# Patient Record
Sex: MALE | NOT HISPANIC OR LATINO | Employment: UNEMPLOYED | URBAN - METROPOLITAN AREA
[De-identification: names, ages, dates, MRNs, and addresses within clinical notes are randomized per-mention and may not be internally consistent; named-entity substitution may affect disease eponyms.]

---

## 2021-01-01 ENCOUNTER — VIRTUAL VISIT (OUTPATIENT)
Dept: ONCOLOGY | Facility: CLINIC | Age: 46
End: 2021-01-01
Attending: REGISTERED NURSE
Payer: MEDICAID

## 2021-01-01 ENCOUNTER — VIRTUAL VISIT (OUTPATIENT)
Dept: ONCOLOGY | Facility: CLINIC | Age: 46
End: 2021-01-01
Attending: PHYSICIAN ASSISTANT
Payer: MEDICAID

## 2021-01-01 ENCOUNTER — TELEPHONE (OUTPATIENT)
Facility: CLINIC | Age: 46
End: 2021-01-01

## 2021-01-01 ENCOUNTER — APPOINTMENT (OUTPATIENT)
Dept: GENERAL RADIOLOGY | Facility: CLINIC | Age: 46
End: 2021-01-01
Attending: INTERNAL MEDICINE
Payer: MEDICAID

## 2021-01-01 ENCOUNTER — HOSPITAL ENCOUNTER (OUTPATIENT)
Facility: CLINIC | Age: 46
Discharge: HOME | End: 2021-12-10
Admitting: PHYSICIAN ASSISTANT
Payer: MEDICAID

## 2021-01-01 ENCOUNTER — APPOINTMENT (OUTPATIENT)
Dept: PHYSICAL THERAPY | Facility: CLINIC | Age: 46
End: 2021-01-01
Attending: STUDENT IN AN ORGANIZED HEALTH CARE EDUCATION/TRAINING PROGRAM
Payer: MEDICAID

## 2021-01-01 ENCOUNTER — APPOINTMENT (OUTPATIENT)
Dept: OCCUPATIONAL THERAPY | Facility: CLINIC | Age: 46
End: 2021-01-01
Attending: PHYSICIAN ASSISTANT
Payer: MEDICAID

## 2021-01-01 ENCOUNTER — APPOINTMENT (OUTPATIENT)
Dept: OCCUPATIONAL THERAPY | Facility: CLINIC | Age: 46
End: 2021-01-01
Attending: STUDENT IN AN ORGANIZED HEALTH CARE EDUCATION/TRAINING PROGRAM
Payer: MEDICAID

## 2021-01-01 ENCOUNTER — PATIENT OUTREACH (OUTPATIENT)
Dept: ONCOLOGY | Facility: CLINIC | Age: 46
End: 2021-01-01
Payer: MEDICAID

## 2021-01-01 ENCOUNTER — VIRTUAL VISIT (OUTPATIENT)
Dept: PALLIATIVE CARE | Facility: CLINIC | Age: 46
End: 2021-01-01
Attending: INTERNAL MEDICINE
Payer: MEDICAID

## 2021-01-01 ENCOUNTER — VIRTUAL VISIT (OUTPATIENT)
Dept: RADIATION ONCOLOGY | Facility: HOSPITAL | Age: 46
End: 2021-01-01
Attending: CLINICAL NURSE SPECIALIST

## 2021-01-01 ENCOUNTER — LAB (OUTPATIENT)
Dept: INFUSION THERAPY | Facility: HOSPITAL | Age: 46
End: 2021-01-01
Attending: PHYSICIAN ASSISTANT
Payer: MEDICAID

## 2021-01-01 ENCOUNTER — DOCUMENTATION ONLY (OUTPATIENT)
Facility: CLINIC | Age: 46
End: 2021-01-01

## 2021-01-01 ENCOUNTER — APPOINTMENT (OUTPATIENT)
Dept: OCCUPATIONAL THERAPY | Facility: CLINIC | Age: 46
End: 2021-01-01
Attending: INTERNAL MEDICINE
Payer: MEDICAID

## 2021-01-01 ENCOUNTER — HOSPITAL ENCOUNTER (INPATIENT)
Facility: CLINIC | Age: 46
LOS: 4 days | Discharge: HOME | End: 2021-12-04
Attending: INTERNAL MEDICINE | Admitting: INTERNAL MEDICINE
Payer: MEDICAID

## 2021-01-01 ENCOUNTER — NURSE TRIAGE (OUTPATIENT)
Dept: ONCOLOGY | Facility: CLINIC | Age: 46
End: 2021-01-01
Payer: MEDICAID

## 2021-01-01 ENCOUNTER — INFUSION THERAPY VISIT (OUTPATIENT)
Dept: INFUSION THERAPY | Facility: CLINIC | Age: 46
End: 2021-01-01
Attending: REGISTERED NURSE
Payer: MEDICAID

## 2021-01-01 ENCOUNTER — PATIENT OUTREACH (OUTPATIENT)
Dept: PALLIATIVE CARE | Facility: CLINIC | Age: 46
End: 2021-01-01
Payer: MEDICAID

## 2021-01-01 ENCOUNTER — HOSPITAL ENCOUNTER (OUTPATIENT)
Age: 46
End: 2021-01-01
Attending: INTERNAL MEDICINE | Admitting: INTERNAL MEDICINE
Payer: COMMERCIAL

## 2021-01-01 VITALS
HEART RATE: 130 BPM | OXYGEN SATURATION: 100 % | SYSTOLIC BLOOD PRESSURE: 123 MMHG | RESPIRATION RATE: 18 BRPM | DIASTOLIC BLOOD PRESSURE: 84 MMHG | TEMPERATURE: 99 F

## 2021-01-01 VITALS
RESPIRATION RATE: 20 BRPM | SYSTOLIC BLOOD PRESSURE: 141 MMHG | OXYGEN SATURATION: 100 % | HEIGHT: 74 IN | TEMPERATURE: 97.2 F | DIASTOLIC BLOOD PRESSURE: 78 MMHG | HEART RATE: 135 BPM | BODY MASS INDEX: 32.39 KG/M2 | WEIGHT: 252.4 LBS

## 2021-01-01 VITALS
HEART RATE: 116 BPM | WEIGHT: 257.4 LBS | BODY MASS INDEX: 33.03 KG/M2 | TEMPERATURE: 98.4 F | HEIGHT: 74 IN | OXYGEN SATURATION: 100 % | RESPIRATION RATE: 18 BRPM | DIASTOLIC BLOOD PRESSURE: 70 MMHG | SYSTOLIC BLOOD PRESSURE: 118 MMHG

## 2021-01-01 VITALS
TEMPERATURE: 98.8 F | RESPIRATION RATE: 18 BRPM | SYSTOLIC BLOOD PRESSURE: 121 MMHG | OXYGEN SATURATION: 100 % | HEART RATE: 120 BPM | DIASTOLIC BLOOD PRESSURE: 77 MMHG

## 2021-01-01 VITALS
OXYGEN SATURATION: 100 % | TEMPERATURE: 98.1 F | HEART RATE: 117 BPM | DIASTOLIC BLOOD PRESSURE: 79 MMHG | SYSTOLIC BLOOD PRESSURE: 123 MMHG | RESPIRATION RATE: 20 BRPM

## 2021-01-01 VITALS
HEART RATE: 119 BPM | SYSTOLIC BLOOD PRESSURE: 128 MMHG | RESPIRATION RATE: 20 BRPM | OXYGEN SATURATION: 100 % | TEMPERATURE: 98.3 F | DIASTOLIC BLOOD PRESSURE: 75 MMHG

## 2021-01-01 VITALS
OXYGEN SATURATION: 100 % | SYSTOLIC BLOOD PRESSURE: 122 MMHG | TEMPERATURE: 98.3 F | HEART RATE: 109 BPM | RESPIRATION RATE: 15 BRPM | DIASTOLIC BLOOD PRESSURE: 77 MMHG

## 2021-01-01 DIAGNOSIS — R19.7 DIARRHEA, UNSPECIFIED TYPE: ICD-10-CM

## 2021-01-01 DIAGNOSIS — R63.4 WEIGHT LOSS: ICD-10-CM

## 2021-01-01 DIAGNOSIS — C49.9 SARCOMA (H): Primary | ICD-10-CM

## 2021-01-01 DIAGNOSIS — C49.9 SARCOMA (H): ICD-10-CM

## 2021-01-01 DIAGNOSIS — D50.9 MICROCYTIC ANEMIA: ICD-10-CM

## 2021-01-01 DIAGNOSIS — G89.3 CANCER ASSOCIATED PAIN: Primary | ICD-10-CM

## 2021-01-01 DIAGNOSIS — G89.3 CANCER ASSOCIATED PAIN: ICD-10-CM

## 2021-01-01 DIAGNOSIS — D70.9 NEUTROPENIA, UNSPECIFIED TYPE (H): ICD-10-CM

## 2021-01-01 DIAGNOSIS — R11.0 NAUSEA: ICD-10-CM

## 2021-01-01 DIAGNOSIS — K59.00 CONSTIPATION, UNSPECIFIED CONSTIPATION TYPE: ICD-10-CM

## 2021-01-01 DIAGNOSIS — Z51.5 ENCOUNTER FOR PALLIATIVE CARE: ICD-10-CM

## 2021-01-01 DIAGNOSIS — E87.6 HYPOKALEMIA: ICD-10-CM

## 2021-01-01 DIAGNOSIS — M79.89 SOFT TISSUE MASS: ICD-10-CM

## 2021-01-01 LAB
ABO/RH(D): NORMAL
ALBUMIN SERPL-MCNC: 2 G/DL (ref 3.4–5)
ALBUMIN SERPL-MCNC: 2.2 G/DL (ref 3.4–5)
ALBUMIN SERPL-MCNC: 2.3 G/DL (ref 3.4–5)
ALBUMIN SERPL-MCNC: 2.4 G/DL (ref 3.4–5)
ALBUMIN SERPL-MCNC: 2.5 G/DL (ref 3.4–5)
ALBUMIN SERPL-MCNC: 2.6 G/DL (ref 3.5–5)
ALBUMIN SERPL-MCNC: 2.7 G/DL (ref 3.5–5)
ALBUMIN SERPL-MCNC: 3 G/DL (ref 3.5–5)
ALBUMIN UR-MCNC: 30 MG/DL
ALBUMIN UR-MCNC: 70 MG/DL
ALP SERPL-CCNC: 142 U/L (ref 40–150)
ALP SERPL-CCNC: 143 U/L (ref 40–150)
ALP SERPL-CCNC: 208 U/L (ref 45–120)
ALP SERPL-CCNC: 218 U/L (ref 40–150)
ALP SERPL-CCNC: 237 U/L (ref 45–120)
ALP SERPL-CCNC: 239 U/L (ref 40–150)
ALP SERPL-CCNC: 247 U/L (ref 45–120)
ALP SERPL-CCNC: 275 U/L (ref 40–150)
ALP SERPL-CCNC: 315 U/L (ref 40–150)
ALP SERPL-CCNC: 320 U/L (ref 40–150)
ALP SERPL-CCNC: 397 U/L (ref 40–150)
ALT SERPL W P-5'-P-CCNC: 16 U/L (ref 0–70)
ALT SERPL W P-5'-P-CCNC: 17 U/L (ref 0–70)
ALT SERPL W P-5'-P-CCNC: 17 U/L (ref 0–70)
ALT SERPL W P-5'-P-CCNC: 18 U/L (ref 0–45)
ALT SERPL W P-5'-P-CCNC: 18 U/L (ref 0–45)
ALT SERPL W P-5'-P-CCNC: 18 U/L (ref 0–70)
ALT SERPL W P-5'-P-CCNC: 19 U/L (ref 0–70)
ALT SERPL W P-5'-P-CCNC: 21 U/L (ref 0–70)
ALT SERPL W P-5'-P-CCNC: 24 U/L (ref 0–45)
ALT SERPL W P-5'-P-CCNC: 24 U/L (ref 0–70)
ALT SERPL W P-5'-P-CCNC: 26 U/L (ref 0–70)
ANION GAP SERPL CALCULATED.3IONS-SCNC: 6 MMOL/L (ref 3–14)
ANION GAP SERPL CALCULATED.3IONS-SCNC: 7 MMOL/L (ref 3–14)
ANION GAP SERPL CALCULATED.3IONS-SCNC: 8 MMOL/L (ref 3–14)
ANION GAP SERPL CALCULATED.3IONS-SCNC: 8 MMOL/L (ref 5–18)
ANION GAP SERPL CALCULATED.3IONS-SCNC: 9 MMOL/L (ref 3–14)
ANION GAP SERPL CALCULATED.3IONS-SCNC: 9 MMOL/L (ref 5–18)
ANION GAP SERPL CALCULATED.3IONS-SCNC: 9 MMOL/L (ref 5–18)
ANTIBODY SCREEN: NEGATIVE
APPEARANCE UR: CLEAR
APPEARANCE UR: CLEAR
AST SERPL W P-5'-P-CCNC: 13 U/L (ref 0–45)
AST SERPL W P-5'-P-CCNC: 14 U/L (ref 0–40)
AST SERPL W P-5'-P-CCNC: 15 U/L (ref 0–45)
AST SERPL W P-5'-P-CCNC: 18 U/L (ref 0–45)
AST SERPL W P-5'-P-CCNC: 21 U/L (ref 0–45)
AST SERPL W P-5'-P-CCNC: 22 U/L (ref 0–40)
AST SERPL W P-5'-P-CCNC: 22 U/L (ref 0–40)
AST SERPL W P-5'-P-CCNC: 22 U/L (ref 0–45)
AST SERPL W P-5'-P-CCNC: 22 U/L (ref 0–45)
AST SERPL W P-5'-P-CCNC: 25 U/L (ref 0–45)
AST SERPL W P-5'-P-CCNC: 35 U/L (ref 0–45)
ATRIAL RATE - MUSE: 111 BPM
ATRIAL RATE - MUSE: 115 BPM
ATRIAL RATE - MUSE: 126 BPM
BACTERIA #/AREA URNS HPF: ABNORMAL /HPF
BACTERIA BLD CULT: NO GROWTH
BASOPHILS # BLD AUTO: 0 10E3/UL (ref 0–0.2)
BASOPHILS # BLD AUTO: 0.1 10E3/UL (ref 0–0.2)
BASOPHILS # BLD MANUAL: 0 10E3/UL (ref 0–0.2)
BASOPHILS # BLD MANUAL: 0.1 10E3/UL (ref 0–0.2)
BASOPHILS # BLD MANUAL: 0.2 10E3/UL (ref 0–0.2)
BASOPHILS NFR BLD AUTO: 0 %
BASOPHILS NFR BLD AUTO: 1 %
BASOPHILS NFR BLD MANUAL: 0 %
BASOPHILS NFR BLD MANUAL: 1 %
BASOPHILS NFR BLD MANUAL: 4 %
BILIRUB SERPL-MCNC: 0.4 MG/DL (ref 0.2–1.3)
BILIRUB SERPL-MCNC: 0.6 MG/DL (ref 0.2–1.3)
BILIRUB SERPL-MCNC: 0.7 MG/DL (ref 0.2–1.3)
BILIRUB SERPL-MCNC: 0.7 MG/DL (ref 0.2–1.3)
BILIRUB SERPL-MCNC: 0.8 MG/DL (ref 0.2–1.3)
BILIRUB SERPL-MCNC: 0.8 MG/DL (ref 0.2–1.3)
BILIRUB SERPL-MCNC: 0.8 MG/DL (ref 0–1)
BILIRUB UR QL STRIP: NEGATIVE
BILIRUB UR QL STRIP: NEGATIVE
BLD PROD TYP BPU: NORMAL
BLD PROD TYP BPU: NORMAL
BLOOD COMPONENT TYPE: NORMAL
BLOOD COMPONENT TYPE: NORMAL
BUN SERPL-MCNC: 12 MG/DL (ref 7–30)
BUN SERPL-MCNC: 14 MG/DL (ref 7–30)
BUN SERPL-MCNC: 5 MG/DL (ref 7–30)
BUN SERPL-MCNC: 5 MG/DL (ref 8–22)
BUN SERPL-MCNC: 7 MG/DL (ref 7–30)
BUN SERPL-MCNC: 8 MG/DL (ref 7–30)
BUN SERPL-MCNC: 8 MG/DL (ref 7–30)
BUN SERPL-MCNC: 8 MG/DL (ref 8–22)
BUN SERPL-MCNC: 8 MG/DL (ref 8–22)
BUN SERPL-MCNC: 9 MG/DL (ref 7–30)
BUN SERPL-MCNC: 9 MG/DL (ref 7–30)
CALCIUM SERPL-MCNC: 8.7 MG/DL (ref 8.5–10.1)
CALCIUM SERPL-MCNC: 8.8 MG/DL (ref 8.5–10.1)
CALCIUM SERPL-MCNC: 8.9 MG/DL (ref 8.5–10.1)
CALCIUM SERPL-MCNC: 9 MG/DL (ref 8.5–10.5)
CALCIUM SERPL-MCNC: 9 MG/DL (ref 8.5–10.5)
CALCIUM SERPL-MCNC: 9.1 MG/DL (ref 8.5–10.1)
CALCIUM SERPL-MCNC: 9.1 MG/DL (ref 8.5–10.5)
CALCIUM SERPL-MCNC: 9.3 MG/DL (ref 8.5–10.1)
CALCIUM SERPL-MCNC: 9.3 MG/DL (ref 8.5–10.1)
CHLORIDE BLD-SCNC: 100 MMOL/L (ref 94–109)
CHLORIDE BLD-SCNC: 101 MMOL/L (ref 98–107)
CHLORIDE BLD-SCNC: 102 MMOL/L (ref 94–109)
CHLORIDE BLD-SCNC: 103 MMOL/L (ref 94–109)
CHLORIDE BLD-SCNC: 103 MMOL/L (ref 98–107)
CHLORIDE BLD-SCNC: 104 MMOL/L (ref 94–109)
CHLORIDE BLD-SCNC: 104 MMOL/L (ref 94–109)
CHLORIDE BLD-SCNC: 105 MMOL/L (ref 94–109)
CHLORIDE BLD-SCNC: 106 MMOL/L (ref 94–109)
CHLORIDE BLD-SCNC: 99 MMOL/L (ref 94–109)
CHLORIDE BLD-SCNC: 99 MMOL/L (ref 98–107)
CO2 SERPL-SCNC: 23 MMOL/L (ref 20–32)
CO2 SERPL-SCNC: 24 MMOL/L (ref 20–32)
CO2 SERPL-SCNC: 25 MMOL/L (ref 20–32)
CO2 SERPL-SCNC: 25 MMOL/L (ref 20–32)
CO2 SERPL-SCNC: 25 MMOL/L (ref 22–31)
CO2 SERPL-SCNC: 26 MMOL/L (ref 20–32)
CO2 SERPL-SCNC: 26 MMOL/L (ref 22–31)
CO2 SERPL-SCNC: 27 MMOL/L (ref 20–32)
CO2 SERPL-SCNC: 28 MMOL/L (ref 22–31)
CODING SYSTEM: NORMAL
CODING SYSTEM: NORMAL
COLOR UR AUTO: ABNORMAL
COLOR UR AUTO: ABNORMAL
CREAT SERPL-MCNC: 0.53 MG/DL (ref 0.66–1.25)
CREAT SERPL-MCNC: 0.55 MG/DL (ref 0.66–1.25)
CREAT SERPL-MCNC: 0.58 MG/DL (ref 0.66–1.25)
CREAT SERPL-MCNC: 0.6 MG/DL (ref 0.66–1.25)
CREAT SERPL-MCNC: 0.6 MG/DL (ref 0.66–1.25)
CREAT SERPL-MCNC: 0.62 MG/DL (ref 0.66–1.25)
CREAT SERPL-MCNC: 0.64 MG/DL (ref 0.7–1.3)
CREAT SERPL-MCNC: 0.69 MG/DL (ref 0.7–1.3)
CREAT SERPL-MCNC: 0.75 MG/DL (ref 0.66–1.25)
CREAT SERPL-MCNC: 0.77 MG/DL (ref 0.7–1.3)
CREAT SERPL-MCNC: 0.81 MG/DL (ref 0.66–1.25)
CROSSMATCH: NORMAL
CROSSMATCH: NORMAL
CRP SERPL-MCNC: 120 MG/L (ref 0–8)
DACRYOCYTES BLD QL SMEAR: SLIGHT
DACRYOCYTES BLD QL SMEAR: SLIGHT
DIASTOLIC BLOOD PRESSURE - MUSE: NORMAL MMHG
ELLIPTOCYTES BLD QL SMEAR: SLIGHT
ELLIPTOCYTES BLD QL SMEAR: SLIGHT
EOSINOPHIL # BLD AUTO: 0 10E3/UL (ref 0–0.7)
EOSINOPHIL # BLD AUTO: 0 10E3/UL (ref 0–0.7)
EOSINOPHIL # BLD AUTO: 0.1 10E3/UL (ref 0–0.7)
EOSINOPHIL # BLD AUTO: 0.2 10E3/UL (ref 0–0.7)
EOSINOPHIL # BLD MANUAL: 0 10E3/UL (ref 0–0.7)
EOSINOPHIL NFR BLD AUTO: 0 %
EOSINOPHIL NFR BLD AUTO: 1 %
EOSINOPHIL NFR BLD MANUAL: 0 %
EOSINOPHIL NFR BLD MANUAL: 1 %
ERYTHROCYTE [DISTWIDTH] IN BLOOD BY AUTOMATED COUNT: 14.8 % (ref 10–15)
ERYTHROCYTE [DISTWIDTH] IN BLOOD BY AUTOMATED COUNT: 14.9 % (ref 10–15)
ERYTHROCYTE [DISTWIDTH] IN BLOOD BY AUTOMATED COUNT: 15.1 % (ref 10–15)
ERYTHROCYTE [DISTWIDTH] IN BLOOD BY AUTOMATED COUNT: 17.2 % (ref 10–15)
ERYTHROCYTE [DISTWIDTH] IN BLOOD BY AUTOMATED COUNT: 17.2 % (ref 10–15)
ERYTHROCYTE [DISTWIDTH] IN BLOOD BY AUTOMATED COUNT: 17.8 % (ref 10–15)
ERYTHROCYTE [DISTWIDTH] IN BLOOD BY AUTOMATED COUNT: 18 % (ref 10–15)
ERYTHROCYTE [DISTWIDTH] IN BLOOD BY AUTOMATED COUNT: 18.1 % (ref 10–15)
ERYTHROCYTE [DISTWIDTH] IN BLOOD BY AUTOMATED COUNT: 18.1 % (ref 10–15)
ERYTHROCYTE [DISTWIDTH] IN BLOOD BY AUTOMATED COUNT: 18.3 % (ref 10–15)
ERYTHROCYTE [DISTWIDTH] IN BLOOD BY AUTOMATED COUNT: 18.4 % (ref 10–15)
ERYTHROCYTE [DISTWIDTH] IN BLOOD BY AUTOMATED COUNT: 18.6 % (ref 10–15)
ERYTHROCYTE [DISTWIDTH] IN BLOOD BY AUTOMATED COUNT: 19.8 % (ref 10–15)
FERRITIN SERPL-MCNC: 1559 NG/ML (ref 26–388)
FIBRINOGEN PPP-MCNC: 455 MG/DL (ref 170–490)
FRAGMENTS BLD QL SMEAR: SLIGHT
GFR SERPL CREATININE-BSD FRML MDRD: >90 ML/MIN/1.73M2
GLUCOSE BLD-MCNC: 105 MG/DL (ref 70–125)
GLUCOSE BLD-MCNC: 106 MG/DL (ref 70–99)
GLUCOSE BLD-MCNC: 109 MG/DL (ref 70–125)
GLUCOSE BLD-MCNC: 110 MG/DL (ref 70–125)
GLUCOSE BLD-MCNC: 112 MG/DL (ref 70–99)
GLUCOSE BLD-MCNC: 112 MG/DL (ref 70–99)
GLUCOSE BLD-MCNC: 116 MG/DL (ref 70–99)
GLUCOSE BLD-MCNC: 119 MG/DL (ref 70–99)
GLUCOSE BLD-MCNC: 121 MG/DL (ref 70–99)
GLUCOSE BLD-MCNC: 124 MG/DL (ref 70–99)
GLUCOSE BLD-MCNC: 125 MG/DL (ref 70–99)
GLUCOSE UR STRIP-MCNC: NEGATIVE MG/DL
GLUCOSE UR STRIP-MCNC: NEGATIVE MG/DL
GRANULAR CAST: 1 /LPF
HCT VFR BLD AUTO: 17.7 % (ref 40–53)
HCT VFR BLD AUTO: 20.7 % (ref 40–53)
HCT VFR BLD AUTO: 20.8 % (ref 40–53)
HCT VFR BLD AUTO: 21.9 % (ref 40–53)
HCT VFR BLD AUTO: 22.3 % (ref 40–53)
HCT VFR BLD AUTO: 22.3 % (ref 40–53)
HCT VFR BLD AUTO: 22.8 % (ref 40–53)
HCT VFR BLD AUTO: 22.9 % (ref 40–53)
HCT VFR BLD AUTO: 23.1 % (ref 40–53)
HCT VFR BLD AUTO: 23.2 % (ref 40–53)
HCT VFR BLD AUTO: 23.9 % (ref 40–53)
HCT VFR BLD AUTO: 24.3 % (ref 40–53)
HCT VFR BLD AUTO: 24.8 % (ref 40–53)
HGB BLD-MCNC: 5.8 G/DL (ref 13.3–17.7)
HGB BLD-MCNC: 6.5 G/DL (ref 13.3–17.7)
HGB BLD-MCNC: 6.8 G/DL (ref 13.3–17.7)
HGB BLD-MCNC: 7.1 G/DL (ref 13.3–17.7)
HGB BLD-MCNC: 7.1 G/DL (ref 13.3–17.7)
HGB BLD-MCNC: 7.2 G/DL (ref 13.3–17.7)
HGB BLD-MCNC: 7.4 G/DL (ref 13.3–17.7)
HGB BLD-MCNC: 7.4 G/DL (ref 13.3–17.7)
HGB BLD-MCNC: 7.5 G/DL (ref 13.3–17.7)
HGB BLD-MCNC: 7.6 G/DL (ref 13.3–17.7)
HGB BLD-MCNC: 7.6 G/DL (ref 13.3–17.7)
HGB BLD-MCNC: 7.7 G/DL (ref 13.3–17.7)
HGB BLD-MCNC: 8.1 G/DL (ref 13.3–17.7)
HGB UR QL STRIP: ABNORMAL
HGB UR QL STRIP: ABNORMAL
IMM GRANULOCYTES # BLD: 0.1 10E3/UL
IMM GRANULOCYTES # BLD: 0.2 10E3/UL
IMM GRANULOCYTES # BLD: 0.3 10E3/UL
IMM GRANULOCYTES NFR BLD: 1 %
IMM GRANULOCYTES NFR BLD: 2 %
IMM GRANULOCYTES NFR BLD: 3 %
IMM GRANULOCYTES NFR BLD: 3 %
INR PPP: 1.2 (ref 0.85–1.15)
INTERPRETATION ECG - MUSE: NORMAL
IRON SATN MFR SERPL: 19 % (ref 15–46)
IRON SERPL-MCNC: 29 UG/DL (ref 35–180)
ISSUE DATE AND TIME: NORMAL
ISSUE DATE AND TIME: NORMAL
KETONES UR STRIP-MCNC: 60 MG/DL
KETONES UR STRIP-MCNC: 80 MG/DL
LACTATE SERPL-SCNC: 1 MMOL/L (ref 0.7–2)
LACTATE SERPL-SCNC: 1.1 MMOL/L (ref 0.7–2)
LDH SERPL L TO P-CCNC: 283 U/L (ref 85–227)
LDH SERPL L TO P-CCNC: 287 U/L (ref 85–227)
LEUKOCYTE ESTERASE UR QL STRIP: NEGATIVE
LEUKOCYTE ESTERASE UR QL STRIP: NEGATIVE
LYMPHOCYTES # BLD AUTO: 0.4 10E3/UL (ref 0.8–5.3)
LYMPHOCYTES # BLD AUTO: 0.5 10E3/UL (ref 0.8–5.3)
LYMPHOCYTES # BLD AUTO: 0.5 10E3/UL (ref 0.8–5.3)
LYMPHOCYTES # BLD AUTO: 0.7 10E3/UL (ref 0.8–5.3)
LYMPHOCYTES # BLD AUTO: 0.7 10E3/UL (ref 0.8–5.3)
LYMPHOCYTES # BLD AUTO: 1 10E3/UL (ref 0.8–5.3)
LYMPHOCYTES # BLD MANUAL: 0 10E3/UL (ref 0.8–5.3)
LYMPHOCYTES # BLD MANUAL: 0.2 10E3/UL (ref 0.8–5.3)
LYMPHOCYTES # BLD MANUAL: 0.4 10E3/UL (ref 0.8–5.3)
LYMPHOCYTES # BLD MANUAL: 0.4 10E3/UL (ref 0.8–5.3)
LYMPHOCYTES # BLD MANUAL: 0.7 10E3/UL (ref 0.8–5.3)
LYMPHOCYTES NFR BLD AUTO: 3 %
LYMPHOCYTES NFR BLD AUTO: 4 %
LYMPHOCYTES NFR BLD AUTO: 4 %
LYMPHOCYTES NFR BLD AUTO: 5 %
LYMPHOCYTES NFR BLD AUTO: 6 %
LYMPHOCYTES NFR BLD AUTO: 6 %
LYMPHOCYTES NFR BLD AUTO: 9 %
LYMPHOCYTES NFR BLD AUTO: 9 %
LYMPHOCYTES NFR BLD MANUAL: 0 %
LYMPHOCYTES NFR BLD MANUAL: 10 %
LYMPHOCYTES NFR BLD MANUAL: 10 %
LYMPHOCYTES NFR BLD MANUAL: 3 %
LYMPHOCYTES NFR BLD MANUAL: 52 %
MAGNESIUM SERPL-MCNC: 2.2 MG/DL (ref 1.6–2.3)
MAGNESIUM SERPL-MCNC: 2.3 MG/DL (ref 1.6–2.3)
MAGNESIUM SERPL-MCNC: 2.3 MG/DL (ref 1.8–2.6)
MCH RBC QN AUTO: 24.3 PG (ref 26.5–33)
MCH RBC QN AUTO: 24.6 PG (ref 26.5–33)
MCH RBC QN AUTO: 24.7 PG (ref 26.5–33)
MCH RBC QN AUTO: 24.8 PG (ref 26.5–33)
MCH RBC QN AUTO: 24.9 PG (ref 26.5–33)
MCH RBC QN AUTO: 25 PG (ref 26.5–33)
MCH RBC QN AUTO: 25.1 PG (ref 26.5–33)
MCH RBC QN AUTO: 25.1 PG (ref 26.5–33)
MCH RBC QN AUTO: 25.2 PG (ref 26.5–33)
MCH RBC QN AUTO: 25.5 PG (ref 26.5–33)
MCH RBC QN AUTO: 25.7 PG (ref 26.5–33)
MCH RBC QN AUTO: 25.7 PG (ref 26.5–33)
MCH RBC QN AUTO: 25.9 PG (ref 26.5–33)
MCHC RBC AUTO-ENTMCNC: 31.4 G/DL (ref 31.5–36.5)
MCHC RBC AUTO-ENTMCNC: 31.4 G/DL (ref 31.5–36.5)
MCHC RBC AUTO-ENTMCNC: 31.7 G/DL (ref 31.5–36.5)
MCHC RBC AUTO-ENTMCNC: 31.8 G/DL (ref 31.5–36.5)
MCHC RBC AUTO-ENTMCNC: 31.8 G/DL (ref 31.5–36.5)
MCHC RBC AUTO-ENTMCNC: 32.3 G/DL (ref 31.5–36.5)
MCHC RBC AUTO-ENTMCNC: 32.4 G/DL (ref 31.5–36.5)
MCHC RBC AUTO-ENTMCNC: 32.5 G/DL (ref 31.5–36.5)
MCHC RBC AUTO-ENTMCNC: 32.7 G/DL (ref 31.5–36.5)
MCHC RBC AUTO-ENTMCNC: 32.7 G/DL (ref 31.5–36.5)
MCHC RBC AUTO-ENTMCNC: 32.8 G/DL (ref 31.5–36.5)
MCHC RBC AUTO-ENTMCNC: 32.9 G/DL (ref 31.5–36.5)
MCHC RBC AUTO-ENTMCNC: 33.2 G/DL (ref 31.5–36.5)
MCV RBC AUTO: 75 FL (ref 78–100)
MCV RBC AUTO: 77 FL (ref 78–100)
MCV RBC AUTO: 78 FL (ref 78–100)
MCV RBC AUTO: 79 FL (ref 78–100)
MCV RBC AUTO: 80 FL (ref 78–100)
METAMYELOCYTES # BLD MANUAL: 0 10E3/UL
METAMYELOCYTES NFR BLD MANUAL: 4 %
MONOCYTES # BLD AUTO: 0.3 10E3/UL (ref 0–1.3)
MONOCYTES # BLD AUTO: 0.4 10E3/UL (ref 0–1.3)
MONOCYTES # BLD AUTO: 0.8 10E3/UL (ref 0–1.3)
MONOCYTES # BLD AUTO: 0.8 10E3/UL (ref 0–1.3)
MONOCYTES # BLD AUTO: 0.9 10E3/UL (ref 0–1.3)
MONOCYTES # BLD AUTO: 1 10E3/UL (ref 0–1.3)
MONOCYTES # BLD AUTO: 1.1 10E3/UL (ref 0–1.3)
MONOCYTES # BLD AUTO: 1.1 10E3/UL (ref 0–1.3)
MONOCYTES # BLD MANUAL: 0 10E3/UL (ref 0–1.3)
MONOCYTES # BLD MANUAL: 0.1 10E3/UL (ref 0–1.3)
MONOCYTES NFR BLD AUTO: 10 %
MONOCYTES NFR BLD AUTO: 11 %
MONOCYTES NFR BLD AUTO: 4 %
MONOCYTES NFR BLD AUTO: 7 %
MONOCYTES NFR BLD AUTO: 8 %
MONOCYTES NFR BLD AUTO: 9 %
MONOCYTES NFR BLD MANUAL: 0 %
MONOCYTES NFR BLD MANUAL: 12 %
NEUTROPHILS # BLD AUTO: 10.1 10E3/UL (ref 1.6–8.3)
NEUTROPHILS # BLD AUTO: 11.8 10E3/UL (ref 1.6–8.3)
NEUTROPHILS # BLD AUTO: 12.9 10E3/UL (ref 1.6–8.3)
NEUTROPHILS # BLD AUTO: 3.4 10E3/UL (ref 1.6–8.3)
NEUTROPHILS # BLD AUTO: 5.9 10E3/UL (ref 1.6–8.3)
NEUTROPHILS # BLD AUTO: 8.4 10E3/UL (ref 1.6–8.3)
NEUTROPHILS # BLD AUTO: 8.9 10E3/UL (ref 1.6–8.3)
NEUTROPHILS # BLD AUTO: 9.7 10E3/UL (ref 1.6–8.3)
NEUTROPHILS # BLD MANUAL: 0 10E3/UL (ref 1.6–8.3)
NEUTROPHILS # BLD MANUAL: 0.3 10E3/UL (ref 1.6–8.3)
NEUTROPHILS # BLD MANUAL: 3.2 10E3/UL (ref 1.6–8.3)
NEUTROPHILS # BLD MANUAL: 5.9 10E3/UL (ref 1.6–8.3)
NEUTROPHILS # BLD MANUAL: 7.7 10E3/UL (ref 1.6–8.3)
NEUTROPHILS NFR BLD AUTO: 77 %
NEUTROPHILS NFR BLD AUTO: 80 %
NEUTROPHILS NFR BLD AUTO: 81 %
NEUTROPHILS NFR BLD AUTO: 81 %
NEUTROPHILS NFR BLD AUTO: 86 %
NEUTROPHILS NFR BLD AUTO: 87 %
NEUTROPHILS NFR BLD AUTO: 88 %
NEUTROPHILS NFR BLD AUTO: 90 %
NEUTROPHILS NFR BLD MANUAL: 0 %
NEUTROPHILS NFR BLD MANUAL: 32 %
NEUTROPHILS NFR BLD MANUAL: 85 %
NEUTROPHILS NFR BLD MANUAL: 90 %
NEUTROPHILS NFR BLD MANUAL: 96 %
NITRATE UR QL: NEGATIVE
NITRATE UR QL: NEGATIVE
NRBC # BLD AUTO: 0 10E3/UL
NRBC BLD AUTO-RTO: 0 /100
NT-PROBNP SERPL-MCNC: 150 PG/ML (ref 0–450)
P AXIS - MUSE: 26 DEGREES
P AXIS - MUSE: 73 DEGREES
P AXIS - MUSE: 79 DEGREES
PH UR STRIP: 6 [PH] (ref 5–7)
PH UR STRIP: 7 [PH] (ref 5–7)
PHOSPHATE SERPL-MCNC: 2.1 MG/DL (ref 2.5–4.5)
PHOSPHATE SERPL-MCNC: 2.7 MG/DL (ref 2.5–4.5)
PHOSPHATE SERPL-MCNC: 2.8 MG/DL (ref 2.5–4.5)
PHOSPHATE SERPL-MCNC: 2.8 MG/DL (ref 2.5–4.5)
PHOSPHATE SERPL-MCNC: 3 MG/DL (ref 2.5–4.5)
PHOSPHATE SERPL-MCNC: 3.1 MG/DL (ref 2.5–4.5)
PHOSPHATE SERPL-MCNC: 3.3 MG/DL (ref 2.5–4.5)
PHOSPHATE SERPL-MCNC: 3.5 MG/DL (ref 2.5–4.5)
PLAT MORPH BLD: ABNORMAL
PLATELET # BLD AUTO: 128 10E3/UL (ref 150–450)
PLATELET # BLD AUTO: 161 10E3/UL (ref 150–450)
PLATELET # BLD AUTO: 220 10E3/UL (ref 150–450)
PLATELET # BLD AUTO: 224 10E3/UL (ref 150–450)
PLATELET # BLD AUTO: 224 10E3/UL (ref 150–450)
PLATELET # BLD AUTO: 255 10E3/UL (ref 150–450)
PLATELET # BLD AUTO: 521 10E3/UL (ref 150–450)
PLATELET # BLD AUTO: 657 10E3/UL (ref 150–450)
PLATELET # BLD AUTO: 664 10E3/UL (ref 150–450)
PLATELET # BLD AUTO: 705 10E3/UL (ref 150–450)
PLATELET # BLD AUTO: 706 10E3/UL (ref 150–450)
PLATELET # BLD AUTO: 708 10E3/UL (ref 150–450)
PLATELET # BLD AUTO: 772 10E3/UL (ref 150–450)
POTASSIUM BLD-SCNC: 3.2 MMOL/L (ref 3.4–5.3)
POTASSIUM BLD-SCNC: 3.3 MMOL/L (ref 3.5–5)
POTASSIUM BLD-SCNC: 3.5 MMOL/L (ref 3.5–5)
POTASSIUM BLD-SCNC: 3.5 MMOL/L (ref 3.5–5)
POTASSIUM BLD-SCNC: 3.6 MMOL/L (ref 3.4–5.3)
POTASSIUM BLD-SCNC: 3.7 MMOL/L (ref 3.4–5.3)
POTASSIUM BLD-SCNC: 3.9 MMOL/L (ref 3.4–5.3)
POTASSIUM BLD-SCNC: 3.9 MMOL/L (ref 3.4–5.3)
PR INTERVAL - MUSE: 122 MS
PR INTERVAL - MUSE: 122 MS
PR INTERVAL - MUSE: 126 MS
PROCALCITONIN SERPL-MCNC: 0.28 NG/ML
PROT SERPL-MCNC: 7.1 G/DL (ref 6.8–8.8)
PROT SERPL-MCNC: 7.2 G/DL (ref 6.8–8.8)
PROT SERPL-MCNC: 7.2 G/DL (ref 6.8–8.8)
PROT SERPL-MCNC: 7.3 G/DL (ref 6.8–8.8)
PROT SERPL-MCNC: 7.4 G/DL (ref 6.8–8.8)
PROT SERPL-MCNC: 7.4 G/DL (ref 6–8)
PROT SERPL-MCNC: 7.5 G/DL (ref 6.8–8.8)
PROT SERPL-MCNC: 7.5 G/DL (ref 6–8)
PROT SERPL-MCNC: 7.6 G/DL (ref 6.8–8.8)
PROT SERPL-MCNC: 7.7 G/DL (ref 6–8)
PROT SERPL-MCNC: 7.8 G/DL (ref 6.8–8.8)
QRS DURATION - MUSE: 68 MS
QRS DURATION - MUSE: 72 MS
QRS DURATION - MUSE: 74 MS
QT - MUSE: 314 MS
QT - MUSE: 336 MS
QT - MUSE: 344 MS
QTC - MUSE: 454 MS
QTC - MUSE: 456 MS
QTC - MUSE: 475 MS
R AXIS - MUSE: 16 DEGREES
R AXIS - MUSE: 2 DEGREES
R AXIS - MUSE: 2 DEGREES
RBC # BLD AUTO: 2.35 10E6/UL (ref 4.4–5.9)
RBC # BLD AUTO: 2.67 10E6/UL (ref 4.4–5.9)
RBC # BLD AUTO: 2.71 10E6/UL (ref 4.4–5.9)
RBC # BLD AUTO: 2.78 10E6/UL (ref 4.4–5.9)
RBC # BLD AUTO: 2.86 10E6/UL (ref 4.4–5.9)
RBC # BLD AUTO: 2.88 10E6/UL (ref 4.4–5.9)
RBC # BLD AUTO: 2.89 10E6/UL (ref 4.4–5.9)
RBC # BLD AUTO: 2.96 10E6/UL (ref 4.4–5.9)
RBC # BLD AUTO: 2.96 10E6/UL (ref 4.4–5.9)
RBC # BLD AUTO: 2.99 10E6/UL (ref 4.4–5.9)
RBC # BLD AUTO: 3.07 10E6/UL (ref 4.4–5.9)
RBC # BLD AUTO: 3.09 10E6/UL (ref 4.4–5.9)
RBC # BLD AUTO: 3.13 10E6/UL (ref 4.4–5.9)
RBC MORPH BLD: ABNORMAL
RBC URINE: 0 /HPF
RBC URINE: 6 /HPF
SARS-COV-2 RNA RESP QL NAA+PROBE: NEGATIVE
SARS-COV-2 RNA RESP QL NAA+PROBE: NEGATIVE
SCANNED LAB RESULT: NORMAL
SODIUM SERPL-SCNC: 133 MMOL/L (ref 133–144)
SODIUM SERPL-SCNC: 135 MMOL/L (ref 133–144)
SODIUM SERPL-SCNC: 135 MMOL/L (ref 136–145)
SODIUM SERPL-SCNC: 136 MMOL/L (ref 133–144)
SODIUM SERPL-SCNC: 136 MMOL/L (ref 136–145)
SODIUM SERPL-SCNC: 137 MMOL/L (ref 133–144)
SODIUM SERPL-SCNC: 137 MMOL/L (ref 136–145)
SP GR UR STRIP: 1.01 (ref 1–1.03)
SP GR UR STRIP: 1.01 (ref 1–1.03)
SPECIMEN EXPIRATION DATE: NORMAL
SQUAMOUS EPITHELIAL: <1 /HPF
SQUAMOUS EPITHELIAL: <1 /HPF
SYSTOLIC BLOOD PRESSURE - MUSE: NORMAL MMHG
T AXIS - MUSE: -11 DEGREES
T AXIS - MUSE: 1 DEGREES
T AXIS - MUSE: 14 DEGREES
TARGETS BLD QL SMEAR: ABNORMAL
TARGETS BLD QL SMEAR: SLIGHT
TIBC SERPL-MCNC: 155 UG/DL (ref 240–430)
TRANSFERRIN SERPL-MCNC: 124 MG/DL (ref 210–360)
TRANSITIONAL EPI: <1 /HPF
UNIT ABO/RH: NORMAL
UNIT ABO/RH: NORMAL
UNIT NUMBER: NORMAL
UNIT NUMBER: NORMAL
UNIT STATUS: NORMAL
UNIT STATUS: NORMAL
UNIT TYPE ISBT: 5100
UNIT TYPE ISBT: 6200
URATE SERPL-MCNC: 3.4 MG/DL (ref 3–8)
URATE SERPL-MCNC: 4.5 MG/DL (ref 3.5–7.2)
URATE SERPL-MCNC: 4.8 MG/DL (ref 3.5–7.2)
UROBILINOGEN UR STRIP-MCNC: 2 MG/DL
UROBILINOGEN UR STRIP-MCNC: 3 MG/DL
VENTRICULAR RATE- MUSE: 111 BPM
VENTRICULAR RATE- MUSE: 115 BPM
VENTRICULAR RATE- MUSE: 126 BPM
WBC # BLD AUTO: 0.3 10E3/UL (ref 4–11)
WBC # BLD AUTO: 0.8 10E3/UL (ref 4–11)
WBC # BLD AUTO: 10.8 10E3/UL (ref 4–11)
WBC # BLD AUTO: 11.6 10E3/UL (ref 4–11)
WBC # BLD AUTO: 12 10E3/UL (ref 4–11)
WBC # BLD AUTO: 13.5 10E3/UL (ref 4–11)
WBC # BLD AUTO: 15.1 10E3/UL (ref 4–11)
WBC # BLD AUTO: 3.8 10E3/UL (ref 4–11)
WBC # BLD AUTO: 4.1 10E3/UL (ref 4–11)
WBC # BLD AUTO: 6.6 10E3/UL (ref 4–11)
WBC # BLD AUTO: 7.2 10E3/UL (ref 4–11)
WBC # BLD AUTO: 8 10E3/UL (ref 4–11)
WBC # BLD AUTO: 9.8 10E3/UL (ref 4–11)
WBC URINE: 1 /HPF
WBC URINE: 2 /HPF

## 2021-01-01 PROCEDURE — 97165 OT EVAL LOW COMPLEX 30 MIN: CPT | Mod: GO | Performed by: OCCUPATIONAL THERAPIST

## 2021-01-01 PROCEDURE — 36591 DRAW BLOOD OFF VENOUS DEVICE: CPT | Performed by: PHYSICIAN ASSISTANT

## 2021-01-01 PROCEDURE — 83880 ASSAY OF NATRIURETIC PEPTIDE: CPT | Performed by: INTERNAL MEDICINE

## 2021-01-01 PROCEDURE — 258N000003 HC RX IP 258 OP 636: Performed by: PHYSICIAN ASSISTANT

## 2021-01-01 PROCEDURE — 250N000013 HC RX MED GY IP 250 OP 250 PS 637: Performed by: PHYSICIAN ASSISTANT

## 2021-01-01 PROCEDURE — 250N000013 HC RX MED GY IP 250 OP 250 PS 637: Performed by: INTERNAL MEDICINE

## 2021-01-01 PROCEDURE — 82247 BILIRUBIN TOTAL: CPT

## 2021-01-01 PROCEDURE — 83735 ASSAY OF MAGNESIUM: CPT | Performed by: PHYSICIAN ASSISTANT

## 2021-01-01 PROCEDURE — 99222 1ST HOSP IP/OBS MODERATE 55: CPT | Mod: GC | Performed by: INTERNAL MEDICINE

## 2021-01-01 PROCEDURE — 82247 BILIRUBIN TOTAL: CPT | Performed by: PHYSICIAN ASSISTANT

## 2021-01-01 PROCEDURE — 93010 ELECTROCARDIOGRAM REPORT: CPT | Performed by: INTERNAL MEDICINE

## 2021-01-01 PROCEDURE — 85610 PROTHROMBIN TIME: CPT | Performed by: PHYSICIAN ASSISTANT

## 2021-01-01 PROCEDURE — 97140 MANUAL THERAPY 1/> REGIONS: CPT | Mod: GO | Performed by: OCCUPATIONAL THERAPIST

## 2021-01-01 PROCEDURE — 97535 SELF CARE MNGMENT TRAINING: CPT | Mod: GO | Performed by: OCCUPATIONAL THERAPIST

## 2021-01-01 PROCEDURE — 84550 ASSAY OF BLOOD/URIC ACID: CPT | Performed by: PHYSICIAN ASSISTANT

## 2021-01-01 PROCEDURE — 99233 SBSQ HOSP IP/OBS HIGH 50: CPT | Performed by: INTERNAL MEDICINE

## 2021-01-01 PROCEDURE — 250N000011 HC RX IP 250 OP 636: Performed by: PHYSICIAN ASSISTANT

## 2021-01-01 PROCEDURE — 120N000002 HC R&B MED SURG/OB UMMC

## 2021-01-01 PROCEDURE — P9016 RBC LEUKOCYTES REDUCED: HCPCS | Performed by: PHYSICIAN ASSISTANT

## 2021-01-01 PROCEDURE — 84132 ASSAY OF SERUM POTASSIUM: CPT | Performed by: PHYSICIAN ASSISTANT

## 2021-01-01 PROCEDURE — 36430 TRANSFUSION BLD/BLD COMPNT: CPT

## 2021-01-01 PROCEDURE — 82040 ASSAY OF SERUM ALBUMIN: CPT | Performed by: PHYSICIAN ASSISTANT

## 2021-01-01 PROCEDURE — 85027 COMPLETE CBC AUTOMATED: CPT

## 2021-01-01 PROCEDURE — 85027 COMPLETE CBC AUTOMATED: CPT | Performed by: PHYSICIAN ASSISTANT

## 2021-01-01 PROCEDURE — 999N000128 HC STATISTIC PERIPHERAL IV START W/O US GUIDANCE

## 2021-01-01 PROCEDURE — 84155 ASSAY OF PROTEIN SERUM: CPT | Performed by: INTERNAL MEDICINE

## 2021-01-01 PROCEDURE — 99223 1ST HOSP IP/OBS HIGH 75: CPT | Mod: AI | Performed by: INTERNAL MEDICINE

## 2021-01-01 PROCEDURE — U0005 INFEC AGEN DETEC AMPLI PROBE: HCPCS | Performed by: INTERNAL MEDICINE

## 2021-01-01 PROCEDURE — 999N001193 HC VIDEO/TELEPHONE VISIT; NO CHARGE

## 2021-01-01 PROCEDURE — 80053 COMPREHEN METABOLIC PANEL: CPT | Performed by: PHYSICIAN ASSISTANT

## 2021-01-01 PROCEDURE — 99443 PR PHYSICIAN TELEPHONE EVALUATION 21-30 MIN: CPT | Performed by: REGISTERED NURSE

## 2021-01-01 PROCEDURE — 85004 AUTOMATED DIFF WBC COUNT: CPT | Performed by: PHYSICIAN ASSISTANT

## 2021-01-01 PROCEDURE — 85025 COMPLETE CBC W/AUTO DIFF WBC: CPT | Performed by: PHYSICIAN ASSISTANT

## 2021-01-01 PROCEDURE — 93005 ELECTROCARDIOGRAM TRACING: CPT

## 2021-01-01 PROCEDURE — 36415 COLL VENOUS BLD VENIPUNCTURE: CPT | Performed by: PHYSICIAN ASSISTANT

## 2021-01-01 PROCEDURE — 99232 SBSQ HOSP IP/OBS MODERATE 35: CPT | Performed by: INTERNAL MEDICINE

## 2021-01-01 PROCEDURE — 99204 OFFICE O/P NEW MOD 45 MIN: CPT | Mod: 95 | Performed by: CLINICAL NURSE SPECIALIST

## 2021-01-01 PROCEDURE — 85014 HEMATOCRIT: CPT

## 2021-01-01 PROCEDURE — 99232 SBSQ HOSP IP/OBS MODERATE 35: CPT | Performed by: PHYSICIAN ASSISTANT

## 2021-01-01 PROCEDURE — 99238 HOSP IP/OBS DSCHRG MGMT 30/<: CPT | Performed by: INTERNAL MEDICINE

## 2021-01-01 PROCEDURE — 36415 COLL VENOUS BLD VENIPUNCTURE: CPT

## 2021-01-01 PROCEDURE — 84145 PROCALCITONIN (PCT): CPT | Performed by: INTERNAL MEDICINE

## 2021-01-01 PROCEDURE — 84100 ASSAY OF PHOSPHORUS: CPT | Performed by: PHYSICIAN ASSISTANT

## 2021-01-01 PROCEDURE — 87040 BLOOD CULTURE FOR BACTERIA: CPT | Performed by: INTERNAL MEDICINE

## 2021-01-01 PROCEDURE — 86901 BLOOD TYPING SEROLOGIC RH(D): CPT | Performed by: PHYSICIAN ASSISTANT

## 2021-01-01 PROCEDURE — 250N000013 HC RX MED GY IP 250 OP 250 PS 637: Performed by: STUDENT IN AN ORGANIZED HEALTH CARE EDUCATION/TRAINING PROGRAM

## 2021-01-01 PROCEDURE — 83615 LACTATE (LD) (LDH) ENZYME: CPT | Performed by: PHYSICIAN ASSISTANT

## 2021-01-01 PROCEDURE — G0463 HOSPITAL OUTPT CLINIC VISIT: HCPCS | Mod: PN,RTG | Performed by: REGISTERED NURSE

## 2021-01-01 PROCEDURE — 82040 ASSAY OF SERUM ALBUMIN: CPT

## 2021-01-01 PROCEDURE — 86140 C-REACTIVE PROTEIN: CPT | Performed by: INTERNAL MEDICINE

## 2021-01-01 PROCEDURE — 86923 COMPATIBILITY TEST ELECTRIC: CPT

## 2021-01-01 PROCEDURE — 258N000003 HC RX IP 258 OP 636: Performed by: INTERNAL MEDICINE

## 2021-01-01 PROCEDURE — 250N000011 HC RX IP 250 OP 636: Performed by: STUDENT IN AN ORGANIZED HEALTH CARE EDUCATION/TRAINING PROGRAM

## 2021-01-01 PROCEDURE — 250N000011 HC RX IP 250 OP 636: Performed by: INTERNAL MEDICINE

## 2021-01-01 PROCEDURE — 71045 X-RAY EXAM CHEST 1 VIEW: CPT

## 2021-01-01 PROCEDURE — 99207 PR CONSULT E&M CHANGED TO INITIAL LEVEL: CPT | Performed by: PHYSICIAN ASSISTANT

## 2021-01-01 PROCEDURE — 86900 BLOOD TYPING SEROLOGIC ABO: CPT | Performed by: PHYSICIAN ASSISTANT

## 2021-01-01 PROCEDURE — 99215 OFFICE O/P EST HI 40 MIN: CPT | Mod: 95 | Performed by: REGISTERED NURSE

## 2021-01-01 PROCEDURE — 36591 DRAW BLOOD OFF VENOUS DEVICE: CPT

## 2021-01-01 PROCEDURE — 80053 COMPREHEN METABOLIC PANEL: CPT

## 2021-01-01 PROCEDURE — 85025 COMPLETE CBC W/AUTO DIFF WBC: CPT | Performed by: INTERNAL MEDICINE

## 2021-01-01 PROCEDURE — P9016 RBC LEUKOCYTES REDUCED: HCPCS

## 2021-01-01 PROCEDURE — 97116 GAIT TRAINING THERAPY: CPT | Mod: GP | Performed by: REHABILITATION PRACTITIONER

## 2021-01-01 PROCEDURE — 96372 THER/PROPH/DIAG INJ SC/IM: CPT | Performed by: INTERNAL MEDICINE

## 2021-01-01 PROCEDURE — G0463 HOSPITAL OUTPT CLINIC VISIT: HCPCS | Mod: PN,RTG | Performed by: EMERGENCY MEDICINE

## 2021-01-01 PROCEDURE — 84550 ASSAY OF BLOOD/URIC ACID: CPT

## 2021-01-01 PROCEDURE — 36591 DRAW BLOOD OFF VENOUS DEVICE: CPT | Performed by: INTERNAL MEDICINE

## 2021-01-01 PROCEDURE — 99213 OFFICE O/P EST LOW 20 MIN: CPT | Mod: 95 | Performed by: PHYSICIAN ASSISTANT

## 2021-01-01 PROCEDURE — 85384 FIBRINOGEN ACTIVITY: CPT | Performed by: PHYSICIAN ASSISTANT

## 2021-01-01 PROCEDURE — 36415 COLL VENOUS BLD VENIPUNCTURE: CPT | Performed by: INTERNAL MEDICINE

## 2021-01-01 PROCEDURE — 99215 OFFICE O/P EST HI 40 MIN: CPT | Mod: GC | Performed by: EMERGENCY MEDICINE

## 2021-01-01 PROCEDURE — 71045 X-RAY EXAM CHEST 1 VIEW: CPT | Mod: 26 | Performed by: RADIOLOGY

## 2021-01-01 PROCEDURE — 83735 ASSAY OF MAGNESIUM: CPT

## 2021-01-01 PROCEDURE — 84466 ASSAY OF TRANSFERRIN: CPT | Performed by: PHYSICIAN ASSISTANT

## 2021-01-01 PROCEDURE — 99238 HOSP IP/OBS DSCHRG MGMT 30/<: CPT | Performed by: STUDENT IN AN ORGANIZED HEALTH CARE EDUCATION/TRAINING PROGRAM

## 2021-01-01 PROCEDURE — G0463 HOSPITAL OUTPT CLINIC VISIT: HCPCS | Mod: PN,RTG | Performed by: PHYSICIAN ASSISTANT

## 2021-01-01 PROCEDURE — 99215 OFFICE O/P EST HI 40 MIN: CPT | Mod: 95 | Performed by: PHYSICIAN ASSISTANT

## 2021-01-01 PROCEDURE — 81001 URINALYSIS AUTO W/SCOPE: CPT | Performed by: INTERNAL MEDICINE

## 2021-01-01 PROCEDURE — 81001 URINALYSIS AUTO W/SCOPE: CPT | Performed by: STUDENT IN AN ORGANIZED HEALTH CARE EDUCATION/TRAINING PROGRAM

## 2021-01-01 PROCEDURE — U0005 INFEC AGEN DETEC AMPLI PROBE: HCPCS | Performed by: PHYSICIAN ASSISTANT

## 2021-01-01 PROCEDURE — 99231 SBSQ HOSP IP/OBS SF/LOW 25: CPT | Performed by: PHYSICIAN ASSISTANT

## 2021-01-01 PROCEDURE — 83605 ASSAY OF LACTIC ACID: CPT | Performed by: INTERNAL MEDICINE

## 2021-01-01 PROCEDURE — 3E04305 INTRODUCTION OF OTHER ANTINEOPLASTIC INTO CENTRAL VEIN, PERCUTANEOUS APPROACH: ICD-10-PCS | Performed by: INTERNAL MEDICINE

## 2021-01-01 PROCEDURE — 84450 TRANSFERASE (AST) (SGOT): CPT | Performed by: INTERNAL MEDICINE

## 2021-01-01 PROCEDURE — 99222 1ST HOSP IP/OBS MODERATE 55: CPT | Performed by: PHYSICIAN ASSISTANT

## 2021-01-01 PROCEDURE — 999N000147 HC STATISTIC PT IP EVAL DEFER: Performed by: PHYSICAL THERAPIST

## 2021-01-01 PROCEDURE — 84100 ASSAY OF PHOSPHORUS: CPT

## 2021-01-01 PROCEDURE — 97530 THERAPEUTIC ACTIVITIES: CPT | Mod: GP | Performed by: REHABILITATION PRACTITIONER

## 2021-01-01 PROCEDURE — 96372 THER/PROPH/DIAG INJ SC/IM: CPT | Performed by: PHYSICIAN ASSISTANT

## 2021-01-01 PROCEDURE — 82728 ASSAY OF FERRITIN: CPT | Performed by: PHYSICIAN ASSISTANT

## 2021-01-01 PROCEDURE — 86923 COMPATIBILITY TEST ELECTRIC: CPT | Performed by: PHYSICIAN ASSISTANT

## 2021-01-01 PROCEDURE — 83550 IRON BINDING TEST: CPT | Performed by: PHYSICIAN ASSISTANT

## 2021-01-01 RX ORDER — HEPARIN SODIUM (PORCINE) LOCK FLUSH IV SOLN 100 UNIT/ML 100 UNIT/ML
5 SOLUTION INTRAVENOUS
Status: DISCONTINUED | OUTPATIENT
Start: 2021-01-01 | End: 2021-01-01 | Stop reason: HOSPADM

## 2021-01-01 RX ORDER — ALBUTEROL SULFATE 90 UG/1
1-2 AEROSOL, METERED RESPIRATORY (INHALATION)
Status: CANCELLED
Start: 2021-01-01

## 2021-01-01 RX ORDER — HEPARIN SODIUM,PORCINE 10 UNIT/ML
5 VIAL (ML) INTRAVENOUS
Status: CANCELLED | OUTPATIENT
Start: 2021-01-01

## 2021-01-01 RX ORDER — ACETAMINOPHEN 500 MG
500-1000 TABLET ORAL EVERY 6 HOURS PRN
Qty: 50 TABLET | Refills: 1 | Status: SHIPPED | OUTPATIENT
Start: 2021-01-01 | End: 2022-01-01

## 2021-01-01 RX ORDER — ONDANSETRON 8 MG/1
8 TABLET, FILM COATED ORAL EVERY 8 HOURS
Status: DISCONTINUED | OUTPATIENT
Start: 2021-01-01 | End: 2021-01-01

## 2021-01-01 RX ORDER — MORPHINE SULFATE 30 MG/1
30 TABLET, FILM COATED, EXTENDED RELEASE ORAL EVERY 12 HOURS
Qty: 14 TABLET | Refills: 0 | Status: SHIPPED | OUTPATIENT
Start: 2021-01-01 | End: 2021-01-01

## 2021-01-01 RX ORDER — DEXTROSE MONOHYDRATE 50 MG/ML
10-20 INJECTION, SOLUTION INTRAVENOUS
Status: CANCELLED | OUTPATIENT
Start: 2021-01-01

## 2021-01-01 RX ORDER — DIPHENHYDRAMINE HYDROCHLORIDE 50 MG/ML
50 INJECTION INTRAMUSCULAR; INTRAVENOUS
Status: CANCELLED
Start: 2021-01-01

## 2021-01-01 RX ORDER — CEFTRIAXONE 1 G/1
1 INJECTION, POWDER, FOR SOLUTION INTRAMUSCULAR; INTRAVENOUS EVERY 24 HOURS
Status: DISCONTINUED | OUTPATIENT
Start: 2021-01-01 | End: 2021-01-01

## 2021-01-01 RX ORDER — PROCHLORPERAZINE MALEATE 10 MG
10 TABLET ORAL EVERY 6 HOURS PRN
Status: CANCELLED | OUTPATIENT
Start: 2021-01-01

## 2021-01-01 RX ORDER — HEPARIN SODIUM,PORCINE 10 UNIT/ML
5 VIAL (ML) INTRAVENOUS
Status: DISCONTINUED | OUTPATIENT
Start: 2021-01-01 | End: 2021-01-01 | Stop reason: HOSPADM

## 2021-01-01 RX ORDER — DIPHENHYDRAMINE HYDROCHLORIDE 50 MG/ML
50 INJECTION INTRAMUSCULAR; INTRAVENOUS
Status: DISCONTINUED | OUTPATIENT
Start: 2021-01-01 | End: 2021-01-01 | Stop reason: HOSPADM

## 2021-01-01 RX ORDER — METHYLPREDNISOLONE SODIUM SUCCINATE 125 MG/2ML
125 INJECTION, POWDER, LYOPHILIZED, FOR SOLUTION INTRAMUSCULAR; INTRAVENOUS
Status: CANCELLED
Start: 2021-01-01

## 2021-01-01 RX ORDER — EPINEPHRINE 1 MG/ML
0.3 INJECTION, SOLUTION INTRAMUSCULAR; SUBCUTANEOUS EVERY 5 MIN PRN
Status: CANCELLED | OUTPATIENT
Start: 2021-01-01

## 2021-01-01 RX ORDER — PROCHLORPERAZINE MALEATE 10 MG
10 TABLET ORAL EVERY 6 HOURS PRN
Status: DISCONTINUED | OUTPATIENT
Start: 2021-01-01 | End: 2021-01-01 | Stop reason: HOSPADM

## 2021-01-01 RX ORDER — ALBUTEROL SULFATE 0.83 MG/ML
2.5 SOLUTION RESPIRATORY (INHALATION)
Status: CANCELLED | OUTPATIENT
Start: 2021-01-01

## 2021-01-01 RX ORDER — EPINEPHRINE 1 MG/ML
0.3 INJECTION, SOLUTION, CONCENTRATE INTRAVENOUS EVERY 5 MIN PRN
Status: CANCELLED | OUTPATIENT
Start: 2021-01-01

## 2021-01-01 RX ORDER — EPINEPHRINE 1 MG/ML
0.3 INJECTION, SOLUTION, CONCENTRATE INTRAVENOUS EVERY 5 MIN PRN
Status: DISCONTINUED | OUTPATIENT
Start: 2021-01-01 | End: 2021-01-01 | Stop reason: HOSPADM

## 2021-01-01 RX ORDER — HEPARIN SODIUM (PORCINE) LOCK FLUSH IV SOLN 100 UNIT/ML 100 UNIT/ML
5 SOLUTION INTRAVENOUS
Status: CANCELLED | OUTPATIENT
Start: 2021-01-01

## 2021-01-01 RX ORDER — PROCHLORPERAZINE MALEATE 5 MG
5 TABLET ORAL EVERY 6 HOURS PRN
Qty: 30 TABLET | Refills: 0 | Status: ON HOLD | OUTPATIENT
Start: 2021-01-01 | End: 2022-01-01

## 2021-01-01 RX ORDER — NALOXONE HYDROCHLORIDE 0.4 MG/ML
0.2 INJECTION, SOLUTION INTRAMUSCULAR; INTRAVENOUS; SUBCUTANEOUS
Status: CANCELLED | OUTPATIENT
Start: 2021-01-01

## 2021-01-01 RX ORDER — DEXTROSE MONOHYDRATE 50 MG/ML
10-20 INJECTION, SOLUTION INTRAVENOUS
Status: DISCONTINUED | OUTPATIENT
Start: 2021-01-01 | End: 2021-01-01

## 2021-01-01 RX ORDER — POTASSIUM CHLORIDE 750 MG/1
40 TABLET, EXTENDED RELEASE ORAL ONCE
Status: COMPLETED | OUTPATIENT
Start: 2021-01-01 | End: 2021-01-01

## 2021-01-01 RX ORDER — MORPHINE SULFATE 30 MG/1
30 TABLET, FILM COATED, EXTENDED RELEASE ORAL EVERY 12 HOURS
Qty: 60 TABLET | Refills: 0 | Status: SHIPPED | OUTPATIENT
Start: 2021-01-01 | End: 2021-01-01

## 2021-01-01 RX ORDER — SENNOSIDES 8.6 MG
1-2 TABLET ORAL 2 TIMES DAILY PRN
Status: DISCONTINUED | OUTPATIENT
Start: 2021-01-01 | End: 2021-01-01

## 2021-01-01 RX ORDER — SALIVA STIMULANT COMB. NO.3
1-2 SPRAY, NON-AEROSOL (ML) MUCOUS MEMBRANE 4 TIMES DAILY PRN
Status: DISCONTINUED | OUTPATIENT
Start: 2021-01-01 | End: 2021-01-01 | Stop reason: HOSPADM

## 2021-01-01 RX ORDER — SENNOSIDES 8.6 MG
8.6 TABLET ORAL 2 TIMES DAILY
Status: DISCONTINUED | OUTPATIENT
Start: 2021-01-01 | End: 2021-01-01 | Stop reason: HOSPADM

## 2021-01-01 RX ORDER — SENNOSIDES 8.6 MG
1-2 TABLET ORAL 2 TIMES DAILY PRN
Qty: 30 TABLET | Refills: 1 | Status: ON HOLD | OUTPATIENT
Start: 2021-01-01 | End: 2022-01-01

## 2021-01-01 RX ORDER — POLYETHYLENE GLYCOL 3350 17 G/17G
17 POWDER, FOR SOLUTION ORAL DAILY PRN
Qty: 510 G | Status: ON HOLD | COMMUNITY
Start: 2021-01-01 | End: 2022-01-01

## 2021-01-01 RX ORDER — MEPERIDINE HYDROCHLORIDE 25 MG/ML
25 INJECTION INTRAMUSCULAR; INTRAVENOUS; SUBCUTANEOUS EVERY 30 MIN PRN
Status: CANCELLED | OUTPATIENT
Start: 2021-01-01

## 2021-01-01 RX ORDER — HEPARIN SODIUM,PORCINE 10 UNIT/ML
5-10 VIAL (ML) INTRAVENOUS EVERY 24 HOURS
Status: DISCONTINUED | OUTPATIENT
Start: 2021-01-01 | End: 2021-01-01 | Stop reason: HOSPADM

## 2021-01-01 RX ORDER — METHYLPREDNISOLONE SODIUM SUCCINATE 125 MG/2ML
125 INJECTION, POWDER, LYOPHILIZED, FOR SOLUTION INTRAMUSCULAR; INTRAVENOUS
Status: DISCONTINUED | OUTPATIENT
Start: 2021-01-01 | End: 2021-01-01 | Stop reason: HOSPADM

## 2021-01-01 RX ORDER — SENNOSIDES 8.6 MG
1-2 TABLET ORAL 2 TIMES DAILY
Status: DISCONTINUED | OUTPATIENT
Start: 2021-01-01 | End: 2021-01-01 | Stop reason: HOSPADM

## 2021-01-01 RX ORDER — ONDANSETRON 8 MG/1
8 TABLET, FILM COATED ORAL EVERY 8 HOURS
Status: CANCELLED | OUTPATIENT
Start: 2021-01-01

## 2021-01-01 RX ORDER — DOCUSATE SODIUM 100 MG/1
100 CAPSULE, LIQUID FILLED ORAL 2 TIMES DAILY PRN
Status: DISCONTINUED | OUTPATIENT
Start: 2021-01-01 | End: 2021-01-01 | Stop reason: HOSPADM

## 2021-01-01 RX ORDER — ONDANSETRON 8 MG/1
8 TABLET, FILM COATED ORAL EVERY 8 HOURS PRN
Status: DISCONTINUED | OUTPATIENT
Start: 2021-01-01 | End: 2021-01-01 | Stop reason: HOSPADM

## 2021-01-01 RX ORDER — OXYCODONE HYDROCHLORIDE 5 MG/1
5-15 TABLET ORAL EVERY 4 HOURS PRN
Status: DISCONTINUED | OUTPATIENT
Start: 2021-01-01 | End: 2021-01-01 | Stop reason: HOSPADM

## 2021-01-01 RX ORDER — OXYCODONE HYDROCHLORIDE 5 MG/1
5-15 TABLET ORAL EVERY 4 HOURS PRN
Status: DISCONTINUED | OUTPATIENT
Start: 2021-01-01 | End: 2021-01-01

## 2021-01-01 RX ORDER — NALOXONE HYDROCHLORIDE 0.4 MG/ML
0.2 INJECTION, SOLUTION INTRAMUSCULAR; INTRAVENOUS; SUBCUTANEOUS
Status: DISCONTINUED | OUTPATIENT
Start: 2021-01-01 | End: 2021-01-01 | Stop reason: HOSPADM

## 2021-01-01 RX ORDER — PROCHLORPERAZINE MALEATE 5 MG
5 TABLET ORAL EVERY 6 HOURS PRN
Qty: 30 TABLET | Refills: 0 | Status: SHIPPED | OUTPATIENT
Start: 2021-01-01 | End: 2021-01-01

## 2021-01-01 RX ORDER — OXYCODONE HYDROCHLORIDE 5 MG/1
5-15 TABLET ORAL EVERY 4 HOURS PRN
Qty: 45 TABLET | Refills: 0 | Status: SHIPPED | OUTPATIENT
Start: 2021-01-01 | End: 2021-01-01

## 2021-01-01 RX ORDER — ONDANSETRON 4 MG/1
8 TABLET, ORALLY DISINTEGRATING ORAL EVERY 8 HOURS PRN
Status: DISCONTINUED | OUTPATIENT
Start: 2021-01-01 | End: 2021-01-01 | Stop reason: HOSPADM

## 2021-01-01 RX ORDER — OXYCODONE HYDROCHLORIDE 5 MG/1
10-15 TABLET ORAL EVERY 4 HOURS PRN
Status: DISCONTINUED | OUTPATIENT
Start: 2021-01-01 | End: 2021-01-01 | Stop reason: HOSPADM

## 2021-01-01 RX ORDER — LEVOFLOXACIN 750 MG/1
750 TABLET, FILM COATED ORAL DAILY
Qty: 5 TABLET | Refills: 0 | Status: SHIPPED | OUTPATIENT
Start: 2021-01-01 | End: 2021-01-01

## 2021-01-01 RX ORDER — ONDANSETRON 8 MG/1
8 TABLET, FILM COATED ORAL EVERY 8 HOURS PRN
Qty: 30 TABLET | Refills: 1 | Status: ON HOLD | OUTPATIENT
Start: 2021-01-01 | End: 2022-01-01

## 2021-01-01 RX ORDER — METHYLPREDNISOLONE SODIUM SUCCINATE 125 MG/2ML
125 INJECTION, POWDER, LYOPHILIZED, FOR SOLUTION INTRAMUSCULAR; INTRAVENOUS
Status: CANCELLED | OUTPATIENT
Start: 2021-01-01

## 2021-01-01 RX ORDER — ALBUTEROL SULFATE 90 UG/1
1-2 AEROSOL, METERED RESPIRATORY (INHALATION)
Status: CANCELLED | OUTPATIENT
Start: 2021-01-01

## 2021-01-01 RX ORDER — MORPHINE SULFATE 15 MG/1
30 TABLET, FILM COATED, EXTENDED RELEASE ORAL EVERY 12 HOURS
Status: DISCONTINUED | OUTPATIENT
Start: 2021-01-01 | End: 2021-01-01 | Stop reason: HOSPADM

## 2021-01-01 RX ORDER — ONDANSETRON 2 MG/ML
8 INJECTION INTRAMUSCULAR; INTRAVENOUS EVERY 8 HOURS PRN
Status: DISCONTINUED | OUTPATIENT
Start: 2021-01-01 | End: 2021-01-01 | Stop reason: HOSPADM

## 2021-01-01 RX ORDER — OXYCODONE HYDROCHLORIDE 5 MG/1
5-20 TABLET ORAL EVERY 4 HOURS PRN
Status: DISCONTINUED | OUTPATIENT
Start: 2021-01-01 | End: 2021-01-01

## 2021-01-01 RX ORDER — ACETAMINOPHEN 500 MG
500-1000 TABLET ORAL EVERY 6 HOURS PRN
COMMUNITY
Start: 2021-01-01 | End: 2021-01-01

## 2021-01-01 RX ORDER — OXYCODONE HYDROCHLORIDE 5 MG/1
10-15 TABLET ORAL EVERY 4 HOURS PRN
Qty: 100 TABLET | Refills: 0 | Status: SHIPPED | OUTPATIENT
Start: 2021-01-01 | End: 2021-01-01

## 2021-01-01 RX ORDER — CALCIUM CARBONATE 500 MG/1
500 TABLET, CHEWABLE ORAL 3 TIMES DAILY PRN
Status: DISCONTINUED | OUTPATIENT
Start: 2021-01-01 | End: 2021-01-01 | Stop reason: HOSPADM

## 2021-01-01 RX ORDER — PROCHLORPERAZINE MALEATE 5 MG
5-10 TABLET ORAL EVERY 6 HOURS PRN
Status: DISCONTINUED | OUTPATIENT
Start: 2021-01-01 | End: 2021-01-01

## 2021-01-01 RX ORDER — ALBUTEROL SULFATE 90 UG/1
1-2 AEROSOL, METERED RESPIRATORY (INHALATION)
Status: DISCONTINUED | OUTPATIENT
Start: 2021-01-01 | End: 2021-01-01 | Stop reason: HOSPADM

## 2021-01-01 RX ORDER — ACETAMINOPHEN 500 MG
500-1000 TABLET ORAL EVERY 6 HOURS PRN
Status: DISCONTINUED | OUTPATIENT
Start: 2021-01-01 | End: 2021-01-01 | Stop reason: HOSPADM

## 2021-01-01 RX ORDER — OXYCODONE HYDROCHLORIDE 5 MG/1
5-15 TABLET ORAL EVERY 4 HOURS PRN
Qty: 60 TABLET | Refills: 0 | Status: ON HOLD | OUTPATIENT
Start: 2021-01-01 | End: 2021-01-01

## 2021-01-01 RX ORDER — OXYCODONE HYDROCHLORIDE 5 MG/1
10-15 TABLET ORAL EVERY 4 HOURS PRN
Qty: 100 TABLET | Refills: 0 | Status: SHIPPED | OUTPATIENT
Start: 2021-01-01 | End: 2022-01-01

## 2021-01-01 RX ORDER — MORPHINE SULFATE 30 MG/1
30 TABLET, FILM COATED, EXTENDED RELEASE ORAL EVERY 12 HOURS
Qty: 60 TABLET | Refills: 0 | Status: SHIPPED | OUTPATIENT
Start: 2021-01-01 | End: 2022-01-01

## 2021-01-01 RX ORDER — HEPARIN SODIUM,PORCINE 10 UNIT/ML
5-10 VIAL (ML) INTRAVENOUS
Status: DISCONTINUED | OUTPATIENT
Start: 2021-01-01 | End: 2021-01-01 | Stop reason: HOSPADM

## 2021-01-01 RX ORDER — ACETAMINOPHEN 325 MG/1
975 TABLET ORAL EVERY 8 HOURS
Status: DISCONTINUED | OUTPATIENT
Start: 2021-01-01 | End: 2021-01-01 | Stop reason: HOSPADM

## 2021-01-01 RX ORDER — ALBUTEROL SULFATE 0.83 MG/ML
2.5 SOLUTION RESPIRATORY (INHALATION)
Status: DISCONTINUED | OUTPATIENT
Start: 2021-01-01 | End: 2021-01-01 | Stop reason: HOSPADM

## 2021-01-01 RX ORDER — HEPARIN SODIUM (PORCINE) LOCK FLUSH IV SOLN 100 UNIT/ML 100 UNIT/ML
5-10 SOLUTION INTRAVENOUS
Status: DISCONTINUED | OUTPATIENT
Start: 2021-01-01 | End: 2021-01-01 | Stop reason: HOSPADM

## 2021-01-01 RX ORDER — ONDANSETRON 8 MG/1
8 TABLET, FILM COATED ORAL EVERY 8 HOURS PRN
Qty: 30 TABLET | Refills: 1 | Status: SHIPPED | OUTPATIENT
Start: 2021-01-01 | End: 2021-01-01

## 2021-01-01 RX ORDER — POLYETHYLENE GLYCOL 3350 17 G/17G
17 POWDER, FOR SOLUTION ORAL DAILY
Status: DISCONTINUED | OUTPATIENT
Start: 2021-01-01 | End: 2021-01-01 | Stop reason: HOSPADM

## 2021-01-01 RX ORDER — OXYCODONE HYDROCHLORIDE 5 MG/1
5-15 TABLET ORAL EVERY 4 HOURS PRN
Qty: 60 TABLET | Refills: 0 | Status: SHIPPED | OUTPATIENT
Start: 2021-01-01 | End: 2021-01-01

## 2021-01-01 RX ORDER — OXYCODONE HYDROCHLORIDE 10 MG/1
10-20 TABLET ORAL EVERY 4 HOURS PRN
Status: DISCONTINUED | OUTPATIENT
Start: 2021-01-01 | End: 2021-01-01

## 2021-01-01 RX ORDER — MEPERIDINE HYDROCHLORIDE 25 MG/ML
25 INJECTION INTRAMUSCULAR; INTRAVENOUS; SUBCUTANEOUS EVERY 30 MIN PRN
Status: DISCONTINUED | OUTPATIENT
Start: 2021-01-01 | End: 2021-01-01 | Stop reason: HOSPADM

## 2021-01-01 RX ORDER — DIPHENHYDRAMINE HYDROCHLORIDE 50 MG/ML
50 INJECTION INTRAMUSCULAR; INTRAVENOUS
Status: CANCELLED | OUTPATIENT
Start: 2021-01-01

## 2021-01-01 RX ORDER — OXYCODONE HYDROCHLORIDE 5 MG/1
5-15 TABLET ORAL EVERY 4 HOURS PRN
Qty: 60 TABLET | Refills: 0 | Status: CANCELLED | OUTPATIENT
Start: 2021-01-01

## 2021-01-01 RX ORDER — BISACODYL 10 MG
10 SUPPOSITORY, RECTAL RECTAL DAILY PRN
Status: DISCONTINUED | OUTPATIENT
Start: 2021-01-01 | End: 2021-01-01 | Stop reason: HOSPADM

## 2021-01-01 RX ADMIN — OXYCODONE HYDROCHLORIDE 15 MG: 5 TABLET ORAL at 00:24

## 2021-01-01 RX ADMIN — ONDANSETRON HYDROCHLORIDE 8 MG: 8 TABLET, FILM COATED ORAL at 10:21

## 2021-01-01 RX ADMIN — MORPHINE SULFATE 30 MG: 15 TABLET, EXTENDED RELEASE ORAL at 21:34

## 2021-01-01 RX ADMIN — ACETAMINOPHEN 975 MG: 325 TABLET, FILM COATED ORAL at 12:30

## 2021-01-01 RX ADMIN — ENOXAPARIN SODIUM 40 MG: 40 INJECTION SUBCUTANEOUS at 12:47

## 2021-01-01 RX ADMIN — ENOXAPARIN SODIUM 40 MG: 40 INJECTION SUBCUTANEOUS at 11:06

## 2021-01-01 RX ADMIN — POTASSIUM CHLORIDE 40 MEQ: 750 TABLET, EXTENDED RELEASE ORAL at 09:05

## 2021-01-01 RX ADMIN — OXYCODONE HYDROCHLORIDE 15 MG: 5 TABLET ORAL at 13:09

## 2021-01-01 RX ADMIN — HEPARIN SODIUM (PORCINE) LOCK FLUSH IV SOLN 100 UNIT/ML 5 ML: 100 SOLUTION at 13:50

## 2021-01-01 RX ADMIN — OXYCODONE HYDROCHLORIDE 10 MG: 5 TABLET ORAL at 18:10

## 2021-01-01 RX ADMIN — HEPARIN 5 ML: 100 SYRINGE at 09:41

## 2021-01-01 RX ADMIN — ACETAMINOPHEN 975 MG: 325 TABLET, FILM COATED ORAL at 20:03

## 2021-01-01 RX ADMIN — ONDANSETRON HYDROCHLORIDE 8 MG: 8 TABLET, FILM COATED ORAL at 21:30

## 2021-01-01 RX ADMIN — HEPARIN SODIUM (PORCINE) LOCK FLUSH IV SOLN 100 UNIT/ML 5 ML: 100 SOLUTION at 14:43

## 2021-01-01 RX ADMIN — MESNA 3280 MG: 100 INJECTION, SOLUTION INTRAVENOUS at 16:48

## 2021-01-01 RX ADMIN — MESNA 3280 MG: 100 INJECTION, SOLUTION INTRAVENOUS at 17:00

## 2021-01-01 RX ADMIN — ONDANSETRON HYDROCHLORIDE 8 MG: 8 TABLET, FILM COATED ORAL at 22:40

## 2021-01-01 RX ADMIN — MORPHINE SULFATE 30 MG: 15 TABLET, EXTENDED RELEASE ORAL at 08:37

## 2021-01-01 RX ADMIN — OXYCODONE HYDROCHLORIDE 10 MG: 5 TABLET ORAL at 22:33

## 2021-01-01 RX ADMIN — ACETAMINOPHEN 975 MG: 325 TABLET, FILM COATED ORAL at 04:12

## 2021-01-01 RX ADMIN — PEGFILGRASTIM-CBQV 6 MG: 6 INJECTION, SOLUTION SUBCUTANEOUS at 14:57

## 2021-01-01 RX ADMIN — OXYCODONE HYDROCHLORIDE 5 MG: 5 TABLET ORAL at 03:24

## 2021-01-01 RX ADMIN — SODIUM CHLORIDE 1000 ML: 9 INJECTION, SOLUTION INTRAVENOUS at 13:01

## 2021-01-01 RX ADMIN — MORPHINE SULFATE 30 MG: 15 TABLET, EXTENDED RELEASE ORAL at 07:39

## 2021-01-01 RX ADMIN — ACETAMINOPHEN 500 MG: 500 TABLET, FILM COATED ORAL at 21:46

## 2021-01-01 RX ADMIN — HEPARIN SODIUM (PORCINE) LOCK FLUSH IV SOLN 100 UNIT/ML 5 ML: 100 SOLUTION at 11:04

## 2021-01-01 RX ADMIN — CEFTRIAXONE SODIUM 1 G: 1 INJECTION, POWDER, FOR SOLUTION INTRAMUSCULAR; INTRAVENOUS at 08:58

## 2021-01-01 RX ADMIN — OXYCODONE HYDROCHLORIDE 15 MG: 5 TABLET ORAL at 20:03

## 2021-01-01 RX ADMIN — OXYCODONE HYDROCHLORIDE 10 MG: 5 TABLET ORAL at 00:18

## 2021-01-01 RX ADMIN — OXYCODONE HYDROCHLORIDE 15 MG: 5 TABLET ORAL at 05:30

## 2021-01-01 RX ADMIN — ENOXAPARIN SODIUM 40 MG: 40 INJECTION SUBCUTANEOUS at 13:11

## 2021-01-01 RX ADMIN — MESNA 3280 MG: 100 INJECTION, SOLUTION INTRAVENOUS at 17:20

## 2021-01-01 RX ADMIN — ONDANSETRON HYDROCHLORIDE 8 MG: 8 TABLET, FILM COATED ORAL at 13:42

## 2021-01-01 RX ADMIN — ONDANSETRON HYDROCHLORIDE 8 MG: 8 TABLET, FILM COATED ORAL at 18:34

## 2021-01-01 RX ADMIN — ENOXAPARIN SODIUM 40 MG: 40 INJECTION SUBCUTANEOUS at 20:03

## 2021-01-01 RX ADMIN — PROCHLORPERAZINE MALEATE 10 MG: 10 TABLET ORAL at 20:03

## 2021-01-01 RX ADMIN — ONDANSETRON HYDROCHLORIDE 8 MG: 8 TABLET, FILM COATED ORAL at 13:11

## 2021-01-01 RX ADMIN — MORPHINE SULFATE 30 MG: 15 TABLET, EXTENDED RELEASE ORAL at 20:03

## 2021-01-01 RX ADMIN — MORPHINE SULFATE 30 MG: 15 TABLET, EXTENDED RELEASE ORAL at 08:33

## 2021-01-01 RX ADMIN — OXYCODONE HYDROCHLORIDE 10 MG: 5 TABLET ORAL at 02:39

## 2021-01-01 RX ADMIN — ACETAMINOPHEN 975 MG: 325 TABLET, FILM COATED ORAL at 13:09

## 2021-01-01 RX ADMIN — HEPARIN SODIUM (PORCINE) LOCK FLUSH IV SOLN 100 UNIT/ML 5 ML: 100 SOLUTION at 10:55

## 2021-01-01 RX ADMIN — MORPHINE SULFATE 30 MG: 15 TABLET, EXTENDED RELEASE ORAL at 08:10

## 2021-01-01 RX ADMIN — OXYCODONE HYDROCHLORIDE 15 MG: 5 TABLET ORAL at 18:34

## 2021-01-01 RX ADMIN — ENOXAPARIN SODIUM 40 MG: 40 INJECTION SUBCUTANEOUS at 11:26

## 2021-01-01 RX ADMIN — ONDANSETRON HYDROCHLORIDE 8 MG: 8 TABLET, FILM COATED ORAL at 14:56

## 2021-01-01 RX ADMIN — DOXORUBICIN HYDROCHLORIDE 109 MG: 2 INJECTABLE, LIPOSOMAL INTRAVENOUS at 15:38

## 2021-01-01 RX ADMIN — ONDANSETRON 8 MG: 2 INJECTION INTRAMUSCULAR; INTRAVENOUS at 07:34

## 2021-01-01 RX ADMIN — Medication 5 ML: at 10:21

## 2021-01-01 RX ADMIN — Medication 5 ML: at 07:57

## 2021-01-01 RX ADMIN — POTASSIUM & SODIUM PHOSPHATES POWDER PACK 280-160-250 MG 1 PACKET: 280-160-250 PACK at 09:05

## 2021-01-01 RX ADMIN — OXYCODONE HYDROCHLORIDE 15 MG: 5 TABLET ORAL at 09:29

## 2021-01-01 RX ADMIN — ONDANSETRON HYDROCHLORIDE 8 MG: 8 TABLET, FILM COATED ORAL at 02:36

## 2021-01-01 RX ADMIN — ONDANSETRON HYDROCHLORIDE 8 MG: 8 TABLET, FILM COATED ORAL at 09:05

## 2021-01-01 RX ADMIN — MORPHINE SULFATE 30 MG: 15 TABLET, EXTENDED RELEASE ORAL at 20:37

## 2021-01-01 RX ADMIN — PEGFILGRASTIM 6 MG: 6 INJECTION SUBCUTANEOUS at 10:00

## 2021-01-01 RX ADMIN — ONDANSETRON HYDROCHLORIDE 8 MG: 8 TABLET, FILM COATED ORAL at 06:00

## 2021-01-01 RX ADMIN — Medication 5 ML: at 07:39

## 2021-01-01 RX ADMIN — PROCHLORPERAZINE MALEATE 10 MG: 10 TABLET ORAL at 12:18

## 2021-01-01 RX ADMIN — ONDANSETRON HYDROCHLORIDE 8 MG: 8 TABLET, FILM COATED ORAL at 05:30

## 2021-01-01 RX ADMIN — STANDARDIZED SENNA CONCENTRATE 2 TABLET: 8.6 TABLET ORAL at 20:37

## 2021-01-01 RX ADMIN — OXYCODONE HYDROCHLORIDE 15 MG: 5 TABLET ORAL at 13:12

## 2021-01-01 RX ADMIN — SODIUM CHLORIDE 1000 ML: 9 INJECTION, SOLUTION INTRAVENOUS at 10:21

## 2021-01-01 RX ADMIN — MORPHINE SULFATE 30 MG: 15 TABLET, EXTENDED RELEASE ORAL at 08:45

## 2021-01-01 RX ADMIN — MESNA 3660 MG: 100 INJECTION, SOLUTION INTRAVENOUS at 10:40

## 2021-01-01 RX ADMIN — MORPHINE SULFATE 30 MG: 15 TABLET, EXTENDED RELEASE ORAL at 19:57

## 2021-01-01 RX ADMIN — MORPHINE SULFATE 30 MG: 15 TABLET, EXTENDED RELEASE ORAL at 08:58

## 2021-01-01 RX ADMIN — ONDANSETRON HYDROCHLORIDE 8 MG: 8 TABLET, FILM COATED ORAL at 02:38

## 2021-01-01 RX ADMIN — OXYCODONE HYDROCHLORIDE 15 MG: 5 TABLET ORAL at 06:57

## 2021-01-01 RX ADMIN — OXYCODONE HYDROCHLORIDE 15 MG: 5 TABLET ORAL at 14:37

## 2021-01-01 RX ADMIN — HEPARIN SODIUM (PORCINE) LOCK FLUSH IV SOLN 100 UNIT/ML 5 ML: 100 SOLUTION at 10:00

## 2021-01-01 RX ADMIN — STANDARDIZED SENNA CONCENTRATE 2 TABLET: 8.6 TABLET ORAL at 08:46

## 2021-01-01 ASSESSMENT — ACTIVITIES OF DAILY LIVING (ADL)
ADLS_ACUITY_SCORE: 20
ADLS_ACUITY_SCORE: 8
ADLS_ACUITY_SCORE: 12
ADLS_ACUITY_SCORE: 16
ADLS_ACUITY_SCORE: 16
ADLS_ACUITY_SCORE: 12
ADLS_ACUITY_SCORE: 8
ADLS_ACUITY_SCORE: 8
ADLS_ACUITY_SCORE: 12
ADLS_ACUITY_SCORE: 18
ADLS_ACUITY_SCORE: 8
ADLS_ACUITY_SCORE: 20
ADLS_ACUITY_SCORE: 18
ADLS_ACUITY_SCORE: 12
ADLS_ACUITY_SCORE: 12
ADLS_ACUITY_SCORE: 16
ADLS_ACUITY_SCORE: 8
ADLS_ACUITY_SCORE: 12
ADLS_ACUITY_SCORE: 8
ADLS_ACUITY_SCORE: 8
ADLS_ACUITY_SCORE: 12
ADLS_ACUITY_SCORE: 20
ADLS_ACUITY_SCORE: 8
ADLS_ACUITY_SCORE: 12
ADLS_ACUITY_SCORE: 8
ADLS_ACUITY_SCORE: 8
ADLS_ACUITY_SCORE: 20
ADLS_ACUITY_SCORE: 12
ADLS_ACUITY_SCORE: 20
ADLS_ACUITY_SCORE: 12
ADLS_ACUITY_SCORE: 12
ADLS_ACUITY_SCORE: 8
ADLS_ACUITY_SCORE: 20
ADLS_ACUITY_SCORE: 8
ADLS_ACUITY_SCORE: 12
ADLS_ACUITY_SCORE: 16
ADLS_ACUITY_SCORE: 16
ADLS_ACUITY_SCORE: 20
ADLS_ACUITY_SCORE: 16
ADLS_ACUITY_SCORE: 8
ADLS_ACUITY_SCORE: 8
ADLS_ACUITY_SCORE: 12
ADLS_ACUITY_SCORE: 20
ADLS_ACUITY_SCORE: 12
ADLS_ACUITY_SCORE: 14
ADLS_ACUITY_SCORE: 12
ADLS_ACUITY_SCORE: 8
ADLS_ACUITY_SCORE: 8
ADLS_ACUITY_SCORE: 12
ADLS_ACUITY_SCORE: 16
ADLS_ACUITY_SCORE: 16
ADLS_ACUITY_SCORE: 20
ADLS_ACUITY_SCORE: 20
ADLS_ACUITY_SCORE: 12
ADLS_ACUITY_SCORE: 20
ADLS_ACUITY_SCORE: 12
ADLS_ACUITY_SCORE: 20
ADLS_ACUITY_SCORE: 12
ADLS_ACUITY_SCORE: 12
ADLS_ACUITY_SCORE: 18
ADLS_ACUITY_SCORE: 20
DEPENDENT_IADLS:: TRANSPORTATION;SHOPPING
ADLS_ACUITY_SCORE: 20
ADLS_ACUITY_SCORE: 12
ADLS_ACUITY_SCORE: 8
ADLS_ACUITY_SCORE: 12
ADLS_ACUITY_SCORE: 20
ADLS_ACUITY_SCORE: 8
ADLS_ACUITY_SCORE: 12
ADLS_ACUITY_SCORE: 18
ADLS_ACUITY_SCORE: 12
ADLS_ACUITY_SCORE: 20
ADLS_ACUITY_SCORE: 16
ADLS_ACUITY_SCORE: 16
ADLS_ACUITY_SCORE: 12
ADLS_ACUITY_SCORE: 8
ADLS_ACUITY_SCORE: 8
ADLS_ACUITY_SCORE: 16
ADLS_ACUITY_SCORE: 12
ADLS_ACUITY_SCORE: 16
ADLS_ACUITY_SCORE: 16
ADLS_ACUITY_SCORE: 12
ADLS_ACUITY_SCORE: 16
ADLS_ACUITY_SCORE: 12
ADLS_ACUITY_SCORE: 8
ADLS_ACUITY_SCORE: 14
ADLS_ACUITY_SCORE: 8
ADLS_ACUITY_SCORE: 16
ADLS_ACUITY_SCORE: 12
ADLS_ACUITY_SCORE: 20
ADLS_ACUITY_SCORE: 16
ADLS_ACUITY_SCORE: 16
ADLS_ACUITY_SCORE: 8
ADLS_ACUITY_SCORE: 12
ADLS_ACUITY_SCORE: 16
PREVIOUS_RESPONSIBILITIES: MEAL PREP;HOUSEKEEPING;LAUNDRY;SHOPPING;MEDICATION MANAGEMENT;FINANCES;DRIVING;WORK
ADLS_ACUITY_SCORE: 12
ADLS_ACUITY_SCORE: 16
ADLS_ACUITY_SCORE: 8
ADLS_ACUITY_SCORE: 16
ADLS_ACUITY_SCORE: 12
ADLS_ACUITY_SCORE: 16
ADLS_ACUITY_SCORE: 20
ADLS_ACUITY_SCORE: 12
ADLS_ACUITY_SCORE: 18
ADLS_ACUITY_SCORE: 12
ADLS_ACUITY_SCORE: 20
ADLS_ACUITY_SCORE: 8
ADLS_ACUITY_SCORE: 16
ADLS_ACUITY_SCORE: 12
ADLS_ACUITY_SCORE: 8
ADLS_ACUITY_SCORE: 16

## 2021-01-01 ASSESSMENT — PAIN SCALES - GENERAL
PAINLEVEL: SEVERE PAIN (6)
PAINLEVEL: NO PAIN (0)
PAINLEVEL: EXTREME PAIN (8)

## 2021-01-01 ASSESSMENT — MIFFLIN-ST. JEOR
SCORE: 2099.63
SCORE: 2122.31
SCORE: 2087.38
SCORE: 2103.71

## 2021-09-21 ENCOUNTER — OFFICE VISIT (OUTPATIENT)
Dept: ORTHOPEDICS | Facility: CLINIC | Age: 46
End: 2021-09-21

## 2021-09-21 DIAGNOSIS — Z53.9 ERRONEOUS ENCOUNTER--DISREGARD: Primary | ICD-10-CM

## 2021-09-21 NOTE — LETTER
9/21/2021      RE: Vic Scott III  [address] [facility] [address]       ERRONEOUS ENCOUNTER :    Patient was inappropriately scheduled in my clinic and we have facilitated appointment with Dr. Matty Rodríguez.    Aleksey Longoria DO [facility]  Primary Care Sports Medicine  [facility]          Aleksey Longoria DO

## 2021-09-21 NOTE — PROGRESS NOTES
ERRONEOUS ENCOUNTER :    Patient was inappropriately scheduled in my clinic and we have facilitated appointment with Dr. Matty Rodríguez.    Aleksey Longoria DO [facility]  Primary Care Sports Medicine  [facility]

## 2021-09-22 NOTE — TELEPHONE ENCOUNTER
RECORDS RECEIVED FROM: right leg mass referred by Dr. Barrientos at [facility]    DATE RECEIVED: Sep 24, 2021     NOTES STATUS DETAILS   OFFICE NOTE from referring provider [facility] Akshat Barrientos,      DISCHARGE REPORT from the ER [facility] Akshat Barrientos, DO     MEDICATION LIST Internal    MRI In process-R 9/17/21   CT SCAN In process-R 9/13/21 09/22/21   12:13 PM   CALLED  FOR IMAGES  Sariah Steiner CMA    09/23/21   9:04 AM   IMAGES RESOLVED IN PACS  COMPLETE  Sariah Steiner CMA

## 2021-09-24 ENCOUNTER — PRE VISIT (OUTPATIENT)
Dept: ORTHOPEDICS | Facility: CLINIC | Age: 46
End: 2021-09-24

## 2021-09-24 ENCOUNTER — OFFICE VISIT (OUTPATIENT)
Dept: ORTHOPEDICS | Facility: CLINIC | Age: 46
End: 2021-09-24

## 2021-09-24 VITALS — WEIGHT: 260 LBS | HEIGHT: 72 IN | BODY MASS INDEX: 35.21 KG/M2

## 2021-09-24 DIAGNOSIS — M79.89 SOFT TISSUE MASS: Primary | ICD-10-CM

## 2021-09-24 PROCEDURE — 88341 IMHCHEM/IMCYTCHM EA ADD ANTB: CPT | Mod: 26 | Performed by: PATHOLOGY

## 2021-09-24 PROCEDURE — 20206 BIOPSY MUSCLE PERQ NEEDLE: CPT | Mod: RT | Performed by: ORTHOPAEDIC SURGERY

## 2021-09-24 PROCEDURE — 88342 IMHCHEM/IMCYTCHM 1ST ANTB: CPT | Mod: TC | Performed by: ORTHOPAEDIC SURGERY

## 2021-09-24 PROCEDURE — 88307 TISSUE EXAM BY PATHOLOGIST: CPT | Mod: TC | Performed by: ORTHOPAEDIC SURGERY

## 2021-09-24 PROCEDURE — 99204 OFFICE O/P NEW MOD 45 MIN: CPT | Mod: 25 | Performed by: ORTHOPAEDIC SURGERY

## 2021-09-24 PROCEDURE — 88307 TISSUE EXAM BY PATHOLOGIST: CPT | Mod: 26 | Performed by: PATHOLOGY

## 2021-09-24 PROCEDURE — 88342 IMHCHEM/IMCYTCHM 1ST ANTB: CPT | Mod: 26 | Performed by: PATHOLOGY

## 2021-09-24 RX ORDER — OXYCODONE HYDROCHLORIDE 5 MG/1
5 TABLET ORAL EVERY 6 HOURS PRN
Qty: 30 TABLET | Refills: 0 | Status: SHIPPED | OUTPATIENT
Start: 2021-09-24 | End: 2021-10-13

## 2021-09-24 RX ORDER — OXYCODONE HYDROCHLORIDE 5 MG/1
5 TABLET ORAL
COMMUNITY
Start: 2021-09-13 | End: 2021-09-24

## 2021-09-24 ASSESSMENT — MIFFLIN-ST. JEOR: SCORE: 2109.35

## 2021-09-24 NOTE — LETTER
9/24/2021         RE: Vic Scott III  [address] [facility] [address]        Dear Colleague,    Thank you for referring your patient, Vic Scott III, to the [facility] [place]. Please see a copy of my visit note below.    This is a 46-year-old is a history swollen right leg over the past couple months.  Its been increasing rapidly in size.  Is difficult for him to ambulate because of this.  He presented to an outside institution and had no insurance.  He is worked to rectify this and now presents for diagnosis and treatment.    On examination he is alert oriented has normal mood and affect and is in no acute distress.  Respirations are regular and unlabored eyes are nonicteric.  In his right lower extremity he is extremely swollen in the right thigh.  He has difficulty flexing the hip at all.  He is able to bear weight but has an extreme limp favoring that right leg.  The foot is sensate.  There is no erythema.    I reviewed MRI and CT scan.  The MRI is incomplete as he did not tolerate it.  He has a large mass originating in the area of the gluteus medius and quadricep.  It surrounds the entire femur just below the hip and extends up on the lateral aspect of the pelvis.  There seems to be some involvement of bone at the superior edge of the acetabulum and the anterior cortex of the femur just below the hip.  The MRI shows some increased signal in the acetabulum.  Is difficult to assess the femur because of the lack of an appropriate sequence.  On the T1 coronal sequence is difficult to appreciate true marrow placement in the pelvis or the femur.  These changes may be inflammatory or secondary.  There is definitely some periosteal bone formation on the anterior femur on the CT scan.    I proposed a needle biopsy today and the patient agreed.  He was able to get himself positioned supine on exam table.      Description of procedure:    The superior end of this large  right hip and thigh mass was marked and the skin sterilely prepped.  I injected lidocaine in the skin subcutaneous tissue and muscle capsule and tumor.  I made a small incision with 11 blade and passed a Arnoldo-Cut needle 3 times.  Good tissue was obtained.  The patient had bloody serous drainage for quite a while after that.  After holding pressure and applying Steri-Strips we were able to control this drainage.  The patient was able to get back in his wheelchair and was comfortable.  The specimen was placed in formalin and sent to pathology.    We discussed that this was likely a malignancy.  Is not clear if we can do a good limb salvage but his femoral vessels and sciatic nerve did not seem to be involved.  He may benefit from neoadjuvant treatment if it is indeed a malignancy.  We will reach out to him next week with the diagnosis and treatment proposal.      Again, thank you for allowing me to participate in the care of your patient.        Sincerely,        Matty Whitaker MD

## 2021-09-24 NOTE — PROGRESS NOTES
This is a 46-year-old is a history swollen right leg over the past couple months.  Its been increasing rapidly in size.  Is difficult for him to ambulate because of this.  He presented to an outside institution and had no insurance.  He is worked to rectify this and now presents for diagnosis and treatment.    On examination he is alert oriented has normal mood and affect and is in no acute distress.  Respirations are regular and unlabored eyes are nonicteric.  In his right lower extremity he is extremely swollen in the right thigh.  He has difficulty flexing the hip at all.  He is able to bear weight but has an extreme limp favoring that right leg.  The foot is sensate.  There is no erythema.    I reviewed MRI and CT scan.  The MRI is incomplete as he did not tolerate it.  He has a large mass originating in the area of the gluteus medius and quadricep.  It surrounds the entire femur just below the hip and extends up on the lateral aspect of the pelvis.  There seems to be some involvement of bone at the superior edge of the acetabulum and the anterior cortex of the femur just below the hip.  The MRI shows some increased signal in the acetabulum.  Is difficult to assess the femur because of the lack of an appropriate sequence.  On the T1 coronal sequence is difficult to appreciate true marrow placement in the pelvis or the femur.  These changes may be inflammatory or secondary.  There is definitely some periosteal bone formation on the anterior femur on the CT scan.    I proposed a needle biopsy today and the patient agreed.  He was able to get himself positioned supine on exam table.      Description of procedure:    The superior end of this large right hip and thigh mass was marked and the skin sterilely prepped.  I injected lidocaine in the skin subcutaneous tissue and muscle capsule and tumor.  I made a small incision with 11 blade and passed a Arnoldo-Cut needle 3 times.  Good tissue was obtained.  The patient had  bloody serous drainage for quite a while after that.  After holding pressure and applying Steri-Strips we were able to control this drainage.  The patient was able to get back in his wheelchair and was comfortable.  The specimen was placed in formalin and sent to pathology.    We discussed that this was likely a malignancy.  Is not clear if we can do a good limb salvage but his femoral vessels and sciatic nerve did not seem to be involved.  He may benefit from neoadjuvant treatment if it is indeed a malignancy.  We will reach out to him next week with the diagnosis and treatment proposal.

## 2021-09-24 NOTE — LETTER
Date:October 9, 2021      Patient was self referred, no letter generated. Do not send.        [facility] Health Information

## 2021-09-24 NOTE — NURSING NOTE
"Reason For Visit:   Chief Complaint   Patient presents with     Consult     consulting right hip mass referred by Dr. Barrientos at [facility] // first noticed about a month ago        Ht 1.84 m (6' 0.44\")   Wt 117.9 kg (260 lb)   BMI 34.83 kg/m      Pain Assessment  Patient Currently in Pain: Yes  0-10 Pain Scale: 3 (can get up to 10)  Primary Pain Location: Hip (right hip)      Joycelyn Dorantes ATC    "

## 2021-09-24 NOTE — NURSING NOTE
[facility] [address] [address]  [phone]  Dept: [phone]  ______________________________________________________________________________    Patient: Vic Scott III   : 1975   MRN: [id number]   2021    INVASIVE PROCEDURE SAFETY CHECKLIST    Date: 2021   Procedure:Right Hip Biopsy   Patient Name: Vic Scott III  MRN: [id number]  YOB: 1975    Action: Complete sections as appropriate. Any discrepancy results in a HARD COPY until resolved.     PRE PROCEDURE:  Patient ID verified with 2 identifiers (name and  or MRN): Yes  Procedure and site verified with patient/designee (when able): Yes  Accurate consent documentation in medical record: Yes  H&P (or appropriate assessment) documented in medical record: NA  H&P must be up to 20 days prior to procedure and updates within 24 hours of procedure as applicable: NA  Relevant diagnostic and radiology test results appropriately labeled and displayed as applicable: Yes  Procedure site(s) marked with provider initials: NA    TIMEOUT:  Time-Out performed immediately prior to starting procedure, including verbal and active participation of all team members addressing the following:Yes  * Correct patient identify  * Confirmed that the correct side and site are marked  * An accurate procedure consent form  * Agreement on the procedure to be done  * Correct patient position  * Relevant images and results are properly labeled and appropriately displayed  * The need to administer antibiotics or fluids for irrigation purposes during the procedure as applicable   * Safety precautions based on patient history or medication use    DURING PROCEDURE: Verification of correct person, site, and procedures any time the responsibility for care of the patient is transferred to another member of the care team.       The following medications were given:          Prior to injection, verified patient identity using patient's name and date of birth.  Due to injection administration, patient instructed to remain in clinic for 15 minutes  afterwards, and to report any adverse reaction to me immediately.    Biopsy Injection  Medication Name: Lidocaine NDC 96876-606-45  Drug Amount Wasted:  Yes: 0 mg/ml   Vial/Syringe: Single dose vial  Expiration Date:  12/1/24  Lot: [id number]  -Two vials used       Scribed by Joycelyn Dorantes ATC for Dr. Whitaker on September 24, 2021 at 11:00 am based on the provider's statements to me.     Joycelyn Dorantes ATC

## 2021-09-29 LAB
PATH REPORT.COMMENTS IMP SPEC: ABNORMAL
PATH REPORT.COMMENTS IMP SPEC: YES
PATH REPORT.FINAL DX SPEC: ABNORMAL
PATH REPORT.GROSS SPEC: ABNORMAL
PATH REPORT.MICROSCOPIC SPEC OTHER STN: ABNORMAL
PHOTO IMAGE: ABNORMAL

## 2021-09-30 DIAGNOSIS — C49.9 UNDIFFERENTIATED PLEOMORPHIC SARCOMA (H): Primary | ICD-10-CM

## 2021-10-01 ENCOUNTER — VIRTUAL VISIT (OUTPATIENT)
Dept: ORTHOPEDICS | Facility: CLINIC | Age: 46
End: 2021-10-01
Payer: MEDICAID

## 2021-10-01 ENCOUNTER — TELEPHONE (OUTPATIENT)
Dept: ORTHOPEDICS | Facility: CLINIC | Age: 46
End: 2021-10-01

## 2021-10-01 DIAGNOSIS — C49.9 UNDIFFERENTIATED PLEOMORPHIC SARCOMA (H): Primary | ICD-10-CM

## 2021-10-01 PROCEDURE — 99212 OFFICE O/P EST SF 10 MIN: CPT | Mod: 95 | Performed by: ORTHOPAEDIC SURGERY

## 2021-10-01 NOTE — LETTER
10/1/2021         RE: Vic Scott III  [address] [facility] [address]        Dear Colleague,    Thank you for referring your patient, Vic Scott III, to the [facility] [place]. Please see a copy of my visit note below.    Vic is a 46 year old who is being evaluated via a billable telephone visit.      What phone number would you like to be contacted at? [phone]  How would you like to obtain your AVS? 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  The lymph nodes are less likely but the lung involvement is common.  He has a PET scan ordered but has not scheduled that yet.  I would like him to do this as soon as possible.  I did also like him to visit with her medical oncologist to see if he is a candidate for chemotherapy.  I think you benefit but having this treated prior to attempting any surgery.  Is not clear if we can do limb salvage with the state of things as they currently are given the apparent involvement of the femur and the proximity to the acetabulum.  From the MRI is difficult to assess bone involvement in the pelvis but it is right up against the outer table of the ilium.  Is possible the patient could have a high cord amputation or massive bone resection with the pelvis and proximal femur reconstruction.  Any type of reconstruction would be at very high risk for infection and subsequent loss of the limb.  Patient is anxious to get started with initial treatment.  We will present his case at our conference for review and discussion of will be reasonable after neoadjuvant treatment.      Sincerely,        Matty Whitaker MD

## 2021-10-01 NOTE — NURSING NOTE
Reason For Visit:   Chief Complaint   Patient presents with     RECHECK     review biopsy results // discuss what's next        There were no vitals taken for this visit.    Pain Assessment  Patient Currently in Pain: Unable to assess    Joycelyn Dorantes ATC

## 2021-10-01 NOTE — TELEPHONE ENCOUNTER
ATC called [facility] to schedule pt's PET. They informed that they need to verify pt's insurance first before scheduling the PET. They will call pt to schedule the PET ASAP after hearing from .           -GEMA Almanzar- Orthopedics

## 2021-10-01 NOTE — PROGRESS NOTES
Vic is a 46 year old who is being evaluated via a billable telephone visit.      What phone number would you like to be contacted at? [phone]  How would you like to obtain your AVS? 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  The lymph nodes are less likely but the lung involvement is common.  He has a PET scan ordered but has not scheduled that yet.  I would like him to do this as soon as possible.  I did also like him to visit with her medical oncologist to see if he is a candidate for chemotherapy.  I think you benefit but having this treated prior to attempting any surgery.  Is not clear if we can do limb salvage with the state of things as they currently are given the apparent involvement of the femur and the proximity to the acetabulum.  From the MRI is difficult to assess bone involvement in the pelvis but it is right up against the outer table of the ilium.  Is possible the patient could have a high cord amputation or massive bone resection with the pelvis and proximal femur reconstruction.  Any type of reconstruction would be at very high risk for infection and subsequent loss of the limb.  Patient is anxious to get started with initial treatment.  We will present his case at our conference for review and discussion of will be reasonable after neoadjuvant treatment.

## 2021-10-05 ENCOUNTER — TELEPHONE (OUTPATIENT)
Dept: ORTHOPEDICS | Facility: CLINIC | Age: 46
End: 2021-10-05

## 2021-10-05 NOTE — TELEPHONE ENCOUNTER
RN called over to the [facility] to schedule a PET scan and left a voice message with them to call patient directly and schedule soonest available.

## 2021-10-06 ENCOUNTER — TELEPHONE (OUTPATIENT)
Dept: ORTHOPEDICS | Facility: CLINIC | Age: 46
End: 2021-10-06

## 2021-10-06 NOTE — TELEPHONE ENCOUNTER
[facility] Call Center    Phone Message    May a detailed message be left on voicemail: yes     Reason for Call: Other: Patient's sister, Lauryn, is calling to discuss the possibility of trying different medications.      Patient was on Motrin but it didn't do anything, wants to try something else.    Patient said the oxy worked but he doesn't have any left.    Please call Lauryn to discuss options.    Action Taken: Message routed to:  [facility] ortho    Travel Screening: Not Applicable

## 2021-10-08 NOTE — TELEPHONE ENCOUNTER
RN called and the patients phone answered, he spoke and then was disconnected.  RN then called back again and it went to voice mail.  RN left a message that I was calling back to speak with him regarding his pain medication and that I would follow up on Monday with him.  He is also directed to call back to PET scan to get that scheduled as I know that they have called and left messages.

## 2021-10-12 ENCOUNTER — TELEPHONE (OUTPATIENT)
Dept: ORTHOPEDICS | Facility: CLINIC | Age: 46
End: 2021-10-12

## 2021-10-12 DIAGNOSIS — M79.89 SOFT TISSUE MASS: ICD-10-CM

## 2021-10-12 NOTE — TELEPHONE ENCOUNTER
RN called and left a voice message for Vic.  I am calling to speak with your regarding your pain medication request.  Please try and answer a call from this number tomorrow.

## 2021-10-13 RX ORDER — OXYCODONE HYDROCHLORIDE 5 MG/1
5 TABLET ORAL EVERY 6 HOURS PRN
Qty: 30 TABLET | Refills: 0 | Status: SHIPPED | OUTPATIENT
Start: 2021-10-13 | End: 2021-10-26

## 2021-10-14 ENCOUNTER — DOCUMENTATION ONLY (OUTPATIENT)
Dept: ONCOLOGY | Facility: CLINIC | Age: 46
End: 2021-10-14

## 2021-10-14 ENCOUNTER — TELEPHONE (OUTPATIENT)
Dept: ORTHOPEDICS | Facility: CLINIC | Age: 46
End: 2021-10-14

## 2021-10-14 DIAGNOSIS — C49.9 SARCOMA (H): Primary | ICD-10-CM

## 2021-10-14 NOTE — NURSING NOTE
Review of Oncology referral for new dx sarcoma.    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 Ortho has msg'ed Financial Counselors for assistance. Needs to establish with Dr Ambrocio or Christiano/ Med Onc. Awaiting reply from  & Financial team about next steps.     Addendum 10/15: per Jony Perez, he is working with pt now to complete MA application. Updates to come next week.

## 2021-10-14 NOTE — TELEPHONE ENCOUNTER
RN sent an email to Armando Singh and Violeta Conklin.  RN informed them of the need for Oncology consult and PET scan for new diagnosis of Sarcoma.  Please reach out to assist this patient .

## 2021-10-14 NOTE — TELEPHONE ENCOUNTER
Staff at the [facility] called me back and states that the patient has no insurance.  Violeta ANNE (Timmylandbharathi) states that he needs to have a EMA and Care plan before they are able to schedule the PET.  RN is not able to see notes from Violeta ANNE.  Staff provided her number for me to call .

## 2021-10-14 NOTE — TELEPHONE ENCOUNTER
RN called over to the [facility] and left a voice message to call me back to schedule a PET or call patient directly.

## 2021-10-20 ENCOUNTER — ANCILLARY PROCEDURE (OUTPATIENT)
Dept: PET IMAGING | Facility: CLINIC | Age: 46
End: 2021-10-20
Attending: PHYSICIAN ASSISTANT
Payer: MEDICAID

## 2021-10-20 DIAGNOSIS — C49.9 UNDIFFERENTIATED PLEOMORPHIC SARCOMA (H): ICD-10-CM

## 2021-10-20 LAB
CREAT BLD-MCNC: 0.8 MG/DL (ref 0.5–1.2)
GFR SERPL CREATININE-BSD FRML MDRD: >90 ML/MIN/{1.73_M2}
GLUCOSE SERPL-MCNC: 92 MG/DL (ref 70–99)

## 2021-10-20 RX ORDER — IOPAMIDOL 755 MG/ML
50-125 INJECTION, SOLUTION INTRAVASCULAR ONCE
Status: COMPLETED | OUTPATIENT
Start: 2021-10-20 | End: 2021-10-20

## 2021-10-20 RX ADMIN — IOPAMIDOL 125 ML: 755 INJECTION, SOLUTION INTRAVASCULAR at 14:57

## 2021-10-26 ENCOUNTER — VIRTUAL VISIT (OUTPATIENT)
Dept: ONCOLOGY | Facility: CLINIC | Age: 46
End: 2021-10-26
Attending: ORTHOPAEDIC SURGERY
Payer: MEDICAID

## 2021-10-26 DIAGNOSIS — R63.4 WEIGHT LOSS: ICD-10-CM

## 2021-10-26 DIAGNOSIS — M79.89 SOFT TISSUE MASS: ICD-10-CM

## 2021-10-26 DIAGNOSIS — G89.3 CANCER ASSOCIATED PAIN: Primary | ICD-10-CM

## 2021-10-26 DIAGNOSIS — C49.9 SARCOMA (H): ICD-10-CM

## 2021-10-26 PROCEDURE — G0463 HOSPITAL OUTPT CLINIC VISIT: HCPCS | Mod: PN,RTG | Performed by: INTERNAL MEDICINE

## 2021-10-26 PROCEDURE — 99205 OFFICE O/P NEW HI 60 MIN: CPT | Mod: 95 | Performed by: INTERNAL MEDICINE

## 2021-10-26 PROCEDURE — 999N001193 HC VIDEO/TELEPHONE VISIT; NO CHARGE

## 2021-10-26 RX ORDER — OXYCODONE HYDROCHLORIDE 5 MG/1
TABLET ORAL
Qty: 60 TABLET | Refills: 0 | Status: ON HOLD | OUTPATIENT
Start: 2021-10-26 | End: 2021-01-01

## 2021-10-26 NOTE — LETTER
10/26/2021         RE: Vic Scott III  [address] [facility] [address]        Dear Colleague,    Thank you for referring your patient, Vic Scott III, to the [facility]. Please see a copy of my visit note below.    Vic is a 46 year old who is being evaluated via a billable video visit.      How would you like to obtain your AVS? MyChart  If the video visit is dropped, the invitation should be resent by: Text to cell phone: [phone]  Will anyone else be joining your video visit? No      Video Start [id number]  Video-Visit Details    Type of service:  Video Visit    Video End Timeabout 348    Originating Location (pt. Location)home  Distant Location (provider location):  [facility]     [facility]        10-26-21     I saw Vic Scott, being referred for a sarcoma of the right leg.  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  He then noticed increasing swelling leading to imaging and a biopsy showed a sarcoma.  He is now taking oxycodone 5 mg every 4-6 hours and sometimes has to wake up with that.  He also takes Motrin 2 of them every 4-6 hours and the pain is sometimes controlled sometimes not.  He is eating okay and gets out of the house daily but is not that active.  He has an occasional burning sensation in the area of the tumor.  He thinks he gets hot more easily than he used to.  He has lost 15 to 20 pounds since September but still weighs 225.  He thinks his mood is okay with all this.    Past history is really not remarkable.  The family history is not perfectly clear but 1 brother had a lymphoma and his father may have had a sarcoma.  All of his siblings are half siblings.  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  No known drug allergies    On exam he appeared comfortable normal affect.  HEENT full EOMs  CHEST no respiratory distress  NEURO normal mentation and speech  PSYCH appropriate mood but somewhat upset and teary when discussing the situation.  MSK normal head and arm movement      I reviewed the pathology showing:    [id number]  Soft tissue, right thigh mass, needle biopsy:  - High grade pleomorphic sarcoma  - See comment  Immunohistochemical stains show the tumor is positive for SATB2 while S100, CK AE1/AE3, desmin, myogenin and ERG are negative. The positivity for SATB2, in addition to the bone involvement identified on imaging studies suggest an high grade osteosarcoma.    -  I reviewed his images of 10-20-21 showing the larre primary mass along with some concerning but non-specific inguinal LNs    Formal read:  IMPRESSION: This patient with pleomorphic sarcoma of the right le. 30 cm hypermetabolic mass centered in the anterior compartment of  the right thigh involving the right groin and buttock consistent with  biopsy-proven pleomorphic sarcoma. Mass partially erodes the outer  cortex of the proximal right femur which increases risk for pathologic  fracture.  2. Several prominent mildly hypermetabolic inguinal lymph nodes,  concerning for metastasis.  3. Moderate right leg subcutaneous edema. Compression of the femoral  vein as it passes by the mass, without evidence of deep venous  Thrombosis.    -  We discussed the nature of cancer in general and his sarcoma in particular.  I think this is most likely a STS and we will approach treatment that way pending Foudation one results.  We discussed we will be getting NGS to better characterize the tumor but we will start treatment with Doxil ifosfamide and reviewed how that works.  All things considered I think it would be best to do the first treatment in the hospital and then if insurance allows and he tolerates it okay to think about  doing outpatient treatment after that.  He would like to start Monday and we will try to get a port in before that.  We could use a PICC if necessary.  We will check an echo to be complete.  I reviewed the typical toxicities of this and will need to figure out how to do the neuro checks if he tries at home.  We discussed this is a serious situation but we do not have definite metastatic disease demonstrated right now.  We will also refer to palliative for better pain control and general support.    All of his questions were addressed to call if others arise.  t>60 min including chart/image review/orders    Jonn Ambrocio M.D.  Professor  Hematology, Oncology and Transplantation        Again, thank you for allowing me to participate in the care of your patient.        Sincerely,        Jonn Ambrocio MD

## 2021-10-26 NOTE — PROGRESS NOTES
Vic is a 46 year old who is being evaluated via a billable video visit.      How would you like to obtain your AVS? MyChart  If the video visit is dropped, the invitation should be resent by: Text to cell phone: [phone]  Will anyone else be joining your video visit? No      Video Start [id number]  Video-Visit Details    Type of service:  Video Visit    Video End Timeabout 348    Originating Location (pt. Location)home  Distant Location (provider location):  [facility]     [facility]        10-26-21     I saw Vic Scott, being referred for a sarcoma of the right leg.  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  He then noticed increasing swelling leading to imaging and a biopsy showed a sarcoma.  He is now taking oxycodone 5 mg every 4-6 hours and sometimes has to wake up with that.  He also takes Motrin 2 of them every 4-6 hours and the pain is sometimes controlled sometimes not.  He is eating okay and gets out of the house daily but is not that active.  He has an occasional burning sensation in the area of the tumor.  He thinks he gets hot more easily than he used to.  He has lost 15 to 20 pounds since September but still weighs 225.  He thinks his mood is okay with all this.    Past history is really not remarkable.  The family history is not perfectly clear but 1 brother had a lymphoma and his father may have had a sarcoma.  All of his siblings are half siblings.  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  No known drug allergies    On exam he appeared comfortable normal affect.  HEENT full EOMs  CHEST no respiratory distress  NEURO normal mentation and speech  PSYCH appropriate mood but somewhat upset and teary when discussing the situation.  TOAN  normal head and arm movement      I reviewed the pathology showing:    [id number]  Soft tissue, right thigh mass, needle biopsy:  - High grade pleomorphic sarcoma  - See comment  Immunohistochemical stains show the tumor is positive for SATB2 while S100, CK AE1/AE3, desmin, myogenin and ERG are negative. The positivity for SATB2, in addition to the bone involvement identified on imaging studies suggest an high grade osteosarcoma.    -  I reviewed his images of 10-20-21 showing the larre primary mass along with some concerning but non-specific inguinal LNs    Formal read:  IMPRESSION: This patient with pleomorphic sarcoma of the right le. 30 cm hypermetabolic mass centered in the anterior compartment of  the right thigh involving the right groin and buttock consistent with  biopsy-proven pleomorphic sarcoma. Mass partially erodes the outer  cortex of the proximal right femur which increases risk for pathologic  fracture.  2. Several prominent mildly hypermetabolic inguinal lymph nodes,  concerning for metastasis.  3. Moderate right leg subcutaneous edema. Compression of the femoral  vein as it passes by the mass, without evidence of deep venous  Thrombosis.    -  We discussed the nature of cancer in general and his sarcoma in particular.  I think this is most likely a STS and we will approach treatment that way pending Foudation one results.  We discussed we will be getting NGS to better characterize the tumor but we will start treatment with Doxil ifosfamide and reviewed how that works.  All things considered I think it would be best to do the first treatment in the hospital and then if insurance allows and he tolerates it okay to think about doing outpatient treatment after that.  He would like to start Monday and we will try to get a port in before that.  We could use a PICC if necessary.  We will check an echo to be complete.  I reviewed the typical toxicities of this and will need to figure out how  to do the neuro checks if he tries at home.  We discussed this is a serious situation but we do not have definite metastatic disease demonstrated right now.  We will also refer to palliative for better pain control and general support.    All of his questions were addressed to call if others arise.  t>60 min including chart/image review/orders    Jonn Ambrocio M.D.  Professor  Hematology, Oncology and Transplantation

## 2021-10-29 ENCOUNTER — ANESTHESIA EVENT (OUTPATIENT)
Dept: SURGERY | Facility: AMBULATORY SURGERY CENTER | Age: 46
End: 2021-10-29
Payer: MEDICAID

## 2021-10-29 DIAGNOSIS — C49.9 SARCOMA (H): Primary | ICD-10-CM

## 2021-10-29 RX ORDER — HYDROMORPHONE HYDROCHLORIDE 1 MG/ML
0.4 INJECTION, SOLUTION INTRAMUSCULAR; INTRAVENOUS; SUBCUTANEOUS EVERY 5 MIN PRN
Status: CANCELLED | OUTPATIENT
Start: 2021-10-29

## 2021-10-29 RX ORDER — ONDANSETRON 2 MG/ML
4 INJECTION INTRAMUSCULAR; INTRAVENOUS EVERY 30 MIN PRN
Status: CANCELLED | OUTPATIENT
Start: 2021-10-29

## 2021-10-29 RX ORDER — OXYCODONE HYDROCHLORIDE 5 MG/1
5 TABLET ORAL EVERY 4 HOURS PRN
Status: CANCELLED | OUTPATIENT
Start: 2021-10-29

## 2021-10-29 RX ORDER — LABETALOL HYDROCHLORIDE 5 MG/ML
10 INJECTION, SOLUTION INTRAVENOUS
Status: CANCELLED | OUTPATIENT
Start: 2021-10-29

## 2021-10-29 RX ORDER — ONDANSETRON 4 MG/1
4 TABLET, ORALLY DISINTEGRATING ORAL EVERY 30 MIN PRN
Status: CANCELLED | OUTPATIENT
Start: 2021-10-29

## 2021-10-29 RX ORDER — FENTANYL CITRATE 50 UG/ML
50 INJECTION, SOLUTION INTRAMUSCULAR; INTRAVENOUS EVERY 5 MIN PRN
Status: CANCELLED | OUTPATIENT
Start: 2021-10-29

## 2021-10-29 RX ORDER — SODIUM CHLORIDE, SODIUM LACTATE, POTASSIUM CHLORIDE, CALCIUM CHLORIDE 600; 310; 30; 20 MG/100ML; MG/100ML; MG/100ML; MG/100ML
INJECTION, SOLUTION INTRAVENOUS CONTINUOUS
Status: CANCELLED | OUTPATIENT
Start: 2021-10-29

## 2021-10-29 NOTE — ANESTHESIA PREPROCEDURE EVALUATION
Anesthesia Pre-Procedure Evaluation    Patient: Vic Scott III   MRN: [id number] : 1975        Preoperative Diagnosis: Sarcoma (H) [C49.9]    Procedure : Procedure(s):  INSERTION, VASCULAR ACCESS PORT          No past medical history on file.   No past surgical history on file.   No Known Allergies   Social History     Tobacco Use     Smoking status: Never Smoker     Smokeless tobacco: Never Used   Substance Use Topics     Alcohol use: Not on file      Wt Readings from Last 1 Encounters:   21 117.9 kg (260 lb)           Physical Exam    Airway        Mallampati: II   TM distance: > 3 FB   Neck ROM: full   Mouth opening: > 3 cm    Respiratory Devices and Support         Dental  no notable dental history         Cardiovascular          Rhythm and rate: tachycardia     Pulmonary   pulmonary exam normal                OUTSIDE LABS:  CBC: No results found for: WBC, HGB, HCT, PLT  BMP:   Lab Results   Component Value Date    GLC 92 10/20/2021     COAGS: No results found for: PTT, INR, FIBR  POC: No results found for: BGM, HCG, HCGS  HEPATIC: No results found for: ALBUMIN, PROTTOTAL, ALT, AST, GGT, ALKPHOS, BILITOTAL, BILIDIRECT, STACIA  OTHER: No results found for: PH, LACT, A1C, VENTURA, PHOS, MAG, LIPASE, AMYLASE, TSH, T4, T3, CRP, SED    Anesthesia Plan    ASA Status:  2   NPO Status:  NPO Appropriate    Anesthesia Type: MAC.     - Reason for MAC: straight local not clinically adequate   Induction: Intravenous, Propofol.   Maintenance: TIVA.        Consents    Anesthesia Plan(s) and associated risks, benefits, and realistic alternatives discussed. Questions answered and patient/representative(s) expressed understanding.     - Discussed with:  Patient      - Extended Intubation/Ventilatory Support Discussed: No.      - Patient is DNR/DNI Status: No    Use of blood products discussed: No .     Postoperative Care    Pain management: Multi-modal analgesia, Oral pain medications, IV analgesics.   PONV  prophylaxis: Dexamethasone or Solumedrol, Ondansetron (or other 5HT-3)     Comments:                Gustavo Mead MD

## 2021-11-01 ENCOUNTER — HOSPITAL ENCOUNTER (OUTPATIENT)
Facility: AMBULATORY SURGERY CENTER | Age: 46
End: 2021-11-01
Attending: RADIOLOGY
Payer: MEDICAID

## 2021-11-01 ENCOUNTER — ANCILLARY PROCEDURE (OUTPATIENT)
Dept: RADIOLOGY | Facility: AMBULATORY SURGERY CENTER | Age: 46
End: 2021-11-01
Attending: INTERNAL MEDICINE
Payer: MEDICAID

## 2021-11-01 ENCOUNTER — ANESTHESIA (OUTPATIENT)
Dept: SURGERY | Facility: AMBULATORY SURGERY CENTER | Age: 46
End: 2021-11-01
Payer: MEDICAID

## 2021-11-01 VITALS
OXYGEN SATURATION: 100 % | HEIGHT: 74 IN | DIASTOLIC BLOOD PRESSURE: 95 MMHG | HEART RATE: 115 BPM | WEIGHT: 230 LBS | TEMPERATURE: 98.8 F | SYSTOLIC BLOOD PRESSURE: 152 MMHG | BODY MASS INDEX: 29.52 KG/M2

## 2021-11-01 DIAGNOSIS — C49.9 SARCOMA (H): ICD-10-CM

## 2021-11-01 LAB
INR PPP: 1.11 (ref 0.85–1.15)
PLATELET # BLD AUTO: 318 10E3/UL (ref 150–450)

## 2021-11-01 PROCEDURE — 77001 FLUOROGUIDE FOR VEIN DEVICE: CPT | Mod: 26 | Performed by: RADIOLOGY

## 2021-11-01 PROCEDURE — 85049 AUTOMATED PLATELET COUNT: CPT | Performed by: PATHOLOGY

## 2021-11-01 PROCEDURE — 76937 US GUIDE VASCULAR ACCESS: CPT | Mod: 26 | Performed by: RADIOLOGY

## 2021-11-01 PROCEDURE — 36561 INSERT TUNNELED CV CATH: CPT | Mod: RT | Performed by: RADIOLOGY

## 2021-11-01 PROCEDURE — 85610 PROTHROMBIN TIME: CPT | Performed by: PATHOLOGY

## 2021-11-01 DEVICE — CATH PORT POWERPORT CLEARVUE SLIM 8FR 5618000: Type: IMPLANTABLE DEVICE | Site: CHEST | Status: FUNCTIONAL

## 2021-11-01 RX ORDER — SODIUM CHLORIDE, SODIUM LACTATE, POTASSIUM CHLORIDE, CALCIUM CHLORIDE 600; 310; 30; 20 MG/100ML; MG/100ML; MG/100ML; MG/100ML
INJECTION, SOLUTION INTRAVENOUS CONTINUOUS
Status: DISCONTINUED | OUTPATIENT
Start: 2021-11-01 | End: 2021-11-02 | Stop reason: HOSPADM

## 2021-11-01 RX ORDER — CEFAZOLIN SODIUM 2 G/50ML
2 SOLUTION INTRAVENOUS
Status: COMPLETED | OUTPATIENT
Start: 2021-11-01 | End: 2021-11-01

## 2021-11-01 RX ORDER — HEPARIN SODIUM,PORCINE 10 UNIT/ML
5-10 VIAL (ML) INTRAVENOUS
Status: DISCONTINUED | OUTPATIENT
Start: 2021-11-01 | End: 2021-11-02 | Stop reason: HOSPADM

## 2021-11-01 RX ORDER — LIDOCAINE 40 MG/G
CREAM TOPICAL
Status: DISCONTINUED | OUTPATIENT
Start: 2021-11-01 | End: 2021-11-02 | Stop reason: HOSPADM

## 2021-11-01 RX ORDER — PROPOFOL 10 MG/ML
INJECTION, EMULSION INTRAVENOUS PRN
Status: DISCONTINUED | OUTPATIENT
Start: 2021-11-01 | End: 2021-11-01

## 2021-11-01 RX ORDER — HEPARIN SODIUM (PORCINE) LOCK FLUSH IV SOLN 100 UNIT/ML 100 UNIT/ML
5-10 SOLUTION INTRAVENOUS
Status: DISCONTINUED | OUTPATIENT
Start: 2021-11-01 | End: 2021-11-02 | Stop reason: HOSPADM

## 2021-11-01 RX ORDER — SODIUM CHLORIDE 9 MG/ML
INJECTION, SOLUTION INTRAVENOUS CONTINUOUS
Status: DISCONTINUED | OUTPATIENT
Start: 2021-11-01 | End: 2021-11-02 | Stop reason: HOSPADM

## 2021-11-01 RX ORDER — PROPOFOL 10 MG/ML
INJECTION, EMULSION INTRAVENOUS CONTINUOUS PRN
Status: DISCONTINUED | OUTPATIENT
Start: 2021-11-01 | End: 2021-11-01

## 2021-11-01 RX ORDER — NICOTINE POLACRILEX 4 MG
15-30 LOZENGE BUCCAL
Status: DISCONTINUED | OUTPATIENT
Start: 2021-11-01 | End: 2021-11-02 | Stop reason: HOSPADM

## 2021-11-01 RX ORDER — HEPARIN SODIUM,PORCINE 10 UNIT/ML
5-10 VIAL (ML) INTRAVENOUS EVERY 24 HOURS
Status: DISCONTINUED | OUTPATIENT
Start: 2021-11-01 | End: 2021-11-02 | Stop reason: HOSPADM

## 2021-11-01 RX ORDER — DEXTROSE MONOHYDRATE 25 G/50ML
25-50 INJECTION, SOLUTION INTRAVENOUS
Status: DISCONTINUED | OUTPATIENT
Start: 2021-11-01 | End: 2021-11-02 | Stop reason: HOSPADM

## 2021-11-01 RX ADMIN — CEFAZOLIN SODIUM 2 G: 2 SOLUTION INTRAVENOUS at 08:15

## 2021-11-01 RX ADMIN — SODIUM CHLORIDE, SODIUM LACTATE, POTASSIUM CHLORIDE, CALCIUM CHLORIDE: 600; 310; 30; 20 INJECTION, SOLUTION INTRAVENOUS at 08:27

## 2021-11-01 RX ADMIN — PROPOFOL 50 MG: 10 INJECTION, EMULSION INTRAVENOUS at 08:52

## 2021-11-01 RX ADMIN — PROPOFOL 300 MCG/KG/MIN: 10 INJECTION, EMULSION INTRAVENOUS at 08:52

## 2021-11-01 ASSESSMENT — MIFFLIN-ST. JEOR: SCORE: 1998.02

## 2021-11-01 NOTE — ANESTHESIA POSTPROCEDURE EVALUATION
Patient: Vic Scott III    Procedure: Procedure(s):  INSERTION, VASCULAR ACCESS PORT       Diagnosis:Sarcoma (H) [C49.9]  Diagnosis Additional Information: No value filed.    Anesthesia Type:  MAC    Note:  Disposition: Outpatient   Postop Pain Control: Uneventful            Sign Out: Well controlled pain   PONV:    Neuro/Psych: Uneventful            Sign Out: Acceptable/Baseline neuro status   Airway/Respiratory: Uneventful            Sign Out: Acceptable/Baseline resp. status   CV/Hemodynamics: Uneventful            Sign Out: Acceptable CV status; No obvious hypovolemia; No obvious fluid overload   Other NRE:    DID A NON-ROUTINE EVENT OCCUR?            Last vitals:  Vitals Value Taken Time   /109 11/01/21 0947   Temp 37.1  C (98.8  F) 11/01/21 0947   Pulse 115 11/01/21 0947   Resp     SpO2 100 % 11/01/21 0947       Electronically Signed By: Gustavo Mead MD  November 1, 2021  10:08 AM

## 2021-11-01 NOTE — H&P
A collaboration between [facility] and [facility]  Experts in minimally invasive, targeted treatments performed using imaging guidance    History & Physical    Patient Name:  Vic Scott III   YOB: 1975  Medical Record Number (MRN):  [id number]  Age:  46 year old male      Requested IR procedure:  Chest port    Indication / Associated Diagnosis:  sarcoma    Sedation planned:  Per anesthesia services, Monitored Anesthesia Care (MAC)    Expected Level: Moderate to Deep Sedation    Indication: Sedation is required for the following type of Procedure: Venous Access      Focused history and physical completed prior to procedure. I have reviewed the lab findings, diagnostic data, and medications.     I have determined this patient to be an appropriate candidate for the planned procedure and have reassessed the patient IMMEDIATELY PRIOR to procedure.    -----    Patient Data:    Physical Exam:  Heart:  RRR w/o mm  Pulm: Lungs CTA bilaterally  Airway: Mouth open fully, > 3 fingers wide    Review of Systems: (negative unless bolded and red font)  Heart: palpatations, h/o MI, HTN, chest pain, cardiac surgery  Pulm: sleep apnea, COPD, chronic cough, asthma  GI: nausea, vomiting, ulcers, reflux  MSK: significant arthralgias, myalgias, physical limitations, pain  Endo: diabetes  Heme: bleeding disorder, anticoagulation    -----    History reviewed. No pertinent past medical history.      Patient Active Problem List    Diagnosis Date Noted     Sarcoma (H) 10/26/2021     Priority: Medium         Prescription Medications as of 11/1/2021       Rx Number Disp Refills Start End Last Dispensed Date Next Fill Date Owning Pharmacy    oxyCODONE (ROXICODONE) 5 MG tablet  60 tablet 0 10/26/2021    [id number] [address] AT [facility] [address]    Sig: Take 1-2 pills by mouth every 4-6 hours as needed for pain    Class:  "E-Prescribe    Earliest Fill Date: 10/26/2021      Hospital Medications as of 11/1/2021       Dose Frequency Start End    ceFAZolin (ANCEF) intermittent infusion 2 g in 50 mL dextrose PREMIX 2 g PRE-OP/PRE-PROCEDURE 11/1/2021     Admin Instructions: Give dose within 1 hour PRIOR to procedure.  If patient weight is greater than or equal to 120 kg change dose to 3 g.    Class: E-Prescribe    Route: Intravenous    lactated ringers infusion  CONTINUOUS 11/1/2021     Class: E-Prescribe    Route: Intravenous    lidocaine (LMX4) kit  EVERY 1 HOUR PRN 11/1/2021     Admin Instructions: Apply at least 30 minutes prior to VAD insertion in divided doses as needed for size of site for insertion.<BR>MAX Dose: 2.5 g (  of 5 g tube)<BR>Do NOT give if patient has a history of allergy to any local anesthetic or any \"eva\" product.<BR>Do NOT use both lidocaine intradermal/subcutaneous injection and the lidocaine cream on the same site.    Class: E-Prescribe    Route: Topical    lidocaine (LMX4) kit  EVERY 1 HOUR PRN 11/1/2021     Admin Instructions: Apply at least 30 minutes prior to VAD insertion in divided doses as needed for size of site for insertion.<BR>MAX Dose: 2.5 g (  of 5 g tube)<BR>Do NOT give if patient has a history of allergy to any local anesthetic or any \"eva\" product.<BR>Do NOT use both lidocaine intradermal/subcutaneous injection and the lidocaine cream on the same site.    Class: E-Prescribe    Route: Topical    lidocaine 1 % 0.1-1 mL 0.1-1 mL EVERY 1 HOUR PRN 11/1/2021     Admin Instructions: MAX dose 1 mL subcutaneous OR intradermal along the side of the vein in divided doses as needed for VAD insertion.<BR>Do NOT give if patient has a history of allergy to any local anesthetic or any \"eva\" product.<BR>Do NOT use both lidocaine intradermal/subcutaneous injection and the lidocaine cream on the same site.    Class: E-Prescribe    Route: Other    lidocaine 1 % 0.1-1 mL 0.1-1 mL EVERY 1 HOUR PRN 11/1/2021     " "Admin Instructions: MAX dose 1 mL subcutaneous OR intradermal along the side of the vein in divided doses as needed for VAD insertion.<BR>Do NOT give if patient has a history of allergy to any local anesthetic or any \"eva\" product.<BR>Do NOT use both lidocaine intradermal/subcutaneous injection and the lidocaine cream on the same site.    Class: E-Prescribe    Route: Other    medication instruction  CONTINUOUS PRN 11/1/2021     Admin Instructions: May take regular AM medications except those listed    Class: E-Prescribe    Route: Does not apply    sodium chloride (PF) 0.9% PF flush 3 mL 3 mL EVERY 8 HOURS 11/1/2021     Admin Instructions: to lock peripheral IV dormant line    Class: E-Prescribe    Route: Intracatheter    sodium chloride (PF) 0.9% PF flush 3 mL 3 mL EVERY 1 MIN PRN 11/1/2021     Class: E-Prescribe    Route: Intracatheter    sodium chloride (PF) 0.9% PF flush 3 mL 3 mL EVERY 8 HOURS 11/1/2021     Admin Instructions: to lock peripheral IV dormant line    Class: E-Prescribe    Route: Intracatheter    sodium chloride (PF) 0.9% PF flush 3 mL 3 mL EVERY 1 MIN PRN 11/1/2021     Class: E-Prescribe    Route: Intracatheter    sodium chloride 0.9 % bag TABLE SOLN 1 Bag CONTINUOUS PRN 10/29/2021     Admin Instructions: Maximum total dose 5000 mL<BR>Nurse will document number of bags used at the end of the procedure.<BR><BR>NOT A PRESSURE BAG    Class: E-Prescribe    Route: TABLE SOLN    sodium chloride 0.9% infusion  CONTINUOUS 11/1/2021     Admin Instructions: IF PATIENT IS RECEIVING RENAL DIALYSIS, RN to reduce rate of 0.9 % sodium chloride IV solution to 10 mL /hour (TKO) IV infusion to prevent fluid overload.    Class: E-Prescribe    Route: Intravenous            No Known Allergies      Complete Blood Count:  No results found for: PLT    Coagulation:  No results found for: INR    Vital Signs:  BP (!) 166/105   Pulse (!) 126   Temp 97.1  F (36.2  C) (Temporal)   Ht 1.88 m (6' 2\")   Wt 104.3 kg (230 lb)  "  SpO2 98%   BMI 29.53 kg/m      -----    Rory Jovel PA-C

## 2021-11-01 NOTE — ANESTHESIA CARE TRANSFER NOTE
Patient: Vic Scott III    Procedure: Procedure(s):  INSERTION, VASCULAR ACCESS PORT       Diagnosis: Sarcoma (H) [C49.9]  Diagnosis Additional Information: No value filed.    Anesthesia Type:   MAC     Note:    Oropharynx: oropharynx clear of all foreign objects and spontaneously breathing  Level of Consciousness: awake  Oxygen Supplementation: room air    Independent Airway: airway patency satisfactory and stable  Dentition: dentition unchanged  Vital Signs Stable: post-procedure vital signs reviewed and stable  Report to RN Given: handoff report given  Patient transferred to:  Recovery  Comments: Uneventful transport; IV patent; Pt responds appropriately to command; Pt comfortable; VSS: Report to RN  Handoff Report: Identifed the Patient, Identified the Reponsible Provider, Reviewed the pertinent medical history, Discussed the surgical course, Reviewed Intra-OP anesthesia mangement and issues during anesthesia, Set expectations for post-procedure period and Allowed opportunity for questions and acknowledgement of understanding      Vitals:  Vitals Value Taken Time   /109 11/01/21 0947   Temp 37.1  C (98.8  F) 11/01/21 0947   Pulse 115 11/01/21 0947   Resp 22    SpO2 100 % 11/01/21 0947       Electronically Signed By: CHET HOUSTON CRNA  November 1, 2021  9:48 AM

## 2021-11-01 NOTE — BRIEF OP NOTE
[facility]    Brief Operative Note    Pre-operative diagnosis: Sarcoma (H) [C49.9]  Post-operative diagnosis Same as pre-operative diagnosis    Procedure: Procedure(s):  INSERTION, VASCULAR ACCESS PORT  Surgeon: Surgeon(s) and Role:     * Sushil Gonzales MD - Primary  Anesthesia: Monitor Anesthesia Care   Estimated Blood Loss: Minimal    Drains: None  Specimens: * No specimens in log *  Findings: 8F x 22.5 cm RIJV single lumen port placed with tip in SVC. Hep locked with 1:100 heparin. Port ready for immediate use.  Complications: None.  Implants:   Implant Name Type Inv. Item Serial No.  Lot No. LRB No. Used Action   CATH PORT POWERPORT CLEARVUE SLIM 8FR [id number] - [id number] Port CATH PORT POWERPORT CLEARVUE SLIM 8FR [id number]   [employer] [id number] Right 1 Implanted

## 2021-11-01 NOTE — DISCHARGE INSTRUCTIONS
A collaboration between [facility] and [facility]  Experts in minimally invasive, targeted treatments performed using imaging guidance    Venous Access Device,  Port Catheter or Tunneled or Non-Tunneled Central Line Placement    Today you had a procedure today to install a venous access device; either a tunneled central vein catheter or a subcutaneous port catheter.    After you go home:  - Drink plenty of fluids.  Generally 6-8 (8 ounce) glasses a day is recommended.  - Resume your regular diet unless otherwise ordered by a medical provider.  - Keep any applied tape/gauze dressings clean and dry.  Change tape/gauze dressings if they get wet or soiled.  - You may shower the following day after procedure, however cover and protect from moisture any tape/gauze dressings.  You may let water hit and run over dried skin glue, but do not scrub.  Pat the area dry after showering.  - Port placement incisions are closed with absorbable suture, meaning they do not need to be removed at a later date, and a topical skin adhesive (skin glue).  This glue will wear off in 7-14 days.  Do not remove before this time.  If 14 days have passed and residual glue is present, you may gently remove it.  - Do not apply gels, lotions, or ointments to the glue site for the first 10 days as this may cause the glue to prematurely soften and fail.  - Do not perform strenuous activities or lift greater than 10 pounds for the next three days.  - If there is bleeding or oozing from the procedure site, apply firm pressure to the area for 5-10 minutes.  If the bleeding continues seek medical advice at the numbers below.  - Mild procedure site discomfort can be treated with an ice pack and over-the-counter pain relievers.        For 24 hours after any sedation used:  - Relax and take it easy.  No strenuous activities.  - Do not drive or operate machines at home or at work.  - No alcohol  consumption.  - Do not make any important or legal decisions.    Call our Interventional Radiology (IR) service if:  - If you start bleeding from the procedure site.  If you do start to bleed from the site, lie down and hold some pressure on the site.  Our radiology provider can help you decide if you need to return to the hospital.  - If you have new or worsening pain related to the procedure.  - If you have concerning swelling at the procedure site.  - If you develop persistent nausea or vomiting.  - If you develop hives or a rash or any unexplained itching.  - If you have a fever of greater than 100.5  F and chills in the first 5 days after procedure.  - Any other concerns related to your procedure.      [facility]  Interventional Radiology (IR)  [address]  [address] [address]    Contact Number:  [phone]  (IR control desk)  - Monday - Friday 8:00 am - 4:30 pm    After hours for urgent concerns:  [phone]  - After 4:30 pm Monday - Friday, Weekends and Holidays.   - Ask for Interventional Radiology on-call.  Someone is available 24 hours a day.  - [facility] toll free number:  [phone]

## 2021-11-02 ENCOUNTER — APPOINTMENT (OUTPATIENT)
Dept: LAB | Facility: CLINIC | Age: 46
End: 2021-11-02
Attending: INTERNAL MEDICINE
Payer: MEDICAID

## 2021-11-02 ENCOUNTER — HOSPITAL ENCOUNTER (INPATIENT)
Facility: CLINIC | Age: 46
LOS: 7 days | Discharge: HOME | End: 2021-11-09
Attending: STUDENT IN AN ORGANIZED HEALTH CARE EDUCATION/TRAINING PROGRAM | Admitting: INTERNAL MEDICINE
Payer: MEDICAID

## 2021-11-02 ENCOUNTER — APPOINTMENT (OUTPATIENT)
Dept: CARDIOLOGY | Facility: CLINIC | Age: 46
End: 2021-11-02
Attending: PHYSICIAN ASSISTANT
Payer: MEDICAID

## 2021-11-02 DIAGNOSIS — C49.9 SARCOMA (H): Primary | ICD-10-CM

## 2021-11-02 DIAGNOSIS — K59.00 CONSTIPATION, UNSPECIFIED CONSTIPATION TYPE: ICD-10-CM

## 2021-11-02 DIAGNOSIS — R60.0 EDEMA OF RIGHT LOWER EXTREMITY: ICD-10-CM

## 2021-11-02 LAB
ALBUMIN SERPL-MCNC: 3.1 G/DL (ref 3.4–5)
ALP SERPL-CCNC: 263 U/L (ref 40–150)
ALT SERPL W P-5'-P-CCNC: 26 U/L (ref 0–70)
ANION GAP SERPL CALCULATED.3IONS-SCNC: 6 MMOL/L (ref 3–14)
AST SERPL W P-5'-P-CCNC: 25 U/L (ref 0–45)
BASOPHILS # BLD AUTO: 0 10E3/UL (ref 0–0.2)
BASOPHILS NFR BLD AUTO: 0 %
BILIRUB SERPL-MCNC: 1.1 MG/DL (ref 0.2–1.3)
BUN SERPL-MCNC: 16 MG/DL (ref 7–30)
CALCIUM SERPL-MCNC: 8.8 MG/DL (ref 8.5–10.1)
CHLORIDE BLD-SCNC: 106 MMOL/L (ref 94–109)
CO2 SERPL-SCNC: 26 MMOL/L (ref 20–32)
CREAT SERPL-MCNC: 0.73 MG/DL (ref 0.66–1.25)
EOSINOPHIL # BLD AUTO: 0.1 10E3/UL (ref 0–0.7)
EOSINOPHIL NFR BLD AUTO: 1 %
ERYTHROCYTE [DISTWIDTH] IN BLOOD BY AUTOMATED COUNT: 14.8 % (ref 10–15)
GFR SERPL CREATININE-BSD FRML MDRD: >90 ML/MIN/1.73M2
GLUCOSE BLD-MCNC: 109 MG/DL (ref 70–99)
HCT VFR BLD AUTO: 32.1 % (ref 40–53)
HGB BLD-MCNC: 10.5 G/DL (ref 13.3–17.7)
IMM GRANULOCYTES # BLD: 0.1 10E3/UL
IMM GRANULOCYTES NFR BLD: 1 %
LVEF ECHO: NORMAL
LYMPHOCYTES # BLD AUTO: 1.5 10E3/UL (ref 0.8–5.3)
LYMPHOCYTES NFR BLD AUTO: 15 %
MCH RBC QN AUTO: 25.7 PG (ref 26.5–33)
MCHC RBC AUTO-ENTMCNC: 32.7 G/DL (ref 31.5–36.5)
MCV RBC AUTO: 79 FL (ref 78–100)
MONOCYTES # BLD AUTO: 1 10E3/UL (ref 0–1.3)
MONOCYTES NFR BLD AUTO: 10 %
NEUTROPHILS # BLD AUTO: 7.4 10E3/UL (ref 1.6–8.3)
NEUTROPHILS NFR BLD AUTO: 73 %
NRBC # BLD AUTO: 0 10E3/UL
NRBC BLD AUTO-RTO: 0 /100
PHOSPHATE SERPL-MCNC: 3.5 MG/DL (ref 2.5–4.5)
PLATELET # BLD AUTO: 322 10E3/UL (ref 150–450)
POTASSIUM BLD-SCNC: 3.9 MMOL/L (ref 3.4–5.3)
PROT SERPL-MCNC: 8 G/DL (ref 6.8–8.8)
RBC # BLD AUTO: 4.08 10E6/UL (ref 4.4–5.9)
SARS-COV-2 RNA RESP QL NAA+PROBE: NEGATIVE
SODIUM SERPL-SCNC: 138 MMOL/L (ref 133–144)
WBC # BLD AUTO: 10 10E3/UL (ref 4–11)

## 2021-11-02 PROCEDURE — 80053 COMPREHEN METABOLIC PANEL: CPT | Performed by: PHYSICIAN ASSISTANT

## 2021-11-02 PROCEDURE — U0003 INFECTIOUS AGENT DETECTION BY NUCLEIC ACID (DNA OR RNA); SEVERE ACUTE RESPIRATORY SYNDROME CORONAVIRUS 2 (SARS-COV-2) (CORONAVIRUS DISEASE [COVID-19]), AMPLIFIED PROBE TECHNIQUE, MAKING USE OF HIGH THROUGHPUT TECHNOLOGIES AS DESCRIBED BY CMS-2020-01-R: HCPCS | Performed by: PHYSICIAN ASSISTANT

## 2021-11-02 PROCEDURE — 85025 COMPLETE CBC W/AUTO DIFF WBC: CPT | Performed by: PHYSICIAN ASSISTANT

## 2021-11-02 PROCEDURE — 93010 ELECTROCARDIOGRAM REPORT: CPT | Performed by: INTERNAL MEDICINE

## 2021-11-02 PROCEDURE — 93306 TTE W/DOPPLER COMPLETE: CPT | Mod: 26 | Performed by: STUDENT IN AN ORGANIZED HEALTH CARE EDUCATION/TRAINING PROGRAM

## 2021-11-02 PROCEDURE — 99223 1ST HOSP IP/OBS HIGH 75: CPT | Mod: AI | Performed by: INTERNAL MEDICINE

## 2021-11-02 PROCEDURE — 255N000002 HC RX 255 OP 636: Performed by: STUDENT IN AN ORGANIZED HEALTH CARE EDUCATION/TRAINING PROGRAM

## 2021-11-02 PROCEDURE — 3E04305 INTRODUCTION OF OTHER ANTINEOPLASTIC INTO CENTRAL VEIN, PERCUTANEOUS APPROACH: ICD-10-PCS | Performed by: INTERNAL MEDICINE

## 2021-11-02 PROCEDURE — 258N000003 HC RX IP 258 OP 636: Performed by: INTERNAL MEDICINE

## 2021-11-02 PROCEDURE — 250N000011 HC RX IP 250 OP 636: Performed by: INTERNAL MEDICINE

## 2021-11-02 PROCEDURE — 36415 COLL VENOUS BLD VENIPUNCTURE: CPT | Performed by: PHYSICIAN ASSISTANT

## 2021-11-02 PROCEDURE — 250N000013 HC RX MED GY IP 250 OP 250 PS 637: Performed by: PHYSICIAN ASSISTANT

## 2021-11-02 PROCEDURE — 93005 ELECTROCARDIOGRAM TRACING: CPT

## 2021-11-02 PROCEDURE — 999N000208 ECHOCARDIOGRAM COMPLETE

## 2021-11-02 PROCEDURE — 84100 ASSAY OF PHOSPHORUS: CPT | Performed by: PHYSICIAN ASSISTANT

## 2021-11-02 PROCEDURE — 120N000002 HC R&B MED SURG/OB UMMC

## 2021-11-02 RX ORDER — ONDANSETRON 4 MG/1
8 TABLET, ORALLY DISINTEGRATING ORAL EVERY 8 HOURS PRN
Status: DISCONTINUED | OUTPATIENT
Start: 2021-11-02 | End: 2021-11-02

## 2021-11-02 RX ORDER — NALOXONE HYDROCHLORIDE 0.4 MG/ML
0.4 INJECTION, SOLUTION INTRAMUSCULAR; INTRAVENOUS; SUBCUTANEOUS
Status: DISCONTINUED | OUTPATIENT
Start: 2021-11-02 | End: 2021-01-01 | Stop reason: HOSPADM

## 2021-11-02 RX ORDER — NALOXONE HYDROCHLORIDE 0.4 MG/ML
0.2 INJECTION, SOLUTION INTRAMUSCULAR; INTRAVENOUS; SUBCUTANEOUS
Status: DISCONTINUED | OUTPATIENT
Start: 2021-11-02 | End: 2021-11-02

## 2021-11-02 RX ORDER — MEPERIDINE HYDROCHLORIDE 25 MG/ML
25 INJECTION INTRAMUSCULAR; INTRAVENOUS; SUBCUTANEOUS EVERY 30 MIN PRN
Status: DISCONTINUED | OUTPATIENT
Start: 2021-11-02 | End: 2021-01-01 | Stop reason: HOSPADM

## 2021-11-02 RX ORDER — PROCHLORPERAZINE MALEATE 5 MG
5 TABLET ORAL EVERY 6 HOURS PRN
Status: DISCONTINUED | OUTPATIENT
Start: 2021-11-02 | End: 2021-01-01 | Stop reason: HOSPADM

## 2021-11-02 RX ORDER — LORAZEPAM 2 MG/ML
.5-1 INJECTION INTRAMUSCULAR EVERY 6 HOURS PRN
Status: DISCONTINUED | OUTPATIENT
Start: 2021-11-02 | End: 2021-01-01 | Stop reason: HOSPADM

## 2021-11-02 RX ORDER — ALBUTEROL SULFATE 90 UG/1
1-2 AEROSOL, METERED RESPIRATORY (INHALATION)
Status: CANCELLED
Start: 2021-11-02

## 2021-11-02 RX ORDER — HEPARIN SODIUM (PORCINE) LOCK FLUSH IV SOLN 100 UNIT/ML 100 UNIT/ML
5 SOLUTION INTRAVENOUS
Status: CANCELLED | OUTPATIENT
Start: 2021-11-02

## 2021-11-02 RX ORDER — NALOXONE HYDROCHLORIDE 0.4 MG/ML
0.2 INJECTION, SOLUTION INTRAMUSCULAR; INTRAVENOUS; SUBCUTANEOUS
Status: DISCONTINUED | OUTPATIENT
Start: 2021-11-02 | End: 2021-01-01 | Stop reason: HOSPADM

## 2021-11-02 RX ORDER — ONDANSETRON 8 MG/1
8 TABLET, FILM COATED ORAL EVERY 8 HOURS PRN
Status: DISCONTINUED | OUTPATIENT
Start: 2021-11-02 | End: 2021-11-02

## 2021-11-02 RX ORDER — PROCHLORPERAZINE MALEATE 10 MG
10 TABLET ORAL EVERY 6 HOURS PRN
Status: DISCONTINUED | OUTPATIENT
Start: 2021-11-02 | End: 2021-11-02

## 2021-11-02 RX ORDER — ACETAMINOPHEN 325 MG/1
650 TABLET ORAL EVERY 4 HOURS PRN
Status: DISCONTINUED | OUTPATIENT
Start: 2021-11-02 | End: 2021-01-01 | Stop reason: HOSPADM

## 2021-11-02 RX ORDER — ALBUTEROL SULFATE 0.83 MG/ML
2.5 SOLUTION RESPIRATORY (INHALATION)
Status: CANCELLED | OUTPATIENT
Start: 2021-11-02

## 2021-11-02 RX ORDER — DEXTROSE MONOHYDRATE 50 MG/ML
10-20 INJECTION, SOLUTION INTRAVENOUS
Status: DISCONTINUED | OUTPATIENT
Start: 2021-01-01 | End: 2021-11-02

## 2021-11-02 RX ORDER — DIPHENHYDRAMINE HYDROCHLORIDE 50 MG/ML
50 INJECTION INTRAMUSCULAR; INTRAVENOUS
Status: DISCONTINUED | OUTPATIENT
Start: 2021-11-02 | End: 2021-01-01 | Stop reason: HOSPADM

## 2021-11-02 RX ORDER — EPINEPHRINE 1 MG/ML
0.3 INJECTION, SOLUTION, CONCENTRATE INTRAVENOUS EVERY 5 MIN PRN
Status: CANCELLED | OUTPATIENT
Start: 2021-11-02

## 2021-11-02 RX ORDER — METHYLPREDNISOLONE SODIUM SUCCINATE 125 MG/2ML
125 INJECTION, POWDER, LYOPHILIZED, FOR SOLUTION INTRAMUSCULAR; INTRAVENOUS
Status: CANCELLED
Start: 2021-11-02

## 2021-11-02 RX ORDER — POLYETHYLENE GLYCOL 3350 17 G/17G
17 POWDER, FOR SOLUTION ORAL DAILY PRN
Status: DISCONTINUED | OUTPATIENT
Start: 2021-11-02 | End: 2021-01-01 | Stop reason: HOSPADM

## 2021-11-02 RX ORDER — ALBUTEROL SULFATE 0.83 MG/ML
2.5 SOLUTION RESPIRATORY (INHALATION)
Status: DISCONTINUED | OUTPATIENT
Start: 2021-11-02 | End: 2021-01-01 | Stop reason: HOSPADM

## 2021-11-02 RX ORDER — DIPHENHYDRAMINE HYDROCHLORIDE 50 MG/ML
50 INJECTION INTRAMUSCULAR; INTRAVENOUS
Status: CANCELLED
Start: 2021-11-02

## 2021-11-02 RX ORDER — AMOXICILLIN 250 MG
1 CAPSULE ORAL
Status: DISCONTINUED | OUTPATIENT
Start: 2021-11-02 | End: 2021-01-01 | Stop reason: HOSPADM

## 2021-11-02 RX ORDER — NALOXONE HYDROCHLORIDE 0.4 MG/ML
0.2 INJECTION, SOLUTION INTRAMUSCULAR; INTRAVENOUS; SUBCUTANEOUS
Status: CANCELLED | OUTPATIENT
Start: 2021-11-02

## 2021-11-02 RX ORDER — METHYLPREDNISOLONE SODIUM SUCCINATE 125 MG/2ML
125 INJECTION, POWDER, LYOPHILIZED, FOR SOLUTION INTRAMUSCULAR; INTRAVENOUS
Status: DISCONTINUED | OUTPATIENT
Start: 2021-11-02 | End: 2021-01-01 | Stop reason: HOSPADM

## 2021-11-02 RX ORDER — AMOXICILLIN 250 MG
2 CAPSULE ORAL
Status: DISCONTINUED | OUTPATIENT
Start: 2021-11-02 | End: 2021-01-01 | Stop reason: HOSPADM

## 2021-11-02 RX ORDER — OXYCODONE HYDROCHLORIDE 5 MG/1
5-10 TABLET ORAL EVERY 4 HOURS PRN
Status: DISCONTINUED | OUTPATIENT
Start: 2021-11-02 | End: 2021-11-04

## 2021-11-02 RX ORDER — ONDANSETRON 2 MG/ML
8 INJECTION INTRAMUSCULAR; INTRAVENOUS EVERY 8 HOURS PRN
Status: DISCONTINUED | OUTPATIENT
Start: 2021-11-02 | End: 2021-11-02

## 2021-11-02 RX ORDER — HEPARIN SODIUM,PORCINE 10 UNIT/ML
5 VIAL (ML) INTRAVENOUS
Status: CANCELLED | OUTPATIENT
Start: 2021-11-02

## 2021-11-02 RX ORDER — MEPERIDINE HYDROCHLORIDE 25 MG/ML
25 INJECTION INTRAMUSCULAR; INTRAVENOUS; SUBCUTANEOUS EVERY 30 MIN PRN
Status: CANCELLED | OUTPATIENT
Start: 2021-11-02

## 2021-11-02 RX ORDER — EPINEPHRINE 1 MG/ML
0.3 INJECTION, SOLUTION, CONCENTRATE INTRAVENOUS EVERY 5 MIN PRN
Status: DISCONTINUED | OUTPATIENT
Start: 2021-11-02 | End: 2021-01-01 | Stop reason: HOSPADM

## 2021-11-02 RX ORDER — ONDANSETRON 8 MG/1
8 TABLET, FILM COATED ORAL EVERY 8 HOURS
Status: COMPLETED | OUTPATIENT
Start: 2021-11-02 | End: 2021-01-01

## 2021-11-02 RX ORDER — LIDOCAINE 40 MG/G
CREAM TOPICAL
Status: DISCONTINUED | OUTPATIENT
Start: 2021-11-02 | End: 2021-01-01 | Stop reason: HOSPADM

## 2021-11-02 RX ORDER — ALBUTEROL SULFATE 90 UG/1
1-2 AEROSOL, METERED RESPIRATORY (INHALATION)
Status: DISCONTINUED | OUTPATIENT
Start: 2021-11-02 | End: 2021-01-01 | Stop reason: HOSPADM

## 2021-11-02 RX ORDER — LORAZEPAM 0.5 MG/1
.5-1 TABLET ORAL EVERY 6 HOURS PRN
Status: DISCONTINUED | OUTPATIENT
Start: 2021-11-02 | End: 2021-01-01 | Stop reason: HOSPADM

## 2021-11-02 RX ADMIN — ACETAMINOPHEN 650 MG: 325 TABLET, FILM COATED ORAL at 16:52

## 2021-11-02 RX ADMIN — OXYCODONE HYDROCHLORIDE 5 MG: 5 TABLET ORAL at 21:17

## 2021-11-02 RX ADMIN — HUMAN ALBUMIN MICROSPHERES AND PERFLUTREN 5 ML: 10; .22 INJECTION, SOLUTION INTRAVENOUS at 15:14

## 2021-11-02 RX ADMIN — ONDANSETRON HYDROCHLORIDE 8 MG: 8 TABLET, FILM COATED ORAL at 20:09

## 2021-11-02 RX ADMIN — DOXORUBICIN HYDROCHLORIDE 110 MG: 2 INJECTABLE, LIPOSOMAL INTRAVENOUS at 20:49

## 2021-11-02 RX ADMIN — OXYCODONE HYDROCHLORIDE 5 MG: 5 TABLET ORAL at 16:50

## 2021-11-02 ASSESSMENT — ACTIVITIES OF DAILY LIVING (ADL)
ADLS_ACUITY_SCORE: 7
ADLS_ACUITY_SCORE: 12
ADLS_ACUITY_SCORE: 7
ADLS_ACUITY_SCORE: 12
ADLS_ACUITY_SCORE: 7
ADLS_ACUITY_SCORE: 12

## 2021-11-02 ASSESSMENT — MIFFLIN-ST. JEOR: SCORE: 2096.91

## 2021-11-02 NOTE — PLAN OF CARE
[id number]: Pt HR tachy 110s-120s. EKG completed showing sinus tach. OVSS, pt afebrile on RA. Pt anxious with admission; therapeutic communication and emotional support provided. Pt denies nausea or dyspnea. Pt reporting pain in right hip that radiates down leg; experiences some burning sensation in that leg. 5 mg Oxy given X1 and Tylenol X1. Pt stating pain improved after pain medications. Pt with diffuse edema in RLE; encouraging pt to elevate extremity. Pt up with SBA with Walker or cane. Pt to start Doxil & Ifos/Mesna this evening. Education about chemotherapy given, questions answered. Covid test sent and negative. Echo completed. Port patent; good blood return noted. IR accessed port at bedside.

## 2021-11-02 NOTE — PROVIDER NOTIFICATION
DATE/TIME  (DOT-TD, DOT-NOW) CHEMO CHECK ACTIVITY (REGIMEN & DOSE CHECK, DAY, DOSE #, NAME OF CHEMO #1)  CHEMO DRUG #2  CHEMO DRUG #3 NAME OF RN #1 (USE DOT-ME HERE) NAME OF RN#2 (2ND RN TO LOG IN SEPARATELY)   11/2/2021  3:42 PM   Doxil/Ifos Mesna Protocol Double Check   Melanie Amor RN   (Alondra)    11/02/21  7:56 PM   Dose #1 Doxil double check   LUIS Xiao RN     11/03/21  12:31 AM   Dose #1 Ifos/Mesna double check   LUIS Xiao RN     11/04/21  12:31 AM   Dose #2 Ifos/Mesna double check   LUIS Xiao RN     11/05/21  12:49 AM   Dose #3 ifos/mesna double check   LUIS Xiao RN     11/06/21  12:30 AM Dose #4 ifos/mesna  Double check   Lucia Iqbal RN ([facility])   11/06/21  23:27 Dose #5 ifos/mesna  Double check   LUIS Zarate RN     11/07/2021 Dose #6 Ifos/mesna   Double check   Lucia Iqbal RN ([facility])

## 2021-11-02 NOTE — PROGRESS NOTES
RIGHT chest port accessed bedside, positive blood return, locked with 5 ml 10 U/mL heparin. Sterilely dressed. Okay to use.

## 2021-11-02 NOTE — PLAN OF CARE
Nursing Focus: Admission  D: Arrived at 1300 from home. Patient accompanied by sister. Admitted for Cycle 1 of Ifosfamide/Mesna and Doxil. Complains of pain in right leg.      I: Admission process began.  Patient oriented to room, enviroment, call light.  MD notified of patient's arrival on unit.     A: HR tachy 110-120s. Vital signs stable, afebrile.  Patient stable at this time.  Complaining of pain in right leg.     P: Implement plan of care when available. Continue to monitor patient. Nursing interventions as appropriate. Notify MD with changes in pt status.

## 2021-11-02 NOTE — H&P
[facility]    Hematology / Oncology  Admission History & Physical     Date of Admission:  11/02/21  Date of Service: 11/02/21  Primary Hematologist/Oncologist: Dr. 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 Whitaker, Orthopedics with rapidly progressing right leg swelling since ~ July 2021. MRI was incomplete due to pain, but did show ~[id number] right thigh soft tissue mass with probable bony involvement of the proximal right femur. 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 Was seen by Dr. Whitaker to review the results on 10/1/21 - discussed consideration of neoadjuvant chemotherapy vs resection and reconstruction. Given high risk for amputation with massive reconstructive surgery, he was referred to Dr. Ambrocio and a PET was ordered which identifed concerning inguinal lymphadenopathy. He was seen by Dr. Ambrocio 10/26/21, ordered NGS (in process) and recommended starting Doxil/Ifos initially as an inpatient. Admitted for Cycle 1 Doxil/Ifos (W7D7=69/2/21)   - Port placed 11/1/21 - nursing unable to access port on admission. IR consulted, able to access port and OK to use.   - Baseline Echo (11/2) - EF 60-65%, normal  - Pt has moderate right leg subcutaneous edema with compression of the femoral vein. Lymphedema consulted     Treatment Plan: Doxil/Ifos/Mesna Cycle 1, Day 1 = 11/2/21   Doxorubicin Liposomal 45 mg/mg2 once Day 1   Ifosfamide 1500 mg/m2, Mesna 1500 mg/m2 once daily Days 1-6   Mesna 1500 mg/m2 once Day 7   Neulasta Day 8 - request sent to be scheduled at [facility]  [facility] ~11/9   Supportive Care - Zofran 8mg q8h Days 1-7    # Cancer-related pain  Referral placed to palliative care for pain control and support PTA. He has not yet established care with palliative. Currently taking oxycodone 5mg and Motrin ~every 4-6 hours.   - Mass partially erodes the outer cortex of the proximal right femur which increases risk for pathologic fracture. Per Dr. Whitaker, OK to bear weight on his right leg  - Consider Palliative care consult for pain mgmt and general support during admission  - Oxycodone 5-10mg q4h PRN    # Sinus tachycardia  Pt tachycardic on admission, HR 115bpm but regular. No murmurs. Pt denies chest pain. He does not have significant cardiac history. He does admit to feeling a little anxious about his new cancer diagnosis.   - EKG in process  - Baseline Echo as above per chemo workup is normal with EF 60-65%    FEN:  - IVF per chemotherapy protocol   - PRN lyte replacement  - RDAT     Prophy/Misc:  - VTE: Lovenox ppx while inpatient   - GI/PUD: None indicated  - Bowels: Senna and Miralax PRN  - Activity: OK to bear weight per Dr. Whitaker, consulted PT. Use cane with walking.   - Asymptomatic COVID screening: negative on admission    Code: Full Code, confirmed on admission  Disposition: Admitted for scheduled chemotherapy with Doxil/Ifos, anticipate discharge on Day 7 after completion of Mesna   Follow up:   - Requested labs, neulasta  - Will need MEHRAN visit within 1 week of discharge, request not yet sent    Patient was seen and plan of care was discussed with attending physician Dr. Ellis.    Sofi Waller PA-C  Hematology/Oncology  Pager # [phone]    Chief Complaint   Sarcoma  - History obtained from the patient and through chart review.    History of Present Illness   Vic Scott III is a 46 year old male with history of newly diagnosed right thigh high grade pleomorphic sarcoma with metastases to the inguinal lymph nodes who presents for Cycle 1 Doxil/Ifos (Day  1=11/2/21).     He notes that he started having swelling of his right hip some time in July 2021. He did have tenderness near the area but was able to walk okay and overall it didn't bother him much. It then started to get larger and more painful. He saw a provider who diagnosed him with bursitis and recommended tylenol. Then in September his family noticed that he was limping and the right thigh mass was larger, and more painful. When told he has cancer he was very surprised and initially in denial because he has always been so healthy. Nondrinker, nonsmoker, no other significant past medical history. He doesn't take any medications PTA besides pain meds. No weight loss, night sweats, N/V. Denied dyspnea, chest pain, abdominal pain. He uses a cane to mobilize but remains otherwise independent. Currently living with his sister in [place] and plans to stay there through treatment. A comprehensive review of systems was reviewed with the patient and the pertinent positives are listed in the HPI above.      We discussed the potential side effects of doxil/ifos including N/V and neurotoxicity. Pt voices understanding and his questions were addressed. His sister was at bedside. He admits to having some anxiety about his new cancer diagnosis.      Past Medical History    I have reviewed this patient's medical history and updated it with pertinent information if needed.   No past medical history on file.    Past Surgical History   I have reviewed this patient's surgical history and updated it with pertinent information if needed.  Past Surgical History:   Procedure Laterality Date     INSERT PORT VASCULAR ACCESS Right 11/1/2021    Procedure: INSERTION, VASCULAR ACCESS PORT;  Surgeon: Sushil Gonzales MD;  Location: [facility] OR     IR CHEST PORT PLACEMENT > 5 YRS OF AGE  11/1/2021       Prior to Admission Medications   Cannot display prior to admission medications because the patient has not been admitted in this contact.      Allergies   No Known Allergies    Social History   Social History     Socioeconomic History     Marital status: Single     Spouse name: Not on file     Number of children: Not on file     Years of education: Not on file     Highest education level: Not on file   Occupational History     Not on file   Tobacco Use     Smoking status: Never Smoker     Smokeless tobacco: Never Used   Substance and Sexual Activity     Alcohol use: Not on file     Drug use: Not on file     Sexual activity: Not on file   Other Topics Concern     Not on file   Social History Narrative     Not on file     Social Determinants of Health     Financial Resource Strain:      Difficulty of Paying Living Expenses:    Food Insecurity:      Worried About Running Out of Food in the Last Year:      Ran Out of Food in the Last Year:    Transportation Needs:      Lack of Transportation (Medical):      Lack of Transportation (Non-Medical):    Physical Activity:      Days of Exercise per Week:      Minutes of Exercise per Session:    Stress:      Feeling of Stress :    Social Connections:      Frequency of Communication with Friends and Family:      Frequency of Social Gatherings with Friends and Family:      Attends Anabaptism Services:      Active Member of Clubs or Organizations:      Attends Club or Organization Meetings:      Marital Status:    Intimate Partner Violence:      Fear of Current or Ex-Partner:      Emotionally Abused:      Physically Abused:      Sexually Abused:        Family History   I have reviewed this patient's family history and updated it with pertinent information if needed.   No family history on file.    Review of Systems   A comprehensive ROS was performed with the patient and was found to be negative or non-contributory with the exception of that noted in the HPI above.    Physical Exam   Temp:  [98.8  F (37.1  C)] 98.8  F (37.1  C)  Pulse:  [115] 115  BP: (152-154)/() 152/95  SpO2:  [100 %] 100 %  CONSTITUTIONAL:  Alert and interactive. Sitting up in bed, in no acute distress.  HEENT: NC/AT, PERRLA, EOMI, anicteric sclera, oropharynx is pink and moist without erythema/exudates/lesions/thrush.  NECK: Supple, full ROM.  LYMPHATIC: No palpable mandibular/cervical/supra/infraclavicular lymph nodes.  CARDIOVASCULAR: Tachycardic, regular rhythm. Normal S1/S2. No murmurs. 2+ equal and bilateral radial and pedal pulses.   RESPIRATORY: CTAB. No wheezes appreciated. Normal respiratory effort on ambient air.  GASTROINTESTINAL: Soft, non-tender, non-distended, normoactive bowel sounds.  MUSCULOSKELETAL: Diffuse firm swelling of the right hip and upper right thigh, mild tenderness to palpation  EXTREMITIES: 2+ RLE edema  SKIN: Warm and intact. No concerning lesions or rashes on exposed skin surfaces. No jaundice.  NEUROLOGIC: Alert and fully oriented, appropriately responsive during interview.   VASCULAR ACCESS: port C/D/I, non-tender.    Data   I have personally reviewed the following labs/imaging:  Results for orders placed or performed in visit on 11/01/21 (from the past 24 hour(s))   IR Chest Port Placement > 5 Yrs of Age    Narrative    Procedures 11/1/2021:  1. Ultrasound guidance for venous access  2. Tunneled port (age > 5 yrs.) placement  3. Fluoroscopic guidance placement    History: Sarcoma of the right thigh.     Comparisons: PET/CT 10/20/2021    Operators: Christian Gonzales M.D. I, Dr. Sushil Gonzales performed the  entirety of this procedure without assistance.    Medications:   Monitored anesthesia care. Vital signs and oxygenation continuously  monitored. The patient remained stable throughout the procedure.    Fluoroscopy time: 11 seconds    Findings/procedure:     Prior to the procedure, both verbal and written informed consent  obtained from the patient.     Limited jugular ultrasound documented jugular vein patency. The right  neck and upper chest prepped and draped in the usual sterile  fashion.  Buffered 1% Lidocaine used for local analgesia.    Under ultrasound guidance, right internal jugular venotomy made with a  micropuncture needle. Image documenting the vein within the vein  saved.    Micropuncture needle exchanged over guidewire for the micropuncture  sheath. Catheter length measured with the 0.018 guidewire.  Micropuncture sheath saline locked. Subcutaneous pocket created for  the port reservoir over the right anterior chest using a combination  of sharp and blunt dissection. Pocket liberally irrigated with  saline..     8 Spanish by 22.5 cm subcutaneously tunneled from the right anterior  chest pocket to the right internal jugular venotomy site. Catheter cut  to length. Micropuncture sheath exchanged over guidewire for the  peel-away sheath. Catheter placed through the peel-away sheath and  advanced under fluoroscopic guidance to the atriocaval junction.  Peel-away sheath removed. Port aspirated and flushed adequately.     Port heparin locked with 100:1 heparin solution. The right anterior  chest incision closed with three 3-0 Vicryl interrupted sutures, a  running 4-0 Monocryl subcuticular suture, and Dermabond. Right  internal jugular venotomy site closed with Dermabond. No immediate  complication.       Impression    Impression:  Uncomplicated image guided placement of right internal jugular venous  central venous catheter with port. Catheter tip overlies the atrial  caval junction. Port flushed, heparin locked, ready for immediate use.      AMY SUERO         SYSTEM ID:  [id number]

## 2021-11-02 NOTE — PHARMACY-ADMISSION MEDICATION HISTORY
Admission Medication History Completed by Pharmacy    See [facility] Admission Navigator for allergy information, preferred outpatient pharmacy, prior to admission medications and immunization status.     Medication History Sources:     Patient    EPIC    Changes made to PTA medication list (reason):    Added: None    Deleted: None    Changed: None    Additional Information:    None    Prior to Admission medications    Medication Sig Last Dose Taking? Auth Provider   oxyCODONE (ROXICODONE) 5 MG tablet Take 1-2 pills by mouth every 4-6 hours as needed for pain  Yes Jonn Ambrocio MD       Date completed: 11/02/21    Medication history completed by: Rolo Merida [facility]

## 2021-11-02 NOTE — PROGRESS NOTES
Labs and Transfusion orders:  Date: 2021    Patient: Vic Scott III  : 1975  Diagnosis: Sarcoma (C49.9)    Medication administration:  [ x ] Neulasta (pegfilgastim) injection 6 mg x1 subcutaneous on  or 11/10.    LABS:  [ x ] Check CBC with differential and CMP with phos twice a week  through  (fax labs to [facility] at [phone])      Please call the [facility] at [phone] for any questions, and ask to speak with the care coordinator for Dr. Ambrocio (patient's primary oncologist).    Thank you,         Sofi Waller PA-C  Hematology/Oncology  Pager #[phone]

## 2021-11-03 ENCOUNTER — APPOINTMENT (OUTPATIENT)
Dept: PHYSICAL THERAPY | Facility: CLINIC | Age: 46
End: 2021-11-03
Attending: PHYSICIAN ASSISTANT
Payer: MEDICAID

## 2021-11-03 ENCOUNTER — APPOINTMENT (OUTPATIENT)
Dept: OCCUPATIONAL THERAPY | Facility: CLINIC | Age: 46
End: 2021-11-03
Attending: PHYSICIAN ASSISTANT
Payer: MEDICAID

## 2021-11-03 LAB
ALBUMIN SERPL-MCNC: 2.4 G/DL (ref 3.4–5)
ALP SERPL-CCNC: 218 U/L (ref 40–150)
ALT SERPL W P-5'-P-CCNC: 26 U/L (ref 0–70)
ANION GAP SERPL CALCULATED.3IONS-SCNC: 4 MMOL/L (ref 3–14)
AST SERPL W P-5'-P-CCNC: 34 U/L (ref 0–45)
ATRIAL RATE - MUSE: 117 BPM
BASOPHILS # BLD AUTO: 0 10E3/UL (ref 0–0.2)
BASOPHILS NFR BLD AUTO: 0 %
BILIRUB SERPL-MCNC: 0.9 MG/DL (ref 0.2–1.3)
BUN SERPL-MCNC: 11 MG/DL (ref 7–30)
CALCIUM SERPL-MCNC: 8.5 MG/DL (ref 8.5–10.1)
CHLORIDE BLD-SCNC: 104 MMOL/L (ref 94–109)
CO2 SERPL-SCNC: 27 MMOL/L (ref 20–32)
CREAT SERPL-MCNC: 0.69 MG/DL (ref 0.66–1.25)
DIASTOLIC BLOOD PRESSURE - MUSE: NORMAL MMHG
EOSINOPHIL # BLD AUTO: 0.1 10E3/UL (ref 0–0.7)
EOSINOPHIL NFR BLD AUTO: 1 %
ERYTHROCYTE [DISTWIDTH] IN BLOOD BY AUTOMATED COUNT: 14.6 % (ref 10–15)
FERRITIN SERPL-MCNC: 1314 NG/ML (ref 26–388)
GFR SERPL CREATININE-BSD FRML MDRD: >90 ML/MIN/1.73M2
GLUCOSE BLD-MCNC: 114 MG/DL (ref 70–99)
HCT VFR BLD AUTO: 27.7 % (ref 40–53)
HGB BLD-MCNC: 9.1 G/DL (ref 13.3–17.7)
IMM GRANULOCYTES # BLD: 0.1 10E3/UL
IMM GRANULOCYTES NFR BLD: 1 %
INTERPRETATION ECG - MUSE: NORMAL
LDH SERPL L TO P-CCNC: 409 U/L (ref 85–227)
LYMPHOCYTES # BLD AUTO: 1.5 10E3/UL (ref 0.8–5.3)
LYMPHOCYTES NFR BLD AUTO: 15 %
MCH RBC QN AUTO: 25.8 PG (ref 26.5–33)
MCHC RBC AUTO-ENTMCNC: 32.9 G/DL (ref 31.5–36.5)
MCV RBC AUTO: 79 FL (ref 78–100)
MONOCYTES # BLD AUTO: 1 10E3/UL (ref 0–1.3)
MONOCYTES NFR BLD AUTO: 10 %
NEUTROPHILS # BLD AUTO: 7 10E3/UL (ref 1.6–8.3)
NEUTROPHILS NFR BLD AUTO: 73 %
NRBC # BLD AUTO: 0 10E3/UL
NRBC BLD AUTO-RTO: 0 /100
P AXIS - MUSE: 51 DEGREES
PHOSPHATE SERPL-MCNC: 3.4 MG/DL (ref 2.5–4.5)
PLATELET # BLD AUTO: 330 10E3/UL (ref 150–450)
POTASSIUM BLD-SCNC: 3.8 MMOL/L (ref 3.4–5.3)
PR INTERVAL - MUSE: 122 MS
PROT SERPL-MCNC: 7.1 G/DL (ref 6.8–8.8)
QRS DURATION - MUSE: 70 MS
QT - MUSE: 318 MS
QTC - MUSE: 443 MS
R AXIS - MUSE: -12 DEGREES
RBC # BLD AUTO: 3.53 10E6/UL (ref 4.4–5.9)
SODIUM SERPL-SCNC: 135 MMOL/L (ref 133–144)
SYSTOLIC BLOOD PRESSURE - MUSE: NORMAL MMHG
T AXIS - MUSE: 7 DEGREES
URATE SERPL-MCNC: 4 MG/DL (ref 3.5–7.2)
VENTRICULAR RATE- MUSE: 117 BPM
WBC # BLD AUTO: 9.6 10E3/UL (ref 4–11)

## 2021-11-03 PROCEDURE — 250N000011 HC RX IP 250 OP 636: Performed by: PHYSICIAN ASSISTANT

## 2021-11-03 PROCEDURE — 84550 ASSAY OF BLOOD/URIC ACID: CPT | Performed by: PHYSICIAN ASSISTANT

## 2021-11-03 PROCEDURE — 36591 DRAW BLOOD OFF VENOUS DEVICE: CPT | Performed by: PHYSICIAN ASSISTANT

## 2021-11-03 PROCEDURE — 80053 COMPREHEN METABOLIC PANEL: CPT | Performed by: PHYSICIAN ASSISTANT

## 2021-11-03 PROCEDURE — 83615 LACTATE (LD) (LDH) ENZYME: CPT | Performed by: PHYSICIAN ASSISTANT

## 2021-11-03 PROCEDURE — 97116 GAIT TRAINING THERAPY: CPT | Mod: GP | Performed by: REHABILITATION PRACTITIONER

## 2021-11-03 PROCEDURE — 85025 COMPLETE CBC W/AUTO DIFF WBC: CPT | Performed by: PHYSICIAN ASSISTANT

## 2021-11-03 PROCEDURE — 97535 SELF CARE MNGMENT TRAINING: CPT | Mod: GO | Performed by: OCCUPATIONAL THERAPIST

## 2021-11-03 PROCEDURE — 97161 PT EVAL LOW COMPLEX 20 MIN: CPT | Mod: GP | Performed by: REHABILITATION PRACTITIONER

## 2021-11-03 PROCEDURE — 97530 THERAPEUTIC ACTIVITIES: CPT | Mod: GP | Performed by: REHABILITATION PRACTITIONER

## 2021-11-03 PROCEDURE — 84100 ASSAY OF PHOSPHORUS: CPT | Performed by: PHYSICIAN ASSISTANT

## 2021-11-03 PROCEDURE — 250N000013 HC RX MED GY IP 250 OP 250 PS 637: Performed by: PHYSICIAN ASSISTANT

## 2021-11-03 PROCEDURE — 97165 OT EVAL LOW COMPLEX 30 MIN: CPT | Mod: GO | Performed by: OCCUPATIONAL THERAPIST

## 2021-11-03 PROCEDURE — 82040 ASSAY OF SERUM ALBUMIN: CPT | Performed by: PHYSICIAN ASSISTANT

## 2021-11-03 PROCEDURE — 258N000003 HC RX IP 258 OP 636: Performed by: INTERNAL MEDICINE

## 2021-11-03 PROCEDURE — 97140 MANUAL THERAPY 1/> REGIONS: CPT | Mod: GO | Performed by: OCCUPATIONAL THERAPIST

## 2021-11-03 PROCEDURE — 250N000011 HC RX IP 250 OP 636: Performed by: INTERNAL MEDICINE

## 2021-11-03 PROCEDURE — 82728 ASSAY OF FERRITIN: CPT | Performed by: INTERNAL MEDICINE

## 2021-11-03 PROCEDURE — 99232 SBSQ HOSP IP/OBS MODERATE 35: CPT | Performed by: INTERNAL MEDICINE

## 2021-11-03 PROCEDURE — 120N000002 HC R&B MED SURG/OB UMMC

## 2021-11-03 RX ADMIN — MESNA 3660 MG: 100 INJECTION, SOLUTION INTRAVENOUS at 00:52

## 2021-11-03 RX ADMIN — OXYCODONE HYDROCHLORIDE 10 MG: 5 TABLET ORAL at 22:00

## 2021-11-03 RX ADMIN — ACETAMINOPHEN 650 MG: 325 TABLET, FILM COATED ORAL at 02:49

## 2021-11-03 RX ADMIN — OXYCODONE HYDROCHLORIDE 10 MG: 5 TABLET ORAL at 01:21

## 2021-11-03 RX ADMIN — OXYCODONE HYDROCHLORIDE 10 MG: 5 TABLET ORAL at 14:12

## 2021-11-03 RX ADMIN — ONDANSETRON HYDROCHLORIDE 8 MG: 8 TABLET, FILM COATED ORAL at 02:39

## 2021-11-03 RX ADMIN — ONDANSETRON HYDROCHLORIDE 8 MG: 8 TABLET, FILM COATED ORAL at 09:33

## 2021-11-03 RX ADMIN — ONDANSETRON HYDROCHLORIDE 8 MG: 8 TABLET, FILM COATED ORAL at 18:14

## 2021-11-03 RX ADMIN — ENOXAPARIN SODIUM 40 MG: 40 INJECTION SUBCUTANEOUS at 22:00

## 2021-11-03 RX ADMIN — OXYCODONE HYDROCHLORIDE 10 MG: 5 TABLET ORAL at 09:35

## 2021-11-03 RX ADMIN — ACETAMINOPHEN 650 MG: 325 TABLET, FILM COATED ORAL at 09:35

## 2021-11-03 ASSESSMENT — ACTIVITIES OF DAILY LIVING (ADL)
ADLS_ACUITY_SCORE: 10
PREVIOUS_RESPONSIBILITIES: MEAL PREP;HOUSEKEEPING;LAUNDRY;SHOPPING;MEDICATION MANAGEMENT;FINANCES;DRIVING;WORK
DEPENDENT_IADLS:: INDEPENDENT
ADLS_ACUITY_SCORE: 7
ADLS_ACUITY_SCORE: 7
ADLS_ACUITY_SCORE: 10
ADLS_ACUITY_SCORE: 10
ADLS_ACUITY_SCORE: 7
ADLS_ACUITY_SCORE: 8
ADLS_ACUITY_SCORE: 10
ADLS_ACUITY_SCORE: 7
ADLS_ACUITY_SCORE: 10
ADLS_ACUITY_SCORE: 10
ADLS_ACUITY_SCORE: 7
ADLS_ACUITY_SCORE: 10
ADLS_ACUITY_SCORE: 10
ADLS_ACUITY_SCORE: 7
ADLS_ACUITY_SCORE: 10
ADLS_ACUITY_SCORE: 7
ADLS_ACUITY_SCORE: 7
ADLS_ACUITY_SCORE: 10
ADLS_ACUITY_SCORE: 7
ADLS_ACUITY_SCORE: 7
ADLS_ACUITY_SCORE: 10

## 2021-11-03 ASSESSMENT — MIFFLIN-ST. JEOR: SCORE: 2117.32

## 2021-11-03 NOTE — PROGRESS NOTES
11/03/21 1552   Quick Adds   Type of Visit Initial PT Evaluation   Living Environment   People in home sibling(s)  (niece)   Current Living Arrangements house   Home Accessibility stairs to enter home;stairs within home   Number of Stairs, Main Entrance 4   Stair Railings, Main Entrance railings safe and in good condition   Number of Stairs, Within Home, Primary other (see comments)  (1 flight)   Stair Railings, Within Home, Primary railings safe and in good condition   Transportation Anticipated family or friend will provide   Living Environment Comments For the last month, pt has been living at his sister's home with his niece, above information reflects sister's home set up. Pt plans to return to his sister's home at discharge.    Self-Care   Usual Activity Tolerance good   Current Activity Tolerance fair   Regular Exercise No   Equipment Currently Used at Home shower chair;cane, straight   Activity/Exercise/Self-Care Comment Pt reports he is IND at baseline, has been using SPC recently.   Disability/Function   Hearing Difficulty or Deaf no   Wear Glasses or Blind yes   Vision Management glasses   Concentrating, Remembering or Making Decisions Difficulty no   Difficulty Communicating no   Difficulty Eating/Swallowing no   Walking or Climbing Stairs Difficulty yes   Walking or Climbing Stairs ambulation difficulty, requires equipment;stair climbing difficulty, requires equipment   Mobility Management cane   Dressing/Bathing Difficulty no   Toileting issues no   Doing Errands Independently Difficulty (such as shopping) yes   Errands Management Pts family has been assisting   Fall history within last six months yes   Number of times patient has fallen within last six months 1  (fell this AM trying to get up IND)   Change in Functional Status Since Onset of Current Illness/Injury yes   General Information   Onset of Illness/Injury or Date of Surgery 11/02/21   Referring Physician Sofi Waller PA-C    Pertinent History of Current Problem (include personal factors and/or comorbidities that impact the POC) Pt is a 46 year old male with history of 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   Existing Precautions/Restrictions fall   Weight-Bearing Status - RLE weight-bearing as tolerated   Cognition   Orientation Status (Cognition) oriented x 4   Affect/Mental Status (Cognition) WNL   Follows Commands (Cognition) WNL   Safety Deficit (Cognition) insight into deficits/self-awareness;safety precautions awareness;safety precautions follow-through/compliance   Pain Assessment   Patient Currently in Pain Yes, see Vital Sign flowsheet  (Pt reports minimal pain in RLE)   Integumentary/Edema   Integumentary/Edema other (describe)   Integumentary/Edema Comments RLE edematous, RLE wrapped with GCB, OT Edema following   Posture    Posture Forward head position;Protracted shoulders   Range of Motion (ROM)   ROM Comment Decreased ROM RLE, GCB bandages present limiting some ROM, edema in RLE also limiting   Strength   Strength Comments BLE 3/5 throughout   Transfers   Transfer Safety Comments Pt tx sit->stand with CGA. Pt with flexed posture.    Gait/Stairs (Locomotion)   Comment (Gait/Stairs) Pt amb ~ 50 feet with WW and CGA, cues to keep WW close to self, pt with toe out gait pattern with RLE, WBOS.    Balance   Balance other (describe)   Balance Comments Minimal sway in static standing, needs UE support on WW.   Clinical Impression   Criteria for Skilled Therapeutic Intervention yes, treatment indicated   PT Diagnosis (PT) Impaired Functional Mobility   Influenced by the following impairments edema, decreased strength, activity intolerance, impaired balance   Functional limitations due to impairments bed mob, transfers, gait   Clinical Presentation Evolving/Changing   Clinical Presentation Rationale PMHx, clinical  judgement, current presentation   Clinical Decision Making (Complexity) low complexity   Therapy Frequency (PT) Daily   Predicted Duration of Therapy Intervention (days/wks) 11/10/21   Planned Therapy Interventions (PT) bed mobility training;gait training;neuromuscular re-education;stair training;transfer training;strengthening   Anticipated Equipment Needs at Discharge (PT) walker, rolling   Risk & Benefits of therapy have been explained care plan/treatment goals reviewed   PT Discharge Planning    PT Discharge Recommendation (DC Rec) home with home care physical therapy;home with outpatient physical therapy   PT Rationale for DC Rec Pt is mobilizing well, anticipate pt will be safe to discharge home when medically ready for discharge, pt may need WW for discharge. Pt would benefit from further skilled PT services to address ROM, strength and mobility, pt would benefit from additional skilled edema services to achieve reduction of RLE and have management strategies in place for discharge.   PT Brief overview of current status  Nursing Staff: up with A x 1 + WW + gait belt   Total Evaluation Time   Total Evaluation Time (Minutes) 8

## 2021-11-03 NOTE — PLAN OF CARE
[id number]: Tachycardic 100-110s, OVSS pt afebrile on RA. Pt denies nausea or dyspnea. CIVI Dose #1 Ifos Mesna infusing 48.3 mL/hr. Q4H brisk blood return noted. Pt tolerating chemo well thus far. See prior note regarding fall. Pt educated to now ambulate with assistance. Pt calling appropriately before ambulating. PT worked with pt this afternoon. Pt up with AstX1 GB/RW. Pt having fair appetite, with good PO intake. Continue POC.

## 2021-11-03 NOTE — PROGRESS NOTES
11/03/21 1207   Quick Adds   Type of Visit Initial Occupational Therapy Evaluation  (Lymphedema evaluation)   Living Environment   People in home sibling(s)  (niece)   Current Living Arrangements house   Home Accessibility stairs to enter home;stairs within home   Number of Stairs, Main Entrance 4   Stair Railings, Main Entrance railings safe and in good condition   Number of Stairs, Within Home, Primary   (one flight)   Stair Railings, Within Home, Primary railings safe and in good condition   Transportation Anticipated car, drives self;family or friend will provide   Living Environment Comments At baseline, pt lives alone in a house, but for ~1 month, patient has been living with his sister and her daughter in a house w/ 4 steps to enter from the garage and no stairs on main entry. There is one flight of stairs in the house, but pt does not need to use these. House has a walk-in shower w/ a shower chair. Pt will discharge back to his sister's house.   Self-Care   Usual Activity Tolerance good   Current Activity Tolerance fair   Regular Exercise No   Equipment Currently Used at Home cane, straight;shower chair   Activity/Exercise/Self-Care Comment Pt was IND w/ ADLs at baseline. Most recently pt has been using SEC. Pt's sister purchased a shower chair for pt to use. Pt reports his sister is available to provide some assist w/ ADLs. Pt needing increased assist in prior week due to increase volume of RLE.    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 Pt was IND with all IADLs at baseline, now his sister provides assist w/ most IADLs. Pt reports he will provide some assist w/ care of his niece, who has CP.    Disability/Function   Hearing Difficulty or Deaf no   Wear Glasses or Blind yes   Vision Management glasses  "  Concentrating, Remembering or Making Decisions Difficulty no   Difficulty Communicating no   Difficulty Eating/Swallowing no   Walking or Climbing Stairs Difficulty yes   Walking or Climbing Stairs ambulation difficulty, requires equipment;stair climbing difficulty, requires equipment   Mobility Management SEC   Dressing/Bathing Difficulty no   Toileting issues no   Doing Errands Independently Difficulty (such as shopping) yes   Errands Management   (Family providing assist)   Fall history within last six months yes  (Fell this AM in hospital room)   Number of times patient has fallen within last six months 1   Change in Functional Status Since Onset of Current Illness/Injury yes  (Impaired ADL performance)   General Information   Onset of Illness/Injury or Date of Surgery 11/02/21   Referring Physician Sofi Waller PA-C   Patient/Family Therapy Goal Statement (OT) To improve swelling/pain in RLE, to return to sister's house.    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 who presents for Cycle 1 Doxil/Ifos (Day 1=11/2/21).\"   Performance Patterns (Routines, Roles, Habits) Pt values his role as father (has 4 children who do not live with him) and uncle   Existing Precautions/Restrictions fall   Left Upper Extremity (Weight-bearing Status) full weight-bearing (FWB)   Right Upper Extremity (Weight-bearing Status) full weight-bearing (FWB)   Left Lower Extremity (Weight-bearing Status) full weight-bearing (FWB)   Right Lower Extremity (Weight-bearing Status) full weight-bearing (FWB)   General Observations and Info Activity order: Up ad lucretia  (Pt fell this AM, for now should have A when up)   Cognitive Status Examination   Orientation Status orientation to person, place and time   Cognitive Status " "Comments No acute concerns noted   Visual Perception   Visual Impairment/Limitations WFL   Impact of Vision Impairment on Function (Vision) Pt wears glasses, reports no concerns   Sensory   Sensory Comments Light touch intact in BLE. Pt reporting \"burning skin\" sensation in RLE.   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 Edema now much worse than it was a few weeks ago.   Edema Examination/Assessment   Skin Condition Pitting;Intact;Dryness   Skin Condition Comments Skin is intact, mildly dry w/ flaking on sole of foot.    Scar No   Ulcerations No   Pitting Assessment Skin taut w/ firm 2-3+ pitting edema. Volume of RLE, especially thigh, notably larger than LLE.   Edema Assessment Comments Applied GCB from MTPs to mid-thigh to promote fluid mobility.   Range of Motion Comprehensive   Comment, General Range of Motion BUE ROM WFL. RLE ROM limited due to large edema.   Strength Comprehensive (MMT)   Comment, General Manual Muscle Testing (MMT) Assessment BUE strength WFL for ADLs and transfers.    Coordination   Upper Extremity Coordination No deficits were identified   Bed Mobility   Scooting/Bridging Summers (Bed Mobility) supervision   Supine-Sit Summers (Bed Mobility) minimum assist (75% patient effort)   Sit-Supine Summers (Bed Mobility) minimum assist (75% patient effort)   Assistive Device (Bed Mobility) leg    Comment (Bed Mobility) Min A to move/lift RLE during supine < > sit transfer. Pt educated in use of " SEC as improvised leg .   Transfers   Transfers sit-stand transfer   Sit-Stand Transfer   Sit-Stand Trinity (Transfers) supervision   Sit/Stand Transfer Comments x3 STS transfers from EOB w/ close SBA   Lower Body Dressing Assessment   Trinity Level (Lower Body Dressing) minimum assist (75% patient effort)   Assistive Devices (Lower Body Dressing)   (SEC)   Position (Lower Body Dressing) unsupported sitting   Comment (Lower Body Dressing) Min A to doff/don pants while seated EOB. Pt using SEC as improvised dressing stick.   Clinical Impression   Criteria for Skilled Therapeutic Interventions Met (OT) yes;meets criteria;skilled treatment is necessary   OT Diagnosis Impaired ADL performance   Edema: Patient Presentation Edema   OT Problem List-Impairments impacting ADL problems related to;activity tolerance impaired;balance;range of motion (ROM);strength;pain  (significant RLE edema)   Assessment of Occupational Performance 5 or more Performance Deficits   Identified Performance Deficits dressing, bathing, functional mobility/transfers, meal prep, household management, work, leisure   Planned Therapy Interventions (OT) ADL retraining;IADL retraining;strengthening;home program guidelines;progressive activity/exercise;risk factor education   Edema: Planned Interventions Gradient compression bandaging;Fit for compression garment;Edema exercises;Precautions to prevent infection/exacerbation;Education;Manual therapy;ADL training   Clinical Decision Making Complexity (OT) low complexity   Therapy Frequency (OT) Daily   Predicted Duration of Therapy x1 week   Anticipated Equipment Needs Upon Discharge (OT)   (TBD)   Risk & Benefits of therapy have been explained evaluation/treatment results reviewed;care plan/treatment goals reviewed;risks/benefits reviewed;current/potential barriers reviewed;participants voiced agreement with care plan;participants included;patient   Comment-Clinical Impression Pt presents  today w/ significant edema in RLE, R hip pain which impair his functional mobility and ADL performance. Pt will benefit from IP OT to address above deficits in the context of ADL performance and to manage RLE edema for improved ADL independence and safety.   OT Discharge Planning    OT Discharge Recommendation (DC Rec) Home with assist  (OP PT, OP lymphedema therapy)   OT Rationale for DC Rec Anticipate pt will be safe to discharge to his sister's house from ADL standpoint. Pt has good support for IADLs. Pt would benefit from OP PT or OP cancer rehab to promote strength and endurance for ADLs and functional mobility. Pt should seek OP lymphedema for ongoing management of full-leg RLE edema.   OT Brief overview of current status  SBA for transfer and dressing. Ax1 w/ FWW for mobility   Total Evaluation Time (Minutes)   Total Evaluation Time (Minutes) 5

## 2021-11-03 NOTE — PLAN OF CARE
Nursing Focus: Chemotherapy  D: Positive blood return via PORT. Insertion site is clean/dry/intact, dressing intact with no complaints of pain.  Urine output is recorded in intake in Doc Flowsheet.    I: Premedications given per order (see electronic medical administration record). Dose #1 Ifos/[place]es[place] started to infuse over 24 hours. Reviewed pt teaching on chemotherapy side effects.  Pt denies need for further teaching. Chemotherapy double checked per protocol by two chemotherapy competent RN's.   A: Tolerating procedure well. Denies nausea and or pain.   P: Continue to monitor urine output and symptoms of nausea. Screen for symptoms of toxicity.

## 2021-11-03 NOTE — PROGRESS NOTES
Fall  D: Prior to fall patient was ambulating from bed to bathroom. Pt did not have hospital socks on; had own socks on. Patient fell trying stand from bed and was found by NST Angelia. Fall involved no harm to patient.  I: Physician notified. PA coming to assess pt at bedside. Evaluation for injury; no harm noted from fall.   A: Post-fall assessment revealed no harm.  P: Falls precautions to be observed. Additional measures to prevent falls include making sure staff are aware he needs ast-1 at all times, use of  socks, and bed alarm if not calling for help.

## 2021-11-03 NOTE — PLAN OF CARE
"2300 - 0700  Tachycardic - not within parameters to notify, OVS stable, afebrile. Pt c/o R leg pain, restless and unable to become comfortable - 5mg oxy and tylenol given x1 and 10mg oxy given x1. Pt found better pain relief with 10mg, stated he was \"able to get some rest\". Ice packs given for R leg per pt request. Pt denied SOB/n/v. Doxil infused without concern - tolerated up to 300ml/hr. Ifos/Mesna began around 0050 - good blood return noted, tolerating well. Pt up with SBA, able to pivot from bed to chair. No significant events, continue POC.  "

## 2021-11-03 NOTE — PROGRESS NOTES
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     Today:  - Today is Day 1 of Doxil/Ifos   - Will likely complete his chemotherapy on 11/9  - Had a fall this morning when he stood up. Landed on his bottom, did not hit his head   - Pain not increased from baseline. 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 Whitaker, Orthopedics with rapidly progressing right leg swelling since ~ July 2021. MRI was incomplete due to pain, but did show ~[id number] right thigh soft tissue mass with probable bony involvement of the proximal right femur. 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 Was seen by Dr. Whitaker to review the results on 10/1/21 - discussed consideration of neoadjuvant chemotherapy vs resection and reconstruction. Given high risk for amputation with massive reconstructive surgery, he was referred to Dr. Ambrocio and a PET was ordered which identifed concerning inguinal lymphadenopathy. He was seen by Dr. Ambrocio 10/26/21, ordered NGS (in process) and recommended starting Doxil/Ifos initially as an inpatient. Admitted for Cycle 1 Doxil/Ifos (T8Y3=28/2/21)   - Port placed 11/1/21 - nursing unable to access port on admission. IR consulted, able to access port and OK to use.   - Baseline Echo (11/2) - EF 60-65%, normal                  Treatment Plan: Doxil/Ifos/Mesna Cycle 1, Day 1 = 11/3/21               Doxorubicin Liposomal 45 mg/mg2 once Day  1               Ifosfamide 1500 mg/m2, Mesna 1500 mg/m2 once daily Days 1-6               Mesna 1500 mg/m2 once Day 7               Neulasta Day 8 - request sent to be scheduled at [facility] ~11/9               Supportive Care - Zofran 8mg q8h Days 1-7     # Cancer-related pain  Referral placed to palliative care for pain control and support PTA. He has not yet established care with palliative. Currently taking oxycodone 5mg and Motrin ~every 4-6 hours.   - Mass partially erodes the outer cortex of the proximal right femur which increases risk for pathologic fracture. Per Dr. Whitaker, OK to bear weight on his right leg  - Consider Palliative care consult for pain mgmt and general support during admission  - Oxycodone 5-10mg q4h PRN     # Sinus tachycardia  Pt tachycardic on admission, HR 115bpm but regular. No murmurs. Pt denies chest pain. He does not have significant cardiac history. He does admit to feeling a little anxious about his new cancer diagnosis.   - EKG in process  - Baseline Echo as above per chemo workup is normal with EF 60-65%     MSK  # Right Leg Edema  - Pt has moderate right leg subcutaneous edema with compression of the femoral vein.   - Lymphedema consulted    # Fall  - Had a fall this morning when he stood up. Landed on his bottom, did not hit his head  - Pain not increased from baseline. Monitor closely  - There is always concern for fracture after a fall but patient is not having increased pain. Additionally, on review of his imaging, there does not appear to be bony involvement of his sarcoma mass.       Fluids/Electrolytes/Nutrition  - IVF per chemotherapy regimen  - PRN lyte replacement per standing protocol  - Regular diet as tolerated     Lines: Port (11/1/21)    PPX  - VTE: Lovenox ppx while inpatient   - GI/PUD: None indicated  - Bowels: Senna and Miralax PRN  - Activity: OK to bear weight per Dr. Whitaker, consulted PT. Use cane with walking.   - Asymptomatic COVID screening:  negative on admission     Code: Full Code    Disposition: Admitted for scheduled chemotherapy with Doxil/Ifos, anticipate discharge on Day 7 after completion of Mesna   Follow up:   - Requested labs, scheduled at [facility], 2x weekly  - Will request neulasta for 11/11  - Will need MEHRAN visit within 1 week of discharge      Patient is seen and examined by Dr. Martinez and EVER.  Assessment and plan are discussed and delivered to the patient.    Bell Chicas (Benson) PA   Hematology/Oncology  Pager: [phone]    Interval History   Had a fall this morning after getting up. Did not have associated dizziness or lightheadedness. Did not lose consciousness. Did not hit head. States his socks/feet just slipped and he went down and landed on his bottom. No increased pain in his leg or hip.  Has not noticed any other new symptoms from starting chemotherapy.  Appetite ok. Energy ok.  Denies fever, chills, mouth sores, SOB, cough, abdominal pain, diarrhea, constipation, nausea, vomiting, dysuria, hematuria, numbness, tingling, swelling      Complete and Comprehensive review of systems review and negative other than noted here or in the HPI.     Physical Exam   Temp: 98.1  F (36.7  C) Temp src: Oral BP: (!) 143/71 Pulse: 115   Resp: 20 SpO2: 99 % O2 Device: None (Room air)    Vitals:    11/02/21 1248 11/03/21 0700   Weight: 114.2 kg (251 lb 12.8 oz) 116.3 kg (256 lb 4.8 oz)     Vital Signs with Ranges  Temp:  [96.4  F (35.8  C)-99  F (37.2  C)] 98.1  F (36.7  C)  Pulse:  [110-127] 115  Resp:  [16-20] 20  BP: (132-171)/(71-95) 143/71  SpO2:  [98 %-100 %] 99 %  I/O last 3 completed shifts:  In: 20 [I.V.:20]  Out: 900 [Urine:900]    Constitutional: Pleasant male sitting in bed. Awake and conversational. Non- toxic appearing. No acute distress.   HEENT: Normocephalic, atraumatic. Sclerae anicteric. EOM intact. Moist mucus membranes   Respiratory: Breathing comfortable with no increased work on room air. Good air exchange. No signs of  respiratory distress or accessory muscle use. Lungs clear to auscultation bilaterally, no crackles or wheezing noted.  Cardiovascular: Regular rate and rhythm. Normal S1 and S2. No murmurs, rubs, or gallops. a.    GI: Abdomen is soft, non-distended, non-tender and without distension. Bowel sounds present and normoactive.   Skin: Skin is clean, dry, intact. No jaundice appreciated.   Musculoskeletal/ Extremities: No redness, warmth, or swelling of the joints appreciated. Distal pulses palpable. Nailbeds pink and without cyanosis or clubbing. Significantly swollen right leg with large mass on right thigh  Neurologic: Alert and oriented. Speech normal. Grossly nonfocal. Memory and thought process preserved. Motor function grossly normal, limited movement on right leg. Sensation intact bilaterally. CN II-XII intact.   Neuropsychiatric: Calm, affect congruent to situation. Appropriate mood and affect. Good judgment and insight. No visual/auditory hallucinations.         Medications       Chemotherapy Infusing-Continuous Infusion   Does not apply Q8H     enoxaparin ANTICOAGULANT  40 mg Subcutaneous Q24H     ifosfamide (IFEX) infusion  1,500 mg/m2 Intravenous Q24H     [START ON 11/8/2021] mesna (MESNEX) infusion  1,500 mg/m2 Intravenous Once     ondansetron  8 mg Oral Q8H     sodium chloride (PF)  3 mL Intracatheter Q8H       Data   Results for orders placed or performed during the hospital encounter of 11/02/21 (from the past 24 hour(s))   CBC with platelets differential    Narrative    The following orders were created for panel order CBC with platelets differential.  Procedure                               Abnormality         Status                     ---------                               -----------         ------                     CBC with platelets and d...[[id number]]  Abnormal            Final result                 Please view results for these tests on the individual orders.   Comprehensive metabolic panel    Result Value Ref Range    Sodium 138 133 - 144 mmol/L    Potassium 3.9 3.4 - 5.3 mmol/L    Chloride 106 94 - 109 mmol/L    Carbon Dioxide (CO2) 26 20 - 32 mmol/L    Anion Gap 6 3 - 14 mmol/L    Urea Nitrogen 16 7 - 30 mg/dL    Creatinine 0.73 0.66 - 1.25 mg/dL    Calcium 8.8 8.5 - 10.1 mg/dL    Glucose 109 (H) 70 - 99 mg/dL    Alkaline Phosphatase 263 (H) 40 - 150 U/L    AST 25 0 - 45 U/L    ALT 26 0 - 70 U/L    Protein Total 8.0 6.8 - 8.8 g/dL    Albumin 3.1 (L) 3.4 - 5.0 g/dL    Bilirubin Total 1.1 0.2 - 1.3 mg/dL    GFR Estimate >90 >60 mL/min/1.73m2   Phosphorus   Result Value Ref Range    Phosphorus 3.5 2.5 - 4.5 mg/dL   CBC with platelets and differential   Result Value Ref Range    WBC Count 10.0 4.0 - 11.0 10e3/uL    RBC Count 4.08 (L) 4.40 - 5.90 10e6/uL    Hemoglobin 10.5 (L) 13.3 - 17.7 g/dL    Hematocrit 32.1 (L) 40.0 - 53.0 %    MCV 79 78 - 100 fL    MCH 25.7 (L) 26.5 - 33.0 pg    MCHC 32.7 31.5 - 36.5 g/dL    RDW 14.8 10.0 - 15.0 %    Platelet Count 322 150 - 450 10e3/uL    % Neutrophils 73 %    % Lymphocytes 15 %    % Monocytes 10 %    % Eosinophils 1 %    % Basophils 0 %    % Immature Granulocytes 1 %    NRBCs per 100 WBC 0 <1 /100    Absolute Neutrophils 7.4 1.6 - 8.3 10e3/uL    Absolute Lymphocytes 1.5 0.8 - 5.3 10e3/uL    Absolute Monocytes 1.0 0.0 - 1.3 10e3/uL    Absolute Eosinophils 0.1 0.0 - 0.7 10e3/uL    Absolute Basophils 0.0 0.0 - 0.2 10e3/uL    Absolute Immature Granulocytes 0.1 (H) <=0.0 10e3/uL    Absolute NRBCs 0.0 10e3/uL   Echo Complete   Result Value Ref Range    LVEF  60-65%     Narrative    [id number]  [id number]  [id number]  [id number]^MULU^VICENTE^CARMELITA     [facility]  Echocardiography Laboratory  [address] [place] [id number]     Name: MARIELA MICHAEL  MRN: [id number]  : 1975  Study Date: 2021 02:27 PM  Age: 46 yrs  Gender: Male  Patient Location: [facility], [place]  Reason For Study: Chemotherapy  Ordering Physician: VICENTE HARDWICK  CARMELITA  Referring Physician: SELF, REFERRED  Performed By: Debora Ramirez RDCS     BSA: 2.4 m2  Height: 74 in  Weight: 251 lb  HR: 122  BP: 132/90 mmHg  ______________________________________________________________________________  Procedure  Complete Portable Echo Adult. Contrast Optison. Technically difficult  study.Extremely difficult acoustic windows despite the use of contrast for  endcardial border definition. Optison (NDC #2527-4368-89) given intravenously.  Patient was given 5 ml mixture of 3 ml Optison and 6 ml saline. 4 ml wasted.  ______________________________________________________________________________  Interpretation Summary  Global and regional left ventricular function is normal with an EF of 60-65%.  Left ventricular size is normal.  The right ventricle is normal size.  Global right ventricular function is normal.  No significant valvular abnormalities were noted.  No pericardial effusion is present.  ______________________________________________________________________________  Left Ventricle  Global and regional left ventricular function is normal with an EF of 60-65%.  Left ventricular size is normal. Left ventricular diastolic function is not  assessable.     Right Ventricle  The right ventricle is normal size. Global right ventricular function is  normal.     Atria  Both atria appear normal.     Mitral Valve  The mitral valve is normal. Trace mitral insufficiency is present.     Aortic Valve  The valve leaflets are not well visualized. On Doppler interrogation, there is  no significant stenosis or regurgitation.     Tricuspid Valve  The tricuspid valve is normal. Trace tricuspid insufficiency is present.  Pulmonary artery systolic pressure cannot be assessed.     Pulmonic Valve  The valve leaflets are not well visualized. On Doppler interrogation, there is  no significant stenosis or regurgitation.     Vessels  The aorta root is normal. The thoracic aorta cannot be assessed. The  inferior  vena cava was normal in size with preserved respiratory variability.     Pericardium  No pericardial effusion is present.     ______________________________________________________________________________  MMode/2D Measurements & Calculations  LVOT diam: 2.3 cm  LVOT area: 4.2 cm2     EF(MOD-bp): 63.8 %     ______________________________________________________________________________  Report approved by: MD Jacoby Mclaughlin 11/02/2021 03:25 PM         EKG 12-lead, tracing only   Result Value Ref Range    Systolic Blood Pressure  mmHg    Diastolic Blood Pressure  mmHg    Ventricular Rate 117 BPM    Atrial Rate 117 BPM    IN Interval 122 ms    QRS Duration 70 ms     ms    QTc 443 ms    P Axis 51 degrees    R AXIS -12 degrees    T Axis 7 degrees    Interpretation ECG       Sinus tachycardia  Otherwise normal ECG  No previous ECGs available     CBC with platelets differential    Narrative    The following orders were created for panel order CBC with platelets differential.  Procedure                               Abnormality         Status                     ---------                               -----------         ------                     CBC with platelets and d...[[id number]]  Abnormal            Final result                 Please view results for these tests on the individual orders.   Comprehensive metabolic panel   Result Value Ref Range    Sodium 135 133 - 144 mmol/L    Potassium 3.8 3.4 - 5.3 mmol/L    Chloride 104 94 - 109 mmol/L    Carbon Dioxide (CO2) 27 20 - 32 mmol/L    Anion Gap 4 3 - 14 mmol/L    Urea Nitrogen 11 7 - 30 mg/dL    Creatinine 0.69 0.66 - 1.25 mg/dL    Calcium 8.5 8.5 - 10.1 mg/dL    Glucose 114 (H) 70 - 99 mg/dL    Alkaline Phosphatase 218 (H) 40 - 150 U/L    AST 34 0 - 45 U/L    ALT 26 0 - 70 U/L    Protein Total 7.1 6.8 - 8.8 g/dL    Albumin 2.4 (L) 3.4 - 5.0 g/dL    Bilirubin Total 0.9 0.2 - 1.3 mg/dL    GFR Estimate >90 >60 mL/min/1.73m2    Phosphorus   Result Value Ref Range    Phosphorus 3.4 2.5 - 4.5 mg/dL   Uric acid   Result Value Ref Range    Uric Acid 4.0 3.5 - 7.2 mg/dL   Lactate Dehydrogenase   Result Value Ref Range    Lactate Dehydrogenase 409 (H) 85 - 227 U/L   CBC with platelets and differential   Result Value Ref Range    WBC Count 9.6 4.0 - 11.0 10e3/uL    RBC Count 3.53 (L) 4.40 - 5.90 10e6/uL    Hemoglobin 9.1 (L) 13.3 - 17.7 g/dL    Hematocrit 27.7 (L) 40.0 - 53.0 %    MCV 79 78 - 100 fL    MCH 25.8 (L) 26.5 - 33.0 pg    MCHC 32.9 31.5 - 36.5 g/dL    RDW 14.6 10.0 - 15.0 %    Platelet Count 330 150 - 450 10e3/uL    % Neutrophils 73 %    % Lymphocytes 15 %    % Monocytes 10 %    % Eosinophils 1 %    % Basophils 0 %    % Immature Granulocytes 1 %    NRBCs per 100 WBC 0 <1 /100    Absolute Neutrophils 7.0 1.6 - 8.3 10e3/uL    Absolute Lymphocytes 1.5 0.8 - 5.3 10e3/uL    Absolute Monocytes 1.0 0.0 - 1.3 10e3/uL    Absolute Eosinophils 0.1 0.0 - 0.7 10e3/uL    Absolute Basophils 0.0 0.0 - 0.2 10e3/uL    Absolute Immature Granulocytes 0.1 (H) <=0.0 10e3/uL    Absolute NRBCs 0.0 10e3/uL   Ferritin   Result Value Ref Range    Ferritin 1,314 (H) 26 - 388 ng/mL   Care Management / Social Work IP Consult    Narrative    Roselia Jones RN     11/3/2021 10:44 AM  Care Management Initial Consult    General Information  Assessment completed with: Patient, Care Team Member   Type of CM/SW Visit: Initial Assessment    Primary Care Provider verified and updated as needed: Does not   have a PCP  Readmission within the last 30 days: No previous admission in   last 30 days      Reason for Consult: Discharge Planning  Advance Care Planning: Reviewed: No concerns identified       Communication Assessment  Patient's communication style: Spoken language (English or   Bilingual)    Hearing Difficulty or Deaf: no   Wear Glasses or Blind: yes    Cognitive  Cognitive/Neuro/Behavioral: WDL                      Living Environment:   People in home:  Sibling(s), niece    Current living Arrangements: House      Able to return to prior arrangements: Yes     Family/Social Support:  Care provided by: Self  Provides care for: No one             Description of Support System: Supportive, Involved       Current Resources:   Patient receiving home care services: No  Community Resources: None  Equipment currently used at home: Cane, straight  Supplies currently used at home: None    Employment/Financial:  Employment Status: Unemployed        Financial Concerns: No concerns identified     Lifestyle & Psychosocial Needs:  Social Determinants of Health     Tobacco Use: Low Risk      Smoking Tobacco Use: Never Smoker     Smokeless Tobacco Use: Never Used   Alcohol Use:      Frequency of Alcohol Consumption:      Average Number of Drinks:      Frequency of Binge Drinking:    Financial Resource Strain:      Difficulty of Paying Living Expenses:    Food Insecurity:      Worried About Running Out of Food in the Last Year:      Ran Out of Food in the Last Year:    Transportation Needs:      Lack of Transportation (Medical):      Lack of Transportation (Non-Medical):    Physical Activity:      Days of Exercise per Week:      Minutes of Exercise per Session:    Stress:      Feeling of Stress :    Social Connections:      Frequency of Communication with Friends and Family:      Frequency of Social Gatherings with Friends and Family:      Attends Synagogue Services:      Active Member of Clubs or Organizations:      Attends Club or Organization Meetings:      Marital Status:    Intimate Partner Violence:      Fear of Current or Ex-Partner:      Emotionally Abused:      Physically Abused:      Sexually Abused:    Depression: Not at risk     PHQ-2 Score: 2   Housing Stability:      Unable to Pay for Housing in the Last Year:      Number of Places Lived in the Last Year:      Unstable Housing in the Last Year:        Functional Status:  Prior to admission patient needed assistance:    Dependent ADLs: Ambulation-cane  Dependent IADLs: Independent  Assesssment of Functional Status: At functional baseline    Mental Health Status:  Mental Health Status: No Current Concerns       Chemical Dependency Status:  Chemical Dependency Status: No Current Concerns           Values/Beliefs:  Spiritual, Cultural Beliefs, Faith Practices, Values that   affect care: No               Additional Information:    Patient is a 46 year old male with history of newly diagnosed   right thigh high grade pleomorphic sarcoma with concerns for   metastases to the inguinal lymph nodes who was admitted for Cycle   1 Doxil/Ifos.    Writer asked by MEHRAN to schedule a Neulasta injection on 11/9 or   11/10 and twice weekly labs (CBC w/diff and CMP) through 11/26 at   [facility].     Writer called (Phn: [phone]) and spoke with Lance at [facility]. They do not have any availability next week for a   Neulasta injection, but would be able to schedule labs with   possible transfusions.     The following was scheduled:  11/9: 1030am  11/12: 0830am  11/16: 1030am  11/19: 11am  11/23: 1030am  11/26: 11am    AVS updated. MEHRAN updated. MEHRAN will request a Neulasta injection   at the [facility].     Patient is independent at baseline and does not receive any   in-home supports or services. Patient has recently bee residing   at his sister's home and she is able to assist him with care   needs, as needed. Patient's sister will likely transport him upon   discharge.     RNCC will follow.     Roselia Jones RN, BSN, PHN  Care Coordinator   P: [phone], [facility]

## 2021-11-03 NOTE — PLAN OF CARE
Nursing Focus: Chemotherapy  D: Positive blood return via PORT. Insertion site is clean/dry/intact, dressing intact with no complaints of pain.  Urine output is recorded in intake in Doc Flowsheet.    I: Premedications given per order (see electronic medical administration record). Dose #1 Doxil started to infuse slowly with ramp up. Reviewed pt teaching on chemotherapy side effects.  Pt denies need for further teaching. Chemotherapy double checked per protocol by two chemotherapy competent RN's.   A: Tolerating procedure well. Denies nausea and or pain.   P: Continue to monitor urine output and symptoms of nausea. Screen for symptoms of toxicity.

## 2021-11-04 ENCOUNTER — APPOINTMENT (OUTPATIENT)
Dept: OCCUPATIONAL THERAPY | Facility: CLINIC | Age: 46
End: 2021-11-04
Attending: STUDENT IN AN ORGANIZED HEALTH CARE EDUCATION/TRAINING PROGRAM
Payer: MEDICAID

## 2021-11-04 ENCOUNTER — APPOINTMENT (OUTPATIENT)
Dept: PHYSICAL THERAPY | Facility: CLINIC | Age: 46
End: 2021-11-04
Attending: STUDENT IN AN ORGANIZED HEALTH CARE EDUCATION/TRAINING PROGRAM
Payer: MEDICAID

## 2021-11-04 LAB
ALBUMIN SERPL-MCNC: 2.8 G/DL (ref 3.4–5)
ALP SERPL-CCNC: 221 U/L (ref 40–150)
ALT SERPL W P-5'-P-CCNC: 26 U/L (ref 0–70)
ANION GAP SERPL CALCULATED.3IONS-SCNC: 4 MMOL/L (ref 3–14)
AST SERPL W P-5'-P-CCNC: 30 U/L (ref 0–45)
BASOPHILS # BLD AUTO: 0 10E3/UL (ref 0–0.2)
BASOPHILS NFR BLD AUTO: 0 %
BILIRUB SERPL-MCNC: 0.7 MG/DL (ref 0.2–1.3)
BUN SERPL-MCNC: 11 MG/DL (ref 7–30)
CALCIUM SERPL-MCNC: 8.6 MG/DL (ref 8.5–10.1)
CHLORIDE BLD-SCNC: 104 MMOL/L (ref 94–109)
CO2 SERPL-SCNC: 26 MMOL/L (ref 20–32)
CREAT SERPL-MCNC: 0.71 MG/DL (ref 0.66–1.25)
EOSINOPHIL # BLD AUTO: 0.1 10E3/UL (ref 0–0.7)
EOSINOPHIL NFR BLD AUTO: 1 %
ERYTHROCYTE [DISTWIDTH] IN BLOOD BY AUTOMATED COUNT: 14.8 % (ref 10–15)
FIBRINOGEN PPP-MCNC: 471 MG/DL (ref 170–490)
GFR SERPL CREATININE-BSD FRML MDRD: >90 ML/MIN/1.73M2
GLUCOSE BLD-MCNC: 106 MG/DL (ref 70–99)
HCT VFR BLD AUTO: 27.4 % (ref 40–53)
HGB BLD-MCNC: 9 G/DL (ref 13.3–17.7)
IMM GRANULOCYTES # BLD: 0.1 10E3/UL
IMM GRANULOCYTES NFR BLD: 1 %
INR PPP: 1.26 (ref 0.85–1.15)
LDH SERPL L TO P-CCNC: 403 U/L (ref 85–227)
LYMPHOCYTES # BLD AUTO: 1.4 10E3/UL (ref 0.8–5.3)
LYMPHOCYTES NFR BLD AUTO: 15 %
MAGNESIUM SERPL-MCNC: 2 MG/DL (ref 1.6–2.3)
MCH RBC QN AUTO: 25.8 PG (ref 26.5–33)
MCHC RBC AUTO-ENTMCNC: 32.8 G/DL (ref 31.5–36.5)
MCV RBC AUTO: 79 FL (ref 78–100)
MONOCYTES # BLD AUTO: 1 10E3/UL (ref 0–1.3)
MONOCYTES NFR BLD AUTO: 11 %
NEUTROPHILS # BLD AUTO: 6.7 10E3/UL (ref 1.6–8.3)
NEUTROPHILS NFR BLD AUTO: 72 %
NRBC # BLD AUTO: 0 10E3/UL
NRBC BLD AUTO-RTO: 0 /100
PHOSPHATE SERPL-MCNC: 3.5 MG/DL (ref 2.5–4.5)
PLATELET # BLD AUTO: 344 10E3/UL (ref 150–450)
POTASSIUM BLD-SCNC: 4 MMOL/L (ref 3.4–5.3)
PROT SERPL-MCNC: 7.3 G/DL (ref 6.8–8.8)
RBC # BLD AUTO: 3.49 10E6/UL (ref 4.4–5.9)
SODIUM SERPL-SCNC: 134 MMOL/L (ref 133–144)
URATE SERPL-MCNC: 3.9 MG/DL (ref 3.5–7.2)
WBC # BLD AUTO: 9.3 10E3/UL (ref 4–11)

## 2021-11-04 PROCEDURE — 85384 FIBRINOGEN ACTIVITY: CPT | Performed by: PHYSICIAN ASSISTANT

## 2021-11-04 PROCEDURE — 250N000013 HC RX MED GY IP 250 OP 250 PS 637: Performed by: PHYSICIAN ASSISTANT

## 2021-11-04 PROCEDURE — 97116 GAIT TRAINING THERAPY: CPT | Mod: GP | Performed by: REHABILITATION PRACTITIONER

## 2021-11-04 PROCEDURE — 80053 COMPREHEN METABOLIC PANEL: CPT | Performed by: PHYSICIAN ASSISTANT

## 2021-11-04 PROCEDURE — 120N000002 HC R&B MED SURG/OB UMMC

## 2021-11-04 PROCEDURE — 83735 ASSAY OF MAGNESIUM: CPT | Performed by: PHYSICIAN ASSISTANT

## 2021-11-04 PROCEDURE — 250N000011 HC RX IP 250 OP 636: Performed by: INTERNAL MEDICINE

## 2021-11-04 PROCEDURE — 250N000011 HC RX IP 250 OP 636: Performed by: PHYSICIAN ASSISTANT

## 2021-11-04 PROCEDURE — 84100 ASSAY OF PHOSPHORUS: CPT | Performed by: PHYSICIAN ASSISTANT

## 2021-11-04 PROCEDURE — 84550 ASSAY OF BLOOD/URIC ACID: CPT | Performed by: PHYSICIAN ASSISTANT

## 2021-11-04 PROCEDURE — 83615 LACTATE (LD) (LDH) ENZYME: CPT | Performed by: PHYSICIAN ASSISTANT

## 2021-11-04 PROCEDURE — 97530 THERAPEUTIC ACTIVITIES: CPT | Mod: GP | Performed by: REHABILITATION PRACTITIONER

## 2021-11-04 PROCEDURE — 85610 PROTHROMBIN TIME: CPT | Performed by: PHYSICIAN ASSISTANT

## 2021-11-04 PROCEDURE — 258N000003 HC RX IP 258 OP 636: Performed by: INTERNAL MEDICINE

## 2021-11-04 PROCEDURE — 36591 DRAW BLOOD OFF VENOUS DEVICE: CPT | Performed by: PHYSICIAN ASSISTANT

## 2021-11-04 PROCEDURE — 97140 MANUAL THERAPY 1/> REGIONS: CPT | Mod: GO | Performed by: OCCUPATIONAL THERAPIST

## 2021-11-04 PROCEDURE — 85025 COMPLETE CBC W/AUTO DIFF WBC: CPT | Performed by: PHYSICIAN ASSISTANT

## 2021-11-04 PROCEDURE — 99232 SBSQ HOSP IP/OBS MODERATE 35: CPT | Performed by: INTERNAL MEDICINE

## 2021-11-04 RX ORDER — HYDROMORPHONE HYDROCHLORIDE 1 MG/ML
0.3 INJECTION, SOLUTION INTRAMUSCULAR; INTRAVENOUS; SUBCUTANEOUS
Status: DISCONTINUED | OUTPATIENT
Start: 2021-11-04 | End: 2021-01-01 | Stop reason: HOSPADM

## 2021-11-04 RX ORDER — OXYCODONE HYDROCHLORIDE 10 MG/1
10-20 TABLET ORAL EVERY 4 HOURS PRN
Status: DISCONTINUED | OUTPATIENT
Start: 2021-11-04 | End: 2021-11-07

## 2021-11-04 RX ADMIN — OXYCODONE HYDROCHLORIDE 10 MG: 10 TABLET ORAL at 16:19

## 2021-11-04 RX ADMIN — OXYCODONE HYDROCHLORIDE 10 MG: 5 TABLET ORAL at 02:15

## 2021-11-04 RX ADMIN — ENOXAPARIN SODIUM 40 MG: 40 INJECTION SUBCUTANEOUS at 19:59

## 2021-11-04 RX ADMIN — ACETAMINOPHEN 650 MG: 325 TABLET, FILM COATED ORAL at 07:26

## 2021-11-04 RX ADMIN — OXYCODONE HYDROCHLORIDE 10 MG: 5 TABLET ORAL at 13:10

## 2021-11-04 RX ADMIN — ACETAMINOPHEN 650 MG: 325 TABLET, FILM COATED ORAL at 13:10

## 2021-11-04 RX ADMIN — ONDANSETRON HYDROCHLORIDE 8 MG: 8 TABLET, FILM COATED ORAL at 09:27

## 2021-11-04 RX ADMIN — ONDANSETRON HYDROCHLORIDE 8 MG: 8 TABLET, FILM COATED ORAL at 17:51

## 2021-11-04 RX ADMIN — ONDANSETRON HYDROCHLORIDE 8 MG: 8 TABLET, FILM COATED ORAL at 02:15

## 2021-11-04 RX ADMIN — OXYCODONE HYDROCHLORIDE 10 MG: 5 TABLET ORAL at 07:26

## 2021-11-04 RX ADMIN — MESNA 3660 MG: 100 INJECTION, SOLUTION INTRAVENOUS at 00:52

## 2021-11-04 RX ADMIN — OXYCODONE HYDROCHLORIDE 20 MG: 10 TABLET ORAL at 19:59

## 2021-11-04 ASSESSMENT — ACTIVITIES OF DAILY LIVING (ADL)
ADLS_ACUITY_SCORE: 10

## 2021-11-04 NOTE — PROGRESS NOTES
"CLINICAL NUTRITION SERVICES - ASSESSMENT NOTE     Nutrition Prescription    RECOMMENDATIONS FOR MDs/PROVIDERS TO ORDER:  Continue to monitor fluid status    Malnutrition Status:    Patient does not meet two of the established criteria necessary for diagnosing malnutriton    Recommendations already ordered by Registered Dietitian (RD):  None at this time    Future/Additional Recommendations:  Consider calorie counts pending flowsheet documentation  Monitor weight for fluctuations d/t fluid status     REASON FOR ASSESSMENT  Vic Scott III is a/an 46 year old male assessed by the dietitian for Admission Nutrition Risk Screen:   Have you recently lost weight without trying? Yes. Unsure how much  Have you been eating poorly because of a decreased appetite? yes    NUTRITION HISTORY  At visit, pt states he usually eats about 1-2 meals per day. He says he sometimes snacks, though it is not frequent and depends on the day.     CURRENT NUTRITION ORDERS  Diet: Regular  Intake/Tolerance: per provider note, no nausea or vomiting yet from treatment. Pt having fair appetite, with good PO intake from RN note. 100% of 1 meal documented in flowsheets.   Pt tells me at visit today that his appetite and intake are \"the same\" and \"not really reduced\" from his baseline.     LABS  Labs reviewed    MEDICATIONS  Medications reviewed  Senna and Miralax PRN  Oxycodone 5-10mg q4h PRN  PRN lyte replacement  Continuous Chemo- Ifosfamide (Ifex) and Mesna (Mesnex)    ANTHROPOMETRICS  Height: 188 cm (6' 2\")  Most Recent Weight: 116.3 kg (256 lb 4.8 oz) 11/3  IBW: 86.4 kg  BMI: Obesity Grade I BMI 30-34.9  Weight History: Do not have accurate wt for patient. Suspect wts from 11/1 and 9/24 were reported, and that pt is currently fluid up. He tells me his UBW is ~230lbs, but that he is sure his wt is up due to the fluid in his legs and suspects he may have lost weight that is masked by the fluid.  Wt Readings from Last 10 Encounters:   11/03/21 " 116.3 kg (256 lb 4.8 oz)   11/01/21 104.3 kg (230 lb)   09/24/21 117.9 kg (260 lb)     Dosing Weight: 94 kg (adjusted) - based on lowest doc wt his adm (116.3 kg on 11/3) and IBW of 86.4kg    ASSESSED NUTRITION NEEDS  Estimated Energy Needs: 1969-0056 kcals/day (25 - 30 kcals/kg)  Justification: Maintenance  Estimated Protein Needs: 113-141 grams protein/day (1.2 - 1.5 grams of pro/kg)  Justification: Increased needs  Estimated Fluid Needs: 1mL/kcal or per MD  Justification: Per provider pending fluid status    PHYSICAL FINDINGS  See malnutrition section below.  2-3+ pitting edema, notably worse in RLE than LLE  No BM documented since admit    MALNUTRITION  % Intake: No decreased intake noted  % Weight Loss: None noted, wts difficult to assess  Subcutaneous Fat Loss: None observed  Muscle Loss: None observed  Fluid Accumulation/Edema: Moderate  Malnutrition Diagnosis: Patient does not meet two of the established criteria necessary for diagnosing malnutrition    NUTRITION DIAGNOSIS  No nutrition diagnosis at this time.    INTERVENTIONS  Implementation  Nutrition Education: Per provider order if indicated   Discussed importance of adequate nutrition with patient and to consider oral supplements if he notices any decrease in appetite.    Goals  Patient to consume % of nutritionally adequate meal trays TID, or the equivalent with supplements/snacks.     Monitoring/Evaluation  Progress toward goals will be monitored and evaluated per protocol.    Cari Krueger  Dietetic Intern

## 2021-11-04 NOTE — PLAN OF CARE
1900 - 0700  Tmax 100.0, tachy to 126, OVS stable. Pt c/o right leg pain - 10mg oxy x2 with benefit. Pt denied SOB/n/v. Ifos/mesna infusing, good blood return noted, tolerating well. Pt up with walker and assist. No significant events, continue POC.

## 2021-11-04 NOTE — PLAN OF CARE
[id number]: Pt tachycardic with -120s; OVSS pt afebrile on RA. D2 CIVI Ifos/mesna infusing 48.3 mL/hr. Q4H brisk blood return noted. Pt denies nausea or dyspnea. Pt experiencing constant pain in RLE, pain being managed with 10 mg Oxy X2 and prn Tylenol. Pain not really changing with current pain medications; spoke with PATRICIA Luu and she is planning to increase oxy and add PRN IV dilaudid. Pt using Ice on R hip. CMS intact on right leg. Lymphedema wraps in place on RLE. RLE edematous 3+. Pt up with AstX1 RW.Pt to work with PT today. Pt gave self bed bath. Continue POC.

## 2021-11-04 NOTE — PROGRESS NOTES
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     Today:  - Today is Day 2 of Doxil/Ifos; Will likely complete his chemotherapy on 11/9  - Tolerating treatment well so far   - No nausea or vomiting   - Pain not increased from baseline. 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 Whitaker, Orthopedics with rapidly progressing right leg swelling since ~ July 2021. MRI was incomplete due to pain, but did show ~[id number] right thigh soft tissue mass with probable bony involvement of the proximal right femur. 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 Was seen by Dr. Whitaker to review the results on 10/1/21 - discussed consideration of neoadjuvant chemotherapy vs resection and reconstruction. Given high risk for amputation with massive reconstructive surgery, he was referred to Dr. Ambrocio and a PET was ordered which identifed concerning inguinal lymphadenopathy. He was seen by Dr. Ambrocio 10/26/21, ordered NGS (in process) and recommended starting Doxil/Ifos initially as an inpatient. Admitted for Cycle 1 Doxil/Ifos (M2Y5=98/2/21)   - Port placed 11/1/21 - nursing unable to access port on admission. IR consulted, able to access port and OK to use.   - Baseline Echo (11/2) - EF 60-65%, normal                  Treatment Plan: Doxil/Ifos/Mesna Cycle 1, Day 1 = 11/3/21                Doxorubicin Liposomal 45 mg/mg2 once Day 1               Ifosfamide 1500 mg/m2, Mesna 1500 mg/m2 once daily Days 1-6               Mesna 1500 mg/m2 once Day 7               Neulasta Day 8 - Request sent to be scheduled at [facility] for 11/11               Supportive Care - Zofran 8mg q8h Days 1-7     # Cancer-related pain  Referral placed to palliative care for pain control and support PTA. He has not yet established care with palliative. Currently taking oxycodone 5mg and Motrin ~every 4-6 hours.   - Mass partially erodes the outer cortex of the proximal right femur which increases risk for pathologic fracture. Per Dr. Whitaker, OK to bear weight on his right leg  - Consider Palliative care consult for pain mgmt and general support during admission  - Oxycodone 5-10mg q4h PRN     # Sinus tachycardia  Pt tachycardic on admission, HR 115bpm but regular. No murmurs. Pt denies chest pain. He does not have significant cardiac history. He does admit to feeling a little anxious about his new cancer diagnosis.   - EKG with sinus tachy   - Baseline Echo as above per chemo workup is normal with EF 60-65%     MSK  # Right Leg Edema  - Pt has moderate right leg subcutaneous edema with compression of the femoral vein.   - Lymphedema team on board    # Fall  - Had a fall the am of 11/3 when he stood up. Slipped on his socks. Was not wearing the provided  socks. Landed on his bottom, did not hit his head  - There is always concern for fracture after a fall but patient is not having increased pain. Additionally, on review of his imaging, there does not appear to be bony involvement of his sarcoma mass.   - Pain not increased from baseline. Monitor closely    Fluids/Electrolytes/Nutrition  - IVF per chemotherapy regimen  - PRN lyte replacement per standing protocol  - Regular diet as tolerated     Lines: Port (11/1/21)    PPX  - VTE: Lovenox ppx while inpatient   - GI/PUD: None indicated  - Bowels: Senna and Miralax PRN  -  Activity: OK to bear weight per Dr. Whitaker, consulted PT. Use cane with walking.   - Asymptomatic COVID screening: negative on admission     Code: Full Code    Disposition: Admitted for scheduled chemotherapy with Doxil/Ifos, anticipate discharge on Day 7 after completion of Mesna   Follow up:   - Scheduled labs at [facility], 2x weekly  - Requested neulasta for 11/11  - Requested MEHRAN visit within 1 week of discharge      Patient is seen and examined by Dr. Martinez and EVER.  Assessment and plan are discussed and delivered to the patient.    Bell Gonzalez) PA   Hematology/Oncology  Pager: [phone]    Interval History   No acute events overnight.  Vic continues to feel well. Has not noted any new symptoms or side effects from this cycle of chemo yet. Discussed that he will likely have fatigue/nausea/vomiting in the coming days.   No increased pain in his leg or hip.  Appetite ok. Energy ok.  Denies fever, chills, mouth sores, SOB, cough, abdominal pain, diarrhea, constipation, nausea, vomiting, dysuria, hematuria, numbness, tingling, swelling      Complete and Comprehensive review of systems review and negative other than noted here or in the HPI.     Physical Exam   Temp: 98.5  F (36.9  C) Temp src: Oral BP: (!) 144/70 Pulse: (!) 121   Resp: 18 SpO2: 99 % O2 Device: None (Room air)    Vitals:    11/02/21 1248 11/03/21 0700   Weight: 114.2 kg (251 lb 12.8 oz) 116.3 kg (256 lb 4.8 oz)     Vital Signs with Ranges  Temp:  [97.8  F (36.6  C)-100  F (37.8  C)] 98.5  F (36.9  C)  Pulse:  [109-126] 121  Resp:  [18-20] 18  BP: (128-146)/(70-84) 144/70  SpO2:  [96 %-100 %] 99 %  I/O last 3 completed shifts:  In: 240 [P.O.:240]  Out: 750 [Urine:750]    Constitutional: Pleasant male sitting in bed. Awake and conversational. Non- toxic appearing. No acute distress.   HEENT: Normocephalic, atraumatic. Sclerae anicteric. EOM intact. Moist mucus membranes   Respiratory: Breathing comfortable with no increased work on room air.  Good air exchange. No signs of respiratory distress or accessory muscle use. Lungs clear to auscultation bilaterally, no crackles or wheezing noted.  Cardiovascular: Regular rate and rhythm. Normal S1 and S2. No murmurs, rubs, or gallops. a.    GI: Abdomen is soft, non-distended, non-tender and without distension. Bowel sounds present and normoactive.   Skin: Skin is clean, dry, intact. No jaundice appreciated.   Musculoskeletal/ Extremities: No redness, warmth, or swelling of the joints appreciated. Distal pulses palpable. Nailbeds pink and without cyanosis or clubbing. Significantly swollen right leg with large mass on right thigh  Neurologic: Alert and oriented. Speech normal. Grossly nonfocal. Memory and thought process preserved. Motor function grossly normal, limited movement on right leg. Sensation intact bilaterally. CN II-XII intact.   Neuropsychiatric: Calm, affect congruent to situation. Appropriate mood and affect. Good judgment and insight. No visual/auditory hallucinations.         Medications       Chemotherapy Infusing-Continuous Infusion   Does not apply Q8H     enoxaparin ANTICOAGULANT  40 mg Subcutaneous Q24H     ifosfamide (IFEX) infusion  1,500 mg/m2 Intravenous Q24H     [START ON 11/8/2021] mesna (MESNEX) infusion  1,500 mg/m2 Intravenous Once     ondansetron  8 mg Oral Q8H     sodium chloride (PF)  3 mL Intracatheter Q8H       Data   Results for orders placed or performed during the hospital encounter of 11/02/21 (from the past 24 hour(s))   CBC with platelets differential    Narrative    The following orders were created for panel order CBC with platelets differential.  Procedure                               Abnormality         Status                     ---------                               -----------         ------                     CBC with platelets and d...[[id number]]  Abnormal            Final result                 Please view results for these tests on the individual orders.    Comprehensive metabolic panel   Result Value Ref Range    Sodium 134 133 - 144 mmol/L    Potassium 4.0 3.4 - 5.3 mmol/L    Chloride 104 94 - 109 mmol/L    Carbon Dioxide (CO2) 26 20 - 32 mmol/L    Anion Gap 4 3 - 14 mmol/L    Urea Nitrogen 11 7 - 30 mg/dL    Creatinine 0.71 0.66 - 1.25 mg/dL    Calcium 8.6 8.5 - 10.1 mg/dL    Glucose 106 (H) 70 - 99 mg/dL    Alkaline Phosphatase 221 (H) 40 - 150 U/L    AST 30 0 - 45 U/L    ALT 26 0 - 70 U/L    Protein Total 7.3 6.8 - 8.8 g/dL    Albumin 2.8 (L) 3.4 - 5.0 g/dL    Bilirubin Total 0.7 0.2 - 1.3 mg/dL    GFR Estimate >90 >60 mL/min/1.73m2   Magnesium   Result Value Ref Range    Magnesium 2.0 1.6 - 2.3 mg/dL   Phosphorus   Result Value Ref Range    Phosphorus 3.5 2.5 - 4.5 mg/dL   INR   Result Value Ref Range    INR 1.26 (H) 0.85 - 1.15   Fibrinogen activity   Result Value Ref Range    Fibrinogen Activity 471 170 - 490 mg/dL   Uric acid   Result Value Ref Range    Uric Acid 3.9 3.5 - 7.2 mg/dL   Lactate Dehydrogenase   Result Value Ref Range    Lactate Dehydrogenase 403 (H) 85 - 227 U/L   CBC with platelets and differential   Result Value Ref Range    WBC Count 9.3 4.0 - 11.0 10e3/uL    RBC Count 3.49 (L) 4.40 - 5.90 10e6/uL    Hemoglobin 9.0 (L) 13.3 - 17.7 g/dL    Hematocrit 27.4 (L) 40.0 - 53.0 %    MCV 79 78 - 100 fL    MCH 25.8 (L) 26.5 - 33.0 pg    MCHC 32.8 31.5 - 36.5 g/dL    RDW 14.8 10.0 - 15.0 %    Platelet Count 344 150 - 450 10e3/uL    % Neutrophils 72 %    % Lymphocytes 15 %    % Monocytes 11 %    % Eosinophils 1 %    % Basophils 0 %    % Immature Granulocytes 1 %    NRBCs per 100 WBC 0 <1 /100    Absolute Neutrophils 6.7 1.6 - 8.3 10e3/uL    Absolute Lymphocytes 1.4 0.8 - 5.3 10e3/uL    Absolute Monocytes 1.0 0.0 - 1.3 10e3/uL    Absolute Eosinophils 0.1 0.0 - 0.7 10e3/uL    Absolute Basophils 0.0 0.0 - 0.2 10e3/uL    Absolute Immature Granulocytes 0.1 (H) <=0.0 10e3/uL    Absolute NRBCs 0.0 10e3/uL

## 2021-11-04 NOTE — PLAN OF CARE
Nursing Focus: Chemotherapy  D: Positive blood return via PORT. Insertion site is clean/dry/intact, dressing intact with no complaints of pain.  Urine output is recorded in intake in Doc Flowsheet.    I: Premedications given per order (see electronic medical administration record). Dose #2 Ifos/Mesna started to infuse over 24 hours. Reviewed pt teaching on chemotherapy side effects.  Pt denies need for further teaching. Chemotherapy double checked per protocol by two chemotherapy competent RN's.   A: Tolerating procedure well. Denies nausea and or pain.   P: Continue to monitor urine output and symptoms of nausea. Screen for symptoms of toxicity.

## 2021-11-05 ENCOUNTER — APPOINTMENT (OUTPATIENT)
Dept: OCCUPATIONAL THERAPY | Facility: CLINIC | Age: 46
End: 2021-11-05
Attending: STUDENT IN AN ORGANIZED HEALTH CARE EDUCATION/TRAINING PROGRAM
Payer: MEDICAID

## 2021-11-05 ENCOUNTER — APPOINTMENT (OUTPATIENT)
Dept: GENERAL RADIOLOGY | Facility: CLINIC | Age: 46
End: 2021-11-05
Attending: PHYSICIAN ASSISTANT
Payer: MEDICAID

## 2021-11-05 LAB
ALBUMIN SERPL-MCNC: 2.5 G/DL (ref 3.4–5)
ALBUMIN UR-MCNC: 30 MG/DL
ALP SERPL-CCNC: 201 U/L (ref 40–150)
ALT SERPL W P-5'-P-CCNC: 23 U/L (ref 0–70)
AMORPH CRY #/AREA URNS HPF: ABNORMAL /HPF
ANION GAP SERPL CALCULATED.3IONS-SCNC: 7 MMOL/L (ref 3–14)
APPEARANCE UR: CLEAR
AST SERPL W P-5'-P-CCNC: 21 U/L (ref 0–45)
BASOPHILS # BLD AUTO: 0 10E3/UL (ref 0–0.2)
BASOPHILS NFR BLD AUTO: 0 %
BILIRUB SERPL-MCNC: 0.8 MG/DL (ref 0.2–1.3)
BILIRUB UR QL STRIP: NEGATIVE
BUN SERPL-MCNC: 10 MG/DL (ref 7–30)
CALCIUM SERPL-MCNC: 8.6 MG/DL (ref 8.5–10.1)
CHLORIDE BLD-SCNC: 103 MMOL/L (ref 94–109)
CO2 SERPL-SCNC: 24 MMOL/L (ref 20–32)
COLOR UR AUTO: YELLOW
CREAT SERPL-MCNC: 0.65 MG/DL (ref 0.66–1.25)
EOSINOPHIL # BLD AUTO: 0 10E3/UL (ref 0–0.7)
EOSINOPHIL NFR BLD AUTO: 0 %
ERYTHROCYTE [DISTWIDTH] IN BLOOD BY AUTOMATED COUNT: 14.9 % (ref 10–15)
GFR SERPL CREATININE-BSD FRML MDRD: >90 ML/MIN/1.73M2
GLUCOSE BLD-MCNC: 137 MG/DL (ref 70–99)
GLUCOSE UR STRIP-MCNC: NEGATIVE MG/DL
HCT VFR BLD AUTO: 27.5 % (ref 40–53)
HGB BLD-MCNC: 9 G/DL (ref 13.3–17.7)
HGB UR QL STRIP: ABNORMAL
IMM GRANULOCYTES # BLD: 0.1 10E3/UL
IMM GRANULOCYTES NFR BLD: 1 %
KETONES UR STRIP-MCNC: 80 MG/DL
LACTATE SERPL-SCNC: 1.3 MMOL/L (ref 0.7–2)
LDH SERPL L TO P-CCNC: 378 U/L (ref 85–227)
LEUKOCYTE ESTERASE UR QL STRIP: NEGATIVE
LYMPHOCYTES # BLD AUTO: 0.8 10E3/UL (ref 0.8–5.3)
LYMPHOCYTES NFR BLD AUTO: 8 %
MAGNESIUM SERPL-MCNC: 2.2 MG/DL (ref 1.6–2.3)
MCH RBC QN AUTO: 25.7 PG (ref 26.5–33)
MCHC RBC AUTO-ENTMCNC: 32.7 G/DL (ref 31.5–36.5)
MCV RBC AUTO: 79 FL (ref 78–100)
MONOCYTES # BLD AUTO: 0.9 10E3/UL (ref 0–1.3)
MONOCYTES NFR BLD AUTO: 9 %
MUCOUS THREADS #/AREA URNS LPF: PRESENT /LPF
NEUTROPHILS # BLD AUTO: 8.5 10E3/UL (ref 1.6–8.3)
NEUTROPHILS NFR BLD AUTO: 82 %
NITRATE UR QL: NEGATIVE
NRBC # BLD AUTO: 0 10E3/UL
NRBC BLD AUTO-RTO: 0 /100
PH UR STRIP: 5.5 [PH] (ref 5–7)
PHOSPHATE SERPL-MCNC: 3.4 MG/DL (ref 2.5–4.5)
PLATELET # BLD AUTO: 283 10E3/UL (ref 150–450)
POTASSIUM BLD-SCNC: 3.8 MMOL/L (ref 3.4–5.3)
PROT SERPL-MCNC: 7.3 G/DL (ref 6.8–8.8)
RBC # BLD AUTO: 3.5 10E6/UL (ref 4.4–5.9)
RBC URINE: <1 /HPF
SODIUM SERPL-SCNC: 134 MMOL/L (ref 133–144)
SP GR UR STRIP: 1.02 (ref 1–1.03)
SQUAMOUS EPITHELIAL: <1 /HPF
URATE SERPL-MCNC: 4.7 MG/DL (ref 3.5–7.2)
UROBILINOGEN UR STRIP-MCNC: NORMAL MG/DL
WBC # BLD AUTO: 10.3 10E3/UL (ref 4–11)
WBC URINE: 2 /HPF

## 2021-11-05 PROCEDURE — 83615 LACTATE (LD) (LDH) ENZYME: CPT | Performed by: PHYSICIAN ASSISTANT

## 2021-11-05 PROCEDURE — 71045 X-RAY EXAM CHEST 1 VIEW: CPT

## 2021-11-05 PROCEDURE — 81001 URINALYSIS AUTO W/SCOPE: CPT | Performed by: PHYSICIAN ASSISTANT

## 2021-11-05 PROCEDURE — 85025 COMPLETE CBC W/AUTO DIFF WBC: CPT | Performed by: PHYSICIAN ASSISTANT

## 2021-11-05 PROCEDURE — 250N000013 HC RX MED GY IP 250 OP 250 PS 637: Performed by: PHYSICIAN ASSISTANT

## 2021-11-05 PROCEDURE — 87086 URINE CULTURE/COLONY COUNT: CPT | Performed by: PHYSICIAN ASSISTANT

## 2021-11-05 PROCEDURE — 84550 ASSAY OF BLOOD/URIC ACID: CPT | Performed by: PHYSICIAN ASSISTANT

## 2021-11-05 PROCEDURE — 84100 ASSAY OF PHOSPHORUS: CPT | Performed by: PHYSICIAN ASSISTANT

## 2021-11-05 PROCEDURE — 80053 COMPREHEN METABOLIC PANEL: CPT | Performed by: PHYSICIAN ASSISTANT

## 2021-11-05 PROCEDURE — 250N000011 HC RX IP 250 OP 636: Performed by: INTERNAL MEDICINE

## 2021-11-05 PROCEDURE — 258N000003 HC RX IP 258 OP 636: Performed by: PHYSICIAN ASSISTANT

## 2021-11-05 PROCEDURE — 258N000003 HC RX IP 258 OP 636: Performed by: INTERNAL MEDICINE

## 2021-11-05 PROCEDURE — 99232 SBSQ HOSP IP/OBS MODERATE 35: CPT | Performed by: INTERNAL MEDICINE

## 2021-11-05 PROCEDURE — 999N000128 HC STATISTIC PERIPHERAL IV START W/O US GUIDANCE

## 2021-11-05 PROCEDURE — 71045 X-RAY EXAM CHEST 1 VIEW: CPT | Mod: 26 | Performed by: STUDENT IN AN ORGANIZED HEALTH CARE EDUCATION/TRAINING PROGRAM

## 2021-11-05 PROCEDURE — 87040 BLOOD CULTURE FOR BACTERIA: CPT | Performed by: INTERNAL MEDICINE

## 2021-11-05 PROCEDURE — 83605 ASSAY OF LACTIC ACID: CPT | Performed by: INTERNAL MEDICINE

## 2021-11-05 PROCEDURE — 36591 DRAW BLOOD OFF VENOUS DEVICE: CPT | Performed by: INTERNAL MEDICINE

## 2021-11-05 PROCEDURE — 250N000011 HC RX IP 250 OP 636: Performed by: PHYSICIAN ASSISTANT

## 2021-11-05 PROCEDURE — 120N000002 HC R&B MED SURG/OB UMMC

## 2021-11-05 PROCEDURE — 36591 DRAW BLOOD OFF VENOUS DEVICE: CPT | Performed by: PHYSICIAN ASSISTANT

## 2021-11-05 PROCEDURE — 83735 ASSAY OF MAGNESIUM: CPT | Performed by: INTERNAL MEDICINE

## 2021-11-05 PROCEDURE — 97140 MANUAL THERAPY 1/> REGIONS: CPT | Mod: GO | Performed by: OCCUPATIONAL THERAPIST

## 2021-11-05 RX ORDER — ONDANSETRON 4 MG/1
4 TABLET, ORALLY DISINTEGRATING ORAL EVERY 6 HOURS PRN
Status: DISCONTINUED | OUTPATIENT
Start: 2021-11-05 | End: 2021-11-05

## 2021-11-05 RX ORDER — ONDANSETRON 2 MG/ML
4 INJECTION INTRAMUSCULAR; INTRAVENOUS EVERY 6 HOURS PRN
Status: DISCONTINUED | OUTPATIENT
Start: 2021-11-05 | End: 2021-11-05

## 2021-11-05 RX ORDER — ONDANSETRON 4 MG/1
4 TABLET, FILM COATED ORAL EVERY 6 HOURS PRN
Status: DISCONTINUED | OUTPATIENT
Start: 2021-11-05 | End: 2021-11-05

## 2021-11-05 RX ADMIN — ONDANSETRON HYDROCHLORIDE 8 MG: 8 TABLET, FILM COATED ORAL at 09:55

## 2021-11-05 RX ADMIN — MESNA 3660 MG: 100 INJECTION, SOLUTION INTRAVENOUS at 00:52

## 2021-11-05 RX ADMIN — ONDANSETRON HYDROCHLORIDE 8 MG: 8 TABLET, FILM COATED ORAL at 18:17

## 2021-11-05 RX ADMIN — ONDANSETRON HYDROCHLORIDE 8 MG: 8 TABLET, FILM COATED ORAL at 02:49

## 2021-11-05 RX ADMIN — SODIUM CHLORIDE 1000 ML: 9 INJECTION, SOLUTION INTRAVENOUS at 14:00

## 2021-11-05 RX ADMIN — OXYCODONE HYDROCHLORIDE 10 MG: 10 TABLET ORAL at 13:58

## 2021-11-05 RX ADMIN — OXYCODONE HYDROCHLORIDE 20 MG: 10 TABLET ORAL at 02:49

## 2021-11-05 RX ADMIN — OXYCODONE HYDROCHLORIDE 20 MG: 10 TABLET ORAL at 21:48

## 2021-11-05 RX ADMIN — ENOXAPARIN SODIUM 40 MG: 40 INJECTION SUBCUTANEOUS at 20:04

## 2021-11-05 RX ADMIN — OXYCODONE HYDROCHLORIDE 20 MG: 10 TABLET ORAL at 08:02

## 2021-11-05 RX ADMIN — OXYCODONE HYDROCHLORIDE 10 MG: 10 TABLET ORAL at 12:58

## 2021-11-05 ASSESSMENT — ACTIVITIES OF DAILY LIVING (ADL)
ADLS_ACUITY_SCORE: 12
ADLS_ACUITY_SCORE: 10
ADLS_ACUITY_SCORE: 10
ADLS_ACUITY_SCORE: 12
ADLS_ACUITY_SCORE: 10
ADLS_ACUITY_SCORE: 12
ADLS_ACUITY_SCORE: 10
ADLS_ACUITY_SCORE: 12
ADLS_ACUITY_SCORE: 10
ADLS_ACUITY_SCORE: 12
ADLS_ACUITY_SCORE: 10
ADLS_ACUITY_SCORE: 12
ADLS_ACUITY_SCORE: 10
ADLS_ACUITY_SCORE: 10
ADLS_ACUITY_SCORE: 12
ADLS_ACUITY_SCORE: 12
ADLS_ACUITY_SCORE: 10
ADLS_ACUITY_SCORE: 12
ADLS_ACUITY_SCORE: 12
ADLS_ACUITY_SCORE: 10

## 2021-11-05 ASSESSMENT — MIFFLIN-ST. JEOR: SCORE: 2034.76

## 2021-11-05 NOTE — PROGRESS NOTES
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     Today:  - Today is Day 3 of Doxil/Ifos; Will likely complete his chemotherapy on 11/9  - Tolerating treatment well so far   - Some nausea without vomiting  - Pain not well controlled. Increased Oxycodone to 10-20mg q4h PRN   - IV Dilaudid available prn for breakthrough pain  - T max of 100.4F today. Tachycardic to 120s. No associate symptoms. Did not \"feel\" feverish. Lactic acid of 1.3.    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 Whitaker, Orthopedics with rapidly progressing right leg swelling since ~ July 2021. MRI was incomplete due to pain, but did show ~[id number] right thigh soft tissue mass with probable bony involvement of the proximal right femur. 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 Was seen by Dr. Whitaker to review the results on 10/1/21 - discussed consideration of neoadjuvant chemotherapy vs resection and reconstruction. Given high risk for amputation with massive reconstructive surgery, he was referred to Dr. Ambrocio and a PET was ordered which identifed concerning inguinal lymphadenopathy. He was seen by Dr. Ambrocio 10/26/21, ordered NGS " (in process) and recommended starting Doxil/Ifos initially as an inpatient. Admitted for Cycle 1 Doxil/Ifos (F1K2=36/2/21)   - Port placed 11/1/21 - nursing unable to access port on admission. IR consulted, able to access port and OK to use.   - Baseline Echo (11/2) - EF 60-65%, normal                  Treatment Plan: Doxil/Ifos/Mesna Cycle 1, Day 1 = 11/3/21               Doxorubicin Liposomal 45 mg/mg2 once Day 1               Ifosfamide 1500 mg/m2, Mesna 1500 mg/m2 once daily Days 1-6               Mesna 1500 mg/m2 once Day 7               Neulasta Day 8 - Scheduled 11/10               Supportive Care - Zofran 8mg q8h Days 1-7     # Cancer-related pain  Referral placed to palliative care for pain control and support PTA. He has not yet established care with palliative. Currently taking oxycodone 5mg and Motrin ~every 4-6 hours.   - Mass partially erodes the outer cortex of the proximal right femur which increases risk for pathologic fracture. Per Dr. Whitaker, OK to bear weight on his right leg  - Consider Palliative care consult for pain mgmt and general support during admission  - Oxycodone 5-10mg q4h PRN     # Sinus tachycardia  Pt tachycardic on admission, HR 115bpm but regular. No murmurs. Pt denies chest pain. He does not have significant cardiac history. He does admit to feeling a little anxious about his new cancer diagnosis.   - EKG with sinus tachy   - Baseline Echo as above per chemo workup is normal with EF 60-65%     MSK  # Right Leg Edema  - Pt has moderate right leg subcutaneous edema with compression of the femoral vein.   - Lymphedema team on board    # Fall  - Had a fall the am of 11/3 when he stood up. Slipped on his socks. Was not wearing the provided  socks. Landed on his bottom, did not hit his head  - There is always concern for fracture after a fall but patient is not having increased pain. Additionally, on review of his imaging, there does not appear to be bony involvement of his  sarcoma mass.   - Pain not increased from baseline. Monitor closely    Fluids/Electrolytes/Nutrition  - IVF per chemotherapy regimen  - PRN lyte replacement per standing protocol  - Regular diet as tolerated     Lines: Port (11/1/21)    PPX  - VTE: Lovenox ppx while inpatient   - GI/PUD: None indicated  - Bowels: Senna and Miralax PRN  - Activity: OK to bear weight per Dr. Whitakre, consulted PT. Use cane with walking.   - Asymptomatic COVID screening: negative on admission     Code: Full Code    Disposition: Admitted for scheduled chemotherapy with Doxil/Ifos, anticipate discharge on Day 7 after completion of Mesna   Follow up:   - Scheduled labs at [facility], 2x weekly  - Neulasta on 11/10  - Requested MEHRAN visit within 1 week of discharge      Patient is seen and examined by Dr. Schmidt.  Assessment and plan are discussed and delivered to the patient.    Bell Gonzalez) PA   Hematology/Oncology  Pager: [phone]    Interval History   No acute events overnight.  Vic is feeling well this morning.Has not noted any new symptoms or side effects from this cycle of chemo yet. Discussed that he will likely have fatigue/nausea/vomiting in the coming days. Encouraged using anti emetics  Having some increased pain in his leg. Improved with increased Oxycodone.   Appetite ok. Energy ok.  Denies fever, chills, mouth sores, SOB, cough, abdominal pain, diarrhea, constipation, nausea, vomiting, dysuria, hematuria, numbness, tingling, swelling      Complete and Comprehensive review of systems review and negative other than noted here or in the HPI.     Physical Exam   Temp: 100.4  F (38  C) Temp src: Oral BP: (!) 148/73 Pulse: (!) 131   Resp: 19 SpO2: 100 % O2 Device: None (Room air)    Vitals:    11/02/21 1248 11/03/21 0700 11/05/21 0929   Weight: 114.2 kg (251 lb 12.8 oz) 116.3 kg (256 lb 4.8 oz) 108 kg (238 lb 1.6 oz)     Vital Signs with Ranges  Temp:  [97.4  F (36.3  C)-100.4  F (38  C)] 100.4  F (38  C)  Pulse:   [] 131  Resp:  [18-20] 19  BP: (120-153)/(57-93) 148/73  SpO2:  [93 %-100 %] 100 %  I/O last 3 completed shifts:  In: 1672.8 [P.O.:880; I.V.:20; IV Piggyback:772.8]  Out: 1925 [Urine:1925]    Constitutional: Pleasant male sitting in bed. Awake and conversational. Non- toxic appearing. No acute distress.   HEENT: Normocephalic, atraumatic. Sclerae anicteric. EOM intact. Moist mucus membranes   Respiratory: Breathing comfortable with no increased work on room air. Good air exchange. No signs of respiratory distress or accessory muscle use. Lungs clear to auscultation bilaterally, no crackles or wheezing noted.  Cardiovascular: Regular rate and rhythm. Normal S1 and S2. No murmurs, rubs, or gallops. a.    GI: Abdomen is soft, non-distended, non-tender and without distension. Bowel sounds present and normoactive.   Skin: Skin is clean, dry, intact. No jaundice appreciated.   Musculoskeletal/ Extremities: No redness, warmth, or swelling of the joints appreciated. Distal pulses palpable. Nailbeds pink and without cyanosis or clubbing. Significantly swollen right leg with large mass on right thigh  Neurologic: Alert and oriented. Speech normal. Grossly nonfocal. Memory and thought process preserved. Motor function grossly normal, limited movement on right leg. Sensation intact bilaterally. CN II-XII intact.   Neuropsychiatric: Calm, affect congruent to situation. Appropriate mood and affect. Good judgment and insight. No visual/auditory hallucinations.         Medications       sodium chloride 0.9%  1,000 mL Intravenous Once     ceFEPIme (MAXIPIME) IV  2 g Intravenous Q8H     Chemotherapy Infusing-Continuous Infusion   Does not apply Q8H     enoxaparin ANTICOAGULANT  40 mg Subcutaneous Q24H     ifosfamide (IFEX) infusion  1,500 mg/m2 Intravenous Q24H     [START ON 11/8/2021] mesna (MESNEX) infusion  1,500 mg/m2 Intravenous Once     ondansetron  8 mg Oral Q8H     sodium chloride (PF)  3 mL Intracatheter Q8H       Data    Results for orders placed or performed during the hospital encounter of 11/02/21 (from the past 24 hour(s))   CBC with platelets differential    Narrative    The following orders were created for panel order CBC with platelets differential.  Procedure                               Abnormality         Status                     ---------                               -----------         ------                     CBC with platelets and d...[[id number]]  Abnormal            Final result                 Please view results for these tests on the individual orders.   Comprehensive metabolic panel   Result Value Ref Range    Sodium 134 133 - 144 mmol/L    Potassium 3.8 3.4 - 5.3 mmol/L    Chloride 103 94 - 109 mmol/L    Carbon Dioxide (CO2) 24 20 - 32 mmol/L    Anion Gap 7 3 - 14 mmol/L    Urea Nitrogen 10 7 - 30 mg/dL    Creatinine 0.65 (L) 0.66 - 1.25 mg/dL    Calcium 8.6 8.5 - 10.1 mg/dL    Glucose 137 (H) 70 - 99 mg/dL    Alkaline Phosphatase 201 (H) 40 - 150 U/L    AST 21 0 - 45 U/L    ALT 23 0 - 70 U/L    Protein Total 7.3 6.8 - 8.8 g/dL    Albumin 2.5 (L) 3.4 - 5.0 g/dL    Bilirubin Total 0.8 0.2 - 1.3 mg/dL    GFR Estimate >90 >60 mL/min/1.73m2   Phosphorus   Result Value Ref Range    Phosphorus 3.4 2.5 - 4.5 mg/dL   Uric acid   Result Value Ref Range    Uric Acid 4.7 3.5 - 7.2 mg/dL   Lactate Dehydrogenase   Result Value Ref Range    Lactate Dehydrogenase 378 (H) 85 - 227 U/L   Magnesium   Result Value Ref Range    Magnesium 2.2 1.6 - 2.3 mg/dL   CBC with platelets and differential   Result Value Ref Range    WBC Count 10.3 4.0 - 11.0 10e3/uL    RBC Count 3.50 (L) 4.40 - 5.90 10e6/uL    Hemoglobin 9.0 (L) 13.3 - 17.7 g/dL    Hematocrit 27.5 (L) 40.0 - 53.0 %    MCV 79 78 - 100 fL    MCH 25.7 (L) 26.5 - 33.0 pg    MCHC 32.7 31.5 - 36.5 g/dL    RDW 14.9 10.0 - 15.0 %    Platelet Count 283 150 - 450 10e3/uL    % Neutrophils 82 %    % Lymphocytes 8 %    % Monocytes 9 %    % Eosinophils 0 %    % Basophils 0 %    %  Immature Granulocytes 1 %    NRBCs per 100 WBC 0 <1 /100    Absolute Neutrophils 8.5 (H) 1.6 - 8.3 10e3/uL    Absolute Lymphocytes 0.8 0.8 - 5.3 10e3/uL    Absolute Monocytes 0.9 0.0 - 1.3 10e3/uL    Absolute Eosinophils 0.0 0.0 - 0.7 10e3/uL    Absolute Basophils 0.0 0.0 - 0.2 10e3/uL    Absolute Immature Granulocytes 0.1 (H) <=0.0 10e3/uL    Absolute NRBCs 0.0 10e3/uL   Lactic acid whole blood   Result Value Ref Range    Lactic Acid 1.3 0.7 - 2.0 mmol/L

## 2021-11-05 NOTE — PLAN OF CARE
[id number]: Up in the chair for most of the shift, now back in bed. 10 mg of Oxycodone given at the beginning of the shift followed 4 hrs later by 20 mg Oxycodone for pain to RLE. Lymphedema wraps in place to RLE. Declined dinner. Denies nausea. CIVI Day #2 Ifosfamide/mesna with great blood return via port q 4 hours.

## 2021-11-05 NOTE — PLAN OF CARE
2300 - 0700  Tachy to 120's, hypertensive, OVS stable, afebrile. Pt c/o R leg pain - 20mg oxy given x1 with much benefit. Pt denied SOB/n/v. Ifos/Mesna infusing, good blood return noted. Slept well between nursing cares. No significant events, continue POC.

## 2021-11-05 NOTE — PLAN OF CARE
Nursing Focus: Chemotherapy  D: Positive blood return via PORT. Insertion site is clean/dry/intact, dressing intact with no complaints of pain.  Urine output is recorded in intake in Doc Flowsheet.    I: Premedications given per order (see electronic medical administration record). Dose #3 Ifos/Mesna started to infuse over 24 hours. Reviewed pt teaching on chemotherapy side effects.  Pt denies need for further teaching. Chemotherapy double checked per protocol by two chemotherapy competent RN's.   A: Tolerating procedure well. Denies nausea and or pain.   P: Continue to monitor urine output and symptoms of nausea. Screen for symptoms of toxicity.

## 2021-11-05 NOTE — PLAN OF CARE
0700 - 1900: Tmax 100.4, HR in 120's - 130's, pt report not feeling feverish and wants to wait for the iv cefepime (PATRICIA Luu notified) and okay with the plan since recheck temp is 99.7  NS 1L bolus infused over 4 hour d/t tachy and triggered sepsis w/ LA 1.3  On Day#3 CIVI ifos/mesna infusing via port, tolerating well thus far, denies nausea, on scheduled zofran.  OT re-wrapped his R leg and doesn't think there is not much changes to R leg.  R hip/extremity pain  Managed w/ oxycodone 20 mg x 2 w/ some relief from pain.   PIV placed d/t bolus on left hand/patent.  Up with assist x 1/walker/non-slipper socks on, good UOP, last bm 11/03.  Continue with poc...

## 2021-11-05 NOTE — PROVIDER NOTIFICATION
Bell GOMEZ notified for fever 100.4 and 's - 130's. Pt asymptomatic, ex in R hip/extremity pain. Denies chest pain & palpitation, been resting on his chair.   Provider put chest x-ray, NS bolus 1L and starting on iv cefepime for fever, blood culture ordered, UA/UC ordered.  Continue to monitor HR and fever....

## 2021-11-06 ENCOUNTER — APPOINTMENT (OUTPATIENT)
Dept: OCCUPATIONAL THERAPY | Facility: CLINIC | Age: 46
End: 2021-11-06
Attending: STUDENT IN AN ORGANIZED HEALTH CARE EDUCATION/TRAINING PROGRAM
Payer: MEDICAID

## 2021-11-06 LAB
ALBUMIN SERPL-MCNC: 2.3 G/DL (ref 3.4–5)
ALP SERPL-CCNC: 164 U/L (ref 40–150)
ALT SERPL W P-5'-P-CCNC: 32 U/L (ref 0–70)
ANION GAP SERPL CALCULATED.3IONS-SCNC: 6 MMOL/L (ref 3–14)
AST SERPL W P-5'-P-CCNC: 24 U/L (ref 0–45)
BASOPHILS # BLD AUTO: 0 10E3/UL (ref 0–0.2)
BASOPHILS NFR BLD AUTO: 0 %
BILIRUB SERPL-MCNC: 0.8 MG/DL (ref 0.2–1.3)
BUN SERPL-MCNC: 10 MG/DL (ref 7–30)
CALCIUM SERPL-MCNC: 8.8 MG/DL (ref 8.5–10.1)
CHLORIDE BLD-SCNC: 104 MMOL/L (ref 94–109)
CO2 SERPL-SCNC: 25 MMOL/L (ref 20–32)
CREAT SERPL-MCNC: 0.65 MG/DL (ref 0.66–1.25)
EOSINOPHIL # BLD AUTO: 0 10E3/UL (ref 0–0.7)
EOSINOPHIL NFR BLD AUTO: 0 %
ERYTHROCYTE [DISTWIDTH] IN BLOOD BY AUTOMATED COUNT: 14.7 % (ref 10–15)
GFR SERPL CREATININE-BSD FRML MDRD: >90 ML/MIN/1.73M2
GLUCOSE BLD-MCNC: 177 MG/DL (ref 70–99)
HCT VFR BLD AUTO: 26.7 % (ref 40–53)
HGB BLD-MCNC: 8.9 G/DL (ref 13.3–17.7)
IMM GRANULOCYTES # BLD: 0.1 10E3/UL
IMM GRANULOCYTES NFR BLD: 1 %
LDH SERPL L TO P-CCNC: 485 U/L (ref 85–227)
LYMPHOCYTES # BLD AUTO: 0.6 10E3/UL (ref 0.8–5.3)
LYMPHOCYTES NFR BLD AUTO: 6 %
MAGNESIUM SERPL-MCNC: 2.1 MG/DL (ref 1.6–2.3)
MCH RBC QN AUTO: 26.1 PG (ref 26.5–33)
MCHC RBC AUTO-ENTMCNC: 33.3 G/DL (ref 31.5–36.5)
MCV RBC AUTO: 78 FL (ref 78–100)
MONOCYTES # BLD AUTO: 0.5 10E3/UL (ref 0–1.3)
MONOCYTES NFR BLD AUTO: 5 %
NEUTROPHILS # BLD AUTO: 8.4 10E3/UL (ref 1.6–8.3)
NEUTROPHILS NFR BLD AUTO: 88 %
NRBC # BLD AUTO: 0 10E3/UL
NRBC BLD AUTO-RTO: 0 /100
PHOSPHATE SERPL-MCNC: 2.5 MG/DL (ref 2.5–4.5)
PLATELET # BLD AUTO: 228 10E3/UL (ref 150–450)
POTASSIUM BLD-SCNC: 3.7 MMOL/L (ref 3.4–5.3)
PROT SERPL-MCNC: 7.2 G/DL (ref 6.8–8.8)
RBC # BLD AUTO: 3.41 10E6/UL (ref 4.4–5.9)
SODIUM SERPL-SCNC: 135 MMOL/L (ref 133–144)
URATE SERPL-MCNC: 4.2 MG/DL (ref 3.5–7.2)
WBC # BLD AUTO: 9.5 10E3/UL (ref 4–11)

## 2021-11-06 PROCEDURE — 250N000013 HC RX MED GY IP 250 OP 250 PS 637: Performed by: STUDENT IN AN ORGANIZED HEALTH CARE EDUCATION/TRAINING PROGRAM

## 2021-11-06 PROCEDURE — 83615 LACTATE (LD) (LDH) ENZYME: CPT | Performed by: PHYSICIAN ASSISTANT

## 2021-11-06 PROCEDURE — 250N000013 HC RX MED GY IP 250 OP 250 PS 637: Performed by: PHYSICIAN ASSISTANT

## 2021-11-06 PROCEDURE — 80051 ELECTROLYTE PANEL: CPT | Performed by: PHYSICIAN ASSISTANT

## 2021-11-06 PROCEDURE — 99222 1ST HOSP IP/OBS MODERATE 55: CPT | Performed by: STUDENT IN AN ORGANIZED HEALTH CARE EDUCATION/TRAINING PROGRAM

## 2021-11-06 PROCEDURE — 97530 THERAPEUTIC ACTIVITIES: CPT | Mod: GO

## 2021-11-06 PROCEDURE — 84550 ASSAY OF BLOOD/URIC ACID: CPT | Performed by: PHYSICIAN ASSISTANT

## 2021-11-06 PROCEDURE — 250N000011 HC RX IP 250 OP 636: Performed by: INTERNAL MEDICINE

## 2021-11-06 PROCEDURE — 83735 ASSAY OF MAGNESIUM: CPT | Performed by: INTERNAL MEDICINE

## 2021-11-06 PROCEDURE — 85025 COMPLETE CBC W/AUTO DIFF WBC: CPT | Performed by: PHYSICIAN ASSISTANT

## 2021-11-06 PROCEDURE — 36591 DRAW BLOOD OFF VENOUS DEVICE: CPT | Performed by: PHYSICIAN ASSISTANT

## 2021-11-06 PROCEDURE — 120N000002 HC R&B MED SURG/OB UMMC

## 2021-11-06 PROCEDURE — 99233 SBSQ HOSP IP/OBS HIGH 50: CPT | Performed by: INTERNAL MEDICINE

## 2021-11-06 PROCEDURE — 97140 MANUAL THERAPY 1/> REGIONS: CPT | Mod: GO

## 2021-11-06 PROCEDURE — 258N000003 HC RX IP 258 OP 636: Performed by: INTERNAL MEDICINE

## 2021-11-06 PROCEDURE — 84100 ASSAY OF PHOSPHORUS: CPT | Performed by: PHYSICIAN ASSISTANT

## 2021-11-06 PROCEDURE — 250N000011 HC RX IP 250 OP 636: Performed by: PHYSICIAN ASSISTANT

## 2021-11-06 RX ORDER — MORPHINE SULFATE 15 MG/1
45 TABLET, FILM COATED, EXTENDED RELEASE ORAL EVERY 12 HOURS SCHEDULED
Status: DISCONTINUED | OUTPATIENT
Start: 2021-11-06 | End: 2021-11-07

## 2021-11-06 RX ADMIN — ONDANSETRON HYDROCHLORIDE 8 MG: 8 TABLET, FILM COATED ORAL at 17:55

## 2021-11-06 RX ADMIN — OXYCODONE HYDROCHLORIDE 20 MG: 10 TABLET ORAL at 06:23

## 2021-11-06 RX ADMIN — OXYCODONE HYDROCHLORIDE 20 MG: 10 TABLET ORAL at 11:08

## 2021-11-06 RX ADMIN — OXYCODONE HYDROCHLORIDE 20 MG: 10 TABLET ORAL at 16:22

## 2021-11-06 RX ADMIN — MORPHINE SULFATE 45 MG: 15 TABLET, EXTENDED RELEASE ORAL at 19:54

## 2021-11-06 RX ADMIN — MESNA 3660 MG: 100 INJECTION, SOLUTION INTRAVENOUS at 00:38

## 2021-11-06 RX ADMIN — ONDANSETRON HYDROCHLORIDE 8 MG: 8 TABLET, FILM COATED ORAL at 01:41

## 2021-11-06 RX ADMIN — ENOXAPARIN SODIUM 40 MG: 40 INJECTION SUBCUTANEOUS at 19:54

## 2021-11-06 RX ADMIN — ONDANSETRON HYDROCHLORIDE 8 MG: 8 TABLET, FILM COATED ORAL at 10:10

## 2021-11-06 ASSESSMENT — ACTIVITIES OF DAILY LIVING (ADL)
ADLS_ACUITY_SCORE: 12

## 2021-11-06 NOTE — PLAN OF CARE
0700 - 1900: C1 D#4 CIVI ifos/mesna infusing via port, pt tolerating well thus far. A&O, AVSS, R hip/thigh pain managed w/ oxycodone 20 mg q4hrs.  Palliative doctor seen the pt & added Ms-contin q12 hrs to managed his pain better d/t R thigh tumor/cancer.  Pt been up on chair since he feel most comfortable on recliner chair.  Pt worked with lymphedema & re-wrapped his R leg up to his thigh.  Continue with poc...

## 2021-11-06 NOTE — PLAN OF CARE
[id number]    Tachy in 120's. OVSS on RA. Afebrile, tmax 99.7. Day 4 Ifos/Mesna infusing. Brisk blood return noted. Continues on scheduled Zofran, denies nausea. Requested 20mg PRN oxy x2 for R leg pain. Sat up in chair most of the night, as he was uncomfortable in bed. Continue with POC.

## 2021-11-06 NOTE — CONSULTS
[facility]  Palliative Care Consultation Note    Patient: Vic Scott III  Date of Admission:  11/2/2021    Requesting Clinician / Team: Rebecca/Onc  Reason for consult: Pain management, Symptom management    Recommendations:    Having significant pain related to tumor in right thigh. Has taken 80mg oxycodone over last 24 hours (~100-120 OMEs)with incomplete effect on pain control.    Recommend starting MSContin 45mg Q12H starting tonight- ordered for you.     Continue oxycodone 10-20mg Q4H PRN for now.    Recommend consideration for short course of PO steroids if appropriate with his chemo treatments to get on top of pain.    Has PRN bowel meds ordered. Will need to monitor with addition of long-acting pain medication and possibly schedule a bowel regimen if constipation if occurs.     Patient made aware of availability of [employer]  support in hospital.    These recommendations have been discussed with Rebecca Onc.      Thank you for the opportunity to participate in the care of this patient and family. Our team: will continue to follow.     During regular M-F work hours -- if you are not sure who specifically to contact -- please contact us by sending a text page to our team consult pager at [phone].    After regular work hours and on weekends/holidays, you can call our answering service at [phone]. Also, who's on call for us is available in [facility] Smart Web.       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 Palliative care was consulted for symptom management, specifically related to pain control.      Today, the patient was seen for:  Pleomorphic sarcoma  Cancer-related pain    Prognosis, Goals, & Planning:      Functional Status just prior to  "hospitalization: 1 (Restricted in physically strenuous activity but ambulatory and able to carry out work of a light or sedentary nature)      Prognosis, Goals, and/or Advance Care Planning were addressed today: No        Patient's decision making preferences: shared with support from loved ones          Patient has decision-making capacity today for complex decisions: Yes            I have concerns about the patient/family's health literacy today: No           Patient has a completed Health Care Directive: No.       Code status: Full Code    Coping, Meaning, & Spirituality:   Mood, coping, and/or meaning in the context of serious illness were addressed today: Yes  Patient identifies his Pentec[employer]alism danny as an important part of his life.    Social:     Living situation: Lives with his sister    Key family / caregivers: 2 older sisters    History of Present Illness:  History gathered today from: patient, medical chart, medical team members    No prior significant medical history. Presented with R leg swelling July 2021, found to have soft tissue mass. Bx in Sept showed high grade pleomorphic sarcoma. Was seen by Dr. Whitaker to review the results in Oct. and discussed consideration of neoadjuvant chemotherapy vs resection and reconstruction. Given high risk for amputation with massive reconstructive surgery, he was referred to Dr. Ambrocio and a PET was ordered which identifed concerning inguinal lymphadenopathy. He was seen by Dr. Ambrocio 10/26/21 who recommended starting Doxil/Ifos initially as an inpatient.     Primary symptom has been pain. Currently taking oxycodone 10-20mg (mostly 20mg) Q4H PRN and it is \"taking the edge off\". So far oxycodone has been the only thing that helps with his pain. Denies any other symptoms such as SOB, N/V, or bowel concerns.    Key Palliative Symptom Data:  Pain location: Right leg  # Pain severity the last 12 hours: severe  # Dyspnea severity the last 12 hours: none  # Nausea severity " the last 12 hours: none  # Anxiety severity the last 12 hours: none    Patient is on opioids: assessed and I made recommendations about bowel care as above.    ROS:  Besides above, a complete 10+ ROS was reviewed and is unremarkable.     Past Medical History:  No past medical history on file.     Past Surgical History:  Past Surgical History:   Procedure Laterality Date     INSERT PORT VASCULAR ACCESS Right 11/1/2021    Procedure: INSERTION, VASCULAR ACCESS PORT;  Surgeon: Sushil Gonzales MD;  Location: [facility] OR     IR CHEST PORT PLACEMENT > 5 YRS OF AGE  11/1/2021         Family History:  No family history on file.      Allergies:  No Known Allergies     Medications:  I have reviewed this patient's medication profile and medications from this hospitalization.   Noted scheduled meds are:  Voltaren gel  Zofran    Noted PRN meds are:  Tylenol 650mg Q4H PRN- 0 doses in 24 hours  Hydromorphone IV 0.3mg- 0 doses in 24 hours  Lorazepam 0.5-1mg IV/PO- 0 doses in 24 hours  Oxycodone 10-20mg PO- 80mg in 24 hours  Compazine  Miralax    Physical Exam:  Vital Signs: Temp: 99.2  F (37.3  C) Temp src: Oral BP: 134/74 Pulse: (!) 126   Resp: 20 SpO2: 100 % O2 Device: None (Room air)    Weight: 238 lbs 1.6 oz  Gen: Alert, NAD, sitting up in chair  Eyes: Conjunctiva clear. Sclera anicteric.  HENT: No head trauma; Moist mucous membranes.  CV: RRR  Resp: CTAB anteriorly  Abd: soft, nt, nd  Msk: no gross deformity, no sarcopenia  Skin:  no jaundice  Ext: warm, well perfused. Significant edema of right LE to knee with compression wrap in place.  Neuro: A&O x 3; CN II-XII grossly intact.  Mental status/Psych: Not anxious or depressed.    Data reviewed:  Reviewed recent labs and pertinent imaging  11/6- Cr 0.65, GFR >90. LFTs wnl. Hgb 8.9, Plts 228.      Thank you for the opportunity to continue to participate in the care of this patient and family.  Please feel free to contact on-call palliative provider with any emergent needs.  We  can be reached via team pager [phone] (answered 8-4:30 Monday-Friday); after-hours answering service ([phone]);     Juanita Watts DO / Palliative Medicine / Pager [phone] / [employer] Inpatient Team Consult Pager [phone] (answered 8am-430pm M-F) - ok to text page via [employer] / After-Hours Answering Service [phone] / Palliative Clinic in the [facility] - [phone] (scheduling); [phone] (triage).    Total time spent was 60 mins spent in reviewing record, patient examination and counseling, discussion with primary team, and documentation.  >50% spent in counseling re: symptom management, specifically pain management and medication adjustment.

## 2021-11-06 NOTE — PROGRESS NOTES
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     Today:  - Today is Day 4 of Doxil/Ifos; anticipate discharge 11/9 pending no complications   - Continue supportive care for nausea/vomiting   - Consulted palliative care for assistance in pain regimen; pt would benefit from outpatient follow up      HEME  # High grade pleomorphic sarcoma of the right thigh  # Concern for metastases to the inguinal lymph nodes  # Right leg edema  He initially presented to Dr. Whitaker, Orthopedics with rapidly progressing right leg swelling since ~ July 2021. MRI was incomplete due to pain, but did show ~[id number] right thigh soft tissue mass with probable bony involvement of the proximal right femur. 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 Was seen by Dr. Whitaker to review the results on 10/1/21 - discussed consideration of neoadjuvant chemotherapy vs resection and reconstruction. Given high risk for amputation with massive reconstructive surgery, he was referred to Dr. Ambrocio and a PET was ordered which identifed concerning inguinal lymphadenopathy. He was seen by Dr. Ambrocio 10/26/21, ordered NGS (in process) and recommended starting Doxil/Ifos initially as an inpatient. Admitted for Cycle 1 Doxil/Ifos (W8D7=31/2/21)   - Port placed 11/1/21 - nursing unable to access port on admission. IR consulted, able to access port and OK to use.   - Baseline Echo (11/2) - EF 60-65%, normal                  Treatment Plan: Doxil/Ifos/Mesna Cycle 1, Day 1 = 11/3/21               Doxorubicin Liposomal 45 mg/mg2 once Day 1               Ifosfamide 1500 mg/m2, Mesna  1500 mg/m2 once daily Days 1-6               Mesna 1500 mg/m2 once Day 7               Neulasta Day 8 - Scheduled 11/10               Supportive Care - Zofran 8mg q8h Days 1-7     # Cancer-related pain  Referral placed to palliative care for pain control and support PTA. He has not yet established care with palliative. Currently taking oxycodone 5mg and Motrin ~every 4-6 hours.   - Mass partially erodes the outer cortex of the proximal right femur which increases risk for pathologic fracture. Per Dr. Whitaker, OK to bear weight on his right leg  - Consider Palliative care consult for pain mgmt and general support during admission  - Oxycodone 5-10mg q4h PRN     # Sinus tachycardia  Pt tachycardic on admission, HR 115bpm but regular. No murmurs. Pt denies chest pain. He does not have significant cardiac history. He does admit to feeling a little anxious about his new cancer diagnosis.   - EKG with sinus tachy   - Baseline Echo as above per chemo workup is normal with EF 60-65%     MSK  # Right Leg Edema  - Pt has moderate right leg subcutaneous edema with compression of the femoral vein.   - Lymphedema team on board    # Fall  - Had a fall the am of 11/3 when he stood up. Slipped on his socks. Was not wearing the provided  socks. Landed on his bottom, did not hit his head  - There is always concern for fracture after a fall but patient is not having increased pain. Additionally, on review of his imaging, there does not appear to be bony involvement of his sarcoma mass.   - Pain not increased from baseline. Monitor closely    Fluids/Electrolytes/Nutrition  - IVF per chemotherapy regimen  - PRN lyte replacement per standing protocol  - Regular diet as tolerated     Lines: Port (11/1/21)    PPX  - VTE: Lovenox ppx while inpatient   - GI/PUD: None indicated  - Bowels: Senna and Miralax PRN  - Activity: OK to bear weight per Dr. Whitaker, consulted PT. Use cane with walking.   - Asymptomatic COVID screening: negative on  admission     Code: Full Code    Disposition: Admitted for scheduled chemotherapy with Doxil/Ifos, anticipate discharge on Day 7 after completion of Mesna   Follow up:   - Scheduled labs at [facility], 2x weekly  - Neulasta on 11/10  - Requested MEHRAN visit within 1 week of discharge    Patient is seen and examined by Dr. Martinez and I.  Assessment and plan are discussed and delivered to the patient.    Dione Jurado PA-C  Hematology/Oncology  Pager #[phone]    Interval History   No acute events overnight. Vic is doing ok he continues to have right hip pain. He reports having pain consistently throughout the day. His goal is to have 1-2 hours of no pain. He reports that sometimes the pain is keeping him awake at night. Discussed getting the palliative care team involved while he is admitted as I suspect he would benefit from ongoing pain management outpatient as well. Patient was agreeable to do anything to help the pain. He has not had a BM for 2 days which is typical for him. He will try today, but I provided education that pain medications can cause the gut to slow down. Patient voiced understanding.   Denies fever, chills, mouth sores, SOB, cough, abdominal pain, diarrhea, constipation, nausea, vomiting, dysuria, hematuria, numbness, tingling.      Complete and Comprehensive review of systems review and negative other than noted here or in the HPI.     Physical Exam   Temp: 98.9  F (37.2  C) Temp src: Oral BP: 131/87 Pulse: (!) 127   Resp: 16 SpO2: 100 % O2 Device: None (Room air)    Vitals:    11/02/21 1248 11/03/21 0700 11/05/21 0929   Weight: 114.2 kg (251 lb 12.8 oz) 116.3 kg (256 lb 4.8 oz) 108 kg (238 lb 1.6 oz)     Vital Signs with Ranges  Temp:  [98.9  F (37.2  C)-100.4  F (38  C)] 98.9  F (37.2  C)  Pulse:  [125-131] 127  Resp:  [16-20] 16  BP: (131-158)/(73-87) 131/87  SpO2:  [98 %-100 %] 100 %  I/O last 3 completed shifts:  In: 500 [P.O.:480; I.V.:20]  Out: 2775 [Urine:2775]    Constitutional: Pleasant  male sitting in chair. Awake and conversational. Non- toxic appearing. No acute distress.   HEENT: Normocephalic, atraumatic. Sclerae anicteric. EOM intact. Moist mucus membranes   Respiratory: Breathing comfortable with no increased work on room air. Good air exchange. No signs of respiratory distress or accessory muscle use. Lungs clear to auscultation bilaterally, no crackles or wheezing noted.  Cardiovascular: Regular rate and rhythm. Normal S1 and S2. No murmurs, rubs, or gallops. a.    GI: Abdomen is soft, non-distended, non-tender and without distension. Bowel sounds present and normoactive.   Skin: Skin is clean, dry, intact. No jaundice appreciated.   Musculoskeletal/ Extremities: No redness, warmth, or swelling of the joints appreciated.Right leg w 2+ edema currently wrapped.   Neurologic: Alert and oriented. Speech normal. Grossly nonfocal. Memory and thought process preserved. Motor function grossly normal, limited movement on right leg.   Neuropsychiatric: Calm, affect congruent to situation. Appropriate mood and affect. Good judgment and insight. No visual/auditory hallucinations.         Medications       Chemotherapy Infusing-Continuous Infusion   Does not apply Q8H     diclofenac  2-4 g Topical 4x Daily     enoxaparin ANTICOAGULANT  40 mg Subcutaneous Q24H     ifosfamide (IFEX) infusion  1,500 mg/m2 Intravenous Q24H     [START ON 11/8/2021] mesna (MESNEX) infusion  1,500 mg/m2 Intravenous Once     ondansetron  8 mg Oral Q8H     sodium chloride (PF)  3 mL Intracatheter Q8H       Data   Results for orders placed or performed during the hospital encounter of 11/02/21 (from the past 24 hour(s))   Lactic acid whole blood   Result Value Ref Range    Lactic Acid 1.3 0.7 - 2.0 mmol/L   Blood Culture Arm, Right    Specimen: Arm, Right; Blood   Result Value Ref Range    Culture No growth after 12 hours    XR Chest Port 1 View    Narrative    EXAMINATION:  XR CHEST PORT 1 VIEW 11/5/2021 2:12 PM.    COMPARISON:  None..    HISTORY:  New fever in patient with newly diagnosed sarcoma    FINDINGS: Port-A-Cath tip projects over the low SVC. Heart is normal  in size. Pulmonary vasculature is distinct. The left lung is clear.  Hazy opacities in the peripheral left lung and subtle retrocardiac  opacities. Small left pleural effusion. No pneumothorax. Upper abdomen  is within normal limits.      Impression    IMPRESSION: Possible small left pleural effusion and hazy left basilar  pulmonary opacities which may represent atelectasis or infection.    I have personally reviewed the examination and initial interpretation  and I agree with the findings.    SHELIA TONG MD         SYSTEM ID:  [id number]   Blood Culture Line, venous    Specimen: Line, venous; Blood   Result Value Ref Range    Culture No growth after 12 hours    UA with Microscopic reflex to Culture    Specimen: Urine, Clean Catch   Result Value Ref Range    Color Urine Yellow Colorless, Straw, Light Yellow, Yellow    Appearance Urine Clear Clear    Glucose Urine Negative Negative mg/dL    Bilirubin Urine Negative Negative    Ketones Urine 80  (A) Negative mg/dL    Specific Gravity Urine 1.018 1.003 - 1.035    Blood Urine Small (A) Negative    pH Urine 5.5 5.0 - 7.0    Protein Albumin Urine 30  (A) Negative mg/dL    Urobilinogen Urine Normal Normal, 2.0 mg/dL    Nitrite Urine Negative Negative    Leukocyte Esterase Urine Negative Negative    Mucus Urine Present (A) None Seen /LPF    Amorphous Crystals Urine Few (A) None Seen /HPF    RBC Urine <1 <=2 /HPF    WBC Urine 2 <=5 /HPF    Squamous Epithelials Urine <1 <=1 /HPF    Narrative    Urine Culture not indicated   CBC with platelets differential    Narrative    The following orders were created for panel order CBC with platelets differential.  Procedure                               Abnormality         Status                     ---------                               -----------         ------                     CBC with  platelets and d...[[id number]]  Abnormal            Final result                 Please view results for these tests on the individual orders.   Comprehensive metabolic panel   Result Value Ref Range    Sodium 135 133 - 144 mmol/L    Potassium 3.7 3.4 - 5.3 mmol/L    Chloride 104 94 - 109 mmol/L    Carbon Dioxide (CO2) 25 20 - 32 mmol/L    Anion Gap 6 3 - 14 mmol/L    Urea Nitrogen 10 7 - 30 mg/dL    Creatinine 0.65 (L) 0.66 - 1.25 mg/dL    Calcium 8.8 8.5 - 10.1 mg/dL    Glucose 177 (H) 70 - 99 mg/dL    Alkaline Phosphatase 164 (H) 40 - 150 U/L    AST 24 0 - 45 U/L    ALT 32 0 - 70 U/L    Protein Total 7.2 6.8 - 8.8 g/dL    Albumin 2.3 (L) 3.4 - 5.0 g/dL    Bilirubin Total 0.8 0.2 - 1.3 mg/dL    GFR Estimate >90 >60 mL/min/1.73m2   Phosphorus   Result Value Ref Range    Phosphorus 2.5 2.5 - 4.5 mg/dL   Uric acid   Result Value Ref Range    Uric Acid 4.2 3.5 - 7.2 mg/dL   Lactate Dehydrogenase   Result Value Ref Range    Lactate Dehydrogenase 485 (H) 85 - 227 U/L   Magnesium   Result Value Ref Range    Magnesium 2.1 1.6 - 2.3 mg/dL   CBC with platelets and differential   Result Value Ref Range    WBC Count 9.5 4.0 - 11.0 10e3/uL    RBC Count 3.41 (L) 4.40 - 5.90 10e6/uL    Hemoglobin 8.9 (L) 13.3 - 17.7 g/dL    Hematocrit 26.7 (L) 40.0 - 53.0 %    MCV 78 78 - 100 fL    MCH 26.1 (L) 26.5 - 33.0 pg    MCHC 33.3 31.5 - 36.5 g/dL    RDW 14.7 10.0 - 15.0 %    Platelet Count 228 150 - 450 10e3/uL    % Neutrophils 88 %    % Lymphocytes 6 %    % Monocytes 5 %    % Eosinophils 0 %    % Basophils 0 %    % Immature Granulocytes 1 %    NRBCs per 100 WBC 0 <1 /100    Absolute Neutrophils 8.4 (H) 1.6 - 8.3 10e3/uL    Absolute Lymphocytes 0.6 (L) 0.8 - 5.3 10e3/uL    Absolute Monocytes 0.5 0.0 - 1.3 10e3/uL    Absolute Eosinophils 0.0 0.0 - 0.7 10e3/uL    Absolute Basophils 0.0 0.0 - 0.2 10e3/uL    Absolute Immature Granulocytes 0.1 (H) <=0.0 10e3/uL    Absolute NRBCs 0.0 10e3/uL

## 2021-11-06 NOTE — PROGRESS NOTES
Nursing Focus: Chemotherapy  D: Positive blood return via port. Insertion site is clean/dry/intact, dressing intact with no complaints of pain.  Urine output is recorded in intake in Doc Flowsheet.    I: Premedications given per order (see electronic medical administration record). Dose #4 of Ifos/Mesna started to infuse over 24 hours. Reviewed pt teaching on chemotherapy side effects.  Pt denies need for further teaching. Chemotherapy double checked per protocol by two chemotherapy competent RN's.   A: Tolerating procedure well. Denies nausea and or pain.   P: Continue to monitor urine output and symptoms of nausea. Screen for symptoms of toxicity.

## 2021-11-07 ENCOUNTER — APPOINTMENT (OUTPATIENT)
Dept: OCCUPATIONAL THERAPY | Facility: CLINIC | Age: 46
End: 2021-11-07
Attending: STUDENT IN AN ORGANIZED HEALTH CARE EDUCATION/TRAINING PROGRAM
Payer: MEDICAID

## 2021-11-07 LAB
ALBUMIN SERPL-MCNC: 2.4 G/DL (ref 3.4–5)
ALP SERPL-CCNC: 158 U/L (ref 40–150)
ALT SERPL W P-5'-P-CCNC: 17 U/L (ref 0–70)
ANION GAP SERPL CALCULATED.3IONS-SCNC: 6 MMOL/L (ref 3–14)
AST SERPL W P-5'-P-CCNC: 32 U/L (ref 0–45)
BACTERIA UR CULT: NO GROWTH
BASOPHILS # BLD MANUAL: 0.2 10E3/UL (ref 0–0.2)
BASOPHILS NFR BLD MANUAL: 2 %
BILIRUB SERPL-MCNC: 0.8 MG/DL (ref 0.2–1.3)
BUN SERPL-MCNC: 13 MG/DL (ref 7–30)
CALCIUM SERPL-MCNC: 8.8 MG/DL (ref 8.5–10.1)
CHLORIDE BLD-SCNC: 105 MMOL/L (ref 94–109)
CO2 SERPL-SCNC: 25 MMOL/L (ref 20–32)
CREAT SERPL-MCNC: 0.72 MG/DL (ref 0.66–1.25)
EOSINOPHIL # BLD MANUAL: 0 10E3/UL (ref 0–0.7)
EOSINOPHIL NFR BLD MANUAL: 0 %
ERYTHROCYTE [DISTWIDTH] IN BLOOD BY AUTOMATED COUNT: 14.9 % (ref 10–15)
GFR SERPL CREATININE-BSD FRML MDRD: >90 ML/MIN/1.73M2
GLUCOSE BLD-MCNC: 119 MG/DL (ref 70–99)
HCT VFR BLD AUTO: 25.5 % (ref 40–53)
HGB BLD-MCNC: 8.5 G/DL (ref 13.3–17.7)
LDH SERPL L TO P-CCNC: 302 U/L (ref 85–227)
LYMPHOCYTES # BLD MANUAL: 0.4 10E3/UL (ref 0.8–5.3)
LYMPHOCYTES NFR BLD MANUAL: 5 %
MCH RBC QN AUTO: 26.3 PG (ref 26.5–33)
MCHC RBC AUTO-ENTMCNC: 33.3 G/DL (ref 31.5–36.5)
MCV RBC AUTO: 79 FL (ref 78–100)
MONOCYTES # BLD MANUAL: 0 10E3/UL (ref 0–1.3)
MONOCYTES NFR BLD MANUAL: 0 %
NEUTROPHILS # BLD MANUAL: 7.8 10E3/UL (ref 1.6–8.3)
NEUTROPHILS NFR BLD MANUAL: 93 %
PHOSPHATE SERPL-MCNC: 2.8 MG/DL (ref 2.5–4.5)
PLAT MORPH BLD: ABNORMAL
PLATELET # BLD AUTO: 270 10E3/UL (ref 150–450)
POTASSIUM BLD-SCNC: 3.8 MMOL/L (ref 3.4–5.3)
PROT SERPL-MCNC: 7.2 G/DL (ref 6.8–8.8)
RBC # BLD AUTO: 3.23 10E6/UL (ref 4.4–5.9)
RBC MORPH BLD: ABNORMAL
SODIUM SERPL-SCNC: 136 MMOL/L (ref 133–144)
TARGETS BLD QL SMEAR: ABNORMAL
URATE SERPL-MCNC: 4.6 MG/DL (ref 3.5–7.2)
WBC # BLD AUTO: 8.4 10E3/UL (ref 4–11)

## 2021-11-07 PROCEDURE — 84550 ASSAY OF BLOOD/URIC ACID: CPT | Performed by: PHYSICIAN ASSISTANT

## 2021-11-07 PROCEDURE — 99231 SBSQ HOSP IP/OBS SF/LOW 25: CPT | Performed by: STUDENT IN AN ORGANIZED HEALTH CARE EDUCATION/TRAINING PROGRAM

## 2021-11-07 PROCEDURE — 250N000011 HC RX IP 250 OP 636: Performed by: INTERNAL MEDICINE

## 2021-11-07 PROCEDURE — 82040 ASSAY OF SERUM ALBUMIN: CPT | Performed by: PHYSICIAN ASSISTANT

## 2021-11-07 PROCEDURE — 250N000011 HC RX IP 250 OP 636: Performed by: PHYSICIAN ASSISTANT

## 2021-11-07 PROCEDURE — 99232 SBSQ HOSP IP/OBS MODERATE 35: CPT | Performed by: INTERNAL MEDICINE

## 2021-11-07 PROCEDURE — 83615 LACTATE (LD) (LDH) ENZYME: CPT | Performed by: PHYSICIAN ASSISTANT

## 2021-11-07 PROCEDURE — 120N000002 HC R&B MED SURG/OB UMMC

## 2021-11-07 PROCEDURE — 258N000003 HC RX IP 258 OP 636: Performed by: INTERNAL MEDICINE

## 2021-11-07 PROCEDURE — 36591 DRAW BLOOD OFF VENOUS DEVICE: CPT | Performed by: PHYSICIAN ASSISTANT

## 2021-11-07 PROCEDURE — 250N000013 HC RX MED GY IP 250 OP 250 PS 637: Performed by: PHYSICIAN ASSISTANT

## 2021-11-07 PROCEDURE — 80053 COMPREHEN METABOLIC PANEL: CPT | Performed by: PHYSICIAN ASSISTANT

## 2021-11-07 PROCEDURE — 97140 MANUAL THERAPY 1/> REGIONS: CPT | Mod: GO

## 2021-11-07 PROCEDURE — 84100 ASSAY OF PHOSPHORUS: CPT | Performed by: PHYSICIAN ASSISTANT

## 2021-11-07 PROCEDURE — 250N000013 HC RX MED GY IP 250 OP 250 PS 637: Performed by: STUDENT IN AN ORGANIZED HEALTH CARE EDUCATION/TRAINING PROGRAM

## 2021-11-07 PROCEDURE — 85027 COMPLETE CBC AUTOMATED: CPT | Performed by: PHYSICIAN ASSISTANT

## 2021-11-07 RX ORDER — MORPHINE SULFATE 15 MG/1
30 TABLET, FILM COATED, EXTENDED RELEASE ORAL EVERY 12 HOURS SCHEDULED
Status: DISCONTINUED | OUTPATIENT
Start: 2021-11-07 | End: 2021-01-01 | Stop reason: HOSPADM

## 2021-11-07 RX ORDER — OXYCODONE HYDROCHLORIDE 5 MG/1
5-20 TABLET ORAL EVERY 4 HOURS PRN
Status: DISCONTINUED | OUTPATIENT
Start: 2021-11-07 | End: 2021-01-01

## 2021-11-07 RX ADMIN — DICLOFENAC SODIUM 2 G: 10 GEL TOPICAL at 08:43

## 2021-11-07 RX ADMIN — OXYCODONE HYDROCHLORIDE 10 MG: 5 TABLET ORAL at 17:19

## 2021-11-07 RX ADMIN — MORPHINE SULFATE 45 MG: 15 TABLET, EXTENDED RELEASE ORAL at 08:41

## 2021-11-07 RX ADMIN — DICLOFENAC SODIUM 2 G: 10 GEL TOPICAL at 14:54

## 2021-11-07 RX ADMIN — MORPHINE SULFATE 15 MG: 15 TABLET, EXTENDED RELEASE ORAL at 20:01

## 2021-11-07 RX ADMIN — MESNA 3660 MG: 100 INJECTION, SOLUTION INTRAVENOUS at 00:04

## 2021-11-07 RX ADMIN — ONDANSETRON HYDROCHLORIDE 8 MG: 8 TABLET, FILM COATED ORAL at 01:41

## 2021-11-07 RX ADMIN — ENOXAPARIN SODIUM 40 MG: 40 INJECTION SUBCUTANEOUS at 20:02

## 2021-11-07 RX ADMIN — MESNA 3660 MG: 100 INJECTION, SOLUTION INTRAVENOUS at 23:09

## 2021-11-07 RX ADMIN — ONDANSETRON HYDROCHLORIDE 8 MG: 8 TABLET, FILM COATED ORAL at 10:33

## 2021-11-07 RX ADMIN — ONDANSETRON HYDROCHLORIDE 8 MG: 8 TABLET, FILM COATED ORAL at 18:48

## 2021-11-07 ASSESSMENT — ACTIVITIES OF DAILY LIVING (ADL)
ADLS_ACUITY_SCORE: 16
ADLS_ACUITY_SCORE: 12
ADLS_ACUITY_SCORE: 16
ADLS_ACUITY_SCORE: 16
ADLS_ACUITY_SCORE: 12
ADLS_ACUITY_SCORE: 16
ADLS_ACUITY_SCORE: 16
ADLS_ACUITY_SCORE: 12
ADLS_ACUITY_SCORE: 16
ADLS_ACUITY_SCORE: 12
ADLS_ACUITY_SCORE: 16
ADLS_ACUITY_SCORE: 16
ADLS_ACUITY_SCORE: 12
ADLS_ACUITY_SCORE: 12
ADLS_ACUITY_SCORE: 16
ADLS_ACUITY_SCORE: 12

## 2021-11-07 ASSESSMENT — MIFFLIN-ST. JEOR: SCORE: 2112.33

## 2021-11-07 NOTE — PLAN OF CARE
[id number]    Tachy in 120's-130's. OVSS on RA. Day 5 Ifos/Mesna infusing. Brisk blood return noted. Continues on scheduled Zofran, denies nausea. Started on MS contin 45 mg, denied pain overnight and did not request Prn ox, pt sleepy this shift.  Up in chair all night for comfort. Continue with POC.     Edit: at bedside report this AM patient was observed without shorts, insisting he was wearing shorts, shorts were on floor, and wet. Upon waking further patient was orientated but unsure why he was without shorts or what the wetness was from. Unsure if incontinent or water spill.

## 2021-11-07 NOTE — PROGRESS NOTES
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     Today:  - Today is Day 5 of Doxil/Ifos; anticipate discharge 11/9 pending no complications   - Continue supportive care for nausea/vomiting   - Appreciate palliative care input regarding pain management.      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 Whitaker, Orthopedics with rapidly progressing right leg swelling since ~ July 2021. MRI was incomplete due to pain, but did show ~[id number] right thigh soft tissue mass with probable bony involvement of the proximal right femur. 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 Was seen by Dr. Whitaker to review the results on 10/1/21 - discussed consideration of neoadjuvant chemotherapy vs resection and reconstruction. Given high risk for amputation with massive reconstructive surgery, he was referred to Dr. Ambrocio and a PET was ordered which identifed concerning inguinal lymphadenopathy. He was seen by Dr. Ambrocio 10/26/21, ordered NGS (in process) and recommended starting Doxil/Ifos initially as an inpatient. Admitted for Cycle 1 Doxil/Ifos (H9J2=58/2/21)   - Port placed 11/1/21 - nursing unable to access port on admission. IR consulted, able to access port and OK to use.   - Baseline Echo (11/2) - EF 60-65%, normal                  Treatment Plan: Doxil/Ifos/Mesna Cycle 1, Day 1 = 11/3/21               Doxorubicin Liposomal 45 mg/mg2 once Day 1               Ifosfamide 1500 mg/m2, Mesna 1500 mg/m2 once daily Days 1-6                Mesna 1500 mg/m2 once Day 7               Neulasta Day 8 - Scheduled 11/10               Supportive Care - Zofran 8mg q8h Days 1-7     # Cancer-related pain  Referral placed to palliative care for pain control and support PTA. He has not yet established care with palliative. Currently taking oxycodone 5mg and Motrin ~every 4-6 hours.   - Mass partially erodes the outer cortex of the proximal right femur which increases risk for pathologic fracture. Per Dr. Whitaker, OK to bear weight on his right leg  - Palliative care consulted    - MS Contin 30 mg BID    - Oxycodone 5-20mg q4h PRN     # Sinus tachycardia  Pt tachycardic on admission, HR 115bpm but regular. No murmurs. Pt denies chest pain. He does not have significant cardiac history. He does admit to feeling a little anxious about his new cancer diagnosis.   - EKG with sinus tachy   - Baseline Echo as above per chemo workup is normal with EF 60-65%  - If persistently tachycardic once pain is under better control may need to consider further evaluation.      MSK  # Right Leg Edema  - Pt has moderate right leg subcutaneous edema with compression of the femoral vein.   - Lymphedema team on board; outpatient referral placed     # Fall  - Had a fall the am of 11/3 when he stood up. Slipped on his socks. Was not wearing the provided  socks. Landed on his bottom, did not hit his head  - There is always concern for fracture after a fall but patient is not having increased pain. Additionally, on review of his imaging, there does not appear to be bony involvement of his sarcoma mass.   - Pain not increased from baseline. Monitor closely    Fluids/Electrolytes/Nutrition  - IVF per chemotherapy regimen  - PRN lyte replacement per standing protocol  - Regular diet as tolerated     Lines: Port (11/1/21)    PPX  - VTE: Lovenox ppx while inpatient   - GI/PUD: None indicated  - Bowels: Senna and Miralax PRN  - Activity: OK to bear weight per Dr. Whitaker, consulted PT. Use  "cane with walking.   - Asymptomatic COVID screening: negative on admission     Code: Full Code    Disposition: Admitted for scheduled chemotherapy with Doxil/Ifos, anticipate discharge on Day 7 after completion of Mesna   Follow up:   - Scheduled labs at [facility], 2x weekly  - Neulasta on 11/10  - Requested MEHRAN visit within 1 week of discharge    Patient is seen and examined by Dr. Pittman and EVER.  Assessment and plan are discussed and delivered to the patient.    Dione Jurado PA-C  Hematology/Oncology  Pager #[phone]    Interval History   No acute events overnight. Vic is feeling very tired today. He was found to be w/o his pants on this morning and his pants were on the floor and wet. Patient was able to awake from sleep and answer orientation questions appropriately. Believe this event is related to change in pain medications rather that neurotoxicity. He appears uncomfortable during our conversation; repositioning and say \"oww\" often. He has no further questions at this time.        Complete and Comprehensive review of systems review and negative other than noted here or in the HPI.     Physical Exam   Temp: 99.2  F (37.3  C) Temp src: Oral BP: 126/70 Pulse: 100   Resp: 18 SpO2: 99 % O2 Device: None (Room air)    Vitals:    11/03/21 0700 11/05/21 0929 11/07/21 0700   Weight: 116.3 kg (256 lb 4.8 oz) 108 kg (238 lb 1.6 oz) 115.8 kg (255 lb 3.2 oz)     Vital Signs with Ranges  Temp:  [98.5  F (36.9  C)-100.3  F (37.9  C)] 99.2  F (37.3  C)  Pulse:  [100-133] 100  Resp:  [16-20] 18  BP: (126-152)/(70-80) 126/70  SpO2:  [99 %-100 %] 99 %  I/O last 3 completed shifts:  In: 290 [P.O.:280; I.V.:10]  Out: 1925 [Urine:1925]    Constitutional: Pleasant male sitting in chair; visually uncomfortable. Awake and conversational. Non- toxic appearing. No acute distress.   HEENT: Normocephalic, atraumatic. Sclerae anicteric. EOM intact. Moist mucus membranes   Respiratory: Breathing comfortable with no increased work on room " air. Good air exchange. No signs of respiratory distress or accessory muscle use. Lungs clear to auscultation bilaterally, no crackles or wheezing noted.  Cardiovascular: Regular rate and rhythm. Normal S1 and S2. No murmurs, rubs, or gallops. a.    GI: Abdomen is soft, non-distended, non-tender and without distension. Bowel sounds present and normoactive.   Skin: Skin is clean, dry, intact. No jaundice appreciated.   Musculoskeletal/ Extremities: No redness, warmth, or swelling of the joints appreciated.Right leg w 2+ edema currently wrapped.   Neurologic: Alert and oriented. Speech normal. Grossly nonfocal. Memory and thought process preserved. Motor function grossly normal, limited movement on right leg.   Neuropsychiatric: Calm, affect congruent to situation. Appropriate mood and affect. Good judgment and insight. No visual/auditory hallucinations.         Medications       Chemotherapy Infusing-Continuous Infusion   Does not apply Q8H     diclofenac  2-4 g Topical 4x Daily     enoxaparin ANTICOAGULANT  40 mg Subcutaneous Q24H     ifosfamide (IFEX) infusion  1,500 mg/m2 Intravenous Q24H     [START ON 11/8/2021] mesna (MESNEX) infusion  1,500 mg/m2 Intravenous Once     morphine  30 mg Oral Q12H SAMANTHA     ondansetron  8 mg Oral Q8H     sodium chloride (PF)  3 mL Intracatheter Q8H       Data   Results for orders placed or performed during the hospital encounter of 11/02/21 (from the past 24 hour(s))   CBC with platelets differential    Narrative    The following orders were created for panel order CBC with platelets differential.  Procedure                               Abnormality         Status                     ---------                               -----------         ------                     CBC with platelets and d...[[id number]]  Abnormal            Final result               Manual Differential[[id number]]          Abnormal            Final result                 Please view results for these tests on the  individual orders.   Comprehensive metabolic panel   Result Value Ref Range    Sodium 136 133 - 144 mmol/L    Potassium 3.8 3.4 - 5.3 mmol/L    Chloride 105 94 - 109 mmol/L    Carbon Dioxide (CO2) 25 20 - 32 mmol/L    Anion Gap 6 3 - 14 mmol/L    Urea Nitrogen 13 7 - 30 mg/dL    Creatinine 0.72 0.66 - 1.25 mg/dL    Calcium 8.8 8.5 - 10.1 mg/dL    Glucose 119 (H) 70 - 99 mg/dL    Alkaline Phosphatase 158 (H) 40 - 150 U/L    AST 32 0 - 45 U/L    ALT 17 0 - 70 U/L    Protein Total 7.2 6.8 - 8.8 g/dL    Albumin 2.4 (L) 3.4 - 5.0 g/dL    Bilirubin Total 0.8 0.2 - 1.3 mg/dL    GFR Estimate >90 >60 mL/min/1.73m2   Phosphorus   Result Value Ref Range    Phosphorus 2.8 2.5 - 4.5 mg/dL   Uric acid   Result Value Ref Range    Uric Acid 4.6 3.5 - 7.2 mg/dL   Lactate Dehydrogenase   Result Value Ref Range    Lactate Dehydrogenase 302 (H) 85 - 227 U/L   CBC with platelets and differential   Result Value Ref Range    WBC Count 8.4 4.0 - 11.0 10e3/uL    RBC Count 3.23 (L) 4.40 - 5.90 10e6/uL    Hemoglobin 8.5 (L) 13.3 - 17.7 g/dL    Hematocrit 25.5 (L) 40.0 - 53.0 %    MCV 79 78 - 100 fL    MCH 26.3 (L) 26.5 - 33.0 pg    MCHC 33.3 31.5 - 36.5 g/dL    RDW 14.9 10.0 - 15.0 %    Platelet Count 270 150 - 450 10e3/uL   Manual Differential   Result Value Ref Range    % Neutrophils 93 %    % Lymphocytes 5 %    % Monocytes 0 %    % Eosinophils 0 %    % Basophils 2 %    Absolute Neutrophils 7.8 1.6 - 8.3 10e3/uL    Absolute Lymphocytes 0.4 (L) 0.8 - 5.3 10e3/uL    Absolute Monocytes 0.0 0.0 - 1.3 10e3/uL    Absolute Eosinophils 0.0 0.0 - 0.7 10e3/uL    Absolute Basophils 0.2 0.0 - 0.2 10e3/uL    RBC Morphology Confirmed RBC Indices     Platelet Assessment  Automated Count Confirmed. Platelet morphology is normal.     Automated Count Confirmed. Platelet morphology is normal.    Target Cells Marked (A) None Seen       I have seen, interviewed, and examined the patient independently.  I have reviewed the vital signs and labs.  This note  reflects my assessment and plan.    Vic is quite sleepy this morning, but arouses easily. He is able to tell me the details of his oncological care, his oncologist, surgeon, and the plan. He has no complaints. We will back off on sedating narcotics as he appears quite sleepy, but very much able to awaken and not somnolent (I.e. I do not think this is due to ifos, he is able to be alert and conversant and mentally sharp). Will proceed with protocol and monitor mental status carefully. He is eating ok, getting OOB to use the bathroom.    Ananya Pittman MD/PhD

## 2021-11-07 NOTE — PROGRESS NOTES
[facility]  Palliative Care Daily Progress Note       Recommendations & Counseling       Patient having some increased sedation today. Will decrease MSContin to 30mg Q12H- ordered for you. 1st dose will be at 8PM tonight.    Continue oxycodone 5-20mg Q4H PRN.    In addition to following in hospital, will recommend outpatient follow up with PC.        Assessments          Vic Scott III is a 46 year old male with a past medical history significant for right thigh high grade pleomorphic sarcoma (dx Sept 2021) with concern for metastases to the inguinal lymph nodes who was admitted for Cycle 1 of Doxil/Ifos. Palliative care was consulted for symptom management, specifically related to pain control.       Today, the patient was seen for:  Pleomorphic sarcoma  Cancer-related pain    Prognosis, Goals, or Advance Care Planning was addressed today with: No.     Mood, coping, and/or meaning in the context of serious illness were addressed today: No.            Interval History:     Chart review/discussion with unit or clinical team members:   Per Bedside RN, patient has been more somnolent. PRN range was decreased. Did not get any oxycodone overnight.    Per patient or family/caregivers today:  He is awake but sleepy sitting up in his chair. He does report pain is improved.    Key Palliative Symptoms:   # Pain severity the last 12 hours: low    Patient is on opioids: bowels not assessed today.           Review of Systems:     Besides above, a complete 10+ ROS was reviewed and is unremarkable.          Medications:     I have reviewed this patient's medication profile and medications during this hospitalization.    Noted meds:    MSContin 45mg Q12H  Oxycodone- no doses in 12 hours.           Physical Exam:   Temp: 98.8  F (37.1  C) Temp src: Oral BP: (!) 152/74 Pulse: (!) 127   Resp: 18 SpO2: 99 % O2 Device: None (Room air)    Gen: Alert, NAD, sitting up in chair  Eyes:  Conjunctiva clear. Sclera anicteric.  HENT: No head trauma; Moist mucous membranes.  Resp: Unlabored respirations  Msk: no gross deformity, no sarcopenia  Skin:  no jaundice  Ext: warm, well perfused. Significant edema of right LE to knee with compression wrap in place.  Neuro: A&O x 3 but is sleepy.  Mental status/Psych: Not anxious or depressed.           Data Reviewed:     Reviewed recent labs and pertinent imaging  11/7- Cr 0.72, GFR >90. LFTs wnl. Hgb 8.5. Plts 270.      Thank you for the opportunity to continue to participate in the care of this patient and family.  Please feel free to contact on-call palliative provider with any emergent needs.  We can be reached via team pager [phone] (answered 8-4:30 Monday-Friday); after-hours answering service ([phone]);     Juanita Watts DO / Palliative Medicine / Pager [phone] / [facility] Inpatient Team Consult Pager [phone] (answered 8am-430pm M-F) - ok to text page via [employer] / After-Hours Answering Service [phone] / Palliative Clinic in the [facility] at the [facility] - [phone] (scheduling); [phone] (triage).  Total time spent was 20 minutes,  >50% of time was spent counseling and/or coordination of care regarding pain management and medication adjustments.

## 2021-11-07 NOTE — PLAN OF CARE
The EMR was down for 3 hours on 11/7/2021.    Lucia Iqbal RN was responsible for completing the paper charting during this time period.     The following information was re-entered into the system by Lucia Iqbal RN: Flowsheet data    The following information will remain in the paper chart: N/A    Lucia Iqbal RN  11/7/2021

## 2021-11-07 NOTE — PROGRESS NOTES
Nursing Focus: Chemotherapy  D: Positive blood return via port. Insertion site is clean/dry/intact, dressing intact with no complaints of pain.  Urine output is recorded in intake in Doc Flowsheet.    I: Premedications given per order (see electronic medical administration record). Dose #5 of Ifos/Mesnastarted to infuse over 24hours/minutes. Reviewed pt teaching on chemotherapy side effects.  Pt denies need for further teaching. Chemotherapy double checked per protocol by two chemotherapy competent RN's.   A: Tolerating procedure well. Denies nausea and or pain.   P: Continue to monitor urine output and symptoms of nausea. Screen for symptoms of toxicity.

## 2021-11-07 NOTE — PLAN OF CARE
"0700 - 1900:   BP (!) 140/70 (BP Location: Right arm)   Pulse 117   Temp 99  F (37.2  C) (Oral)   Resp 18   Ht 1.88 m (6' 2\")   Wt 115.8 kg (255 lb 3.2 oz)   SpO2 100%   BMI 32.77 kg/m    Pt been little drowsy from morning d/t oxycodone and morphine or mildly having ifosfamide toxicity, had incontinent urine x 1 this morning.  Pt took a little while to recollect himself this morning when he think he is wearing short while he was not wearing it & took his wet shorts off (provider are aware of the incident).  Palliative doctor seen pt and adjusted morphine to 30 mg from 45 mg & primary team adjusted oxycodone dose 5 - 20 mg.  Pt been declining for oxycodone but continues to be restless & un rested on chair.  C1D#4 CIVI ifos/mesna infusing via port w/ positive blood return.   A&O, arouses to voice, pain managed with scheduled morphine and prn oxycodone 10 mg x 1.  Up with assist x 1, MOSES, last bm 11/3, decline for bowel med's, educated on bowel med's d/t uses of oxycodone.   Continue with poc...    "

## 2021-11-08 NOTE — PLAN OF CARE
"[id number]    AVSS on RA. Day 6 CIVI Ifos/mesna infusing. Pt voided in urinal and this RN noted possible blood tinged urine, provider paged and UA ordered and resulted with moderate amount of blood in urine and few bacteria, MD deferring to day team for any intervention.    Patient endorsing pain, not agreeable to giving a number rating for pain, stating \"I don't know\" or shrugging in response. MS contin dose was switched from 45 to 30mg due to lethargy overnight yesterday, however, pt was initially declining any at all, worried it would be too much. Pt accepted 15mg MS contin. Needed encouragement to take any additional pain medicine, eventually agreed to take 5mg oxy this AM after patient was frequently observed displaying nonverbal indicators of pain.     Pt restless and up in chair all night. Refusing repositioning, pillows, or getting in to bed but shifts weight in chair frequently. Encouraged patient to walk in the evening to the bathroom, and pt declined.     Continue with POC.   "

## 2021-11-08 NOTE — PROGRESS NOTES
[facility]  Palliative Care Daily Progress Note       Recommendations & Counseling       Continue MS Contin 30 mg po q12h and oxycodone 5-15 mg po q4h prn    Recommend scheduling tylenol 975 mg q8h (ordered for you)    Recommend starting bowel regimen with Senna-S 1 tab BID    Thank you for the opportunity to participate in the care of this patient and family. Our team: will continue to follow.     During regular M-F work hours ([id number]) -- if you are not sure who specifically to contact -- please contact us in [facility] Smart Web.     After regular work hours and on weekends/holidays, you can call our answering service at [phone].       Case was reviewed with Bell Chicas PA-C.    Deborah Vang PA-C  [facility]  Contact information available via [facility] Paging/Directory      Attestation:  Total time on the floor involved in the patient's care: 35 minutes  Total time spent in counseling/care coordination: >50%      Assessments          Vic Scott III is a 46 year old male with a past medical history significant for right thigh high grade pleomorphic sarcoma (dx Sept 2021) with concern for metastases to the inguinal lymph nodes who was admitted for Cycle 1 of Doxil/Ifos. Palliative care was consulted for symptom management, specifically related to pain control.       Today, the patient was seen for:  Pleomorphic sarcoma  Cancer-related pain  Constipation    Prognosis, Goals, or Advance Care Planning was addressed today with: No.  Mood, coping, and/or meaning in the context of serious illness were addressed today: No.            Interval History:     Chart review/discussion with unit or clinical team members:   Given MS Contin 15 mg last night, but 30 mg this AM. Ifos stopped due to concern for neurotoxicity    Per patient or family/caregivers today:  Vic is seated in chair. His sister Lauryn at bedside. He  states he slept ok, but does have severe pain in RLE this morning. He repositions himself frequently due to pain. He does not think the 5 mg of oxycodone touches the pain. His goal is to have a few hours of pain relief, but not be sedated to the point he is sleeping all day. His sister related how she noticed he wasn't texting her at all yesterday, and he does not recall much. He is a little sleepy this morning, and seems to be slow in some of his responses and struggles at time to maintain attention, but responds appropriately to questions. He does not have much appetite, but then he and his sister state he never had much appetite, even prior to his cancer diagnosis and treatment. He has not had a BM since 11/3 per nursing, but he thinks he had one on Saturday. He normally has a BM every 3-4 days. Denies nausea, abdominal pain.    Key Palliative Symptoms:  # Pain severity the last 12 hours: severe  # Dyspnea severity the last 12 hours: none  # Nausea severity the last 12 hours: none  # Anxiety severity the last 12 hours: none    Patient is on opioids: assessed and I made recommendations about bowel care as above.           Review of Systems:     A comprehensive ROS has been negative other than stated in the HPI and below:   Palliative Symptom Review (0=no symptom/no concern, 1=mild, 2=moderate, 3=severe):      Fatigue: 1      Depressive Symptoms: 0      Anxiety: 0      Drowsiness: 1      Poor Appetite: 2      Shortness of Breath:0      Insomnia: 0          Medications:     I have reviewed this patient's medication profile and medications during this hospitalization.    Noted meds:    Ceftriaxone 1 g iv q24h  Voltaren gel qid--only used twice yest  MS Contin 30 mg BID  Zofran 8 mg po q8h  Oxy 5-20 mg po q4h prn--10 mg x 1 @ 17:19 yest, 5 mg @ 3:24 AM today--total 15 mg in past 24h  No other prn use             Physical Exam:   Temp: 98.8  F (37.1  C) Temp src: Oral BP: 137/77 Pulse: (!) 127   Resp: 18 SpO2: 99 % O2  Device: None (Room air)    Gen: Sitting in chair. Appears uncomfortable, but in NAD.  HEENT: NCAT. Conjunctiva clear. Sclera anicteric.  CV: Tachycardic, regular rhythm, Peripheral perfusion intact.   Resp: No increased work of breathing, CTAB  Abd: soft, nt, nd  Skin: warm, dry, no jaundice  Ext: warm, well perfused. RLE swelling, wrapped up to proximal thigh.  Neuro: face symmetric. EOM, vision, hearing grossly intact. Speech slow, but clear. Answering questions appropriately, but struggles to maintain attention at times.   Mental status/Psych: alert. Oriented. Affect is appropriate.               Data Reviewed:     Reviewed recent pertinent imaging, comments:   Results for orders placed or performed during the hospital encounter of 11/02/21   XR Chest Port 1 View    Impression    IMPRESSION: Possible small left pleural effusion and hazy left basilar  pulmonary opacities which may represent atelectasis or infection.    I have personally reviewed the examination and initial interpretation  and I agree with the findings.    SHELIA TONG MD         SYSTEM ID:  [id number]   Echo Complete   Result Value Ref Range    LVEF  60-65%        Reviewed recent labs, comments:   Na 135  K 3.6  Creat 0.75  WBC 6.6  Hgb 8.1  Plt 224k  Albumin 2.5

## 2021-11-08 NOTE — PROVIDER NOTIFICATION
Bed: 16  Expected date: 1/17/21  Expected time: 7:55 PM  Means of arrival: Amb-[place]  Comments:  Jose 0627   PATRICIA Luu was notified d/t another episode of incontinence urine x 1 this morning, writer also found a whole pitcher filled with urine at the table w/ straw in it. Pt couldn't remember if pt voided in the pitcher. Pt took awhile to recollect himself and said he did voided in the pitcher since he cannot find the urinals and denied drinking from the pitcher since he knows that its urine.  Writer saw two urinals sitting on the table.  PATRICIA Luu paged/notified Dr. Martinez (attending) and stopped the D#6 chemo at 8:30AM and neuro check q2 hrs, neuro check intact and pt more awake once the chemo stopped, pt was frustrated over stopping his chemo which is delaying his discharge.   Mesna stat ordered and hung at 1045 AM over 18 hours.

## 2021-11-08 NOTE — INTERIM SUMMARY
Cross cover    Received page regarding patient: Blood noted in urine and on urinal. Per nurse, no recent straight cath or [place]ork. On Ifos.     UA showed:       Component Ref Range & Units  4:39 AM 3 d ago    Color Urine Colorless, Straw, Light Yellow, Yellow Light Yellow  Yellow     Appearance Urine Clear Clear  Clear     Glucose Urine Negative mg/dL Negative  Negative     Bilirubin Urine Negative Negative  Negative     Ketones Urine Negative mg/dL 60  Abnormal   80  Abnormal      Specific Gravity Urine 1.003 - 1.035 1.015  1.018     Blood Urine Negative Moderate Abnormal   Small Abnormal      pH Urine 5.0 - 7.0 6.0  5.5     Protein Albumin Urine Negative mg/dL 70  Abnormal   30  Abnormal      Urobilinogen Urine Normal, 2.0 mg/dL 2.0  Normal     Nitrite Urine Negative Negative  Negative     Leukocyte Esterase Urine Negative Negative  Negative     Bacteria Urine None Seen /HPF Few Abnormal       RBC Urine <=2 /HPF 6 High   <1     WBC Urine <=5 /HPF 2  2     Squamous Epithelials Urine <=1 /HPF <1  <1     Transitional Epithelials Urine <=1 /HPF <1      Granular Casts Urine None Seen /LPF 1 High               Resulted close to sign out time. Not due for next chemo or Lovenox until the evening, so there is time to adjust this as needed per day team preference. Per sign out, given fever yesterday, start antibiotics if any focal symptoms. Blood in urine can be symptom of UTI, and patient continues to be quite tachycardic and mildly hypothermic. No previous cultures per review of micro. Not neutropenic. Ordered CTX for  coverage.     Dr. Meenu Ruvalcaba  Internal Medicine/Pediatrics      This note was created using voice recognition software and may contain minor errors.

## 2021-11-08 NOTE — PROVIDER NOTIFICATION
"Cross cover paged at *[phone]    \"Pt stating blood in urine is from \"squeezing too hard\". Educated on other symptoms of UTI (tachy, fever..). Wanting to wait to start antibiotic until after team talks to him this AM. OK to wait?\"   "

## 2021-11-08 NOTE — PROGRESS NOTES
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     Today:  - Today is Day 6 of Doxil/Ifos   - Patient started to develop some concerning signs of ifos toxicity. Stopped Ifos this morning, started Mesna.    - Q2h neuro checks. If patient worsens will consider adding methylene blue  - Continue supportive care for nausea/vomiting   - Appreciate palliative care input regarding pain management.   - having urinary incontinence, hematuria. Bacteria noted on UA. Treated with CTX 2g IV once. Felt that this was not a UTI in absence of fever and unconcerning UA.   - Plan for discharge 11/9 pending no complications    - Discussed discharge schedule with patient and sister. 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 Whitaker, Orthopedics with rapidly progressing right leg swelling since ~ July 2021. MRI was incomplete due to pain, but did show ~[id number] right thigh soft tissue mass with probable bony involvement of the proximal right femur. 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 Was seen by Dr. Whitaker to review the results on 10/1/21 - discussed consideration of neoadjuvant chemotherapy vs resection and reconstruction. Given high risk for amputation with massive reconstructive surgery, he was referred to Dr. Ambrocio and a PET was ordered which identifed concerning inguinal lymphadenopathy. He was seen by Dr. Ambrocio 10/26/21, ordered NGS (in process) and recommended starting Doxil/Ifos  "initially as an inpatient. Admitted for Cycle 1 Doxil/Ifos (W5L7=98/2/21)   - Port placed 11/1/21 - nursing unable to access port on admission. IR consulted, able to access port and OK to use.    - Baseline Echo (11/2) - EF 60-65%, normal                  Treatment Plan: Doxil/Ifos/Mesna Cycle 1, Day 1 = 11/3/21               Doxorubicin Liposomal 45 mg/mg2 once Day 1               Ifosfamide 1500 mg/m2, Mesna 1500 mg/m2 once daily Days 1-6               Mesna 1500 mg/m2 once Day 7               Neulasta Day 8 - Scheduled 11/10               Supportive Care - Zofran 8mg q8h Days 1-7     # Concern for Ifosfamide neurotoxicity  Patient started having intermittent urinary incontinence. Found without his shorts on and some confusion. Noted that his shorts were wet when found. This AM, urine was found in one of his drinking pitchers. At first was notably confused per nursing. Though patient then states that he knows he urinated into the drinking pitcher because he could not find a urinal. Nursing states that there was a urinal sitting on the patient's table. Patient is adamant that he was not \"loopy\" or confused. Mood labile. Visibly upset. Patient is agreeable to stopping the Ifos early. Started Mesna at 1040.  - Q2h neuro checks. If patient worsens will consider adding methylene blue    # Cancer-related pain  Referral placed to palliative care for pain control and support PTA. He has not yet established care with palliative. Currently taking oxycodone 5mg and Motrin ~every 4-6 hours.   - Mass partially erodes the outer cortex of the proximal right femur which increases risk for pathologic fracture. Per Dr. Whitaker, OK to bear weight on his right leg  - Palliative care consulted    - MS Contin 30 mg BID    - Oxycodone 5-20mg q4h PRN   - Tylenol 975mg TID     # Sinus tachycardia  Pt tachycardic on admission, HR 115bpm but regular. No murmurs. Pt denies chest pain. He does not have significant cardiac history. He does " "admit to feeling a little anxious about his new cancer diagnosis.   - EKG with sinus tachy   - Baseline Echo as above per chemo workup is normal with EF 60-65%  - If persistently tachycardic once pain is under better control may need to consider further evaluation.      Renal  # Hematuria  # Urinary Incontinence  Having urinary incontinence, hematuria.  - Questionable Ifos toxicity for incontinence.   - Patient states that he \"squeezed hard\" and that may have been the cause of this hematuria.   - Bacteria noted on UA. Treated with CTX 2g IV once overnight. Felt that this was not a UTI in absence of fever and unconcerning UA.     MSK  # Right Leg Edema  - Pt has moderate right leg subcutaneous edema with compression of the femoral vein.   - Lymphedema team on board; outpatient referral placed     # Fall  - Had a fall the am of 11/3 when he stood up. Slipped on his socks. Was not wearing the provided  socks. Landed on his bottom, did not hit his head  - There is always concern for fracture after a fall but patient is not having increased pain. Additionally, on review of his imaging, there does not appear to be bony involvement of his sarcoma mass.   - Pain not increased from baseline. Monitor closely    Fluids/Electrolytes/Nutrition  - IVF per chemotherapy regimen  - PRN lyte replacement per standing protocol  - Regular diet as tolerated     Lines: Port (11/1/21)    PPX  - VTE: Lovenox ppx while inpatient   - GI/PUD: None indicated  - Bowels: Senna and Miralax PRN  - Activity: OK to bear weight per Dr. Whitaker, consulted PT. Use cane with walking.   - Asymptomatic COVID screening: negative on admission     Code: Full Code    Disposition: Admitted for scheduled chemotherapy with Doxil/Ifos, anticipate discharge on Day 7 after completion of Mesna   Follow up:   - Scheduled labs at [facility], 2x weekly  - Neulasta on 11/10  - MEHRAN visit scheduled 11/17  - Palliative follow up on 11/17    Patient is seen and examined by " "Dr. Martinez and I.  Assessment and plan are discussed and delivered to the patient.    Bell Chicas (Benson) PA-C  Hematology/Oncology  Pager #[phone]    Interval History   No acute events overnight.   Vic is very frustrated today. Patient started having intermittent urinary incontinence. Found without his shorts on and some confusion. Noted that his shorts were wet when found. This AM, urine was found in one of his drinking pitchers. At first was notably confused per nursing. Though patient then states that he knows he urinated into the drinking pitcher because he could not find a urinal. Nursing states that there was a urinal sitting on the patient's table. Patient is adamant that he was not \"loopy\" or confused. Mood labile. Visibly upset.   Vic states he is upset because he feels he is not being listened to. Empathetic listening regrading patients frustration. Voiced our concerns over Ifos toxicity. Patient is agreeable to stopping the Ifos early.  Patient is able to answer orientation questions appropriately.   Appetite ok. Energy low.   States he just wants to be able to go home  Denies fever, chills, mouth sores, SOB, cough, abdominal pain, diarrhea, constipation, nausea, vomiting, dysuria, hematuria, numbness, tingling, swelling      Complete and Comprehensive review of systems review and negative other than noted here or in the HPI.     Physical Exam   Temp: 98.8  F (37.1  C) Temp src: Oral BP: 137/77 Pulse: (!) 127   Resp: 18 SpO2: 99 % O2 Device: None (Room air)    Vitals:    11/05/21 0929 11/07/21 0700 11/08/21 0809   Weight: 108 kg (238 lb 1.6 oz) 115.8 kg (255 lb 3.2 oz) 114.9 kg (253 lb 4.8 oz)     Vital Signs with Ranges  Temp:  [98.3  F (36.8  C)-99.3  F (37.4  C)] 98.8  F (37.1  C)  Pulse:  [117-127] 127  Resp:  [17-20] 18  BP: (121-140)/(62-77) 137/77  SpO2:  [99 %-100 %] 99 %  I/O last 3 completed shifts:  In: -   Out: 3100 [Urine:3100]    Constitutional: Pleasant male sitting in chair; " visually uncomfortable. Awake and conversational. Non- toxic appearing. No acute distress.   HEENT: Normocephalic, atraumatic. Sclerae anicteric. EOM intact. Moist mucus membranes   Respiratory: Breathing comfortable with no increased work on room air. Good air exchange. No signs of respiratory distress or accessory muscle use. Lungs clear to auscultation bilaterally, no crackles or wheezing noted.  Cardiovascular: Regular rate and rhythm. Normal S1 and S2. No murmurs, rubs, or gallops. a.    GI: Abdomen is soft, non-distended, non-tender and without distension. Bowel sounds present and normoactive.   Skin: Skin is clean, dry, intact. No jaundice appreciated.   Musculoskeletal/ Extremities: No redness, warmth, or swelling of the joints appreciated.Right leg w 2+ edema currently wrapped.   Neurologic: Alert and oriented. Speech normal. Grossly nonfocal. Memory and thought process preserved. Motor function grossly normal, limited movement on right leg.   Neuropsychiatric: Calm, affect congruent to situation. Appropriate mood and affect. Good judgment and insight. No visual/auditory hallucinations.         Medications       sodium chloride 0.9%  1,000 mL Intravenous Once     acetaminophen  975 mg Oral Q8H     cefTRIAXone  1 g Intravenous Q24H     Chemotherapy Infusing-Continuous Infusion   Does not apply Q8H     diclofenac  2-4 g Topical 4x Daily     enoxaparin ANTICOAGULANT  40 mg Subcutaneous Q24H     ifosfamide (IFEX) infusion  1,500 mg/m2 Intravenous Q24H     mesna (MESNEX) infusion  1,500 mg/m2 Intravenous Once     morphine  30 mg Oral Q12H SAMANTHA     ondansetron  8 mg Oral Q8H     sennosides  8.6 mg Oral BID     sodium chloride (PF)  3 mL Intracatheter Q8H       Data   Results for orders placed or performed during the hospital encounter of 11/02/21 (from the past 24 hour(s))   UA with Microscopic   Result Value Ref Range    Color Urine Light Yellow Colorless, Straw, Light Yellow, Yellow    Appearance Urine Clear  Clear    Glucose Urine Negative Negative mg/dL    Bilirubin Urine Negative Negative    Ketones Urine 60  (A) Negative mg/dL    Specific Gravity Urine 1.015 1.003 - 1.035    Blood Urine Moderate (A) Negative    pH Urine 6.0 5.0 - 7.0    Protein Albumin Urine 70  (A) Negative mg/dL    Urobilinogen Urine 2.0 Normal, 2.0 mg/dL    Nitrite Urine Negative Negative    Leukocyte Esterase Urine Negative Negative    Bacteria Urine Few (A) None Seen /HPF    RBC Urine 6 (H) <=2 /HPF    WBC Urine 2 <=5 /HPF    Squamous Epithelials Urine <1 <=1 /HPF    Transitional Epithelials Urine <1 <=1 /HPF    Granular Casts Urine 1 (H) None Seen /LPF   CBC with platelets differential    Narrative    The following orders were created for panel order CBC with platelets differential.  Procedure                               Abnormality         Status                     ---------                               -----------         ------                     CBC with platelets and d...[[id number]]  Abnormal            Final result               Manual Differential[[id number]]          Abnormal            Final result                 Please view results for these tests on the individual orders.   Comprehensive metabolic panel   Result Value Ref Range    Sodium 135 133 - 144 mmol/L    Potassium 3.6 3.4 - 5.3 mmol/L    Chloride 104 94 - 109 mmol/L    Carbon Dioxide (CO2) 23 20 - 32 mmol/L    Anion Gap 8 3 - 14 mmol/L    Urea Nitrogen 14 7 - 30 mg/dL    Creatinine 0.75 0.66 - 1.25 mg/dL    Calcium 9.1 8.5 - 10.1 mg/dL    Glucose 112 (H) 70 - 99 mg/dL    Alkaline Phosphatase 143 40 - 150 U/L    AST 22 0 - 45 U/L    ALT 17 0 - 70 U/L    Protein Total 7.2 6.8 - 8.8 g/dL    Albumin 2.5 (L) 3.4 - 5.0 g/dL    Bilirubin Total 0.8 0.2 - 1.3 mg/dL    GFR Estimate >90 >60 mL/min/1.73m2   Magnesium   Result Value Ref Range    Magnesium 2.3 1.6 - 2.3 mg/dL   Phosphorus   Result Value Ref Range    Phosphorus 2.8 2.5 - 4.5 mg/dL   INR   Result Value Ref Range    INR  1.20 (H) 0.85 - 1.15   Fibrinogen activity   Result Value Ref Range    Fibrinogen Activity 455 170 - 490 mg/dL   Uric acid   Result Value Ref Range    Uric Acid 4.8 3.5 - 7.2 mg/dL   Lactate Dehydrogenase   Result Value Ref Range    Lactate Dehydrogenase 283 (H) 85 - 227 U/L   CBC with platelets and differential   Result Value Ref Range    WBC Count 6.6 4.0 - 11.0 10e3/uL    RBC Count 3.13 (L) 4.40 - 5.90 10e6/uL    Hemoglobin 8.1 (L) 13.3 - 17.7 g/dL    Hematocrit 24.8 (L) 40.0 - 53.0 %    MCV 79 78 - 100 fL    MCH 25.9 (L) 26.5 - 33.0 pg    MCHC 32.7 31.5 - 36.5 g/dL    RDW 15.1 (H) 10.0 - 15.0 %    Platelet Count 224 150 - 450 10e3/uL   Manual Differential   Result Value Ref Range    % Neutrophils 90 %    % Lymphocytes 10 %    % Monocytes 0 %    % Eosinophils 0 %    % Basophils 0 %    Absolute Neutrophils 5.9 1.6 - 8.3 10e3/uL    Absolute Lymphocytes 0.7 (L) 0.8 - 5.3 10e3/uL    Absolute Monocytes 0.0 0.0 - 1.3 10e3/uL    Absolute Eosinophils 0.0 0.0 - 0.7 10e3/uL    Absolute Basophils 0.0 0.0 - 0.2 10e3/uL    RBC Morphology Confirmed RBC Indices     Platelet Assessment  Automated Count Confirmed. Platelet morphology is normal.     Automated Count Confirmed. Platelet morphology is normal.

## 2021-11-08 NOTE — PLAN OF CARE
"0700 - 1900:   /81 (BP Location: Right arm)   Pulse 120   Temp 98.5  F (36.9  C) (Oral)   Resp 16   Ht 1.88 m (6' 2\")   Wt 114.9 kg (253 lb 4.8 oz)   SpO2 100%   BMI 32.52 kg/m    A&O, q2hrs neuro check, neuro intact & unchanged.  Pt very restless/rubbing on his legs while sitting up on chair, R hip/thigh pain at 9/10, oxycodone 15 mg x 2 w/ only mild relief.  Pt absolutely refused/decline to take his senokot, pt was educated since pt been taking oxycodone but pt not even ready to listen, very convince that he is not constipated even his last bm was 11/3 and doesn't need to go and its his baseline.  No appetite and not wanting to order any meals, good fluid intake, likes natasha rele.  Pt sister was present the whole time and pt gets upset & frustrated over staff members not listening to him.  Continue with poc..  Pt will be discharging home tomorrow after mesna completion.  "

## 2021-11-08 NOTE — PROVIDER NOTIFICATION
"Cross cover paged at *[phone] at 0200    \"Pt voided in urinal and urine appears aguilar/ pink, possible blood tinged. Sample has been collected. Would you like a UA?\"    Dr. Ruvalcaba responded with a phone call and ordered a UA, UA sent   "

## 2021-11-08 NOTE — DISCHARGE SUMMARY
Attestation signed by Griffin Martinez MD at 11/9/2021 12:03 PM   Physician Attestation   I, Griffin Martinez MD, personally saw and evaluated Vic Scott III as part of a shared visit.  I have reviewed and discussed with the advanced practice provider their discharge plan.     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  # Concern for metastases to the inguinal lymph nodes  # Right leg edema  # Concern for Ifosfamide neurotoxicity  # Cancer-related pain  # Sinus tachycardia  # Hematuria  # Urinary Incontinence    Recommendations for Outpatient:  New/changed medications:   - Voltaren gel PRN   - MS Contin 30mg BID for pain (due to insurance barriers, will send home with 7 day supply and paper script for 30 day supply)   - Oxycodone 5-15mg q4h for pain PRN   - Ondansetron 8mg q8h prn   - Compazine 5mg q6h prn   - Miralax / Senna PRN for constipation    Follow-Up:   - Scheduled labs at [facility], 2x weekly   - Neulasta on 11/10   - MEHRAN visit scheduled 11/17 + 11/29   - Palliative follow up on 11/17     Summary of Hospitalization:   Vic Scott III is a 46 year old male with history of newly diagnosed right thigh high grade pleomorphic sarcoma with concern for metastases to the inguinal lymph nodes who presents for Cycle 1 Doxil/Ifos (Day 1=11/3/21).     Of note, had a fall the am of 11/3 when he stood up. Slipped on his socks. Was not  "wearing the provided  socks. Landed on his bottom, did not hit his head. Through his admission, did not have evidence of pathologic fracture.     Patient struggling with pain control. Palliative care consulted and adjusted pain regimen. Improved prior to discharge. Will follow up with palliative team on 11/17.    On 11/8/21: Patient started having intermittent urinary incontinence. Found without his shorts on and some confusion. Noted that his shorts were wet when found. This AM, urine was found in one of his drinking pitchers. At first was notably confused per nursing. Though patient then states that he knows he urinated into the drinking pitcher because he could not find a urinal. Nursing states that there was a urinal sitting on the patient's table. Patient is adamant that he was not \"loopy\" or confused. Mood labile. Visibly upset. Patient is agreeable to stopping the Ifos early. Started Mesna at 1040.Q2h neuro checks with no worsening of his mentation. Did not require methylene blue.   There is questionable Ifos toxicity involvement. This event should not preclude him from Ifosfamide in the future.     History of Present Illness   Vic is doing well today  Appetite intact. Some nausea present, encourage using anti-emetics. Mood brighter today. Still having pain in the right leg. States it is ache-y but starts to \"burn\" sometimes. Pain medications are overall improving his pain control.   Denies fever, SOB, cough, abdominal pain, diarrhea, constipation, nausea, vomiting, dysuria, hematuria, numbness, tingling, swelling    Thoroughly discussed pain medications and anti nausea medications with patient.  All questions answered at bedside.    Hospital Course   Vic Scott III was admitted on 11/2/2021.  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 Whitaker, Orthopedics with rapidly progressing right leg swelling since ~ July 2021. MRI was incomplete due to pain, but did show ~[id number] right thigh soft tissue mass with probable bony involvement of the proximal right femur. 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 Was seen by Dr. Whitaker to review the results on 10/1/21 - discussed consideration of neoadjuvant chemotherapy vs resection and reconstruction. Given high risk for amputation with massive reconstructive surgery, he was referred to Dr. Ambrocio and a PET was ordered which identifed concerning inguinal lymphadenopathy. He was seen by Dr. Ambrocio 10/26/21, ordered NGS (in process) and recommended starting Doxil/Ifos initially as an inpatient. Admitted for Cycle 1 Doxil/Ifos (B7L8=14/2/21)   - Port placed 11/1/21 - nursing unable to access port on admission. IR consulted, able to access port and OK to use.    - Baseline Echo (11/2) - EF 60-65%, normal                  Treatment Plan: Doxil/Ifos/Mesna Cycle 1, Day 1 = 11/3/21               Doxorubicin Liposomal 45 mg/mg2 once Day 1               Ifosfamide 1500 mg/m2, Mesna 1500 mg/m2 once daily Days 1-6               Mesna 1500 mg/m2 once Day 7               Neulasta Day 8 - Scheduled 11/10               Supportive Care - Zofran 8mg q8h Days 1-7     # Concern for Ifosfamide neurotoxicity  Patient started having intermittent urinary incontinence. Found without his shorts on and some confusion. Noted that his shorts were wet when found. This AM, urine was found in one of his drinking pitchers. At first was notably confused per nursing. Though patient then states that he knows he urinated into the drinking pitcher because he could not find a urinal. Nursing states that there was a urinal sitting on the patient's table. Patient is adamant that he was not \"loopy\" or confused. Mood labile. Visibly upset. Patient is agreeable to stopping the Ifos early. " "Started Mesna at 1040.  - Q2h neuro checks. If patient worsens will consider adding methylene blue     # Cancer-related pain  Referral placed to palliative care for pain control and support PTA. He has not yet established care with palliative. Currently taking oxycodone 5mg and Motrin ~every 4-6 hours.   - Mass partially erodes the outer cortex of the proximal right femur which increases risk for pathologic fracture. Per Dr. Whitaker, OK to bear weight on his right leg  - Palliative care consulted                - MS Contin 30 mg BID                - Oxycodone 5-20mg q4h PRN               - Tylenol 975mg TID     # Sinus tachycardia  Pt tachycardic on admission, HR 115bpm but regular. No murmurs. Pt denies chest pain. He does not have significant cardiac history. He does admit to feeling a little anxious about his new cancer diagnosis.   - EKG with sinus tachy   - Baseline Echo as above per chemo workup is normal with EF 60-65%  - If persistently tachycardic once pain is under better control may need to consider further evaluation.      Renal  # Hematuria  # Urinary Incontinence  Having urinary incontinence, hematuria.  - Questionable Ifos toxicity for incontinence.   - Patient states that he \"squeezed hard\" and that may have been the cause of this hematuria.   - Bacteria noted on UA. Treated with CTX 2g IV once overnight. Felt that this was not a UTI in absence of fever and unconcerning UA.      MSK  # Right Leg Edema  - Pt has moderate right leg subcutaneous edema with compression of the femoral vein.   - Lymphedema team on board; outpatient referral placed      # Fall  - Had a fall the am of 11/3 when he stood up. Slipped on his socks. Was not wearing the provided  socks. Landed on his bottom, did not hit his head  - There is always concern for fracture after a fall but patient is not having increased pain. Additionally, on review of his imaging, there does not appear to be bony involvement of his sarcoma mass. "   - Pain not increased from baseline. Monitor closely     Lytes  # Hypokalemia  Replaced per protocol prior to discharge  - Will follow in outpatient    Patient is seen and examined by Dr. Martinez and EVER.  Assessment and plan are discussed and delivered to the patient.    Bell Chicas (Nelson), RICHARD  Hematology/Oncology    Significant Results and Procedures   n/a    Pending Results   These results will be followed up by outpatient team  Unresulted Labs Ordered in the Past 30 Days of this Admission     Date and Time Order Name Status Description    11/5/2021  1:04 PM Blood Culture Line, venous Preliminary     11/5/2021  1:04 PM Blood Culture Arm, Right Preliminary           Code Status   Full Code    Primary Care Physician   Physician No Ref-Primary    Physical Exam   Temp: 98.4  F (36.9  C) Temp src: Oral BP: 118/70 Pulse: 116   Resp: 18 SpO2: 100 % O2 Device: None (Room air)    Vitals:    11/07/21 0700 11/08/21 0809 11/09/21 0754   Weight: 115.8 kg (255 lb 3.2 oz) 114.9 kg (253 lb 4.8 oz) 116.8 kg (257 lb 6.4 oz)     Vital Signs with Ranges  Temp:  [97.6  F (36.4  C)-99.2  F (37.3  C)] 98.4  F (36.9  C)  Pulse:  [113-121] 116  Resp:  [16-18] 18  BP: (118-143)/(70-81) 118/70  SpO2:  [98 %-100 %] 100 %  I/O last 3 completed shifts:  In: -   Out: 2625 [Urine:2625]      Constitutional: Pleasant male sitting in chair; visually uncomfortable. Awake and conversational. Non- toxic appearing. No acute distress.   HEENT: Normocephalic, atraumatic. Sclerae anicteric. EOM intact. Moist mucus membranes   Respiratory: Breathing comfortable with no increased work on room air. Good air exchange. No signs of respiratory distress or accessory muscle use. Lungs clear to auscultation bilaterally, no crackles or wheezing noted.  Cardiovascular: Regular rate and rhythm. Normal S1 and S2. No murmurs, rubs, or gallops. a.    GI: Abdomen is soft, non-distended, non-tender and without distension. Bowel sounds present and normoactive.   Skin:  Skin is clean, dry, intact. No jaundice appreciated.   Musculoskeletal/ Extremities: No redness, warmth, or swelling of the joints appreciated.Right leg w 2+ edema currently wrapped.   Neurologic: Alert and oriented. Speech normal. Grossly nonfocal. Memory and thought process preserved. Motor function grossly normal, limited movement on right leg.   Neuropsychiatric: Calm, affect congruent to situation. Appropriate mood and affect. Good judgment and insight. No visual/auditory hallucinations.       Time Spent on this Encounter   IBell PA-C, personally saw the patient today and spent greater than 30 minutes discharging this patient.    Discharge Disposition   Discharged to home  Condition at discharge: Stable    Consultations This Hospital Stay   PHYSICAL THERAPY ADULT IP CONSULT  INTERVENTIONAL RADIOLOGY ADULT/PEDS IP CONSULT  VASCULAR ACCESS CARE ADULT IP CONSULT  LYMPHEDEMA THERAPY IP CONSULT  CARE MANAGEMENT / SOCIAL WORK IP CONSULT  VASCULAR ACCESS CARE ADULT IP CONSULT  PALLIATIVE CARE ADULT IP CONSULT    Discharge Orders      Comprehensive metabolic panel    Please arrange twice weekly, 11/9-11/26     Lymphedema Therapy Referral      Infusion Appointment Request     CBC with Platelets & Differential    Please arrange twice weekly 11/9-11/26     Discharge Medications   Current Discharge Medication List      START taking these medications    Details   diclofenac (VOLTAREN) 1 % topical gel Apply 2-4 g topically 4 times daily as needed for moderate pain  Qty: 100 g, Refills: 0    Associated Diagnoses: Sarcoma (H)      levofloxacin (LEVAQUIN) 750 MG tablet Take 1 tablet (750 mg) by mouth daily for 5 days  Qty: 5 tablet, Refills: 0    Associated Diagnoses: Sarcoma (H)      morphine (MS CONTIN) 30 MG CR tablet Take 1 tablet (30 mg) by mouth every 12 hours  Qty: 60 tablet, Refills: 0    Associated Diagnoses: Sarcoma (H)      ondansetron (ZOFRAN) 8 MG tablet Take 1 tablet (8 mg) by mouth every 8 hours as  needed for nausea  Qty: 30 tablet, Refills: 1    Associated Diagnoses: Sarcoma (H)      polyethylene glycol (MIRALAX) 17 GM/Dose powder Take 17 g by mouth daily  Qty: 510 g      prochlorperazine (COMPAZINE) 5 MG tablet Take 1 tablet (5 mg) by mouth every 6 hours as needed for nausea or vomiting  Qty: 30 tablet, Refills: 0    Associated Diagnoses: Sarcoma (H)         CONTINUE these medications which have CHANGED    Details   oxyCODONE (ROXICODONE) 5 MG tablet Take 1-3 tablets (5-15 mg) by mouth every 4 hours as needed for moderate to severe pain  Qty: 45 tablet, Refills: 0    Associated Diagnoses: Sarcoma (H)         CONTINUE these medications which have NOT CHANGED    Details   acetaminophen (TYLENOL) 500 MG tablet Take 1-2 tablets (500-1,000 mg) by mouth every 6 hours as needed for mild pain           Allergies   No Known Allergies  Data   Most Recent 3 CBC's:Recent Labs   Lab Test 11/09/21  0707 11/08/21  0630 11/07/21  0558   WBC 3.8* 6.6 8.4   HGB 7.6* 8.1* 8.5*   MCV 78 79 79    224 270      Most Recent 3 BMP's:  Recent Labs   Lab Test 11/09/21  0707 11/08/21  0630 11/07/21  0558    135 136   POTASSIUM 3.2* 3.6 3.8   CHLORIDE 104 104 105   CO2 25 23 25   BUN 12 14 13   CR 0.81 0.75 0.72   ANIONGAP 7 8 6   VENTURA 8.8 9.1 8.8   * 112* 119*     Most Recent 2 LFT's:  Recent Labs   Lab Test 11/09/21  0707 11/08/21  0630   AST 22 22   ALT 16 17   ALKPHOS 142 143   BILITOTAL 0.6 0.8     Most Recent INR's and Anticoagulation Dosing History:  Anticoagulation Dose History     Recent Dosing and Labs Latest Ref Rng & Units 11/1/2021 11/4/2021 11/8/2021    INR 0.85 - 1.15 1.11 1.26(H) 1.20(H)        Most Recent 3 Troponin's:No lab results found.  Most Recent Cholesterol Panel:No lab results found.  Most Recent 6 Bacteria Isolates From Any Culture (See EPIC Reports for Culture Details):No lab results found.  Most Recent TSH, T4 and A1c Labs:No lab results found.  Results for orders placed or performed during  the hospital encounter of 21   XR Chest Port 1 View    Narrative    EXAMINATION:  XR CHEST PORT 1 VIEW 2021 2:12 PM.    COMPARISON: None..    HISTORY:  New fever in patient with newly diagnosed sarcoma    FINDINGS: Port-A-Cath tip projects over the low SVC. Heart is normal  in size. Pulmonary vasculature is distinct. The left lung is clear.  Hazy opacities in the peripheral left lung and subtle retrocardiac  opacities. Small left pleural effusion. No pneumothorax. Upper abdomen  is within normal limits.      Impression    IMPRESSION: Possible small left pleural effusion and hazy left basilar  pulmonary opacities which may represent atelectasis or infection.    I have personally reviewed the examination and initial interpretation  and I agree with the findings.    SHELIA TONG MD         SYSTEM ID:  [id number]   Echo Complete     Value    LVEF  60-65%    Narrative    [id number]  [id number]  [id number]  [id number]^MULU^VICENTE^CARMELITA     [facility],[place]  Echocardiography Laboratory  [address] [place] [id number]     Name: MARIELA MICHAEL  MRN: [id number]  : 1975  Study Date: 2021 02:27 PM  Age: 46 yrs  Gender: Male  Patient Location: [facility]  Reason For Study: Chemotherapy  Ordering Physician: VICENTE HARDWICK  Referring Physician: SELF, REFERRED  Performed By: Debora Ramirez RDCS     BSA: 2.4 m2  Height: 74 in  Weight: 251 lb  HR: 122  BP: 132/90 mmHg  ______________________________________________________________________________  Procedure  Complete Portable Echo Adult. Contrast Optison. Technically difficult  study.Extremely difficult acoustic windows despite the use of contrast for  endcardial border definition. Optison (NDC #5582-7638-13) given intravenously.  Patient was given 5 ml mixture of 3 ml Optison and 6 ml saline. 4 ml wasted.  ______________________________________________________________________________  Interpretation Summary  Global and  regional left ventricular function is normal with an EF of 60-65%.  Left ventricular size is normal.  The right ventricle is normal size.  Global right ventricular function is normal.  No significant valvular abnormalities were noted.  No pericardial effusion is present.  ______________________________________________________________________________  Left Ventricle  Global and regional left ventricular function is normal with an EF of 60-65%.  Left ventricular size is normal. Left ventricular diastolic function is not  assessable.     Right Ventricle  The right ventricle is normal size. Global right ventricular function is  normal.     Atria  Both atria appear normal.     Mitral Valve  The mitral valve is normal. Trace mitral insufficiency is present.     Aortic Valve  The valve leaflets are not well visualized. On Doppler interrogation, there is  no significant stenosis or regurgitation.     Tricuspid Valve  The tricuspid valve is normal. Trace tricuspid insufficiency is present.  Pulmonary artery systolic pressure cannot be assessed.     Pulmonic Valve  The valve leaflets are not well visualized. On Doppler interrogation, there is  no significant stenosis or regurgitation.     Vessels  The aorta root is normal. The thoracic aorta cannot be assessed. The inferior  vena cava was normal in size with preserved respiratory variability.     Pericardium  No pericardial effusion is present.     ______________________________________________________________________________  MMode/2D Measurements & Calculations  LVOT diam: 2.3 cm  LVOT area: 4.2 cm2     EF(MOD-bp): 63.8 %     ______________________________________________________________________________  Report approved by: MD Jacoby Leslien 11/02/2021 03:25 PM

## 2021-11-08 NOTE — PROGRESS NOTES
Nursing Focus: Chemotherapy  D: Positive blood return via port. Insertion site is clean/dry/intact, dressing intact with no complaints of pain.  Urine output is recorded in intake in Doc Flowsheet.    I: Premedications given per order (see electronic medical administration record). Dose #6 of Ifos/Mesna started to infuse over 24hours/minutes. Reviewed pt teaching on chemotherapy side effects.  Pt denies need for further teaching. Chemotherapy double checked per protocol by two chemotherapy competent RN's.   A: Tolerating procedure well. Denies nausea and or pain.   P: Continue to monitor urine output and symptoms of nausea. Screen for symptoms of toxicity.

## 2021-11-09 NOTE — PLAN OF CARE
Physical Therapy Discharge Summary    Reason for therapy discharge:    Discharged to home with home therapy.    Progress towards therapy goal(s). See goals on Care Plan in [facility] electronic health record for goal details.  Goals partially met.  Barriers to achieving goals:   discharge from facility.    Therapy recommendation(s):    Continued therapy is recommended.  Rationale/Recommendations:  Pt would benefit from additional skilled PT to address functional mobility, transfers, gait, strength and ROM.

## 2021-11-09 NOTE — PLAN OF CARE
Occupational Therapy and Lymphedema Therapy Discharge Summary    Reason for therapy discharge:    Discharged to home with home therapy.    Progress towards therapy goal(s). See goals on Care Plan in [facility] electronic health record for goal details.  Goals partially met.  Barriers to achieving goals:   discharge from facility.    Therapy recommendation(s):    Continued therapy is recommended.  Rationale/Recommendations:  HH OT to maximize pt's ADL independence and safety in the home environment. HH Lymphedema therapy to promote fluid mobility in RLE to protect skin integrity and to improve functional mobility and ADL performance..

## 2021-11-09 NOTE — PROGRESS NOTES
Care Management Discharge Note    Discharge Date: 11/09/2021     Discharge Disposition: Home    Discharge Services: Home Care, Outpatient Infusion Services    Discharge DME: Walker, raised toilet seat    Discharge Transportation: Car, family or friend will provide    Private pay costs discussed: Not applicable    PAS Confirmation Code: N/A  Patient/family educated on Medicare website which has current facility and service quality ratings: Yes    Education Provided on the Discharge Plan: Yes  Persons Notified of Discharge Plans: Patient, bedside RN, DANNY  Patient/Family in Agreement with the Plan: Yes    Additional Information:    Per team, patient will discharge today after completion of chemotherapy.     Writer asked to arranged HH PT/OT/Lymphedema services per PT/OT recs.    Writer spoke with the patient and his sister to discuss above recommendations. Writer described overall home care referral process. Patient agreeable to home care referral. Writer offered choice and both patient and patient's sister did not have a preference.    Patient reported that he does not currently have a PCP. Patient's sister shared that she was going to set patient up with a provider at her clinic, [facility] in [place]. Patient and patient's sister declined writer assisting with scheduling a PCP appointment at this time and patient's sister said she would do this once they get home.     [facility]- Denied. At capacity with APRIL farrar.  [facility]- Denied. At [facility] [place].  [facility]- Denied. Not currently taking new patients.  [facility]- Denied. No availability in area.  [facility]- Denied. Not contracted with [place].  [facility]- Denied. At [facility] in [facility].  [facility]- Denied. Not currently accepting new patients.  [facility]- Are able to accept. Start of care week of 11/22.  [facility]-  Denied. Do not accept MA payers.  [facility]- Denied. Do not accept MA payers.    MEHRAN updated on above. Agreeable to delayed start of care.     Writer spoke with patient. Patient agreeable to delayed start of care.    Writer made referral to [facility] for  PT/OT/Lymphedema services. Discharge orders faxed ([phone]).    AVS updated.     Roselia Jones RN, BSN, PHN  Care Coordinator   P: [phone], [facility]

## 2021-11-09 NOTE — PROGRESS NOTES
[facility]  Palliative Care Daily Progress Note       Recommendations & Counseling       Continue MS Contin 30 mg po q12h and oxycodone 5-15 mg po q4h prn    Encourage him to take bowel regimen    Follow up with PC clinic scheduled for 11/17/21    Thank you for the opportunity to participate in the care of this patient and family. Our team: will continue to follow.     During regular M-F work hours ([phone]) -- if you are not sure who specifically to contact -- please contact us in [facility] Smart Web.     After regular work hours and on weekends/holidays, you can call our answering service at [phone].     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 Palliative care was consulted for symptom management, specifically related to pain control.       Today, the patient was seen for:  Pleomorphic sarcoma  Cancer-related pain    Prognosis, Goals, or Advance Care Planning was addressed today with: No.  Mood, coping, and/or meaning in the context of serious illness were addressed today: No.            Interval History:     Chart review/discussion with unit or clinical team members:   Discharging today. Not taking Senna per chart.    Per patient or family/caregivers today:  Vic is seated in chair. He is more awake, alert today. He feels his pain is reasonably controlled. He states the pain can be unpredictable in intensity--sometimes 5 mg of oxycodone is enough, but  sometimes needing more. He feels the current range of 5-15 mg is working well.     Key Palliative Symptoms:  # Pain severity the last 12 hours: moderate    Patient is on opioids: bowels not assessed today.           Review of Systems:     A comprehensive ROS has been negative other than stated in the HPI.          Medications:     I have reviewed this patient's medication profile and medications during this hospitalization.    Noted meds:    Ceftriaxone 1 g iv q24h  Voltaren gel qid--not using  MS Contin 30 mg BID  Zofran 8 mg po q8h  Senna 1 tab BID-not taking  Oxy 5-15 mg po q4h prn--65 mg total in past 24h               Physical Exam:   Temp: 98.4  F (36.9  C) Temp src: Oral BP: 118/70 Pulse: 116   Resp: 18 SpO2: 100 % O2 Device: None (Room air)    Gen: Sitting in chair. Appears comfortable, in NAD.  HEENT: NCAT. Conjunctiva clear. Sclera anicteric.  CV: Tachycardic, regular rhythm, Peripheral perfusion intact.   Resp: No increased work of breathing, CTAB  Abd: soft, nt, nd  Skin: warm, dry, no jaundice  Ext: warm, well perfused. RLE swelling, wrapped up to proximal thigh.  Neuro: face symmetric. EOM, vision, hearing grossly intact. Speech clear. More alert today.  Mental status/Psych: Affect is appropriate.               Data Reviewed:     Reviewed recent pertinent imaging, comments:   Results for orders placed or performed during the hospital encounter of 11/02/21   XR Chest Port 1 View    Impression    IMPRESSION: Possible small left pleural effusion and hazy left basilar  pulmonary opacities which may represent atelectasis or infection.    I have personally reviewed the examination and initial interpretation  and I agree with the findings.    SHELIA TONG MD         SYSTEM ID:  [id number]   Echo Complete   Result Value Ref Range    LVEF  60-65%        Reviewed recent labs, comments:   Na 135  K 3.6  Creat 0.75  WBC 6.6  Hgb 8.1  Plt 224k  Albumin 2.5

## 2021-11-09 NOTE — PLAN OF CARE
"[id number]    Mesna finished infusing.     Neuros Q2H, slight decrease on right leg d/t sarcoma but otherwise intact. Patient allowed writer to do Q2H over evenings, but declined the frequency of checks overnight, agreeable to once with VS. Mentation seems clear, patient appeared more awake, watching TV and on his computer/tablet.     Pain being managed with MS contin and PRN Oxy 10mg Q4H, given x2. Denies nausea, on scheduled Zofran. No BM since 11/3, not passing flatus, hypoactive BS. Ordered a commode as patient states it is hard to sit down low d/t his height and the toilet being low. He finds it a lot of \"work\" to use the bathroom r/t his leg/pain. States he eats about 1-2 \"light meals\", but that is not new. Educated on the importance of having Bms with chemo and narcs, patient may need more education.     Plan to discharge today. Ho states he is unsure what time d/t his sister picking him up (unsure if she works).    "

## 2021-11-09 NOTE — PLAN OF CARE
VSS pt afebrile on RA. Pt denies nausea or dyspnea. Neuros WDL. Pt experiencing pain in RLE; being managed with PRN oxy and scheduled MS contin. K 3.2, replaced. Phos 2.1 replaced. Lymph wraps in place. Pt to have home PT/OT follow up, referrals sent via [facility]. [facility] will call pt to update him when accepted. Pt adequate for discharge.     Discharge  D: Orders for discharge and outpatient medications written.  I: Home medications and return to clinic schedule reviewed with patient and his sister. Discharge instructions and parameters for calling Health Care Provider reviewed. Patient left at 1354 accompanied by transport and sister.   A: Patient/family verbalized understanding and was ready for discharge. Education provided regarding pain medication management and constipation prevention.   P: Patient instructed to  medications in Pharmacy. Paper script for MS Contin given to patient, can fill at preferred pharmacy 11/13. Follow up as scheduled reviewed with patient and sister.

## 2021-11-11 NOTE — PROGRESS NOTES
Called and spoke with Vic to let him know that he needs to come to get his neulasta appointment today at the [facility] at 2:00 pm.  Let him know that if he doesn't, his counts will really drop and he might end up in the hospital with neutropenia.  He confirmed that he will call for a ride and come.  He verbalized that he knew where the [facility] was located.

## 2021-11-12 NOTE — PROGRESS NOTES
Vic came to clinic this morning for labs.  Dr Ambrocio called as pt had order for Neulasta in his therapy plan.  Per Dr Ambrocio pt should get neulasta today.  Port was accessed with good blood return.  Labs drawn.  Port flushed with ns, heparinized then de-accessed and site covered. Pt was educated on Neulasta use and potential side effects.  Neulasta was given subcutaneous into his abdomen.  He tolerated the injection well and site was covered with a bandaid.  Vic left clinic assisted by this RN via wheelchair to hospital lobby.  He is aware of his future appointments.     After pt left lab called with critical lab of WBC 0.3.  Dr Ambrocio paged with this result.

## 2021-11-16 NOTE — PROGRESS NOTES
Vic is a 46 year old who is being evaluated via a billable video visit.      How would you like to obtain your AVS? MyChart  If the video visit is dropped, the invitation should be resent by: Text to cell phone: [phone]  Will anyone else be joining your video visit? No    Video-Visit Details    Type of service:  Video Visit    Video Start Time: 11:10 AM    Video End Time:11:34 AM    Originating Location (pt. Location): Home    Distant Location (provider location):  [facility]     Platform used for Video Visit: [facility]    Hematology-Oncology Visit  Nov 17, 2021    Reason for Visit: High-grade pleomorphic sarcoma of the right thigh    Oncology History:   He initially presented to Dr. Whitaker, Orthopedics with rapidly progressing right leg swelling since ~ July 2021. MRI was incomplete due to pain, but did show ~[id number] right thigh soft tissue mass with probable bony involvement of the proximal right femur. 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 Was seen by Dr. Whitaker to review the results on 10/1/21 - discussed consideration of neoadjuvant chemotherapy vs resection and reconstruction. Given high risk for amputation with massive reconstructive surgery, he was referred to Dr. Ambrocio and a PET was ordered which identifed concerning inguinal lymphadenopathy. He was seen by Dr. Ambrocio 10/26/21, ordered NGS (in process) and recommended starting Doxil/Ifos initially as an inpatient. Admitted for Cycle 1 Doxil/Ifos (F4K9=65/2/21).  Notably he had an episode of confusion while hospitalized on 11/8/21 concerning for ifosfamide neurotoxicity.  This resolved without administration of methylene blue    Interval History:   Vic is seen today in follow-up of sarcoma.  He feels that he tolerated his first cycle of Doxil/ifosfamide well.  He is having fluctuating fatigue.  Right hip and leg pain is currently controlled with MS Contin, oxycodone, Tylenol and  Voltaren gel.  He is using 10 to 15 mg of oxycodone 2-3 times per day.  Uses walker for ambulation.  Denies mucositis.  No hand-foot syndrome concerns, uses Vaseline on his hands and feet daily.  Has not had nausea since discharge.  Appetite is back to normal.  Using senna as needed for constipation.  Denies shortness of breath or dizziness.  No fever chills.  Has had some intermittent port tenderness when drinking soda.  Lives with his oldest sister who has been a good source of support.    ROS:  10 point ROS neg other than the symptoms noted above in the HPI.    Current Outpatient Medications   Medication     acetaminophen (TYLENOL) 500 MG tablet     diclofenac (VOLTAREN) 1 % topical gel     polyethylene glycol (MIRALAX) 17 GM/Dose powder     prochlorperazine (COMPAZINE) 5 MG tablet     sennosides (SENOKOT) 8.6 MG tablet     morphine (MS CONTIN) 30 MG CR tablet     ondansetron (ZOFRAN) 8 MG tablet     oxyCODONE (ROXICODONE) 5 MG tablet     No current facility-administered medications for this visit.       PHYSICAL EXAM:  General: Patient appears well in no acute distress.   Skin: No visualized rash or lesions on visualized skin  Eyes: EOMI, no erythema, sclera icterus or discharge noted  Resp: Appears to be breathing comfortably without accessory muscle usage, speaking in full sentences, no cough  MSK: Appears to have normal range of motion based on visualized movements  Neurologic: No apparent tremors, facial movements symmetric  Psych: affect pleasant, alert and oriented    The rest of a comprehensive physical examination is deferred due to PHE (public health emergency) video restrictions    ECO    Labs:    2021 09:43   Sodium 137   Potassium 3.5   Chloride 103   Carbon Dioxide 25   Urea Nitrogen 8   Creatinine 0.77   GFR Estimate >90   Calcium 9.0   Anion Gap 9   Magnesium 2.3   Phosphorus 2.8   Albumin 3.0 (L)   Protein Total 7.4   Bilirubin Total 0.8   Alkaline Phosphatase 208 (H)   ALT 24   AST 22    Uric Acid 3.4   Glucose 109   WBC 0.3 (LL)   Hemoglobin 7.4 (L)   Hematocrit 22.3 (L)   Platelet Count 128 (L)   RBC Count 2.88 (L)   MCV 77 (L)   MCH 25.7 (L)   MCHC 33.2   RDW 14.8   % Neutrophils 0   % Lymphocytes 0   % Monocytes 0   % Eosinophils 0   % Basophils 0   Absolute Basophils 0.0   Absolute Neutrophil 0.0 (LL)   Absolute Lymphocytes 0.0 (L)   Absolute Monocytes 0.0   Absolute Eosinophils 0.0   RBC Morphology Confirmed RBC Indices   Platelet Morphology Automated Count Confirmed. Platelet morphology is normal.     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 Biopsy showing high grade pleomorphic sarcoma of large right thigh soft tissue mas. High risk for amputation with massive reconstruction. Began initial treatment with Doxil/Ifos as an inpatient 11/2/21. Baseline ECHO with EF 60-65%. Tolerated first cycle well with concern for ifos neurotoxicity. Cycle 2 scheduled 11/30/21.    Concern for ifosfamide neurotoxicity  Intermittent urinary incontinence and episodes of confusion noted on day 7 of ifosfamide infusion. Did not require methylene blue. Should not preclude future treatment.     Neutropenia  Neutropenia with 11/12 labs, received neulasta support same day. No fever/chills or concerns s/s of infection. Repeat labs 11/19 as scheduled.    Microcytic anemia  Currently asymptomatic. Have not checked iron studies, will add to 11/19 labs. Transfusion support as needed.    Constipation  Continue senna PRN    Cancer-related pain  Fairly well controlled with MS contin, oxycodone, tylenol and Voltaren gel.  Requesting refills. Will defer to palliative care as he is scheduled for a consult this afternoon.     Hypokalemia  Replaced inpatient per protocol. Stable at 3.5. Continue to follow.    Plan:  Palliative consult today  Labs 11/19, 11/23, 11/26   Will see Latosha Steinberg PA-C 11/29  Cycle 2 doxil/ifos inpatient planned  11/30  Would like to get established with PCP. Will request.     55 minutes spent on the date of the encounter doing chart review, review of test results, interpretation of tests, patient visit and documentation     Latosha Taylor CNP  [facility] [address] [id number] [phone]    The patient was seen in conjunction with Latosha Taylor CNP who served as a scribe for today's visit. I have reviewed and edited the above note, and agree with the above findings and plan.  Latosha Steinberg PA-C

## 2021-11-17 NOTE — PROGRESS NOTES
Vic is a 46 year old who is being evaluated via a billable video visit.      How would you like to obtain your AVS? Mail a copy  If the video visit is dropped, the invitation should be resent by: Text to cell phone: [phone]  Will anyone else be joining your video visit?       Video Start Time: 1505  Video-Visit Details    Type of service:  Video Visit    Video End Time:1549    Originating Location (pt. Location): Home    Distant Location (provider location):  [facility]     Platform used for Video Visit: [facility]     Palliative Care Outpatient Clinic Consultation Note    Patient:  Vic Scott III    Chief Complaint:   Vic Scott III 46 year old male who is presenting to the palliative medicine clinic today at the request of Dr. Ambrocio for a palliative care consultation secondary to symptoms management related to cancer.   The patient's primary care provider is:  No Ref-Primary, Physician.     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     This patient's cancer history was reviewed as per the chart.  In brief,     He initially presented to Dr. Whitaker, Orthopedics with rapidly progressing right leg swelling since ~ July 2021. MRI was incomplete due to pain, but did show ~17v35db right thigh soft tissue mass with probable bony involvement of the proximal right femur. 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 Was seen by Dr. Whitaker to review the results on 10/1/21 - discussed consideration of neoadjuvant chemotherapy vs resection and reconstruction. Given high risk for amputation with massive reconstructive surgery, he was referred to Dr. Ambrocio and a PET was ordered which identifed concerning inguinal lymphadenopathy. He was seen by Dr. Ambrocio 10/26/21, ordered NGS (in process) and recommended  starting Doxil/Ifos initially as an inpatient. Admitted for Cycle 1 Doxil/Ifos (O1B1=50/2/21)     He has been doing relatively well since his discharge from the hospital 21. He continues to have right leg pain and has been using MS Contin 30 mg BID as well as oxycodone 10-15 mg 2-3 times daily. He says this moderately controls his pain but when he takes the higher dose it does make him sleepy. While in the hospital he was on a higher dose of MS Contin and he was sedated from it. He is able to ambulate but with a walker to offload some of the weight from his leg.     He says he has not been having significant symptoms otherwise. He is concerned about his diet and wants to keep some kind of log to track what he is eating to make sure he is eating enough.     Patient's Disease Understanding: plan to shrink tumor then surgery, he says timing is up in the air    Coping:  He says he has been coping well. Denies significant depression or anxiety.     Social History  Living Situation: lives with sister, has another sister, nieces  Children: 4 kids 14,16, 6, 4  Actual/Potential Caregiver(s): sisters  Support System: family/friends, college friends electrical engineering  Occupation: mental health counselor, was looking into opening restaurtant  Hobbies: basketball, weightlifting   Substance Use/History of misuse: denies  Spiritual Background: not really (practices Anabaptist)  Spiritual Concerns/Needs: none    Social History     Tobacco Use     Smoking status: Never Smoker     Smokeless tobacco: Never Used   Substance Use Topics     Alcohol use: Not on file     Drug use: Not on file       Family History  No family history on file.  Patient's Involvement with Prior History of Serious Illness in Family: leonardo has cancer (leukemia) currently, grandmother  from cancer    Advance Care Planning:  Advance Directive:  none  Health Care Agent Contact Information: Sister Lauryn BLACK:   no    No Known Allergies  Current  Outpatient Medications   Medication Sig Dispense Refill     acetaminophen (TYLENOL) 500 MG tablet Take 500-1,000 mg by mouth every 6 hours as needed for mild pain Per patient he needs a refill       diclofenac (VOLTAREN) 1 % topical gel Apply 2-4 g topically 4 times daily as needed for moderate pain 100 g 0     morphine (MS CONTIN) 30 MG CR tablet Take 1 tablet (30 mg) by mouth every 12 hours (Patient not taking: Reported on 11/17/2021) 60 tablet 0     ondansetron (ZOFRAN) 8 MG tablet Take 1 tablet (8 mg) by mouth every 8 hours as needed for nausea (Patient not taking: Reported on 11/17/2021) 30 tablet 1     oxyCODONE (ROXICODONE) 5 MG tablet Take 1-3 tablets (5-15 mg) by mouth every 4 hours as needed for moderate to severe pain (Patient not taking: Reported on 11/17/2021) 60 tablet 0     polyethylene glycol (MIRALAX) 17 GM/Dose powder Take 17 g by mouth daily 510 g      prochlorperazine (COMPAZINE) 5 MG tablet Take 1 tablet (5 mg) by mouth every 6 hours as needed for nausea or vomiting 30 tablet 0     sennosides (SENOKOT) 8.6 MG tablet Take 1-2 tablets by mouth 2 times daily as needed for constipation or no stool 30 tablet 1     No past medical history on file.  Past Surgical History:   Procedure Laterality Date     INSERT PORT VASCULAR ACCESS Right 11/1/2021    Procedure: INSERTION, VASCULAR ACCESS PORT;  Surgeon: Sushil Gonzales MD;  Location: [facility] OR     IR CHEST PORT PLACEMENT > 5 YRS OF AGE  11/1/2021       Palliative Symptom Review (0=no symptom/no concern, 1=mild, 2=moderate, 3=severe):      Pain: 3      Fatigue: 1      Nausea: 1      Constipation: 0      Diarrhea: 0      Depressive Symptoms: 0      Anxiety: 0      Drowsiness: 1      Poor Appetite: 1      Shortness of Breath: 0      Insomnia: 0      Other: 0      Overall (0 good/no concerns, 3 very poor):  2    No vitals due to video visit.     GENERAL: Comfortable-appearing, alert and no distress  EYES: Eyes grossly normal to inspection, conjunctivae  and sclerae normal  Hearing intact.   RESP: no audible wheeze, cough, or visible cyanosis.  No increased work of breathing on RA.  Able to speak easily in complete sentences.  SKIN: no suspicious lesions or rashes on exposed skin  NEURO: Cranial nerves grossly intact, mentation intact and speech normal.  No tremor.   PSYCH: mentation appears normal, affect normal/bright and mood-congruent, judgement and insight intact, normal speech and appearance     Wt Readings from Last 10 Encounters:   21 116.8 kg (257 lb 6.4 oz)   21 104.3 kg (230 lb)   21 117.9 kg (260 lb)       Data Reviewed:  LABS:   Recent Labs   Lab Test 21  0943 21  0707    136   POTASSIUM 3.5 3.2*   CHLORIDE 103 104   CO2 25 25   ANIONGAP 9 7    125*   BUN 8 12   CR 0.77 0.81   VENTURA 9.0 8.8       IMAGING:     Images personally reviewed: massive R thigh tumor from buttock to knee    PET 10/20/21  IMPRESSION: This patient with pleomorphic sarcoma of the right le. 30 cm hypermetabolic mass centered in the anterior compartment of  the right thigh involving the right groin and buttock consistent with  biopsy-proven pleomorphic sarcoma. Mass partially erodes the outer  cortex of the proximal right femur which increases risk for pathologic  fracture.  2. Several prominent mildly hypermetabolic inguinal lymph nodes,  concerning for metastasis.  3. Moderate right leg subcutaneous edema. Compression of the femoral  vein as it passes by the mass, without evidence of deep venous  thrombosis.    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 He has been doing well since his discharge but pain has been moderately controlled.    1. Pain- will continue on MS Contin 30 mg BID as well as prn Oxycodone. He became sedated on higher  doses in the hospital and also notices when he uses the higher dose of Oxycodone it causes some mild sedation. May benefit from transition to Methadone. Will defer starting that at this time as he will be admitted for chemo starting 11/30/21 but we could consider changing during that admission as we could monitor how he does inpatient. We have refilled his MS Contin and Oxycodone today.     2. Nausea- improved since discharge, will refer to dietician to discuss his concerns about nutrition    3. ACP- not addressed today as we had some technical issues that shortened our visit. Will reassess on future visits.     Aman Sofia MD  Palliative Care Fellow  Staffed with Dr. Valentino    Attending Note:  Patient seen and evaluated with Dr Sofia and I agree with/confirm their findings/recs in this note. I think he'll likely have better pain relief with methadone but our AV connection with poor today (he couldn't understand us very well, we had to repeat a lot), and he was oversedated recently in the hospital from an opioid transition, so we did not recommend that with him today. I am hopeful my inpatient colleagues can see him in the hospital when he's there next for ifos and can consider it then. Pain may yet improve spontaneously too if his massive tumor shrinks. His mood seems ok, good family support.      Thank you for involving us in the patient's care.   David Valentino MD / Palliative Medicine / Text me via [facility].

## 2021-11-17 NOTE — LETTER
November 17, 2021       TO: Vic Scott III  [address] [facility] [address]       DearMr.Sonia,    We are writing to inform you of your test results.    {p results letter list:[id number]}    No results found from the In Basket message.    ***

## 2021-11-17 NOTE — PATIENT INSTRUCTIONS
Thank you for seeing the Palliative Care Clinic today.      1. We have refilled your pain medication prescriptions. Please call our clinic at the number listed below 5 days before you are going to run out to get a refill.     2. We have sent a referral to our dietician to discuss your diet concerns. They should be contacting you to set up an appointment to talk.     3. We will have our Palliative team in the hospital see you next time to discuss further changes in your pain medications to see if we can help further with your pain.     Return to clinic after your discharge from the hospital for a follow-up.      You can reach the Palliative Care Team during business hours at the following numbers:   -For the [facility], call [phone]  -To reach the palliative RN for questions or refills, call [phone]       To reach the Palliative Care Provider on-call After-hours or on holidays and weekends, call: [phone].  Please note that we are not able to provide pain medication refills on evenings or weekends.

## 2021-11-17 NOTE — LETTER
11/17/2021       RE: Vic Scott III  [address] [facility] [address]     Dear Colleague,    Thank you for referring your patient, Vic Scott III, to the [facility] at [facility]. Please see a copy of my visit note below.    Palliative Care Outpatient Clinic Consultation Note    Patient:  Vic Scott III    Chief Complaint:   Vic Scott III 46 year old male who is presenting to the palliative medicine clinic today at the request of Dr. Ambrocio for a palliative care consultation secondary to symptoms management related to cancer.   The patient's primary care provider is:  No Ref-Primary, Physician.     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     This patient's cancer history was reviewed as per the chart.  In brief,     He initially presented to Dr. Whitaker, Orthopedics with rapidly progressing right leg swelling since ~ July 2021. MRI was incomplete due to pain, but did show ~[id number] right thigh soft tissue mass with probable bony involvement of the proximal right femur. 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 Was seen by Dr. Whitaker to review the results on 10/1/21 - discussed consideration of neoadjuvant chemotherapy vs resection and reconstruction. Given high risk for amputation with massive reconstructive surgery, he was referred to Dr. Ambrocio and a PET was ordered which identifed concerning inguinal lymphadenopathy. He was seen by Dr. Ambrocio 10/26/21, ordered NGS (in process) and recommended starting Doxil/Ifos initially as an inpatient. Admitted for Cycle 1 Doxil/Ifos (N9J4=83/2/21)     He has been doing relatively well since his discharge from the hospital 11/9/21. He continues to have right leg pain and has been using MS Contin 30  mg BID as well as oxycodone 10-15 mg 2-3 times daily. He says this moderately controls his pain but when he takes the higher dose it does make him sleepy. While in the hospital he was on a higher dose of MS Contin and he was sedated from it. He is able to ambulate but with a walker to offload some of the weight from his leg.     He says he has not been having significant symptoms otherwise. He is concerned about his diet and wants to keep some kind of log to track what he is eating to make sure he is eating enough.     Patient's Disease Understanding: plan to shrink tumor then surgery, he says timing is up in the air    Coping:  He says he has been coping well. Denies significant depression or anxiety.     Social History  Living Situation: lives with sister, has another sister, nieces  Children: 4 kids 14,16, 6, 4  Actual/Potential Caregiver(s): sisters  Support System: family/friends, college friends electrical engineering  Occupation: mental health counselor, was looking into opening restaurtant  Hobbies: basketball, weightlifting   Substance Use/History of misuse: denies  Spiritual Background: not really (practices Taoism)  Spiritual Concerns/Needs: none    Social History     Tobacco Use     Smoking status: Never Smoker     Smokeless tobacco: Never Used   Substance Use Topics     Alcohol use: Not on file     Drug use: Not on file       Family History  No family history on file.  Patient's Involvement with Prior History of Serious Illness in Family: buddy has cancer (leukemia) currently, grandmother  from cancer    Advance Care Planning:  Advance Directive:  none  Health Care Agent Contact Information: Sister Lauryn BLACK:   no    No Known Allergies  Current Outpatient Medications   Medication Sig Dispense Refill     acetaminophen (TYLENOL) 500 MG tablet Take 500-1,000 mg by mouth every 6 hours as needed for mild pain Per patient he needs a refill       diclofenac (VOLTAREN) 1 % topical gel Apply 2-4 g  topically 4 times daily as needed for moderate pain 100 g 0     morphine (MS CONTIN) 30 MG CR tablet Take 1 tablet (30 mg) by mouth every 12 hours (Patient not taking: Reported on 11/17/2021) 60 tablet 0     ondansetron (ZOFRAN) 8 MG tablet Take 1 tablet (8 mg) by mouth every 8 hours as needed for nausea (Patient not taking: Reported on 11/17/2021) 30 tablet 1     oxyCODONE (ROXICODONE) 5 MG tablet Take 1-3 tablets (5-15 mg) by mouth every 4 hours as needed for moderate to severe pain (Patient not taking: Reported on 11/17/2021) 60 tablet 0     polyethylene glycol (MIRALAX) 17 GM/Dose powder Take 17 g by mouth daily 510 g      prochlorperazine (COMPAZINE) 5 MG tablet Take 1 tablet (5 mg) by mouth every 6 hours as needed for nausea or vomiting 30 tablet 0     sennosides (SENOKOT) 8.6 MG tablet Take 1-2 tablets by mouth 2 times daily as needed for constipation or no stool 30 tablet 1     No past medical history on file.  Past Surgical History:   Procedure Laterality Date     INSERT PORT VASCULAR ACCESS Right 11/1/2021    Procedure: INSERTION, VASCULAR ACCESS PORT;  Surgeon: Sushil Gonzales MD;  Location: [facility] OR     IR CHEST PORT PLACEMENT > 5 YRS OF AGE  11/1/2021       Palliative Symptom Review (0=no symptom/no concern, 1=mild, 2=moderate, 3=severe):      Pain: 3      Fatigue: 1      Nausea: 1      Constipation: 0      Diarrhea: 0      Depressive Symptoms: 0      Anxiety: 0      Drowsiness: 1      Poor Appetite: 1      Shortness of Breath: 0      Insomnia: 0      Other: 0      Overall (0 good/no concerns, 3 very poor):  2    No vitals due to video visit.     GENERAL: Comfortable-appearing, alert and no distress  EYES: Eyes grossly normal to inspection, conjunctivae and sclerae normal  Hearing intact.   RESP: no audible wheeze, cough, or visible cyanosis.  No increased work of breathing on RA.  Able to speak easily in complete sentences.  SKIN: no suspicious lesions or rashes on exposed skin  NEURO: Cranial  nerves grossly intact, mentation intact and speech normal.  No tremor.   PSYCH: mentation appears normal, affect normal/bright and mood-congruent, judgement and insight intact, normal speech and appearance     Wt Readings from Last 10 Encounters:   21 116.8 kg (257 lb 6.4 oz)   21 104.3 kg (230 lb)   21 117.9 kg (260 lb)       Data Reviewed:  LABS:   Recent Labs   Lab Test 21  0943 21  0707    136   POTASSIUM 3.5 3.2*   CHLORIDE 103 104   CO2 25 25   ANIONGAP 9 7    125*   BUN 8 12   CR 0.77 0.81   VENTURA 9.0 8.8       IMAGING:     Images personally reviewed: massive R thigh tumor from buttock to knee    PET 10/20/21  IMPRESSION: This patient with pleomorphic sarcoma of the right le. 30 cm hypermetabolic mass centered in the anterior compartment of  the right thigh involving the right groin and buttock consistent with  biopsy-proven pleomorphic sarcoma. Mass partially erodes the outer  cortex of the proximal right femur which increases risk for pathologic  fracture.  2. Several prominent mildly hypermetabolic inguinal lymph nodes,  concerning for metastasis.  3. Moderate right leg subcutaneous edema. Compression of the femoral  vein as it passes by the mass, without evidence of deep venous  thrombosis.    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 He has been doing well since his discharge but pain has been moderately controlled.    1. Pain- will continue on MS Contin 30 mg BID as well as prn Oxycodone. He became sedated on higher doses in the hospital and also notices when he uses the higher dose of Oxycodone it causes some mild sedation. May benefit from transition to Methadone. Will defer starting that at this time as he will be admitted for chemo starting 21 but  we could consider changing during that admission as we could monitor how he does inpatient. We have refilled his MS Contin and Oxycodone today.     2. Nausea- improved since discharge, will refer to dietician to discuss his concerns about nutrition    3. ACP- not addressed today as we had some technical issues that shortened our visit. Will reassess on future visits.     Aman Sofia MD  Palliative Care Fellow  Staffed with Dr. Valentino    Attending Note:  Patient seen and evaluated with Dr Sofia and I agree with/confirm their findings/recs in this note. I think he'll likely have better pain relief with methadone but our AV connection with poor today (he couldn't understand us very well, we had to repeat a lot), and he was oversedated recently in the hospital from an opioid transition, so we did not recommend that with him today. I am hopeful my inpatient colleagues can see him in the hospital when he's there next for ifos and can consider it then. Pain may yet improve spontaneously too if his massive tumor shrinks. His mood seems ok, good family support.      Thank you for involving us in the patient's care.   David Valentino MD / Palliative Medicine / Text me via [facility].              Again, thank you for allowing me to participate in the care of your patient.      Sincerely,    Aman Sofia MD

## 2021-11-17 NOTE — LETTER
11/17/2021      RE: Vic Scott III  [address] [facility] [address]       Hematology-Oncology Visit  Nov 17, 2021    Reason for Visit: High-grade pleomorphic sarcoma of the right thigh    Oncology History:   He initially presented to Dr. Whitaker, Orthopedics with rapidly progressing right leg swelling since ~ July 2021. MRI was incomplete due to pain, but did show ~[id number] right thigh soft tissue mass with probable bony involvement of the proximal right femur. 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 Was seen by Dr. Whitaker to review the results on 10/1/21 - discussed consideration of neoadjuvant chemotherapy vs resection and reconstruction. Given high risk for amputation with massive reconstructive surgery, he was referred to Dr. Ambrocio and a PET was ordered which identifed concerning inguinal lymphadenopathy. He was seen by Dr. Ambrocio 10/26/21, ordered NGS (in process) and recommended starting Doxil/Ifos initially as an inpatient. Admitted for Cycle 1 Doxil/Ifos (E3A2=44/2/21).  Notably he had an episode of confusion while hospitalized on 11/8/21 concerning for ifosfamide neurotoxicity.  This resolved without administration of methylene blue    Interval History:   Vic is seen today in follow-up of sarcoma.  He feels that he tolerated his first cycle of Doxil/ifosfamide well.  He is having fluctuating fatigue.  Right hip and leg pain is currently controlled with MS Contin, oxycodone, Tylenol and Voltaren gel.  He is using 10 to 15 mg of oxycodone 2-3 times per day.  Uses walker for ambulation.  Denies mucositis.  No hand-foot syndrome concerns, uses Vaseline on his hands and feet daily.  Has not had nausea since discharge.  Appetite is back to normal.  Using senna as needed for constipation.  Denies shortness of breath or dizziness.  No fever chills.  Has had some intermittent port tenderness when drinking soda.  Lives with his oldest sister who has been a good  source of support.    ROS:  10 point ROS neg other than the symptoms noted above in the HPI.    Current Outpatient Medications   Medication     acetaminophen (TYLENOL) 500 MG tablet     diclofenac (VOLTAREN) 1 % topical gel     polyethylene glycol (MIRALAX) 17 GM/Dose powder     prochlorperazine (COMPAZINE) 5 MG tablet     sennosides (SENOKOT) 8.6 MG tablet     morphine (MS CONTIN) 30 MG CR tablet     ondansetron (ZOFRAN) 8 MG tablet     oxyCODONE (ROXICODONE) 5 MG tablet     No current facility-administered medications for this visit.       PHYSICAL EXAM:  General: Patient appears well in no acute distress.   Skin: No visualized rash or lesions on visualized skin  Eyes: EOMI, no erythema, sclera icterus or discharge noted  Resp: Appears to be breathing comfortably without accessory muscle usage, speaking in full sentences, no cough  MSK: Appears to have normal range of motion based on visualized movements  Neurologic: No apparent tremors, facial movements symmetric  Psych: affect pleasant, alert and oriented    The rest of a comprehensive physical examination is deferred due to PHE (public health emergency) video restrictions    ECO    Labs:    2021 09:43   Sodium 137   Potassium 3.5   Chloride 103   Carbon Dioxide 25   Urea Nitrogen 8   Creatinine 0.77   GFR Estimate >90   Calcium 9.0   Anion Gap 9   Magnesium 2.3   Phosphorus 2.8   Albumin 3.0 (L)   Protein Total 7.4   Bilirubin Total 0.8   Alkaline Phosphatase 208 (H)   ALT 24   AST 22   Uric Acid 3.4   Glucose 109   WBC 0.3 (LL)   Hemoglobin 7.4 (L)   Hematocrit 22.3 (L)   Platelet Count 128 (L)   RBC Count 2.88 (L)   MCV 77 (L)   MCH 25.7 (L)   MCHC 33.2   RDW 14.8   % Neutrophils 0   % Lymphocytes 0   % Monocytes 0   % Eosinophils 0   % Basophils 0   Absolute Basophils 0.0   Absolute Neutrophil 0.0 (LL)   Absolute Lymphocytes 0.0 (L)   Absolute Monocytes 0.0   Absolute Eosinophils 0.0   RBC Morphology Confirmed RBC Indices   Platelet Morphology  Automated Count Confirmed. Platelet morphology is normal.     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 Biopsy showing high grade pleomorphic sarcoma of large right thigh soft tissue mas. High risk for amputation with massive reconstruction. Began initial treatment with Doxil/Ifos as an inpatient 11/2/21. Baseline ECHO with EF 60-65%. Tolerated first cycle well with concern for ifos neurotoxicity. Cycle 2 scheduled 11/30/21.    Concern for ifosfamide neurotoxicity  Intermittent urinary incontinence and episodes of confusion noted on day 7 of ifosfamide infusion. Did not require methylene blue. Should not preclude future treatment.     Neutropenia  Neutropenia with 11/12 labs, received neulasta support same day. No fever/chills or concerns s/s of infection. Repeat labs 11/19 as scheduled.    Microcytic anemia  Currently asymptomatic. Have not checked iron studies, will add to 11/19 labs. Transfusion support as needed.    Constipation  Continue senna PRN    Cancer-related pain  Fairly well controlled with MS contin, oxycodone, tylenol and Voltaren gel.  Requesting refills. Will defer to palliative care as he is scheduled for a consult this afternoon.     Hypokalemia  Replaced inpatient per protocol. Stable at 3.5. Continue to follow.    Plan:  Palliative consult today  Labs 11/19, 11/23, 11/26   Will see Latosha Steinberg PA-C 11/29  Cycle 2 doxil/ifos inpatient planned 11/30  Would like to get established with PCP. Will request.     55 minutes spent on the date of the encounter doing chart review, review of test results, interpretation of tests, patient visit and documentation     Latosha Taylor CNP  [facility]  [address] [place] [id number]  [phone]    The patient was seen in conjunction with Latosha Taylor CNP who served as a scribe for today's visit. I have reviewed and edited the above note, and agree with  the above findings and plan.      Latosha Steinberg PA-C

## 2021-11-26 NOTE — PROGRESS NOTES
Received a call from [place] stating that he needed medications refilled.  Called him back to see which ones he needs and left him a voice mail to call the main number.  Also let him know will call him back on Monday 11/29.

## 2021-11-29 NOTE — Clinical Note
11/29/2021         RE: Vic Scott III  [address] [facility] [address]        Dear Colleague,    Thank you for referring your patient, Vic Scott III, to the [facility]. Please see a copy of my visit note below.    Vic is a 46 year old who is being evaluated via a billable video visit.      How would you like to obtain your AVS? MyChart  If the video visit is dropped, the invitation should be resent by: Text to cell phone: [phone]  Will anyone else be joining your video visit? No      Video-Visit Details    Type of service:  Video Visit    Video Start Time: 1:27 PM    Video End Time:1:41 PM    Originating Location (pt. Location): Home    Distant Location (provider location):  [facility]     Platform used for Video Visit: KXEN     Hematology-Oncology Visit  Nov 29, 2021    Reason for Visit: High-grade pleomorphic sarcoma of the right thigh    Oncology History:   He initially presented to Dr. Whitaker, Orthopedics with rapidly progressing right leg swelling since ~ July 2021. MRI was incomplete due to pain, but did show ~94v75ze right thigh soft tissue mass with probable bony involvement of the proximal right femur. 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 Was seen by Dr. Whitaker to review the results on 10/1/21 - discussed consideration of neoadjuvant chemotherapy vs resection and reconstruction. Given high risk for amputation with massive reconstructive surgery, he was referred to Dr. Ambrocio and a PET was ordered which identifed concerning inguinal lymphadenopathy. He was seen by Dr. Ambrocio 10/26/21, ordered NGS (in process) and recommended starting Doxil/Ifos initially as an inpatient. Admitted for Cycle 1 Doxil/Ifos (M8N4=33/2/21).  Notably he had an episode of confusion while hospitalized on 11/8/21 concerning for ifosfamide neurotoxicity.  This resolved without administration of methylene  blue    Interval History:   Vic is seen today in follow-up of sarcoma.    -Dad passed away last week, collapsed. Plan for a service in December.  -Talking with sisters and feels coping fairly well.   -Pain is not well controlled. Pain is in right hip and leg. Taking MS Contin.   -Ran out of oxycodone last week. Previously, had been taking oxycodone 5 mg 2-3 tablets bid. Also, taking Motrin, started this morning. Also, using Voltaren gel.   -Eating and drinking okay. Appetite remains fair. Feels weight is going down a little.     Current Outpatient Medications   Medication     acetaminophen (TYLENOL) 500 MG tablet     diclofenac (VOLTAREN) 1 % topical gel     morphine (MS CONTIN) 30 MG CR tablet     ondansetron (ZOFRAN) 8 MG tablet     oxyCODONE (ROXICODONE) 5 MG tablet     polyethylene glycol (MIRALAX) 17 GM/Dose powder     prochlorperazine (COMPAZINE) 5 MG tablet     sennosides (SENOKOT) 8.6 MG tablet     No current facility-administered medications for this visit.       PHYSICAL EXAM:  General: Patient appears well in no acute distress.   Skin: No visualized rash or lesions on visualized skin  Eyes: EOMI, no erythema, sclera icterus or discharge noted  Resp: Appears to be breathing comfortably without accessory muscle usage, speaking in full sentences, no cough  MSK: Appears to have normal range of motion based on visualized movements  Neurologic: No apparent tremors, facial movements symmetric  Psych: affect pleasant, alert and oriented    The rest of a comprehensive physical examination is deferred due to PHE (public health emergency) video restrictions    ECO    Labs:   Most Recent 3 CBC's:Recent Labs   Lab Test 21  0943 21  0707 21  0630   WBC 0.3* 3.8* 6.6   HGB 7.4* 7.6* 8.1*   MCV 77* 78 79   * 224 224     Most Recent 3 BMP's:  Recent Labs   Lab Test 21  0943 21  0707 21  0630    136 135   POTASSIUM 3.5 3.2* 3.6   CHLORIDE 103 104 104   CO2 25 25 23    BUN 8 12 14   CR 0.77 0.81 0.75   ANIONGAP 9 7 8   VENTURA 9.0 8.8 9.1    125* 112*    Most Recent 2 LFT's:  Recent Labs   Lab Test 11/12/21  0943 11/09/21  0707   AST 22 22   ALT 24 16   ALKPHOS 208* 142   BILITOTAL 0.8 0.6    Most Recent TSH and T4:No lab results found.  I reviewed the above labs today.    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 Biopsy showing high grade pleomorphic sarcoma of large right thigh soft tissue mas. High risk for amputation with massive reconstruction. Began initial treatment with Doxil/Ifos as an inpatient 11/2/21. Baseline ECHO with EF 60-65%. Tolerated first cycle well with concern for ifos neurotoxicity, but did not require methylene blue. Cycle 2 scheduled 11/30/21.     Concern for ifosfamide neurotoxicity  Intermittent urinary incontinence and episodes of confusion noted on day 7 of ifosfamide infusion. Did not require methylene blue. Should not preclude future treatment.     Constipation  Continue senna PRN    Cancer-related pain  Fairly well controlled with MS Contin, oxycodone, tylenol and Voltaren gel.  Ran out of oxycodone. Will message palliative care about getting a refill.    Dietician inpatient  Labs tomorrow with admission  Iron studies  PCP    Latosha Steinberg PA-C  [facility]  [address] [place] [id number] [phone]          Again, thank you for allowing me to participate in the care of your patient.        Sincerely,        Latosha Steinberg PA-C

## 2021-11-29 NOTE — TELEPHONE ENCOUNTER
[facility] Triage - NO  ACCESS    Incoming Call: Refill on Oxycodone 5 mg    Refill Request    Date of most recent appointment: 21 Maryan  Next upcoming appointment: 21 Maryan    Medication requested: Oxycodone  Quantity:  60  Last fill date:  21  Person requesting refill:  Vic  Notes: Sees Maryan today    Prescribing provider(s):  [facility]    How are you takin-3 every 4-6 hours      Are you having any side effects: No    How many do you have left:Completely out    Is this managing your pain: Yes    What pharmacy: [facility]    Routing to Aman Sofia, Palliative

## 2021-11-29 NOTE — PROGRESS NOTES
Vic called back b/c sister is getting off work and is trying to coordinate  of medications. Wondering about status.     As of 3:18pm oxycodone refill is pending.     Routed to care team

## 2021-11-29 NOTE — H&P
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 Currently admitted for Cycle 2 Doxil/Ifosfamide, Ifosfamide dose-reduced by 10% for history of mild neurotoxicity with cycle 1.     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 Whitaker, Orthopedics with rapidly progressing right leg swelling since ~ July 2021. MRI was incomplete due to pain, but did show ~97j04pm right thigh soft tissue mass with probable bony involvement of the proximal right femur. 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 Was seen by Dr. Whitaker to review the results on 10/1/21 - discussed consideration of neoadjuvant chemotherapy vs resection and reconstruction. Given high risk for amputation with massive reconstructive surgery, he was referred to Dr. Ambrocio and a PET was ordered which identifed concerning inguinal lymphadenopathy. He was seen by Dr. Ambrocio 10/26/21, ordered Foundation one (MSI stable, no targetable mutations) and recommended starting Doxil/Ifos initially as an inpatient. Baseline Echo 11/2/21 technically difficult, EF 60-65%. Admitted for Cycle 1 Doxil/Ifos (E6Z8=36/2/21), on C1D6 he was noted to have some mild confusion (labile mood, urinated himself and into a water pitcher) which warranted stopping Ifos slightly earlier at 1040am on Day 6 (he only had a few hours of ifos infusion left), mesna started. Patient did not require methylene blue. Otherwise tolerated well with mild nausea. He notes subjectively  decreased size of the right thigh mass. Admitted for Cycle 2 Doxil/Ifos, Ifosfamide dose reduced by 10% (1500 mg/m2 ? 1350 mg/m2).   - Port accessed on admission   - Plan for restaging scans prior to Cycle 3 Doxil/Ifos                  Treatment Plan: Doxil/Ifos/Mesna Cycle 2, Day 1 = 11/30/21               Doxorubicin Liposomal 45 mg/mg2 once Day 1               Ifosfamide 1350 mg/m2, Mesna 1500 mg/m2 once daily Days 1-6               Mesna 1500 mg/m2 once Day 7               Pegfilgastrim (UDENYCA per patient's insurance) Day 8 - requested to be scheduled at [facility] - Zofran 8mg q8h Days 1-7    # Cancer-related pain  Follows with palliative care outpatient. Currently taking oxycodone 5mg and Motrin ~every 4-6 hours. Mass partially erodes the outer cortex of the proximal right femur which increases risk for pathologic fracture. Per Dr. Whitaker, OK to bear weight on his right leg. He is following with Palliative Care, Dr. Sofia, as an outpatient. When seen by Dr. Sofia on 11/17/21, they discussed that he tends to become sedated with higher doses of oxycodone and could benefit from Methadone, but deferred starting methadone until next planned admission 11/30/21 so that he could be closely monitored. Pain has been fairly controlled with outpatient regimen, on average he is taking 5mg oxycodone 2-3 tabs BID, and ran out of oxycodone ~ 1 week PTA. After Cycle 1 of chemotherapy the right thigh mass has decreased in size and the pain is slightly improved.   - Continue PTA pain regimen                - MS Contin 30 mg BID                - Oxycodone 10-15mg q4h PRN               - Tylenol 975mg TID    - Voltaren gel PRN  - Consulted Palliative Care on admission to consider transitioning to Methadone per outpatient plan    # Leukocytosis  # Anemia  # Thrombocytosis  On admission, WBC 15.1 (mostly neutrophils), Hgb 7.1, Plt 772k. He received neulasta on 11/12, which would not explain his  leukocytosis presently. His counts appear to be reactive but he has been afebrile and no s/sx of infection. No evidence of bleeding, anemia likely due to chemotherapy. OK to proceed with chemotherapy as planned per Dr. Ambrocio.   - Transfuse for hgb <7 and plt <10k, do not anticipate transfusion needs while inpatient but will sign blood consent if needed    CARDS   # Sinus Tachycardia   On previous admission patient was noted to have consistent -120. EKG with sinus tachycardia, otherwise normal. No chest pain and no significant cardiac history. He has been drinking a lot of gatorade at home, though notes poor food intake lately due to nausea from chemotherapy. Clinically euvolemic on exam.  - EKG ordered on admission  - 1L NS given 11/30  - Consider additional fluid boluses if PO intake is poor  - Will consider Cardiology consultation is tachycardia persists, could discuss starting metoprolol    MSK   # Right LE Edema   Follows with lymphedema outpatient.   - Consulted lymphedema    GI  # Opioid-induced constipation  Per patient, on average he has about 2 bowel movements per week. Is passing flatus, no abdominal pain on admission. Bowel sounds remain active.   - Increased bowel regimen: Miralax daily, Senna 1-2 tab BID    # Chemo-related Nausea  # Decreased appetite  After Cycle 1 Doxil/Ifos he has been intermittently nauseous at home. Zofran PRN usually helps. His appetite is decreased, eating 1-2 meals per day and not eating 100% of his meals. He denies heartburn or s/sx of GERD. He has a bowel movement only about twice per week. Denies abdominal pain, cramping, or bloating.   - Encouraged bowel regimen as above  - Zofran PRN  - Encouraged small frequent meals, Ensure between meals  - If nausea is poorly controlled consider trial of Zyprexa at bedtime     FEN:  -IVF per chemo protocol   -PRN lyte replacement  -RDAT    Prophy/Misc:  -VTE: Lovenox, hold if platelets <50k  -GI/PUD:None indicated, tums  PRN  -Bowels:Senna and Miralax     Dispo: Remain inpatient until completion of chemotherapy, tentatively plan to discharge on 12/7 barring any other clinical complications  Follow up:   - Requested UDENYCA and Labs 12/8 at [facility]  - Requested twice weekly labs at [facility] 12/8-12/24 (CBC w/ diff, CMP)  - Requested MEHRAN visit the week of 12/13  - Referral placed to Primary Care by outpatient MEHRAN, not yet scheduled    This patient was discussed with Dr Cristóbal Waller PA-C  Hematology/Oncology  Pager #[phone]       I independently examined this patient and my assessment is in agreement with the above note.  I reviewed all tests and past medical history and my evaluation agrees with the findings in the note. Given the subjective response we will proceed w cycle 2 w 10% ifos reduction and same doxil dose.  The thrombocytosis is a bit surprising but the subjective response seems convincing.  Neurotoxicity will be monitored.  We will restage before cycle 3.    Jonn Ambrocio M.D.  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 Cycle 1 Doxil/Ifos complicated by mild confusion on Day 6, Ifosfamide discontinued a few hours before completion of regimen. Currently admitted for Cycle 2 Doxil/Ifosfamide, dose-reduced by 10%.     Patient has been feeling fairly well at home besides the death of his father last week. He is trying to stay positive and take things day by day, and overall feels he is coping OK. He feels like his right thigh mass is significantly smaller and softer after cycle 1 of chemotherapy. His pain is constantly present but he  feels like the pain is fairly controlled with his current regimen and might be slightly improved after cycle 1. He was taking oxycodone 10-15mg about twice daily but ran out ~ 1 week ago. He denies fevers, nasal congestion, sinus tenderness, cough, sore throat, chest pain, abdominal pain. He has had some nausea at home, taking zofran helps. He has about 2 bowel movements per week on average. No abdominal pain, cramping, or bloating on admission. Appetite is decreased lately, he is only eating about 1-2 meals per day, and doesn't always eat 100% of his meals. He is interested in trying a protein shake. A comprehensive review of systems was obtained and is negative other than noted here or in the HPI.      Past Medical History    I have reviewed this patient's medical history and updated it with pertinent information if needed.   No past medical history on file.    Past Surgical History   I have reviewed this patient's surgical history and updated it with pertinent information if needed.  Past Surgical History:   Procedure Laterality Date    INSERT PORT VASCULAR ACCESS Right 11/1/2021    Procedure: INSERTION, VASCULAR ACCESS PORT;  Surgeon: Sushil Gonzales MD;  Location: [facility] OR    IR CHEST PORT PLACEMENT > 5 YRS OF AGE  11/1/2021       Prior to Admission Medications   Cannot display prior to admission medications because the patient has not been admitted in this contact.     Allergies   No Known Allergies    Social History   I have reviewed this patient's social history and updated it with pertinent information if needed. Raleigh Sonia III  reports that he has never smoked. He has never used smokeless tobacco.    Family History   I have reviewed this patient's family history and updated it with pertinent information if needed.   No family history on file.    Review of Systems   A comprehensive review of symptoms was obtained and negative unless noted above.      Physical Exam                      Vital Signs  with Ranges     0 lbs 0 oz    Constitutional: Pleasant male sitting in chair in NAD. Alert and interactive.   HEENT: NCAT. PERRL, EOMI, anicteric sclera. Oral mucosa pink and moist with no lesions or thrush.  Hematologic / Lymphatic: No overt bleeding. No cervical or clavicular adenopathy.  Respiratory: Non-labored breathing, good air exchange, lungs clear to auscultation bilaterally. No cough or wheeze noted.   Cardiovascular: Regular rate and rhythm. No murmur or rub.   GI: Normoactive bowel sounds. Abdomen soft, non-distended, and non-tender. No palpable masses or organomegaly.  Skin: Warm and dry. No concerning lesions or rash on exposed surfaces.  Musculoskeletal: Right thigh with firm widespread mass, non-tender. RLE edema with lymphedema wrap in placed. Strong peripheral pulses. Good strength and ROM  Neurologic: A&O x 3, CNs 2-12 grossly intact, speech normal, gait normal, sensation to light touch grossly WNL. Grossly non-focal.  Neuropsychiatric: Mentation and affect appear normal/appropriate.  Vascular Access: Port is CDI without erythema, swelling, or discharge.       Data     Most Recent 3 CBC's:Recent Labs   Lab Test 11/30/21  1049 11/12/21  0943 11/09/21  0707   WBC 15.1* 0.3* 3.8*   HGB 7.1* 7.4* 7.6*   MCV 77* 77* 78   * 128* 224     Most Recent 3 BMP's:Recent Labs   Lab Test 11/30/21  1049 11/12/21  0943 11/09/21  0707    137 136   POTASSIUM 3.7  3.7 3.5 3.2*   CHLORIDE 99 103 104   CO2 26 25 25   BUN 8 8 12   CR 0.62* 0.77 0.81   ANIONGAP 8 9 7   VENTURA 8.7 9.0 8.8   * 109 125*

## 2021-11-29 NOTE — PROGRESS NOTES
Received message from ONC MEHRAN patient is requesting refill of oxycodone. Patient averaging 4-6 tabs per day per ONC MEHRAN.      Last refill: 11/17/2021  Last office visit: 11/17/2021  Needs follow up scheduled, looks like plan is to see patient while admitted and schedule follow up after his discharge?    Will route request to MD for review.     Reviewed [place]  Report.

## 2021-11-29 NOTE — PROGRESS NOTES
Vic is a 46 year old who is being evaluated via a billable video visit.      How would you like to obtain your AVS? MyChart  If the video visit is dropped, the invitation should be resent by: Text to cell phone: [phone]  Will anyone else be joining your video visit? No    Video-Visit Details    Type of service:  Video Visit    Video Start Time: 1:27 PM    Video End Time:1:41 PM    Originating Location (pt. Location): Home    Distant Location (provider location):  [facility]     Platform used for Video Visit: Lemko     Hematology-Oncology Visit  Nov 29, 2021    Reason for Visit: High-grade pleomorphic sarcoma of the right thigh    Oncology History:   He initially presented to Dr. Whitaker, Orthopedics with rapidly progressing right leg swelling since ~ July 2021. MRI was incomplete due to pain, but did show ~32z90ag right thigh soft tissue mass with probable bony involvement of the proximal right femur. 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 Was seen by Dr. Whitaker to review the results on 10/1/21 - discussed consideration of neoadjuvant chemotherapy vs resection and reconstruction. Given high risk for amputation with massive reconstructive surgery, he was referred to Dr. Ambrocio and a PET was ordered which identifed concerning inguinal lymphadenopathy. He was seen by Dr. Ambrocio 10/26/21, ordered NGS (in process) and recommended starting Doxil/Ifos initially as an inpatient. Admitted for Cycle 1 Doxil/Ifos (T0A7=70/2/21).  Notably he had an episode of confusion while hospitalized on 11/8/21 concerning for ifosfamide neurotoxicity.  This resolved without administration of methylene blue    Interval History:   Vic is seen today in follow-up of sarcoma.    -Dad passed away last week, collapsed. Plan for a service in December.  -Talking with sisters and feels coping fairly well.   -Pain is not well controlled. Pain is in right hip and leg.  Taking MS Contin.   -Ran out of oxycodone last week. Previously, had been taking oxycodone 5 mg 2-3 tablets bid. Also, taking Motrin, started this morning. Also, using Voltaren gel.   -Eating and drinking okay. Appetite remains fair. Feels weight is going down a little.     Current Outpatient Medications   Medication     acetaminophen (TYLENOL) 500 MG tablet     diclofenac (VOLTAREN) 1 % topical gel     morphine (MS CONTIN) 30 MG CR tablet     ondansetron (ZOFRAN) 8 MG tablet     oxyCODONE (ROXICODONE) 5 MG tablet     polyethylene glycol (MIRALAX) 17 GM/Dose powder     prochlorperazine (COMPAZINE) 5 MG tablet     sennosides (SENOKOT) 8.6 MG tablet     No current facility-administered medications for this visit.     PHYSICAL EXAM:  General: Patient appears well in no acute distress.   Skin: No visualized rash or lesions on visualized skin  Eyes: EOMI, no erythema, sclera icterus or discharge noted  Resp: Appears to be breathing comfortably without accessory muscle usage, speaking in full sentences, no cough  MSK: Appears to have normal range of motion based on visualized movements  Neurologic: No apparent tremors, facial movements symmetric  Psych: affect pleasant, alert and oriented    The rest of a comprehensive physical examination is deferred due to PHE (public health emergency) video restrictions    ECO    Labs:   Most Recent 3 CBC's:Recent Labs   Lab Test 2143 21  0707 21  0630   WBC 0.3* 3.8* 6.6   HGB 7.4* 7.6* 8.1*   MCV 77* 78 79   * 224 224     Most Recent 3 BMP's:  Recent Labs   Lab Test 21  0943 21  0707 21  0630    136 135   POTASSIUM 3.5 3.2* 3.6   CHLORIDE 103 104 104   CO2 25 25 23   BUN 8 12 14   CR 0.77 0.81 0.75   ANIONGAP 9 7 8   VENTURA 9.0 8.8 9.1    125* 112*    Most Recent 2 LFT's:  Recent Labs   Lab Test 21  0943 21  0707   AST 22 22   ALT 24 16   ALKPHOS 208* 142   BILITOTAL 0.8 0.6   I reviewed the above labs  today.    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 Biopsy showing high grade pleomorphic sarcoma of large right thigh soft tissue mas. High risk for amputation with massive reconstruction. Began initial treatment with Doxil/Ifos as an inpatient 11/2/21. Baseline ECHO with EF 60-65%. Tolerated first cycle well with concern for ifos neurotoxicity, but did not require methylene blue. Cycle 2 scheduled 11/30/21. Will repeat imaging after this cycle.     Concern for ifosfamide neurotoxicity  Intermittent urinary incontinence and episodes of confusion noted on day 7 of ifosfamide infusion. Did not require methylene blue. Should not preclude future treatment. Discussed with Dr. Ambrocio and will dose reduce by 10% for cycle 2.     Constipation  Continue senna PRN    Cancer-related pain  Fairly well controlled with MS Contin, oxycodone, tylenol and Voltaren gel.      Malnutrition  Will ask to have him seen by a dietician inpatient.     Primary care  Will request an appt to get established    Microcytic anemia  Will check iron studies tomorrow.    Latosha Steinberg PA-C  [facility]  [address] [place] [id number]  [phone]    20 minutes spent on the date of the encounter doing chart review, review of test results, interpretation of tests, patient visit and documentation

## 2021-11-30 NOTE — CONSULTS
Care Management Initial Consult    General Information  Assessment completed with: Care Team MemberMOSES-chart review   Type of CM/SW Visit: Initial Assessment    Primary Care Provider verified and updated as needed: Does not have PCP. Follows w/Oncology   Readmission within the last 30 days: Planned readmission      Reason for Consult: Discharge Planning  Advance Care Planning: Reviewed: No concerns identified       Communication Assessment  Patient's communication style: Spoken language (English or Bilingual)    Hearing Difficulty or Deaf: no   Wear Glasses or Blind: yes    Cognitive  Cognitive/Neuro/Behavioral: WDL                      Living Environment:   People in home: Sibling(s), niece  Current living Arrangements: House      Able to return to prior arrangements: Yes     Family/Social Support:  Care provided by: Self  Provides care for: No one        Description of Support System: Supportive, Involved       Current Resources:   Patient receiving home care services: No  Community Resources: OP Infusion, Palliative Care, Transportation Services, OP Lymphedema services  Equipment currently used at home: Cane, straight, shower chair, walker,  standard  Supplies currently used at home: None    Employment/Financial:  Employment Status: Unemployed        Financial Concerns: No concerns identified     Lifestyle & Psychosocial Needs:  Social Determinants of Health     Tobacco Use: Low Risk      Smoking Tobacco Use: Never Smoker     Smokeless Tobacco Use: Never Used   Alcohol Use: Not on file   Financial Resource Strain: Not on file   Food Insecurity: Not on file   Transportation Needs: Not on file   Physical Activity: Not on file   Stress: Not on file   Social Connections: Not on file   Intimate Partner Violence: Not on file   Depression: Not at risk     PHQ-2 Score: 2   Housing Stability: Not on file       Functional Status:  Prior to admission patient needed assistance:   Dependent ADLs: Ambulation-cane,  Ambulation-walker  Dependent IADLs: Transportation, Shopping  Assesssment of Functional Status: At functional baseline    Mental Health Status:  Mental Health Status: No Current Concerns       Chemical Dependency Status:  Chemical Dependency Status: No Current Concerns           Values/Beliefs:  Spiritual, Cultural Beliefs, Congregational Practices, Values that affect care: No               Additional Information:    Patient is a 46 year old male with a past medical history of 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 Currently admitted for Cycle 2 Doxil/Ifosfamide, Ifosfamide dose-reduced by 10% for history of mild neurotoxicity with cycle 1.     CM assessment being completed for discharge planning.    Patient is independent at baseline and does not receive any in-home supports or services. Patient has recently been residing at his sister's home and she is able to assist him with care needs, as needed. Patient does use a walker for ambulation. Patient has been attending OP lymphedema services at [facility]. Patient has transportation benefits ([employer], Phn: [phone]) through his medical insurance, that he can use, if needed upon discharge.      Writer asked by MEHRAN to schedule a Pegfilgastrim (UDENYCA per patient's insurance) injection on 12/8 and twice weekly labs (CBC w/diff and CMP) through 12/24 at [facility].      Writer called (Phn: [phone]) [facility] and was able to schedule patient as follows:     12/8: 230pm- Udenyca injection and labs  12/10: 2pm- Labs  12/13: 10am- Labs  12/16: 2pm- Labs  12/20: 2pm- Labs  12/23: 2pm- Labs    AVS updated. MEHRAN updated. RNCC will follow.      Roselia Jones RN, BSN, PHN  Care Coordinator   P: [phone], [facility]

## 2021-11-30 NOTE — PLAN OF CARE

## 2021-11-30 NOTE — PLAN OF CARE
[phone]    Nursing Focus: Admission  D: Arrived at 1100 from home via personal car. Patient accompanied by family member. Admitted for Cycle 2 Doxil/Ifos. Complains of pain in right leg.      I: Admission process began.  Patient oriented to room, enviroment, call light.  MD notified of patient's arrival on unit.     A: Asymptomatic covid sent, NEG. Tachy in 120s, no concerns. Sepsis triggered, LA OVSS on RA. C/o pain in right leg, rating 6/10, declined PRN oxycodone or other intervention. Nauseous, prn compazine given x1 with some relief. Denies SOB. Port accessed, no issues. 1L NS bolus given over 2 hrs. Up with SBA and cane, calling appropriately d/t recent fall. LBM 11/28, denied constipation. Lymph wrap on right leg, unable to assess skin, pt reported skin break down on RLE from leaving his lymph wrap on too long. Able to make needs known.     P: Implement plan of care when available. Continue to monitor patient. Nursing interventions as appropriate. Notify MD with changes in pt status.

## 2021-11-30 NOTE — PROGRESS NOTES
Nursing Focus: Chemotherapy  D: Positive brisk bright red blood return via Port. Insertion site is clean/dry/intact, dressing intact with no complaints of pain.  Urine output is recorded in intake Doc Flowsheet.    I: Dose #1 of Ifos and mesna started to infuse over 24 hours. Reviewed pt teaching on chemotherapy side effects.  Pt denies need for further teaching. Chemotherapy double checked per protocol by two chemotherapy competent RN's.   A: Tolerating chemo well. Denies nausea.   P: Continue to monitor urine output and symptoms of nausea. Screen for symptoms of toxicity.     normal...

## 2021-11-30 NOTE — LETTER
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 Currently admitted for Cycle 2 Doxil/Ifosfamide, Ifosfamide dose-reduced by 10% for history of mild neurotoxicity with cycle 1.          Reason for Communication Hand-off Referral: Fragility    Discharge Plan: Home with OP lymphedema services with clinic follow-up as recommended.        Concern for non-adherence with plan of care: N  Discharge Needs Assessment:  Needs      Most Recent Value   Equipment Currently Used at Home cane, straight, walker, standard, shower chair, raised toilet seat  [reacher]          Follow-up specialty is recommended: Yes    Follow-up plan:    Future Appointments   Date Time Provider Department Center          2021  2:00 PM [facility]   2021  2:30 PM [facility]   2021  3:30 PM [facility]   2021  8:00 AM [facility] Bolanos[facility]   12/10/2021  2:00 PM [facility] CHEMO LAB DRAW 1 [facility]   2021  8:00 AM Latosha Steinberg PA-C [facility]   2021 10:00 AM [facility]RAW [facility]   2021  1:30 PM Dani Robb, PT [facility]   12/15/2021 11:30 AM [facility]   2021  2:00 PM [facility]   2021  2:00 PM [facility]   2021  2:00 PM [facility] CHEMO LAB DRAW 1  [facility]       Any outstanding tests or procedures:        Radiology & Cardiology Orders     Future Labs/Procedures Complete By Expires    PET Oncology Whole Body  12/29/2021 (Approximate) 2/1/2022    Process Instructions:    If your scan is being scheduled at the hospital, call [phone] for Pre-authorization.    If your scan is being scheduledat the [facility], call [phone] for scheduling and prior authorization.     Patient Preparation  1.  NPO (except water and medications) for 6 hours prior to exam  2.  No physical exercise for 24 hours prior to exam (for example: running)  3.  If possible, please schedule known diabetic patients for early AM time slots.  4.  Patient should arrive 1/2 hours prior to exam scheduled start   5.  Patient should expect the exam to last 2-3 hours  6.  Patient may need to sign ABN or waiver prior to exam.      Technical Questions if scheduled at the [facility]?   [phone]  Technical Questions if scheduled at [facility]?  [phone]     Call to schedule:    Monday through Friday [facility] ([facility]) [phone]  Monday through Saturday [facility] [phone]  Monday [facility] [phone]  Tuesday [facility] [phone]  Wednesday [facility] [phone]  Thursday [facility] [phone]  Friday [facility] (AM) [phone]  Friday [facility] (PM) [phone]*    *Mobile services are also available at [employer] every other Tuesday (AM) [phone] or  *Mobile services are also available at [facility] every Wednesday (PM) [phone]  *Mobile services are also available at [place] Wednesday (AM) [phone]      Comments:    Please arrange on ~12/29              Roselia Jones, RN, BSN, PHN  Care Coordinator   P: [phone], [facility]       AVS/Discharge Summary is the source of truth; this is a helpful guide for  improved communication of patient story

## 2021-11-30 NOTE — PROVIDER NOTIFICATION
DATE/TIME  (DOT-TD, DOT-NOW) CHEMO CHECK ACTIVITY (REGIMEN & DOSE CHECK, DAY, DOSE #, NAME OF CHEMO #1)  CHEMO DRUG #2  CHEMO DRUG #3 NAME OF RN #1 (USE DOT-ME HERE) NAME OF RN#2 (2ND RN TO LOG IN SEPARATELY)   11/30/2021  11:04 AM   Doxil/Ifos/Mesna protocol double check   LUIS Cleaning RN     11/30/21  2:53 PM   Doxil Dose # 1 double check   Staci Brito RN   (campos)   11/30/2021  4:45 PM  Ifos/mesna Dose #1 double check  Jesus Urena RN   (Alondra Vargas)   12/1/2021  15:58 PM Ifos/Mesna #2 Dose #2 Double check    LUIS Zarate RN     12/2/2021 Ifos/Mesna #3   Dose #3 Double check   LUIS Green RN

## 2021-11-30 NOTE — CONSULTS
[facility]  Palliative Care Consultation Note    Patient: Vic Scott III  Date of Admission:  11/30/2021    Requesting Clinician / Team: Sofi Waller PA-C/Hem Onc  Reason for consult: Symptom management    Recommendations:    Vic wants to think about whether he wants to try methadone. Recommend that we start tomorrow morning if he decides to proceed. In the meantime, he is agreeable with obtaining a new baseline EKG in case he does want to change to methadone. For now, his pain appears to be reasonably controlled on current regimen, aside from feeling some mild sedation on higher doses of oxycodone.    Continue MS Contin 30 mg po q12h for now, along with oxycodone 5-15 mg po q4h prn.    If no contraindication, would also recommend having ibuprofen 400 mg po q6h prn also available.    Ordered biotene spray prn dry mouth    These recommendations have been discussed with Sofi Waller PA-C.      Thank you for the opportunity to participate in the care of this patient and family. Our team: will continue to follow.     During regular M-F work hours -- if you are not sure who specifically to contact -- please contact us by sending a text page to our team consult pager at [phone].    After regular work hours and on weekends/holidays, you can call our answering service at [phone]. Also, who's on call for us is available in [facility] Smart Web.     Deborah Vang PA-C  [facility]  Contact information available via [facility] Paging/Directory    Total time spent was 55 minutes,  >50% of time was spent counseling and/or coordination of care.    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 Currently admitted for Cycle 2 Doxil/Ifosfamide.    Today, the patient was seen for:  High  "grade pleomorphic sarcoma of the right thigh  Cancer related pain    Prognosis, Goals, & Planning:      Functional Status just prior to hospitalization: 1 (Restricted in physically strenuous activity but ambulatory and able to carry out work of a light or sedentary nature)       Prognosis, Goals, and/or Advance Care Planning were addressed today: Yes        Summary/Comments: Although no HCD on file, he states his sister Lauryn did fill one out. Asked him if she could bring in a copy.      Patient's decision making preferences: independently          Patient has decision-making capacity today for complex decisions: Yes            I have concerns about the patient/family's health literacy today: No           Patient has a completed Health Care Directive: Reportedly yes, but not available to us currently.      Code status: Full Code        Coping, Meaning, & Spirituality:   Mood, coping, and/or meaning in the context of serious illness were addressed today: Yes  Summary/Comments: Vic shared that his father (over 80 years of age) collapsed and  suddenly last week from a heart attack. He is \"processing\" his grief on his own, and declined support of Imam (he is Religion) or . Denies any anxiety or depression.    Social:     Living situation: lives with his sister Lauryn    [place] family / caregivers: both mother and father are . 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 Whitaker, Orthopedics with rapidly progressing right leg swelling since ~ 2021. MRI was incomplete due to pain, but did show ~47y48rp right thigh soft tissue mass with probable bony involvement of the proximal right femur. Biopsy of the mass 21 " "significant for high grade pleomorphic sarcoma. Was seen by Dr. Whitaker to review the results on 10/1/21 - discussed consideration of neoadjuvant chemotherapy vs resection and reconstruction. Given high risk for amputation with massive reconstructive surgery, he was referred to Dr. Ambrocio and a PET was ordered which identifed concerning inguinal lymphadenopathy. He was seen by Dr. Ambrocio 10/26/21, ordered Foundation one (MSI stable, no targetable mutations) and recommended starting Doxil/Ifos initially as an inpatient. Baseline Echo 11/2/21 technically difficult, EF 60-65%. Admitted for Cycle 1 Doxil/Ifos (C4W2=22/2/21), on C1D6 he was noted to have some mild confusion (labile mood, urinated himself and into a water pitcher) which warranted stopping Ifos slightly earlier at 1040am on Day 6 (he only had a few hours of ifos infusion left), mesna started. Patient did not require methylene blue.\" Now admitted for C2 Doxil/Ifos.    Vic is known to our service and followed in outpatient palliative care clinic. He is here with his brother in [facility] at bedside. Overall, he feels the swelling of his RLE has decreased since starting chemo. He previously had sensation of his skin burning which was even more uncomfortable than the achy, stabbing pain he has from the mass in his left thigh. This burning pain has resolved. He states pain in right thigh varies in intensity at times, and can come on suddenly, even without provocation. He is able to ambulate with a cane and sometimes walker. Pain does not wake him at night. He has been taking MS Contin 30 mg BID. He usually 10 mg of oxycodone in the morning with 1 tab of motrin. He takes oxycodone about 2-3 times per day, either 10 or 15 mg. The 15 mg does cause him to feel sleepy, so sometimes he takes 10 mg instead. Over the Thanksgiving holiday he ran out of oxycodone and was not able to get this refilled until yesterday. Without oxycodone, his pain was up to a 10/10, " with oxycodone, pain decreases to a 3-4/10. He denies any constipation and only takes a stool softener as needed. He has not had problems with nausea until today--he thinks returning to the hospital has triggered this. Nausea is mild. He vomited once after discharging from the hospital, but not since then. Appetite is low and he is losing weight--he will start the protein shakes recommended by dietician. He does have dry mouth at times and has been drinking a lot to offset this. He is sleeping ok.       Key Palliative Symptom Data:  # Pain severity the last 12 hours: moderate  # Nausea severity the last 12 hours: low    Patient is on opioids: assessed and bowels ok/no needed changes to plan of care today.    ROS:  Palliative Symptom Review (0=no symptom/no concern, 1=mild, 2=moderate, 3=severe):      Pain: 2-3      Fatigue: 1      Nausea: 1      Constipation: 0      Diarrhea: 0      Depressive Symptoms: 0, although is coping with grief from sudden death of his father last week      Anxiety: 0      Drowsiness: 1      Poor Appetite: 1      Shortness of Breath: 0      Insomnia: 0     Past Medical History:  No past medical history on file.     Past Surgical History:  Past Surgical History:   Procedure Laterality Date     INSERT PORT VASCULAR ACCESS Right 11/1/2021    Procedure: INSERTION, VASCULAR ACCESS PORT;  Surgeon: Sushil Gonzales MD;  Location: [facility] OR     IR CHEST PORT PLACEMENT > 5 YRS OF AGE  11/1/2021         Family History:  No family history on file.      Allergies:  No Known Allergies     Medications:  I have reviewed this patient's medication profile and medications from this hospitalization.   Noted:  MS Contin 30 mg po q12h  Oxycodone 5-15 mg po q4h prn    Physical Exam:  Vital Signs: Temp: 99.1  F (37.3  C) Temp src: Oral BP: 126/80 Pulse: (!) 129   Resp: 20 SpO2: 98 % O2 Device: None (Room air)    Weight: 249 lbs 11.2 oz  Gen: Sitting in chair. Appears comfortable, in NAD  HEENT: NCAT. Conjunctiva  clear. Sclera anicteric.  CV: RRR, Peripheral perfusion intact.   Resp: No increased work of breathing, CTAB  Abd: soft, nt, nd  Skin:  Warm, dry.  Ext: RLE wrapped, significantly larger in size > LLE.   Neuro: face symmetric. EOM, vision, hearing grossly intact. Speech fluent. Moves all extremities.  Mental status/Psych: alert. Oriented. Asks/answers questions appropriately. Affect is appropriate.      Data reviewed:  Recent imaging reviewed, my comments on pertinents:   EKG 11/2/21--Sinus tachycardia, QTc 443  Echo 11/2/21:   Interpretation Summary  Global and regional left ventricular function is normal with an EF of 60-65%.  Left ventricular size is normal.  The right ventricle is normal size.  Global right ventricular function is normal.  No significant valvular abnormalities were noted.  No pericardial effusion is present.    Recent lab data reviewed, my comments on pertinents:   Na 133  K 3.7  BUN 8  Cr 0.62  Mag 2.3  Phos 3.1  Tbili 0.6  Alk Phos 397  ALT 26  AST 35  WBC 15.1  Hgb 7.1  Plt 772k

## 2021-12-01 NOTE — PROGRESS NOTES
SPIRITUAL HEALTH SERVICES  SPIRITUAL ASSESSMENT Progress Note (Palliative Focus)  [facility] ([place]) 7D    REFERRAL SOURCE: Palliative care initial spiritual assessment.    Per Palliative Care PATRICIA Vang, patient Vic Scott declines spiritual health visits at this time, citing own spiritual supports.     Plan: I am available for spiritual support as needed, with no plans to follow unless requested.    Racheal Bazan  Palliative   Pager [phone]  [facility] Inpatient Team Consult pager [phone] (M-F 8-4:30)  After-hours Answering Service [phone]

## 2021-12-01 NOTE — PROGRESS NOTES
[facility]  Palliative Care Daily Progress Note       Recommendations & Counseling       Vic does not wish to switch to methadone but continue his current pain regimen    We did discuss trial of Marinol for appetite stimulation--he will think about this.    Thank you for the opportunity to participate in the care of this patient and family. Our team: will continue to follow.     During regular M-F work hours ([phone]) -- if you are not sure who specifically to contact -- please contact us in [facility] Smart Web.     After regular work hours and on weekends/holidays, you can call our answering service at [phone].       Case was reviewed with oSfi Waller PA-C/Hem Onc.    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 Currently admitted for Cycle 2 Doxil/Ifosfamide.     Today, the patient was seen for:  High grade pleomorphic sarcoma of the right thigh  Cancer related pain     Prognosis, Goals, or Advance Care Planning was addressed today with: No.  Mood, coping, and/or meaning in the context of serious illness were addressed today: No.              Interval History:     Chart review/discussion with unit or clinical team members:   No acute events overnight.    Per patient or family/caregivers today:  Vic is seated in chair. He has decided he does not want to change to methadone, but to continue his current pain regimen. He denies any nausea today. He will try supplementing diet with Ensure as his appetite continues to be low. No other  concerns today.     Key Palliative Symptoms:  # Pain severity the last 12 hours: moderate  # Nausea severity the last 12 hours: none    Patient is on opioids: bowels not assessed today.           Review of Systems:     A comprehensive ROS has been negative other than stated in the HPI and below:  Palliative Symptom Review (0=no symptom/no concern, 1=mild, 2=moderate, 3=severe):      Pain: 2      Fatigue: 1      Nausea: 0             Medications:     I have reviewed this patient's medication profile and medications during this hospitalization.    Noted meds:    MS Contin 30 mg po q12h  Zofran 8 mg po q8h  Oxycodone 10-15 mg po q4h prn--45 mg in past 24h, last dose 9:30 AM  Compazine 10 mg po q6h prn--x 2 yest             Physical Exam:   Temp: 97.6  F (36.4  C) Temp src: Oral BP: 129/74 Pulse: 106   Resp: 18 SpO2: 100 % O2 Device: None (Room air)    Gen: Sitting in chair. Appears comfortable, in NAD  HEENT: NCAT. Conjunctiva clear. Sclera anicteric.  CV: RRR, Peripheral perfusion intact.   Resp: No increased work of breathing, CTAB  Abd: soft, nt, nd  Skin:  Warm, dry.  Ext: RLE wrapped, significantly larger in size > LLE.   Neuro: face symmetric. EOM, vision, hearing grossly intact. Speech fluent. Moves all extremities.  Mental status/Psych: alert. Oriented. Asks/answers questions appropriately. Affect is appropriate.               Data Reviewed:     Reviewed recent pertinent imaging, comments:   No results found for this visit on 11/30/21.    Reviewed recent labs, comments:   Na 135  K 3.6  Creat 0.60  WBC 10.8  Hgb 6.5  Plt 705k  Albumin 2.2

## 2021-12-01 NOTE — PLAN OF CARE
"1900- 2300    /74 (BP Location: Left arm)   Pulse 111   Temp 99.2  F (37.3  C) (Oral)   Resp 16   Ht 1.88 m (6' 2\")   Wt 113.3 kg (249 lb 11.2 oz)   SpO2 100%   BMI 32.06 kg/m      Pt reporting pain in RLE and Hip. PRN Oxy given x1. Pt reports intermittent nausea. PRN Compazine given x1 and scheduled Zofran. Pt reports poor appetite. BM last on 10/28- normal per pt- denied Senna. Good UOP per pt report. CIVI Ifosfamide running at 47.8 ml/hr. Good blood return. Tolerating infusion well. Pt sleeping in recliner comfortably.   "

## 2021-12-01 NOTE — PROVIDER NOTIFICATION
Paged:     Shira, #[phone]    Critical Lab: pt hgb = 6.5, no conditional orders in place.     Thanks, Cuca [phone]

## 2021-12-01 NOTE — PROGRESS NOTES
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 Currently admitted for Cycle 2 Doxil/Ifosfamide, Ifosfamide dose-reduced by 10% for history of mild neurotoxicity with cycle 1.     Plan for today  - Day 2 Doxil/Ifos. No e/o neurotoxicity  - Continue supportive cares and current pain regimen     ONC  # High grade pleomorphic sarcoma of the right thigh  # Concern for metastases to the inguinal lymph nodes  # Right leg edema  # History of mild Ifos neurotoxicity  He initially presented to Dr. Whitaker, Orthopedics with rapidly progressing right leg swelling since ~ July 2021. MRI was incomplete due to pain, but did show ~17e55nt right thigh soft tissue mass with probable bony involvement of the proximal right femur. 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 Was seen by Dr. Whitaker to review the results on 10/1/21 - discussed consideration of neoadjuvant chemotherapy vs resection and reconstruction. Given high risk for amputation with massive reconstructive surgery, he was referred to Dr. Ambrocio and a PET was ordered which identifed concerning inguinal lymphadenopathy. He was seen by Dr. Ambrocio 10/26/21, ordered Foundation one (MSI stable, no targetable mutations) and recommended starting Doxil/Ifos initially as an inpatient. Baseline Echo 11/2/21 technically difficult, EF 60-65%. Admitted for Cycle 1 Doxil/Ifos (X2M3=68/2/21), on C1D6 he was noted to have some mild confusion (labile mood, urinated himself and into a water pitcher) which warranted stopping Ifos slightly earlier at 1040am on Day 6 (he only had a few hours of ifos infusion left), mesna started. Patient  did not require methylene blue. Otherwise tolerated well with mild nausea. He notes subjectively decreased size of the right thigh mass. Admitted for Cycle 2 Doxil/Ifos, Ifosfamide dose reduced by 10% (1500 mg/m2 ? 1350 mg/m2).   - Port accessed on admission   - Plan for restaging scans prior to Cycle 3 Doxil/Ifos, ordered/requested PET/CT scan for 12/29/21                  Treatment Plan: Doxil/Ifos/Mesna Cycle 2, Day 1 = 11/30/21               Doxorubicin Liposomal 45 mg/mg2 once Day 1               Ifosfamide 1350 mg/m2, Mesna 1500 mg/m2 once daily Days 1-6               Mesna 1500 mg/m2 once Day 7               Pegfilgastrim (UDENYCA per patient's insurance) Day 8 - scheduled on 12/8 at [facility] - Zofran 8mg q8h Days 1-7     # Cancer-related pain  Follows with palliative care outpatient. Currently taking oxycodone 5mg and Motrin ~every 4-6 hours. Mass partially erodes the outer cortex of the proximal right femur which increases risk for pathologic fracture. Per Dr. Whitaker, OK to bear weight on his right leg. He is following with Palliative Care, Dr. Sofia, as an outpatient. When seen by Dr. Sofia on 11/17/21, they discussed that he tends to become sedated with higher doses of oxycodone and could benefit from Methadone, but deferred starting methadone until next planned admission 11/30/21 so that he could be closely monitored. Pain has been fairly controlled with outpatient regimen, on average he is taking 5mg oxycodone 2-3 tabs BID, and ran out of oxycodone ~ 1 week PTA. After Cycle 1 of chemotherapy the right thigh mass has decreased in size and the pain is slightly improved.   - Continue PTA pain regimen                - MS Contin 30 mg BID                - Oxycodone 10-15mg q4h PRN               - Tylenol 975mg TID               - Voltaren gel PRN  - Consulted Palliative Care on admission to consider transitioning to Methadone per outpatient plan. Upon further discussions  with patient, he feels his pain is fairly controlled and is not interested in trying methadone at this time.      # Leukocytosis  # Anemia  # Thrombocytosis  On admission, WBC 15.1 (mostly neutrophils), Hgb 7.1 (MCV 77, borderline microcytic), Plt 772k. He received neulasta on 11/12, which would not explain his leukocytosis presently. His counts appear to be reactive but he has been afebrile and no s/sx of infection. No evidence of bleeding, anemia likely due to chemotherapy and possible iron deficiency. OK to proceed with chemotherapy as planned per Dr. Ambrocio.   - Iron studies: Serum iron 29, , iron saturation index 19%, transferrin 124, ferritin 1559. Indicates mixed iron deficiency and anemia of chronic disease. In the setting of chronic opioid-induced constipation, will try to avoid PO iron supplementation. Pt was encouraged to incorporate more iron into his diet. If anemia persists disproportionate to chemotherapy-effect could consider IV iron outpatient  - Transfuse for hgb <7 and plt <10k, blood consent signed  - Given 1u pRBC on 12/1 for hgb of 6.5    CARDS   # Sinus Tachycardia   On previous admission patient was noted to have consistent -120. EKG with sinus tachycardia, otherwise normal. No chest pain and no significant cardiac history. He has been drinking a lot of gatorade at home, though notes poor food intake lately due to nausea from chemotherapy. Clinically euvolemic on exam.  - EKG ordered on admission  - 1L NS given 11/30, HR improved to ~104 bpm  - Consider additional fluid boluses if PO intake is poor  - Will consider Cardiology consultation is tachycardia persists, could discuss starting metoprolol     MSK   # Right LE Edema   Follows with lymphedema outpatient.   - Consulted lymphedema     GI  # Opioid-induced constipation  Per patient, on average he has about 2 bowel movements per week. Is passing flatus, no abdominal pain on admission. Bowel sounds remain active.   - Increased  bowel regimen: Miralax daily, Senna 1-2 tab BID. Colace 100mg BID PRN, Milk of Mag daily PRN, Dulcolax suppository daily PRN (ANC >1000, OK to give suppository)     # Chemo-related Nausea  # Decreased appetite  After Cycle 1 Doxil/Ifos he has been intermittently nauseous at home. Zofran PRN usually helps. His appetite is decreased, eating 1-2 meals per day and not eating 100% of his meals. He denies heartburn or s/sx of GERD. He has a bowel movement only about twice per week. Denies abdominal pain, cramping, or bloating.   - Encouraged bowel regimen as above  - Zofran PRN  - Encouraged small frequent meals, Ensure or magic cup between meals  - Palliative care discussed starting Marinol with him which he declined  - If nausea is poorly controlled consider trial of Zyprexa at bedtime      FEN:  -IVF per chemo protocol   -PRN lyte replacement  -RDAT     Prophy/Misc:  -VTE: Lovenox, hold if platelets <50k  -GI/PUD:None indicated, tums PRN  -Bowels:Senna and Miralax   - Activity: WBAT per Orthopedics, PT consulted       Consulted: PT, Lymphedema, Palliative Care  Code: Full code, confirmed with patient on admission  Dispo: Remain inpatient until completion of chemotherapy, tentatively plan to discharge on 12/7 barring any other clinical complications  Follow up:   - UDENYCA and Labs 12/8 at [facility]  - Twice weekly labs at [facility] 12/8-12/24 (CBC w/ diff, CMP)  - MEHRAN visit 12/13  - Ordered/requested PET/CT scan for 12/29/21 (prior to cycle 3)  - Will need an MEHRAN appt or follow up with Dr. Ambrocio after his PET scan to determine further treatment plan, outpatient team to request based on timing of PET  - Referral placed to Primary Care by outpatient MEHRAN, not yet scheduled    Patient was seen and plan of care was discussed with attending physician Dr. Limon.    Sofi Waller PA-C  Hematology/Oncology  Pager # [phone]  ___________________________________________________________________    Subjective & Interval  "History:    No acute events noted overnight. No confusion, tremors, skin changes, oral lesions. Denies N/V. He hasn't had a BM in a few days which is typical for him, but his passing a lot of flatus. No abdominal pain. His right thigh pain is fairly controlled with the current regimen, took 45mg oxycodone and 30mg MS contin BID yesterday. A comprehensive review of systems was reviewed with the patient and the pertinent positives are listed in the HPI above. Discussed his low hgb and rationale for giving pRBCs, consent signed. He notes that he rarely eats red meats or spinach lately.     Physical Exam:    Blood pressure 117/73, pulse 104, temperature 97.1  F (36.2  C), temperature source Oral, resp. rate 18, height 1.88 m (6' 2\"), weight 114.5 kg (252 lb 6.4 oz), SpO2 100 %.    General: alert and cooperative, sitting up in chair, no acute distress  HEENT: sclera anicteric, EOMI, MMM. Thin face  Neck: supple, normal ROM  CV: RRR, normal S1/S2, no m/r/g  Resp: CTAB, no wheezing/crackles, normal respiratory effort on ambient air  GI: soft, non-tender, non-distended, bowel sounds present and normoactive  MSK: Right thigh with firm widespread mass, mildly-tender to palpation. RLE edema with lymphedema wrap in placed. Strong peripheral pulses. Good strength and ROM  Skin: no rashes on limited exam, no jaundice  Neuro: AOx3, moves all extremities equally, no focal deficits    Labs & Studies: I personally reviewed the following studies:  ROUTINE LABS (Last four results):  CMP  Recent Labs   Lab 12/01/21 0625 11/30/21  1049    133   POTASSIUM 3.6 3.7  3.7   CHLORIDE 100 99   CO2 26 26   ANIONGAP 9 8   * 124*   BUN 5* 8   CR 0.60* 0.62*   GFRESTIMATED >90 >90   VENTURA 8.8 8.7   MAG 2.2 2.3   PHOS 3.3 3.1   PROTTOTAL 7.3 7.8   ALBUMIN 2.2* 2.4*   BILITOTAL 0.4 0.6   ALKPHOS 320* 397*   AST 25 35   ALT 24 26     CBC  Recent Labs   Lab 12/01/21  0625 11/30/21  1049   WBC 10.8 15.1*   RBC 2.67* 2.89*   HGB 6.5* 7.1* "   HCT 20.7* 22.3*   MCV 78 77*   MCH 24.3* 24.6*   MCHC 31.4* 31.8   RDW 17.2* 17.2*   * 772*     INRNo lab results found in last 7 days.    Microbiology  Recent Labs   Lab 11/30/21  1128   LACT 1.0       Pertinent Imaging    Medications list for reference:  Current Facility-Administered Medications   Medication     0.9% sodium chloride BOLUS     acetaminophen (TYLENOL) tablet 500-1,000 mg     albuterol (PROVENTIL HFA/VENTOLIN HFA) inhaler     albuterol (PROVENTIL) neb solution 2.5 mg     artificial saliva (BIOTENE MT) solution 1-2 spray     calcium carbonate (TUMS) chewable tablet 500 mg     Chemotherapy Infusing-Continuous Infusion     diclofenac (VOLTAREN) 1 % topical gel 2-4 g     diphenhydrAMINE (BENADRYL) injection 50 mg     enoxaparin ANTICOAGULANT (LOVENOX) injection 40 mg     EPINEPHrine PF (ADRENALIN) injection 0.3 mg     famotidine (PEPCID) injection 20 mg     heparin 100 UNIT/ML injection 5-10 mL     heparin lock flush 10 UNIT/ML injection 5-10 mL     heparin lock flush 10 UNIT/ML injection 5-10 mL     Ifosfamide (IFEX) 3,280 mg, mesna (MESNEX) 3,280 mg in sodium chloride 0.9 % 1,148 mL infusion     meperidine (DEMEROL) injection 25 mg     [START ON 12/6/2021] mesna (MESNEX) 3,280 mg in sodium chloride 0.9 % 1,083 mL infusion     methylPREDNISolone sodium succinate (solu-MEDROL) injection 125 mg     morphine (MS CONTIN) 12 hr tablet 30 mg     naloxone (NARCAN) injection 0.2 mg     ondansetron (ZOFRAN) injection 8 mg    Or     ondansetron (ZOFRAN-ODT) ODT tab 8 mg    Or     ondansetron (ZOFRAN) tablet 8 mg     ondansetron (ZOFRAN) tablet 8 mg     oxyCODONE (ROXICODONE) tablet 10-15 mg     polyethylene glycol (MIRALAX) Packet 17 g     prochlorperazine (COMPAZINE) injection 10 mg     prochlorperazine (COMPAZINE) tablet 10 mg     sennosides (SENOKOT) tablet 1-2 tablet     sodium chloride (PF) 0.9% PF flush 10-20 mL     sodium chloride (PF) 0.9% PF flush 10-20 mL     sodium chloride (PF) 0.9% PF flush  10-20 mL     ------------------------------------------------------------------------------------  PHYSICIAN ATTESTATION    I, Manuel Limon, saw and evaluated Vic Scott III as part of a shared visit.  I have reviewed and discussed with the advanced practice provider their history, physical and plan.    I personally reviewed the vital signs, medications, labs and imaging.    My key history or physical exam findings:  Patient tolerating chemotherapy well thus far.  No physical examination abnormalities.  No adverse events thus far.    Key management decisions made by me:  Continue with planned inpatient chemotherapy; doxil + ifosfamide.  No changes to dose or schedule.    Manuel Limon M.D.  Date of Service (when I saw the patient): 12/01/21

## 2021-12-01 NOTE — PROGRESS NOTES
12/01/21 1633   Quick Adds   Type of Visit Initial Occupational Therapy Evaluation  (Lymphedema Evaluation)   Living Environment   People in home sibling(s)   Current Living Arrangements house   Home Accessibility stairs to enter home;stairs within home   Number of Stairs, Main Entrance 4   Stair Railings, Main Entrance railings safe and in good condition   Number of Stairs, Within Home, Primary other (see comments)   Stair Railings, Within Home, Primary railings safe and in good condition   Transportation Anticipated family or friend will provide   Living Environment Comments At baseline, pt lives alone in a house, but recently patient has been living with his sister and her daughter in a house w/ 4 steps to enter from the garage and no stairs on main entry. There is one flight of stairs in the house, but pt does not need to use these. House has a walk-in shower w/ a shower chair and elevated toilet seat. Pt will discharge back to his sister's house.   Self-Care   Usual Activity Tolerance good   Current Activity Tolerance fair   Regular Exercise No   Equipment Currently Used at Home cane, straight;walker, standard;shower chair;raised toilet seat  (reacher)   Activity/Exercise/Self-Care Comment Pt completes most ADLs Mod I. Before prior hospitalization, pt was using SEC for mobility, now using FWW. Pt uses raised toilet seat due to difficulty flexing R hip and knee. Pt uses reacher or end of cane to doff/don clothing, with help from sister at times to manage socks.    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 Pt is not currently working. Pt was IND w/ IADLs at baseline, now has assist from his sister. Pt reports he provides care for his niece who has CP.    Disability/Function   Hearing Difficulty or  "Deaf no   Wear Glasses or Blind yes   Vision Management glassed   Concentrating, Remembering or Making Decisions Difficulty no   Difficulty Communicating no   Difficulty Eating/Swallowing no   Walking or Climbing Stairs Difficulty yes   Walking or Climbing Stairs ambulation difficulty, requires equipment;stair climbing difficulty, requires equipment   Mobility Management SEC, FWW   Dressing/Bathing Difficulty yes   Dressing/Bathing bathing difficulty, requires equipment;dressing difficulty, requires equipment;dressing difficulty, assistance 1 person   Dressing/Bathing Management shower chair for bathing, reacher or Ax1 for dressing   Toileting issues yes   Toileting Management raised toilet seat   Toileting Assistance toileting difficulty, requires equipment   Doing Errands Independently Difficulty (such as shopping) yes   Errands Management family and friends provide assist   Fall history within last six months yes   Number of times patient has fallen within last six months 1   Change in Functional Status Since Onset of Current Illness/Injury   (ADL performance below baseline)   General Information   Onset of Illness/Injury or Date of Surgery 11/30/21   Referring Physician Sofi Waller PA-C   Patient/Family Therapy Goal Statement (OT) To manage swelling/pain in RLE, to maintain/improve independence w/ ADLs   Additional Occupational Profile Info/Pertinent History of Current Problem Per chart: \"Vic Scott III is a 46 year old male who presents with past medical history of 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 Currently admitted for Cycle 2 Doxil/Ifosfamide, Ifosfamide dose-reduced by 10% for history of mild neurotoxicity with cycle 1.\"   Performance Patterns (Routines, Roles, Habits) Pt values his role as father- pt has 4 children who do not live with him   Existing Precautions/Restrictions fall   Left Upper Extremity (Weight-bearing Status) full " weight-bearing (FWB)   Right Upper Extremity (Weight-bearing Status) full weight-bearing (FWB)   Left Lower Extremity (Weight-bearing Status) full weight-bearing (FWB)   Right Lower Extremity (Weight-bearing Status) full weight-bearing (FWB)   General Observations and Info Acitivity order:    Cognitive Status Examination   Orientation Status orientation to person, place and time   Cognitive Status Comments No acute concerns noted   Visual Perception   Visual Impairment/Limitations WFL   Impact of Vision Impairment on Function (Vision) Pt wears glasses, no acute concerns   Sensory   Sensory Comments Light touch intact in all extremities.    Pain Assessment   Patient Currently in Pain No   Edema General Information   Onset of Edema 09/06/21  (approximate)   Affected Body Part(s) Right LE   Etiology Comments right thigh high grade pleomorphic sarcoma with concern for metastases to the inguinal lymph nodes    Edema Precautions Active Cancer   General Comments/Previous Edema Treatment/Edema Equipment Pt has been wearing knee high compression sock and neoprene knee brace to manage RLE edema. During prior hospitalization, thigh-high GCBs used. Pt reporting he has NOT seen OP lymphedema therapy yet due to scheduling constraints. First available appointment is 12/7.    Edema Examination/Assessment   Skin Condition Pitting;Non-pitting;Intact;Dryness   Skin Condition Comments RLE skin is intact overall. Well-moisturized from ankle to knee, mildly dry w/ flaking on sole of foot. Pt has a band of abraded/scabbing mid-calf from pressure/shear injury caused by wearing a compression sock for ~48 hrs. Scabs are healing, pink exposed skin, no redness or warmth.    Scar No   Pitting Assessment Skin is very taut w/ non-pitting to 2+ pitting edema. RLE volume significantly greater than LLE. No circumference measurements taken today, will consider for future session.    Edema Assessment Comments GCB from MTPs to knee crease donned. Plan  for thigh-high GCB, however pt wearing tight sweatpants, refusing to switch to shorts due to being cold.   Range of Motion Comprehensive   Comment, General Range of Motion BUE ROM WFL. RLE hip and knew ROM limited due to swelling.   Strength Comprehensive (MMT)   Comment, General Manual Muscle Testing (MMT) Assessment BUE strength WFL for ADLs and transfers.    Coordination   Upper Extremity Coordination No deficits were identified   Transfers   Transfers sit-stand transfer   Sit-Stand Transfer   Sit-Stand [place] (Transfers) supervision   Sit/Stand Transfer Comments Multiple STS transfers from recliner w/ no AD.   Lower Body Dressing Assessment   [place] Level (Lower Body Dressing) maximum assist (25% patient effort)   Position (Lower Body Dressing) unsupported sitting   Comment (Lower Body Dressing) Min A to adjust leg of sweat pants, Max A to don sock over GCB.    Clinical Impression   Criteria for Skilled Therapeutic Interventions Met (OT) yes;meets criteria;skilled treatment is necessary   OT Diagnosis Impaired ADL/IADL performance   Edema: Patient Presentation Edema   OT Problem List-Impairments impacting ADL problems related to;activity tolerance impaired;balance;range of motion (ROM);strength;pain   Assessment of Occupational Performance 5 or more Performance Deficits   Identified Performance Deficits dressing, bathing, functional mobility/transfers, meal prep, household management, work, leisure   Planned Therapy Interventions (OT) ADL retraining;IADL retraining;strengthening;home program guidelines;progressive activity/exercise;risk factor education   Edema: Planned Interventions Gradient compression bandaging;Fit for compression garment;Edema exercises;Precautions to prevent infection/exacerbation;Education;Manual therapy;ADL training   Clinical Decision Making Complexity (OT) low complexity   Therapy Frequency (OT) Daily   Predicted Duration of Therapy x1 week   Anticipated Equipment Needs Upon  Discharge (OT)   (TBD)   Risk & Benefits of therapy have been explained evaluation/treatment results reviewed;care plan/treatment goals reviewed;risks/benefits reviewed;current/potential barriers reviewed;participants voiced agreement with care plan;participants included;patient   Comment-Clinical Impression Pt presents today w/ significant edema in RLE, R hip pain which impair his functional mobility and ADL performance. Pt will benefit from IP OT to address above deficits in the context of ADL performance and to manage RLE edema for improved ADL independence and safety.   OT Discharge Planning    OT Discharge Recommendation (DC Rec) Home with assist;home with outpatient occupational therapy  (HH or OP lymphedema therapy)   OT Rationale for DC Rec Rec OP lymph tx. Anticipate pt will be safe to discharge to his sister's house from ADL standpoint. Pt has good support for IADLs. Pt would benefit from OP OT to progress ADL independence and safety in the home environment. Pt would benefit from HH or OP lymphedema therapy for ongoing management of significant RLE edema.    OT Brief overview of current status  Ax1 w/ FWW   Total Evaluation Time (Minutes)   Total Evaluation Time (Minutes) 8

## 2021-12-01 NOTE — PLAN OF CARE
"[id number]:     /76 (BP Location: Left arm)   Pulse 104   Temp 98.3  F (36.8  C) (Oral)   Resp 20   Ht 1.88 m (6' 2\")   Wt 113.3 kg (249 lb 11.2 oz)   SpO2 100%   BMI 32.06 kg/m        NEURO: Intact, A&O x4.      RESPIRATORY:  WDL. Pt denies having any SOB and no cough noted.     CARDIO: Pt tachycardic at baseline.      GI/:  WDL. Pt voiding spontaneously with AUOP. Last BM: 11/28/21.      SKIN: RLE swelling, unable to assess RLE.      ACTIVITY: Stand by assist with walker.      PAIN: Pt reports pain in his right thigh and hip, prn oxycodone given x2, medication was somewhat effective per pt report.      LDA: Port - infusing ifosfamide @ 47.8ml/hr         "

## 2021-12-02 NOTE — CONSULTS
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    Cardiology consulted for persistent tachycardia. Tachycardia has been present for many weeks now.    - EKGs reviewed: sinus rhythm, no arrhythmias identified.  - Echo showed normal biventricular function: no underlying cardiac pathology for the patient's tachycardia.  - No further cardiac testing indicated.     Cardiology will sign off. Please page [phone] with questions or concerns.     Patient discussed with staff attending, Dr. Gardiner and the note reflects our joint plan. Thank you for consulting the cardiovascular services at the [facility]. Please do not hesitate to call with questions or concerns.     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  Patient very somnolent, could not provide accurate history, but denies chest pain/pressure, SOB, palpitations or swelling    A 13-point ROS cannot be obtained because patient very lethargic.      Past Medical History:     Patient Active Problem List   Diagnosis     Sarcoma (H)         Past  Surgical History:      Past Surgical History:   Procedure Laterality Date     INSERT PORT VASCULAR ACCESS Right 11/1/2021    Procedure: INSERTION, VASCULAR ACCESS PORT;  Surgeon: Sushil Gonzales MD;  Location: [facility] OR     IR CHEST PORT PLACEMENT > 5 YRS OF AGE  11/1/2021         Social History:     Social History     Socioeconomic History     Marital status: Single     Spouse name: Not on file     Number of children: Not on file     Years of education: Not on file     Highest education level: Not on file   Occupational History     Not on file   Tobacco Use     Smoking status: Never Smoker     Smokeless tobacco: Never Used   Substance and Sexual Activity     Alcohol use: Not on file     Drug use: Not on file     Sexual activity: Not on file   Other Topics Concern     Not on file   Social History Narrative     Not on file     Social Determinants of Health     Financial Resource Strain: Not on file   Food Insecurity: Not on file   Transportation Needs: Not on file   Physical Activity: Not on file   Stress: Not on file   Social Connections: Not on file   Intimate Partner Violence: Not on file   Housing Stability: Not on file         Family History:     No family history on file.      Allergies:     No Known Allergies      Medications:     Current Facility-Administered Medications   Medication     0.9% sodium chloride BOLUS     acetaminophen (TYLENOL) tablet 500-1,000 mg     albuterol (PROVENTIL HFA/VENTOLIN HFA) inhaler     albuterol (PROVENTIL) neb solution 2.5 mg     artificial saliva (BIOTENE MT) solution 1-2 spray     bisacodyl (DULCOLAX) Suppository 10 mg     calcium carbonate (TUMS) chewable tablet 500 mg     Chemotherapy Infusing-Continuous Infusion     diclofenac (VOLTAREN) 1 % topical gel 2-4 g     diphenhydrAMINE (BENADRYL) injection 50 mg     docusate sodium (COLACE) capsule 100 mg     enoxaparin ANTICOAGULANT (LOVENOX) injection 40 mg     EPINEPHrine PF (ADRENALIN) injection 0.3 mg     famotidine  (PEPCID) injection 20 mg     heparin 100 UNIT/ML injection 5-10 mL     heparin lock flush 10 UNIT/ML injection 5-10 mL     heparin lock flush 10 UNIT/ML injection 5-10 mL     Ifosfamide (IFEX) 3,280 mg, mesna (MESNEX) 3,280 mg in sodium chloride 0.9 % 1,148 mL infusion     magnesium hydroxide (MILK OF MAGNESIA) suspension 30 mL     meperidine (DEMEROL) injection 25 mg     [START ON 12/6/2021] mesna (MESNEX) 3,280 mg in sodium chloride 0.9 % 1,083 mL infusion     methylPREDNISolone sodium succinate (solu-MEDROL) injection 125 mg     morphine (MS CONTIN) 12 hr tablet 30 mg     naloxone (NARCAN) injection 0.2 mg     ondansetron (ZOFRAN) injection 8 mg    Or     ondansetron (ZOFRAN-ODT) ODT tab 8 mg    Or     ondansetron (ZOFRAN) tablet 8 mg     ondansetron (ZOFRAN) tablet 8 mg     oxyCODONE (ROXICODONE) tablet 10-15 mg     polyethylene glycol (MIRALAX) Packet 17 g     prochlorperazine (COMPAZINE) injection 10 mg     prochlorperazine (COMPAZINE) tablet 10 mg     sennosides (SENOKOT) tablet 1-2 tablet     sodium chloride (PF) 0.9% PF flush 10-20 mL     sodium chloride (PF) 0.9% PF flush 10-20 mL     sodium chloride (PF) 0.9% PF flush 10-20 mL       No current facility-administered medications on file prior to encounter.  acetaminophen (TYLENOL) 500 MG tablet, Take 500-1,000 mg by mouth every 6 hours as needed for mild pain Per patient he needs a refill  diclofenac (VOLTAREN) 1 % topical gel, Apply 2-4 g topically 4 times daily as needed for moderate pain  ondansetron (ZOFRAN) 8 MG tablet, Take 1 tablet (8 mg) by mouth every 8 hours as needed for nausea  polyethylene glycol (MIRALAX) 17 GM/Dose powder, Take 17 g by mouth daily  prochlorperazine (COMPAZINE) 5 MG tablet, Take 1 tablet (5 mg) by mouth every 6 hours as needed for nausea or vomiting  sennosides (SENOKOT) 8.6 MG tablet, Take 1-2 tablets by mouth 2 times daily as needed for constipation or no stool            Physical Exam:     BP (!) 141/86 (BP Location: Right  "arm)   Pulse 112   Temp 97.8  F (36.6  C) (Oral)   Resp 18   Ht 1.88 m (6' 2\")   Wt 114.5 kg (252 lb 6.4 oz)   SpO2 100%   BMI 32.41 kg/m      Wt Readings from Last 4 Encounters:   12/01/21 114.5 kg (252 lb 6.4 oz)   11/09/21 116.8 kg (257 lb 6.4 oz)   11/01/21 104.3 kg (230 lb)   09/24/21 117.9 kg (260 lb)         Intake/Output Summary (Last 24 hours) at 12/2/2021 0912  Last data filed at 12/2/2021 0645  Gross per 24 hour   Intake 230 ml   Output 250 ml   Net -20 ml       Gen: Very somnolent, NAD  HEENT: EOM grossly intact, mucous membranes pink, moist without plaque or exudate  PULM/THORAX: Clear to auscultation bilaterally, no rales/rhonchi/wheezes  CV:RRR, S1 and S2 appreciated, no extra heart sounds, murmurs or rub auscultated. No JVD  ABD: obese, soft, nontender, nondistended. Normoactive bowel sounds  EXT: No edema, clubbing or cyanosis. No asymmetrical edema or tenderness to palpation in calves bilaterally.  PSYCH: lethargic, following commands  MUSCULOSKELETAL: No joint swelling or tenderness.   SKIN: No jaundice. No rashes or lesions.       Data:     Labs Reviewed on Admission    Troponin No results found for: TROPI, TROPONIN, TROPR, TROPN  BMP  Recent Labs   Lab 12/02/21  0652 12/01/21 0625 11/30/21  1049    135 133   POTASSIUM 3.7 3.6 3.7  3.7   CHLORIDE 102 100 99   VENTURA 8.8 8.8 8.7   CO2 27 26 26   BUN 7 5* 8   CR 0.58* 0.60* 0.62*   * 119* 124*     CBC  Recent Labs   Lab 12/02/21  0652 12/01/21  0625 11/30/21  1049   WBC 12.0* 10.8 15.1*   RBC 2.96* 2.67* 2.89*   HGB 7.4* 6.5* 7.1*   HCT 22.8* 20.7* 22.3*   MCV 77* 78 77*   MCH 25.0* 24.3* 24.6*   MCHC 32.5 31.4* 31.8   RDW 18.1* 17.2* 17.2*   * 705* 772*     INRNo lab results found in last 7 days.   Hepatic Panel   Lab Results   Component Value Date    AST 21 12/02/2021     Lab Results   Component Value Date    ALT 21 12/02/2021     No results found for: BILICONJ   Lab Results   Component Value Date    BILITOTAL 0.8 " 12/02/2021     Lab Results   Component Value Date    ALBUMIN 2.0 12/02/2021     Lab Results   Component Value Date    PROTTOTAL 7.2 12/02/2021      Lab Results   Component Value Date    ALKPHOS 315 12/02/2021           Most Recent Imaging:     EKG 11/30/2021:       Echo 11/2/2021:   Global and regional left ventricular function is normal with an EF of 60-65%.  Left ventricular size is normal.  The right ventricle is normal size.  Global right ventricular function is normal.  No significant valvular abnormalities were noted.  No pericardial effusion is present.    CXR 11/5/2021                                    IMPRESSION: Possible small left pleural effusion and hazy left basilar  pulmonary opacities which may represent atelectasis or infection.

## 2021-12-02 NOTE — PLAN OF CARE
Time: 9471-1551    Tmax 100.4, provider paged. Blood cultures obtained and tylenol given. OVSS on RA. Denies pain/nausea/SOB. On scheduled pain and anti-emetics. CIVI ifos/mesna infusing uneventfully with good blood return from port. SBA with walker. Upon waking, needed multiple prompts to complete an orientation assessment (asked for pt's name and birthday and pt repeatedly only gave his name and insisted he was giving his birthday, when offered his birthday, pt recognized the  and answered subsequent orientation assessment questions appropriately. Later, when asked about swelling of right leg, pt insisted left leg was the swollen extremity). Provider paged for concern for ifos toxicity, but found to be A/Ox4 without deficits. Encourage urinal use in bathroom for measurement. After much education, took 2 scheduled senna. LBM was . Continue to monitor.

## 2021-12-02 NOTE — PROGRESS NOTES
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 Currently admitted for Cycle 2 Doxil/Ifosfamide, Ifosfamide dose-reduced by 10% for history of mild neurotoxicity with cycle 1.     Plan for today  - Day 3 Doxil/Ifos. Labile mood/flat affect but alert and oriented   - Continue chemotherapy as scheduled but will monitor closely for monitor for s/sx of ifosfamide toxicity: hallucinations, AMS, emotional lability, somnolence, myoclonic jerks, tremors, coordination difficulties, etc. If these occur, will consider stopping chemotherapy and giving mesna. If severe symptoms will need to consider Methylene Blue  - Cardiology consulted for persistent tachycardia  - Continue supportive cares and current pain regimen   - Repeat COVID test on 12/7 or sooner if symptomatic    ONC  # High grade pleomorphic sarcoma of the right thigh  # Concern for metastases to the inguinal lymph nodes  # Right leg edema  # History of mild Ifos neurotoxicity  He initially presented to Dr. Whitaker, Orthopedics with rapidly progressing right leg swelling since ~ July 2021. MRI was incomplete due to pain, but did show ~[id number] right thigh soft tissue mass with probable bony involvement of the proximal right femur. 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 Was seen by Dr. Whitaker to review the results on 10/1/21 - discussed consideration of neoadjuvant chemotherapy vs resection and reconstruction. Given high risk for amputation with massive reconstructive surgery, he was referred to Dr. Ambrocio and a PET was ordered which identifed concerning inguinal lymphadenopathy. He was seen by Dr. Ambrocio  10/26/21, ordered Foundation one (MSI stable, no targetable mutations) and recommended starting Doxil/Ifos initially as an inpatient. Baseline Echo 11/2/21 technically difficult, EF 60-65%. Admitted for Cycle 1 Doxil/Ifos (L0R8=28/2/21), on C1D6 he was noted to have some mild confusion (labile mood, urinated himself and into a water pitcher) which warranted stopping Ifos slightly earlier at 1040am on Day 6 (he only had a few hours of ifos infusion left), mesna started. Patient did not require methylene blue. Otherwise tolerated well with mild nausea. He notes subjectively decreased size of the right thigh mass. Admitted for Cycle 2 Doxil/Ifos, Ifosfamide dose reduced by 10% (1500 mg/m2 ? 1350 mg/m2).   - Port accessed on admission   - Plan for restaging scans prior to Cycle 3 Doxil/Ifos, ordered/requested PET/CT scan for 12/29/21                  Treatment Plan: Doxil/Ifos/Mesna Cycle 2, Day 1 = 11/30/21               Doxorubicin Liposomal 45 mg/mg2 once Day 1               Ifosfamide 1350 mg/m2, Mesna 1500 mg/m2 once daily Days 1-6               Mesna 1500 mg/m2 once Day 7               Pegfilgastrim (UDENYCA per patient's insurance) Day 8 - scheduled on 12/8 at [facility] - Zofran 8mg q8h Days 1-7     # Cancer-related pain  Follows with palliative care outpatient. Currently taking oxycodone 5mg and Motrin ~every 4-6 hours. Mass partially erodes the outer cortex of the proximal right femur which increases risk for pathologic fracture. Per Dr. Whitaker, OK to bear weight on his right leg. He is following with Palliative Care, Dr. Sofia, as an outpatient. When seen by Dr. Sofia on 11/17/21, they discussed that he tends to become sedated with higher doses of oxycodone and could benefit from Methadone, but deferred starting methadone until next planned admission 11/30/21 so that he could be closely monitored. Pain has been fairly controlled with outpatient regimen, on average he is taking  5mg oxycodone 2-3 tabs BID, and ran out of oxycodone ~ 1 week PTA. After Cycle 1 of chemotherapy the right thigh mass has decreased in size and the pain is slightly improved.   - Continue PTA pain regimen                - MS Contin 30 mg BID                - Oxycodone 10-15mg q4h PRN               - Tylenol 975mg TID               - Voltaren gel PRN  - Consulted Palliative Care on admission to consider transitioning to Methadone per outpatient plan. Upon further discussions with patient, he feels his pain is fairly controlled and is not interested in trying methadone at this time.      # Leukocytosis  # Anemia  # Thrombocytosis  On admission, WBC 15.1 (mostly neutrophils), Hgb 7.1 (MCV 77, borderline microcytic), Plt 772k. He received neulasta on 11/12, which would not explain his leukocytosis presently. His counts appear to be reactive but he has been afebrile and no s/sx of infection. No evidence of bleeding or dark tarry stools. Anemia likely due to chemotherapy and possible iron deficiency/anemia of chronic disease. OK to proceed with chemotherapy as planned per Dr. Ambrocio.   - Iron studies: Serum iron 29, , iron saturation index 19%, transferrin 124, ferritin 1559. Indicates mixed iron deficiency and anemia of chronic disease. In the setting of chronic opioid-induced constipation, will try to avoid PO iron supplementation. Pt was encouraged to incorporate more iron into his diet. If anemia persists disproportionate to chemotherapy-effect could consider IV iron outpatient  - Transfuse for hgb <7 and plt <10k, blood consent signed  - Given 1u pRBC on 12/1 for hgb of 6.5    CARDS   # Sinus Tachycardia   On previous admission patient was noted to have consistent -120. EKG with sinus tachycardia, otherwise normal. No chest pain and no significant cardiac history. In clinic his heart rate has been persistently ~113-130bpm. He has been drinking a lot of gatorade at home, though notes poor food intake  lately due to nausea from chemotherapy. Clinically euvolemic on exam.  - EKG ordered on admission  - 1L NS given 11/30, HR improved to ~104 bpm briefly but HR since remains persistently 110-120s  - Consider additional fluid boluses if PO intake is poor  - Cardiology consulted per Dr. Ambrocio request for persistent tachycardia and consideration of a beta blocker     ID  # Low-grade non-neutropenic fever  Tm 100.4 at 2030 on 12/1, which was 3 hours after completion pRBC. No s/sx of infection, he has since remained afebrile.   - BCx 12/1 NGTD  - Tylenol PRN  - No indication for further imaging or antibiotics unless re-fevers or develops s/sx of infection    # Possible COVID exposure  On 12/1 patient was roomed with another patient who was exposed to COVID, but the patient has remained asymptomatic.   - No need for special precautions at this time.   - Will order a COVID test on 12/7 (prior to discharge) or sooner if he becomes symptomatic    MSK   # Right LE Edema   Follows with lymphedema outpatient.   - Consulted lymphedema     GI  # Opioid-induced constipation  Per patient, on average he has about 2 bowel movements per week. Is passing flatus, no abdominal pain on admission. Bowel sounds remain active.   - Increased bowel regimen: Miralax daily, Senna 1-2 tab BID. Colace 100mg BID PRN, Milk of Mag daily PRN, Dulcolax suppository daily PRN (ANC >1000, OK to give suppository)     # Chemo-related Nausea  # Decreased appetite  After Cycle 1 Doxil/Ifos he has been intermittently nauseous at home. Zofran PRN usually helps. His appetite is decreased, eating 1-2 meals per day and not eating 100% of his meals. He denies heartburn or s/sx of GERD. He has a bowel movement only about twice per week. Denies abdominal pain, cramping, or bloating.   - Encouraged bowel regimen as above  - Zofran PRN  - Encouraged small frequent meals, Ensure and/or magic cup between meals  - Palliative care discussed starting Marinol with him which  he declined  - If nausea is poorly controlled consider trial of Zyprexa at bedtime     PSYCH  # Labile mood  Patient had labile mood starting Day 6 of cycle 1 Doxil/Ifos. On 12/2 (Cycle 2, day 3) he expressed frustration over the multiple questions being asked to assess for Ifosfamide neurotoxicity. Yet he remains oriented. He notes feeling really tired due to frequent awakenings. He has been using less opioids, and has not had any benzodiazepine use recently.   - Continue to monitor for Ifos neurotoxicity. Nursing instructed to notify provider if he is unable to answer orientation questions.     FEN:  -IVF per chemo protocol   -PRN lyte replacement  -RDAT     Prophy/Misc:  -VTE: Lovenox, hold if platelets <50k  -GI/PUD:None indicated, tums PRN  -Bowels:Senna and Miralax   -Activity: WBAT per Orthopedics, PT consulted       Consulted: PT, Lymphedema, Palliative Care  Code: Full code, confirmed with patient on admission  Dispo: Remain inpatient until completion of chemotherapy, tentatively plan to discharge on 12/7 barring any other clinical complications  Follow up:   - UDENYCA and Labs 12/8 at [facility]  - Twice weekly labs at [facility] 12/8-12/24 (CBC w/ diff, CMP)  - MEHRAN visit 12/13  - Ordered/requested PET/CT scan for 12/29/21 (prior to cycle 3)  - Will need an MEHRAN appt or follow up with Dr. Ambrocio after his PET scan to determine further treatment plan, outpatient team to request based on timing of PET  - Referral placed to Primary Care by outpatient MEHRAN, not yet scheduled    Patient was seen and plan of care was discussed with attending physician Dr. Limon.    Sofi Waller PA-C  Hematology/Oncology  Pager # [phone]  ___________________________________________________________________    Subjective & Interval History:    Tm 100.4 overnight, no symptoms of infection. Overnight per nursing notes there was concern for neurotoxicity because when asked for his birthday he would repeatedly give his name  "instead. When further evaluated by the provider overnight he was alert and oriented x4, no evidence of confusion. Pt notes he was just starting to wake up at the time nursing was asking him questions. This morning he feels really tired and annoyed with all of the \"dumb questions\". Yet he remains alert and oriented, no tremors or poor balance. Denies nausea. He hasn't had a BM in a few days which is typical for him, but his passing a lot of flatus. No abdominal pain. His right thigh pain is fairly controlled with the current regimen, took 15mg oxycodone and 30mg MS contin BID yesterday. A comprehensive review of systems was reviewed with the patient and the pertinent positives are listed in the HPI above.     Physical Exam:    Blood pressure (!) 141/86, pulse 112, temperature 97.8  F (36.6  C), temperature source Oral, resp. rate 18, height 1.88 m (6' 2\"), weight 114.5 kg (252 lb 6.4 oz), SpO2 100 %.    General: tired, reluctantly cooperative, sitting up in chair, no acute distress  HEENT: sclera anicteric, EOMI, MMM. Thin face  Neck: supple, normal ROM  CV: tachycardic, regular rhythm, normal S1/S2, no m/r/g  Resp: CTAB, no wheezing/crackles, normal respiratory effort on ambient air  GI: soft, non-tender, non-distended, bowel sounds present and normoactive  MSK: Right thigh with firm widespread mass, mildly-tender to palpation. RLE edema with lymphedema wrap in placed. Strong peripheral pulses. Good strength and ROM  Skin: no rashes on limited exam, no jaundice  Neuro: AOx3, moves all extremities equally, no focal deficits    Labs & Studies: I personally reviewed the following studies:  ROUTINE LABS (Last four results):  CMP  Recent Labs   Lab 12/02/21  0652 12/01/21  0625 11/30/21  1049    135 133   POTASSIUM 3.7 3.6 3.7  3.7   CHLORIDE 102 100 99   CO2 27 26 26   ANIONGAP 6 9 8   * 119* 124*   BUN 7 5* 8   CR 0.58* 0.60* 0.62*   GFRESTIMATED >90 >90 >90   VENTURA 8.8 8.8 8.7   MAG 2.2 2.2 2.3   PHOS 3.5 " 3.3 3.1   PROTTOTAL 7.2 7.3 7.8   ALBUMIN 2.0* 2.2* 2.4*   BILITOTAL 0.8 0.4 0.6   ALKPHOS 315* 320* 397*   AST 21 25 35   ALT 21 24 26     CBC  Recent Labs   Lab 12/02/21  0652 12/01/21  0625 11/30/21  1049   WBC 12.0* 10.8 15.1*   RBC 2.96* 2.67* 2.89*   HGB 7.4* 6.5* 7.1*   HCT 22.8* 20.7* 22.3*   MCV 77* 78 77*   MCH 25.0* 24.3* 24.6*   MCHC 32.5 31.4* 31.8   RDW 18.1* 17.2* 17.2*   * 705* 772*     INRNo lab results found in last 7 days.    Microbiology  Recent Labs   Lab 11/30/21  1128   LACT 1.0       Pertinent Imaging    Medications list for reference:  Current Facility-Administered Medications   Medication    0.9% sodium chloride BOLUS    acetaminophen (TYLENOL) tablet 500-1,000 mg    albuterol (PROVENTIL HFA/VENTOLIN HFA) inhaler    albuterol (PROVENTIL) neb solution 2.5 mg    artificial saliva (BIOTENE MT) solution 1-2 spray    bisacodyl (DULCOLAX) Suppository 10 mg    calcium carbonate (TUMS) chewable tablet 500 mg    Chemotherapy Infusing-Continuous Infusion    diclofenac (VOLTAREN) 1 % topical gel 2-4 g    diphenhydrAMINE (BENADRYL) injection 50 mg    docusate sodium (COLACE) capsule 100 mg    enoxaparin ANTICOAGULANT (LOVENOX) injection 40 mg    EPINEPHrine PF (ADRENALIN) injection 0.3 mg    famotidine (PEPCID) injection 20 mg    heparin 100 UNIT/ML injection 5-10 mL    heparin lock flush 10 UNIT/ML injection 5-10 mL    heparin lock flush 10 UNIT/ML injection 5-10 mL    Ifosfamide (IFEX) 3,280 mg, mesna (MESNEX) 3,280 mg in sodium chloride 0.9 % 1,148 mL infusion    magnesium hydroxide (MILK OF MAGNESIA) suspension 30 mL    meperidine (DEMEROL) injection 25 mg    [START ON 12/6/2021] mesna (MESNEX) 3,280 mg in sodium chloride 0.9 % 1,083 mL infusion    methylPREDNISolone sodium succinate (solu-MEDROL) injection 125 mg    morphine (MS CONTIN) 12 hr tablet 30 mg    naloxone (NARCAN) injection 0.2 mg    ondansetron (ZOFRAN) injection 8 mg    Or    ondansetron (ZOFRAN-ODT) ODT tab 8 mg    Or     ondansetron (ZOFRAN) tablet 8 mg    ondansetron (ZOFRAN) tablet 8 mg    oxyCODONE (ROXICODONE) tablet 10-15 mg    polyethylene glycol (MIRALAX) Packet 17 g    prochlorperazine (COMPAZINE) injection 10 mg    prochlorperazine (COMPAZINE) tablet 10 mg    sennosides (SENOKOT) tablet 1-2 tablet    sodium chloride (PF) 0.9% PF flush 10-20 mL    sodium chloride (PF) 0.9% PF flush 10-20 mL    sodium chloride (PF) 0.9% PF flush 10-20 mL     Sofi Waller PA-C      ------------------------------------------------------------------------------------  PHYSICIAN ATTESTATION  I, Manuel Limon, saw and evaluated Vic Scott III as part of a shared visit.  I have reviewed and discussed with the advanced practice provider their history, physical and plan.  I personally reviewed the vital signs, medications, labs and imaging.  My key history or physical exam findings:  Patient tolerating chemotherapy well thus far.  No significant physical examination abnormalities, except for a sinus tachycardia.  He had a fever last night to 100.4F; this has not recurred.  He received one unit of PRBCs yesterday, and his hemoglobin today was 7.4 g/dL.  No evidence of ifosfamide-related CNS toxicity.  Key management decisions made by me:  Continue with planned inpatient chemotherapy; doxil/ifosfamide, Day 3 today.  No changes to dose or schedule of chemo.  Consult cardiology for recommendations about his tachycardia.  I spent a total of 40 minutes on this patient on the day of the encounter, of which more than 50% of this time was used for counselling and coordination of care.  Manuel Limon M.D.  Date of Service (when I saw the patient): 12/02/2021

## 2021-12-02 NOTE — PROGRESS NOTES
Paged by RN for concern for neurotoxicity from ifosfamide.     Spent some time interviewing and did a neuro exam with the patient and was reassured that his mentation and neuro exam are fairly unremarkable. Patient said he was a little slower to respond earlier because he woke up right when he was interviewed by the nurse. I don't think he has neurotoxicity.    Zay Turner MD

## 2021-12-02 NOTE — PROGRESS NOTES
[facility] [place]  Palliative Care Daily Progress Note       Recommendations & Counseling       Vic is more somnolent today on exam, but after discussion with oncology, they feel this is more likely due to Ifos neurotoxicity than opioid related. He has not used any prn oxycodone since yesterday morning, and exam without pinpoint pupils. He does wake up enough to answer orientations questions correctly and rates his pain a 7/10 currently. Would continue MS Contin 30 mg BID for now.     Continue to encourage bowel regimen. He states his normal pattern is a BM every 3-4 days.    No complaints of nausea. He declined Marinol for appetite stimulation and I wouldn't recommend this right now anyway given how somnolent he is.    Thank you for the opportunity to participate in the care of this patient and family. As his pain is controlled on current regimen and he does not appear to need further assistance with symptom management, we will sign off. Please re-consult if we can be of further assistance. I will send a message to scheduling about a follow up appointment in OP PC clinic.    During regular M-F work hours ([id number]) -- if you are not sure who specifically to contact -- please contact us in [facility] Smart Web.     After regular work hours and on weekends/holidays, you can call our answering service at [phone].     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 Currently admitted for Cycle 2 Doxil/Ifosfamide.     Today, the patient was seen  for:  High grade pleomorphic sarcoma of the right thigh  Cancer related pain     Prognosis, Goals, or Advance Care Planning was addressed today with: No.  Mood, coping, and/or meaning in the context of serious illness were addressed today: No.              Interval History:     Chart review/discussion with unit or clinical team members:   Overnight hospitalist paged due to concerns that patient was experiencing neurotoxicty, but no signs on that exam. No oxycodone use since yesterday AM. Continues on MS Contin 30 mg BID    Per patient or family/caregivers today:  Vic is seated in chair. He appears more somnolent this morning and not as interactive. He will respond to questions with repeated prompting, but is slow to answer. He is oriented to place and Thursday. He denies feeling any differently today than yesterday, although he repeatedly closes eyes and nods head during interview. He denies nausea. He rates pain in right thigh a 7/10. Last BM on Sunday. He does not feel constipated. When asked, states he normally can go 3-4 days with no BM. He is annoyed, as he feels writer has asked him this already this morning. Discussed it was likely another staff member, as I had not asked him this today already. He shook his head no when asked if his thoughts about trying an appetite stimulant, and would not provide a reason why.     Key Palliative Symptoms:  # Pain severity the last 12 hours: moderate  # Nausea severity the last 12 hours: none    Patient is on opioids: assessed and I made recommendations about bowel care as above.           Review of Systems:     Minimal ROS due to limited participation.             Medications:     I have reviewed this patient's medication profile and medications during this hospitalization.    Noted meds:    MS Contin 30 mg po q12h  Zofran 8 mg po q8h  Oxycodone 10-15 mg po q4h prn--15 mg in past 24h--last dose yest AM  Compazine 10 mg po q6h prn--not using             Physical Exam:    Temp: 97.8  F (36.6  C) Temp src: Oral BP: (!) 141/86 Pulse: 112   Resp: 18 SpO2: 100 % O2 Device: None (Room air)    Gen: Sitting in chair. Appears comfortable, in NAD  HEENT: NCAT. PERRL. Conjunctiva clear. Sclera anicteric.   Resp: No increased work of breathing  Abd: soft, nt, nd  Skin:  Warm, dry.  Ext: RLE wrapped, significantly larger in size > LLE.   Neuro: Somnolent, but does open eyes and respond verbally to questions. Oriented x 3.                Data Reviewed:     Reviewed recent pertinent imaging, comments:   No results found for this visit on 11/30/21.    Reviewed recent labs, comments:   Na 135  K 3.6  Creat 0.60  WBC 10.8  Hgb 6.5  Plt 705k  Albumin 2.2

## 2021-12-02 NOTE — PROVIDER NOTIFICATION
"Web-based messaging to [phone]    \"7D - 7520-1 - C.F.  Tmax 100.4. Due for blood cultures. Okay to release and give tylenol? Also, concern for chemo toxicity. Need to page fellow. Please call RN back.  Sandra, [phone]\"  "

## 2021-12-02 NOTE — PLAN OF CARE
[id number]    Mildly hypertensive, tachycardic. EKG done per cardiology rec. Sinus Tachy. Pt somnolent this shift, sleeping in chair. Not engaging in conversation, utilizing phone, laptop or TV as he was yesterday. Provider is aware of change in behavior. Continue to monitor and notify for increased confusion, hallucinations, AMS, myoclonic jerks or tremors and notify provider.     Day 2 infusing, Day 3 to begin today. Continueing to encourage bowel meds as pt has not had a BM since yesterday. Pt has a visitor who brought food, pt has not yet ate it, and otherwise pt not ordering own food as he has no appetite. UOP this shift 200ml, this writer suspects patient got up to use bathroom with friend present and did not save urine, although patient declined to answer or did not remember.     Continue with POC.

## 2021-12-02 NOTE — PLAN OF CARE
[id number]    AVSS on RA. 1 unit(s) PRBC transfused for a hgb of 6.5 with no adverse reaction. Day 2 CIVI Ifos/mesna infusing. No neuro deficits noted. PTA wound from compression stockings noted on right leg, wound is superficial, covered with gauze and now lymphedema wraps. Oxy given x1.     Pt shared with me his father passed away last week. Emotional support provided, and spiritual services offered, patient declined at this time but will keep it in mind. He also was curious if he should be on oral anti-coagulants while at home, and shared with me that his half brother passed away from a blood clot (PE?) a few years ago.     He has not had a BM since Sunday, and continues to decline all bowel meds. Opioid/constipation education reinforced. Patient said he will let us know if he wants anything.     Continue with POC.

## 2021-12-03 NOTE — PROGRESS NOTES
Care Management Discharge Note    Discharge Date: 12/4/21     Discharge Disposition: Home     Discharge Services: Outpatient Infusion Services, Palliative Care, OP Lymphedema services    Discharge DME: None    Discharge Transportation: Car, family or friend will provide    Private pay costs discussed: Not applicable    PAS Confirmation Code: N/A  Patient/family educated on Medicare website which has current facility and service quality ratings: N/A    Education Provided on the Discharge Plan: Yes  Persons Notified of Discharge Plans: Patient, bedside RN, SW  Patient/Family in Agreement with the Plan: Yes     Handoff Referral Completed: Yes    Additional Information:    Writer received update from [facility] that due to patients Ifosfamide Neurotoxicity, chemotherapy will be stopped. Patient will discharge home tomorrow, 12/4, pending stability.    Writer asked to cancel patients infusion appointment for a Udenyca injection and labs at [facility] on 12/8 and reschedule it on Monday, 12/6 or Tuesday AM, 12/7.    Writer called (Phn: [phone]) and spoke with Shanae at [facility] and was able to re-schedule patient's appointment to Monday, 12/6 at 2pm.    AVS updated.     Roselia Jones, RN, BSN, PHN  Care Coordinator   P: [phone], [employer]

## 2021-12-03 NOTE — PROVIDER NOTIFICATION
7D 7517-2 CF  /83. . Notifying per parameters. No change in clinical status. thanks Pro SMITH [id number]

## 2021-12-03 NOTE — DISCHARGE SUMMARY
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 Currently admitted for Cycle 2 Doxil/Ifosfamide, Ifosfamide dose-reduced by 10% for history of mild neurotoxicity with cycle 1.     On 12/3 at 0530, ie 12 hours into Day 3 Ifos/Mesna pt developed hand tremors, very labile/irritable mood, somnolence, urinary incontinence, was spitting on the floor and spilling drinks, not answering orientation questions. Ifos stopped at around 0530 per on-call Heme/Onc fellow, and started Mesna at ~0800. He gradually improved after stopping Ifos around 1030am, mentation back to baseline and tremors resolved at around 1400 today. Pt expressed that he really wants to go home and doesn't want any more chemo during this admission, but would be amenable to trying either an alternative treatment regimen or significantly lower dose doxil/ifos in the future. Given the severity of the neurotoxicity and per patient's wishes, we elected to discontinue Cycle 2 Ifosfamide and complete 18 hours of Mesna with plan to discharge 12/4 AM.     On 12/2 during admission, patient's roommate was exposed to COVID. Vic remains asymptomatic.     On day of discharge, patient is feeling well. He is hemodynamically stable and mentating appropriately. He can tell me that he became confused with chemotherapy yesterday, however cannot walk through the specifics of the day. States he remembers sitting on the ground and that he was \"out of it\". Feeling much better this " morning and able to think through situations appropriately. Has now completed 18-hour mesna washout. Looking forward to going home. Persistent tachycardia throughout admission without any changes from previous. Pain is overall well-controlled on PTA MS Contin and 10-15 mg oxycodone (which he takes ~2x daily at home). Refills sent of oxycodone and zofran per patient request. Denies need for further refills (does not need MS Contin). Discussed discharge precautions and patient agreeable to plan of care. Will call sister this morning to discuss as well.     To be Addressed at Follow up:  - Dr. Ambrocio and outpatient ME[facility]AN were notified of his neurotoxicity as above, awaiting plan per Dr. Ambrocio  - A PET scan was requested for 12/29, cancel or reschedule as per Dr. Ambrocio if indicated    New Discharge Medications:  - Plan to receive GCSF on 12/6 at [facility], with twice weekly labs and once weekly possible pRBCs.   - Denies need for MS Contin refill.  - Refilled oxycodone 5 mg tablets (#100); using 2-3 (10-15 mg), ~2x daily at home.   - Sent prescription for Zofran ODT for nausea.    Next Follow up Appointments:  - UDENYCA and Labs 12/6 at [facility]  - Twice weekly labs at [facility] 12/8-12/24 (CBC w/ diff, CMP), possible pRBC once weekly   - [facility] video visit 12/8  - ME[facility]AN video visit 12/13  - Ordered/requested PET/CT scan for 12/29/21, cancel/reschedule per outpt team  - Referral placed to Primary Care by outpatient MEHRAN, not yet scheduled  - referral placed by PT for outpatient PT, on waitlist    Hospital Course   Vic Scott III was admitted on 11/30/2021.  The following problems were addressed during his hospitalization:    ONC  # High grade pleomorphic sarcoma of the right thigh  # Concern for metastases to the inguinal lymph nodes  # Right leg edema  # History of mild Ifos neurotoxicity   He initially presented to Dr. Whitaker, Orthopedics with rapidly progressing right leg swelling since ~ July 2021. MRI was  incomplete due to pain, but did show ~51m27gg right thigh soft tissue mass with probable bony involvement of the proximal right femur. 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 Was seen by Dr. Whitaker to review the results on 10/1/21 - discussed consideration of neoadjuvant chemotherapy vs resection and reconstruction. Given high risk for amputation with massive reconstructive surgery, he was referred to Dr. Ambrocio and a PET was ordered which identifed concerning inguinal lymphadenopathy. He was seen by Dr. Ambrocio 10/26/21, ordered Foundation one (MSI stable, no targetable mutations) and recommended starting Doxil/Ifos initially as an inpatient. Baseline Echo 11/2/21 technically difficult, EF 60-65%. Admitted for Cycle 1 Doxil/Ifos (O7B6=99/2/21), on C1D6 he was noted to have some mild confusion (labile mood, urinated himself and into a water pitcher) which warranted stopping Ifos slightly earlier at 1040am on Day 6 (he only had a few hours of ifos infusion left), mesna started. Patient did not require methylene blue. Otherwise tolerated well with mild nausea. He notes subjectively decreased size of the right thigh mass. Admitted for Cycle 2 Doxil/Ifos, Ifosfamide dose reduced by 10% (1500 mg/m2 ? 1350 mg/m2).   - Port accessed on admission   - Plan for restaging scans prior to Cycle 3 Doxil/Ifos, ordered/requested PET/CT scan for 12/29/21                  Treatment Plan: Doxil/Ifos/Mesna Cycle 2, Day 1 = 11/30/21. * Treatment plan discontinued on Day 3, see below               Doxorubicin Liposomal 45 mg/mg2 once Day 1               Ifosfamide 1350 mg/m2, Mesna 1500 mg/m2 once daily Days 1-3*               Mesna 1500 mg/m2 once Day 4*               Pegfilgastrim (UDENYCA per patient's insurance) Day 7* - scheduled on 12/6 at [facility] - Zofran 8mg q8h Days 1-3*                    # Ifosfamide Neurotoxicity  At 12/3 at 0530 AM ie. 12 hours into Day 3  Ifos/Mesna infusion the patient became increasingly irritable (previously pleasant on admission) and lethargic. He was having urinary incontinence, spitting frequently on the floor and spilling drinks, and refusing to answer orientation questions. Also note new bilateral hand tremors. The on-call Heme/Onc fellow was notified and instructed them to stop Ifos and continue mesna. Ifosfamide was discontinued around 0530 on 12/3. Mesna was released but not administered until 0815 on 12/3 due to an error which our Nurse Manager is investigating. He gradually improved after stopping Ifos around 1030am, mentation back to baseline and tremors resolved at around 1400 12/3. Pt expressed that he really wants to go home and doesn't want any more chemo during this admission, but would be amenable to trying either an alternative treatment regimen or significantly lower dose doxil/ifos in the future.   - Given severity of symptoms and per patient's wishes, we discontinued Ifos this cycle. Continue Mesna over 18 hours, will be done around 2am on 12/4. Discharge 12/4 AM  - Dr. Ambrocio and outpatient ME[facility] were notified, awaiting plan per Dr. Ambrocio     # Cancer-related pain  Follows with palliative care outpatient. Currently taking oxycodone 5mg and Motrin ~every 4-6 hours. Mass partially erodes the outer cortex of the proximal right femur which increases risk for pathologic fracture. Per Dr. Whitaker, OK to bear weight on his right leg. He is following with Palliative Care, Dr. Sofia, as an outpatient. When seen by Dr. Sofia on 11/17/21, they discussed that he tends to become sedated with higher doses of oxycodone and could benefit from Methadone, but deferred starting methadone until next planned admission 11/30/21 so that he could be closely monitored. Pain has been fairly controlled with outpatient regimen, on average he is taking 5mg oxycodone 2-3 tabs BID, and ran out of oxycodone ~ 1 week PTA. After Cycle 1 of chemotherapy the  right thigh mass has decreased in size and the pain is slightly improved.   - Continue PTA pain regimen                - MS Contin 30 mg BID                - Oxycodone 10-15mg q4h PRN               - Tylenol 975mg TID               - Voltaren gel PRN  - Consulted Palliative Care on admission to consider transitioning to Methadone per outpatient plan. Upon further discussions with patient, he feels his pain is fairly controlled and is not interested in trying methadone at this time.      # Leukocytosis  # Anemia  # Thrombocytosis  On admission, WBC 15.1 (mostly neutrophils), Hgb 7.1 (MCV 77, borderline microcytic), Plt 772k. He received neulasta on 11/12, which would not explain his leukocytosis presently. His counts appear to be reactive but he has been afebrile and no s/sx of infection. No evidence of bleeding or dark tarry stools. Anemia likely due to chemotherapy and possible iron deficiency/anemia of chronic disease. OK to proceed with chemotherapy as planned per Dr. Ambrocio.   - Iron studies: Serum iron 29, , iron saturation index 19%, transferrin 124, ferritin 1559. Indicates mixed iron deficiency and anemia of chronic disease. In the setting of chronic opioid-induced constipation, will try to avoid PO iron supplementation. Pt was encouraged to incorporate more iron into his diet. If anemia persists disproportionate to chemotherapy-effect could consider IV iron outpatient  - Transfuse for hgb <7 and plt <10k, blood consent signed  - Given 1u pRBC on 12/1 for hgb of 6.5  - Follow labs twice weekly, possible pRBC once weekly      CARDS   # Sinus Tachycardia   On previous admission patient was noted to have consistent -120. EKG with sinus tachycardia, otherwise normal. No chest pain and no significant cardiac history. In clinic his heart rate has been persistently ~113-130bpm. He has been drinking a lot of gatorade at home, though notes poor food intake lately due to nausea from chemotherapy.  Clinically euvolemic on exam.  - EKG ordered on admission, sinus tachy with  bpm  - 1L NS given 11/30, HR improved to ~104 bpm briefly but HR since remains persistently 110-120s  - Consider additional fluid boluses if PO intake is poor  - Cardiology consulted per Dr. Ambrocio request for persistent tachycardia and consideration of a beta blocker - they do not feel like this is cardiac in nature, suspect tachycardia is d/t malignancy. No indication for beta blocker     ID  # Low-grade non-neutropenic fever  Tm 100.4 at 2030 on 12/1, which was 3 hours after completion pRBC. No s/sx of infection, he has since remained afebrile.   - BCx 12/1 NGTD  - Tylenol PRN  - No indication for further imaging or antibiotics unless re-fevers or develops s/sx of infection  - No further fevers noted     # Possible COVID exposure  On 12/1 patient was roomed with another patient who was exposed to COVID, but the patient has remained asymptomatic.   - No need for special precautions at this time.   - Will order a COVID test on 12/4 (prior to discharge)     MSK   # Right LE Edema   Follows with lymphedema outpatient.   - Consulted lymphedema     GI  # Opioid-induced constipation  Per patient, on average he has about 2 bowel movements per week. Is passing flatus, no abdominal pain on admission. Bowel sounds remain active.   - Increased bowel regimen: Miralax daily, Senna 1-2 tab BID. Colace 100mg BID PRN, Milk of Mag daily PRN, Dulcolax suppository daily PRN (ANC >1000, OK to give suppository)     # Chemo-related Nausea  # Decreased appetite  After Cycle 1 Doxil/Ifos he has been intermittently nauseous at home. Zofran PRN usually helps. His appetite is decreased, eating 1-2 meals per day and not eating 100% of his meals. He denies heartburn or s/sx of GERD. He has a bowel movement only about twice per week. Denies abdominal pain, cramping, or bloating.   - Encouraged bowel regimen as above  - Zofran PRN  - Encouraged small frequent  meals, Ensure and/or magic cup between meals  - Palliative care discussed starting Marinol with him which he declined  - Pt denied nausea during this admission. If nausea is poorly controlled in the future consider trial of Zyprexa at bedtime.     FEN:  -IVF per chemo protocol   -PRN lyte replacement  -RDAT     Prophy/Misc:  -VTE: Lovenox inpatient, hold if platelets <50k  -GI/PUD:None indicated, tums PRN  -Bowels:Senna and Miralax   -Activity: WBAT per Orthopedics, PT consulted    Consulted: PT, Lymphedema, Palliative Care  Code: Full code, confirmed with patient on admission  Dispo: Discharge 12/4 AM to patient's sister's home    Deborah Bajwa PA-C  Hematology/Oncology  #[id number]    Significant Results and Procedures   None    Pending Results   These results will be followed up by MEHRAN  Unresulted Labs Ordered in the Past 30 Days of this Admission     Date and Time Order Name Status Description    12/1/2021  9:43 PM Blood Culture Line, venous Preliminary     12/1/2021  9:43 PM Blood Culture Hand, Right Preliminary           Code Status   Full Code    Primary Care Physician   Physician No Ref-Primary  General: alert and oriented, sitting up in chair, no acute distress  HEENT: sclera anicteric, EOMI, MMM. Thin face  Neck: supple, normal ROM  CV: tachycardic, regular rhythm, normal S1/S2, no m/r/g  Resp: CTAB, no wheezing/crackles, normal respiratory effort on ambient air  GI: soft, non-tender, non-distended, bowel sounds present and normoactive  MSK: Right thigh with firm widespread mass, RLE edema with lymphedema wrap in placed. Strong peripheral pulses. Good strength and ROM  Skin: no rashes on limited exam, no jaundice  Neuro: AOx3, moves all extremities equally, no focal deficits    Time Spent on this Encounter   I, Deborah Bajwa PA-C, personally saw the patient today and spent greater than 30 minutes discharging this patient.    Discharge Disposition   Discharged to home  Condition at discharge:  Stable    Consultations This Hospital Stay   PALLIATIVE CARE ADULT IP CONSULT  LYMPHEDEMA THERAPY IP CONSULT  PHYSICAL THERAPY ADULT IP CONSULT  CARE MANAGEMENT / SOCIAL WORK IP CONSULT  VASCULAR ACCESS CARE ADULT IP CONSULT  CARDIOLOGY GENERAL ADULT IP CONSULT    Discharge Orders      PET Oncology Whole Body    Please arrange on ~12/29     Comprehensive metabolic panel    Twice weekly CMP 12/8-12/24     Physical Therapy Referral      Reason for your hospital stay    Chemotherapy (Doxil/Ifos/Mesna) for Sarcoma     Activity    Your activity upon discharge: activity as tolerated     Follow Up and recommended labs and tests    See follow up arranged below     When to contact your care team    [facility] cancer clinic triage line at [phone] for temp > or = 100.4, uncontrolled nausea/vomiting/diarrhea/constipation, unrelieved pain, bleeding not relieved with pressure, dizziness, chest pain, shortness of breath, loss of consciousness, and any new or concerning symptoms.     Discharge Instructions    - Continue current pain regimen  - Take zofran as needed for nausea  - Try to do more activity and eat more small frequent meals     Diet    Follow this diet upon discharge: regular diet, protein shakes between meals (1-2 per day)     CBC with Platelets & Differential    Twice weekly labs 12/8-12/24     Discharge Medications   Current Discharge Medication List      CONTINUE these medications which have CHANGED    Details   oxyCODONE (ROXICODONE) 5 MG tablet Take 2-3 tablets (10-15 mg) by mouth every 4 hours as needed for moderate to severe pain  Qty: 100 tablet, Refills: 0    Associated Diagnoses: Sarcoma (H); Cancer associated pain         CONTINUE these medications which have NOT CHANGED    Details   acetaminophen (TYLENOL) 500 MG tablet Take 500-1,000 mg by mouth every 6 hours as needed for mild pain Per patient he needs a refill      diclofenac (VOLTAREN) 1 % topical gel Apply 2-4 g topically 4 times daily as needed  for moderate pain  Qty: 100 g, Refills: 0    Associated Diagnoses: Sarcoma (H)      morphine (MS CONTIN) 30 MG CR tablet Take 1 tablet (30 mg) by mouth every 12 hours  Qty: 60 tablet, Refills: 0    Associated Diagnoses: Sarcoma (H); Cancer associated pain      ondansetron (ZOFRAN) 8 MG tablet Take 1 tablet (8 mg) by mouth every 8 hours as needed for nausea  Qty: 30 tablet, Refills: 1    Associated Diagnoses: Sarcoma (H)      polyethylene glycol (MIRALAX) 17 GM/Dose powder Take 17 g by mouth daily  Qty: 510 g      prochlorperazine (COMPAZINE) 5 MG tablet Take 1 tablet (5 mg) by mouth every 6 hours as needed for nausea or vomiting  Qty: 30 tablet, Refills: 0    Associated Diagnoses: Sarcoma (H)      sennosides (SENOKOT) 8.6 MG tablet Take 1-2 tablets by mouth 2 times daily as needed for constipation or no stool  Qty: 30 tablet, Refills: 1    Associated Diagnoses: Sarcoma (H); Constipation, unspecified constipation type           Allergies   No Known Allergies  Data   Most Recent 3 CBC's:Recent Labs   Lab Test 12/04/21 0758 12/03/21  1711 12/03/21  0613   WBC 9.8 11.6* 13.5*   HGB 7.5* 7.6* 7.7*   MCV 78 78 79   * 708* 706*      Most Recent 3 BMP's:  Recent Labs   Lab Test 12/04/21 0758 12/03/21  1711 12/03/21  0613    137 137   POTASSIUM 3.6 3.9 3.9   CHLORIDE 106 105 103   CO2 24 25 26   BUN 9 8 9   CR 0.60* 0.55* 0.53*   ANIONGAP 7 7 8   VENTURA 9.3 8.9 9.3   * 112* 121*     Most Recent 2 LFT's:  Recent Labs   Lab Test 12/04/21 0758 12/03/21 1711   AST 13 15   ALT 17 18   ALKPHOS 218* 239*   BILITOTAL 0.7 0.6     Most Recent INR's and Anticoagulation Dosing History:  Anticoagulation Dose History     Recent Dosing and Labs Latest Ref Rng & Units 11/1/2021 11/4/2021 11/8/2021    INR 0.85 - 1.15 1.11 1.26(H) 1.20(H)        Most Recent 3 Troponin's:No lab results found.  Most Recent Cholesterol Panel:No lab results found.  Most Recent 6 Bacteria Isolates From Any Culture (See EPIC Reports for Culture  Details):No lab results found.  Most Recent TSH, T4 and A1c Labs:No lab results found.  Results for orders placed or performed during the hospital encounter of 11/30/21   XR Chest Port 1 View    Narrative    EXAM: XR CHEST PORT 1 VIEW  12/3/2021 7:37 PM     HISTORY:  sustained tacycardia looking at infiltrates       COMPARISON:  Chest x-ray 11/25/2021, PET/CT 10/20/2021    FINDINGS  Technique: Semiupright AP view of the chest.     Devices: Right chest Port-A-Cath terminates projected over the  superior cavoatrial junction.    Lungs: No new focal airspace opacity.  No discernible pneumothorax.   No definite pleural effusion.     Cardiovascular: Cardiomediastinal silhouette is within normal limits.      Impression    IMPRESSION:   No acute air space opacity to suggest focal infection.    I have personally reviewed the examination and initial interpretation  and I agree with the findings.    DEWAYNE KEMP MD         SYSTEM ID:  [id number]

## 2021-12-03 NOTE — PROGRESS NOTES
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 Currently admitted for Cycle 2 Doxil/Ifosfamide, Ifosfamide dose-reduced by 10% for history of mild neurotoxicity with cycle 1.     Plan for today  - at 0530 this morning, ie 12 hours into Day 3 Ifos/Mesna pt developed hand tremors, labile mood, urinary incontinence, was spitting on the floor and spilling drinks, not answering orientation questions. Ifos stopped at 0530 this morning, and started Mesna at ~0800. He gradually improved after stopping Ifos around 1030am, mentation back to baseline and tremors resolved at around 1400 today. Pt expressed that he really wants to go home and doesn't want any more chemo during this admission, but would be amenable to trying either an alternative treatment regimen or significantly lower dose doxil/ifos in the future.   - Will discharge 12/4 AM after 18hours of Mesna (to be completed at ~2:30am tonight)  - Continue current pain regimen     ONC  # High grade pleomorphic sarcoma of the right thigh  # Concern for metastases to the inguinal lymph nodes  # Right leg edema  # History of mild Ifos neurotoxicity   He initially presented to Dr. Whitaker, Orthopedics with rapidly progressing right leg swelling since ~ July 2021. MRI was incomplete due to pain, but did show ~[id number] right thigh soft tissue mass with probable bony involvement of the proximal right femur. 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 Was seen by Dr. Whitaker to review the results on 10/1/21 - discussed consideration of neoadjuvant chemotherapy vs resection and reconstruction. Given high risk for amputation with  massive reconstructive surgery, he was referred to Dr. Ambrocio and a PET was ordered which identifed concerning inguinal lymphadenopathy. He was seen by Dr. Ambrocio 10/26/21, ordered Foundation one (MSI stable, no targetable mutations) and recommended starting Doxil/Ifos initially as an inpatient. Baseline Echo 11/2/21 technically difficult, EF 60-65%. Admitted for Cycle 1 Doxil/Ifos (U8G2=91/2/21), on C1D6 he was noted to have some mild confusion (labile mood, urinated himself and into a water pitcher) which warranted stopping Ifos slightly earlier at 1040am on Day 6 (he only had a few hours of ifos infusion left), mesna started. Patient did not require methylene blue. Otherwise tolerated well with mild nausea. He notes subjectively decreased size of the right thigh mass. Admitted for Cycle 2 Doxil/Ifos, Ifosfamide dose reduced by 10% (1500 mg/m2 ? 1350 mg/m2).   - Port accessed on admission   - Plan for restaging scans prior to Cycle 3 Doxil/Ifos, ordered/requested PET/CT scan for 12/29/21                  Treatment Plan: Doxil/Ifos/Mesna Cycle 2, Day 1 = 11/30/21. * Treatment plan discontinued on Day 3, see below               Doxorubicin Liposomal 45 mg/mg2 once Day 1               Ifosfamide 1350 mg/m2, Mesna 1500 mg/m2 once daily Days 1-3*               Mesna 1500 mg/m2 once Day 4*               Pegfilgastrim (UDENYCA per patient's insurance) Day 7* - scheduled on 12/6 at [facility] - Zofran 8mg q8h Days 1-3*       # Ifosfamide Neurotoxicity  At 12/3 at 0530 AM ie. 12 hours into Day 3 Ifos/Mesna infusion the patient became increasingly irritable (previously pleasant on admission) and lethargic. He was having urinary incontinence, spitting frequently on the floor and spilling drinks, and refusing to answer orientation questions. Also note new bilateral hand tremors. The on-call Heme/Onc fellow was notified and instructed them to stop Ifos and continue mesna. Ifosfamide was  discontinued around 0530 on 12/3. Mesna was released but not administered until 0815 on 12/3 due to an error which our Nurse Manager is investigating. He gradually improved after stopping Ifos around 1030am, mentation back to baseline and tremors resolved at around 1400 12/3. Pt expressed that he really wants to go home and doesn't want any more chemo during this admission, but would be amenable to trying either an alternative treatment regimen or significantly lower dose doxil/ifos in the future.   - Given severity of symptoms and per patient's wishes, we discontinued Ifos this cycle. Continue Mesna over 18 hours, will be done around 2am on 12/4. Discharge 12/4 AM  - Dr. Ambrocio and outpatient MEHRAN were notified, awaiting plan per Dr. Ambrocio       # Cancer-related pain  Follows with palliative care outpatient. Currently taking oxycodone 5mg and Motrin ~every 4-6 hours. Mass partially erodes the outer cortex of the proximal right femur which increases risk for pathologic fracture. Per Dr. Whitaker, OK to bear weight on his right leg. He is following with Palliative Care, Dr. Sofia, as an outpatient. When seen by Dr. Sofia on 11/17/21, they discussed that he tends to become sedated with higher doses of oxycodone and could benefit from Methadone, but deferred starting methadone until next planned admission 11/30/21 so that he could be closely monitored. Pain has been fairly controlled with outpatient regimen, on average he is taking 5mg oxycodone 2-3 tabs BID, and ran out of oxycodone ~ 1 week PTA. After Cycle 1 of chemotherapy the right thigh mass has decreased in size and the pain is slightly improved.   - Continue PTA pain regimen                - MS Contin 30 mg BID                - Oxycodone 10-15mg q4h PRN               - Tylenol 975mg TID               - Voltaren gel PRN  - Consulted Palliative Care on admission to consider transitioning to Methadone per outpatient plan. Upon further discussions with patient,  he feels his pain is fairly controlled and is not interested in trying methadone at this time.      # Leukocytosis  # Anemia  # Thrombocytosis  On admission, WBC 15.1 (mostly neutrophils), Hgb 7.1 (MCV 77, borderline microcytic), Plt 772k. He received neulasta on 11/12, which would not explain his leukocytosis presently. His counts appear to be reactive but he has been afebrile and no s/sx of infection. No evidence of bleeding or dark tarry stools. Anemia likely due to chemotherapy and possible iron deficiency/anemia of chronic disease. OK to proceed with chemotherapy as planned per Dr. Ambrocio.   - Iron studies: Serum iron 29, , iron saturation index 19%, transferrin 124, ferritin 1559. Indicates mixed iron deficiency and anemia of chronic disease. In the setting of chronic opioid-induced constipation, will try to avoid PO iron supplementation. Pt was encouraged to incorporate more iron into his diet. If anemia persists disproportionate to chemotherapy-effect could consider IV iron outpatient  - Transfuse for hgb <7 and plt <10k, blood consent signed  - Given 1u pRBC on 12/1 for hgb of 6.5  - Follow labs twice weekly, possible pRBC once weekly     CARDS   # Sinus Tachycardia   On previous admission patient was noted to have consistent -120. EKG with sinus tachycardia, otherwise normal. No chest pain and no significant cardiac history. In clinic his heart rate has been persistently ~113-130bpm. He has been drinking a lot of gatorade at home, though notes poor food intake lately due to nausea from chemotherapy. Clinically euvolemic on exam.  - EKG ordered on admission  - 1L NS given 11/30, HR improved to ~104 bpm briefly but HR since remains persistently 110-120s  - Consider additional fluid boluses if PO intake is poor  - Cardiology consulted per Dr. Ambrocio request for persistent tachycardia and consideration of a beta blocker - they do not feel like this is cardiac in nature, suspect tachycardia is  d/t malignancy. No indication for beta blocker     ID  # Low-grade non-neutropenic fever  Tm 100.4 at 2030 on 12/1, which was 3 hours after completion pRBC. No s/sx of infection, he has since remained afebrile.   - BCx 12/1 NGTD  - Tylenol PRN  - No indication for further imaging or antibiotics unless re-fevers or develops s/sx of infection    # Possible COVID exposure  On 12/1 patient was roomed with another patient who was exposed to COVID, but the patient has remained asymptomatic.   - No need for special precautions at this time.   - Will order a COVID test on 12/7 (prior to discharge) or sooner if he becomes symptomatic    MSK   # Right LE Edema   Follows with lymphedema outpatient.   - Consulted lymphedema     GI  # Opioid-induced constipation  Per patient, on average he has about 2 bowel movements per week. Is passing flatus, no abdominal pain on admission. Bowel sounds remain active.   - Increased bowel regimen: Miralax daily, Senna 1-2 tab BID. Colace 100mg BID PRN, Milk of Mag daily PRN, Dulcolax suppository daily PRN (ANC >1000, OK to give suppository)     # Chemo-related Nausea  # Decreased appetite  After Cycle 1 Doxil/Ifos he has been intermittently nauseous at home. Zofran PRN usually helps. His appetite is decreased, eating 1-2 meals per day and not eating 100% of his meals. He denies heartburn or s/sx of GERD. He has a bowel movement only about twice per week. Denies abdominal pain, cramping, or bloating.   - Encouraged bowel regimen as above  - Zofran PRN  - Encouraged small frequent meals, Ensure and/or magic cup between meals  - Palliative care discussed starting Marinol with him which he declined  - If nausea is poorly controlled consider trial of Zyprexa at bedtime     PSYCH  # Labile mood  Patient had labile mood starting Day 6 of cycle 1 Doxil/Ifos. On 12/2 (Cycle 2, day 3) he expressed frustration over the multiple questions being asked to assess for Ifosfamide neurotoxicity. Yet he remains  oriented. He notes feeling really tired due to frequent awakenings. He has been using less opioids, and has not had any benzodiazepine use recently.   - Continue to monitor for Ifos neurotoxicity. Nursing instructed to notify provider if he is unable to answer orientation questions.     FEN:  -IVF per chemo protocol   -PRN lyte replacement  -RDAT     Prophy/Misc:  -VTE: Lovenox, hold if platelets <50k  -GI/PUD:None indicated, tums PRN  -Bowels:Senna and Miralax   -Activity: WBAT per Orthopedics, PT consulted       Consulted: PT, Lymphedema, Palliative Care  Code: Full code, confirmed with patient on admission  Dispo: Discharge 12/4 AM to patient's sister's home  Follow up:   - UDENYCA and Labs 12/6 at [facility]  - Twice weekly labs at [place] 12/8-12/24 (CBC w/ diff, CMP), possible pRBC once weekly   - requested MEHRAN video visit the week of 12/6  - MEHRAN visit 12/13  - Ordered/requested PET/CT scan for 12/29/21 (prior to cycle 3)  - Will need an MEHRAN appt or follow up with Dr. Ambrocio after his PET scan to determine further treatment plan, outpatient team to request based on timing of PET  - Referral placed to Primary Care by outpatient MEHRAN, not yet scheduled  - referral placed by PT for outpatient PT, on waitlist    Patient was seen and plan of care was discussed with attending physician Dr. Limon.    Sofi Waller PA-C  Hematology/Oncology  Pager # [phone]  ___________________________________________________________________    Subjective & Interval History:    Afebrile overnight. At 0530 this morning, ie 12 hours into Day 3 Ifos/Mesna pt developed hand tremors, labile mood, urinary incontinence, was spitting on the floor and spilling drinks, not answering orientation questions. Ifos stopped at 0530 this morning, and started Mesna at ~0800. He gradually improved after stopping Ifos around 1030am, mentation back to baseline and tremors resolved at around 1400 today. Pt expressed that he really wants to go  "home and doesn't want any more chemo during this admission, but would be amenable to trying either an alternative treatment regimen or significantly lower dose doxil/ifos in the future. Pain is fairly controlled.  A comprehensive review of systems was reviewed with the patient and the pertinent positives are listed in the HPI above.     Physical Exam:    Blood pressure (!) 164/89, pulse (!) 129, temperature 98.5  F (36.9  C), temperature source Oral, resp. rate 18, height 1.88 m (6' 2\"), weight 114.5 kg (252 lb 6.4 oz), SpO2 100 %.  *Physical exam at 2pm today  General: alert and oriented, sitting up in chair, no acute distress  HEENT: sclera anicteric, EOMI, MMM. Thin face  Neck: supple, normal ROM  CV: tachycardic, regular rhythm, normal S1/S2, no m/r/g  Resp: CTAB, no wheezing/crackles, normal respiratory effort on ambient air  GI: soft, non-tender, non-distended, bowel sounds present and normoactive  MSK: Right thigh with firm widespread mass, mildly-tender to palpation. RLE edema with lymphedema wrap in placed. Strong peripheral pulses. Good strength and ROM  Skin: no rashes on limited exam, no jaundice  Neuro: AOx3, moves all extremities equally, no focal deficits    Labs & Studies: I personally reviewed the following studies:  ROUTINE LABS (Last four results):  CMP  Recent Labs   Lab 12/03/21  0613 12/02/21  0652 12/01/21  0625 11/30/21  1049    135 135 133   POTASSIUM 3.9 3.7 3.6 3.7  3.7   CHLORIDE 103 102 100 99   CO2 26 27 26 26   ANIONGAP 8 6 9 8   * 106* 119* 124*   BUN 9 7 5* 8   CR 0.53* 0.58* 0.60* 0.62*   GFRESTIMATED >90 >90 >90 >90   VENTURA 9.3 8.8 8.8 8.7   MAG 2.3 2.2 2.2 2.3   PHOS 3.0 3.5 3.3 3.1   PROTTOTAL 7.6 7.2 7.3 7.8   ALBUMIN 2.2* 2.0* 2.2* 2.4*   BILITOTAL 0.7 0.8 0.4 0.6   ALKPHOS 275* 315* 320* 397*   AST 18 21 25 35   ALT 19 21 24 26     CBC  Recent Labs   Lab 12/03/21  0613 12/02/21  0652 12/01/21  0625 11/30/21  1049   WBC 13.5* 12.0* 10.8 15.1*   RBC 3.09* 2.96* 2.67* " 2.89*   HGB 7.7* 7.4* 6.5* 7.1*   HCT 24.3* 22.8* 20.7* 22.3*   MCV 79 77* 78 77*   MCH 24.9* 25.0* 24.3* 24.6*   MCHC 31.7 32.5 31.4* 31.8   RDW 18.4* 18.1* 17.2* 17.2*   * 657* 705* 772*     INRNo lab results found in last 7 days.    Microbiology  Recent Labs   Lab 12/03/21  0732 11/30/21  1128   LACT 1.1 1.0       Pertinent Imaging    Medications list for reference:  Current Facility-Administered Medications   Medication    0.9% sodium chloride BOLUS    acetaminophen (TYLENOL) tablet 500-1,000 mg    albuterol (PROVENTIL HFA/VENTOLIN HFA) inhaler    albuterol (PROVENTIL) neb solution 2.5 mg    artificial saliva (BIOTENE MT) solution 1-2 spray    bisacodyl (DULCOLAX) Suppository 10 mg    calcium carbonate (TUMS) chewable tablet 500 mg    Chemotherapy Infusing-Continuous Infusion    diclofenac (VOLTAREN) 1 % topical gel 2-4 g    diphenhydrAMINE (BENADRYL) injection 50 mg    docusate sodium (COLACE) capsule 100 mg    enoxaparin ANTICOAGULANT (LOVENOX) injection 40 mg    EPINEPHrine PF (ADRENALIN) injection 0.3 mg    famotidine (PEPCID) injection 20 mg    heparin 100 UNIT/ML injection 5-10 mL    heparin lock flush 10 UNIT/ML injection 5-10 mL    heparin lock flush 10 UNIT/ML injection 5-10 mL    [Held by provider] Ifosfamide (IFEX) 3,280 mg, mesna (MESNEX) 3,280 mg in sodium chloride 0.9 % 1,148 mL infusion    magnesium hydroxide (MILK OF MAGNESIA) suspension 30 mL    meperidine (DEMEROL) injection 25 mg    mesna (MESNEX) 3,280 mg in sodium chloride 0.9 % 1,083 mL infusion    methylPREDNISolone sodium succinate (solu-MEDROL) injection 125 mg    morphine (MS CONTIN) 12 hr tablet 30 mg    naloxone (NARCAN) injection 0.2 mg    ondansetron (ZOFRAN) injection 8 mg    Or    ondansetron (ZOFRAN-ODT) ODT tab 8 mg    Or    ondansetron (ZOFRAN) tablet 8 mg    ondansetron (ZOFRAN) tablet 8 mg    oxyCODONE (ROXICODONE) tablet 10-15 mg    polyethylene glycol (MIRALAX) Packet 17 g    prochlorperazine (COMPAZINE) injection 10  mg    prochlorperazine (COMPAZINE) tablet 10 mg    sennosides (SENOKOT) tablet 1-2 tablet    sodium chloride (PF) 0.9% PF flush 10-20 mL    sodium chloride (PF) 0.9% PF flush 10-20 mL    sodium chloride (PF) 0.9% PF flush 10-20 mL     Sofi Waller PA-C      ------------------------------------------------------------------------------------  PHYSICIAN ATTESTATION  I, Manuel Limon, saw and evaluated Vic Scott III today as part of a shared visit.  I have reviewed and discussed with the advanced practice provider their history, physical and plan.  I personally reviewed the vital signs, medications, labs and imaging.  My key history or physical exam findings:  He developed significant ifosfamide-related CNS toxicity (disorientation x 3, hand tremors, combative) this morning at about 5:50am.  Ifosfamide infusion was stopped, mesna infusion was started.  By 9:30am, his disorientation had improved partially.  We decided to cancel further ifosfamide doses for this admission.  His Hgb today was steady at 7.7 g/dL.  Blood cultures negative.  The rest of the day was relatively quiet for him.  Key management decisions made by me:  We will stop further ifosfamide infusions for this admission.  Will continue with mesna.  If his CNS function improves to normal by tomorrow, then we will plan for a Saturday discharge.  Growth factor support (Neulasta) will be given on Monday.  Follow up appointments have been made.  I spent a total of 40 minutes on this patient on the day of the encounter, of which more than 50% of this time was used for counselling and coordination of care.  Manuel Limon M.D.  Date of Service (when I saw the patient): 12/03/2021

## 2021-12-03 NOTE — PLAN OF CARE
PT-7D: PT eval & treat orders received; pt seated in chair upon PTs arrival and agreeable to participate. Initiated subjective component of eval, inquiring about home environment & prior level of functional mobility. Pt became increasingly irritated with questioning, did not answer questions & was unable to comprehend the reasons for the questions. Attempted to redirect and ask general questions about prior level of function but was again unsuccessful. Asked pt to take a walk with PT within pt's room; pt declines to ambulate at this time & additional attempts at redirection unsuccessful. Pt continues to become increasingly irritated so PT departed. Pt was still seated in chair with alarm activated; RN informed of PT's interaction with pt. Will reschedule PT eval for 12/6/21 allowing for additional time for toxicity to improve.

## 2021-12-03 NOTE — PROGRESS NOTES
"Overnight Crosscover Note:     Paged by nursing at 5:51am to notify about concern for progression of neurotoxicity with ifosfamide. Nursing notes Pt to have labile mood, urinary incontinence, and tremor in hands. Pt examined bedside--on my exam, Pt has myoclonic movements in both hands, is irritable and lethargic, and is unable to answer orientation questions for me other than to say he is in the hospital, otherwise even with repeating questions he keeps asking \"what?\" and cannot remember the question. He has numerous spilled drinks in his room. Repeated BP in room and 164/89. No hypoxia or dyspnea, no angioedema on exam. Nursing had held ifosfamide with worsening symptoms.    Paged [facility] fellow on call to discuss, appreciate recommendations. Per recs from fellow, will hold ifosfamide (ordered), continue Mesna, monitor for 30 minutes to an hour and decide next steps including possible dose of methylene blue. Will sign this out to primary team. Discussed with bedside nurse as well.    Elizabeth Silva MD  [phone]     "

## 2021-12-03 NOTE — PLAN OF CARE
"Easily arouses to voice. Oriented to self and place. Irritable.Declines to answer further questions. Declines VS or neuro check. Despite explaining to pt rationale for assessment and concern for neurotoxicity he states, \" Why the fuck do you keep bothering me?\" No tremors or other abnormal movements observed. Speech is clear and fluent. Day 3 Ifosfamide/Mesna infusion continues at 47.8 mL/hr. Will continue to monitor for observable s/sx of neurotoxicity.   "

## 2021-12-03 NOTE — PLAN OF CARE
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

## 2021-12-03 NOTE — PLAN OF CARE
Pt lethargic and slow to respond this morning. Arouses to verbal stimuli. Irritable. Oriented to self and states he's in hospital. Unable or unwilling to specify which hospital. Declines to answer further questions. Declines formal neuro assessment, however action tremor noted in LUE while holding a cup of water. Spit out scheduled Zofran tablet. Spitting on floor beside his recliner. Incontinent of urine. Liquid spills noted on his bedside table. Ifosfamide infusion stopped at 0538. Overnight physician notified and came up to see pt. Physician paged Hem/Onc fellow. Plan is to start Mesna infusion shortly and consider methylene blue if no resolution of symptoms. Pharmacy notified and will send Mesna dose when ready.

## 2021-12-04 NOTE — PLAN OF CARE
"0921-1946    BP (!) 141/78 (BP Location: Right arm)   Pulse (!) 135   Temp 97.2  F (36.2  C) (Oral)   Resp 20   Ht 1.88 m (6' 2\")   Wt 114.5 kg (252 lb 6.4 oz)   SpO2 100%   BMI 32.41 kg/m      Reason for admission: Admitted for Cycle 2 Doxil/Ifosfamide, Ifosfamide dose-reduced by 10% for history of mild neurotoxicity with cycle 1.   Activity: SBA with walker. Bed/chair alarmed for safety.   Pain: Pt reporting up 5/10 pain to R hip/thigh. Given scheduled MS contin, pt declined further intervention  Neuro: AxOx4. Neuros intact.   Cardiac: Tachycardia to 130s, workup completed, unremarkable. HTN within parameters. Afebrile.   Respiratory: NLB on RA. O2 sats WDL.   GI/: Voiding spontaneously in bedside urinal, no episodes of incontinence.   Diet: Regular diet. Poor/no appetite.    Lines: R chest port WDL, de-accessed at discharge. PIV WDL, removed at discharge.   Wounds: No new deficits.   Labs/imaging: Reviewed. See chart. COVID swab sent this am, returned negative.       Continue to monitor and follow POC  "

## 2021-12-04 NOTE — PLAN OF CARE
"[id number]    BP (!) 164/83 (BP Location: Right arm)   Pulse (!) 123   Temp 98.1  F (36.7  C) (Oral)   Resp 20   Ht 1.88 m (6' 2\")   Wt 114.5 kg (252 lb 6.4 oz)   SpO2 97%   BMI 32.41 kg/m      Reason for admission: Admitted for Cycle 2 Doxil/Ifosfamide, Ifosfamide dose-reduced by 10% for history of mild neurotoxicity with cycle 1.   Activity: SBA with walker. Bed/chair alarmed for safety.   Pain: Pt reporting up to 10/10 pain to R hip/thigh. Given scheduled MS contin, PRN Oxycodone x2 with moderate effect.   Neuro: At start of shift pt alert to self only, non compliant with assessments. Appeared to be confused and easily loose train of thought. BUE tremors noted, incontinent of urine and stool, spilt drinks all over floor and was spitting on floor constantly despite requests to spit into bag. Pt displaying much agitation using inappropriate language in response to all caregiver inquiries. Ifos stopped approximately 0530 this am, pt mentation improved throughout day returning to baseline in early afternoon.   Cardiac: Tachycardia in 120s throughout day, EKG ordered, CXR, UA/UC, labs. Continued workup in process. HTN in 160s. Afebrile.   Respiratory: NLB on RA. O2 sats WDL.   GI/: Voiding spontaneously, incontinent of urine and stool this am.   Diet: Regular diet. Poor/no appetite.    Lines: R chest port intact infusing Mesna. PIV SL. Sites WDL.   Wounds: No new deficits.   Labs/imaging: Reviewed. See chart.       Continue to monitor and follow POC    "

## 2021-12-04 NOTE — PROGRESS NOTES
Internal medicine cross coverage note  -I was called by the nursing staff for evaluation of persistent tachycardia with the patient heart rate of 127.  -I evaluated the patient at the bedside who declined any cardiac or respiratory symptoms.  There is no remarkable abnormality on cardiac or pulmonary exam.  -I added inflammatory markers which were significantly elevated with CRP of 120 and procalcitonin of 0.28.  -I noted blood cultures dated 12/1/2021 which were negative.  I ordered a chest x-ray which I personally reviewed with no evidence of infiltrates.  I also ordered a urinalysis with reflex to urine culture.  -Twelve-lead EKG showed sinus rhythm with no significant ST/T wave changes.  -No indication for antibiotics given lack of focal symptoms suggestive of infection and unremarkable work-up other than inflammatory markers.  -The patient heart rate normalized to heart rate of 68.

## 2021-12-04 NOTE — PLAN OF CARE
"1900-0730:  Pt refused VS overnight. Asymptomatic. Non-labored breathing. Alert & oriented. C/o 10/10 R hip/leg pain, managed with scheduled MS Contin & PRN oxycodone. Acute episode of wretching/dry heaving after reportedly pills \"went down wrong\". Denied feeling nauseas, no actual emesis occurred. Voiding spontaneously, good UOP. No BM overnight. Mesna infusion completed. Port hep-locked. UA/UC collected. Plan for pt to discharge before 11AM this morning.   "

## 2021-12-04 NOTE — PROGRESS NOTES
Discharge  D: Orders for discharge and outpatient medications written.  I: Home medications and return to clinic schedule reviewed with patient. Discharge instructions and parameters for calling Health Care Provider reviewed. Patient left at 1215 accompanied by brother in law.   A: Patient/family verbalized understanding and was ready for discharge.   P: Patient instructed to  medications in Pharmacy. Follow up as scheduled.

## 2021-12-06 NOTE — TELEPHONE ENCOUNTER
[facility] Prior Authorization Team Request    Medication: oxyCODONE 5mg tabs  Dosing: Take 2-3 tablets (10-15 mg) by mouth every 4 hours as needed for moderate to severe pain  Qty: 100  Day Supply: 6  ICD-10: Sarcoma (H) (C49.9); Cancer associated pain (G89.3)  NDC: [address]-05    Insurance: [place] Medicaid  BIN: [id number]  PCN: n/a  Grp: n/a  ID: [id number]    Additional documentation: Patient requires quantity greater than insurance limits due to cancer-related pain.    Filling Pharmacy: [address] [place] [address] Discharge Pharmacy  Phone Number: [phone]  Fax: [phone] 340b NPI: [id number]    Latosha Bergeron  Pharmacy Liaison  Ph: [phone] Page: [phone]

## 2021-12-06 NOTE — PROGRESS NOTES
PT here in wheelchair for injection and port lab drawn. Port accessed and labs drawn / port flushed and deaccessed with 2x2 to site.  Udencya injection administered in right abdomen and bandaid to site. PT dc'd in wheelchair with family.

## 2021-12-06 NOTE — PROGRESS NOTES
DATE:  12/6/2021   TIME OF RECEIPT FROM LAB:  1530  LAB TEST:  CBC with Diff  LAB VALUE:  HGB 6.8  RESULTS GIVEN WITH READ-BACK TO (PROVIDER):  VICENTE HARDWICK  TIME LAB VALUE REPORTED TO PROVIDER:   1170

## 2021-12-06 NOTE — TELEPHONE ENCOUNTER
Central Prior Authorization Team   Phone: [phone]      PA Initiation    Medication: oxyCODONE 5mg tabs  Insurance Company: [place] Medicaid ([facility]) - Phone [phone] Fax [phone]  Pharmacy Filling the Rx: [facility] - [place], [place] - [address]  Filling Pharmacy Phone: [phone]  Filling Pharmacy Fax:    Start Date: 12/6/2021

## 2021-12-07 NOTE — TELEPHONE ENCOUNTER
"PRIOR AUTHORIZATION DENIED    Medication: oxyCODONE 5mg tabs-DENIED    Denial Date: 12/6/2021    Denial Rational: Clinic Tool for the Assessment and Management of Persistent Pain form is required to be completed and signed by the patient or provider/nurse \"on behalf of the patient\" with someone in the providers office that the patient verbally agree to the pain and plan AND patient unable to come to office to sign OR reason unable to sign.    Form can be found on [facility] website: [email or web address]                  Appeal Information:       "

## 2021-12-07 NOTE — PROGRESS NOTES
Vic is a 46 year old who is being evaluated via a billable video visit.      How would you like to obtain your AVS? MyChart  If the video visit is dropped, the invitation should be resent by: Send to e-mail at: [phone]  Will anyone else be joining your video visit? No      Video Start Time: 12:35  Video-Visit Details    Type of service:  Video Visit    Video End Time:    Originating Location (pt. Location): Home    Distant Location (provider location):  [facility] RADIATION ONCOL[facility] [place]     Platform used for Video Visit: Kendra     Pt called prior to video visit, see assessment. Further recommendations and orders per palliative care provider.

## 2021-12-07 NOTE — PROGRESS NOTES
"[facility]  Palliative Care Consultation Note     \"This video/telephone visit will be conducted via a call between you and yourphysician/provider. We have found that certain health care needs can be provided without the need for an in-person physical exam.  This service lets us provide the care you need with a video/telephone conversation.  If aprescription is necessary we can send it directly to your pharmacy.  If lab work is needed we can place an order for that and you can then stop by our lab to have the test done at a later time.     Video/telephone visits arebilled at different rates depending on your insurance coverage. Please reach out to your insurance provider with any questions.     If during the course of the call the physician/provider feels a video visit is notappropriate, you will not be charged for this service.\"     Patient has given verbal consent to a Video/telephone visit? yes     Video Start - end Time:  1240 - 1309  Multiple attempts on patient in writer's behalf to connect to video visit without success.  Visit was conducted via telephone.    Patient: Vic Scott III    Requesting Clinician / Team: Deborah Vang PA-C  Reason for consult: Ongoing symptom management, goals of care-follow-up visit from inpatient consultation 12/2/2021    Code status: Full code    Impression & Recommendations:  SYMPTOM ASSESSMENT  1.  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  Patient taking a total of 25 mg (one 10 mg and one 15 mg dose) daily  -Voltaren topical cream 2-4 times daily  -Encouraged use of ice and heat as needed for comfort  -Reviewed with patient that prescription refills for opiates cannot be done by writer due to inability and connecting with video visit today.  No changes made at this time.  Would be happy to discuss " "this further with medical oncology until patient can connect with palliative care either face-to-face or in successful video visit.    2.  Nausea secondary to post chemotherapy infusion of Doxil/ifosfamide-mesna (patient unable to complete cycle 2 secondary to neurotoxicity)  -Zofran 8 mg ODT every 8 hours as needed for nausea.  Prescription electronically submitted for this medication today.  -Compazine 5 mg by mouth every 6 hours as needed.  Prescription electronically submitted for this today.    ADVANCED CARE PLANNING/GOALS OF CARE DISCUSSION  -CODE STATUS: Full code.  -Patient's main decision makers/surrogate's: Sisters, Sheryl Vora and Charity Jean Baptiste  - Follow-up with the outpatient palliative care clinic in the next 2 to 3 months for ongoing symptom management and goals of care discussion.  This can take place at any palliative care clinic.    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  Orthopedics, Dr. Whitaker offered options of neoadjuvant chemotherapy vs resection and reconstruction.  Per chart, due to high risk for amputation with massive reconstructive surgery, referral to oncology, Dr. Ambrocio was made .  PET scan identifed inguinal lymphadenopathy.   Dr. Ambrocio recommended/ordered Foundation one (MSI stable, no targetable mutations) and  Doxil/Ifosfamide-mesna as inpatient infusion..  Baseline Echo 11/2/21 technically difficult, EF 60-65%.   Cycle 1 Doxil/Ifos (N2X0=79/2/21), on C1D6 patient \"noted to have some mild confusion (labile mood, urinated himself and into a water pitcher) which warranted stopping Ifos slightly earlier at 1040am on Day 6 (he only had a few hours of ifos infusion left), mesna started. Patient did not require methylene blue.\" (per Juliet " "Taj's consult note -. Cycle 2 Doxil/Ifos was interrupted due to intolerance as well. Patient wishes to discuss alternate treatment options with oncology at next visit.    Prognosis, Goals, & Planning:      Functional Status just prior to hospitalization: 1 (Restricted in physically strenuous activity but ambulatory and able to carry out work of a light or sedentary nature)        Prognosis, Goals, and/or Advance Care Planning were addressed today: Yes        Summary/Comments: reports 2 sisters, Lauryn and Charity are his decision makers.       Patient's decision making preferences: independently          Patient has decision-making capacity today for complex decisions: Yes            I have concerns about the patient/family's health literacy today: No           Patient has a completed Health Care Directive: Reportedly yes, but not available currently.       Code status: Full Code confirmed     Coping, Meaning, & Spirituality:   Mood, coping, and/or meaning in the context of serious illness were addressed today: Yes  Summary/Comments: Vic shared that his father (over 80 years of age) collapsed and  suddenly last week from a heart attack. He is \"processing\" his grief on his own, and declined support of Imam (he is [place]) or SW. Denies any anxiety or depression.     Key Palliative Symptom Data:  # Pain severity the last 12 hours: moderate  # Dyspnea severity the last 12 hours: none  We are not managing dyspnea in this patient  # Nausea severity the last 12 hours: low  # Anxiety severity the last 12 hours: none  We are not managing anxiety in this patient  Patient currently rating right hip pain as 7/10 and described as a constant ache.  Patient recently awoke from sleep and has not taken any morning pain medications including long-acting morphine.  Feels overall that long-acting morphine and oxycodone 10-15 mg for breakthrough pain controlling his current pain.    Patient is on opioids: assessed and " bowels ok/no needed changes to plan of care today.    ROS:  Comprehensive ROS is reviewed and is negative except as here & per HPI: Patient currently denying fever, chills, cough, vomiting, runny nose, body aches, dizziness.    Anorexia: Reports decreased appetite.  Trying to be cognizant of drinking protein shakes as recommended.  Patient reports some fatigue.  Had just woken up prior to phone call for video visit.     Past Medical History:  No past medical history on file.     Past Surgical History:  Past Surgical History:   Procedure Laterality Date     INSERT PORT VASCULAR ACCESS Right 2021    Procedure: INSERTION, VASCULAR ACCESS PORT;  Surgeon: Sushil Gonzales MD;  Location: [facility] OR     IR CHEST PORT PLACEMENT > 5 YRS OF AGE  2021         Family History:  No family history on file.     Social:     Living situation: Current Shailesh staying at his sister's house, Charity Jean Baptiste, so that he is not alone.    Key family / caregivers: Sisters, Charity Jean Baptiste and Lauryn Vora    Current in-home services: None     Both mother and father are .  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   Noted: Medication list reviewed.      Lab Results: personally reviewed.   Lab Results   Component Value Date     2021    CO2 26 2021    BUN 8 2021     Lab Results   Component Value Date    WBC 8.0 2021    HGB 6.8 2021    HCT 20.8 2021    MCV 77 2021     2021     AST   Date Value Ref Range Status   2021 22 0 - 40 U/L Final     ALT   Date Value Ref Range Status   2021 18 0 - 45 U/L Final     Alkaline Phosphatase   Date Value Ref Range Status   2021 247 (H) 45 - 120 U/L Final     Albumin   Date  Value Ref Range Status   12/06/2021 2.7 (L) 3.5 - 5.0 g/dL Final       RADIOLOGY:  XR Chest Port 1 View    Result Date: 12/3/2021  EXAM: XR CHEST PORT 1 VIEW  12/3/2021 7:37 PM HISTORY:  sustained tacycardia looking at infiltrates   COMPARISON:  Chest x-ray 11/25/2021, PET/CT 10/20/2021 FINDINGS Technique: Semiupright AP view of the chest. Devices: Right chest Port-A-Cath terminates projected over the superior cavoatrial junction. Lungs: No new focal airspace opacity.  No discernible pneumothorax. No definite pleural effusion. Cardiovascular: Cardiomediastinal silhouette is within normal limits.     IMPRESSION: No acute air space opacity to suggest focal infection. I have personally reviewed the examination and initial interpretation and I agree with the findings. DEWAYNE KEMP MD   SYSTEM ID:  [id number]      Physical Exam:  Temp:  [98.8  F (37.1  C)] 98.8  F (37.1  C)  Pulse:  [120] 120  Resp:  [18] 18  BP: (121)/(77) 121/77  SpO2:  [100 %] 100 %  Wt Readings from Last 3 Encounters:   12/01/21 114.5 kg (252 lb 6.4 oz)   11/09/21 116.8 kg (257 lb 6.4 oz)   11/01/21 104.3 kg (230 lb)      Unable to connect to video visit due to technical difficulties.    Patient was alert and oriented to time, place, person and situation.  Calm.  Appropriate and conversant.  Nonlabored sounding respirations during conversation.    TTS: I have personally spent a total of 45 minutes today in above encounter reviewing patient's medical record, consultation with the medical providers, and assessing patient and syptoms and providing emotional support with than 50% of this time spent trying to connect with patient in video visit with patient on the phone and conducting symptom review, education on medications for symptoms and review of medication list and providing support.    CHET Vega, CNS  Palliative Care  [phone]

## 2021-12-08 NOTE — LETTER
12/8/2021         RE: Vic Scott III  [address] [address]        Dear Colleague,    Thank you for referring your patient, Vic Scott III, to the [facility]. Please see a copy of my visit note below.    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 Whitaker, Orthopedics with rapidly progressing right leg swelling since ~ July 2021. MRI was incomplete due to pain, but did show ~43u03ry right thigh soft tissue mass with probable bony involvement of the proximal right femur. 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 Was seen by Dr. Whitaker to review the results on 10/1/21 - discussed consideration of neoadjuvant chemotherapy vs resection and reconstruction. Given high risk for amputation with massive reconstructive surgery, he was referred to Dr. Ambrocio and a PET was ordered which identifed concerning inguinal lymphadenopathy. He was seen by Dr. Ambrocio 10/26/21, ordered NGS (in process) and recommended starting Doxil/Ifos initially as an inpatient. Admitted for Cycle 1 Doxil/Ifos (K8J1=54/2/21).  Notably he had an episode of confusion while hospitalized on 11/8/21 concerning for ifosfamide neurotoxicity.  This resolved without administration of methylene blue. With cycle 2 treatment he also had severe neurotoxicity 12 hours into day 3 ifosfamide infusion. He again improved without methylene blue. Remainder of cycle 2 treatment was aborted due reaction/patient wishes.    Interval History:   Vic was called for a video visit which was transferred to telephone visit for evaluation following cycle 2 doxil/ifos. He experienced significant neurotoxicity 12 hours into his day 3 ifos. Symptoms recovered within 5 hours without administration of methylene blue but he did not wish to resume further treatment during the  admission. The remainder of cycle 2 was held and he was discharged home.    Today he is feeling fairly well. Admits to vomiting a few times in the last few days but also did not have antiemetics on hand. Zofran and compazine have since been refilled and are available to him. His pain is stable. Uses MS Contin ever 12 hours as well as oxycodone 10-15 mg twice per day on average. He is out of Tylenol but requests a refill. Pain overall feels stable since diagnosis. Severity is dependent on his activity level and positioning. He sometimes feels as if the size of his tumor has decreased and sometimes feels it is unchanged. Ambulates with a walker or cane. Denies increasing weakness, numbness or tingling to the right leg. Occasional constipation is treated with senna. He received neulasta support  as well as 1 unit PRBCs for a hemglobin of 6.8.    He is concerned about receiving future ifosfamide infusions due to his reaction. He mentally feels well at the moment but states the experience was scary and a large source of stress for both he and his family members.      Current Outpatient Medications   Medication     acetaminophen (TYLENOL) 500 MG tablet     diclofenac (VOLTAREN) 1 % topical gel     morphine (MS CONTIN) 30 MG CR tablet     ondansetron (ZOFRAN) 8 MG tablet     oxyCODONE (ROXICODONE) 5 MG tablet     polyethylene glycol (MIRALAX) 17 GM/Dose powder     prochlorperazine (COMPAZINE) 5 MG tablet     sennosides (SENOKOT) 8.6 MG tablet     No current facility-administered medications for this visit.     PHYSICAL EXAM:  Unable to do physical exam 2/2 telephone visit. Well sounding in no distress. Normal speech and thought process. Good voice quality. No audible wheezing or cough.    ECO    Labs:   Most Recent 3 CBC's:  Recent Labs   Lab Test 21  1426 21  0758 21  1711   WBC 8.0 9.8 11.6*   HGB 6.8* 7.5* 7.6*   MCV 77* 78 78   * 664* 708*     Most Recent 3 BMP's:  Recent Labs   Lab  Test 12/06/21  1426 12/04/21  0758 12/03/21  1711    137 137   POTASSIUM 3.5 3.6 3.9   CHLORIDE 101 106 105   CO2 26 24 25   BUN 8 9 8   CR 0.69* 0.60* 0.55*   ANIONGAP 9 7 7   VENTURA 9.0 9.3 8.9    116* 112*    Most Recent 2 LFT's:  Recent Labs   Lab Test 12/06/21  1426 12/04/21  0758   AST 22 13   ALT 18 17   ALKPHOS 247* 218*   BILITOTAL 0.8 0.7   I reviewed the most recent labs above today. Next lab draw scheduled 12/10/21.    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 Biopsy showing high grade pleomorphic sarcoma of large right thigh soft tissue mas. High risk for amputation with massive reconstruction. Began initial treatment with Doxil/Ifos as an inpatient 11/2/21. Baseline ECHO with EF 60-65%. Tolerated first cycle well with concern for ifos neurotoxicity, but did not require methylene blue. Also had significant ifos neurotoxicity with cycle 2 which limited finishing the cycle.    Discussed the plan for repeat PET imaging to assess response in the next few weeks. Based on results, will determine plan for further treatment with chemotherapy versus surgical plan. If continued chemo with doxil/ifos recommended, ifosfamide dose will be decreased by another 20% to avoid neurotoxicity.     Ifosfamide neurotoxicity  Intermittent urinary incontinence, somnolence, disorientation and episodes of confusion with cycle 1 and 2. Did not require methylene blue. Should not preclude future treatment. Plan for dose reduction if doxil/ifos is continued based on upcoming PET.    Constipation  Continue senna PRN    Cancer-related pain  Fairly well controlled with MS Contin, oxycodone, tylenol and Voltaren gel. Followed by palliative care. Tylenol refilled per patient request.    Anemia  Iron studies indicate mixed iron deficiency and anemia of chronic disease. PO iron avoided d/t constipation. Consider IV iron if anemia disproportionate  to chemotherapy effect. PRBCs PRN.    Plan:  PET scan 2 weeks  MEHRAN visit following PET to review  Twice weekly labs/tentative transfusions as scheduled    30 minutes spent on the date of the encounter doing chart review, review of test results, interpretation of tests, patient visit and documentation, in addition to 21 minutes spent on the phone with the patient.     Latosha Taylor CNP  [facility] [address] [address] [phone]

## 2021-12-08 NOTE — PROGRESS NOTES
Vic is a 46 year old who is being evaluated via a billable video visit.      How would you like to obtain your AVS? Mail a copy  If the video visit is dropped, the invitation should be resent by: Text to cell phone: [phone]  Will anyone else be joining your video visit? No      Unable to connect with patient via [employer] or [employer] for virtual visit. Visit was changed to telephone visit using [employer]. Total phone time: 21 minutes.    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 Whitaker, Orthopedics with rapidly progressing right leg swelling since ~ July 2021. MRI was incomplete due to pain, but did show ~[id number] right thigh soft tissue mass with probable bony involvement of the proximal right femur. 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 Was seen by Dr. Whitaker to review the results on 10/1/21 - discussed consideration of neoadjuvant chemotherapy vs resection and reconstruction. Given high risk for amputation with massive reconstructive surgery, he was referred to Dr. Ambrocio and a PET was ordered which identifed concerning inguinal lymphadenopathy. He was seen by Dr. Ambrocio 10/26/21, ordered NGS (in process) and recommended starting Doxil/Ifos initially as an inpatient. Admitted for Cycle 1 Doxil/Ifos (Y3K1=18/2/21).  Notably he had an episode of confusion while hospitalized on 11/8/21 concerning for ifosfamide neurotoxicity.  This resolved without administration of methylene blue. With cycle 2 treatment he also had severe neurotoxicity 12 hours into day 3 ifosfamide infusion. He again improved without methylene blue. Remainder of cycle 2 treatment was aborted due reaction/patient wishes.    Interval History:   Vic was called for a video visit which was transferred to telephone visit for evaluation following cycle 2 doxil/ifos. He experienced  significant neurotoxicity 12 hours into his day 3 ifos. Symptoms recovered within 5 hours without administration of methylene blue but he did not wish to resume further treatment during the admission. The remainder of cycle 2 was held and he was discharged home.    Today he is feeling fairly well. Admits to vomiting a few times in the last few days but also did not have antiemetics on hand. Zofran and compazine have since been refilled and are available to him. His pain is stable. Uses MS Contin ever 12 hours as well as oxycodone 10-15 mg twice per day on average. He is out of Tylenol but requests a refill. Pain overall feels stable since diagnosis. Severity is dependent on his activity level and positioning. He sometimes feels as if the size of his tumor has decreased and sometimes feels it is unchanged. Ambulates with a walker or cane. Denies increasing weakness, numbness or tingling to the right leg. Occasional constipation is treated with senna. He received neulasta support  as well as 1 unit PRBCs for a hemglobin of 6.8.    He is concerned about receiving future ifosfamide infusions due to his reaction. He mentally feels well at the moment but states the experience was scary and a large source of stress for both he and his family members.      Current Outpatient Medications   Medication     acetaminophen (TYLENOL) 500 MG tablet     diclofenac (VOLTAREN) 1 % topical gel     morphine (MS CONTIN) 30 MG CR tablet     ondansetron (ZOFRAN) 8 MG tablet     oxyCODONE (ROXICODONE) 5 MG tablet     polyethylene glycol (MIRALAX) 17 GM/Dose powder     prochlorperazine (COMPAZINE) 5 MG tablet     sennosides (SENOKOT) 8.6 MG tablet     No current facility-administered medications for this visit.     PHYSICAL EXAM:  Unable to do physical exam 2/2 telephone visit. Well sounding in no distress. Normal speech and thought process. Good voice quality. No audible wheezing or cough.    ECO    Labs:   Most Recent 3  CBC's:  Recent Labs   Lab Test 12/06/21  1426 12/04/21  0758 12/03/21  1711   WBC 8.0 9.8 11.6*   HGB 6.8* 7.5* 7.6*   MCV 77* 78 78   * 664* 708*     Most Recent 3 BMP's:  Recent Labs   Lab Test 12/06/21  1426 12/04/21  0758 12/03/21  1711    137 137   POTASSIUM 3.5 3.6 3.9   CHLORIDE 101 106 105   CO2 26 24 25   BUN 8 9 8   CR 0.69* 0.60* 0.55*   ANIONGAP 9 7 7   VENTURA 9.0 9.3 8.9    116* 112*    Most Recent 2 LFT's:  Recent Labs   Lab Test 12/06/21  1426 12/04/21  0758   AST 22 13   ALT 18 17   ALKPHOS 247* 218*   BILITOTAL 0.8 0.7   I reviewed the most recent labs above today. Next lab draw scheduled 12/10/21.    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 Biopsy showing high grade pleomorphic sarcoma of large right thigh soft tissue mas. High risk for amputation with massive reconstruction. Began initial treatment with Doxil/Ifos as an inpatient 11/2/21. Baseline ECHO with EF 60-65%. Tolerated first cycle well with concern for ifos neurotoxicity, but did not require methylene blue. Also had significant ifos neurotoxicity with cycle 2 which limited finishing the cycle.    Discussed the plan for repeat PET imaging to assess response in the next few weeks. Based on results, will determine plan for further treatment with chemotherapy versus surgical plan. If continued chemo with doxil/ifos recommended, ifosfamide dose will be decreased by another 20% to avoid neurotoxicity.     Ifosfamide neurotoxicity  Intermittent urinary incontinence, somnolence, disorientation and episodes of confusion with cycle 1 and 2. Did not require methylene blue. Should not preclude future treatment. Plan for dose reduction if doxil/ifos is continued based on upcoming PET.    Constipation  Continue senna PRN    Cancer-related pain  Fairly well controlled with MS Contin, oxycodone, tylenol and Voltaren gel. Followed by palliative care.  Tylenol refilled per patient request.    Anemia  Iron studies indicate mixed iron deficiency and anemia of chronic disease. PO iron avoided d/t constipation. Consider IV iron if anemia disproportionate to chemotherapy effect. PRBCs PRN.    Plan:  PET scan 2 weeks  MEHRAN visit following PET to review  Twice weekly labs/tentative transfusions as scheduled    30 minutes spent on the date of the encounter doing chart review, review of test results, interpretation of tests, patient visit and documentation, in addition to 21 minutes spent on the phone with the patient.     Latosha Taylor CNP  [facility] [address] [address]  [phone]

## 2021-12-11 NOTE — LETTER
2021         RE: Vic Scott III  [address] [facility] [address]        Dear Colleague,    Thank you for referring your patient, Vic Scott III, to the [facility]. Please see a copy of my visit note below.    Blood Product Transfusion Nursing Note:    Vic Scott III presents today to [facility] for a one unit PRBcs transfusion.  During today's [facility] appointment orders from Sofi Waller PA-C  were completed.    Progress note:  ID verified by name and .  Assessment completed.  Vitals were stable throughout time in [facility].    printed education given to patient/representative regarding transfusion and possible side effects.  Patient/representative verbalized understanding.     present during visit today: Not Applicable.    All pertinent labs reviewed prior to infusion: YES    Date of consent or authorization: 12/10/21    Patient tolerated the procedure well    Transfusion given over approximately  2 hours    Discharge Plan:    Discharge instructions were reviewed with patient Yes  Patient/representative verbalized understanding of discharge instructions and all questions answered Yes.        Yolis Mcdonald RN    /68   Pulse 117   Temp 98.3  F (36.8  C) (Oral)   Resp 18   SpO2 100%             Again, thank you for allowing me to participate in the care of your patient.        Sincerely,        [facility]

## 2021-12-11 NOTE — PROGRESS NOTES
Blood Product Transfusion Nursing Note:    Vic Scott III presents today to [facility] for a one unit PRBcs transfusion.  During today's [facility] appointment orders from Sofi Waller PA-C  were completed.    Progress note:  ID verified by name and .  Assessment completed.  Vitals were stable throughout time in [facility].    printed education given to patient/representative regarding transfusion and possible side effects.  Patient/representative verbalized understanding.     present during visit today: Not Applicable.    All pertinent labs reviewed prior to infusion: YES    Date of consent or authorization: 12/10/21    Patient tolerated the procedure well    Transfusion given over approximately  2 hours    Discharge Plan:    Discharge instructions were reviewed with patient Yes  Patient/representative verbalized understanding of discharge instructions and all questions answered Yes.        Yolis Mcdonald RN    /68   Pulse 117   Temp 98.3  F (36.8  C) (Oral)   Resp 18   SpO2 100%

## 2021-12-11 NOTE — PATIENT INSTRUCTIONS
Patient Education   After Your Blood Transfusion  Discharge Instructions  After you leave  After a blood transfusion (received red blood cells, platelets, plasma, cryo or granulocytes), you will need to watch for signs of a reaction for the next 48 hours.  Call your clinic or 911 (or go to the Emergency room) if you have any signs of a reaction:    Shaking or chills    Fever (temperature above 100.0 F)    Headache    Nausea    Hives    Itching    Swelling of the face or feeling flushed    Ongoing dry cough (nothing is coughed up)    Trouble breathing or wheezing  Some signs of a reaction won't show up for a few days or up to 4 weeks.   These may include:    Fatigue    Dizziness    Pink or red urine  Your clinic is:   ________________________________________  ________________________________________  For informational purposes only. Not to replace the advice of your health care provider.   Copyright   2015 [employer]. All rights reserved. [employer] 585475 - Rev 07/16.

## 2021-12-13 NOTE — PROGRESS NOTES
Infusion Nursing Note:  Vic Scott III presents today for for Labs and possible blood transfusion.   Patient seen by provider today: No   present during visit today: Not Applicable.    Note: Suzy arrived via wheel chair in company of his care giver. Plan f care explained he understood but verbalized that he went in on Saturday and was transfused with blood because his Hgb came down to 5. 8. So Lab was drawn and he left. His Hgb today is 7.1   Intravenous Access:  Labs drawn without difficulty.  Implanted Port.  Treatment Conditions:  Not Applicable.    Post Infusion Assessment:  Site patent and intact, free from redness, edema or discomfort.  No evidence of extravasations.       Discharge Plan:   Discharge instructions reviewed with: Patient.  Patient and/or family verbalized understanding of discharge instructions and all questions answered.  Patient discharged in stable condition accompanied by: attendant.  Departure Mode: Wheelchair.  Chayo Solis RN

## 2021-12-16 NOTE — PROGRESS NOTES
Infusion Nursing Note:  Vic Scott III presents today for for lab draw and possible transfusion   Patient seen by provider today: No   present during visit today: Not Applicable.    Note: Patient arrived via wheel chair in company of his brother In law, VSS. Said he is here just for lab draw, same was done via his Port. Hgb today is 7.2.    Intravenous Access:  Labs drawn without difficulty.  Treatment Conditions:  Not Applicable.  Post Infusion Assessment:  NA  Discharge Plan:   Discharge instructions reviewed with: Patient and Family.  Patient discharged in stable condition accompanied by: brother.  Departure Mode: Wheelchair.  Chayo Solis RN

## 2021-12-24 NOTE — PROGRESS NOTES
Vic is a 46 year old who is being evaluated via a billable video visit.      How would you like to obtain your AVS? Mail a copy  If the video visit is dropped, the invitation should be resent by: Text to cell phone: [phone]  Will anyone else be joining your video visit? Melanie Rahman VF    Video Start Time: 11:06 AM  Video-Visit Details    Type of service:  Video Visit    Video End Time:11:26 AM    Originating Location (pt. 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 Whitaker, Orthopedics with rapidly progressing right leg swelling since ~ July 2021. MRI was incomplete due to pain, but did show ~[id number] right thigh soft tissue mass with probable bony involvement of the proximal right femur. 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 Was seen by Dr. Whitaker to review the results on 10/1/21 - discussed consideration of neoadjuvant chemotherapy vs resection and reconstruction. Given high risk for amputation with massive reconstructive surgery, he was referred to Dr. Ambrocio and a PET was ordered which identifed concerning inguinal lymphadenopathy. He was seen by Dr. Ambrocio 10/26/21, ordered NGS (in process) and recommended starting Doxil/Ifos initially as an inpatient. Admitted for Cycle 1 Doxil/Ifos (Y3S1=04/2/21).  Notably he had an episode of confusion while hospitalized on 11/8/21 concerning for ifosfamide neurotoxicity.  This resolved without administration of methylene blue. With cycle 2 treatment he also had severe neurotoxicity 12 hours into day 3 ifosfamide infusion. He again improved without methylene blue. Remainder of cycle 2 treatment was aborted due reaction/patient wishes.    Interval History:    Vic was called today for virtual visit for follow-up of high-grade pleomorphic sarcoma of the right thigh.  He missed his restaging  PET scan yesterday, he thought this was scheduled today.    He reports feeling pretty well.  His right leg feels stronger.  Previously was using his walker for ambulation and is now able to ambulate with his cane.  His pain is well controlled with MS Contin every 12 hours and 15 mg of oxycodone twice a day.  He denies constipation, has actually had diarrhea for the last 3 to 4 days.  He is not using MiraLAX but has continued to use senna.    He discussed ongoing concerns with resuming ifosfamide given his neurotoxicity experiences.  He denies any shortness of breath, dizziness or fatigue.  He did have significant anemia with this last cycle, with miscommunication about when he was supposed to be transfused which ultimately delayed the necessary transfusion.      Current Outpatient Medications   Medication     morphine (MS CONTIN) 30 MG CR tablet     oxyCODONE (ROXICODONE) 5 MG tablet     acetaminophen (TYLENOL) 500 MG tablet     diclofenac (VOLTAREN) 1 % topical gel     ondansetron (ZOFRAN) 8 MG tablet     polyethylene glycol (MIRALAX) 17 GM/Dose powder     prochlorperazine (COMPAZINE) 5 MG tablet     sennosides (SENOKOT) 8.6 MG tablet     No current facility-administered medications for this visit.     PHYSICAL EXAM:  There were no vitals taken for this visit.    Video physical exam  General: Patient appears well in no acute distress.   Skin: No visualized rash or lesions on visualized skin  Eyes: EOMI, no erythema, sclera icterus or discharge noted  Resp: Appears to be breathing comfortably without accessory muscle usage, speaking in full sentences, no cough  MSK: Appears to have normal range of motion based on visualized movements  Neurologic: No apparent tremors, facial movements symmetric  Psych: affect appropriate, pleasant, alert and oriented    The rest of a comprehensive  physical examination is deferred due to PHE (public health emergency) video restrictions    ECO    Labs:   Most Recent 3 CBC's:  Recent Labs   Lab Test 21  1022 21  1034 12/10/21  1323   WBC 7.2 4.1 0.8*   HGB 7.2* 7.1* 5.8*   MCV 80 79 75*    161 220     Most Recent 3 BMP's:  Recent Labs   Lab Test 12/10/21  1323 21  1426 21  0758   * 136 137   POTASSIUM 3.3* 3.5 3.6   CHLORIDE 99 101 106   CO2 28 26 24   BUN 5* 8 9   CR 0.64* 0.69* 0.60*   ANIONGAP 8 9 7   VENTURA 9.1 9.0 9.3    110 116*    Most Recent 2 LFT's:  Recent Labs   Lab Test 12/10/21  1323 21  1426   AST 14 22   ALT 18 18   ALKPHOS 237* 247*   BILITOTAL 0.8 0.8   I reviewed the most recent labs above today.     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 Biopsy showing high grade pleomorphic sarcoma of large right thigh soft tissue mas. High risk for amputation with massive reconstruction. Began initial treatment with Doxil/Ifos as an inpatient 21. Baseline ECHO with EF 60-65%. Tolerated first cycle well with concern for ifos neurotoxicity, but did not require methylene blue. Also had significant ifos neurotoxicity with cycle 2 which limited finishing the cycle.    Due for cycle 3 doxil/ifos  but will need to delay due to missed PET. Will plan on PET scan next week, virtual visit with MEHRAN 1/3/22 to review and tentative admission for cycle 3 22. We had a detailed discussion regarding his concern to resume ifosfamide. He also verbalized his fear of the possibility of limb amputation. He has good understanding of the potential risk versus benefit of all scenarios. I assured him that I will review PET scans with Dr. Ambrocio and relay his hesitancy to resume ifosfamide. Ultimately he would like to remain aggressive in his treatment to optimize his chance of cure.    If continued chemo with doxil/ifos recommended,  ifosfamide dose will be decreased by another 20% to avoid neurotoxicity.     Ifosfamide neurotoxicity  Intermittent urinary incontinence, somnolence, disorientation and episodes of confusion with cycle 1 and 2. Did not require methylene blue. Should not preclude future treatment. Plan for dose reduction if doxil/ifos is continued based on upcoming PET.    Constipation  Now with recent diarrhea. Advised to stop senna and continue to hold Miralax. May resume if constipation is na issue.    Cancer-related pain  Well controlled with MS Contin and oxycodone (using 30 mg oxy/day). Not regularly using Tylenol/Voltaren. Followed by palliative care. Morphine and oxycodone refilled today.    Anemia  Iron studies indicate mixed iron deficiency and anemia of chronic disease. PO iron avoided d/t constipation. Consider IV iron if anemia disproportionate to chemotherapy effect. Will recheck labs with PET next week to ensure count recovery.    Plan:  Labs/PET scan next week  MEHRAN visit following PET to review on 1/3/22  Tentative admission for cycle 3 on 1/4/22    40 minutes spent on the date of the encounter doing chart review, review of test results, interpretation of tests, patient visit and documentation     Latosha Taylor CNP  [facility]  [address] [place] [id number] [phone]

## 2022-01-01 ENCOUNTER — APPOINTMENT (OUTPATIENT)
Dept: INTERVENTIONAL RADIOLOGY/VASCULAR | Facility: CLINIC | Age: 47
DRG: 871 | End: 2022-01-01
Attending: PHYSICIAN ASSISTANT
Payer: COMMERCIAL

## 2022-01-01 ENCOUNTER — APPOINTMENT (OUTPATIENT)
Dept: OCCUPATIONAL THERAPY | Facility: CLINIC | Age: 47
End: 2022-01-01
Attending: PHYSICAL MEDICINE & REHABILITATION
Payer: COMMERCIAL

## 2022-01-01 ENCOUNTER — APPOINTMENT (OUTPATIENT)
Dept: GENERAL RADIOLOGY | Facility: CLINIC | Age: 47
DRG: 180 | End: 2022-01-01
Attending: INTERNAL MEDICINE
Payer: COMMERCIAL

## 2022-01-01 ENCOUNTER — TELEPHONE (OUTPATIENT)
Dept: NURSING | Facility: CLINIC | Age: 47
End: 2022-01-01

## 2022-01-01 ENCOUNTER — APPOINTMENT (OUTPATIENT)
Dept: PHYSICAL THERAPY | Facility: CLINIC | Age: 47
DRG: 871 | End: 2022-01-01
Attending: CLINICAL NURSE SPECIALIST
Payer: COMMERCIAL

## 2022-01-01 ENCOUNTER — APPOINTMENT (OUTPATIENT)
Dept: CARDIOLOGY | Facility: CLINIC | Age: 47
DRG: 180 | End: 2022-01-01
Attending: INTERNAL MEDICINE
Payer: COMMERCIAL

## 2022-01-01 ENCOUNTER — HOME INFUSION (PRE-WILLOW HOME INFUSION) (OUTPATIENT)
Dept: PHARMACY | Facility: CLINIC | Age: 47
End: 2022-01-01

## 2022-01-01 ENCOUNTER — APPOINTMENT (OUTPATIENT)
Dept: GENERAL RADIOLOGY | Facility: CLINIC | Age: 47
DRG: 871 | End: 2022-01-01
Attending: EMERGENCY MEDICINE
Payer: COMMERCIAL

## 2022-01-01 ENCOUNTER — MEDICAL CORRESPONDENCE (OUTPATIENT)
Dept: FAMILY MEDICINE | Facility: CLINIC | Age: 47
End: 2022-01-01

## 2022-01-01 ENCOUNTER — INFUSION THERAPY VISIT (OUTPATIENT)
Dept: TRANSPLANT | Facility: CLINIC | Age: 47
End: 2022-01-01
Attending: INTERNAL MEDICINE
Payer: COMMERCIAL

## 2022-01-01 ENCOUNTER — PATIENT OUTREACH (OUTPATIENT)
Dept: ONCOLOGY | Facility: CLINIC | Age: 47
End: 2022-01-01

## 2022-01-01 ENCOUNTER — HOSPITAL ENCOUNTER (INPATIENT)
Facility: CLINIC | Age: 47
LOS: 11 days | Discharge: HOME HEALTH | DRG: 180 | End: 2022-09-11
Attending: EMERGENCY MEDICINE | Admitting: INTERNAL MEDICINE
Payer: COMMERCIAL

## 2022-01-01 ENCOUNTER — VIRTUAL VISIT (OUTPATIENT)
Dept: ONCOLOGY | Facility: CLINIC | Age: 47
End: 2022-01-01
Attending: REGISTERED NURSE
Payer: COMMERCIAL

## 2022-01-01 ENCOUNTER — TELEPHONE (OUTPATIENT)
Dept: ONCOLOGY | Facility: CLINIC | Age: 47
End: 2022-01-01

## 2022-01-01 ENCOUNTER — APPOINTMENT (OUTPATIENT)
Dept: GENERAL RADIOLOGY | Facility: CLINIC | Age: 47
DRG: 871 | End: 2022-01-01
Attending: STUDENT IN AN ORGANIZED HEALTH CARE EDUCATION/TRAINING PROGRAM
Payer: COMMERCIAL

## 2022-01-01 ENCOUNTER — TELEPHONE (OUTPATIENT)
Dept: FAMILY MEDICINE | Facility: CLINIC | Age: 47
End: 2022-01-01

## 2022-01-01 ENCOUNTER — ANESTHESIA EVENT (OUTPATIENT)
Dept: SURGERY | Facility: CLINIC | Age: 47
End: 2022-01-01
Payer: COMMERCIAL

## 2022-01-01 ENCOUNTER — APPOINTMENT (OUTPATIENT)
Dept: PHYSICAL THERAPY | Facility: CLINIC | Age: 47
End: 2022-01-01
Attending: PHYSICAL MEDICINE & REHABILITATION
Payer: COMMERCIAL

## 2022-01-01 ENCOUNTER — MEDICAL CORRESPONDENCE (OUTPATIENT)
Dept: HEALTH INFORMATION MANAGEMENT | Facility: CLINIC | Age: 47
End: 2022-01-01

## 2022-01-01 ENCOUNTER — NURSE TRIAGE (OUTPATIENT)
Dept: NURSING | Facility: CLINIC | Age: 47
End: 2022-01-01

## 2022-01-01 ENCOUNTER — APPOINTMENT (OUTPATIENT)
Dept: CT IMAGING | Facility: CLINIC | Age: 47
DRG: 064 | End: 2022-01-01
Payer: COMMERCIAL

## 2022-01-01 ENCOUNTER — PATIENT OUTREACH (OUTPATIENT)
Dept: CARE COORDINATION | Facility: CLINIC | Age: 47
End: 2022-01-01

## 2022-01-01 ENCOUNTER — APPOINTMENT (OUTPATIENT)
Dept: CT IMAGING | Facility: CLINIC | Age: 47
DRG: 180 | End: 2022-01-01
Attending: PHYSICIAN ASSISTANT
Payer: COMMERCIAL

## 2022-01-01 ENCOUNTER — OFFICE VISIT (OUTPATIENT)
Dept: FAMILY MEDICINE | Facility: CLINIC | Age: 47
End: 2022-01-01
Attending: STUDENT IN AN ORGANIZED HEALTH CARE EDUCATION/TRAINING PROGRAM
Payer: COMMERCIAL

## 2022-01-01 ENCOUNTER — PATIENT OUTREACH (OUTPATIENT)
Dept: PLASTIC SURGERY | Facility: CLINIC | Age: 47
End: 2022-01-01

## 2022-01-01 ENCOUNTER — LAB REQUISITION (OUTPATIENT)
Dept: LAB | Facility: HOSPITAL | Age: 47
End: 2022-01-01
Payer: COMMERCIAL

## 2022-01-01 ENCOUNTER — APPOINTMENT (OUTPATIENT)
Dept: PHYSICAL THERAPY | Facility: CLINIC | Age: 47
End: 2022-01-01
Attending: ORTHOPAEDIC SURGERY
Payer: COMMERCIAL

## 2022-01-01 ENCOUNTER — ANCILLARY PROCEDURE (OUTPATIENT)
Dept: GENERAL RADIOLOGY | Facility: CLINIC | Age: 47
End: 2022-01-01
Attending: ORTHOPAEDIC SURGERY
Payer: COMMERCIAL

## 2022-01-01 ENCOUNTER — APPOINTMENT (OUTPATIENT)
Dept: GENERAL RADIOLOGY | Facility: CLINIC | Age: 47
DRG: 180 | End: 2022-01-01
Attending: STUDENT IN AN ORGANIZED HEALTH CARE EDUCATION/TRAINING PROGRAM
Payer: COMMERCIAL

## 2022-01-01 ENCOUNTER — OFFICE VISIT (OUTPATIENT)
Dept: PLASTIC SURGERY | Facility: CLINIC | Age: 47
End: 2022-01-01
Payer: COMMERCIAL

## 2022-01-01 ENCOUNTER — HOSPITAL ENCOUNTER (INPATIENT)
Facility: CLINIC | Age: 47
LOS: 5 days | Discharge: HOME HEALTH | DRG: 064 | End: 2022-09-22
Attending: EMERGENCY MEDICINE | Admitting: STUDENT IN AN ORGANIZED HEALTH CARE EDUCATION/TRAINING PROGRAM
Payer: COMMERCIAL

## 2022-01-01 ENCOUNTER — TELEPHONE (OUTPATIENT)
Dept: ORTHOPEDICS | Facility: CLINIC | Age: 47
End: 2022-01-01
Payer: COMMERCIAL

## 2022-01-01 ENCOUNTER — APPOINTMENT (OUTPATIENT)
Dept: GENERAL RADIOLOGY | Facility: CLINIC | Age: 47
DRG: 180 | End: 2022-01-01
Attending: PHYSICIAN ASSISTANT
Payer: COMMERCIAL

## 2022-01-01 ENCOUNTER — APPOINTMENT (OUTPATIENT)
Dept: MRI IMAGING | Facility: CLINIC | Age: 47
DRG: 064 | End: 2022-01-01
Attending: EMERGENCY MEDICINE
Payer: COMMERCIAL

## 2022-01-01 ENCOUNTER — APPOINTMENT (OUTPATIENT)
Dept: GENERAL RADIOLOGY | Facility: CLINIC | Age: 47
DRG: 871 | End: 2022-01-01
Attending: FAMILY MEDICINE
Payer: COMMERCIAL

## 2022-01-01 ENCOUNTER — VIRTUAL VISIT (OUTPATIENT)
Dept: ONCOLOGY | Facility: CLINIC | Age: 47
End: 2022-01-01
Attending: STUDENT IN AN ORGANIZED HEALTH CARE EDUCATION/TRAINING PROGRAM
Payer: COMMERCIAL

## 2022-01-01 ENCOUNTER — MEDICAL CORRESPONDENCE (OUTPATIENT)
Dept: REHABILITATION | Facility: CLINIC | Age: 47
End: 2022-01-01

## 2022-01-01 ENCOUNTER — PREP FOR PROCEDURE (OUTPATIENT)
Dept: PLASTIC SURGERY | Facility: CLINIC | Age: 47
End: 2022-01-01
Payer: COMMERCIAL

## 2022-01-01 ENCOUNTER — LAB REQUISITION (OUTPATIENT)
Dept: LAB | Facility: CLINIC | Age: 47
End: 2022-01-01
Payer: COMMERCIAL

## 2022-01-01 ENCOUNTER — PRE VISIT (OUTPATIENT)
Dept: ORTHOPEDICS | Facility: CLINIC | Age: 47
End: 2022-01-01

## 2022-01-01 ENCOUNTER — APPOINTMENT (OUTPATIENT)
Dept: CT IMAGING | Facility: CLINIC | Age: 47
DRG: 180 | End: 2022-01-01
Attending: EMERGENCY MEDICINE
Payer: COMMERCIAL

## 2022-01-01 ENCOUNTER — APPOINTMENT (OUTPATIENT)
Dept: PHYSICAL THERAPY | Facility: CLINIC | Age: 47
DRG: 180 | End: 2022-01-01
Attending: PHYSICIAN ASSISTANT
Payer: COMMERCIAL

## 2022-01-01 ENCOUNTER — TRANSCRIBE ORDERS (OUTPATIENT)
Dept: ONCOLOGY | Facility: CLINIC | Age: 47
End: 2022-01-01

## 2022-01-01 ENCOUNTER — OFFICE VISIT (OUTPATIENT)
Dept: ORTHOPEDICS | Facility: CLINIC | Age: 47
End: 2022-01-01
Payer: COMMERCIAL

## 2022-01-01 ENCOUNTER — APPOINTMENT (OUTPATIENT)
Dept: ULTRASOUND IMAGING | Facility: CLINIC | Age: 47
DRG: 064 | End: 2022-01-01
Attending: EMERGENCY MEDICINE
Payer: COMMERCIAL

## 2022-01-01 ENCOUNTER — LAB (OUTPATIENT)
Dept: LAB | Facility: CLINIC | Age: 47
End: 2022-01-01

## 2022-01-01 ENCOUNTER — HOSPITAL ENCOUNTER (OUTPATIENT)
Age: 47
End: 2022-01-01
Attending: INTERNAL MEDICINE
Payer: COMMERCIAL

## 2022-01-01 ENCOUNTER — APPOINTMENT (OUTPATIENT)
Dept: PHYSICAL THERAPY | Facility: CLINIC | Age: 47
DRG: 871 | End: 2022-01-01
Payer: COMMERCIAL

## 2022-01-01 ENCOUNTER — HOSPITAL ENCOUNTER (INPATIENT)
Facility: CLINIC | Age: 47
LOS: 15 days | DRG: 871 | End: 2022-11-08
Attending: EMERGENCY MEDICINE | Admitting: STUDENT IN AN ORGANIZED HEALTH CARE EDUCATION/TRAINING PROGRAM
Payer: COMMERCIAL

## 2022-01-01 ENCOUNTER — TELEPHONE (OUTPATIENT)
Dept: NEUROLOGY | Facility: CLINIC | Age: 47
End: 2022-01-01

## 2022-01-01 ENCOUNTER — APPOINTMENT (OUTPATIENT)
Dept: MRI IMAGING | Facility: CLINIC | Age: 47
End: 2022-01-01
Attending: ORTHOPAEDIC SURGERY
Payer: COMMERCIAL

## 2022-01-01 ENCOUNTER — APPOINTMENT (OUTPATIENT)
Dept: PHYSICAL THERAPY | Facility: CLINIC | Age: 47
End: 2022-01-01
Attending: PHYSICIAN ASSISTANT
Payer: COMMERCIAL

## 2022-01-01 ENCOUNTER — TRANSFERRED RECORDS (OUTPATIENT)
Dept: FAMILY MEDICINE | Facility: CLINIC | Age: 47
End: 2022-01-01

## 2022-01-01 ENCOUNTER — APPOINTMENT (OUTPATIENT)
Dept: ULTRASOUND IMAGING | Facility: CLINIC | Age: 47
DRG: 871 | End: 2022-01-01
Attending: INTERNAL MEDICINE
Payer: COMMERCIAL

## 2022-01-01 ENCOUNTER — APPOINTMENT (OUTPATIENT)
Dept: CT IMAGING | Facility: CLINIC | Age: 47
DRG: 871 | End: 2022-01-01
Attending: FAMILY MEDICINE
Payer: COMMERCIAL

## 2022-01-01 ENCOUNTER — APPOINTMENT (OUTPATIENT)
Dept: LAB | Facility: CLINIC | Age: 47
End: 2022-01-01
Attending: REGISTERED NURSE
Payer: COMMERCIAL

## 2022-01-01 ENCOUNTER — HOSPITAL ENCOUNTER (INPATIENT)
Facility: CLINIC | Age: 47
LOS: 23 days | Discharge: NURSING, REHAB OR LONG-TERM CARE | End: 2022-06-28
Attending: ORTHOPAEDIC SURGERY | Admitting: ORTHOPAEDIC SURGERY
Payer: COMMERCIAL

## 2022-01-01 ENCOUNTER — HOSPITAL ENCOUNTER (OUTPATIENT)
Dept: PET IMAGING | Facility: HOSPITAL | Age: 47
Discharge: HOME | End: 2022-01-11
Attending: PHYSICIAN ASSISTANT | Admitting: PHYSICIAN ASSISTANT
Payer: COMMERCIAL

## 2022-01-01 ENCOUNTER — APPOINTMENT (OUTPATIENT)
Dept: CT IMAGING | Facility: CLINIC | Age: 47
DRG: 871 | End: 2022-01-01
Attending: INTERNAL MEDICINE
Payer: COMMERCIAL

## 2022-01-01 ENCOUNTER — HOSPITAL ENCOUNTER (OUTPATIENT)
Dept: VASCULAR ULTRASOUND | Facility: CLINIC | Age: 47
Discharge: HOME | End: 2022-08-30
Attending: INTERNAL MEDICINE | Admitting: INTERNAL MEDICINE
Payer: COMMERCIAL

## 2022-01-01 ENCOUNTER — APPOINTMENT (OUTPATIENT)
Dept: OCCUPATIONAL THERAPY | Facility: CLINIC | Age: 47
End: 2022-01-01
Attending: ORTHOPAEDIC SURGERY
Payer: COMMERCIAL

## 2022-01-01 ENCOUNTER — APPOINTMENT (OUTPATIENT)
Dept: CARDIOLOGY | Facility: CLINIC | Age: 47
DRG: 871 | End: 2022-01-01
Attending: STUDENT IN AN ORGANIZED HEALTH CARE EDUCATION/TRAINING PROGRAM
Payer: COMMERCIAL

## 2022-01-01 ENCOUNTER — APPOINTMENT (OUTPATIENT)
Dept: EDUCATION SERVICES | Facility: CLINIC | Age: 47
DRG: 064 | End: 2022-01-01
Payer: COMMERCIAL

## 2022-01-01 ENCOUNTER — TELEPHONE (OUTPATIENT)
Dept: ONCOLOGY | Facility: CLINIC | Age: 47
End: 2022-01-01
Payer: COMMERCIAL

## 2022-01-01 ENCOUNTER — HOSPITAL ENCOUNTER (INPATIENT)
Facility: CLINIC | Age: 47
LOS: 10 days | Discharge: HOME HEALTH | End: 2022-07-08
Attending: PHYSICAL MEDICINE & REHABILITATION | Admitting: PHYSICAL MEDICINE & REHABILITATION
Payer: COMMERCIAL

## 2022-01-01 ENCOUNTER — APPOINTMENT (OUTPATIENT)
Dept: CT IMAGING | Facility: CLINIC | Age: 47
DRG: 871 | End: 2022-01-01
Attending: STUDENT IN AN ORGANIZED HEALTH CARE EDUCATION/TRAINING PROGRAM
Payer: COMMERCIAL

## 2022-01-01 ENCOUNTER — APPOINTMENT (OUTPATIENT)
Dept: RADIOLOGY | Facility: HOSPITAL | Age: 47
End: 2022-01-01
Attending: STUDENT IN AN ORGANIZED HEALTH CARE EDUCATION/TRAINING PROGRAM
Payer: COMMERCIAL

## 2022-01-01 ENCOUNTER — TELEPHONE (OUTPATIENT)
Dept: ORTHOPEDICS | Facility: CLINIC | Age: 47
End: 2022-01-01

## 2022-01-01 ENCOUNTER — OFFICE VISIT (OUTPATIENT)
Dept: FAMILY MEDICINE | Facility: CLINIC | Age: 47
End: 2022-01-01
Payer: COMMERCIAL

## 2022-01-01 ENCOUNTER — TELEPHONE (OUTPATIENT)
Dept: PLASTIC SURGERY | Facility: CLINIC | Age: 47
End: 2022-01-01

## 2022-01-01 ENCOUNTER — ANESTHESIA (OUTPATIENT)
Dept: SURGERY | Facility: CLINIC | Age: 47
End: 2022-01-01
Payer: COMMERCIAL

## 2022-01-01 ENCOUNTER — APPOINTMENT (OUTPATIENT)
Dept: GENERAL RADIOLOGY | Facility: CLINIC | Age: 47
DRG: 871 | End: 2022-01-01
Attending: HOSPITALIST
Payer: COMMERCIAL

## 2022-01-01 ENCOUNTER — MEDICAL CORRESPONDENCE (OUTPATIENT)
Dept: ADMINISTRATIVE | Facility: CLINIC | Age: 47
End: 2022-01-01

## 2022-01-01 ENCOUNTER — APPOINTMENT (OUTPATIENT)
Dept: ULTRASOUND IMAGING | Facility: CLINIC | Age: 47
End: 2022-01-01
Attending: INTERNAL MEDICINE
Payer: COMMERCIAL

## 2022-01-01 ENCOUNTER — NURSE TRIAGE (OUTPATIENT)
Dept: NURSING | Facility: CLINIC | Age: 47
End: 2022-01-01
Payer: COMMERCIAL

## 2022-01-01 ENCOUNTER — DOCUMENTATION ONLY (OUTPATIENT)
Dept: PLASTIC SURGERY | Facility: CLINIC | Age: 47
End: 2022-01-01

## 2022-01-01 ENCOUNTER — HOSPITAL ENCOUNTER (EMERGENCY)
Facility: HOSPITAL | Age: 47
Discharge: ANOTHER HOSPITAL | End: 2022-06-05
Attending: STUDENT IN AN ORGANIZED HEALTH CARE EDUCATION/TRAINING PROGRAM | Admitting: STUDENT IN AN ORGANIZED HEALTH CARE EDUCATION/TRAINING PROGRAM
Payer: COMMERCIAL

## 2022-01-01 ENCOUNTER — APPOINTMENT (OUTPATIENT)
Dept: CARDIOLOGY | Facility: CLINIC | Age: 47
DRG: 064 | End: 2022-01-01
Payer: COMMERCIAL

## 2022-01-01 ENCOUNTER — APPOINTMENT (OUTPATIENT)
Dept: OCCUPATIONAL THERAPY | Facility: CLINIC | Age: 47
DRG: 064 | End: 2022-01-01
Payer: COMMERCIAL

## 2022-01-01 ENCOUNTER — HOSPITAL ENCOUNTER (INPATIENT)
Facility: CLINIC | Age: 47
LOS: 3 days | Discharge: HOME HEALTH | DRG: 871 | End: 2022-10-19
Attending: FAMILY MEDICINE | Admitting: STUDENT IN AN ORGANIZED HEALTH CARE EDUCATION/TRAINING PROGRAM
Payer: COMMERCIAL

## 2022-01-01 ENCOUNTER — HOSPITAL ENCOUNTER (EMERGENCY)
Facility: HOSPITAL | Age: 47
Discharge: HOME | End: 2022-08-13
Attending: EMERGENCY MEDICINE | Admitting: EMERGENCY MEDICINE
Payer: COMMERCIAL

## 2022-01-01 ENCOUNTER — APPOINTMENT (OUTPATIENT)
Dept: INTERVENTIONAL RADIOLOGY/VASCULAR | Facility: CLINIC | Age: 47
DRG: 180 | End: 2022-01-01
Attending: PHYSICIAN ASSISTANT
Payer: COMMERCIAL

## 2022-01-01 ENCOUNTER — VIRTUAL VISIT (OUTPATIENT)
Dept: PALLIATIVE CARE | Facility: CLINIC | Age: 47
End: 2022-01-01
Attending: INTERNAL MEDICINE
Payer: COMMERCIAL

## 2022-01-01 ENCOUNTER — CARE COORDINATION (OUTPATIENT)
Dept: PLASTIC SURGERY | Facility: CLINIC | Age: 47
End: 2022-01-01

## 2022-01-01 ENCOUNTER — HOSPITAL ENCOUNTER (OUTPATIENT)
Facility: CLINIC | Age: 47
End: 2022-01-01
Attending: STUDENT IN AN ORGANIZED HEALTH CARE EDUCATION/TRAINING PROGRAM | Admitting: STUDENT IN AN ORGANIZED HEALTH CARE EDUCATION/TRAINING PROGRAM
Payer: COMMERCIAL

## 2022-01-01 ENCOUNTER — APPOINTMENT (OUTPATIENT)
Dept: CT IMAGING | Facility: HOSPITAL | Age: 47
End: 2022-01-01
Attending: EMERGENCY MEDICINE
Payer: COMMERCIAL

## 2022-01-01 ENCOUNTER — VIRTUAL VISIT (OUTPATIENT)
Dept: ONCOLOGY | Facility: CLINIC | Age: 47
End: 2022-01-01
Attending: INTERNAL MEDICINE
Payer: COMMERCIAL

## 2022-01-01 VITALS
TEMPERATURE: 99 F | BODY MASS INDEX: 23.68 KG/M2 | HEIGHT: 74 IN | SYSTOLIC BLOOD PRESSURE: 124 MMHG | RESPIRATION RATE: 17 BRPM | HEART RATE: 98 BPM | OXYGEN SATURATION: 100 % | DIASTOLIC BLOOD PRESSURE: 89 MMHG | WEIGHT: 184.53 LBS

## 2022-01-01 VITALS
HEART RATE: 106 BPM | WEIGHT: 173.3 LBS | OXYGEN SATURATION: 86 % | TEMPERATURE: 97.4 F | RESPIRATION RATE: 8 BRPM | BODY MASS INDEX: 22.25 KG/M2 | DIASTOLIC BLOOD PRESSURE: 79 MMHG | SYSTOLIC BLOOD PRESSURE: 131 MMHG

## 2022-01-01 VITALS
RESPIRATION RATE: 18 BRPM | OXYGEN SATURATION: 99 % | BODY MASS INDEX: 22.46 KG/M2 | TEMPERATURE: 98.4 F | HEART RATE: 89 BPM | HEIGHT: 74 IN | WEIGHT: 175 LBS | SYSTOLIC BLOOD PRESSURE: 135 MMHG | DIASTOLIC BLOOD PRESSURE: 76 MMHG

## 2022-01-01 VITALS
SYSTOLIC BLOOD PRESSURE: 141 MMHG | HEART RATE: 110 BPM | BODY MASS INDEX: 22.46 KG/M2 | HEIGHT: 74 IN | OXYGEN SATURATION: 100 % | DIASTOLIC BLOOD PRESSURE: 87 MMHG | WEIGHT: 175 LBS

## 2022-01-01 VITALS
HEART RATE: 109 BPM | OXYGEN SATURATION: 98 % | BODY MASS INDEX: 22.33 KG/M2 | WEIGHT: 173.9 LBS | DIASTOLIC BLOOD PRESSURE: 94 MMHG | RESPIRATION RATE: 18 BRPM | SYSTOLIC BLOOD PRESSURE: 150 MMHG | TEMPERATURE: 97.5 F

## 2022-01-01 VITALS
SYSTOLIC BLOOD PRESSURE: 121 MMHG | RESPIRATION RATE: 16 BRPM | BODY MASS INDEX: 23.69 KG/M2 | DIASTOLIC BLOOD PRESSURE: 80 MMHG | HEIGHT: 74 IN | HEART RATE: 115 BPM

## 2022-01-01 VITALS
OXYGEN SATURATION: 98 % | BODY MASS INDEX: 23.02 KG/M2 | SYSTOLIC BLOOD PRESSURE: 120 MMHG | HEART RATE: 100 BPM | DIASTOLIC BLOOD PRESSURE: 69 MMHG | TEMPERATURE: 97.8 F | RESPIRATION RATE: 18 BRPM | WEIGHT: 179.4 LBS | HEIGHT: 74 IN

## 2022-01-01 VITALS — OXYGEN SATURATION: 98 % | RESPIRATION RATE: 18 BRPM | TEMPERATURE: 98.9 F | HEART RATE: 124 BPM

## 2022-01-01 VITALS
WEIGHT: 176.3 LBS | OXYGEN SATURATION: 100 % | DIASTOLIC BLOOD PRESSURE: 68 MMHG | HEART RATE: 100 BPM | TEMPERATURE: 96.7 F | BODY MASS INDEX: 22.63 KG/M2 | SYSTOLIC BLOOD PRESSURE: 115 MMHG | RESPIRATION RATE: 18 BRPM | HEIGHT: 74 IN

## 2022-01-01 VITALS
RESPIRATION RATE: 16 BRPM | OXYGEN SATURATION: 98 % | DIASTOLIC BLOOD PRESSURE: 73 MMHG | SYSTOLIC BLOOD PRESSURE: 124 MMHG | BODY MASS INDEX: 21.46 KG/M2 | WEIGHT: 167.11 LBS | HEART RATE: 95 BPM | TEMPERATURE: 97 F

## 2022-01-01 VITALS
RESPIRATION RATE: 20 BRPM | TEMPERATURE: 99.5 F | OXYGEN SATURATION: 100 % | HEART RATE: 120 BPM | WEIGHT: 235 LBS | HEIGHT: 74 IN | SYSTOLIC BLOOD PRESSURE: 140 MMHG | BODY MASS INDEX: 30.16 KG/M2 | DIASTOLIC BLOOD PRESSURE: 70 MMHG

## 2022-01-01 VITALS
OXYGEN SATURATION: 98 % | RESPIRATION RATE: 18 BRPM | SYSTOLIC BLOOD PRESSURE: 128 MMHG | TEMPERATURE: 98.1 F | DIASTOLIC BLOOD PRESSURE: 82 MMHG | HEART RATE: 118 BPM

## 2022-01-01 VITALS
RESPIRATION RATE: 15 BRPM | HEART RATE: 105 BPM | BODY MASS INDEX: 22.47 KG/M2 | DIASTOLIC BLOOD PRESSURE: 78 MMHG | WEIGHT: 175 LBS | TEMPERATURE: 98.1 F | SYSTOLIC BLOOD PRESSURE: 133 MMHG

## 2022-01-01 VITALS
SYSTOLIC BLOOD PRESSURE: 136 MMHG | TEMPERATURE: 98.4 F | HEART RATE: 123 BPM | RESPIRATION RATE: 18 BRPM | DIASTOLIC BLOOD PRESSURE: 87 MMHG | OXYGEN SATURATION: 97 %

## 2022-01-01 DIAGNOSIS — C49.9 UNDIFFERENTIATED PLEOMORPHIC SARCOMA (H): ICD-10-CM

## 2022-01-01 DIAGNOSIS — K59.00 CONSTIPATION, UNSPECIFIED CONSTIPATION TYPE: ICD-10-CM

## 2022-01-01 DIAGNOSIS — R22.2 ABDOMINAL WALL MASS: ICD-10-CM

## 2022-01-01 DIAGNOSIS — C49.9 SARCOMA (H): Primary | ICD-10-CM

## 2022-01-01 DIAGNOSIS — G89.3 CANCER ASSOCIATED PAIN: ICD-10-CM

## 2022-01-01 DIAGNOSIS — J96.01 ACUTE RESPIRATORY FAILURE WITH HYPOXIA (H): ICD-10-CM

## 2022-01-01 DIAGNOSIS — J90 PLEURAL EFFUSION ON LEFT: ICD-10-CM

## 2022-01-01 DIAGNOSIS — C41.9 OSTEOSARCOMA OF BONE (H): Primary | ICD-10-CM

## 2022-01-01 DIAGNOSIS — I63.419 CEREBROVASCULAR ACCIDENT (CVA) DUE TO EMBOLISM OF MIDDLE CEREBRAL ARTERY, UNSPECIFIED BLOOD VESSEL LATERALITY (H): Primary | ICD-10-CM

## 2022-01-01 DIAGNOSIS — S72.001D CLOSED FRACTURE OF NECK OF RIGHT FEMUR WITH ROUTINE HEALING, SUBSEQUENT ENCOUNTER: ICD-10-CM

## 2022-01-01 DIAGNOSIS — Z89.621 HISTORY OF DISARTICULATION OF RIGHT HIP: Primary | ICD-10-CM

## 2022-01-01 DIAGNOSIS — D64.9 ANEMIA, UNSPECIFIED TYPE: Primary | ICD-10-CM

## 2022-01-01 DIAGNOSIS — Z89.621 AMPUTEE, HIP, RIGHT: ICD-10-CM

## 2022-01-01 DIAGNOSIS — J18.9 COMMUNITY ACQUIRED PNEUMONIA, UNSPECIFIED LATERALITY: ICD-10-CM

## 2022-01-01 DIAGNOSIS — E87.6 HYPOKALEMIA: ICD-10-CM

## 2022-01-01 DIAGNOSIS — C49.9 SARCOMA (H): ICD-10-CM

## 2022-01-01 DIAGNOSIS — S72.001A CLOSED FRACTURE OF RIGHT HIP, INITIAL ENCOUNTER (H): ICD-10-CM

## 2022-01-01 DIAGNOSIS — Z71.89 ADVANCE CARE PLANNING: ICD-10-CM

## 2022-01-01 DIAGNOSIS — I63.9 CEREBROVASCULAR ACCIDENT (CVA), UNSPECIFIED MECHANISM (H): Primary | ICD-10-CM

## 2022-01-01 DIAGNOSIS — C41.9 OSTEOSARCOMA OF BONE (H): ICD-10-CM

## 2022-01-01 DIAGNOSIS — R05.1 ACUTE COUGH: ICD-10-CM

## 2022-01-01 DIAGNOSIS — Z89.621 AMPUTEE, HIP, RIGHT: Primary | ICD-10-CM

## 2022-01-01 DIAGNOSIS — C78.00 SECONDARY SARCOMA OF LUNG, UNSPECIFIED LATERALITY (H): ICD-10-CM

## 2022-01-01 DIAGNOSIS — T45.1X5A ADVERSE EFFECT OF ANTINEOPLASTIC AND IMMUNOSUPPRESSIVE DRUGS, INITIAL ENCOUNTER: Primary | ICD-10-CM

## 2022-01-01 DIAGNOSIS — J90 PLEURAL EFFUSION: ICD-10-CM

## 2022-01-01 DIAGNOSIS — D50.9 MICROCYTIC ANEMIA: ICD-10-CM

## 2022-01-01 DIAGNOSIS — C78.00 SECONDARY SARCOMA OF LUNG, UNSPECIFIED LATERALITY (H): Primary | ICD-10-CM

## 2022-01-01 DIAGNOSIS — R19.7 DIARRHEA, UNSPECIFIED TYPE: ICD-10-CM

## 2022-01-01 DIAGNOSIS — C49.9 UNDIFFERENTIATED PLEOMORPHIC SARCOMA (H): Primary | ICD-10-CM

## 2022-01-01 DIAGNOSIS — C49.9 SARCOMA OF SOFT TISSUE (H): Primary | ICD-10-CM

## 2022-01-01 DIAGNOSIS — S72.91XA CLOSED FRACTURE OF RIGHT FEMUR, UNSPECIFIED FRACTURE MORPHOLOGY, UNSPECIFIED PORTION OF FEMUR, INITIAL ENCOUNTER (H): Primary | ICD-10-CM

## 2022-01-01 DIAGNOSIS — Z89.621 ACQUIRED ABSENCE OF RIGHT HIP: Primary | ICD-10-CM

## 2022-01-01 DIAGNOSIS — T45.1X5A ADVERSE EFFECT OF ANTINEOPLASTIC AND IMMUNOSUPPRESSIVE DRUGS, INITIAL ENCOUNTER: ICD-10-CM

## 2022-01-01 DIAGNOSIS — D64.9 ANEMIA, UNSPECIFIED TYPE: ICD-10-CM

## 2022-01-01 DIAGNOSIS — S72.001A CLOSED FRACTURE OF NECK OF RIGHT FEMUR, INITIAL ENCOUNTER (H): ICD-10-CM

## 2022-01-01 DIAGNOSIS — R45.86 MOOD AND AFFECT DISTURBANCE: ICD-10-CM

## 2022-01-01 DIAGNOSIS — S31.809S WOUND OF BUTTOCK, UNSPECIFIED LATERALITY, SEQUELA: ICD-10-CM

## 2022-01-01 DIAGNOSIS — Z11.52 ENCOUNTER FOR SCREENING LABORATORY TESTING FOR SEVERE ACUTE RESPIRATORY SYNDROME CORONAVIRUS 2 (SARS-COV-2): ICD-10-CM

## 2022-01-01 DIAGNOSIS — R06.02 SOB (SHORTNESS OF BREATH): ICD-10-CM

## 2022-01-01 DIAGNOSIS — J90 PLEURAL EFFUSION ON LEFT: Primary | ICD-10-CM

## 2022-01-01 DIAGNOSIS — D72.829 LEUKOCYTOSIS, UNSPECIFIED TYPE: ICD-10-CM

## 2022-01-01 DIAGNOSIS — J18.9 PNEUMONIA OF BOTH LUNGS DUE TO INFECTIOUS ORGANISM, UNSPECIFIED PART OF LUNG: ICD-10-CM

## 2022-01-01 DIAGNOSIS — E83.42 HYPOMAGNESEMIA: ICD-10-CM

## 2022-01-01 DIAGNOSIS — C77.2 MALIGNANT NEOPLASM METASTATIC TO INTRA-ABDOMINAL LYMPH NODE (H): ICD-10-CM

## 2022-01-01 DIAGNOSIS — C78.00 MALIGNANT NEOPLASM METASTATIC TO LUNG, UNSPECIFIED LATERALITY (H): Primary | ICD-10-CM

## 2022-01-01 DIAGNOSIS — Z09 HOSPITAL DISCHARGE FOLLOW-UP: Primary | ICD-10-CM

## 2022-01-01 DIAGNOSIS — E88.09 HYPOALBUMINEMIA: ICD-10-CM

## 2022-01-01 DIAGNOSIS — I63.419 CEREBROVASCULAR ACCIDENT (CVA) DUE TO EMBOLISM OF MIDDLE CEREBRAL ARTERY, UNSPECIFIED BLOOD VESSEL LATERALITY (H): ICD-10-CM

## 2022-01-01 DIAGNOSIS — C49.9 SOFT TISSUE SARCOMA (H): Primary | ICD-10-CM

## 2022-01-01 DIAGNOSIS — Z71.89 OTHER SPECIFIED COUNSELING: ICD-10-CM

## 2022-01-01 DIAGNOSIS — I63.9 STROKE (H): ICD-10-CM

## 2022-01-01 DIAGNOSIS — C49.9 SECONDARY SARCOMA OF LUNG, UNSPECIFIED LATERALITY (H): ICD-10-CM

## 2022-01-01 DIAGNOSIS — C41.9 MALIGNANT NEOPLASM OF BONE AND ARTICULAR CARTILAGE, UNSPECIFIED (H): ICD-10-CM

## 2022-01-01 DIAGNOSIS — R00.0 SINUS TACHYCARDIA: ICD-10-CM

## 2022-01-01 DIAGNOSIS — C49.9 SECONDARY SARCOMA OF LUNG, UNSPECIFIED LATERALITY (H): Primary | ICD-10-CM

## 2022-01-01 DIAGNOSIS — I63.9 CEREBROVASCULAR ACCIDENT (CVA), UNSPECIFIED MECHANISM (H): ICD-10-CM

## 2022-01-01 DIAGNOSIS — M84.551S PATHOLOGICAL FRACTURE OF RIGHT FEMUR DUE TO NEOPLASTIC DISEASE, SEQUELA: ICD-10-CM

## 2022-01-01 DIAGNOSIS — C78.00 MALIGNANT NEOPLASM METASTATIC TO LUNG, UNSPECIFIED LATERALITY (H): ICD-10-CM

## 2022-01-01 DIAGNOSIS — I26.99 THROMBUS OF PULMONARY VEIN (H): ICD-10-CM

## 2022-01-01 DIAGNOSIS — I63.412 CEREBROVASCULAR ACCIDENT (CVA) DUE TO EMBOLISM OF LEFT MIDDLE CEREBRAL ARTERY (H): Primary | ICD-10-CM

## 2022-01-01 DIAGNOSIS — D61.818 PANCYTOPENIA (H): ICD-10-CM

## 2022-01-01 LAB
1,3 BETA GLUCAN SER-MCNC: <31 PG/ML
ABO/RH(D): ABNORMAL
ALBUMIN SERPL BCG-MCNC: 2.7 G/DL (ref 3.5–5.2)
ALBUMIN SERPL BCG-MCNC: 2.8 G/DL (ref 3.5–5.2)
ALBUMIN SERPL BCG-MCNC: 2.8 G/DL (ref 3.5–5.2)
ALBUMIN SERPL BCG-MCNC: 2.9 G/DL (ref 3.5–5.2)
ALBUMIN SERPL BCG-MCNC: 3 G/DL (ref 3.5–5.2)
ALBUMIN SERPL BCG-MCNC: 3.1 G/DL (ref 3.5–5.2)
ALBUMIN SERPL BCG-MCNC: 3.2 G/DL (ref 3.5–5.2)
ALBUMIN SERPL BCG-MCNC: 3.3 G/DL (ref 3.5–5.2)
ALBUMIN SERPL BCG-MCNC: 3.5 G/DL (ref 3.5–5.2)
ALBUMIN SERPL-MCNC: 2.4 G/DL (ref 3.4–5)
ALBUMIN SERPL-MCNC: 2.5 G/DL (ref 3.4–5)
ALBUMIN SERPL-MCNC: 2.7 G/DL (ref 3.5–5)
ALBUMIN SERPL-MCNC: 3.2 G/DL (ref 3.5–5)
ALBUMIN UR-MCNC: 10 MG/DL
ALBUMIN UR-MCNC: 20 MG/DL
ALBUMIN UR-MCNC: 50 MG/DL
ALBUMIN UR-MCNC: 70 MG/DL
ALP SERPL-CCNC: 103 U/L (ref 40–129)
ALP SERPL-CCNC: 104 U/L (ref 40–129)
ALP SERPL-CCNC: 108 U/L (ref 40–129)
ALP SERPL-CCNC: 110 U/L (ref 40–129)
ALP SERPL-CCNC: 111 U/L (ref 40–129)
ALP SERPL-CCNC: 112 U/L (ref 45–120)
ALP SERPL-CCNC: 113 U/L (ref 40–129)
ALP SERPL-CCNC: 115 U/L (ref 40–129)
ALP SERPL-CCNC: 117 U/L (ref 40–129)
ALP SERPL-CCNC: 118 U/L (ref 40–129)
ALP SERPL-CCNC: 119 U/L (ref 40–129)
ALP SERPL-CCNC: 121 U/L (ref 40–129)
ALP SERPL-CCNC: 122 U/L (ref 40–129)
ALP SERPL-CCNC: 134 U/L (ref 40–129)
ALP SERPL-CCNC: 136 U/L (ref 40–129)
ALP SERPL-CCNC: 137 U/L (ref 40–150)
ALP SERPL-CCNC: 144 U/L (ref 40–129)
ALP SERPL-CCNC: 145 U/L (ref 40–129)
ALP SERPL-CCNC: 147 U/L (ref 40–129)
ALP SERPL-CCNC: 149 U/L (ref 40–129)
ALP SERPL-CCNC: 151 U/L (ref 45–120)
ALP SERPL-CCNC: 214 U/L (ref 40–129)
ALP SERPL-CCNC: 275 U/L (ref 40–129)
ALP SERPL-CCNC: 93 U/L (ref 40–129)
ALP SERPL-CCNC: 96 U/L (ref 40–129)
ALP SERPL-CCNC: 99 U/L (ref 40–129)
ALT SERPL W P-5'-P-CCNC: 10 U/L (ref 10–50)
ALT SERPL W P-5'-P-CCNC: 12 U/L (ref 0–70)
ALT SERPL W P-5'-P-CCNC: 12 U/L (ref 10–50)
ALT SERPL W P-5'-P-CCNC: 12 U/L (ref 10–50)
ALT SERPL W P-5'-P-CCNC: 13 U/L (ref 10–50)
ALT SERPL W P-5'-P-CCNC: 13 U/L (ref 10–50)
ALT SERPL W P-5'-P-CCNC: 5 U/L (ref 10–50)
ALT SERPL W P-5'-P-CCNC: 6 U/L (ref 10–50)
ALT SERPL W P-5'-P-CCNC: 6 U/L (ref 10–50)
ALT SERPL W P-5'-P-CCNC: 7 U/L (ref 10–50)
ALT SERPL W P-5'-P-CCNC: 7 U/L (ref 10–50)
ALT SERPL W P-5'-P-CCNC: 9 U/L (ref 10–50)
ALT SERPL W P-5'-P-CCNC: 9 U/L (ref 10–50)
ALT SERPL W P-5'-P-CCNC: <5 U/L (ref 10–50)
ALT SERPL W P-5'-P-CCNC: <9 U/L (ref 0–45)
ALT SERPL W P-5'-P-CCNC: <9 U/L (ref 0–45)
ANION GAP SERPL CALCULATED.3IONS-SCNC: 10 MMOL/L (ref 3–14)
ANION GAP SERPL CALCULATED.3IONS-SCNC: 10 MMOL/L (ref 7–15)
ANION GAP SERPL CALCULATED.3IONS-SCNC: 11 MMOL/L (ref 7–15)
ANION GAP SERPL CALCULATED.3IONS-SCNC: 12 MMOL/L (ref 5–18)
ANION GAP SERPL CALCULATED.3IONS-SCNC: 12 MMOL/L (ref 7–15)
ANION GAP SERPL CALCULATED.3IONS-SCNC: 13 MMOL/L (ref 7–15)
ANION GAP SERPL CALCULATED.3IONS-SCNC: 14 MMOL/L (ref 7–15)
ANION GAP SERPL CALCULATED.3IONS-SCNC: 14 MMOL/L (ref 7–15)
ANION GAP SERPL CALCULATED.3IONS-SCNC: 16 MMOL/L (ref 7–15)
ANION GAP SERPL CALCULATED.3IONS-SCNC: 18 MMOL/L (ref 7–15)
ANION GAP SERPL CALCULATED.3IONS-SCNC: 19 MMOL/L (ref 7–15)
ANION GAP SERPL CALCULATED.3IONS-SCNC: 22 MMOL/L (ref 7–15)
ANION GAP SERPL CALCULATED.3IONS-SCNC: 28 MMOL/L (ref 7–15)
ANION GAP SERPL CALCULATED.3IONS-SCNC: 5 MMOL/L (ref 3–14)
ANION GAP SERPL CALCULATED.3IONS-SCNC: 5 MMOL/L (ref 3–14)
ANION GAP SERPL CALCULATED.3IONS-SCNC: 6 MMOL/L (ref 3–14)
ANION GAP SERPL CALCULATED.3IONS-SCNC: 7 MMOL/L (ref 3–14)
ANION GAP SERPL CALCULATED.3IONS-SCNC: 7 MMOL/L (ref 3–14)
ANION GAP SERPL CALCULATED.3IONS-SCNC: 8 MMOL/L (ref 3–14)
ANION GAP SERPL CALCULATED.3IONS-SCNC: 8 MMOL/L (ref 3–14)
ANION GAP SERPL CALCULATED.3IONS-SCNC: 8 MMOL/L (ref 7–15)
ANION GAP SERPL CALCULATED.3IONS-SCNC: 9 MMOL/L (ref 5–18)
ANION GAP SERPL CALCULATED.3IONS-SCNC: 9 MMOL/L (ref 7–15)
ANTIBODY ID: NORMAL
ANTIBODY SCREEN, TUBE: ABNORMAL
ANTIBODY SCREEN: POSITIVE
APPEARANCE FLD: ABNORMAL
APPEARANCE UR: ABNORMAL
APPEARANCE UR: CLEAR
APTT PPP: 30 SECONDS (ref 22–38)
APTT PPP: 31 SECONDS (ref 22–38)
APTT PPP: 31 SECONDS (ref 22–38)
APTT PPP: 32 SECONDS (ref 22–38)
APTT PPP: 32 SECONDS (ref 22–38)
APTT PPP: 33 SECONDS (ref 22–38)
APTT PPP: 34 SECONDS (ref 22–38)
APTT PPP: 34 SECONDS (ref 22–38)
APTT PPP: 35 SECONDS (ref 22–38)
APTT PPP: 35 SECONDS (ref 22–38)
APTT PPP: 36 SECONDS (ref 22–38)
APTT PPP: 36 SECONDS (ref 22–38)
APTT PPP: 37 SECONDS (ref 22–38)
APTT PPP: 38 SECONDS (ref 22–38)
APTT PPP: 44 SECONDS (ref 22–38)
APTT PPP: 46 SECONDS (ref 22–38)
AST SERPL W P-5'-P-CCNC: 12 U/L (ref 0–40)
AST SERPL W P-5'-P-CCNC: 16 U/L (ref 10–50)
AST SERPL W P-5'-P-CCNC: 17 U/L (ref 0–40)
AST SERPL W P-5'-P-CCNC: 17 U/L (ref 10–50)
AST SERPL W P-5'-P-CCNC: 18 U/L (ref 10–50)
AST SERPL W P-5'-P-CCNC: 18 U/L (ref 10–50)
AST SERPL W P-5'-P-CCNC: 19 U/L (ref 10–50)
AST SERPL W P-5'-P-CCNC: 20 U/L (ref 10–50)
AST SERPL W P-5'-P-CCNC: 21 U/L (ref 10–50)
AST SERPL W P-5'-P-CCNC: 22 U/L (ref 10–50)
AST SERPL W P-5'-P-CCNC: 23 U/L (ref 10–50)
AST SERPL W P-5'-P-CCNC: 24 U/L (ref 10–50)
AST SERPL W P-5'-P-CCNC: 29 U/L (ref 0–45)
AST SERPL W P-5'-P-CCNC: 29 U/L (ref 10–50)
AST SERPL W P-5'-P-CCNC: 30 U/L (ref 10–50)
ATRIAL RATE - MUSE: 104 BPM
ATRIAL RATE - MUSE: 107 BPM
ATRIAL RATE - MUSE: 111 BPM
ATRIAL RATE - MUSE: 117 BPM
ATRIAL RATE - MUSE: 120 BPM
ATRIAL RATE - MUSE: 125 BPM
ATRIAL RATE - MUSE: 131 BPM
ATRIAL RATE - MUSE: 138 BPM
ATRIAL RATE - MUSE: 139 BPM
BACTERIA BLD CULT: NO GROWTH
BACTERIA PLR CULT: ABNORMAL
BACTERIA PLR CULT: NO GROWTH
BACTERIA UR CULT: NO GROWTH
BASE EXCESS BLDA CALC-SCNC: -0.6 MMOL/L (ref -9.6–2)
BASE EXCESS BLDA CALC-SCNC: 1.2 MMOL/L (ref -9.6–2)
BASE EXCESS BLDA CALC-SCNC: 1.7 MMOL/L (ref -9.6–2)
BASE EXCESS BLDA CALC-SCNC: 2 MMOL/L (ref -9.6–2)
BASE EXCESS BLDA CALC-SCNC: 2.3 MMOL/L (ref -9.6–2)
BASE EXCESS BLDA CALC-SCNC: 2.4 MMOL/L (ref -9.6–2)
BASE EXCESS BLDA CALC-SCNC: 3.1 MMOL/L (ref -9.6–2)
BASE EXCESS BLDV CALC-SCNC: -2.5 MMOL/L (ref -7.7–1.9)
BASOPHILS # BLD AUTO: 0 10E3/UL (ref 0–0.2)
BASOPHILS # BLD AUTO: 0.1 10E3/UL (ref 0–0.2)
BASOPHILS # BLD MANUAL: 0 10E3/UL (ref 0–0.2)
BASOPHILS # BLD MANUAL: 0 10E3/UL (ref 0–0.2)
BASOPHILS NFR BLD AUTO: 0 %
BASOPHILS NFR BLD AUTO: 1 %
BASOPHILS NFR BLD MANUAL: 0 %
BASOPHILS NFR BLD MANUAL: 0 %
BILIRUB SERPL-MCNC: 0.3 MG/DL
BILIRUB SERPL-MCNC: 0.4 MG/DL
BILIRUB SERPL-MCNC: 0.4 MG/DL (ref 0–1)
BILIRUB SERPL-MCNC: 0.5 MG/DL
BILIRUB SERPL-MCNC: 0.6 MG/DL
BILIRUB SERPL-MCNC: 0.7 MG/DL
BILIRUB SERPL-MCNC: 0.8 MG/DL
BILIRUB SERPL-MCNC: 0.8 MG/DL (ref 0.2–1.3)
BILIRUB SERPL-MCNC: 0.8 MG/DL (ref 0–1)
BILIRUB SERPL-MCNC: 0.9 MG/DL
BILIRUB SERPL-MCNC: 1 MG/DL
BILIRUB SERPL-MCNC: 1.2 MG/DL
BILIRUB UR QL STRIP: NEGATIVE
BLD PROD TYP BPU: NORMAL
BLOOD COMPONENT TYPE: NORMAL
BUN SERPL-MCNC: 10 MG/DL (ref 7–30)
BUN SERPL-MCNC: 10.2 MG/DL (ref 6–20)
BUN SERPL-MCNC: 10.4 MG/DL (ref 6–20)
BUN SERPL-MCNC: 10.5 MG/DL (ref 6–20)
BUN SERPL-MCNC: 10.7 MG/DL (ref 6–20)
BUN SERPL-MCNC: 10.8 MG/DL (ref 6–20)
BUN SERPL-MCNC: 11 MG/DL (ref 6–20)
BUN SERPL-MCNC: 11 MG/DL (ref 7–30)
BUN SERPL-MCNC: 11.1 MG/DL (ref 6–20)
BUN SERPL-MCNC: 11.2 MG/DL (ref 6–20)
BUN SERPL-MCNC: 11.7 MG/DL (ref 6–20)
BUN SERPL-MCNC: 12 MG/DL (ref 7–30)
BUN SERPL-MCNC: 12 MG/DL (ref 8–22)
BUN SERPL-MCNC: 12 MG/DL (ref 8–22)
BUN SERPL-MCNC: 12.3 MG/DL (ref 6–20)
BUN SERPL-MCNC: 13 MG/DL (ref 7–30)
BUN SERPL-MCNC: 13 MG/DL (ref 7–30)
BUN SERPL-MCNC: 13.7 MG/DL (ref 6–20)
BUN SERPL-MCNC: 14 MG/DL (ref 7–30)
BUN SERPL-MCNC: 15 MG/DL (ref 7–30)
BUN SERPL-MCNC: 15.4 MG/DL (ref 6–20)
BUN SERPL-MCNC: 15.4 MG/DL (ref 6–20)
BUN SERPL-MCNC: 16 MG/DL (ref 6–20)
BUN SERPL-MCNC: 16 MG/DL (ref 7–30)
BUN SERPL-MCNC: 16.4 MG/DL (ref 6–20)
BUN SERPL-MCNC: 18.2 MG/DL (ref 6–20)
BUN SERPL-MCNC: 19.2 MG/DL (ref 6–20)
BUN SERPL-MCNC: 20.1 MG/DL (ref 6–20)
BUN SERPL-MCNC: 21.2 MG/DL (ref 6–20)
BUN SERPL-MCNC: 21.8 MG/DL (ref 6–20)
BUN SERPL-MCNC: 22 MG/DL (ref 6–20)
BUN SERPL-MCNC: 22.2 MG/DL (ref 6–20)
BUN SERPL-MCNC: 22.5 MG/DL (ref 6–20)
BUN SERPL-MCNC: 23.1 MG/DL (ref 6–20)
BUN SERPL-MCNC: 23.6 MG/DL (ref 6–20)
BUN SERPL-MCNC: 23.7 MG/DL (ref 6–20)
BUN SERPL-MCNC: 26.4 MG/DL (ref 6–20)
BUN SERPL-MCNC: 27.9 MG/DL (ref 6–20)
BUN SERPL-MCNC: 28.4 MG/DL (ref 6–20)
BUN SERPL-MCNC: 29.9 MG/DL (ref 6–20)
BUN SERPL-MCNC: 31.7 MG/DL (ref 6–20)
BUN SERPL-MCNC: 33.5 MG/DL (ref 6–20)
BUN SERPL-MCNC: 34.3 MG/DL (ref 6–20)
BUN SERPL-MCNC: 43 MG/DL (ref 6–20)
BUN SERPL-MCNC: 5.6 MG/DL (ref 6–20)
BUN SERPL-MCNC: 61.2 MG/DL (ref 6–20)
BUN SERPL-MCNC: 7.3 MG/DL (ref 6–20)
BUN SERPL-MCNC: 8.8 MG/DL (ref 6–20)
BUN SERPL-MCNC: 9.2 MG/DL (ref 6–20)
BUN SERPL-MCNC: 9.7 MG/DL (ref 6–20)
BUN SERPL-MCNC: 9.8 MG/DL (ref 6–20)
C COLI+JEJUNI+LARI FUSA STL QL NAA+PROBE: NOT DETECTED
C COLI+JEJUNI+LARI FUSA STL QL NAA+PROBE: NOT DETECTED
C DIFF TOX B STL QL: NEGATIVE
CA-I BLD-MCNC: 4.1 MG/DL (ref 4.4–5.2)
CA-I BLD-MCNC: 4.3 MG/DL (ref 4.4–5.2)
CA-I BLD-MCNC: 4.6 MG/DL (ref 4.4–5.2)
CA-I BLD-MCNC: 4.8 MG/DL (ref 4.4–5.2)
CA-I BLD-MCNC: 4.8 MG/DL (ref 4.4–5.2)
CA-I BLD-MCNC: 4.9 MG/DL (ref 4.4–5.2)
CA-I BLD-MCNC: 5 MG/DL (ref 4.4–5.2)
CA-I BLD-MCNC: 5 MG/DL (ref 4.4–5.2)
CALCIUM SERPL-MCNC: 7.2 MG/DL (ref 8.5–10.1)
CALCIUM SERPL-MCNC: 7.3 MG/DL (ref 8.5–10.1)
CALCIUM SERPL-MCNC: 7.5 MG/DL (ref 8.6–10)
CALCIUM SERPL-MCNC: 7.8 MG/DL (ref 8.5–10.1)
CALCIUM SERPL-MCNC: 7.8 MG/DL (ref 8.5–10.1)
CALCIUM SERPL-MCNC: 8.3 MG/DL (ref 8.6–10)
CALCIUM SERPL-MCNC: 8.4 MG/DL (ref 8.6–10)
CALCIUM SERPL-MCNC: 8.6 MG/DL (ref 8.6–10)
CALCIUM SERPL-MCNC: 8.7 MG/DL (ref 8.6–10)
CALCIUM SERPL-MCNC: 8.8 MG/DL (ref 8.6–10)
CALCIUM SERPL-MCNC: 8.9 MG/DL (ref 8.6–10)
CALCIUM SERPL-MCNC: 9 MG/DL (ref 8.5–10.1)
CALCIUM SERPL-MCNC: 9 MG/DL (ref 8.6–10)
CALCIUM SERPL-MCNC: 9.1 MG/DL (ref 8.5–10.1)
CALCIUM SERPL-MCNC: 9.1 MG/DL (ref 8.6–10)
CALCIUM SERPL-MCNC: 9.2 MG/DL (ref 8.5–10.1)
CALCIUM SERPL-MCNC: 9.2 MG/DL (ref 8.6–10)
CALCIUM SERPL-MCNC: 9.2 MG/DL (ref 8.6–10)
CALCIUM SERPL-MCNC: 9.3 MG/DL (ref 8.5–10.1)
CALCIUM SERPL-MCNC: 9.3 MG/DL (ref 8.5–10.5)
CALCIUM SERPL-MCNC: 9.3 MG/DL (ref 8.6–10)
CALCIUM SERPL-MCNC: 9.4 MG/DL (ref 8.5–10.5)
CALCIUM SERPL-MCNC: 9.4 MG/DL (ref 8.6–10)
CALCIUM SERPL-MCNC: 9.5 MG/DL (ref 8.6–10)
CALCIUM SERPL-MCNC: 9.5 MG/DL (ref 8.6–10)
CELL COUNT BODY FLUID SOURCE: ABNORMAL
CF REDUC 60M P MA LENFR BLD TEG: 0.6 % (ref 0–15)
CF REDUC 60M P MA LENFR BLD TEG: 2 % (ref 0–15)
CFT BLD TEG: 1.2 MINUTE (ref 1–3)
CFT BLD TEG: 2.4 MINUTE (ref 1–3)
CHLORIDE BLD-SCNC: 100 MMOL/L (ref 94–109)
CHLORIDE BLD-SCNC: 102 MMOL/L (ref 94–109)
CHLORIDE BLD-SCNC: 103 MMOL/L (ref 94–109)
CHLORIDE BLD-SCNC: 103 MMOL/L (ref 98–107)
CHLORIDE BLD-SCNC: 104 MMOL/L (ref 98–107)
CHLORIDE BLD-SCNC: 106 MMOL/L (ref 94–109)
CHLORIDE BLD-SCNC: 107 MMOL/L (ref 94–109)
CHLORIDE BLD-SCNC: 108 MMOL/L (ref 94–109)
CHLORIDE BLD-SCNC: 99 MMOL/L (ref 94–109)
CHLORIDE BLD-SCNC: 99 MMOL/L (ref 94–109)
CHLORIDE SERPL-SCNC: 100 MMOL/L (ref 98–107)
CHLORIDE SERPL-SCNC: 101 MMOL/L (ref 98–107)
CHLORIDE SERPL-SCNC: 101 MMOL/L (ref 98–107)
CHLORIDE SERPL-SCNC: 102 MMOL/L (ref 98–107)
CHLORIDE SERPL-SCNC: 103 MMOL/L (ref 98–107)
CHLORIDE SERPL-SCNC: 104 MMOL/L (ref 98–107)
CHLORIDE SERPL-SCNC: 105 MMOL/L (ref 98–107)
CHLORIDE SERPL-SCNC: 106 MMOL/L (ref 98–107)
CHLORIDE SERPL-SCNC: 107 MMOL/L (ref 98–107)
CHLORIDE SERPL-SCNC: 108 MMOL/L (ref 98–107)
CHLORIDE SERPL-SCNC: 108 MMOL/L (ref 98–107)
CHLORIDE SERPL-SCNC: 110 MMOL/L (ref 98–107)
CHLORIDE SERPL-SCNC: 96 MMOL/L (ref 98–107)
CHLORIDE SERPL-SCNC: 96 MMOL/L (ref 98–107)
CHLORIDE SERPL-SCNC: 98 MMOL/L (ref 98–107)
CHOLEST SERPL-MCNC: 183 MG/DL
CI (COAGULATION INDEX)(Z) NON NATIVE: 0.7 (ref -3–3)
CI (COAGULATION INDEX)(Z) NON NATIVE: 2.7 (ref -3–3)
CK SERPL-CCNC: 79 U/L (ref 30–300)
CLOT ANGLE BLD TEG: 63.9 DEGREES (ref 53–72)
CLOT ANGLE BLD TEG: 72.2 DEGREES (ref 53–72)
CLOT INIT BLD TEG: 2.2 MINUTE (ref 5–10)
CLOT INIT BLD TEG: 3 MINUTE (ref 5–10)
CLOT LYSIS 30M P MA LENFR BLD TEG: 0 % (ref 0–8)
CLOT LYSIS 30M P MA LENFR BLD TEG: 0.2 % (ref 0–8)
CLOT STRENGTH BLD TEG: 4.8 KD/SC (ref 4.5–11)
CLOT STRENGTH BLD TEG: 7.8 KD/SC (ref 4.5–11)
CO2 SERPL-SCNC: 22 MMOL/L (ref 20–32)
CO2 SERPL-SCNC: 24 MMOL/L (ref 20–32)
CO2 SERPL-SCNC: 25 MMOL/L (ref 20–32)
CO2 SERPL-SCNC: 26 MMOL/L (ref 20–32)
CO2 SERPL-SCNC: 26 MMOL/L (ref 22–31)
CO2 SERPL-SCNC: 26 MMOL/L (ref 22–31)
CO2 SERPL-SCNC: 27 MMOL/L (ref 20–32)
CO2 SERPL-SCNC: 27 MMOL/L (ref 20–32)
CO2 SERPL-SCNC: 30 MMOL/L (ref 20–32)
CO2 SERPL-SCNC: 31 MMOL/L (ref 20–32)
CODING SYSTEM: NORMAL
COLOR FLD: ABNORMAL
COLOR UR AUTO: ABNORMAL
CPB POCT: NO
CREAT BLD-MCNC: 0.5 MG/DL (ref 0.7–1.3)
CREAT SERPL-MCNC: 0.41 MG/DL (ref 0.67–1.17)
CREAT SERPL-MCNC: 0.49 MG/DL (ref 0.66–1.25)
CREAT SERPL-MCNC: 0.49 MG/DL (ref 0.66–1.25)
CREAT SERPL-MCNC: 0.49 MG/DL (ref 0.67–1.17)
CREAT SERPL-MCNC: 0.5 MG/DL (ref 0.66–1.25)
CREAT SERPL-MCNC: 0.5 MG/DL (ref 0.66–1.25)
CREAT SERPL-MCNC: 0.51 MG/DL (ref 0.66–1.25)
CREAT SERPL-MCNC: 0.51 MG/DL (ref 0.67–1.17)
CREAT SERPL-MCNC: 0.52 MG/DL (ref 0.67–1.17)
CREAT SERPL-MCNC: 0.57 MG/DL (ref 0.66–1.25)
CREAT SERPL-MCNC: 0.61 MG/DL (ref 0.67–1.17)
CREAT SERPL-MCNC: 0.62 MG/DL (ref 0.66–1.25)
CREAT SERPL-MCNC: 0.62 MG/DL (ref 0.66–1.25)
CREAT SERPL-MCNC: 0.63 MG/DL (ref 0.66–1.25)
CREAT SERPL-MCNC: 0.63 MG/DL (ref 0.67–1.17)
CREAT SERPL-MCNC: 0.65 MG/DL (ref 0.66–1.25)
CREAT SERPL-MCNC: 0.66 MG/DL (ref 0.66–1.25)
CREAT SERPL-MCNC: 0.66 MG/DL (ref 0.67–1.17)
CREAT SERPL-MCNC: 0.67 MG/DL (ref 0.66–1.25)
CREAT SERPL-MCNC: 0.67 MG/DL (ref 0.67–1.17)
CREAT SERPL-MCNC: 0.7 MG/DL (ref 0.66–1.25)
CREAT SERPL-MCNC: 0.71 MG/DL (ref 0.7–1.3)
CREAT SERPL-MCNC: 0.73 MG/DL (ref 0.7–1.3)
CREAT SERPL-MCNC: 0.75 MG/DL (ref 0.67–1.17)
CREAT SERPL-MCNC: 0.76 MG/DL (ref 0.67–1.17)
CREAT SERPL-MCNC: 0.76 MG/DL (ref 0.67–1.17)
CREAT SERPL-MCNC: 0.77 MG/DL (ref 0.67–1.17)
CREAT SERPL-MCNC: 0.77 MG/DL (ref 0.67–1.17)
CREAT SERPL-MCNC: 0.81 MG/DL (ref 0.67–1.17)
CREAT SERPL-MCNC: 0.82 MG/DL (ref 0.67–1.17)
CREAT SERPL-MCNC: 0.84 MG/DL (ref 0.67–1.17)
CREAT SERPL-MCNC: 0.9 MG/DL (ref 0.67–1.17)
CREAT SERPL-MCNC: 0.97 MG/DL (ref 0.67–1.17)
CREAT SERPL-MCNC: 0.97 MG/DL (ref 0.67–1.17)
CREAT SERPL-MCNC: 0.98 MG/DL (ref 0.67–1.17)
CREAT SERPL-MCNC: 1.02 MG/DL (ref 0.67–1.17)
CREAT SERPL-MCNC: 1.05 MG/DL (ref 0.67–1.17)
CREAT SERPL-MCNC: 1.06 MG/DL (ref 0.67–1.17)
CREAT SERPL-MCNC: 1.11 MG/DL (ref 0.67–1.17)
CREAT SERPL-MCNC: 1.12 MG/DL (ref 0.67–1.17)
CREAT SERPL-MCNC: 1.12 MG/DL (ref 0.67–1.17)
CREAT SERPL-MCNC: 1.14 MG/DL (ref 0.67–1.17)
CREAT SERPL-MCNC: 1.19 MG/DL (ref 0.67–1.17)
CREAT SERPL-MCNC: 1.2 MG/DL (ref 0.67–1.17)
CREAT SERPL-MCNC: 1.25 MG/DL (ref 0.67–1.17)
CREAT SERPL-MCNC: 1.28 MG/DL (ref 0.67–1.17)
CREAT SERPL-MCNC: 1.33 MG/DL (ref 0.67–1.17)
CREAT SERPL-MCNC: 1.38 MG/DL (ref 0.67–1.17)
CREAT SERPL-MCNC: 1.49 MG/DL (ref 0.67–1.17)
CREAT SERPL-MCNC: 1.53 MG/DL (ref 0.67–1.17)
CREAT SERPL-MCNC: 1.73 MG/DL (ref 0.67–1.17)
CREAT SERPL-MCNC: 1.91 MG/DL (ref 0.67–1.17)
CREAT SERPL-MCNC: 2.02 MG/DL (ref 0.67–1.17)
CREAT SERPL-MCNC: 2.06 MG/DL (ref 0.67–1.17)
CREAT SERPL-MCNC: 2.11 MG/DL (ref 0.67–1.17)
CREAT SERPL-MCNC: 3.04 MG/DL (ref 0.67–1.17)
CROSSMATCH: NORMAL
CRP SERPL-MCNC: 104 MG/L
CRP SERPL-MCNC: 112 MG/L
CRP SERPL-MCNC: 113 MG/L
CRP SERPL-MCNC: 114 MG/L
CRP SERPL-MCNC: 114 MG/L
CRP SERPL-MCNC: 115 MG/L
CRP SERPL-MCNC: 119 MG/L
CRP SERPL-MCNC: 130 MG/L
CRP SERPL-MCNC: 149 MG/L
CRP SERPL-MCNC: 65 MG/L
CRP SERPL-MCNC: 76.4 MG/L
CRP SERPL-MCNC: 91.9 MG/L
CRP SERPL-MCNC: 92.7 MG/L
CRP SERPL-MCNC: 96.8 MG/L
CYSTATIN C (ROCHE): 4.3 MG/L (ref 0.6–1)
D DIMER PPP FEU-MCNC: 9.43 UG/ML FEU (ref 0–0.5)
DACRYOCYTES BLD QL SMEAR: SLIGHT
DAT, ANTI-IGG, C3: ABNORMAL
DAT, ANTI-IGG: ABNORMAL
DEPRECATED HCO3 PLAS-SCNC: 13 MMOL/L (ref 22–29)
DEPRECATED HCO3 PLAS-SCNC: 17 MMOL/L (ref 22–29)
DEPRECATED HCO3 PLAS-SCNC: 19 MMOL/L (ref 22–29)
DEPRECATED HCO3 PLAS-SCNC: 19 MMOL/L (ref 22–29)
DEPRECATED HCO3 PLAS-SCNC: 20 MMOL/L (ref 22–29)
DEPRECATED HCO3 PLAS-SCNC: 21 MMOL/L (ref 22–29)
DEPRECATED HCO3 PLAS-SCNC: 22 MMOL/L (ref 22–29)
DEPRECATED HCO3 PLAS-SCNC: 23 MMOL/L (ref 22–29)
DEPRECATED HCO3 PLAS-SCNC: 24 MMOL/L (ref 22–29)
DEPRECATED HCO3 PLAS-SCNC: 25 MMOL/L (ref 22–29)
DEPRECATED HCO3 PLAS-SCNC: 26 MMOL/L (ref 22–29)
DEPRECATED HCO3 PLAS-SCNC: 27 MMOL/L (ref 22–29)
DEPRECATED HCO3 PLAS-SCNC: 28 MMOL/L (ref 22–29)
DEPRECATED HCO3 PLAS-SCNC: 28 MMOL/L (ref 22–29)
DEPRECATED HCO3 PLAS-SCNC: 29 MMOL/L (ref 22–29)
DEPRECATED HCO3 PLAS-SCNC: 29 MMOL/L (ref 22–29)
DIASTOLIC BLOOD PRESSURE - MUSE: NORMAL MMHG
EC STX1 GENE STL QL NAA+PROBE: NOT DETECTED
EC STX1 GENE STL QL NAA+PROBE: NOT DETECTED
EC STX2 GENE STL QL NAA+PROBE: NOT DETECTED
EC STX2 GENE STL QL NAA+PROBE: NOT DETECTED
ELLIPTOCYTES BLD QL SMEAR: SLIGHT
ELLIPTOCYTES BLD QL SMEAR: SLIGHT
EOSINOPHIL # BLD AUTO: 0 10E3/UL (ref 0–0.7)
EOSINOPHIL # BLD AUTO: 0.1 10E3/UL (ref 0–0.7)
EOSINOPHIL # BLD AUTO: 0.2 10E3/UL (ref 0–0.7)
EOSINOPHIL # BLD AUTO: 0.3 10E3/UL (ref 0–0.7)
EOSINOPHIL # BLD AUTO: 0.7 10E3/UL (ref 0–0.7)
EOSINOPHIL # BLD AUTO: 0.7 10E3/UL (ref 0–0.7)
EOSINOPHIL # BLD MANUAL: 0 10E3/UL (ref 0–0.7)
EOSINOPHIL # BLD MANUAL: 0 10E3/UL (ref 0–0.7)
EOSINOPHIL NFR BLD AUTO: 0 %
EOSINOPHIL NFR BLD AUTO: 1 %
EOSINOPHIL NFR BLD AUTO: 2 %
EOSINOPHIL NFR BLD AUTO: 3 %
EOSINOPHIL NFR BLD AUTO: 5 %
EOSINOPHIL NFR BLD AUTO: 6 %
EOSINOPHIL NFR BLD MANUAL: 0 %
EOSINOPHIL NFR BLD MANUAL: 0 %
EOSINOPHIL NFR FLD MANUAL: 1 %
EOSINOPHIL NFR FLD MANUAL: 2 %
ERYTHROCYTE [DISTWIDTH] IN BLOOD BY AUTOMATED COUNT: 16.1 % (ref 10–15)
ERYTHROCYTE [DISTWIDTH] IN BLOOD BY AUTOMATED COUNT: 16.2 % (ref 10–15)
ERYTHROCYTE [DISTWIDTH] IN BLOOD BY AUTOMATED COUNT: 16.4 % (ref 10–15)
ERYTHROCYTE [DISTWIDTH] IN BLOOD BY AUTOMATED COUNT: 16.5 % (ref 10–15)
ERYTHROCYTE [DISTWIDTH] IN BLOOD BY AUTOMATED COUNT: 16.9 % (ref 10–15)
ERYTHROCYTE [DISTWIDTH] IN BLOOD BY AUTOMATED COUNT: 17 % (ref 10–15)
ERYTHROCYTE [DISTWIDTH] IN BLOOD BY AUTOMATED COUNT: 17 % (ref 10–15)
ERYTHROCYTE [DISTWIDTH] IN BLOOD BY AUTOMATED COUNT: 17.8 % (ref 10–15)
ERYTHROCYTE [DISTWIDTH] IN BLOOD BY AUTOMATED COUNT: 18.1 % (ref 10–15)
ERYTHROCYTE [DISTWIDTH] IN BLOOD BY AUTOMATED COUNT: 18.3 % (ref 10–15)
ERYTHROCYTE [DISTWIDTH] IN BLOOD BY AUTOMATED COUNT: 18.3 % (ref 10–15)
ERYTHROCYTE [DISTWIDTH] IN BLOOD BY AUTOMATED COUNT: 18.4 % (ref 10–15)
ERYTHROCYTE [DISTWIDTH] IN BLOOD BY AUTOMATED COUNT: 18.5 % (ref 10–15)
ERYTHROCYTE [DISTWIDTH] IN BLOOD BY AUTOMATED COUNT: 18.6 % (ref 10–15)
ERYTHROCYTE [DISTWIDTH] IN BLOOD BY AUTOMATED COUNT: 18.7 % (ref 10–15)
ERYTHROCYTE [DISTWIDTH] IN BLOOD BY AUTOMATED COUNT: 18.8 % (ref 10–15)
ERYTHROCYTE [DISTWIDTH] IN BLOOD BY AUTOMATED COUNT: 18.8 % (ref 10–15)
ERYTHROCYTE [DISTWIDTH] IN BLOOD BY AUTOMATED COUNT: 19 % (ref 10–15)
ERYTHROCYTE [DISTWIDTH] IN BLOOD BY AUTOMATED COUNT: 19.1 % (ref 10–15)
ERYTHROCYTE [DISTWIDTH] IN BLOOD BY AUTOMATED COUNT: 19.2 % (ref 10–15)
ERYTHROCYTE [DISTWIDTH] IN BLOOD BY AUTOMATED COUNT: 19.3 % (ref 10–15)
ERYTHROCYTE [DISTWIDTH] IN BLOOD BY AUTOMATED COUNT: 19.3 % (ref 10–15)
ERYTHROCYTE [DISTWIDTH] IN BLOOD BY AUTOMATED COUNT: 19.4 % (ref 10–15)
ERYTHROCYTE [DISTWIDTH] IN BLOOD BY AUTOMATED COUNT: 19.4 % (ref 10–15)
ERYTHROCYTE [DISTWIDTH] IN BLOOD BY AUTOMATED COUNT: 19.6 % (ref 10–15)
ERYTHROCYTE [DISTWIDTH] IN BLOOD BY AUTOMATED COUNT: 19.6 % (ref 10–15)
ERYTHROCYTE [DISTWIDTH] IN BLOOD BY AUTOMATED COUNT: 19.7 % (ref 10–15)
ERYTHROCYTE [DISTWIDTH] IN BLOOD BY AUTOMATED COUNT: 19.9 % (ref 10–15)
ERYTHROCYTE [DISTWIDTH] IN BLOOD BY AUTOMATED COUNT: 20 % (ref 10–15)
ERYTHROCYTE [DISTWIDTH] IN BLOOD BY AUTOMATED COUNT: 20.2 % (ref 10–15)
ERYTHROCYTE [DISTWIDTH] IN BLOOD BY AUTOMATED COUNT: 21.1 % (ref 10–15)
ERYTHROCYTE [DISTWIDTH] IN BLOOD BY AUTOMATED COUNT: 21.2 % (ref 10–15)
ERYTHROCYTE [DISTWIDTH] IN BLOOD BY AUTOMATED COUNT: 21.2 % (ref 10–15)
ERYTHROCYTE [DISTWIDTH] IN BLOOD BY AUTOMATED COUNT: 21.3 % (ref 10–15)
ERYTHROCYTE [DISTWIDTH] IN BLOOD BY AUTOMATED COUNT: 21.3 % (ref 10–15)
ERYTHROCYTE [DISTWIDTH] IN BLOOD BY AUTOMATED COUNT: 21.7 % (ref 10–15)
ERYTHROCYTE [DISTWIDTH] IN BLOOD BY AUTOMATED COUNT: 22 % (ref 10–15)
ERYTHROCYTE [DISTWIDTH] IN BLOOD BY AUTOMATED COUNT: 22.1 % (ref 10–15)
ERYTHROCYTE [DISTWIDTH] IN BLOOD BY AUTOMATED COUNT: 22.2 % (ref 10–15)
ERYTHROCYTE [DISTWIDTH] IN BLOOD BY AUTOMATED COUNT: 22.2 % (ref 10–15)
ERYTHROCYTE [DISTWIDTH] IN BLOOD BY AUTOMATED COUNT: 22.4 % (ref 10–15)
ERYTHROCYTE [DISTWIDTH] IN BLOOD BY AUTOMATED COUNT: 22.4 % (ref 10–15)
ERYTHROCYTE [DISTWIDTH] IN BLOOD BY AUTOMATED COUNT: 22.6 % (ref 10–15)
ERYTHROCYTE [SEDIMENTATION RATE] IN BLOOD BY WESTERGREN METHOD: 88 MM/HR (ref 0–15)
FERRITIN SERPL-MCNC: 1280 NG/ML (ref 26–388)
FERRITIN SERPL-MCNC: 594 NG/ML (ref 26–388)
FIBRINOGEN PPP-MCNC: 117 MG/DL (ref 170–490)
FIBRINOGEN PPP-MCNC: 160 MG/DL (ref 170–490)
FIBRINOGEN PPP-MCNC: 194 MG/DL (ref 170–490)
FIBRINOGEN PPP-MCNC: 338 MG/DL (ref 170–490)
FIBRINOGEN PPP-MCNC: 420 MG/DL (ref 170–490)
FLUAV RNA SPEC QL NAA+PROBE: NEGATIVE
FLUBV RNA RESP QL NAA+PROBE: NEGATIVE
GALACTOMANNAN AG SERPL QL IA: NEGATIVE
GALACTOMANNAN AG SPEC IA-ACNC: 0.29
GFR SERPL CREATININE-BSD FRML MDRD: 12 ML/MIN/1.73M2
GFR SERPL CREATININE-BSD FRML MDRD: 25 ML/MIN/1.73M2
GFR SERPL CREATININE-BSD FRML MDRD: 38 ML/MIN/1.73M2
GFR SERPL CREATININE-BSD FRML MDRD: 39 ML/MIN/1.73M2
GFR SERPL CREATININE-BSD FRML MDRD: 40 ML/MIN/1.73M2
GFR SERPL CREATININE-BSD FRML MDRD: 43 ML/MIN/1.73M2
GFR SERPL CREATININE-BSD FRML MDRD: 49 ML/MIN/1.73M2
GFR SERPL CREATININE-BSD FRML MDRD: 56 ML/MIN/1.73M2
GFR SERPL CREATININE-BSD FRML MDRD: 58 ML/MIN/1.73M2
GFR SERPL CREATININE-BSD FRML MDRD: 64 ML/MIN/1.73M2
GFR SERPL CREATININE-BSD FRML MDRD: 67 ML/MIN/1.73M2
GFR SERPL CREATININE-BSD FRML MDRD: 70 ML/MIN/1.73M2
GFR SERPL CREATININE-BSD FRML MDRD: 72 ML/MIN/1.73M2
GFR SERPL CREATININE-BSD FRML MDRD: 76 ML/MIN/1.73M2
GFR SERPL CREATININE-BSD FRML MDRD: 76 ML/MIN/1.73M2
GFR SERPL CREATININE-BSD FRML MDRD: 80 ML/MIN/1.73M2
GFR SERPL CREATININE-BSD FRML MDRD: 82 ML/MIN/1.73M2
GFR SERPL CREATININE-BSD FRML MDRD: 82 ML/MIN/1.73M2
GFR SERPL CREATININE-BSD FRML MDRD: 83 ML/MIN/1.73M2
GFR SERPL CREATININE-BSD FRML MDRD: 88 ML/MIN/1.73M2
GFR SERPL CREATININE-BSD FRML MDRD: 89 ML/MIN/1.73M2
GFR SERPL CREATININE-BSD FRML MDRD: >60 ML/MIN/1.73M2
GFR SERPL CREATININE-BSD FRML MDRD: >90 ML/MIN/1.73M2
GLUCOSE BLD-MCNC: 101 MG/DL (ref 70–99)
GLUCOSE BLD-MCNC: 107 MG/DL (ref 70–99)
GLUCOSE BLD-MCNC: 110 MG/DL (ref 70–99)
GLUCOSE BLD-MCNC: 111 MG/DL (ref 70–99)
GLUCOSE BLD-MCNC: 113 MG/DL (ref 70–125)
GLUCOSE BLD-MCNC: 115 MG/DL (ref 70–99)
GLUCOSE BLD-MCNC: 116 MG/DL (ref 70–125)
GLUCOSE BLD-MCNC: 116 MG/DL (ref 70–99)
GLUCOSE BLD-MCNC: 119 MG/DL (ref 70–99)
GLUCOSE BLD-MCNC: 120 MG/DL (ref 70–99)
GLUCOSE BLD-MCNC: 124 MG/DL (ref 70–99)
GLUCOSE BLD-MCNC: 126 MG/DL (ref 70–99)
GLUCOSE BLD-MCNC: 131 MG/DL (ref 70–99)
GLUCOSE BLD-MCNC: 138 MG/DL (ref 70–99)
GLUCOSE BLD-MCNC: 147 MG/DL (ref 70–99)
GLUCOSE BLD-MCNC: 154 MG/DL (ref 70–99)
GLUCOSE BLD-MCNC: 155 MG/DL (ref 70–99)
GLUCOSE BLD-MCNC: 162 MG/DL (ref 70–99)
GLUCOSE BLD-MCNC: 163 MG/DL (ref 70–99)
GLUCOSE BLD-MCNC: 182 MG/DL (ref 70–99)
GLUCOSE BLD-MCNC: 193 MG/DL (ref 70–99)
GLUCOSE BLD-MCNC: 201 MG/DL (ref 70–99)
GLUCOSE BLDC GLUCOMTR-MCNC: 106 MG/DL (ref 70–99)
GLUCOSE BLDC GLUCOMTR-MCNC: 111 MG/DL (ref 70–99)
GLUCOSE BLDC GLUCOMTR-MCNC: 113 MG/DL (ref 70–99)
GLUCOSE BLDC GLUCOMTR-MCNC: 125 MG/DL (ref 70–99)
GLUCOSE BLDC GLUCOMTR-MCNC: 129 MG/DL (ref 70–99)
GLUCOSE BLDC GLUCOMTR-MCNC: 131 MG/DL (ref 70–99)
GLUCOSE BLDC GLUCOMTR-MCNC: 143 MG/DL (ref 70–99)
GLUCOSE BLDC GLUCOMTR-MCNC: 146 MG/DL (ref 70–99)
GLUCOSE BLDC GLUCOMTR-MCNC: 151 MG/DL (ref 70–99)
GLUCOSE BLDC GLUCOMTR-MCNC: 160 MG/DL (ref 70–99)
GLUCOSE BLDC GLUCOMTR-MCNC: 172 MG/DL (ref 70–99)
GLUCOSE BLDC GLUCOMTR-MCNC: 172 MG/DL (ref 70–99)
GLUCOSE BLDC GLUCOMTR-MCNC: 96 MG/DL (ref 70–99)
GLUCOSE SERPL-MCNC: 100 MG/DL (ref 70–99)
GLUCOSE SERPL-MCNC: 102 MG/DL (ref 70–99)
GLUCOSE SERPL-MCNC: 103 MG/DL (ref 70–99)
GLUCOSE SERPL-MCNC: 103 MG/DL (ref 70–99)
GLUCOSE SERPL-MCNC: 104 MG/DL (ref 70–99)
GLUCOSE SERPL-MCNC: 104 MG/DL (ref 70–99)
GLUCOSE SERPL-MCNC: 105 MG/DL (ref 70–99)
GLUCOSE SERPL-MCNC: 106 MG/DL (ref 70–99)
GLUCOSE SERPL-MCNC: 107 MG/DL (ref 70–99)
GLUCOSE SERPL-MCNC: 108 MG/DL (ref 70–99)
GLUCOSE SERPL-MCNC: 109 MG/DL (ref 70–99)
GLUCOSE SERPL-MCNC: 111 MG/DL (ref 70–99)
GLUCOSE SERPL-MCNC: 114 MG/DL (ref 70–99)
GLUCOSE SERPL-MCNC: 116 MG/DL (ref 70–99)
GLUCOSE SERPL-MCNC: 116 MG/DL (ref 70–99)
GLUCOSE SERPL-MCNC: 117 MG/DL (ref 70–99)
GLUCOSE SERPL-MCNC: 119 MG/DL (ref 70–99)
GLUCOSE SERPL-MCNC: 122 MG/DL (ref 70–99)
GLUCOSE SERPL-MCNC: 123 MG/DL (ref 70–99)
GLUCOSE SERPL-MCNC: 123 MG/DL (ref 70–99)
GLUCOSE SERPL-MCNC: 125 MG/DL (ref 70–99)
GLUCOSE SERPL-MCNC: 126 MG/DL (ref 70–99)
GLUCOSE SERPL-MCNC: 132 MG/DL (ref 70–99)
GLUCOSE SERPL-MCNC: 135 MG/DL (ref 70–99)
GLUCOSE SERPL-MCNC: 139 MG/DL (ref 70–99)
GLUCOSE SERPL-MCNC: 139 MG/DL (ref 70–99)
GLUCOSE SERPL-MCNC: 141 MG/DL (ref 70–99)
GLUCOSE SERPL-MCNC: 147 MG/DL (ref 70–99)
GLUCOSE SERPL-MCNC: 83 MG/DL (ref 70–99)
GLUCOSE SERPL-MCNC: 84 MG/DL (ref 70–99)
GLUCOSE SERPL-MCNC: 85 MG/DL (ref 70–99)
GLUCOSE SERPL-MCNC: 86 MG/DL (ref 70–99)
GLUCOSE SERPL-MCNC: 91 MG/DL (ref 70–99)
GLUCOSE SERPL-MCNC: 92 MG/DL (ref 70–99)
GLUCOSE SERPL-MCNC: 94 MG/DL (ref 70–99)
GLUCOSE SERPL-MCNC: 95 MG/DL (ref 70–99)
GLUCOSE SERPL-MCNC: 96 MG/DL (ref 70–99)
GLUCOSE UR STRIP-MCNC: 70 MG/DL
GLUCOSE UR STRIP-MCNC: NEGATIVE MG/DL
GRAM STAIN RESULT: NORMAL
HAPTOGLOB SERPL-MCNC: 205 MG/DL (ref 32–197)
HAPTOGLOB SERPL-MCNC: 303 MG/DL (ref 32–197)
HBA1C MFR BLD: 4.7 %
HCO3 BLDA-SCNC: 24 MMOL/L (ref 21–28)
HCO3 BLDA-SCNC: 26 MMOL/L (ref 21–28)
HCO3 BLDA-SCNC: 27 MMOL/L (ref 21–28)
HCO3 BLDA-SCNC: 27 MMOL/L (ref 21–28)
HCO3 BLDV-SCNC: 24 MMOL/L (ref 21–28)
HCO3 BLDV-SCNC: 26 MMOL/L (ref 21–28)
HCT VFR BLD AUTO: 18.9 % (ref 40–53)
HCT VFR BLD AUTO: 19.1 % (ref 40–53)
HCT VFR BLD AUTO: 19.3 % (ref 40–53)
HCT VFR BLD AUTO: 19.4 % (ref 40–53)
HCT VFR BLD AUTO: 19.5 % (ref 40–53)
HCT VFR BLD AUTO: 19.8 % (ref 40–53)
HCT VFR BLD AUTO: 20.3 % (ref 40–53)
HCT VFR BLD AUTO: 20.5 % (ref 40–53)
HCT VFR BLD AUTO: 21.2 % (ref 40–53)
HCT VFR BLD AUTO: 21.2 % (ref 40–53)
HCT VFR BLD AUTO: 21.4 % (ref 40–53)
HCT VFR BLD AUTO: 21.8 % (ref 40–53)
HCT VFR BLD AUTO: 22 % (ref 40–53)
HCT VFR BLD AUTO: 22.2 % (ref 40–53)
HCT VFR BLD AUTO: 22.3 % (ref 40–53)
HCT VFR BLD AUTO: 22.7 % (ref 40–53)
HCT VFR BLD AUTO: 22.9 % (ref 40–53)
HCT VFR BLD AUTO: 23 % (ref 40–53)
HCT VFR BLD AUTO: 23 % (ref 40–53)
HCT VFR BLD AUTO: 23.3 % (ref 40–53)
HCT VFR BLD AUTO: 23.4 % (ref 40–53)
HCT VFR BLD AUTO: 23.7 % (ref 40–53)
HCT VFR BLD AUTO: 23.7 % (ref 40–53)
HCT VFR BLD AUTO: 23.8 % (ref 40–53)
HCT VFR BLD AUTO: 23.9 % (ref 40–53)
HCT VFR BLD AUTO: 24.1 % (ref 40–53)
HCT VFR BLD AUTO: 24.3 % (ref 40–53)
HCT VFR BLD AUTO: 24.4 % (ref 40–53)
HCT VFR BLD AUTO: 24.5 % (ref 40–53)
HCT VFR BLD AUTO: 24.7 % (ref 40–53)
HCT VFR BLD AUTO: 24.7 % (ref 40–53)
HCT VFR BLD AUTO: 24.9 % (ref 40–53)
HCT VFR BLD AUTO: 24.9 % (ref 40–53)
HCT VFR BLD AUTO: 25.3 % (ref 40–53)
HCT VFR BLD AUTO: 25.4 % (ref 40–53)
HCT VFR BLD AUTO: 25.4 % (ref 40–53)
HCT VFR BLD AUTO: 25.5 % (ref 40–53)
HCT VFR BLD AUTO: 25.7 % (ref 40–53)
HCT VFR BLD AUTO: 25.7 % (ref 40–53)
HCT VFR BLD AUTO: 25.8 % (ref 40–53)
HCT VFR BLD AUTO: 26 % (ref 40–53)
HCT VFR BLD AUTO: 26 % (ref 40–53)
HCT VFR BLD AUTO: 26.2 % (ref 40–53)
HCT VFR BLD AUTO: 26.3 % (ref 40–53)
HCT VFR BLD AUTO: 26.3 % (ref 40–53)
HCT VFR BLD AUTO: 26.9 % (ref 40–53)
HCT VFR BLD AUTO: 27 % (ref 40–53)
HCT VFR BLD AUTO: 27.7 % (ref 40–53)
HCT VFR BLD AUTO: 27.8 % (ref 40–53)
HCT VFR BLD AUTO: 28.1 % (ref 40–53)
HCT VFR BLD AUTO: 28.2 % (ref 40–53)
HCT VFR BLD AUTO: 30.4 % (ref 40–53)
HCT VFR BLD AUTO: 34.9 % (ref 40–53)
HCT VFR BLD AUTO: 36.2 % (ref 40–53)
HCT VFR BLD CALC: 40 % (ref 40–53)
HDLC SERPL-MCNC: 48 MG/DL
HGB BLD-MCNC: 11.8 G/DL (ref 13.3–17.7)
HGB BLD-MCNC: 12.1 G/DL (ref 13.3–17.7)
HGB BLD-MCNC: 13.6 G/DL (ref 13.3–17.7)
HGB BLD-MCNC: 5.7 G/DL (ref 13.3–17.7)
HGB BLD-MCNC: 5.9 G/DL (ref 13.3–17.7)
HGB BLD-MCNC: 6.1 G/DL (ref 13.3–17.7)
HGB BLD-MCNC: 6.2 G/DL (ref 13.3–17.7)
HGB BLD-MCNC: 6.3 G/DL (ref 13.3–17.7)
HGB BLD-MCNC: 6.3 G/DL (ref 13.3–17.7)
HGB BLD-MCNC: 6.4 G/DL (ref 13.3–17.7)
HGB BLD-MCNC: 6.6 G/DL (ref 13.3–17.7)
HGB BLD-MCNC: 6.6 G/DL (ref 13.3–17.7)
HGB BLD-MCNC: 6.7 G/DL (ref 13.3–17.7)
HGB BLD-MCNC: 6.8 G/DL (ref 13.3–17.7)
HGB BLD-MCNC: 6.8 G/DL (ref 13.3–17.7)
HGB BLD-MCNC: 6.9 G/DL (ref 13.3–17.7)
HGB BLD-MCNC: 6.9 G/DL (ref 13.3–17.7)
HGB BLD-MCNC: 7 G/DL (ref 13.3–17.7)
HGB BLD-MCNC: 7 G/DL (ref 13.3–17.7)
HGB BLD-MCNC: 7.1 G/DL (ref 13.3–17.7)
HGB BLD-MCNC: 7.1 G/DL (ref 13.3–17.7)
HGB BLD-MCNC: 7.2 G/DL (ref 13.3–17.7)
HGB BLD-MCNC: 7.2 G/DL (ref 13.3–17.7)
HGB BLD-MCNC: 7.3 G/DL (ref 13.3–17.7)
HGB BLD-MCNC: 7.4 G/DL (ref 13.3–17.7)
HGB BLD-MCNC: 7.5 G/DL (ref 13.3–17.7)
HGB BLD-MCNC: 7.5 G/DL (ref 13.3–17.7)
HGB BLD-MCNC: 7.6 G/DL (ref 13.3–17.7)
HGB BLD-MCNC: 7.7 G/DL (ref 13.3–17.7)
HGB BLD-MCNC: 7.8 G/DL (ref 13.3–17.7)
HGB BLD-MCNC: 7.9 G/DL (ref 13.3–17.7)
HGB BLD-MCNC: 8 G/DL (ref 13.3–17.7)
HGB BLD-MCNC: 8.1 G/DL (ref 13.3–17.7)
HGB BLD-MCNC: 8.1 G/DL (ref 13.3–17.7)
HGB BLD-MCNC: 8.2 G/DL (ref 13.3–17.7)
HGB BLD-MCNC: 8.2 G/DL (ref 13.3–17.7)
HGB BLD-MCNC: 8.3 G/DL (ref 13.3–17.7)
HGB BLD-MCNC: 8.3 G/DL (ref 13.3–17.7)
HGB BLD-MCNC: 8.4 G/DL (ref 13.3–17.7)
HGB BLD-MCNC: 8.4 G/DL (ref 13.3–17.7)
HGB BLD-MCNC: 8.5 G/DL (ref 13.3–17.7)
HGB BLD-MCNC: 8.6 G/DL (ref 13.3–17.7)
HGB BLD-MCNC: 8.6 G/DL (ref 13.3–17.7)
HGB BLD-MCNC: 8.9 G/DL (ref 13.3–17.7)
HGB BLD-MCNC: 8.9 G/DL (ref 13.3–17.7)
HGB BLD-MCNC: 9 G/DL (ref 13.3–17.7)
HGB BLD-MCNC: 9.1 G/DL (ref 13.3–17.7)
HGB BLD-MCNC: 9.3 G/DL (ref 13.3–17.7)
HGB BLD-MCNC: 9.5 G/DL (ref 13.3–17.7)
HGB BLD-MCNC: NORMAL G/DL
HGB UR QL STRIP: ABNORMAL
HGB UR QL STRIP: ABNORMAL
HGB UR QL STRIP: NEGATIVE
HGB UR QL STRIP: NEGATIVE
HOLD SPECIMEN: NORMAL
HYALINE CASTS: 3 /LPF
IMM GRANULOCYTES # BLD: 0 10E3/UL
IMM GRANULOCYTES # BLD: 0.1 10E3/UL
IMM GRANULOCYTES # BLD: 0.2 10E3/UL
IMM GRANULOCYTES # BLD: 0.3 10E3/UL
IMM GRANULOCYTES # BLD: 0.3 10E3/UL
IMM GRANULOCYTES # BLD: 0.4 10E3/UL
IMM GRANULOCYTES # BLD: 0.5 10E3/UL
IMM GRANULOCYTES # BLD: 0.6 10E3/UL
IMM GRANULOCYTES NFR BLD: 0 %
IMM GRANULOCYTES NFR BLD: 0 %
IMM GRANULOCYTES NFR BLD: 1 %
IMM GRANULOCYTES NFR BLD: 2 %
IMM GRANULOCYTES NFR BLD: 3 %
IMM GRANULOCYTES NFR BLD: 4 %
INR PPP: 1.04 (ref 0.85–1.15)
INR PPP: 1.05 (ref 0.85–1.15)
INR PPP: 1.07 (ref 0.85–1.15)
INR PPP: 1.13 (ref 0.85–1.15)
INR PPP: 1.2 (ref 0.85–1.15)
INR PPP: 1.23 (ref 0.85–1.15)
INR PPP: 1.34 (ref 0.85–1.15)
INR PPP: 1.36 (ref 0.85–1.15)
INR PPP: 1.42 (ref 0.85–1.15)
INR PPP: 1.43 (ref 0.85–1.15)
INR PPP: 1.46 (ref 0.85–1.15)
INR PPP: 1.46 (ref 0.85–1.15)
INR PPP: 1.48 (ref 0.85–1.15)
INR PPP: 1.53 (ref 0.85–1.15)
INR PPP: 1.56 (ref 0.85–1.15)
INR PPP: 1.62 (ref 0.85–1.15)
INR PPP: 1.64 (ref 0.85–1.15)
INR PPP: 1.64 (ref 0.85–1.15)
INR PPP: 1.78 (ref 0.85–1.15)
INR PPP: 1.91 (ref 0.85–1.15)
INTERPRETATION ECG - MUSE: NORMAL
IRON SATN MFR SERPL: 12 % (ref 15–46)
IRON SATN MFR SERPL: 13 % (ref 15–46)
IRON SERPL-MCNC: 20 UG/DL (ref 35–180)
IRON SERPL-MCNC: 30 UG/DL (ref 35–180)
ISSUE DATE AND TIME: NORMAL
KETONES UR STRIP-MCNC: NEGATIVE MG/DL
LACTATE BLD-SCNC: 0.7 MMOL/L
LACTATE BLD-SCNC: 0.7 MMOL/L
LACTATE BLD-SCNC: 0.8 MMOL/L
LACTATE BLD-SCNC: 0.9 MMOL/L
LACTATE BLD-SCNC: 1 MMOL/L
LACTATE BLD-SCNC: 1.1 MMOL/L
LACTATE BLD-SCNC: 1.3 MMOL/L
LACTATE SERPL-SCNC: 0.5 MMOL/L (ref 0.7–2)
LACTATE SERPL-SCNC: 0.6 MMOL/L (ref 0.7–2)
LACTATE SERPL-SCNC: 0.7 MMOL/L (ref 0.7–2)
LACTATE SERPL-SCNC: 0.8 MMOL/L (ref 0.7–2)
LACTATE SERPL-SCNC: 0.8 MMOL/L (ref 0.7–2)
LACTATE SERPL-SCNC: 1 MMOL/L (ref 0.7–2)
LACTATE SERPL-SCNC: 1 MMOL/L (ref 0.7–2)
LACTATE SERPL-SCNC: 1.2 MMOL/L (ref 0.7–2)
LACTATE SERPL-SCNC: 1.3 MMOL/L (ref 0.7–2)
LACTATE SERPL-SCNC: 1.4 MMOL/L (ref 0.7–2)
LACTATE SERPL-SCNC: 1.4 MMOL/L (ref 0.7–2)
LACTATE SERPL-SCNC: 1.5 MMOL/L (ref 0.7–2)
LACTATE SERPL-SCNC: 1.6 MMOL/L (ref 0.7–2)
LACTATE SERPL-SCNC: 1.7 MMOL/L (ref 0.7–2)
LACTATE SERPL-SCNC: 1.7 MMOL/L (ref 0.7–2)
LACTATE SERPL-SCNC: 1.8 MMOL/L (ref 0.7–2)
LACTATE SERPL-SCNC: 2 MMOL/L (ref 0.7–2)
LACTATE SERPL-SCNC: 2.1 MMOL/L (ref 0.7–2)
LACTATE SERPL-SCNC: 2.2 MMOL/L (ref 0.7–2)
LACTATE SERPL-SCNC: 2.3 MMOL/L (ref 0.7–2)
LACTATE SERPL-SCNC: 2.4 MMOL/L (ref 0.7–2)
LACTATE SERPL-SCNC: 2.4 MMOL/L (ref 0.7–2)
LACTATE SERPL-SCNC: 2.5 MMOL/L (ref 0.7–2)
LACTATE SERPL-SCNC: 3.1 MMOL/L (ref 0.7–2)
LACTATE SERPL-SCNC: 3.1 MMOL/L (ref 0.7–2)
LACTATE SERPL-SCNC: 3.4 MMOL/L (ref 0.7–2)
LACTATE SERPL-SCNC: 4.3 MMOL/L (ref 0.7–2)
LACTATE SERPL-SCNC: 4.5 MMOL/L (ref 0.7–2)
LD BODY BODY FLUID SOURCE: NORMAL
LD BODY BODY FLUID SOURCE: NORMAL
LDH FLD L TO P-CCNC: 1542 U/L
LDH FLD L TO P-CCNC: 651 U/L
LDH SERPL L TO P-CCNC: 420 U/L (ref 0–250)
LDH SERPL L TO P-CCNC: 658 U/L (ref 0–250)
LDLC SERPL CALC-MCNC: 112 MG/DL
LEUKOCYTE ESTERASE UR QL STRIP: NEGATIVE
LVEF ECHO: NORMAL
LYMPHOCYTES # BLD AUTO: 0.5 10E3/UL (ref 0.8–5.3)
LYMPHOCYTES # BLD AUTO: 0.7 10E3/UL (ref 0.8–5.3)
LYMPHOCYTES # BLD AUTO: 0.8 10E3/UL (ref 0.8–5.3)
LYMPHOCYTES # BLD AUTO: 0.9 10E3/UL (ref 0.8–5.3)
LYMPHOCYTES # BLD AUTO: 0.9 10E3/UL (ref 0.8–5.3)
LYMPHOCYTES # BLD AUTO: 1 10E3/UL (ref 0.8–5.3)
LYMPHOCYTES # BLD AUTO: 1.1 10E3/UL (ref 0.8–5.3)
LYMPHOCYTES # BLD AUTO: 1.2 10E3/UL (ref 0.8–5.3)
LYMPHOCYTES # BLD AUTO: 1.3 10E3/UL (ref 0.8–5.3)
LYMPHOCYTES # BLD AUTO: 1.3 10E3/UL (ref 0.8–5.3)
LYMPHOCYTES # BLD AUTO: 1.4 10E3/UL (ref 0.8–5.3)
LYMPHOCYTES # BLD AUTO: 1.5 10E3/UL (ref 0.8–5.3)
LYMPHOCYTES # BLD AUTO: 1.5 10E3/UL (ref 0.8–5.3)
LYMPHOCYTES # BLD AUTO: 1.6 10E3/UL (ref 0.8–5.3)
LYMPHOCYTES # BLD AUTO: 1.6 10E3/UL (ref 0.8–5.3)
LYMPHOCYTES # BLD AUTO: 1.9 10E3/UL (ref 0.8–5.3)
LYMPHOCYTES # BLD MANUAL: 0.6 10E3/UL (ref 0.8–5.3)
LYMPHOCYTES # BLD MANUAL: 0.7 10E3/UL (ref 0.8–5.3)
LYMPHOCYTES NFR BLD AUTO: 10 %
LYMPHOCYTES NFR BLD AUTO: 11 %
LYMPHOCYTES NFR BLD AUTO: 13 %
LYMPHOCYTES NFR BLD AUTO: 14 %
LYMPHOCYTES NFR BLD AUTO: 5 %
LYMPHOCYTES NFR BLD AUTO: 6 %
LYMPHOCYTES NFR BLD AUTO: 7 %
LYMPHOCYTES NFR BLD AUTO: 8 %
LYMPHOCYTES NFR BLD AUTO: 9 %
LYMPHOCYTES NFR BLD MANUAL: 20 %
LYMPHOCYTES NFR BLD MANUAL: 51 %
LYMPHOCYTES NFR FLD MANUAL: 14 %
LYMPHOCYTES NFR FLD MANUAL: 26 %
LYMPHOCYTES NFR FLD MANUAL: 35 %
LYMPHOCYTES NFR FLD MANUAL: 41 %
LYMPHOCYTES NFR FLD MANUAL: 44 %
LYMPHOCYTES NFR FLD MANUAL: 6 %
Lab: NORMAL
MAGNESIUM SERPL-MCNC: 1.1 MG/DL (ref 1.7–2.3)
MAGNESIUM SERPL-MCNC: 1.3 MG/DL (ref 1.7–2.3)
MAGNESIUM SERPL-MCNC: 1.3 MG/DL (ref 1.7–2.3)
MAGNESIUM SERPL-MCNC: 1.4 MG/DL (ref 1.7–2.3)
MAGNESIUM SERPL-MCNC: 1.6 MG/DL (ref 1.7–2.3)
MAGNESIUM SERPL-MCNC: 1.7 MG/DL (ref 1.7–2.3)
MAGNESIUM SERPL-MCNC: 1.8 MG/DL (ref 1.7–2.3)
MAGNESIUM SERPL-MCNC: 1.8 MG/DL (ref 1.7–2.3)
MAGNESIUM SERPL-MCNC: 1.9 MG/DL (ref 1.6–2.3)
MAGNESIUM SERPL-MCNC: 1.9 MG/DL (ref 1.6–2.3)
MAGNESIUM SERPL-MCNC: 2 MG/DL (ref 1.6–2.3)
MAGNESIUM SERPL-MCNC: 2 MG/DL (ref 1.6–2.3)
MAGNESIUM SERPL-MCNC: 2 MG/DL (ref 1.7–2.3)
MAGNESIUM SERPL-MCNC: 2 MG/DL (ref 1.7–2.3)
MAGNESIUM SERPL-MCNC: 2.1 MG/DL (ref 1.6–2.3)
MAGNESIUM SERPL-MCNC: 2.1 MG/DL (ref 1.7–2.3)
MAGNESIUM SERPL-MCNC: 2.2 MG/DL (ref 1.6–2.3)
MAGNESIUM SERPL-MCNC: 2.2 MG/DL (ref 1.6–2.3)
MAGNESIUM SERPL-MCNC: 2.2 MG/DL (ref 1.7–2.3)
MAGNESIUM SERPL-MCNC: 2.3 MG/DL (ref 1.6–2.3)
MAGNESIUM SERPL-MCNC: 2.3 MG/DL (ref 1.7–2.3)
MAGNESIUM SERPL-MCNC: 2.3 MG/DL (ref 1.7–2.3)
MAGNESIUM SERPL-MCNC: 2.4 MG/DL (ref 1.6–2.3)
MAGNESIUM SERPL-MCNC: 2.4 MG/DL (ref 1.6–2.3)
MAGNESIUM SERPL-MCNC: 2.4 MG/DL (ref 1.7–2.3)
MAGNESIUM SERPL-MCNC: 2.5 MG/DL (ref 1.7–2.3)
MAGNESIUM SERPL-MCNC: 2.5 MG/DL (ref 1.7–2.3)
MCF BLD TEG: 49.2 MM (ref 50–70)
MCF BLD TEG: 61 MM (ref 50–70)
MCH RBC QN AUTO: 21.1 PG (ref 26.5–33)
MCH RBC QN AUTO: 21.5 PG (ref 26.5–33)
MCH RBC QN AUTO: 21.7 PG (ref 26.5–33)
MCH RBC QN AUTO: 22.1 PG (ref 26.5–33)
MCH RBC QN AUTO: 22.3 PG (ref 26.5–33)
MCH RBC QN AUTO: 22.4 PG (ref 26.5–33)
MCH RBC QN AUTO: 22.5 PG (ref 26.5–33)
MCH RBC QN AUTO: 22.6 PG (ref 26.5–33)
MCH RBC QN AUTO: 22.7 PG (ref 26.5–33)
MCH RBC QN AUTO: 22.9 PG (ref 26.5–33)
MCH RBC QN AUTO: 23 PG (ref 26.5–33)
MCH RBC QN AUTO: 23 PG (ref 26.5–33)
MCH RBC QN AUTO: 23.1 PG (ref 26.5–33)
MCH RBC QN AUTO: 23.1 PG (ref 26.5–33)
MCH RBC QN AUTO: 23.2 PG (ref 26.5–33)
MCH RBC QN AUTO: 23.4 PG (ref 26.5–33)
MCH RBC QN AUTO: 23.5 PG (ref 26.5–33)
MCH RBC QN AUTO: 23.7 PG (ref 26.5–33)
MCH RBC QN AUTO: 23.7 PG (ref 26.5–33)
MCH RBC QN AUTO: 23.9 PG (ref 26.5–33)
MCH RBC QN AUTO: 24.3 PG (ref 26.5–33)
MCH RBC QN AUTO: 24.3 PG (ref 26.5–33)
MCH RBC QN AUTO: 24.5 PG (ref 26.5–33)
MCH RBC QN AUTO: 24.7 PG (ref 26.5–33)
MCH RBC QN AUTO: 24.8 PG (ref 26.5–33)
MCH RBC QN AUTO: 25 PG (ref 26.5–33)
MCH RBC QN AUTO: 25.1 PG (ref 26.5–33)
MCH RBC QN AUTO: 25.2 PG (ref 26.5–33)
MCH RBC QN AUTO: 25.3 PG (ref 26.5–33)
MCH RBC QN AUTO: 25.9 PG (ref 26.5–33)
MCH RBC QN AUTO: 25.9 PG (ref 26.5–33)
MCH RBC QN AUTO: 26 PG (ref 26.5–33)
MCH RBC QN AUTO: 26.1 PG (ref 26.5–33)
MCH RBC QN AUTO: 26.4 PG (ref 26.5–33)
MCH RBC QN AUTO: 26.5 PG (ref 26.5–33)
MCH RBC QN AUTO: 26.5 PG (ref 26.5–33)
MCH RBC QN AUTO: 26.6 PG (ref 26.5–33)
MCH RBC QN AUTO: 26.7 PG (ref 26.5–33)
MCH RBC QN AUTO: 26.7 PG (ref 26.5–33)
MCH RBC QN AUTO: 26.9 PG (ref 26.5–33)
MCH RBC QN AUTO: 26.9 PG (ref 26.5–33)
MCH RBC QN AUTO: 27.1 PG (ref 26.5–33)
MCH RBC QN AUTO: 27.1 PG (ref 26.5–33)
MCH RBC QN AUTO: 27.3 PG (ref 26.5–33)
MCH RBC QN AUTO: 27.3 PG (ref 26.5–33)
MCH RBC QN AUTO: 27.4 PG (ref 26.5–33)
MCH RBC QN AUTO: 27.5 PG (ref 26.5–33)
MCH RBC QN AUTO: 27.5 PG (ref 26.5–33)
MCH RBC QN AUTO: 27.9 PG (ref 26.5–33)
MCH RBC QN AUTO: 28.1 PG (ref 26.5–33)
MCH RBC QN AUTO: 28.1 PG (ref 26.5–33)
MCH RBC QN AUTO: 28.2 PG (ref 26.5–33)
MCH RBC QN AUTO: 28.2 PG (ref 26.5–33)
MCH RBC QN AUTO: 28.3 PG (ref 26.5–33)
MCHC RBC AUTO-ENTMCNC: 30.2 G/DL (ref 31.5–36.5)
MCHC RBC AUTO-ENTMCNC: 30.2 G/DL (ref 31.5–36.5)
MCHC RBC AUTO-ENTMCNC: 30.4 G/DL (ref 31.5–36.5)
MCHC RBC AUTO-ENTMCNC: 30.4 G/DL (ref 31.5–36.5)
MCHC RBC AUTO-ENTMCNC: 30.7 G/DL (ref 31.5–36.5)
MCHC RBC AUTO-ENTMCNC: 30.7 G/DL (ref 31.5–36.5)
MCHC RBC AUTO-ENTMCNC: 30.8 G/DL (ref 31.5–36.5)
MCHC RBC AUTO-ENTMCNC: 30.9 G/DL (ref 31.5–36.5)
MCHC RBC AUTO-ENTMCNC: 30.9 G/DL (ref 31.5–36.5)
MCHC RBC AUTO-ENTMCNC: 31 G/DL (ref 31.5–36.5)
MCHC RBC AUTO-ENTMCNC: 31.1 G/DL (ref 31.5–36.5)
MCHC RBC AUTO-ENTMCNC: 31.2 G/DL (ref 31.5–36.5)
MCHC RBC AUTO-ENTMCNC: 31.3 G/DL (ref 31.5–36.5)
MCHC RBC AUTO-ENTMCNC: 31.4 G/DL (ref 31.5–36.5)
MCHC RBC AUTO-ENTMCNC: 31.6 G/DL (ref 31.5–36.5)
MCHC RBC AUTO-ENTMCNC: 31.7 G/DL (ref 31.5–36.5)
MCHC RBC AUTO-ENTMCNC: 31.7 G/DL (ref 31.5–36.5)
MCHC RBC AUTO-ENTMCNC: 31.8 G/DL (ref 31.5–36.5)
MCHC RBC AUTO-ENTMCNC: 31.9 G/DL (ref 31.5–36.5)
MCHC RBC AUTO-ENTMCNC: 32 G/DL (ref 31.5–36.5)
MCHC RBC AUTO-ENTMCNC: 32.1 G/DL (ref 31.5–36.5)
MCHC RBC AUTO-ENTMCNC: 32.1 G/DL (ref 31.5–36.5)
MCHC RBC AUTO-ENTMCNC: 32.2 G/DL (ref 31.5–36.5)
MCHC RBC AUTO-ENTMCNC: 32.3 G/DL (ref 31.5–36.5)
MCHC RBC AUTO-ENTMCNC: 32.4 G/DL (ref 31.5–36.5)
MCHC RBC AUTO-ENTMCNC: 32.5 G/DL (ref 31.5–36.5)
MCHC RBC AUTO-ENTMCNC: 32.6 G/DL (ref 31.5–36.5)
MCHC RBC AUTO-ENTMCNC: 32.7 G/DL (ref 31.5–36.5)
MCHC RBC AUTO-ENTMCNC: 32.7 G/DL (ref 31.5–36.5)
MCHC RBC AUTO-ENTMCNC: 33 G/DL (ref 31.5–36.5)
MCHC RBC AUTO-ENTMCNC: 33.1 G/DL (ref 31.5–36.5)
MCHC RBC AUTO-ENTMCNC: 33.4 G/DL (ref 31.5–36.5)
MCHC RBC AUTO-ENTMCNC: 33.8 G/DL (ref 31.5–36.5)
MCV RBC AUTO: 68 FL (ref 78–100)
MCV RBC AUTO: 69 FL (ref 78–100)
MCV RBC AUTO: 69 FL (ref 78–100)
MCV RBC AUTO: 70 FL (ref 78–100)
MCV RBC AUTO: 71 FL (ref 78–100)
MCV RBC AUTO: 71 FL (ref 78–100)
MCV RBC AUTO: 72 FL (ref 78–100)
MCV RBC AUTO: 73 FL (ref 78–100)
MCV RBC AUTO: 74 FL (ref 78–100)
MCV RBC AUTO: 74 FL (ref 78–100)
MCV RBC AUTO: 75 FL (ref 78–100)
MCV RBC AUTO: 76 FL (ref 78–100)
MCV RBC AUTO: 77 FL (ref 78–100)
MCV RBC AUTO: 78 FL (ref 78–100)
MCV RBC AUTO: 78 FL (ref 78–100)
MCV RBC AUTO: 79 FL (ref 78–100)
MCV RBC AUTO: 80 FL (ref 78–100)
MCV RBC AUTO: 80 FL (ref 78–100)
MCV RBC AUTO: 81 FL (ref 78–100)
MCV RBC AUTO: 81 FL (ref 78–100)
MCV RBC AUTO: 82 FL (ref 78–100)
MCV RBC AUTO: 84 FL (ref 78–100)
MCV RBC AUTO: 86 FL (ref 78–100)
MCV RBC AUTO: 87 FL (ref 78–100)
MCV RBC AUTO: 88 FL (ref 78–100)
MCV RBC AUTO: 88 FL (ref 78–100)
MCV RBC AUTO: 89 FL (ref 78–100)
MCV RBC AUTO: 90 FL (ref 78–100)
MCV RBC AUTO: 90 FL (ref 78–100)
METAMYELOCYTES # BLD MANUAL: 0.1 10E3/UL
METAMYELOCYTES NFR BLD MANUAL: 2 %
MONOCYTES # BLD AUTO: 0.4 10E3/UL (ref 0–1.3)
MONOCYTES # BLD AUTO: 0.4 10E3/UL (ref 0–1.3)
MONOCYTES # BLD AUTO: 0.7 10E3/UL (ref 0–1.3)
MONOCYTES # BLD AUTO: 0.7 10E3/UL (ref 0–1.3)
MONOCYTES # BLD AUTO: 0.8 10E3/UL (ref 0–1.3)
MONOCYTES # BLD AUTO: 0.9 10E3/UL (ref 0–1.3)
MONOCYTES # BLD AUTO: 1 10E3/UL (ref 0–1.3)
MONOCYTES # BLD AUTO: 1.1 10E3/UL (ref 0–1.3)
MONOCYTES # BLD AUTO: 1.2 10E3/UL (ref 0–1.3)
MONOCYTES # BLD AUTO: 1.2 10E3/UL (ref 0–1.3)
MONOCYTES # BLD AUTO: 1.3 10E3/UL (ref 0–1.3)
MONOCYTES # BLD AUTO: 1.3 10E3/UL (ref 0–1.3)
MONOCYTES # BLD AUTO: 1.4 10E3/UL (ref 0–1.3)
MONOCYTES # BLD AUTO: 1.5 10E3/UL (ref 0–1.3)
MONOCYTES # BLD AUTO: 1.6 10E3/UL (ref 0–1.3)
MONOCYTES # BLD AUTO: 1.6 10E3/UL (ref 0–1.3)
MONOCYTES # BLD AUTO: 1.7 10E3/UL (ref 0–1.3)
MONOCYTES # BLD AUTO: 1.7 10E3/UL (ref 0–1.3)
MONOCYTES # BLD AUTO: 2.1 10E3/UL (ref 0–1.3)
MONOCYTES # BLD AUTO: 2.2 10E3/UL (ref 0–1.3)
MONOCYTES # BLD MANUAL: 0.3 10E3/UL (ref 0–1.3)
MONOCYTES # BLD MANUAL: 1 10E3/UL (ref 0–1.3)
MONOCYTES NFR BLD AUTO: 11 %
MONOCYTES NFR BLD AUTO: 12 %
MONOCYTES NFR BLD AUTO: 18 %
MONOCYTES NFR BLD AUTO: 5 %
MONOCYTES NFR BLD AUTO: 6 %
MONOCYTES NFR BLD AUTO: 6 %
MONOCYTES NFR BLD AUTO: 7 %
MONOCYTES NFR BLD AUTO: 8 %
MONOCYTES NFR BLD AUTO: 9 %
MONOCYTES NFR BLD MANUAL: 24 %
MONOCYTES NFR BLD MANUAL: 29 %
MONOS+MACROS NFR FLD MANUAL: 14 %
MONOS+MACROS NFR FLD MANUAL: 29 %
MONOS+MACROS NFR FLD MANUAL: 3 %
MONOS+MACROS NFR FLD MANUAL: 7 %
MONOS+MACROS NFR FLD MANUAL: NORMAL %
MONOS+MACROS NFR FLD MANUAL: NORMAL %
MRSA DNA SPEC QL NAA+PROBE: NEGATIVE
MUCOUS THREADS #/AREA URNS LPF: PRESENT /LPF
MUCOUS THREADS #/AREA URNS LPF: PRESENT /LPF
MYELOCYTES # BLD MANUAL: 0 10E3/UL
MYELOCYTES NFR BLD MANUAL: 1 %
NEUTROPHILS # BLD AUTO: 10.2 10E3/UL (ref 1.6–8.3)
NEUTROPHILS # BLD AUTO: 10.3 10E3/UL (ref 1.6–8.3)
NEUTROPHILS # BLD AUTO: 10.9 10E3/UL (ref 1.6–8.3)
NEUTROPHILS # BLD AUTO: 11 10E3/UL (ref 1.6–8.3)
NEUTROPHILS # BLD AUTO: 11.2 10E3/UL (ref 1.6–8.3)
NEUTROPHILS # BLD AUTO: 11.5 10E3/UL (ref 1.6–8.3)
NEUTROPHILS # BLD AUTO: 12.2 10E3/UL (ref 1.6–8.3)
NEUTROPHILS # BLD AUTO: 12.3 10E3/UL (ref 1.6–8.3)
NEUTROPHILS # BLD AUTO: 12.3 10E3/UL (ref 1.6–8.3)
NEUTROPHILS # BLD AUTO: 12.4 10E3/UL (ref 1.6–8.3)
NEUTROPHILS # BLD AUTO: 12.8 10E3/UL (ref 1.6–8.3)
NEUTROPHILS # BLD AUTO: 13.4 10E3/UL (ref 1.6–8.3)
NEUTROPHILS # BLD AUTO: 13.7 10E3/UL (ref 1.6–8.3)
NEUTROPHILS # BLD AUTO: 13.7 10E3/UL (ref 1.6–8.3)
NEUTROPHILS # BLD AUTO: 14 10E3/UL (ref 1.6–8.3)
NEUTROPHILS # BLD AUTO: 14.4 10E3/UL (ref 1.6–8.3)
NEUTROPHILS # BLD AUTO: 14.8 10E3/UL (ref 1.6–8.3)
NEUTROPHILS # BLD AUTO: 15 10E3/UL (ref 1.6–8.3)
NEUTROPHILS # BLD AUTO: 15.7 10E3/UL (ref 1.6–8.3)
NEUTROPHILS # BLD AUTO: 16.2 10E3/UL (ref 1.6–8.3)
NEUTROPHILS # BLD AUTO: 16.5 10E3/UL (ref 1.6–8.3)
NEUTROPHILS # BLD AUTO: 17.5 10E3/UL (ref 1.6–8.3)
NEUTROPHILS # BLD AUTO: 18.3 10E3/UL (ref 1.6–8.3)
NEUTROPHILS # BLD AUTO: 20.9 10E3/UL (ref 1.6–8.3)
NEUTROPHILS # BLD AUTO: 4.5 10E3/UL (ref 1.6–8.3)
NEUTROPHILS # BLD AUTO: 5.3 10E3/UL (ref 1.6–8.3)
NEUTROPHILS # BLD AUTO: 6.1 10E3/UL (ref 1.6–8.3)
NEUTROPHILS # BLD AUTO: 7 10E3/UL (ref 1.6–8.3)
NEUTROPHILS # BLD AUTO: 7.2 10E3/UL (ref 1.6–8.3)
NEUTROPHILS # BLD AUTO: 7.8 10E3/UL (ref 1.6–8.3)
NEUTROPHILS # BLD AUTO: 7.9 10E3/UL (ref 1.6–8.3)
NEUTROPHILS # BLD AUTO: 7.9 10E3/UL (ref 1.6–8.3)
NEUTROPHILS # BLD AUTO: 8.8 10E3/UL (ref 1.6–8.3)
NEUTROPHILS # BLD AUTO: 8.8 10E3/UL (ref 1.6–8.3)
NEUTROPHILS # BLD AUTO: 9.2 10E3/UL (ref 1.6–8.3)
NEUTROPHILS # BLD AUTO: 9.5 10E3/UL (ref 1.6–8.3)
NEUTROPHILS # BLD AUTO: 9.5 10E3/UL (ref 1.6–8.3)
NEUTROPHILS # BLD MANUAL: 0.3 10E3/UL (ref 1.6–8.3)
NEUTROPHILS # BLD MANUAL: 1.8 10E3/UL (ref 1.6–8.3)
NEUTROPHILS NFR BLD AUTO: 68 %
NEUTROPHILS NFR BLD AUTO: 72 %
NEUTROPHILS NFR BLD AUTO: 74 %
NEUTROPHILS NFR BLD AUTO: 75 %
NEUTROPHILS NFR BLD AUTO: 76 %
NEUTROPHILS NFR BLD AUTO: 77 %
NEUTROPHILS NFR BLD AUTO: 78 %
NEUTROPHILS NFR BLD AUTO: 78 %
NEUTROPHILS NFR BLD AUTO: 79 %
NEUTROPHILS NFR BLD AUTO: 80 %
NEUTROPHILS NFR BLD AUTO: 81 %
NEUTROPHILS NFR BLD AUTO: 82 %
NEUTROPHILS NFR BLD AUTO: 83 %
NEUTROPHILS NFR BLD AUTO: 84 %
NEUTROPHILS NFR BLD AUTO: 84 %
NEUTROPHILS NFR BLD AUTO: 85 %
NEUTROPHILS NFR BLD AUTO: 86 %
NEUTROPHILS NFR BLD AUTO: 87 %
NEUTROPHILS NFR BLD AUTO: 87 %
NEUTROPHILS NFR BLD MANUAL: 24 %
NEUTROPHILS NFR BLD MANUAL: 49 %
NEUTS BAND NFR FLD MANUAL: 30 %
NEUTS BAND NFR FLD MANUAL: 51 %
NEUTS BAND NFR FLD MANUAL: 52 %
NEUTS BAND NFR FLD MANUAL: 52 %
NEUTS BAND NFR FLD MANUAL: 53 %
NEUTS BAND NFR FLD MANUAL: 79 %
NITRATE UR QL: NEGATIVE
NONHDLC SERPL-MCNC: 135 MG/DL
NOROV GI+II ORF1-ORF2 JNC STL QL NAA+PR: NOT DETECTED
NOROV GI+II ORF1-ORF2 JNC STL QL NAA+PR: NOT DETECTED
NRBC # BLD AUTO: 0 10E3/UL
NRBC BLD AUTO-RTO: 0 /100
NT-PROBNP SERPL-MCNC: 611 PG/ML (ref 0–450)
NT-PROBNP SERPL-MCNC: 789 PG/ML (ref 0–450)
O2/TOTAL GAS SETTING VFR VENT: 28 %
O2/TOTAL GAS SETTING VFR VENT: 3 %
O2/TOTAL GAS SETTING VFR VENT: 40 %
OBSERVATION IMP: NEGATIVE
OTHER CELLS FLD MANUAL: 20 %
OTHER CELLS FLD MANUAL: 42 %
OXYHGB MFR BLDV: 59 % (ref 70–75)
P AXIS - MUSE: 52 DEGREES
P AXIS - MUSE: 56 DEGREES
P AXIS - MUSE: 58 DEGREES
P AXIS - MUSE: 58 DEGREES
P AXIS - MUSE: 59 DEGREES
P AXIS - MUSE: 60 DEGREES
P AXIS - MUSE: 62 DEGREES
P AXIS - MUSE: 63 DEGREES
P AXIS - MUSE: NORMAL DEGREES
PATH REPORT.COMMENTS IMP SPEC: ABNORMAL
PATH REPORT.COMMENTS IMP SPEC: ABNORMAL
PATH REPORT.COMMENTS IMP SPEC: NORMAL
PATH REPORT.COMMENTS IMP SPEC: YES
PATH REPORT.FINAL DX SPEC: ABNORMAL
PATH REPORT.FINAL DX SPEC: NORMAL
PATH REPORT.GROSS SPEC: ABNORMAL
PATH REPORT.GROSS SPEC: NORMAL
PATH REPORT.MICROSCOPIC SPEC OTHER STN: ABNORMAL
PATH REPORT.MICROSCOPIC SPEC OTHER STN: NORMAL
PATH REPORT.RELEVANT HX SPEC: ABNORMAL
PATH REPORT.RELEVANT HX SPEC: NORMAL
PATHOLOGY SYNOPTIC REPORT: ABNORMAL
PCO2 BLDA: 36 MM HG (ref 35–45)
PCO2 BLDA: 36 MM HG (ref 35–45)
PCO2 BLDA: 37 MM HG (ref 35–45)
PCO2 BLDA: 38 MM HG (ref 35–45)
PCO2 BLDA: 38 MM HG (ref 35–45)
PCO2 BLDV: 43 MM HG (ref 40–50)
PCO2 BLDV: 49 MM HG (ref 40–50)
PERFORMING LABORATORY: NORMAL
PH BLDA: 7.42 [PH] (ref 7.35–7.45)
PH BLDA: 7.44 [PH] (ref 7.35–7.45)
PH BLDA: 7.45 [PH] (ref 7.35–7.45)
PH BLDA: 7.46 [PH] (ref 7.35–7.45)
PH BLDA: 7.46 [PH] (ref 7.35–7.45)
PH BLDA: 7.47 [PH] (ref 7.35–7.45)
PH BLDA: 7.47 [PH] (ref 7.35–7.45)
PH BLDV: 7.31 [PH] (ref 7.32–7.43)
PH BLDV: 7.38 [PH] (ref 7.32–7.43)
PH UR STRIP: 5 [PH] (ref 5–7)
PH UR STRIP: 5.5 [PH] (ref 5–7)
PH UR STRIP: 6 [PH] (ref 5–7)
PH UR STRIP: 6 [PH] (ref 5–7)
PHOSPHATE SERPL-MCNC: 1.5 MG/DL (ref 2.5–4.5)
PHOSPHATE SERPL-MCNC: 1.6 MG/DL (ref 2.5–4.5)
PHOSPHATE SERPL-MCNC: 1.6 MG/DL (ref 2.5–4.5)
PHOSPHATE SERPL-MCNC: 1.9 MG/DL (ref 2.5–4.5)
PHOSPHATE SERPL-MCNC: 2.2 MG/DL (ref 2.5–4.5)
PHOSPHATE SERPL-MCNC: 2.2 MG/DL (ref 2.5–4.5)
PHOSPHATE SERPL-MCNC: 2.3 MG/DL (ref 2.5–4.5)
PHOSPHATE SERPL-MCNC: 2.7 MG/DL (ref 2.5–4.5)
PHOSPHATE SERPL-MCNC: 2.8 MG/DL (ref 2.5–4.5)
PHOSPHATE SERPL-MCNC: 2.8 MG/DL (ref 2.5–4.5)
PHOSPHATE SERPL-MCNC: 3.1 MG/DL (ref 2.5–4.5)
PHOSPHATE SERPL-MCNC: 3.2 MG/DL (ref 2.5–4.5)
PHOSPHATE SERPL-MCNC: 3.2 MG/DL (ref 2.5–4.5)
PHOSPHATE SERPL-MCNC: 3.3 MG/DL (ref 2.5–4.5)
PHOSPHATE SERPL-MCNC: 3.3 MG/DL (ref 2.5–4.5)
PHOSPHATE SERPL-MCNC: 3.5 MG/DL (ref 2.5–4.5)
PHOSPHATE SERPL-MCNC: 3.6 MG/DL (ref 2.5–4.5)
PHOSPHATE SERPL-MCNC: 3.6 MG/DL (ref 2.5–4.5)
PHOSPHATE SERPL-MCNC: 3.7 MG/DL (ref 2.5–4.5)
PHOSPHATE SERPL-MCNC: 4.1 MG/DL (ref 2.5–4.5)
PHOSPHATE SERPL-MCNC: 4.6 MG/DL (ref 2.5–4.5)
PHOSPHATE SERPL-MCNC: 4.6 MG/DL (ref 2.5–4.5)
PHOSPHATE SERPL-MCNC: 5.2 MG/DL (ref 2.5–4.5)
PHOSPHATE SERPL-MCNC: 6 MG/DL (ref 2.5–4.5)
PHOSPHATE SERPL-MCNC: 8.4 MG/DL (ref 2.5–4.5)
PHOTO IMAGE: ABNORMAL
PLAT MORPH BLD: ABNORMAL
PLAT MORPH BLD: NORMAL
PLAT MORPH BLD: NORMAL
PLATELET # BLD AUTO: 106 10E3/UL (ref 150–450)
PLATELET # BLD AUTO: 110 10E3/UL (ref 150–450)
PLATELET # BLD AUTO: 113 10E3/UL (ref 150–450)
PLATELET # BLD AUTO: 138 10E3/UL (ref 150–450)
PLATELET # BLD AUTO: 145 10E3/UL (ref 150–450)
PLATELET # BLD AUTO: 148 10E3/UL (ref 150–450)
PLATELET # BLD AUTO: 188 10E3/UL (ref 150–450)
PLATELET # BLD AUTO: 197 10E3/UL (ref 150–450)
PLATELET # BLD AUTO: 200 10E3/UL (ref 150–450)
PLATELET # BLD AUTO: 213 10E3/UL (ref 150–450)
PLATELET # BLD AUTO: 216 10E3/UL (ref 150–450)
PLATELET # BLD AUTO: 216 10E3/UL (ref 150–450)
PLATELET # BLD AUTO: 238 10E3/UL (ref 150–450)
PLATELET # BLD AUTO: 240 10E3/UL (ref 150–450)
PLATELET # BLD AUTO: 241 10E3/UL (ref 150–450)
PLATELET # BLD AUTO: 251 10E3/UL (ref 150–450)
PLATELET # BLD AUTO: 252 10E3/UL (ref 150–450)
PLATELET # BLD AUTO: 262 10E3/UL (ref 150–450)
PLATELET # BLD AUTO: 265 10E3/UL (ref 150–450)
PLATELET # BLD AUTO: 267 10E3/UL (ref 150–450)
PLATELET # BLD AUTO: 270 10E3/UL (ref 150–450)
PLATELET # BLD AUTO: 271 10E3/UL (ref 150–450)
PLATELET # BLD AUTO: 271 10E3/UL (ref 150–450)
PLATELET # BLD AUTO: 273 10E3/UL (ref 150–450)
PLATELET # BLD AUTO: 282 10E3/UL (ref 150–450)
PLATELET # BLD AUTO: 283 10E3/UL (ref 150–450)
PLATELET # BLD AUTO: 292 10E3/UL (ref 150–450)
PLATELET # BLD AUTO: 295 10E3/UL (ref 150–450)
PLATELET # BLD AUTO: 298 10E3/UL (ref 150–450)
PLATELET # BLD AUTO: 299 10E3/UL (ref 150–450)
PLATELET # BLD AUTO: 300 10E3/UL (ref 150–450)
PLATELET # BLD AUTO: 300 10E3/UL (ref 150–450)
PLATELET # BLD AUTO: 303 10E3/UL (ref 150–450)
PLATELET # BLD AUTO: 303 10E3/UL (ref 150–450)
PLATELET # BLD AUTO: 307 10E3/UL (ref 150–450)
PLATELET # BLD AUTO: 309 10E3/UL (ref 150–450)
PLATELET # BLD AUTO: 310 10E3/UL (ref 150–450)
PLATELET # BLD AUTO: 313 10E3/UL (ref 150–450)
PLATELET # BLD AUTO: 317 10E3/UL (ref 150–450)
PLATELET # BLD AUTO: 319 10E3/UL (ref 150–450)
PLATELET # BLD AUTO: 331 10E3/UL (ref 150–450)
PLATELET # BLD AUTO: 333 10E3/UL (ref 150–450)
PLATELET # BLD AUTO: 334 10E3/UL (ref 150–450)
PLATELET # BLD AUTO: 338 10E3/UL (ref 150–450)
PLATELET # BLD AUTO: 342 10E3/UL (ref 150–450)
PLATELET # BLD AUTO: 351 10E3/UL (ref 150–450)
PLATELET # BLD AUTO: 353 10E3/UL (ref 150–450)
PLATELET # BLD AUTO: 356 10E3/UL (ref 150–450)
PLATELET # BLD AUTO: 357 10E3/UL (ref 150–450)
PLATELET # BLD AUTO: 361 10E3/UL (ref 150–450)
PLATELET # BLD AUTO: 372 10E3/UL (ref 150–450)
PLATELET # BLD AUTO: 421 10E3/UL (ref 150–450)
PLATELET # BLD AUTO: 431 10E3/UL (ref 150–450)
PLATELET # BLD AUTO: 465 10E3/UL (ref 150–450)
PLATELET # BLD AUTO: 465 10E3/UL (ref 150–450)
PLATELET # BLD AUTO: 469 10E3/UL (ref 150–450)
PLATELET # BLD AUTO: 487 10E3/UL (ref 150–450)
PLATELET # BLD AUTO: 526 10E3/UL (ref 150–450)
PLATELET # BLD AUTO: 571 10E3/UL (ref 150–450)
PLATELET # BLD AUTO: 586 10E3/UL (ref 150–450)
PLATELET # BLD AUTO: 589 10E3/UL (ref 150–450)
PLATELET # BLD AUTO: 61 10E3/UL (ref 150–450)
PLATELET # BLD AUTO: 75 10E3/UL (ref 150–450)
PLATELET # BLD AUTO: 85 10E3/UL (ref 150–450)
PO2 BLDA: 117 MM HG (ref 80–105)
PO2 BLDA: 123 MM HG (ref 80–105)
PO2 BLDA: 125 MM HG (ref 80–105)
PO2 BLDA: 138 MM HG (ref 80–105)
PO2 BLDA: 144 MM HG (ref 80–105)
PO2 BLDA: 145 MM HG (ref 80–105)
PO2 BLDA: 211 MM HG (ref 80–105)
PO2 BLDV: 34 MM HG (ref 25–47)
PO2 BLDV: 36 MM HG (ref 25–47)
POLYCHROMASIA BLD QL SMEAR: SLIGHT
POTASSIUM BLD-SCNC: 3.7 MMOL/L (ref 3.4–5.3)
POTASSIUM BLD-SCNC: 3.7 MMOL/L (ref 3.5–5)
POTASSIUM BLD-SCNC: 3.8 MMOL/L (ref 3.4–5.3)
POTASSIUM BLD-SCNC: 3.9 MMOL/L (ref 3.4–5.3)
POTASSIUM BLD-SCNC: 4 MMOL/L (ref 3.4–5.3)
POTASSIUM BLD-SCNC: 4 MMOL/L (ref 3.4–5.3)
POTASSIUM BLD-SCNC: 4.1 MMOL/L (ref 3.4–5.3)
POTASSIUM BLD-SCNC: 4.1 MMOL/L (ref 3.5–5)
POTASSIUM BLD-SCNC: 4.2 MMOL/L (ref 3.4–5.3)
POTASSIUM BLD-SCNC: 4.3 MMOL/L (ref 3.4–5.3)
POTASSIUM BLD-SCNC: 4.3 MMOL/L (ref 3.5–5)
POTASSIUM BLD-SCNC: 4.4 MMOL/L (ref 3.4–5.3)
POTASSIUM BLD-SCNC: 4.4 MMOL/L (ref 3.4–5.3)
POTASSIUM BLD-SCNC: 4.6 MMOL/L (ref 3.5–5)
POTASSIUM BLD-SCNC: 4.6 MMOL/L (ref 3.5–5)
POTASSIUM BLD-SCNC: 4.7 MMOL/L (ref 3.4–5.3)
POTASSIUM BLD-SCNC: 4.7 MMOL/L (ref 3.5–5)
POTASSIUM BLD-SCNC: 4.8 MMOL/L (ref 3.5–5)
POTASSIUM BLD-SCNC: 4.9 MMOL/L (ref 3.4–5.3)
POTASSIUM BLD-SCNC: 4.9 MMOL/L (ref 3.5–5)
POTASSIUM BLD-SCNC: 4.9 MMOL/L (ref 3.5–5)
POTASSIUM SERPL-SCNC: 2.1 MMOL/L (ref 3.4–5.3)
POTASSIUM SERPL-SCNC: 3.1 MMOL/L (ref 3.4–5.3)
POTASSIUM SERPL-SCNC: 3.2 MMOL/L (ref 3.4–5.3)
POTASSIUM SERPL-SCNC: 3.2 MMOL/L (ref 3.4–5.3)
POTASSIUM SERPL-SCNC: 3.3 MMOL/L (ref 3.4–5.3)
POTASSIUM SERPL-SCNC: 3.4 MMOL/L (ref 3.4–5.3)
POTASSIUM SERPL-SCNC: 3.4 MMOL/L (ref 3.4–5.3)
POTASSIUM SERPL-SCNC: 3.5 MMOL/L (ref 3.4–5.3)
POTASSIUM SERPL-SCNC: 3.6 MMOL/L (ref 3.4–5.3)
POTASSIUM SERPL-SCNC: 3.7 MMOL/L (ref 3.4–5.3)
POTASSIUM SERPL-SCNC: 3.7 MMOL/L (ref 3.4–5.3)
POTASSIUM SERPL-SCNC: 3.8 MMOL/L (ref 3.4–5.3)
POTASSIUM SERPL-SCNC: 4 MMOL/L (ref 3.4–5.3)
POTASSIUM SERPL-SCNC: 4 MMOL/L (ref 3.4–5.3)
POTASSIUM SERPL-SCNC: 4.1 MMOL/L (ref 3.4–5.3)
POTASSIUM SERPL-SCNC: 4.2 MMOL/L (ref 3.4–5.3)
POTASSIUM SERPL-SCNC: 4.3 MMOL/L (ref 3.4–5.3)
POTASSIUM SERPL-SCNC: 4.5 MMOL/L (ref 3.4–4.5)
POTASSIUM SERPL-SCNC: 4.6 MMOL/L (ref 3.4–5.3)
PR INTERVAL - MUSE: 114 MS
PR INTERVAL - MUSE: 116 MS
PR INTERVAL - MUSE: 118 MS
PR INTERVAL - MUSE: 126 MS
PR INTERVAL - MUSE: 130 MS
PR INTERVAL - MUSE: 130 MS
PR INTERVAL - MUSE: 134 MS
PR INTERVAL - MUSE: 160 MS
PR INTERVAL - MUSE: NORMAL MS
PREALB SERPL IA-MCNC: 8 MG/DL (ref 15–45)
PROCALCITONIN SERPL IA-MCNC: 0.21 NG/ML
PROCALCITONIN SERPL IA-MCNC: 0.24 NG/ML
PROCALCITONIN SERPL IA-MCNC: 0.26 NG/ML
PROCALCITONIN SERPL IA-MCNC: 0.32 NG/ML
PROCALCITONIN SERPL IA-MCNC: 0.49 NG/ML
PROCALCITONIN SERPL IA-MCNC: 0.78 NG/ML
PROCALCITONIN SERPL IA-MCNC: 0.8 NG/ML
PROCALCITONIN SERPL IA-MCNC: 0.98 NG/ML
PROCALCITONIN SERPL IA-MCNC: 1.96 NG/ML
PROT FLD-MCNC: 3.8 G/DL
PROT FLD-MCNC: 4.8 G/DL
PROT SERPL-MCNC: 5.9 G/DL (ref 6.4–8.3)
PROT SERPL-MCNC: 6 G/DL (ref 6.4–8.3)
PROT SERPL-MCNC: 6.1 G/DL (ref 6.4–8.3)
PROT SERPL-MCNC: 6.2 G/DL (ref 6.4–8.3)
PROT SERPL-MCNC: 6.2 G/DL (ref 6.4–8.3)
PROT SERPL-MCNC: 6.3 G/DL (ref 6.4–8.3)
PROT SERPL-MCNC: 6.4 G/DL (ref 6.4–8.3)
PROT SERPL-MCNC: 6.5 G/DL (ref 6.4–8.3)
PROT SERPL-MCNC: 6.6 G/DL (ref 6.4–8.3)
PROT SERPL-MCNC: 6.6 G/DL (ref 6.4–8.3)
PROT SERPL-MCNC: 6.7 G/DL (ref 6.4–8.3)
PROT SERPL-MCNC: 6.8 G/DL (ref 6.4–8.3)
PROT SERPL-MCNC: 6.8 G/DL (ref 6.4–8.3)
PROT SERPL-MCNC: 7 G/DL (ref 6.4–8.3)
PROT SERPL-MCNC: 7 G/DL (ref 6.4–8.3)
PROT SERPL-MCNC: 7.2 G/DL (ref 6.8–8.8)
PROT SERPL-MCNC: 7.3 G/DL (ref 6–8)
PROT SERPL-MCNC: 7.5 G/DL (ref 6–8)
PROTEIN BODY FLUID SOURCE: NORMAL
PROTEIN BODY FLUID SOURCE: NORMAL
QRS DURATION - MUSE: 66 MS
QRS DURATION - MUSE: 66 MS
QRS DURATION - MUSE: 68 MS
QRS DURATION - MUSE: 70 MS
QRS DURATION - MUSE: 72 MS
QRS DURATION - MUSE: 72 MS
QRS DURATION - MUSE: 74 MS
QRS DURATION - MUSE: 76 MS
QRS DURATION - MUSE: 80 MS
QT - MUSE: 288 MS
QT - MUSE: 302 MS
QT - MUSE: 310 MS
QT - MUSE: 314 MS
QT - MUSE: 316 MS
QT - MUSE: 324 MS
QT - MUSE: 330 MS
QT - MUSE: 348 MS
QT - MUSE: 360 MS
QTC - MUSE: 438 MS
QTC - MUSE: 440 MS
QTC - MUSE: 445 MS
QTC - MUSE: 456 MS
QTC - MUSE: 456 MS
QTC - MUSE: 457 MS
QTC - MUSE: 460 MS
QTC - MUSE: 464 MS
QTC - MUSE: 473 MS
R AXIS - MUSE: -12 DEGREES
R AXIS - MUSE: 0 DEGREES
R AXIS - MUSE: 0 DEGREES
R AXIS - MUSE: 12 DEGREES
R AXIS - MUSE: 20 DEGREES
R AXIS - MUSE: 21 DEGREES
R AXIS - MUSE: 26 DEGREES
R AXIS - MUSE: 5 DEGREES
R AXIS - MUSE: 52 DEGREES
RADIOLOGIST FLAGS: ABNORMAL
RBC # BLD AUTO: 2.38 10E6/UL (ref 4.4–5.9)
RBC # BLD AUTO: 2.49 10E6/UL (ref 4.4–5.9)
RBC # BLD AUTO: 2.53 10E6/UL (ref 4.4–5.9)
RBC # BLD AUTO: 2.57 10E6/UL (ref 4.4–5.9)
RBC # BLD AUTO: 2.66 10E6/UL (ref 4.4–5.9)
RBC # BLD AUTO: 2.67 10E6/UL (ref 4.4–5.9)
RBC # BLD AUTO: 2.69 10E6/UL (ref 4.4–5.9)
RBC # BLD AUTO: 2.69 10E6/UL (ref 4.4–5.9)
RBC # BLD AUTO: 2.7 10E6/UL (ref 4.4–5.9)
RBC # BLD AUTO: 2.7 10E6/UL (ref 4.4–5.9)
RBC # BLD AUTO: 2.73 10E6/UL (ref 4.4–5.9)
RBC # BLD AUTO: 2.75 10E6/UL (ref 4.4–5.9)
RBC # BLD AUTO: 2.78 10E6/UL (ref 4.4–5.9)
RBC # BLD AUTO: 2.79 10E6/UL (ref 4.4–5.9)
RBC # BLD AUTO: 2.84 10E6/UL (ref 4.4–5.9)
RBC # BLD AUTO: 2.85 10E6/UL (ref 4.4–5.9)
RBC # BLD AUTO: 2.86 10E6/UL (ref 4.4–5.9)
RBC # BLD AUTO: 2.88 10E6/UL (ref 4.4–5.9)
RBC # BLD AUTO: 2.9 10E6/UL (ref 4.4–5.9)
RBC # BLD AUTO: 2.91 10E6/UL (ref 4.4–5.9)
RBC # BLD AUTO: 2.91 10E6/UL (ref 4.4–5.9)
RBC # BLD AUTO: 2.95 10E6/UL (ref 4.4–5.9)
RBC # BLD AUTO: 2.96 10E6/UL (ref 4.4–5.9)
RBC # BLD AUTO: 3.01 10E6/UL (ref 4.4–5.9)
RBC # BLD AUTO: 3.04 10E6/UL (ref 4.4–5.9)
RBC # BLD AUTO: 3.04 10E6/UL (ref 4.4–5.9)
RBC # BLD AUTO: 3.05 10E6/UL (ref 4.4–5.9)
RBC # BLD AUTO: 3.06 10E6/UL (ref 4.4–5.9)
RBC # BLD AUTO: 3.11 10E6/UL (ref 4.4–5.9)
RBC # BLD AUTO: 3.13 10E6/UL (ref 4.4–5.9)
RBC # BLD AUTO: 3.14 10E6/UL (ref 4.4–5.9)
RBC # BLD AUTO: 3.14 10E6/UL (ref 4.4–5.9)
RBC # BLD AUTO: 3.15 10E6/UL (ref 4.4–5.9)
RBC # BLD AUTO: 3.17 10E6/UL (ref 4.4–5.9)
RBC # BLD AUTO: 3.19 10E6/UL (ref 4.4–5.9)
RBC # BLD AUTO: 3.2 10E6/UL (ref 4.4–5.9)
RBC # BLD AUTO: 3.21 10E6/UL (ref 4.4–5.9)
RBC # BLD AUTO: 3.21 10E6/UL (ref 4.4–5.9)
RBC # BLD AUTO: 3.24 10E6/UL (ref 4.4–5.9)
RBC # BLD AUTO: 3.32 10E6/UL (ref 4.4–5.9)
RBC # BLD AUTO: 3.32 10E6/UL (ref 4.4–5.9)
RBC # BLD AUTO: 3.35 10E6/UL (ref 4.4–5.9)
RBC # BLD AUTO: 3.36 10E6/UL (ref 4.4–5.9)
RBC # BLD AUTO: 3.38 10E6/UL (ref 4.4–5.9)
RBC # BLD AUTO: 3.4 10E6/UL (ref 4.4–5.9)
RBC # BLD AUTO: 3.45 10E6/UL (ref 4.4–5.9)
RBC # BLD AUTO: 3.46 10E6/UL (ref 4.4–5.9)
RBC # BLD AUTO: 3.48 10E6/UL (ref 4.4–5.9)
RBC # BLD AUTO: 3.49 10E6/UL (ref 4.4–5.9)
RBC # BLD AUTO: 3.54 10E6/UL (ref 4.4–5.9)
RBC # BLD AUTO: 3.59 10E6/UL (ref 4.4–5.9)
RBC # BLD AUTO: 3.66 10E6/UL (ref 4.4–5.9)
RBC # BLD AUTO: 3.71 10E6/UL (ref 4.4–5.9)
RBC # BLD AUTO: 4.01 10E6/UL (ref 4.4–5.9)
RBC # BLD AUTO: 4.18 10E6/UL (ref 4.4–5.9)
RBC # BLD AUTO: 4.3 10E6/UL (ref 4.4–5.9)
RBC MORPH BLD: ABNORMAL
RBC MORPH BLD: NORMAL
RBC MORPH BLD: NORMAL
RBC URINE: 1 /HPF
RBC URINE: 2 /HPF
RBC URINE: 2 /HPF
RETICS # AUTO: 0.09 10E6/UL (ref 0.03–0.1)
RETICS # AUTO: 0.1 10E6/UL (ref 0.03–0.1)
RETICS/RBC NFR AUTO: 2.8 % (ref 0.5–2)
RETICS/RBC NFR AUTO: 3.9 % (ref 0.5–2)
RSV RNA SPEC NAA+PROBE: NEGATIVE
RVA NSP5 STL QL NAA+PROBE: NOT DETECTED
RVA NSP5 STL QL NAA+PROBE: NOT DETECTED
SA TARGET DNA: NEGATIVE
SALMONELLA SP RPOD STL QL NAA+PROBE: NOT DETECTED
SALMONELLA SP RPOD STL QL NAA+PROBE: NOT DETECTED
SAO2 % BLDV: 64 % (ref 94–100)
SARS-COV-2 RNA RESP QL NAA+PROBE: NEGATIVE
SHIGELLA SP+EIEC IPAH STL QL NAA+PROBE: NOT DETECTED
SHIGELLA SP+EIEC IPAH STL QL NAA+PROBE: NOT DETECTED
SODIUM BLD-SCNC: 134 MMOL/L (ref 133–144)
SODIUM BLD-SCNC: 135 MMOL/L (ref 133–144)
SODIUM BLD-SCNC: 136 MMOL/L (ref 133–144)
SODIUM BLD-SCNC: 141 MMOL/L (ref 133–144)
SODIUM SERPL-SCNC: 133 MMOL/L (ref 136–145)
SODIUM SERPL-SCNC: 134 MMOL/L (ref 133–144)
SODIUM SERPL-SCNC: 134 MMOL/L (ref 133–144)
SODIUM SERPL-SCNC: 134 MMOL/L (ref 136–145)
SODIUM SERPL-SCNC: 134 MMOL/L (ref 136–145)
SODIUM SERPL-SCNC: 135 MMOL/L (ref 133–144)
SODIUM SERPL-SCNC: 136 MMOL/L (ref 133–144)
SODIUM SERPL-SCNC: 136 MMOL/L (ref 133–144)
SODIUM SERPL-SCNC: 136 MMOL/L (ref 136–145)
SODIUM SERPL-SCNC: 137 MMOL/L (ref 136–145)
SODIUM SERPL-SCNC: 138 MMOL/L (ref 136–145)
SODIUM SERPL-SCNC: 139 MMOL/L (ref 133–144)
SODIUM SERPL-SCNC: 139 MMOL/L (ref 136–145)
SODIUM SERPL-SCNC: 140 MMOL/L (ref 136–145)
SODIUM SERPL-SCNC: 141 MMOL/L (ref 136–145)
SODIUM SERPL-SCNC: 142 MMOL/L (ref 136–145)
SODIUM SERPL-SCNC: 143 MMOL/L (ref 136–145)
SODIUM SERPL-SCNC: 143 MMOL/L (ref 136–145)
SODIUM SERPL-SCNC: 144 MMOL/L (ref 136–145)
SODIUM SERPL-SCNC: 151 MMOL/L (ref 136–145)
SP GR UR STRIP: 1.01 (ref 1–1.03)
SP GR UR STRIP: 1.01 (ref 1–1.03)
SP GR UR STRIP: 1.02 (ref 1–1.03)
SP GR UR STRIP: 1.02 (ref 1–1.03)
SPECIMEN EXPIRATION DATE: ABNORMAL
SPECIMEN EXPIRATION DATE: NORMAL
SPECIMEN STATUS: NORMAL
SQUAMOUS EPITHELIAL: 1 /HPF
SQUAMOUS EPITHELIAL: <1 /HPF
SYSTOLIC BLOOD PRESSURE - MUSE: NORMAL MMHG
T AXIS - MUSE: -8 DEGREES
T AXIS - MUSE: 0 DEGREES
T AXIS - MUSE: 15 DEGREES
T AXIS - MUSE: 18 DEGREES
T AXIS - MUSE: 19 DEGREES
T AXIS - MUSE: 27 DEGREES
T AXIS - MUSE: 30 DEGREES
T AXIS - MUSE: 35 DEGREES
T AXIS - MUSE: 39 DEGREES
TEST NAME: NORMAL
TIBC SERPL-MCNC: 152 UG/DL (ref 240–430)
TIBC SERPL-MCNC: 243 UG/DL (ref 240–430)
TRIGL SERPL-MCNC: 116 MG/DL
TROPONIN T SERPL HS-MCNC: 23 NG/L
TROPONIN T SERPL HS-MCNC: 23 NG/L
TROPONIN T SERPL HS-MCNC: 32 NG/L
TROPONIN T SERPL HS-MCNC: 39 NG/L
TROPONIN T SERPL HS-MCNC: 39 NG/L
TSH SERPL DL<=0.005 MIU/L-ACNC: 0.51 UIU/ML (ref 0.3–4.2)
UNIT ABO/RH: NORMAL
UNIT NUMBER: NORMAL
UNIT STATUS: NORMAL
UNIT TYPE ISBT: 5100
UNIT TYPE ISBT: 600
UNIT TYPE ISBT: 6200
UNIT TYPE ISBT: 9500
UNIT TYPE ISBT: 9500
URATE SERPL-MCNC: 4.6 MG/DL (ref 3.4–7)
URATE SERPL-MCNC: 5.6 MG/DL (ref 3.4–7)
URATE SERPL-MCNC: 6.3 MG/DL (ref 3.4–7)
UROBILINOGEN UR STRIP-MCNC: NORMAL MG/DL
V CHOL+PARA RFBL+TRKH+TNAA STL QL NAA+PR: NOT DETECTED
V CHOL+PARA RFBL+TRKH+TNAA STL QL NAA+PR: NOT DETECTED
VANCOMYCIN SERPL-MCNC: 25.1 UG/ML
VENTRICULAR RATE- MUSE: 104 BPM
VENTRICULAR RATE- MUSE: 107 BPM
VENTRICULAR RATE- MUSE: 111 BPM
VENTRICULAR RATE- MUSE: 117 BPM
VENTRICULAR RATE- MUSE: 121 BPM
VENTRICULAR RATE- MUSE: 125 BPM
VENTRICULAR RATE- MUSE: 130 BPM
VENTRICULAR RATE- MUSE: 138 BPM
VENTRICULAR RATE- MUSE: 139 BPM
WBC # BLD AUTO: 1.2 10E3/UL (ref 4–11)
WBC # BLD AUTO: 1.2 10E3/UL (ref 4–11)
WBC # BLD AUTO: 1.5 10E3/UL (ref 4–11)
WBC # BLD AUTO: 10.9 10E3/UL (ref 4–11)
WBC # BLD AUTO: 11.4 10E3/UL (ref 4–11)
WBC # BLD AUTO: 11.6 10E3/UL (ref 4–11)
WBC # BLD AUTO: 11.8 10E3/UL (ref 4–11)
WBC # BLD AUTO: 11.8 10E3/UL (ref 4–11)
WBC # BLD AUTO: 12 10E3/UL (ref 4–11)
WBC # BLD AUTO: 12.1 10E3/UL (ref 4–11)
WBC # BLD AUTO: 12.3 10E3/UL (ref 4–11)
WBC # BLD AUTO: 12.5 10E3/UL (ref 4–11)
WBC # BLD AUTO: 12.5 10E3/UL (ref 4–11)
WBC # BLD AUTO: 12.7 10E3/UL (ref 4–11)
WBC # BLD AUTO: 12.8 10E3/UL (ref 4–11)
WBC # BLD AUTO: 12.8 10E3/UL (ref 4–11)
WBC # BLD AUTO: 13.1 10E3/UL (ref 4–11)
WBC # BLD AUTO: 13.4 10E3/UL (ref 4–11)
WBC # BLD AUTO: 13.7 10E3/UL (ref 4–11)
WBC # BLD AUTO: 13.9 10E3/UL (ref 4–11)
WBC # BLD AUTO: 14.2 10E3/UL (ref 4–11)
WBC # BLD AUTO: 14.5 10E3/UL (ref 4–11)
WBC # BLD AUTO: 14.6 10E3/UL (ref 4–11)
WBC # BLD AUTO: 14.7 10E3/UL (ref 4–11)
WBC # BLD AUTO: 15 10E3/UL (ref 4–11)
WBC # BLD AUTO: 15.2 10E3/UL (ref 4–11)
WBC # BLD AUTO: 15.6 10E3/UL (ref 4–11)
WBC # BLD AUTO: 15.7 10E3/UL (ref 4–11)
WBC # BLD AUTO: 16.3 10E3/UL (ref 4–11)
WBC # BLD AUTO: 16.7 10E3/UL (ref 4–11)
WBC # BLD AUTO: 16.7 10E3/UL (ref 4–11)
WBC # BLD AUTO: 17.2 10E3/UL (ref 4–11)
WBC # BLD AUTO: 17.3 10E3/UL (ref 4–11)
WBC # BLD AUTO: 17.4 10E3/UL (ref 4–11)
WBC # BLD AUTO: 17.7 10E3/UL (ref 4–11)
WBC # BLD AUTO: 18.9 10E3/UL (ref 4–11)
WBC # BLD AUTO: 19.2 10E3/UL (ref 4–11)
WBC # BLD AUTO: 19.7 10E3/UL (ref 4–11)
WBC # BLD AUTO: 20.1 10E3/UL (ref 4–11)
WBC # BLD AUTO: 20.3 10E3/UL (ref 4–11)
WBC # BLD AUTO: 21.4 10E3/UL (ref 4–11)
WBC # BLD AUTO: 24.9 10E3/UL (ref 4–11)
WBC # BLD AUTO: 3.6 10E3/UL (ref 4–11)
WBC # BLD AUTO: 3.6 10E3/UL (ref 4–11)
WBC # BLD AUTO: 4.1 10E3/UL (ref 4–11)
WBC # BLD AUTO: 4.3 10E3/UL (ref 4–11)
WBC # BLD AUTO: 5.6 10E3/UL (ref 4–11)
WBC # BLD AUTO: 5.8 10E3/UL (ref 4–11)
WBC # BLD AUTO: 6.9 10E3/UL (ref 4–11)
WBC # BLD AUTO: 7.2 10E3/UL (ref 4–11)
WBC # BLD AUTO: 8 10E3/UL (ref 4–11)
WBC # BLD AUTO: 8 10E3/UL (ref 4–11)
WBC # BLD AUTO: 8.3 10E3/UL (ref 4–11)
WBC # BLD AUTO: 9.2 10E3/UL (ref 4–11)
WBC # BLD AUTO: 9.7 10E3/UL (ref 4–11)
WBC # BLD AUTO: 9.9 10E3/UL (ref 4–11)
WBC # FLD AUTO: 1004 /UL
WBC # FLD AUTO: 1130 /UL
WBC # FLD AUTO: 1167 /UL
WBC # FLD AUTO: 1336 /UL
WBC # FLD AUTO: 1475 /UL
WBC # FLD AUTO: 71 /UL
WBC URINE: 1 /HPF
WBC URINE: 1 /HPF
WBC URINE: 2 /HPF
Y ENTERO RECN STL QL NAA+PROBE: NOT DETECTED
Y ENTERO RECN STL QL NAA+PROBE: NOT DETECTED

## 2022-01-01 PROCEDURE — 250N000011 HC RX IP 250 OP 636: Performed by: REGISTERED NURSE

## 2022-01-01 PROCEDURE — 999N000065 XR CHEST PORT 1 VIEW

## 2022-01-01 PROCEDURE — 99233 SBSQ HOSP IP/OBS HIGH 50: CPT | Performed by: STUDENT IN AN ORGANIZED HEALTH CARE EDUCATION/TRAINING PROGRAM

## 2022-01-01 PROCEDURE — 87506 IADNA-DNA/RNA PROBE TQ 6-11: CPT

## 2022-01-01 PROCEDURE — 258N000003 HC RX IP 258 OP 636: Performed by: INTERNAL MEDICINE

## 2022-01-01 PROCEDURE — 258N000001 HC RX 258: Performed by: STUDENT IN AN ORGANIZED HEALTH CARE EDUCATION/TRAINING PROGRAM

## 2022-01-01 PROCEDURE — 99291 CRITICAL CARE FIRST HOUR: CPT | Mod: 25 | Performed by: EMERGENCY MEDICINE

## 2022-01-01 PROCEDURE — 250N000013 HC RX MED GY IP 250 OP 250 PS 637

## 2022-01-01 PROCEDURE — 99231 SBSQ HOSP IP/OBS SF/LOW 25: CPT | Performed by: INTERNAL MEDICINE

## 2022-01-01 PROCEDURE — 36592 COLLECT BLOOD FROM PICC: CPT | Performed by: STUDENT IN AN ORGANIZED HEALTH CARE EDUCATION/TRAINING PROGRAM

## 2022-01-01 PROCEDURE — 99232 SBSQ HOSP IP/OBS MODERATE 35: CPT | Performed by: PHYSICIAN ASSISTANT

## 2022-01-01 PROCEDURE — 120N000002 HC R&B MED SURG/OB UMMC

## 2022-01-01 PROCEDURE — 80048 BASIC METABOLIC PNL TOTAL CA: CPT | Performed by: INTERNAL MEDICINE

## 2022-01-01 PROCEDURE — 99233 SBSQ HOSP IP/OBS HIGH 50: CPT | Performed by: PHYSICIAN ASSISTANT

## 2022-01-01 PROCEDURE — 83605 ASSAY OF LACTIC ACID: CPT | Performed by: INTERNAL MEDICINE

## 2022-01-01 PROCEDURE — 36415 COLL VENOUS BLD VENIPUNCTURE: CPT | Performed by: ORTHOPAEDIC SURGERY

## 2022-01-01 PROCEDURE — 250N000013 HC RX MED GY IP 250 OP 250 PS 637: Performed by: STUDENT IN AN ORGANIZED HEALTH CARE EDUCATION/TRAINING PROGRAM

## 2022-01-01 PROCEDURE — 85396 CLOTTING ASSAY WHOLE BLOOD: CPT | Performed by: INTERNAL MEDICINE

## 2022-01-01 PROCEDURE — 88305 TISSUE EXAM BY PATHOLOGIST: CPT | Mod: 26 | Performed by: PATHOLOGY

## 2022-01-01 PROCEDURE — P9016 RBC LEUKOCYTES REDUCED: HCPCS | Performed by: EMERGENCY MEDICINE

## 2022-01-01 PROCEDURE — 250N000011 HC RX IP 250 OP 636: Performed by: PHYSICIAN ASSISTANT

## 2022-01-01 PROCEDURE — 84132 ASSAY OF SERUM POTASSIUM: CPT

## 2022-01-01 PROCEDURE — 250N000011 HC RX IP 250 OP 636

## 2022-01-01 PROCEDURE — 82310 ASSAY OF CALCIUM: CPT | Performed by: INTERNAL MEDICINE

## 2022-01-01 PROCEDURE — 86860 RBC ANTIBODY ELUTION: CPT | Performed by: STUDENT IN AN ORGANIZED HEALTH CARE EDUCATION/TRAINING PROGRAM

## 2022-01-01 PROCEDURE — 83735 ASSAY OF MAGNESIUM: CPT | Performed by: INTERNAL MEDICINE

## 2022-01-01 PROCEDURE — 80053 COMPREHEN METABOLIC PANEL: CPT | Performed by: INTERNAL MEDICINE

## 2022-01-01 PROCEDURE — 93005 ELECTROCARDIOGRAM TRACING: CPT

## 2022-01-01 PROCEDURE — 99233 SBSQ HOSP IP/OBS HIGH 50: CPT | Mod: GC | Performed by: INTERNAL MEDICINE

## 2022-01-01 PROCEDURE — 87205 SMEAR GRAM STAIN: CPT | Performed by: INTERNAL MEDICINE

## 2022-01-01 PROCEDURE — 97535 SELF CARE MNGMENT TRAINING: CPT | Mod: GO | Performed by: OCCUPATIONAL THERAPIST

## 2022-01-01 PROCEDURE — 94640 AIRWAY INHALATION TREATMENT: CPT

## 2022-01-01 PROCEDURE — 250N000013 HC RX MED GY IP 250 OP 250 PS 637: Performed by: ORTHOPAEDIC SURGERY

## 2022-01-01 PROCEDURE — 250N000013 HC RX MED GY IP 250 OP 250 PS 637: Performed by: PHYSICIAN ASSISTANT

## 2022-01-01 PROCEDURE — 72170 X-RAY EXAM OF PELVIS: CPT | Mod: GC | Performed by: RADIOLOGY

## 2022-01-01 PROCEDURE — 200N000002 HC R&B ICU UMMC

## 2022-01-01 PROCEDURE — 96374 THER/PROPH/DIAG INJ IV PUSH: CPT | Mod: 59

## 2022-01-01 PROCEDURE — 250N000011 HC RX IP 250 OP 636: Performed by: STUDENT IN AN ORGANIZED HEALTH CARE EDUCATION/TRAINING PROGRAM

## 2022-01-01 PROCEDURE — U0003 INFECTIOUS AGENT DETECTION BY NUCLEIC ACID (DNA OR RNA); SEVERE ACUTE RESPIRATORY SYNDROME CORONAVIRUS 2 (SARS-COV-2) (CORONAVIRUS DISEASE [COVID-19]), AMPLIFIED PROBE TECHNIQUE, MAKING USE OF HIGH THROUGHPUT TECHNOLOGIES AS DESCRIBED BY CMS-2020-01-R: HCPCS | Performed by: PEDIATRICS

## 2022-01-01 PROCEDURE — P9016 RBC LEUKOCYTES REDUCED: HCPCS | Performed by: ORTHOPAEDIC SURGERY

## 2022-01-01 PROCEDURE — 94640 AIRWAY INHALATION TREATMENT: CPT | Mod: 76

## 2022-01-01 PROCEDURE — 250N000013 HC RX MED GY IP 250 OP 250 PS 637: Performed by: CLINICAL NURSE SPECIALIST

## 2022-01-01 PROCEDURE — 250N000009 HC RX 250

## 2022-01-01 PROCEDURE — 84157 ASSAY OF PROTEIN OTHER: CPT | Performed by: STUDENT IN AN ORGANIZED HEALTH CARE EDUCATION/TRAINING PROGRAM

## 2022-01-01 PROCEDURE — 70496 CT ANGIOGRAPHY HEAD: CPT | Mod: 26 | Performed by: RADIOLOGY

## 2022-01-01 PROCEDURE — 85730 THROMBOPLASTIN TIME PARTIAL: CPT

## 2022-01-01 PROCEDURE — 250N000009 HC RX 250: Performed by: ANESTHESIOLOGY

## 2022-01-01 PROCEDURE — 85018 HEMOGLOBIN: CPT | Performed by: PHYSICIAN ASSISTANT

## 2022-01-01 PROCEDURE — 250N000011 HC RX IP 250 OP 636: Performed by: INTERNAL MEDICINE

## 2022-01-01 PROCEDURE — 99233 SBSQ HOSP IP/OBS HIGH 50: CPT | Mod: GC | Performed by: PEDIATRICS

## 2022-01-01 PROCEDURE — 128N000003 HC R&B REHAB

## 2022-01-01 PROCEDURE — 250N000009 HC RX 250: Performed by: STUDENT IN AN ORGANIZED HEALTH CARE EDUCATION/TRAINING PROGRAM

## 2022-01-01 PROCEDURE — 36415 COLL VENOUS BLD VENIPUNCTURE: CPT | Performed by: INTERNAL MEDICINE

## 2022-01-01 PROCEDURE — 80053 COMPREHEN METABOLIC PANEL: CPT

## 2022-01-01 PROCEDURE — 99215 OFFICE O/P EST HI 40 MIN: CPT | Mod: 95 | Performed by: INTERNAL MEDICINE

## 2022-01-01 PROCEDURE — 86901 BLOOD TYPING SEROLOGIC RH(D): CPT | Performed by: STUDENT IN AN ORGANIZED HEALTH CARE EDUCATION/TRAINING PROGRAM

## 2022-01-01 PROCEDURE — 36591 DRAW BLOOD OFF VENOUS DEVICE: CPT | Performed by: INTERNAL MEDICINE

## 2022-01-01 PROCEDURE — 0W9B3ZZ DRAINAGE OF LEFT PLEURAL CAVITY, PERCUTANEOUS APPROACH: ICD-10-PCS | Performed by: STUDENT IN AN ORGANIZED HEALTH CARE EDUCATION/TRAINING PROGRAM

## 2022-01-01 PROCEDURE — 72156 MRI NECK SPINE W/O & W/DYE: CPT | Mod: 26 | Performed by: RADIOLOGY

## 2022-01-01 PROCEDURE — 99291 CRITICAL CARE FIRST HOUR: CPT | Mod: CS | Performed by: EMERGENCY MEDICINE

## 2022-01-01 PROCEDURE — 99223 1ST HOSP IP/OBS HIGH 75: CPT | Mod: AI | Performed by: STUDENT IN AN ORGANIZED HEALTH CARE EDUCATION/TRAINING PROGRAM

## 2022-01-01 PROCEDURE — 36415 COLL VENOUS BLD VENIPUNCTURE: CPT | Performed by: PHYSICIAN ASSISTANT

## 2022-01-01 PROCEDURE — 82040 ASSAY OF SERUM ALBUMIN: CPT | Performed by: INTERNAL MEDICINE

## 2022-01-01 PROCEDURE — 85025 COMPLETE CBC W/AUTO DIFF WBC: CPT | Performed by: EMERGENCY MEDICINE

## 2022-01-01 PROCEDURE — 258N000003 HC RX IP 258 OP 636: Performed by: PHYSICIAN ASSISTANT

## 2022-01-01 PROCEDURE — 84145 PROCALCITONIN (PCT): CPT | Performed by: INTERNAL MEDICINE

## 2022-01-01 PROCEDURE — 71045 X-RAY EXAM CHEST 1 VIEW: CPT | Mod: 26 | Performed by: RADIOLOGY

## 2022-01-01 PROCEDURE — 96366 THER/PROPH/DIAG IV INF ADDON: CPT

## 2022-01-01 PROCEDURE — 999N000147 HC STATISTIC PT IP EVAL DEFER

## 2022-01-01 PROCEDURE — 36592 COLLECT BLOOD FROM PICC: CPT

## 2022-01-01 PROCEDURE — 36430 TRANSFUSION BLD/BLD COMPNT: CPT

## 2022-01-01 PROCEDURE — 99233 SBSQ HOSP IP/OBS HIGH 50: CPT | Mod: 24 | Performed by: SURGERY

## 2022-01-01 PROCEDURE — 80048 BASIC METABOLIC PNL TOTAL CA: CPT

## 2022-01-01 PROCEDURE — 258N000003 HC RX IP 258 OP 636

## 2022-01-01 PROCEDURE — 94799 UNLISTED PULMONARY SVC/PX: CPT

## 2022-01-01 PROCEDURE — C9803 HOPD COVID-19 SPEC COLLECT: HCPCS

## 2022-01-01 PROCEDURE — 80061 LIPID PANEL: CPT

## 2022-01-01 PROCEDURE — 86850 RBC ANTIBODY SCREEN: CPT | Performed by: ORTHOPAEDIC SURGERY

## 2022-01-01 PROCEDURE — 97110 THERAPEUTIC EXERCISES: CPT | Mod: GP | Performed by: PHYSICAL THERAPIST

## 2022-01-01 PROCEDURE — 258N000003 HC RX IP 258 OP 636: Performed by: ANESTHESIOLOGY

## 2022-01-01 PROCEDURE — 85049 AUTOMATED PLATELET COUNT: CPT

## 2022-01-01 PROCEDURE — 87493 C DIFF AMPLIFIED PROBE: CPT | Performed by: STUDENT IN AN ORGANIZED HEALTH CARE EDUCATION/TRAINING PROGRAM

## 2022-01-01 PROCEDURE — 80051 ELECTROLYTE PANEL: CPT | Performed by: EMERGENCY MEDICINE

## 2022-01-01 PROCEDURE — 97110 THERAPEUTIC EXERCISES: CPT | Mod: GP | Performed by: REHABILITATION PRACTITIONER

## 2022-01-01 PROCEDURE — 85014 HEMATOCRIT: CPT | Performed by: INTERNAL MEDICINE

## 2022-01-01 PROCEDURE — 85027 COMPLETE CBC AUTOMATED: CPT | Performed by: INTERNAL MEDICINE

## 2022-01-01 PROCEDURE — 99233 SBSQ HOSP IP/OBS HIGH 50: CPT | Mod: 24 | Performed by: PHYSICAL MEDICINE & REHABILITATION

## 2022-01-01 PROCEDURE — 99367 TEAM CONF W/O PAT BY PHYS: CPT | Mod: 25 | Performed by: INTERNAL MEDICINE

## 2022-01-01 PROCEDURE — 97530 THERAPEUTIC ACTIVITIES: CPT | Mod: GP

## 2022-01-01 PROCEDURE — 93308 TTE F-UP OR LMTD: CPT

## 2022-01-01 PROCEDURE — 86922 COMPATIBILITY TEST ANTIGLOB: CPT

## 2022-01-01 PROCEDURE — 0W9B30Z DRAINAGE OF LEFT PLEURAL CAVITY WITH DRAINAGE DEVICE, PERCUTANEOUS APPROACH: ICD-10-PCS | Performed by: RADIOLOGY

## 2022-01-01 PROCEDURE — 99231 SBSQ HOSP IP/OBS SF/LOW 25: CPT | Performed by: PHYSICIAN ASSISTANT

## 2022-01-01 PROCEDURE — 99207 PR SC NO CHARGE VISIT: CPT | Performed by: INTERNAL MEDICINE

## 2022-01-01 PROCEDURE — 86922 COMPATIBILITY TEST ANTIGLOB: CPT | Performed by: STUDENT IN AN ORGANIZED HEALTH CARE EDUCATION/TRAINING PROGRAM

## 2022-01-01 PROCEDURE — 96361 HYDRATE IV INFUSION ADD-ON: CPT

## 2022-01-01 PROCEDURE — 999N000111 HC STATISTIC OT IP EVAL DEFER

## 2022-01-01 PROCEDURE — 36592 COLLECT BLOOD FROM PICC: CPT | Performed by: REGISTERED NURSE

## 2022-01-01 PROCEDURE — 89051 BODY FLUID CELL COUNT: CPT | Performed by: INTERNAL MEDICINE

## 2022-01-01 PROCEDURE — 99223 1ST HOSP IP/OBS HIGH 75: CPT | Mod: GC | Performed by: STUDENT IN AN ORGANIZED HEALTH CARE EDUCATION/TRAINING PROGRAM

## 2022-01-01 PROCEDURE — 97162 PT EVAL MOD COMPLEX 30 MIN: CPT | Mod: GP | Performed by: PHYSICAL THERAPIST

## 2022-01-01 PROCEDURE — 250N000013 HC RX MED GY IP 250 OP 250 PS 637: Performed by: INTERNAL MEDICINE

## 2022-01-01 PROCEDURE — 80053 COMPREHEN METABOLIC PANEL: CPT | Performed by: FAMILY MEDICINE

## 2022-01-01 PROCEDURE — 85610 PROTHROMBIN TIME: CPT | Performed by: PHYSICIAN ASSISTANT

## 2022-01-01 PROCEDURE — 99207 PR NO BILLABLE SERVICE THIS VISIT: CPT | Performed by: INTERNAL MEDICINE

## 2022-01-01 PROCEDURE — 96365 THER/PROPH/DIAG IV INF INIT: CPT

## 2022-01-01 PROCEDURE — 99238 HOSP IP/OBS DSCHRG MGMT 30/<: CPT | Performed by: PEDIATRICS

## 2022-01-01 PROCEDURE — 93325 DOPPLER ECHO COLOR FLOW MAPG: CPT

## 2022-01-01 PROCEDURE — 84132 ASSAY OF SERUM POTASSIUM: CPT | Performed by: ORTHOPAEDIC SURGERY

## 2022-01-01 PROCEDURE — 84155 ASSAY OF PROTEIN SERUM: CPT | Performed by: REGISTERED NURSE

## 2022-01-01 PROCEDURE — 86901 BLOOD TYPING SEROLOGIC RH(D): CPT

## 2022-01-01 PROCEDURE — 96368 THER/DIAG CONCURRENT INF: CPT

## 2022-01-01 PROCEDURE — 99356 PR PROLONGED SERV,INPATIENT,1ST HR: CPT | Performed by: CLINICAL NURSE SPECIALIST

## 2022-01-01 PROCEDURE — 83615 LACTATE (LD) (LDH) ENZYME: CPT

## 2022-01-01 PROCEDURE — 86901 BLOOD TYPING SEROLOGIC RH(D): CPT | Performed by: FAMILY MEDICINE

## 2022-01-01 PROCEDURE — 87075 CULTR BACTERIA EXCEPT BLOOD: CPT | Performed by: INTERNAL MEDICINE

## 2022-01-01 PROCEDURE — 86900 BLOOD TYPING SEROLOGIC ABO: CPT | Performed by: STUDENT IN AN ORGANIZED HEALTH CARE EDUCATION/TRAINING PROGRAM

## 2022-01-01 PROCEDURE — 250N000009 HC RX 250: Performed by: ORTHOPAEDIC SURGERY

## 2022-01-01 PROCEDURE — 70498 CT ANGIOGRAPHY NECK: CPT

## 2022-01-01 PROCEDURE — 258N000003 HC RX IP 258 OP 636: Performed by: EMERGENCY MEDICINE

## 2022-01-01 PROCEDURE — 99207 PR SC NO CHARGE VISIT: CPT | Performed by: STUDENT IN AN ORGANIZED HEALTH CARE EDUCATION/TRAINING PROGRAM

## 2022-01-01 PROCEDURE — 258N000003 HC RX IP 258 OP 636: Performed by: ORTHOPAEDIC SURGERY

## 2022-01-01 PROCEDURE — 27500 TREATMENT OF THIGH FRACTURE: CPT | Mod: RT

## 2022-01-01 PROCEDURE — 99231 SBSQ HOSP IP/OBS SF/LOW 25: CPT | Mod: 24 | Performed by: PHYSICAL MEDICINE & REHABILITATION

## 2022-01-01 PROCEDURE — 85610 PROTHROMBIN TIME: CPT

## 2022-01-01 PROCEDURE — 83605 ASSAY OF LACTIC ACID: CPT | Performed by: PEDIATRICS

## 2022-01-01 PROCEDURE — 85049 AUTOMATED PLATELET COUNT: CPT | Performed by: STUDENT IN AN ORGANIZED HEALTH CARE EDUCATION/TRAINING PROGRAM

## 2022-01-01 PROCEDURE — 999N000044 HC STATISTIC CVC DRESSING CHANGE

## 2022-01-01 PROCEDURE — 86140 C-REACTIVE PROTEIN: CPT | Performed by: INTERNAL MEDICINE

## 2022-01-01 PROCEDURE — 85027 COMPLETE CBC AUTOMATED: CPT | Performed by: EMERGENCY MEDICINE

## 2022-01-01 PROCEDURE — 72156 MRI NECK SPINE W/O & W/DYE: CPT

## 2022-01-01 PROCEDURE — 97530 THERAPEUTIC ACTIVITIES: CPT | Mod: GP | Performed by: PHYSICAL THERAPIST

## 2022-01-01 PROCEDURE — 36591 DRAW BLOOD OFF VENOUS DEVICE: CPT | Performed by: STUDENT IN AN ORGANIZED HEALTH CARE EDUCATION/TRAINING PROGRAM

## 2022-01-01 PROCEDURE — 97110 THERAPEUTIC EXERCISES: CPT | Mod: GO | Performed by: OCCUPATIONAL THERAPIST

## 2022-01-01 PROCEDURE — 71275 CT ANGIOGRAPHY CHEST: CPT | Mod: 26 | Performed by: RADIOLOGY

## 2022-01-01 PROCEDURE — 86850 RBC ANTIBODY SCREEN: CPT | Performed by: STUDENT IN AN ORGANIZED HEALTH CARE EDUCATION/TRAINING PROGRAM

## 2022-01-01 PROCEDURE — 250N000011 HC RX IP 250 OP 636: Performed by: EMERGENCY MEDICINE

## 2022-01-01 PROCEDURE — 99232 SBSQ HOSP IP/OBS MODERATE 35: CPT | Mod: GC | Performed by: INTERNAL MEDICINE

## 2022-01-01 PROCEDURE — 82330 ASSAY OF CALCIUM: CPT

## 2022-01-01 PROCEDURE — 71045 X-RAY EXAM CHEST 1 VIEW: CPT

## 2022-01-01 PROCEDURE — 87040 BLOOD CULTURE FOR BACTERIA: CPT | Performed by: INTERNAL MEDICINE

## 2022-01-01 PROCEDURE — 93010 ELECTROCARDIOGRAM REPORT: CPT | Performed by: INTERNAL MEDICINE

## 2022-01-01 PROCEDURE — 85384 FIBRINOGEN ACTIVITY: CPT | Performed by: INTERNAL MEDICINE

## 2022-01-01 PROCEDURE — P9016 RBC LEUKOCYTES REDUCED: HCPCS

## 2022-01-01 PROCEDURE — 93321 DOPPLER ECHO F-UP/LMTD STD: CPT | Mod: 26 | Performed by: INTERNAL MEDICINE

## 2022-01-01 PROCEDURE — 255N000002 HC RX 255 OP 636: Performed by: EMERGENCY MEDICINE

## 2022-01-01 PROCEDURE — 83615 LACTATE (LD) (LDH) ENZYME: CPT | Performed by: STUDENT IN AN ORGANIZED HEALTH CARE EDUCATION/TRAINING PROGRAM

## 2022-01-01 PROCEDURE — 81001 URINALYSIS AUTO W/SCOPE: CPT | Performed by: INTERNAL MEDICINE

## 2022-01-01 PROCEDURE — 82565 ASSAY OF CREATININE: CPT | Performed by: PHYSICAL MEDICINE & REHABILITATION

## 2022-01-01 PROCEDURE — 72170 X-RAY EXAM OF PELVIS: CPT

## 2022-01-01 PROCEDURE — 83735 ASSAY OF MAGNESIUM: CPT | Performed by: ORTHOPAEDIC SURGERY

## 2022-01-01 PROCEDURE — 93978 VASCULAR STUDY: CPT | Mod: 26 | Performed by: RADIOLOGY

## 2022-01-01 PROCEDURE — 999N000128 HC STATISTIC PERIPHERAL IV START W/O US GUIDANCE

## 2022-01-01 PROCEDURE — 99417 PROLNG OP E/M EACH 15 MIN: CPT | Performed by: INTERNAL MEDICINE

## 2022-01-01 PROCEDURE — 258N000003 HC RX IP 258 OP 636: Performed by: FAMILY MEDICINE

## 2022-01-01 PROCEDURE — 85007 BL SMEAR W/DIFF WBC COUNT: CPT | Performed by: INTERNAL MEDICINE

## 2022-01-01 PROCEDURE — 97165 OT EVAL LOW COMPLEX 30 MIN: CPT | Mod: GO

## 2022-01-01 PROCEDURE — 87070 CULTURE OTHR SPECIMN AEROBIC: CPT | Performed by: INTERNAL MEDICINE

## 2022-01-01 PROCEDURE — 0W993ZZ DRAINAGE OF RIGHT PLEURAL CAVITY, PERCUTANEOUS APPROACH: ICD-10-PCS | Performed by: STUDENT IN AN ORGANIZED HEALTH CARE EDUCATION/TRAINING PROGRAM

## 2022-01-01 PROCEDURE — 99285 EMERGENCY DEPT VISIT HI MDM: CPT | Mod: CS,25

## 2022-01-01 PROCEDURE — 250N000011 HC RX IP 250 OP 636: Performed by: ORTHOPAEDIC SURGERY

## 2022-01-01 PROCEDURE — 258N000003 HC RX IP 258 OP 636: Performed by: STUDENT IN AN ORGANIZED HEALTH CARE EDUCATION/TRAINING PROGRAM

## 2022-01-01 PROCEDURE — 99222 1ST HOSP IP/OBS MODERATE 55: CPT | Performed by: CLINICAL NURSE SPECIALIST

## 2022-01-01 PROCEDURE — 86901 BLOOD TYPING SEROLOGIC RH(D): CPT | Performed by: ORTHOPAEDIC SURGERY

## 2022-01-01 PROCEDURE — 96367 TX/PROPH/DG ADDL SEQ IV INF: CPT

## 2022-01-01 PROCEDURE — 93325 DOPPLER ECHO COLOR FLOW MAPG: CPT | Mod: 26 | Performed by: INTERNAL MEDICINE

## 2022-01-01 PROCEDURE — 84100 ASSAY OF PHOSPHORUS: CPT | Performed by: ORTHOPAEDIC SURGERY

## 2022-01-01 PROCEDURE — G0463 HOSPITAL OUTPT CLINIC VISIT: HCPCS | Mod: PN,RTG | Performed by: INTERNAL MEDICINE

## 2022-01-01 PROCEDURE — 71260 CT THORAX DX C+: CPT | Mod: 26

## 2022-01-01 PROCEDURE — 85027 COMPLETE CBC AUTOMATED: CPT

## 2022-01-01 PROCEDURE — 84100 ASSAY OF PHOSPHORUS: CPT

## 2022-01-01 PROCEDURE — 85384 FIBRINOGEN ACTIVITY: CPT | Performed by: STUDENT IN AN ORGANIZED HEALTH CARE EDUCATION/TRAINING PROGRAM

## 2022-01-01 PROCEDURE — 85652 RBC SED RATE AUTOMATED: CPT | Performed by: EMERGENCY MEDICINE

## 2022-01-01 PROCEDURE — 86970 RBC PRETX INCUBATJ W/CHEMICL: CPT | Performed by: STUDENT IN AN ORGANIZED HEALTH CARE EDUCATION/TRAINING PROGRAM

## 2022-01-01 PROCEDURE — 999N000007 HC SITE CHECK

## 2022-01-01 PROCEDURE — 83605 ASSAY OF LACTIC ACID: CPT | Performed by: ORTHOPAEDIC SURGERY

## 2022-01-01 PROCEDURE — 999N001193 HC VIDEO/TELEPHONE VISIT; NO CHARGE

## 2022-01-01 PROCEDURE — 85014 HEMATOCRIT: CPT

## 2022-01-01 PROCEDURE — 999N000127 HC STATISTIC PERIPHERAL IV START W US GUIDANCE

## 2022-01-01 PROCEDURE — 96376 TX/PRO/DX INJ SAME DRUG ADON: CPT

## 2022-01-01 PROCEDURE — 99231 SBSQ HOSP IP/OBS SF/LOW 25: CPT | Mod: GC | Performed by: STUDENT IN AN ORGANIZED HEALTH CARE EDUCATION/TRAINING PROGRAM

## 2022-01-01 PROCEDURE — 71275 CT ANGIOGRAPHY CHEST: CPT

## 2022-01-01 PROCEDURE — 85018 HEMOGLOBIN: CPT | Performed by: INTERNAL MEDICINE

## 2022-01-01 PROCEDURE — 255N000002 HC RX 255 OP 636: Performed by: STUDENT IN AN ORGANIZED HEALTH CARE EDUCATION/TRAINING PROGRAM

## 2022-01-01 PROCEDURE — 250N000009 HC RX 250: Performed by: PHYSICIAN ASSISTANT

## 2022-01-01 PROCEDURE — 01996 DLY HOSP MGMT EDRL RX ADMIN: CPT | Performed by: NURSE PRACTITIONER

## 2022-01-01 PROCEDURE — 250N000025 HC SEVOFLURANE, PER MIN: Performed by: ORTHOPAEDIC SURGERY

## 2022-01-01 PROCEDURE — 85025 COMPLETE CBC W/AUTO DIFF WBC: CPT | Performed by: INTERNAL MEDICINE

## 2022-01-01 PROCEDURE — 85730 THROMBOPLASTIN TIME PARTIAL: CPT | Performed by: INTERNAL MEDICINE

## 2022-01-01 PROCEDURE — 89051 BODY FLUID CELL COUNT: CPT

## 2022-01-01 PROCEDURE — 99207 PR NO CHARGE LOS: CPT | Performed by: CLINICAL NURSE SPECIALIST

## 2022-01-01 PROCEDURE — 86140 C-REACTIVE PROTEIN: CPT

## 2022-01-01 PROCEDURE — 99285 EMERGENCY DEPT VISIT HI MDM: CPT | Mod: 25

## 2022-01-01 PROCEDURE — 87086 URINE CULTURE/COLONY COUNT: CPT | Performed by: FAMILY MEDICINE

## 2022-01-01 PROCEDURE — 82728 ASSAY OF FERRITIN: CPT | Performed by: INTERNAL MEDICINE

## 2022-01-01 PROCEDURE — 83550 IRON BINDING TEST: CPT | Performed by: PATHOLOGY

## 2022-01-01 PROCEDURE — 99223 1ST HOSP IP/OBS HIGH 75: CPT | Mod: GC | Performed by: INTERNAL MEDICINE

## 2022-01-01 PROCEDURE — 88112 CYTOPATH CELL ENHANCE TECH: CPT | Mod: 26 | Performed by: PATHOLOGY

## 2022-01-01 PROCEDURE — 82040 ASSAY OF SERUM ALBUMIN: CPT

## 2022-01-01 PROCEDURE — 999N000157 HC STATISTIC RCP TIME EA 10 MIN

## 2022-01-01 PROCEDURE — 36415 COLL VENOUS BLD VENIPUNCTURE: CPT | Performed by: STUDENT IN AN ORGANIZED HEALTH CARE EDUCATION/TRAINING PROGRAM

## 2022-01-01 PROCEDURE — 84100 ASSAY OF PHOSPHORUS: CPT | Performed by: INTERNAL MEDICINE

## 2022-01-01 PROCEDURE — 83735 ASSAY OF MAGNESIUM: CPT | Performed by: STUDENT IN AN ORGANIZED HEALTH CARE EDUCATION/TRAINING PROGRAM

## 2022-01-01 PROCEDURE — 99214 OFFICE O/P EST MOD 30 MIN: CPT | Performed by: REGISTERED NURSE

## 2022-01-01 PROCEDURE — 86850 RBC ANTIBODY SCREEN: CPT | Performed by: CLINICAL NURSE SPECIALIST

## 2022-01-01 PROCEDURE — 84999 UNLISTED CHEMISTRY PROCEDURE: CPT | Performed by: STUDENT IN AN ORGANIZED HEALTH CARE EDUCATION/TRAINING PROGRAM

## 2022-01-01 PROCEDURE — 99233 SBSQ HOSP IP/OBS HIGH 50: CPT | Performed by: INTERNAL MEDICINE

## 2022-01-01 PROCEDURE — 84100 ASSAY OF PHOSPHORUS: CPT | Performed by: STUDENT IN AN ORGANIZED HEALTH CARE EDUCATION/TRAINING PROGRAM

## 2022-01-01 PROCEDURE — 82374 ASSAY BLOOD CARBON DIOXIDE: CPT | Performed by: STUDENT IN AN ORGANIZED HEALTH CARE EDUCATION/TRAINING PROGRAM

## 2022-01-01 PROCEDURE — 80048 BASIC METABOLIC PNL TOTAL CA: CPT | Performed by: PHYSICIAN ASSISTANT

## 2022-01-01 PROCEDURE — 82310 ASSAY OF CALCIUM: CPT | Performed by: STUDENT IN AN ORGANIZED HEALTH CARE EDUCATION/TRAINING PROGRAM

## 2022-01-01 PROCEDURE — 70450 CT HEAD/BRAIN W/O DYE: CPT

## 2022-01-01 PROCEDURE — 85014 HEMATOCRIT: CPT | Performed by: STUDENT IN AN ORGANIZED HEALTH CARE EDUCATION/TRAINING PROGRAM

## 2022-01-01 PROCEDURE — 0KXN0Z2 TRANSFER RIGHT HIP MUSCLE WITH SKIN AND SUBCUTANEOUS TISSUE, OPEN APPROACH: ICD-10-PCS | Performed by: PLASTIC SURGERY

## 2022-01-01 PROCEDURE — P9059 PLASMA, FRZ BETWEEN 8-24HOUR: HCPCS | Performed by: ORTHOPAEDIC SURGERY

## 2022-01-01 PROCEDURE — 85610 PROTHROMBIN TIME: CPT | Performed by: INTERNAL MEDICINE

## 2022-01-01 PROCEDURE — 99233 SBSQ HOSP IP/OBS HIGH 50: CPT | Performed by: CLINICAL NURSE SPECIALIST

## 2022-01-01 PROCEDURE — 85730 THROMBOPLASTIN TIME PARTIAL: CPT | Performed by: FAMILY MEDICINE

## 2022-01-01 PROCEDURE — 83605 ASSAY OF LACTIC ACID: CPT | Performed by: PHYSICIAN ASSISTANT

## 2022-01-01 PROCEDURE — 74220 X-RAY XM ESOPHAGUS 1CNTRST: CPT

## 2022-01-01 PROCEDURE — 36415 COLL VENOUS BLD VENIPUNCTURE: CPT | Performed by: PHYSICAL MEDICINE & REHABILITATION

## 2022-01-01 PROCEDURE — 36591 DRAW BLOOD OFF VENOUS DEVICE: CPT

## 2022-01-01 PROCEDURE — G0463 HOSPITAL OUTPT CLINIC VISIT: HCPCS | Mod: PN,RTG | Performed by: STUDENT IN AN ORGANIZED HEALTH CARE EDUCATION/TRAINING PROGRAM

## 2022-01-01 PROCEDURE — 86880 COOMBS TEST DIRECT: CPT | Performed by: STUDENT IN AN ORGANIZED HEALTH CARE EDUCATION/TRAINING PROGRAM

## 2022-01-01 PROCEDURE — 70498 CT ANGIOGRAPHY NECK: CPT | Mod: 26 | Performed by: RADIOLOGY

## 2022-01-01 PROCEDURE — 97124 MASSAGE THERAPY: CPT | Performed by: PHYSICAL THERAPIST

## 2022-01-01 PROCEDURE — 72195 MRI PELVIS W/O DYE: CPT

## 2022-01-01 PROCEDURE — 83735 ASSAY OF MAGNESIUM: CPT | Performed by: FAMILY MEDICINE

## 2022-01-01 PROCEDURE — 87205 SMEAR GRAM STAIN: CPT

## 2022-01-01 PROCEDURE — 99233 SBSQ HOSP IP/OBS HIGH 50: CPT | Mod: 25 | Performed by: INTERNAL MEDICINE

## 2022-01-01 PROCEDURE — 83010 ASSAY OF HAPTOGLOBIN QUANT: CPT | Performed by: INTERNAL MEDICINE

## 2022-01-01 PROCEDURE — 99253 IP/OBS CNSLTJ NEW/EST LOW 45: CPT | Mod: 24 | Performed by: PHYSICAL MEDICINE & REHABILITATION

## 2022-01-01 PROCEDURE — 36592 COLLECT BLOOD FROM PICC: CPT | Performed by: PEDIATRICS

## 2022-01-01 PROCEDURE — 99222 1ST HOSP IP/OBS MODERATE 55: CPT | Performed by: PSYCHIATRY & NEUROLOGY

## 2022-01-01 PROCEDURE — 71046 X-RAY EXAM CHEST 2 VIEWS: CPT

## 2022-01-01 PROCEDURE — 87102 FUNGUS ISOLATION CULTURE: CPT | Performed by: INTERNAL MEDICINE

## 2022-01-01 PROCEDURE — 250N000011 HC RX IP 250 OP 636: Performed by: FAMILY MEDICINE

## 2022-01-01 PROCEDURE — 83036 HEMOGLOBIN GLYCOSYLATED A1C: CPT

## 2022-01-01 PROCEDURE — 999N000015 HC STATISTIC ARTERIAL MONITORING DAILY

## 2022-01-01 PROCEDURE — 85027 COMPLETE CBC AUTOMATED: CPT | Performed by: STUDENT IN AN ORGANIZED HEALTH CARE EDUCATION/TRAINING PROGRAM

## 2022-01-01 PROCEDURE — 85025 COMPLETE CBC W/AUTO DIFF WBC: CPT | Performed by: REGISTERED NURSE

## 2022-01-01 PROCEDURE — 86922 COMPATIBILITY TEST ANTIGLOB: CPT | Performed by: EMERGENCY MEDICINE

## 2022-01-01 PROCEDURE — 99357 PR PROLONGED SERV,INPATIENT,EA ADDL 30 MIN: CPT | Performed by: CLINICAL NURSE SPECIALIST

## 2022-01-01 PROCEDURE — 99221 1ST HOSP IP/OBS SF/LOW 40: CPT | Performed by: INTERNAL MEDICINE

## 2022-01-01 PROCEDURE — 99232 SBSQ HOSP IP/OBS MODERATE 35: CPT | Performed by: INTERNAL MEDICINE

## 2022-01-01 PROCEDURE — 82374 ASSAY BLOOD CARBON DIOXIDE: CPT | Performed by: INTERNAL MEDICINE

## 2022-01-01 PROCEDURE — 36592 COLLECT BLOOD FROM PICC: CPT | Performed by: INTERNAL MEDICINE

## 2022-01-01 PROCEDURE — 85027 COMPLETE CBC AUTOMATED: CPT | Performed by: PHYSICIAN ASSISTANT

## 2022-01-01 PROCEDURE — 97112 NEUROMUSCULAR REEDUCATION: CPT | Mod: GO

## 2022-01-01 PROCEDURE — 97530 THERAPEUTIC ACTIVITIES: CPT | Mod: GO

## 2022-01-01 PROCEDURE — 258N000003 HC RX IP 258 OP 636: Performed by: PEDIATRICS

## 2022-01-01 PROCEDURE — 84484 ASSAY OF TROPONIN QUANT: CPT | Performed by: INTERNAL MEDICINE

## 2022-01-01 PROCEDURE — 272N000451 HC KIT SHRLOCK 5FR POWER PICC DOUBLE LUMEN

## 2022-01-01 PROCEDURE — 99221 1ST HOSP IP/OBS SF/LOW 40: CPT | Mod: GC | Performed by: PHYSICAL MEDICINE & REHABILITATION

## 2022-01-01 PROCEDURE — 97542 WHEELCHAIR MNGMENT TRAINING: CPT | Mod: GP | Performed by: PHYSICAL THERAPIST

## 2022-01-01 PROCEDURE — 70553 MRI BRAIN STEM W/O & W/DYE: CPT | Mod: 26 | Performed by: RADIOLOGY

## 2022-01-01 PROCEDURE — 86901 BLOOD TYPING SEROLOGIC RH(D): CPT | Performed by: EMERGENCY MEDICINE

## 2022-01-01 PROCEDURE — 87077 CULTURE AEROBIC IDENTIFY: CPT

## 2022-01-01 PROCEDURE — 99215 OFFICE O/P EST HI 40 MIN: CPT | Mod: GT | Performed by: INTERNAL MEDICINE

## 2022-01-01 PROCEDURE — 32555 ASPIRATE PLEURA W/ IMAGING: CPT | Performed by: STUDENT IN AN ORGANIZED HEALTH CARE EDUCATION/TRAINING PROGRAM

## 2022-01-01 PROCEDURE — G0463 HOSPITAL OUTPT CLINIC VISIT: HCPCS

## 2022-01-01 PROCEDURE — 80048 BASIC METABOLIC PNL TOTAL CA: CPT | Performed by: STUDENT IN AN ORGANIZED HEALTH CARE EDUCATION/TRAINING PROGRAM

## 2022-01-01 PROCEDURE — 85004 AUTOMATED DIFF WBC COUNT: CPT | Performed by: INTERNAL MEDICINE

## 2022-01-01 PROCEDURE — 74177 CT ABD & PELVIS W/CONTRAST: CPT

## 2022-01-01 PROCEDURE — G0463 HOSPITAL OUTPT CLINIC VISIT: HCPCS | Mod: PN,RTG | Performed by: REGISTERED NURSE

## 2022-01-01 PROCEDURE — 86920 COMPATIBILITY TEST SPIN: CPT | Performed by: STUDENT IN AN ORGANIZED HEALTH CARE EDUCATION/TRAINING PROGRAM

## 2022-01-01 PROCEDURE — 86922 COMPATIBILITY TEST ANTIGLOB: CPT | Performed by: ORTHOPAEDIC SURGERY

## 2022-01-01 PROCEDURE — 999N000125 HC STATISTIC PATIENT MED CONFERENCE < 30 MIN: Performed by: OCCUPATIONAL THERAPIST

## 2022-01-01 PROCEDURE — 36569 INSJ PICC 5 YR+ W/O IMAGING: CPT

## 2022-01-01 PROCEDURE — P9047 ALBUMIN (HUMAN), 25%, 50ML: HCPCS | Performed by: INTERNAL MEDICINE

## 2022-01-01 PROCEDURE — 99356 PR PROLONGED SERV,INPATIENT,1ST HR: CPT | Performed by: INTERNAL MEDICINE

## 2022-01-01 PROCEDURE — 82947 ASSAY GLUCOSE BLOOD QUANT: CPT | Performed by: EMERGENCY MEDICINE

## 2022-01-01 PROCEDURE — 83735 ASSAY OF MAGNESIUM: CPT | Performed by: EMERGENCY MEDICINE

## 2022-01-01 PROCEDURE — U0005 INFEC AGEN DETEC AMPLI PROBE: HCPCS | Performed by: INTERNAL MEDICINE

## 2022-01-01 PROCEDURE — 86921 COMPATIBILITY TEST INCUBATE: CPT | Performed by: STUDENT IN AN ORGANIZED HEALTH CARE EDUCATION/TRAINING PROGRAM

## 2022-01-01 PROCEDURE — 97140 MANUAL THERAPY 1/> REGIONS: CPT | Mod: GP | Performed by: PHYSICAL THERAPIST

## 2022-01-01 PROCEDURE — 87637 SARSCOV2&INF A&B&RSV AMP PRB: CPT | Performed by: EMERGENCY MEDICINE

## 2022-01-01 PROCEDURE — P9041 ALBUMIN (HUMAN),5%, 50ML: HCPCS | Performed by: ANESTHESIOLOGY

## 2022-01-01 PROCEDURE — 99232 SBSQ HOSP IP/OBS MODERATE 35: CPT | Performed by: STUDENT IN AN ORGANIZED HEALTH CARE EDUCATION/TRAINING PROGRAM

## 2022-01-01 PROCEDURE — 82565 ASSAY OF CREATININE: CPT

## 2022-01-01 PROCEDURE — 71250 CT THORAX DX C-: CPT

## 2022-01-01 PROCEDURE — 36591 DRAW BLOOD OFF VENOUS DEVICE: CPT | Performed by: PHYSICIAN ASSISTANT

## 2022-01-01 PROCEDURE — 87040 BLOOD CULTURE FOR BACTERIA: CPT | Performed by: FAMILY MEDICINE

## 2022-01-01 PROCEDURE — 83605 ASSAY OF LACTIC ACID: CPT | Performed by: FAMILY MEDICINE

## 2022-01-01 PROCEDURE — 87305 ASPERGILLUS AG IA: CPT | Performed by: INTERNAL MEDICINE

## 2022-01-01 PROCEDURE — 0W993ZZ DRAINAGE OF RIGHT PLEURAL CAVITY, PERCUTANEOUS APPROACH: ICD-10-PCS | Performed by: PHYSICIAN ASSISTANT

## 2022-01-01 PROCEDURE — 370N000017 HC ANESTHESIA TECHNICAL FEE, PER MIN: Performed by: ORTHOPAEDIC SURGERY

## 2022-01-01 PROCEDURE — 83550 IRON BINDING TEST: CPT | Performed by: INTERNAL MEDICINE

## 2022-01-01 PROCEDURE — 85018 HEMOGLOBIN: CPT | Performed by: PEDIATRICS

## 2022-01-01 PROCEDURE — 99232 SBSQ HOSP IP/OBS MODERATE 35: CPT | Performed by: CLINICAL NURSE SPECIALIST

## 2022-01-01 PROCEDURE — 84484 ASSAY OF TROPONIN QUANT: CPT | Performed by: FAMILY MEDICINE

## 2022-01-01 PROCEDURE — 84550 ASSAY OF BLOOD/URIC ACID: CPT | Performed by: REGISTERED NURSE

## 2022-01-01 PROCEDURE — 74177 CT ABD & PELVIS W/CONTRAST: CPT | Mod: 59,PS

## 2022-01-01 PROCEDURE — 99214 OFFICE O/P EST MOD 30 MIN: CPT | Performed by: ORTHOPAEDIC SURGERY

## 2022-01-01 PROCEDURE — 250N000011 HC RX IP 250 OP 636: Performed by: CLINICAL NURSE SPECIALIST

## 2022-01-01 PROCEDURE — 85025 COMPLETE CBC W/AUTO DIFF WBC: CPT

## 2022-01-01 PROCEDURE — 99207 PR APP CREDIT; MD BILLING SHARED VISIT: CPT | Performed by: PHYSICIAN ASSISTANT

## 2022-01-01 PROCEDURE — 82805 BLOOD GASES W/O2 SATURATION: CPT | Performed by: INTERNAL MEDICINE

## 2022-01-01 PROCEDURE — 97110 THERAPEUTIC EXERCISES: CPT | Mod: GP

## 2022-01-01 PROCEDURE — 99232 SBSQ HOSP IP/OBS MODERATE 35: CPT | Mod: GC | Performed by: STUDENT IN AN ORGANIZED HEALTH CARE EDUCATION/TRAINING PROGRAM

## 2022-01-01 PROCEDURE — 84145 PROCALCITONIN (PCT): CPT | Performed by: STUDENT IN AN ORGANIZED HEALTH CARE EDUCATION/TRAINING PROGRAM

## 2022-01-01 PROCEDURE — C9803 HOPD COVID-19 SPEC COLLECT: HCPCS | Performed by: EMERGENCY MEDICINE

## 2022-01-01 PROCEDURE — 84132 ASSAY OF SERUM POTASSIUM: CPT | Performed by: STUDENT IN AN ORGANIZED HEALTH CARE EDUCATION/TRAINING PROGRAM

## 2022-01-01 PROCEDURE — 97530 THERAPEUTIC ACTIVITIES: CPT | Mod: GP | Performed by: REHABILITATION PRACTITIONER

## 2022-01-01 PROCEDURE — 250N000012 HC RX MED GY IP 250 OP 636 PS 637: Performed by: STUDENT IN AN ORGANIZED HEALTH CARE EDUCATION/TRAINING PROGRAM

## 2022-01-01 PROCEDURE — 94002 VENT MGMT INPAT INIT DAY: CPT

## 2022-01-01 PROCEDURE — 97161 PT EVAL LOW COMPLEX 20 MIN: CPT | Mod: GP | Performed by: REHABILITATION PRACTITIONER

## 2022-01-01 PROCEDURE — 81003 URINALYSIS AUTO W/O SCOPE: CPT | Performed by: PEDIATRICS

## 2022-01-01 PROCEDURE — 999N000111 HC STATISTIC OT IP EVAL DEFER: Performed by: OCCUPATIONAL THERAPIST

## 2022-01-01 PROCEDURE — 93010 ELECTROCARDIOGRAM REPORT: CPT | Performed by: FAMILY MEDICINE

## 2022-01-01 PROCEDURE — 99215 OFFICE O/P EST HI 40 MIN: CPT | Mod: 95 | Performed by: REGISTERED NURSE

## 2022-01-01 PROCEDURE — 71045 X-RAY EXAM CHEST 1 VIEW: CPT | Mod: 26 | Performed by: STUDENT IN AN ORGANIZED HEALTH CARE EDUCATION/TRAINING PROGRAM

## 2022-01-01 PROCEDURE — 85004 AUTOMATED DIFF WBC COUNT: CPT | Performed by: STUDENT IN AN ORGANIZED HEALTH CARE EDUCATION/TRAINING PROGRAM

## 2022-01-01 PROCEDURE — 86870 RBC ANTIBODY IDENTIFICATION: CPT | Performed by: STUDENT IN AN ORGANIZED HEALTH CARE EDUCATION/TRAINING PROGRAM

## 2022-01-01 PROCEDURE — 85018 HEMOGLOBIN: CPT | Performed by: STUDENT IN AN ORGANIZED HEALTH CARE EDUCATION/TRAINING PROGRAM

## 2022-01-01 PROCEDURE — 85610 PROTHROMBIN TIME: CPT | Performed by: STUDENT IN AN ORGANIZED HEALTH CARE EDUCATION/TRAINING PROGRAM

## 2022-01-01 PROCEDURE — 85049 AUTOMATED PLATELET COUNT: CPT | Performed by: INTERNAL MEDICINE

## 2022-01-01 PROCEDURE — 80053 COMPREHEN METABOLIC PANEL: CPT | Performed by: STUDENT IN AN ORGANIZED HEALTH CARE EDUCATION/TRAINING PROGRAM

## 2022-01-01 PROCEDURE — 74177 CT ABD & PELVIS W/CONTRAST: CPT | Mod: 26

## 2022-01-01 PROCEDURE — 97530 THERAPEUTIC ACTIVITIES: CPT | Mod: GO | Performed by: OCCUPATIONAL THERAPIST

## 2022-01-01 PROCEDURE — 999N000208 ECHOCARDIOGRAM COMPLETE

## 2022-01-01 PROCEDURE — 93308 TTE F-UP OR LMTD: CPT | Mod: 26 | Performed by: INTERNAL MEDICINE

## 2022-01-01 PROCEDURE — 86922 COMPATIBILITY TEST ANTIGLOB: CPT | Performed by: REGISTERED NURSE

## 2022-01-01 PROCEDURE — 82947 ASSAY GLUCOSE BLOOD QUANT: CPT

## 2022-01-01 PROCEDURE — 88305 TISSUE EXAM BY PATHOLOGIST: CPT | Mod: TC

## 2022-01-01 PROCEDURE — 85730 THROMBOPLASTIN TIME PARTIAL: CPT | Performed by: STUDENT IN AN ORGANIZED HEALTH CARE EDUCATION/TRAINING PROGRAM

## 2022-01-01 PROCEDURE — 82803 BLOOD GASES ANY COMBINATION: CPT

## 2022-01-01 PROCEDURE — 99495 TRANSJ CARE MGMT MOD F2F 14D: CPT | Performed by: FAMILY MEDICINE

## 2022-01-01 PROCEDURE — 97542 WHEELCHAIR MNGMENT TRAINING: CPT | Mod: GP

## 2022-01-01 PROCEDURE — 99366 TEAM CONF W/PAT BY HC PROF: CPT | Performed by: INTERNAL MEDICINE

## 2022-01-01 PROCEDURE — 97535 SELF CARE MNGMENT TRAINING: CPT | Mod: GO

## 2022-01-01 PROCEDURE — 85014 HEMATOCRIT: CPT | Performed by: PHYSICIAN ASSISTANT

## 2022-01-01 PROCEDURE — 85049 AUTOMATED PLATELET COUNT: CPT | Performed by: PHYSICAL MEDICINE & REHABILITATION

## 2022-01-01 PROCEDURE — 93010 ELECTROCARDIOGRAM REPORT: CPT | Performed by: EMERGENCY MEDICINE

## 2022-01-01 PROCEDURE — 36415 COLL VENOUS BLD VENIPUNCTURE: CPT

## 2022-01-01 PROCEDURE — 88309 TISSUE EXAM BY PATHOLOGIST: CPT | Mod: 26 | Performed by: PATHOLOGY

## 2022-01-01 PROCEDURE — 99222 1ST HOSP IP/OBS MODERATE 55: CPT | Mod: 24 | Performed by: PHYSICAL MEDICINE & REHABILITATION

## 2022-01-01 PROCEDURE — 370N000017 HC ANESTHESIA TECHNICAL FEE, PER MIN

## 2022-01-01 PROCEDURE — 87637 SARSCOV2&INF A&B&RSV AMP PRB: CPT | Performed by: FAMILY MEDICINE

## 2022-01-01 PROCEDURE — 36415 COLL VENOUS BLD VENIPUNCTURE: CPT | Performed by: EMERGENCY MEDICINE

## 2022-01-01 PROCEDURE — 84134 ASSAY OF PREALBUMIN: CPT | Performed by: INTERNAL MEDICINE

## 2022-01-01 PROCEDURE — 97166 OT EVAL MOD COMPLEX 45 MIN: CPT | Mod: GO | Performed by: OCCUPATIONAL THERAPIST

## 2022-01-01 PROCEDURE — 85018 HEMOGLOBIN: CPT

## 2022-01-01 PROCEDURE — 76705 ECHO EXAM OF ABDOMEN: CPT

## 2022-01-01 PROCEDURE — 999N000141 HC STATISTIC PRE-PROCEDURE NURSING ASSESSMENT: Performed by: ORTHOPAEDIC SURGERY

## 2022-01-01 PROCEDURE — 86140 C-REACTIVE PROTEIN: CPT | Performed by: STUDENT IN AN ORGANIZED HEALTH CARE EDUCATION/TRAINING PROGRAM

## 2022-01-01 PROCEDURE — 80053 COMPREHEN METABOLIC PANEL: CPT | Performed by: EMERGENCY MEDICINE

## 2022-01-01 PROCEDURE — A9585 GADOBUTROL INJECTION: HCPCS | Performed by: RADIOLOGY

## 2022-01-01 PROCEDURE — 81001 URINALYSIS AUTO W/SCOPE: CPT | Performed by: FAMILY MEDICINE

## 2022-01-01 PROCEDURE — 84484 ASSAY OF TROPONIN QUANT: CPT | Performed by: STUDENT IN AN ORGANIZED HEALTH CARE EDUCATION/TRAINING PROGRAM

## 2022-01-01 PROCEDURE — 86901 BLOOD TYPING SEROLOGIC RH(D): CPT | Performed by: CLINICAL NURSE SPECIALIST

## 2022-01-01 PROCEDURE — 27290 AMPUTATION OF LEG AT HIP: CPT | Mod: 22 | Performed by: ORTHOPAEDIC SURGERY

## 2022-01-01 PROCEDURE — 99232 SBSQ HOSP IP/OBS MODERATE 35: CPT | Mod: GC | Performed by: PEDIATRICS

## 2022-01-01 PROCEDURE — 99233 SBSQ HOSP IP/OBS HIGH 50: CPT | Mod: 24 | Performed by: PHYSICIAN ASSISTANT

## 2022-01-01 PROCEDURE — 80202 ASSAY OF VANCOMYCIN: CPT | Performed by: STUDENT IN AN ORGANIZED HEALTH CARE EDUCATION/TRAINING PROGRAM

## 2022-01-01 PROCEDURE — 86850 RBC ANTIBODY SCREEN: CPT | Performed by: EMERGENCY MEDICINE

## 2022-01-01 PROCEDURE — 94003 VENT MGMT INPAT SUBQ DAY: CPT

## 2022-01-01 PROCEDURE — 97116 GAIT TRAINING THERAPY: CPT | Mod: GP

## 2022-01-01 PROCEDURE — 82962 GLUCOSE BLOOD TEST: CPT

## 2022-01-01 PROCEDURE — 99215 OFFICE O/P EST HI 40 MIN: CPT | Performed by: ORTHOPAEDIC SURGERY

## 2022-01-01 PROCEDURE — 93971 EXTREMITY STUDY: CPT | Mod: LT

## 2022-01-01 PROCEDURE — 90832 PSYTX W PT 30 MINUTES: CPT | Performed by: STUDENT IN AN ORGANIZED HEALTH CARE EDUCATION/TRAINING PROGRAM

## 2022-01-01 PROCEDURE — 97161 PT EVAL LOW COMPLEX 20 MIN: CPT | Mod: GP | Performed by: STUDENT IN AN ORGANIZED HEALTH CARE EDUCATION/TRAINING PROGRAM

## 2022-01-01 PROCEDURE — 78816 PET IMAGE W/CT FULL BODY: CPT | Mod: 26

## 2022-01-01 PROCEDURE — 82610 CYSTATIN C: CPT | Performed by: INTERNAL MEDICINE

## 2022-01-01 PROCEDURE — P9035 PLATELET PHERES LEUKOREDUCED: HCPCS | Performed by: ORTHOPAEDIC SURGERY

## 2022-01-01 PROCEDURE — 999N000150 HC STATISTIC PT MED CONFERENCE < 30 MIN: Performed by: PHYSICAL THERAPIST

## 2022-01-01 PROCEDURE — 99215 OFFICE O/P EST HI 40 MIN: CPT | Performed by: REGISTERED NURSE

## 2022-01-01 PROCEDURE — 4A1GXSH MONITORING OF SKIN AND BREAST VASCULAR PERFUSION USING INDOCYANINE GREEN DYE, EXTERNAL APPROACH: ICD-10-PCS | Performed by: ORTHOPAEDIC SURGERY

## 2022-01-01 PROCEDURE — 99231 SBSQ HOSP IP/OBS SF/LOW 25: CPT | Performed by: CLINICAL NURSE SPECIALIST

## 2022-01-01 PROCEDURE — 85025 COMPLETE CBC W/AUTO DIFF WBC: CPT | Mod: ORL | Performed by: INTERNAL MEDICINE

## 2022-01-01 PROCEDURE — 84155 ASSAY OF PROTEIN SERUM: CPT | Performed by: STUDENT IN AN ORGANIZED HEALTH CARE EDUCATION/TRAINING PROGRAM

## 2022-01-01 PROCEDURE — 96360 HYDRATION IV INFUSION INIT: CPT | Mod: 59 | Performed by: EMERGENCY MEDICINE

## 2022-01-01 PROCEDURE — 83605 ASSAY OF LACTIC ACID: CPT

## 2022-01-01 PROCEDURE — 49083 ABD PARACENTESIS W/IMAGING: CPT | Performed by: STUDENT IN AN ORGANIZED HEALTH CARE EDUCATION/TRAINING PROGRAM

## 2022-01-01 PROCEDURE — 88305 TISSUE EXAM BY PATHOLOGIST: CPT | Mod: TC | Performed by: INTERNAL MEDICINE

## 2022-01-01 PROCEDURE — 85610 PROTHROMBIN TIME: CPT | Performed by: EMERGENCY MEDICINE

## 2022-01-01 PROCEDURE — 84484 ASSAY OF TROPONIN QUANT: CPT | Performed by: PHYSICIAN ASSISTANT

## 2022-01-01 PROCEDURE — 3E04305 INTRODUCTION OF OTHER ANTINEOPLASTIC INTO CENTRAL VEIN, PERCUTANEOUS APPROACH: ICD-10-PCS | Performed by: STUDENT IN AN ORGANIZED HEALTH CARE EDUCATION/TRAINING PROGRAM

## 2022-01-01 PROCEDURE — 83615 LACTATE (LD) (LDH) ENZYME: CPT | Performed by: INTERNAL MEDICINE

## 2022-01-01 PROCEDURE — U0003 INFECTIOUS AGENT DETECTION BY NUCLEIC ACID (DNA OR RNA); SEVERE ACUTE RESPIRATORY SYNDROME CORONAVIRUS 2 (SARS-COV-2) (CORONAVIRUS DISEASE [COVID-19]), AMPLIFIED PROBE TECHNIQUE, MAKING USE OF HIGH THROUGHPUT TECHNOLOGIES AS DESCRIBED BY CMS-2020-01-R: HCPCS | Performed by: EMERGENCY MEDICINE

## 2022-01-01 PROCEDURE — 85025 COMPLETE CBC W/AUTO DIFF WBC: CPT | Performed by: STUDENT IN AN ORGANIZED HEALTH CARE EDUCATION/TRAINING PROGRAM

## 2022-01-01 PROCEDURE — 71250 CT THORAX DX C-: CPT | Mod: 26 | Performed by: RADIOLOGY

## 2022-01-01 PROCEDURE — 36591 DRAW BLOOD OFF VENOUS DEVICE: CPT | Performed by: EMERGENCY MEDICINE

## 2022-01-01 PROCEDURE — 93005 ELECTROCARDIOGRAM TRACING: CPT | Performed by: EMERGENCY MEDICINE

## 2022-01-01 PROCEDURE — A9552 F18 FDG: HCPCS | Performed by: PHYSICIAN ASSISTANT

## 2022-01-01 PROCEDURE — 97161 PT EVAL LOW COMPLEX 20 MIN: CPT | Mod: GP

## 2022-01-01 PROCEDURE — 97112 NEUROMUSCULAR REEDUCATION: CPT | Mod: GP

## 2022-01-01 PROCEDURE — 99239 HOSP IP/OBS DSCHRG MGMT >30: CPT | Performed by: STUDENT IN AN ORGANIZED HEALTH CARE EDUCATION/TRAINING PROGRAM

## 2022-01-01 PROCEDURE — 81001 URINALYSIS AUTO W/SCOPE: CPT | Performed by: EMERGENCY MEDICINE

## 2022-01-01 PROCEDURE — 84484 ASSAY OF TROPONIN QUANT: CPT

## 2022-01-01 PROCEDURE — 82040 ASSAY OF SERUM ALBUMIN: CPT | Performed by: PATHOLOGY

## 2022-01-01 PROCEDURE — 250N000009 HC RX 250: Performed by: FAMILY MEDICINE

## 2022-01-01 PROCEDURE — 93971 EXTREMITY STUDY: CPT | Mod: 26 | Performed by: RADIOLOGY

## 2022-01-01 PROCEDURE — C1769 GUIDE WIRE: HCPCS

## 2022-01-01 PROCEDURE — 99232 SBSQ HOSP IP/OBS MODERATE 35: CPT | Mod: 57 | Performed by: CLINICAL NURSE SPECIALIST

## 2022-01-01 PROCEDURE — 99223 1ST HOSP IP/OBS HIGH 75: CPT | Mod: AI | Performed by: INTERNAL MEDICINE

## 2022-01-01 PROCEDURE — 97110 THERAPEUTIC EXERCISES: CPT | Mod: GO

## 2022-01-01 PROCEDURE — 272N000001 HC OR GENERAL SUPPLY STERILE: Performed by: ORTHOPAEDIC SURGERY

## 2022-01-01 PROCEDURE — 85610 PROTHROMBIN TIME: CPT | Performed by: FAMILY MEDICINE

## 2022-01-01 PROCEDURE — 74177 CT ABD & PELVIS W/CONTRAST: CPT | Mod: 26 | Performed by: RADIOLOGY

## 2022-01-01 PROCEDURE — 85045 AUTOMATED RETICULOCYTE COUNT: CPT | Performed by: INTERNAL MEDICINE

## 2022-01-01 PROCEDURE — P9016 RBC LEUKOCYTES REDUCED: HCPCS | Performed by: STUDENT IN AN ORGANIZED HEALTH CARE EDUCATION/TRAINING PROGRAM

## 2022-01-01 PROCEDURE — 99223 1ST HOSP IP/OBS HIGH 75: CPT | Performed by: PHYSICIAN ASSISTANT

## 2022-01-01 PROCEDURE — 97530 THERAPEUTIC ACTIVITIES: CPT | Mod: GP | Performed by: STUDENT IN AN ORGANIZED HEALTH CARE EDUCATION/TRAINING PROGRAM

## 2022-01-01 PROCEDURE — 272N000019 HC KIT OPEN ENDED DOUBLE LUMEN

## 2022-01-01 PROCEDURE — 99233 SBSQ HOSP IP/OBS HIGH 50: CPT | Mod: 24 | Performed by: INTERNAL MEDICINE

## 2022-01-01 PROCEDURE — 88305 TISSUE EXAM BY PATHOLOGIST: CPT | Mod: TC | Performed by: STUDENT IN AN ORGANIZED HEALTH CARE EDUCATION/TRAINING PROGRAM

## 2022-01-01 PROCEDURE — 86140 C-REACTIVE PROTEIN: CPT | Performed by: EMERGENCY MEDICINE

## 2022-01-01 PROCEDURE — 89051 BODY FLUID CELL COUNT: CPT | Performed by: STUDENT IN AN ORGANIZED HEALTH CARE EDUCATION/TRAINING PROGRAM

## 2022-01-01 PROCEDURE — 83880 ASSAY OF NATRIURETIC PEPTIDE: CPT | Performed by: FAMILY MEDICINE

## 2022-01-01 PROCEDURE — 81403 MOPATH PROCEDURE LEVEL 4: CPT | Performed by: STUDENT IN AN ORGANIZED HEALTH CARE EDUCATION/TRAINING PROGRAM

## 2022-01-01 PROCEDURE — 99222 1ST HOSP IP/OBS MODERATE 55: CPT | Performed by: FAMILY MEDICINE

## 2022-01-01 PROCEDURE — 87493 C DIFF AMPLIFIED PROBE: CPT

## 2022-01-01 PROCEDURE — 83605 ASSAY OF LACTIC ACID: CPT | Performed by: PHYSICAL MEDICINE & REHABILITATION

## 2022-01-01 PROCEDURE — 83605 ASSAY OF LACTIC ACID: CPT | Performed by: EMERGENCY MEDICINE

## 2022-01-01 PROCEDURE — 85007 BL SMEAR W/DIFF WBC COUNT: CPT | Performed by: EMERGENCY MEDICINE

## 2022-01-01 PROCEDURE — 84157 ASSAY OF PROTEIN OTHER: CPT

## 2022-01-01 PROCEDURE — 73552 X-RAY EXAM OF FEMUR 2/>: CPT | Mod: RT

## 2022-01-01 PROCEDURE — 71046 X-RAY EXAM CHEST 2 VIEWS: CPT | Mod: 26 | Performed by: RADIOLOGY

## 2022-01-01 PROCEDURE — 999N000226 HC STATISTIC SLP IP EVAL DEFER

## 2022-01-01 PROCEDURE — 272N000103 HC INTRODUCER MICRO SET

## 2022-01-01 PROCEDURE — 15738 MUSCLE-SKIN GRAFT LEG: CPT | Mod: GC | Performed by: PLASTIC SURGERY

## 2022-01-01 PROCEDURE — 99231 SBSQ HOSP IP/OBS SF/LOW 25: CPT | Mod: 24 | Performed by: NURSE PRACTITIONER

## 2022-01-01 PROCEDURE — 87102 FUNGUS ISOLATION CULTURE: CPT

## 2022-01-01 PROCEDURE — 99223 1ST HOSP IP/OBS HIGH 75: CPT | Mod: GC | Performed by: SURGERY

## 2022-01-01 PROCEDURE — 85379 FIBRIN DEGRADATION QUANT: CPT | Performed by: INTERNAL MEDICINE

## 2022-01-01 PROCEDURE — 99239 HOSP IP/OBS DSCHRG MGMT >30: CPT | Mod: 24 | Performed by: PHYSICAL MEDICINE & REHABILITATION

## 2022-01-01 PROCEDURE — P9016 RBC LEUKOCYTES REDUCED: HCPCS | Performed by: INTERNAL MEDICINE

## 2022-01-01 PROCEDURE — 87040 BLOOD CULTURE FOR BACTERIA: CPT | Performed by: EMERGENCY MEDICINE

## 2022-01-01 PROCEDURE — 82728 ASSAY OF FERRITIN: CPT | Performed by: PATHOLOGY

## 2022-01-01 PROCEDURE — 250N000013 HC RX MED GY IP 250 OP 250 PS 637: Performed by: EMERGENCY MEDICINE

## 2022-01-01 PROCEDURE — 99024 POSTOP FOLLOW-UP VISIT: CPT | Performed by: ORTHOPAEDIC SURGERY

## 2022-01-01 PROCEDURE — 99222 1ST HOSP IP/OBS MODERATE 55: CPT | Performed by: PHYSICIAN ASSISTANT

## 2022-01-01 PROCEDURE — 360N000084 HC SURGERY LEVEL 4 W/ FLUORO, PER MIN: Performed by: ORTHOPAEDIC SURGERY

## 2022-01-01 PROCEDURE — 97167 OT EVAL HIGH COMPLEX 60 MIN: CPT | Mod: GO | Performed by: OCCUPATIONAL THERAPIST

## 2022-01-01 PROCEDURE — 343N000001 HC RX 343: Performed by: PHYSICIAN ASSISTANT

## 2022-01-01 PROCEDURE — 87077 CULTURE AEROBIC IDENTIFY: CPT | Performed by: STUDENT IN AN ORGANIZED HEALTH CARE EDUCATION/TRAINING PROGRAM

## 2022-01-01 PROCEDURE — 99285 EMERGENCY DEPT VISIT HI MDM: CPT | Mod: CS | Performed by: FAMILY MEDICINE

## 2022-01-01 PROCEDURE — 99024 POSTOP FOLLOW-UP VISIT: CPT | Performed by: PLASTIC SURGERY

## 2022-01-01 PROCEDURE — U0005 INFEC AGEN DETEC AMPLI PROBE: HCPCS | Performed by: STUDENT IN AN ORGANIZED HEALTH CARE EDUCATION/TRAINING PROGRAM

## 2022-01-01 PROCEDURE — 84550 ASSAY OF BLOOD/URIC ACID: CPT | Performed by: INTERNAL MEDICINE

## 2022-01-01 PROCEDURE — 70553 MRI BRAIN STEM W/O & W/DYE: CPT

## 2022-01-01 PROCEDURE — 96374 THER/PROPH/DIAG INJ IV PUSH: CPT

## 2022-01-01 PROCEDURE — 82310 ASSAY OF CALCIUM: CPT | Performed by: PHYSICIAN ASSISTANT

## 2022-01-01 PROCEDURE — 70496 CT ANGIOGRAPHY HEAD: CPT

## 2022-01-01 PROCEDURE — 86140 C-REACTIVE PROTEIN: CPT | Performed by: FAMILY MEDICINE

## 2022-01-01 PROCEDURE — 250N000011 HC RX IP 250 OP 636: Performed by: ANESTHESIOLOGY

## 2022-01-01 PROCEDURE — 99214 OFFICE O/P EST MOD 30 MIN: CPT | Performed by: NURSE PRACTITIONER

## 2022-01-01 PROCEDURE — 86902 BLOOD TYPE ANTIGEN DONOR EA: CPT | Performed by: STUDENT IN AN ORGANIZED HEALTH CARE EDUCATION/TRAINING PROGRAM

## 2022-01-01 PROCEDURE — 85025 COMPLETE CBC W/AUTO DIFF WBC: CPT | Performed by: PATHOLOGY

## 2022-01-01 PROCEDURE — 36415 COLL VENOUS BLD VENIPUNCTURE: CPT | Performed by: FAMILY MEDICINE

## 2022-01-01 PROCEDURE — 99357 PR PROLONGED SERV,INPATIENT,EA ADDL 30 MIN: CPT | Performed by: INTERNAL MEDICINE

## 2022-01-01 PROCEDURE — 36415 COLL VENOUS BLD VENIPUNCTURE: CPT | Performed by: PEDIATRICS

## 2022-01-01 PROCEDURE — 99222 1ST HOSP IP/OBS MODERATE 55: CPT | Performed by: NURSE PRACTITIONER

## 2022-01-01 PROCEDURE — 72197 MRI PELVIS W/O & W/DYE: CPT | Performed by: RADIOLOGY

## 2022-01-01 PROCEDURE — 99222 1ST HOSP IP/OBS MODERATE 55: CPT | Mod: GC | Performed by: INTERNAL MEDICINE

## 2022-01-01 PROCEDURE — 360N000085 HC SURGERY LEVEL 5 W/ FLUORO, PER MIN: Performed by: ORTHOPAEDIC SURGERY

## 2022-01-01 PROCEDURE — 84145 PROCALCITONIN (PCT): CPT

## 2022-01-01 PROCEDURE — 85025 COMPLETE CBC W/AUTO DIFF WBC: CPT | Performed by: FAMILY MEDICINE

## 2022-01-01 PROCEDURE — 88311 DECALCIFY TISSUE: CPT | Mod: TC | Performed by: ORTHOPAEDIC SURGERY

## 2022-01-01 PROCEDURE — 83880 ASSAY OF NATRIURETIC PEPTIDE: CPT | Performed by: INTERNAL MEDICINE

## 2022-01-01 PROCEDURE — 32557 INSERT CATH PLEURA W/ IMAGE: CPT | Mod: LT | Performed by: RADIOLOGY

## 2022-01-01 PROCEDURE — 82550 ASSAY OF CK (CPK): CPT | Performed by: NURSE PRACTITIONER

## 2022-01-01 PROCEDURE — 74220 X-RAY XM ESOPHAGUS 1CNTRST: CPT | Mod: 26 | Performed by: RADIOLOGY

## 2022-01-01 PROCEDURE — 99232 SBSQ HOSP IP/OBS MODERATE 35: CPT | Mod: 24 | Performed by: SURGERY

## 2022-01-01 PROCEDURE — 82040 ASSAY OF SERUM ALBUMIN: CPT | Performed by: FAMILY MEDICINE

## 2022-01-01 PROCEDURE — 87070 CULTURE OTHR SPECIMN AEROBIC: CPT

## 2022-01-01 PROCEDURE — 0Y620ZZ DETACHMENT AT RIGHT HINDQUARTER, OPEN APPROACH: ICD-10-PCS | Performed by: ORTHOPAEDIC SURGERY

## 2022-01-01 PROCEDURE — 93306 TTE W/DOPPLER COMPLETE: CPT | Mod: 26 | Performed by: STUDENT IN AN ORGANIZED HEALTH CARE EDUCATION/TRAINING PROGRAM

## 2022-01-01 PROCEDURE — 99205 OFFICE O/P NEW HI 60 MIN: CPT | Mod: 24 | Performed by: STUDENT IN AN ORGANIZED HEALTH CARE EDUCATION/TRAINING PROGRAM

## 2022-01-01 PROCEDURE — 87493 C DIFF AMPLIFIED PROBE: CPT | Performed by: INTERNAL MEDICINE

## 2022-01-01 PROCEDURE — 999N000141 HC STATISTIC PRE-PROCEDURE NURSING ASSESSMENT

## 2022-01-01 PROCEDURE — 36415 COLL VENOUS BLD VENIPUNCTURE: CPT | Performed by: PATHOLOGY

## 2022-01-01 PROCEDURE — 32555 ASPIRATE PLEURA W/ IMAGING: CPT | Mod: RT | Performed by: PHYSICIAN ASSISTANT

## 2022-01-01 PROCEDURE — 71260 CT THORAX DX C+: CPT | Mod: 26 | Performed by: RADIOLOGY

## 2022-01-01 PROCEDURE — 84100 ASSAY OF PHOSPHORUS: CPT | Performed by: REGISTERED NURSE

## 2022-01-01 PROCEDURE — 97110 THERAPEUTIC EXERCISES: CPT | Mod: GP | Performed by: STUDENT IN AN ORGANIZED HEALTH CARE EDUCATION/TRAINING PROGRAM

## 2022-01-01 PROCEDURE — 87641 MR-STAPH DNA AMP PROBE: CPT

## 2022-01-01 PROCEDURE — 250N000009 HC RX 250: Performed by: HOSPITALIST

## 2022-01-01 PROCEDURE — A9585 GADOBUTROL INJECTION: HCPCS | Performed by: EMERGENCY MEDICINE

## 2022-01-01 PROCEDURE — 99222 1ST HOSP IP/OBS MODERATE 55: CPT | Performed by: STUDENT IN AN ORGANIZED HEALTH CARE EDUCATION/TRAINING PROGRAM

## 2022-01-01 PROCEDURE — 96361 HYDRATE IV INFUSION ADD-ON: CPT | Performed by: EMERGENCY MEDICINE

## 2022-01-01 PROCEDURE — 83605 ASSAY OF LACTIC ACID: CPT | Performed by: STUDENT IN AN ORGANIZED HEALTH CARE EDUCATION/TRAINING PROGRAM

## 2022-01-01 PROCEDURE — 82310 ASSAY OF CALCIUM: CPT

## 2022-01-01 PROCEDURE — 83735 ASSAY OF MAGNESIUM: CPT | Performed by: REGISTERED NURSE

## 2022-01-01 PROCEDURE — 82040 ASSAY OF SERUM ALBUMIN: CPT | Performed by: EMERGENCY MEDICINE

## 2022-01-01 PROCEDURE — 99214 OFFICE O/P EST MOD 30 MIN: CPT | Mod: 95 | Performed by: REGISTERED NURSE

## 2022-01-01 PROCEDURE — 99233 SBSQ HOSP IP/OBS HIGH 50: CPT | Mod: GC | Performed by: STUDENT IN AN ORGANIZED HEALTH CARE EDUCATION/TRAINING PROGRAM

## 2022-01-01 PROCEDURE — 87449 NOS EACH ORGANISM AG IA: CPT | Performed by: INTERNAL MEDICINE

## 2022-01-01 PROCEDURE — 86922 COMPATIBILITY TEST ANTIGLOB: CPT | Performed by: INTERNAL MEDICINE

## 2022-01-01 PROCEDURE — C9113 INJ PANTOPRAZOLE SODIUM, VIA: HCPCS | Performed by: STUDENT IN AN ORGANIZED HEALTH CARE EDUCATION/TRAINING PROGRAM

## 2022-01-01 PROCEDURE — 250N000009 HC RX 250: Performed by: RADIOLOGY

## 2022-01-01 PROCEDURE — 76705 ECHO EXAM OF ABDOMEN: CPT | Mod: 26 | Performed by: RADIOLOGY

## 2022-01-01 PROCEDURE — 86850 RBC ANTIBODY SCREEN: CPT | Performed by: FAMILY MEDICINE

## 2022-01-01 PROCEDURE — 83735 ASSAY OF MAGNESIUM: CPT

## 2022-01-01 PROCEDURE — 0W9G3ZZ DRAINAGE OF PERITONEAL CAVITY, PERCUTANEOUS APPROACH: ICD-10-PCS | Performed by: STUDENT IN AN ORGANIZED HEALTH CARE EDUCATION/TRAINING PROGRAM

## 2022-01-01 PROCEDURE — 88311 DECALCIFY TISSUE: CPT | Mod: 26 | Performed by: PATHOLOGY

## 2022-01-01 PROCEDURE — 83735 ASSAY OF MAGNESIUM: CPT | Performed by: PHYSICIAN ASSISTANT

## 2022-01-01 PROCEDURE — 84443 ASSAY THYROID STIM HORMONE: CPT | Performed by: EMERGENCY MEDICINE

## 2022-01-01 PROCEDURE — 93978 VASCULAR STUDY: CPT

## 2022-01-01 PROCEDURE — 84145 PROCALCITONIN (PCT): CPT | Performed by: EMERGENCY MEDICINE

## 2022-01-01 PROCEDURE — 99285 EMERGENCY DEPT VISIT HI MDM: CPT | Mod: 25 | Performed by: EMERGENCY MEDICINE

## 2022-01-01 PROCEDURE — 85004 AUTOMATED DIFF WBC COUNT: CPT

## 2022-01-01 RX ORDER — NOREPINEPHRINE BITARTRATE 0.06 MG/ML
.01-.6 INJECTION, SOLUTION INTRAVENOUS CONTINUOUS
Status: DISCONTINUED | OUTPATIENT
Start: 2022-01-01 | End: 2022-01-01

## 2022-01-01 RX ORDER — DIPHENHYDRAMINE HYDROCHLORIDE 50 MG/ML
50 INJECTION INTRAMUSCULAR; INTRAVENOUS
Status: ACTIVE | OUTPATIENT
Start: 2022-01-01 | End: 2022-01-01

## 2022-01-01 RX ORDER — MULTIVITAMIN,THERAPEUTIC
1 TABLET ORAL DAILY
Status: DISCONTINUED | OUTPATIENT
Start: 2022-01-01 | End: 2022-01-01 | Stop reason: HOSPADM

## 2022-01-01 RX ORDER — IOPAMIDOL 755 MG/ML
63 INJECTION, SOLUTION INTRAVASCULAR ONCE
Status: COMPLETED | OUTPATIENT
Start: 2022-01-01 | End: 2022-01-01

## 2022-01-01 RX ORDER — BISACODYL 10 MG
10 SUPPOSITORY, RECTAL RECTAL
Status: DISCONTINUED | OUTPATIENT
Start: 2022-11-10 | End: 2022-01-01 | Stop reason: HOSPADM

## 2022-01-01 RX ORDER — ACETAMINOPHEN 500 MG
1000 TABLET ORAL 3 TIMES DAILY PRN
Status: DISCONTINUED | OUTPATIENT
Start: 2022-01-01 | End: 2022-01-01

## 2022-01-01 RX ORDER — OXYCODONE HYDROCHLORIDE 10 MG/1
10 TABLET ORAL EVERY 4 HOURS PRN
Qty: 30 TABLET | Refills: 0 | COMMUNITY
Start: 2022-01-01 | End: 2022-01-01

## 2022-01-01 RX ORDER — NALOXONE HYDROCHLORIDE 0.4 MG/ML
0.2 INJECTION, SOLUTION INTRAMUSCULAR; INTRAVENOUS; SUBCUTANEOUS
Status: DISCONTINUED | OUTPATIENT
Start: 2022-01-01 | End: 2022-01-01 | Stop reason: HOSPADM

## 2022-01-01 RX ORDER — HEPARIN SODIUM,PORCINE 10 UNIT/ML
5-10 VIAL (ML) INTRAVENOUS EVERY 24 HOURS
Status: DISCONTINUED | OUTPATIENT
Start: 2022-01-01 | End: 2022-01-01 | Stop reason: CLARIF

## 2022-01-01 RX ORDER — MORPHINE SULFATE 30 MG/1
30 TABLET, FILM COATED, EXTENDED RELEASE ORAL EVERY 12 HOURS SCHEDULED
Status: DISCONTINUED | OUTPATIENT
Start: 2022-01-01 | End: 2022-01-01 | Stop reason: HOSPADM

## 2022-01-01 RX ORDER — POLYETHYLENE GLYCOL 3350 17 G/17G
17 POWDER, FOR SOLUTION ORAL DAILY
Qty: 10 PACKET | Refills: 0 | Status: ON HOLD | OUTPATIENT
Start: 2022-01-01 | End: 2022-01-01

## 2022-01-01 RX ORDER — FUROSEMIDE 10 MG/ML
20 INJECTION INTRAMUSCULAR; INTRAVENOUS ONCE
Status: COMPLETED | OUTPATIENT
Start: 2022-01-01 | End: 2022-01-01

## 2022-01-01 RX ORDER — ENOXAPARIN SODIUM 100 MG/ML
40 INJECTION SUBCUTANEOUS EVERY 24 HOURS
Status: DISCONTINUED | OUTPATIENT
Start: 2022-01-01 | End: 2022-01-01

## 2022-01-01 RX ORDER — HEPARIN SODIUM (PORCINE) LOCK FLUSH IV SOLN 100 UNIT/ML 100 UNIT/ML
5-10 SOLUTION INTRAVENOUS
Status: DISCONTINUED | OUTPATIENT
Start: 2022-01-01 | End: 2022-01-01 | Stop reason: HOSPADM

## 2022-01-01 RX ORDER — ENOXAPARIN SODIUM 100 MG/ML
40 INJECTION SUBCUTANEOUS EVERY 24 HOURS
Status: DISCONTINUED | OUTPATIENT
Start: 2022-01-01 | End: 2022-01-01 | Stop reason: HOSPADM

## 2022-01-01 RX ORDER — ONDANSETRON 4 MG/1
4-8 TABLET, FILM COATED ORAL EVERY 8 HOURS PRN
Status: CANCELLED | OUTPATIENT
Start: 2022-01-01

## 2022-01-01 RX ORDER — OXYCODONE HYDROCHLORIDE 10 MG/1
10 TABLET ORAL EVERY 4 HOURS PRN
Status: DISCONTINUED | OUTPATIENT
Start: 2022-01-01 | End: 2022-01-01

## 2022-01-01 RX ORDER — DEXMEDETOMIDINE HYDROCHLORIDE 4 UG/ML
INJECTION, SOLUTION INTRAVENOUS PRN
Status: DISCONTINUED | OUTPATIENT
Start: 2022-01-01 | End: 2022-01-01

## 2022-01-01 RX ORDER — EPINEPHRINE 1 MG/ML
0.3 INJECTION, SOLUTION INTRAMUSCULAR; SUBCUTANEOUS EVERY 5 MIN PRN
Status: CANCELLED | OUTPATIENT
Start: 2022-01-01

## 2022-01-01 RX ORDER — FENTANYL CITRATE 50 UG/ML
INJECTION, SOLUTION INTRAMUSCULAR; INTRAVENOUS PRN
Status: DISCONTINUED | OUTPATIENT
Start: 2022-01-01 | End: 2022-01-01

## 2022-01-01 RX ORDER — HEPARIN SODIUM (PORCINE) LOCK FLUSH IV SOLN 100 UNIT/ML 100 UNIT/ML
5 SOLUTION INTRAVENOUS
Status: CANCELLED | OUTPATIENT
Start: 2022-01-01

## 2022-01-01 RX ORDER — PROCHLORPERAZINE MALEATE 5 MG
5 TABLET ORAL EVERY 6 HOURS PRN
Status: CANCELLED | OUTPATIENT
Start: 2022-01-01

## 2022-01-01 RX ORDER — HEPARIN SODIUM 1000 [USP'U]/ML
INJECTION, SOLUTION INTRAVENOUS; SUBCUTANEOUS PRN
Status: DISCONTINUED | OUTPATIENT
Start: 2022-01-01 | End: 2022-01-01 | Stop reason: HOSPADM

## 2022-01-01 RX ORDER — AMOXICILLIN 250 MG
2 CAPSULE ORAL 2 TIMES DAILY PRN
Status: DISCONTINUED | OUTPATIENT
Start: 2022-01-01 | End: 2022-01-01 | Stop reason: HOSPADM

## 2022-01-01 RX ORDER — ACETAMINOPHEN 325 MG/1
650 TABLET ORAL EVERY 4 HOURS PRN
Status: CANCELLED | OUTPATIENT
Start: 2022-01-01

## 2022-01-01 RX ORDER — CEFAZOLIN SODIUM 2 G/100ML
2 INJECTION, SOLUTION INTRAVENOUS
Status: DISCONTINUED | OUTPATIENT
Start: 2022-01-01 | End: 2022-01-01 | Stop reason: HOSPADM

## 2022-01-01 RX ORDER — ATORVASTATIN CALCIUM 80 MG/1
80 TABLET, FILM COATED ORAL EVERY EVENING
Status: DISCONTINUED | OUTPATIENT
Start: 2022-01-01 | End: 2022-01-01 | Stop reason: HOSPADM

## 2022-01-01 RX ORDER — ONDANSETRON 2 MG/ML
4 INJECTION INTRAMUSCULAR; INTRAVENOUS EVERY 30 MIN PRN
Status: CANCELLED | OUTPATIENT
Start: 2022-01-01

## 2022-01-01 RX ORDER — CARBOXYMETHYLCELLULOSE SODIUM 5 MG/ML
1 SOLUTION/ DROPS OPHTHALMIC
Status: DISCONTINUED | OUTPATIENT
Start: 2022-01-01 | End: 2022-01-01 | Stop reason: HOSPADM

## 2022-01-01 RX ORDER — ALBUMIN (HUMAN) 12.5 G/50ML
50 SOLUTION INTRAVENOUS ONCE
Status: COMPLETED | OUTPATIENT
Start: 2022-01-01 | End: 2022-01-01

## 2022-01-01 RX ORDER — OXYCODONE HYDROCHLORIDE 5 MG/1
10-15 TABLET ORAL EVERY 4 HOURS PRN
Qty: 100 TABLET | Refills: 0 | Status: SHIPPED | OUTPATIENT
Start: 2022-01-01 | End: 2022-01-01

## 2022-01-01 RX ORDER — AMOXICILLIN 250 MG
1 CAPSULE ORAL 2 TIMES DAILY PRN
Status: DISCONTINUED | OUTPATIENT
Start: 2022-01-01 | End: 2022-01-01 | Stop reason: HOSPADM

## 2022-01-01 RX ORDER — IOPAMIDOL 755 MG/ML
60 INJECTION, SOLUTION INTRAVASCULAR ONCE
Status: COMPLETED | OUTPATIENT
Start: 2022-01-01 | End: 2022-01-01

## 2022-01-01 RX ORDER — ONDANSETRON 4 MG/1
4-8 TABLET, ORALLY DISINTEGRATING ORAL EVERY 8 HOURS PRN
Status: DISCONTINUED | OUTPATIENT
Start: 2022-01-01 | End: 2022-01-01

## 2022-01-01 RX ORDER — AMOXICILLIN 250 MG
1 CAPSULE ORAL 2 TIMES DAILY PRN
Status: CANCELLED | OUTPATIENT
Start: 2022-01-01

## 2022-01-01 RX ORDER — OXYCODONE HYDROCHLORIDE 10 MG/1
10 TABLET ORAL EVERY 4 HOURS PRN
Qty: 100 TABLET | Refills: 0 | Status: CANCELLED | OUTPATIENT
Start: 2022-01-01 | End: 2022-01-01

## 2022-01-01 RX ORDER — POLYETHYLENE GLYCOL 3350 17 G/17G
17 POWDER, FOR SOLUTION ORAL DAILY PRN
Status: DISCONTINUED | OUTPATIENT
Start: 2022-01-01 | End: 2022-01-01

## 2022-01-01 RX ORDER — ALBUTEROL SULFATE 0.83 MG/ML
2.5 SOLUTION RESPIRATORY (INHALATION) ONCE
Status: COMPLETED | OUTPATIENT
Start: 2022-01-01 | End: 2022-01-01

## 2022-01-01 RX ORDER — ACETAMINOPHEN 325 MG/1
975 TABLET ORAL EVERY 8 HOURS
Status: DISCONTINUED | OUTPATIENT
Start: 2022-01-01 | End: 2022-01-01 | Stop reason: HOSPADM

## 2022-01-01 RX ORDER — NALOXONE HYDROCHLORIDE 0.4 MG/ML
0.4 INJECTION, SOLUTION INTRAMUSCULAR; INTRAVENOUS; SUBCUTANEOUS
Status: DISCONTINUED | OUTPATIENT
Start: 2022-01-01 | End: 2022-01-01

## 2022-01-01 RX ORDER — HYDROMORPHONE HYDROCHLORIDE 2 MG/1
2-4 TABLET ORAL EVERY 4 HOURS PRN
Status: DISCONTINUED | OUTPATIENT
Start: 2022-01-01 | End: 2022-01-01

## 2022-01-01 RX ORDER — MORPHINE SULFATE 15 MG/1
30 TABLET, FILM COATED, EXTENDED RELEASE ORAL EVERY 12 HOURS SCHEDULED
Status: DISCONTINUED | OUTPATIENT
Start: 2022-01-01 | End: 2022-01-01

## 2022-01-01 RX ORDER — LIDOCAINE 40 MG/G
CREAM TOPICAL
Status: DISCONTINUED | OUTPATIENT
Start: 2022-01-01 | End: 2022-01-01 | Stop reason: HOSPADM

## 2022-01-01 RX ORDER — DIAZEPAM 5 MG
5 TABLET ORAL EVERY 6 HOURS PRN
Status: DISCONTINUED | OUTPATIENT
Start: 2022-01-01 | End: 2022-01-01

## 2022-01-01 RX ORDER — CEFAZOLIN SODIUM/WATER 2 G/20 ML
2 SYRINGE (ML) INTRAVENOUS EVERY 8 HOURS
Status: DISCONTINUED | OUTPATIENT
Start: 2022-01-01 | End: 2022-01-01

## 2022-01-01 RX ORDER — MORPHINE SULFATE 30 MG/1
30 TABLET, FILM COATED, EXTENDED RELEASE ORAL EVERY 12 HOURS
Qty: 30 TABLET | Refills: 0 | Status: SHIPPED | OUTPATIENT
Start: 2022-01-01 | End: 2022-01-01

## 2022-01-01 RX ORDER — GABAPENTIN 300 MG/1
CAPSULE ORAL
Qty: 90 CAPSULE | Refills: 0 | Status: ON HOLD | OUTPATIENT
Start: 2022-01-01 | End: 2022-01-01

## 2022-01-01 RX ORDER — METHYLPREDNISOLONE SODIUM SUCCINATE 125 MG/2ML
125 INJECTION, POWDER, LYOPHILIZED, FOR SOLUTION INTRAMUSCULAR; INTRAVENOUS
Status: ACTIVE | OUTPATIENT
Start: 2022-01-01 | End: 2022-01-01

## 2022-01-01 RX ORDER — POTASSIUM CHLORIDE 1500 MG/1
20 TABLET, EXTENDED RELEASE ORAL DAILY
Qty: 30 TABLET | Refills: 0 | Status: ON HOLD | OUTPATIENT
Start: 2022-01-01 | End: 2022-01-01

## 2022-01-01 RX ORDER — KETAMINE HYDROCHLORIDE 10 MG/ML
INJECTION INTRAMUSCULAR; INTRAVENOUS PRN
Status: DISCONTINUED | OUTPATIENT
Start: 2022-01-01 | End: 2022-01-01

## 2022-01-01 RX ORDER — ACETAMINOPHEN 650 MG/1
650 SUPPOSITORY RECTAL EVERY 6 HOURS
Status: DISCONTINUED | OUTPATIENT
Start: 2022-01-01 | End: 2022-01-01 | Stop reason: HOSPADM

## 2022-01-01 RX ORDER — IPRATROPIUM BROMIDE AND ALBUTEROL SULFATE 2.5; .5 MG/3ML; MG/3ML
3 SOLUTION RESPIRATORY (INHALATION) ONCE
Status: COMPLETED | OUTPATIENT
Start: 2022-01-01 | End: 2022-01-01

## 2022-01-01 RX ORDER — ATROPINE SULFATE 10 MG/ML
2 SOLUTION/ DROPS OPHTHALMIC EVERY 4 HOURS PRN
Status: DISCONTINUED | OUTPATIENT
Start: 2022-01-01 | End: 2022-01-01 | Stop reason: HOSPADM

## 2022-01-01 RX ORDER — AMOXICILLIN 250 MG
2 CAPSULE ORAL 2 TIMES DAILY PRN
Status: CANCELLED | OUTPATIENT
Start: 2022-01-01

## 2022-01-01 RX ORDER — CEFAZOLIN SODIUM 2 G/100ML
2 INJECTION, SOLUTION INTRAVENOUS SEE ADMIN INSTRUCTIONS
Status: DISCONTINUED | OUTPATIENT
Start: 2022-01-01 | End: 2022-01-01 | Stop reason: HOSPADM

## 2022-01-01 RX ORDER — GADOBUTROL 604.72 MG/ML
0.1 INJECTION INTRAVENOUS ONCE
Status: COMPLETED | OUTPATIENT
Start: 2022-01-01 | End: 2022-01-01

## 2022-01-01 RX ORDER — LORAZEPAM 1 MG/1
1 TABLET ORAL
Status: DISCONTINUED | OUTPATIENT
Start: 2022-01-01 | End: 2022-01-01

## 2022-01-01 RX ORDER — PROCHLORPERAZINE 25 MG
25 SUPPOSITORY, RECTAL RECTAL EVERY 12 HOURS PRN
Status: DISCONTINUED | OUTPATIENT
Start: 2022-01-01 | End: 2022-01-01 | Stop reason: HOSPADM

## 2022-01-01 RX ORDER — OXYCODONE HYDROCHLORIDE 10 MG/1
10 TABLET ORAL ONCE
Status: COMPLETED | OUTPATIENT
Start: 2022-01-01 | End: 2022-01-01

## 2022-01-01 RX ORDER — MAGNESIUM OXIDE 400 MG/1
200 TABLET ORAL DAILY
Qty: 10 TABLET | Refills: 0 | Status: SHIPPED | OUTPATIENT
Start: 2022-01-01 | End: 2022-01-01

## 2022-01-01 RX ORDER — OLANZAPINE 2.5 MG/1
2.5 TABLET, FILM COATED ORAL AT BEDTIME
Status: DISCONTINUED | OUTPATIENT
Start: 2022-01-01 | End: 2022-01-01 | Stop reason: HOSPADM

## 2022-01-01 RX ORDER — ONDANSETRON 4 MG/1
4 TABLET, ORALLY DISINTEGRATING ORAL EVERY 6 HOURS PRN
Status: DISCONTINUED | OUTPATIENT
Start: 2022-01-01 | End: 2022-01-01 | Stop reason: HOSPADM

## 2022-01-01 RX ORDER — FUROSEMIDE 10 MG/ML
40 INJECTION INTRAMUSCULAR; INTRAVENOUS ONCE
Status: COMPLETED | OUTPATIENT
Start: 2022-01-01 | End: 2022-01-01

## 2022-01-01 RX ORDER — POTASSIUM CHLORIDE 7.45 MG/ML
10 INJECTION INTRAVENOUS
Status: COMPLETED | OUTPATIENT
Start: 2022-01-01 | End: 2022-01-01

## 2022-01-01 RX ORDER — ACETAMINOPHEN 325 MG/1
975 TABLET ORAL EVERY 8 HOURS
Status: DISCONTINUED | OUTPATIENT
Start: 2022-01-01 | End: 2022-01-01

## 2022-01-01 RX ORDER — SODIUM CHLORIDE, SODIUM LACTATE, POTASSIUM CHLORIDE, CALCIUM CHLORIDE 600; 310; 30; 20 MG/100ML; MG/100ML; MG/100ML; MG/100ML
INJECTION, SOLUTION INTRAVENOUS CONTINUOUS
Status: ACTIVE | OUTPATIENT
Start: 2022-01-01 | End: 2022-01-01

## 2022-01-01 RX ORDER — CEFDINIR 300 MG/1
300 CAPSULE ORAL EVERY 12 HOURS SCHEDULED
Status: DISCONTINUED | OUTPATIENT
Start: 2022-01-01 | End: 2022-01-01 | Stop reason: HOSPADM

## 2022-01-01 RX ORDER — HEPARIN SODIUM,PORCINE 10 UNIT/ML
5 VIAL (ML) INTRAVENOUS
Status: CANCELLED | OUTPATIENT
Start: 2022-01-01

## 2022-01-01 RX ORDER — TRANEXAMIC ACID 650 MG/1
1950 TABLET ORAL ONCE
Status: COMPLETED | OUTPATIENT
Start: 2022-01-01 | End: 2022-01-01

## 2022-01-01 RX ORDER — POLYETHYLENE GLYCOL 3350 17 G/17G
17 POWDER, FOR SOLUTION ORAL DAILY
Qty: 510 G | Refills: 0 | Status: SHIPPED | OUTPATIENT
Start: 2022-01-01

## 2022-01-01 RX ORDER — HYDROMORPHONE HYDROCHLORIDE 1 MG/ML
0.5 INJECTION, SOLUTION INTRAMUSCULAR; INTRAVENOUS; SUBCUTANEOUS ONCE
Status: DISCONTINUED | OUTPATIENT
Start: 2022-01-01 | End: 2022-01-01

## 2022-01-01 RX ORDER — ALBUTEROL SULFATE 0.83 MG/ML
2.5 SOLUTION RESPIRATORY (INHALATION)
Status: ACTIVE | OUTPATIENT
Start: 2022-01-01 | End: 2022-01-01

## 2022-01-01 RX ORDER — HYDROMORPHONE HYDROCHLORIDE 1 MG/ML
.5-1 INJECTION, SOLUTION INTRAMUSCULAR; INTRAVENOUS; SUBCUTANEOUS
Status: DISCONTINUED | OUTPATIENT
Start: 2022-01-01 | End: 2022-01-01

## 2022-01-01 RX ORDER — OXYCODONE HYDROCHLORIDE 10 MG/1
10 TABLET ORAL EVERY 4 HOURS PRN
Qty: 100 TABLET | Refills: 0 | Status: SHIPPED | OUTPATIENT
Start: 2022-01-01 | End: 2022-01-01

## 2022-01-01 RX ORDER — SODIUM CHLORIDE 9 MG/ML
INJECTION, SOLUTION INTRAVENOUS CONTINUOUS
Status: DISCONTINUED | OUTPATIENT
Start: 2022-01-01 | End: 2022-01-01

## 2022-01-01 RX ORDER — PHENYLEPHRINE HCL IN 0.9% NACL 50MG/250ML
.1-6 PLASTIC BAG, INJECTION (ML) INTRAVENOUS CONTINUOUS
Status: DISCONTINUED | OUTPATIENT
Start: 2022-01-01 | End: 2022-01-01

## 2022-01-01 RX ORDER — CEFAZOLIN SODIUM 2 G/50ML
2 SOLUTION INTRAVENOUS SEE ADMIN INSTRUCTIONS
Status: CANCELLED | OUTPATIENT
Start: 2022-01-01

## 2022-01-01 RX ORDER — NALOXONE HYDROCHLORIDE 0.4 MG/ML
0.1 INJECTION, SOLUTION INTRAMUSCULAR; INTRAVENOUS; SUBCUTANEOUS
Status: DISCONTINUED | OUTPATIENT
Start: 2022-01-01 | End: 2022-01-01 | Stop reason: HOSPADM

## 2022-01-01 RX ORDER — PROCHLORPERAZINE MALEATE 10 MG
10 TABLET ORAL EVERY 6 HOURS PRN
Status: DISCONTINUED | OUTPATIENT
Start: 2022-01-01 | End: 2022-01-01 | Stop reason: HOSPADM

## 2022-01-01 RX ORDER — NALOXONE HYDROCHLORIDE 0.4 MG/ML
0.2 INJECTION, SOLUTION INTRAMUSCULAR; INTRAVENOUS; SUBCUTANEOUS
Status: DISCONTINUED | OUTPATIENT
Start: 2022-01-01 | End: 2022-01-01

## 2022-01-01 RX ORDER — MULTIVITAMIN,THERAPEUTIC
1 TABLET ORAL DAILY
Qty: 30 TABLET | Refills: 1 | Status: SHIPPED | OUTPATIENT
Start: 2022-01-01 | End: 2022-01-01

## 2022-01-01 RX ORDER — HEPARIN SODIUM,PORCINE 10 UNIT/ML
5 VIAL (ML) INTRAVENOUS ONCE
Status: COMPLETED | OUTPATIENT
Start: 2022-01-01 | End: 2022-01-01

## 2022-01-01 RX ORDER — ENOXAPARIN SODIUM 100 MG/ML
40 INJECTION SUBCUTANEOUS
Status: CANCELLED | OUTPATIENT
Start: 2022-01-01

## 2022-01-01 RX ORDER — KETOROLAC TROMETHAMINE 15 MG/ML
15 INJECTION, SOLUTION INTRAMUSCULAR; INTRAVENOUS EVERY 6 HOURS PRN
Status: DISCONTINUED | OUTPATIENT
Start: 2022-01-01 | End: 2022-01-01

## 2022-01-01 RX ORDER — FENTANYL CITRATE 50 UG/ML
25-50 INJECTION, SOLUTION INTRAMUSCULAR; INTRAVENOUS
Status: DISCONTINUED | OUTPATIENT
Start: 2022-01-01 | End: 2022-01-01 | Stop reason: HOSPADM

## 2022-01-01 RX ORDER — IOPAMIDOL 755 MG/ML
80 INJECTION, SOLUTION INTRAVASCULAR ONCE
Status: COMPLETED | OUTPATIENT
Start: 2022-01-01 | End: 2022-01-01

## 2022-01-01 RX ORDER — ALBUMIN, HUMAN INJ 5% 5 %
SOLUTION INTRAVENOUS CONTINUOUS PRN
Status: DISCONTINUED | OUTPATIENT
Start: 2022-01-01 | End: 2022-01-01

## 2022-01-01 RX ORDER — OXYCODONE HYDROCHLORIDE 5 MG/1
5-10 TABLET ORAL
Status: DISCONTINUED | OUTPATIENT
Start: 2022-01-01 | End: 2022-01-01

## 2022-01-01 RX ORDER — ALBUTEROL SULFATE 90 UG/1
1-2 AEROSOL, METERED RESPIRATORY (INHALATION)
Status: CANCELLED
Start: 2022-01-01

## 2022-01-01 RX ORDER — NAPROXEN 500 MG/1
500 TABLET ORAL 2 TIMES DAILY WITH MEALS
COMMUNITY

## 2022-01-01 RX ORDER — IOPAMIDOL 755 MG/ML
105 INJECTION, SOLUTION INTRAVASCULAR ONCE
Status: COMPLETED | OUTPATIENT
Start: 2022-01-01 | End: 2022-01-01

## 2022-01-01 RX ORDER — ONDANSETRON 4 MG/1
4-8 TABLET, ORALLY DISINTEGRATING ORAL EVERY 8 HOURS PRN
Status: DISCONTINUED | OUTPATIENT
Start: 2022-01-01 | End: 2022-01-01 | Stop reason: HOSPADM

## 2022-01-01 RX ORDER — MULTIVITAMIN,THERAPEUTIC
1 TABLET ORAL DAILY
Status: ON HOLD | COMMUNITY
End: 2022-01-01

## 2022-01-01 RX ORDER — NALOXONE HYDROCHLORIDE 0.4 MG/ML
0.1 INJECTION, SOLUTION INTRAMUSCULAR; INTRAVENOUS; SUBCUTANEOUS
Status: DISCONTINUED | OUTPATIENT
Start: 2022-01-01 | End: 2022-01-01

## 2022-01-01 RX ORDER — LORAZEPAM 1 MG/1
1 TABLET ORAL
Status: DISCONTINUED | OUTPATIENT
Start: 2022-01-01 | End: 2022-01-01 | Stop reason: HOSPADM

## 2022-01-01 RX ORDER — HEPARIN SODIUM,PORCINE 10 UNIT/ML
5-20 VIAL (ML) INTRAVENOUS
Status: DISCONTINUED | OUTPATIENT
Start: 2022-01-01 | End: 2022-01-01 | Stop reason: HOSPADM

## 2022-01-01 RX ORDER — ACETAMINOPHEN 650 MG/1
650 SUPPOSITORY RECTAL EVERY 6 HOURS PRN
Status: DISCONTINUED | OUTPATIENT
Start: 2022-01-01 | End: 2022-01-01

## 2022-01-01 RX ORDER — HYDROMORPHONE HYDROCHLORIDE 2 MG/1
2 TABLET ORAL EVERY 6 HOURS
Status: DISCONTINUED | OUTPATIENT
Start: 2022-01-01 | End: 2022-01-01

## 2022-01-01 RX ORDER — MULTIVITAMIN,THERAPEUTIC
1 TABLET ORAL DAILY
Status: CANCELLED | OUTPATIENT
Start: 2022-01-01

## 2022-01-01 RX ORDER — ALBUTEROL SULFATE 0.83 MG/ML
2.5 SOLUTION RESPIRATORY (INHALATION)
Status: CANCELLED | OUTPATIENT
Start: 2022-01-01

## 2022-01-01 RX ORDER — MORPHINE SULFATE 30 MG/1
30 TABLET, FILM COATED, EXTENDED RELEASE ORAL EVERY 12 HOURS
Qty: 60 TABLET | Refills: 0 | Status: CANCELLED | OUTPATIENT
Start: 2022-01-01 | End: 2022-01-01

## 2022-01-01 RX ORDER — PROCHLORPERAZINE MALEATE 10 MG
10 TABLET ORAL EVERY 6 HOURS PRN
Status: DISCONTINUED | OUTPATIENT
Start: 2022-01-01 | End: 2022-01-01

## 2022-01-01 RX ORDER — OLANZAPINE 2.5 MG/1
2.5 TABLET, FILM COATED ORAL AT BEDTIME
Status: DISCONTINUED | OUTPATIENT
Start: 2022-01-01 | End: 2022-01-01

## 2022-01-01 RX ORDER — HYDROMORPHONE HYDROCHLORIDE 1 MG/ML
.5-1 INJECTION, SOLUTION INTRAMUSCULAR; INTRAVENOUS; SUBCUTANEOUS EVERY 4 HOURS PRN
Status: DISCONTINUED | OUTPATIENT
Start: 2022-01-01 | End: 2022-01-01

## 2022-01-01 RX ORDER — ALBUTEROL SULFATE 90 UG/1
6 AEROSOL, METERED RESPIRATORY (INHALATION) EVERY 4 HOURS PRN
Qty: 18 G | Refills: 0 | Status: SHIPPED | OUTPATIENT
Start: 2022-01-01 | End: 2022-01-01

## 2022-01-01 RX ORDER — DEXTROSE MONOHYDRATE 25 G/50ML
25-50 INJECTION, SOLUTION INTRAVENOUS
Status: DISCONTINUED | OUTPATIENT
Start: 2022-01-01 | End: 2022-01-01 | Stop reason: HOSPADM

## 2022-01-01 RX ORDER — MORPHINE SULFATE 30 MG/1
30 TABLET, FILM COATED, EXTENDED RELEASE ORAL EVERY 12 HOURS
Status: DISCONTINUED | OUTPATIENT
Start: 2022-01-01 | End: 2022-01-01 | Stop reason: HOSPADM

## 2022-01-01 RX ORDER — LOPERAMIDE HCL 1 MG/7.5ML
2 SUSPENSION ORAL 4 TIMES DAILY PRN
Status: DISCONTINUED | OUTPATIENT
Start: 2022-01-01 | End: 2022-01-01

## 2022-01-01 RX ORDER — POTASSIUM CHLORIDE 750 MG/1
40 TABLET, EXTENDED RELEASE ORAL ONCE
Status: DISCONTINUED | OUTPATIENT
Start: 2022-01-01 | End: 2022-01-01

## 2022-01-01 RX ORDER — LIDOCAINE 40 MG/G
CREAM TOPICAL
Status: DISCONTINUED | OUTPATIENT
Start: 2022-01-01 | End: 2022-01-01

## 2022-01-01 RX ORDER — NAPROXEN 500 MG/1
500 TABLET ORAL 2 TIMES DAILY WITH MEALS
Status: CANCELLED | OUTPATIENT
Start: 2022-01-01

## 2022-01-01 RX ORDER — DEXAMETHASONE SODIUM PHOSPHATE 4 MG/ML
INJECTION, SOLUTION INTRA-ARTICULAR; INTRALESIONAL; INTRAMUSCULAR; INTRAVENOUS; SOFT TISSUE PRN
Status: DISCONTINUED | OUTPATIENT
Start: 2022-01-01 | End: 2022-01-01

## 2022-01-01 RX ORDER — GABAPENTIN 300 MG/1
300 CAPSULE ORAL 3 TIMES DAILY
Status: CANCELLED | OUTPATIENT
Start: 2022-01-01

## 2022-01-01 RX ORDER — MORPHINE SULFATE 15 MG/1
30 TABLET, FILM COATED, EXTENDED RELEASE ORAL EVERY 12 HOURS SCHEDULED
Status: CANCELLED | OUTPATIENT
Start: 2022-01-01

## 2022-01-01 RX ORDER — ONDANSETRON 2 MG/ML
4 INJECTION INTRAMUSCULAR; INTRAVENOUS EVERY 6 HOURS PRN
Status: DISCONTINUED | OUTPATIENT
Start: 2022-01-01 | End: 2022-01-01 | Stop reason: HOSPADM

## 2022-01-01 RX ORDER — MORPHINE SULFATE 30 MG/1
30 TABLET, FILM COATED, EXTENDED RELEASE ORAL EVERY 12 HOURS
Qty: 60 TABLET | Refills: 0 | Status: SHIPPED | OUTPATIENT
Start: 2022-01-01 | End: 2022-01-01

## 2022-01-01 RX ORDER — POTASSIUM CHLORIDE 1.5 G/1.58G
40 POWDER, FOR SOLUTION ORAL ONCE
Status: DISCONTINUED | OUTPATIENT
Start: 2022-01-01 | End: 2022-01-01

## 2022-01-01 RX ORDER — POLYETHYLENE GLYCOL 3350 17 G/17G
17 POWDER, FOR SOLUTION ORAL DAILY PRN
Status: DISCONTINUED | OUTPATIENT
Start: 2022-01-01 | End: 2022-01-01 | Stop reason: HOSPADM

## 2022-01-01 RX ORDER — POTASSIUM CHLORIDE 750 MG/1
40 TABLET, EXTENDED RELEASE ORAL ONCE
Status: COMPLETED | OUTPATIENT
Start: 2022-01-01 | End: 2022-01-01

## 2022-01-01 RX ORDER — HYDRALAZINE HYDROCHLORIDE 20 MG/ML
10 INJECTION INTRAMUSCULAR; INTRAVENOUS EVERY 6 HOURS PRN
Status: DISCONTINUED | OUTPATIENT
Start: 2022-01-01 | End: 2022-01-01 | Stop reason: HOSPADM

## 2022-01-01 RX ORDER — ONDANSETRON 2 MG/ML
4-8 INJECTION INTRAMUSCULAR; INTRAVENOUS EVERY 8 HOURS PRN
Status: CANCELLED | OUTPATIENT
Start: 2022-01-01

## 2022-01-01 RX ORDER — ONDANSETRON 2 MG/ML
4-8 INJECTION INTRAMUSCULAR; INTRAVENOUS EVERY 8 HOURS PRN
Status: DISCONTINUED | OUTPATIENT
Start: 2022-01-01 | End: 2022-01-01 | Stop reason: HOSPADM

## 2022-01-01 RX ORDER — ACETAMINOPHEN 500 MG
500-1000 TABLET ORAL EVERY 6 HOURS PRN
Qty: 50 TABLET | Refills: 1 | Status: ON HOLD | OUTPATIENT
Start: 2022-01-01 | End: 2022-01-01

## 2022-01-01 RX ORDER — DIAZEPAM 10 MG/2ML
5 INJECTION, SOLUTION INTRAMUSCULAR; INTRAVENOUS EVERY 6 HOURS PRN
Status: DISCONTINUED | OUTPATIENT
Start: 2022-01-01 | End: 2022-01-01 | Stop reason: HOSPADM

## 2022-01-01 RX ORDER — LORAZEPAM 2 MG/ML
1 INJECTION INTRAMUSCULAR
Status: DISCONTINUED | OUTPATIENT
Start: 2022-01-01 | End: 2022-01-01

## 2022-01-01 RX ORDER — AMOXICILLIN 250 MG
1 CAPSULE ORAL 2 TIMES DAILY
Status: DISCONTINUED | OUTPATIENT
Start: 2022-01-01 | End: 2022-01-01 | Stop reason: HOSPADM

## 2022-01-01 RX ORDER — ACETAMINOPHEN 325 MG/1
650 TABLET ORAL EVERY 6 HOURS PRN
Status: DISCONTINUED | OUTPATIENT
Start: 2022-01-01 | End: 2022-01-01

## 2022-01-01 RX ORDER — POTASSIUM CHLORIDE 1500 MG/1
40 TABLET, EXTENDED RELEASE ORAL DAILY
Qty: 14 TABLET | Refills: 0 | Status: SHIPPED | OUTPATIENT
Start: 2022-01-01 | End: 2022-01-01

## 2022-01-01 RX ORDER — HEPARIN SODIUM,PORCINE 10 UNIT/ML
5-10 VIAL (ML) INTRAVENOUS
Status: DISCONTINUED | OUTPATIENT
Start: 2022-01-01 | End: 2022-01-01 | Stop reason: HOSPADM

## 2022-01-01 RX ORDER — NAPROXEN 500 MG/1
500 TABLET ORAL EVERY 12 HOURS PRN
Status: DISCONTINUED | OUTPATIENT
Start: 2022-01-01 | End: 2022-01-01 | Stop reason: HOSPADM

## 2022-01-01 RX ORDER — HYDROMORPHONE HYDROCHLORIDE 1 MG/ML
0.5 INJECTION, SOLUTION INTRAMUSCULAR; INTRAVENOUS; SUBCUTANEOUS ONCE
Status: COMPLETED | OUTPATIENT
Start: 2022-01-01 | End: 2022-01-01

## 2022-01-01 RX ORDER — EPINEPHRINE 1 MG/ML
0.3 INJECTION, SOLUTION, CONCENTRATE INTRAVENOUS EVERY 5 MIN PRN
Status: ACTIVE | OUTPATIENT
Start: 2022-01-01 | End: 2022-01-01

## 2022-01-01 RX ORDER — NALOXONE HYDROCHLORIDE 0.4 MG/ML
0.4 INJECTION, SOLUTION INTRAMUSCULAR; INTRAVENOUS; SUBCUTANEOUS
Status: DISCONTINUED | OUTPATIENT
Start: 2022-01-01 | End: 2022-01-01 | Stop reason: HOSPADM

## 2022-01-01 RX ORDER — POTASSIUM CHLORIDE 1500 MG/1
20 TABLET, EXTENDED RELEASE ORAL DAILY
Qty: 10 TABLET | Refills: 0 | Status: SHIPPED | OUTPATIENT
Start: 2022-01-01 | End: 2022-01-01

## 2022-01-01 RX ORDER — PALONOSETRON 0.05 MG/ML
0.25 INJECTION, SOLUTION INTRAVENOUS ONCE
Status: COMPLETED | OUTPATIENT
Start: 2022-01-01 | End: 2022-01-01

## 2022-01-01 RX ORDER — HYDROMORPHONE HYDROCHLORIDE 1 MG/ML
.3-.5 INJECTION, SOLUTION INTRAMUSCULAR; INTRAVENOUS; SUBCUTANEOUS EVERY 4 HOURS PRN
Status: DISCONTINUED | OUTPATIENT
Start: 2022-01-01 | End: 2022-01-01 | Stop reason: HOSPADM

## 2022-01-01 RX ORDER — ALBUTEROL SULFATE 90 UG/1
6 AEROSOL, METERED RESPIRATORY (INHALATION) EVERY 4 HOURS PRN
Status: DISCONTINUED | OUTPATIENT
Start: 2022-01-01 | End: 2022-01-01 | Stop reason: HOSPADM

## 2022-01-01 RX ORDER — GADOBUTROL 604.72 MG/ML
10 INJECTION INTRAVENOUS ONCE
Status: DISCONTINUED | OUTPATIENT
Start: 2022-01-01 | End: 2022-01-01

## 2022-01-01 RX ORDER — BISACODYL 5 MG
10 TABLET, DELAYED RELEASE (ENTERIC COATED) ORAL DAILY PRN
Status: DISCONTINUED | OUTPATIENT
Start: 2022-01-01 | End: 2022-01-01

## 2022-01-01 RX ORDER — POLYETHYLENE GLYCOL 3350 17 G/17G
17 POWDER, FOR SOLUTION ORAL DAILY
Status: DISCONTINUED | OUTPATIENT
Start: 2022-01-01 | End: 2022-01-01 | Stop reason: HOSPADM

## 2022-01-01 RX ORDER — PROCHLORPERAZINE MALEATE 5 MG
10 TABLET ORAL EVERY 6 HOURS PRN
Status: DISCONTINUED | OUTPATIENT
Start: 2022-01-01 | End: 2022-01-01 | Stop reason: HOSPADM

## 2022-01-01 RX ORDER — POTASSIUM CHLORIDE 1500 MG/1
60 TABLET, EXTENDED RELEASE ORAL ONCE
Status: DISCONTINUED | OUTPATIENT
Start: 2022-01-01 | End: 2022-01-01

## 2022-01-01 RX ORDER — OXYCODONE HYDROCHLORIDE 10 MG/1
10-20 TABLET ORAL EVERY 4 HOURS PRN
Qty: 30 TABLET | Refills: 0 | Status: ON HOLD | OUTPATIENT
Start: 2022-01-01 | End: 2022-01-01

## 2022-01-01 RX ORDER — MAGNESIUM OXIDE 400 MG/1
400 TABLET ORAL 2 TIMES DAILY
Qty: 30 TABLET | Refills: 1 | Status: SHIPPED | OUTPATIENT
Start: 2022-01-01 | End: 2022-01-01

## 2022-01-01 RX ORDER — IPRATROPIUM BROMIDE AND ALBUTEROL SULFATE 2.5; .5 MG/3ML; MG/3ML
3 SOLUTION RESPIRATORY (INHALATION) EVERY 4 HOURS PRN
Status: DISCONTINUED | OUTPATIENT
Start: 2022-01-01 | End: 2022-01-01 | Stop reason: HOSPADM

## 2022-01-01 RX ORDER — BISACODYL 5 MG
5 TABLET, DELAYED RELEASE (ENTERIC COATED) ORAL DAILY PRN
Status: DISCONTINUED | OUTPATIENT
Start: 2022-01-01 | End: 2022-01-01

## 2022-01-01 RX ORDER — OXYCODONE HYDROCHLORIDE 5 MG/1
5-10 TABLET ORAL EVERY 4 HOURS PRN
Status: DISCONTINUED | OUTPATIENT
Start: 2022-01-01 | End: 2022-01-01 | Stop reason: HOSPADM

## 2022-01-01 RX ORDER — IOPAMIDOL 755 MG/ML
75 INJECTION, SOLUTION INTRAVASCULAR ONCE
Status: COMPLETED | OUTPATIENT
Start: 2022-01-01 | End: 2022-01-01

## 2022-01-01 RX ORDER — ACETAMINOPHEN 500 MG
500-1000 TABLET ORAL EVERY 6 HOURS PRN
Qty: 50 TABLET | Refills: 1 | Status: SHIPPED | OUTPATIENT
Start: 2022-01-01 | End: 2022-01-01

## 2022-01-01 RX ORDER — ATORVASTATIN CALCIUM 80 MG/1
80 TABLET, FILM COATED ORAL EVERY EVENING
Qty: 90 TABLET | Refills: 3 | Status: SHIPPED | OUTPATIENT
Start: 2022-01-01

## 2022-01-01 RX ORDER — ASPIRIN 325 MG
325 TABLET ORAL DAILY
Status: DISCONTINUED | OUTPATIENT
Start: 2022-01-01 | End: 2022-01-01

## 2022-01-01 RX ORDER — FENTANYL CITRATE 50 UG/ML
25 INJECTION, SOLUTION INTRAMUSCULAR; INTRAVENOUS EVERY 5 MIN PRN
Status: CANCELLED | OUTPATIENT
Start: 2022-01-01

## 2022-01-01 RX ORDER — CEFTRIAXONE 2 G/1
2 INJECTION, POWDER, FOR SOLUTION INTRAMUSCULAR; INTRAVENOUS EVERY 24 HOURS
Status: COMPLETED | OUTPATIENT
Start: 2022-01-01 | End: 2022-01-01

## 2022-01-01 RX ORDER — ESCITALOPRAM OXALATE 10 MG/1
10 TABLET ORAL DAILY
Qty: 30 TABLET | Refills: 1 | Status: SHIPPED | OUTPATIENT
Start: 2022-01-01 | End: 2022-01-01

## 2022-01-01 RX ORDER — POTASSIUM CHLORIDE 29.8 MG/ML
20 INJECTION INTRAVENOUS ONCE
Status: COMPLETED | OUTPATIENT
Start: 2022-01-01 | End: 2022-01-01

## 2022-01-01 RX ORDER — MORPHINE SULFATE 30 MG/1
30 TABLET, FILM COATED, EXTENDED RELEASE ORAL EVERY 12 HOURS
Refills: 0 | COMMUNITY
Start: 2022-01-01 | End: 2022-01-01

## 2022-01-01 RX ORDER — MAGNESIUM OXIDE 400 MG/1
400 TABLET ORAL 2 TIMES DAILY
Status: CANCELLED | OUTPATIENT
Start: 2022-01-01

## 2022-01-01 RX ORDER — UREA 10 %
500 LOTION (ML) TOPICAL DAILY
Status: DISCONTINUED | OUTPATIENT
Start: 2022-01-01 | End: 2022-01-01 | Stop reason: HOSPADM

## 2022-01-01 RX ORDER — CEFAZOLIN SODIUM 1 G/3ML
INJECTION, POWDER, FOR SOLUTION INTRAMUSCULAR; INTRAVENOUS PRN
Status: DISCONTINUED | OUTPATIENT
Start: 2022-01-01 | End: 2022-01-01

## 2022-01-01 RX ORDER — DIPHENHYDRAMINE HYDROCHLORIDE 50 MG/ML
50 INJECTION INTRAMUSCULAR; INTRAVENOUS
Status: CANCELLED
Start: 2022-01-01

## 2022-01-01 RX ORDER — HEPARIN SODIUM,PORCINE 10 UNIT/ML
5 VIAL (ML) INTRAVENOUS
Status: DISCONTINUED | OUTPATIENT
Start: 2022-01-01 | End: 2022-01-01 | Stop reason: HOSPADM

## 2022-01-01 RX ORDER — GABAPENTIN 300 MG/1
300 CAPSULE ORAL 3 TIMES DAILY
Qty: 90 CAPSULE | Refills: 0 | Status: SHIPPED | OUTPATIENT
Start: 2022-01-01

## 2022-01-01 RX ORDER — METHYLPREDNISOLONE SODIUM SUCCINATE 125 MG/2ML
125 INJECTION, POWDER, LYOPHILIZED, FOR SOLUTION INTRAMUSCULAR; INTRAVENOUS
Status: CANCELLED
Start: 2022-01-01

## 2022-01-01 RX ORDER — ALBUTEROL SULFATE 90 UG/1
1-2 AEROSOL, METERED RESPIRATORY (INHALATION) EVERY 6 HOURS PRN
Qty: 18 G | Refills: 0 | Status: SHIPPED | OUTPATIENT
Start: 2022-01-01 | End: 2022-01-01

## 2022-01-01 RX ORDER — CALCIUM CHLORIDE 100 MG/ML
INJECTION INTRAVENOUS; INTRAVENTRICULAR PRN
Status: DISCONTINUED | OUTPATIENT
Start: 2022-01-01 | End: 2022-01-01

## 2022-01-01 RX ORDER — HEPARIN SODIUM,PORCINE 10 UNIT/ML
5-20 VIAL (ML) INTRAVENOUS EVERY 24 HOURS
Status: DISCONTINUED | OUTPATIENT
Start: 2022-01-01 | End: 2022-01-01 | Stop reason: HOSPADM

## 2022-01-01 RX ORDER — PAZOPANIB 200 MG/1
800 TABLET, FILM COATED ORAL DAILY
Qty: 120 TABLET | Refills: 0 | Status: SHIPPED | OUTPATIENT
Start: 2022-01-01

## 2022-01-01 RX ORDER — QUETIAPINE FUMARATE 25 MG/1
25 TABLET, FILM COATED ORAL AT BEDTIME
Qty: 90 TABLET | Refills: 0 | Status: SHIPPED | OUTPATIENT
Start: 2022-01-01 | End: 2022-01-01

## 2022-01-01 RX ORDER — LORAZEPAM 2 MG/ML
0.5 INJECTION INTRAMUSCULAR EVERY 4 HOURS PRN
Status: CANCELLED | OUTPATIENT
Start: 2022-01-01

## 2022-01-01 RX ORDER — ASPIRIN 325 MG
325 TABLET ORAL ONCE
Status: COMPLETED | OUTPATIENT
Start: 2022-01-01 | End: 2022-01-01

## 2022-01-01 RX ORDER — POLYETHYLENE GLYCOL 3350 17 G/17G
17 POWDER, FOR SOLUTION ORAL DAILY
Status: CANCELLED | OUTPATIENT
Start: 2022-01-01

## 2022-01-01 RX ORDER — ACETAMINOPHEN 325 MG/1
650 TABLET ORAL EVERY 4 HOURS PRN
Status: DISCONTINUED | OUTPATIENT
Start: 2022-01-01 | End: 2022-01-01 | Stop reason: HOSPADM

## 2022-01-01 RX ORDER — HYDROMORPHONE HYDROCHLORIDE 1 MG/ML
0.2 INJECTION, SOLUTION INTRAMUSCULAR; INTRAVENOUS; SUBCUTANEOUS EVERY 5 MIN PRN
Status: CANCELLED | OUTPATIENT
Start: 2022-01-01

## 2022-01-01 RX ORDER — IOPAMIDOL 755 MG/ML
123 INJECTION, SOLUTION INTRAVASCULAR ONCE
Status: COMPLETED | OUTPATIENT
Start: 2022-01-01 | End: 2022-01-01

## 2022-01-01 RX ORDER — SENNOSIDES 8.6 MG
1-2 TABLET ORAL 2 TIMES DAILY PRN
Qty: 30 TABLET | Refills: 1 | Status: SHIPPED | OUTPATIENT
Start: 2022-01-01 | End: 2022-01-01

## 2022-01-01 RX ORDER — SODIUM CHLORIDE 9 MG/ML
INJECTION, SOLUTION INTRAVENOUS
Status: COMPLETED
Start: 2022-01-01 | End: 2022-01-01

## 2022-01-01 RX ORDER — ACETAMINOPHEN 325 MG/1
975 TABLET ORAL EVERY 8 HOURS
Qty: 270 TABLET | Refills: 1 | Status: SHIPPED | OUTPATIENT
Start: 2022-01-01 | End: 2022-01-01

## 2022-01-01 RX ORDER — HYDROXYZINE HYDROCHLORIDE 25 MG/1
25 TABLET, FILM COATED ORAL EVERY 6 HOURS PRN
Status: DISCONTINUED | OUTPATIENT
Start: 2022-01-01 | End: 2022-01-01

## 2022-01-01 RX ORDER — GADOBUTROL 604.72 MG/ML
15 INJECTION INTRAVENOUS ONCE
Status: COMPLETED | OUTPATIENT
Start: 2022-01-01 | End: 2022-01-01

## 2022-01-01 RX ORDER — ESCITALOPRAM OXALATE 10 MG/1
10 TABLET ORAL DAILY
Qty: 30 TABLET | Refills: 0 | Status: ON HOLD | DISCHARGE
Start: 2022-01-01 | End: 2022-01-01

## 2022-01-01 RX ORDER — KETOROLAC TROMETHAMINE 15 MG/ML
15 INJECTION, SOLUTION INTRAMUSCULAR; INTRAVENOUS ONCE
Status: COMPLETED | OUTPATIENT
Start: 2022-01-01 | End: 2022-01-01

## 2022-01-01 RX ORDER — ENOXAPARIN SODIUM 100 MG/ML
40 INJECTION SUBCUTANEOUS EVERY 24 HOURS
Qty: 12 ML | Refills: 0 | Status: ON HOLD | DISCHARGE
Start: 2022-01-01 | End: 2022-01-01

## 2022-01-01 RX ORDER — OXYCODONE HYDROCHLORIDE 5 MG/1
10-15 TABLET ORAL EVERY 4 HOURS PRN
Qty: 100 TABLET | Refills: 0 | Status: ON HOLD | OUTPATIENT
Start: 2022-01-01 | End: 2022-01-01

## 2022-01-01 RX ORDER — POTASSIUM CHLORIDE 20MEQ/15ML
60 LIQUID (ML) ORAL DAILY
Status: DISCONTINUED | OUTPATIENT
Start: 2022-01-01 | End: 2022-01-01

## 2022-01-01 RX ORDER — IOPAMIDOL 755 MG/ML
112 INJECTION, SOLUTION INTRAVASCULAR ONCE
Status: COMPLETED | OUTPATIENT
Start: 2022-01-01 | End: 2022-01-01

## 2022-01-01 RX ORDER — BISACODYL 10 MG
10 SUPPOSITORY, RECTAL RECTAL DAILY PRN
Status: DISCONTINUED | OUTPATIENT
Start: 2022-01-01 | End: 2022-01-01 | Stop reason: HOSPADM

## 2022-01-01 RX ORDER — ESCITALOPRAM OXALATE 10 MG/1
10 TABLET ORAL DAILY
Status: DISCONTINUED | OUTPATIENT
Start: 2022-01-01 | End: 2022-01-01

## 2022-01-01 RX ORDER — OXYCODONE HYDROCHLORIDE 10 MG/1
10 TABLET ORAL EVERY 4 HOURS PRN
Qty: 40 TABLET | Refills: 0 | Status: SHIPPED | OUTPATIENT
Start: 2022-01-01

## 2022-01-01 RX ORDER — MAGNESIUM SULFATE HEPTAHYDRATE 40 MG/ML
4 INJECTION, SOLUTION INTRAVENOUS ONCE
Status: COMPLETED | OUTPATIENT
Start: 2022-01-01 | End: 2022-01-01

## 2022-01-01 RX ORDER — POTASSIUM CHLORIDE 20MEQ/15ML
60 LIQUID (ML) ORAL ONCE
Status: COMPLETED | OUTPATIENT
Start: 2022-01-01 | End: 2022-01-01

## 2022-01-01 RX ORDER — SENNOSIDES 8.6 MG
1-2 TABLET ORAL 2 TIMES DAILY PRN
Qty: 30 TABLET | Refills: 1 | Status: ON HOLD | OUTPATIENT
Start: 2022-01-01 | End: 2022-01-01

## 2022-01-01 RX ORDER — CEFTRIAXONE 1 G/1
1 INJECTION, POWDER, FOR SOLUTION INTRAMUSCULAR; INTRAVENOUS EVERY 24 HOURS
Status: DISCONTINUED | OUTPATIENT
Start: 2022-01-01 | End: 2022-01-01

## 2022-01-01 RX ORDER — LORAZEPAM 2 MG/ML
1 CONCENTRATE ORAL
Status: DISCONTINUED | OUTPATIENT
Start: 2022-01-01 | End: 2022-01-01 | Stop reason: HOSPADM

## 2022-01-01 RX ORDER — IOPAMIDOL 755 MG/ML
61 INJECTION, SOLUTION INTRAVASCULAR ONCE
Status: COMPLETED | OUTPATIENT
Start: 2022-01-01 | End: 2022-01-01

## 2022-01-01 RX ORDER — CEFTRIAXONE 1 G/1
1 INJECTION, POWDER, FOR SOLUTION INTRAMUSCULAR; INTRAVENOUS ONCE
Status: COMPLETED | OUTPATIENT
Start: 2022-01-01 | End: 2022-01-01

## 2022-01-01 RX ORDER — TRANEXAMIC ACID 650 MG/1
1950 TABLET ORAL ONCE
Status: DISCONTINUED | OUTPATIENT
Start: 2022-01-01 | End: 2022-01-01 | Stop reason: HOSPADM

## 2022-01-01 RX ORDER — PROCHLORPERAZINE MALEATE 10 MG
10 TABLET ORAL EVERY 6 HOURS PRN
Qty: 30 TABLET | Refills: 2 | Status: ON HOLD | OUTPATIENT
Start: 2022-01-01 | End: 2022-01-01

## 2022-01-01 RX ORDER — ACETAMINOPHEN 325 MG/1
650 TABLET ORAL EVERY 6 HOURS PRN
Status: DISCONTINUED | OUTPATIENT
Start: 2022-01-01 | End: 2022-01-01 | Stop reason: HOSPADM

## 2022-01-01 RX ORDER — ACETAMINOPHEN 650 MG/1
650 SUPPOSITORY RECTAL EVERY 6 HOURS PRN
Status: DISCONTINUED | OUTPATIENT
Start: 2022-01-01 | End: 2022-01-01 | Stop reason: HOSPADM

## 2022-01-01 RX ORDER — BENZONATATE 100 MG/1
100 CAPSULE ORAL 3 TIMES DAILY PRN
Status: DISCONTINUED | OUTPATIENT
Start: 2022-01-01 | End: 2022-01-01 | Stop reason: HOSPADM

## 2022-01-01 RX ORDER — SODIUM CHLORIDE, SODIUM LACTATE, POTASSIUM CHLORIDE, CALCIUM CHLORIDE 600; 310; 30; 20 MG/100ML; MG/100ML; MG/100ML; MG/100ML
INJECTION, SOLUTION INTRAVENOUS CONTINUOUS
Status: DISCONTINUED | OUTPATIENT
Start: 2022-01-01 | End: 2022-01-01

## 2022-01-01 RX ORDER — ACETAMINOPHEN 325 MG/1
975 TABLET ORAL EVERY 8 HOURS
Qty: 270 TABLET | Refills: 1 | Status: SHIPPED | OUTPATIENT
Start: 2022-01-01

## 2022-01-01 RX ORDER — IPRATROPIUM BROMIDE AND ALBUTEROL SULFATE 2.5; .5 MG/3ML; MG/3ML
3 SOLUTION RESPIRATORY (INHALATION)
Status: DISCONTINUED | OUTPATIENT
Start: 2022-01-01 | End: 2022-01-01

## 2022-01-01 RX ORDER — FLUMAZENIL 0.1 MG/ML
0.2 INJECTION, SOLUTION INTRAVENOUS
Status: DISCONTINUED | OUTPATIENT
Start: 2022-01-01 | End: 2022-01-01 | Stop reason: HOSPADM

## 2022-01-01 RX ORDER — MAGNESIUM SULFATE HEPTAHYDRATE 40 MG/ML
2 INJECTION, SOLUTION INTRAVENOUS ONCE
Status: COMPLETED | OUTPATIENT
Start: 2022-01-01 | End: 2022-01-01

## 2022-01-01 RX ORDER — MEPERIDINE HYDROCHLORIDE 25 MG/ML
25 INJECTION INTRAMUSCULAR; INTRAVENOUS; SUBCUTANEOUS EVERY 30 MIN PRN
Status: CANCELLED | OUTPATIENT
Start: 2022-01-01

## 2022-01-01 RX ORDER — PIPERACILLIN SODIUM, TAZOBACTAM SODIUM 4; .5 G/20ML; G/20ML
4.5 INJECTION, POWDER, LYOPHILIZED, FOR SOLUTION INTRAVENOUS ONCE
Status: COMPLETED | OUTPATIENT
Start: 2022-01-01 | End: 2022-01-01

## 2022-01-01 RX ORDER — MAGNESIUM OXIDE 400 MG/1
400 TABLET ORAL DAILY
Qty: 7 TABLET | Refills: 0 | Status: SHIPPED | OUTPATIENT
Start: 2022-01-01 | End: 2022-01-01

## 2022-01-01 RX ORDER — CEFDINIR 300 MG/1
300 CAPSULE ORAL 2 TIMES DAILY
Qty: 8 CAPSULE | Refills: 0 | Status: SHIPPED | OUTPATIENT
Start: 2022-01-01 | End: 2022-01-01

## 2022-01-01 RX ORDER — PAZOPANIB 200 MG/1
800 TABLET, FILM COATED ORAL DAILY
Status: DISCONTINUED | OUTPATIENT
Start: 2022-01-01 | End: 2022-01-01 | Stop reason: HOSPADM

## 2022-01-01 RX ORDER — OXYCODONE HYDROCHLORIDE 10 MG/1
10-20 TABLET ORAL
Status: DISCONTINUED | OUTPATIENT
Start: 2022-01-01 | End: 2022-01-01 | Stop reason: HOSPADM

## 2022-01-01 RX ORDER — SODIUM CHLORIDE, SODIUM LACTATE, POTASSIUM CHLORIDE, CALCIUM CHLORIDE 600; 310; 30; 20 MG/100ML; MG/100ML; MG/100ML; MG/100ML
INJECTION, SOLUTION INTRAVENOUS CONTINUOUS PRN
Status: DISCONTINUED | OUTPATIENT
Start: 2022-01-01 | End: 2022-01-01

## 2022-01-01 RX ORDER — ESCITALOPRAM OXALATE 10 MG/1
10 TABLET ORAL DAILY
Status: DISCONTINUED | OUTPATIENT
Start: 2022-01-01 | End: 2022-01-01 | Stop reason: HOSPADM

## 2022-01-01 RX ORDER — ALBUTEROL SULFATE 90 UG/1
1-2 AEROSOL, METERED RESPIRATORY (INHALATION)
Status: ACTIVE | OUTPATIENT
Start: 2022-01-01 | End: 2022-01-01

## 2022-01-01 RX ORDER — DOXYCYCLINE 100 MG/10ML
100 INJECTION, POWDER, LYOPHILIZED, FOR SOLUTION INTRAVENOUS EVERY 12 HOURS
Status: DISCONTINUED | OUTPATIENT
Start: 2022-01-01 | End: 2022-01-01

## 2022-01-01 RX ORDER — LORAZEPAM 2 MG/ML
1 INJECTION INTRAMUSCULAR ONCE
Status: COMPLETED | OUTPATIENT
Start: 2022-01-01 | End: 2022-01-01

## 2022-01-01 RX ORDER — HYDROMORPHONE HYDROCHLORIDE 1 MG/ML
.3-.5 INJECTION, SOLUTION INTRAMUSCULAR; INTRAVENOUS; SUBCUTANEOUS
Status: DISCONTINUED | OUTPATIENT
Start: 2022-01-01 | End: 2022-01-01 | Stop reason: HOSPADM

## 2022-01-01 RX ORDER — ACETAMINOPHEN 325 MG/1
975 TABLET ORAL EVERY 8 HOURS
Qty: 50 TABLET | Refills: 0 | Status: SHIPPED | OUTPATIENT
Start: 2022-01-01 | End: 2022-01-01

## 2022-01-01 RX ORDER — PIPERACILLIN SODIUM, TAZOBACTAM SODIUM 4; .5 G/20ML; G/20ML
4.5 INJECTION, POWDER, LYOPHILIZED, FOR SOLUTION INTRAVENOUS EVERY 6 HOURS
Status: DISCONTINUED | OUTPATIENT
Start: 2022-01-01 | End: 2022-01-01

## 2022-01-01 RX ORDER — IPRATROPIUM BROMIDE AND ALBUTEROL SULFATE 2.5; .5 MG/3ML; MG/3ML
3 SOLUTION RESPIRATORY (INHALATION) 4 TIMES DAILY PRN
Status: DISCONTINUED | OUTPATIENT
Start: 2022-01-01 | End: 2022-01-01

## 2022-01-01 RX ORDER — LACTOSE-REDUCED FOOD
1 LIQUID (ML) ORAL
Qty: 7110 ML | Refills: 1 | Status: SHIPPED | OUTPATIENT
Start: 2022-01-01 | End: 2022-01-01

## 2022-01-01 RX ORDER — HYDROMORPHONE HYDROCHLORIDE 1 MG/ML
0.5 INJECTION, SOLUTION INTRAMUSCULAR; INTRAVENOUS; SUBCUTANEOUS
Status: DISCONTINUED | OUTPATIENT
Start: 2022-01-01 | End: 2022-01-01

## 2022-01-01 RX ORDER — NAPROXEN 500 MG/1
500 TABLET ORAL 2 TIMES DAILY WITH MEALS
Qty: 16 TABLET | Refills: 0 | Status: SHIPPED | OUTPATIENT
Start: 2022-01-01 | End: 2022-01-01

## 2022-01-01 RX ORDER — LIDOCAINE 4 G/G
2 PATCH TOPICAL
Status: DISCONTINUED | OUTPATIENT
Start: 2022-01-01 | End: 2022-01-01 | Stop reason: HOSPADM

## 2022-01-01 RX ORDER — HYDROMORPHONE HYDROCHLORIDE 1 MG/ML
.3-.5 INJECTION, SOLUTION INTRAMUSCULAR; INTRAVENOUS; SUBCUTANEOUS
Status: DISCONTINUED | OUTPATIENT
Start: 2022-01-01 | End: 2022-01-01

## 2022-01-01 RX ORDER — METRONIDAZOLE 500 MG/100ML
500 INJECTION, SOLUTION INTRAVENOUS EVERY 8 HOURS
Status: DISCONTINUED | OUTPATIENT
Start: 2022-01-01 | End: 2022-01-01 | Stop reason: HOSPADM

## 2022-01-01 RX ORDER — CEFAZOLIN SODIUM 2 G/50ML
2 SOLUTION INTRAVENOUS
Status: CANCELLED | OUTPATIENT
Start: 2022-01-01

## 2022-01-01 RX ORDER — POTASSIUM CHLORIDE 1.5 G/1.58G
20 POWDER, FOR SOLUTION ORAL ONCE
Status: DISCONTINUED | OUTPATIENT
Start: 2022-01-01 | End: 2022-01-01

## 2022-01-01 RX ORDER — ESCITALOPRAM OXALATE 10 MG/1
10 TABLET ORAL DAILY
Status: CANCELLED | OUTPATIENT
Start: 2022-01-01

## 2022-01-01 RX ORDER — LEVOFLOXACIN 5 MG/ML
250 INJECTION, SOLUTION INTRAVENOUS EVERY 24 HOURS
Status: DISCONTINUED | OUTPATIENT
Start: 2022-01-01 | End: 2022-01-01 | Stop reason: HOSPADM

## 2022-01-01 RX ORDER — OXYCODONE HYDROCHLORIDE 5 MG/1
5 TABLET ORAL EVERY 4 HOURS PRN
Qty: 12 TABLET | Refills: 0 | Status: SHIPPED | OUTPATIENT
Start: 2022-01-01 | End: 2022-01-01

## 2022-01-01 RX ORDER — AZITHROMYCIN 250 MG/1
500 TABLET, FILM COATED ORAL DAILY
Status: DISCONTINUED | OUTPATIENT
Start: 2022-01-01 | End: 2022-01-01 | Stop reason: HOSPADM

## 2022-01-01 RX ORDER — PROPOFOL 10 MG/ML
INJECTION, EMULSION INTRAVENOUS PRN
Status: DISCONTINUED | OUTPATIENT
Start: 2022-01-01 | End: 2022-01-01

## 2022-01-01 RX ORDER — OXYCODONE HYDROCHLORIDE 5 MG/1
10-20 TABLET ORAL EVERY 4 HOURS PRN
Status: DISCONTINUED | OUTPATIENT
Start: 2022-01-01 | End: 2022-01-01 | Stop reason: HOSPADM

## 2022-01-01 RX ORDER — OXYCODONE HYDROCHLORIDE 10 MG/1
10-20 TABLET ORAL
Status: CANCELLED | OUTPATIENT
Start: 2022-01-01

## 2022-01-01 RX ORDER — PROPOFOL 10 MG/ML
5-75 INJECTION, EMULSION INTRAVENOUS CONTINUOUS
Status: DISCONTINUED | OUTPATIENT
Start: 2022-01-01 | End: 2022-01-01

## 2022-01-01 RX ORDER — HEPARIN SODIUM,PORCINE 10 UNIT/ML
5-10 VIAL (ML) INTRAVENOUS EVERY 24 HOURS
Status: DISCONTINUED | OUTPATIENT
Start: 2022-01-01 | End: 2022-01-01 | Stop reason: HOSPADM

## 2022-01-01 RX ORDER — CEFPODOXIME PROXETIL 200 MG/1
200 TABLET, FILM COATED ORAL 2 TIMES DAILY
Status: DISCONTINUED | OUTPATIENT
Start: 2022-01-01 | End: 2022-01-01

## 2022-01-01 RX ORDER — NALBUPHINE HYDROCHLORIDE 10 MG/ML
2.5-5 INJECTION, SOLUTION INTRAMUSCULAR; INTRAVENOUS; SUBCUTANEOUS EVERY 6 HOURS PRN
Status: DISCONTINUED | OUTPATIENT
Start: 2022-01-01 | End: 2022-01-01

## 2022-01-01 RX ORDER — AZITHROMYCIN 500 MG/1
500 TABLET, FILM COATED ORAL DAILY
Qty: 3 TABLET | Refills: 0 | Status: SHIPPED | OUTPATIENT
Start: 2022-01-01 | End: 2022-01-01

## 2022-01-01 RX ORDER — MORPHINE SULFATE 30 MG/1
30 TABLET, FILM COATED, EXTENDED RELEASE ORAL EVERY 12 HOURS
Qty: 30 TABLET | Refills: 0 | Status: ON HOLD | OUTPATIENT
Start: 2022-01-01 | End: 2022-01-01

## 2022-01-01 RX ORDER — LIDOCAINE 40 MG/G
CREAM TOPICAL
Status: CANCELLED | OUTPATIENT
Start: 2022-01-01

## 2022-01-01 RX ORDER — LORAZEPAM 0.5 MG/1
0.5 TABLET ORAL ONCE
Status: COMPLETED | OUTPATIENT
Start: 2022-01-01 | End: 2022-01-01

## 2022-01-01 RX ORDER — OLANZAPINE 2.5 MG/1
2.5 TABLET, FILM COATED ORAL AT BEDTIME
Status: CANCELLED | OUTPATIENT
Start: 2022-01-01

## 2022-01-01 RX ORDER — HYDROMORPHONE HYDROCHLORIDE 1 MG/ML
0.5 INJECTION, SOLUTION INTRAMUSCULAR; INTRAVENOUS; SUBCUTANEOUS
Status: COMPLETED | OUTPATIENT
Start: 2022-01-01 | End: 2022-01-01

## 2022-01-01 RX ORDER — ACETAMINOPHEN 500 MG
1000 TABLET ORAL 3 TIMES DAILY PRN
Qty: 90 TABLET | Refills: 1 | Status: ON HOLD | OUTPATIENT
Start: 2022-01-01 | End: 2022-01-01

## 2022-01-01 RX ORDER — ONDANSETRON 4 MG/1
4-8 TABLET, FILM COATED ORAL EVERY 8 HOURS PRN
Status: DISCONTINUED | OUTPATIENT
Start: 2022-01-01 | End: 2022-01-01 | Stop reason: HOSPADM

## 2022-01-01 RX ORDER — NICOTINE POLACRILEX 4 MG
15-30 LOZENGE BUCCAL
Status: DISCONTINUED | OUTPATIENT
Start: 2022-01-01 | End: 2022-01-01 | Stop reason: HOSPADM

## 2022-01-01 RX ORDER — SODIUM CHLORIDE 9 MG/ML
INJECTION, SOLUTION INTRAVENOUS
Status: DISCONTINUED
Start: 2022-01-01 | End: 2022-01-01 | Stop reason: HOSPADM

## 2022-01-01 RX ORDER — METHOCARBAMOL 500 MG/1
500 TABLET, FILM COATED ORAL EVERY 6 HOURS PRN
Status: DISCONTINUED | OUTPATIENT
Start: 2022-01-01 | End: 2022-01-01

## 2022-01-01 RX ORDER — LIDOCAINE 50 MG/G
OINTMENT TOPICAL EVERY 4 HOURS PRN
Status: DISCONTINUED | OUTPATIENT
Start: 2022-01-01 | End: 2022-01-01 | Stop reason: HOSPADM

## 2022-01-01 RX ORDER — MORPHINE SULFATE 15 MG/1
15 TABLET, FILM COATED, EXTENDED RELEASE ORAL EVERY 12 HOURS SCHEDULED
Status: DISCONTINUED | OUTPATIENT
Start: 2022-01-01 | End: 2022-01-01

## 2022-01-01 RX ORDER — POLYETHYLENE GLYCOL 3350 17 G/17G
17 POWDER, FOR SOLUTION ORAL DAILY
Qty: 10 PACKET | Refills: 0 | Status: SHIPPED | OUTPATIENT
Start: 2022-01-01 | End: 2022-01-01

## 2022-01-01 RX ORDER — ESCITALOPRAM OXALATE 10 MG/1
10 TABLET ORAL DAILY
Qty: 90 TABLET | Refills: 0 | Status: SHIPPED | OUTPATIENT
Start: 2022-01-01 | End: 2022-01-01

## 2022-01-01 RX ORDER — LACTOSE-REDUCED FOOD
1 LIQUID (ML) ORAL
Qty: 7110 ML | Refills: 1 | Status: SHIPPED | OUTPATIENT
Start: 2022-01-01

## 2022-01-01 RX ORDER — PAPAVERINE HYDROCHLORIDE 30 MG/ML
INJECTION INTRAMUSCULAR; INTRAVENOUS PRN
Status: DISCONTINUED | OUTPATIENT
Start: 2022-01-01 | End: 2022-01-01 | Stop reason: HOSPADM

## 2022-01-01 RX ORDER — CODEINE PHOSPHATE AND GUAIFENESIN 10; 100 MG/5ML; MG/5ML
5 SOLUTION ORAL EVERY 4 HOURS PRN
Status: DISCONTINUED | OUTPATIENT
Start: 2022-01-01 | End: 2022-01-01 | Stop reason: HOSPADM

## 2022-01-01 RX ORDER — MAGNESIUM OXIDE 400 MG/1
400 TABLET ORAL 2 TIMES DAILY
Qty: 30 TABLET | Refills: 1 | Status: SHIPPED | OUTPATIENT
Start: 2022-01-01

## 2022-01-01 RX ORDER — ESCITALOPRAM OXALATE 10 MG/1
10 TABLET ORAL DAILY
Qty: 30 TABLET | Refills: 1 | Status: SHIPPED | OUTPATIENT
Start: 2022-01-01

## 2022-01-01 RX ORDER — LIDOCAINE HYDROCHLORIDE 10 MG/ML
1-30 INJECTION, SOLUTION EPIDURAL; INFILTRATION; INTRACAUDAL; PERINEURAL
Status: COMPLETED | OUTPATIENT
Start: 2022-01-01 | End: 2022-01-01

## 2022-01-01 RX ORDER — SODIUM CHLORIDE, SODIUM GLUCONATE, SODIUM ACETATE, POTASSIUM CHLORIDE AND MAGNESIUM CHLORIDE 526; 502; 368; 37; 30 MG/100ML; MG/100ML; MG/100ML; MG/100ML; MG/100ML
INJECTION, SOLUTION INTRAVENOUS CONTINUOUS PRN
Status: DISCONTINUED | OUTPATIENT
Start: 2022-01-01 | End: 2022-01-01

## 2022-01-01 RX ORDER — GABAPENTIN 300 MG/1
300 CAPSULE ORAL 3 TIMES DAILY
Status: DISCONTINUED | OUTPATIENT
Start: 2022-01-01 | End: 2022-01-01 | Stop reason: HOSPADM

## 2022-01-01 RX ORDER — GABAPENTIN 300 MG/1
300 CAPSULE ORAL 3 TIMES DAILY
Status: DISCONTINUED | OUTPATIENT
Start: 2022-01-01 | End: 2022-01-01

## 2022-01-01 RX ORDER — CARBOXYMETHYLCELLULOSE SODIUM 5 MG/ML
1 SOLUTION/ DROPS OPHTHALMIC 4 TIMES DAILY PRN
Status: DISCONTINUED | OUTPATIENT
Start: 2022-01-01 | End: 2022-01-01

## 2022-01-01 RX ORDER — LIDOCAINE 4 G/G
1 PATCH TOPICAL
Status: DISCONTINUED | OUTPATIENT
Start: 2022-01-01 | End: 2022-01-01

## 2022-01-01 RX ORDER — AMOXICILLIN 250 MG
2 CAPSULE ORAL 2 TIMES DAILY
Status: DISCONTINUED | OUTPATIENT
Start: 2022-01-01 | End: 2022-01-01 | Stop reason: HOSPADM

## 2022-01-01 RX ORDER — HYDROMORPHONE HYDROCHLORIDE 2 MG/1
2 TABLET ORAL EVERY 4 HOURS PRN
Status: DISCONTINUED | OUTPATIENT
Start: 2022-01-01 | End: 2022-01-01

## 2022-01-01 RX ORDER — DOXYCYCLINE 100 MG/1
100 CAPSULE ORAL EVERY 12 HOURS SCHEDULED
Status: DISCONTINUED | OUTPATIENT
Start: 2022-01-01 | End: 2022-01-01

## 2022-01-01 RX ORDER — ACETAMINOPHEN 325 MG/1
650 TABLET ORAL EVERY 4 HOURS PRN
Status: DISCONTINUED | OUTPATIENT
Start: 2022-01-01 | End: 2022-01-01

## 2022-01-01 RX ORDER — LABETALOL HYDROCHLORIDE 5 MG/ML
10-20 INJECTION, SOLUTION INTRAVENOUS EVERY 4 HOURS PRN
Status: DISCONTINUED | OUTPATIENT
Start: 2022-01-01 | End: 2022-01-01 | Stop reason: HOSPADM

## 2022-01-01 RX ORDER — ONDANSETRON 4 MG/1
4 TABLET, ORALLY DISINTEGRATING ORAL EVERY 30 MIN PRN
Status: CANCELLED | OUTPATIENT
Start: 2022-01-01

## 2022-01-01 RX ORDER — ACETAMINOPHEN 325 MG/1
975 TABLET ORAL EVERY 6 HOURS
Status: DISCONTINUED | OUTPATIENT
Start: 2022-01-01 | End: 2022-01-01 | Stop reason: HOSPADM

## 2022-01-01 RX ORDER — IPRATROPIUM BROMIDE AND ALBUTEROL SULFATE 2.5; .5 MG/3ML; MG/3ML
3 SOLUTION RESPIRATORY (INHALATION) 4 TIMES DAILY PRN
Status: DISCONTINUED | OUTPATIENT
Start: 2022-01-01 | End: 2022-01-01 | Stop reason: HOSPADM

## 2022-01-01 RX ORDER — OXYCODONE HYDROCHLORIDE 10 MG/1
10-20 TABLET ORAL EVERY 4 HOURS PRN
Qty: 60 TABLET | Refills: 0 | Status: SHIPPED | OUTPATIENT
Start: 2022-01-01 | End: 2022-01-01

## 2022-01-01 RX ORDER — DEXAMETHASONE SODIUM PHOSPHATE 4 MG/ML
12 INJECTION, SOLUTION INTRA-ARTICULAR; INTRALESIONAL; INTRAMUSCULAR; INTRAVENOUS; SOFT TISSUE
Status: COMPLETED | OUTPATIENT
Start: 2022-01-01 | End: 2022-01-01

## 2022-01-01 RX ORDER — HEPARIN SODIUM (PORCINE) LOCK FLUSH IV SOLN 100 UNIT/ML 100 UNIT/ML
5 SOLUTION INTRAVENOUS
Status: DISCONTINUED | OUTPATIENT
Start: 2022-01-01 | End: 2022-01-01 | Stop reason: HOSPADM

## 2022-01-01 RX ORDER — SENNOSIDES 8.6 MG
8.6 TABLET ORAL 2 TIMES DAILY
Status: DISCONTINUED | OUTPATIENT
Start: 2022-01-01 | End: 2022-01-01 | Stop reason: HOSPADM

## 2022-01-01 RX ORDER — POLYETHYLENE GLYCOL 3350 17 G/17G
17 POWDER, FOR SOLUTION ORAL DAILY PRN
Status: CANCELLED | OUTPATIENT
Start: 2022-01-01

## 2022-01-01 RX ORDER — SODIUM CHLORIDE, SODIUM LACTATE, POTASSIUM CHLORIDE, CALCIUM CHLORIDE 600; 310; 30; 20 MG/100ML; MG/100ML; MG/100ML; MG/100ML
INJECTION, SOLUTION INTRAVENOUS CONTINUOUS
Status: CANCELLED | OUTPATIENT
Start: 2022-01-01

## 2022-01-01 RX ORDER — PROCHLORPERAZINE MALEATE 5 MG
5-10 TABLET ORAL EVERY 6 HOURS PRN
Status: DISCONTINUED | OUTPATIENT
Start: 2022-01-01 | End: 2022-01-01 | Stop reason: HOSPADM

## 2022-01-01 RX ORDER — VANCOMYCIN HYDROCHLORIDE 125 MG/1
125 CAPSULE ORAL 4 TIMES DAILY
Status: DISCONTINUED | OUTPATIENT
Start: 2022-01-01 | End: 2022-01-01

## 2022-01-01 RX ORDER — OXYCODONE HYDROCHLORIDE 10 MG/1
10 TABLET ORAL EVERY 4 HOURS PRN
Status: CANCELLED | OUTPATIENT
Start: 2022-01-01

## 2022-01-01 RX ORDER — HEPARIN SODIUM 5000 [USP'U]/.5ML
5000 INJECTION, SOLUTION INTRAVENOUS; SUBCUTANEOUS EVERY 8 HOURS
Status: DISCONTINUED | OUTPATIENT
Start: 2022-01-01 | End: 2022-01-01 | Stop reason: HOSPADM

## 2022-01-01 RX ORDER — MEPERIDINE HYDROCHLORIDE 25 MG/ML
25 INJECTION INTRAMUSCULAR; INTRAVENOUS; SUBCUTANEOUS EVERY 30 MIN PRN
Status: ACTIVE | OUTPATIENT
Start: 2022-01-01 | End: 2022-01-01

## 2022-01-01 RX ORDER — PALONOSETRON 0.05 MG/ML
0.25 INJECTION, SOLUTION INTRAVENOUS ONCE
Status: CANCELLED
Start: 2022-01-01

## 2022-01-01 RX ORDER — FENTANYL CITRATE 50 UG/ML
50 INJECTION, SOLUTION INTRAMUSCULAR; INTRAVENOUS ONCE
Status: COMPLETED | OUTPATIENT
Start: 2022-01-01 | End: 2022-01-01

## 2022-01-01 RX ORDER — OLANZAPINE 2.5 MG/1
2.5 TABLET, FILM COATED ORAL AT BEDTIME
Qty: 30 TABLET | Refills: 0 | Status: SHIPPED | OUTPATIENT
Start: 2022-01-01

## 2022-01-01 RX ORDER — ACETAMINOPHEN 325 MG/1
975 TABLET ORAL EVERY 8 HOURS
Qty: 50 TABLET | Refills: 0 | Status: ON HOLD | DISCHARGE
Start: 2022-01-01 | End: 2022-01-01

## 2022-01-01 RX ORDER — OXYCODONE HYDROCHLORIDE 10 MG/1
10 TABLET ORAL EVERY 4 HOURS PRN
Qty: 40 TABLET | Refills: 0 | Status: SHIPPED | OUTPATIENT
Start: 2022-01-01 | End: 2022-01-01

## 2022-01-01 RX ORDER — ASPIRIN 81 MG/1
81 TABLET, CHEWABLE ORAL DAILY
Status: DISCONTINUED | OUTPATIENT
Start: 2022-01-01 | End: 2022-01-01

## 2022-01-01 RX ORDER — CEFAZOLIN SODIUM 2 G/100ML
2 INJECTION, SOLUTION INTRAVENOUS EVERY 8 HOURS
Status: COMPLETED | OUTPATIENT
Start: 2022-01-01 | End: 2022-01-01

## 2022-01-01 RX ORDER — LOPERAMIDE HCL 2 MG
2 CAPSULE ORAL 4 TIMES DAILY PRN
Status: DISCONTINUED | OUTPATIENT
Start: 2022-01-01 | End: 2022-01-01

## 2022-01-01 RX ORDER — OXYCODONE HYDROCHLORIDE 5 MG/1
5 TABLET ORAL EVERY 4 HOURS PRN
Status: CANCELLED | OUTPATIENT
Start: 2022-01-01

## 2022-01-01 RX ORDER — MAGNESIUM OXIDE 400 MG/1
400 TABLET ORAL 2 TIMES DAILY
Status: DISCONTINUED | OUTPATIENT
Start: 2022-01-01 | End: 2022-01-01 | Stop reason: HOSPADM

## 2022-01-01 RX ORDER — LIDOCAINE HYDROCHLORIDE 20 MG/ML
INJECTION, SOLUTION INFILTRATION; PERINEURAL PRN
Status: DISCONTINUED | OUTPATIENT
Start: 2022-01-01 | End: 2022-01-01

## 2022-01-01 RX ORDER — LOPERAMIDE HCL 2 MG
2 CAPSULE ORAL 4 TIMES DAILY PRN
Status: DISCONTINUED | OUTPATIENT
Start: 2022-01-01 | End: 2022-01-01 | Stop reason: HOSPADM

## 2022-01-01 RX ORDER — GRANISETRON HYDROCHLORIDE 1 MG/ML
1 INJECTION INTRAVENOUS ONCE
Status: COMPLETED | OUTPATIENT
Start: 2022-01-01 | End: 2022-01-01

## 2022-01-01 RX ORDER — MULTIVITAMIN,THERAPEUTIC
1 TABLET ORAL DAILY
Qty: 30 TABLET | Refills: 1 | Status: SHIPPED | OUTPATIENT
Start: 2022-01-01

## 2022-01-01 RX ORDER — QUETIAPINE FUMARATE 25 MG/1
25 TABLET, FILM COATED ORAL AT BEDTIME
Status: DISCONTINUED | OUTPATIENT
Start: 2022-01-01 | End: 2022-01-01 | Stop reason: HOSPADM

## 2022-01-01 RX ORDER — ONDANSETRON 4 MG/1
4-8 TABLET, ORALLY DISINTEGRATING ORAL EVERY 8 HOURS PRN
Qty: 30 TABLET | Refills: 0 | Status: SHIPPED | OUTPATIENT
Start: 2022-01-01

## 2022-01-01 RX ORDER — HYDROMORPHONE HYDROCHLORIDE 1 MG/ML
0.3 INJECTION, SOLUTION INTRAMUSCULAR; INTRAVENOUS; SUBCUTANEOUS ONCE
Status: COMPLETED | OUTPATIENT
Start: 2022-01-01 | End: 2022-01-01

## 2022-01-01 RX ORDER — IBUPROFEN 200 MG
400 TABLET ORAL EVERY 8 HOURS PRN
Status: ON HOLD | COMMUNITY
End: 2022-01-01

## 2022-01-01 RX ORDER — ONDANSETRON 2 MG/ML
4 INJECTION INTRAMUSCULAR; INTRAVENOUS EVERY 6 HOURS PRN
Status: DISCONTINUED | OUTPATIENT
Start: 2022-01-01 | End: 2022-01-01

## 2022-01-01 RX ORDER — MORPHINE SULFATE 30 MG/1
30 TABLET, FILM COATED, EXTENDED RELEASE ORAL EVERY 12 HOURS
Qty: 60 TABLET | Refills: 0 | OUTPATIENT
Start: 2022-01-01

## 2022-01-01 RX ORDER — MORPHINE SULFATE 2 MG/ML
2-4 INJECTION, SOLUTION INTRAMUSCULAR; INTRAVENOUS
Status: DISCONTINUED | OUTPATIENT
Start: 2022-01-01 | End: 2022-01-01

## 2022-01-01 RX ORDER — MORPHINE SULFATE 30 MG/1
30 TABLET, FILM COATED, EXTENDED RELEASE ORAL EVERY 12 HOURS
Qty: 60 TABLET | Refills: 0 | Status: ON HOLD | OUTPATIENT
Start: 2022-01-01 | End: 2022-01-01

## 2022-01-01 RX ORDER — ESCITALOPRAM OXALATE 10 MG/1
10 TABLET ORAL DAILY
Qty: 30 TABLET | Refills: 1 | Status: CANCELLED | OUTPATIENT
Start: 2022-01-01 | End: 2022-01-01

## 2022-01-01 RX ADMIN — IOPAMIDOL 63 ML: 755 INJECTION, SOLUTION INTRAVENOUS at 17:18

## 2022-01-01 RX ADMIN — OXYCODONE HYDROCHLORIDE 20 MG: 5 TABLET ORAL at 12:00

## 2022-01-01 RX ADMIN — OXYCODONE HYDROCHLORIDE 20 MG: 5 TABLET ORAL at 09:57

## 2022-01-01 RX ADMIN — CEFPODOXIME PROXETIL 200 MG: 200 TABLET, FILM COATED ORAL at 08:35

## 2022-01-01 RX ADMIN — OLANZAPINE 2.5 MG: 2.5 TABLET, FILM COATED ORAL at 23:53

## 2022-01-01 RX ADMIN — ACETAMINOPHEN 325MG 975 MG: 325 TABLET ORAL at 00:59

## 2022-01-01 RX ADMIN — PIPERACILLIN SODIUM AND TAZOBACTAM SODIUM 4.5 G: 4; .5 INJECTION, POWDER, LYOPHILIZED, FOR SOLUTION INTRAVENOUS at 17:47

## 2022-01-01 RX ADMIN — HYDROMORPHONE HYDROCHLORIDE 2 MG: 2 TABLET ORAL at 21:51

## 2022-01-01 RX ADMIN — ACETAMINOPHEN 325MG 975 MG: 325 TABLET ORAL at 19:57

## 2022-01-01 RX ADMIN — GABAPENTIN 300 MG: 300 CAPSULE ORAL at 21:12

## 2022-01-01 RX ADMIN — APIXABAN 5 MG: 5 TABLET, FILM COATED ORAL at 10:08

## 2022-01-01 RX ADMIN — ESCITALOPRAM 10 MG: 5 TABLET, FILM COATED ORAL at 09:04

## 2022-01-01 RX ADMIN — MAGNESIUM SULFATE IN WATER 2 G: 40 INJECTION, SOLUTION INTRAVENOUS at 20:51

## 2022-01-01 RX ADMIN — ACETAMINOPHEN 975 MG: 325 TABLET, FILM COATED ORAL at 14:20

## 2022-01-01 RX ADMIN — Medication 1 LOZENGE: at 15:10

## 2022-01-01 RX ADMIN — POLYETHYLENE GLYCOL 3350 17 G: 17 POWDER, FOR SOLUTION ORAL at 08:17

## 2022-01-01 RX ADMIN — APIXABAN 5 MG: 5 TABLET, FILM COATED ORAL at 08:34

## 2022-01-01 RX ADMIN — ESCITALOPRAM OXALATE 10 MG: 10 TABLET ORAL at 08:08

## 2022-01-01 RX ADMIN — GABAPENTIN 400 MG: 100 CAPSULE ORAL at 13:51

## 2022-01-01 RX ADMIN — ACETAMINOPHEN 325MG 975 MG: 325 TABLET ORAL at 21:24

## 2022-01-01 RX ADMIN — PROCHLORPERAZINE MALEATE 10 MG: 10 TABLET ORAL at 13:24

## 2022-01-01 RX ADMIN — HYDROMORPHONE HYDROCHLORIDE 1 MG: 1 INJECTION, SOLUTION INTRAMUSCULAR; INTRAVENOUS; SUBCUTANEOUS at 05:20

## 2022-01-01 RX ADMIN — OXYCODONE HYDROCHLORIDE 10 MG: 5 TABLET ORAL at 21:21

## 2022-01-01 RX ADMIN — MORPHINE SULFATE 30 MG: 15 TABLET, EXTENDED RELEASE ORAL at 20:40

## 2022-01-01 RX ADMIN — OXYCODONE HYDROCHLORIDE 10 MG: 5 TABLET ORAL at 11:55

## 2022-01-01 RX ADMIN — VANCOMYCIN HYDROCHLORIDE 125 MG: 125 CAPSULE ORAL at 14:53

## 2022-01-01 RX ADMIN — MORPHINE SULFATE 30 MG: 30 TABLET, EXTENDED RELEASE ORAL at 00:19

## 2022-01-01 RX ADMIN — IPRATROPIUM BROMIDE AND ALBUTEROL SULFATE 3 ML: .5; 3 SOLUTION RESPIRATORY (INHALATION) at 09:14

## 2022-01-01 RX ADMIN — OXYCODONE HYDROCHLORIDE 20 MG: 5 TABLET ORAL at 05:21

## 2022-01-01 RX ADMIN — CEFPODOXIME PROXETIL 200 MG: 200 TABLET, FILM COATED ORAL at 08:48

## 2022-01-01 RX ADMIN — ACETAMINOPHEN 975 MG: 325 TABLET, FILM COATED ORAL at 08:33

## 2022-01-01 RX ADMIN — ACETAMINOPHEN 325MG 975 MG: 325 TABLET ORAL at 12:36

## 2022-01-01 RX ADMIN — BENZONATATE 100 MG: 100 CAPSULE ORAL at 04:10

## 2022-01-01 RX ADMIN — ACETAMINOPHEN 325MG 975 MG: 325 TABLET ORAL at 20:35

## 2022-01-01 RX ADMIN — OXYCODONE HYDROCHLORIDE 10 MG: 10 TABLET ORAL at 13:06

## 2022-01-01 RX ADMIN — HYDROMORPHONE HYDROCHLORIDE 2 MG: 2 TABLET ORAL at 15:56

## 2022-01-01 RX ADMIN — OXYCODONE HYDROCHLORIDE 20 MG: 5 TABLET ORAL at 15:12

## 2022-01-01 RX ADMIN — GABAPENTIN 400 MG: 100 CAPSULE ORAL at 14:11

## 2022-01-01 RX ADMIN — SODIUM CHLORIDE, PRESERVATIVE FREE 10 ML: 5 INJECTION INTRAVENOUS at 20:36

## 2022-01-01 RX ADMIN — VANCOMYCIN HYDROCHLORIDE 2000 MG: 1 INJECTION, POWDER, LYOPHILIZED, FOR SOLUTION INTRAVENOUS at 23:38

## 2022-01-01 RX ADMIN — ASPIRIN 81 MG CHEWABLE TABLET 81 MG: 81 TABLET CHEWABLE at 07:55

## 2022-01-01 RX ADMIN — POTASSIUM CHLORIDE 10 MEQ: 7.46 INJECTION, SOLUTION INTRAVENOUS at 11:58

## 2022-01-01 RX ADMIN — OXYCODONE HYDROCHLORIDE 20 MG: 5 TABLET ORAL at 19:26

## 2022-01-01 RX ADMIN — ONDANSETRON 4 MG: 2 INJECTION INTRAMUSCULAR; INTRAVENOUS at 09:57

## 2022-01-01 RX ADMIN — POTASSIUM & SODIUM PHOSPHATES POWDER PACK 280-160-250 MG 1 PACKET: 280-160-250 PACK at 23:26

## 2022-01-01 RX ADMIN — HYDROMORPHONE HYDROCHLORIDE 0.5 MG: 1 INJECTION, SOLUTION INTRAMUSCULAR; INTRAVENOUS; SUBCUTANEOUS at 07:39

## 2022-01-01 RX ADMIN — HYDROMORPHONE HYDROCHLORIDE 0.5 MG: 1 INJECTION, SOLUTION INTRAMUSCULAR; INTRAVENOUS; SUBCUTANEOUS at 23:55

## 2022-01-01 RX ADMIN — VANCOMYCIN HYDROCHLORIDE 125 MG: 125 CAPSULE ORAL at 20:27

## 2022-01-01 RX ADMIN — SENNOSIDES AND DOCUSATE SODIUM 2 TABLET: 8.6; 5 TABLET ORAL at 08:18

## 2022-01-01 RX ADMIN — PIPERACILLIN SODIUM AND TAZOBACTAM SODIUM 4.5 G: 4; .5 INJECTION, POWDER, LYOPHILIZED, FOR SOLUTION INTRAVENOUS at 12:11

## 2022-01-01 RX ADMIN — ACETAMINOPHEN 325MG 975 MG: 325 TABLET ORAL at 05:10

## 2022-01-01 RX ADMIN — SODIUM CHLORIDE 35 MG: 0.9 INJECTION, SOLUTION INTRAVENOUS at 20:50

## 2022-01-01 RX ADMIN — LOPERAMIDE HYDROCHLORIDE 2 MG: 2 CAPSULE ORAL at 09:18

## 2022-01-01 RX ADMIN — ACETAMINOPHEN 325MG 975 MG: 325 TABLET ORAL at 20:48

## 2022-01-01 RX ADMIN — VANCOMYCIN HYDROCHLORIDE 125 MG: 125 CAPSULE ORAL at 10:03

## 2022-01-01 RX ADMIN — ESCITALOPRAM 10 MG: 5 TABLET, FILM COATED ORAL at 08:12

## 2022-01-01 RX ADMIN — SODIUM CHLORIDE, PRESERVATIVE FREE 5 ML: 5 INJECTION INTRAVENOUS at 04:42

## 2022-01-01 RX ADMIN — HYDROMORPHONE HYDROCHLORIDE 1 MG: 1 INJECTION, SOLUTION INTRAMUSCULAR; INTRAVENOUS; SUBCUTANEOUS at 19:32

## 2022-01-01 RX ADMIN — MORPHINE SULFATE 30 MG: 30 TABLET, EXTENDED RELEASE ORAL at 08:24

## 2022-01-01 RX ADMIN — SODIUM CHLORIDE, SODIUM GLUCONATE, SODIUM ACETATE, POTASSIUM CHLORIDE AND MAGNESIUM CHLORIDE: 526; 502; 368; 37; 30 INJECTION, SOLUTION INTRAVENOUS at 21:53

## 2022-01-01 RX ADMIN — ONDANSETRON 8 MG: 4 TABLET, ORALLY DISINTEGRATING ORAL at 02:18

## 2022-01-01 RX ADMIN — ACETAMINOPHEN 975 MG: 325 TABLET, FILM COATED ORAL at 10:07

## 2022-01-01 RX ADMIN — ACETAMINOPHEN 325MG 975 MG: 325 TABLET ORAL at 20:11

## 2022-01-01 RX ADMIN — AZITHROMYCIN MONOHYDRATE 500 MG: 250 TABLET ORAL at 08:06

## 2022-01-01 RX ADMIN — OXYCODONE HYDROCHLORIDE 10 MG: 5 TABLET ORAL at 01:26

## 2022-01-01 RX ADMIN — MORPHINE SULFATE 30 MG: 30 TABLET, FILM COATED, EXTENDED RELEASE ORAL at 22:20

## 2022-01-01 RX ADMIN — GABAPENTIN 300 MG: 300 CAPSULE ORAL at 19:22

## 2022-01-01 RX ADMIN — MORPHINE SULFATE 30 MG: 15 TABLET, EXTENDED RELEASE ORAL at 20:26

## 2022-01-01 RX ADMIN — CEFAZOLIN SODIUM 2 G: 2 INJECTION, SOLUTION INTRAVENOUS at 11:59

## 2022-01-01 RX ADMIN — OXYCODONE HYDROCHLORIDE 20 MG: 5 TABLET ORAL at 02:23

## 2022-01-01 RX ADMIN — IPRATROPIUM BROMIDE AND ALBUTEROL SULFATE 3 ML: .5; 3 SOLUTION RESPIRATORY (INHALATION) at 14:34

## 2022-01-01 RX ADMIN — ACETAMINOPHEN 975 MG: 325 TABLET, FILM COATED ORAL at 21:48

## 2022-01-01 RX ADMIN — ACETAMINOPHEN 325MG 975 MG: 325 TABLET ORAL at 13:41

## 2022-01-01 RX ADMIN — OXYCODONE HYDROCHLORIDE 20 MG: 5 TABLET ORAL at 10:36

## 2022-01-01 RX ADMIN — HEPARIN SODIUM 5000 UNITS: 5000 INJECTION, SOLUTION INTRAVENOUS; SUBCUTANEOUS at 17:22

## 2022-01-01 RX ADMIN — DOXYCYCLINE 100 MG: 100 INJECTION, POWDER, LYOPHILIZED, FOR SOLUTION INTRAVENOUS at 09:48

## 2022-01-01 RX ADMIN — SODIUM CHLORIDE, PRESERVATIVE FREE 5 ML: 5 INJECTION INTRAVENOUS at 08:35

## 2022-01-01 RX ADMIN — FOSAPREPITANT: 150 INJECTION, POWDER, LYOPHILIZED, FOR SOLUTION INTRAVENOUS at 21:59

## 2022-01-01 RX ADMIN — MORPHINE SULFATE 30 MG: 15 TABLET, EXTENDED RELEASE ORAL at 20:11

## 2022-01-01 RX ADMIN — GABAPENTIN 400 MG: 100 CAPSULE ORAL at 21:17

## 2022-01-01 RX ADMIN — APIXABAN 5 MG: 5 TABLET, FILM COATED ORAL at 19:22

## 2022-01-01 RX ADMIN — INSULIN ASPART 1 UNITS: 100 INJECTION, SOLUTION INTRAVENOUS; SUBCUTANEOUS at 03:27

## 2022-01-01 RX ADMIN — MORPHINE SULFATE 30 MG: 30 TABLET, EXTENDED RELEASE ORAL at 08:18

## 2022-01-01 RX ADMIN — Medication 400 MG: at 20:39

## 2022-01-01 RX ADMIN — Medication 5 ML: at 16:58

## 2022-01-01 RX ADMIN — ACETAMINOPHEN 325MG 975 MG: 325 TABLET ORAL at 13:16

## 2022-01-01 RX ADMIN — SODIUM CHLORIDE, PRESERVATIVE FREE 5 ML: 5 INJECTION INTRAVENOUS at 15:10

## 2022-01-01 RX ADMIN — ENOXAPARIN SODIUM 40 MG: 40 INJECTION SUBCUTANEOUS at 16:05

## 2022-01-01 RX ADMIN — HYDROMORPHONE HYDROCHLORIDE 0.5 MG: 1 INJECTION, SOLUTION INTRAMUSCULAR; INTRAVENOUS; SUBCUTANEOUS at 04:17

## 2022-01-01 RX ADMIN — MORPHINE SULFATE 30 MG: 30 TABLET, EXTENDED RELEASE ORAL at 19:27

## 2022-01-01 RX ADMIN — MORPHINE SULFATE 15 MG: 15 TABLET, EXTENDED RELEASE ORAL at 21:20

## 2022-01-01 RX ADMIN — ONDANSETRON 8 MG: 4 TABLET, ORALLY DISINTEGRATING ORAL at 08:47

## 2022-01-01 RX ADMIN — Medication 75 MCG/HR: at 23:28

## 2022-01-01 RX ADMIN — MICAFUNGIN 150 MG: 10 INJECTION, POWDER, LYOPHILIZED, FOR SOLUTION INTRAVENOUS at 13:38

## 2022-01-01 RX ADMIN — OXYCODONE HYDROCHLORIDE 15 MG: 5 TABLET ORAL at 00:07

## 2022-01-01 RX ADMIN — MORPHINE SULFATE 30 MG: 30 TABLET, EXTENDED RELEASE ORAL at 01:10

## 2022-01-01 RX ADMIN — OXYCODONE HYDROCHLORIDE 10 MG: 10 TABLET ORAL at 14:02

## 2022-01-01 RX ADMIN — ACETAMINOPHEN 325MG 975 MG: 325 TABLET ORAL at 21:09

## 2022-01-01 RX ADMIN — OXYCODONE HYDROCHLORIDE 10 MG: 10 TABLET ORAL at 08:34

## 2022-01-01 RX ADMIN — MIDAZOLAM 2 MG: 1 INJECTION INTRAMUSCULAR; INTRAVENOUS at 14:30

## 2022-01-01 RX ADMIN — MORPHINE SULFATE 30 MG: 30 TABLET, EXTENDED RELEASE ORAL at 01:57

## 2022-01-01 RX ADMIN — OXYCODONE HYDROCHLORIDE 10 MG: 5 TABLET ORAL at 09:07

## 2022-01-01 RX ADMIN — MORPHINE SULFATE 30 MG: 30 TABLET, FILM COATED, EXTENDED RELEASE ORAL at 09:49

## 2022-01-01 RX ADMIN — HYDROMORPHONE HYDROCHLORIDE 0.5 MG: 1 INJECTION, SOLUTION INTRAMUSCULAR; INTRAVENOUS; SUBCUTANEOUS at 08:28

## 2022-01-01 RX ADMIN — PHENYLEPHRINE HYDROCHLORIDE 0.3 MCG/KG/MIN: 10 INJECTION INTRAVENOUS at 18:16

## 2022-01-01 RX ADMIN — IOPAMIDOL 105 ML: 755 INJECTION, SOLUTION INTRAVENOUS at 13:29

## 2022-01-01 RX ADMIN — GUAIFENESIN AND CODEINE PHOSPHATE 5 ML: 10; 100 LIQUID ORAL at 12:08

## 2022-01-01 RX ADMIN — ACETAMINOPHEN 325MG 975 MG: 325 TABLET ORAL at 05:04

## 2022-01-01 RX ADMIN — AZITHROMYCIN MONOHYDRATE 500 MG: 500 INJECTION, POWDER, LYOPHILIZED, FOR SOLUTION INTRAVENOUS at 19:00

## 2022-01-01 RX ADMIN — PIPERACILLIN SODIUM AND TAZOBACTAM SODIUM 4.5 G: 4; .5 INJECTION, POWDER, LYOPHILIZED, FOR SOLUTION INTRAVENOUS at 11:52

## 2022-01-01 RX ADMIN — MORPHINE SULFATE 30 MG: 30 TABLET, FILM COATED, EXTENDED RELEASE ORAL at 07:43

## 2022-01-01 RX ADMIN — DOXYCYCLINE HYCLATE 100 MG: 100 CAPSULE ORAL at 08:34

## 2022-01-01 RX ADMIN — MORPHINE SULFATE 30 MG: 30 TABLET, EXTENDED RELEASE ORAL at 20:33

## 2022-01-01 RX ADMIN — Medication 400 MG: at 08:48

## 2022-01-01 RX ADMIN — ACETAMINOPHEN 325MG 975 MG: 325 TABLET ORAL at 09:34

## 2022-01-01 RX ADMIN — Medication 20 MCG: at 14:48

## 2022-01-01 RX ADMIN — Medication 5 ML: at 23:07

## 2022-01-01 RX ADMIN — MORPHINE SULFATE 30 MG: 30 TABLET, FILM COATED, EXTENDED RELEASE ORAL at 08:15

## 2022-01-01 RX ADMIN — ENOXAPARIN SODIUM 40 MG: 40 INJECTION SUBCUTANEOUS at 16:10

## 2022-01-01 RX ADMIN — LOPERAMIDE HYDROCHLORIDE 2 MG: 2 CAPSULE ORAL at 13:20

## 2022-01-01 RX ADMIN — MORPHINE SULFATE 30 MG: 15 TABLET, FILM COATED, EXTENDED RELEASE ORAL at 20:35

## 2022-01-01 RX ADMIN — ACETAMINOPHEN 325MG 975 MG: 325 TABLET ORAL at 05:48

## 2022-01-01 RX ADMIN — CEFAZOLIN SODIUM 2 G: 2 INJECTION, SOLUTION INTRAVENOUS at 20:11

## 2022-01-01 RX ADMIN — OXYCODONE HYDROCHLORIDE 20 MG: 5 TABLET ORAL at 09:26

## 2022-01-01 RX ADMIN — SODIUM CHLORIDE 1000 ML: 9 INJECTION, SOLUTION INTRAVENOUS at 23:54

## 2022-01-01 RX ADMIN — PHENYLEPHRINE HYDROCHLORIDE 100 MCG: 10 INJECTION INTRAVENOUS at 17:36

## 2022-01-01 RX ADMIN — Medication 1 UNITS: at 19:24

## 2022-01-01 RX ADMIN — GABAPENTIN 300 MG: 300 CAPSULE ORAL at 20:33

## 2022-01-01 RX ADMIN — SODIUM CHLORIDE, POTASSIUM CHLORIDE, SODIUM LACTATE AND CALCIUM CHLORIDE: 600; 310; 30; 20 INJECTION, SOLUTION INTRAVENOUS at 11:36

## 2022-01-01 RX ADMIN — ACETAMINOPHEN 325MG 975 MG: 325 TABLET ORAL at 05:01

## 2022-01-01 RX ADMIN — OXYCODONE HYDROCHLORIDE 20 MG: 5 TABLET ORAL at 06:28

## 2022-01-01 RX ADMIN — GABAPENTIN 400 MG: 100 CAPSULE ORAL at 19:20

## 2022-01-01 RX ADMIN — PIPERACILLIN SODIUM AND TAZOBACTAM SODIUM 4.5 G: 4; .5 INJECTION, POWDER, LYOPHILIZED, FOR SOLUTION INTRAVENOUS at 05:26

## 2022-01-01 RX ADMIN — ACETAMINOPHEN 325MG 975 MG: 325 TABLET ORAL at 17:42

## 2022-01-01 RX ADMIN — ATORVASTATIN CALCIUM 80 MG: 80 TABLET, FILM COATED ORAL at 21:12

## 2022-01-01 RX ADMIN — FUROSEMIDE 20 MG: 10 INJECTION, SOLUTION INTRAVENOUS at 13:25

## 2022-01-01 RX ADMIN — MORPHINE SULFATE 30 MG: 30 TABLET, FILM COATED, EXTENDED RELEASE ORAL at 20:08

## 2022-01-01 RX ADMIN — OXYCODONE HYDROCHLORIDE 20 MG: 5 TABLET ORAL at 20:47

## 2022-01-01 RX ADMIN — Medication 1 DROP: at 07:05

## 2022-01-01 RX ADMIN — OXYCODONE HYDROCHLORIDE 20 MG: 5 TABLET ORAL at 15:42

## 2022-01-01 RX ADMIN — ASPIRIN 325 MG ORAL TABLET 325 MG: 325 PILL ORAL at 21:40

## 2022-01-01 RX ADMIN — PROCHLORPERAZINE MALEATE 10 MG: 10 TABLET ORAL at 16:36

## 2022-01-01 RX ADMIN — ACETAMINOPHEN 325MG 975 MG: 325 TABLET ORAL at 05:28

## 2022-01-01 RX ADMIN — OXYCODONE HYDROCHLORIDE 20 MG: 5 TABLET ORAL at 13:32

## 2022-01-01 RX ADMIN — OXYCODONE HYDROCHLORIDE 20 MG: 5 TABLET ORAL at 21:28

## 2022-01-01 RX ADMIN — BENZONATATE 100 MG: 100 CAPSULE ORAL at 17:11

## 2022-01-01 RX ADMIN — ACETAMINOPHEN 975 MG: 325 TABLET, FILM COATED ORAL at 21:37

## 2022-01-01 RX ADMIN — APIXABAN 5 MG: 5 TABLET, FILM COATED ORAL at 21:19

## 2022-01-01 RX ADMIN — KETAMINE HYDROCHLORIDE 10 MG/HR: 100 INJECTION, SOLUTION, CONCENTRATE INTRAMUSCULAR; INTRAVENOUS at 10:22

## 2022-01-01 RX ADMIN — ACETAMINOPHEN 325MG 975 MG: 325 TABLET ORAL at 20:40

## 2022-01-01 RX ADMIN — BUPIVACAINE HYDROCHLORIDE: 7.5 INJECTION, SOLUTION EPIDURAL; RETROBULBAR at 06:22

## 2022-01-01 RX ADMIN — OXYCODONE HYDROCHLORIDE 20 MG: 5 TABLET ORAL at 15:49

## 2022-01-01 RX ADMIN — OLANZAPINE 2.5 MG: 2.5 TABLET, FILM COATED ORAL at 01:24

## 2022-01-01 RX ADMIN — ENOXAPARIN SODIUM 40 MG: 40 INJECTION SUBCUTANEOUS at 16:59

## 2022-01-01 RX ADMIN — GABAPENTIN 400 MG: 100 CAPSULE ORAL at 10:03

## 2022-01-01 RX ADMIN — CEFPODOXIME PROXETIL 200 MG: 200 TABLET, FILM COATED ORAL at 20:18

## 2022-01-01 RX ADMIN — POLYETHYLENE GLYCOL 3350 17 G: 17 POWDER, FOR SOLUTION ORAL at 08:24

## 2022-01-01 RX ADMIN — IPRATROPIUM BROMIDE AND ALBUTEROL SULFATE 3 ML: .5; 3 SOLUTION RESPIRATORY (INHALATION) at 08:39

## 2022-01-01 RX ADMIN — HEPARIN SODIUM 5000 UNITS: 5000 INJECTION, SOLUTION INTRAVENOUS; SUBCUTANEOUS at 09:05

## 2022-01-01 RX ADMIN — FUROSEMIDE 20 MG: 10 INJECTION, SOLUTION INTRAMUSCULAR; INTRAVENOUS at 11:46

## 2022-01-01 RX ADMIN — OXYCODONE HYDROCHLORIDE 20 MG: 5 TABLET ORAL at 17:33

## 2022-01-01 RX ADMIN — POTASSIUM CHLORIDE: 2 INJECTION, SOLUTION, CONCENTRATE INTRAVENOUS at 01:58

## 2022-01-01 RX ADMIN — IPRATROPIUM BROMIDE AND ALBUTEROL SULFATE 3 ML: .5; 3 SOLUTION RESPIRATORY (INHALATION) at 09:12

## 2022-01-01 RX ADMIN — APIXABAN 5 MG: 5 TABLET, FILM COATED ORAL at 08:56

## 2022-01-01 RX ADMIN — MORPHINE SULFATE 30 MG: 30 TABLET, FILM COATED, EXTENDED RELEASE ORAL at 08:05

## 2022-01-01 RX ADMIN — SODIUM CHLORIDE, PRESERVATIVE FREE 5 ML: 5 INJECTION INTRAVENOUS at 09:00

## 2022-01-01 RX ADMIN — APIXABAN 5 MG: 5 TABLET, FILM COATED ORAL at 20:18

## 2022-01-01 RX ADMIN — OLANZAPINE 2.5 MG: 2.5 TABLET, FILM COATED ORAL at 21:54

## 2022-01-01 RX ADMIN — IOPAMIDOL 60 ML: 755 INJECTION, SOLUTION INTRAVENOUS at 13:05

## 2022-01-01 RX ADMIN — GABAPENTIN 400 MG: 100 CAPSULE ORAL at 15:10

## 2022-01-01 RX ADMIN — OXYCODONE HYDROCHLORIDE 20 MG: 5 TABLET ORAL at 13:22

## 2022-01-01 RX ADMIN — HEPARIN SODIUM 5000 UNITS: 5000 INJECTION, SOLUTION INTRAVENOUS; SUBCUTANEOUS at 02:05

## 2022-01-01 RX ADMIN — DOXYCYCLINE 100 MG: 100 INJECTION, POWDER, LYOPHILIZED, FOR SOLUTION INTRAVENOUS at 10:04

## 2022-01-01 RX ADMIN — BENZONATATE 100 MG: 100 CAPSULE ORAL at 14:27

## 2022-01-01 RX ADMIN — APIXABAN 5 MG: 5 TABLET, FILM COATED ORAL at 11:40

## 2022-01-01 RX ADMIN — MORPHINE SULFATE 30 MG: 15 TABLET, EXTENDED RELEASE ORAL at 08:29

## 2022-01-01 RX ADMIN — ESCITALOPRAM OXALATE 10 MG: 10 TABLET ORAL at 07:58

## 2022-01-01 RX ADMIN — ENOXAPARIN SODIUM 40 MG: 40 INJECTION SUBCUTANEOUS at 08:16

## 2022-01-01 RX ADMIN — ALBUTEROL SULFATE 2.5 MG: 2.5 SOLUTION RESPIRATORY (INHALATION) at 04:48

## 2022-01-01 RX ADMIN — IPRATROPIUM BROMIDE AND ALBUTEROL SULFATE 3 ML: 2.5; .5 SOLUTION RESPIRATORY (INHALATION) at 19:19

## 2022-01-01 RX ADMIN — ACETAMINOPHEN 325MG 975 MG: 325 TABLET ORAL at 04:45

## 2022-01-01 RX ADMIN — DOXYCYCLINE HYCLATE 100 MG: 100 CAPSULE ORAL at 20:18

## 2022-01-01 RX ADMIN — ACETAMINOPHEN 325MG 975 MG: 325 TABLET ORAL at 08:58

## 2022-01-01 RX ADMIN — HYDROMORPHONE HYDROCHLORIDE 0.5 MG: 1 INJECTION, SOLUTION INTRAMUSCULAR; INTRAVENOUS; SUBCUTANEOUS at 05:50

## 2022-01-01 RX ADMIN — MORPHINE SULFATE 30 MG: 30 TABLET, EXTENDED RELEASE ORAL at 02:22

## 2022-01-01 RX ADMIN — Medication 5 ML: at 22:15

## 2022-01-01 RX ADMIN — SODIUM CHLORIDE: 9 INJECTION, SOLUTION INTRAVENOUS at 11:45

## 2022-01-01 RX ADMIN — Medication 1 LOZENGE: at 09:14

## 2022-01-01 RX ADMIN — HYDROMORPHONE HYDROCHLORIDE 1 MG: 1 INJECTION, SOLUTION INTRAMUSCULAR; INTRAVENOUS; SUBCUTANEOUS at 04:02

## 2022-01-01 RX ADMIN — OXYCODONE HYDROCHLORIDE 20 MG: 5 TABLET ORAL at 15:56

## 2022-01-01 RX ADMIN — GABAPENTIN 400 MG: 100 CAPSULE ORAL at 08:33

## 2022-01-01 RX ADMIN — OXYCODONE HYDROCHLORIDE 10 MG: 10 TABLET ORAL at 14:23

## 2022-01-01 RX ADMIN — OXYCODONE HYDROCHLORIDE 20 MG: 5 TABLET ORAL at 10:04

## 2022-01-01 RX ADMIN — OXYCODONE HYDROCHLORIDE 20 MG: 5 TABLET ORAL at 21:14

## 2022-01-01 RX ADMIN — Medication 400 MG: at 08:34

## 2022-01-01 RX ADMIN — IPRATROPIUM BROMIDE AND ALBUTEROL SULFATE 3 ML: 2.5; .5 SOLUTION RESPIRATORY (INHALATION) at 17:07

## 2022-01-01 RX ADMIN — GABAPENTIN 300 MG: 300 CAPSULE ORAL at 08:33

## 2022-01-01 RX ADMIN — MORPHINE SULFATE 30 MG: 15 TABLET, EXTENDED RELEASE ORAL at 01:54

## 2022-01-01 RX ADMIN — OXYCODONE HYDROCHLORIDE 20 MG: 5 TABLET ORAL at 13:06

## 2022-01-01 RX ADMIN — CEFPODOXIME PROXETIL 200 MG: 200 TABLET, FILM COATED ORAL at 07:56

## 2022-01-01 RX ADMIN — SODIUM CHLORIDE, POTASSIUM CHLORIDE, SODIUM LACTATE AND CALCIUM CHLORIDE: 600; 310; 30; 20 INJECTION, SOLUTION INTRAVENOUS at 00:06

## 2022-01-01 RX ADMIN — ENOXAPARIN SODIUM 40 MG: 40 INJECTION SUBCUTANEOUS at 16:13

## 2022-01-01 RX ADMIN — PIPERACILLIN SODIUM AND TAZOBACTAM SODIUM 4.5 G: 4; .5 INJECTION, POWDER, LYOPHILIZED, FOR SOLUTION INTRAVENOUS at 00:17

## 2022-01-01 RX ADMIN — HYDROMORPHONE HYDROCHLORIDE 1 MG: 1 INJECTION, SOLUTION INTRAMUSCULAR; INTRAVENOUS; SUBCUTANEOUS at 16:28

## 2022-01-01 RX ADMIN — ESCITALOPRAM OXALATE 10 MG: 10 TABLET ORAL at 10:08

## 2022-01-01 RX ADMIN — OXYCODONE HYDROCHLORIDE 20 MG: 5 TABLET ORAL at 15:58

## 2022-01-01 RX ADMIN — OXYCODONE HYDROCHLORIDE 20 MG: 5 TABLET ORAL at 18:55

## 2022-01-01 RX ADMIN — GABAPENTIN 300 MG: 300 CAPSULE ORAL at 14:02

## 2022-01-01 RX ADMIN — DOXYCYCLINE HYCLATE 100 MG: 100 CAPSULE ORAL at 20:39

## 2022-01-01 RX ADMIN — APIXABAN 5 MG: 5 TABLET, FILM COATED ORAL at 10:01

## 2022-01-01 RX ADMIN — SENNOSIDES AND DOCUSATE SODIUM 2 TABLET: 8.6; 5 TABLET ORAL at 14:05

## 2022-01-01 RX ADMIN — ESCITALOPRAM OXALATE 10 MG: 10 TABLET ORAL at 09:14

## 2022-01-01 RX ADMIN — OXYCODONE HYDROCHLORIDE 20 MG: 5 TABLET ORAL at 05:49

## 2022-01-01 RX ADMIN — ACETAMINOPHEN 975 MG: 325 TABLET, FILM COATED ORAL at 03:18

## 2022-01-01 RX ADMIN — GABAPENTIN 300 MG: 300 CAPSULE ORAL at 08:46

## 2022-01-01 RX ADMIN — APIXABAN 5 MG: 5 TABLET, FILM COATED ORAL at 08:33

## 2022-01-01 RX ADMIN — ACETAMINOPHEN 975 MG: 325 TABLET, FILM COATED ORAL at 00:32

## 2022-01-01 RX ADMIN — FENTANYL CITRATE 50 MCG: 50 INJECTION, SOLUTION INTRAMUSCULAR; INTRAVENOUS at 14:48

## 2022-01-01 RX ADMIN — DOXYCYCLINE HYCLATE 100 MG: 100 CAPSULE ORAL at 08:36

## 2022-01-01 RX ADMIN — ACETAMINOPHEN 975 MG: 325 TABLET, FILM COATED ORAL at 23:51

## 2022-01-01 RX ADMIN — DOXYCYCLINE HYCLATE 100 MG: 100 CAPSULE ORAL at 08:21

## 2022-01-01 RX ADMIN — OXYCODONE HYDROCHLORIDE 20 MG: 5 TABLET ORAL at 13:33

## 2022-01-01 RX ADMIN — OXYCODONE HYDROCHLORIDE 20 MG: 5 TABLET ORAL at 16:29

## 2022-01-01 RX ADMIN — HYDROMORPHONE HYDROCHLORIDE 2 MG: 2 TABLET ORAL at 09:14

## 2022-01-01 RX ADMIN — OXYCODONE HYDROCHLORIDE 20 MG: 5 TABLET ORAL at 17:27

## 2022-01-01 RX ADMIN — VANCOMYCIN HYDROCHLORIDE 125 MG: 125 CAPSULE ORAL at 12:11

## 2022-01-01 RX ADMIN — ACETAMINOPHEN 325MG 975 MG: 325 TABLET ORAL at 05:11

## 2022-01-01 RX ADMIN — OXYCODONE HYDROCHLORIDE 20 MG: 5 TABLET ORAL at 17:29

## 2022-01-01 RX ADMIN — ACETAMINOPHEN 325MG 975 MG: 325 TABLET ORAL at 13:06

## 2022-01-01 RX ADMIN — OXYCODONE HYDROCHLORIDE 20 MG: 5 TABLET ORAL at 08:23

## 2022-01-01 RX ADMIN — Medication 5 ML: at 06:01

## 2022-01-01 RX ADMIN — MORPHINE SULFATE 30 MG: 30 TABLET, EXTENDED RELEASE ORAL at 08:58

## 2022-01-01 RX ADMIN — THERA TABS 1 TABLET: TAB at 08:06

## 2022-01-01 RX ADMIN — HYDROMORPHONE HYDROCHLORIDE 0.5 MG: 1 INJECTION, SOLUTION INTRAMUSCULAR; INTRAVENOUS; SUBCUTANEOUS at 13:39

## 2022-01-01 RX ADMIN — IOPAMIDOL 61 ML: 755 INJECTION, SOLUTION INTRAVENOUS at 21:37

## 2022-01-01 RX ADMIN — PHENYLEPHRINE HYDROCHLORIDE 200 MCG: 10 INJECTION INTRAVENOUS at 19:17

## 2022-01-01 RX ADMIN — OXYCODONE HYDROCHLORIDE 20 MG: 5 TABLET ORAL at 13:41

## 2022-01-01 RX ADMIN — ACETAMINOPHEN 975 MG: 325 TABLET, FILM COATED ORAL at 17:22

## 2022-01-01 RX ADMIN — ACETAMINOPHEN 975 MG: 325 TABLET, FILM COATED ORAL at 21:52

## 2022-01-01 RX ADMIN — SODIUM CHLORIDE, POTASSIUM CHLORIDE, SODIUM LACTATE AND CALCIUM CHLORIDE: 600; 310; 30; 20 INJECTION, SOLUTION INTRAVENOUS at 12:02

## 2022-01-01 RX ADMIN — GABAPENTIN 300 MG: 300 CAPSULE ORAL at 20:18

## 2022-01-01 RX ADMIN — OXYCODONE HYDROCHLORIDE 20 MG: 5 TABLET ORAL at 19:02

## 2022-01-01 RX ADMIN — ACETAMINOPHEN 325MG 975 MG: 325 TABLET ORAL at 16:07

## 2022-01-01 RX ADMIN — GUAIFENESIN AND CODEINE PHOSPHATE 5 ML: 10; 100 LIQUID ORAL at 16:47

## 2022-01-01 RX ADMIN — PROPOFOL 150 MG: 10 INJECTION, EMULSION INTRAVENOUS at 15:10

## 2022-01-01 RX ADMIN — ESCITALOPRAM OXALATE 10 MG: 10 TABLET ORAL at 08:45

## 2022-01-01 RX ADMIN — HYDROMORPHONE HYDROCHLORIDE 2 MG: 2 TABLET ORAL at 10:00

## 2022-01-01 RX ADMIN — GUAIFENESIN AND CODEINE PHOSPHATE 5 ML: 10; 100 LIQUID ORAL at 00:08

## 2022-01-01 RX ADMIN — SODIUM CHLORIDE 35 MG: 0.9 INJECTION, SOLUTION INTRAVENOUS at 22:16

## 2022-01-01 RX ADMIN — MORPHINE SULFATE 30 MG: 30 TABLET, FILM COATED, EXTENDED RELEASE ORAL at 22:17

## 2022-01-01 RX ADMIN — MORPHINE SULFATE 30 MG: 30 TABLET, FILM COATED, EXTENDED RELEASE ORAL at 22:43

## 2022-01-01 RX ADMIN — SODIUM CHLORIDE 500 ML: 9 INJECTION, SOLUTION INTRAVENOUS at 11:47

## 2022-01-01 RX ADMIN — ACETAMINOPHEN 975 MG: 325 TABLET, FILM COATED ORAL at 00:16

## 2022-01-01 RX ADMIN — MORPHINE SULFATE 30 MG: 15 TABLET, EXTENDED RELEASE ORAL at 08:59

## 2022-01-01 RX ADMIN — ENOXAPARIN SODIUM 40 MG: 40 INJECTION SUBCUTANEOUS at 09:34

## 2022-01-01 RX ADMIN — OXYCODONE HYDROCHLORIDE 20 MG: 5 TABLET ORAL at 06:45

## 2022-01-01 RX ADMIN — ACETAMINOPHEN 325MG 975 MG: 325 TABLET ORAL at 12:19

## 2022-01-01 RX ADMIN — APIXABAN 5 MG: 5 TABLET, FILM COATED ORAL at 20:24

## 2022-01-01 RX ADMIN — ACETAMINOPHEN 325MG 975 MG: 325 TABLET ORAL at 12:55

## 2022-01-01 RX ADMIN — MORPHINE SULFATE 30 MG: 15 TABLET, EXTENDED RELEASE ORAL at 07:56

## 2022-01-01 RX ADMIN — OXYCODONE HYDROCHLORIDE 20 MG: 5 TABLET ORAL at 14:02

## 2022-01-01 RX ADMIN — IOPAMIDOL 61 ML: 755 INJECTION, SOLUTION INTRAVENOUS at 21:18

## 2022-01-01 RX ADMIN — BENZONATATE 100 MG: 100 CAPSULE ORAL at 00:07

## 2022-01-01 RX ADMIN — CEFAZOLIN SODIUM 2 G: 2 INJECTION, SOLUTION INTRAVENOUS at 03:11

## 2022-01-01 RX ADMIN — ACETAMINOPHEN 975 MG: 325 TABLET, FILM COATED ORAL at 10:01

## 2022-01-01 RX ADMIN — SODIUM CHLORIDE 35 MG: 0.9 INJECTION, SOLUTION INTRAVENOUS at 23:56

## 2022-01-01 RX ADMIN — ESCITALOPRAM OXALATE 10 MG: 10 TABLET ORAL at 07:55

## 2022-01-01 RX ADMIN — Medication 20 MG: at 14:38

## 2022-01-01 RX ADMIN — ACETAMINOPHEN 325MG 975 MG: 325 TABLET ORAL at 08:29

## 2022-01-01 RX ADMIN — ACETAMINOPHEN 325MG 975 MG: 325 TABLET ORAL at 20:25

## 2022-01-01 RX ADMIN — ACETAMINOPHEN 975 MG: 325 TABLET, FILM COATED ORAL at 14:28

## 2022-01-01 RX ADMIN — OXYCODONE HYDROCHLORIDE 20 MG: 5 TABLET ORAL at 22:38

## 2022-01-01 RX ADMIN — OXYCODONE HYDROCHLORIDE 20 MG: 5 TABLET ORAL at 18:00

## 2022-01-01 RX ADMIN — ACETAMINOPHEN 325MG 975 MG: 325 TABLET ORAL at 04:55

## 2022-01-01 RX ADMIN — MORPHINE SULFATE 30 MG: 15 TABLET, EXTENDED RELEASE ORAL at 11:55

## 2022-01-01 RX ADMIN — GABAPENTIN 400 MG: 100 CAPSULE ORAL at 08:21

## 2022-01-01 RX ADMIN — PIPERACILLIN SODIUM AND TAZOBACTAM SODIUM 4.5 G: 4; .5 INJECTION, POWDER, LYOPHILIZED, FOR SOLUTION INTRAVENOUS at 05:07

## 2022-01-01 RX ADMIN — GABAPENTIN 300 MG: 300 CAPSULE ORAL at 14:36

## 2022-01-01 RX ADMIN — CEFPODOXIME PROXETIL 200 MG: 200 TABLET, FILM COATED ORAL at 07:57

## 2022-01-01 RX ADMIN — OXYCODONE HYDROCHLORIDE 10 MG: 10 TABLET ORAL at 17:07

## 2022-01-01 RX ADMIN — ACETAMINOPHEN 325MG 975 MG: 325 TABLET ORAL at 05:39

## 2022-01-01 RX ADMIN — IOPAMIDOL 123 ML: 755 INJECTION, SOLUTION INTRAVENOUS at 13:42

## 2022-01-01 RX ADMIN — HYDROMORPHONE HYDROCHLORIDE 0.5 MG: 1 INJECTION, SOLUTION INTRAMUSCULAR; INTRAVENOUS; SUBCUTANEOUS at 05:07

## 2022-01-01 RX ADMIN — OXYCODONE HYDROCHLORIDE 20 MG: 5 TABLET ORAL at 10:41

## 2022-01-01 RX ADMIN — HYDROMORPHONE HYDROCHLORIDE 0.5 MG: 1 INJECTION, SOLUTION INTRAMUSCULAR; INTRAVENOUS; SUBCUTANEOUS at 03:07

## 2022-01-01 RX ADMIN — BENZONATATE 100 MG: 100 CAPSULE ORAL at 08:48

## 2022-01-01 RX ADMIN — MORPHINE SULFATE 15 MG: 15 TABLET, EXTENDED RELEASE ORAL at 08:33

## 2022-01-01 RX ADMIN — ACETAMINOPHEN 325MG 975 MG: 325 TABLET ORAL at 21:18

## 2022-01-01 RX ADMIN — SODIUM CHLORIDE, POTASSIUM CHLORIDE, SODIUM LACTATE AND CALCIUM CHLORIDE: 600; 310; 30; 20 INJECTION, SOLUTION INTRAVENOUS at 17:51

## 2022-01-01 RX ADMIN — ACETAMINOPHEN 975 MG: 325 TABLET, FILM COATED ORAL at 16:54

## 2022-01-01 RX ADMIN — GABAPENTIN 400 MG: 100 CAPSULE ORAL at 09:31

## 2022-01-01 RX ADMIN — POLYETHYLENE GLYCOL 3350 17 G: 17 POWDER, FOR SOLUTION ORAL at 20:49

## 2022-01-01 RX ADMIN — ESCITALOPRAM OXALATE 10 MG: 10 TABLET ORAL at 08:06

## 2022-01-01 RX ADMIN — OXYCODONE HYDROCHLORIDE 20 MG: 5 TABLET ORAL at 19:41

## 2022-01-01 RX ADMIN — OXYCODONE HYDROCHLORIDE 20 MG: 5 TABLET ORAL at 05:59

## 2022-01-01 RX ADMIN — OXYCODONE HYDROCHLORIDE 10 MG: 10 TABLET ORAL at 09:35

## 2022-01-01 RX ADMIN — POTASSIUM CHLORIDE 40 MEQ: 750 TABLET, EXTENDED RELEASE ORAL at 16:43

## 2022-01-01 RX ADMIN — GUAIFENESIN AND CODEINE PHOSPHATE 5 ML: 10; 100 LIQUID ORAL at 10:26

## 2022-01-01 RX ADMIN — ACETAMINOPHEN 975 MG: 325 TABLET, FILM COATED ORAL at 01:38

## 2022-01-01 RX ADMIN — POLYETHYLENE GLYCOL 3350 17 G: 17 POWDER, FOR SOLUTION ORAL at 09:57

## 2022-01-01 RX ADMIN — OXYCODONE HYDROCHLORIDE 20 MG: 5 TABLET ORAL at 02:47

## 2022-01-01 RX ADMIN — OXYCODONE HYDROCHLORIDE 20 MG: 5 TABLET ORAL at 16:53

## 2022-01-01 RX ADMIN — ACETAMINOPHEN 325MG 975 MG: 325 TABLET ORAL at 02:10

## 2022-01-01 RX ADMIN — MORPHINE SULFATE 15 MG: 15 TABLET, EXTENDED RELEASE ORAL at 09:31

## 2022-01-01 RX ADMIN — METRONIDAZOLE 500 MG: 500 INJECTION, SOLUTION INTRAVENOUS at 20:39

## 2022-01-01 RX ADMIN — MORPHINE SULFATE 15 MG: 15 TABLET, EXTENDED RELEASE ORAL at 20:26

## 2022-01-01 RX ADMIN — LOPERAMIDE HYDROCHLORIDE 2 MG: 2 CAPSULE ORAL at 13:10

## 2022-01-01 RX ADMIN — PHENYLEPHRINE HYDROCHLORIDE 200 MCG: 10 INJECTION INTRAVENOUS at 17:55

## 2022-01-01 RX ADMIN — MORPHINE SULFATE 30 MG: 30 TABLET, FILM COATED, EXTENDED RELEASE ORAL at 21:18

## 2022-01-01 RX ADMIN — OXYCODONE HYDROCHLORIDE 20 MG: 5 TABLET ORAL at 00:52

## 2022-01-01 RX ADMIN — MORPHINE SULFATE 30 MG: 30 TABLET, FILM COATED, EXTENDED RELEASE ORAL at 21:30

## 2022-01-01 RX ADMIN — GABAPENTIN 300 MG: 300 CAPSULE ORAL at 08:48

## 2022-01-01 RX ADMIN — OXYCODONE HYDROCHLORIDE 20 MG: 5 TABLET ORAL at 06:11

## 2022-01-01 RX ADMIN — HYDROMORPHONE HYDROCHLORIDE 0.5 MG: 1 INJECTION, SOLUTION INTRAMUSCULAR; INTRAVENOUS; SUBCUTANEOUS at 18:12

## 2022-01-01 RX ADMIN — OXYCODONE HYDROCHLORIDE 20 MG: 5 TABLET ORAL at 13:08

## 2022-01-01 RX ADMIN — BENZONATATE 100 MG: 100 CAPSULE ORAL at 23:38

## 2022-01-01 RX ADMIN — APIXABAN 5 MG: 5 TABLET, FILM COATED ORAL at 21:17

## 2022-01-01 RX ADMIN — OXYCODONE HYDROCHLORIDE 15 MG: 5 TABLET ORAL at 20:33

## 2022-01-01 RX ADMIN — SODIUM CHLORIDE, PRESERVATIVE FREE 5 ML: 5 INJECTION INTRAVENOUS at 01:16

## 2022-01-01 RX ADMIN — PIPERACILLIN SODIUM AND TAZOBACTAM SODIUM 4.5 G: 4; .5 INJECTION, POWDER, LYOPHILIZED, FOR SOLUTION INTRAVENOUS at 23:53

## 2022-01-01 RX ADMIN — SODIUM CHLORIDE, PRESERVATIVE FREE 5 ML: 5 INJECTION INTRAVENOUS at 13:18

## 2022-01-01 RX ADMIN — ALBUMIN (HUMAN): 12.5 SOLUTION INTRAVENOUS at 17:34

## 2022-01-01 RX ADMIN — ACETAMINOPHEN 325MG 975 MG: 325 TABLET ORAL at 20:14

## 2022-01-01 RX ADMIN — ACETAMINOPHEN 325MG 975 MG: 325 TABLET ORAL at 17:15

## 2022-01-01 RX ADMIN — ACETAMINOPHEN 975 MG: 325 TABLET, FILM COATED ORAL at 16:02

## 2022-01-01 RX ADMIN — OXYCODONE HYDROCHLORIDE 20 MG: 5 TABLET ORAL at 00:57

## 2022-01-01 RX ADMIN — APIXABAN 5 MG: 5 TABLET, FILM COATED ORAL at 21:23

## 2022-01-01 RX ADMIN — MORPHINE SULFATE 30 MG: 15 TABLET, FILM COATED, EXTENDED RELEASE ORAL at 09:45

## 2022-01-01 RX ADMIN — SODIUM CHLORIDE 500 ML: 9 INJECTION, SOLUTION INTRAVENOUS at 09:41

## 2022-01-01 RX ADMIN — OXYCODONE HYDROCHLORIDE 20 MG: 5 TABLET ORAL at 09:11

## 2022-01-01 RX ADMIN — Medication 5 ML: at 10:53

## 2022-01-01 RX ADMIN — OXYCODONE HYDROCHLORIDE 10 MG: 10 TABLET ORAL at 12:05

## 2022-01-01 RX ADMIN — IPRATROPIUM BROMIDE AND ALBUTEROL SULFATE 3 ML: .5; 3 SOLUTION RESPIRATORY (INHALATION) at 15:49

## 2022-01-01 RX ADMIN — ACETAMINOPHEN 325MG 975 MG: 325 TABLET ORAL at 05:31

## 2022-01-01 RX ADMIN — GABAPENTIN 400 MG: 100 CAPSULE ORAL at 10:08

## 2022-01-01 RX ADMIN — FILGRASTIM 480 MCG: 480 INJECTION, SOLUTION INTRAVENOUS; SUBCUTANEOUS at 19:13

## 2022-01-01 RX ADMIN — MORPHINE SULFATE 30 MG: 30 TABLET, FILM COATED, EXTENDED RELEASE ORAL at 10:21

## 2022-01-01 RX ADMIN — IOPAMIDOL 75 ML: 755 INJECTION, SOLUTION INTRAVENOUS at 02:43

## 2022-01-01 RX ADMIN — MORPHINE SULFATE 30 MG: 15 TABLET, EXTENDED RELEASE ORAL at 08:34

## 2022-01-01 RX ADMIN — OXYCODONE HYDROCHLORIDE 20 MG: 5 TABLET ORAL at 06:13

## 2022-01-01 RX ADMIN — OXYCODONE HYDROCHLORIDE 20 MG: 5 TABLET ORAL at 13:00

## 2022-01-01 RX ADMIN — OXYCODONE HYDROCHLORIDE 20 MG: 5 TABLET ORAL at 18:42

## 2022-01-01 RX ADMIN — OXYCODONE HYDROCHLORIDE 20 MG: 5 TABLET ORAL at 08:39

## 2022-01-01 RX ADMIN — ESCITALOPRAM OXALATE 10 MG: 10 TABLET ORAL at 07:50

## 2022-01-01 RX ADMIN — OXYCODONE HYDROCHLORIDE 20 MG: 5 TABLET ORAL at 23:48

## 2022-01-01 RX ADMIN — OXYCODONE HYDROCHLORIDE 10 MG: 10 TABLET ORAL at 08:48

## 2022-01-01 RX ADMIN — MORPHINE SULFATE 30 MG: 15 TABLET, EXTENDED RELEASE ORAL at 20:42

## 2022-01-01 RX ADMIN — OXYCODONE HYDROCHLORIDE 10 MG: 10 TABLET ORAL at 02:10

## 2022-01-01 RX ADMIN — OXYCODONE HYDROCHLORIDE 20 MG: 5 TABLET ORAL at 18:41

## 2022-01-01 RX ADMIN — MORPHINE SULFATE 30 MG: 30 TABLET, FILM COATED, EXTENDED RELEASE ORAL at 10:20

## 2022-01-01 RX ADMIN — OXYCODONE HYDROCHLORIDE 10 MG: 10 TABLET ORAL at 12:19

## 2022-01-01 RX ADMIN — DOXYCYCLINE 100 MG: 100 INJECTION, POWDER, LYOPHILIZED, FOR SOLUTION INTRAVENOUS at 22:26

## 2022-01-01 RX ADMIN — MORPHINE SULFATE 30 MG: 15 TABLET, EXTENDED RELEASE ORAL at 08:01

## 2022-01-01 RX ADMIN — VANCOMYCIN HYDROCHLORIDE 1250 MG: 10 INJECTION, POWDER, LYOPHILIZED, FOR SOLUTION INTRAVENOUS at 11:21

## 2022-01-01 RX ADMIN — ENOXAPARIN SODIUM 40 MG: 40 INJECTION SUBCUTANEOUS at 18:42

## 2022-01-01 RX ADMIN — Medication 400 MG: at 07:56

## 2022-01-01 RX ADMIN — DEXAMETHASONE SODIUM PHOSPHATE 12 MG: 4 INJECTION, SOLUTION INTRA-ARTICULAR; INTRALESIONAL; INTRAMUSCULAR; INTRAVENOUS; SOFT TISSUE at 21:59

## 2022-01-01 RX ADMIN — OLANZAPINE 2.5 MG: 2.5 TABLET, FILM COATED ORAL at 21:48

## 2022-01-01 RX ADMIN — DOXYCYCLINE HYCLATE 100 MG: 100 CAPSULE ORAL at 19:22

## 2022-01-01 RX ADMIN — ACETAMINOPHEN 650 MG: 325 TABLET, FILM COATED ORAL at 17:21

## 2022-01-01 RX ADMIN — CEFTRIAXONE SODIUM 1 G: 1 INJECTION, POWDER, FOR SOLUTION INTRAMUSCULAR; INTRAVENOUS at 18:35

## 2022-01-01 RX ADMIN — SODIUM CHLORIDE, POTASSIUM CHLORIDE, SODIUM LACTATE AND CALCIUM CHLORIDE: 600; 310; 30; 20 INJECTION, SOLUTION INTRAVENOUS at 13:34

## 2022-01-01 RX ADMIN — MORPHINE SULFATE 30 MG: 30 TABLET, EXTENDED RELEASE ORAL at 20:52

## 2022-01-01 RX ADMIN — HYDROMORPHONE HYDROCHLORIDE 0.5 MG: 1 INJECTION, SOLUTION INTRAMUSCULAR; INTRAVENOUS; SUBCUTANEOUS at 11:34

## 2022-01-01 RX ADMIN — CALCIUM CHLORIDE 200 MG: 100 INJECTION INTRAVENOUS; INTRAVENTRICULAR at 18:56

## 2022-01-01 RX ADMIN — ACETAMINOPHEN 975 MG: 325 TABLET, FILM COATED ORAL at 09:14

## 2022-01-01 RX ADMIN — ESCITALOPRAM OXALATE 10 MG: 10 TABLET ORAL at 08:22

## 2022-01-01 RX ADMIN — SODIUM CHLORIDE, PRESERVATIVE FREE 5 ML: 5 INJECTION INTRAVENOUS at 04:36

## 2022-01-01 RX ADMIN — OXYCODONE HYDROCHLORIDE 20 MG: 5 TABLET ORAL at 05:17

## 2022-01-01 RX ADMIN — OXYCODONE HYDROCHLORIDE 10 MG: 5 TABLET ORAL at 02:44

## 2022-01-01 RX ADMIN — LIDOCAINE: 40 CREAM TOPICAL at 23:08

## 2022-01-01 RX ADMIN — MICAFUNGIN 150 MG: 10 INJECTION, POWDER, LYOPHILIZED, FOR SOLUTION INTRAVENOUS at 13:04

## 2022-01-01 RX ADMIN — IPRATROPIUM BROMIDE AND ALBUTEROL SULFATE 3 ML: .5; 3 SOLUTION RESPIRATORY (INHALATION) at 20:40

## 2022-01-01 RX ADMIN — ACETAMINOPHEN 975 MG: 325 TABLET, FILM COATED ORAL at 23:53

## 2022-01-01 RX ADMIN — OXYCODONE HYDROCHLORIDE 10 MG: 10 TABLET ORAL at 08:37

## 2022-01-01 RX ADMIN — CEFPODOXIME PROXETIL 200 MG: 200 TABLET, FILM COATED ORAL at 08:22

## 2022-01-01 RX ADMIN — OXYCODONE HYDROCHLORIDE 20 MG: 5 TABLET ORAL at 20:25

## 2022-01-01 RX ADMIN — OXYCODONE HYDROCHLORIDE 20 MG: 5 TABLET ORAL at 00:06

## 2022-01-01 RX ADMIN — ACETAMINOPHEN 325MG 975 MG: 325 TABLET ORAL at 05:06

## 2022-01-01 RX ADMIN — SODIUM CHLORIDE, SODIUM GLUCONATE, SODIUM ACETATE, POTASSIUM CHLORIDE AND MAGNESIUM CHLORIDE: 526; 502; 368; 37; 30 INJECTION, SOLUTION INTRAVENOUS at 15:18

## 2022-01-01 RX ADMIN — POTASSIUM CHLORIDE 60 MEQ: 20 SOLUTION ORAL at 02:17

## 2022-01-01 RX ADMIN — ACETAMINOPHEN 975 MG: 325 TABLET, FILM COATED ORAL at 22:39

## 2022-01-01 RX ADMIN — GABAPENTIN 300 MG: 300 CAPSULE ORAL at 20:39

## 2022-01-01 RX ADMIN — ACETAMINOPHEN 325MG 975 MG: 325 TABLET ORAL at 05:17

## 2022-01-01 RX ADMIN — MORPHINE SULFATE 30 MG: 30 TABLET, FILM COATED, EXTENDED RELEASE ORAL at 10:30

## 2022-01-01 RX ADMIN — OXYCODONE HYDROCHLORIDE 20 MG: 5 TABLET ORAL at 23:33

## 2022-01-01 RX ADMIN — IOPAMIDOL 75 ML: 755 INJECTION, SOLUTION INTRAVENOUS at 20:07

## 2022-01-01 RX ADMIN — PIPERACILLIN SODIUM AND TAZOBACTAM SODIUM 4.5 G: 4; .5 INJECTION, POWDER, LYOPHILIZED, FOR SOLUTION INTRAVENOUS at 08:34

## 2022-01-01 RX ADMIN — HYDROMORPHONE HYDROCHLORIDE 0.5 MG: 1 INJECTION, SOLUTION INTRAMUSCULAR; INTRAVENOUS; SUBCUTANEOUS at 06:32

## 2022-01-01 RX ADMIN — GABAPENTIN 300 MG: 300 CAPSULE ORAL at 14:28

## 2022-01-01 RX ADMIN — ACETAMINOPHEN 325MG 975 MG: 325 TABLET ORAL at 13:01

## 2022-01-01 RX ADMIN — ACETAMINOPHEN 325MG 975 MG: 325 TABLET ORAL at 21:15

## 2022-01-01 RX ADMIN — INSULIN ASPART 1 UNITS: 100 INJECTION, SOLUTION INTRAVENOUS; SUBCUTANEOUS at 00:33

## 2022-01-01 RX ADMIN — GUAIFENESIN AND CODEINE PHOSPHATE 5 ML: 10; 100 LIQUID ORAL at 04:10

## 2022-01-01 RX ADMIN — ACETAMINOPHEN 975 MG: 325 TABLET, FILM COATED ORAL at 22:38

## 2022-01-01 RX ADMIN — APIXABAN 5 MG: 5 TABLET, FILM COATED ORAL at 08:06

## 2022-01-01 RX ADMIN — OXYCODONE HYDROCHLORIDE 20 MG: 5 TABLET ORAL at 06:30

## 2022-01-01 RX ADMIN — OXYCODONE HYDROCHLORIDE 20 MG: 5 TABLET ORAL at 06:21

## 2022-01-01 RX ADMIN — DOXYCYCLINE HYCLATE 100 MG: 100 CAPSULE ORAL at 07:57

## 2022-01-01 RX ADMIN — SODIUM CHLORIDE, POTASSIUM CHLORIDE, SODIUM LACTATE AND CALCIUM CHLORIDE 500 ML: 600; 310; 30; 20 INJECTION, SOLUTION INTRAVENOUS at 01:15

## 2022-01-01 RX ADMIN — PIPERACILLIN SODIUM AND TAZOBACTAM SODIUM 4.5 G: 4; .5 INJECTION, POWDER, LYOPHILIZED, FOR SOLUTION INTRAVENOUS at 10:08

## 2022-01-01 RX ADMIN — OXYCODONE HYDROCHLORIDE 20 MG: 5 TABLET ORAL at 08:34

## 2022-01-01 RX ADMIN — GABAPENTIN 300 MG: 300 CAPSULE ORAL at 14:20

## 2022-01-01 RX ADMIN — MORPHINE SULFATE 30 MG: 15 TABLET, FILM COATED, EXTENDED RELEASE ORAL at 08:14

## 2022-01-01 RX ADMIN — OXYCODONE HYDROCHLORIDE 20 MG: 5 TABLET ORAL at 22:50

## 2022-01-01 RX ADMIN — OXYCODONE HYDROCHLORIDE 20 MG: 5 TABLET ORAL at 02:34

## 2022-01-01 RX ADMIN — ENOXAPARIN SODIUM 40 MG: 40 INJECTION SUBCUTANEOUS at 08:30

## 2022-01-01 RX ADMIN — OXYCODONE HYDROCHLORIDE 20 MG: 5 TABLET ORAL at 21:24

## 2022-01-01 RX ADMIN — KETAMINE HYDROCHLORIDE 10 MG/HR: 100 INJECTION, SOLUTION, CONCENTRATE INTRAMUSCULAR; INTRAVENOUS at 20:10

## 2022-01-01 RX ADMIN — PHENYLEPHRINE HYDROCHLORIDE 200 MCG: 10 INJECTION INTRAVENOUS at 18:16

## 2022-01-01 RX ADMIN — THERA TABS 1 TABLET: TAB at 08:46

## 2022-01-01 RX ADMIN — ALBUMIN HUMAN 50 G: 0.25 SOLUTION INTRAVENOUS at 19:47

## 2022-01-01 RX ADMIN — HYDROMORPHONE HYDROCHLORIDE 1 MG: 1 INJECTION, SOLUTION INTRAMUSCULAR; INTRAVENOUS; SUBCUTANEOUS at 13:50

## 2022-01-01 RX ADMIN — GABAPENTIN 300 MG: 300 CAPSULE ORAL at 07:58

## 2022-01-01 RX ADMIN — OXYCODONE HYDROCHLORIDE 20 MG: 5 TABLET ORAL at 13:51

## 2022-01-01 RX ADMIN — CEFPODOXIME PROXETIL 200 MG: 200 TABLET, FILM COATED ORAL at 20:23

## 2022-01-01 RX ADMIN — ENOXAPARIN SODIUM 40 MG: 40 INJECTION SUBCUTANEOUS at 16:57

## 2022-01-01 RX ADMIN — OXYCODONE HYDROCHLORIDE 20 MG: 5 TABLET ORAL at 18:30

## 2022-01-01 RX ADMIN — IPRATROPIUM BROMIDE AND ALBUTEROL SULFATE 3 ML: .5; 3 SOLUTION RESPIRATORY (INHALATION) at 14:44

## 2022-01-01 RX ADMIN — OXYCODONE HYDROCHLORIDE 20 MG: 5 TABLET ORAL at 12:38

## 2022-01-01 RX ADMIN — GABAPENTIN 300 MG: 300 CAPSULE ORAL at 19:35

## 2022-01-01 RX ADMIN — ESCITALOPRAM 10 MG: 5 TABLET, FILM COATED ORAL at 09:14

## 2022-01-01 RX ADMIN — OXYCODONE HYDROCHLORIDE 10 MG: 10 TABLET ORAL at 21:11

## 2022-01-01 RX ADMIN — Medication 20 MCG: at 14:44

## 2022-01-01 RX ADMIN — PHENYLEPHRINE HYDROCHLORIDE 100 MCG: 10 INJECTION INTRAVENOUS at 17:43

## 2022-01-01 RX ADMIN — MORPHINE SULFATE 15 MG: 15 TABLET, EXTENDED RELEASE ORAL at 09:14

## 2022-01-01 RX ADMIN — DIAZEPAM 5 MG: 5 TABLET ORAL at 03:31

## 2022-01-01 RX ADMIN — ENOXAPARIN SODIUM 40 MG: 40 INJECTION SUBCUTANEOUS at 19:51

## 2022-01-01 RX ADMIN — HYDROMORPHONE HYDROCHLORIDE 2 MG: 2 TABLET ORAL at 03:52

## 2022-01-01 RX ADMIN — APIXABAN 5 MG: 5 TABLET, FILM COATED ORAL at 20:39

## 2022-01-01 RX ADMIN — GABAPENTIN 300 MG: 300 CAPSULE ORAL at 19:34

## 2022-01-01 RX ADMIN — GABAPENTIN 400 MG: 100 CAPSULE ORAL at 21:23

## 2022-01-01 RX ADMIN — POTASSIUM CHLORIDE 10 MEQ: 7.46 INJECTION, SOLUTION INTRAVENOUS at 13:05

## 2022-01-01 RX ADMIN — HYDROMORPHONE HYDROCHLORIDE 0.5 MG: 1 INJECTION, SOLUTION INTRAMUSCULAR; INTRAVENOUS; SUBCUTANEOUS at 19:08

## 2022-01-01 RX ADMIN — CEFPODOXIME PROXETIL 200 MG: 200 TABLET, FILM COATED ORAL at 08:34

## 2022-01-01 RX ADMIN — HYDROMORPHONE HYDROCHLORIDE 0.5 MG: 1 INJECTION, SOLUTION INTRAMUSCULAR; INTRAVENOUS; SUBCUTANEOUS at 03:21

## 2022-01-01 RX ADMIN — SODIUM CHLORIDE, POTASSIUM CHLORIDE, SODIUM LACTATE AND CALCIUM CHLORIDE: 600; 310; 30; 20 INJECTION, SOLUTION INTRAVENOUS at 06:06

## 2022-01-01 RX ADMIN — ESCITALOPRAM OXALATE 10 MG: 10 TABLET ORAL at 08:34

## 2022-01-01 RX ADMIN — OXYCODONE HYDROCHLORIDE 15 MG: 5 TABLET ORAL at 14:05

## 2022-01-01 RX ADMIN — UMECLIDINIUM BROMIDE AND VILANTEROL TRIFENATATE 1 PUFF: 62.5; 25 POWDER RESPIRATORY (INHALATION) at 08:48

## 2022-01-01 RX ADMIN — HUMAN ALBUMIN MICROSPHERES AND PERFLUTREN 6 ML: 10; .22 INJECTION, SOLUTION INTRAVENOUS at 15:42

## 2022-01-01 RX ADMIN — POTASSIUM CHLORIDE: 2 INJECTION, SOLUTION, CONCENTRATE INTRAVENOUS at 21:03

## 2022-01-01 RX ADMIN — IOPAMIDOL 112 ML: 755 INJECTION, SOLUTION INTRAVENOUS at 15:59

## 2022-01-01 RX ADMIN — PIPERACILLIN SODIUM AND TAZOBACTAM SODIUM 4.5 G: 4; .5 INJECTION, POWDER, LYOPHILIZED, FOR SOLUTION INTRAVENOUS at 21:39

## 2022-01-01 RX ADMIN — ENOXAPARIN SODIUM 40 MG: 40 INJECTION SUBCUTANEOUS at 20:11

## 2022-01-01 RX ADMIN — HEPARIN SODIUM 5000 UNITS: 5000 INJECTION, SOLUTION INTRAVENOUS; SUBCUTANEOUS at 09:58

## 2022-01-01 RX ADMIN — DOXYCYCLINE 100 MG: 100 INJECTION, POWDER, LYOPHILIZED, FOR SOLUTION INTRAVENOUS at 10:35

## 2022-01-01 RX ADMIN — METRONIDAZOLE 500 MG: 500 INJECTION, SOLUTION INTRAVENOUS at 04:42

## 2022-01-01 RX ADMIN — ACETAMINOPHEN 325MG 975 MG: 325 TABLET ORAL at 21:28

## 2022-01-01 RX ADMIN — POTASSIUM CHLORIDE 10 MEQ: 7.46 INJECTION, SOLUTION INTRAVENOUS at 14:16

## 2022-01-01 RX ADMIN — MICAFUNGIN 150 MG: 10 INJECTION, POWDER, LYOPHILIZED, FOR SOLUTION INTRAVENOUS at 13:28

## 2022-01-01 RX ADMIN — ACETAMINOPHEN 325MG 975 MG: 325 TABLET ORAL at 06:29

## 2022-01-01 RX ADMIN — OXYCODONE HYDROCHLORIDE 15 MG: 5 TABLET ORAL at 09:10

## 2022-01-01 RX ADMIN — SODIUM CHLORIDE 1000 ML: 9 INJECTION, SOLUTION INTRAVENOUS at 14:31

## 2022-01-01 RX ADMIN — MORPHINE SULFATE 30 MG: 30 TABLET, FILM COATED, EXTENDED RELEASE ORAL at 20:00

## 2022-01-01 RX ADMIN — PHENYLEPHRINE HYDROCHLORIDE 100 MCG: 10 INJECTION INTRAVENOUS at 19:15

## 2022-01-01 RX ADMIN — MORPHINE SULFATE 30 MG: 30 TABLET, FILM COATED, EXTENDED RELEASE ORAL at 09:07

## 2022-01-01 RX ADMIN — LORAZEPAM 0.5 MG: 0.5 TABLET ORAL at 21:20

## 2022-01-01 RX ADMIN — ACETAMINOPHEN 975 MG: 325 TABLET, FILM COATED ORAL at 15:58

## 2022-01-01 RX ADMIN — ESCITALOPRAM OXALATE 10 MG: 10 TABLET ORAL at 08:15

## 2022-01-01 RX ADMIN — PROPOFOL 40 MCG/KG/MIN: 10 INJECTION, EMULSION INTRAVENOUS at 05:56

## 2022-01-01 RX ADMIN — MORPHINE SULFATE 30 MG: 30 TABLET, FILM COATED, EXTENDED RELEASE ORAL at 20:22

## 2022-01-01 RX ADMIN — DOXYCYCLINE 100 MG: 100 INJECTION, POWDER, LYOPHILIZED, FOR SOLUTION INTRAVENOUS at 21:38

## 2022-01-01 RX ADMIN — ACETAMINOPHEN 975 MG: 325 TABLET, FILM COATED ORAL at 16:22

## 2022-01-01 RX ADMIN — OXYCODONE HYDROCHLORIDE 20 MG: 5 TABLET ORAL at 20:11

## 2022-01-01 RX ADMIN — OXYCODONE HYDROCHLORIDE 20 MG: 5 TABLET ORAL at 23:36

## 2022-01-01 RX ADMIN — CEFTRIAXONE SODIUM 1 G: 1 INJECTION, POWDER, FOR SOLUTION INTRAMUSCULAR; INTRAVENOUS at 17:19

## 2022-01-01 RX ADMIN — DEXTROSE AND SODIUM CHLORIDE: 5; 450 INJECTION, SOLUTION INTRAVENOUS at 03:22

## 2022-01-01 RX ADMIN — MORPHINE SULFATE 30 MG: 30 TABLET, EXTENDED RELEASE ORAL at 20:07

## 2022-01-01 RX ADMIN — ENOXAPARIN SODIUM 40 MG: 40 INJECTION SUBCUTANEOUS at 17:23

## 2022-01-01 RX ADMIN — Medication 1 LOZENGE: at 21:42

## 2022-01-01 RX ADMIN — ENOXAPARIN SODIUM 40 MG: 40 INJECTION SUBCUTANEOUS at 18:28

## 2022-01-01 RX ADMIN — OXYCODONE HYDROCHLORIDE 20 MG: 5 TABLET ORAL at 16:58

## 2022-01-01 RX ADMIN — MORPHINE SULFATE 15 MG: 15 TABLET, EXTENDED RELEASE ORAL at 21:23

## 2022-01-01 RX ADMIN — OXYCODONE HYDROCHLORIDE 20 MG: 5 TABLET ORAL at 03:52

## 2022-01-01 RX ADMIN — ACETAMINOPHEN 325MG 975 MG: 325 TABLET ORAL at 20:22

## 2022-01-01 RX ADMIN — PROCHLORPERAZINE MALEATE 10 MG: 5 TABLET ORAL at 17:06

## 2022-01-01 RX ADMIN — FENTANYL CITRATE 50 MCG: 50 INJECTION, SOLUTION INTRAMUSCULAR; INTRAVENOUS at 00:53

## 2022-01-01 RX ADMIN — STANDARDIZED SENNA CONCENTRATE 8.6 MG: 8.6 TABLET ORAL at 20:48

## 2022-01-01 RX ADMIN — APIXABAN 5 MG: 5 TABLET, FILM COATED ORAL at 08:48

## 2022-01-01 RX ADMIN — MORPHINE SULFATE 30 MG: 30 TABLET, EXTENDED RELEASE ORAL at 10:20

## 2022-01-01 RX ADMIN — POTASSIUM CHLORIDE 20 MEQ: 29.8 INJECTION, SOLUTION INTRAVENOUS at 23:56

## 2022-01-01 RX ADMIN — MORPHINE SULFATE 30 MG: 30 TABLET, FILM COATED, EXTENDED RELEASE ORAL at 22:45

## 2022-01-01 RX ADMIN — BENZONATATE 100 MG: 100 CAPSULE ORAL at 00:09

## 2022-01-01 RX ADMIN — OXYCODONE HYDROCHLORIDE 20 MG: 5 TABLET ORAL at 15:03

## 2022-01-01 RX ADMIN — OXYCODONE HYDROCHLORIDE 20 MG: 5 TABLET ORAL at 12:56

## 2022-01-01 RX ADMIN — VANCOMYCIN HYDROCHLORIDE 125 MG: 125 CAPSULE ORAL at 21:22

## 2022-01-01 RX ADMIN — LIDOCAINE PATCH 4% 2 PATCH: 40 PATCH TOPICAL at 19:14

## 2022-01-01 RX ADMIN — OXYCODONE HYDROCHLORIDE 10 MG: 10 TABLET ORAL at 21:51

## 2022-01-01 RX ADMIN — OXYCODONE HYDROCHLORIDE 20 MG: 5 TABLET ORAL at 23:24

## 2022-01-01 RX ADMIN — GABAPENTIN 300 MG: 300 CAPSULE ORAL at 07:56

## 2022-01-01 RX ADMIN — SODIUM CHLORIDE 35 MG: 0.9 INJECTION, SOLUTION INTRAVENOUS at 02:05

## 2022-01-01 RX ADMIN — Medication 5 ML: at 12:47

## 2022-01-01 RX ADMIN — ACETAMINOPHEN 975 MG: 325 TABLET, FILM COATED ORAL at 09:31

## 2022-01-01 RX ADMIN — ENOXAPARIN SODIUM 40 MG: 40 INJECTION SUBCUTANEOUS at 17:53

## 2022-01-01 RX ADMIN — SODIUM CHLORIDE, POTASSIUM CHLORIDE, SODIUM LACTATE AND CALCIUM CHLORIDE 1000 ML: 600; 310; 30; 20 INJECTION, SOLUTION INTRAVENOUS at 21:39

## 2022-01-01 RX ADMIN — CEFTRIAXONE SODIUM 2 G: 2 INJECTION, POWDER, FOR SOLUTION INTRAMUSCULAR; INTRAVENOUS at 15:34

## 2022-01-01 RX ADMIN — PIPERACILLIN SODIUM AND TAZOBACTAM SODIUM 4.5 G: 4; .5 INJECTION, POWDER, LYOPHILIZED, FOR SOLUTION INTRAVENOUS at 14:20

## 2022-01-01 RX ADMIN — OXYCODONE HYDROCHLORIDE 10 MG: 5 TABLET ORAL at 17:49

## 2022-01-01 RX ADMIN — PHENYLEPHRINE HYDROCHLORIDE 200 MCG: 10 INJECTION INTRAVENOUS at 18:02

## 2022-01-01 RX ADMIN — GUAIFENESIN AND CODEINE PHOSPHATE 5 ML: 10; 100 LIQUID ORAL at 14:27

## 2022-01-01 RX ADMIN — ASPIRIN 81 MG CHEWABLE TABLET 81 MG: 81 TABLET CHEWABLE at 07:58

## 2022-01-01 RX ADMIN — ESCITALOPRAM OXALATE 10 MG: 10 TABLET ORAL at 08:24

## 2022-01-01 RX ADMIN — ACETAMINOPHEN 325MG 975 MG: 325 TABLET ORAL at 04:31

## 2022-01-01 RX ADMIN — Medication 0.05 MCG/KG/MIN: at 19:43

## 2022-01-01 RX ADMIN — OXYCODONE HYDROCHLORIDE 20 MG: 5 TABLET ORAL at 05:04

## 2022-01-01 RX ADMIN — PIPERACILLIN SODIUM AND TAZOBACTAM SODIUM 4.5 G: 4; .5 INJECTION, POWDER, LYOPHILIZED, FOR SOLUTION INTRAVENOUS at 13:21

## 2022-01-01 RX ADMIN — OXYCODONE HYDROCHLORIDE 10 MG: 5 TABLET ORAL at 11:49

## 2022-01-01 RX ADMIN — ACETAMINOPHEN 975 MG: 325 TABLET, FILM COATED ORAL at 14:36

## 2022-01-01 RX ADMIN — LIDOCAINE PATCH 4% 1 PATCH: 40 PATCH TOPICAL at 07:41

## 2022-01-01 RX ADMIN — POTASSIUM PHOSPHATE, MONOBASIC AND POTASSIUM PHOSPHATE, DIBASIC 15 MMOL: 224; 236 INJECTION, SOLUTION, CONCENTRATE INTRAVENOUS at 10:36

## 2022-01-01 RX ADMIN — SODIUM CHLORIDE, PRESERVATIVE FREE 5 ML: 5 INJECTION INTRAVENOUS at 11:05

## 2022-01-01 RX ADMIN — ESCITALOPRAM OXALATE 10 MG: 10 TABLET ORAL at 09:07

## 2022-01-01 RX ADMIN — HYDROMORPHONE HYDROCHLORIDE 0.5 MG: 1 INJECTION, SOLUTION INTRAMUSCULAR; INTRAVENOUS; SUBCUTANEOUS at 17:31

## 2022-01-01 RX ADMIN — OXYCODONE HYDROCHLORIDE 10 MG: 5 TABLET ORAL at 05:38

## 2022-01-01 RX ADMIN — ALBUMIN (HUMAN): 12.5 SOLUTION INTRAVENOUS at 18:04

## 2022-01-01 RX ADMIN — ACETAMINOPHEN 975 MG: 325 TABLET, FILM COATED ORAL at 23:25

## 2022-01-01 RX ADMIN — OXYCODONE HYDROCHLORIDE 20 MG: 5 TABLET ORAL at 12:34

## 2022-01-01 RX ADMIN — OXYCODONE HYDROCHLORIDE 10 MG: 5 TABLET ORAL at 04:57

## 2022-01-01 RX ADMIN — OXYCODONE HYDROCHLORIDE 20 MG: 5 TABLET ORAL at 10:27

## 2022-01-01 RX ADMIN — APIXABAN 5 MG: 5 TABLET, FILM COATED ORAL at 08:35

## 2022-01-01 RX ADMIN — LEVOFLOXACIN 250 MG: 5 INJECTION, SOLUTION INTRAVENOUS at 13:59

## 2022-01-01 RX ADMIN — MIDAZOLAM 2 MG: 1 INJECTION INTRAMUSCULAR; INTRAVENOUS at 14:45

## 2022-01-01 RX ADMIN — OXYCODONE HYDROCHLORIDE 10 MG: 10 TABLET ORAL at 12:51

## 2022-01-01 RX ADMIN — GADOBUTROL 12 ML: 604.72 INJECTION INTRAVENOUS at 14:47

## 2022-01-01 RX ADMIN — SODIUM CHLORIDE 1000 ML: 9 INJECTION, SOLUTION INTRAVENOUS at 21:26

## 2022-01-01 RX ADMIN — ACETAMINOPHEN 325MG 975 MG: 325 TABLET ORAL at 21:30

## 2022-01-01 RX ADMIN — OXYCODONE HYDROCHLORIDE 20 MG: 5 TABLET ORAL at 11:12

## 2022-01-01 RX ADMIN — PIPERACILLIN SODIUM AND TAZOBACTAM SODIUM 4.5 G: 4; .5 INJECTION, POWDER, LYOPHILIZED, FOR SOLUTION INTRAVENOUS at 22:53

## 2022-01-01 RX ADMIN — MORPHINE SULFATE 30 MG: 30 TABLET, FILM COATED, EXTENDED RELEASE ORAL at 21:59

## 2022-01-01 RX ADMIN — PHENYLEPHRINE HYDROCHLORIDE 200 MCG: 10 INJECTION INTRAVENOUS at 18:23

## 2022-01-01 RX ADMIN — HYDROMORPHONE HYDROCHLORIDE 0.5 MG: 1 INJECTION, SOLUTION INTRAMUSCULAR; INTRAVENOUS; SUBCUTANEOUS at 15:58

## 2022-01-01 RX ADMIN — APIXABAN 5 MG: 5 TABLET, FILM COATED ORAL at 20:23

## 2022-01-01 RX ADMIN — ATORVASTATIN CALCIUM 80 MG: 80 TABLET, FILM COATED ORAL at 20:33

## 2022-01-01 RX ADMIN — PIPERACILLIN SODIUM AND TAZOBACTAM SODIUM 4.5 G: 4; .5 INJECTION, POWDER, LYOPHILIZED, FOR SOLUTION INTRAVENOUS at 01:18

## 2022-01-01 RX ADMIN — POTASSIUM & SODIUM PHOSPHATES POWDER PACK 280-160-250 MG 1 PACKET: 280-160-250 PACK at 11:55

## 2022-01-01 RX ADMIN — MORPHINE SULFATE 30 MG: 30 TABLET, FILM COATED, EXTENDED RELEASE ORAL at 23:23

## 2022-01-01 RX ADMIN — OXYCODONE HYDROCHLORIDE 20 MG: 5 TABLET ORAL at 06:09

## 2022-01-01 RX ADMIN — OXYCODONE HYDROCHLORIDE 20 MG: 5 TABLET ORAL at 04:30

## 2022-01-01 RX ADMIN — HYDROMORPHONE HYDROCHLORIDE 1 MG: 1 INJECTION, SOLUTION INTRAMUSCULAR; INTRAVENOUS; SUBCUTANEOUS at 06:12

## 2022-01-01 RX ADMIN — IPRATROPIUM BROMIDE AND ALBUTEROL SULFATE 3 ML: .5; 3 SOLUTION RESPIRATORY (INHALATION) at 07:34

## 2022-01-01 RX ADMIN — MORPHINE SULFATE 30 MG: 30 TABLET, FILM COATED, EXTENDED RELEASE ORAL at 20:44

## 2022-01-01 RX ADMIN — OXYCODONE HYDROCHLORIDE 20 MG: 5 TABLET ORAL at 02:04

## 2022-01-01 RX ADMIN — PIPERACILLIN SODIUM AND TAZOBACTAM SODIUM 4.5 G: 4; .5 INJECTION, POWDER, LYOPHILIZED, FOR SOLUTION INTRAVENOUS at 05:36

## 2022-01-01 RX ADMIN — ENOXAPARIN SODIUM 40 MG: 40 INJECTION SUBCUTANEOUS at 08:08

## 2022-01-01 RX ADMIN — OXYCODONE HYDROCHLORIDE 20 MG: 5 TABLET ORAL at 05:05

## 2022-01-01 RX ADMIN — OLANZAPINE 2.5 MG: 2.5 TABLET, FILM COATED ORAL at 00:33

## 2022-01-01 RX ADMIN — LIDOCAINE PATCH 4% 2 PATCH: 40 PATCH TOPICAL at 15:05

## 2022-01-01 RX ADMIN — ACETAMINOPHEN 325MG 975 MG: 325 TABLET ORAL at 13:04

## 2022-01-01 RX ADMIN — OXYCODONE HYDROCHLORIDE 20 MG: 5 TABLET ORAL at 21:10

## 2022-01-01 RX ADMIN — OXYCODONE HYDROCHLORIDE 20 MG: 5 TABLET ORAL at 01:43

## 2022-01-01 RX ADMIN — VANCOMYCIN HYDROCHLORIDE 1500 MG: 10 INJECTION, POWDER, LYOPHILIZED, FOR SOLUTION INTRAVENOUS at 17:35

## 2022-01-01 RX ADMIN — ACETAMINOPHEN 325MG 975 MG: 325 TABLET ORAL at 13:05

## 2022-01-01 RX ADMIN — MIDAZOLAM 2 MG: 1 INJECTION INTRAMUSCULAR; INTRAVENOUS at 14:37

## 2022-01-01 RX ADMIN — Medication 0.5 UNITS: at 18:05

## 2022-01-01 RX ADMIN — APIXABAN 5 MG: 5 TABLET, FILM COATED ORAL at 20:33

## 2022-01-01 RX ADMIN — ACETAMINOPHEN 325MG 975 MG: 325 TABLET ORAL at 06:04

## 2022-01-01 RX ADMIN — SODIUM CHLORIDE 1000 ML: 9 INJECTION, SOLUTION INTRAVENOUS at 13:10

## 2022-01-01 RX ADMIN — INSULIN ASPART 1 UNITS: 100 INJECTION, SOLUTION INTRAVENOUS; SUBCUTANEOUS at 07:51

## 2022-01-01 RX ADMIN — CEFAZOLIN 2 G: 1 INJECTION, POWDER, FOR SOLUTION INTRAMUSCULAR; INTRAVENOUS at 19:50

## 2022-01-01 RX ADMIN — SODIUM CHLORIDE, PRESERVATIVE FREE 5 ML: 5 INJECTION INTRAVENOUS at 15:54

## 2022-01-01 RX ADMIN — AZITHROMYCIN 500 MG: 500 INJECTION, POWDER, LYOPHILIZED, FOR SOLUTION INTRAVENOUS at 18:03

## 2022-01-01 RX ADMIN — MORPHINE SULFATE 30 MG: 30 TABLET, EXTENDED RELEASE ORAL at 13:25

## 2022-01-01 RX ADMIN — OXYCODONE HYDROCHLORIDE 20 MG: 5 TABLET ORAL at 02:46

## 2022-01-01 RX ADMIN — PHENYLEPHRINE HYDROCHLORIDE 200 MCG: 10 INJECTION INTRAVENOUS at 19:24

## 2022-01-01 RX ADMIN — ESCITALOPRAM 10 MG: 5 TABLET, FILM COATED ORAL at 08:01

## 2022-01-01 RX ADMIN — KETOROLAC TROMETHAMINE 15 MG: 15 INJECTION, SOLUTION INTRAMUSCULAR; INTRAVENOUS at 01:56

## 2022-01-01 RX ADMIN — ESCITALOPRAM OXALATE 10 MG: 10 TABLET ORAL at 07:51

## 2022-01-01 RX ADMIN — LIDOCAINE PATCH 4% 1 PATCH: 40 PATCH TOPICAL at 08:01

## 2022-01-01 RX ADMIN — OXYCODONE HYDROCHLORIDE 20 MG: 5 TABLET ORAL at 11:55

## 2022-01-01 RX ADMIN — ESCITALOPRAM 10 MG: 5 TABLET, FILM COATED ORAL at 08:15

## 2022-01-01 RX ADMIN — ACETAMINOPHEN 325MG 975 MG: 325 TABLET ORAL at 20:01

## 2022-01-01 RX ADMIN — CALCIUM CHLORIDE 200 MG: 100 INJECTION INTRAVENOUS; INTRAVENTRICULAR at 18:53

## 2022-01-01 RX ADMIN — Medication 20 MG: at 14:43

## 2022-01-01 RX ADMIN — OXYCODONE HYDROCHLORIDE 10 MG: 5 TABLET ORAL at 16:17

## 2022-01-01 RX ADMIN — ACETAMINOPHEN 325MG 975 MG: 325 TABLET ORAL at 21:00

## 2022-01-01 RX ADMIN — ACETAMINOPHEN 325MG 975 MG: 325 TABLET ORAL at 12:56

## 2022-01-01 RX ADMIN — ACETAMINOPHEN 325MG 975 MG: 325 TABLET ORAL at 08:16

## 2022-01-01 RX ADMIN — ESCITALOPRAM OXALATE 10 MG: 10 TABLET ORAL at 08:16

## 2022-01-01 RX ADMIN — SODIUM CHLORIDE, POTASSIUM CHLORIDE, SODIUM LACTATE AND CALCIUM CHLORIDE 1000 ML: 600; 310; 30; 20 INJECTION, SOLUTION INTRAVENOUS at 21:50

## 2022-01-01 RX ADMIN — ACETAMINOPHEN 975 MG: 325 TABLET, FILM COATED ORAL at 15:49

## 2022-01-01 RX ADMIN — ENOXAPARIN SODIUM 40 MG: 40 INJECTION SUBCUTANEOUS at 17:27

## 2022-01-01 RX ADMIN — ALTEPLASE 2 MG: 2.2 INJECTION, POWDER, LYOPHILIZED, FOR SOLUTION INTRAVENOUS at 19:00

## 2022-01-01 RX ADMIN — ESCITALOPRAM OXALATE 10 MG: 10 TABLET ORAL at 09:31

## 2022-01-01 RX ADMIN — LORAZEPAM 1 MG: 2 INJECTION INTRAMUSCULAR; INTRAVENOUS at 18:33

## 2022-01-01 RX ADMIN — ACETAMINOPHEN 325MG 975 MG: 325 TABLET ORAL at 12:27

## 2022-01-01 RX ADMIN — PIPERACILLIN SODIUM AND TAZOBACTAM SODIUM 4.5 G: 4; .5 INJECTION, POWDER, LYOPHILIZED, FOR SOLUTION INTRAVENOUS at 10:01

## 2022-01-01 RX ADMIN — PROCHLORPERAZINE MALEATE 10 MG: 10 TABLET ORAL at 17:13

## 2022-01-01 RX ADMIN — MORPHINE SULFATE 30 MG: 30 TABLET, FILM COATED, EXTENDED RELEASE ORAL at 08:16

## 2022-01-01 RX ADMIN — ACETAMINOPHEN 975 MG: 325 TABLET, FILM COATED ORAL at 08:21

## 2022-01-01 RX ADMIN — MORPHINE SULFATE 30 MG: 30 TABLET, FILM COATED, EXTENDED RELEASE ORAL at 21:55

## 2022-01-01 RX ADMIN — APIXABAN 5 MG: 5 TABLET, FILM COATED ORAL at 07:51

## 2022-01-01 RX ADMIN — LOPERAMIDE HYDROCHLORIDE 2 MG: 2 CAPSULE ORAL at 14:04

## 2022-01-01 RX ADMIN — POTASSIUM CHLORIDE 20 MEQ: 29.8 INJECTION INTRAVENOUS at 06:15

## 2022-01-01 RX ADMIN — OLANZAPINE 2.5 MG: 2.5 TABLET, FILM COATED ORAL at 21:38

## 2022-01-01 RX ADMIN — MORPHINE SULFATE 30 MG: 30 TABLET, FILM COATED, EXTENDED RELEASE ORAL at 07:52

## 2022-01-01 RX ADMIN — ACETAMINOPHEN 325MG 975 MG: 325 TABLET ORAL at 01:22

## 2022-01-01 RX ADMIN — UMECLIDINIUM BROMIDE AND VILANTEROL TRIFENATATE 1 PUFF: 62.5; 25 POWDER RESPIRATORY (INHALATION) at 01:15

## 2022-01-01 RX ADMIN — OLANZAPINE 2.5 MG: 2.5 TABLET, FILM COATED ORAL at 21:23

## 2022-01-01 RX ADMIN — Medication 12 MCG: at 14:35

## 2022-01-01 RX ADMIN — MICAFUNGIN 150 MG: 10 INJECTION, POWDER, LYOPHILIZED, FOR SOLUTION INTRAVENOUS at 11:46

## 2022-01-01 RX ADMIN — ATORVASTATIN CALCIUM 80 MG: 80 TABLET, FILM COATED ORAL at 19:35

## 2022-01-01 RX ADMIN — GABAPENTIN 300 MG: 300 CAPSULE ORAL at 19:45

## 2022-01-01 RX ADMIN — ESCITALOPRAM 10 MG: 5 TABLET, FILM COATED ORAL at 08:10

## 2022-01-01 RX ADMIN — OLANZAPINE 2.5 MG: 2.5 TABLET, FILM COATED ORAL at 23:51

## 2022-01-01 RX ADMIN — SODIUM CHLORIDE 500 ML: 9 INJECTION, SOLUTION INTRAVENOUS at 20:15

## 2022-01-01 RX ADMIN — OXYCODONE HYDROCHLORIDE 20 MG: 5 TABLET ORAL at 21:37

## 2022-01-01 RX ADMIN — LIDOCAINE PATCH 4% 2 PATCH: 40 PATCH TOPICAL at 19:28

## 2022-01-01 RX ADMIN — OXYCODONE HYDROCHLORIDE 20 MG: 5 TABLET ORAL at 03:31

## 2022-01-01 RX ADMIN — BENZONATATE 100 MG: 100 CAPSULE ORAL at 12:42

## 2022-01-01 RX ADMIN — MORPHINE SULFATE 30 MG: 30 TABLET, FILM COATED, EXTENDED RELEASE ORAL at 07:37

## 2022-01-01 RX ADMIN — ACETAMINOPHEN 325MG 975 MG: 325 TABLET ORAL at 13:00

## 2022-01-01 RX ADMIN — SODIUM CHLORIDE, PRESERVATIVE FREE 5 ML: 5 INJECTION INTRAVENOUS at 10:00

## 2022-01-01 RX ADMIN — OXYCODONE HYDROCHLORIDE 20 MG: 5 TABLET ORAL at 02:30

## 2022-01-01 RX ADMIN — PANTOPRAZOLE SODIUM 40 MG: 40 INJECTION, POWDER, FOR SOLUTION INTRAVENOUS at 07:43

## 2022-01-01 RX ADMIN — ACETAMINOPHEN 325MG 975 MG: 325 TABLET ORAL at 05:26

## 2022-01-01 RX ADMIN — OXYCODONE HYDROCHLORIDE 20 MG: 5 TABLET ORAL at 22:25

## 2022-01-01 RX ADMIN — PROPOFOL 30 MCG/KG/MIN: 10 INJECTION, EMULSION INTRAVENOUS at 23:29

## 2022-01-01 RX ADMIN — ACETAMINOPHEN 975 MG: 325 TABLET, FILM COATED ORAL at 09:11

## 2022-01-01 RX ADMIN — GUAIFENESIN AND CODEINE PHOSPHATE 5 ML: 10; 100 LIQUID ORAL at 01:24

## 2022-01-01 RX ADMIN — Medication 1 DROP: at 22:55

## 2022-01-01 RX ADMIN — ESCITALOPRAM 10 MG: 5 TABLET, FILM COATED ORAL at 08:24

## 2022-01-01 RX ADMIN — APIXABAN 5 MG: 5 TABLET, FILM COATED ORAL at 19:34

## 2022-01-01 RX ADMIN — OXYCODONE HYDROCHLORIDE 10 MG: 10 TABLET ORAL at 16:16

## 2022-01-01 RX ADMIN — LOPERAMIDE HYDROCHLORIDE 2 MG: 2 CAPSULE ORAL at 10:05

## 2022-01-01 RX ADMIN — OXYCODONE HYDROCHLORIDE 10 MG: 5 TABLET ORAL at 06:52

## 2022-01-01 RX ADMIN — OXYCODONE HYDROCHLORIDE 20 MG: 5 TABLET ORAL at 16:41

## 2022-01-01 RX ADMIN — DEXAMETHASONE SODIUM PHOSPHATE 12 MG: 4 INJECTION, SOLUTION INTRA-ARTICULAR; INTRALESIONAL; INTRAMUSCULAR; INTRAVENOUS; SOFT TISSUE at 17:42

## 2022-01-01 RX ADMIN — MORPHINE SULFATE 30 MG: 30 TABLET, EXTENDED RELEASE ORAL at 08:42

## 2022-01-01 RX ADMIN — DEXAMETHASONE SODIUM PHOSPHATE 4 MG: 4 INJECTION, SOLUTION INTRA-ARTICULAR; INTRALESIONAL; INTRAMUSCULAR; INTRAVENOUS; SOFT TISSUE at 19:00

## 2022-01-01 RX ADMIN — MAGNESIUM SULFATE IN WATER 2 G: 40 INJECTION, SOLUTION INTRAVENOUS at 14:12

## 2022-01-01 RX ADMIN — POTASSIUM PHOSPHATE, MONOBASIC AND POTASSIUM PHOSPHATE, DIBASIC 15 MMOL: 224; 236 INJECTION, SOLUTION, CONCENTRATE INTRAVENOUS at 10:59

## 2022-01-01 RX ADMIN — POTASSIUM CHLORIDE 40 MEQ: 750 TABLET, EXTENDED RELEASE ORAL at 09:03

## 2022-01-01 RX ADMIN — SODIUM CHLORIDE, POTASSIUM CHLORIDE, SODIUM LACTATE AND CALCIUM CHLORIDE: 600; 310; 30; 20 INJECTION, SOLUTION INTRAVENOUS at 20:15

## 2022-01-01 RX ADMIN — SODIUM CHLORIDE: 9 INJECTION, SOLUTION INTRAVENOUS at 08:07

## 2022-01-01 RX ADMIN — ACETAMINOPHEN 325MG 975 MG: 325 TABLET ORAL at 20:44

## 2022-01-01 RX ADMIN — ACETAMINOPHEN 325MG 975 MG: 325 TABLET ORAL at 06:01

## 2022-01-01 RX ADMIN — ACETAMINOPHEN 975 MG: 325 TABLET, FILM COATED ORAL at 03:24

## 2022-01-01 RX ADMIN — OXYCODONE HYDROCHLORIDE 20 MG: 5 TABLET ORAL at 17:10

## 2022-01-01 RX ADMIN — MORPHINE SULFATE 30 MG: 30 TABLET, EXTENDED RELEASE ORAL at 08:45

## 2022-01-01 RX ADMIN — POTASSIUM & SODIUM PHOSPHATES POWDER PACK 280-160-250 MG 1 PACKET: 280-160-250 PACK at 21:09

## 2022-01-01 RX ADMIN — HYDROMORPHONE HYDROCHLORIDE 1 MG: 1 INJECTION, SOLUTION INTRAMUSCULAR; INTRAVENOUS; SUBCUTANEOUS at 09:41

## 2022-01-01 RX ADMIN — ENOXAPARIN SODIUM 40 MG: 40 INJECTION SUBCUTANEOUS at 16:21

## 2022-01-01 RX ADMIN — LIDOCAINE PATCH 4% 2 PATCH: 40 PATCH TOPICAL at 20:34

## 2022-01-01 RX ADMIN — ESCITALOPRAM OXALATE 10 MG: 10 TABLET ORAL at 09:34

## 2022-01-01 RX ADMIN — SODIUM CHLORIDE, SODIUM LACTATE, POTASSIUM CHLORIDE, CALCIUM CHLORIDE: 600; 310; 30; 20 INJECTION, SOLUTION INTRAVENOUS at 17:45

## 2022-01-01 RX ADMIN — OLANZAPINE 2.5 MG: 2.5 TABLET, FILM COATED ORAL at 22:35

## 2022-01-01 RX ADMIN — ACETAMINOPHEN 325MG 975 MG: 325 TABLET ORAL at 12:38

## 2022-01-01 RX ADMIN — MORPHINE SULFATE 30 MG: 30 TABLET, FILM COATED, EXTENDED RELEASE ORAL at 20:14

## 2022-01-01 RX ADMIN — PROCHLORPERAZINE MALEATE 10 MG: 10 TABLET ORAL at 01:00

## 2022-01-01 RX ADMIN — BENZONATATE 100 MG: 100 CAPSULE ORAL at 08:37

## 2022-01-01 RX ADMIN — GUAIFENESIN AND CODEINE PHOSPHATE 5 ML: 10; 100 LIQUID ORAL at 08:50

## 2022-01-01 RX ADMIN — ACETAMINOPHEN 975 MG: 325 TABLET, FILM COATED ORAL at 00:07

## 2022-01-01 RX ADMIN — DOXYCYCLINE 100 MG: 100 INJECTION, POWDER, LYOPHILIZED, FOR SOLUTION INTRAVENOUS at 11:03

## 2022-01-01 RX ADMIN — GABAPENTIN 400 MG: 100 CAPSULE ORAL at 09:14

## 2022-01-01 RX ADMIN — MORPHINE SULFATE 30 MG: 30 TABLET, FILM COATED, EXTENDED RELEASE ORAL at 09:36

## 2022-01-01 RX ADMIN — SODIUM CHLORIDE, POTASSIUM CHLORIDE, SODIUM LACTATE AND CALCIUM CHLORIDE: 600; 310; 30; 20 INJECTION, SOLUTION INTRAVENOUS at 16:53

## 2022-01-01 RX ADMIN — OXYCODONE HYDROCHLORIDE 20 MG: 5 TABLET ORAL at 13:01

## 2022-01-01 RX ADMIN — OXYCODONE HYDROCHLORIDE 20 MG: 5 TABLET ORAL at 22:49

## 2022-01-01 RX ADMIN — HEPARIN SODIUM 5000 UNITS: 5000 INJECTION, SOLUTION INTRAVENOUS; SUBCUTANEOUS at 03:24

## 2022-01-01 RX ADMIN — HYDROMORPHONE HYDROCHLORIDE 0.5 MG: 1 INJECTION, SOLUTION INTRAMUSCULAR; INTRAVENOUS; SUBCUTANEOUS at 15:59

## 2022-01-01 RX ADMIN — GUAIFENESIN AND CODEINE PHOSPHATE 5 ML: 10; 100 LIQUID ORAL at 08:37

## 2022-01-01 RX ADMIN — KETAMINE HYDROCHLORIDE 10 MG/HR: 100 INJECTION, SOLUTION, CONCENTRATE INTRAMUSCULAR; INTRAVENOUS at 06:15

## 2022-01-01 RX ADMIN — ESCITALOPRAM OXALATE 10 MG: 10 TABLET ORAL at 08:41

## 2022-01-01 RX ADMIN — ACETAMINOPHEN 325MG 975 MG: 325 TABLET ORAL at 20:05

## 2022-01-01 RX ADMIN — AZITHROMYCIN MONOHYDRATE 500 MG: 500 INJECTION, POWDER, LYOPHILIZED, FOR SOLUTION INTRAVENOUS at 19:37

## 2022-01-01 RX ADMIN — LIDOCAINE PATCH 4% 1 PATCH: 40 PATCH TOPICAL at 08:31

## 2022-01-01 RX ADMIN — CEFTRIAXONE SODIUM 2 G: 2 INJECTION, POWDER, FOR SOLUTION INTRAMUSCULAR; INTRAVENOUS at 17:22

## 2022-01-01 RX ADMIN — Medication 20 MG: at 18:08

## 2022-01-01 RX ADMIN — MORPHINE SULFATE 30 MG: 30 TABLET, EXTENDED RELEASE ORAL at 00:59

## 2022-01-01 RX ADMIN — OXYCODONE HYDROCHLORIDE 10 MG: 10 TABLET ORAL at 18:52

## 2022-01-01 RX ADMIN — OXYCODONE HYDROCHLORIDE 20 MG: 5 TABLET ORAL at 18:48

## 2022-01-01 RX ADMIN — PALONOSETRON 0.25 MG: 0.05 INJECTION, SOLUTION INTRAVENOUS at 20:35

## 2022-01-01 RX ADMIN — ENOXAPARIN SODIUM 40 MG: 40 INJECTION SUBCUTANEOUS at 18:03

## 2022-01-01 RX ADMIN — IPRATROPIUM BROMIDE AND ALBUTEROL SULFATE 3 ML: .5; 3 SOLUTION RESPIRATORY (INHALATION) at 09:26

## 2022-01-01 RX ADMIN — OXYCODONE HYDROCHLORIDE 20 MG: 5 TABLET ORAL at 16:05

## 2022-01-01 RX ADMIN — MORPHINE SULFATE 30 MG: 30 TABLET, FILM COATED, EXTENDED RELEASE ORAL at 22:22

## 2022-01-01 RX ADMIN — MORPHINE SULFATE 30 MG: 30 TABLET, FILM COATED, EXTENDED RELEASE ORAL at 10:24

## 2022-01-01 RX ADMIN — OXYCODONE HYDROCHLORIDE 20 MG: 5 TABLET ORAL at 10:40

## 2022-01-01 RX ADMIN — SODIUM CHLORIDE, POTASSIUM CHLORIDE, SODIUM LACTATE AND CALCIUM CHLORIDE 1000 ML: 600; 310; 30; 20 INJECTION, SOLUTION INTRAVENOUS at 10:14

## 2022-01-01 RX ADMIN — MAGNESIUM SULFATE IN WATER 4 G: 40 INJECTION, SOLUTION INTRAVENOUS at 13:12

## 2022-01-01 RX ADMIN — MAGNESIUM SULFATE HEPTAHYDRATE 4 G: 40 INJECTION, SOLUTION INTRAVENOUS at 10:42

## 2022-01-01 RX ADMIN — MAGNESIUM SULFATE HEPTAHYDRATE 2 G: 40 INJECTION, SOLUTION INTRAVENOUS at 09:12

## 2022-01-01 RX ADMIN — PROCHLORPERAZINE MALEATE 10 MG: 5 TABLET ORAL at 13:33

## 2022-01-01 RX ADMIN — MORPHINE SULFATE 30 MG: 15 TABLET, EXTENDED RELEASE ORAL at 20:25

## 2022-01-01 RX ADMIN — FENTANYL CITRATE 50 MCG: 50 INJECTION, SOLUTION INTRAMUSCULAR; INTRAVENOUS at 14:43

## 2022-01-01 RX ADMIN — CEFDINIR 300 MG: 300 CAPSULE ORAL at 19:35

## 2022-01-01 RX ADMIN — Medication 5 ML: at 15:15

## 2022-01-01 RX ADMIN — ACETAMINOPHEN 975 MG: 325 TABLET, FILM COATED ORAL at 09:57

## 2022-01-01 RX ADMIN — ACETAMINOPHEN 325MG 975 MG: 325 TABLET ORAL at 01:10

## 2022-01-01 RX ADMIN — LIDOCAINE HYDROCHLORIDE 14 ML: 10 INJECTION, SOLUTION EPIDURAL; INFILTRATION; INTRACAUDAL; PERINEURAL at 14:27

## 2022-01-01 RX ADMIN — ACETAMINOPHEN 325MG 975 MG: 325 TABLET ORAL at 20:08

## 2022-01-01 RX ADMIN — ACETAMINOPHEN 325MG 975 MG: 325 TABLET ORAL at 13:03

## 2022-01-01 RX ADMIN — GABAPENTIN 300 MG: 300 CAPSULE ORAL at 08:34

## 2022-01-01 RX ADMIN — DOXYCYCLINE 100 MG: 100 INJECTION, POWDER, LYOPHILIZED, FOR SOLUTION INTRAVENOUS at 21:41

## 2022-01-01 RX ADMIN — OXYCODONE HYDROCHLORIDE 20 MG: 5 TABLET ORAL at 20:41

## 2022-01-01 RX ADMIN — HEPARIN SODIUM 5000 UNITS: 5000 INJECTION, SOLUTION INTRAVENOUS; SUBCUTANEOUS at 18:31

## 2022-01-01 RX ADMIN — AZITHROMYCIN MONOHYDRATE 500 MG: 250 TABLET ORAL at 14:18

## 2022-01-01 RX ADMIN — MORPHINE SULFATE 30 MG: 30 TABLET, FILM COATED, EXTENDED RELEASE ORAL at 22:35

## 2022-01-01 RX ADMIN — ACETAMINOPHEN 325MG 975 MG: 325 TABLET ORAL at 04:21

## 2022-01-01 RX ADMIN — OLANZAPINE 2.5 MG: 2.5 TABLET, FILM COATED ORAL at 00:44

## 2022-01-01 RX ADMIN — MORPHINE SULFATE 30 MG: 30 TABLET, FILM COATED, EXTENDED RELEASE ORAL at 09:04

## 2022-01-01 RX ADMIN — IPRATROPIUM BROMIDE AND ALBUTEROL SULFATE 3 ML: .5; 3 SOLUTION RESPIRATORY (INHALATION) at 19:40

## 2022-01-01 RX ADMIN — ACETAMINOPHEN 325MG 975 MG: 325 TABLET ORAL at 05:30

## 2022-01-01 RX ADMIN — CEFPODOXIME PROXETIL 200 MG: 200 TABLET, FILM COATED ORAL at 19:34

## 2022-01-01 RX ADMIN — GABAPENTIN 300 MG: 300 CAPSULE ORAL at 14:18

## 2022-01-01 RX ADMIN — APIXABAN 5 MG: 5 TABLET, FILM COATED ORAL at 08:46

## 2022-01-01 RX ADMIN — OXYCODONE HYDROCHLORIDE 20 MG: 5 TABLET ORAL at 21:59

## 2022-01-01 RX ADMIN — PROCHLORPERAZINE MALEATE 10 MG: 10 TABLET ORAL at 20:52

## 2022-01-01 RX ADMIN — MORPHINE SULFATE 15 MG: 15 TABLET, EXTENDED RELEASE ORAL at 10:03

## 2022-01-01 RX ADMIN — ESCITALOPRAM OXALATE 10 MG: 10 TABLET ORAL at 08:42

## 2022-01-01 RX ADMIN — OXYCODONE HYDROCHLORIDE 20 MG: 5 TABLET ORAL at 05:52

## 2022-01-01 RX ADMIN — APIXABAN 5 MG: 5 TABLET, FILM COATED ORAL at 08:21

## 2022-01-01 RX ADMIN — IPRATROPIUM BROMIDE AND ALBUTEROL SULFATE 3 ML: .5; 3 SOLUTION RESPIRATORY (INHALATION) at 08:43

## 2022-01-01 RX ADMIN — POTASSIUM & SODIUM PHOSPHATES POWDER PACK 280-160-250 MG 1 PACKET: 280-160-250 PACK at 18:28

## 2022-01-01 RX ADMIN — DOXYCYCLINE HYCLATE 100 MG: 100 CAPSULE ORAL at 20:23

## 2022-01-01 RX ADMIN — FUROSEMIDE 40 MG: 10 INJECTION, SOLUTION INTRAMUSCULAR; INTRAVENOUS at 13:35

## 2022-01-01 RX ADMIN — OXYCODONE HYDROCHLORIDE 20 MG: 5 TABLET ORAL at 07:50

## 2022-01-01 RX ADMIN — Medication 1 DROP: at 17:32

## 2022-01-01 RX ADMIN — OLANZAPINE 2.5 MG: 2.5 TABLET, FILM COATED ORAL at 01:09

## 2022-01-01 RX ADMIN — AZITHROMYCIN 500 MG: 500 INJECTION, POWDER, LYOPHILIZED, FOR SOLUTION INTRAVENOUS at 16:49

## 2022-01-01 RX ADMIN — HYDROMORPHONE HYDROCHLORIDE 0.5 MG: 1 INJECTION, SOLUTION INTRAMUSCULAR; INTRAVENOUS; SUBCUTANEOUS at 18:42

## 2022-01-01 RX ADMIN — ACETAMINOPHEN 325MG 975 MG: 325 TABLET ORAL at 05:46

## 2022-01-01 RX ADMIN — LOPERAMIDE HYDROCHLORIDE 2 MG: 2 CAPSULE ORAL at 16:51

## 2022-01-01 RX ADMIN — OXYCODONE HYDROCHLORIDE 20 MG: 5 TABLET ORAL at 16:21

## 2022-01-01 RX ADMIN — GABAPENTIN 400 MG: 100 CAPSULE ORAL at 10:00

## 2022-01-01 RX ADMIN — INSULIN ASPART 1 UNITS: 100 INJECTION, SOLUTION INTRAVENOUS; SUBCUTANEOUS at 12:07

## 2022-01-01 RX ADMIN — MORPHINE SULFATE 30 MG: 15 TABLET, FILM COATED, EXTENDED RELEASE ORAL at 21:00

## 2022-01-01 RX ADMIN — ACETAMINOPHEN 975 MG: 325 TABLET, FILM COATED ORAL at 16:00

## 2022-01-01 RX ADMIN — HYDROMORPHONE HYDROCHLORIDE 1 MG: 1 INJECTION, SOLUTION INTRAMUSCULAR; INTRAVENOUS; SUBCUTANEOUS at 17:01

## 2022-01-01 RX ADMIN — MORPHINE SULFATE 30 MG: 30 TABLET, FILM COATED, EXTENDED RELEASE ORAL at 09:14

## 2022-01-01 RX ADMIN — MORPHINE SULFATE 30 MG: 30 TABLET, FILM COATED, EXTENDED RELEASE ORAL at 21:28

## 2022-01-01 RX ADMIN — ACETAMINOPHEN 975 MG: 325 TABLET, FILM COATED ORAL at 09:35

## 2022-01-01 RX ADMIN — MORPHINE SULFATE 30 MG: 30 TABLET, FILM COATED, EXTENDED RELEASE ORAL at 08:08

## 2022-01-01 RX ADMIN — OXYCODONE HYDROCHLORIDE 20 MG: 5 TABLET ORAL at 11:21

## 2022-01-01 RX ADMIN — OLANZAPINE 2.5 MG: 2.5 TABLET, FILM COATED ORAL at 00:16

## 2022-01-01 RX ADMIN — OXYCODONE HYDROCHLORIDE 20 MG: 5 TABLET ORAL at 12:27

## 2022-01-01 RX ADMIN — HYDROMORPHONE HYDROCHLORIDE 0.5 MG: 1 INJECTION, SOLUTION INTRAMUSCULAR; INTRAVENOUS; SUBCUTANEOUS at 18:20

## 2022-01-01 RX ADMIN — SODIUM CHLORIDE 1000 ML: 9 INJECTION, SOLUTION INTRAVENOUS at 21:41

## 2022-01-01 RX ADMIN — ESCITALOPRAM OXALATE 10 MG: 10 TABLET ORAL at 08:56

## 2022-01-01 RX ADMIN — HYDROMORPHONE HYDROCHLORIDE 1 MG: 1 INJECTION, SOLUTION INTRAMUSCULAR; INTRAVENOUS; SUBCUTANEOUS at 23:38

## 2022-01-01 RX ADMIN — SODIUM PHOSPHATE, MONOBASIC, MONOHYDRATE AND SODIUM PHOSPHATE, DIBASIC, ANHYDROUS 15 MMOL: 276; 142 INJECTION, SOLUTION INTRAVENOUS at 12:22

## 2022-01-01 RX ADMIN — GABAPENTIN 300 MG: 300 CAPSULE ORAL at 08:42

## 2022-01-01 RX ADMIN — HYDROMORPHONE HYDROCHLORIDE 1 MG: 1 INJECTION, SOLUTION INTRAMUSCULAR; INTRAVENOUS; SUBCUTANEOUS at 15:45

## 2022-01-01 RX ADMIN — FUROSEMIDE 20 MG: 10 INJECTION, SOLUTION INTRAVENOUS at 12:20

## 2022-01-01 RX ADMIN — POTASSIUM CHLORIDE 10 MEQ: 7.46 INJECTION, SOLUTION INTRAVENOUS at 00:06

## 2022-01-01 RX ADMIN — ENOXAPARIN SODIUM 40 MG: 40 INJECTION SUBCUTANEOUS at 16:55

## 2022-01-01 RX ADMIN — HYDROMORPHONE HYDROCHLORIDE 0.5 MG: 1 INJECTION, SOLUTION INTRAMUSCULAR; INTRAVENOUS; SUBCUTANEOUS at 14:33

## 2022-01-01 RX ADMIN — UMECLIDINIUM BROMIDE AND VILANTEROL TRIFENATATE 1 PUFF: 62.5; 25 POWDER RESPIRATORY (INHALATION) at 09:10

## 2022-01-01 RX ADMIN — MORPHINE SULFATE 30 MG: 15 TABLET, EXTENDED RELEASE ORAL at 00:17

## 2022-01-01 RX ADMIN — LIDOCAINE PATCH 4% 2 PATCH: 40 PATCH TOPICAL at 21:23

## 2022-01-01 RX ADMIN — ESCITALOPRAM 10 MG: 5 TABLET, FILM COATED ORAL at 08:27

## 2022-01-01 RX ADMIN — ACETAMINOPHEN 325MG 975 MG: 325 TABLET ORAL at 04:57

## 2022-01-01 RX ADMIN — IPRATROPIUM BROMIDE AND ALBUTEROL SULFATE 3 ML: .5; 3 SOLUTION RESPIRATORY (INHALATION) at 20:20

## 2022-01-01 RX ADMIN — OLANZAPINE 2.5 MG: 2.5 TABLET, FILM COATED ORAL at 22:39

## 2022-01-01 RX ADMIN — ACETAMINOPHEN 975 MG: 325 TABLET, FILM COATED ORAL at 08:48

## 2022-01-01 RX ADMIN — ACETAMINOPHEN 325MG 975 MG: 325 TABLET ORAL at 13:19

## 2022-01-01 RX ADMIN — ACETAMINOPHEN 325MG 975 MG: 325 TABLET ORAL at 22:45

## 2022-01-01 RX ADMIN — POTASSIUM PHOSPHATE, MONOBASIC AND POTASSIUM PHOSPHATE, DIBASIC 15 MMOL: 224; 236 INJECTION, SOLUTION, CONCENTRATE INTRAVENOUS at 23:31

## 2022-01-01 RX ADMIN — GUAIFENESIN AND CODEINE PHOSPHATE 5 ML: 10; 100 LIQUID ORAL at 06:04

## 2022-01-01 RX ADMIN — STANDARDIZED SENNA CONCENTRATE 8.6 MG: 8.6 TABLET ORAL at 21:15

## 2022-01-01 RX ADMIN — FUROSEMIDE 20 MG: 10 INJECTION, SOLUTION INTRAMUSCULAR; INTRAVENOUS at 09:02

## 2022-01-01 RX ADMIN — OXYCODONE HYDROCHLORIDE 20 MG: 5 TABLET ORAL at 15:24

## 2022-01-01 RX ADMIN — OXYCODONE HYDROCHLORIDE 20 MG: 5 TABLET ORAL at 19:58

## 2022-01-01 RX ADMIN — APIXABAN 5 MG: 5 TABLET, FILM COATED ORAL at 07:58

## 2022-01-01 RX ADMIN — MORPHINE SULFATE 30 MG: 15 TABLET, EXTENDED RELEASE ORAL at 11:39

## 2022-01-01 RX ADMIN — HYDROMORPHONE HYDROCHLORIDE 1 MG: 1 INJECTION, SOLUTION INTRAMUSCULAR; INTRAVENOUS; SUBCUTANEOUS at 19:44

## 2022-01-01 RX ADMIN — POTASSIUM & SODIUM PHOSPHATES POWDER PACK 280-160-250 MG 1 PACKET: 280-160-250 PACK at 05:17

## 2022-01-01 RX ADMIN — PIPERACILLIN SODIUM AND TAZOBACTAM SODIUM 4.5 G: 4; .5 INJECTION, POWDER, LYOPHILIZED, FOR SOLUTION INTRAVENOUS at 21:41

## 2022-01-01 RX ADMIN — OXYCODONE HYDROCHLORIDE 10 MG: 10 TABLET ORAL at 14:36

## 2022-01-01 RX ADMIN — ACETAMINOPHEN 325MG 975 MG: 325 TABLET ORAL at 05:59

## 2022-01-01 RX ADMIN — DOXYCYCLINE HYCLATE 100 MG: 100 CAPSULE ORAL at 19:34

## 2022-01-01 RX ADMIN — Medication 12 MCG: at 14:31

## 2022-01-01 RX ADMIN — ENOXAPARIN SODIUM 40 MG: 40 INJECTION SUBCUTANEOUS at 16:00

## 2022-01-01 RX ADMIN — OXYCODONE HYDROCHLORIDE 20 MG: 5 TABLET ORAL at 03:33

## 2022-01-01 RX ADMIN — ACETAMINOPHEN 325MG 975 MG: 325 TABLET ORAL at 16:14

## 2022-01-01 RX ADMIN — Medication 5 ML: at 23:54

## 2022-01-01 RX ADMIN — Medication 500 MG: at 08:56

## 2022-01-01 RX ADMIN — OXYCODONE HYDROCHLORIDE 20 MG: 5 TABLET ORAL at 16:20

## 2022-01-01 RX ADMIN — OLANZAPINE 2.5 MG: 2.5 TABLET, FILM COATED ORAL at 21:52

## 2022-01-01 RX ADMIN — POTASSIUM CHLORIDE 10 MEQ: 7.46 INJECTION, SOLUTION INTRAVENOUS at 21:55

## 2022-01-01 RX ADMIN — OXYCODONE HYDROCHLORIDE 20 MG: 5 TABLET ORAL at 05:11

## 2022-01-01 RX ADMIN — IPRATROPIUM BROMIDE AND ALBUTEROL SULFATE 3 ML: .5; 3 SOLUTION RESPIRATORY (INHALATION) at 16:11

## 2022-01-01 RX ADMIN — OXYCODONE HYDROCHLORIDE 20 MG: 5 TABLET ORAL at 09:30

## 2022-01-01 RX ADMIN — IPRATROPIUM BROMIDE AND ALBUTEROL SULFATE 3 ML: .5; 3 SOLUTION RESPIRATORY (INHALATION) at 20:32

## 2022-01-01 RX ADMIN — OXYCODONE HYDROCHLORIDE 20 MG: 5 TABLET ORAL at 10:35

## 2022-01-01 RX ADMIN — CEFPODOXIME PROXETIL 200 MG: 200 TABLET, FILM COATED ORAL at 19:22

## 2022-01-01 RX ADMIN — OXYCODONE HYDROCHLORIDE 10 MG: 5 TABLET ORAL at 20:21

## 2022-01-01 RX ADMIN — OXYCODONE HYDROCHLORIDE 20 MG: 5 TABLET ORAL at 23:51

## 2022-01-01 RX ADMIN — ACETAMINOPHEN 650 MG: 325 TABLET, FILM COATED ORAL at 05:07

## 2022-01-01 RX ADMIN — GUAIFENESIN AND CODEINE PHOSPHATE 5 ML: 10; 100 LIQUID ORAL at 21:11

## 2022-01-01 RX ADMIN — HYDROMORPHONE HYDROCHLORIDE 1 MG: 1 INJECTION, SOLUTION INTRAMUSCULAR; INTRAVENOUS; SUBCUTANEOUS at 17:46

## 2022-01-01 RX ADMIN — MICAFUNGIN 150 MG: 10 INJECTION, POWDER, LYOPHILIZED, FOR SOLUTION INTRAVENOUS at 13:39

## 2022-01-01 RX ADMIN — LIDOCAINE: 50 OINTMENT TOPICAL at 04:32

## 2022-01-01 RX ADMIN — Medication 400 MG: at 08:36

## 2022-01-01 RX ADMIN — CISPLATIN 122 MG: 1 INJECTION, SOLUTION INTRAVENOUS at 22:35

## 2022-01-01 RX ADMIN — PROCHLORPERAZINE MALEATE 10 MG: 10 TABLET ORAL at 01:10

## 2022-01-01 RX ADMIN — MORPHINE SULFATE 30 MG: 30 TABLET, EXTENDED RELEASE ORAL at 13:18

## 2022-01-01 RX ADMIN — OXYCODONE HYDROCHLORIDE 20 MG: 5 TABLET ORAL at 23:17

## 2022-01-01 RX ADMIN — HYDROMORPHONE HYDROCHLORIDE 0.5 MG: 1 INJECTION, SOLUTION INTRAMUSCULAR; INTRAVENOUS; SUBCUTANEOUS at 08:10

## 2022-01-01 RX ADMIN — POTASSIUM & SODIUM PHOSPHATES POWDER PACK 280-160-250 MG 1 PACKET: 280-160-250 PACK at 17:53

## 2022-01-01 RX ADMIN — GUAIFENESIN AND CODEINE PHOSPHATE 5 ML: 10; 100 LIQUID ORAL at 00:09

## 2022-01-01 RX ADMIN — IPRATROPIUM BROMIDE AND ALBUTEROL SULFATE 3 ML: .5; 3 SOLUTION RESPIRATORY (INHALATION) at 15:05

## 2022-01-01 RX ADMIN — GUAIFENESIN AND CODEINE PHOSPHATE 5 ML: 10; 100 LIQUID ORAL at 19:22

## 2022-01-01 RX ADMIN — OLANZAPINE 2.5 MG: 2.5 TABLET, FILM COATED ORAL at 00:40

## 2022-01-01 RX ADMIN — PIPERACILLIN SODIUM AND TAZOBACTAM SODIUM 4.5 G: 4; .5 INJECTION, POWDER, LYOPHILIZED, FOR SOLUTION INTRAVENOUS at 07:58

## 2022-01-01 RX ADMIN — CEFTRIAXONE SODIUM 2 G: 2 INJECTION, POWDER, FOR SOLUTION INTRAMUSCULAR; INTRAVENOUS at 15:55

## 2022-01-01 RX ADMIN — ACETAMINOPHEN 975 MG: 325 TABLET, FILM COATED ORAL at 16:16

## 2022-01-01 RX ADMIN — OXYCODONE HYDROCHLORIDE 20 MG: 5 TABLET ORAL at 09:51

## 2022-01-01 RX ADMIN — VANCOMYCIN HYDROCHLORIDE 1500 MG: 10 INJECTION, POWDER, LYOPHILIZED, FOR SOLUTION INTRAVENOUS at 11:02

## 2022-01-01 RX ADMIN — HYDROMORPHONE HYDROCHLORIDE 0.3 MG: 1 INJECTION, SOLUTION INTRAMUSCULAR; INTRAVENOUS; SUBCUTANEOUS at 09:07

## 2022-01-01 RX ADMIN — OXYCODONE HYDROCHLORIDE 20 MG: 5 TABLET ORAL at 09:14

## 2022-01-01 RX ADMIN — DOXYCYCLINE HYCLATE 100 MG: 100 CAPSULE ORAL at 07:56

## 2022-01-01 RX ADMIN — GABAPENTIN 400 MG: 100 CAPSULE ORAL at 14:32

## 2022-01-01 RX ADMIN — OLANZAPINE 2.5 MG: 2.5 TABLET, FILM COATED ORAL at 01:38

## 2022-01-01 RX ADMIN — OXYCODONE HYDROCHLORIDE 10 MG: 5 TABLET ORAL at 08:45

## 2022-01-01 RX ADMIN — APIXABAN 5 MG: 5 TABLET, FILM COATED ORAL at 08:42

## 2022-01-01 RX ADMIN — SODIUM CHLORIDE 16 MG/HR: 9 INJECTION, SOLUTION INTRAVENOUS at 16:32

## 2022-01-01 RX ADMIN — ACETAMINOPHEN 325MG 975 MG: 325 TABLET ORAL at 17:54

## 2022-01-01 RX ADMIN — UMECLIDINIUM BROMIDE AND VILANTEROL TRIFENATATE 1 PUFF: 62.5; 25 POWDER RESPIRATORY (INHALATION) at 22:21

## 2022-01-01 RX ADMIN — OXYCODONE HYDROCHLORIDE 10 MG: 10 TABLET ORAL at 21:02

## 2022-01-01 RX ADMIN — OXYCODONE HYDROCHLORIDE 10 MG: 10 TABLET ORAL at 10:54

## 2022-01-01 RX ADMIN — HYDROMORPHONE HYDROCHLORIDE 1 MG: 1 INJECTION, SOLUTION INTRAMUSCULAR; INTRAVENOUS; SUBCUTANEOUS at 19:03

## 2022-01-01 RX ADMIN — OXYCODONE HYDROCHLORIDE 20 MG: 5 TABLET ORAL at 05:31

## 2022-01-01 RX ADMIN — POTASSIUM CHLORIDE 20 MEQ: 29.8 INJECTION, SOLUTION INTRAVENOUS at 02:15

## 2022-01-01 RX ADMIN — APIXABAN 5 MG: 5 TABLET, FILM COATED ORAL at 21:12

## 2022-01-01 RX ADMIN — ACETAMINOPHEN 325MG 975 MG: 325 TABLET ORAL at 08:07

## 2022-01-01 RX ADMIN — Medication 30 MG: at 20:02

## 2022-01-01 RX ADMIN — PIPERACILLIN SODIUM AND TAZOBACTAM SODIUM 4.5 G: 4; .5 INJECTION, POWDER, LYOPHILIZED, FOR SOLUTION INTRAVENOUS at 23:03

## 2022-01-01 RX ADMIN — OXYCODONE HYDROCHLORIDE 10 MG: 10 TABLET ORAL at 01:38

## 2022-01-01 RX ADMIN — CEFAZOLIN 2 G: 1 INJECTION, POWDER, FOR SOLUTION INTRAMUSCULAR; INTRAVENOUS at 15:50

## 2022-01-01 RX ADMIN — MORPHINE SULFATE 30 MG: 30 TABLET, FILM COATED, EXTENDED RELEASE ORAL at 20:01

## 2022-01-01 RX ADMIN — GABAPENTIN 400 MG: 100 CAPSULE ORAL at 15:15

## 2022-01-01 RX ADMIN — POTASSIUM CHLORIDE 10 MEQ: 7.46 INJECTION, SOLUTION INTRAVENOUS at 15:23

## 2022-01-01 RX ADMIN — OXYCODONE HYDROCHLORIDE 20 MG: 5 TABLET ORAL at 14:25

## 2022-01-01 RX ADMIN — IPRATROPIUM BROMIDE AND ALBUTEROL SULFATE 3 ML: .5; 3 SOLUTION RESPIRATORY (INHALATION) at 20:02

## 2022-01-01 RX ADMIN — HYDROMORPHONE HYDROCHLORIDE 1 MG: 1 INJECTION, SOLUTION INTRAMUSCULAR; INTRAVENOUS; SUBCUTANEOUS at 01:24

## 2022-01-01 RX ADMIN — GABAPENTIN 300 MG: 300 CAPSULE ORAL at 09:11

## 2022-01-01 RX ADMIN — MORPHINE SULFATE 30 MG: 15 TABLET, EXTENDED RELEASE ORAL at 08:20

## 2022-01-01 RX ADMIN — Medication 50 MG: at 16:36

## 2022-01-01 RX ADMIN — AZITHROMYCIN 500 MG: 500 INJECTION, POWDER, LYOPHILIZED, FOR SOLUTION INTRAVENOUS at 16:14

## 2022-01-01 RX ADMIN — ACETAMINOPHEN 325MG 975 MG: 325 TABLET ORAL at 06:44

## 2022-01-01 RX ADMIN — IPRATROPIUM BROMIDE AND ALBUTEROL SULFATE 3 ML: .5; 3 SOLUTION RESPIRATORY (INHALATION) at 21:15

## 2022-01-01 RX ADMIN — BUPIVACAINE HYDROCHLORIDE 8 ML/HR: 7.5 INJECTION, SOLUTION EPIDURAL; RETROBULBAR at 17:42

## 2022-01-01 RX ADMIN — PIPERACILLIN SODIUM AND TAZOBACTAM SODIUM 4.5 G: 4; .5 INJECTION, POWDER, LYOPHILIZED, FOR SOLUTION INTRAVENOUS at 03:53

## 2022-01-01 RX ADMIN — OXYCODONE HYDROCHLORIDE 20 MG: 5 TABLET ORAL at 05:34

## 2022-01-01 RX ADMIN — ACETAMINOPHEN 325MG 975 MG: 325 TABLET ORAL at 13:22

## 2022-01-01 RX ADMIN — ACETAMINOPHEN 325MG 975 MG: 325 TABLET ORAL at 00:16

## 2022-01-01 RX ADMIN — ENOXAPARIN SODIUM 40 MG: 40 INJECTION SUBCUTANEOUS at 16:23

## 2022-01-01 RX ADMIN — ACETAMINOPHEN 650 MG: 325 TABLET, FILM COATED ORAL at 08:45

## 2022-01-01 RX ADMIN — GABAPENTIN 400 MG: 100 CAPSULE ORAL at 21:51

## 2022-01-01 RX ADMIN — OLANZAPINE 2.5 MG: 2.5 TABLET, FILM COATED ORAL at 00:51

## 2022-01-01 RX ADMIN — CISPLATIN 122 MG: 1 INJECTION, SOLUTION INTRAVENOUS at 18:26

## 2022-01-01 RX ADMIN — SODIUM CHLORIDE, PRESERVATIVE FREE 5 ML: 5 INJECTION INTRAVENOUS at 06:05

## 2022-01-01 RX ADMIN — MORPHINE SULFATE 30 MG: 15 TABLET, EXTENDED RELEASE ORAL at 20:41

## 2022-01-01 RX ADMIN — MORPHINE SULFATE 30 MG: 15 TABLET, EXTENDED RELEASE ORAL at 20:46

## 2022-01-01 RX ADMIN — ACETAMINOPHEN 325MG 975 MG: 325 TABLET ORAL at 13:08

## 2022-01-01 RX ADMIN — GRANISETRON HYDROCHLORIDE 1 MG: 1 INJECTION, SOLUTION INTRAVENOUS at 21:59

## 2022-01-01 RX ADMIN — THERA TABS 1 TABLET: TAB at 08:42

## 2022-01-01 RX ADMIN — ACETAMINOPHEN 325MG 975 MG: 325 TABLET ORAL at 12:44

## 2022-01-01 RX ADMIN — ESCITALOPRAM OXALATE 10 MG: 10 TABLET ORAL at 14:32

## 2022-01-01 RX ADMIN — ACETAMINOPHEN 325MG 975 MG: 325 TABLET ORAL at 21:14

## 2022-01-01 RX ADMIN — MORPHINE SULFATE 30 MG: 30 TABLET, FILM COATED, EXTENDED RELEASE ORAL at 10:37

## 2022-01-01 RX ADMIN — MORPHINE SULFATE 30 MG: 30 TABLET, FILM COATED, EXTENDED RELEASE ORAL at 10:22

## 2022-01-01 RX ADMIN — OXYCODONE HYDROCHLORIDE 20 MG: 5 TABLET ORAL at 20:27

## 2022-01-01 RX ADMIN — ACETAMINOPHEN 975 MG: 325 TABLET, FILM COATED ORAL at 06:19

## 2022-01-01 RX ADMIN — ACETAMINOPHEN 325MG 975 MG: 325 TABLET ORAL at 12:34

## 2022-01-01 RX ADMIN — IPRATROPIUM BROMIDE AND ALBUTEROL SULFATE 3 ML: .5; 3 SOLUTION RESPIRATORY (INHALATION) at 14:01

## 2022-01-01 RX ADMIN — ACETAMINOPHEN 325MG 975 MG: 325 TABLET ORAL at 05:37

## 2022-01-01 RX ADMIN — PIPERACILLIN SODIUM AND TAZOBACTAM SODIUM 4.5 G: 4; .5 INJECTION, POWDER, LYOPHILIZED, FOR SOLUTION INTRAVENOUS at 17:30

## 2022-01-01 RX ADMIN — ESCITALOPRAM OXALATE 10 MG: 10 TABLET ORAL at 07:43

## 2022-01-01 RX ADMIN — IPRATROPIUM BROMIDE AND ALBUTEROL SULFATE 3 ML: .5; 3 SOLUTION RESPIRATORY (INHALATION) at 11:58

## 2022-01-01 RX ADMIN — Medication 5 ML: at 07:20

## 2022-01-01 RX ADMIN — OXYCODONE HYDROCHLORIDE 20 MG: 5 TABLET ORAL at 16:23

## 2022-01-01 RX ADMIN — ACETAMINOPHEN 325MG 975 MG: 325 TABLET ORAL at 04:39

## 2022-01-01 RX ADMIN — MORPHINE SULFATE 30 MG: 15 TABLET, EXTENDED RELEASE ORAL at 07:41

## 2022-01-01 RX ADMIN — VANCOMYCIN HYDROCHLORIDE 125 MG: 125 CAPSULE ORAL at 16:57

## 2022-01-01 RX ADMIN — APIXABAN 5 MG: 5 TABLET, FILM COATED ORAL at 20:26

## 2022-01-01 RX ADMIN — OXYCODONE HYDROCHLORIDE 20 MG: 5 TABLET ORAL at 18:04

## 2022-01-01 RX ADMIN — SODIUM CHLORIDE 1000 ML: 9 INJECTION, SOLUTION INTRAVENOUS at 16:49

## 2022-01-01 RX ADMIN — TRANEXAMIC ACID 1950 MG: 650 TABLET ORAL at 11:05

## 2022-01-01 RX ADMIN — ACETAMINOPHEN 325MG 975 MG: 325 TABLET ORAL at 20:46

## 2022-01-01 RX ADMIN — ACETAMINOPHEN 975 MG: 325 TABLET, FILM COATED ORAL at 08:35

## 2022-01-01 RX ADMIN — SODIUM CHLORIDE, POTASSIUM CHLORIDE, SODIUM LACTATE AND CALCIUM CHLORIDE 500 ML: 600; 310; 30; 20 INJECTION, SOLUTION INTRAVENOUS at 00:06

## 2022-01-01 RX ADMIN — OXYCODONE HYDROCHLORIDE 20 MG: 5 TABLET ORAL at 22:51

## 2022-01-01 RX ADMIN — PIPERACILLIN SODIUM AND TAZOBACTAM SODIUM 4.5 G: 4; .5 INJECTION, POWDER, LYOPHILIZED, FOR SOLUTION INTRAVENOUS at 21:53

## 2022-01-01 RX ADMIN — ENOXAPARIN SODIUM 40 MG: 40 INJECTION SUBCUTANEOUS at 16:46

## 2022-01-01 RX ADMIN — OXYCODONE HYDROCHLORIDE 20 MG: 5 TABLET ORAL at 16:10

## 2022-01-01 RX ADMIN — PHENYLEPHRINE HYDROCHLORIDE 100 MCG: 10 INJECTION INTRAVENOUS at 17:49

## 2022-01-01 RX ADMIN — ENOXAPARIN SODIUM 40 MG: 40 INJECTION SUBCUTANEOUS at 18:43

## 2022-01-01 RX ADMIN — OXYCODONE HYDROCHLORIDE 10 MG: 10 TABLET ORAL at 06:33

## 2022-01-01 RX ADMIN — APIXABAN 5 MG: 5 TABLET, FILM COATED ORAL at 09:14

## 2022-01-01 RX ADMIN — ONDANSETRON 8 MG: 4 TABLET, ORALLY DISINTEGRATING ORAL at 10:54

## 2022-01-01 RX ADMIN — PROCHLORPERAZINE EDISYLATE 10 MG: 5 INJECTION INTRAMUSCULAR; INTRAVENOUS at 15:09

## 2022-01-01 RX ADMIN — PIPERACILLIN SODIUM AND TAZOBACTAM SODIUM 4.5 G: 4; .5 INJECTION, POWDER, LYOPHILIZED, FOR SOLUTION INTRAVENOUS at 12:13

## 2022-01-01 RX ADMIN — OXYCODONE HYDROCHLORIDE 10 MG: 10 TABLET ORAL at 08:02

## 2022-01-01 RX ADMIN — LORAZEPAM 1 MG: 2 LIQUID ORAL at 04:42

## 2022-01-01 RX ADMIN — INSULIN ASPART 1 UNITS: 100 INJECTION, SOLUTION INTRAVENOUS; SUBCUTANEOUS at 20:22

## 2022-01-01 RX ADMIN — ESCITALOPRAM OXALATE 10 MG: 10 TABLET ORAL at 07:52

## 2022-01-01 RX ADMIN — GABAPENTIN 400 MG: 100 CAPSULE ORAL at 14:23

## 2022-01-01 RX ADMIN — BUPIVACAINE HYDROCHLORIDE: 7.5 INJECTION, SOLUTION EPIDURAL; RETROBULBAR at 04:23

## 2022-01-01 RX ADMIN — CEFPODOXIME PROXETIL 200 MG: 200 TABLET, FILM COATED ORAL at 19:21

## 2022-01-01 RX ADMIN — ACETAMINOPHEN 975 MG: 325 TABLET, FILM COATED ORAL at 17:50

## 2022-01-01 RX ADMIN — OXYCODONE HYDROCHLORIDE 20 MG: 5 TABLET ORAL at 09:45

## 2022-01-01 RX ADMIN — PIPERACILLIN SODIUM AND TAZOBACTAM SODIUM 4.5 G: 4; .5 INJECTION, POWDER, LYOPHILIZED, FOR SOLUTION INTRAVENOUS at 04:48

## 2022-01-01 RX ADMIN — OXYCODONE HYDROCHLORIDE 20 MG: 5 TABLET ORAL at 16:59

## 2022-01-01 RX ADMIN — MORPHINE SULFATE 30 MG: 30 TABLET, FILM COATED, EXTENDED RELEASE ORAL at 08:22

## 2022-01-01 RX ADMIN — MORPHINE SULFATE 30 MG: 15 TABLET, EXTENDED RELEASE ORAL at 08:25

## 2022-01-01 RX ADMIN — PIPERACILLIN SODIUM AND TAZOBACTAM SODIUM 4.5 G: 4; .5 INJECTION, POWDER, LYOPHILIZED, FOR SOLUTION INTRAVENOUS at 15:56

## 2022-01-01 RX ADMIN — DEXTROSE AND SODIUM CHLORIDE: 5; 450 INJECTION, SOLUTION INTRAVENOUS at 06:04

## 2022-01-01 RX ADMIN — HYDROMORPHONE HYDROCHLORIDE 0.5 MG: 1 INJECTION, SOLUTION INTRAMUSCULAR; INTRAVENOUS; SUBCUTANEOUS at 02:42

## 2022-01-01 RX ADMIN — METRONIDAZOLE 500 MG: 500 INJECTION, SOLUTION INTRAVENOUS at 12:17

## 2022-01-01 RX ADMIN — GABAPENTIN 300 MG: 300 CAPSULE ORAL at 14:29

## 2022-01-01 RX ADMIN — ACETAMINOPHEN 650 MG: 325 TABLET, FILM COATED ORAL at 21:24

## 2022-01-01 RX ADMIN — POTASSIUM CHLORIDE 10 MEQ: 7.46 INJECTION, SOLUTION INTRAVENOUS at 01:14

## 2022-01-01 RX ADMIN — IPRATROPIUM BROMIDE AND ALBUTEROL SULFATE 3 ML: .5; 3 SOLUTION RESPIRATORY (INHALATION) at 06:25

## 2022-01-01 RX ADMIN — OXYCODONE HYDROCHLORIDE 20 MG: 5 TABLET ORAL at 12:43

## 2022-01-01 RX ADMIN — PIPERACILLIN SODIUM AND TAZOBACTAM SODIUM 4.5 G: 4; .5 INJECTION, POWDER, LYOPHILIZED, FOR SOLUTION INTRAVENOUS at 18:02

## 2022-01-01 RX ADMIN — SODIUM CHLORIDE, POTASSIUM CHLORIDE, SODIUM LACTATE AND CALCIUM CHLORIDE: 600; 310; 30; 20 INJECTION, SOLUTION INTRAVENOUS at 21:38

## 2022-01-01 RX ADMIN — Medication 5 ML: at 04:02

## 2022-01-01 RX ADMIN — PAZOPANIB 800 MG: 200 TABLET, FILM COATED ORAL at 10:48

## 2022-01-01 RX ADMIN — MORPHINE SULFATE 30 MG: 30 TABLET, FILM COATED, EXTENDED RELEASE ORAL at 07:51

## 2022-01-01 RX ADMIN — IPRATROPIUM BROMIDE AND ALBUTEROL SULFATE 3 ML: .5; 3 SOLUTION RESPIRATORY (INHALATION) at 19:31

## 2022-01-01 RX ADMIN — ENOXAPARIN SODIUM 40 MG: 40 INJECTION SUBCUTANEOUS at 17:14

## 2022-01-01 RX ADMIN — OXYCODONE HYDROCHLORIDE 20 MG: 5 TABLET ORAL at 09:15

## 2022-01-01 RX ADMIN — ALBUMIN HUMAN 50 G: 0.25 SOLUTION INTRAVENOUS at 14:45

## 2022-01-01 RX ADMIN — OXYCODONE HYDROCHLORIDE 10 MG: 10 TABLET ORAL at 22:32

## 2022-01-01 RX ADMIN — OXYCODONE HYDROCHLORIDE 20 MG: 5 TABLET ORAL at 03:26

## 2022-01-01 RX ADMIN — Medication 10 MG: at 14:33

## 2022-01-01 RX ADMIN — Medication 400 MG: at 19:21

## 2022-01-01 RX ADMIN — OXYCODONE HYDROCHLORIDE 20 MG: 5 TABLET ORAL at 08:33

## 2022-01-01 RX ADMIN — LOPERAMIDE HYDROCHLORIDE 2 MG: 2 CAPSULE ORAL at 00:09

## 2022-01-01 RX ADMIN — OLANZAPINE 2.5 MG: 2.5 TABLET, FILM COATED ORAL at 02:30

## 2022-01-01 RX ADMIN — ESCITALOPRAM OXALATE 10 MG: 10 TABLET ORAL at 08:58

## 2022-01-01 RX ADMIN — VANCOMYCIN HYDROCHLORIDE 125 MG: 125 CAPSULE ORAL at 08:33

## 2022-01-01 RX ADMIN — DOXYCYCLINE HYCLATE 100 MG: 100 CAPSULE ORAL at 08:48

## 2022-01-01 RX ADMIN — HYDROMORPHONE HYDROCHLORIDE 0.3 MG: 1 INJECTION, SOLUTION INTRAMUSCULAR; INTRAVENOUS; SUBCUTANEOUS at 22:55

## 2022-01-01 RX ADMIN — OXYCODONE HYDROCHLORIDE 20 MG: 5 TABLET ORAL at 07:52

## 2022-01-01 RX ADMIN — ACETAMINOPHEN 975 MG: 325 TABLET, FILM COATED ORAL at 10:48

## 2022-01-01 RX ADMIN — PIPERACILLIN SODIUM AND TAZOBACTAM SODIUM 4.5 G: 4; .5 INJECTION, POWDER, LYOPHILIZED, FOR SOLUTION INTRAVENOUS at 16:35

## 2022-01-01 RX ADMIN — SODIUM CHLORIDE, POTASSIUM CHLORIDE, SODIUM LACTATE AND CALCIUM CHLORIDE 1000 ML: 600; 310; 30; 20 INJECTION, SOLUTION INTRAVENOUS at 09:07

## 2022-01-01 RX ADMIN — MORPHINE SULFATE 30 MG: 30 TABLET, EXTENDED RELEASE ORAL at 12:18

## 2022-01-01 RX ADMIN — HYDROMORPHONE HYDROCHLORIDE 1 MG: 1 INJECTION, SOLUTION INTRAMUSCULAR; INTRAVENOUS; SUBCUTANEOUS at 07:05

## 2022-01-01 RX ADMIN — APIXABAN 5 MG: 5 TABLET, FILM COATED ORAL at 10:03

## 2022-01-01 RX ADMIN — ACETAMINOPHEN 975 MG: 325 TABLET, FILM COATED ORAL at 04:42

## 2022-01-01 RX ADMIN — OXYCODONE HYDROCHLORIDE 20 MG: 5 TABLET ORAL at 02:53

## 2022-01-01 RX ADMIN — OXYCODONE HYDROCHLORIDE 10 MG: 10 TABLET ORAL at 19:10

## 2022-01-01 RX ADMIN — GUAIFENESIN AND CODEINE PHOSPHATE 5 ML: 10; 100 LIQUID ORAL at 15:59

## 2022-01-01 RX ADMIN — HYDROMORPHONE HYDROCHLORIDE 0.5 MG: 1 INJECTION, SOLUTION INTRAMUSCULAR; INTRAVENOUS; SUBCUTANEOUS at 01:16

## 2022-01-01 RX ADMIN — ALBUMIN (HUMAN): 12.5 SOLUTION INTRAVENOUS at 19:14

## 2022-01-01 RX ADMIN — PIPERACILLIN SODIUM AND TAZOBACTAM SODIUM 4.5 G: 4; .5 INJECTION, POWDER, LYOPHILIZED, FOR SOLUTION INTRAVENOUS at 19:45

## 2022-01-01 RX ADMIN — FENTANYL CITRATE 50 MCG: 50 INJECTION, SOLUTION INTRAMUSCULAR; INTRAVENOUS at 14:35

## 2022-01-01 RX ADMIN — VANCOMYCIN HYDROCHLORIDE 1250 MG: 10 INJECTION, POWDER, LYOPHILIZED, FOR SOLUTION INTRAVENOUS at 22:49

## 2022-01-01 RX ADMIN — ACETAMINOPHEN 325MG 975 MG: 325 TABLET ORAL at 05:21

## 2022-01-01 RX ADMIN — OXYCODONE HYDROCHLORIDE 20 MG: 5 TABLET ORAL at 02:15

## 2022-01-01 RX ADMIN — ACETAMINOPHEN 325MG 975 MG: 325 TABLET ORAL at 13:46

## 2022-01-01 RX ADMIN — ACETAMINOPHEN 325MG 975 MG: 325 TABLET ORAL at 20:42

## 2022-01-01 RX ADMIN — CEFTRIAXONE SODIUM 2 G: 2 INJECTION, POWDER, FOR SOLUTION INTRAMUSCULAR; INTRAVENOUS at 16:14

## 2022-01-01 RX ADMIN — ESCITALOPRAM OXALATE 10 MG: 10 TABLET ORAL at 07:37

## 2022-01-01 RX ADMIN — HEPARIN SODIUM 5000 UNITS: 5000 INJECTION, SOLUTION INTRAVENOUS; SUBCUTANEOUS at 10:48

## 2022-01-01 RX ADMIN — ACETAMINOPHEN 325MG 975 MG: 325 TABLET ORAL at 13:33

## 2022-01-01 RX ADMIN — ACETAMINOPHEN 975 MG: 325 TABLET, FILM COATED ORAL at 04:36

## 2022-01-01 RX ADMIN — APIXABAN 5 MG: 5 TABLET, FILM COATED ORAL at 21:51

## 2022-01-01 RX ADMIN — MORPHINE SULFATE 15 MG: 15 TABLET, EXTENDED RELEASE ORAL at 21:24

## 2022-01-01 RX ADMIN — SENNOSIDES AND DOCUSATE SODIUM 2 TABLET: 50; 8.6 TABLET ORAL at 08:15

## 2022-01-01 RX ADMIN — ACETAMINOPHEN 975 MG: 325 TABLET, FILM COATED ORAL at 08:34

## 2022-01-01 RX ADMIN — Medication 5 ML: at 10:09

## 2022-01-01 RX ADMIN — KETOROLAC TROMETHAMINE 15 MG: 15 INJECTION, SOLUTION INTRAMUSCULAR; INTRAVENOUS at 05:06

## 2022-01-01 RX ADMIN — ESCITALOPRAM OXALATE 10 MG: 10 TABLET ORAL at 10:00

## 2022-01-01 RX ADMIN — GABAPENTIN 300 MG: 300 CAPSULE ORAL at 13:55

## 2022-01-01 RX ADMIN — BENZONATATE 100 MG: 100 CAPSULE ORAL at 15:59

## 2022-01-01 RX ADMIN — OXYCODONE HYDROCHLORIDE 20 MG: 5 TABLET ORAL at 15:15

## 2022-01-01 RX ADMIN — HYDROMORPHONE HYDROCHLORIDE 0.3 MG: 1 INJECTION, SOLUTION INTRAMUSCULAR; INTRAVENOUS; SUBCUTANEOUS at 01:05

## 2022-01-01 RX ADMIN — ESCITALOPRAM 10 MG: 5 TABLET, FILM COATED ORAL at 08:39

## 2022-01-01 RX ADMIN — OXYCODONE HYDROCHLORIDE 20 MG: 5 TABLET ORAL at 05:39

## 2022-01-01 RX ADMIN — APIXABAN 5 MG: 5 TABLET, FILM COATED ORAL at 19:20

## 2022-01-01 RX ADMIN — ACETAMINOPHEN 975 MG: 325 TABLET, FILM COATED ORAL at 00:43

## 2022-01-01 RX ADMIN — OXYCODONE HYDROCHLORIDE 20 MG: 5 TABLET ORAL at 08:22

## 2022-01-01 RX ADMIN — CALCIUM CHLORIDE 200 MG: 100 INJECTION INTRAVENOUS; INTRAVENTRICULAR at 18:58

## 2022-01-01 RX ADMIN — OXYCODONE HYDROCHLORIDE 20 MG: 5 TABLET ORAL at 03:10

## 2022-01-01 RX ADMIN — APIXABAN 5 MG: 5 TABLET, FILM COATED ORAL at 07:56

## 2022-01-01 RX ADMIN — Medication 5 ML: at 13:05

## 2022-01-01 RX ADMIN — Medication 50 MG: at 21:34

## 2022-01-01 RX ADMIN — ENOXAPARIN SODIUM 40 MG: 40 INJECTION SUBCUTANEOUS at 18:48

## 2022-01-01 RX ADMIN — ESCITALOPRAM OXALATE 10 MG: 10 TABLET ORAL at 08:05

## 2022-01-01 RX ADMIN — ACETAMINOPHEN 325MG 975 MG: 325 TABLET ORAL at 06:13

## 2022-01-01 RX ADMIN — UMECLIDINIUM BROMIDE AND VILANTEROL TRIFENATATE 1 PUFF: 62.5; 25 POWDER RESPIRATORY (INHALATION) at 08:36

## 2022-01-01 RX ADMIN — Medication 5 ML: at 17:20

## 2022-01-01 RX ADMIN — OXYCODONE HYDROCHLORIDE 20 MG: 5 TABLET ORAL at 06:29

## 2022-01-01 RX ADMIN — HYDROMORPHONE HYDROCHLORIDE 2 MG: 1 INJECTION, SOLUTION INTRAMUSCULAR; INTRAVENOUS; SUBCUTANEOUS at 11:13

## 2022-01-01 RX ADMIN — ACETAMINOPHEN 975 MG: 325 TABLET, FILM COATED ORAL at 21:23

## 2022-01-01 RX ADMIN — OXYCODONE HYDROCHLORIDE 20 MG: 5 TABLET ORAL at 16:54

## 2022-01-01 RX ADMIN — CEFPODOXIME PROXETIL 200 MG: 200 TABLET, FILM COATED ORAL at 20:39

## 2022-01-01 RX ADMIN — ACETAMINOPHEN 325MG 975 MG: 325 TABLET ORAL at 00:20

## 2022-01-01 RX ADMIN — ESCITALOPRAM OXALATE 10 MG: 10 TABLET ORAL at 08:29

## 2022-01-01 RX ADMIN — ACETAMINOPHEN 650 MG: 325 TABLET, FILM COATED ORAL at 02:44

## 2022-01-01 RX ADMIN — ESCITALOPRAM OXALATE 10 MG: 10 TABLET ORAL at 10:03

## 2022-01-01 RX ADMIN — PIPERACILLIN SODIUM AND TAZOBACTAM SODIUM 4.5 G: 4; .5 INJECTION, POWDER, LYOPHILIZED, FOR SOLUTION INTRAVENOUS at 16:58

## 2022-01-01 RX ADMIN — Medication 16 MCG: at 14:39

## 2022-01-01 RX ADMIN — MORPHINE SULFATE 30 MG: 30 TABLET, EXTENDED RELEASE ORAL at 12:20

## 2022-01-01 RX ADMIN — GABAPENTIN 400 MG: 100 CAPSULE ORAL at 21:18

## 2022-01-01 RX ADMIN — MORPHINE SULFATE 30 MG: 30 TABLET, EXTENDED RELEASE ORAL at 09:04

## 2022-01-01 RX ADMIN — ESCITALOPRAM OXALATE 10 MG: 10 TABLET ORAL at 09:04

## 2022-01-01 RX ADMIN — PROCHLORPERAZINE EDISYLATE 10 MG: 5 INJECTION INTRAMUSCULAR; INTRAVENOUS at 04:40

## 2022-01-01 RX ADMIN — ACETAMINOPHEN 325MG 975 MG: 325 TABLET ORAL at 20:41

## 2022-01-01 RX ADMIN — ACETAMINOPHEN 325MG 975 MG: 325 TABLET ORAL at 20:00

## 2022-01-01 RX ADMIN — HYDROMORPHONE HYDROCHLORIDE 0.5 MG: 1 INJECTION, SOLUTION INTRAMUSCULAR; INTRAVENOUS; SUBCUTANEOUS at 12:11

## 2022-01-01 RX ADMIN — DEXTROSE AND SODIUM CHLORIDE: 5; 450 INJECTION, SOLUTION INTRAVENOUS at 10:40

## 2022-01-01 RX ADMIN — ACETAMINOPHEN 650 MG: 325 TABLET, FILM COATED ORAL at 03:59

## 2022-01-01 RX ADMIN — OXYCODONE HYDROCHLORIDE 20 MG: 5 TABLET ORAL at 21:16

## 2022-01-01 RX ADMIN — APIXABAN 5 MG: 5 TABLET, FILM COATED ORAL at 19:35

## 2022-01-01 RX ADMIN — OXYCODONE HYDROCHLORIDE 20 MG: 5 TABLET ORAL at 06:00

## 2022-01-01 RX ADMIN — MORPHINE SULFATE 30 MG: 30 TABLET, EXTENDED RELEASE ORAL at 19:12

## 2022-01-01 RX ADMIN — ACETAMINOPHEN 325MG 975 MG: 325 TABLET ORAL at 20:27

## 2022-01-01 RX ADMIN — OXYCODONE HYDROCHLORIDE 10 MG: 10 TABLET ORAL at 07:58

## 2022-01-01 RX ADMIN — MORPHINE SULFATE 30 MG: 15 TABLET, FILM COATED, EXTENDED RELEASE ORAL at 22:35

## 2022-01-01 RX ADMIN — ACETAMINOPHEN 325MG 975 MG: 325 TABLET ORAL at 13:10

## 2022-01-01 RX ADMIN — ACETAMINOPHEN 975 MG: 325 TABLET, FILM COATED ORAL at 15:46

## 2022-01-01 RX ADMIN — OXYCODONE HYDROCHLORIDE 20 MG: 5 TABLET ORAL at 03:05

## 2022-01-01 RX ADMIN — ACETAMINOPHEN 325MG 975 MG: 325 TABLET ORAL at 13:12

## 2022-01-01 RX ADMIN — OXYCODONE HYDROCHLORIDE 20 MG: 5 TABLET ORAL at 05:10

## 2022-01-01 RX ADMIN — ACETAMINOPHEN 325MG 975 MG: 325 TABLET ORAL at 13:20

## 2022-01-01 RX ADMIN — OXYCODONE HYDROCHLORIDE 20 MG: 5 TABLET ORAL at 10:48

## 2022-01-01 RX ADMIN — IPRATROPIUM BROMIDE AND ALBUTEROL SULFATE 3 ML: 2.5; .5 SOLUTION RESPIRATORY (INHALATION) at 17:33

## 2022-01-01 RX ADMIN — SENNOSIDES AND DOCUSATE SODIUM 2 TABLET: 50; 8.6 TABLET ORAL at 16:27

## 2022-01-01 RX ADMIN — OXYCODONE HYDROCHLORIDE 20 MG: 5 TABLET ORAL at 15:34

## 2022-01-01 RX ADMIN — ACETAMINOPHEN 325MG 975 MG: 325 TABLET ORAL at 20:26

## 2022-01-01 RX ADMIN — CEFDINIR 300 MG: 300 CAPSULE ORAL at 08:06

## 2022-01-01 RX ADMIN — Medication 50 MG: at 15:12

## 2022-01-01 RX ADMIN — POTASSIUM CHLORIDE 10 MEQ: 7.46 INJECTION, SOLUTION INTRAVENOUS at 23:01

## 2022-01-01 RX ADMIN — ACETAMINOPHEN 975 MG: 325 TABLET, FILM COATED ORAL at 07:56

## 2022-01-01 RX ADMIN — MAGNESIUM SULFATE IN WATER 4 G: 40 INJECTION, SOLUTION INTRAVENOUS at 07:58

## 2022-01-01 RX ADMIN — OXYCODONE HYDROCHLORIDE 20 MG: 5 TABLET ORAL at 05:32

## 2022-01-01 RX ADMIN — ACETAMINOPHEN 975 MG: 325 TABLET, FILM COATED ORAL at 00:40

## 2022-01-01 RX ADMIN — OXYCODONE HYDROCHLORIDE 20 MG: 5 TABLET ORAL at 00:05

## 2022-01-01 RX ADMIN — OXYCODONE HYDROCHLORIDE 20 MG: 5 TABLET ORAL at 03:00

## 2022-01-01 RX ADMIN — ACETAMINOPHEN 325MG 975 MG: 325 TABLET ORAL at 13:52

## 2022-01-01 RX ADMIN — APIXABAN 5 MG: 5 TABLET, FILM COATED ORAL at 20:13

## 2022-01-01 RX ADMIN — FLUDEOXYGLUCOSE F-18 15.02 MCI.: 500 INJECTION, SOLUTION INTRAVENOUS at 12:34

## 2022-01-01 RX ADMIN — OXYCODONE HYDROCHLORIDE 15 MG: 5 TABLET ORAL at 03:18

## 2022-01-01 RX ADMIN — ACETAMINOPHEN 325MG 975 MG: 325 TABLET ORAL at 12:42

## 2022-01-01 RX ADMIN — PIPERACILLIN SODIUM AND TAZOBACTAM SODIUM 4.5 G: 4; .5 INJECTION, POWDER, LYOPHILIZED, FOR SOLUTION INTRAVENOUS at 05:17

## 2022-01-01 RX ADMIN — CEFTRIAXONE SODIUM 2 G: 2 INJECTION, POWDER, FOR SOLUTION INTRAMUSCULAR; INTRAVENOUS at 17:56

## 2022-01-01 RX ADMIN — HYDROMORPHONE HYDROCHLORIDE 0.5 MG: 1 INJECTION, SOLUTION INTRAMUSCULAR; INTRAVENOUS; SUBCUTANEOUS at 12:55

## 2022-01-01 RX ADMIN — APIXABAN 5 MG: 5 TABLET, FILM COATED ORAL at 19:21

## 2022-01-01 RX ADMIN — GABAPENTIN 300 MG: 300 CAPSULE ORAL at 20:23

## 2022-01-01 RX ADMIN — OXYCODONE HYDROCHLORIDE 10 MG: 10 TABLET ORAL at 08:33

## 2022-01-01 RX ADMIN — DOXYCYCLINE HYCLATE 100 MG: 100 CAPSULE ORAL at 19:21

## 2022-01-01 RX ADMIN — GABAPENTIN 400 MG: 100 CAPSULE ORAL at 20:26

## 2022-01-01 RX ADMIN — ACETAMINOPHEN 325MG 975 MG: 325 TABLET ORAL at 05:52

## 2022-01-01 RX ADMIN — SODIUM CHLORIDE, POTASSIUM CHLORIDE, SODIUM LACTATE AND CALCIUM CHLORIDE: 600; 310; 30; 20 INJECTION, SOLUTION INTRAVENOUS at 14:29

## 2022-01-01 RX ADMIN — MORPHINE SULFATE 30 MG: 15 TABLET, FILM COATED, EXTENDED RELEASE ORAL at 09:56

## 2022-01-01 RX ADMIN — Medication 5 ML: at 01:51

## 2022-01-01 RX ADMIN — OXYCODONE HYDROCHLORIDE 10 MG: 10 TABLET ORAL at 09:18

## 2022-01-01 RX ADMIN — ENOXAPARIN SODIUM 40 MG: 40 INJECTION SUBCUTANEOUS at 19:13

## 2022-01-01 RX ADMIN — ACETAMINOPHEN 325MG 975 MG: 325 TABLET ORAL at 14:02

## 2022-01-01 RX ADMIN — POTASSIUM PHOSPHATE, MONOBASIC AND POTASSIUM PHOSPHATE, DIBASIC 9 MMOL: 224; 236 INJECTION, SOLUTION INTRAVENOUS at 21:20

## 2022-01-01 RX ADMIN — ACETAMINOPHEN 325MG 975 MG: 325 TABLET ORAL at 08:41

## 2022-01-01 RX ADMIN — ACETAMINOPHEN 325MG 975 MG: 325 TABLET ORAL at 08:42

## 2022-01-01 RX ADMIN — MORPHINE SULFATE 30 MG: 30 TABLET, EXTENDED RELEASE ORAL at 20:19

## 2022-01-01 RX ADMIN — ESCITALOPRAM OXALATE 10 MG: 10 TABLET ORAL at 08:17

## 2022-01-01 RX ADMIN — POTASSIUM & SODIUM PHOSPHATES POWDER PACK 280-160-250 MG 1 PACKET: 280-160-250 PACK at 08:39

## 2022-01-01 RX ADMIN — ACETAMINOPHEN 325MG 975 MG: 325 TABLET ORAL at 04:54

## 2022-01-01 RX ADMIN — GABAPENTIN 300 MG: 300 CAPSULE ORAL at 08:06

## 2022-01-01 RX ADMIN — ACETAMINOPHEN 975 MG: 325 TABLET, FILM COATED ORAL at 09:04

## 2022-01-01 RX ADMIN — GADOBUTROL 8.3 ML: 604.72 INJECTION INTRAVENOUS at 17:23

## 2022-01-01 RX ADMIN — HYDROMORPHONE HYDROCHLORIDE 0.5 MG: 1 INJECTION, SOLUTION INTRAMUSCULAR; INTRAVENOUS; SUBCUTANEOUS at 22:35

## 2022-01-01 RX ADMIN — SODIUM CHLORIDE: 9 INJECTION, SOLUTION INTRAVENOUS at 18:55

## 2022-01-01 RX ADMIN — BUPIVACAINE HYDROCHLORIDE: 7.5 INJECTION, SOLUTION EPIDURAL; RETROBULBAR at 03:17

## 2022-01-01 RX ADMIN — ALTEPLASE 2 MG: 2.2 INJECTION, POWDER, LYOPHILIZED, FOR SOLUTION INTRAVENOUS at 16:36

## 2022-01-01 ASSESSMENT — ACTIVITIES OF DAILY LIVING (ADL)
ADLS_ACUITY_SCORE: 28
ADLS_ACUITY_SCORE: 49
ADLS_ACUITY_SCORE: 36
ADLS_ACUITY_SCORE: 49
ADLS_ACUITY_SCORE: 38
ADLS_ACUITY_SCORE: 43
ADLS_ACUITY_SCORE: 38
ADLS_ACUITY_SCORE: 42
ADLS_ACUITY_SCORE: 35
ADLS_ACUITY_SCORE: 41
ADLS_ACUITY_SCORE: 43
ADLS_ACUITY_SCORE: 40
ADLS_ACUITY_SCORE: 37
ADLS_ACUITY_SCORE: 48
ADLS_ACUITY_SCORE: 39
CHANGE_IN_FUNCTIONAL_STATUS_SINCE_ONSET_OF_CURRENT_ILLNESS/INJURY: NO
ADLS_ACUITY_SCORE: 37
ADLS_ACUITY_SCORE: 32
ADLS_ACUITY_SCORE: 36
ADLS_ACUITY_SCORE: 41
ADLS_ACUITY_SCORE: 40
ADLS_ACUITY_SCORE: 40
ADLS_ACUITY_SCORE: 39
ADLS_ACUITY_SCORE: 37
ADLS_ACUITY_SCORE: 36
ADLS_ACUITY_SCORE: 51
ADLS_ACUITY_SCORE: 48
ADLS_ACUITY_SCORE: 41
ADLS_ACUITY_SCORE: 39
ADLS_ACUITY_SCORE: 32
ADLS_ACUITY_SCORE: 39
ADLS_ACUITY_SCORE: 49
ADLS_ACUITY_SCORE: 51
ADLS_ACUITY_SCORE: 37
ADLS_ACUITY_SCORE: 38
ADLS_ACUITY_SCORE: 49
ADLS_ACUITY_SCORE: 39
ADLS_ACUITY_SCORE: 22
ADLS_ACUITY_SCORE: 51
TOILETING_ISSUES: YES
ADLS_ACUITY_SCORE: 36
WEAR_GLASSES_OR_BLIND: YES
ADLS_ACUITY_SCORE: 51
ADLS_ACUITY_SCORE: 36
ADLS_ACUITY_SCORE: 49
ADLS_ACUITY_SCORE: 38
DOING_ERRANDS_INDEPENDENTLY_DIFFICULTY: OTHER (SEE COMMENTS)
ADLS_ACUITY_SCORE: 32
ADLS_ACUITY_SCORE: 38
ADLS_ACUITY_SCORE: 40
ADLS_ACUITY_SCORE: 39
ADLS_ACUITY_SCORE: 40
ADLS_ACUITY_SCORE: 43
BATHING: 1-->ASSISTANCE NEEDED
ADLS_ACUITY_SCORE: 38
ADLS_ACUITY_SCORE: 49
ADLS_ACUITY_SCORE: 51
ADLS_ACUITY_SCORE: 37
ADLS_ACUITY_SCORE: 49
ADLS_ACUITY_SCORE: 36
ADLS_ACUITY_SCORE: 51
ADLS_ACUITY_SCORE: 50
ADLS_ACUITY_SCORE: 28
ADLS_ACUITY_SCORE: 46
ADLS_ACUITY_SCORE: 49
ADLS_ACUITY_SCORE: 42
ADLS_ACUITY_SCORE: 45
ADLS_ACUITY_SCORE: 45
ADLS_ACUITY_SCORE: 49
ADLS_ACUITY_SCORE: 42
ADLS_ACUITY_SCORE: 40
WEAR_GLASSES_OR_BLIND: YES
ADLS_ACUITY_SCORE: 42
ADLS_ACUITY_SCORE: 40
ADLS_ACUITY_SCORE: 51
ADLS_ACUITY_SCORE: 43
ADLS_ACUITY_SCORE: 37
ADLS_ACUITY_SCORE: 37
ADLS_ACUITY_SCORE: 38
ADLS_ACUITY_SCORE: 43
ADLS_ACUITY_SCORE: 46
ADLS_ACUITY_SCORE: 40
ADLS_ACUITY_SCORE: 39
ADLS_ACUITY_SCORE: 51
ADLS_ACUITY_SCORE: 49
ADLS_ACUITY_SCORE: 28
ADLS_ACUITY_SCORE: 46
ADLS_ACUITY_SCORE: 39
EQUIPMENT_CURRENTLY_USED_AT_HOME: WALKER, ROLLING
ADLS_ACUITY_SCORE: 37
ADLS_ACUITY_SCORE: 46
ADLS_ACUITY_SCORE: 36
ADLS_ACUITY_SCORE: 39
ADLS_ACUITY_SCORE: 32
ADLS_ACUITY_SCORE: 50
ADLS_ACUITY_SCORE: 40
ADLS_ACUITY_SCORE: 43
ADLS_ACUITY_SCORE: 40
ADLS_ACUITY_SCORE: 36
ADLS_ACUITY_SCORE: 49
ADLS_ACUITY_SCORE: 40
ADLS_ACUITY_SCORE: 37
ADLS_ACUITY_SCORE: 38
ADLS_ACUITY_SCORE: 37
ADLS_ACUITY_SCORE: 49
ADLS_ACUITY_SCORE: 39
ADLS_ACUITY_SCORE: 38
ADLS_ACUITY_SCORE: 28
ADLS_ACUITY_SCORE: 43
ADLS_ACUITY_SCORE: 39
ADLS_ACUITY_SCORE: 39
ADLS_ACUITY_SCORE: 32
ADLS_ACUITY_SCORE: 37
ADLS_ACUITY_SCORE: 34
ADLS_ACUITY_SCORE: 42
ADLS_ACUITY_SCORE: 51
ADLS_ACUITY_SCORE: 49
ADLS_ACUITY_SCORE: 32
ADLS_ACUITY_SCORE: 38
ADLS_ACUITY_SCORE: 40
ADLS_ACUITY_SCORE: 51
CHANGE_IN_FUNCTIONAL_STATUS_SINCE_ONSET_OF_CURRENT_ILLNESS/INJURY: NO
ADLS_ACUITY_SCORE: 37
ADLS_ACUITY_SCORE: 36
ADLS_ACUITY_SCORE: 49
ADLS_ACUITY_SCORE: 51
ADLS_ACUITY_SCORE: 51
ADLS_ACUITY_SCORE: 39
ADLS_ACUITY_SCORE: 38
ADLS_ACUITY_SCORE: 38
ADLS_ACUITY_SCORE: 40
ADLS_ACUITY_SCORE: 38
ADLS_ACUITY_SCORE: 49
ADLS_ACUITY_SCORE: 36
ADLS_ACUITY_SCORE: 38
ADLS_ACUITY_SCORE: 38
ADLS_ACUITY_SCORE: 37
ADLS_ACUITY_SCORE: 35
ADLS_ACUITY_SCORE: 48
ADLS_ACUITY_SCORE: 49
ADLS_ACUITY_SCORE: 40
ADLS_ACUITY_SCORE: 43
ADLS_ACUITY_SCORE: 44
ADLS_ACUITY_SCORE: 36
ADLS_ACUITY_SCORE: 44
ADLS_ACUITY_SCORE: 38
ADLS_ACUITY_SCORE: 49
ADLS_ACUITY_SCORE: 37
DRESSING/BATHING_DIFFICULTY: NO
ADLS_ACUITY_SCORE: 38
ADLS_ACUITY_SCORE: 40
ADLS_ACUITY_SCORE: 32
ADLS_ACUITY_SCORE: 42
ADLS_ACUITY_SCORE: 43
ADLS_ACUITY_SCORE: 50
ADLS_ACUITY_SCORE: 42
ADLS_ACUITY_SCORE: 40
ADLS_ACUITY_SCORE: 40
ADLS_ACUITY_SCORE: 42
ADLS_ACUITY_SCORE: 51
ADLS_ACUITY_SCORE: 43
DOING_ERRANDS_INDEPENDENTLY_DIFFICULTY: YES
ADLS_ACUITY_SCORE: 38
ADLS_ACUITY_SCORE: 40
ADLS_ACUITY_SCORE: 40
ADLS_ACUITY_SCORE: 39
ADLS_ACUITY_SCORE: 40
ADLS_ACUITY_SCORE: 49
ADLS_ACUITY_SCORE: 40
ADLS_ACUITY_SCORE: 46
ADLS_ACUITY_SCORE: 47
ADLS_ACUITY_SCORE: 49
ADLS_ACUITY_SCORE: 37
ADLS_ACUITY_SCORE: 49
ADLS_ACUITY_SCORE: 40
ADLS_ACUITY_SCORE: 38
ADLS_ACUITY_SCORE: 49
ADLS_ACUITY_SCORE: 32
ADLS_ACUITY_SCORE: 39
TOILETING: 1-->ASSISTANCE (EQUIPMENT/PERSON) NEEDED (NOT DEVELOPMENTALLY APPROPRIATE)
ADLS_ACUITY_SCORE: 45
ADLS_ACUITY_SCORE: 42
ADLS_ACUITY_SCORE: 40
ADLS_ACUITY_SCORE: 50
CONCENTRATING,_REMEMBERING_OR_MAKING_DECISIONS_DIFFICULTY: NO
ADLS_ACUITY_SCORE: 37
ADLS_ACUITY_SCORE: 35
ADLS_ACUITY_SCORE: 38
ADLS_ACUITY_SCORE: 43
ADLS_ACUITY_SCORE: 45
ADLS_ACUITY_SCORE: 32
ADLS_ACUITY_SCORE: 37
WALKING_OR_CLIMBING_STAIRS_DIFFICULTY: OTHER (SEE COMMENTS)
ADLS_ACUITY_SCORE: 43
ADLS_ACUITY_SCORE: 40
ADLS_ACUITY_SCORE: 49
ADLS_ACUITY_SCORE: 36
ADLS_ACUITY_SCORE: 49
ADLS_ACUITY_SCORE: 40
ADLS_ACUITY_SCORE: 47
ADLS_ACUITY_SCORE: 39
ADLS_ACUITY_SCORE: 40
ADLS_ACUITY_SCORE: 39
TOILETING: 1-->ASSISTANCE (EQUIPMENT/PERSON) NEEDED
ADLS_ACUITY_SCORE: 40
ADLS_ACUITY_SCORE: 37
ADLS_ACUITY_SCORE: 37
ADLS_ACUITY_SCORE: 39
DRESS: 1-->ASSISTANCE (EQUIPMENT/PERSON) NEEDED
ADLS_ACUITY_SCORE: 40
ADLS_ACUITY_SCORE: 43
ADLS_ACUITY_SCORE: 32
ADLS_ACUITY_SCORE: 32
ADLS_ACUITY_SCORE: 43
ADLS_ACUITY_SCORE: 36
DRESSING/BATHING: BATHING DIFFICULTY, REQUIRES EQUIPMENT
ADLS_ACUITY_SCORE: 51
ADLS_ACUITY_SCORE: 51
ADLS_ACUITY_SCORE: 39
ADLS_ACUITY_SCORE: 48
ADLS_ACUITY_SCORE: 36
ADLS_ACUITY_SCORE: 49
ADLS_ACUITY_SCORE: 41
ADLS_ACUITY_SCORE: 38
ADLS_ACUITY_SCORE: 36
BADLS,_PREVIOUS_FUNCTIONAL_LEVEL: INDEPENDENT
ADLS_ACUITY_SCORE: 51
ADLS_ACUITY_SCORE: 39
ADLS_ACUITY_SCORE: 49
ADLS_ACUITY_SCORE: 47
ADLS_ACUITY_SCORE: 51
DOING_ERRANDS_INDEPENDENTLY_DIFFICULTY: YES
TRANSFERRING: 0-->ASSISTANCE NEEDED (DEVELOPMENTALLY APPROPRIATE)
ADLS_ACUITY_SCORE: 49
ADLS_ACUITY_SCORE: 36
ADLS_ACUITY_SCORE: 39
ADLS_ACUITY_SCORE: 40
ADLS_ACUITY_SCORE: 49
CONCENTRATING,_REMEMBERING_OR_MAKING_DECISIONS_DIFFICULTY: NO
ADLS_ACUITY_SCORE: 39
ADLS_ACUITY_SCORE: 38
ADLS_ACUITY_SCORE: 28
ADLS_ACUITY_SCORE: 41
WEAR_GLASSES_OR_BLIND: YES
ADLS_ACUITY_SCORE: 49
ADLS_ACUITY_SCORE: 40
ADLS_ACUITY_SCORE: 36
ADLS_ACUITY_SCORE: 42
ADLS_ACUITY_SCORE: 42
ADLS_ACUITY_SCORE: 38
FALL_HISTORY_WITHIN_LAST_SIX_MONTHS: NO
ADLS_ACUITY_SCORE: 36
ADLS_ACUITY_SCORE: 43
ADLS_ACUITY_SCORE: 36
ADLS_ACUITY_SCORE: 49
ADLS_ACUITY_SCORE: 44
ADLS_ACUITY_SCORE: 36
ADLS_ACUITY_SCORE: 38
ADLS_ACUITY_SCORE: 50
ADLS_ACUITY_SCORE: 49
ADLS_ACUITY_SCORE: 38
ADLS_ACUITY_SCORE: 49
CHANGE_IN_FUNCTIONAL_STATUS_SINCE_ONSET_OF_CURRENT_ILLNESS/INJURY: NO
ADLS_ACUITY_SCORE: 41
ADLS_ACUITY_SCORE: 41
DIFFICULTY_EATING/SWALLOWING: NO
ADLS_ACUITY_SCORE: 38
ADLS_ACUITY_SCORE: 36
ADLS_ACUITY_SCORE: 49
ADLS_ACUITY_SCORE: 37
ADLS_ACUITY_SCORE: 36
TOILETING_ISSUES: NO
ADLS_ACUITY_SCORE: 43
ADLS_ACUITY_SCORE: 28
ADLS_ACUITY_SCORE: 51
ADLS_ACUITY_SCORE: 28
ADLS_ACUITY_SCORE: 43
BATHING: 1-->ASSISTANCE NEEDED
ADLS_ACUITY_SCORE: 32
ADLS_ACUITY_SCORE: 40
ADLS_ACUITY_SCORE: 32
ADLS_ACUITY_SCORE: 38
ADLS_ACUITY_SCORE: 36
ADLS_ACUITY_SCORE: 38
ADLS_ACUITY_SCORE: 36
ADLS_ACUITY_SCORE: 43
IADLS,_PREVIOUS_FUNCTIONAL_LEVEL: PARTIAL ASSISTANCE
ADLS_ACUITY_SCORE: 37
ADLS_ACUITY_SCORE: 42
DRESSING/BATHING_DIFFICULTY: YES
ADLS_ACUITY_SCORE: 43
DRESS: 0-->ASSISTANCE NEEDED (DEVELOPMENTALLY APPROPRIATE)
ADLS_ACUITY_SCORE: 43
ADLS_ACUITY_SCORE: 37
ADLS_ACUITY_SCORE: 51
ADLS_ACUITY_SCORE: 34
ADLS_ACUITY_SCORE: 37
ADLS_ACUITY_SCORE: 49
ADLS_ACUITY_SCORE: 45
ADLS_ACUITY_SCORE: 40
ADLS_ACUITY_SCORE: 36
ADLS_ACUITY_SCORE: 37
ADLS_ACUITY_SCORE: 38
ADLS_ACUITY_SCORE: 36
ADLS_ACUITY_SCORE: 38
EQUIPMENT_CURRENTLY_USED_AT_HOME: WALKER, ROLLING;WHEELCHAIR, MANUAL
ADLS_ACUITY_SCORE: 40
ADLS_ACUITY_SCORE: 49
ADLS_ACUITY_SCORE: 38
ADLS_ACUITY_SCORE: 36
ADLS_ACUITY_SCORE: 36
ADLS_ACUITY_SCORE: 39
ADLS_ACUITY_SCORE: 49
ADLS_ACUITY_SCORE: 38
ADLS_ACUITY_SCORE: 50
ADLS_ACUITY_SCORE: 45
DOING_ERRANDS_INDEPENDENTLY_DIFFICULTY: YES
ADLS_ACUITY_SCORE: 42
ADLS_ACUITY_SCORE: 43
ADLS_ACUITY_SCORE: 28
ADLS_ACUITY_SCORE: 35
ADLS_ACUITY_SCORE: 44
ADLS_ACUITY_SCORE: 36
ADLS_ACUITY_SCORE: 40
ADLS_ACUITY_SCORE: 43
FALL_HISTORY_WITHIN_LAST_SIX_MONTHS: NO
ADLS_ACUITY_SCORE: 51
ADLS_ACUITY_SCORE: 49
ADLS_ACUITY_SCORE: 40
DIFFICULTY_EATING/SWALLOWING: NO
ADLS_ACUITY_SCORE: 40
TRANSFERRING: 0-->ASSISTANCE NEEDED (DEVELOPMENTALLY APPROPRIATE)
ADLS_ACUITY_SCORE: 39
ADLS_ACUITY_SCORE: 39
ADLS_ACUITY_SCORE: 37
ADLS_ACUITY_SCORE: 38
ADLS_ACUITY_SCORE: 45
WALKING_OR_CLIMBING_STAIRS_DIFFICULTY: YES
ADLS_ACUITY_SCORE: 48
ADLS_ACUITY_SCORE: 43
ADLS_ACUITY_SCORE: 38
ADLS_ACUITY_SCORE: 51
ADLS_ACUITY_SCORE: 40
ADLS_ACUITY_SCORE: 38
ADLS_ACUITY_SCORE: 32
ADLS_ACUITY_SCORE: 36
ADLS_ACUITY_SCORE: 40
ADLS_ACUITY_SCORE: 45
WEAR_GLASSES_OR_BLIND: YES
ADLS_ACUITY_SCORE: 49
ADLS_ACUITY_SCORE: 32
ADLS_ACUITY_SCORE: 48
DRESSING/BATHING_DIFFICULTY: YES
ADLS_ACUITY_SCORE: 49
ADLS_ACUITY_SCORE: 39
ADLS_ACUITY_SCORE: 32
ADLS_ACUITY_SCORE: 40
ADLS_ACUITY_SCORE: 40
ADLS_ACUITY_SCORE: 42
ADLS_ACUITY_SCORE: 49
DRESS: 1-->ASSISTANCE (EQUIPMENT/PERSON) NEEDED
ADLS_ACUITY_SCORE: 39
ADLS_ACUITY_SCORE: 37
ADLS_ACUITY_SCORE: 40
ADLS_ACUITY_SCORE: 38
ADLS_ACUITY_SCORE: 39
ADLS_ACUITY_SCORE: 38
ADLS_ACUITY_SCORE: 49
ADLS_ACUITY_SCORE: 49
ADLS_ACUITY_SCORE: 39
ADLS_ACUITY_SCORE: 49
ADLS_ACUITY_SCORE: 51
ADLS_ACUITY_SCORE: 38
ADLS_ACUITY_SCORE: 40
ADLS_ACUITY_SCORE: 32
ADLS_ACUITY_SCORE: 38
ADLS_ACUITY_SCORE: 42
ADLS_ACUITY_SCORE: 47
ADLS_ACUITY_SCORE: 38
ADLS_ACUITY_SCORE: 48
ADLS_ACUITY_SCORE: 49
ADLS_ACUITY_SCORE: 37
ADLS_ACUITY_SCORE: 41
ADLS_ACUITY_SCORE: 44
ADLS_ACUITY_SCORE: 49
ADLS_ACUITY_SCORE: 49
ADLS_ACUITY_SCORE: 38
ADLS_ACUITY_SCORE: 28
ADLS_ACUITY_SCORE: 38
ADLS_ACUITY_SCORE: 46
ADLS_ACUITY_SCORE: 39
WALKING_OR_CLIMBING_STAIRS: AMBULATION DIFFICULTY, REQUIRES EQUIPMENT
ADLS_ACUITY_SCORE: 38
ADLS_ACUITY_SCORE: 39
ADLS_ACUITY_SCORE: 45
ADLS_ACUITY_SCORE: 43
ADLS_ACUITY_SCORE: 36
ADLS_ACUITY_SCORE: 49
CONCENTRATING,_REMEMBERING_OR_MAKING_DECISIONS_DIFFICULTY: NO
ADLS_ACUITY_SCORE: 38
ADLS_ACUITY_SCORE: 32
ADLS_ACUITY_SCORE: 37
ADLS_ACUITY_SCORE: 42
ADLS_ACUITY_SCORE: 51
ADLS_ACUITY_SCORE: 32
ADLS_ACUITY_SCORE: 40
ADLS_ACUITY_SCORE: 36
ADLS_ACUITY_SCORE: 40
ADLS_ACUITY_SCORE: 49
ADLS_ACUITY_SCORE: 38
ADLS_ACUITY_SCORE: 28
ADLS_ACUITY_SCORE: 36
ADLS_ACUITY_SCORE: 43
ADLS_ACUITY_SCORE: 41
ADLS_ACUITY_SCORE: 47
ADLS_ACUITY_SCORE: 42
ADLS_ACUITY_SCORE: 49
ADLS_ACUITY_SCORE: 45
ADLS_ACUITY_SCORE: 28
ADLS_ACUITY_SCORE: 38
ADLS_ACUITY_SCORE: 40
ADLS_ACUITY_SCORE: 36
ADLS_ACUITY_SCORE: 40
ADLS_ACUITY_SCORE: 36
ADLS_ACUITY_SCORE: 36
ADLS_ACUITY_SCORE: 32
ADLS_ACUITY_SCORE: 42
ADLS_ACUITY_SCORE: 38
ADLS_ACUITY_SCORE: 43
ADLS_ACUITY_SCORE: 40
ADLS_ACUITY_SCORE: 43
ADLS_ACUITY_SCORE: 37
ADLS_ACUITY_SCORE: 39
ADLS_ACUITY_SCORE: 43
ADLS_ACUITY_SCORE: 48
WALKING_OR_CLIMBING_STAIRS_DIFFICULTY: YES
ADLS_ACUITY_SCORE: 32
ADLS_ACUITY_SCORE: 32
ADLS_ACUITY_SCORE: 36
ADLS_ACUITY_SCORE: 36
ADLS_ACUITY_SCORE: 32
ADLS_ACUITY_SCORE: 40
ADLS_ACUITY_SCORE: 37
ADLS_ACUITY_SCORE: 38
ADLS_ACUITY_SCORE: 38
ADLS_ACUITY_SCORE: 43
ADLS_ACUITY_SCORE: 40
ADLS_ACUITY_SCORE: 40
ADLS_ACUITY_SCORE: 41
ADLS_ACUITY_SCORE: 42
ADLS_ACUITY_SCORE: 40
ADLS_ACUITY_SCORE: 49
ADLS_ACUITY_SCORE: 41
ADLS_ACUITY_SCORE: 43
ADLS_ACUITY_SCORE: 36
ADLS_ACUITY_SCORE: 40
ADLS_ACUITY_SCORE: 51
ADLS_ACUITY_SCORE: 39
ADLS_ACUITY_SCORE: 49
ADLS_ACUITY_SCORE: 35
ADLS_ACUITY_SCORE: 28
WEAR_GLASSES_OR_BLIND: YES
ADLS_ACUITY_SCORE: 36
ADLS_ACUITY_SCORE: 41
TOILETING_ISSUES: YES
ADLS_ACUITY_SCORE: 51
CONCENTRATING,_REMEMBERING_OR_MAKING_DECISIONS_DIFFICULTY: NO
ADLS_ACUITY_SCORE: 49
ADLS_ACUITY_SCORE: 40
ADLS_ACUITY_SCORE: 32
ADLS_ACUITY_SCORE: 37
ADLS_ACUITY_SCORE: 36
ADLS_ACUITY_SCORE: 37
ADLS_ACUITY_SCORE: 37
ADLS_ACUITY_SCORE: 38
ADLS_ACUITY_SCORE: 47
ADLS_ACUITY_SCORE: 38
ADLS_ACUITY_SCORE: 32
ADLS_ACUITY_SCORE: 36
ADLS_ACUITY_SCORE: 39
FALL_HISTORY_WITHIN_LAST_SIX_MONTHS: NO
ADLS_ACUITY_SCORE: 50
ADLS_ACUITY_SCORE: 49
ADLS_ACUITY_SCORE: 32
WALKING_OR_CLIMBING_STAIRS_DIFFICULTY: NO
ADLS_ACUITY_SCORE: 51
ADLS_ACUITY_SCORE: 32
ADLS_ACUITY_SCORE: 49
ADLS_ACUITY_SCORE: 49
ADLS_ACUITY_SCORE: 51
ADLS_ACUITY_SCORE: 40
ADLS_ACUITY_SCORE: 36
ADLS_ACUITY_SCORE: 38
ADLS_ACUITY_SCORE: 38
ADLS_ACUITY_SCORE: 36
ADLS_ACUITY_SCORE: 49
ADLS_ACUITY_SCORE: 40
ADLS_ACUITY_SCORE: 40
ADLS_ACUITY_SCORE: 49
ADLS_ACUITY_SCORE: 49
VISION_MANAGEMENT: EYEGLASSES
ADLS_ACUITY_SCORE: 32
ADLS_ACUITY_SCORE: 36
ADLS_ACUITY_SCORE: 49
ADLS_ACUITY_SCORE: 32
ADLS_ACUITY_SCORE: 38
ADLS_ACUITY_SCORE: 42
ADLS_ACUITY_SCORE: 38
ADLS_ACUITY_SCORE: 45
ADLS_ACUITY_SCORE: 41
ADLS_ACUITY_SCORE: 38
ADLS_ACUITY_SCORE: 38
ADLS_ACUITY_SCORE: 37
ADLS_ACUITY_SCORE: 39
ADLS_ACUITY_SCORE: 51
ADLS_ACUITY_SCORE: 49
ADLS_ACUITY_SCORE: 37
ADLS_ACUITY_SCORE: 37
ADLS_ACUITY_SCORE: 41
ADLS_ACUITY_SCORE: 35
DEPENDENT_IADLS:: TRANSPORTATION;SHOPPING
ADLS_ACUITY_SCORE: 40
ADLS_ACUITY_SCORE: 49
DIFFICULTY_EATING/SWALLOWING: NO
ADLS_ACUITY_SCORE: 43
ADLS_ACUITY_SCORE: 39
ADLS_ACUITY_SCORE: 44
FALL_HISTORY_WITHIN_LAST_SIX_MONTHS: NO
ADLS_ACUITY_SCORE: 43
DRESSING/BATHING_DIFFICULTY: NO
ADLS_ACUITY_SCORE: 39
ADLS_ACUITY_SCORE: 43
ADLS_ACUITY_SCORE: 43
ADLS_ACUITY_SCORE: 45
ADLS_ACUITY_SCORE: 36
DRESS: 1-->ASSISTANCE (EQUIPMENT/PERSON) NEEDED (NOT DEVELOPMENTALLY APPROPRIATE)
ADLS_ACUITY_SCORE: 43
ADLS_ACUITY_SCORE: 45
ADLS_ACUITY_SCORE: 36
ADLS_ACUITY_SCORE: 40
ADLS_ACUITY_SCORE: 44
ADLS_ACUITY_SCORE: 40
ADLS_ACUITY_SCORE: 39
ADLS_ACUITY_SCORE: 39
ADLS_ACUITY_SCORE: 43
ADLS_ACUITY_SCORE: 43
ADLS_ACUITY_SCORE: 46
ADLS_ACUITY_SCORE: 32
TOILETING_ISSUES: NO
ADLS_ACUITY_SCORE: 43
ADLS_ACUITY_SCORE: 36
ADLS_ACUITY_SCORE: 32
ADLS_ACUITY_SCORE: 32
DEPENDENT_IADLS:: TRANSPORTATION;SHOPPING
ADLS_ACUITY_SCORE: 41
ADLS_ACUITY_SCORE: 51
ADLS_ACUITY_SCORE: 51
ADLS_ACUITY_SCORE: 49
ADLS_ACUITY_SCORE: 41
ADLS_ACUITY_SCORE: 49
ADLS_ACUITY_SCORE: 49
ADLS_ACUITY_SCORE: 51
ADLS_ACUITY_SCORE: 38
ADLS_ACUITY_SCORE: 43
ADLS_ACUITY_SCORE: 39
ADLS_ACUITY_SCORE: 40
ADLS_ACUITY_SCORE: 40
ADLS_ACUITY_SCORE: 36
ADLS_ACUITY_SCORE: 49
VISION_MANAGEMENT: GLASSES
ADLS_ACUITY_SCORE: 38
ADLS_ACUITY_SCORE: 38
ADLS_ACUITY_SCORE: 42
ADLS_ACUITY_SCORE: 28
ADLS_ACUITY_SCORE: 49
ADLS_ACUITY_SCORE: 48
TOILETING: 1-->ASSISTANCE (EQUIPMENT/PERSON) NEEDED (NOT DEVELOPMENTALLY APPROPRIATE)
ADLS_ACUITY_SCORE: 37
ADLS_ACUITY_SCORE: 45
ADLS_ACUITY_SCORE: 38
ADLS_ACUITY_SCORE: 38
ADLS_ACUITY_SCORE: 36
ADLS_ACUITY_SCORE: 49
ADLS_ACUITY_SCORE: 48
ADLS_ACUITY_SCORE: 45
ADLS_ACUITY_SCORE: 38
ADLS_ACUITY_SCORE: 50
DRESSING/BATHING_DIFFICULTY: YES
ADLS_ACUITY_SCORE: 45
ADLS_ACUITY_SCORE: 32
ADLS_ACUITY_SCORE: 42
ADLS_ACUITY_SCORE: 36
ADLS_ACUITY_SCORE: 51
ADLS_ACUITY_SCORE: 49
ADLS_ACUITY_SCORE: 32
ADLS_ACUITY_SCORE: 38
ADLS_ACUITY_SCORE: 42
ADLS_ACUITY_SCORE: 32
ADLS_ACUITY_SCORE: 49
TOILETING_ISSUES: NO
ADLS_ACUITY_SCORE: 38
CHANGE_IN_FUNCTIONAL_STATUS_SINCE_ONSET_OF_CURRENT_ILLNESS/INJURY: NO
ADLS_ACUITY_SCORE: 39
ADLS_ACUITY_SCORE: 40
ADLS_ACUITY_SCORE: 36
ADLS_ACUITY_SCORE: 36
DOING_ERRANDS_INDEPENDENTLY_DIFFICULTY: YES
ADLS_ACUITY_SCORE: 37
DRESSING/BATHING: BATHING DIFFICULTY, ASSISTANCE 1 PERSON
ADLS_ACUITY_SCORE: 38
ADLS_ACUITY_SCORE: 42
IADLS,_PREVIOUS_FUNCTIONAL_LEVEL: INDEPENDENT
ADLS_ACUITY_SCORE: 46
ADLS_ACUITY_SCORE: 49
ADLS_ACUITY_SCORE: 38
ADLS_ACUITY_SCORE: 38
ADLS_ACUITY_SCORE: 40
ADLS_ACUITY_SCORE: 36
ADLS_ACUITY_SCORE: 40
ADLS_ACUITY_SCORE: 38
ADLS_ACUITY_SCORE: 48
ADLS_ACUITY_SCORE: 32
ADLS_ACUITY_SCORE: 36
ADLS_ACUITY_SCORE: 38
ADLS_ACUITY_SCORE: 39
DRESSING/BATHING_MANAGEMENT: SHOWER CHAIR
ADLS_ACUITY_SCORE: 36
ADLS_ACUITY_SCORE: 51
ADLS_ACUITY_SCORE: 42
ADLS_ACUITY_SCORE: 38
ADLS_ACUITY_SCORE: 39
ADLS_ACUITY_SCORE: 49
ADLS_ACUITY_SCORE: 39
ADLS_ACUITY_SCORE: 49
ADLS_ACUITY_SCORE: 50
ADLS_ACUITY_SCORE: 32
ADLS_ACUITY_SCORE: 40
ADLS_ACUITY_SCORE: 42
ADLS_ACUITY_SCORE: 40
ADLS_ACUITY_SCORE: 38
ADLS_ACUITY_SCORE: 38
ADLS_ACUITY_SCORE: 41
ADLS_ACUITY_SCORE: 48
ADLS_ACUITY_SCORE: 40
ADLS_ACUITY_SCORE: 36
PREVIOUS_RESPONSIBILITIES: MEAL PREP
ADLS_ACUITY_SCORE: 38
ADLS_ACUITY_SCORE: 38
ADLS_ACUITY_SCORE: 51
ADLS_ACUITY_SCORE: 38
ADLS_ACUITY_SCORE: 32
DIFFICULTY_EATING/SWALLOWING: NO
ADLS_ACUITY_SCORE: 49
ADLS_ACUITY_SCORE: 32
ADLS_ACUITY_SCORE: 51
ADLS_ACUITY_SCORE: 40
ADLS_ACUITY_SCORE: 39
ADLS_ACUITY_SCORE: 43
ADLS_ACUITY_SCORE: 39
ADLS_ACUITY_SCORE: 37
ADLS_ACUITY_SCORE: 45
ADLS_ACUITY_SCORE: 38
ADLS_ACUITY_SCORE: 37
ADLS_ACUITY_SCORE: 37
TRANSFERRING: 1-->ASSISTANCE (EQUIPMENT/PERSON) NEEDED
ADLS_ACUITY_SCORE: 37
ADLS_ACUITY_SCORE: 49
ADLS_ACUITY_SCORE: 51
ADLS_ACUITY_SCORE: 38
ADLS_ACUITY_SCORE: 32
ADLS_ACUITY_SCORE: 37
ADLS_ACUITY_SCORE: 49
ADLS_ACUITY_SCORE: 36
ADLS_ACUITY_SCORE: 49
ADLS_ACUITY_SCORE: 36
ADLS_ACUITY_SCORE: 43
ADLS_ACUITY_SCORE: 49
ADLS_ACUITY_SCORE: 36
ADLS_ACUITY_SCORE: 49
ADLS_ACUITY_SCORE: 43
ADLS_ACUITY_SCORE: 39
ADLS_ACUITY_SCORE: 45
ADLS_ACUITY_SCORE: 38
ADLS_ACUITY_SCORE: 40
ADLS_ACUITY_SCORE: 35
ADLS_ACUITY_SCORE: 22
ADLS_ACUITY_SCORE: 42
ADLS_ACUITY_SCORE: 42
ADLS_ACUITY_SCORE: 40
BADLS,_PREVIOUS_FUNCTIONAL_LEVEL: INDEPENDENT
ADLS_ACUITY_SCORE: 32
ADLS_ACUITY_SCORE: 51
ADLS_ACUITY_SCORE: 38
ADLS_ACUITY_SCORE: 37
ADLS_ACUITY_SCORE: 38
ADLS_ACUITY_SCORE: 39
ADLS_ACUITY_SCORE: 40
ADLS_ACUITY_SCORE: 32
ADLS_ACUITY_SCORE: 41
ADLS_ACUITY_SCORE: 38
ADLS_ACUITY_SCORE: 39
ADLS_ACUITY_SCORE: 32
ADLS_ACUITY_SCORE: 32
ADLS_ACUITY_SCORE: 49
ADLS_ACUITY_SCORE: 39
ADLS_ACUITY_SCORE: 44
ADLS_ACUITY_SCORE: 37
ADLS_ACUITY_SCORE: 49
ADLS_ACUITY_SCORE: 40
ADLS_ACUITY_SCORE: 36
ADLS_ACUITY_SCORE: 36
ADLS_ACUITY_SCORE: 49
ADLS_ACUITY_SCORE: 43
ADLS_ACUITY_SCORE: 40
ADLS_ACUITY_SCORE: 35
ADLS_ACUITY_SCORE: 48
ADLS_ACUITY_SCORE: 37
ADLS_ACUITY_SCORE: 48
ADLS_ACUITY_SCORE: 39
DEPENDENT_IADLS:: TRANSPORTATION;SHOPPING
ADLS_ACUITY_SCORE: 39
ADLS_ACUITY_SCORE: 40
ADLS_ACUITY_SCORE: 40
ADLS_ACUITY_SCORE: 48
ADLS_ACUITY_SCORE: 40
ADLS_ACUITY_SCORE: 39
ADLS_ACUITY_SCORE: 37
ADLS_ACUITY_SCORE: 38
ADLS_ACUITY_SCORE: 49
ADLS_ACUITY_SCORE: 28
ADLS_ACUITY_SCORE: 40
VISION_MANAGEMENT: GLASSES
ADLS_ACUITY_SCORE: 32
ADLS_ACUITY_SCORE: 42
TOILETING: 1-->ASSISTANCE (EQUIPMENT/PERSON) NEEDED
ADLS_ACUITY_SCORE: 37
ADLS_ACUITY_SCORE: 42
ADLS_ACUITY_SCORE: 49
ADLS_ACUITY_SCORE: 45
ADLS_ACUITY_SCORE: 49
ADLS_ACUITY_SCORE: 51
ADLS_ACUITY_SCORE: 46
ADLS_ACUITY_SCORE: 35
ADLS_ACUITY_SCORE: 32
ADLS_ACUITY_SCORE: 32
ADLS_ACUITY_SCORE: 38
ADLS_ACUITY_SCORE: 32
ADLS_ACUITY_SCORE: 36
ADLS_ACUITY_SCORE: 36
ADLS_ACUITY_SCORE: 28
ADLS_ACUITY_SCORE: 38
ADLS_ACUITY_SCORE: 49
ADLS_ACUITY_SCORE: 50
ADLS_ACUITY_SCORE: 37
ADLS_ACUITY_SCORE: 43
ADLS_ACUITY_SCORE: 42
ADLS_ACUITY_SCORE: 40
ADLS_ACUITY_SCORE: 48
ADLS_ACUITY_SCORE: 39
ADLS_ACUITY_SCORE: 42
ADLS_ACUITY_SCORE: 43
ADLS_ACUITY_SCORE: 39
ADLS_ACUITY_SCORE: 40
ADLS_ACUITY_SCORE: 38
ADLS_ACUITY_SCORE: 38
ADLS_ACUITY_SCORE: 40
ADLS_ACUITY_SCORE: 36
ADLS_ACUITY_SCORE: 32
ADLS_ACUITY_SCORE: 49
ADLS_ACUITY_SCORE: 47
ADLS_ACUITY_SCORE: 50
ADLS_ACUITY_SCORE: 37
TRANSFERRING: 1-->ASSISTANCE (EQUIPMENT/PERSON) NEEDED
ADLS_ACUITY_SCORE: 42
ADLS_ACUITY_SCORE: 39
ADLS_ACUITY_SCORE: 50
ADLS_ACUITY_SCORE: 40
ADLS_ACUITY_SCORE: 39
ADLS_ACUITY_SCORE: 39
ADLS_ACUITY_SCORE: 40
ADLS_ACUITY_SCORE: 50
ADLS_ACUITY_SCORE: 45
ADLS_ACUITY_SCORE: 51
ADLS_ACUITY_SCORE: 32
ADLS_ACUITY_SCORE: 49
ADLS_ACUITY_SCORE: 40
CHANGE_IN_FUNCTIONAL_STATUS_SINCE_ONSET_OF_CURRENT_ILLNESS/INJURY: NO
ADLS_ACUITY_SCORE: 28
ADLS_ACUITY_SCORE: 38
ADLS_ACUITY_SCORE: 32
ADLS_ACUITY_SCORE: 47
ADLS_ACUITY_SCORE: 38
ADLS_ACUITY_SCORE: 48
ADLS_ACUITY_SCORE: 45
ADLS_ACUITY_SCORE: 35
ADLS_ACUITY_SCORE: 42
ADLS_ACUITY_SCORE: 49
ADLS_ACUITY_SCORE: 36
ADLS_ACUITY_SCORE: 39
DIFFICULTY_EATING/SWALLOWING: NO
ADLS_ACUITY_SCORE: 40
ADLS_ACUITY_SCORE: 49
ADLS_ACUITY_SCORE: 38
ADLS_ACUITY_SCORE: 39
ADLS_ACUITY_SCORE: 41
ADLS_ACUITY_SCORE: 51
ADLS_ACUITY_SCORE: 38
ADLS_ACUITY_SCORE: 41
ADLS_ACUITY_SCORE: 32
ADLS_ACUITY_SCORE: 51
ADLS_ACUITY_SCORE: 51
ADLS_ACUITY_SCORE: 37
ADLS_ACUITY_SCORE: 36
ADLS_ACUITY_SCORE: 32
ADLS_ACUITY_SCORE: 37
ADLS_ACUITY_SCORE: 38
ADLS_ACUITY_SCORE: 51
ADLS_ACUITY_SCORE: 40
ADLS_ACUITY_SCORE: 37
ADLS_ACUITY_SCORE: 38
ADLS_ACUITY_SCORE: 39
ADLS_ACUITY_SCORE: 35
ADLS_ACUITY_SCORE: 28
ADLS_ACUITY_SCORE: 37
ADLS_ACUITY_SCORE: 37
ADLS_ACUITY_SCORE: 40
ADLS_ACUITY_SCORE: 48
ADLS_ACUITY_SCORE: 43
TOILETING_MANAGEMENT: URINAL
ADLS_ACUITY_SCORE: 49
ADLS_ACUITY_SCORE: 49
ADLS_ACUITY_SCORE: 39
ADLS_ACUITY_SCORE: 46
ADLS_ACUITY_SCORE: 39
ADLS_ACUITY_SCORE: 37
ADLS_ACUITY_SCORE: 38
ADLS_ACUITY_SCORE: 37
ADLS_ACUITY_SCORE: 39
ADLS_ACUITY_SCORE: 38
ADLS_ACUITY_SCORE: 51
ADLS_ACUITY_SCORE: 39
ADLS_ACUITY_SCORE: 51
CONCENTRATING,_REMEMBERING_OR_MAKING_DECISIONS_DIFFICULTY: NO
ADLS_ACUITY_SCORE: 39
ADLS_ACUITY_SCORE: 51
ADLS_ACUITY_SCORE: 35
WALKING_OR_CLIMBING_STAIRS_DIFFICULTY: YES
ADLS_ACUITY_SCORE: 44
ADLS_ACUITY_SCORE: 36
ADLS_ACUITY_SCORE: 32
ADLS_ACUITY_SCORE: 37
ADLS_ACUITY_SCORE: 39
FALL_HISTORY_WITHIN_LAST_SIX_MONTHS: NO
ADLS_ACUITY_SCORE: 49
ADLS_ACUITY_SCORE: 38
ADLS_ACUITY_SCORE: 39

## 2022-01-01 ASSESSMENT — ANXIETY QUESTIONNAIRES
1. FEELING NERVOUS, ANXIOUS, OR ON EDGE: NOT AT ALL
GAD7 TOTAL SCORE: 3
IF YOU CHECKED OFF ANY PROBLEMS ON THIS QUESTIONNAIRE, HOW DIFFICULT HAVE THESE PROBLEMS MADE IT FOR YOU TO DO YOUR WORK, TAKE CARE OF THINGS AT HOME, OR GET ALONG WITH OTHER PEOPLE: NOT DIFFICULT AT ALL
7. FEELING AFRAID AS IF SOMETHING AWFUL MIGHT HAPPEN: NOT AT ALL
1. FEELING NERVOUS, ANXIOUS, OR ON EDGE: NOT AT ALL
2. NOT BEING ABLE TO STOP OR CONTROL WORRYING: NOT AT ALL
7. FEELING AFRAID AS IF SOMETHING AWFUL MIGHT HAPPEN: NOT AT ALL
GAD7 TOTAL SCORE: 3
2. NOT BEING ABLE TO STOP OR CONTROL WORRYING: SEVERAL DAYS
IF YOU CHECKED OFF ANY PROBLEMS ON THIS QUESTIONNAIRE, HOW DIFFICULT HAVE THESE PROBLEMS MADE IT FOR YOU TO DO YOUR WORK, TAKE CARE OF THINGS AT HOME, OR GET ALONG WITH OTHER PEOPLE: NOT DIFFICULT AT ALL
5. BEING SO RESTLESS THAT IT IS HARD TO SIT STILL: NOT AT ALL
6. BECOMING EASILY ANNOYED OR IRRITABLE: NOT AT ALL
GAD7 TOTAL SCORE: 0
6. BECOMING EASILY ANNOYED OR IRRITABLE: NOT AT ALL
5. BEING SO RESTLESS THAT IT IS HARD TO SIT STILL: NOT AT ALL
4. TROUBLE RELAXING: NOT AT ALL
3. WORRYING TOO MUCH ABOUT DIFFERENT THINGS: NOT AT ALL
3. WORRYING TOO MUCH ABOUT DIFFERENT THINGS: SEVERAL DAYS
GAD7 TOTAL SCORE: 0

## 2022-01-01 ASSESSMENT — ENCOUNTER SYMPTOMS
FATIGUE: 1
COUGH: 1
ARTHRALGIAS: 0
WEAKNESS: 0
DIZZINESS: 0
TROUBLE SWALLOWING: 0
CHOKING: 0
DYSURIA: 0
FEVER: 0
DIARRHEA: 0
NAUSEA: 0
AGITATION: 0
SINUS PRESSURE: 0
DYSPHORIC MOOD: 0
DYSURIA: 0
HEADACHES: 0
ABDOMINAL PAIN: 0
COUGH: 0
FEVER: 0
NUMBNESS: 0
COLOR CHANGE: 0
EYE REDNESS: 0
FATIGUE: 0
PALPITATIONS: 1
CONFUSION: 0
CHILLS: 0
WEAKNESS: 1
ARTHRALGIAS: 1
NECK PAIN: 0
NECK PAIN: 0
DYSURIA: 0
DIFFICULTY URINATING: 0
HEADACHES: 0
VOMITING: 0
SORE THROAT: 0
SHORTNESS OF BREATH: 1
HEADACHES: 0
FEVER: 0
SINUS PAIN: 0
ACTIVITY CHANGE: 1
DIFFICULTY URINATING: 0
LIGHT-HEADEDNESS: 0
DECREASED CONCENTRATION: 0
DIARRHEA: 0
COUGH: 0
NAUSEA: 0
NECK STIFFNESS: 0
FEVER: 0
WOUND: 0
CHEST TIGHTNESS: 0
CONSTIPATION: 0
FEVER: 0
VOMITING: 0
PALPITATIONS: 0
NAUSEA: 0
CHEST TIGHTNESS: 0
BRUISES/BLEEDS EASILY: 1
ARTHRALGIAS: 0
ABDOMINAL PAIN: 0
ABDOMINAL DISTENTION: 0
SORE THROAT: 0
CONFUSION: 0
BACK PAIN: 0
CHILLS: 0
SHORTNESS OF BREATH: 0
FLANK PAIN: 0
EYE PAIN: 0
CONFUSION: 0
APPETITE CHANGE: 0
POLYDIPSIA: 0
CHILLS: 0
COLOR CHANGE: 0
BACK PAIN: 0
ABDOMINAL PAIN: 0
VOMITING: 0
RHINORRHEA: 0
VOMITING: 0
FREQUENCY: 0
COUGH: 0
ABDOMINAL PAIN: 0
APPETITE CHANGE: 0
MYALGIAS: 0
WEAKNESS: 1
SHORTNESS OF BREATH: 0
ABDOMINAL PAIN: 1
NAUSEA: 0
CHILLS: 0
WHEEZING: 0
BACK PAIN: 0
SHORTNESS OF BREATH: 1

## 2022-01-01 ASSESSMENT — PATIENT HEALTH QUESTIONNAIRE - PHQ9
SUM OF ALL RESPONSES TO PHQ QUESTIONS 1-9: 3
SUM OF ALL RESPONSES TO PHQ QUESTIONS 1-9: 2
SUM OF ALL RESPONSES TO PHQ QUESTIONS 1-9: 2
5. POOR APPETITE OR OVEREATING: SEVERAL DAYS

## 2022-01-01 ASSESSMENT — VISUAL ACUITY
OU: GLASSES
OU: GLASSES
OU: BASELINE
OU: GLASSES
OU: GLASSES

## 2022-01-01 ASSESSMENT — PAIN SCALES - GENERAL
PAINLEVEL: NO PAIN (0)

## 2022-01-02 NOTE — PROGRESS NOTES
Vic is a 46 year old who is being evaluated via a billable video visit.      How would you like to obtain your AVS? MyChart  If the video visit is dropped, the invitation should be resent by: Text to cell phone: [phone]   Will anyone else be joining your video visit? No      Video Start Time: 2:10 PM  Video-Visit Details    Type of service:  Video Visit    Video End Time: 2:25 PM    Originating Location (pt. 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 Whitaker, Orthopedics with rapidly progressing right leg swelling since ~ July 2021. MRI was incomplete due to pain, but did show ~[id number] right thigh soft tissue mass with probable bony involvement of the proximal right femur. 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 Was seen by Dr. Whitaker to review the results on 10/1/21 - discussed consideration of neoadjuvant chemotherapy vs resection and reconstruction. Given high risk for amputation with massive reconstructive surgery, he was referred to Dr. Ambrocio and a PET was ordered which identifed concerning inguinal lymphadenopathy. He was seen by Dr. Ambrocio 10/26/21, ordered NGS (in process) and recommended starting Doxil/Ifos initially as an inpatient. Admitted for Cycle 1 Doxil/Ifos (W3H5=27/2/21).  Notably he had an episode of confusion while hospitalized on 11/8/21 concerning for ifosfamide neurotoxicity.  This resolved without administration of methylene blue. With cycle 2 treatment he also had severe neurotoxicity 12 hours into day 3 ifosfamide infusion. He again improved without methylene blue. Remainder of cycle 2 treatment was aborted due reaction/patient wishes.    Interval History:    He missed  his rescheduled PET scan last week due to 2 unanticipated deaths in the family. His leg continues to feel well. He feels that the leg mass is likely smaller. He can feel some enlarged lymph nodes in his groin. He has been able to decrease his oxycodone to once per day. He has actually skipped a few MS Contin doses fitz well. He's ambulating with a cane or without assistive devices at times. Will still use the walker if needed. He stopped using senna due to loose stools. Denies shortness of breath, dizziness or fatigue. He has been very Covid-cautious with masking around family this week.       Current Outpatient Medications   Medication     acetaminophen (TYLENOL) 500 MG tablet     diclofenac (VOLTAREN) 1 % topical gel     morphine (MS CONTIN) 30 MG CR tablet     ondansetron (ZOFRAN) 8 MG tablet     oxyCODONE (ROXICODONE) 5 MG tablet     polyethylene glycol (MIRALAX) 17 GM/Dose powder     prochlorperazine (COMPAZINE) 5 MG tablet     sennosides (SENOKOT) 8.6 MG tablet     No current facility-administered medications for this visit.     PHYSICAL EXAM:  There were no vitals taken for this visit.    Video physical exam  General: Patient appears well in no acute distress.   Skin: No visualized rash or lesions on visualized skin  Eyes: EOMI, no erythema, sclera icterus or discharge noted  Resp: Appears to be breathing comfortably without accessory muscle usage, speaking in full sentences, no cough  MSK: Appears to have normal range of motion based on visualized movements  Neurologic: No apparent tremors, facial movements symmetric  Psych: affect appropriate, pleasant, alert and oriented    The rest of a comprehensive physical examination is deferred due to PHE (public health emergency) video restrictions    ECO    Labs:   Most Recent 3 CBC's:  Recent Labs   Lab Test 21  1022 21  1034 12/10/21  1323   WBC 7.2 4.1 0.8*   HGB 7.2* 7.1* 5.8*   MCV 80 79 75*    161 220     Most Recent 3 BMP's:  Recent  Labs   Lab Test 12/10/21  1323 12/06/21  1426 12/04/21  0758   * 136 137   POTASSIUM 3.3* 3.5 3.6   CHLORIDE 99 101 106   CO2 28 26 24   BUN 5* 8 9   CR 0.64* 0.69* 0.60*   ANIONGAP 8 9 7   VENTURA 9.1 9.0 9.3    110 116*    Most Recent 2 LFT's:  Recent Labs   Lab Test 12/10/21  1323 12/06/21  1426   AST 14 22   ALT 18 18   ALKPHOS 237* 247*   BILITOTAL 0.8 0.8   I reviewed the most recent labs above today.     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 Biopsy showing high grade pleomorphic sarcoma of large right thigh soft tissue mas. High risk for amputation with massive reconstruction. Began initial treatment with Doxil/Ifos as an inpatient 11/2/21. Baseline ECHO with EF 60-65%. Tolerated first cycle well with concern for ifos neurotoxicity, but did not require methylene blue. Also had significant ifos neurotoxicity with cycle 2 which limited finishing the cycle.    Cycle 3 doxil/ifos has been delayed by 1 week due to missing PET scan. Rescheduled PET was also missed due to unexpected deaths of family members this past week. Discussed with Dr. Ambrocio-clinically he appears to be responding. It would be reasonable to proceed with cycle 3 and schedule restaging following. Patient would prefer to proceed with PET prior to additional chemo. Will reschedule PET and admission within the next week.     We again discussed additional ifosfamide treatment. Due to previous neurotoxicity, he remains very hesitant to resume, even with an additional planned dose reduction. If PET shows evidence of response with current treatment, we would recommend proceeding with current doxil/ifos. Single agent Doxil is an option as well although patient understands this may be less effective. If continued chemo with doxil/ifos recommended, ifosfamide dose will be decreased by another 20% to avoid neurotoxicity.     Ifosfamide  neurotoxicity  Intermittent urinary incontinence, somnolence, disorientation and episodes of confusion with cycle 1 and 2. Did not require methylene blue. Should not preclude future treatment. Plan for dose reduction if doxil/ifos is continued based on upcoming PET.    Constipation  Resolved. Not currently using senna or miralax.     Cancer-related pain  Well controlled with MS Contin and oxycodone. He has reduced oxycodone to 15 mg/day. Not regularly using Tylenol/Voltaren. Followed by palliative care. No refills needed.    Anemia  Iron studies indicate mixed iron deficiency and anemia of chronic disease. PO iron avoided d/t constipation. Consider IV iron if anemia disproportionate to chemotherapy effect. Will recheck labs with PET next week to ensure count recovery.    Plan:  PET scan this week followed by virtual visit with Latosha Taylor and tentative admission for cycle 3 doxil/ifos vs doxil alone.    40 minutes spent on the date of the encounter doing chart review, review of test results, interpretation of tests, patient visit and documentation     Latosha Taylor, CNP  [facility]  [address] [place] [address] [id number] [phone]

## 2022-01-12 NOTE — PROGRESS NOTES
Vic is a 47 year old who is being evaluated via a billable video visit.      How would you like to obtain your AVS? Mail a copy  If the video visit is dropped, the invitation should be resent by: Text to cell phone: [phone]  Will anyone else be joining your video visit? No      Patient unable to connect to video in either PubGame or Livemap. Transitioned to telephone visit. 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 Whitaker, Orthopedics with rapidly progressing right leg swelling since ~ July 2021. MRI was incomplete due to pain, but did show ~[id number] right thigh soft tissue mass with probable bony involvement of the proximal right femur. 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 Was seen by Dr. Whitaker to review the results on 10/1/21 - discussed consideration of neoadjuvant chemotherapy vs resection and reconstruction. Given high risk for amputation with massive reconstructive surgery, he was referred to Dr. Ambrocio and a PET was ordered which identifed concerning inguinal lymphadenopathy. He was seen by Dr. Ambrocio 10/26/21, ordered NGS (in process) and recommended starting Doxil/Ifos initially as an inpatient. Admitted for Cycle 1 Doxil/Ifos (H7H1=99/2/21).  Notably he had an episode of confusion while hospitalized on 11/8/21 concerning for ifosfamide neurotoxicity.  This resolved without administration of methylene blue. With cycle 2 treatment he also had severe neurotoxicity 12 hours into day 3 ifosfamide infusion. He again improved without methylene blue. Remainder of cycle 2 treatment was aborted due reaction/patient wishes.    Interval History:    Patient is feeling very well.  Recently celebrated his birthday this week which was enjoyable.  His pain has been much improved over the past  few weeks.  He is rarely taking MS Contin, reports using this only once per day midday.  Oftentimes can get by with oxycodone alone.  He has been increasing his ambulation and using a scooter last for long distances.  He was able to walk through [place] the other day.  He was previously sitting around often but reports being much more active in general.      Current Outpatient Medications   Medication     acetaminophen (TYLENOL) 500 MG tablet     diclofenac (VOLTAREN) 1 % topical gel     morphine (MS CONTIN) 30 MG CR tablet     ondansetron (ZOFRAN) 8 MG tablet     oxyCODONE (ROXICODONE) 5 MG tablet     polyethylene glycol (MIRALAX) 17 GM/Dose powder     prochlorperazine (COMPAZINE) 5 MG tablet     sennosides (SENOKOT) 8.6 MG tablet     No current facility-administered medications for this visit.     PHYSICAL EXAM:  There were no vitals taken for this visit.    Video physical exam  General: Patient appears well in no acute distress.   Skin: No visualized rash or lesions on visualized skin  Eyes: EOMI, no erythema, sclera icterus or discharge noted  Resp: Appears to be breathing comfortably without accessory muscle usage, speaking in full sentences, no cough  MSK: Appears to have normal range of motion based on visualized movements  Neurologic: No apparent tremors, facial movements symmetric  Psych: affect appropriate, pleasant, alert and oriented    The rest of a comprehensive physical examination is deferred due to PHE (public health emergency) video restrictions    ECO    Labs:   Most Recent 3 CBC's:  Recent Labs   Lab Test 21  1022 21  1034 12/10/21  1323   WBC 7.2 4.1 0.8*   HGB 7.2* 7.1* 5.8*   MCV 80 79 75*    161 220     Most Recent 3 BMP's:  Recent Labs   Lab Test 22  1229 12/10/21  1323 21  1426 21  0758   NA  --  135* 136 137   POTASSIUM  --  3.3* 3.5 3.6   CHLORIDE  --  99 101 106   CO2  --  28 26 24   BUN  --  5* 8 9   CR  --  0.64* 0.69* 0.60*   ANIONGAP  --  8 9  7   VENTURA  --  9.1 9.0 9.3   * 105 110 116*    Most Recent 2 LFT's:  Recent Labs   Lab Test 12/10/21  1323 12/06/21  1426   AST 14 22   ALT 18 18   ALKPHOS 237* 247*   BILITOTAL 0.8 0.8   I reviewed the most recent labs above today.     Imaging:  Narrative & Impression   Combined Report of:    PET and CT on  1/11/2022 2:18 PM :     1. PET of the neck, chest, abdomen, and pelvis.  2. PET CT Fusion for Attenuation Correction and Anatomical  Localization:    3. Diagnostic CT scan of the chest, abdomen, and pelvis with  intravenous contrast for interpretation.  3. CT of the chest, abdomen and pelvis obtained for diagnostic  interpretation.  4. 3D MIP and PET-CT fused images were processed on an independent  workstation and archived to PACS and reviewed by a radiologist.     Technique:     1. PET: The patient received 15.02 mCi of F-18-FDG; the serum glucose  was 111 prior to administration, body weight was 114.3 kg. Images were  evaluated in the axial, sagittal, and coronal planes as well as the  rotational whole body MIP. Images were acquired from the Vertex to the  Feet.     UPTAKE WAS MEASURED AT 67 MINUTES.      BACKGROUND:  Liver SUV max= 2.9,   Aorta Blood SUV Max: 2.5.      2. CT: Volumetric acquisition for clinical interpretation of the  chest, abdomen, and pelvis acquired at 3 mm sections . The chest,  abdomen, and pelvis were evaluated at 5 mm sections in bone, soft  tissue, and lung windows.       The patient received 123 cc of Isovue 370 intravenously for the  examination.  500 cc Breeza oral contrast.  --     3. 3D MIP and PET-CT fused images were processed on an independent  workstation and archived to PACS and reviewed by a radiologist.     INDICATION: Sarcoma (H)     ADDITIONAL INFORMATION OBTAINED FROM EMR: 48 yo Male with history of  biopsy confirmed high-grade pleomorphic sarcoma of the right thigh.  Currently on chemotherapy. PET-CT to assess response to therapy.      COMPARISON: PET/CT  10/20/2021     FINDINGS:      HEAD/NECK:  There is no  suspicious FDG uptake in the neck.      The paranasal sinuses are clear. The mastoid air cells are clear.  Focal hypermetabolic uptake in the left mandibular premolar region  with SUV max of 4.9 consistent with odontogenic etiology.     The mucosal pharyngeal space, the , prevertebral and carotid  spaces are within normal limits.      No masses, mass effect or pathologically enlarged lymph nodes are  evident. The thyroid gland is unremarkable.     CHEST:  There is no suspicious FDG uptake in the chest.       There are no pathologically enlarged mediastinal, hilar or axillary  lymph nodes.  There are no suspicious lung nodules or evidence for infection.        There is no significant pericardial or pleural effusions.     ABDOMEN AND PELVIS:  There is no suspicious FDG uptake in the abdomen or pelvis.     There are no suspicious hepatic lesions. There is no splenomegaly or  evidence for splenic or pancreatic mass lesion.  There are no suspicious adrenal mass lesions or opaque gallbladder  calculi. There is symmetric nephrographic renal phase without  hydronephrosis.     There is no evidence for diverticulitis, bowel obstruction or free  fluid.       No pathologically enlarged or hypermetabolic lymph nodes within the  abdomen or pelvis.   Several prominent right inguinal lymph nodes are similar to prior:  2.8 x 1.6 cm lymph node with SUV max of 2.9, previously 1.6 x 2.9 cm  with SUV max of 1.8.  2.0 x 2.2 cm right inguinal lymph nodes with SUV max of 1.8,  previously 1.8 x 2.2 with SUV max of 2.7.     LOWER EXTREMITIES:   Large mixed cystic and solid mass centered in the anterior compartment  of the proximal right thigh demonstrates abnormal hypermetabolism of  the soft tissue components with SUV max of 30.0. The predominant  hypermetabolic soft tissue component measures approximately 12.2 cm x  4.0 x 4.7 cm, previously measuring 18.3 x 4.4 13.0 cm when  measured in  similar fashion. The cystic component is incompletely visualized  though increased in size from prior.   Increased cortical erosion of the adjacent proximal right femur and  acetabulum. No evidence of pathologic fracture.     BONES:   Cortical erosion of the proximal right femur as above. No additional  suspicious lytic or blastic osseous lesions or abnormal FDG uptake in  the skeleton.                                                                      IMPRESSION: In this patient with history of pleomorphic sarcoma of the  right leg, there is evidence of partial metabolic response:  1. Decreased size of the hypermetabolic soft tissue component of the  biopsy proven pleomorphic sarcoma of the right thigh.   2. No significant change in several mildly hypermetabolic right  inguinal lymph nodes concerning for metastatic disease. No new  enlarged or hypermetabolic lymph nodes.   3. Increased cortical erosion of the proximal right femur with  continued risk for potential pathologic fracture.     I have personally reviewed the examination and initial interpretation  and I agree with the findings.     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 Biopsy showing high grade pleomorphic sarcoma of large right thigh soft tissue mas. High risk for amputation with massive reconstruction. Began initial treatment with Doxil/Ifos as an inpatient 11/2/21. Baseline ECHO with EF 60-65%. Tolerated first cycle well with concern for ifos neurotoxicity, but did not require methylene blue. Also had significant ifos neurotoxicity with cycle 2 which limited finishing the cycle.    Cycle 3 delayed by 2 weeks due to two missed PET scans.  PET performed 1/11/22 showing decrease size of the hypermetabolic soft tissue component of pleomorphic sarcoma in the right thigh, previously measuring 18.3 x 4.4 x 13 and now measuring  approximately 12.2 x 4.0 x 4.7 cm.     As previously discussed, patient is extremely hesitant to move forward with additional ifosfamide chemotherapy.  Today we discussed that we are unsure if the Doxil or ifosfamide has been the primary effective agent so far which he understands.  Discussed with Dr. Ambrocio-surgery now may be more preferable.  Dr. Whitaker has reviewed the PET scan and will arrange MRI with and without contrast of the pelvis and follow-up to discuss surgical options.    We will cancel planned admission for chemotherapy 1/13/22.  I did inform patient that based on Dr. Whitaker's recommendation he may need additional neoadjuvant chemotherapy.  We also briefly discussed the role of adjuvant treatment.  I will schedule a tentative virtual visit with patient in 2 weeks.  This may be canceled based on his surgical plans.  To note, if continued chemo with Doxil/ifos is recommended in the future, ifosfamide dose will be decreased by another 20% to avoid neurotoxicity.    Ifosfamide neurotoxicity  Intermittent urinary incontinence, somnolence, disorientation and episodes of confusion with cycle 1 and 2. Did not require methylene blue. Should not preclude future treatment. Plan for ifos dose reduction by and additional 20% if doxil/ifos continued in future.    Constipation  Resolved. Not currently using senna or miralax.     Cancer-related pain  Much improved.  Rarely using MS Contin.  Advised that he discontinue this and continue with oxycodone as needed which has been effective.  Can also use Tylenol and Voltaren gel.    Anemia  Iron studies indicate mixed iron deficiency and anemia of chronic disease. PO iron avoided d/t constipation. Consider IV iron if anemia disproportionate to chemotherapy effect. Unfortunately labs not checked with PET. Repeat prior to surgical evaluation.    Plan:  Follow-up with Dr. Whitaker as scheduled for MRI and surgical consultation.  Tentative virtual visit with Latosha Taylor in  2 weeks.    15 minutes spent on the date of the encounter doing chart review, review of test results, interpretation of tests, documentation and discussion with other provider(s), in addition to 19 minutes spent on the phone with the patient.     Latosha Taylor CNP  [facility] [address] [address]  [phone]

## 2022-02-02 NOTE — TELEPHONE ENCOUNTER
Refill Request    Date of most recent appointment:  1/12/22  Next upcoming appointment:   Not schedueld  Prescribing provider(s):  LESLEY Malik  Person requesting refill:  Vic    Medication requested:  Oxycodone  Quantity:  100  Last fill date:  12/24/21    Notes:  Pt would like to  today at 4:30 if possible  Pain being treated leg pain from sarcome  Number of pills remaining: a few left  When will pt be out of meds: out completely in one day  Side effects: no  Number of times pt takes per day: 2-3 every four hours  Other NA    Not  reviewed.    Routed to LESLEY Malik

## 2022-02-04 NOTE — LETTER
Date:February 6, 2022      Patient was self referred, no letter generated. Do not send.        [facility] Information

## 2022-02-04 NOTE — NURSING NOTE
Reason For Visit:   Chief Complaint   Patient presents with     RECHECK     review MRI results and discuss surgery        There were no vitals taken for this visit.    Pain Assessment  Patient Currently in Pain: Yes  0-10 Pain Scale: 6  Primary Pain Location: Hip (right hip/groin area)      Joycelyn Dorantes ATC

## 2022-02-04 NOTE — LETTER
2/4/2022         RE: Vic Scott III  [address]  [place] [address]        Dear Colleague,    Thank you for referring your patient, Vic Scott III, to the [facility] [place]. Please see a copy of my visit note below.    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  He has been wheelchair-bound and has difficulty using the hip in a meaningful way.    On examination he is alert oriented has a normal mood and affect and is in no acute distress.  He is able to move his ankle but has difficulty moving the knee much.  He has profound swelling in the proximal thigh and right pelvis.    Reviewed his MRI which shows tumor about the hip.  The enhancing portion seems to have decreased in size but he is a large cystic portion as well on the outer table of the ilium and then wrapping around anteriorly on the inner table.  There is an erosion into the acetabulum and into the proximal femur related to the tumor.  The femoral vessels are in the pseudocapsule and while the sciatic nerve is spared the entire anterior compartment is replaced by tumor.  He recently had a PET scan as well which shows no sign of disease in the lungs, decreased in size of the metabolically active tumor but increased erosion in the proximal femur.    I recommended amputation of this patient as I think a large proximal femur custom pelvis reconstruction without any soft tissue coverage and loss of the quadricep muscle would make for a difficult functional outcome.  Additionally the oncologic outcome is likely to be marginal to poor given how close he would have to be of the tumor and multiple areas to do any type of limb salvage.  I am concerned he would have a long surgery with a high risk of infection in the leg that had only sciatic nerve function and it at best.  He would need a constrained hip replacement given the  absence of hip stabilizing muscles.    At the patient's request I have reached out to my partners for their opinions about limb salvage.  I will get back to the patient regarding final recommendations.  His last chemotherapy was in December so he could to be scheduled for surgery at any time now.      Again, thank you for allowing me to participate in the care of your patient.        Sincerely,        Matty Whitaker MD

## 2022-02-04 NOTE — PROGRESS NOTES
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  He has been wheelchair-bound and has difficulty using the hip in a meaningful way.    On examination he is alert oriented has a normal mood and affect and is in no acute distress.  He is able to move his ankle but has difficulty moving the knee much.  He has profound swelling in the proximal thigh and right pelvis.    Reviewed his MRI which shows tumor about the hip.  The enhancing portion seems to have decreased in size but he is a large cystic portion as well on the outer table of the ilium and then wrapping around anteriorly on the inner table.  There is an erosion into the acetabulum and into the proximal femur related to the tumor.  The femoral vessels are in the pseudocapsule and while the sciatic nerve is spared the entire anterior compartment is replaced by tumor.  He recently had a PET scan as well which shows no sign of disease in the lungs, decreased in size of the metabolically active tumor but increased erosion in the proximal femur.    I recommended amputation of this patient as I think a large proximal femur custom pelvis reconstruction without any soft tissue coverage and loss of the quadricep muscle would make for a difficult functional outcome.  Additionally the oncologic outcome is likely to be marginal to poor given how close he would have to be of the tumor and multiple areas to do any type of limb salvage.  I am concerned he would have a long surgery with a high risk of infection in the leg that had only sciatic nerve function and it at best.  He would need a constrained hip replacement given the absence of hip stabilizing muscles.    At the patient's request I have reached out to my partners for their opinions about limb salvage.  I will get back to the patient regarding final recommendations.  His last chemotherapy was in December so he could to be scheduled for  surgery at any time now.

## 2022-02-07 PROBLEM — C49.9 SARCOMA (H): Status: ACTIVE | Noted: 2021-10-26

## 2022-02-07 NOTE — TELEPHONE ENCOUNTER
RECORDS RECEIVED FROM: second opinion on right groin tumor per Dr. Whitaker    DATE RECEIVED: Feb 10, 2022     NOTES STATUS DETAILS   OFFICE NOTE from referring provider Internal Matty Whitaker MD     OFFICE NOTE from other specialist Internal  Latosha Taylor CNP     MEDICATION LIST Internal    MRI Internal 1/30/22   PET Internal 1/11/22  10/20/21   XRAYS (IMAGES & REPORTS) Internal 12/3/21  11/5/21

## 2022-02-23 NOTE — TELEPHONE ENCOUNTER
Refill Request    Date of most recent appointment:  01/12/2022  Next upcoming appointment:   Not scheduled    Medication requested:  MS Contin  Quantity:  60  Last fill date:  12/24/2021  Note: patient does not take consistently    Medication requested:  Oxycodone  Quantity:  100  Last fill date:  02/02/2022  Notes:  Has about 20 left    No side effects from pain medications.     Prescribing provider(s):  Latosha Taylor

## 2022-03-11 NOTE — TELEPHONE ENCOUNTER
Refill Request    Date of most recent appointment:  1/12/22  Next upcoming appointment:   Not scheduled  Prescribing provider(s):  Latosha Taylor  Person requesting refill:  Vic    Medication requested:  Oxycodone 5 mg  Quantity:  100  Last fill date:  2/23/22  Leg pain being treated, has a few left; taking 15 mg (3 tabs) every 4 hours, sometimes more often if pain is worse. No side effects    Medication requested: MS Contin 30 mg CR tablet  Quantity: 60  Last fill date: 12/24/21   Treating leg pain. Completely out of meds. Uses one time per day. Was off this for a while, but now leg pain has worsened.     Not  reviewed.

## 2022-03-29 NOTE — TELEPHONE ENCOUNTER
[facility] Triage - no  ACCESS    Refill Request    Date of most recent appointment: 1/12/22 claudia  Next upcoming appointment:  None on the calendar    Medication requested:  Oxy  Quantity:  100  Last fill date:  3/11/22  Person requesting refill:  Vic  Notes: How many left: 20  How taking: 3 every 4 hours and is working  No side effects that he cannot manage    Prescribing provider(s):  Claudia    Routing to Latosha Taylor

## 2022-03-31 NOTE — NURSING NOTE
Chief Complaint   Patient presents with     Consult     2nd opinion right groin tumor referred by Dr. Whitaker        47 year old  1975        Pain Assessment  Patient Currently in Pain: Yes  0-10 Pain Scale: 8  Primary Pain Location: Hip (right hip/groin area)          [facility] - [place], [place] - [address] [place] [address]        No Known Allergies        Current Outpatient Medications   Medication     acetaminophen (TYLENOL) 500 MG tablet     diclofenac (VOLTAREN) 1 % topical gel     morphine (MS CONTIN) 30 MG CR tablet     ondansetron (ZOFRAN) 8 MG tablet     oxyCODONE (ROXICODONE) 5 MG tablet     polyethylene glycol (MIRALAX) 17 GM/Dose powder     prochlorperazine (COMPAZINE) 5 MG tablet     sennosides (SENOKOT) 8.6 MG tablet     No current facility-administered medications for this visit.

## 2022-03-31 NOTE — PATIENT INSTRUCTIONS
Impression: High-grade sarcoma of the right pelvis.  I believe hemipelvectomy is the preferred choice for local control.    Plan: 1.  Sofia to [place] back with the patient in 1 week via telephone if he has not called Sofia or Dr. Whitaker with a decision as to whether he wants to proceed with surgery.

## 2022-03-31 NOTE — LETTER
3/31/2022         RE: Vic Scott III  [address]  [facility] [address]        Dear Colleague,    Thank you for referring your patient, Vic Scott III, to the [facility] [place]. Please see a copy of my visit note below.    I met today with Apolinar to discuss his surgical options for the management of his undifferentiated pleomorphic sarcoma involving the soft tissues of the right hip.  I have previously reviewed his imaging.  I met with him today for over 30 minutes to discuss his need for a right hemipelvectomy.  I answered all his questions.  His sister did join us for some time.  I answered her questions as well.  I told him that in my experience or at least with my surgical experience and skill I could not perform the surgery that would save his leg.  I strongly recommended a hemipelvectomy oncologic reasons but also I will I think for functional reasons.    I spent at least 15 minutes today reviewing his imaging studies.  It will be difficult to obtain tumor free margins based on his January MRI scan.  It may be necessary to perform a flap.  Unfortunately his right leg is very edematous and may not serve as an adequate source for free flap.  I will leave this up to Dr. Whitaker.    The way that I left it with the patient was he will contact Dr. Whitaker or Sofia to determine if he would like to proceed with surgery specifically hemipelvectomy.  I gave him Sofia's telephone number and would recommend that we contact him in 1 week's time to see if he has made progress in his decision.    Impression: High-grade sarcoma of the right pelvis.  I believe hemipelvectomy is the preferred choice for local control.    Plan: 1.  Sofia to [place] back with the patient in 1 week via telephone if he has not called Sofia or Dr. Whitaker with a decision as to whether he wants to proceed with surgery.    Total length of today's visit was greater than 45 minutes including  face-to-face time and time spent reviewing imaging and collaborating with other physicians.      Again, thank you for allowing me to participate in the care of your patient.        Sincerely,        López Bhatia MD

## 2022-03-31 NOTE — LETTER
Date:April 1, 2022      Patient was self referred, no letter generated. Do not send.        [facility] Health Information

## 2022-03-31 NOTE — PROGRESS NOTES
I met today with Apolinar to discuss his surgical options for the management of his undifferentiated pleomorphic sarcoma involving the soft tissues of the right hip.  I have previously reviewed his imaging.  I met with him today for over 30 minutes to discuss his need for a right hemipelvectomy.  I answered all his questions.  His sister did join us for some time.  I answered her questions as well.  I told him that in my experience or at least with my surgical experience and skill I could not perform the surgery that would save his leg.  I strongly recommended a hemipelvectomy oncologic reasons but also I will I think for functional reasons.    I spent at least 15 minutes today reviewing his imaging studies.  It will be difficult to obtain tumor free margins based on his January MRI scan.  It may be necessary to perform a flap.  Unfortunately his right leg is very edematous and may not serve as an adequate source for free flap.  I will leave this up to Dr. Whitaker.    The way that I left it with the patient was he will contact Dr. Whitaker or Sofia to determine if he would like to proceed with surgery specifically hemipelvectomy.  I gave him Sofia's telephone number and would recommend that we contact him in 1 week's time to see if he has made progress in his decision.    Impression: High-grade sarcoma of the right pelvis.  I believe hemipelvectomy is the preferred choice for local control.    Plan: 1.  Sofia to [place] back with the patient in 1 week via telephone if he has not called Sofia or Dr. Whitaker with a decision as to whether he wants to proceed with surgery.    Total length of today's visit was greater than 45 minutes including face-to-face time and time spent reviewing imaging and collaborating with other physicians.

## 2022-04-12 NOTE — TELEPHONE ENCOUNTER
RN called and spoke with Vic.  He still has some family that need to be told and then he will contact us when he is ready.  He has my contact information.

## 2022-04-16 NOTE — TELEPHONE ENCOUNTER
"Patient calling to report two moments of shooting, burning sensation pain down leg from right hip while moving boxes yesterday and today. Patient reports current pain is at baseline. Patient says his tylenol pills are too big and would like something smaller and also asks if he can get a refill of his pain medication.     Patient encouraged to follow up in clinic to discuss medication changes. Care advice reviewed with patient see below.    Patient agrees to follow up with provider.    Reason for Disposition    [1] Hip pain AND [2] from overuse or strain (e.g., vigorous activity, running)    Additional Information    Negative: Looks like a broken bone or dislocated joint (e.g., crooked or deformed)    Negative: Sounds like a life-threatening emergency to the triager    Negative: Followed a hip injury    Negative: Leg pain is main symptom    Negative: Back pain radiating (shooting) into hip    Negative: [1] SEVERE pain (e.g., excruciating, unable to do any normal activities) AND [2] fever    Negative: Can't stand (bear weight) or walk    Negative: [1] Red area or streak AND [2] fever    Negative: Patient sounds very sick or weak to the triager    Negative: [1] SEVERE pain (e.g., excruciating, unable to do any normal activities) AND [2] not improved after 2 hours of pain medicine    Negative: [1] Looks infected (spreading redness, pus) AND [2] large red area (> 2 in. or 5 cm)    Negative: [1] Painful rash AND [2] multiple small blisters grouped together (i.e., dermatomal distribution or \"band\" or \"stripe\")    Negative: Looks like a boil, infected sore, or deep ulcer    Negative: [1] Localized rash is very painful AND [2] no fever    Negative: [1] Redness of the skin AND [2] no fever    Negative: Numbness in a leg or foot (i.e., loss of sensation)    Negative: [1] MODERATE pain (e.g., interferes with normal activities, limping) AND [2] present > 3 days    Negative: [1] MILD pain (e.g., does not interfere with normal " activities) AND [2] present > 7 days    Negative: Hip pain is a chronic symptom (recurrent or ongoing AND present > 4 weeks)    Protocols used: HIP PAIN-A-AH

## 2022-04-19 NOTE — PROGRESS NOTES
Vic is a 47 year old who is being evaluated via a billable video visit.      How would you like to obtain your AVS? Mail a copy  If the video visit is dropped, the invitation should be resent by: Text to cell phone: [phone]   Will anyone else be joining your video visit? No      Video Start Time: 3:09 PM  Video-Visit Details    Type of service:  Video Visit    Video End Time:3:25 PM    Originating Location (pt. Location): Home    Distant Location (provider location):  [facility]     Platform used for Video Visit: Kendra Lund    Hematology-Oncology Visit  Apr 19, 2022    Reason for Visit: High-grade pleomorphic sarcoma of the right thigh    Oncology History:   He initially presented to Dr. Whitaker, Orthopedics with rapidly progressing right leg swelling since ~ July 2021. MRI was incomplete due to pain, but did show ~[id number] right thigh soft tissue mass with probable bony involvement of the proximal right femur. 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 Was seen by Dr. Whitaker to review the results on 10/1/21 - discussed consideration of neoadjuvant chemotherapy vs resection and reconstruction. Given high risk for amputation with massive reconstructive surgery, he was referred to Dr. Ambrocio and a PET was ordered which identifed concerning inguinal lymphadenopathy. He was seen by Dr. Ambrocio 10/26/21, ordered NGS (in process) and recommended starting Doxil/Ifos initially as an inpatient. Admitted for Cycle 1 Doxil/Ifos (S3T9=22/2/21).  Notably he had an episode of confusion while hospitalized on 11/8/21 concerning for ifosfamide neurotoxicity.  This resolved without administration of methylene blue. With cycle 2 treatment he also had severe neurotoxicity 12 hours into day 3 ifosfamide infusion. He again improved without methylene blue. Remainder of cycle 2 treatment was aborted due reaction/patient wishes.     PET performed 1/11/22  showed decrease size of the hypermetabolic soft tissue component of pleomorphic sarcoma in the right thigh, previously measuring 18.3 x 4.4 x 13 and now measuring approximately 12.2 x 4.0 x 4.7 cm. He was hesitant to move forward with cycle 3 chemotherapy and instead met with Dr. Whitaker as well as Dr. Bhatia to discuss surgical options. It has been recommended he proceed with hemipelvectomy. He has been discussing surgical options with his family before making a final decision.     He was seen today for a virtual visit for reassessment of pain prior to refilling additional opioids medication.    Interval History:    In general his right hip and knee have been more painful.  Some days are better than others.  There are some days he can use only a cane but most of the time lately requires his walker.  His sister will sometimes help him change positions such as getting out of a chair to reduce pressure on his leg. He reports increased right knee swelling but does not feel like the tumor in his thigh/hip has changed substantially. He is currently taking MS Contin on a sporadic basis and uses oxycodone fairly regularly, 10 to 15 mg per dose . He has been talking to his family members regarding his plan for surgery.  He met with both Dr. Whitaker and Dr. Bhatia.  At this point he feels that he will proceed with surgery and will likely proceed with a right hemicolectomy as he understands this will provide him the best oncologic and functional outcome.       Current Outpatient Medications   Medication     acetaminophen (TYLENOL) 500 MG tablet     diclofenac (VOLTAREN) 1 % topical gel     morphine (MS CONTIN) 30 MG CR tablet     ondansetron (ZOFRAN) 8 MG tablet     oxyCODONE (ROXICODONE) 5 MG tablet     polyethylene glycol (MIRALAX) 17 GM/Dose powder     prochlorperazine (COMPAZINE) 5 MG tablet     sennosides (SENOKOT) 8.6 MG tablet     No current facility-administered medications for this visit.     PHYSICAL EXAM:  There  were no vitals taken for this visit.    Video physical exam  General: Patient appears well in no acute distress.   Skin: No visualized rash or lesions on visualized skin  Eyes: EOMI, no erythema, sclera icterus or discharge noted  Resp: Appears to be breathing comfortably without accessory muscle usage, speaking in full sentences, no cough  MSK: Appears to have normal range of motion based on visualized movements  Neurologic: No apparent tremors, facial movements symmetric  Psych: affect appropriate, pleasant, alert and oriented    The rest of a comprehensive physical examination is deferred due to PHE (public health emergency) video restrictions    Labs:   Most Recent 3 CBC's:  Recent Labs   Lab Test 12/16/21  1022 12/13/21  1034 12/10/21  1323   WBC 7.2 4.1 0.8*   HGB 7.2* 7.1* 5.8*   MCV 80 79 75*    161 220     Most Recent 3 BMP's:  Recent Labs   Lab Test 01/11/22  1229 12/10/21  1323 12/06/21  1426 12/04/21  0758   NA  --  135* 136 137   POTASSIUM  --  3.3* 3.5 3.6   CHLORIDE  --  99 101 106   CO2  --  28 26 24   BUN  --  5* 8 9   CR  --  0.64* 0.69* 0.60*   ANIONGAP  --  8 9 7   VENTURA  --  9.1 9.0 9.3   * 105 110 116*    Most Recent 2 LFT's:  Recent Labs   Lab Test 12/10/21  1323 12/06/21  1426   AST 14 22   ALT 18 18   ALKPHOS 237* 247*   BILITOTAL 0.8 0.8   I reviewed the most recent labs above today.     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 Biopsy showing high grade pleomorphic sarcoma of large right thigh soft tissue mas. High risk for amputation with massive reconstruction. Began initial treatment with Doxil/Ifos as an inpatient 11/2/21. Baseline ECHO with EF 60-65%. Tolerated first cycle well with concern for ifos neurotoxicity, but did not require methylene blue. Also had significant ifos neurotoxicity with cycle 2 which limited finishing the cycle.    Cycle 3 delayed by 2 weeks due to  two missed PET scans.  PET performed 1/11/22 showing decrease size of the hypermetabolic soft tissue component of pleomorphic sarcoma in the right thigh, previously measuring 18.3 x 4.4 x 13 and now measuring approximately 12.2 x 4.0 x 4.7 cm. At that time he did not wish to proceed with additional chemotherapy. Met with ortho and is now deciding between proceeding between right hemipelvectomy versus reconstruction. He plans to make a final decision within the next week. We briefly discussed the possibility of adjuvant chemotherapy following surgery.     Cancer-related pain   Encouraged him to use MS Contin on a regular basis to provide consistent relief. Continue oxycodone for breakthrough pain. Anticipate he will require less oxycodone with consistent MS Continue use. Both prescriptions were refilled today. Tylenol and Voltaren gel PRN.     Ifosfamide neurotoxicity  Intermittent urinary incontinence, somnolence, disorientation and episodes of confusion with cycle 1 and 2. Did not require methylene blue. Should not preclude future treatment. Plan for ifos dose reduction by and additional 20% if doxil/ifos continued in future.    Constipation  Continue senna or Miralax PRN    Anemia  Iron studies indicate mixed iron deficiency and anemia of chronic disease. PO iron avoided d/t constipation. Repeat prior to surgery.    Plan:  F/u with ortho with final surgical decision in the next 1-2 weeks. Dr. Ambrocio or myself will see him postoperatively to discuss treatment/surveillance plan.    45 minutes spent on the date of the encounter doing chart review, review of test results, interpretation of tests, patient visit and documentation     Latosha Taylor CNP  [facility] [address] [address] [phone]

## 2022-04-19 NOTE — TELEPHONE ENCOUNTER
[facility] Triage - NO  ACCESS    Incoming Call: Refill    Refill Request    Date of most recent appointment:  1/12/22 Claudia  Next upcoming appointment:   None on calendar    Medication requested:  Oxycontin and Oxycodone  Quantity:  Oxycontin 60 tablets Oxycodone 100  Last fill date:  Oxycontin 3/11/22 Oxycodone 3/29/22  Person requesting refill:  Vic  Notes:   How many left of oxycontin - 10 tablets  How many oxycodone - 11 tablets  No negative side effect  Taking the oxycodone 2-3 tablets every 4 hours  Taking oxycontin 1 every 12 hours      Prescribing provider(s):  Claudia    Routing to Latosha Taylor

## 2022-05-11 NOTE — TELEPHONE ENCOUNTER
[facility] Call Center    Phone Message    May a detailed message be left on voicemail: yes     Reason for Call: Other: Patient is Requesting a disability letter for the county to receive benefits, Please send email to [email or web address]      Action Taken: Other: [facility]    Travel Screening: Not Applicable

## 2022-05-11 NOTE — CONFIDENTIAL NOTE
Narcotic Refill Request    Medication(s) requested:  Oxycodone and tylenol   Person Requesting Refill:Vic   What pain is the medication treating: Hip pain right   How is the medication being taken?:Oxycodone takes 2-3 tablets every 4 hours   Does pt have enough for today? Yes   Is pain being adequately controlled on the current regimen?: yes   Experiencing any side effects from medication?: No     Date of most recent appointment:  4/19/22 cameron Taylor   Any No Show Visits:No  Next appointment:   Not yet scheduled   Last fill date and by whom:  4/19/22 Cameron Taylor    Reviewed: No Access     Routed/Paged provider: Cameron Taylor

## 2022-05-16 NOTE — TELEPHONE ENCOUNTER
Requesting Latosha Bowensrosa maria to send an email to pt stating that he is disabled to send to [facility]. He has to send it by Thursday 5/19/22  Pt requesting if it can sent to his personal email at   : [email or web address]

## 2022-05-19 NOTE — TELEPHONE ENCOUNTER
Letter written today e-mailed to patient's e-mail address on file per request of [facility]CC.    Sudha Inman CMA on 5/19/2022 at 2:21 PM

## 2022-05-24 NOTE — TELEPHONE ENCOUNTER
RN called and left a voice message for Vic.,  We need to have more information from you.  We also do not know if you are planning on having surgery with us.  The best thing to do is to call us and make an appointment with Dr. Whitaker to discuss what you need and a plan going forward regarding surgery or not.  Please call to schedule.

## 2022-05-25 NOTE — TELEPHONE ENCOUNTER
[facility] Call Center    Phone Message    May a detailed message be left on voicemail: yes           Reason for Call: Other: Pt calling Sofia back/ no TE encounter from her      Action Taken: Other: ortho     Travel Screening: Not Applicable

## 2022-06-01 NOTE — CONFIDENTIAL NOTE
Narcotic Refill Request    Medication(s) requested:  MS contin, Oxycodone and Tylenol   Person Requesting Refill:Vic   What pain is the medication treating: Hip pain   How is the medication being taken?:MS contin twice daily,   Oxycodone taking 2-3 tabs every 4 hours   Does pt have enough for today? No   Is pain being adequately controlled on the current regimen?: normally yes   Experiencing any side effects from medication?: No     Date of most recent appointment:  4/19/22 Latosha Taylor   Any No Show Visits:No   Next appointment:   Not yet scheduled   Last fill date and by whom: oxycodone  5/11/22 Latosha Taylor   MS contin 4/19/22 Latosha Taylor    Reviewed: No access     Routed provider: Latosha Taylor

## 2022-06-03 NOTE — TELEPHONE ENCOUNTER
Left msg for patient to call back.  If having numbness, extreme weakness or pain, he should go to the ER.    Please call to discuss symptoms and surgical plan.

## 2022-06-05 PROBLEM — S72.91XA FEMUR FRACTURE, RIGHT (H): Status: ACTIVE | Noted: 2022-01-01

## 2022-06-05 NOTE — PHARMACY-ADMISSION MEDICATION HISTORY
Pharmacy Note - Admission Medication History    Pertinent Provider Information:      ______________________________________________________________________    Prior To Admission (PTA) med list completed and updated in EMR.       PTA Med List   Medication Sig Last Dose     acetaminophen (TYLENOL) 500 MG tablet Take 1-2 tablets (500-1,000 mg) by mouth every 6 hours as needed for mild pain Per patient he needs a refill prn     diclofenac (VOLTAREN) 1 % topical gel Apply 2-4 g topically 4 times daily as needed for moderate pain prn     ibuprofen (ADVIL/MOTRIN) 200 MG tablet Take 400 mg by mouth every 8 hours as needed for mild pain prn     morphine (MS CONTIN) 30 MG CR tablet Take 1 tablet (30 mg) by mouth every 12 hours 6/4/2022 at Unknown time     multivitamin, therapeutic (THERA-VIT) TABS tablet Take 1 tablet by mouth daily 6/4/2022 at Unknown time     ondansetron (ZOFRAN) 8 MG tablet Take 1 tablet (8 mg) by mouth every 8 hours as needed for nausea prn     oxyCODONE (ROXICODONE) 5 MG tablet Take 2-3 tablets (10-15 mg) by mouth every 4 hours as needed for moderate to severe pain prn     polyethylene glycol (MIRALAX) 17 GM/Dose powder Take 17 g by mouth daily as needed prn     prochlorperazine (COMPAZINE) 5 MG tablet Take 1 tablet (5 mg) by mouth every 6 hours as needed for nausea or vomiting prn     sennosides (SENOKOT) 8.6 MG tablet Take 1-2 tablets by mouth 2 times daily as needed for constipation or no stool prn       Information source(s): Patient and CareEverywhere/[facility]  Method of interview communication: in-person    Summary of Changes to PTA Med List  New: ibuprofen, multivitamin  Discontinued: n/a  Changed: Miralax    Patient was asked about OTC/herbal products specifically.  PTA med list reflects this.    In the past week, patient estimated taking medication this percent of the time:  greater than 90%.    Allergies were reviewed, assessed, and updated with the patient.      Patient did not bring any  medications to the hospital and can't retrieve from home. No multi-dose medications are available for use during hospital stay.     The information provided in this note is only as accurate as the sources available at the time of the update(s).    Thank you for the opportunity to participate in the care of this patient.    Marlene Flanagan [facility]  6/5/2022 10:56 AM

## 2022-06-05 NOTE — ED TRIAGE NOTES
"    Patient to ER via [facility]. 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 \"I was told that the bone could break at any time\" normally uses walker, unable for last three days because of pain.     Medics started IV at left hand and gave 100mg Fentanyl and 30 mg ketamine with poor relief. Patient does take oxycodone at home, last does yesterday, \"they told me not to take anything before coming to hospital\"    Very large swelling at right hip, patient states it has been swollen for \"at least a year\" worse recently. No drainage. pants removed.Tachycardia, placed on monitor. Dr Vasquez in room  "

## 2022-06-05 NOTE — LETTER
Health Information Management Services               Recipient:    Alley David      Sender:    Lilia Suh RN, BSN  Care Coordinator, [facility]  Phone [phone]  Pager [phone]        Date: June 9, 2022  Patient Name:  Vic Scott III  Patient YOB: 1975  Routing Message:      Needs TCU placement. Patient will be having surgery June 27, 2022.   Please do a global referral at [facility].  Thank you      The documents accompanying this notice contain confidential information belonging to the sender.  This information is intended only for the use of the individual or entity named above.  The authorized recipient of this information is prohibited from disclosing this information to any other party and is required to destroy the information after its stated need has been fulfilled, unless otherwise required by state law.      If you are not the intended recipient, you are hereby notified that any disclosure, copy, distribution or action taken in reliance on the contents of these documents is strictly prohibited.  If you have received this document in error, please return it by fax to [phone] with a note on the cover sheet explaining why you are returning it (e.g. not your patient, not your provider, etc.).  If you need further assistance, please call [facility] Centralized Transcription at [phone].  Documents may also be returned by mail to [employer], , [facility] [address], [place], [place] [address].

## 2022-06-05 NOTE — LETTER
Recipient:    [facility]     Sender:    Latosha [facility] [phone]    Date: June 23, 2022  Patient Name:  Vic Scott III  Patient YOB: 1975  Routing Message:      Referral for home PT and OT     The documents accompanying this notice contain confidential information belonging to the sender.  This information is intended only for the use of the individual or entity named above.  The authorized recipient of this information is prohibited from disclosing this information to any other party and is required to destroy the information after its stated need has been fulfilled, unless otherwise required by state law.    If you are not the intended recipient, you are hereby notified that any disclosure, copy, distribution or action taken in reliance on the contents of these documents is strictly prohibited.  If you have received this document in error, please return it by fax to [phone] with a note on the cover sheet explaining why you are returning it (e.g. not your patient, not your provider, etc.).  If you need further assistance, please call .  Documents may also be returned by mail to [employer], , [facility] [address], [place], [place] [address].

## 2022-06-05 NOTE — ED PROVIDER NOTES
EMERGENCY DEPARTMENT ENCOUNTER      NAME: Vic Scott III  AGE: 47 year old male  YOB: 1975  MRN: [id number]  EVALUATION DATE & TIME: 6/5/2022  9:04 AM    PCP: Melanie Ref-Primary, Physician    ED PROVIDER: Marcell Vasquez M.D.      Chief Complaint   Patient presents with     Hip Pain         FINAL IMPRESSION:  Hip Fracture  Sarcoma        ED COURSE & MEDICAL DECISION MAKING:    Pertinent Labs & Imaging studies reviewed. (See chart for details)  47 year old male presents to the Emergency Department for evaluation of hip pain.  Patient has a history of a very large sarcoma.  He has been receiving chemotherapy and is in the final stages of finalizing amputation with Dr. Whitaker at the .  Pain has been worse over the last couple days.  Patient was found to have a pathologic fracture.  Patient does not have proper support at home to be at home with this fracture waiting his appointment Dr. Whitaker on Tuesday.  He is required pain medicine here in the emergency department today to control the pain.  Patient does have a hemoglobin at 6.9 but has a history of iron deficiency and anemia of chronic disease.  He has had hemoglobins around 7 previously.  Will most likely need transfusion prior to surgery but will leave this decision to medical team.  Him awaiting furthe nversation with [facility] to discuss the bed situation and how we can get the patient there for definitive treatment.    9:04 AM I met with the patient to gather history and to perform my initial exam. We discussed plans for the ED course, including diagnostic testing and treatment. PPE: N95 mask  12:33 PM I spoke to Dr. Hunter, [facility] recommended transfer to [facility] for definitive treatment.  12:43 PM I spoke to Dr. Del Rio, [facility] who accepted the patient and will work on a bed at the [email or web address]  At the conclusion of the encounter I discussed the results of all of the tests and the disposition. The questions were  answered. The patient or family acknowledged understanding and was agreeable with the care plan.      minutes of critical care time     MEDICATIONS GIVEN IN THE EMERGENCY:  Medications   HYDROmorphone (DILAUDID) injection 1 mg (has no administration in time range)   0.9% sodium chloride BOLUS (0 mLs Intravenous Stopped 6/5/22 1214)   HYDROmorphone (DILAUDID) injection 1 mg (1 mg Intravenous Given 6/5/22 0941)   HYDROmorphone (DILAUDID) injection 2 mg (2 mg Intravenous Given 6/5/22 1113)       NEW PRESCRIPTIONS STARTED AT TODAY'S ER VISIT  New Prescriptions    No medications on file          =================================================================    HPI    Patient information was obtained from: patient     Use of : N/A         Vic Scott III is a 47 year old male with a pertinent history of sarcoma who presents to this ED by EMS for evaluation of right leg pain.    Patient reports pain in his right upper leg in his tumor that has caused him to be unable to walk. He notes that this pain began a few days ago when he was trying to get up and go to the bathroom but was unable to due to the pain. The pain has been worsening since onset and is the the worst today. He was given 100 of fentanyl and 30 of ketamine by EMS which did not help to relieve his pain. At home he usually takes morphine but has not taken any today. States he thinks his leg is mildly more swollen than usual. Patient says he believes he broke the bone that the tumor was decaying.     Denies any other pain, fever, changes to his eating or drinking.     Patient shares he did chemotherapy for his sarcoma in the past but now is trying to plan surgery. He has an appointment with his doctor on June 7th to schedule a surgery date.       REVIEW OF SYSTEMS   Review of Systems   Constitutional: Negative for appetite change and fever.   Gastrointestinal: Negative for abdominal pain.   Musculoskeletal: Positive for arthralgias (Right upper  "leg). Negative for back pain.   All other systems reviewed and are negative.       PAST MEDICAL HISTORY:  No past medical history on file.    PAST SURGICAL HISTORY:  Past Surgical History:   Procedure Laterality Date     INSERT PORT VASCULAR ACCESS Right 11/1/2021    Procedure: INSERTION, VASCULAR ACCESS PORT;  Surgeon: Sushil Gonzales MD;  Location: [facility] OR     IR CHEST PORT PLACEMENT > 5 YRS OF AGE  11/1/2021           CURRENT MEDICATIONS:    acetaminophen (TYLENOL) 500 MG tablet  diclofenac (VOLTAREN) 1 % topical gel  ibuprofen (ADVIL/MOTRIN) 200 MG tablet  morphine (MS CONTIN) 30 MG CR tablet  multivitamin, therapeutic (THERA-VIT) TABS tablet  ondansetron (ZOFRAN) 8 MG tablet  oxyCODONE (ROXICODONE) 5 MG tablet  polyethylene glycol (MIRALAX) 17 GM/Dose powder  prochlorperazine (COMPAZINE) 5 MG tablet  sennosides (SENOKOT) 8.6 MG tablet        ALLERGIES:  No Known Allergies    FAMILY HISTORY:  No family history on file.    SOCIAL HISTORY:   Social History     Socioeconomic History     Marital status: Single   Tobacco Use     Smoking status: Never Smoker     Smokeless tobacco: Never Used       VITALS:  BP (!) 153/85   Pulse (!) 125   Temp 99.5  F (37.5  C)   Resp 20   Ht 1.88 m (6' 2\")   Wt 106.6 kg (235 lb)   SpO2 99%   BMI 30.17 kg/m        PHYSICAL EXAM    Constitutional: Well developed, Well nourished, NAD, GCS 15  HENT: Normocephalic, Atraumatic, Bilateral external ears normal, Oropharynx normal, mucous membranes moist, Nose normal. Neck-  Normal range of motion, No tenderness, Supple, No stridor.  Eyes: PERRL, EOMI, Conjunctiva normal, No discharge.   Respiratory: Normal breath sounds, No respiratory distress, No wheezing, Speaks full sentences easily. No cough.  Cardiovascular: Normal heart rate, Regular rhythm, No murmurs, No rubs, No gallops. Chest wall nontender.  GI:Soft, No tenderness, No masses, No flank tenderness. No rebound or guarding.   Musculoskeletal: 2+ DP pulses.No cyanosis, No " clubbing. Good range of motion in all major joints. No tenderness to palpation. Large mass in right upper anterior thigh. Swelling with chronic stasis changes to lower right leg.   Integument: Warm, Dry, No erythema, No rash. No petechiae.   Neurologic: Alert & oriented x 3,  CN 3-12 intact Normal motor function, Normal sensory function, No focal deficits noted. Normal gait. Normal finger to nose bilaterally  Psychiatric: Affect normal, Judgment normal, Mood normal. Cooperative.          LAB:  All pertinent labs reviewed and interpreted.  Labs Ordered and Resulted from Time of ED Arrival to Time of ED Departure   COMPREHENSIVE METABOLIC PANEL - Abnormal       Result Value    Sodium 141      Potassium 4.1      Chloride 103      Carbon Dioxide (CO2) 26      Anion Gap 12      Urea Nitrogen 12      Creatinine 0.71      Calcium 9.4      Glucose 116      Alkaline Phosphatase 151 (*)     AST 17      ALT <9      Protein Total 7.3      Albumin 2.7 (*)     Bilirubin Total 0.8      GFR Estimate >90     CBC WITH PLATELETS AND DIFFERENTIAL - Abnormal    WBC Count 10.9      RBC Count 3.21 (*)     Hemoglobin 6.9 (*)     Hematocrit 22.3 (*)     MCV 70 (*)     MCH 21.5 (*)     MCHC 30.9 (*)     RDW 19.6 (*)     Platelet Count 310      % Neutrophils 81      % Lymphocytes 7      % Monocytes 8      % Eosinophils 3      % Basophils 0      % Immature Granulocytes 1      NRBCs per 100 WBC 0      Absolute Neutrophils 8.8 (*)     Absolute Lymphocytes 0.8      Absolute Monocytes 0.9      Absolute Eosinophils 0.3      Absolute Basophils 0.0      Absolute Immature Granulocytes 0.1      Absolute NRBCs 0.0     TYPE AND SCREEN, ADULT   ABO/RH TYPE AND SCREEN       RADIOLOGY:  Reviewed all pertinent imaging. Please see official radiology report.  XR Femur Right 2 Views   Final Result   IMPRESSION: Extensive destructive lesions along the right femoral head, neck, and proximal shaft in the right acetabulum and ilium compatible with progressive  malignant tumor. Pathologic fracture of the proximal shaft of the right femur the    subtrochanteric region. Associated soft tissue mass. Diastases of the right hip joint likely secondary to intra-articular tumor.      Bony demineralization at the knee. Mottled appearance to the patella could be related to neoplasm.      XR Pelvis 1/2 Views   Final Result   IMPRESSION: Extensive destructive lesions along the right femoral head, neck, and proximal shaft in the right acetabulum and ilium compatible with progressive malignant tumor. Pathologic fracture of the proximal shaft of the right femur the    subtrochanteric region. Associated soft tissue mass. Diastases of the right hip joint likely secondary to intra-articular tumor.      Bony demineralization at the knee. Mottled appearance to the patella could be related to neoplasm.              I, Zulma Knowles, am serving as a scribe to document services personally performed by Dr. Marcell Vasquez based on my observation and the provider's statements to me. I, Marcell Vasquez MD attest that Zulma Knowles is acting in a scribe capacity, has observed my performance of the services and has documented them in accordance with my direction.    Marcell Vasquez M.D.  Emergency Medicine  [facility] EMERGENCY DEPARTMENT  [facility] [facility] [id number] [phone]  Dept: [phone]     Marcell Vasquez MD  06/05/22 7907

## 2022-06-05 NOTE — TELEPHONE ENCOUNTER
Lauryn (sister) calls and says that her brother has a history of sarcoma and got chemotherapy for this. Lauryn says that her brother cannot walk or even stand up. Lauryn says that pt. Has drainage coming out of the side of his right leg. Pt. Says that he has swelling from his right hip to his right knee. Because pt. Cannot walk, Lauryn says that she will call 911. Lauryn says that her brother will be taken to [facility] ER by ambulance. COVID 19 Nurse Triage Plan/Patient Instructions    Please be aware that novel coronavirus (COVID-19) may be circulating in the community. If you develop symptoms such as fever, cough, or SOB or if you have concerns about the presence of another infection including coronavirus (COVID-19), please contact your health care provider or visit [email or web address].     Disposition/Instructions    ED Visit recommended. Follow protocol based instructions.     Bring Your Own Device:  Please also bring your smart device(s) (smart phones, tablets, laptops) and their charging cables for your personal use and to communicate with your care team during your visit.    Thank you for taking steps to prevent the spread of this virus.  o Limit your contact with others.  o Wear a simple mask to cover your cough.  o Wash your hands well and often.    Resources    [facility]: About COVID-19: [email or web address]    CDC: What to Do If You're Sick: www.cdc.gov/coronavirus/2019-ncov/about/steps-when-sick.html    CDC: Ending Home Isolation: www.cdc.gov/coronavirus/2019-ncov/hcp/disposition-in-home-patients.html     CDC: Caring for Someone: www.cdc.gov/coronavirus/2019-ncov/if-you-are-sick/care-for-someone.html     [facility]: Interim Guidance for Hospital Discharge to Home: www.health.[facility].[place]/diseases/coronavirus/hcp/hospdischarge.pdf    [facility] clinical trials (COVID-19 research studies): [email or web address]     Below are the COVID-19 hotlines  at the [place] Department of Health ([facility]). Interpreters are available.   o For health questions: Call 273-959-2033 or 1-338.758.9026 (7 a.m. to 7 p.m.)  o For questions about schools and childcare: Call 209-788-0412 or 1-802.867.7684 (7 a.m. to 7 p.m.)                   Reason for Disposition    [1] Can't walk or can barely walk AND [2] new onset    Additional Information    Negative: Severe difficulty breathing (e.g., struggling for each breath, speaks in single words)    Negative: Looks like a broken bone or dislocated joint (e.g., crooked or deformed)    Negative: Sounds like a life-threatening emergency to the triager    Negative: Chest pain    Negative: Followed a leg injury    Negative: [1] Small area of swelling AND [2] followed an insect bite to the area    Negative: Swelling of one ankle joint    Negative: Swelling of knee is main symptom    Negative: Pregnant    Negative: Postpartum (from 0 to 6 weeks after delivery)    Negative: Difficulty breathing at rest    Negative: Entire foot is cool or blue in comparison to other side    Protocols used: LEG SWELLING AND EDEMA-A-AH

## 2022-06-05 NOTE — PHARMACY-ADMISSION MEDICATION HISTORY
Admission Medication History Completed by Pharmacy    See [facility] Admission Navigator for allergy information, preferred outpatient pharmacy, prior to admission medications and immunization status.     Medication History Sources:     Patient    surescripts fill history    [place]     Med history completed 6/5/22 by Marlene Flanagan, PharmD under previous encounter at [facility] ED    Changes made to PTA medication list (reason):    Added: None    Deleted: None    Changed: None    Additional Information:  - Per [place] , MS Contin 30 mg ER tablets last filled 4/23/22 for 60 tablets (30 day supply) and oxycodone 5 mg IR tablets last filled 5/17/22 for 100 tablets (6 day supply)   - Per patient, he forgot to take his MS Contin this AM. Last taken last night.     Prior to Admission medications    Medication Sig Last Dose Taking? Auth Provider   acetaminophen (TYLENOL) 500 MG tablet Take 1-2 tablets (500-1,000 mg) by mouth every 6 hours as needed for mild pain Per patient he needs a refill Unknown at prn Yes Latosha Taylor CNP   diclofenac (VOLTAREN) 1 % topical gel Apply 2-4 g topically 4 times daily as needed for moderate pain Unknown at prn Yes Bell Chicas PA-C   ibuprofen (ADVIL/MOTRIN) 200 MG tablet Take 400 mg by mouth every 8 hours as needed for mild pain Unknown at prn Yes Unknown, Entered By History   morphine (MS CONTIN) 30 MG CR tablet Take 1 tablet (30 mg) by mouth every 12 hours 6/4/2022 at HS Yes Latosha Taylor CNP   multivitamin, therapeutic (THERA-VIT) TABS tablet Take 1 tablet by mouth daily 6/4/2022 at Unknown time Yes Unknown, Entered By History   ondansetron (ZOFRAN) 8 MG tablet Take 1 tablet (8 mg) by mouth every 8 hours as needed for nausea Unknown at prn Yes Marga Peters CNS   oxyCODONE (ROXICODONE) 5 MG tablet Take 2-3 tablets (10-15 mg) by mouth every 4 hours as needed for moderate to severe pain 6/4/2022 at Unknown time Yes Latosha Taylor CNP   polyethylene glycol (MIRALAX) 17 GM/Dose  powder Take 17 g by mouth daily as needed Unknown at prn Yes Bell Chicas PA-C   prochlorperazine (COMPAZINE) 5 MG tablet Take 1 tablet (5 mg) by mouth every 6 hours as needed for nausea or vomiting Unknown at prn Yes Marga Peters CNS   sennosides (SENOKOT) 8.6 MG tablet Take 1-2 tablets by mouth 2 times daily as needed for constipation or no stool Unknown at prn Yes Bell Chicas PA-C       Date completed: 06/05/22    Medication history completed by: Gibson Gimenez [facility]

## 2022-06-05 NOTE — ED NOTES
Pt taken to xray. He was in too much pain for them to work with him and was sent back. MD prescribed dilaudid.

## 2022-06-05 NOTE — ED NOTES
Attempted to call report to [facility]. Was informed that we could not give report until after 1500. Call [phone]. Ask for Li SMITH [phone] extension number.

## 2022-06-06 NOTE — CONSULTS
[facility]  Consult Note - Hospitalist Service  Date of Admission:  6/5/2022  Consult Requested by: Dr. 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     # High grade pleomorphic sarcoma of R thigh  # Pathologic femur fx  -- pain management per primary team  -- agree with palliative consult  -- currently awaiting plans for amputation    Anemia of chronic disease. Hgb 6.9, stable from prior. Pt denies lightheadedness, dizziness, or fatigue. No shortness of breath. No evidence of active bleed.  -- consider transfusion prior to surgery  -- monitor for s/s of bleeding    Cardiovascular Risk. Surgery is not urgent/emergent. Patient does not have any active cardiac conditions. Prior to pathologic fracture, functional capacity is 6 METs without symptoms. No clinical risk factors reflecting a 3.9% risk of cardiac death, nonfatal MI, or nonfatal cardiac arrest based on RCRI (Revised Chawla Cardiac Risk Index). Patient may proceed with surgery without further testing.     Pulmonary Risk. does not have known underlying pulmonary disease. Risk for any post op pulmonary complications is intermediate at 13.3% based on Canet Risk Index due to risk factors of pre-op anemia and length of procedure.Patient may proceed with surgery without further testing.        The patient's care was discussed with the Attending Physician, Dr. Islas.    Marlene Bennett PA-C  [facility]  Securely message with the Vocera Web Console (learn more here)  Text page via [facility] Paging/Directory       Hospitalist Service    Clinically Significant Risk Factors Present on Admission             # Hypoalbuminemia: Albumin = 2.7 g/dL (Ref  "range: 3.5 - 5.0 g/dL) on admission, will monitor as appropriate      # Obesity: Estimated body mass index is 30.17 kg/m  as calculated from the following:    Height as of an earlier encounter on 6/5/22: 1.88 m (6' 2\").    Weight as of an earlier encounter on 6/5/22: 106.6 kg (235 lb).      ______________________________________________________________________    Chief Complaint   Pain    History is obtained from the patient    History of Present Illness     Patient is admitted for pain management for pathologic fracture with plan to undergo R leg amputation. Currently, patient complains of moderate pain, however improved from prior. He denies any chest tightness, pain or pressure. He does not have difficulty breathing, LOPEZ, or orthopnea.     Cardiovascular: does not have underlying cardiac disease. At baseline, patient is able to complete ADL's without difficulty. Prior to diagnosis he was very active and played basketball. Currently, denies any chest pain or palpitations.     Pulmonary: does not have underlying lung disease. Currently, denies any cough or SOB.     Prior Anesthesia: No  History of DVT/PE: No    NATHANIEL: No diagnosis    History of stroke - No, seizure - No, kidney disease - No, liver disease - No, thyroid disease - No, diabetes - No, neck/jaw pain or stiffness - No      Review of Systems   The 10 point Review of Systems is negative other than noted in the HPI or here.     Past Medical History    I have reviewed this patient's medical history and updated it with pertinent information if needed.   No past medical history on file.    Past Surgical History   I have reviewed this patient's surgical history and updated it with pertinent information if needed.  Past Surgical History:   Procedure Laterality Date     INSERT PORT VASCULAR ACCESS Right 11/1/2021    Procedure: INSERTION, VASCULAR ACCESS PORT;  Surgeon: Sushil Gonzales MD;  Location: [facility] OR     IR CHEST PORT PLACEMENT > 5 YRS OF AGE  11/1/2021 "       Social History   I have reviewed this patient's social history and updated it with pertinent information if needed.  Social History     Tobacco Use     Smoking status: Never Smoker     Smokeless tobacco: Never Used       Family History     Diabetes    Medications   I have reviewed this patient's current medications    Allergies   No Known Allergies    Physical Exam   Vital Signs: Temp: 98.9  F (37.2  C) Temp src: Oral BP: 137/79 Pulse: (!) 132   Resp: 16 SpO2: 98 % O2 Device: None (Room air)    Weight: 0 lbs 0 oz    General Appearance: Alert and oriented x 3, NAD  Eyes: PERRL  HEENT: normocephalic, atraumatic  Respiratory: Lungs CTAB  Cardiovascular: RRR  GI: Abdomen soft, non distended, non tender to palpation  Skin: warm and dry to touch  Musculoskeletal: Large mass in R upper anterior thigh, swelling with chronic stasis changes to lower right leg  Neurologic: CN II-XII grossly intact  Psychiatric: mood normal    Data   Most Recent 3 CBC's:Recent Labs   Lab Test 06/05/22  0940 12/16/21  1022 12/13/21  1034   WBC 10.9 7.2 4.1   HGB 6.9* 7.2* 7.1*   MCV 70* 80 79    255 161     Most Recent 3 BMP's:Recent Labs   Lab Test 06/05/22  0940 01/11/22  1229 12/10/21  1323 12/06/21  1426     --  135* 136   POTASSIUM 4.1  --  3.3* 3.5   CHLORIDE 103  --  99 101   CO2 26  --  28 26   BUN 12  --  5* 8   CR 0.71  --  0.64* 0.69*   ANIONGAP 12  --  8 9   VENTURA 9.4  --  9.1 9.0    111* 105 110

## 2022-06-06 NOTE — PROGRESS NOTES
"  VS: Tachycardic, pt reports this is his baseline at times d/t anxiety and pain. Dr. Dior notified. Other VSS. Denies CP/SOB. A/O x4.    O2: >90% on RA.    Output: Voiding adequate amounts w/o pain or difficulty.    Last BM: 6/5 per pt report    Activity: Bedrest   Up for meals? No    Skin: MARI backside, pt refused full skin assessment d/t pain. RLE cracked and peeling.    Pain: Pain in R hip/leg, managing with scheduled MS Contin and PRN oxy q 3.    CMS: Edema in RLE, otherwise intact    Dressing: CDI   Diet: Regular, tolerating well   LDA: PIV in L hand SL. Port a cath in R chest (not accessed)   Equipment: IV pole   Plan: Awaiting ortho to determine surgery plan   Additional Info: Per lab, pt is positive for antibodies so there will be delay for blood, Dr. Dior notified verbally.  Dr. Dior also notified around 0830 that pt's Hgb 6.6, awaiting recheck orders. 1200- writer called blood bank to confirm there are no orders placed, Dr. Dior paged again. Order for hgb recheck added on to morning draw. Around 1400 this was still not resulted, writer called lab and was informed this cannot be an add on- changed to a redraw. Recheck was 6.1, 1 unit of blood given.   Pt refused to have MRI today d/t concern for pain and \"not fitting\" educated on importance, offered interventions still refused.   Needs covid swab before surgery         "

## 2022-06-06 NOTE — PLAN OF CARE
Goal Outcome Evaluation:    Patient arrived to the unit at 1740 form [facility] via transport. A&O X4. Oriented to room and call light.     VS: /79 (BP Location: Left arm)   Pulse (!) 132   Temp 98.9  F (37.2  C) (Oral)   Resp 16   SpO2 98%   Tachycardic    O2: Sats >90% on RA.   Output: Voiding into urinal without difficulty.   Last BM: LBM 6/4 per pt report.   Activity: On strict bedrest.   Skin: Watermelon size mass on right groin/hip, right leg venus stasis/ dark, cracked and peeling skin.    Pain: Pain managed with prn oxycodone, scheduled tylenol and Ms contin    Neuro: Intact. Denies N/T in all extremities.   Dressing: None.   Diet: Tolerating regular diet.    LDA: PIV SL into left forearm.   Equipment: Patient belongings.   Plan: Dr. Bhatia with discus surgery plan tomorrow. MRI ordered but pending due to immobility.     Additional Info: Lab called regarding  pt's critical low Hgb 6.6. paged cross cover Marlene GOMEZ. Order received to monitor pt and will follow up tomorrow.

## 2022-06-06 NOTE — PROGRESS NOTES
Orthopaedic Surgery Progress Note     2022    E: No acute events overnight.    S: Pain well controlled, awaiting to discuss plan with Dr Yuen    O:   /55 (BP Location: Left arm)   Pulse 116   Temp 99.3  F (37.4  C) (Oral)   Resp 16   SpO2 100%     Exam:    General: Alert, well-appearing in no acute distress.  Respiratory: Non-labored breathing.   Cardiovascular: Extremities warm and well perfused  Extremities: moving all four extremities.   RLE: edema and swelling throughout RLE  -Sens: SILT dp/sp/s/s/t  -Motor: Fires EHL/FHL/TA/GSc  -Vasc: 2+ pulses, wwp, brisk cap refill     Dressing CDI       Recent Labs   Lab 22  0940    141   POTASSIUM 3.9 4.1   CHLORIDE 106 103   CO2 26 26   BUN 12 12   CR 0.57* 0.71   * 116     Recent Labs   Lab 22  0940   WBC 11.8* 10.9   HGB 6.6* 6.9*    310     Recent Labs   Lab 22   INR 1.13         Assessment/Plan:  Vic Scott III is a 47 year old male w/ PMHx pleomorphic sarcoma status post neoadjuvant chemotherapy with the followin.  Right hip and thigh pleomorphic sarcoma  2.  Pathologic right proximal femur fracture     After discussion of the case with Dr. Whitaker, we will obtain a new MRI, order preoperative laboratory evaluation.  We will also place a consult to the medicine service for preoperative evaluation.  We will asked the patient to be seen by palliative care to have a goals of care discussion as the patient does not feel he is met with them before.  I did send a message to his oncologic team to update them on his status.     Pending imaging and discussion with other teams the patient remain inpatient until a plan is developed.     PLAN FOR TODAY:   -Dr. Whitaker to discuss plan with patient between OR cases  -MR pelvis with and without contrast for surgical planning; patient refusing MRI  -Medicine pre-op completed.  -plan for OR TBD  - HGB 6.6; awaiting transfusion (patient  with rare antibody will take 8 hours for blood to be prepared)     RECOMMENDATIONS:   - Admit to orthopedics  - Plan for OR: TBD pending discussion with orthopedic oncology staff              -Consent: TBD              -Pre-op labs: ordered               -Medicine clearance: pending  - Anticoagulation/DVT Prophylaxis: hold for now   - Antibiotics/Tetanus: TBD pending plan   - X-rays/Imaging: possibly will obtain updated imaging pending discussion with Dr. Whitaker.   - Activity: Bedrest  - Weight bearing: NWB RLE  - Pain control: ordered   - Diet: regular  - Follow-up: TBD  - Disposition: admitted to orthopedics        Zara ROSENBERG [facility]  Department of Orthopedic Surgery  [facility]  [phone]    For any questions regarding this patient please page me at the above number prior to contacting the ortho resident on call.

## 2022-06-06 NOTE — CONSULTS
[facility]  ORTHOPAEDIC SURGERY CONSULT    DATE OF CONSULT: 6/5/2022 7:17 PM    CC: right thigh tumor and new right femur fracture    HISTORY OF PRESENT ILLNESS:   Vic Scott III is a 47 year old male with pmhx high grade pleomorphic sarcoma of the right thigh and right hemipelvis who presented to OSH with worsening right hip and thigh pain.  Outside hospital x-rays were notable for a proximal right femur fracture, and chronic appearing changes to the right proximal femur.  Patient was transferred to the [facility] for further management.    On arrival here the patient is having significant pain with any movement of the right thigh.  He notes worsening pain over the last few days, without any specific mechanism.  To me he notes that he has met with both Dr. Bhatia and Dr. Whitaker and that multiple possible surgeries have been discussed.    Denies numbness, tingling, or weakness to the affected extremities.  Denies fevers, chills, nausea, vomiting, diarrhea, constipation, chest pain, shortness of breath.    PAST MEDICAL HISTORY:   No past medical history on file.  [Patient denies any personal history of bleeding disorders, clotting disorders, or adverse reactions to anesthesia].    PAST SURGICAL HISTORY:    Past Surgical History:   Procedure Laterality Date     INSERT PORT VASCULAR ACCESS Right 11/1/2021    Procedure: INSERTION, VASCULAR ACCESS PORT;  Surgeon: Sushil Gonzales MD;  Location: [facility] OR     IR CHEST PORT PLACEMENT > 5 YRS OF AGE  11/1/2021       MEDICATIONS:   Prior to Admission medications    Medication Sig Last Dose Taking? Auth Provider   acetaminophen (TYLENOL) 500 MG tablet Take 1-2 tablets (500-1,000 mg) by mouth every 6 hours as needed for mild pain Per patient he needs a refill   Latosha Taylor CNP   diclofenac (VOLTAREN) 1 % topical gel Apply 2-4 g topically 4 times daily as needed for moderate pain   Bell Chicas PA-C   ibuprofen (ADVIL/MOTRIN) 200 MG  tablet Take 400 mg by mouth every 8 hours as needed for mild pain   Unknown, Entered By History   morphine (MS CONTIN) 30 MG CR tablet Take 1 tablet (30 mg) by mouth every 12 hours   Latosha Taylor CNP   multivitamin, therapeutic (THERA-VIT) TABS tablet Take 1 tablet by mouth daily   Unknown, Entered By History   ondansetron (ZOFRAN) 8 MG tablet Take 1 tablet (8 mg) by mouth every 8 hours as needed for nausea   Marga Peters CNS   oxyCODONE (ROXICODONE) 5 MG tablet Take 2-3 tablets (10-15 mg) by mouth every 4 hours as needed for moderate to severe pain   Latosha Taylor CNP   polyethylene glycol (MIRALAX) 17 GM/Dose powder Take 17 g by mouth daily as needed   Bell Chicas PA-C   prochlorperazine (COMPAZINE) 5 MG tablet Take 1 tablet (5 mg) by mouth every 6 hours as needed for nausea or vomiting   Marga Peters CNS   sennosides (SENOKOT) 8.6 MG tablet Take 1-2 tablets by mouth 2 times daily as needed for constipation or no stool   Bell Chicas PA-C     ALLERGIES:   Patient has no known allergies.    SOCIAL HISTORY:   Social History     Socioeconomic History     Marital status:      Spouse name: Not on file     Number of children: Not on file     Years of education: Not on file     Highest education level: Not on file   Occupational History     Not on file   Tobacco Use     Smoking status: Never Smoker     Smokeless tobacco: Never Used   Substance and Sexual Activity     Alcohol use: Not on file     Drug use: Not on file     Sexual activity: Not on file   Other Topics Concern     Not on file   Social History Narrative     Not on file     Social Determinants of Health     Financial Resource Strain: Not on file   Food Insecurity: Not on file   Transportation Needs: Not on file   Physical Activity: Not on file   Stress: Not on file   Social Connections: Not on file   Intimate Partner Violence: Not on file   Housing Stability: Not on file     FAMILY HISTORY:  No family history on  file.    Patient denies known family history of bleeding, clotting, or anesthesia related complications.     REVIEW OF SYSTEMS:   Positive for RLE Pain. Otherwise a 10-point reviews of systems was negative except as noted above in the HPI.     PHYSICAL EXAM:   Vitals:    06/05/22 1646 06/05/22 1749   BP:  137/79   BP Location:  Left arm   Pulse:  (!) 132   Resp:  16   Temp: (!) 96.7  F (35.9  C) 98.9  F (37.2  C)   TempSrc: Oral Oral   SpO2:  98%     General: Awake, alert, appropriate, following commands, NAD.  Lungs: Breathing comfortably and nonlabored, no wheezes or stridor noted.  Heart/Cardiovascular: Regular pulse, no peripheral cyanosis.  Back: Nontender to palpation throughout, no step-offs or deformities appreciated. Bilateral SI joints non-tender.   Right Lower Extremity: Significant swelling, and mass to proximal thigh and pelvis.  No draining wounds.  He has significant edema to the foot of the right lower extremity.  He is able to wiggle his toes and is grossly neurologically intact.  Palpable pulses.    LABS:  Hemoglobin   Date Value Ref Range Status   06/05/2022 6.9 (LL) 13.3 - 17.7 g/dL Final   12/16/2021 7.2 (L) 13.3 - 17.7 g/dL Final     WBC Count   Date Value Ref Range Status   06/05/2022 10.9 4.0 - 11.0 10e3/uL Final     Platelet Count   Date Value Ref Range Status   06/05/2022 310 150 - 450 10e3/uL Final     INR   Date Value Ref Range Status   11/08/2021 1.20 (H) 0.85 - 1.15 Final     Creatinine   Date Value Ref Range Status   06/05/2022 0.71 0.70 - 1.30 mg/dL Final     Glucose   Date Value Ref Range Status   06/05/2022 116 70 - 125 mg/dL Final   10/20/2021 92 70 - 99 mg/dL Final     CRP Inflammation   Date Value Ref Range Status   12/03/2021 120.0 (H) 0.0 - 8.0 mg/L Final     No results found for: SED    IMAGING:  X-rays obtained today show a displaced right proximal femur fracture with lytic lesions throughout the femur.  His femoral head is also displaced from the socket likely from tumor  ralph.    IMPRESSION:   Vic Scott III is a 47 year old male w/ PMHx pleomorphic sarcoma status post neoadjuvant chemotherapy with the followin.  Right hip and thigh pleomorphic sarcoma  2.  Pathologic right proximal femur fracture    After discussion of the case with Dr. Whitaker, we will obtain a new MRI, order preoperative laboratory evaluation.  We will also place a consult to the medicine service for preoperative evaluation.  We will asked the patient to be seen by palliative care to have a goals of care discussion as the patient does not feel he is met with them before.  I did send a message to his oncologic team to update them on his status.    Pending imaging and discussion with other teams the patient remain inpatient until a plan is developed.    RECOMMENDATIONS:   - Admit to orthopedics  - Plan for OR: TBD pending discussion with orthopedic oncology staff   -Consent: TBD   -Pre-op labs: ordered    -Medicine clearance: pending  - Anticoagulation/DVT Prophylaxis: hold for now   - Antibiotics/Tetanus: TBD pending plan   - X-rays/Imaging: possibly will obtain updated imaging pending discussion with Dr. Whitaker.   - Activity: Bedrest  - Weight bearing: NWB RLE  - Pain control: ordered   - Diet: regular  - Follow-up: TBD  - Disposition: admitted to orthopedics    Assessment and Plan discussed with Dr. Sherman Smith MD  Orthopaedic Surgery Resident, PGY4  Pager: [phone]    For questions about this patient, please contact me at my pager during business hours before contacting the on call resident. At night or on weekends please contact the on call resident.

## 2022-06-06 NOTE — PROGRESS NOTES
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      # High grade pleomorphic sarcoma of R thigh  # Pathologic femur fx  -- pain management per primary team  -- agree with palliative consult  -- currently awaiting plans for amputation     Anemia of chronic disease. Hgb 6.9, stable from prior. Pt denies lightheadedness, dizziness, or fatigue. No shortness of breath. No evidence of active bleed.  -- consider transfusion prior to surgery  -- monitor for s/s of bleeding     Cardiovascular Risk. Surgery is not urgent/emergent. Patient does not have any active cardiac conditions. Prior to pathologic fracture, functional capacity is 6 METs without symptoms. No clinical risk factors reflecting a 3.9% risk of cardiac death, nonfatal MI, or nonfatal cardiac arrest based on RCRI (Revised Chawla Cardiac Risk Index). Patient may proceed with surgery without further testing.      Pulmonary Risk. does not have known underlying pulmonary disease. Risk for any post op pulmonary complications is intermediate at 13.3% based on Canet Risk Index due to risk factors of pre-op anemia and length of procedure.Patient may proceed with surgery without further testing.             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  Denies any fall.  Noted to have pathological femur fracture.  Ortho was  consulted.  Patient has chronic anemia.  Last chemotherapy according to the patient in January 2022.  Used to receive blood transfusions while he was on chemotherapy but not in the last few months.  Yesterday hemoglobin 6.9 and repeat was 6.6.  He is tachycardic.  Satting okay.  Denies any dyspnea.  Type and screen done, showed that he is positive for antibodies.  A unit of packed red blood cell has been ordered and to be transfused for hemoglobin below 7.        Last 24 hr care team notes reviewed.   ROS:  4 point ROS including Respiratory, CV, GI and , other than that noted in the HPI, is negative.    Medications: Reviewed in EPIC. List below for reference  Current Facility-Administered Medications   Medication     acetaminophen (TYLENOL) tablet 975 mg     bisacodyl (DULCOLAX) EC tablet 5 mg    Or     bisacodyl (DULCOLAX) EC tablet 10 mg     HYDROmorphone (PF) (DILAUDID) injection 0.5 mg     lidocaine (LMX4) cream     lidocaine 1 % 0.1-1 mL     melatonin tablet 1 mg     morphine (MS CONTIN) 12 hr tablet 30 mg     naloxone (NARCAN) injection 0.2 mg    Or     naloxone (NARCAN) injection 0.4 mg    Or     naloxone (NARCAN) injection 0.2 mg    Or     naloxone (NARCAN) injection 0.4 mg     ondansetron (ZOFRAN ODT) ODT tab 4 mg    Or     ondansetron (ZOFRAN) injection 4 mg     oxyCODONE IR (ROXICODONE) tablet 10-20 mg     polyethylene glycol (MIRALAX) Packet 17 g     prochlorperazine (COMPAZINE) injection 10 mg    Or     prochlorperazine (COMPAZINE) tablet 10 mg    Or     prochlorperazine (COMPAZINE) suppository 25 mg     sodium chloride (PF) 0.9% PF flush 3 mL     sodium chloride (PF) 0.9% PF flush 3 mL         Physical Exam:    /55 (BP Location: Left arm)   Pulse 116   Temp 99.3  F (37.4  C) (Oral)   Resp 16   SpO2 100%      GENERAL: Alert and oriented x 3. NAD.   HEENT: Pale conjunctive a anicteric sclera. Mucous membranes moist.   CV: Tachycardic. S1, S2. No murmurs appreciated.   RESPIRATORY: Effort  normal on Lungs CTAB with no wheezing, rales, rhonchi.   GI: Abdomen soft and non distended with normoactive bowel sounds present in all quadrants. No tenderness, rebound, guarding.   NEUROLOGICAL: No focal deficits. Moves all extremities.    EXTREMITIES: Large right upper thigh and pelvic mass, huge.  Nontender to palpation.  Trace edema on the lower extremities.  SKIN: Pale but no jaundice.    Labs & Studies of Note: I personally reviewed the following studies:  Hgb was 6.9 and 6.6 yesterday  Lab Results   Component Value Date    WBC 11.8 06/05/2022     Lab Results   Component Value Date    RBC 3.13 06/05/2022     Lab Results   Component Value Date    HGB 6.6 06/05/2022     Lab Results   Component Value Date    HCT 21.2 06/05/2022     No components found for: MCT  Lab Results   Component Value Date    MCV 68 06/05/2022     Lab Results   Component Value Date    MCH 21.1 06/05/2022     Lab Results   Component Value Date    MCHC 31.1 06/05/2022     Lab Results   Component Value Date    RDW 19.2 06/05/2022     Lab Results   Component Value Date     06/05/2022       Last Comprehensive Metabolic Panel:  Sodium   Date Value Ref Range Status   06/05/2022 139 133 - 144 mmol/L Final     Potassium   Date Value Ref Range Status   06/05/2022 3.9 3.4 - 5.3 mmol/L Final     Chloride   Date Value Ref Range Status   06/05/2022 106 94 - 109 mmol/L Final     Carbon Dioxide (CO2)   Date Value Ref Range Status   06/05/2022 26 20 - 32 mmol/L Final     Anion Gap   Date Value Ref Range Status   06/05/2022 7 3 - 14 mmol/L Final     Glucose   Date Value Ref Range Status   06/05/2022 101 (H) 70 - 99 mg/dL Final   10/20/2021 92 70 - 99 mg/dL Final     Urea Nitrogen   Date Value Ref Range Status   06/05/2022 12 7 - 30 mg/dL Final     Creatinine   Date Value Ref Range Status   06/05/2022 0.57 (L) 0.66 - 1.25 mg/dL Final     GFR Estimate   Date Value Ref Range Status   06/05/2022 >90 >60 mL/min/1.73m2 Final     Comment:     Effective  December 21, 2021 eGFRcr in adults is calculated using the 2021 CKD-EPI creatinine equation which includes age and gender (Padmini et al., NEJM, DOI: 10.1056/NLDUhl2435205)     Calcium   Date Value Ref Range Status   06/05/2022 9.3 8.5 - 10.1 mg/dL Final     Bilirubin Total   Date Value Ref Range Status   06/05/2022 0.8 0.2 - 1.3 mg/dL Final     Alkaline Phosphatase   Date Value Ref Range Status   06/05/2022 137 40 - 150 U/L Final     ALT   Date Value Ref Range Status   06/05/2022 12 0 - 70 U/L Final     AST   Date Value Ref Range Status   06/05/2022 29 0 - 45 U/L Final

## 2022-06-06 NOTE — CONSULTS
[facility]  Palliative Care Consultation Note    Patient: Vic Scott III  Date of Admission:  6/5/2022    Requesting Clinician / Team: Orthopedic surgery  Reason for consult: Goals of care    Recommendations:    At this time pain is effectively managed on his regimen of MS Contin 30 mg twice daily and oxycodone 10/20 mg every 3 hours as needed.  No new recommendations.    His disease understanding is that his cancer is in remission but should it recur, he would not resume any kind of chemotherapy.    Dr. Whitaker and his team will continue to assess and talk with patient about what functional status to expect, extent of surgery, prior to his surgery.  Patient is clear that as per his disease understanding at this time, he wants to go ahead with surgery to extend his life in spite of likely compromised ambulatory/functional status afterwards.    Our team provides support in the setting of complex illness.  He has been talking to both his [facility] and hospital chaplaincy staff.  I think he would benefit from coping support from our palliative care clinical social workers and I present him at sitdown rounds tomorrow.    These recommendations have been discussed with primary team.      Thank you for the opportunity to participate in the care of this patient and family. Our team: will continue to follow.     During regular M-F work hours -- if you are not sure who specifically to contact -- please contact us by sending a text page to our team consult pager at [phone].    After regular work hours and on weekends/holidays, you can call our answering service at [phone]. Also, who's on call for us is available in [employer].     Angel Garza MD  Palliative Medicine Consult Team  Team Pager: [phone]   TT: 67 minutes, with > 50% spent in C/C/E patient/family/care teams re: GOC, POC, Sx management. [id number]    Assessments:  Vic Scott III is a 47 year  old male with diagnosis of high-grade pleomorphic sarcoma based on biopsy 2021. after presenting with rapidly progressing right leg swelling that started in 2021. Has been planning amputation, but R leg pain worsened at home and was admitted through ED with R femur pathologic fx.  Dr Ambrocio is his primary oncologist, and Dr Whitaker is his orthopedist.   Has been seen in past by [facility] Pall care (inpatient consult 21) and by [facility] (telehealth)    Today, the patient was seen for:  Sarcoma, cancer pain, coping and adjustment, GOC    Prognosis, Goals, & Planning:      Functional Status just prior to hospitalization: 2 (Ambulatory and capable of all selfcare but unable to carry out any work activities; may need help with IADLs up and about > 50% of waking hours)      Prognosis, Goals, and/or Advance Care Planning were addressed today: Yes        Summary/Comments:       Patient's decision making preferences: shared with support from loved ones          Patient has decision-making capacity today for complex decisions: Yes            I have concerns about the patient/family's health literacy today: No           Patient has a completed Health Care Directive: No.       Code status: Full Code    Coping, Meaning, & Spirituality:   Mood, coping, and/or meaning in the context of serious illness were addressed today: Yes  Summary/Comments: Roman [employer] danny; would benefit from our Pall Care SW    Social:     Living situation: lives with his sister Lauryn; she and his other sisters are his main source of support.    Key family / caregivers: both mother and father are . Has 2 sisters and several half-siblings, 4 children ages 14, 16, 6, 4    Occupation: Mental health counselor.     Hobbies: basketball, weight lifting      History of Present Illness:  See assessment above.  Awaiting consult with Dr Whitaker    [facility] Symptom Data:  Pain effectively controlled on current regimen.  Continue monitoring bowel  regimen; he is doing okay on MiraLAX at this time.    ROS:  Comprehensive ROS is reviewed and is negative except as here & per HPI:      Past Medical History:  No past medical history on file.     Past Surgical History:  Past Surgical History:   Procedure Laterality Date     INSERT PORT VASCULAR ACCESS Right 11/1/2021    Procedure: INSERTION, VASCULAR ACCESS PORT;  Surgeon: Sushil Gonzales MD;  Location: [facility] OR     IR CHEST PORT PLACEMENT > 5 YRS OF AGE  11/1/2021         Family History:  No family history on file.      Allergies:  Allergies   Allergen Reactions     Blood Transfusion Related (Informational Only) Other (See Comments)     Patient has a history of a clinically significant antibody against RBC antigens.  A delay in compatible RBCs may occur.         Medications:  I have reviewed this patient's medication profile and medications from this hospitalization.   APAP 975 q 8  Dilaudid IV: 4.5 mg total yesterday  Oxy IR 10-20 q 3 prn (five 10 mg doses since last evening)  MSContin 30 mg q 12 hours  Miralax daily    Physical Exam:  Vital Signs: Temp: 99.3  F (37.4  C) Temp src: Oral BP: 121/55 Pulse: 116   Resp: 16 SpO2: 100 % O2 Device: None (Room air)    Weight: 0 lbs 0 oz  General: Awake, alert and oriented, full range of affect, intermittently tearful during our conversation.  ENT: Moist oral mucosa  Lungs: Clear in anterior fields; no overt shortness of breath.  CV: Regular radial pulse 112  Extremities: Significant swelling in the right groin and into right leg.    Data reviewed:  IMPRESSION:CT R femur:  Extensive destructive lesions along the right femoral head, neck, and proximal shaft in the right acetabulum and ilium compatible with progressive malignant tumor. Pathologic fracture of the proximal shaft of the right femur the   subtrochanteric region. Associated soft tissue mass. Diastases of the right hip joint likely secondary to intra-articular tumor.     Bony demineralization at the knee.  Mottled appearance to the patella could be related to neoplasm.

## 2022-06-06 NOTE — PROGRESS NOTES
Orthopaedic Surgery Daily Progress Note     Subjective: Vic Scott III is a 47 year old male with right thigh pleomorphic sarcoma now a right proximal pathologic femur fracture. Pain is well controlled. Tolerating an oral diet. No fever, chills. No chest pain or shortness of breath. No abdominal pain, nausea, vomiting. No diarrhea or constipation. No urinary difficulties.     Physical Exam   BP (!) 159/67 (BP Location: Left arm)   Pulse 100   Temp 98.9  F (37.2  C) (Oral)   Resp 18   SpO2 98%     Intake/Output Summary (Last 24 hours) at 2022 0550  Last data filed at 2022 0115  Gross per 24 hour   Intake --   Output 200 ml   Net -200 ml     General: Alert, well-appearing in no acute distress.  Respiratory: Non-labored breathing.   Cardiovascular: Extremities warm and well perfused  Extremities: moving all four extremities.   RLE: edema and swelling throughout RLE  -Sens: SILT dp/sp/s/s/t  -Motor: Fires EHL/FHL/TA/GSc  -Vasc: 2+ pulses, wwp, brisk cap refill    Dressing CDI    Skin: As noted above. No rashes or lesions appreciated.    Labs    Complete Blood Count   Recent Labs   Lab 22  0940   WBC 11.8* 10.9   HGB 6.6* 6.9*    310     Basic Metabolic Panel  Recent Labs   Lab 22  0940    141   POTASSIUM 3.9 4.1   CHLORIDE 106 103   CO2 26 26   BUN 12 12   CR 0.57* 0.71   * 116     Coagulation Profile  Recent Labs   Lab 22   INR 1.13       Assessment/Plan:  Vic Scott III is a 47 year old male w/ PMHx pleomorphic sarcoma status post neoadjuvant chemotherapy with the followin.  Right hip and thigh pleomorphic sarcoma  2.  Pathologic right proximal femur fracture     After discussion of the case with Dr. Whitaker, we will obtain a new MRI, order preoperative laboratory evaluation.  We will also place a consult to the medicine service for preoperative evaluation.  We will asked the patient to be seen by palliative care to  have a goals of care discussion as the patient does not feel he is met with them before.  I did send a message to his oncologic team to update them on his status.     Pending imaging and discussion with other teams the patient remain inpatient until a plan is developed.    PLAN FOR TODAY:   -Dr. Whitaker to discuss plan with patient between OR cases  -MR pelvis with and without contrast for surgical planning  -Medicine pre-op completed.  -plan for OR TBD     RECOMMENDATIONS:   - Admit to orthopedics  - Plan for OR: TBD pending discussion with orthopedic oncology staff              -Consent: TBD              -Pre-op labs: ordered               -Medicine clearance: pending  - Anticoagulation/DVT Prophylaxis: hold for now   - Antibiotics/Tetanus: TBD pending plan   - X-rays/Imaging: possibly will obtain updated imaging pending discussion with Dr. Whitaker.   - Activity: Bedrest  - Weight bearing: NWB RLE  - Pain control: ordered   - Diet: regular  - Follow-up: TBD  - Disposition: admitted to orthopedics     Assessment and Plan discussed with Dr. Sehrman Smith MD  Orthopaedic Surgery Resident, PGY4  Pager: [phone]     For questions about this patient, please contact me at my pager during business hours before contacting the on call resident. At night or on weekends please contact the on call resident.

## 2022-06-06 NOTE — PROGRESS NOTES
08:17 Sent message to Dr Tito Smith to let him know it will take 8hrs for blood to be ready for transfusion since pt has a rare antibody.

## 2022-06-06 NOTE — PROGRESS NOTES
VS: Vitals stable   O2: Sating 98 % on RA. Lung sounds clear. Denies chest pain and SOB.   Output: Voids spontaneously and adequately.   Last BM: 6/4/22. Bowel sounds active x4. Passing flatus.   Activity: Pt on strict bedrest   Skin: Mass on Rt hip/groin, Rt leg skin dark discoloration , peeling and cracked, scratches   Pain: Pain was managed with oxycodone   Neuro: A&O x4. Denies N/T.   Dressing: PIV dressing to Lt FA C/D/I.   Diet: Regular. Denies N/V.   LDA: PIV to LFA is SL.   Equipment:  Personal belongings.   Plan: Continue to monitor. Call light within reach and utilized appropriately   Additional Info: To follow up on Hgb

## 2022-06-06 NOTE — PROGRESS NOTES
"SPIRITUAL HEALTH SERVICES  SPIRITUAL ASSESSMENT Progress Note  [facility] ([facility]) UR ORTHO     REFERRAL SOURCE: Self initiated visit.    I visited the pt in his room. Pt welcomed the support with a smile. Pt mentioned he can't complain. Pt shared he is cash descent of a Mu-ism danny and he is a father of 4 children. Pt mentioned \"he always smiles is because he loves life\". Pt mentioned he is hopeful that he will get better. I provided him with prayer rug and islamic prayer booklet. I prayed for him may Allah have mercy on him.    PLAN: [facility] will always be available for the pt upon request.    Lita Waltonlain Resident  Phone: [phone]    "

## 2022-06-07 NOTE — PROGRESS NOTES
Orthopaedic Surgery Daily Progress Note     Subjective:  Pain is well controlled this am. Tolerating an oral diet. No fever, chills. No chest pain or shortness of breath. No abdominal pain, nausea, vomiting. No diarrhea or constipation. No urinary difficulties.      Physical Exam   /71 (BP Location: Right arm, Patient Position: Semi-Alford's)   Pulse 113   Temp 99.7  F (37.6  C) (Oral)   Resp 16   SpO2 99%     Intake/Output Summary (Last 24 hours) at 2022 0550  Last data filed at 2022 0115  Gross per 24 hour   Intake --   Output 200 ml   Net -200 ml     General: Alert, well-appearing in no acute distress.  Respiratory: Non-labored breathing.   Cardiovascular: Extremities warm and well perfused  Extremities:  RLE: edema and swelling throughout RLE, large soft tissue mass at the anterior thigh.  -Sens: SILT at the foot  -Motor: Fires EHL/FHL/TA/GSc  -Vasc: wwp, brisk cap refill    Labs    Complete Blood Count   Recent Labs   Lab 22  1409 22  0940   WBC  --  11.8* 10.9   HGB 6.1* 6.6* 6.9*   PLT  --  300 310     Basic Metabolic Panel  Recent Labs   Lab 22  0940    141   POTASSIUM 3.9 4.1   CHLORIDE 106 103   CO2 26 26   BUN 12 12   CR 0.57* 0.71   * 116     Coagulation Profile  Recent Labs   Lab 22   INR 1.13       Assessment/Plan:  Vic Scott III is a 47 year old male w/ PMHx pleomorphic sarcoma status post neoadjuvant chemotherapy with the followin.  Right hip and thigh pleomorphic sarcoma  2.  Pathologic right proximal femur fracture     Cleared by medicine team for OR, will need transfusion prior to OR   Seen by palliative care, patient voiced interest to their team in proceeding with surgery if it was an option  Received one unit of blood transfusion for hgb <7, VS stable      Patient refused MRI yesterday. He would like to chat with Dr. Whitaker prior to proceeding with MRI. Dr. Whitaker to possibly see patient  later today.     PLAN FOR TODAY:   -MR pelvis with and without contrast for surgical planning  -obtain Covid test  -Psychiatry consult placed for evaluation, patient aware of the consult being placed.   -plan for OR TBD pending imaging findings     RECOMMENDATIONS:   - Admit to orthopedics  - Plan for OR: TBD pending discussion with orthopedic oncology staff              -Consent: TBD              -Pre-op labs: done - covid  Test pending              -Medicine clearance: done, consider blood transfusion prior to OR   - Anticoagulation/DVT Prophylaxis: hold for now   - Antibiotics/Tetanus: TBD pending plan   - X-rays/Imaging: pending MRI  - Activity: Bedrest  - Weight bearing: NWB RLE  - Pain control: ordered   - Diet: regular  - Follow-up: TBD  - Disposition: admitted to orthopedics     Assessment and Plan discussed with Dr. Sherman Gu MD  Orthopaedic Surgery Resident, PGY4

## 2022-06-07 NOTE — PLAN OF CARE
Goal Outcome Evaluation:                    VS: BP (!) 149/74 (BP Location: Right arm)   Pulse (!) 121   Temp 98.3  F (36.8  C) (Oral)   Resp 16   SpO2 100%    O2: >95% on RA   Output: Voids spontaneously without difficulty   Last BM: 6/5/22 per pt report   Activity: A2 for repositioning. Prefers to remain on back    Skin: Declined full skin assessment due to pain.   R calf wound, RLE edema and cracked skin. L lower back and buttocks skin were weeping and saturating linen. Blankets changed x3 due to saturation.    Pain: Pain moderately controlled with Scheduled Tylenol, Morphine, and PRN Oxycodone Q 3hrs. Please confirm that patient swallows meds before leaving room.    CMS: Intact   Dressing: R-calf wound from scratching per patient.    Diet: Regular   LDA: PIV L hand - SL   Equipment: IV/pole/pump, and pt belongings.    Plan: Amputation of RLE late this week or next week.    Additional Info:

## 2022-06-07 NOTE — PROGRESS NOTES
"Orthopaedic Surgery Progress Note     2022  E: No acute events overnight.    S: Pain well controlled, \" I need to talk to Wilfrid\"    O:   /70 (BP Location: Right arm)   Pulse 114   Temp 99.7  F (37.6  C) (Oral)   Resp 18   SpO2 98%       Exam:  General: Alert, well-appearing in no acute distress.  Respiratory: Non-labored breathing.   Cardiovascular: Extremities warm and well perfused  Extremities:  RLE: edema and swelling throughout RLE, large soft tissue mass at the anterior thigh.  -Sens: SILT at the foot  -Motor: Fires EHL/FHL/TA/GSc  -Vasc: wwp, brisk cap refill      Recent Labs   Lab 22  0940    141   POTASSIUM 3.9 4.1   CHLORIDE 106 103   CO2 26 26   BUN 12 12   CR 0.57* 0.71   * 116     Recent Labs   Lab 22  0525 22  1409 22  0940   WBC  --   --  11.8* 10.9   HGB 7.1* 6.1* 6.6* 6.9*   PLT  --   --  300 310     Recent Labs   Lab 22   INR 1.13     Assessment/Plan:  Vic Scott III is a 47 year old male w/ PMHx pleomorphic sarcoma status post neoadjuvant chemotherapy with the followin.  Right hip and thigh pleomorphic sarcoma  2.  Pathologic right proximal femur fracture     Cleared by medicine team for OR, will need transfusion prior to OR   Seen by palliative care, patient voiced interest to their team in proceeding with surgery if it was an option  Received one unit of blood transfusion for hgb <7, VS stable        Patient refusing to have MRI Today.  Scheduled for  at 830 am yesterday. He would like to chat with Dr. Whitaker prior to proceeding with MRI. Dr. Whitaker to possibly see patient later today.      PLAN FOR TODAY:     -MR pelvis with and without contrast for surgical planning.  Scheduled for .  -obtain Covid test  -Psychiatry consult placed for evaluation, patient aware of the consult being placed.   -plan for OR TBD pending imaging findings     RECOMMENDATIONS:     - " Admit to orthopedics  - Plan for OR: TBD pending discussion following imaging results.  Patient discussed plan of care with Dr Yuen this AM.  If surgery is an option may be added on Thursday June 9 late afternoon or Wednesday Lesley 15.                -Consent: TBD              -Pre-op labs: done - covid  Test pending              -Medicine clearance: done, consider blood transfusion prior to OR   - Anticoagulation/DVT Prophylaxis: hold for now   - Antibiotics/Tetanus: TBD pending plan   - X-rays/Imaging: pending MRI  - Activity: Bedrest  - Weight bearing: NWB RLE  - Pain control: ordered   - Diet: regular  - Follow-up: TBD  - Disposition: admitted to orthopedics       Zara ROSENBERG CNS  Department of Orthopedic Surgery  [facility]  [phone]    For any questions regarding this patient please page me at the above number prior to contacting the ortho resident on call.

## 2022-06-07 NOTE — CONSULTS
"Triage and Transition - Consult and Liaison     Vic Scott III  June 7, 2022    Session start: 1:35 pm  Session end: 1:58 pm  Session duration in minutes: 23 min  CPT utilized: 71911 - Brief diagnostic assessment (modifier 52)  Patient was seen virtually ([employer] cart or other teleconferencing device).    Reason for consult: Psychiatry consult was requested due to concern for depression Patient was seen by [facility] Consult & Liaison team.     Identifying information: Vic is 47 year old   male   followed related to cancer. Patient not currently employed. He moved back to [place] last year to be closer to family after cancer diagnosis. Patient currently lives with his sister, her children and his 7 year old son. Patient has three other children, all girls, who live with other family members.     Presenting problem (including symptoms, strengths risks, resources used):  In individual therapeutic contact with patient today, patient presents with broad affect, euthymic mood.. Safety concerns are not present today. Patient reports dosing off a lot lately. When asked about recent mood, he states \" I always try to keep in good spirits\". Patient reports things have been piling on him recently and has had more difficulty keeping positive. Over last year he found out he had cancer, dad passed away. One his friends kids passes away, and Moved to [place]. When reviewing symptoms of depression, he reports isolating more often, been in room a lot. Does reach out to kids, hopes to get out more after surgery. Reports his sister concerned about time spent in room. Sisters are good support. Patient reports sleep is adequate. Patient reports taking more naps. Patient reports no changes in appetite. Denies losing interest in things he previously enjoyed (always watching sports, hang out, laughs, making jokes). Patient denies suicidal ideation now or in the past, stating \"my life too prescious\".      When reviewing other mental health " symptoms, patient denies psychosis, eating disorder, PTSD, and ADHD symptoms. Patient reports he does not struggle with daily anxiety, but has noticed over the last year has had intense anxiety when in car with others. He reports he gets anxious about their driving and often has to sit in the back seat. He denies event triggering this response. Does not feel unmanageable.     Patient reports interest in therapy. Open to in person or telehealth.       Mental Status Exam   Affect: Appropriate  Appearance: Appropriate   Attention Span/Concentration: Attentive    Eye Contact: Engaged  Fund of Knowledge: Appropriate   Language /Speech Content: Fluent  Language /Speech Volume: Normal   Language /Speech Rate/Productions: Normal   Recent Memory: Intact  Remote Memory: Intact  Mood: Normal   Orientation:   Person: Yes   Place: Yes  Time of Day: Yes   Date: Yes   Situation (Do they understand why they are here?): Yes   Psychomotor Behavior: Normal   Thought Content: Clear  Thought Form: Intact    Current medications: no current psych meds  Current Facility-Administered Medications   Medication     acetaminophen (TYLENOL) tablet 975 mg     bisacodyl (DULCOLAX) EC tablet 5 mg    Or     bisacodyl (DULCOLAX) EC tablet 10 mg     HYDROmorphone (PF) (DILAUDID) injection 0.5 mg     lidocaine (LMX4) cream     lidocaine 1 % 0.1-1 mL     melatonin tablet 1 mg     morphine (MS CONTIN) 12 hr tablet 30 mg     naloxone (NARCAN) injection 0.2 mg    Or     naloxone (NARCAN) injection 0.4 mg    Or     naloxone (NARCAN) injection 0.2 mg    Or     naloxone (NARCAN) injection 0.4 mg     ondansetron (ZOFRAN ODT) ODT tab 4 mg    Or     ondansetron (ZOFRAN) injection 4 mg     oxyCODONE IR (ROXICODONE) tablet 10-20 mg     polyethylene glycol (MIRALAX) Packet 17 g     prochlorperazine (COMPAZINE) injection 10 mg    Or     prochlorperazine (COMPAZINE) tablet 10 mg    Or     prochlorperazine (COMPAZINE) suppository 25 mg     sodium chloride (PF) 0.9% PF  flush 3 mL     sodium chloride (PF) 0.9% PF flush 3 mL   Relevant history:  Patient has no history of mental health diagnoses. He has never been on medications, done any formal treatment, no CD treatment, and no hospitalizations.    Cultural Influences: Has been meeting with  here in hospital, finds beneficial    Therapeutic intervention and progress:  Therapeutic intervention consisted of building therapeutic rapport, active listening, validation and normalizing.      Collateral information:   Reviewed chart    Diagnosis:   311 (F32.9) Unspecified Depressive Disorder ,  ;    Plan:     Patient does appear to be experiencing some mild symptoms of depression. He does not require further stabilization in inpatient setting or use of medications, but would benefit from additional supports and is interested in therapy.     Set up therapy appointment as follows:   Date: Friday, 6/10/2022  Time: 9:00 am - 10:00 am  Provider: Jacek King PhD  LP  Location: [facility], [address], [address] [facility], [place] [address]  Phone: [phone]  Type: Teletherapy  Information: Please fill New Patient Form by using following link. All forms need to be completed 24 hours prior to the appointment date/time by going to [email or web address] Please call us on [phone] 24 hours prior to your scheduled appointment to confirm that you are able to attend. We will provide you information about how to log into video call software when you call.    Patient will not continue to be followed by this service.    Karley Hirsch, [facility]  Triage and Transition - Consult and Liaison   [phone]

## 2022-06-07 NOTE — PLAN OF CARE
VS: /70 (BP Location: Right arm)   Pulse 114   Temp 99.7  F (37.6  C) (Oral)   Resp 18   SpO2 98%   Pt denies chest pain.    O2: >90% on RA   Output: Voids spontaneously without difficulty to Urinal   Last BM: 6/5/22 per pt report   Activity: AX2 when pt allows repositioning. Pt only allowed repositioning one time overnight.    Up for meals? N/A   Skin: Pt declined full skin assessment, refused to roll over due to pain. R calf wound, RLE edema and cracked skin. L lower back and buttocks skin were weeping/draining overnight to soak through the sheet, but pt refused to allow writer to look at skin in that area or change the sheets.    Pain: Managed with Scheduled Tylenol, Morphine, and PRN Oxycodonel.    CMS: Intact, pt denies numbness or tingling. A&O x4   Dressing: ABD and tape applied to R anterior calf wound.    Diet: Regular   LDA: PIV L hand - SL   Equipment: IV/pole/pump, and pt belongings.    Plan: TBD   Additional Info: Pt has a psych consult today  Pt needs COVID swab to be completed.

## 2022-06-07 NOTE — PROGRESS NOTES
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 Patient presented with worsening right hip and thigh pain and was found to have a pathologic right proximal femur fracture. Patient was transfused for anemia yesterday. From chart review, patient has mixed iron deficiency and anemia of chronic disease.    P:  1.) Right hip and thigh pleomorphic sarcoma; patient is s/p neoadjuvant chemotherapy: MRI pelvis with and without contrast pending per Orthopaedic Surgery.  2.) Pathologic right proximal femur fracture: Pain control.  3.) Anemia - combination of iron deficiency and anemia of chronic disease: Iron studies to be added on to yesterday's blood sample. Monitoring blood counts.      Cristi Peck MD  Hospitalist Service, GOLD TEAM 16  [facility]  Securely message with the Vocera Web Console (learn more here)  Text page via [facility] Paging/Directory   Please see signed in provider for up to date coverage information    ______________________________________________________________________    Interval History     Patient notes pain controlled. Patient notes no dyspnea. Patient notes no other problems.    Physical Exam   Vital Signs: Temp: 98.3  F (36.8  C) Temp src: Oral BP: (!) 149/74 Pulse: (!) 121   Resp: 16 SpO2: 100 % O2 Device: None (Room air)      General: Patient comfortable, NAD.  Heart: RRR, S1 S2 w/o murmurs.  Lungs: Breath sounds present. No crackles/wheezes heard.  Abdomen: Soft, nontender.    Data   Labs noted. Hb 7.1

## 2022-06-08 NOTE — PLAN OF CARE
Goal Outcome Evaluation:                    VS: BP (!) 141/69   Pulse (!) 127   Temp 99.7  F (37.6  C)   Resp 16   SpO2 97% . Tachy: Dr. Mendoza notified and will continue to monitor.     O2: >95% on RA   Output: Voids spontaneously without difficulty   Last BM: Incontinent BM today 6/8/22.    Activity: Refused to reposition. Prefers to remain on back    Skin: Declined full skin assessment due to pain.   R calf wound, RLE edema and cracked skin. L lower back and buttocks skin were weeping and saturating linen. Blankets changed x2 due to saturation. Hygiene cares completed today after incontinent episode.    Pain: Pain moderately controlled with Scheduled Tylenol, Morphine, and PRN Oxycodone Q 3hrs. Please confirm that patient swallows meds before leaving room.    CMS: Intact   Dressing: R-calf wound from scratching per patient.    Diet: Regular   LDA: PIV L hand - SL   Equipment: IV/pole/pump, and pt belongings.    Plan: Amputation of RLE late this week or next week. Will need MRI before plan for surgery can be identified. Py has refused MRI for several days saying he's scared it will hurt.  Reassurance provided. Agreed to do it this evening at 615pm. Ortho stated that if patient continued to refuse, we are unable to do surgery.    Additional Info:

## 2022-06-08 NOTE — PROGRESS NOTES
"Orthopaedic Surgery Progress Note     6/8/2022    Discussed with patient this AM importance of having MRI completed to determine surgical plan.       Patient refusing to have MRI, \" I have other personal issues going on, the world didn't stop because I arrived here.  He can do surgery next Wednesday\".    His sister will be visiting this afternoon \"and I need to talk to her'    Dr Yuen aware.    Zara ROSENBERG  Orthopedics        "

## 2022-06-08 NOTE — PLAN OF CARE
VS: BP (!) 151/75 (BP Location: Right arm, Patient Position: Semi-Alford's)   Pulse (!) 123   Temp 100  F (37.8  C) (Oral)   Resp 18   SpO2 98%   Pt denies chest pain   O2: >90% on RA, Lung sounds clear and equal bilaterally   Output: Voids spontaneously without difficulty   Last BM: 6/5/22 per pt report   Activity: Ax2 for repositioning   Up for meals? N/A   Skin: RLE cracked, dry skin, R calf abrasion. Pt refused full skin assessment.    Pain: Managed with scheduled Morphine, Tylenol, PRN Oxycodone, and Ice packs.    CMS: Intact, pt denies numbness or tingling. A&O x4   Dressing: R calf - CDI   Diet: Regular   LDA: PIV - SL   Equipment: IV pole/pump, and pt belongings.   Plan: Surgery late this week or early next week.    Additional Info: Pt refuses repositioning.

## 2022-06-08 NOTE — PROGRESS NOTES
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 Patient presented with worsening right hip and thigh pain and was found to have a pathologic right proximal femur fracture. Patient was transfused for anemia yesterday. From chart review, patient has mixed iron deficiency and anemia of chronic disease.     Patient has some tachycardia and hypertension that could be pain response.     P:  1.) Right hip and thigh pleomorphic sarcoma; patient is s/p neoadjuvant chemotherapy: MRI pelvis with and without contrast pending per Orthopaedic Surgery.  2.) Pathologic right proximal femur fracture: Pain control.  3.) Anemia - combination of iron deficiency and anemia of chronic disease: Monitoring blood counts.  4.) Obesity: To f/u as outpatient.     Cristi Peck MD  Hospitalist Service, GOLD TEAM 16  [facility]  Securely message with the Vocera Web Console (learn more here)  Text page via [facility] Paging/Directory   Please see signed in provider for up to date coverage information    ______________________________________________________________________    Interval History     Patient notes pain in leg is unchanged. Patient notes no new pain, no dyspnea and no new symptoms.     Physical Exam   Vital Signs: Temp: 99.7  F (37.6  C) Temp src: Oral BP: (!) 141/69 Pulse: (!) 127   Resp: 16 SpO2: 97 % O2 Device: None (Room air)      General: Patient comfortable, NAD.  Heart: RRR, S1 S2 w/o murmurs.  Lungs: Breath sounds present. No crackles/wheezes heard.  Abdomen: Soft, nontender.     Labs noted.   Ferritin 1280; Iron 20; Iron binding cap 152; Iron saturation index 13%

## 2022-06-09 NOTE — PLAN OF CARE
VS: /71 (BP Location: Left arm, Patient Position: Supine)   Pulse 120   Temp (!) 96.3  F (35.7  C) (Axillary)   Resp 21   SpO2 98%   Pt denies chest pain.    O2: >90% on RA   Output: Voids spontaneously to urinal   Last BM: 6/9/22 Incontinent   Activity: Ax2 for incontinence cares, pt refuses repositioning and is bedrest.    Up for meals? N/A   Skin: RLE Edema and cracked weeping skin. R calf weeping wound from pt itching skin   Pain: Pt reports extreme pain at all times. Moderately controleed tieh scheduled Tylenol, Morphine, PRN Oxycodone, and Ice packs.    CMS: Intact, pt denies numbness or tingling.    Dressing: None - R calf wound is open to air with messi underneath.    Diet: Regular   LDA: PIV R hand - SL   Equipment: IV pole/pump, CAPNO, and personal belongings.    Plan: Amputation surgery next Friday 6/17/22 per Dr. Wilkins.    Additional Info: Pt very lethargic and tachy at 2000 - Dr. Salinas made aware and saw pt. Morphine held at that time and bolus ordered. Dr. Salinas called writer at 2300 with orders for chest CT. Writer approached pt about importance of complying with CT to rule out PE and pt refused. Dr. Georges made aware of pt refusal.     0000 - Dr. Georges paged in regards to pain with incontinence cares, 1x dose of Fentanyl ordered.Pt monitored on CAPNO during and after administration of Fentanyl. Pt tolerated Fentanyl well with pain decreasing and incontinence cares being able to be completed. RN flyer stayed with pt to monitor for 45 mins after Fentanyl administered.

## 2022-06-09 NOTE — PROGRESS NOTES
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 Patient presented with worsening right hip and thigh pain and was found to have a pathologic right proximal femur fracture. Patient was transfused for anemia yesterday. From chart review, patient has mixed iron deficiency and anemia of chronic disease.     Patient has some tachycardia and hypertension that could be pain response.      P:  1.) Right hip and thigh pleomorphic sarcoma; patient is s/p neoadjuvant chemotherapy: MRI pelvis with and without contrast pending per Orthopaedic Surgery.  2.) Pathologic right proximal femur fracture: Pain control.  3.) Anemia - combination of iron deficiency and anemia of chronic disease: Monitoring blood counts.  4.) Tachycardia: CT pulmonary angiogram had been ordered overnight but patient reportedly declined. Purpose of CT pulmonary angiogram was discussed with patient and patient said that he \"would think about it.\"  5.) Obesity: To f/u as outpatient.       Cristi Peck MD  Hospitalist Service, GOLD TEAM 16  [facility]  Securely message with the Vocera Web Console (learn more here)  Text page via [facility] Paging/Directory   Please see signed in provider for up to date coverage information    ______________________________________________________________________    Interval History     Patient noted pain generally controlled. Patient noted increased pain when he was moved for MRI yesterday evening. Patient noted no dyspnea, no chest pain and no pleurisy.     Physical Exam   Vital Signs: Temp: 99.1  F (37.3  C) Temp src: Oral BP: 136/72 Pulse: (!) 122   Resp: 16 SpO2: 100 % O2 Device: None (Room air)      General: Patient comfortable, NAD.  HEENT: No scleral icterus or " conjunctival injection.  Heart: Somewhat fast. RRR, S1 S2 w/o murmurs.  Lungs: Breath sounds present. No crackles/wheezes heard.  Abdomen: Soft, nontender.     Data   Labs noted. WBC 14.5; Hb 7.0    MRI pelvic bones w/o contrast:  Significant interval progression of large malignant soft tissue mass proximal right thigh, hip and gluteal region, and extending to the iliac fossa, with progressive bony involvement and destruction of the proximal right femur, ilium, and acetabulum   and pathologic fracture of the proximal right femur. The mass is incompletely and suboptimally imaged.

## 2022-06-09 NOTE — PLAN OF CARE
"Goal Outcome Evaluation:    Plan of Care Reviewed With: patient     Overall Patient Progress: no change     VS: Tachycardic, pt reports this is his baseline at times d/t anxiety and pain. Has been tachy since admission, MDs aware. Pt has been educated and encouraged to get chest CT- says he is \"thinking about it\", asked a couple of times today, still refusing. Triggered sepsis protocol this morning. Lactic came back at 1.5. Dr. Peck aware. Other VSS. Denies CP/SOB. A/O x4.    O2: >90% on RA.    Output: Voiding adequate amounts w/o pain or difficulty.    Last BM: 6/9   Activity: Bedrest   Up for meals? No    Skin: MARI backside, pt refused full skin assessment d/t pain. RLE cracked and peeling.    Pain: Pain in R hip/leg, managing with scheduled MS Contin and PRN oxy q 3.    CMS: Edema in RLE, otherwise intact    Dressing: None    Diet: Regular, tolerating well   LDA: Port a cath in R chest (not accessed). Lost PIV access- needs 18 gauge for CT in forearm or AC.    Equipment: IV pole   Plan: Surgery next week, awaiting possible TCU placement   Additional Info:       "

## 2022-06-09 NOTE — PROGRESS NOTES
Care Management Initial Consult    Communication Assessment  Patient's communication style: spoken language (English or Bilingual)    Hearing Difficulty or Deaf: no   Wear Glasses or Blind: yes    Cognitive  Cognitive/Neuro/Behavioral: WDL                      Equipment currently used at home: commode chair, crutches, grab bar, tub/shower  Supplies currently used at home:      Employment/Financial:  Employment Status:     Unemployed     Lifestyle & Psychosocial Needs:  Social Determinants of Health     Tobacco Use: Low Risk      Smoking Tobacco Use: Never Smoker     Smokeless Tobacco Use: Never Used   Alcohol Use: Not on file   Financial Resource Strain: Not on file   Food Insecurity: Not on file   Transportation Needs: Not on file   Physical Activity: Not on file   Stress: Not on file   Social Connections: Not on file   Intimate Partner Violence: Not on file   Depression: Not at risk     PHQ-2 Score: 0   Housing Stability: Not on file       Additional Information:    Patient admitted 6/5/2022 from [facility] Emergency Department. Patient transferred to [facility] for a pathological right femur fracture. Patient has been receiving chemotherapy and was in the final stages of finalizing amputation with Dr. Whitaker when this fracture was found. Patient does not have proper support at home with his new found fracture.   Plan was for patient to have amputation on 6/7/2022. Patient needed an MRI for surgery but refused at the time. Surgery was cancelled. Patient did eventually have MRI and surgery has been scheduled for 6/17/2022.   Unable to find TCU placement for patient, he does not qualify due to bed bound status and no therapy needs. Will meet with patient on 6/10 to discuss other options for discharge. Patient may be here until his surgery due to lack of options.   RNCC will continue to follow.    Lilia Suh RN, BSN  Care Coordinator, 5 Ortho  Phone [phone]  Pager  [phone]

## 2022-06-09 NOTE — TELEPHONE ENCOUNTER
[facility] Call Center    Phone Message    May a detailed message be left on voicemail: yes     Reason for Call: Other: Pt sister Lauryn regarding MRI and the plan going forward she said what her brother said sounded very confusing so just looking for clarification     Action Taken: Other: ortho     Travel Screening: Not Applicable

## 2022-06-09 NOTE — PROGRESS NOTES
Orthopaedic Surgery Progress Note     2022      O:   /72 (BP Location: Left arm)   Pulse (!) 122   Temp 99.1  F (37.3  C) (Oral)   Resp 16   SpO2 100%         Exam:    General: Alert, well-appearing in no acute distress.  Respiratory: Non-labored breathing.   Cardiovascular: Extremities warm and well perfused  Extremities:  RLE: edema and swelling throughout RLE, large soft tissue mass at the anterior thigh.  -Sens: SILT at the foot  -Motor: Fires EHL/FHL/TA/GSc  -Vasc: wwp, brisk cap refill       Recent Labs   Lab 22  0940    141   POTASSIUM 3.9 4.1   CHLORIDE 106 103   CO2 26 26   BUN 12 12   CR 0.57* 0.71   * 116     Recent Labs   Lab 22  0615 22  0527 22  0525 22  1409 22  0940   WBC 14.5* 13.7*  --   --  11.8* 10.9   HGB 7.0* 7.4* 7.1* 6.1* 6.6* 6.9*    298  --   --  300 310     Recent Labs   Lab 22   INR 1.13     Assessment/Plan:  Vic Scott III is a 47 year old male w/ PMHx pleomorphic sarcoma status post neoadjuvant chemotherapy with the followin.  Right hip and thigh pleomorphic sarcoma  2.  Pathologic right proximal femur fracture     PLAN FOR TODAY:   -Discharge to TCU or home pending recommendations and health care benefits. Referrals for TCU placed  -Spoke to patient and patients sister regarding plans for discharge and upcoming surgery.  I have advised patient and sister to contact Dr Yuen's clinic directly for surgical plan/admission and to speak to social work directly regarding discharge.  Patient is cleared from an orthopedic perspective.    -Tentative plan for OR   with Dr. Whitaker and plastic surgery colleagues  -in the interim patient can be discharged to TCU as soon as today  -PLAN FOR READMISSION on  for optimization and likely blood transfusion        RECOMMENDATIONS:   - Admit to orthopedics  - Plan for OR:  with Dr Yuen and Plastics team  -  Anticoagulation/DVT Prophylaxis: hold for now   - Antibiotics/Tetanus: TBD pending plan   - X-rays/Imaging: pending MRI; completed and reviewed.    - Activity: Bedrest  - Weight bearing: NWB RLE  - Pain control: ordered   - Diet: regular  - Follow-up: TBD  - Disposition: discharge to TCU or home with cares pending insurance recs.    Future Appointments   Date Time Provider Department Center   6/9/2022  7:00 PM UR PT WAITLIST [facility] [place]        Zara ROSENBERG CNS  Department of Orthopedic Surgery  [facility]  [phone]    For any questions regarding this patient please page me at the above number prior to contacting the ortho resident on call.

## 2022-06-09 NOTE — PROGRESS NOTES
Orthopaedic Surgery Daily Progress Note     Subjective:  Pain is well controlled this am. He got his MRI pelvis last night. Tolerating an oral diet. No fever, chills. No chest pain or shortness of breath. No abdominal pain, nausea, vomiting. No diarrhea or constipation. No urinary difficulties.    Discussed plan with patient for possible TCU discharge in the interim while we await surgery and the patient refused, stating I don't feel safe with that plan. Disposition pending safe plan in the interim while we await surgery.       Physical Exam   /71 (BP Location: Left arm, Patient Position: Supine)   Pulse 120   Temp (!) 96.3  F (35.7  C) (Axillary)   Resp 21   SpO2 98%     Intake/Output Summary (Last 24 hours) at 2022 0550  Last data filed at 2022 0115  Gross per 24 hour   Intake --   Output 200 ml   Net -200 ml     General: Alert, well-appearing in no acute distress.  Respiratory: Non-labored breathing.   Cardiovascular: Extremities warm and well perfused  Extremities:  RLE: edema and swelling throughout RLE, large soft tissue mass at the anterior thigh.  -Sens: SILT at the foot  -Motor: Fires EHL/FHL/TA/GSc  -Vasc: wwp, brisk cap refill    Labs    Complete Blood Count   Recent Labs   Lab 22  0527 22  0525 22  1409 22  0940   WBC 13.7*  --   --  11.8* 10.9   HGB 7.4* 7.1* 6.1* 6.6* 6.9*     --   --  300 310     Basic Metabolic Panel  Recent Labs   Lab 22  0940    141   POTASSIUM 3.9 4.1   CHLORIDE 106 103   CO2 26 26   BUN 12 12   CR 0.57* 0.71   * 116     Coagulation Profile  Recent Labs   Lab 22   INR 1.13       Assessment/Plan:  Vic Scott III is a 47 year old male w/ PMHx pleomorphic sarcoma status post neoadjuvant chemotherapy with the followin.  Right hip and thigh pleomorphic sarcoma  2.  Pathologic right proximal femur fracture    PLAN FOR TODAY:   -Tentative plan for OR  with   Sherman and plastic surgery colleagues  -in the interim patient can be discharged to TCU as soon as today  -PLAN FOR READMISSION on 9/16 for optimization and likely blood transfusion       RECOMMENDATIONS:   - Admit to orthopedics  - Plan for OR: TBD pending discussion with orthopedic oncology staff              -Consent: TBD              -Pre-op labs: done - covid  Test pending              -Medicine clearance: done, consider blood transfusion prior to OR   - Anticoagulation/DVT Prophylaxis: hold for now   - Antibiotics/Tetanus: TBD pending plan   - X-rays/Imaging: pending MRI  - Activity: Bedrest  - Weight bearing: NWB RLE  - Pain control: ordered   - Diet: regular  - Follow-up: TBD  - Disposition: admitted to orthopedics     Assessment and Plan discussed with Dr. Sherman Smith MD PGY-4   Orthopaedic Surgery Resident   Pager [phone]

## 2022-06-09 NOTE — PLAN OF CARE
Goal Outcome Evaluation:    Plan of Care Reviewed With: patient        VS: /59   Pulse 120   Temp 97  F (36.1  C) (Oral)   Resp 16   SpO2 100%   Denies SOB, CP, N/T   O2: RA. LS CTA   Output: Voids in urinal   Last BM: 6/9 x2 loose per report   Activity: Bedrest. Encouraged repositioning. Pt declines assistance repositioning/ pillows under legs at this time. Educated on PIP   Skin: Refused full skin assessment. Upper back washed, lotioned. RLE edematous, cracked, peeling, weeping small clear drainage. Clean messi applied, lotion applied to dry skin   Pain: Pain to RLE with movement, ice applied, 20 mg oxy given    CMS: Intact. Edema 3-4+ to RLE   Dressing: NA   Diet: Reg   LDA: NA   Equipment: Port R chest not accessed. IV pole   Plan: Surgery next week, awaiting possible TCU placement   Additional Info: Pt currently deciding on CT scan. If he decides to get CT, pt needs 18 gauge for CT in forearm or AC. Pt aware.

## 2022-06-09 NOTE — PROGRESS NOTES
called by nursing as patient seems  sleepy     Vitals reviewed   bp 140/90 .    Awakes when spoken too , but sleepy . Complains of pain   EKG ; sinus tachy     Plan ;  Hold of Long acting Morphine for now .. ( I told patient and discussed with nurse ) can resume in am when less sleepy .  Pulse ox continuous .  I L fluid .  Continue to monitor  Discussed with nursing in room

## 2022-06-09 NOTE — PROGRESS NOTES
[facility]  Palliative Care Sign-Off Note    Palliative Care was consulted for goals of care. Dr. Angel Garza has a note in on 6/5 outlining Mr. Scott's hopes for treatment, and surgery is now planned for 6/17. Vic's mental health is being supported by health psychology. Since there are not needs our team is actively addressing, we are signing off.    However we know their condition may change -- please re-consult us if we can be helpful at any time in the future.     Thank you for the opportunity to be involved in the care of this patient.    CHET Pierre CNP  Palliative Care Consult Team  [facility] Inpatient Team Consult pager [phone] (M-F 8-4:30)  After-hours Answering Service [phone]

## 2022-06-10 NOTE — PLAN OF CARE
/49   Pulse 110   Temp 97.8  F (36.6  C) (Oral)   Resp 17   SpO2 99%     Oxygen:  Room air;    Output: Voiding in bedside urinal;   Last BM: 6/9/2022   Activity: Bedrest   Skin: RLE edema, dry, cracked, peeling skin; clean messi applied under leg;    Pain: Scheduled tylenol; PRN 20 mg oxycodone x2. Ice packs;    CMS: Intact;   Dressing: Not applicable;   Diet: Regular;   LDA: R chest port not accessed;    Equipment: IV Pole   Plan: Surgery next week (possibly Friday); May discharge to TCU while waiting for surgery;    Additional Information:

## 2022-06-10 NOTE — PROGRESS NOTES
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 Patient presented with worsening right hip and thigh pain and was found to have a pathologic right proximal femur fracture. Patient was transfused for anemia yesterday. From chart review, patient has mixed iron deficiency and anemia of chronic disease.     Patient has some tachycardia and hypertension that could be pain response.      P:  1.) Right hip and thigh pleomorphic sarcoma; patient is s/p neoadjuvant chemotherapy: MRI pelvis with and without contrast pending per Orthopaedic Surgery.  2.) Pathologic right proximal femur fracture: Pain control.  3.) Anemia - combination of iron deficiency and anemia of chronic disease: Monitoring blood counts.  4.) Tachycardia: Patient stated that he was waiting for his sister before deciding if he was willing to undergo CT pulmonary angiogram. Patient has been advised of the reason for this test.   5.) Obesity: To f/u as outpatient.        Cristi Peck MD  Hospitalist Service, GOLD TEAM 16  [facility]  Securely message with the Vocera Web Console (learn more here)  Text page via [facility] Paging/Directory   Please see signed in provider for up to date coverage information    ______________________________________________________________________    Interval History     Patient noted no new symptoms. Patient stated that he still was thinking about whether he wanted to undergo CT pulmonary angiogram.    Physical Exam   Vital Signs: Temp: 97.2  F (36.2  C) Temp src: Oral BP: 125/60 Pulse: (!) 122   Resp: 16 SpO2: 99 % O2 Device: None (Room air)      General: Patient comfortable, NAD.  Heart: Fast, regular, S1 S2 w/o murmurs.  Lungs: Breath sounds present. No crackles/wheezes  heard.  Abdomen: Soft, nontender.

## 2022-06-10 NOTE — PLAN OF CARE
PT: pt on strict bedrest, no therapy needs as unable to mobilize or participate in any sort of skilled therapy. CC team working on either TCU placement or home set up needs vs stay admitted until OR for AKA next week. PT intervention is contraindicated at this time. Will complete orders.

## 2022-06-10 NOTE — PLAN OF CARE
VS: /60 (BP Location: Left arm)   Pulse (!) 122   Temp 97.2  F (36.2  C) (Oral)   Resp 16   SpO2 99%    VSS ex for elevated CA.   Sepsis protocol triggered this AM. Lactis acid results 1.7.     O2: Room air, sats stable. Denies SOB or cough. LS clear.   Output: Voids spontaneously using urinal independently. LBM 6/9.   Activity: Bedrest. Declines repositioning. Weight shifts independently and with assistance as needed.   Skin: RLE dusky with edema. Skin cracked and peeling  Refused full skin assessment.   Pain: RLE pain controlled with scheduled morphine and PRN oxycodone. Ice pack applied.   CMS: Alert and oriented x4. Denies numbness/tingling   Dressing: None   Diet: Tolerating regular diet well. Denies nausea/vomiting   LDA: none   Plan: Possible discharge to TCU and surgery 6/17.   Additional Info: Pending CT scan; pt states he's waiting for his sister to come before he decides on whether to have the CT scan done.  MD aware.  Pt needs IV access for CT if he decides to get it done.

## 2022-06-10 NOTE — PROGRESS NOTES
Orthopaedic Surgery Progress Note     6/10/2022      O:   /60 (BP Location: Left arm)   Pulse (!) 122   Temp 97.2  F (36.2  C) (Oral)   Resp 16   SpO2 99%       Exam:  Gen: NAD, A/O x 3  Resp: Comfortable, non-labored breathing  Ortho:  Unable to exam; patient not cooperating    Recent Labs   Lab 22  0940    141   POTASSIUM 3.9 4.1   CHLORIDE 106 103   CO2 26 26   BUN 12 12   CR 0.57* 0.71   * 116     Recent Labs   Lab 22  0615 22  0527 22  0525 22  1409 22  0940   WBC 14.5* 13.7*  --   --  11.8* 10.9   HGB 7.0* 7.4* 7.1* 6.1* 6.6* 6.9*    298  --   --  300 310     Recent Labs   Lab 22   INR 1.13       Assessment/Plan:  Vic Scott III is a 47 year old male w/ PMHx pleomorphic sarcoma status post neoadjuvant chemotherapy with the followin.  Right hip and thigh pleomorphic sarcoma  2.  Pathologic right proximal femur fracture     PLAN FOR TODAY:   -Discharge to TCU or home pending recommendations and health care benefits. Referrals for TCU placed  -Spoke to patient and patients sister regarding plans for discharge and upcoming surgery.  I have advised patient and sister to contact Dr Yuen's clinic directly for surgical plan/admission and to speak to social work directly regarding discharge.  Patient is cleared from an orthopedic perspective.    -Tentative plan for OR             with Dr. Whitaker and plastic surgery colleagues  -in the interim patient can be discharged to TCU as soon as today  -PLAN FOR READMISSION on  for optimization and likely blood transfusion        RECOMMENDATIONS:   - Admit to orthopedics  - Plan for OR:  with Dr Yuen and Plastics team  - Anticoagulation/DVT Prophylaxis: hold for now   - Antibiotics/Tetanus: TBD pending plan   - X-rays/Imaging: pending MRI; completed and reviewed.    - Activity: Bedrest  - Weight bearing: NWB RLE  - Pain control: ordered   -  Diet: regular  - Follow-up: TBD  - Disposition: discharge to TCU or home with cares pending insurance recs.     Future Appointments   Date Time Provider Department Center   6/10/2022  7:00 PM UR PT WAITLIST URPT [place]        Zara COCHRAN  Department of Orthopedic Surgery  [facility]  [phone]    For any questions regarding this patient please page me at the above number prior to contacting the ortho resident on call.

## 2022-06-11 NOTE — PLAN OF CARE
VS: Stable except tachycardic, 's, cont pulse ox on.   O2: RA.   Output: Voiding using urinal in bed independently.   Last BM: 6/9.   Activity: Strict bedrest, pt refuses repositioning, able to do minimal weight shifting independently.   Skin: Sores R leg, swelling R hip/RLE.   Pain: Constant pain in right hip managed with oxycodone, scheduled morphine and tylenol, and ice.   Neuro: A/Ox4. No numbness or tingling.   Dressing: FIDELIA.   Diet: Regular.   LDA: No IV access. IV needed for CT scan if pt agrees to it today.   Equipment: Urinal, cont pulse ox, call light within reach.   Plan: Continue to monitor. Possible discharge to TCU and surgery 6/17.   Additional Info:

## 2022-06-11 NOTE — PLAN OF CARE
VS: /62 (BP Location: Left arm, Patient Position: Semi-Alford's)   Pulse (!) 122   Temp 97.8  F (36.6  C) (Oral)   Resp 16   SpO2 100%    O2: 100% on Room Air.  Patient denies any shortness of breath.  Lung sounds are clear and equal bilaterally.    Output: Patient voids spontaneously in bedside urinal.    Last BM: 6/9/22   Activity: Patient is on strict bedrest. Patient refuses repositioning.    Up for meals? Up in bed for meals.    Skin: Patient has sores to right lower leg and swelling to right upper and lower leg.     Pain: Patient reports pain of 8 out of 10 in right hip.  Patient given oxycodone 20mg prn x on shift.  Patient given 2 cold packs.    CMS: Patient is A/O x4.  Patient denies any numbness or tingling.    Dressing: No dressings in place.  Dillan under right lower leg has drainage on it.   Diet: Regular diet.    LDA: Lef hand PIV for blood transfusion, at end of transfusion bleeding from site, PIV removed.    Equipment: IV pole, patient personal belongings.    Plan: Continue plan of care.    Additional Info: Patient's hemoglobin was 6.8, 1 unit of blood transfused on shift.      10:00am- Patient triggered sepsis protocol, Dr. Peck notified, LA came back at 2.2, Dr. Peck notified and sepsis code called.  Vitals taken q 30 minutes for 1 hour.

## 2022-06-11 NOTE — PROGRESS NOTES
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 Patient presented with worsening right hip and thigh pain and was found to have a pathologic right proximal femur fracture. Patient was transfused for anemia yesterday. From chart review, patient has mixed iron deficiency and anemia of chronic disease.     Patient has some tachycardia and hypertension that could be pain response. Patient has a lactic acidosis that could be secondary to anemia. Patient does not appear septic at this time.      P:  1.) Right hip and thigh pleomorphic sarcoma; patient is s/p neoadjuvant chemotherapy: Patient to eventually undergo surgical intervention per Orthopaedic Surgery.   2.) Pathologic right proximal femur fracture: Pain control.  3.) Anemia - combination of iron deficiency and anemia of chronic disease: Patient being transfused today.  4.) Tachycardia: Patient had declined CT pulmonary angiogram - patient has been advised of the reason for this test and risks should he have undiagnosed pulmonary embolism.  5.) Obesity: To f/u as outpatient.   6.) Lactic acidosis: Monitoring for evidence of infection and evidence of sepsis.       Cristi Peck MD  Hospitalist Service, GOLD TEAM 16  [facility]  Securely message with the Vocera Web Console (learn more here)  Text page via [employer] Paging/Directory   Please see signed in provider for up to date coverage information    ______________________________________________________________________    Interval History     Patient noted still having pain in right leg. Patient declined CT scan and stated that it was because his \"sister didn't want him to do it.\"    Patient stated that he had tachycardia at baseline. Patient noted no chest " pain, no dyspnea, no cough, no fever, no chills, no diarrhea, no dysuria and no hematuria. Patient noted no new problems.     Physical Exam   Vital Signs: Temp: 97.8  F (36.6  C) Temp src: Oral BP: 115/55 Pulse: 118   Resp: 16 SpO2: 100 % O2 Device: None (Room air)      General: Patient comfortable, NAD.  Heart: RRR, S1 S2 w/o murmurs.  Lungs: Breath sounds present. No crackles/wheezes heard.  Abdomen: Soft, nontender.     Labs noted. Hb 6.8; Lactic acid 2.2

## 2022-06-11 NOTE — PROGRESS NOTES
Orthopaedic Surgery Progress Note     2022    S: CONTRERAS. Pain is controlled. Pt reports sister was unable to come visit in hospital yesterday so he has not discussed the CT with her. He reports that he is not willing to discharge prior to surgery.     O:   /71 (BP Location: Left arm, Patient Position: Semi-Alford's, Cuff Size: Adult Regular)   Pulse (!) 126   Temp 98.8  F (37.1  C) (Oral)   Resp 16   SpO2 99%       Exam:  Gen: NAD, A/O x 3  Resp: Comfortable, non-labored breathing  Ortho:    Significant swelling throughout RLE w/ large soft tissue mass to thigh  Fires EHL, FHL, TA, GSC  Reports SILT dp, sp, sural, saph, tib  Toes wwp    Recent Labs   Lab 22  0940    139 141   POTASSIUM 4.2 3.9 4.1   CHLORIDE 102 106 103   CO2 27 26 26   BUN 12 12 12   CR 0.65* 0.57* 0.71   * 101* 116     Recent Labs   Lab 22  0533 22  0615 22  0527 22  0525 22  1409 22   WBC 12.5* 14.5* 13.7*  --   --  11.8*   HGB 6.8* 7.0* 7.4* 7.1*   < > 6.6*    295 298  --   --  300    < > = values in this interval not displayed.     Recent Labs   Lab 22   INR 1.13       Assessment/Plan:  Vic Scott III is a 47 year old male w/ PMHx pleomorphic sarcoma status post neoadjuvant chemotherapy with the followin.  Right hip and thigh pleomorphic sarcoma  2.  Pathologic right proximal femur fracture     PLAN FOR TODAY:   -Discharge to TCU or home pending recommendations and health care benefits. Referrals for TCU placed  -Spoke to patient and patients sister regarding plans for discharge and upcoming surgery.  I have advised patient and sister to contact Dr Yuen's clinic directly for surgical plan/admission and to speak to social work directly regarding discharge.  Patient is cleared from an orthopedic perspective.    -Tentative plan for OR 6/17  with Dr. Whitaker and plastic surgery colleagues  -in the interim patient  can be discharged to TCU as soon as today  -PLAN FOR READMISSION on 6/16 for optimization and likely blood transfusion        RECOMMENDATIONS:   - Orthopedics Primary  - Plan for OR: 6/17 with Dr Yuen and Plastics team  - Anticoagulation/DVT Prophylaxis: hold for now   - Antibiotics/Tetanus: TBD pending plan   - X-rays/Imaging: pending MRI; completed and reviewed.    - Activity: Bedrest  - Weight bearing: NWB RLE  - Pain control: ordered   - Diet: regular  - Follow-up: TBD  - Disposition: discharge to TCU or home with cares pending insurance recs.     Future Appointments   Date Time Provider Department Center   6/10/2022  7:00 PM UR PT WAITLIST URPT [place]        Staff surgeon: Dr. Whitaker.    Brooke Fan MD  PGY-4  Orthopaedic Surgery

## 2022-06-11 NOTE — CODE/RAPID RESPONSE
"Rapid Response Team Note    Assessment   In assessment a rapid response was called on Vic Scott III due to SIRS/Sepsis trigger. Elevated HR likely due to patient endorsed increasing level of pain to right lower extremity.  Lactic acidosis could be contributed to anemia with hgb currently 6.8 and pending blood transfusion.     Plan   -  Analgesic pain control for pathologic right proximal femur fracture  -  Continue with ongoing blood transfusions with hgb 6.8, trend CBC post transfusion  -  Recommend CT PE given current tachycardia and significant risk factors of pleomorphic sarcoma s/p neoadjuvant chemotherapy.  PT stated that he would like to speak with his sister first before undergoing CT imaging.  -  Repeat lactic acid level 4 hrs after blood infusion  -  Plan for prn fluid boluses + infectious workup should LA and/or symptoms do not resolve after blood transfusion  -  The Internal Medicine primary team was able to be reached and they are in agreement with the above plan.  -  Disposition: The patient will remain on current unit  -  Reassessment and plan follow-up will be performed by the primary team    Raji Bocanegra PA-C  *[phone]  [facility] Paging/Directory    Hospital Course   Brief Summary of events leading to rapid response:   RRT Code Sepsis called for Mr. Scott as he presents with tachycardia of 118 per monitor, lactic acidosis of 2.2, and leukocytosis of 12.2 (improved from 14.5), respirations and work of breathing are normal, no acute distress, non-ill appearing.  Not known to have an active infection.   Primary team hospitalist at bedside.  Per bedside RN, unkown if patient has had adequate po fluid intake last 24 hrs. Pt states that he \"always has a fast heart rate and it's probably high because of the pain in my leg although the pain medicine is helping\"      Admission Diagnosis:   Femur fracture, right (H) [S72.91XA]    Physical Exam   Temp: 98.6  F (37  C) Temp  Min: 97  F (36.1  C)  Max: 98.8  F " (37.1  C)  Resp: 12 Resp  Min: 12  Max: 16  SpO2: 99 % SpO2  Min: 99 %  Max: 100 %  Pulse: 114 Pulse  Min: 111  Max: 126    No data recorded  BP: 114/62 Systolic (24hrs), Av , Min:114 , Max:149   Diastolic (24hrs), Av, Min:55, Max:71     I/Os: I/O last 3 completed shifts:  In: 480 [P.O.:480]  Out: 1650 [Urine:1650]     Exam:   General: Awake, sitting up in bed, typing on his phone, NAD  HEENT: NCAT, anicteric sclera, wearing corrective lenses, moist mucous membranes, trachea midline  Neuro: A&Ox4, follows commands, PERRLA, endorses pain to right lower extremitiy, no focal deficits  Pulm: RR 16, no accessory muscle use, no increased work of breathing, bilateral rise of chest, lungs are clear  CV:  Sinus tachycardia with  per monitor, no MGR  Abd:  Soft, non distended, non tender, normoactive bowel sounds  : defer  Extremities/Skin: right leg with significant edema, difficult to expose for evaluation due to profound sensitivity to pain, patient requested to leave the right leg covered.  Extremities are warm, dry, peripheral pulses present      Significant Results and Procedures   Lactic Acid:   Recent Labs   Lab Test 22  0944 06/10/22  0920 22  0911   LACT 2.2* 1.7 1.5     CBC:   Recent Labs   Lab Test 22  0533 22  0615 22  0527   WBC 12.5* 14.5* 13.7*   HGB 6.8* 7.0* 7.4*   HCT 21.8* 21.8* 23.4*    295 298        Sepsis Evaluation   The patient is not known to have an infection.  NO EVIDENCE OF SEPSIS at this time.  Vital sign, physical exam, and lab findings are due to likely elevated pain level and anemia causing lactic acid to be elevated.  Will re-evaluate for possible sepsis after blood transfusion and pain managment per primary team.

## 2022-06-12 NOTE — PLAN OF CARE
Goal Outcome Evaluation:    Alert and oriented X 4. Able to make needs known. Call light in reach VSS. X HR tachy and BPs  elevated. Severe right leg pain partially managed with Oxycodone 20 mg X 3 and scheduled Tylenol. Right thigh very swollen. Ice applied to thigh.  Encouraged to shift weight,, reluctant to reposition. Scant amount of tan drainage from right calf, open to air.CMS/Neuros WNLs.   Using the urinal at bedside, voiding without difficultly. No BM this shift. Passing flatus. Tolerating PO. Appears to be resting during rounds. No new concerns or issues overnight. Continue with plan of care.

## 2022-06-12 NOTE — CONSULTS
Care Management Follow Up    Length of Stay (days): 7    Expected Discharge Date: 06/20/2022     Anticipated Discharge Disposition: Skilled Nursing Facility     Education Provided on the Discharge Plan:  yes  Patient/Family in Agreement with the Plan: yes    Additional Information:  Initial CMA completed with patient. RNCC will continue to follow patient.      Lilia Suh RN, BSN  Care Coordinator, [facility] Ortho  Phone [phone]  Pager [phone]

## 2022-06-12 NOTE — PLAN OF CARE
VS: Temp: 98.5  F (36.9  C) Temp src: Oral BP: 130/74 Pulse: 117   Resp: 16 SpO2: 100 % O2 Device: None (Room air)       O2: 100% on room air, no oxygen required. Denies SOB and CP, no cough present. Lung sounds clear.   Output: Voids spontaneously and without difficulty. Pt uses the urinal to void.   Last BM: 06/09/2022   Activity: Strict bedrest, pt allowed repositioning this shift.   Skin: R) calf wound, scant amount of tan drainage on messi pad. Peeling/cracked skin on RLE. Pt has tough, leathery skin on RLE. 4+ edema present to the pt's thigh (tumor) and 3+ edema to the pt's knee, calf/skin, ankle and foot.   Pain: Pain is controlled with PRN oxycodone and scheduled morphine. Pt typically rates his pain at a 9-10/10 prior to pain medication administration.   Neuro: A & O x4, denies numbness and tingling.   Dressing: No dressings, messi pad under R) calf changed at the beginning of the shift, has a scant amount of tan drainage and flakes from pt's dry skin.   Diet: Regular   LDA: N/A   Equipment: Personal belongings, call light, urinal   Plan: Continue with plan of care.   Additional Info: Pt's hemoglobin redraw was 7.9 this shift, Dr. Peck notified. Continue to monitor patient.

## 2022-06-12 NOTE — PLAN OF CARE
VS: /72 (BP Location: Right arm, Patient Position: Semi-Alford's)   Pulse (!) 137   Temp (!) 96.4  F (35.8  C) (Oral)   Resp 12   SpO2 100%    O2: 100% on Room Air.  Patient denies any shortness of breath.  Lung sounds are clear and equal bilaterally.    Output: Patient voids spontaneously in bedside urinal.    Last BM: 6/12/22- On shift   Activity: Patient is on strict bedrest.   Up for meals? Up in bed for meals.    Skin: Patient has sores to right lower leg and swelling to right upper and lower leg.     Pain: Patient reports pain of 8 to 10 out of 10 in right hip.  Patient given oxycodone 20mg prn x 2 on shift.  Patient given 2 cold packs.    CMS: Patient is A/O x4.  Patient denies any numbness or tingling.    Dressing: No dressings in place.  Dillan under right lower leg is CDI   Diet: Regular diet.    LDA: No PIV, No drains   Equipment: IV pole, patient personal belongings.    Plan: Continue plan of care.    Additional Info:

## 2022-06-12 NOTE — CONSULTS
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 with cancer related pain with plans for right LE amputation with Dr. Whitaker on 6/17. Plastics was consulted to assist with wound closure. Currently, patient is HDS stable, with complains of pain over right thigh. Labs with WBC of 12.1. Hgb 7.6. MRI of Pelvis with a large mass measuring at least 30 cm transverse x 25 cm anterior to posterior and greater than 30 cm in length (the margins of the mass and inferior portion of the mass are not included in the imaged volume)     - Given size of mass, extension into pelvis, involvement of pelvic structures including gluteal musculature, patient will likely need free flap vs local tissue.  - Final surgical plan will be contingent on the size of the final defect and available soft tissue, but given edematous changes to RLE, it is highly unlikely that we will be able to salvage soft tissue from the right thigh or lower leg.   - Given chronic stasis changes to RLE, we will need some form of imaging of central and lower extremity venous system; the inset of our flap will be continengent on adequate flow through the IVC and iliacs.  - Recommend US of abdomen/pelvis to view the IVC and iliacs - r/o DVT's  - If we are unable to visualize venous drainage- patient will need a CT venogram prior to surgery  - In order of priority we will first try to use the ipsilateral gastroc and surrounding skin---> if unsalvageable, then perhaps a pedicled VRAM or contralateral ALT flap to reconstruct the defect.   - Explained to the patient in detail the concept of free flaps and complex wound closure. Explained that we will try to use any soft tissue that is  available locally to close the defect. In the absence of such tissue, we would need a free tissue transfer from the truck/back/ contralateral leg. Explained what post-operative course would entail  - Obtain baseline nutrition labs (Prealbumin)     Discussed with staff Dr. Filomena Bustamante, GUS, MS  Plastic Surgery, PGY-2      HPI:  47 year old male with a past medical history significant for pleomorphic sarcoma of the right thigh and the right hemipelvis was transferred from an outside hospital earlier this month for worsening hip and thigh pain.  From imaging obtained from outside hospitals.  Patient was noted to have a pathological proximal right femur fracture secondary to his tumor.  Per patient, he was diagnosed with sarcoma during the summer 2021, he underwent chemotherapy for the same in hopes of decreasing his tumor burden.  Unfortunately, from the start of this year, his tumor has slowly progressed and gotten bigger in size.  He is currently unable to move and complains of significant pain over his right thigh.  He has been seen by orthopedic surgery, by both Dr. Bhatia and Dr. Whitaker who plan for a right lower extremity amputation with tumor resection on Friday, 6/17/2022.  Plastic surgery was consulted for assistance with wound coverage and closure.    Patient Active Problem List   Diagnosis     Sarcoma (H)     Femur fracture, right (H)       Past Surgical History:  Past Surgical History:   Procedure Laterality Date     INSERT PORT VASCULAR ACCESS Right 11/1/2021    Procedure: INSERTION, VASCULAR ACCESS PORT;  Surgeon: Sushil Gonzales MD;  Location: [facility] OR     IR CHEST PORT PLACEMENT > 5 YRS OF AGE  11/1/2021       Past Medical History:  No past medical history on file.    Social History:  Social History     Tobacco Use     Smoking status: Never Smoker     Smokeless tobacco: Never Used   Substance Use Topics     Alcohol use: Not on file       Family History:  No family history on file.      Allergies:  Allergies   Allergen Reactions     Blood Transfusion Related (Informational Only) Other (See Comments)     Patient has a history of a clinically significant antibody against RBC antigens.  A delay in compatible RBCs may occur.        Active Medications:  Current Outpatient Medications   Medication Sig Dispense Refill     acetaminophen (TYLENOL) 325 MG tablet Take 3 tablets (975 mg) by mouth every 8 hours 50 tablet 0     morphine (MS CONTIN) 30 MG CR tablet Take 1 tablet (30 mg) by mouth every 12 hours 30 tablet 0     oxyCODONE IR (ROXICODONE) 10 MG tablet Take 1-2 tablets (10-20 mg) by mouth every 4 hours as needed for moderate to severe pain 30 tablet 0     polyethylene glycol (MIRALAX) 17 g packet Take 17 g by mouth daily 10 packet 0     sennosides (SENOKOT) 8.6 MG tablet Take 1-2 tablets by mouth 2 times daily as needed for constipation or no stool 30 tablet 1       ROS:  Otherwise negative    Physical Examination:   Vital Signs: /75 (BP Location: Right arm, Patient Position: Semi-Alford's, Cuff Size: Adult Regular)   Pulse 112   Temp 97.6  F (36.4  C) (Oral)   Resp 16   SpO2 100%   GEN: alert, cooperative, mild distress  Neuro: CNs grossly intact  EENT: normal  Chest: NLB on RA, no scars on chest  Abdomen: soft, tender, distended, no scars on abdomen  Extremities: moving all four extremities.   RLE: Large swelling over proximal right thigh with edema, chronic stasis changes and swelling throughout RLE  -Sens: SILT dp/sp/s/s/t  -Motor: Fires EHL/FHL/TA/GSc  -Vasc: DP+, Post , wwp   Skin: WWP  Psych: normal affect    Labs/Imaging/Other:  XR Femur Right 2 Views    Result Date: 6/5/2022  EXAM: XR PELVIS 1/2 VW, XR FEMUR RIGHT 2 VIEW LOCATION: [facility] DATE/TIME: 6/5/2022 11:29 AM INDICATION: tumor and now pain COMPARISON: MRI 01/30/2022     IMPRESSION: Extensive destructive lesions along the right femoral head, neck, and proximal shaft in the right acetabulum and  ilium compatible with progressive malignant tumor. Pathologic fracture of the proximal shaft of the right femur the subtrochanteric region. Associated soft tissue mass. Diastases of the right hip joint likely secondary to intra-articular tumor. Bony demineralization at the knee. Mottled appearance to the patella could be related to neoplasm.    XR Pelvis 1/2 Views    Result Date: 6/5/2022  EXAM: XR PELVIS 1/2 VW, XR FEMUR RIGHT 2 VIEW LOCATION: [facility] DATE/TIME: 6/5/2022 11:29 AM INDICATION: tumor and now pain COMPARISON: MRI 01/30/2022     IMPRESSION: Extensive destructive lesions along the right femoral head, neck, and proximal shaft in the right acetabulum and ilium compatible with progressive malignant tumor. Pathologic fracture of the proximal shaft of the right femur the subtrochanteric region. Associated soft tissue mass. Diastases of the right hip joint likely secondary to intra-articular tumor. Bony demineralization at the knee. Mottled appearance to the patella could be related to neoplasm.    MR Pelvis Bone wo Contrast    Result Date: 6/8/2022  EXAM: MR PELVIC BONES W/O CONTRAST LOCATION: [facility] DATE/TIME: 6/8/2022 7:00 PM INDICATION: High-grade pleomorphic sarcoma right thigh with worsening right hip and thigh pain. Pathologic fracture. COMPARISON: X-rays 06/05/2022, MRI 01/30/2022 TECHNIQUE: Unenhanced. FINDINGS: Interval increase in size of large soft tissue mass involving the proximal right thigh, right hip, and extending into the right gluteal musculature and iliac fossa. The mass measures at least 30 cm transverse x 25 cm anterior to posterior and greater than 30 cm in length, the margins of the mass and inferior portion of the mass are not included in the imaged volume. In addition there is significant image artifacts and signal loss compromising evaluation. There is associated progressive involvement and destruction of  the proximal right femur and at the right ilium and acetabulum, and a displaced pathologic fracture of the right proximal femoral shaft. There is inferolateral displacement of the right femoral head.     IMPRESSION: 1.  Significant interval progression of large malignant soft tissue mass proximal right thigh, hip and gluteal region, and extending to the iliac fossa, with progressive bony involvement and destruction of the proximal right femur, ilium, and acetabulum and pathologic fracture of the proximal right femur. The mass is incompletely and suboptimally imaged.

## 2022-06-12 NOTE — PROGRESS NOTES
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 Patient presented with worsening right hip and thigh pain and was found to have a pathologic right proximal femur fracture. Patient was transfused for anemia yesterday. From chart review, patient has mixed iron deficiency and anemia of chronic disease.     Patient has some tachycardia and hypertension that could be pain response but patient does have risk factors for pulmonary embolism. Patient has a lactic acidosis that could be secondary to anemia. Patient does not appear septic at this time.      P:  1.) Right hip and thigh pleomorphic sarcoma; patient is s/p neoadjuvant chemotherapy: Patient to eventually undergo surgical intervention per Orthopaedic Surgery.   2.) Pathologic right proximal femur fracture: Pain control.  3.) Anemia - combination of iron deficiency and anemia of chronic disease: Patient being transfused today.  4.) Tachycardia: Patient stated that he was now considering recommendation for CT pulmonary angiogram and that he would make a decision by tomorrow morning.  5.) Obesity: To f/u as outpatient.   6.) Lactic acidosis: Monitoring for evidence of infection and evidence of sepsis.       Cristi Peck MD  Hospitalist Service, GOLD TEAM 16  [facility]  Securely message with the Vocera Web Console (learn more here)  Text page via [employer] Paging/Directory   Please see signed in provider for up to date coverage information    ______________________________________________________________________    Interval History     Patient notes pain in right leg was worse when he had to move his leg earlier but notes that pain is now better controlled. Patient notes no new problems.    Physical Exam   Vital  Signs: Temp: (!) 96.4  F (35.8  C) Temp src: Oral BP: 119/72 Pulse: (!) 137   Resp: 12 SpO2: 100 % O2 Device: None (Room air)      General: Patient comfortable, NAD.  Heart: RRR, S1 S2 w/o murmurs.  Lungs: Breath sounds present. No crackles/wheezes heard.  Abdomen: Soft, nontender.

## 2022-06-12 NOTE — PROGRESS NOTES
Pt triggered sepsis protocols with vitals at start of shift. Vitals done q 30min x2, lactic drawn. Lactic came back at 1.4. Will continue to monitor.

## 2022-06-12 NOTE — PROGRESS NOTES
Orthopaedic Surgery Progress Note     2022    S: Rapid response called yesterday due to sepsis trigger. Evaluated by hospitalist and recommended ongoing blood transfusion, CT PE, repeat lactic acid level.     Discussed strong recommendation for CT PE this AM. Discussed that the surgery has significant mortality risk and that we need to optimize him for surgery. Patient states that his sister told him to not do the CT. He declined to engage in conversation about why they are declining CT and then asked me to leave.     O:   BP (!) 154/69 (BP Location: Left arm, Patient Position: Supine)   Pulse 112   Temp 97.2  F (36.2  C) (Oral)   Resp 16   SpO2 100%       Exam:  Gen: NAD, A/O x 3  Resp: Comfortable, non-labored breathing  Ortho:    Declined to cooperate for exam    Recent Labs   Lab 22  0622  0533 22  0940    135 139 141   POTASSIUM 4.2 4.2 3.9 4.1   CHLORIDE 100 102 106 103   CO2 26 27 26 26   BUN 13 12 12 12   CR 0.62* 0.65* 0.57* 0.71   * 115* 101* 116     Recent Labs   Lab 22  0605 22  1621 22  0533 22  0615 22  0527   WBC 12.1*  --  12.5* 14.5* 13.7*   HGB 7.6* 7.9* 6.8* 7.0* 7.4*     --  351 295 298     Recent Labs   Lab 22   INR 1.13       Assessment/Plan:  Vic Scott III is a 47 year old male w/ PMHx pleomorphic sarcoma status post neoadjuvant chemotherapy with the followin.  Right hip and thigh pleomorphic sarcoma  2.  Pathologic right proximal femur fracture     PLAN FOR TODAY:   - CT PE if patient amenable  -Discharge to TCU or home pending recommendations and health care benefits. Referrals for TCU placed  -Spoke to patient and patients sister regarding plans for discharge and upcoming surgery.  I have advised patient and sister to contact Dr Yuen's clinic directly for surgical plan/admission and to speak to social work directly regarding discharge.   -Tentative plan for OR   with  Dr. Whitaker and plastic surgery colleagues  -in the interim patient can be discharged to TCU as soon as today  -PLAN FOR READMISSION on 6/16 for optimization and likely blood transfusion        RECOMMENDATIONS:   - Orthopedics Primary  - Plan for OR: 6/17 with Dr Yuen and Plastics team  - Anticoagulation/DVT Prophylaxis: hold for now   - Antibiotics/Tetanus: TBD pending plan   - X-rays/Imaging: pending MRI; completed and reviewed.    - Activity: Bedrest  - Weight bearing: NWB RLE  - Pain control: ordered   - Diet: regular  - Follow-up: TBD  - Disposition: discharge to TCU or home with cares pending insurance recs.     Future Appointments   Date Time Provider Department Center   6/10/2022  7:00 PM UR PT WAITLIST [facility] [place]        Staff surgeon: Dr. Whitaker.    Brooke Fan MD  PGY-4  Orthopaedic Surgery

## 2022-06-13 NOTE — PLAN OF CARE
VS: Temp: 97.6  F (36.4  C) Temp src: Oral BP: 132/75 Pulse: 112   Resp: 16 SpO2: 100 % O2 Device: None (Room air)       O2: 100% on room air, no oxygen required. Denies SOB and CP, no cough present. Lung sounds clear.   Output: Voids spontaneously and without difficulty. Pt uses the urinal to void.   Last BM: 06/12/2022   Activity: Strict bedrest, pt allowed repositioning at certain times this shift.   Skin: R) calf wound, no drainage & open to air. Peeling/cracked skin on RLE. Pt has tough, leathery skin on RLE. 4+ edema present to the pt's hip/thigh area (tumor) and 3+ edema to the pt's knee, calf/skin, ankle and foot. Pt refused skin assessment on his backside and underneath his clothing.   Pain: Pain is controlled with PRN oxycodone and scheduled morphine. Pt typically rates his pain at a 8-10/10 prior to pain medication administration.   Neuro: A & O x4, denies numbness and tingling.   Dressing: N/A   Diet: Regular   LDA: N/A   Equipment: Personal belongings   Plan: Tentative plan for amputation on 6/17 with Dr. Whitaker, then discharge to SNF   Additional Info:

## 2022-06-13 NOTE — PLAN OF CARE
VS: Temp: 98.3  F (36.8  C) Temp src: Oral BP: 122/73 Pulse: 113   Resp: 16 SpO2: 100 % O2 Device: None (Room air)       O2: 100% on room air, no oxygen required. Denies SOB and CP, no cough present. Lung sounds clear.   Output: Voids spontaneously and without difficulty. Pt uses the urinal to void.   Last BM: 06/12/2022   Activity: Strict bedrest, pt allowed some repositioning this shift.   Skin: R) calf wound, no drainage & open to air. Peeling/cracked skin on RLE. Pt has tough, leathery skin on RLE. 4+ edema present to the pt's hip/thigh area (tumor) and 3+ edema to the pt's knee, calf/skin, ankle and foot. Pt refused skin assessment on his backside and underneath his clothing.   Pain: Pain is controlled with PRN oxycodone and scheduled morphine. Pt typically rates his pain at a 8-10/10 prior to pain medication administration.   Neuro: A & Ox4, denies numbness and tingling.   Dressing: N/A   Diet: Regular, NPO starting at midnight for ultrasound in the AM.   LDA: N/A   Equipment: Personal belongings, call light, urinal   Plan: Ultrasound scheduled for tomorrow AM, pt NPO starting at midnight tonight.    Tentative plan for amputation on 6/17 with Dr. Whitaker then discharge to skilled nursing facility.   Additional Info:

## 2022-06-13 NOTE — PROGRESS NOTES
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 Patient presented with worsening right hip and thigh pain and was found to have a pathologic right proximal femur fracture. Patient was transfused for anemia yesterday. From chart review, patient has mixed iron deficiency and anemia of chronic disease.     Patient has some tachycardia and hypertension that could be pain response but patient does have risk factors for pulmonary embolism. Patient has a lactic acidosis that could be secondary to anemia. Patient does not appear septic at this time.      P:  1.) Right hip and thigh pleomorphic sarcoma; patient is s/p neoadjuvant chemotherapy: Patient to eventually undergo surgical intervention per Orthopaedic Surgery.   2.) Pathologic right proximal femur fracture: Pain control.  3.) Anemia - combination of iron deficiency and anemia of chronic disease: Patient last was transfused on 11-Jul-2022. Monitoring blood counts. No indication for transfusion today.  4.) Tachycardia: Patient stated that he was waiting for his sister to arrive before deciding on whether he would undergo CT pulmonary angiogram - sister was supposed to visit over the past few days but it is unclear if she actually did so. Will discuss reasons for CT pulmonary angiogram with patient and patient's sister when patient's sister arrives.   5.) Obesity: To f/u as outpatient.   6.) Lactic acidosis: Monitoring for evidence of infection and evidence of sepsis.       Cristi Peck MD  Hospitalist Service, GOLD TEAM 16  [facility]  Securely message with the Vocera Web Console (learn more here)  Text page via [employer] Paging/Directory   Please see signed in provider for up to date coverage  information    ______________________________________________________________________    Interval History     Patient notes no new problems.    Physical Exam   Vital Signs: Temp: 98.3  F (36.8  C) Temp src: Oral BP: (!) 140/73 Pulse: 117   Resp: 16 SpO2: 98 % O2 Device: None (Room air)      General: Patient comfortable, NAD.  Heart: RRR, S1 S2 w/o murmurs.  Lungs: Breath sounds present. No crackles/wheezes heard.  Abdomen: Soft, nontender.

## 2022-06-13 NOTE — PLAN OF CARE
VS: BP (!) 140/73 (BP Location: Right arm)   Pulse 117   Temp 98.3  F (36.8  C) (Oral)   Resp 16   SpO2 98%    O2: Stable on room air   Output: Voids adequately in urinal   Last BM: 6/12/22   Activity: Strict bedrest due to R femur fracture   Up for meals? No   Skin: Visible skin intact, declined full assessment   Pain: Managed with oral medications   CMS: Intact   Dressing: None   Diet: Regular, tolerated well   LDA: None   Equipment: Personal belongings, call light within reach, uses appropriately   Plan: TBD, potential for surgery the 17th   Additional Info:

## 2022-06-14 NOTE — PLAN OF CARE
VS: /69 (BP Location: Right arm, Patient Position: Semi-Alford's, Cuff Size: Adult Regular)   Pulse 114   Temp 98.1  F (36.7  C) (Oral)   Resp 16   SpO2 99%     O2: O2 SATS >90% denies chest pain,  or SBO   Output: Voids adequately using urinal   Last BM: 6/12/22 BS present all x4 quadrants    Activity: Strict bed rest   Up for meals? No    Skin:  Wounds to the R) calf wound,, pt has  Peeling/cracked skin on RLE. Edema and swelling to R thigh and  r knee    Pain: Rate     CMS: Denies numbness and tingling   Dressing: N/A   Diet: Regular diet   LDA: N/A   Equipment: Personal belongings, call light     Plan: Possible amputation on 6/17   Additional Info: Pt was NPO overnight, ultrasound to be done this  morning

## 2022-06-14 NOTE — PROGRESS NOTES
Pt triggered sepsis protocols around 1700. Vitals done q 30min x2, lactic drawn. Lactic came back at 1.4. VSS. Will continue to monitor.

## 2022-06-14 NOTE — PLAN OF CARE
VS: /54 (BP Location: Right arm, Patient Position: Semi-Alford's, Cuff Size: Adult Regular)   Pulse 114   Temp (!) 96.2  F (35.7  C) (Oral)   Resp 14   SpO2 100%    O2: Stable on room air   Output: Voids adequately in urinal   Last BM: 6/12/22   Activity: Strict bedrest   Up for meals? Yes   Skin: Cracked and dry skin RLE, declines full assessment, visual skin intact   Pain: Stays significant in R hip, oral pain medications and ice are helpful per pt   CMS: Intact   Dressing: None   Diet: Regular, tolerating well.  Was NPO at midnight in order to do ultrasound   LDA: None   Equipment: Fan, personal belongings, call light within reach, pt calls appropriately   Plan: Tentative plan for surgery 6/17/22, pt appears complacent with potential amputation   Additional Info: Had an ultrasound this AM

## 2022-06-14 NOTE — PROGRESS NOTES
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 Patient presented with worsening right hip and thigh pain and was found to have a pathologic right proximal femur fracture. Patient was transfused for anemia yesterday. From chart review, patient has mixed iron deficiency and anemia of chronic disease.     Patient has some tachycardia and hypertension that could be pain response but patient does have risk factors for pulmonary embolism. Patient has a lactic acidosis that could be secondary to anemia. Patient does not appear septic at this time.      P:  1.) Right hip and thigh pleomorphic sarcoma; patient is s/p neoadjuvant chemotherapy: Patient to eventually undergo surgical intervention per Orthopaedic Surgery.   Plan is for surgery on Friday 6/17   2.) Pathologic right proximal femur fracture: Pain control.  3.) Anemia - combination of iron deficiency and anemia of chronic disease: Patient last was transfused on 11-Jul-2022. Monitoring blood counts.   4.) Tachycardia: ongoing , he declined CT angio . This is likely multifactorial combination of anemia /deconditioning /Pain .   5.) Obesity: To f/u as outpatient.   6.) Lactic acidosis: resolved           ______________________________________________________________________         Diet: Diet  Regular Diet Adult    DVT Prophylaxis:  lovenox offered ( he will get back to us )   Sung Catheter: Not present  Central Lines: PRESENT     Cardiac Monitoring: None  Code Status: Full Code      Disposition Plan   Expected Discharge: 06/20/2022     Anticipated discharge location: inpatient rehabilitation facility    Delays:            The patient's care was discussed with the Bedside Nurse, Care Coordinator/ and Patient.    Flavia Mora  MD Brad  Hospitalist Service, GOLD TEAM 16  [facility]  Securely message with the [employer] Web Console (learn more here)  Text page via [facility] Paging/Directory   Please see signed in provider for up to date coverage information      Clinically Significant Risk Factors Present on Admission                    ______________________________________________________________________    Interval History   Hand off from Dr Peck   No new symptoms reported per nursing staff .  Last night notes reviewed .  No chest pain or Shortness of breath reported.  No vomiting   No difficulty with voiding   Passing gas .    4 system ROS reviewed .    Data reviewed today: I reviewed all medications, new labs and imaging results over the last 24 hours.     Physical Exam   Vital Signs: Temp: (!) 96.2  F (35.7  C) Temp src: Oral BP: 118/54 Pulse: 114   Resp: 14 SpO2: 100 % O2 Device: None (Room air)    Weight: 0 lbs 0 oz  Constitutional: awake, alert, cooperative, no apparent distress, and appears stated age  Eyes: Lids and lashes normal, pupils equal, round and reactive to light, extra ocular muscles intact, sclera clear, conjunctiva normal  Hematologic / Lymphatic: no cervical lymphadenopathy  Respiratory: No increased work of breathing, good air exchange, clear to auscultation bilaterally, no crackles or wheezing  Cardiovascular: Normal apical impulse, regular rate and rhythm, normal S1 and S2, no S3 or S4, and no murmur noted  GI: No scars, normal bowel sounds, soft, non-distended, non-tender, no masses palpated, no hepatosplenomegally  Neurologic: Awake, alert, oriented to name, place and time.  Cranial nerves II-XII are grossly intact.    Neuropsychiatric: General: normal, calm and normal eye contact    Data   Recent Labs   Lab 06/14/22  0636 06/13/22  0550 06/12/22  0605 06/11/22  1621 06/11/22  0533   WBC 11.8* 12.5* 12.1*  --  12.5*   HGB 7.9* 7.7* 7.6*   < > 6.8*   MCV 70* 72* 70*  --   69*    353 342  --  351     --  134  --  135   POTASSIUM 4.0  --  4.2  --  4.2   CHLORIDE 99  --  100  --  102   CO2 27  --  26  --  27   BUN 12  --  13  --  12   CR 0.63*  --  0.62*  --  0.65*   ANIONGAP 8  --  8  --  6   VENTURA 9.3  --  9.3  --  9.1   *  --  111*  --  115*    < > = values in this interval not displayed.

## 2022-06-15 NOTE — PROGRESS NOTES
CM consulted for hospice. CM attempted to contact patient's wife Stanley Lackey @ [phone] to discuss hospice care. No answer, VM left with call back number. ADDENDUM  Incoming call from patient's wife Stanley Lackey. CM discussed possible home hospice. Hardeep Marks states she would need to speak with her son and will call CM back. CM continue to follow. ADDENDUM  CM spoke with patient's wife Stanley Lackey who states she would like to proceed with home hospice. Hardeep Selina has no preference for agency. CM has sent referral to [address] [place] [address] hospice. Sepsis Evaluation Progress Note    I was called to see Hothanh Scott III due to abnormal vital signs triggering the Sepsis SIRS screening alert. He is not known to have an infection.     Physical Exam   Vital Signs:  Temp: 96.9  F (36.1  C) Temp src: Oral BP: 125/74 Pulse: 116   Resp: 16 SpO2: 96 % O2 Device: None (Room air)       General: in no acute distress  Mental Status: baseline mental status.     Remainder of physical exam is significant for tumour leg     Data   Lactic Acid   Date Value Ref Range Status   06/15/2022 2.2 (H) 0.7 - 2.0 mmol/L Final   06/13/2022 1.4 0.7 - 2.0 mmol/L Final       Assessment & Plan   NO EVIDENCE OF SEPSIS at this time.  Vital sign, physical exam, and lab findings are due to tumour.    Disposition: The patient will remain on the current unit. We will continue to monitor this patient closely..  Flavia Salinas MD    Sepsis Criteria   Sepsis: 2+ SIRS criteria due to infection  Severe Sepsis: Sepsis AND 1+ new sign of acute organ dysfunction (Note: lactate >2 or acute encephalopathy each qualify as organ dysfunction)  Septic Shock: Sepsis AND hypotension despite volume resuscitation with 30 ml/kg crystalloid or lactate >=4  Note: HYPOTENSION is defined as 2 BP readings measured 3 hrs apart that have a SBP <90, MAP <65, or decrease >40 mmHg, occurring 6 hrs before or after t-zero

## 2022-06-15 NOTE — PLAN OF CARE
VS: Tachycardic , triggered Sepsis protocol, bolus infusing LA 2.2   O2: Sating 96% on RA. Lung sounds clear. Denies chest pain and SOB.   Output: Voiding spontaneously, uses urinal at bedside    Last BM: 6/12 BS Present. Denies feelings of constipation, no distention   Activity: Bedrest    Skin: R leg, thigh, dark, peeling, scaly, discoloration, lotion applied   Pain: Managed with Oxycodone PRN, scheduled tylenol MS contin, tylenol   Neuro: A&OX4   Dressing: PIV Dressing to RHand, PIV Dressing to LUFA C/D/I   Diet: Regular diet    LDA: PIV R Hand, saline locked    Equipment: Call light, personal belongings    Plan: Continue to monitor. Call light within reach and utilized appropriately. Plan to place epidural tomorrow morning.   Additional Info: Pt waiting to complete CT until he talks with plastics, R L amputation scheduled for 6/17

## 2022-06-15 NOTE — PROGRESS NOTES
Orthopaedic Cross Cover Note    Contacted by RN overnight regarding ongoing right thigh pain despite a one time dose of 0.5mg IV dilaudid. Pain medications reviewed. Has been receiving the following for pain: tylenol 975 q8h, MS contin 30mg BID, oxycodone 10-20mg q3h prn. Ordered the following: toradol 15mg q6h prn, valium 5mg q6h prn, hydroxyzine 25mg q6h prn. Pain consult placed.    Patient seen at bedside after receiving toradol 15mg. States pain now better controlled.     Remainder of plan per most recent Ortho progress note.     Sudha Harvey MD  Orthopaedic Surgery PGY-4

## 2022-06-15 NOTE — PROGRESS NOTES
"02:57 Ms sent to Dr. Boyle that pt requesting to talk to  Pain med \"not working\".    Dr Boyle called back and ordered dilaudid 0.5mg IV now. Same given after PIV SL in rt hand.  stated in middle of an admission and unable to talk to pt.    "

## 2022-06-15 NOTE — CONSULTS
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 Patient presented with worsening right hip and thigh pain and was found to have a pathologic right proximal femur fracture.Patient is having the tumor now for a while, currently gigant in size, which make the patient bedridden secondary to pain    ASSESSMENT,TREATMENT, RECOMMENDATIONS/PLAN: 46 y/o AA patient with Right thigh sarcoma, schedule for Right lower extremity amputation on Friday,  Patient would get benefit from an epidural analgesia any time starting now, and ideally before surgery next Friday.  The patient was explained about the procedure, indications, contraindications and side effects.  He is going to think about it.  Whenever the patient decide to proceed with the block, appropriate timing after last anticoagulation dose needs to be verify before placement        Primary Care Provider: No Ref-Primary, Physician      Recommendations were discussed with primary service  Plan was reviewed by the Pain Service staff anesthesiologist mercedes    Thank you for consulting the Inpatient Pain Management Service.  The above recommendations are to be acted upon at the primary team s discretion.    To reach us:  Mon - Friday 8 AM - 3 PM: Pager [phone] (Text Page)  After hours, weekends and holidays: Primary service should call [phone] the answering service for the on-call pain specialist        REVIEW OF 10 BODY SYSTEMS: 10 point ROS of systems including Constitutional, Eyes, Respiratory, " "Cardiovascular, Gastroenterology, Genitourinary, Integumentary, Musculoskeletal, Psychiatric were all negative except for pertinent positives noted in my HPI.     INPATIENT MEDICATIONS PERTINENT TO PAIN CONSULT:   Medications related to Pain Management (From now, onward)    Start     Dose/Rate Route Frequency Ordered Stop    06/15/22 0800  Lidocaine (LIDOCARE) 4 % Patch 1 patch         1 patch  over 12 Hours Transdermal EVERY 24 HOURS 0800 06/15/22 0451      06/15/22 0800  lidocaine patch in PLACE          Transdermal EVERY 8 HOURS SCHEDULED 06/15/22 0451      06/15/22 0453  diazepam (VALIUM) tablet 5 mg         5 mg Oral EVERY 6 HOURS PRN 06/15/22 0454      06/15/22 0449  hydrOXYzine (ATARAX) tablet 25 mg         25 mg Oral EVERY 6 HOURS PRN 06/15/22 0451      06/10/22 0000  polyethylene glycol (MIRALAX) 17 g packet         17 g Oral DAILY 06/09/22 0910      06/10/22 0000  acetaminophen (TYLENOL) 325 MG tablet         975 mg Oral EVERY 8 HOURS 06/10/22 0822      06/10/22 0000  morphine (MS CONTIN) 30 MG CR tablet         30 mg Oral EVERY 12 HOURS 06/10/22 0822      06/10/22 0000  oxyCODONE IR (ROXICODONE) 10 MG tablet         10-20 mg Oral EVERY 4 HOURS PRN 06/10/22 0822      06/10/22 0000  sennosides (SENOKOT) 8.6 MG tablet         1-2 tablet Oral 2 TIMES DAILY PRN 06/10/22 0822      06/09/22 0030  morphine (MS CONTIN) 12 hr tablet 30 mg         30 mg Oral EVERY 12 HOURS SCHEDULED 06/09/22 0016      06/06/22 0800  polyethylene glycol (MIRALAX) Packet 17 g         17 g Oral DAILY 06/05/22 2030 06/05/22 2144  oxyCODONE IR (ROXICODONE) tablet 10-20 mg         10-20 mg Oral EVERY 3 HOURS PRN 06/05/22 2145 06/05/22 2100  acetaminophen (TYLENOL) tablet 975 mg         975 mg Oral EVERY 8 HOURS 06/05/22 2030 06/05/22 2028  bisacodyl (DULCOLAX) EC tablet 5 mg        \"Or\" Linked Group Details    5 mg Oral DAILY PRN 06/05/22 2030 06/05/22 2028  bisacodyl (DULCOLAX) EC tablet 10 mg        \"Or\" Linked Group " Details    10 mg Oral DAILY PRN 06/05/22 2030 06/05/22 2025  lidocaine (LMX4) cream          Topical EVERY 1 HOUR PRN 06/05/22 2030 06/05/22 2025  lidocaine 1 % 0.1-1 mL         0.1-1 mL Other EVERY 1 HOUR PRN 06/05/22 2030            CURRENT MEDICATIONS:   Current Facility-Administered Medications Ordered in Epic   Medication Dose Route Frequency Provider Last Rate Last Admin     acetaminophen (TYLENOL) tablet 975 mg  975 mg Oral Q8H Reg Smith MD   975 mg at 06/15/22 0431     bisacodyl (DULCOLAX) EC tablet 5 mg  5 mg Oral Daily PRN Reg Smith MD        Or     bisacodyl (DULCOLAX) EC tablet 10 mg  10 mg Oral Daily PRN Reg Smith MD         diazepam (VALIUM) tablet 5 mg  5 mg Oral Q6H PRN Ccee Harvey MD         enoxaparin ANTICOAGULANT (LOVENOX) injection 40 mg  40 mg Subcutaneous Q24H Flavia Salinas MD   40 mg at 06/14/22 2011     hydrALAZINE (APRESOLINE) injection 10 mg  10 mg Intravenous Q6H PRN Cristi Peck MD         hydrOXYzine (ATARAX) tablet 25 mg  25 mg Oral Q6H PRN Cece Harvey MD         Lidocaine (LIDOCARE) 4 % Patch 1 patch  1 patch Transdermal Q24H Cece Harvey MD   1 patch at 06/15/22 0831     lidocaine (LMX4) cream   Topical Q1H PRN Reg Smith MD         lidocaine 1 % 0.1-1 mL  0.1-1 mL Other Q1H PRN Reg Smith MD         lidocaine patch in PLACE   Transdermal Q8H [facility] Cece Harvey MD         melatonin tablet 1 mg  1 mg Oral At Bedtime PRN Reg Smith MD         morphine (MS CONTIN) 12 hr tablet 30 mg  30 mg Oral Q12H [facility] (08/20) Adam Georges MD   30 mg at 06/15/22 0834     naloxone (NARCAN) injection 0.2 mg  0.2 mg Intravenous Q2 Min PRN Tito Del Rio MD        Or     naloxone (NARCAN) injection 0.4 mg  0.4 mg Intravenous Q2 Min PRN Tito Del Rio MD        Or     naloxone (NARCAN) injection 0.2 mg  0.2 mg Intramuscular Q2 Min PRN Tito Del Rio  MD Munir        Or     naloxone (NARCAN) injection 0.4 mg  0.4 mg Intramuscular Q2 Min PRN Tito Del Rio MD         ondansetron (ZOFRAN ODT) ODT tab 4 mg  4 mg Oral Q6H PRN Reg Smith MD        Or     ondansetron (ZOFRAN) injection 4 mg  4 mg Intravenous Q6H PRN Reg Smith MD         oxyCODONE IR (ROXICODONE) tablet 10-20 mg  10-20 mg Oral Q3H PRN Cece Harvey MD   20 mg at 06/15/22 0833     polyethylene glycol (MIRALAX) Packet 17 g  17 g Oral Daily Reg Smith MD   17 g at 06/08/22 0957     prochlorperazine (COMPAZINE) injection 10 mg  10 mg Intravenous Q6H PRN Reg Smith MD        Or     prochlorperazine (COMPAZINE) tablet 10 mg  10 mg Oral Q6H PRN Reg Smith MD        Or     prochlorperazine (COMPAZINE) suppository 25 mg  25 mg Rectal Q12H PRN Reg Smith MD         sodium chloride (PF) 0.9% PF flush 3 mL  3 mL Intracatheter Q8H Reg Smith MD   3 mL at 06/09/22 1335     sodium chloride (PF) 0.9% PF flush 3 mL  3 mL Intracatheter q1 min prn Reg Smith MD         Current Outpatient Medications Ordered in Epic   Medication Sig Dispense Refill     acetaminophen (TYLENOL) 325 MG tablet Take 3 tablets (975 mg) by mouth every 8 hours 50 tablet 0     morphine (MS CONTIN) 30 MG CR tablet Take 1 tablet (30 mg) by mouth every 12 hours 30 tablet 0     oxyCODONE IR (ROXICODONE) 10 MG tablet Take 1-2 tablets (10-20 mg) by mouth every 4 hours as needed for moderate to severe pain 30 tablet 0     polyethylene glycol (MIRALAX) 17 g packet Take 17 g by mouth daily 10 packet 0     sennosides (SENOKOT) 8.6 MG tablet Take 1-2 tablets by mouth 2 times daily as needed for constipation or no stool 30 tablet 1       HOME/PREVIOUS MEDICATIONS:   Prior to Admission medications    Medication Sig Start Date End Date Taking? Authorizing Provider   acetaminophen (TYLENOL) 325 MG tablet Take 3 tablets (975 mg) by mouth every 8 hours 6/10/22  Yes  Nat Purvis APRN CNS   diclofenac (VOLTAREN) 1 % topical gel Apply 2-4 g topically 4 times daily as needed for moderate pain 11/8/21  Yes Bell Chicas PA-C   ibuprofen (ADVIL/MOTRIN) 200 MG tablet Take 400 mg by mouth every 8 hours as needed for mild pain   Yes Unknown, Entered By History   morphine (MS CONTIN) 30 MG CR tablet Take 1 tablet (30 mg) by mouth every 12 hours 6/10/22  Yes Nat Purvis APRN CNS   multivitamin, therapeutic (THERA-VIT) TABS tablet Take 1 tablet by mouth daily   Yes Unknown, Entered By History   ondansetron (ZOFRAN) 8 MG tablet Take 1 tablet (8 mg) by mouth every 8 hours as needed for nausea 12/7/21  Yes Marga Peters CNS   oxyCODONE IR (ROXICODONE) 10 MG tablet Take 1-2 tablets (10-20 mg) by mouth every 4 hours as needed for moderate to severe pain 6/10/22  Yes Nat Purvis APRN CNS   polyethylene glycol (MIRALAX) 17 g packet Take 17 g by mouth daily 6/10/22  Yes Nat Purvis APRN CNS   prochlorperazine (COMPAZINE) 5 MG tablet Take 1 tablet (5 mg) by mouth every 6 hours as needed for nausea or vomiting 12/7/21  Yes Marga Peters CNS   sennosides (SENOKOT) 8.6 MG tablet Take 1-2 tablets by mouth 2 times daily as needed for constipation or no stool 6/10/22  Yes Nat Purvis APRN CNS       ALLERGIES:    Allergies   Allergen Reactions     Blood Transfusion Related (Informational Only) Other (See Comments)     Patient has a history of a clinically significant antibody against RBC antigens.  A delay in compatible RBCs may occur.         PAST MEDICAL AND PSYCHIATRIC HISTORY:  No past medical history on file.    PAST SURGICAL HISTORY:   Past Surgical History:   Procedure Laterality Date     INSERT PORT VASCULAR ACCESS Right 11/1/2021    Procedure: INSERTION, VASCULAR ACCESS PORT;  Surgeon: Sushil Gonzales MD;  Location: [facility] OR     IR CHEST PORT PLACEMENT > 5 YRS OF AGE  11/1/2021       FAMILY HISTORY: family history is not on  file.    HEALTH & LIFESTYLE PRACTICES:   Tobacco:  reports that he has never smoked. He has never used smokeless tobacco.  Alcohol:  has no history on file for alcohol use.  Illicit drugs:  has no history on file for drug use.    LABORATORY VALUES:   Last Basic Metabolic Panel:  Lab Results   Component Value Date     06/14/2022      Lab Results   Component Value Date    POTASSIUM 4.0 06/14/2022     Lab Results   Component Value Date    CHLORIDE 99 06/14/2022     Lab Results   Component Value Date    VENTURA 9.3 06/14/2022     Lab Results   Component Value Date    CO2 27 06/14/2022     Lab Results   Component Value Date    BUN 12 06/14/2022     Lab Results   Component Value Date    CR 0.63 06/14/2022     Lab Results   Component Value Date     06/14/2022    GLC 92 10/20/2021       CBC results:  Lab Results   Component Value Date    WBC 12.1 06/15/2022     Lab Results   Component Value Date    HGB 7.7 06/15/2022     Lab Results   Component Value Date    HCT 24.3 06/15/2022     Lab Results   Component Value Date     06/15/2022       DIAGNOSTIC TESTS:   Labs above reviewed as well as additional relevant diagnostic studies from the EPIC record.     PHYSICAL EXAMINATION:  /72 (BP Location: Left arm)   Pulse 102   Temp 36.3  C (97.3  F) (Oral)   Resp 16   SpO2 98%    AAO  CP: RRR, clear lungs  Mass around his right thigh from gigant dimensions.      TIME SPENT: 30 minutes including 20 minutes face-to-face time counseling his  about his diagnosis and treatment options, and coordinating care with the primary team.  DECISION-MAKING: The level of decision-making in this case is high/complex due to the complexity of medical problems, acute/chronic pain, opioid analgesia issues, and behavioral factors.    Aramis Flower MD  Lesley 15, 2022, 8:41 AM  Inpatient Pain Management Service

## 2022-06-15 NOTE — PROGRESS NOTES
VS: Vitals stable   O2: Sating 98% on RA. Lung sounds clear. Denies chest pain and SOB.   Output: Voids spontaneously and adequately.   Last BM: 6/12. Bowel sounds active x4. Passing flatus.   Activity: Bedrest   Skin: Dry peeling, dark discoloration. Visible skin intact   Pain: Pain was managed with oxycodone, dilaudidtylenol   Neuro: A&O x4. Denies N/T.   Dressing: PIV dressing to rt hand C/D/I.   Diet: Regular. Denies N/V.   LDA: PIV to Rt hand is SL.   Equipment: Call light and personal belongings.   Plan: Continue to monitor. Call light within reach and utilized appropriately   Additional Info: For leg amputation on 6/17/22

## 2022-06-15 NOTE — PROGRESS NOTES
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 Patient presented with worsening right hip and thigh pain and was found to have a pathologic right proximal femur fracture.  Findings  Per flowsheet pt is consuming 100% of meals. Stated he is eating more in the hospital than at home as the meals are readily available. Denied any nutrition concerns at this time  Pt reported a UBW of 230-235#. Stated wts of 250+ are d/t tumor to R thigh. No weight has been obtained since admit (does not have an active wt order). Weekly wts ordered   Wt Readings from Last 10 Encounters:   06/05/22 106.6 kg (235 lb)   12/01/21 114.5 kg (252 lb 6.4 oz)   11/09/21 116.8 kg (257 lb 6.4 oz)   11/01/21 104.3 kg (230 lb)   09/24/21 117.9 kg (260 lb)     INTERVENTIONS  Implementation  Weekly wts      Follow up/Monitoring  RD to reassess at [facility] unless otherwise consulted.     Jennifer Lowe MS, RD, LDN  Unit Pager [phone]  Weekend pager: [phone]

## 2022-06-15 NOTE — PROGRESS NOTES
Brief Update:    Spoke to patient in detail, regarding utility of CT venogram imaging prior to reconstructive surgery. Patient asked very appropriate questions. Patient is agreeable to obtain imaging tomorrow after epidural catheter placement.    GUS Abarca, MS  Plastic Surgery, PGY-2

## 2022-06-15 NOTE — CODE/RAPID RESPONSE
Rapid Response Team Note    Assessment   In assessment a rapid response was called on Vic Scott III due to SIRS/Sepsis trigger and lactic acidosis. This presentation is likely due to dehydration and possibly 2/2 presence of pleomorphic sarcoma of R thigh.     Plan   -  0.5L LR now, no need for LA recheck unless clinical symptoms worsen  -  Add on CK   -  The Internal Medicine primary team was paged and currently awaiting a response.  -  Disposition: The patient will remain on the current unit. We will continue to monitor this patient closely.  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 Patient presented with worsening right hip and thigh pain and was found to have a pathologic right proximal femur fracture.    A code sepsis was called this AM for a lactic acid of 2.2 with triggers being HR and mild leukocytosis. Upon arrival he is awake and alert, non-toxic appearing. VS , /74, RR 14, T 97.8F, A&Ox4. ROS negative for fevers, chills, cough, chest pain, or changes to sensation. ROS positive for pain in R leg, but it is greatly improved compared to yesterday. Patient endorsed decreased oral intake yesterday 2/2 pain. He has a urinal at the side of the bed with concentrated aguilar urine. Labs this AM showed a normal chem panel (other than the LA 2.2), WBC 12.1 (stable), Hgb 7.7. RLE warm, woodlike edema to calf, grossly swollen at hip, DP pulse palpable, no numbness or tingling or pain out of proportion. Will rehydrate with 1L LR, no need for LA recheck unless pt clinically worsens.     Admission Diagnosis:   Femur fracture, right (H) [S72.91XA]    Physical Exam   Temp: 97.8  F (36.6  C) Temp  Min: 96.9  F (36.1   C)  Max: 97.8  F (36.6  C)  Resp: 14 Resp  Min: 14  Max: 16  SpO2: 100 % SpO2  Min: 96 %  Max: 100 %  Pulse: 119 Pulse  Min: 102  Max: 136    No data recorded  BP: 128/74 Systolic (24hrs), Av , Min:124 , Max:142   Diastolic (24hrs), Av, Min:72, Max:89     I/Os: I/O last 3 completed shifts:  In: -   Out: 600 [Urine:600]     Exam:   General: in no acute distress  Mental Status: AAOx4.  Resp: CTA  Abd: benign  : voids spontaneously, dark clear aguilar urine at beside  Extremities: warm well perfused. RLE arm, woodlike edema to calf, grossly swollen at hip, DP pulse palpable, no numbness or tingling or pain out of proportion.    Significant Results and Procedures   Lactic Acid:   Recent Labs   Lab Test 06/15/22  0904 22  1815 22  1641   LACT 2.2* 1.4 1.4     CBC:   Recent Labs   Lab Test 06/15/22  0820 22  0636 22  0550   WBC 12.1* 11.8* 12.5*   HGB 7.7* 7.9* 7.7*   HCT 24.3* 24.4* 24.7*    357 353        Sepsis Evaluation   The patient is not known to have an infection.  NO EVIDENCE OF SEPSIS at this time.  Vital sign, physical exam, and lab findings are due to dehydration and possible contribution from tumor.

## 2022-06-15 NOTE — PROGRESS NOTES
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 Patient presented with worsening right hip and thigh pain and was found to have a pathologic right proximal femur fracture. Patient was transfused for anemia yest From chart review, patient has mixed iron deficiency and anemia of chronic disease.            # Right hip and thigh pleomorphic sarcoma; patient is s/p neoadjuvant chemotherapy: Patient to eventually undergo surgical intervention per Orthopaedic Surgery.     Plan is for surgery on Friday 6/17     Per plastics patient needs CT venogram .    Spoke with patient 6/15. He politely declines imaging till he is able to speak with plastics .    Ortho planning surgical intervention on Friday     # Pathologic right proximal femur fracture: Pain control.has been erratic  Will get pain team consult in this perioperative period .      # Anemia - combination of iron deficiency and anemia of chronic disease: Patient last was transfused on 11-Jul-2022. Monitoring blood counts.     #  Tachycardia: ongoing , he declined CT angio . This is likely multifactorial combination of anemia /deconditioning /Pain       # Lactic acidosis: off and on . The abnormal vitals triggers lactic acid to be drawn , he does not have an infection at this time . Rehydrate and monitor          Diet: Diet  Regular Diet Adult    DVT Prophylaxis: Enoxaparin (Lovenox) SQ  Sung Catheter: Not present  Central Lines: PRESENT     Cardiac Monitoring: None  Code Status: Full Code      Disposition Plan   Expected Discharge: 06/22/2022     Anticipated discharge location: inpatient rehabilitation facility    Delays:            The patient's care was discussed with the Bedside Nurse, Care Coordinator/, Patient and plastics and orthopedic   Consultant.    Flavia Salinas MD  Hospitalist Service, GOLD TEAM 16  [facility]  Securely message with the [email or web address] Web Console (learn more here)  Text page via [facility] Paging/Directory   Please see signed in provider for up to date coverage information      Clinically Significant Risk Factors Present on Admission                    ______________________________________________________________________    Interval History   No new issues  Declines CT venogram   No cp   No sob   No fever   Pain was worse last time and given IV Toradol     Data reviewed today: I reviewed all medications, new labs and imaging results over the last 24 hours.    Physical Exam   Vital Signs: Temp: 97.3  F (36.3  C) Temp src: Oral BP: 124/72 Pulse: 117   Resp: 16 SpO2: 98 % O2 Device: None (Room air)    Weight: 0 lbs 0 oz  Constitutional: awake, alert, cooperative, no apparent distress, and appears stated age  Eyes: Lids and lashes normal, pupils equal, round and reactive to light, extra ocular muscles intact, sclera clear, conjunctiva normal  Skin ; no jaundice   Hematologic / Lymphatic: no cervical lymphadenopathy  Respiratory: No increased work of breathing, good air exchange, clear to auscultation bilaterally, no crackles or wheezing  Cardiovascular: Normal apical impulse, regular rate and rhythm, normal S1 and S2,  and no murmur noted  GI: No scars, normal bowel sounds, soft, non-distended, non-tender, no masses palpated,   Neurologic: Awake, alert, oriented to name, place and time.  Cranial nerves II-XII are grossly intact.    Neuropsychiatric: General: normal, calm and normal eye contact    Data   Recent Labs   Lab 06/15/22  0820 06/14/22  0636 06/13/22  0550 06/12/22  0605   WBC 12.1* 11.8* 12.5* 12.1*   HGB 7.7* 7.9* 7.7* 7.6*   MCV 70* 70* 72* 70*    357 353 342    134  --  134   POTASSIUM 4.2 4.0  --  4.2   CHLORIDE 100 99  --  100   CO2 30 27  --  26   BUN 15 12  --   13   CR 0.70 0.63*  --  0.62*   ANIONGAP 5 8  --  8   VENTURA 9.1 9.3  --  9.3   * 116*  --  111*

## 2022-06-15 NOTE — PROGRESS NOTES
VS: Blood pressure 131/75, pulse (!) 125, temperature 97.2  F (36.2  C), temperature source Oral, resp. rate 16, SpO2 100 %.       O2: Spo2>90% on RA; lung sounds clear and equal    Output: Voids spontaneously in urinal at bedside    Last BM: 06/12   Activity: Strict bedrest   Skin: Cracked and dry skin on RLE; visible skin intact    Pain: Pain to r hip, managed with scheduled morphine and oxycodone     CMS: Intact    Dressing: Na    Diet: Regular diet, tolerating well   LDA: Na    Equipment: Personal belongings, call light in reach    Plan: Possible amputation on 06/17   Additional Info:

## 2022-06-16 NOTE — ANESTHESIA PROCEDURE NOTES
Epidural catheter Procedure Note    Pre-Procedure   Staff -        Anesthesiologist:  Aramis De Souza MD       Performed By: anesthesiologist       Location: pre-op       Procedure Start/Stop Times: 6/16/2022 10:00 AM  Procedure Documentation  Procedure: epidural catheter  Medication(s) Administered   Medication Administration Time: 6/16/2022 10:00 AM     Comments:  Sking prepped, local anesthetic infiltrated.  Touhy needle placed at L2-L3 level  Patient do not tolerate the needle on his back. No opportunity to manipulate needle position.  Procedure is aborted

## 2022-06-16 NOTE — PLAN OF CARE
VS: VSS ex slightly tachy   O2: RA   Output: Voiding without difficulty in bed pan   Last BM: 6/12    Activity: Bedrest    Skin: R leg peeling/scaly, discoloration   Pain: Managed with Oxycodone PRN, scheduled tylenol and MS contin   Neuro: A&OX4   Dressing: none   Diet: Regular    LDA: PIV R Hand, saline locked    Plan: Plan to place epidural tomorrow morning. Surgery Friday.   Additional Info:

## 2022-06-16 NOTE — PROGRESS NOTES
[facility]    Medicine Progress Note - Hospitalist Service, GOLD TEAM 16    Date of Admission:  6/5/2022    Assessment & Plan                 6/16     Spoke with ortho and plastics   Plan for extensive surgery in am ( amputation and reconstruction )   Will need anesthesia input and may need IMC/ICU stay for pain management   CT venogram still not done due to pain ( spoke with radiology )   Epidural attempt again in pm   Pain team consulted   Sister has been planning to come since last week , not able to be be here  .  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 Patient presented with worsening right hip and thigh pain and was found to have a pathologic right proximal femur fracture. Patient was transfused for anemia yest From chart review, patient has mixed iron deficiency and anemia of chronic disease.            # Right hip and thigh pleomorphic sarcoma; patient is s/p neoadjuvant chemotherapy: Patient to eventually undergo surgical intervention per Orthopaedic Surgery.     Plan is for surgery on Friday 6/17     Per plastics patient needs CT venogram .    Ortho planning surgical intervention on Friday     # Pathologic right proximal femur fracture: Pain control.has been erratic  Will get pain team consult in this perioperative period .      # Anemia - combination of iron deficiency and anemia of chronic disease: Patient last was transfused on 11-Jul-2022. Monitoring blood counts.     #  Tachycardia: ongoing , he declined CT angio . This is likely multifactorial combination of anemia /deconditioning /Pain       # Lactic acidosis: off and on . The abnormal vitals triggers lactic acid to be drawn , he does not have an infection at this time . Rehydrate and monitor   Resolved        Diet: Diet  Regular Diet Adult  NPO per Anesthesia Guidelines for Procedure/Surgery Except for: Meds    DVT Prophylaxis: mechanical as  surgery and epidural being considered and   Sung Catheter: Not present  Central Lines: PRESENT     Cardiac Monitoring: None  Code Status: Full Code      Disposition Plan   Expected Discharge: 06/22/2022     Anticipated discharge location: inpatient rehabilitation facility    Delays:           The patient's care was discussed with the Bedside Nurse, Care Coordinator/, Patient and ORTHO and Plastics Consultant.    Flavia Salinas MD  Hospitalist Service, GOLD TEAM [address] [place] [address]  Securely message with the [employer] Web Console (learn more here)  Text page via [facility] Paging/Directory   Please see signed in provider for up to date coverage information      Clinically Significant Risk Factors Present on Admission                    ______________________________________________________________________    Interval History   No new issues  Pain tolerable currently   No cp   No sob   No fever   Ongoing tachycardia    Data reviewed today: I reviewed all medications, new labs and imaging results over the last 24 hours.     Physical Exam   Vital Signs: Temp: 98.4  F (36.9  C) Temp src: Oral BP: 136/76 Pulse: 117   Resp: 16 SpO2: 100 % O2 Device: None (Room air)    Weight: 0 lbs 0 oz  Constitutional: awake, alert, cooperative, no apparent distress, and appears stated age  Eyes: Lids and lashes normal, pupils equal, round and reactive to light, extra ocular muscles intact, sclera clear, conjunctiva normal  Hematologic / Lymphatic: no cervical lymphadenopathy  Respiratory: No increased work of breathing, good air exchange, clear to auscultation bilaterally, no crackles or wheezing  Cardiovascular: Normal apical impulse, regular rate and rhythm, normal S1 and S2, no S3 or S4, and no murmur noted  GI: No scars, normal bowel sounds, soft, non-distended, non-tender, no masses palpated,  Skin: watermelon sized tumour right lower ext  Neurologic: Awake, alert, oriented to name,  place and time.  Cranial nerves II-XII are grossly intact.   Neuropsychiatric: General: normal, calm and normal eye contact    Data   Recent Labs   Lab 06/15/22  0820 06/14/22  0636 06/13/22  0550 06/12/22  0605   WBC 12.1* 11.8* 12.5* 12.1*   HGB 7.7* 7.9* 7.7* 7.6*   MCV 70* 70* 72* 70*    357 353 342    134  --  134   POTASSIUM 4.2 4.0  --  4.2   CHLORIDE 100 99  --  100   CO2 30 27  --  26   BUN 15 12  --  13   CR 0.70 0.63*  --  0.62*   ANIONGAP 5 8  --  8   VENTURA 9.1 9.3  --  9.3   * 116*  --  111*

## 2022-06-16 NOTE — ANESTHESIA CARE TRANSFER NOTE
Patient: Vic Sonia III    Procedure: Procedure(s):  ANESTHESIA OUT OF OR - Block Epidural       Diagnosis: Pain [R52]  Diagnosis Additional Information: No value filed.    Anesthesia Type:   Epidural     Note:      Level of Consciousness: awake  Oxygen Supplementation: room air    Independent Airway: airway patency satisfactory and stable        Patient transferred to: PACU    Handoff Report: Identifed the Patient, Identified the Reponsible Provider, Reviewed the pertinent medical history, Discussed the surgical course, Reviewed Intra-OP anesthesia mangement and issues during anesthesia, Set expectations for post-procedure period and Allowed opportunity for questions and acknowledgement of understanding      Vitals:  Vitals Value Taken Time   BP     Temp     Pulse     Resp     SpO2         Electronically Signed By: Masood Flower MD  June 16, 2022  10:40 AM

## 2022-06-16 NOTE — ANESTHESIA PREPROCEDURE EVALUATION
Anesthesia Pre-Procedure Evaluation    Patient: Vic Scott III   MRN: [id number] : 1975        Procedure : Procedure(s):  ANESTHESIA OUT OF OR - Block Epidural          No past medical history on file.   Past Surgical History:   Procedure Laterality Date     INSERT PORT VASCULAR ACCESS Right 2021    Procedure: INSERTION, VASCULAR ACCESS PORT;  Surgeon: Sushil Gonzales MD;  Location: [facility] OR     IR CHEST PORT PLACEMENT > 5 YRS OF AGE  2021      Allergies   Allergen Reactions     Blood Transfusion Related (Informational Only) Other (See Comments)     Patient has a history of a clinically significant antibody against RBC antigens.  A delay in compatible RBCs may occur.       Social History     Tobacco Use     Smoking status: Never Smoker     Smokeless tobacco: Never Used   Substance Use Topics     Alcohol use: Not on file      Wt Readings from Last 1 Encounters:   22 106.6 kg (235 lb)        Anesthesia Evaluation            ROS/MED HX  ENT/Pulmonary:       Neurologic:       Cardiovascular:       METS/Exercise Tolerance:     Hematologic:     (+) anemia,     Musculoskeletal:       GI/Hepatic:       Renal/Genitourinary:       Endo:       Psychiatric/Substance Use:       Infectious Disease:       Malignancy: Comment: Gigant right thigh sarcoma  (+) Malignancy, History of Other.    Other:            Physical Exam    Airway        Mallampati: III   TM distance: > 3 FB   Neck ROM: full   Mouth opening: > 3 cm    Respiratory Devices and Support         Dental  no notable dental history         Cardiovascular   cardiovascular exam normal          Pulmonary   pulmonary exam normal                OUTSIDE LABS:  CBC:   Lab Results   Component Value Date    WBC 12.1 (H) 06/15/2022    WBC 11.8 (H) 2022    HGB 7.7 (L) 06/15/2022    HGB 7.9 (L) 2022    HCT 24.3 (L) 06/15/2022    HCT 24.4 (L) 2022     06/15/2022     2022     BMP:   Lab Results   Component Value Date      06/15/2022     06/14/2022    POTASSIUM 4.2 06/15/2022    POTASSIUM 4.0 06/14/2022    CHLORIDE 100 06/15/2022    CHLORIDE 99 06/14/2022    CO2 30 06/15/2022    CO2 27 06/14/2022    BUN 15 06/15/2022    BUN 12 06/14/2022    CR 0.70 06/15/2022    CR 0.63 (L) 06/14/2022     (H) 06/15/2022     (H) 06/14/2022     COAGS:   Lab Results   Component Value Date    INR 1.13 06/05/2022    FIBR 455 11/08/2021     POC: No results found for: BGM, HCG, HCGS  HEPATIC:   Lab Results   Component Value Date    ALBUMIN 2.4 (L) 06/05/2022    PROTTOTAL 7.2 06/05/2022    ALT 12 06/05/2022    AST 29 06/05/2022    ALKPHOS 137 06/05/2022    BILITOTAL 0.8 06/05/2022     OTHER:   Lab Results   Component Value Date    LACT 1.5 06/15/2022    VENTURA 9.1 06/15/2022    PHOS 2.7 12/04/2021    MAG 2.2 12/04/2021    .0 (H) 12/03/2021       Anesthesia Plan    ASA Status:  3   - Procedure: Procedure only, no anesthetic delivered      Anesthesia Type: Epidural.              Consents    Anesthesia Plan(s) and associated risks, benefits, and realistic alternatives discussed. Questions answered and patient/representative(s) expressed understanding.    - Discussed:     - Discussed with:  Patient         Postoperative Care            Comments:                Masood Flower MD

## 2022-06-16 NOTE — PROGRESS NOTES
June 16, 2022    Patient off floor for nerve block procedure.   To OR tomorrow with Dr Yuen  NPO at midnight  IV fluids 0.9 NS 75 ml per hour  Hold all anticoagulation starting tonight.  Appreciate Medicine cares.    Zara Purvis APRN

## 2022-06-16 NOTE — PLAN OF CARE
VS: Temp: 98.4  F (36.9  C) Temp src: Oral BP: 123/70 Pulse: 111   Resp: 16 SpO2: 100 % O2 Device: None (Room air)       O2: 1005 on RA   Output: Voids spontaneously without difficulties in urinal    Last BM: 6/12/22   Activity: Bedfast- declined repositioning, has upper body strength   Skin: RLE & LLE dark/ black skin color, peeling, edema on right thigh and calf   Pain: Unchanged,Constantly a 9/10, frequently requesting oxycodone, also on scheduled tylenol   CMS: Alert and oriented X4, denies nausea, able to verbalize needs   Dressing: N/A   Diet: REG- snacked X3 during the night   LDA: PIV in R forearm, piv in left forearm. Both SL   Equipment: Capno, IV pole/pump   Plan: To have CT today, an epidural and have R leg amputation on Friday.   Additional Info:

## 2022-06-16 NOTE — PROGRESS NOTES
Pt transferred to PreOp/Phase II for Lumbar Epidural block placement. Time out completed and pt received 50mcg Fentanyl at 0950 and 50mcg Fentanyl at 0952. Pt unable to fully position self on left side. Epidural placement attempted by Dr. Flower and per pt request, procedure terminated due to pt pain. PRN Oxycodone 20mg given to pt and he was transported back to inpatient room. Report given to floor nurse, Wilman Olsen. Procedure may be done later this afternoon.

## 2022-06-16 NOTE — ANESTHESIA POSTPROCEDURE EVALUATION
Patient: Vic Scott III    Procedure: Procedure(s):  ANESTHESIA OUT OF OR - Block Epidural       Anesthesia Type:  Epidural    Note:  Disposition: Inpatient   Postop Pain Control:            Sign Out: Pain at Preoperative BASELINE   PONV: No   Neuro/Psych: Uneventful            Sign Out: Acceptable/Baseline neuro status   Airway/Respiratory: Uneventful            Sign Out: Acceptable/Baseline resp. status   CV/Hemodynamics: Uneventful            Sign Out: Acceptable CV status; No obvious hypovolemia; No obvious fluid overload   Other NRE:    DID A NON-ROUTINE EVENT OCCUR?     Event details/Postop Comments:  Procedure aborted. Patient going back to his room           Last vitals:  Vitals:    06/15/22 1529 06/15/22 2340 06/16/22 0805   BP: 126/56 123/70 136/76   Pulse: 114 111 117   Resp: 16 16 16   Temp: 36.9  C (98.4  F) 36.9  C (98.4  F) 36.9  C (98.4  F)   SpO2:  100% 100%       Electronically Signed By: Masood Flower MD  June 16, 2022  11:01 AM

## 2022-06-16 NOTE — PROGRESS NOTES
Brief update    Consent for plastic surgery closure tomorrow signed and in chart. Consent was signed with patient, patient's sister on the phone and a witness present. All questions answered. Patient R thigh was marked. Please make NPO MN for procedure tomorrow 6/17.     Gemini Espana PA-C  Plastic and Reconstructive Surgery  [phone] or plastic surgery on call in [facility]

## 2022-06-16 NOTE — PROGRESS NOTES
Care Management Follow Up    Stretcher transport setup for 6/17/2022 at 8:30am to [facility] to pre-op for surgery. Surgical department to call for return ride. Transport set up through  [facility], phone [phone].    Lilia Suh RN, BSN  Care Coordinator,  Ortho  Phone [phone]  Pager [phone]

## 2022-06-16 NOTE — PLAN OF CARE
VS: Blood pressure 136/76, pulse 117, temperature 98.4  F (36.9  C), temperature source Oral, resp. rate 16, SpO2 100 %.     O2: >95% on RA.Denies chest pain and SOB.   Output: Voids spontaneously and adequately.   Last BM: When writer ask if it's accurate that pt have not had a BM in the last 4 days since 6/12/2022, pt shook his head no. Pt reports that he had a BM a couple days ago, but doesn't remember when. Pt denied feeling constipated. Pt seems to be uncomfortable talking about the subject. Pt decline laxative ordered and say he will request for it when ready.    Activity: Bedrest   Skin: Dry peeling, dark discoloration. Visible skin intact   Pain: Pain was managed with scheduled tylenol and prn oxycodone   Neuro: A&O x4. Denies N/T.   Dressing: None.   Diet: Regular. Denies N/V.  NPO by midnight   LDA: R VANESA SL.   Equipment: Call light and personal belongings.   Plan: Plan for surgery tomorrow 6/17/2022 on [place]  Stretcher transport setup at 8:30am.   Additional Info: Unable to tolerate epidural block this AM in preparation for surgery.  Pt refused CHG wipes this shift. Pt says that he doesn't like to be touch and will do it himself as he is able. Writer offer the wan wipes and explain that each wipes should only be used for each body parts. Pt accepts and verbalized understanding.

## 2022-06-16 NOTE — PROGRESS NOTES
Orthopedic Surgery Progress Note   June 16, 2022    Subjective: No acute events overnight. Pain well controlled. Tolerating diet. Voiding spontaneously. +Flatus, no BM. Denies fever or chills, CP, SOB, numbness or tingling, motor dysfunction or weakness. Unable to tolerate epidural block this AM in preparation for surgery    Objective: /76 (BP Location: Right arm)   Pulse 117   Temp 98.4  F (36.9  C) (Oral)   Resp 16   SpO2 100%     General: Awake, alert, appropriate, following commands, NAD.  Lungs: Breathing comfortably and nonlabored, no wheezes or stridor noted.  Heart/Cardiovascular: Regular pulse, no peripheral cyanosis.  Back: Nontender to palpation throughout, no step-offs or deformities appreciated. Bilateral SI joints non-tender.   Right Lower Extremity: Significant swelling, and mass to proximal thigh and pelvis.  No draining wounds.  He has significant edema to the foot of the right lower extremity.  He is able to wiggle his toes and is grossly neurologically intact.  Palpable pulses.       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 Patient presented with worsening right hip and thigh pain and was found to have a pathologic right proximal femur fracture. Patient was transfused for anemia yest From chart review, patient has mixed iron deficiency and anemia of chronic disease.  To OR On Friday June 17, 2022 with Dr Yuan.  Discussed with patient; plan for surgery on [place].      Primary  Diet: NPO after midnight  NPO per Anesthesia Guidelines for Procedure/Surgery Except for: Meds    DVT Prophylaxis: mechanical ONLY; hold all anticoagulation  Sung Catheter: Not present  CONSULTS:  Appreciate Medicine following    Zara ROSENBERG CNS  Orthopaedic Surgery  Pager: [phone]

## 2022-06-17 NOTE — ANESTHESIA PROCEDURE NOTES
Central Line/PA Catheter Placement    Pre-Procedure   Staff -        Anesthesiologist:  Akshat Ma MD       Performed By: anesthesiologist       Pre-Anesthestic Checklist: patient identified, IV checked, site marked, risks and benefits discussed, informed consent, monitors and equipment checked, pre-op evaluation and at physician/surgeon's request  Timeout:       Correct Patient: Yes        Correct Procedure: Yes        Correct Site: Yes        Correct Position: Yes        Correct Laterality: Yes   Line Placement:   This line was placed Post Induction starting at 6/17/2022 3:30 PM and ending at 6/17/2022 3:38 PM    Procedure   Procedure: central line and elective       Diagnosis: acute blood loss       Laterality: right       Insertion Site: internal jugular.       Patient Position: Trendelenburg  Sterile Prep        All elements of maximal sterile barrier technique followed       Patient Prep/Sterile Barriers: draped, x2, hand hygiene, gloves , hat , mask , draped, gown, sterile gel and probe cover       Skin prep: Chloraprep  Insertion/Injection        Technique: ultrasound guided and Seldinger Technique        1. Ultrasound was used to evaluate the access site.       2. Vein evaluated via ultrasound for patency/adequacy.       3. Using real-time ultrasound the needle/catheter was observed entering the artery/vein.       5. The visualized structures were anatomically normal.       6. There were no apparent abnormal pathologic findings.       Introducer Type: 9 Fr, 2-lumen MAC        Type: Introducer  Narrative         Secured by: suture       Tegaderm dressing used.       Complications: None apparent,        blood aspirated from all lumens,        All lumens flushed: Yes

## 2022-06-17 NOTE — ANESTHESIA PREPROCEDURE EVALUATION
Anesthesia Pre-Procedure Evaluation    Patient: Vic Scott III   MRN: [id number] : 1975        Procedure : Procedure(s):  ANESTHESIA OUT OF OR - Block Epidural          No past medical history on file.   Past Surgical History:   Procedure Laterality Date     ANESTHESIA OUT OF OR BLOOD BRAIN BARRIER DISRUPTION N/A 2022    Procedure: ANESTHESIA OUT OF OR - Block Epidural;  Surgeon: GENERIC ANESTHESIA PROVIDER;  Location: [facility] OR     INSERT PORT VASCULAR ACCESS Right 2021    Procedure: INSERTION, VASCULAR ACCESS PORT;  Surgeon: Sushil Gonzales MD;  Location: [facility] OR     IR CHEST PORT PLACEMENT > 5 YRS OF AGE  2021      Allergies   Allergen Reactions     Blood Transfusion Related (Informational Only) Other (See Comments)     Patient has a history of a clinically significant antibody against RBC antigens.  A delay in compatible RBCs may occur.       Social History     Tobacco Use     Smoking status: Never Smoker     Smokeless tobacco: Never Used   Substance Use Topics     Alcohol use: Not on file      Wt Readings from Last 1 Encounters:   22 106.6 kg (235 lb)        Anesthesia Evaluation   Pt has had prior anesthetic. Type: MAC.        ROS/MED HX  ENT/Pulmonary:  - neg pulmonary ROS     Neurologic:  - neg neurologic ROS     Cardiovascular:  - neg cardiovascular ROS  (-) murmur   METS/Exercise Tolerance:     Hematologic:     (+) anemia,     Musculoskeletal: Comment: Pathological femur fracture      GI/Hepatic:  - neg GI/hepatic ROS     Renal/Genitourinary:  - neg Renal ROS     Endo:       Psychiatric/Substance Use:  - neg psychiatric ROS     Infectious Disease:  - neg infectious disease ROS     Malignancy: Comment: Gigant right thigh sarcoma  (+) Malignancy, History of Other.Other CA Sarcoma Active status post.    Other:            Physical Exam    Airway        Mallampati: III   TM distance: > 3 FB   Neck ROM: full   Mouth opening: > 3 cm    Respiratory Devices and Support          Dental  no notable dental history         Cardiovascular   cardiovascular exam normal       Rhythm and rate: regular and normal (-) no murmur    Pulmonary   pulmonary exam normal                OUTSIDE LABS:  CBC:   Lab Results   Component Value Date    WBC 11.4 (H) 06/17/2022    WBC 12.1 (H) 06/16/2022    HGB 7.5 (L) 06/17/2022    HGB 7.6 (L) 06/16/2022    HCT 23.7 (L) 06/17/2022    HCT 23.8 (L) 06/16/2022     06/17/2022     06/16/2022     BMP:   Lab Results   Component Value Date     06/17/2022     06/16/2022    POTASSIUM 4.1 06/17/2022    POTASSIUM 4.2 06/16/2022    CHLORIDE 100 06/17/2022    CHLORIDE 99 06/16/2022    CO2 30 06/17/2022    CO2 30 06/16/2022    BUN 15 06/17/2022    BUN 14 06/16/2022    CR 0.66 06/17/2022    CR 0.67 06/16/2022     (H) 06/17/2022     (H) 06/16/2022     COAGS:   Lab Results   Component Value Date    INR 1.07 06/17/2022    FIBR 455 11/08/2021     POC: No results found for: BGM, HCG, HCGS  HEPATIC:   Lab Results   Component Value Date    ALBUMIN 2.4 (L) 06/05/2022    PROTTOTAL 7.2 06/05/2022    ALT 12 06/05/2022    AST 29 06/05/2022    ALKPHOS 137 06/05/2022    BILITOTAL 0.8 06/05/2022     OTHER:   Lab Results   Component Value Date    LACT 1.5 06/15/2022    VENTURA 9.2 06/17/2022    PHOS 2.7 12/04/2021    MAG 2.2 12/04/2021    .0 (H) 12/03/2021       Anesthesia Plan    ASA Status:  3   - Procedure: Procedure only, no anesthetic delivered   NPO Status:  NPO Appropriate    Anesthesia Type: General.     - Airway: ETT   Induction: Intravenous.   Maintenance: Inhalation.   Techniques and Equipment:     - Lines/Monitors: 2nd IV, BIS     - Blood: Blood in Room     Consents    Anesthesia Plan(s) and associated risks, benefits, and realistic alternatives discussed. Questions answered and patient/representative(s) expressed understanding.    - Discussed:     - Discussed with:  Patient      - Extended Intubation/Ventilatory Support Discussed: No.       - Patient is DNR/DNI Status: No    Use of blood products discussed: Yes.     - Discussed with: Patient.     - Consented: consented to blood products            Reason for refusal: other.     Postoperative Care    Pain management: IV analgesics, Oral pain medications, Neuraxial analgesia.   PONV prophylaxis: Ondansetron (or other 5HT-3)     Comments:    Other Comments: Patient seen and examined.  Risks, benefits and alternatives to GETA discussed.  Questions answered and patient wishes to proceed  I have had a lengthy discussion with the patient regarding his post op pain control options.  It is my understanding that he was unable to adequately position for his epidural yesterday secondary to pain.  It would be possible today to provide some sedation to help with his comfort during his epidural today.  Plan multimodal.                Akshat Ma MD

## 2022-06-17 NOTE — PROGRESS NOTES
Regional anesthesia service note    Addendum: I discussed plan of care with Vic Edmond's intraoperative anesthesiologist. He had another discussion with Vic, stating, as I did, that we can provide more sedation than was used yesterday to try to keep him comfortable & reiterated the analgesic benefits of an epidural for this significant of a surgery. After thinking it over, Vic is agreeable to us performing the epidural under sedation.   ---  I met with Vic in Preop and had an extensive discussion about epidural placement for postoperative analgesia. He states that he will not try it again, even with liberal pain medication/sedation, as the attempt yesterday was too painful/uncomfortable (both positioning due to the large sarcoma, as well as pain with the needle). He states that if the epidural cannot be placed under general anesthesia, then he does not want it. I did get a second opinion from, Dr. Ramos, who is our current inpatient pain service attending, as well as our Clinical Chief of Chronic Pain Management. He is in agreement that a neuraxial procedure under general anesthesia would be outside of our normal scope of practice, and the risk of causing an unknown nerve injury would not be reasonable to take on. Vic was understanding of this, and would like to maximize IV analgesics. He has also consent to rescue epidural postoperatively if needed.     Loni Judd MD  Anesthesiology

## 2022-06-17 NOTE — ANESTHESIA PROCEDURE NOTES
Airway         Procedure Start/Stop Times: 6/17/2022 3:14 PM  Staff -        Performed By: SRNA and with CRNAs       Procedure performed by resident/fellow/CRNA in presence of a teaching physician.  Indications and Patient Condition       Indications for airway management: marty-procedural       Induction type:intravenous       Mask difficulty assessment: 1 - vent by mask    Final Airway Details       Final airway type: endotracheal airway       Successful airway: ETT - single  Endotracheal Airway Details        ETT size (mm): 7.5       Cuffed: yes       Successful intubation technique: direct laryngoscopy       Grade View of Cords: 1       Adjucts: stylet       Position: Right       Measured from: gums/teeth       Secured at (cm): 23       Bite block used: None    Post intubation assessment        Placement verified by: capnometry, equal breath sounds and chest rise        Number of attempts at approach: 1       Secured with: silk tape       Ease of procedure: easy       Dentition: Intact and Unchanged    Medication(s) Administered   Medication Administration Time: 6/17/2022 3:14 PM

## 2022-06-17 NOTE — PLAN OF CARE
"CT called asking about pre-op CAT scan, pt not tolerating any turning and needs hovermat; placed on hold at the moment    ADD: Pt declining pre-op baths. Did participate with pre-op checklist with no issues. Pt seems modest and hesitant to move due to pain. Remains pleasant, pt states to his sister, \"I'm on my last leg, damn it!\" Sister rolled her eyes.  "

## 2022-06-17 NOTE — ANESTHESIA PROCEDURE NOTES
Epidural catheter Procedure Note    Pre-Procedure   Staff -        Anesthesiologist:  Loni Judd MD       Resident/Fellow: Jeana Mcclain MD       Performed By: anesthesiologist and with residents       Procedure performed by resident/fellow/CRNA in presence of a teaching physician.         Location: OR       Procedure Start/Stop Times: 6/17/2022 2:54 PM and 6/17/2022 3:05 PM       Pre-Anesthestic Checklist: patient identified, IV checked, risks and benefits discussed, informed consent, monitors and equipment checked, pre-op evaluation, at physician/surgeon's request and post-op pain management  Timeout:       Correct Patient: Yes        Correct Procedure: Yes        Correct Site: Yes        Correct Position: Yes   Procedure Documentation  Procedure: epidural catheter       Diagnosis: postoperative pain control       Patient Position: LLD       Patient Prep/Sterile Barriers: sterile gloves, mask, patient draped       Skin prep: Chloraprep       Local skin infiltrated with 3 mL of 1% lidocaine.        Insertion Site: L1-2. (midline approach).       Technique: LORT saline        ERROL at 6.5 cm.       Needle Type: ToLensVectory needle       Needle Gauge: 17.        Needle Length (Inches): 3.5        Catheter: 19 G.          Catheter threaded easily.         4.5 cm epidural space.         Threaded 11 cm at skin.         # of attempts: 2 and  # of redirects:  2    Assessment/Narrative         Paresthesias: No.       Test dose of 3 mL lidocaine 1.5% w/ 1:200,000 epinephrine at 15:05 CDT.        .       Insertion/Infusion Method: LORT saline       No aspiration negative for Heme or CSF via Epidural Catheter.    Medication(s) Administered   Medication Administration Time: 6/17/2022 2:54 PM     Comments:  Discussed risks of epidural including need for replacement, low blood pressure, bleeding, infection, nerve injury, headache and possible treatment with blood patch.    Determination of epidural placement was guided by  "area of prep needed by surgery teams (therefore higher lumbar level had to be used, rather than lower).     Patient tolerated under sedation by intraop anesthesia team. Was conversant throughout and able to follow directions & communicate what he was feeling. 1st attempt by Dr. Mcclain, encountered bone. 2nd attempt by Dr. Judd, able to reach epidural space after optimizing positioning. No paresthesia, no heme, no CSF. Negative test dose. Intact motor function & sensation to light touch on the left leg following procedure completion.     Pain Team Contact information: please Page the Adult Inpatient Pain Team Via [employer]. Search \"Pain\". During daytime hours, please page the attending first. At night please page the resident first.             "

## 2022-06-17 NOTE — OR NURSING
Upon prepping for york catheter insertion.  It was found that there was what appeared to be rope/string wrapped multiple times around the patients penis mid shaft.  Rope/ string was removed and surgeon MD Whitaker was updated of the situation.  Placed york without difficulty, one attempt.  During positioning it was noted that the swelling of the penis shaft has enlarged.

## 2022-06-17 NOTE — PROGRESS NOTES
Orthopedic Surgery Progress Note   June 17, 2022    Subjective: No acute events overnight. Pain somewhat controlled. Unable to tolerate epidural block yesterday in preparation for surgery and unable to tolerate being transported to CT last night for venogram. Plan for transfer to [place] for surgery today.     Objective: /67 (BP Location: Left arm)   Pulse (!) 133   Temp 97.9  F (36.6  C) (Oral)   Resp 16   SpO2 100%     General: Awake, alert, appropriate, following commands, NAD.  Lungs: Breathing comfortably and nonlabored, no wheezes or stridor noted.  Heart/Cardiovascular: No peripheral cyanosis.    Right Lower Extremity: Significant swelling, and mass to proximal thigh and pelvis.   He has significant edema to the foot of the right lower extremity.  He is able to wiggle his toes and is grossly neurologically intact.        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 Patient presented with worsening right hip and thigh pain and was found to have a pathologic right proximal femur fracture. Patient was transfused for anemia yest From chart review, patient has mixed iron deficiency and anemia of chronic disease.  To OR On Friday June 17, 2022 with Dr Yuen and Filomena for hindquarter amputation and flap.  Discussed with patient; plan for surgery on [place]. Transport arranged for 8:30am.    pRBCs ordered to be held for surgery.     Primary  Diet: NPO for surgery  NPO per Anesthesia Guidelines for Procedure/Surgery Except for: Meds    DVT Prophylaxis: mechanical ONLY; hold all anticoagulation  Sung Catheter: Not present  CONSULTS:  Appreciate Medicine following    Brooke Fan MD  PGY-4  Orthopaedic Surgery

## 2022-06-17 NOTE — PLAN OF CARE
VS: BP (!) 148/80   Pulse (!) 127   Temp 98.7  F (37.1  C)   Resp 16   SpO2 97%    O2: >90% on room air. Denies SOB/N/V/chest pain    Output: Voiding spontaneously using urinal    Last BM: Per patient a couple days, refused bowel medication. Passing flatus.    Activity: Bedrest. Patient declining reposition    Skin: Dry peeling, dark discoloration. Wound to right calf. RLE edema.    Pain: Managed with Schedule tylenol,    CMS: Numbness and tingling to RLE    Dressing: Dressing to right calf    Diet: NPO at midnight    LDA: PIV saline  locked    Equipment: Call light, pulsate mattress   Plan: To transfer to the [facility] for surgery    Additional Info:  patient declining surgical scrubs, ortho (Latosha Baumann) notified.   Patient transfer to [place] @9am by  EMS transport.

## 2022-06-17 NOTE — PROGRESS NOTES
[facility]    Medicine Progress Note - Hospitalist Service, GOLD TEAM 16    Date of Admission:  6/5/2022    Assessment & Plan                            6/17    Plan is for patient to go to [place] and have amputation performed with orthopedic and plastics .       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 Patient presented with worsening right hip and thigh pain and was found to have a pathologic right proximal femur fracture. Patient was transfused for anemia yest From chart review, patient has mixed iron deficiency and anemia of chronic disease.            # Right hip and thigh pleomorphic sarcoma; patient is s/p neoadjuvant chemotherapy: Patient to eventually undergo surgical intervention per Orthopaedic Surgery.   # Pathologic right proximal femur fracture: pain team involved   # Anemia - combination of iron deficiency and anemia of chronic disease: Patient last was transfused on 11-Jul-2022. Monitoring blood counts.   #  Tachycardia: ongoing , he declined CT angio . This is likely multifactorial combination of anemia /deconditioning /Pain . He has risk factors for DVT and PE   # Lactic acidosis: off and on . The abnormal vitals triggers lactic acid to be drawn , he does not have an infection at this time . Rehydrate and monitor   Resolved      Diet: Diet  npo  DVT Prophylaxis: surgery today   Sung Catheter: Not present  Central Lines: PRESENT     Cardiac Monitoring: None  Code Status: Full Code      Disposition Plan   Expected Discharge: 06/22/2022     Anticipated discharge location: inpatient rehabilitation facility    Delays:           The patient's care was discussed with the Bedside Nurse, Patient and Primary team.    Flavia Salinas MD  Hospitalist Service, GOLD TEAM 16  [facility]  Securely message with the Vocera Web Console (learn more here)  Text page via [email or web address]  Paging/Directory   Please see signed in provider for up to date coverage information      Clinically Significant Risk Factors Present on Admission                    ______________________________________________________________________    Interval History   No new issues  Did not get epidural yesterday , and did not CT venogram   Refused bowel prep   Refused surgical bath   No cp   No sob   No fever   No chills      Data reviewed today: I reviewed all medications, new labs and imaging results over the last 24 hours.     Physical Exam   Vital Signs: Temp: 99  F (37.2  C) Temp src: Oral BP: (!) 146/87 Pulse: 120   Resp: 16 SpO2: 100 % O2 Device: None (Room air)    Weight: 0 lbs 0 oz  General Appearance: Alert and interactive , NAD  Respiratory: clear , reduced at bases , no wheeze  Cardiovascular: RRR, no m/r/g  GI: soft , nontender abdomen , bs positive   Skin: dry , no jaundice  Other: Large tumour right lower ext    Data   Recent Labs   Lab 06/17/22  0633 06/17/22  0541 06/16/22  1133 06/15/22  0820   WBC  --  11.4* 12.1* 12.1*   HGB  --  7.5* 7.6* 7.7*   MCV  --  71* 70* 70*   PLT  --  372 356 361   INR 1.07  --   --   --    NA  --  136 135 135   POTASSIUM  --  4.1 4.2 4.2   CHLORIDE  --  100 99 100   CO2  --  30 30 30   BUN  --  15 14 15   CR  --  0.66 0.67 0.70   ANIONGAP  --  6 6 5   VENTURA  --  9.2 9.1 9.1   GLC  --  119* 110* 155*

## 2022-06-17 NOTE — ANESTHESIA PROCEDURE NOTES
Arterial Line Procedure Note    Pre-Procedure   Staff -        Anesthesiologist:  Prachi Vickers MD       Performed By: anesthesiologist       Location: OR       Pre-Anesthestic Checklist: patient identified, IV checked, risks and benefits discussed, informed consent, monitors and equipment checked, pre-op evaluation and at physician/surgeon's request  Timeout:       Correct Patient: Yes        Correct Procedure: Yes        Correct Site: Yes        Correct Position: Yes   Line Placement:   This line was placed Post Induction starting at 6/17/2022 3:32 PM and ending at 6/17/2022 3:42 PM  Procedure   Procedure: arterial line       Laterality: left       Insertion Site: radial.  Sterile Prep        Standard elements of sterile barrier followed       Skin prep: Chloraprep  Insertion/Injection        Technique: ultrasound guided        1. Ultrasound was used to evaluate the access site.       2. Artery evaluated via ultrasound for patency/adequacy.       3. Using real-time ultrasound the needle/catheter was observed entering the artery/vein.       Catheter Type/Size: 20 G, 1.75 in/4.5 cm quick cath (integral wire)  Narrative         Secured by: suture       Tegaderm dressing used.       Complications: None apparent,        Arterial waveform: Yes

## 2022-06-17 NOTE — PLAN OF CARE
Patient A/Ox4, very pleasant. VSS ex tachycardic, sometimes mildly elevated BP. Denies CP, SOB, dizziness/LH. LSCTA. +fl/BS, pt opted not to take BM meds. Voiding well in urinal. CMS intact. NPO. Activity level is bedrest per orders. IV SL. Pain rated as tolerable throughout shift, managed with oxycodone 20mg around the clock, long acting morphine, and tylenol PRN. Surgery today, ride set up for 830am. Pt declined pre-op baths, most of pre-op checklist done otherwise. Patient has demonstrated ability to call appropriately. Patient is resting with call light within reach. Will continue to monitor.

## 2022-06-18 NOTE — ANESTHESIA CARE TRANSFER NOTE
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    Anesthesia Type:   General     Note:    Oropharynx: endotracheal tube in place  Level of Consciousness: iatrogenic sedation      Independent Airway: airway patency not satisfactory and stable  Dentition: dentition unchanged  Vital Signs Stable: post-procedure vital signs reviewed and stable  Report to RN Given: handoff report given  Patient transferred to: ICU  Comments: VSS. Patient on 30 propofol, 0.05 NE, 16 ketamine. Patient stable. All questions answered to RN.   ICU Handoff: Call for PAUSE to initiate/utilize ICU HANDOFF, Identified Patient, Identified Responsible Provider, Reviewed the Pertinent Medical History, Discussed Surgical Course, Reviewed Intra-OP Anesthesia Management and Issues during Anesthesia, Set Expectations for Post Procedure Period and Allowed Opportunity for Questions and Acknowledgement of Understanding      Vitals:  Vitals Value Taken Time   /82 06/17/22 2235   Temp     Pulse 112 06/17/22 2257   Resp     SpO2 100 % 06/17/22 2257   Vitals shown include unvalidated device data.    Electronically Signed By: Munir Plunkett MD  June 17, 2022  10:58 PM

## 2022-06-18 NOTE — H&P
SURGICAL ICU ADMISSION NOTE  6/17/2022      ASSESSMENT: Vic Scott III is a 47 year old male POD # 0 s/p R hemipelvectomy for sarcoma.    PLAN:   Neuro/ pain/ sedation:  - Monitor neurological status. Notify the MD/DO for any acute changes in exam.  - Fentayl gtt for pain.  - Propofol gtt for sedation.     Pulmonary care:   - Mechanical ventilation overnight: AC, 500/16/5/40%     Cardiovascular:    #Acute hypovolemic shock  - Monitor hemodynamic status.   - Wean pressors as able  - IV fluid resuscitation     GI care:   - NPO except medications while ventilated     Fluids/ Electrolytes/ Nutrition:   - LR for IV fluid hydration  - LR bolus as needed  - Sung in place     Endocrine:    - Sliding scale for diabetes management. No history of diabetes but glucose running high during the case.     ID/ Antibiotics:  - Perioperative antibiotics     Heme:     #Acute blood loss anemia  - Hemoglobin stable post op     Prophylaxis:    - Mechanical prophylaxis for DVT  - No chemical DVT prophylaxis due to high risk of bleeding  - PPI     Miscellaneous:    - Activity/wound care per plastics/ortho     Lines/ tubes/ drains:  - R internal jugular MAC, ETT, Sung, AMANDA, art line     Disposition:  - Surgical ICU    CODE:  - Full    - - - - - - - - - - - - - - - - - - - - - - - - - - - - - - - - - - - - - - - - - - - - - - - - - - - - - - - - - - - - - - - - - - - - -     PRIMARY TEAM: Orthopedic surgery  PRIMARY PHYSICIAN: Sherman    REASON FOR CRITICAL CARE ADMISSION: Mechanical ventilation, hemodynamic instability   ADMITTING PHYSICIAN: Perlman    HISTORY PRESENTING ILLNESS: Vic is a 47-year-old male with a PMHx s/f RLE sarcoma who is now POD #0 s/p R hemipelvectomy with free flap coverage. Procedure was significant for significant blood loss requiring 10 units RBCs transfusion.     REVIEW OF SYSTEMS: Unable to assess, patient ventilated and sedated    PAST MEDICAL HISTORY:   No past medical history on file.    SURGICAL  HISTORY:   Past Surgical History:   Procedure Laterality Date     ANESTHESIA OUT OF OR BLOOD BRAIN BARRIER DISRUPTION N/A 6/16/2022    Procedure: ANESTHESIA OUT OF OR - Block Epidural;  Surgeon: GENERIC ANESTHESIA PROVIDER;  Location: [facility] OR     INSERT PORT VASCULAR ACCESS Right 11/1/2021    Procedure: INSERTION, VASCULAR ACCESS PORT;  Surgeon: Sushil Gonzales MD;  Location: [facility] OR     IR CHEST PORT PLACEMENT > 5 YRS OF AGE  11/1/2021       FAMILY HISTORY: No bleeding/clotting disorders nor problems with anesthesia.    ALLERGIES:      Allergies   Allergen Reactions     Blood Transfusion Related (Informational Only) Other (See Comments)     Patient has a history of a clinically significant antibody against RBC antigens.  A delay in compatible RBCs may occur.        MEDICATIONS:  No current facility-administered medications on file prior to encounter.  diclofenac (VOLTAREN) 1 % topical gel, Apply 2-4 g topically 4 times daily as needed for moderate pain  ibuprofen (ADVIL/MOTRIN) 200 MG tablet, Take 400 mg by mouth every 8 hours as needed for mild pain  multivitamin, therapeutic (THERA-VIT) TABS tablet, Take 1 tablet by mouth daily  ondansetron (ZOFRAN) 8 MG tablet, Take 1 tablet (8 mg) by mouth every 8 hours as needed for nausea  prochlorperazine (COMPAZINE) 5 MG tablet, Take 1 tablet (5 mg) by mouth every 6 hours as needed for nausea or vomiting        PHYSICAL EXAMINATION:  Temp:  [98.7  F (37.1  C)-99.1  F (37.3  C)] 99  F (37.2  C)  Pulse:  [120-127] 120  Resp:  [16] 16  BP: (126-148)/(66-87) 146/87  SpO2:  [97 %-100 %] 100 %  General: Sedated  HEENT: Normocephalic, atraumatic  Neck: Supple  Chest wall: Symmetric thorax.   Respiratory: Mechanically ventilated  Cardiovascular: Regular rate and rhythm on telemetry  Gastrointestinal: Abdomen soft, non-distended  Genitourinary: Normal genitalia. Sung in place  Extremities: Single L lower extremity, incisions c/d/i, AMANDA with serosanguinous output  Skin: As noted  above. No rashes or lesions appreciated.    LABS: Reviewed.   Arterial Blood Gases   Recent Labs   Lab 06/17/22 2135 06/17/22 2112 06/17/22 2040 06/17/22 1828   PH 7.42 7.47* 7.44 7.45   PCO2 37 36 38 37   PO2 125* 123* 117* 144*   HCO3 24 26 26 26     Complete Blood Count   Recent Labs   Lab 06/17/22 2135 06/17/22 2112 06/17/22 2040 06/17/22 2038 06/17/22 1611 06/17/22  0541 06/16/22  1133 06/15/22  0820   WBC 8.3  --   --   --   --  11.4* 12.1* 12.1*   HGB 9.1*  9.3* 5.7* 6.1*  --    < > 7.5* 7.6* 7.7*   PLT 61*  --   --  138*  --  372 356 361    < > = values in this interval not displayed.     Basic Metabolic Panel  Recent Labs   Lab 06/17/22 2135 06/17/22 2112 06/17/22 2040 06/17/22 1828 06/17/22 1611 06/17/22  0541 06/16/22  1133 06/15/22  0820 06/14/22  0636    135 135 135   < > 136 135 135 134   POTASSIUM 4.7 4.8 4.9 4.9   < > 4.1 4.2 4.2 4.0   CHLORIDE  --   --   --   --   --  100 99 100 99   CO2  --   --   --   --   --  30 30 30 27   BUN  --   --   --   --   --  15 14 15 12   CR  --   --   --   --   --  0.66 0.67 0.70 0.63*   * 154* 193* 163*   < > 119* 110* 155* 116*    < > = values in this interval not displayed.     Liver Function Tests  Recent Labs   Lab 06/17/22 2135 06/17/22 2038 06/17/22 0633   INR 1.91* 1.64* 1.07     Pancreatic Enzymes  No lab results found in last 7 days.  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 Pathologic fracture.  COMPARISON: X-rays 06/05/2022, MRI  01/30/2022  TECHNIQUE: Unenhanced.    FINDINGS:   Interval increase in size of large soft tissue mass involving the proximal right thigh, right hip, and extending into the right gluteal musculature and iliac fossa. The mass measures at least 30 cm transverse x 25 cm anterior to posterior and greater   than 30 cm in length, the margins of the mass and inferior portion of the mass are not included in the imaged volume. In addition there is significant image artifacts and signal loss compromising evaluation. There is associated progressive involvement   and destruction of the proximal right femur and at the right ilium and acetabulum, and a displaced pathologic fracture of the right proximal femoral shaft. There is inferolateral displacement of the right femoral head.      Impression    IMPRESSION:  1.  Significant interval progression of large malignant soft tissue mass proximal right thigh, hip and gluteal region, and extending to the iliac fossa, with progressive bony involvement and destruction of the proximal right femur, ilium, and acetabulum   and pathologic fracture of the proximal right femur. The mass is incompletely and suboptimally imaged.   US Aorta/Ivc/Iliac Duplex Complete    Narrative    Exam: US AORTA/IVC/ILIAC DUPLEX COMPLETE, 6/14/2022 7:55 AM    Indication: Right thigh sarcoma - concern for IVC and iliac DVTs    Comparison: MR 6/8/2022. PET CT 1/11/2022.    Findings:   There is normal color and spectral Doppler evaluation of the proximal,  mid, and distal IVC. Normal color and spectral Doppler evaluation of  the right common iliac vein. The right external iliac and common  femoral vein were not identified. The right popliteal vein was  noncompressible although there was normal color and spectral Doppler  evaluation. The right popliteal artery is widely patent with triphasic  waveforms and peak systolic velocity of 45.4 cm/s. The PTV/peroneal  vein area and shows normal color flow.    The left common  iliac and external iliac veins demonstrate normal  Doppler evaluation.    In the right lower quadrant there is a partially visualized soft  tissue mass with scattered hypoechoic regions correlating to patient's  known thigh sarcoma.      Impression    Impression: In this patient with a known large right thigh sarcoma:  1. The right external iliac and common femoral veins were not  identified.  2. The right popliteal vein is noncompressible with normal color flow  and augmentation, likely related to compression/increased pressure  from right thigh mass.   3. The IVC demonstrates normal Doppler evaluation.  4. The right popliteal artery is widely patent.    I have personally reviewed the examination and initial interpretation  and I agree with the findings.    YVES PILLAI MD         SYSTEM ID:  [id number]       Patient seen, findings and plan discussed with surgical ICU staff.

## 2022-06-18 NOTE — BRIEF OP NOTE
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]  Pathological fracture of right femur due to neoplastic disease, sequela [M84.161S]  Post-operative diagnosis Same as pre-operative diagnosis    Procedure: Procedure(s):  Right hindquarter amputation with free flap  complex closure pelvis, spy  Surgeon: Surgeon(s) and Role:  Panel 1:     * Matty Whitaker MD - Primary     * Brooke Fan MD - Resident - Assisting  Panel 2:     * KAREN Butt MD - Primary     * Yolis Fields MD - Resident - Assisting  Anesthesia: Combined General with Block   Estimated Blood Loss: 200 ml    Drains: Hany-Tamayo lower abdomen   Specimens:   ID Type Source Tests Collected by Time Destination   1 : right leg Tissue Leg, Right SURGICAL PATHOLOGY EXAM Matty Whitaker MD 6/17/2022  9:26 PM      Findings: Posterior thigh complex closure of R flank wound.   Complications: None.  Implants: * No implants in log *    Plan:  - No pressure on the flap (right flank region)   - No need for flap checks   - No sitting at this time   - AMANDA drain cares  - Remaining cares per SICU and ortho     Yolis Fields MD

## 2022-06-18 NOTE — PROGRESS NOTES
06/18/22 1600   Quick Adds   Type of Visit Initial PT Evaluation   Living Environment   People in Home sibling(s)   Current Living Arrangements house   Home Accessibility no concerns   Living Environment Comments Patient reports he lives with his sister in a house. He reports no stairs to enter. When asked if he had to do stairs inside, he reports he doesn't but it is a 2 level house. Reports his bedroom is on the main level. His sister is around sometimes and he reports he will be receiving PCA cares   Self-Care   Usual Activity Tolerance good   Current Activity Tolerance fair   Fall history within last six months no   Activity/Exercise/Self-Care Comment Patient reports he was walking up until the surgery and was independent with all ADLS   General Information   Onset of Illness/Injury or Date of Surgery 06/17/22   Referring Physician Romeo Ding   Patient/Family Therapy Goals Statement (PT) Doesn't state   Pertinent History of Current Problem (include personal factors and/or comorbidities that impact the POC) 47M w/ large sarcoma of R thigh s/p chemotherapy now s/p R hindquarter amputation with Dr Whitaker on 6/17 with local gluteal flap closure with Dr Butt on 6/17.   Existing Precautions/Restrictions   (No sitting at least 1 week)   Weight-Bearing Status - RLE other (see comments)  (No sitting)   General Observations Patient on bedrest HOB <40 degrees.   Cognition   Affect/Mental Status (Cognition) flat/blunted affect   Cognitive Status Comments WNL but very flat affect   Pain Assessment   Patient Currently in Pain Yes, see Vital Sign flowsheet   Integumentary/Edema   Integumentary/Edema Comments Flap not assessed today   Range of Motion (ROM)   ROM Comment BUEs are WNL- demonstrates full AROM throughout. Declines any movement of the LLE- does demonstrate active DF to at least 3 degrees past neutral DF upon request- declines any movement of rest of LE due to drain pain   Strength (Manual Muscle  Testing)   Strength Comments At least 3/5 in BUEs. Declines movement of LLE   Bed Mobility   Comment, (Bed Mobility) Declined rolling today   Transfers   Comment, (Transfers) NT- on bedrest   Gait/Stairs (Locomotion)   Comment, (Gait/Stairs) NT- on bedrest   Balance   Balance Comments NT bedrest   Clinical Impression   Criteria for Skilled Therapeutic Intervention Yes, treatment indicated   PT Diagnosis (PT) Impaired functional mobility   Influenced by the following impairments risk for deconditioning and ROM decline, ICUAW   Functional limitations due to impairments bed mobility, transfers, gait and stairs   Clinical Presentation (PT Evaluation Complexity) Stable/Uncomplicated   Clinical Presentation Rationale clinical judgment   Clinical Decision Making (Complexity) low complexity   Planned Therapy Interventions (PT) bed mobility training;gait training;balance training;neuromuscular re-education;patient/family education;ROM (range of motion);strengthening;stretching;transfer training;risk factor education;progressive activity/exercise;home program guidelines   Anticipated Equipment Needs at Discharge (PT) walker, platform;crutches, axillary;wheelchair cushion;wheelchair   Risk & Benefits of therapy have been explained evaluation/treatment results reviewed;care plan/treatment goals reviewed;risks/benefits reviewed;current/potential barriers reviewed;participants voiced agreement with care plan;participants included;patient   Clinical Impression Comments Patient declines all bed mobility and is on bedrest. Educated on need to minimize onset of ICUAW and contractures. Anticipate will require ARU stay   PT Discharge Planning   PT Discharge Recommendation (DC Rec) Acute Rehab Center-Motivated patient will benefit from intensive, interdisciplinary therapy.  Anticipate will be able to tolerate 3 hours of therapy per day   PT Rationale for DC Rec Patient with new hemipelvectomy and at high risk for developing ICU acquired  weakness. Patient would benefit from ARU to progress mobility. As team prepares for mobility once cleared by plastics, may benefit from orthotics consult to fabricate a sitting orthosis.   PT Brief overview of current status Bed rest   Total Evaluation Time   Total Evaluation Time (Minutes) 6   Physical Therapy Goals   PT Frequency 3x/week   PT Predicted Duration/Target Date for Goal Attainment 06/30/22   PT Goals PT Goal 1;Bed Mobility;Transfers;Gait   PT: Bed Mobility Independent;Supine to/from sit;Within precautions   PT: Transfers Modified independent;Sit to/from stand;Assistive device   PT: Gait Modified independent;Rolling walker;100 feet   PT: Goal 1 Patient will maintain at least 0 degrees of DF on the L ankle to assist with return to ambulation.   Psychosocial Support   Trust Relationship/Rapport care explained   Family/Support System Care caregiver stress acknowledged;self-care encouraged

## 2022-06-18 NOTE — BRIEF OP NOTE
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 7-8U pRBCs and 2U platelets     Drains:  Per plastics  Specimens:   ID Type Source Tests Collected by Time Destination   1 : right leg Tissue Leg, Right SURGICAL PATHOLOGY EXAM Matty Whitaker MD 6/17/2022  9:26 PM      Findings:   Please see dictated op note. .  Complications: None.  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 Patient presented with worsening right hip and thigh pain and was found to have a pathologic right proximal femur fracture. Patient now s/p right hindquarter amputation with Dr. Whitaker and primary wound closure with Dr. Butt on 6/17.    Plan:  SICU  Activity: Up with assist.  Weight bearing status: Per plastic surgery  Antibiotics: Ancef x24 hours perioperatively.  Diet: Begin with clear fluids and progress diet as tolerated.  DVT prophylaxis: Lovenox and mechanical while in the hospital, discharge on Lovenox x4 weeks.  Wound Care: Per plastic surgery  Drains: Per plastic surgery  Pain management: transition from IV to orals as tolerated.   X-rays: None needed  Physical Therapy:  Gait, ROM, ADL's.  Occupational Therapy: ADL's.  Labs: Trend Hgb on POD #1, 2, 3 .  Consults: PT, OT, appreciate assistance in caring for this patient.  Follow-up: Clinic with Dr. Whitaker in 2 weeks with no XRs needed.    Disposition: Pending progress with therapies, pain control on orals, and medical stability, anticipate discharge to acute rehab or TCU on POD #4-5.

## 2022-06-18 NOTE — PROGRESS NOTES
Admitted/transferred from: OR  Reason for admission/transfer: For further care and management  2 RN skin assessment: completed by Alley JONES RN and Meenu CARLOS RN  Result of skin assessment and interventions/actions: R hip amputation incision with AMANDA and some scabs on L hip  Height, weight, drug calc weight: Done  Patient belongings (see Flowsheet)  MDRO education added to care plan: Yes  ?

## 2022-06-18 NOTE — PROGRESS NOTES
Plastic Surgery Progress Note  06/18/22    S: Remained intubated overnight. Weaned from pressures. NO issues with the flap site overnight.     O: /82 (BP Location: Right arm)   Pulse (!) 135   Temp (!) 100.8  F (38.2  C) (Oral)   Resp 16   Wt 75.8 kg (167 lb 1.7 oz)   SpO2 100%   BMI 21.46 kg/m    Intubated, sedated. Opens eyes to voice.   Abdomen soft, nondistended.   Posterior gluteal flap viable. No obvious hematoma/seroma. Incisions c/d/i with glue tape.   Drains s/s.   York in place.   Rectal tube in place.     Hgb 11.8  UOP 1175  Drain: 250/208    A: 47M w/ large sarcoma of R thigh s/p chemotherapy now s/p R hindquarter amputation with Dr Whitaker on 6/17 with local gluteal flap closure with Dr Butt on 6/17.     - No pressure on the flap, okay to turn toward the left and back but NOT toward the right side  - NO sitting   - Okay to extubate from plastic standpoint  - Okay to remove york and rectal tube from plastic standpoint, will defer to ortho   - No need for dressings on incisions   - Drain care, will likely continue at discharge   - Appreciate SICU care, will transfer to medicine as primary once cleared to return to the floor     Yolis Fields MD

## 2022-06-18 NOTE — PROGRESS NOTES
"[facility] EPIDURAL NOTE  Vic Scott III is a 47 year old male POD #1 s/p right hindquarter amputation with free flap  complex closure pelvis and placement of L1-2 epidural catheter for pain management.      SUBJECTIVE  Interval History: Overnight remains intubated and sedated. Epidural running throughout the night. Planning on weaning sedation today.     Antithrombotic/Thrombolytic Therapy ordered:  none    Analgesic Medications:  Medications related to Pain Management (From now, onward)    Start     Dose/Rate Route Frequency Ordered Stop    06/17/22 2330  fentaNYL (SUBLIMAZE) infusion          mcg/hr  1-2 mL/hr  Intravenous CONTINUOUS 06/17/22 2310 06/17/22 2330  propofol (DIPRIVAN) infusion        \"And\" Linked Group Details    5-75 mcg/kg/min × 106.6 kg  3.2-48 mL/hr  Intravenous CONTINUOUS 06/17/22 2310 06/17/22 2310  propofol (DIPRIVAN) bolus from infusion pump 10 mg        \"And\" Linked Group Details    10 mg Intravenous EVERY 15 MIN PRN 06/17/22 2310 06/17/22 2310  fentaNYL (SUBLIMAZE) 50 mcg/mL bolus from pump         50 mcg Intravenous EVERY 1 HOUR PRN 06/17/22 2310      06/17/22 1530  bupivacaine (MARCAINE) 0.125 % in sodium chloride 0.9 % 250 mL EPIDURAL Infusion         8 mL/hr  EPIDURAL CONTINUOUS 06/17/22 1511      06/16/22 1548  magnesium hydroxide (MILK OF MAGNESIA) suspension 30 mL         30 mL Oral DAILY PRN 06/16/22 1549      06/15/22 0453  [Held by provider]  diazepam (VALIUM) tablet 5 mg        (Held by provider since Fri 6/17/2022 at 2305 by Romeo Ding MD.Hold Reason: NPO.)    5 mg Oral EVERY 6 HOURS PRN 06/15/22 0454      06/10/22 0000  polyethylene glycol (MIRALAX) 17 g packet         17 g Oral DAILY 06/09/22 0910      06/10/22 0000  acetaminophen (TYLENOL) 325 MG tablet         975 mg Oral EVERY 8 HOURS 06/10/22 0822      06/10/22 0000  morphine (MS CONTIN) 30 MG CR tablet         30 mg Oral EVERY 12 HOURS 06/10/22 0822      06/10/22 " "0000  oxyCODONE IR (ROXICODONE) 10 MG tablet         10-20 mg Oral EVERY 4 HOURS PRN 06/10/22 0822      06/10/22 0000  sennosides (SENOKOT) 8.6 MG tablet         1-2 tablet Oral 2 TIMES DAILY PRN 06/10/22 0822      06/09/22 0030  [Held by provider]  morphine (MS CONTIN) 12 hr tablet 30 mg        (Held by provider since Fri 6/17/2022 at 2305 by Romeo Ding MD.Hold Reason: NPO.)    30 mg Oral EVERY 12 HOURS SCHEDULED 06/09/22 0016      06/06/22 0800  polyethylene glycol (MIRALAX) Packet 17 g         17 g Oral DAILY 06/05/22 2030 06/05/22 2144  [Held by provider]  oxyCODONE IR (ROXICODONE) tablet 10-20 mg        (Held by provider since Fri 6/17/2022 at 2305 by Romeo Ding MD.Hold Reason: NPO.)    10-20 mg Oral EVERY 3 HOURS PRN 06/05/22 2145 06/05/22 2100  acetaminophen (TYLENOL) tablet 975 mg         975 mg Oral EVERY 8 HOURS 06/05/22 2030 06/05/22 2028  bisacodyl (DULCOLAX) EC tablet 5 mg        \"Or\" Linked Group Details    5 mg Oral DAILY PRN 06/05/22 2030 06/05/22 2028  bisacodyl (DULCOLAX) EC tablet 10 mg        \"Or\" Linked Group Details    10 mg Oral DAILY PRN 06/05/22 2030 06/05/22 2025  lidocaine (LMX4) cream          Topical EVERY 1 HOUR PRN 06/05/22 2030 06/05/22 2025  lidocaine 1 % 0.1-1 mL         0.1-1 mL Other EVERY 1 HOUR PRN 06/05/22 2030             OBJECTIVE  Lab Results:   Recent Labs   Lab Test 06/18/22  0320   WBC 20.1*   RBC 4.18*   HGB 11.8*   HCT 34.9*   MCV 84   MCH 28.2   MCHC 33.8   RDW 16.4*   *       Lab Results   Component Value Date    INR 1.34 06/17/2022    INR 1.91 06/17/2022    INR 1.64 06/17/2022       Vitals:    Temp:  [36.9  C (98.5  F)-38.2  C (100.8  F)] 38.2  C (100.8  F)  Pulse:  [107-135] 135  Resp:  [16] 16  BP: (117-146)/(82-87) 117/82  MAP:  [69 mmHg-89 mmHg] 79 mmHg  Arterial Line BP: ()/(58-74) 104/66  FiO2 (%):  [30 %] 30 %  SpO2:  [100 %] 100 %  /82 (BP Location: Right arm)   Pulse (!) 135   Temp (!) " 38.2  C (100.8  F) (Oral)   Resp 16   Wt 75.8 kg (167 lb 1.7 oz)   SpO2 100%   BMI 21.46 kg/m       Exam:   GEN: intubated / sedated  NEURO/MSK: not evaluated  Strength: unable to evaluate  SKIN: Epidural catheter site with dressing c/d/i, no tenderness, erythema, heme, edema     ASSESSMENT/PLAN:     L1-2 epidural catheter infusion of bupivacaine 0.125%%  at 8 mL/hr.      - continue current epidural infusion bupivacaine 0.125% at 8 mL/hour, POD #1  - antithrombotic/thrombolytic therapy is currently NOT ordered. Please contact [facility] (#[id number]) prior to any medication changes  - will continue to follow and adjust as needed    - discussed plan with attending anesthesiologist    Riley Camargo, DO  [facility]    [facility] Contact Info (24 hour job code pager is the last 4 digits) For in-house use only:   [employer] phone: [place] [phone], [place] [phone], Peds [phone], then enter call-back number.    Text: Use [employer] on the Intranet <Paging/Directory> tab and enter Jobcode ID.   If no call back at any time, contact the hospital  and ask for [facility] attending or backup

## 2022-06-18 NOTE — OP NOTE
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  He underwent medical treatment for a while and then has not been treated for the last 5 months.  He thought the tumor was in remission despite a growing in him losing the ability to walk.  His femur eventually broke while he was at home.  After conversing with the patient he acknowledges that he was recommended to have an in January and amputation could have done that several months ago.  He understands that he will have phantom sensation and likely some phantom pain.  His hemoglobin has been low and so he will certainly get a blood transfusion with the inherent risks of that.  He is at risk for deep venous thrombosis and pulmonary embolism.  He is at significant risk for infection and numbness and tingling in the penis or scrotal area.  He also may experience bowel or bladder incontinence or retention.  He understands that he will have a Sung catheter and may also have a rectal tube immediately after surgery.  He knows that if his gluteal flap is insufficiently perfused then plastic surgery will do a free flap using his lower leg and this will require a lot of monitoring in the initial days following surgery.    DESCRIPTION OF PROCEDURE: The patient underwent successful induction of anesthesia after an epidural was placed.  He was positioned on operating room table laterally and held with a beanbag.  The beanbag was opened at the hips to allow the patient to rotate.  The right leg perirectal area and scrotum and penis were washed and sterilely prepped and draped.  Began making incision in the inguinal area.  The patient had  a lot of bleeding even from the subcutaneous tissue.  We eventually were able to carried this incision up over the iliac crest.  The iliac crest was exposed and we released abdominal wall off of the iliac crest.  This dissection was carried around posteriorly to the posterior iliac spine.  We did injury to the pseudocapsule in 1 spot and I attempted to repair it with a 2-0 Vicryl but we did have some drainage from that area during the case.  We kept it covered with a lap sponge.  We then created a gluteal flap and elevated the gluteus huy off of the pseudocapsule of this very large tumor.  Once the sacroiliac joint was exposed we then turned our attention to exposing the ischial tuberosity.  We returned to the front and released abdominal wall down from the iliac crest to the symphysis.  The patient's spermatic cord and distal portion of the femoral artery were involved and pseudocapsule the tumor.  The artery was exposed where it came off of the pseudocapsule of the tumor up in the pelvis.  We were able to dissect the peritoneum off of the pseudocapsule of the tumor.  There was a small rent in the peritoneum that was repaired with 2-0 Vicryl.  After identifying the symphysis the symphysis was divided.  We carried this dissection down along the inferior pubic ramus.  We were able to separate the bladder from the symphysis and we released the rectus abdominis muscle from the symphysis in the superior pubic ramus.  In order to facilitate exposure we had to release some rectus abdominis on the left side as well.  At this point will return to the back and begin to work on the sacroiliac joint.  We felt that because of the position of the tumor it would be safer to go through a little of the sacral ala and so I passed a malleable anterior to the sacroiliac joint.  I used an osteotome to remove the edge of the sacrum where the tumor was abutting it.  This helped us get to the inferior sacroiliac joint more safely.   Once the malleable was in place to pass an osteotome to the sacroiliac joint down to the malleable.  We went through the joint posteriorly but then deviated into the sacral ala anteriorly.  This kept this free from the edge of the tumor.  We released the sacrotuberous and sacrospinous ligaments.  We also cut the sacral plexus.  The S2 nerve root on the right side was involved in the pseudocapsule of the tumor and it was sacrificed.  Plastic surgery at this point began to elevate a lower leg flap in case the gluteus perfusion was inadequate.  We clipped numerous small bleeding vessels and cauterized numerous vessels as we went.  At this point we then returned to our attention to the front of the pelvis where we clamped and divided the femoral artery and vein.  We released a few other areas of soft tissues and then were able to separate the leg.  We copiously irrigated the field.  I cauterized small bleeding vessels so that the wound would be as hemostatic as possible.  Plastic surgery then assessed the gluteal flap with the spy.  They felt the perfusion was good.  I placed some Ramon bone wax on the sacral ala and then plastics began to advance the gluteal flap over to the abdominal wall area.  The plan was to perform this rotational flap and close over a drain.  I was present for our entire procedure.  There were no complications.  The estimated blood loss is 2700 mL.  The specimen was sent to pathology for margins.    This procedure was 75% more difficult than a typical hindquarter amputation because of the extreme size of this tumor and the associated bleeding.    Matty Whitaker MD

## 2022-06-18 NOTE — PLAN OF CARE
Major Shift Events: Sedated on propofol. When weaned, gets upset by thrashing, gaging on the tube, and trying to pull it out. Mitts on. Fentanyl gtt at 100. Increased from 75 to 100 this am due to heart rate slowly increasing into the 130's. Unsure why but MD notified. Epidural continues. Levo off since 0515. Afebrile. Vent unchanged. Rectal tube in with no output. Sung with 50mL/hr. AMANDA bright red bloody output. Family updated.    Plan: Continue with current POC    For vital signs and complete assessments, please see documentation flowsheets.

## 2022-06-18 NOTE — ANESTHESIA POSTPROCEDURE EVALUATION
Patient: Vic Scott III    Procedure: Procedure(s):  Right hindquarter amputation with free flap  complex closure pelvis, spy       Anesthesia Type:  General    Note:     Postop Pain Control:    PONV:    Neuro/Psych:    Airway/Respiratory:             Sign Out: AIRWAY IN SITU/Resp. Support   CV/Hemodynamics:    Other NRE:    DID A NON-ROUTINE EVENT OCCUR?     Event details/Postop Comments:  Signed out to Dr. David Perlmann           Last vitals:  Vitals:    06/17/22 0730 06/17/22 0955 06/17/22 2240   BP: (!) 148/80 (!) 146/87    Pulse: (!) 127 120    Resp: 16 16    Temp: 37.1  C (98.7  F) 37.2  C (99  F)    SpO2: 97% 100% 100%       Electronically Signed By: Prachi Vickers MD  June 17, 2022  11:00 PM

## 2022-06-18 NOTE — PROGRESS NOTES
Orthopedic Surgery Progress Note 06/18/2022    S: Patient seen at bedside. Remains intubated/sedated. No acute events overnight. Pain appears controlled. Weaned off of pressors overnight. Tachycardic to 130s now.     O:  Temp: 98.5  F (36.9  C) Temp src: Axillary BP: 117/82 Pulse: (!) 130   Resp: 16 SpO2: 100 % O2 Device: Mechanical Ventilator      Exam:  Gen: alert and oriented, responds to questions appropriately  Resp: non-labored on RA  MSK:  RLE:  - Prineo dessing c/d/i. 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 Patient presented with worsening right hip and thigh pain and was found to have a pathologic right proximal femur fracture. Patient now s/p right hindquarter amputation with Dr. Whitaker and primary wound closure with Dr. Butt on 6/17.     Plan:  SICU,appreciate cares  Activity: Up with assist. No pressure on the flap (right flank region) and no sitting at this time per plastics.   Weight bearing status: Per plastic surgery  Antibiotics: Ancef x24 hours perioperatively.  Diet: Begin with clear fluids and progress diet as tolerated.  DVT prophylaxis: Lovenox and mechanical while in the hospital, discharge on Lovenox x4 weeks.  Wound Care: Per plastic surgery  Drains: Per plastic surgery  Pain management: transition from IV to orals as tolerated.   X-rays: None needed  Physical Therapy: Gait, ROM, ADL's.  Occupational Therapy: ADL's.  Labs: Trend Hgb on POD #1, 2, 3 .  Consults: PT, OT, plastics, appreciate assistance in caring for this patient.  Follow-up: Clinic with Dr. Whitaker in 2 weeks with no XRs needed.     Disposition: Pending progress  with therapies, pain control on orals, and medical stability, anticipate discharge to acute rehab or TCU on POD #4-5.    Orthopedic surgery staff for this patient is Dr. Whitaker. Discussed.    --  Becca Valentine MD  Orthopedic Surgery PGY-1

## 2022-06-18 NOTE — PROGRESS NOTES
SURGICAL ICU PROGRESS NOTE  06/18/2022    PRIMARY TEAM: Orthopedic Surgery  PRIMARY PHYSICIAN: Dr. Whitaker    REASON FOR CRITICAL CARE ADMISSION: Mechanical ventilation, hemodynamic instability   ADMITTING PHYSICIAN: Dr. 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 He is POD#1 s/p right hemipelvectomy with free flap coverage. Procedure was notable for significant blood loss requiring transfusion of 10 units pRBC (EBL almost 3 liters). Admitted to the SICU for ventilatory management and hemodynamic monitoring.     Today's Plans/Updates:  - Extubate today   - Wean propofol and fentanyl. Start ketamine prior to extubation to aid in transition to PTA PO pain medications  - Swallow study prior to initiating PO medications  - Advance to regular diet  - Decrease LR to 50 mL/hr  - Electrolyte replacement protocol  - Lovenox 40 mg qday -> will hold for 12 hours prior to epidural catheter removal (likely in next 2-3 days)  - Discontinue Protonix  - Switch to Labetalol for SBP > 180 due to sinus tachycardia  - Remove arterial line, rectal tube, Sung, and CVC  - Plastic surgery recommendations: No sitting or pressure on pelvis for 1 week  - Transfer to floor later today or tomorrow pending clinical course    Neurological:  # Acute postoperative pain  - Monitor neurological status. Delirium preventions and precautions. Notify provider for any acute changes in exam.  - Pain: Fentanyl gtt (currently 100 mcg/hr, wean to 50 mcg/hr). Ketamine 10 mcg/hr prior to extubation for pain management. Tylenol 975 q8h. Post-extubation initiate dilaudid 0.3-0.6mg q2h prn, PTA MScontin 30mg q12h and PTA PO oxycodone 10-20 mg q3h prn  - Epidural catheter with bupivacaine 8 ml/hr  - Swallow study prior to initiating PO medications  - Sedation plan: Propofol gtt (currently  40 mcg/kg/min) - wean/extubate    Pulmonary:   # Post operative ventilatory support  # Acute hypoxic respiratory support   - VENT: CMV/ / 16 / 5 / 30%. Ventilatory bundle. HOB elevation.  - Pressure support trial, extubate today  - Supplemental oxygen to keep saturation above 92%. Overnight saturations at 100%.  - Incentive spirometer every 15-30 minutes when awake.    Cardiovascular:    # Shock - Hypovolemic  - Monitor hemodynamic status. MAP goal > 65.  - Arrived to SICU on 0.06 of Levo, off pressors now (since 5 am 6/18)  - L radial arterial line in place, maintaining MAPs (70's to 80's). 103-114/60-s-70's   - Remove art line today  - Labetalol for SBP > 180    # Sinus tachycardia  Heart rates have trended upwards from consistently in the 110's to 130's. Differential diagnosis includes most likely physiologic response to pain/sedation vs less likely PE, infection, volume depletion, hyperthyroidism, arrhythmia, myocardial ischemia, etc.  - Resume previous pain regimen post-extubation/swallow study, as above  - Continue to monitor    Gastroenterology/Nutrition:  # Protein calorie deficit malnutrition   - NPO, advance to regular diet  - Rectal tube in place, remove today  - Liberalize diet post-swallow study  - Bowel regimen in place - scheduled Miralax, prn bisacodyl suppository    Fluids/Electrolytes:   No acute concerns  - Decrease LR to 50 mL/hr for IV fluid hydration -> will plan to discontinue once having adequate PO intake  - Electrolytes within normal limits this morning  - Daily BMP  - Mg standard electrolyte replacement protocol  - Phos, K high replacement protocol    Renal:  No acute concerns  - Urine output is adequate (0.95 ml/kg/min since midnight) . Will continue to monitor intake and output. UOP yesterday: 0.64 ml/kg/hr  - Cr 0.49  - Sung in place, remove today    Endocrine:  # Stress hyperglycemia   - Medium dose sliding scale insulin for glucose management. Goal to keep BG< 180 for optimal  wound healing   - No history of diabetes  - Glucose overnight 160's to 180's    ID:  # Leukocytosis  # Candis-operative prophylaxis  - WBC 20.1 < 12.7   - Differential includes stress-induced leukocytosis from surgery vs infection  - Temperature 100.8 F this morning -> differential includes atelectasis, pneumonia, infection. Lungs sound clear, CXR normal yesterday  - Ancef 2g q8h (ending 6/19) - perioperative prophylaxis  - Continue to monitor    Heme:     # Acute blood loss anemia   - Hemoglobin 11.8 < 12.1, stable  - s/p 10 units pRBC intraoperatively  - Daily CBC  - Transfuse if hgb <7.0 or signs/symptoms of hypoperfusion. Monitor and trend     MSK:  # Weakness and deconditioning of critical illness  - Physical and occupational therapy consult   - Possible orthotics consult per plastics    # S/p right hemipelvectomy  - Movement restrictions per plastic surgery: No sitting or pressure on pelvis for 1 week.    General Cares/Prophylaxis:    DVT Prophylaxis: SCDs, initiate Lovenox 40 mg daily, discussed with anesthesia, will need to hold Lovenox for 12 hours prior to removing epidural catheter  GI Prophylaxis: Protonix 40 mg qam - discontinue after extubation    Lines/ tubes/ drains:  - R internal jugular MAC -> remove today  - ETT -> extubate today  - Sung -> remove today  - Right hip AMANDA drain -> 272 ml serosanguinous since midnight, 250 mL yesterday  - Left radial arterial line -> remove today  - Rectal tube -> remove today  - PIV x3  - Epidural catheter    Disposition:  - To floor today versus tomorrow    Patient seen and discussed with Dr. Fernández, surgical ICU staff.    Janice Gatica, MS4  [facility] [employer]    I saw and examined the patient with the medical student and agree with the findings as documented. I have edited the note to reflect my findings and plan where necessary.    Wandy Velazquez,   Anesthesiology    ====================================    TODAY'S SUBJECTIVE/INTERVAL HISTORY:    Sinus tachycardia overnight and fever to 100.8 F this morning. Otherwise no acute events overnight. Patient remains intubated and sedated on Propofol. He is able to respond to yes/no questions and follow simple commands. Denies any pain anywhere.    OBJECTIVE:   Temp:  [98.5  F (36.9  C)-100.8  F (38.2  C)] 100.8  F (38.2  C)  Pulse:  [107-135] 130  Resp:  [16] 16  BP: (107-117)/(65-82) 107/65  MAP:  [69 mmHg-89 mmHg] 80 mmHg  Arterial Line BP: ()/(58-74) 112/64  FiO2 (%):  [30 %] 30 %  SpO2:  [100 %] 100 %  Vent Mode: CPAP/PS  (Continuous positive airway pressure with Pressure Support)  FiO2 (%): 30 %  Resp Rate (Set): 16 breaths/min  Tidal Volume (Set, mL): 500 mL  PEEP (cm H2O): 5 cmH2O  Pressure Support (cm H2O): 5 cmH2O  Resp: 16    I/O last 3 completed shifts:  In: 12823.88 [I.V.:20035.88; IV Piggyback:500]  Out: 4973 [Urine:1715; Drains:458; Blood:2800]    UOP:  Since midnight: 0.95 ml/kg/min  Yesterday: 0.64 ml/kg/hr    General/Neuro: Intubated and sedated, drowsy on examination. Follows simple commands. Answers yes/no questions.  CV: Tachycardic, regular rhythm. No murmurs.  Pulm: Mechanically ventilated, symmetric chest rise. Lungs clear to auscultation bilaterally.  Abd: Soft, non-tender, non-distended. AMANDA drain in place, right hip.  Extremities: No peripheral edema. S/p right hemipelvectomy. Incisions are clean, dry, and intact. No surrounding erythema or drainage.  : Mild scrotal swelling. Sung in place, dark aguilar urine.  Skin: Warm and well-perfused.    LABS:   Arterial Blood Gases   Recent Labs   Lab 06/17/22  2135 06/17/22  2112 06/17/22 2040 06/17/22  1828   PH 7.42 7.47* 7.44 7.45   PCO2 37 36 38 37   PO2 125* 123* 117* 144*   HCO3 24 26 26 26     Complete Blood Count   Recent Labs   Lab 06/18/22  0320 06/17/22  2312 06/17/22  2135 06/17/22 2040 06/17/22  2038 06/17/22  1611 06/17/22  0541   WBC 20.1* 12.7* 8.3  --   --   --  11.4*   HGB 11.8* 12.1* 9.1*  9.3*   < >  --    < > 7.5*    * 145* 61*  --  138*  --  372    < > = values in this interval not displayed.     Basic Metabolic Panel  Recent Labs   Lab 06/18/22  0749 06/18/22  0324 06/18/22  0320 06/18/22  0032 06/17/22 2312 06/17/22  2247 06/17/22 2135 06/17/22  2112 06/17/22  1611 06/17/22  0541 06/16/22  1133   NA  --   --  139  --  139  --  134 135   < > 136 135   POTASSIUM  --   --  4.4  --  4.9  --  4.7 4.8   < > 4.1 4.2   CHLORIDE  --   --  107  --  108  --   --   --   --  100 99   CO2  --   --  22  --  25  --   --   --   --  30 30   BUN  --   --  13  --  11  --   --   --   --  15 14   CR  --   --  0.49*  --  0.50*  --   --   --   --  0.66 0.67   * 160* 162* 172* 182*   < > 201* 154*   < > 119* 110*    < > = values in this interval not displayed.     Liver Function Tests  Recent Labs   Lab 06/17/22 2312 06/17/22 2135 06/17/22 2038 06/17/22  0633   INR 1.34* 1.91* 1.64* 1.07     Pancreatic Enzymes  No lab results found in last 7 days.     Coagulation Profile  Recent Labs   Lab 06/17/22 2312 06/17/22 2135 06/17/22 2038 06/17/22  0633   INR 1.34* 1.91* 1.64* 1.07   PTT 33 44* 37  --      IMAGING:   Recent Results (from the past 24 hour(s))   XR Chest Port 1 View    Narrative    EXAM: XR Chest 1 view 6/17/2022 11:52 PM      HISTORY: evaluate central line placement.    COMPARISON: Previous day.     TECHNIQUE: Frontal view of the chest.    FINDINGS: ET tube is in the mid thoracic trachea. Right IJ CVC with  tip in the mid SVC. Right chest wall miquel catheter with tip in the  cavoatrial junction.  Trachea is midline. Cardiac and mediastinal silhouette is stable. No  focal pulmonary opacities. No pleural effusions. No pneumothoraces.      Impression    IMPRESSION:   1. Interval placement of right IJ CVC with tip in the mid SVC. No  pneumothorax  2. No focal pulmonary opacity.    I have personally reviewed the examination and initial interpretation  and I agree with the findings.    GODWIN ENRIQUEZ MD         SYSTEM  ID:  [id number]

## 2022-06-18 NOTE — ANESTHESIA POSTPROCEDURE EVALUATION
Patient: Vic Scott III    Procedure: Procedure(s):  Right hindquarter amputation with free flap  complex closure pelvis, spy       Anesthesia Type:  General    Note:  Disposition: ICU            ICU Sign Out: Anesthesiologist/ICU physician sign out WAS performed   Postop Pain Control: Uneventful            Sign Out: Well controlled pain   PONV: No   Neuro/Psych: Uneventful            Sign Out: Acceptable/Baseline neuro status   Airway/Respiratory: Uneventful            Sign Out: AIRWAY IN SITU/Resp. Support               Airway in situ/Resp. Support: ETT   CV/Hemodynamics:             Sign Out: Acceptable CV status   Other NRE:    DID A NON-ROUTINE EVENT OCCUR?            Last vitals:  Vitals:    06/17/22 0730 06/17/22 0955 06/17/22 2240   BP: (!) 148/80 (!) 146/87    Pulse: (!) 127 120    Resp: 16 16    Temp: 37.1  C (98.7  F) 37.2  C (99  F)    SpO2: 97% 100% 100%       Electronically Signed By: Prachi Vickers MD  June 17, 2022  11:00 PM

## 2022-06-18 NOTE — PLAN OF CARE
Goal Outcome Evaluation:    Plan of Care Reviewed With: patient     Overall Patient Progress: no change       Major Shift Events:  Pt extubated this AM, neuro's intact. C/o AMANDA drain site pain throughout day. See MAR for pain regimen. Refusing cares, refusing to turn and reposition. Attempted to put pillow on side of flap to help with pressure reduction. Due to void. Tachycardic most of the day, afebrile now. Educated on importance of turning and moving, pt continues to complain of pain with every care. Epidural increased by anesthesia along with bolus given.   Plan: Cont to work on pain control, educate on importance of moving and turning and pressure reduction.   For vital signs and complete assessments, please see documentation flowsheets.

## 2022-06-18 NOTE — PROGRESS NOTES
Pt extubated to NC.  Breath sounds  clear and equal bilaterally. No stridor present post extubation. Vitals all within normal limits.    Germania Coreas, RRT

## 2022-06-19 NOTE — CONSULTS
Consult Date: 06/19/2022    PSYCHIATRIC CONSULTATION    IDENTIFICATION:  Mr. Scott is a 47-year-old -American male, who is currently hospitalized for right hindquarter amputation secondary to cancer.  I am asked to evaluate his adjustment and depression by Dr. Valentine.    HISTORY:  Prior to interviewing this patient, I had an opportunity a previous mental health consultation completed on 06/07/2022 by Meghana Bettencourt [facility].  Ms. Bettencourt noted that this patient was somewhat depressed at that time and actually gave a diagnosis of unspecified depressive disorder.  This depression seems to have been exacerbated by his very difficult medical situation and now he is somewhat dysphoric, though he is able to laugh appropriately on occasion.  He has very poor sleep, decreased energy, decreased interest, and some feelings of helplessness, though he is not reporting suicidal ideation.  He is recovering from a very large and difficult surgery and he is experiencing quite a bit of pain.  His sleep condition is long-term.  He has always had a great deal of difficulty with sleep, but given his need for healing, it appears that now would be a good time to try to get him some sleep at night.  Given his depression, I thought it would be a good idea to start Lexapro 10 mg in the morning and then 25 mg of Seroquel for sleep at night.  Currently, he is not experiencing delirium or sundowning, but he is certainly at risk for these and I would like to avoid a benzodiazepine or other standard sleeping pills.  Seroquel is very unlikely to cause delirium.  I also think this patient would benefit from a Health Psychology consultation.  He is obviously going to have significant psychosocial stressors and can use some extra support.  Hopefully, they could continue to follow him after discharge or refer him to outpatient psychotherapy.    PAST MEDICAL HISTORY:  Sarcoma, status post chemotherapy, as well as anemia.    ALLERGIES:  PATIENT  IS ALLERGIC TO BLOOD TRANSFUSION RELATED PROTEIN.    FAMILY HISTORY:  The patient believes that his sister is treated for depression.    SOCIAL HISTORY:  The patient does not drink, smoke, or abuse drugs.  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  He is currently living with his sister and his 7-year-old son.    REVIEW OF SYSTEMS:  Please see the complete review of systems completed by Dr. Flower 06/15/2022.    MENTAL STATUS EXAM:  On my interview, the patient was initially a very dysphoric appearing -American.  His mood was dysphoric and his affect sad.  He did brighten up as the interview continued and occasionally would laugh and smile, but for the most part, his affect was restricted.  His speech was soft and slow, but coherent and goal oriented.  His associations tight.  His thought processes logical and linear.  His content of thought was without psychosis or suicidal ideation.  Recent and remote memory, concentration, fund of knowledge, and use of language appear to be at baseline, as does muscle strength and tone.  He is alert and oriented x 3.  Insight and judgment appear intact.  Recent vitals include a blood pressure of 119/68, temperature of 98.8, pulse of 117, respiration rate of 23 with 100% oxygen saturation.    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    RECOMMENDATIONS:    1.  Lexapro 10 mg q.a.m.  2.  Seroquel 25 mg at bedtime.  3.  Please consult Health Psychology.    Nicholas Collins MD        D: 2022   T: 2022   MT: SANDI    Name:     MARIELA MICHAEL  MRN:      [id number]        Account:      [id number]   :      1975           Consult Date: 2022     Document: [id number]

## 2022-06-19 NOTE — PROGRESS NOTES
Plastic Surgery Progress Note  06/19/22    S: Doing okay. Having a lot of pain in the left groin. Scrotum swollen. York was removed.     O: /78   Pulse 111   Temp 98.9  F (37.2  C) (Oral)   Resp 22   Wt 75.8 kg (167 lb 1.7 oz)   SpO2 99%   BMI 21.46 kg/m    Intubated, sedated. Opens eyes to voice.   Abdomen soft, nondistended.   Posterior gluteal flap viable. No obvious hematoma/seroma. Incisions c/d/i with glue tape.   Drains s/s.   Scrotum swollen    Hgb 8.6  Urin 500 since york removed  Drain 522       A: 47M w/ large sarcoma of R thigh s/p chemotherapy now s/p R hindquarter amputation with Dr Whitaker on 6/17 with local gluteal flap closure with Dr Butt on 6/17.     - No pressure on the flap, okay to turn toward the left and back but NOT toward the right side  - NO sitting for 2 weeks  - No need for dressings on incisions   - Agree with psych therapy   - Drain care, will likely continue at discharge   - Appreciate SICU care, will transfer to medicine as primary once cleared to return to the floor     Yolis Fields MD

## 2022-06-19 NOTE — PROGRESS NOTES
"REGIONAL ANESTHESIA PAIN SERVICE EPIDURAL NOTE  Vic Scott III is a 47 year old male POD #2 s/p right hindquarter amputation with free flap  complex closure pelvis and placement of L1-2 epidural catheter for pain management.      SUBJECTIVE  Interval History:   Epidural running throughout the night, endorses 8/10 pain mostly in his Right hip AMANDA drain spot.  Extent of sensory blockade:  Tested with alcohol b/l T11-L2.      Antithrombotic/Thrombolytic Therapy ordered:  none    Analgesic Medications:  Medications related to Pain Management (From now, onward)    Start     Dose/Rate Route Frequency Ordered Stop    06/17/22 2330  fentaNYL (SUBLIMAZE) infusion          mcg/hr  1-2 mL/hr  Intravenous CONTINUOUS 06/17/22 2310 06/17/22 2330  propofol (DIPRIVAN) infusion        \"And\" Linked Group Details    5-75 mcg/kg/min × 106.6 kg  3.2-48 mL/hr  Intravenous CONTINUOUS 06/17/22 2310 06/17/22 2310  propofol (DIPRIVAN) bolus from infusion pump 10 mg        \"And\" Linked Group Details    10 mg Intravenous EVERY 15 MIN PRN 06/17/22 2310 06/17/22 2310  fentaNYL (SUBLIMAZE) 50 mcg/mL bolus from pump         50 mcg Intravenous EVERY 1 HOUR PRN 06/17/22 2310      06/17/22 1530  bupivacaine (MARCAINE) 0.125 % in sodium chloride 0.9 % 250 mL EPIDURAL Infusion         8 mL/hr  EPIDURAL CONTINUOUS 06/17/22 1511      06/16/22 1548  magnesium hydroxide (MILK OF MAGNESIA) suspension 30 mL         30 mL Oral DAILY PRN 06/16/22 1549      06/15/22 0453  [Held by provider]  diazepam (VALIUM) tablet 5 mg        (Held by provider since Fri 6/17/2022 at 2305 by Romeo Ding MD.Hold Reason: NPO.)    5 mg Oral EVERY 6 HOURS PRN 06/15/22 0454      06/10/22 0000  polyethylene glycol (MIRALAX) 17 g packet         17 g Oral DAILY 06/09/22 0910      06/10/22 0000  acetaminophen (TYLENOL) 325 MG tablet         975 mg Oral EVERY 8 HOURS 06/10/22 0822      06/10/22 0000  morphine (MS CONTIN) 30 MG CR tablet         30 mg " "Oral EVERY 12 HOURS 06/10/22 0822      06/10/22 0000  oxyCODONE IR (ROXICODONE) 10 MG tablet         10-20 mg Oral EVERY 4 HOURS PRN 06/10/22 0822      06/10/22 0000  sennosides (SENOKOT) 8.6 MG tablet         1-2 tablet Oral 2 TIMES DAILY PRN 06/10/22 0822      06/09/22 0030  [Held by provider]  morphine (MS CONTIN) 12 hr tablet 30 mg        (Held by provider since Fri 6/17/2022 at 2305 by Romeo Ding MD.Hold Reason: NPO.)    30 mg Oral EVERY 12 HOURS SCHEDULED 06/09/22 0016      06/06/22 0800  polyethylene glycol (MIRALAX) Packet 17 g         17 g Oral DAILY 06/05/22 2030 06/05/22 2144  [Held by provider]  oxyCODONE IR (ROXICODONE) tablet 10-20 mg        (Held by provider since Fri 6/17/2022 at 2305 by Romeo Ding MD.Hold Reason: NPO.)    10-20 mg Oral EVERY 3 HOURS PRN 06/05/22 2145 06/05/22 2100  acetaminophen (TYLENOL) tablet 975 mg         975 mg Oral EVERY 8 HOURS 06/05/22 2030 06/05/22 2028  bisacodyl (DULCOLAX) EC tablet 5 mg        \"Or\" Linked Group Details    5 mg Oral DAILY PRN 06/05/22 2030 06/05/22 2028  bisacodyl (DULCOLAX) EC tablet 10 mg        \"Or\" Linked Group Details    10 mg Oral DAILY PRN 06/05/22 2030 06/05/22 2025  lidocaine (LMX4) cream          Topical EVERY 1 HOUR PRN 06/05/22 2030 06/05/22 2025  lidocaine 1 % 0.1-1 mL         0.1-1 mL Other EVERY 1 HOUR PRN 06/05/22 2030             OBJECTIVE  Lab Results:   Recent Labs   Lab Test 06/18/22  0320   WBC 20.1*   RBC 4.18*   HGB 11.8*   HCT 34.9*   MCV 84   MCH 28.2   MCHC 33.8   RDW 16.4*   *       Lab Results   Component Value Date    INR 1.34 06/17/2022    INR 1.91 06/17/2022    INR 1.64 06/17/2022       Vitals:    Temp:  [36.9  C (98.5  F)-37.2  C (98.9  F)] 37.1  C (98.8  F)  Pulse:  [106-122] 115  Resp:  [13-28] 16  BP: (111-138)/(62-83) 132/73  MAP:  [78 mmHg-84 mmHg] 84 mmHg  Arterial Line BP: (123-131)/(58-64) 131/64  SpO2:  [93 %-100 %] 98 %  /73   Pulse 115   Temp " 37.1  C (98.8  F) (Oral)   Resp 16   Wt 75.8 kg (167 lb 1.7 oz)   SpO2 98%   BMI 21.46 kg/m       Exam:   GEN: alert and oriented, responds to questions appropriately  Resp: non-labored on RA  EXT: swellen scrotum  SKIN: Epidural catheter site with dressing c/d/i, no tenderness, erythema, heme, edema Extent of sensory blockade:  Tested with alcohol b/l T11-L2.      ASSESSMENT/PLAN:     L1-2 epidural catheter infusion of bupivacaine 0.125%%  at 8 mL/hr.  1-Consider to restart Ketamine IV gtt 5 mL/hr  2-continue current epidural infusion bupivacaine 0.125% at 8 mL/hour  3- Multimodal pain regimen by primary team    POD #2  - antithrombotic/thrombolytic therapy is currently NOT ordered. Please contact [facility] (#[phone]) prior to any medication changes  - will continue to follow and adjust as needed    discussed plan with attending anesthesiologist  Deborah Curiel MD  PGY-2 Regional Anesthesia Pain Service    [facility] Contact Info (24 hour job code pager is the last 4 digits) For in-house use only:   [place] phone: [place] [phone], [place] [phone], [facility] [phone], then enter call-back number.    Text: Use [employer] on the Intranet <Paging/Directory> tab and enter Jobcode ID.   If no call back at any time, contact the hospital  and ask for [facility]APS attending or backup

## 2022-06-19 NOTE — PLAN OF CARE
Major Shift Events: Refuses most cares. Refuses to take out his central line due to not wanting to lay flat. Education given multiple times. Oxycodone given x1. Wants ice on incision around the clock. Education given on the importance of taking a break but pt refuses. Did not sleep at all. Sinus tachycardia. BP stable. RA. No appetite and did not eat anything. Voiding spontaneously. K replaced. Next recheck tomorrow am.    Plan: Continue with current POC    For vital signs and complete assessments, please see documentation flowsheets.

## 2022-06-19 NOTE — PROGRESS NOTES
SURGICAL ICU PROGRESS NOTE  06/19/2022    PRIMARY TEAM: Orthopedic Surgery  PRIMARY PHYSICIAN: Dr. Whitaker    REASON FOR CRITICAL CARE ADMISSION: Mechanical ventilation, hemodynamic instability   ADMITTING PHYSICIAN: Dr. 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 Since 6/17 s/p right hemipelvectomy with free flap coverage. Procedure was notable for significant blood loss requiring transfusion of 10 units pRBC (EBL almost 3 liters). Admitted to the SICU for ventilatory management and hemodynamic monitoring.    Today's Plans/Updates:  - CVC and MAC introducer removed  - Pain team managing epidural catheter (increased to 10 ml/hr overnight)  - Pause ketamine and reassess pain  - Psychiatry consult for adjustment disorder  - Senna scheduled  - Discontinue sliding scale insulin  - Switch to D5 1/2 NS @ 50 mL/hr  - Plans to transfer to floor today    Neurological:  # Acute postoperative pain  - Monitor neurological status. Delirium preventions and precautions. Notify provider for any acute changes in exam.  - Pain: Ketamine 10 mcg/hr -> pause today and reassess. Tylenol 975 q8h. Dilaudid 0.3-0.6mg q2h prn, PTA MScontin 30mg q12h and PTA PO oxycodone 10-20 mg q3h prn  - Epidural catheter with bupivacaine 10 ml/hr (increased by pain team overnight)  - Sedation plan: None    # Adjustment disorder  - Psychiatry consult per orthopedic recommendations    # Insomnia  - Melatonin 1 mg prn at bedtime    Pulmonary:   # Post operative ventilatory support, now extubated  # Acute postprocedural respiratory support - resolved  - Supplemental oxygen to keep saturation above 92%. Overnight saturations at 100% on room air overnight  - Incentive spirometer every 15-30 minutes when awake    Cardiovascular:    # Shock (hypovolemic) - resolved  Maintaining MAPs (80's  to 90's) overnight.  - Monitor hemodynamic status. MAP goal > 65.   - Labetalol for SBP > 180    #Sinus tachycardia  Heart rates have improved to 100's to low 110's. Differential diagnosis includes most likely physiologic response to pain/sedation vs less likely PE, infection, volume depletion, hyperthyroidism, arrhythmia, myocardial ischemia, etc.  - Pain control as above  - Continue to monitor    Gastroenterology/Nutrition:  # Protein calorie deficit malnutrition   - Regular diet, encourage PO intake  - Bowel regimen in place - scheduled Miralax, add scheduled senna, prn bisacodyl suppository    Fluids/Electrolytes:   No acute concerns. Electrolytes within normal limits this morning  - Switch to D5 1/2 NS at 50/hr for mIVF given poor appetite  - Daily BMP  - Mg standard electrolyte replacement protocol  - Phos, K high replacement protocol    Renal:  No acute concerns. Urine output is adequate (0.86 ml/kg/hr). Cr 0.62. Voiding without difficulty, Sung removed yesterday.  - Will continue to monitor intake and output    Endocrine:  # Stress hyperglycemia   No history of diabetes. Overnight glucose levels in the 120's  - Discontinue sliding scale insulin, continue to monitor response  - Goal to keep BG< 180 for optimal wound healing    ID:  # Stress-induced leukocytosis  # Candis-operative prophylaxis  No acute concerns. WBC 17.4 < 20.1 (downtrending). Tmax 98.5 overnight, afebrile.  - Completed Ancef perioperative prophylaxis  - Continue to monitor    Heme:     # Acute blood loss anemia   Hemoglobin 8.6 < 11.8 (downtrending, likely equilibrating from unbalanced blood product resuscitation intraoperatively). Plt 197, INR 1.34, fibrinogen 194  - Daily CBC  - Transfuse if hgb <7.0 or signs/symptoms of hypoperfusion. Monitor and trend    MSK:  # Weakness and deconditioning of critical illness  - Physical and occupational therapy consult     # S/p right hemipelvectomy  - Movement restrictions per plastic surgery: No  sitting or pressure on pelvis for 1 week    General Cares/Prophylaxis:    DVT Prophylaxis: SCDs, Lovenox 40 mg daily (hold prior to epidural catheter removal)  GI Prophylaxis: Not indicated    Lines/ tubes/ drains:  - Right hip AMANDA drain -> 238 mL since midnight, 522 ml total yesterday, serosanguinous drainage  - PIV x3  - Epidural catheter    Disposition:  - Plans for floor status     Patient seen and discussed with Dr. Fernández, surgical ICU staff.    Janice Gatica, MS4  [employer]    [employer] PGY 1 Surgery     ====================================    TODAY'S SUBJECTIVE/INTERVAL HISTORY:   Extubated yesterday. Continued on ketamine 10 mcg/kg/min overnight. Epidural catheter bupivacaine increased to 10 ml/hr.    No acute events overnight. Patient reports feeling well this morning. He continues to have pain, 6/10 currently up to 10/10 at its worst in the right groin area. He has no issues with urination. Denies nausea/vomiting. He is passing flatus. No BM yet. Appetite is minimal. Denies SOB, abdominal pain, or leg pain.    OBJECTIVE:   Temp:  [98.2  F (36.8  C)-98.9  F (37.2  C)] 98.9  F (37.2  C)  Pulse:  [106-122] 112  Resp:  [13-28] 21  BP: (111-138)/(62-83) 138/78  MAP:  [78 mmHg-84 mmHg] 84 mmHg  Arterial Line BP: (119-131)/(58-64) 131/64  SpO2:  [93 %-100 %] 100 %  Vent Mode: CPAP/PS  (Continuous positive airway pressure with Pressure Support)  FiO2 (%): 30 %  Resp Rate (Set): 16 breaths/min  Tidal Volume (Set, mL): 500 mL  PEEP (cm H2O): 5 cmH2O  Pressure Support (cm H2O): 5 cmH2O  Resp: 21    I/O last 3 completed shifts:  In: 1725.74 [P.O.:200; I.V.:1525.74]  Out: 1382 [Urine:830; Drains:552]    UOP:  Since midnight: 0.86 ml/kg/min  Yesterday: 0.48 ml/kg/hr    General/Neuro: Resting comfortably in bed. Alert, flat affect, responds to questions appropriately  CV: Tachycardic, regular rhythm. No murmurs.  Pulm: Lungs clear to auscultation bilaterally. Breathing unlabored on room  air.  Abd: Soft, non-tender, non-distended. AMANDA drain in place, right hip, serosanguinous drainage.  Extremities: No peripheral edema. S/p right hemipelvectomy. Incisions are clean, dry, and intact. No surrounding erythema or drainage.  : Moderate scrotal swelling  Skin: Warm and well-perfused.    LABS:   Arterial Blood Gases   Recent Labs   Lab 06/17/22 2135 06/17/22 2112 06/17/22  2040 06/17/22  1828   PH 7.42 7.47* 7.44 7.45   PCO2 37 36 38 37   PO2 125* 123* 117* 144*   HCO3 24 26 26 26     Complete Blood Count   Recent Labs   Lab 06/19/22  0457 06/18/22  0320 06/17/22 2312 06/17/22 2135   WBC 17.4* 20.1* 12.7* 8.3   HGB 8.6* 11.8* 12.1* 9.1*  9.3*    148* 145* 61*     Basic Metabolic Panel  Recent Labs   Lab 06/19/22  0840 06/19/22  0502 06/19/22  0457 06/19/22  0019 06/18/22  0324 06/18/22  0320 06/18/22  0032 06/17/22 2312 06/17/22  2247 06/17/22 2135 06/17/22  1611 06/17/22  0541   NA  --   --  139  --   --  139  --  139  --  134   < > 136   POTASSIUM  --   --  3.8  --   --  4.4  --  4.9  --  4.7   < > 4.1   CHLORIDE  --   --  106  --   --  107  --  108  --   --   --  100   CO2  --   --  26  --   --  22  --  25  --   --   --  30   BUN  --   --  15  --   --  13  --  11  --   --   --  15   CR  --   --  0.62*  --   --  0.49*  --  0.50*  --   --   --  0.66   GLC 96 129* 124* 131*   < > 162*   < > 182*   < > 201*   < > 119*    < > = values in this interval not displayed.     Liver Function Tests  Recent Labs   Lab 06/17/22 2312 06/17/22 2135 06/17/22 2038 06/17/22  0633   INR 1.34* 1.91* 1.64* 1.07     Pancreatic Enzymes  No lab results found in last 7 days.     Coagulation Profile  Recent Labs   Lab 06/17/22 2312 06/17/22 2135 06/17/22 2038 06/17/22  0633   INR 1.34* 1.91* 1.64* 1.07   PTT 33 44* 37  --      IMAGING:   No results found for this or any previous visit (from the past 24 hour(s)).

## 2022-06-19 NOTE — PLAN OF CARE
Neuro: Alert and oriented x4, afebrile, does move extremities, reports LLE n/t (baseline), psych and health psych now consulted  Pain: Ketamine turned off today from SICU. Remains on epidural. PRN Oxycodone 20 mg provided. Scheduled Tylenol and Morphine provided. Ice packs utilized.  CV: Sinus tachycardia, normotensive, trace edema noted in extremities but scrotum with much edema - did attempt elevation  Resp: RA, education on incentive spirometer performed and encouraged  GI/: sliding scale insulin discontinued this shift, very little almost no oral intake, however did order a sandwich around dinner and at most of it. Began bowel regimen this shift - pt refused despite education  ID/Heme: Continue trending labs  Access: Removed RIJ this shift, PIV x3 and posterior epidural sites, AMANDA drain in place   Skin: Patient hesitant to let nursing do assessment nor do cares such as baths, linen changes, or repo patient. Provided education for patient regarding the need to do that. Patient reported the severity of pain as the source for not wanting to do it. Pain management provided and pt remains not allowing cares.     Problem: Pain Acute (Oncology Care)  Goal: Optimal Pain Control  Outcome: Ongoing, Not Progressing  Patient remains reporting  difficult pain. Have provided PRN / scheduled meds. Have promoted patient to please discuss with nursing when feeling pain beginning again. Patient often sleeping between cares. Have discussed with pain service / service today

## 2022-06-19 NOTE — PROGRESS NOTES
Orthopedic Surgery Progress Note 06/19/2022    S: Patient seen at bedside. Pt. Extubated yesterday. Refusing nursing cares, refused to have central line removed. No acute events overnight. Pt. Endorsing left groin pain. Pain appears controlled. PT recommending acute rehab. Discussed importance of removing central line, decreasing pressure on flap.     O:  Temp: 98.5  F (36.9  C) Temp src: Axillary BP: 131/79 Pulse: 110   Resp: 16 SpO2: 100 % O2 Device: Nasal cannula Oxygen Delivery: 2 LPM    Exam:  Gen: alert and oriented, responds to questions appropriately  Resp: non-labored on RA  MSK:  RLE:  - Incision c/d/i with glute tape. no active drainage  - Moderate swelling about scrotum  - Skin WWP  LLE:   - Left groin without skin changes  - Left groin mildly TTP, no fluctuance, mild edema   - SILT DP/SP/T/Sa/Wu n. 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 Patient presented with worsening right hip and thigh pain and was found to have a pathologic right proximal femur fracture. Patient now s/p right hindquarter amputation with Dr. Whitaker and primary wound closure with Dr. Butt on 6/17.    Placed psych consult 6/19 to aid in providing resources to help the patient adjust to large change s/p procedure.      Plan:  SICU,appreciate cares  Activity: Up with assist. No pressure on the flap (right flank region) and no sitting at this time per plastics.   Weight bearing status: Per plastic surgery  Antibiotics: Ancef x24 hours perioperatively.  Diet: Begin with clear fluids and progress diet as tolerated.  DVT prophylaxis: Lovenox and mechanical while in the hospital, discharge on Lovenox x4 weeks.  Wound  Care: Per plastic surgery  Drains: Per plastic surgery  York: May remove york and rectal tube from ortho perspective   Pain management: transition from IV to orals as tolerated.   X-rays: None needed  Physical Therapy: Gait, ROM, ADL's.  Occupational Therapy: ADL's.  Labs: Trend Hgb on POD #1, 2, 3 .  Consults: PT, OT, plastics, psych, appreciate assistance in caring for this patient.  Follow-up: Clinic with Dr. Whitaker in 2 weeks with no XRs needed.     Disposition: Pending progress with therapies, pain control on orals, and medical stability, anticipate discharge to acute rehab or TCU on POD #4-5.    Orthopedic surgery staff for this patient is Dr. Whitaker. Discussed.    --  Becca Valentine MD  Orthopedic Surgery PGY-1

## 2022-06-20 NOTE — PROGRESS NOTES
Patient transferred to . Verbal report given to Shellie SMITH. All belongings sent with patient. Patient updated sisters of transfer of rooms. IV Dilaudid 0.5mg provided for switching of beds for pain relief. Education for the need to reposition, participate in therapies, and discuss pain management with nursing staff provided prior to transfer.

## 2022-06-20 NOTE — PROGRESS NOTES
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 Patient presented with worsening right hip and thigh pain and was found to have a pathologic right proximal femur fracture. Patient now s/p right hindquarter amputation with Dr. Whitaker and primary complex wound closure with Dr. Butt on 6/17.  Procedure was notable for significant blood loss requiring transfusion of 10 units pRBC (EBL almost 3 liters). Patient remained intubated and was transferred to SICU postop, extubated on POD1. Stable for transfer to floor on POD2.     /76 (BP Location: Right arm, Cuff Size: Adult Regular)   Pulse 101   Temp 98.3  F (36.8  C) (Oral)   Resp 16   Wt 75.8 kg (167 lb 1.7 oz)   SpO2 100%   BMI 21.46 kg/m      Plan:  - Continue cares per Primary Team   - Likely transfer to floor when bed available   - The Critical Care service will continue to follow peripherally while the patient is within the ICU. We are readily available should issues arise. Please feel free to contact us for critical care issues with which we may be of assistance. For all other concerns, please contact primary service first.       CHET Diaz CNP

## 2022-06-20 NOTE — PROGRESS NOTES
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 Hospitalized since 6/5/2022, On 6/17 underwent right hindquarter amputation and local gluteal flap closure with significant blood loss (EBL ~ 3 liters), postop admitted to SICU for ventilatory support and hemodynamic monitoring.     Chronic cancer related pain, opioid tolerance.  Prior to admission on MS extended release 30 mg PO BID and oxycodone 5 mg tab (using 10 to 15 mg per dose). Since hospitalization remains on MS-ER 30 mg PO BID and most often using oxcodone between  mg/day.  Has been off ketamine infusion since yesterday (6/19) morning     Procedure: s/p right hindquarter amputation and local gluteal flap closure     Date of Surgery: 6/17/2022    Date of Catheter Placement: 6/17/2022    Plan/Recommendations:  1. Regional Anesthesia/Analgesia  -Continuous Catheter Type/Site: Epidural  L1-2  Continue 0.125% bupivacaine  Continuous Infusion at 10 mL/hr    Plan to maintain catheter, max of 5 days    2. Anticoagulation  Okay to continue prophylactic enoxaparin 40 mg SC Q 24 hrs as ordered by primary service  -Please contact Inpatient Pain Service before ordering or making any anticoagulation changes       3. Multimodal Analgesia  - Opioid (24 hr total 07AM - 07AM)  Morphine extended release 30 mg PO Q 12 hr (60 mg)  Oxycodone 10-20 mg PO Q 3 hr PRN (100 mg)  Dilaudid 0.3-0.5 mg IV Q 2 hr PRN (0 mg)     - Nonopioid: Tylenol 975 mg PO Q 8 hr    Pain Service will continue to follow.    Discussed with attending anesthesiologist    Please Page the Pain Team Via [employer]: \"Adult acute inpatient pain management/[facility]\"    Greta NESBITT " CHET Mireles CNP  06/20/2022     Overnight Events: None    Tubes/Drains: Yes  Right hip AMANAD drain    Subjective:  Taking oxycodone for pain, it helps a little  Patient declined epidural bolus indicating his right hip is numb and his pain is mainly at AMANDA site.  Attempted repositioning to assess epidural catheter with patient assist, then also with bedside RN assist, but unable visualize insertion site because patient's pain reporting his pain limits his ability to move. Explained to Ho that at a minimum his insertion site should be viewed at least once daily to assess dressing integrity and visualize site for signs of leakage, drainage, infection. Patient verbalizes understanding but still declines repositioning at this time. Bedside RN aware and will contact writer when patient able to reposition  Nausea: No  Vomiting: No  Pruritus: No  Symptoms of LAST: No    Pain Location:  Right AMANDA site and right hip    Pain Intensity:    Pain at Rest: 8/10   Pain with Activity: 10/10  Satisfied with your level of pain control: No    Diet: Diet  Regular Diet Adult    Relevant Labs:  Recent Labs   Lab Test 06/20/22  0523 06/18/22  0320 06/17/22  2312   INR  --   --  1.34*      < > 145*   PTT  --   --  33   BUN 10   < > 11    < > = values in this interval not displayed.       Physical Exam:  Vitals: /76 (BP Location: Right arm, Cuff Size: Adult Regular)   Pulse 101   Temp 98.3  F (36.8  C) (Oral)   Resp 16   Wt 75.8 kg (167 lb 1.7 oz)   SpO2 100%   BMI 21.46 kg/m      Physical Exam:   Orientation:  Alert, oriented, distress with repositioning  Sedation: No    Motor Examination:  5/5 Strength in left lower extremity: Yes    Sensory Level:   Decrease in sensation: Yes, diminished to no sensation from R) T9-L1/2     Catheter Site:   Catheter entry site - unable to assess: patient declined site inspection in the morning due to pain with repositioning          Relevant Medications:  Current Pain  Medications:  Medications related to Pain Management (From now, onward)    Start     Dose/Rate Route Frequency Ordered Stop    06/19/22 1100  sennosides (SENOKOT) tablet 8.6 mg         8.6 mg Oral or Feeding Tube 2 TIMES DAILY 06/19/22 1032      06/19/22 1030  morphine (MS CONTIN) 12 hr tablet 30 mg         30 mg Oral EVERY 12 HOURS SCHEDULED 06/19/22 1027      06/18/22 1123  HYDROmorphone (PF) (DILAUDID) injection 0.3-0.5 mg         0.3-0.5 mg Intravenous EVERY 2 HOURS PRN 06/18/22 1123      06/17/22 1530  bupivacaine (MARCAINE) 0.125 % in sodium chloride 0.9 % 250 mL EPIDURAL Infusion         10 mL/hr  EPIDURAL CONTINUOUS 06/17/22 1511      06/16/22 1548  magnesium hydroxide (MILK OF MAGNESIA) suspension 30 mL         30 mL Oral DAILY PRN 06/16/22 1549      06/10/22 0000  polyethylene glycol (MIRALAX) 17 g packet         17 g Oral DAILY 06/09/22 0910      06/10/22 0000  acetaminophen (TYLENOL) 325 MG tablet         975 mg Oral EVERY 8 HOURS 06/10/22 0822      06/10/22 0000  morphine (MS CONTIN) 30 MG CR tablet         30 mg Oral EVERY 12 HOURS 06/10/22 0822      06/10/22 0000  oxyCODONE IR (ROXICODONE) 10 MG tablet         10-20 mg Oral EVERY 4 HOURS PRN 06/10/22 0822      06/10/22 0000  sennosides (SENOKOT) 8.6 MG tablet         1-2 tablet Oral 2 TIMES DAILY PRN 06/10/22 0822      06/06/22 0800  polyethylene glycol (MIRALAX) Packet 17 g         17 g Oral DAILY 06/05/22 2030 06/05/22 2144  oxyCODONE IR (ROXICODONE) tablet 10-20 mg         10-20 mg Oral EVERY 3 HOURS PRN 06/05/22 2145 06/05/22 2100  acetaminophen (TYLENOL) tablet 975 mg         975 mg Oral EVERY 8 HOURS 06/05/22 2030 06/05/22 2025  lidocaine (LMX4) cream          Topical EVERY 1 HOUR PRN 06/05/22 2030 06/05/22 2025  lidocaine 1 % 0.1-1 mL         0.1-1 mL Other EVERY 1 HOUR PRN 06/05/22 2030            Primary Service Contacted with Recommendations? No      Time/Communication:  I personally spent 15 minutes with greater than 50%  in consultation, education, counseling and coordination of care.  See note above for details on conversation.

## 2022-06-20 NOTE — PROGRESS NOTES
Orthopedic Surgery Progress Note 06/20/2022    S: NAEON. Pain appears controlled. Ketamine gtt discontinued yesterday, remains on epidural. Central line removed yesterday. Continues to decline most of nursing assessments and cares. Declined to work on rolling with PT yesterday. Psych consult yesterday, recommended starting on lexapro and seroquel, health psychology consult. Discussed psych recommendations with patient this AM and patient is agreeable to trying the medications and speaking with psychology.     O:  Temp: 98.6  F (37  C) Temp src: Oral BP: 135/72 Pulse: 114   Resp: 17 SpO2: 98 %        Exam:  Gen: alert and oriented, responds to questions appropriately  Resp: non-labored on RA  MSK:  RLE:  - Incision c/d/i with prineo tape. 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 Patient presented with worsening right hip and thigh pain and was found to have a pathologic right proximal femur fracture. Patient now s/p right hindquarter amputation with Dr. Whitaker and primary complex wound closure with Dr. Butt on 6/17.  Procedure was notable for significant blood loss requiring transfusion of 10 units pRBC (EBL almost 3 liters). Patient remained intubated and was transferred to SICU postop, extubated on POD1. Stable for transfer to floor on POD2.     Placed psych consult 6/19 to aid in providing resources to help the patient adjust to large change s/p procedure. Recommended lexapro, seroquel, Health Psychology consult, placed.     Plan:  SICU, appreciate cares  Activity: Up with assist. No pressure on  the flap (right flank region), okay to turn toward the left and back but NOT towards the right side, and no sitting x 2 weeks per plastics.   Weight bearing status: Per plastic surgery  Antibiotics: Ancef x24 hours perioperatively.  Diet: Begin with clear fluids and progress diet as tolerated.  DVT prophylaxis: Lovenox and mechanical while in the hospital, discharge on Lovenox x4 weeks.  Wound Care: Per plastic surgery  Drains: Per plastic surgery  Pain management: transition from IV to orals as tolerated.   X-rays: None needed  Physical Therapy: Gait, ROM, ADL's.  Occupational Therapy: ADL's.  Labs: Trend Hgb on POD #1, 2, 3 .  Consults: PT, OT, plastics, psych, appreciate assistance in caring for this patient.  Follow-up: Clinic with Dr. Whitaker in 2 weeks with no XRs needed.     Disposition: Pending progress with therapies, pain control on orals, and medical stability, anticipate discharge to acute rehab or TCU on POD #4-5.    Discussed with staff surgeon, Dr. Whitaker.  --  Brooke Fan MD  Orthopedic Surgery PGY-4

## 2022-06-20 NOTE — PLAN OF CARE
Neuro: Patient is alert and oriented x4. LLE has n/t - this is PTA per patient s/p chemotherapy. Afebrile today.   Restrictions for no sitting for two weeks, no right hip pressure and no HOB greater than 40 degrees   Pain - Bupivacaine catheter, PRN oxy, scheduled tylenol/morphine. Chronic pain present  CV: Off of tele per orders, normotensive, scrotum edema present. Lovenox present for anticoagulation  Resp: RA, Incentive Spirometer present and pulls around 750. Does self-administer but could benefit from encouragement   GI/: Improving appetite, uses urinal, no BM since before surgery. IVF 0.45 D5W 0.45% NaCl @ 50/hr for hydration  Access: PIV x2 right arm  AMANDA Drain in right mid lower abd area - tender for patient, no s/s of infection   Right hip incision from posterior to mid anterior - CDI    Pt does refuse turns/care often. Pt has expressed that he becomes frustrated easily when not clearly explained what's going on  - per patient request, please have all staff take time to explain everything that's going on.     Problem: Adjustment to Injury (Hip Fracture Medical Management)  Goal: Optimal Coping with Change in Health Status  Outcome: Ongoing, Not Progressing   Pt with fear/ little motivation regarding next steps. Have provided much education for patient with the understanding that therapy is needed to progress forward. Pt is motivated by having daughters and a son at home and would like to get better for them however. Health psych consulted.  Problem: Pain Acute (Oncology Care)  Goal: Optimal Pain Control  Outcome: Ongoing, Progressing  Pt pain has been better than yesterday per patient. Bupivacaine epidural present at 10 ml/hr. PRN PO oxy provided 20 mg as able.   Intervention: Develop Pain Management Plan    Intervention: Prevent or Manage Pain

## 2022-06-20 NOTE — PLAN OF CARE
Major Shift Events: Continues to refuse lots of cares but will accept some after giving education and building a rapport. Oxycodone PRN given x3. Vitals stable. Ate most of dinner. Voiding spontaneously. See flowsheet for UO and AMANDA output. Phos replacement via IV ordered    Plan: Continue with current POC    For vital signs and complete assessments, please see documentation flowsheets.

## 2022-06-21 NOTE — CONSULTS
Health Psychology                                                                                                                          Bell Klein, Ph.D., L.P [phone]  Vanessa Moses, Ph.D., L.P. [phone]  Michelle Toscano, Ph.D, L..P. [phone]  Jennifer Awan, Ph.D., L.P. [phone]  Nicholas Stroud, Ph.D., A.B.P.P., L.P. [phone]         Torie Arreaga, Ph.D., L.P. 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 Patient presented with worsening right hip and thigh pain and was found to have a pathologic right proximal femur fracture. Patient now s/p right hindquarter amputation with Dr. Whitaker and primary complex wound closure with Dr. Butt on 6/17.  Procedure was notable for significant blood loss requiring transfusion of 10 units pRBC (EBL almost 3 liters). Patient remained intubated and was transferred to SICU postop, extubated on POD1. Stable for transfer to floor on POD2.      Health Psychology consulted to support Fabian in his adjustment post-amputation. Psychiatry met with patient on 6/19 and recommended Lexapro and Seroquel. Palliative met with patient 6/6/22 to discuss goals of care and Karley Hirsch [facility], met with him on 6/7 to assess mood.     SUBJECTIVE:  Met with Fabian in his room to introduce Health Psychology services and discuss nature of referral. Reviewed information seen in Psychiatry note and re-assessed symptoms.     Fabian was transferred to  last night.  He described feeling largely hopeful and motivated about his recovery plans.  He shared that at times he can feel overwhelmed in the hospital due to  "overstimulation and that he often overthinks things, symptoms consistent with anxiety.  Symptoms present in hospital context but not in the community.     Discussed adjustment post-amputation.  Fabian's concerns are that people will treat him differently and he doesn't want to be \"coddled\" or given different treatment.  He described aspects of his personality that may make adjustment difficult. He said that he is often the \"go to\" person with helping others and wants to still be able to do that.     Discussed pain management.  They removed his epidural today and his pain is higher. He said he can find comfortable positions at times which helps with the pain. Medications slightly helpful. Unable to address mind-body interventions for pain management during this visit.    Discussed supports in his life and how he connects with individuals to help manage emotional distress.  For example, he said the other day he was feeling overwhelmed and chose to call his sister to help him manage distress.     Fabian was engaged throughout the visit but needed to use his urinal and we ended our visit.      OBJECTIVE:  Fabian was lying in his bed during our visit.  He had a bag of snacks and drinks that his daughter brought him as a father's day present on his lap and was planning to eat Twizzlers later.  He reported fair mood, affect was mood- and thought-congruent. Appropriately bright when discussing pleasant topics. Speech WNL.  Thought processes logical and linear. Insight and judgment good. Denied suicidal ideation.        ASSESSMENT:  Today Fabian describeds stable, hopeful mood.  He is feeling motivated to participate in treatment and return home and adjust to his new life.  He expressed some concerns about people treating him differently. He described feeling overwhelmed at times in the hospital and this likely contributed to declining cares in the past.  Witnessed him participate in nursing cares during our visit. 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       PLAN:  Depending on disposition and timing, Health Psychology can follow-up another time.  After he met with sebastien Hirsch she helped him schedule an outpatient therapy appointment for 6/10 which he missed as he was in the hospital.     Interest in therapy should be re-assessed at discharge or at his rehabilitation facility.     Michelle Toscano, PhD,   Clinical Health Psychologist  Phone: [phone]  Pager: [phone]      Time in: 1:20  Time out: 1:47

## 2022-06-21 NOTE — PLAN OF CARE
Goal Outcome Evaluation:    Plan of Care Reviewed With: patient     Patient declined 2 personskin assessment. Will wait until pulsatte mattress arrives and then complete.    Md updated re lactic acid protocol.

## 2022-06-21 NOTE — PROGRESS NOTES
Orthopedic Surgery Progress Note 06/21/2022    S: CONTRERAS. Epidural leaking overnight and pulled this AM. Pain team following, no changes to current plan today. Continues to decline most of nursing assessments and cares. Psych consult 6/19, recommended starting on lexapro and seroquel, health psychology consult pending. May consider transfer back to [place] pending bed availability.     O:  Temp: 98.4  F (36.9  C) Temp src: Oral BP: 138/80 Pulse: 110   Resp: 16 SpO2: 100 % O2 Device: None (Room air)      Exam:  Gen: alert and oriented, responds to questions appropriately  Resp: non-labored on RA  MSK:  RLE:  - Incision c/d/i with prineo tape. 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 Patient presented with worsening right hip and thigh pain and was found to have a pathologic right proximal femur fracture. Patient now s/p right hindquarter amputation with Dr. Whitaker and primary complex wound closure with Dr. Butt on 6/17.  Procedure was notable for significant blood loss requiring transfusion of 10 units pRBC (EBL almost 3 liters). Patient remained intubated and was transferred to SICU postop, extubated on POD1. Stable for transfer to floor on POD2.     Placed psych consult 6/19 to aid in providing resources to help the patient adjust to large change s/p procedure. Recommended lexapro, seroquel, Health Psychology consult, placed.    Goals for 6/21:  - Health psychology consult pending   - Pain control, pain team following, appreciate recs.   - may consider transfer to [place] pending bed availability.       Plan:  SICU, appreciate cares  Activity: Up with assist. No pressure on the flap (right flank region), okay to turn toward the left and back but NOT towards the right side, and no sitting x 2 weeks per plastics.   Weight bearing status: Per plastic surgery  Antibiotics: Ancef x24 hours perioperatively.  Diet: Begin with clear fluids and progress diet as tolerated.  DVT prophylaxis: Lovenox and mechanical while in the hospital, discharge on Lovenox x4 weeks.  Wound Care: Per plastic surgery  Drains: Per plastic surgery  Pain management: transition from IV to orals as tolerated.   X-rays: None needed  Physical Therapy: Gait, ROM, ADL's.  Occupational Therapy: ADL's.  Labs: Trend Hgb on POD #1, 2, 3 .  Consults: PT, OT, plastics, psych, appreciate assistance in caring for this patient.  Follow-up: Clinic with Dr. Whitaker in 2 weeks with no XRs needed.     Disposition: Pending progress with therapies, pain control on orals, and medical stability, anticipate discharge to acute rehab or TCU on POD #4-5.    Discussed with staff surgeon, Dr. Whitaker.  --  DIO GRAHAM PA-C  6/21/2022 12:21 PM  Orthopaedic Surgery     Thank you for allowing me to participate in this patient's care. Please page me directly any questions/concerns.   Securely message with the Vocera Web Console (learn more here)  Text page via [employer] Paging/Directory    If there is no response, if it is a weekend, or if it is during evening hours, please page the orthopaedic surgery resident on call via [employer] Paging/Directory

## 2022-06-21 NOTE — PLAN OF CARE
Problem: Plan of Care - These are the overarching goals to be used throughout the patient stay.    Goal: Optimal Comfort and Wellbeing  Outcome: Ongoing, Progressing     Problem: Pain Acute (Oncology Care)  Goal: Optimal Pain Control  Outcome: Ongoing, Progressing     Problem: Bowel Elimination Impaired (Hip Fracture Medical Management)  Goal: Effective Bowel Elimination  Outcome: Ongoing, Progressing     Problem: Mobility Impairment (Lower Extremity Amputation)  Goal: Optimal Mobility [place] and Safety  Outcome: Ongoing, Progressing     Pt is alert and oriented X 4. Bed changed to pulsate mattress. Ceiling lift used for transfer. On strict bed rest. Complained of pain to right hip with movement. 0.5 mg of dilaudid IV given. Bupivacaine (MARCAINE) EPIDURAL Infusing at 10 ml/hr. D5 with 1/2 NACL infusing at 75 cc/hr. Phosphorus low at 1.6. Protocol initiated and phosphorus infusing over 4 hours. BP (!) 141/81 (BP Location: Right arm)   Pulse 112   Temp 99.2  F (37.3  C) (Oral)   Resp 18   Wt 75.8 kg (167 lb 1.7 oz)   SpO2 98%. Pt refused senna. Uses urinal. Refusing repositioning and skin check on left side. Ice pack given to patient per his request. Denies nausea. Tolerating PO intake. Will continue to monitor.

## 2022-06-21 NOTE — OP NOTE
Procedure Date: 06/17/2022    PREOPERATIVE DIAGNOSIS:  Right pelvic/thigh sarcoma requiring hip disarticulation by Orthopedics and reconstruction of the right pelvic area by Plastics.    POSTOPERATIVE DIAGNOSIS:  Right pelvic/thigh sarcoma requiring hip disarticulation by Orthopedics and reconstruction of the right pelvic area by Plastics.    PLASTIC SURGERY PROCEDURE:  Posterior based gluteus huy musculocutaneous thigh flap of the right pelvic region, status post disarticulation.    SURGEON:  Krishna Butt MD    RESIDENT:  Yolis Fields MD    ANESTHESIA:  General anesthesia intubation.    COMPLICATIONS:  Nil.    DRAINS:  A 19-Polish AMANDA drain.    SPECIMENS:  Nil.    BLOOD LOSS:  50 mL for plastic surgery.      PROCEDURE:  Intraoperative chemical angiography using the SPY Elite system (this included supervision of IV ICG dye injection and interpretation of angiogram; indication was to evaluate the blood flow to the posterior-based gluteal musculocutaneous flap to ensure it was viable for coverage).    DESCRIPTION OF PROCEDURE:  After informed consent was taken from the patient, the proper site and procedure was ascertained with him, and he was appropriately marked and taken to the procedure room.  Orthopedic Surgery began the case and will dictate their portion.  I was called to the operating room once they had dissected out the right lower extremity at the pelvis in preparation for a hemipelvectomy/disarticulation.  The patient's leg was still in continuity with the femoral/iliac vessel, therefore was still vascularized.  At this point, we were unsure whether the remaining posterior-based gluteal musculocutaneous flap would be enough to close the defect of the pelvis by rotating it anteriorly and closing it primarily (blood flow interruption that had occurred during the disarticulation/hemipelvectomy), but nonetheless at this point, I went ahead and, in preparation for potentially needing a spare part free  flap reconstruction for the pelvic reconstruction, dissected out a popliteal artery-based free flap including the gastrocnemius muscle and the overlying fasciocutaneous tissues as a composite flap.  I made an anterior midline incision axillary down the tibia and then circumferentially cut the skin just distal to the knee and proximal to the ankle, dissected down to the deep fascia, medially identified the gastrocnemius and soleus muscles, superficial posterior compartment on the lateral side, dissected in the subfascial plane up to the gastrocnemius soleus muscles, thus creating a composite flap of fasciocutaneous and muscle flap, then continued my dissection, identifying the sural vessels proximally by taking down the insertion of the gastrocnemius muscle.  Those vessels were followed to the popliteal vessels.  At this point, the flap was ready to be harvested in case I needed to use this as a free flap to close the pelvic defect.  At this point, the iliac vessels were taken down by the orthopedic surgeon.  The leg was disarticulated completely.  The posterior-based gluteal muscle and thigh skin flap was then evaluated.  This flap was mobilized so that it could be used to close the anterior defect.  I rotated to see if it closed the skin, which it would, but I need to make sure it still was viable.  At this point, I carried out the SPY angiogram.  I injected 5 mL of IV ICG dye intravenously, and then at 30 and 60 seconds evaluated the blood flow.  There was excellent flow throughout the flap, including the muscle and the skin edges.  Thus, we decided to use this as the closing flap, not to use the free flap.  I placed a 19-Croatian Riley drain, secured it, and then closed in layers using 0 PDS suture in a deep layer, 2-0 Monocryl suture in interrupted and horizontal mattress fashion, and then closed with surgical tape and glue.  The patient tolerated the procedure well.  All counts were correct at the end of the  case.  The patient remained intubated and was sent to the ICU.        KAREN Butt MD        D: 2022   T: 2022   MT: SANDI    Name:     MARIELA MICHAEL  MRN:      [id number]        Account:        [id number]   :      1975           Procedure Date: 2022     Document: [id number]

## 2022-06-21 NOTE — CONSULTS
[facility]  Consult Note - Hospitalist Service, GOLD TEAM 6  Date of Admission:  6/5/2022  Consult Requested by: Dr. 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  He underwent right hindquarter amputation and local gluteal flap closure with significant blood loss (EBL ~ 3 liters). Post-operatively admitted to SICU for ventilatory support and hemodynamic monitoring. He is now transferred to the medical floor with internal medicine consulted for co-management.   Plans for eventual transfer back to [facility] for ongoing cares.     High Grade Pleomorphic Sarcoma of Right Thigh c/b Pathologic Proximal Femur Fracture s/p Right Hemipelvectomy with Local Gluteal Flap - Patient initially presented to [facility] with right leg pain, found to have an acute proximal femur fracture. He was eventually transferred to [place] for surgery, which was complicated by significant blood loss.    -Orthopedics primary   -Epidural catheter removed today  -Pain Control: Morphine extended release 30 mg PO Q 12H, Oxycodone 10-20 mg PO Q 3H PRN, Dilaudid 0.3-0.5 mg IV Q 2H PRN, acetaminophen 975mg PO Q8H  -DVT Prophylaxis: Lovenox and mechanical while in the hospital, discharge on Lovenox x4 weeks.  -Wound cares per plastic surgery   -Activity: Up with assist. No pressure on the flap (right flank region), okay to turn toward the left and back but NOT towards the right side, and no sitting x 2 weeks per plastics.     Acute Hypovolemic Shock 2/2 Acute Blood Loss Anemia  Hx of HILARIA and Anemia of Chronic Disease - Patient with ESBL ~3 liters during above procedure. He required SICU admission for vasopressors  and fluid resuscitation. He required 10 units pRBCs. Hgb stable at 8.9  -CBC daily  -monitor for signs/symptoms of active bleeding     Leukocytosis - Currently 14.6, downtrending from 20.1 on 6/18.  Suspect acute stress reactant from above surgery and acute blood loss.  No signs of infection at this time.   -CBC daily     Post Operative Ventilatory Support  Acute Hypoxic Respiratory Failure - Patient t extubated 6/18 and has not required supplemental oxygen since. Pt is able to converse and denies feeling SOB while satting at 100% on room air.  - Supplemental oxygen to keep saturation above 92%.     Depression - Psychiatry consult 6/19 who recommended initiation of Lexapro and Seroquel   -Continue Lexapro and Seroquel   -Health Psychology consult pending     Hypophosphatemia - Phosphorous 1.6, replacement per protocol.          The patient's care was discussed with the Attending Physician, Dr. Link Young.    RICHARD [facility]  Securely message with the Vocera Web Console (learn more here)  Text page via [facility] Paging/Directory   Please see signed in provider for up to date coverage information    Hospitalist Service, [facility] TEAM 6    Clinically Significant Risk Factors Present on Admission                    ______________________________________________________________________    Chief Complaint   Right leg pain    History is obtained from the patient and patient's chart    History of Present Illness   Vic Scott III is a 47 year old male who initially presented with right leg pain found to have a pathologic fracture of his femur in setting of his known high grade pleomorphic sarcoma.  Initially admitted to [facility], some delays in surgery planning due to reluctance in pre-op imaging.  He is now s/p procedure as above.   Patient notes some increased pain since stopping the epidural.  Pain is mainly at incision and AMANDA drain site.    He denies fevers,  chills, chest pain, palpitations, SOB, cough, nausea, vomiting, abdominal pain, change in bowel or urinary habits.     Review of Systems   The 10 point Review of Systems is negative other than noted in the HPI or here.     Past Medical History    I have reviewed this patient's medical history and updated it with pertinent information if needed.   Past Medical History:   Diagnosis Date     Pleomorphic cell sarcoma (H)        Past Surgical History   I have reviewed this patient's surgical history and updated it with pertinent information if needed.  Past Surgical History:   Procedure Laterality Date     ANESTHESIA OUT OF OR BLOOD BRAIN BARRIER DISRUPTION N/A 6/16/2022    Procedure: ANESTHESIA OUT OF OR - Block Epidural;  Surgeon: GENERIC ANESTHESIA PROVIDER;  Location: [facility] OR     COMPLEX WOUND CLOSURE (UPDATE) Right 6/17/2022    Procedure: complex closure pelvis, spy;  Surgeon: KAREN Butt MD;  Location: [facility] OR     COMPLEX WOUND CLOSURE (UPDATE)  6/17/2022    Procedure: ;  Surgeon: KAREN Butt MD;  Location: [facility] OR     EXCISE TUMOR PELVIS POSTERIOR Right 6/17/2022    Procedure: Right hindquarter amputation;  Surgeon: Matty Whitaker MD;  Location: [facility] OR     INSERT PORT VASCULAR ACCESS Right 11/1/2021    Procedure: INSERTION, VASCULAR ACCESS PORT;  Surgeon: Sushil Gonzales MD;  Location: [facility] OR     IR CHEST PORT PLACEMENT > 5 YRS OF AGE  11/1/2021       Social History   I have reviewed this patient's social history and updated it with pertinent information if needed.  Social History     Tobacco Use     Smoking status: Never Smoker     Smokeless tobacco: Never Used       Family History     No significant family history    Medications   I have reviewed this patient's current medications    Allergies   Allergies   Allergen Reactions     Blood Transfusion Related (Informational Only) Other (See Comments)     Patient has a history of a clinically significant antibody against RBC antigens.   A delay in compatible RBCs may occur.        Physical Exam   Vital Signs: Temp: 98.4  F (36.9  C) Temp src: Oral BP: 138/80 Pulse: 110   Resp: 16 SpO2: 100 % O2 Device: None (Room air)    Weight: 167 lbs 1.74 oz    GENERAL: Alert and oriented x 3. NAD. Cooperative.   HEENT: Anicteric sclera. Mucous membranes moist. NC. AT.   CV: RRR. S1, S2. No murmurs appreciated.   RESPIRATORY: Effort normal on RA. Lungs CTAB with no wheezing, rales, rhonchi.   GI: Abdomen soft and non distended with normoactive bowel sounds present in all quadrants. No tenderness, rebound, guarding. No lesions.   NEUROLOGICAL: No focal deficits. Moves all extremities.  CN 2-12 grossly intact.  EXTREMITIES: No peripheral edema. Intact bilateral pedal pulses. RLE amputation.   SKIN: No jaundice. No rashes.        Data    Latest Reference Range & Units 06/21/22 11:42   Sodium 136 - 145 mmol/L 136   Creatinine 0.67 - 1.17 mg/dL 0.41 (L)   GFR Estimate >60 mL/min/1.73m2 >90 [1]   Calcium 8.6 - 10.0 mg/dL 7.5 (L)   Anion Gap 7 - 15 mmol/L 9   WBC 4.0 - 11.0 10e3/uL 14.6 (H)   Hemoglobin 13.3 - 17.7 g/dL 8.9 (L)   Hematocrit 40.0 - 53.0 % 26.9 (L)   Platelet Count 150 - 450 10e3/uL 303   RBC Count 4.40 - 5.90 10e6/uL 3.19 (L)   MCV 78 - 100 fL 84   MCH 26.5 - 33.0 pg 27.9   MCHC 31.5 - 36.5 g/dL 33.1   RDW 10.0 - 15.0 % 16.5 (H)   Carbon Dioxide (CO2) 22 - 29 mmol/L 22   Chloride 98 - 107 mmol/L 105   Glucose 70 - 99 mg/dL 116 (H)   Potassium 3.4 - 4.5 mmol/L 4.5   Urea Nitrogen 6.0 - 20.0 mg/dL 5.6 (L)

## 2022-06-21 NOTE — PROGRESS NOTES
/73 (BP Location: Right arm)   Pulse 91   Temp (!) 96.4  F (35.8  C) (Oral)   Resp 17   Wt 75.8 kg (167 lb 1.7 oz)   SpO2 99%   BMI 21.46 kg/m       Neuro: Alert and oriented x4.     GI/: WDL. Voiding. No BM yet.     Diet: Regular diet Good po intake.     Incisions/Drains:  AMANDA no out put.     IV Access: PIV - sl  currently for tx.     Labs: Phos rechecked at 1700. Result pending.    Activity: Bedrest. S/p r-hemipelvectomy.     Pain: Oxycodone 20mg at 1730. Iv hydromorphone to be given prior to tx.     New changes this shift: Pt transferring to 5A- Ortho unit rm 552. Report given to receiving nurse Meenu.     Plan: Transfer to 5A - ortho via stretcher round 1830.

## 2022-06-21 NOTE — PROGRESS NOTES
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 Hospitalized since 6/5/2022, On 6/17 underwent right hindquarter amputation and local gluteal flap closure with significant blood loss (EBL ~ 3 liters), postop admitted to SICU for ventilatory support and hemodynamic monitoring.     Chronic cancer related pain, opioid tolerance.  Prior to admission on MS extended release 30 mg PO BID and oxycodone 5 mg tab (using 10 to 15 mg per dose). Since hospitalization remains on MS-ER 30 mg PO BID and most often using oxcodone between  mg/day.  Has been off ketamine infusion since yesterday (6/19) morning     Procedure: s/p right hindquarter amputation and local gluteal flap closure     Date of Surgery: 6/17/2022    Date of Catheter Placement: 6/17/2022    Plan/Recommendations:  1. Regional Anesthesia/Analgesia  -Continuous Catheter Type/Site: Epidural  L1-2    0845 am. Epidural catheter was found pulled out approx 2 cm, leaking clear fluid and there was loss of Tegaderm dressing integrity. Lovenox 40 mg last given yesterday at 1727.  Epidural catheter removed without complication, dark tip intact.  Insertion site c/d/i.  No erythema, heme, edema. Small bandage applied.     2. Anticoagulation  Okay to continue prophylactic enoxaparin 40 mg SC Q 24 hrs as ordered by primary service  -Please contact Inpatient Pain Service before ordering or making any anticoagulation changes       3. Multimodal Analgesia  - Opioid (24 hr total 07AM - 07AM)  Morphine extended release 30 mg PO Q 12 hr (60 mg) - home med/dose  Oxycodone 10-20 mg PO Q 3 hr PRN (20 mg x 4) home med that was prescribed for max 80  "mg/day  Dilaudid 0.3-0.5 mg IV Q 2 hr PRN (0.5 mg x 2)   - Nonopioid: Tylenol 975 mg PO Q 8 hr  Patient has not optimized opioid analgesia, therefore would continue with analgesics as ordered.  We will follow up tomorrow.      Pain Service will continue to follow. Discussed with Ortho Service GEOVANAN Bauamnn PA-C  Discussed with attending anesthesiologist    Please Page the Pain Team Via [employer]: \"Adult acute inpatient pain management/[facility]\"    Greta Mireles, CHET CNP  06/21/2022     Overnight Events: None    Tubes/Drains: Yes  AMANDA drain    Subjective: Reporting pain ranging between 8-10/10 since surgery.    Nausea: No  Vomiting: No  Symptoms of LAST: No    Pain Location:  Right thigh at drain site    Pain Intensity:    Pain at Rest: 8/10   Pain with Activity: 10/10  Comfort Goal: 5/10   Satisfied with your level of pain control: No    Diet: Diet  Regular Diet Adult    Relevant Labs:  Recent Labs   Lab Test 06/20/22  0523 06/18/22  0320 06/17/22  2312   INR  --   --  1.34*      < > 145*   PTT  --   --  33   BUN 10   < > 11    < > = values in this interval not displayed.       Physical Exam:  Vitals: BP (!) 141/81 (BP Location: Right arm)   Pulse 112   Temp 99.2  F (37.3  C) (Oral)   Resp 18   Wt 75.8 kg (167 lb 1.7 oz)   SpO2 98%   BMI 21.46 kg/m      Physical Exam:   Orientation:  Alert, oriented, and in no acute distress: Yes  Sedation: No    Motor Examination:  5/5 Strength in left lower extremity    Catheter Site:   Catheter entry site is clean/dry/intact: Yes    Tender: No      Relevant Medications:  Current Pain Medications:  Medications related to Pain Management (From now, onward)    Start     Dose/Rate Route Frequency Ordered Stop    06/19/22 1100  sennosides (SENOKOT) tablet 8.6 mg         8.6 mg Oral or Feeding Tube 2 TIMES DAILY 06/19/22 1032      06/19/22 1030  morphine (MS CONTIN) 12 hr tablet 30 mg         30 mg Oral EVERY 12 HOURS SCHEDULED 06/19/22 1027      06/18/22 1123  " HYDROmorphone (PF) (DILAUDID) injection 0.3-0.5 mg         0.3-0.5 mg Intravenous EVERY 2 HOURS PRN 06/18/22 1123      06/17/22 1530  bupivacaine (MARCAINE) 0.125 % in sodium chloride 0.9 % 250 mL EPIDURAL Infusion         10 mL/hr  EPIDURAL CONTINUOUS 06/17/22 1511      06/16/22 1548  magnesium hydroxide (MILK OF MAGNESIA) suspension 30 mL         30 mL Oral DAILY PRN 06/16/22 1549      06/10/22 0000  polyethylene glycol (MIRALAX) 17 g packet         17 g Oral DAILY 06/09/22 0910      06/10/22 0000  acetaminophen (TYLENOL) 325 MG tablet         975 mg Oral EVERY 8 HOURS 06/10/22 0822      06/10/22 0000  morphine (MS CONTIN) 30 MG CR tablet         30 mg Oral EVERY 12 HOURS 06/10/22 0822      06/10/22 0000  oxyCODONE IR (ROXICODONE) 10 MG tablet         10-20 mg Oral EVERY 4 HOURS PRN 06/10/22 0822      06/10/22 0000  sennosides (SENOKOT) 8.6 MG tablet         1-2 tablet Oral 2 TIMES DAILY PRN 06/10/22 0822      06/06/22 0800  polyethylene glycol (MIRALAX) Packet 17 g         17 g Oral DAILY 06/05/22 2030 06/05/22 2144  oxyCODONE IR (ROXICODONE) tablet 10-20 mg         10-20 mg Oral EVERY 3 HOURS PRN 06/05/22 2145 06/05/22 2100  acetaminophen (TYLENOL) tablet 975 mg         975 mg Oral EVERY 8 HOURS 06/05/22 2030 06/05/22 2025  lidocaine (LMX4) cream          Topical EVERY 1 HOUR PRN 06/05/22 2030 06/05/22 2025  lidocaine 1 % 0.1-1 mL         0.1-1 mL Other EVERY 1 HOUR PRN 06/05/22 2030                Time/Communication:  I personally spent 18 minutes with greater than 50% in consultation, education, counseling and coordination of care.  See note above for details on conversation.

## 2022-06-21 NOTE — PLAN OF CARE
Goal Outcome Evaluation:    Plan of Care Reviewed With: patient     Patient reports pain is minimally tolerable with Oxycodone every 3 hours, and IV Dilaudid every 2 hours. Abdomen soft with bowel sounds present, he states he is passing gas but no BM this shift. Right hip incision C/D/I.  AMANDA intact with small amounts of serosanguinous output. Scrotal edema noted. Patient educated to turn to the left and on back but was refusing to do so at times. Voiding adequate amounts of urine.  Phos of 1.6 replaced with 15 mmol and recheck scheduled.  Patient has poor PO intake, encouraged to eat. Continue with POC.

## 2022-06-22 NOTE — PROGRESS NOTES
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 Hospitalized since 6/5/2022, On 6/17/22 underwent right hindquarter amputation and local gluteal flap closure with significant blood loss (EBL ~ 3 liters), postop admitted to SICU for ventilatory support and hemodynamic monitoring.      Chronic cancer related pain, opioid tolerance.  Prior to admission, was on MS extended release 30 mg PO BID and oxycodone 5 mg tab (using 10mg Q 4 hours PRN). Since hospitalization remains on MS-ER 30 mg PO BID and most often using oxcodone between  mg/day.  Has been off ketamine infusion since 6/19/22.    Pain service has been following \"Fabian\" as he had L1/2 epidural for post op analgesia which was removed on 6/21/22 due to leakage.   On 6/21/22, he was transferred for [facility] to [facility].  Pain service was asked to f/u with [facility] regarding opioid pain management.    Interval History on 6/22/22.  [facility] states pain has been unchanged since yesterday.  Pain remains on the right pelvic region.  Rates pain level 8 to 8.5 /10 which is tolerable for him.  He states his pain prior to admission was 10/10.  He feels that pain is better since the surgery.  We discussed that he has more opioids available to use as ordered. Yesterday used oxycodone 20mg x 4, had 8 available doses.  Discussed avoiding the use of IV opioid as able to prepare for home pain management, yesterday used Dilaudid 0.5mg x 4, had 12 available doses.  States he used IV Dilaudid during transfers.  Overall, he is agreeable to the pain plan with the understanding to use less as able to minimize potential side effects.  His outpatient " "oncologist has been prescribing his opioids.    Plan/Recommendations:  1. Continue  PTA dose of Morphine extended release 30 mg PO Q 12 hr.  2. Continue Oxycodone 10-20 mg PO Q 3 hr PRN    At discharge, please look at oxycodone use in past 24 hours and send him home that same dose for 1-2 days then start decreasing by 1 dose per day every 1-2 days until returning to    Baseline of oxycodone 10mg Q 4 hours PRN.  3. Change IV Dilaudid to 0.3-0.5 mg IV Q 4 hr PRN (was every 2 hours PRN, used 4 doses yesterday). Consider discontinuing IV Dilaudid on 6/23/22 in preparation for home pain management.   4. Continue acetaminophen 975 mg PO Q 8 hr.  5. Bowel regimen to prevent opioid induced constipation.     Pain Service will sign off.    Discussed with attending anesthesiologist    Please Page the Pain Team Via Amcom: \"Adult  acute inpatient pain management/[facility]\"    Cookie Melendez PA-C  06/22/2022         Diet: Diet  Regular Diet Adult    Relevant Labs:  Recent Labs   Lab Test 06/21/22  1142 06/18/22  0320 06/17/22  2312   INR  --   --  1.34*      < > 145*   PTT  --   --  33   BUN 5.6*   < > 11    < > = values in this interval not displayed.       Physical Exam:  Vitals: /64   Pulse 96   Temp 97.9  F (36.6  C)   Resp 18   Wt 75.8 kg (167 lb 1.7 oz)   SpO2 98%   BMI 21.46 kg/m      Physical Exam:   CONSTITUTIONAL/GENERAL APPEARANCE:  Conversant.  EYES: EOMI, sclerae clear  ENT/NECK: neck is supple  RESPIRATORY: CTA, nonlabored  CARDIOVASCULAR: S1 and S2 appreciated  GI:soft, nontender, bowel sounds present  MUSCULOSKELETAL/BACK/SPINE/EXTREMITIES: Moving UE and LLE independently.   Right hip dressing: c,d,i     NEURO:  AAOx3.  SKIN/VASCULAR EXAM:  Dry and warm.  PSYCHIATRIC/BEHAVIORAL/OBSERVATIONS:  No objective signs of pain observed during our interview.   Judgment/Insight -fair   Orientation - x3   Memory -good   Mood and affect - calm, pleasant, cooperative          Relevant Medications:  Current Pain " Medications:  Medications related to Pain Management (From now, onward)    Start     Dose/Rate Route Frequency Ordered Stop    06/22/22 1130  HYDROmorphone (PF) (DILAUDID) injection 0.3-0.5 mg         0.3-0.5 mg Intravenous EVERY 4 HOURS PRN 06/22/22 1108      06/19/22 1100  sennosides (SENOKOT) tablet 8.6 mg         8.6 mg Oral or Feeding Tube 2 TIMES DAILY 06/19/22 1032      06/19/22 1030  morphine (MS CONTIN) 12 hr tablet 30 mg         30 mg Oral EVERY 12 HOURS SCHEDULED 06/19/22 1027      06/16/22 1548  magnesium hydroxide (MILK OF MAGNESIA) suspension 30 mL         30 mL Oral DAILY PRN 06/16/22 1549      06/10/22 0000  polyethylene glycol (MIRALAX) 17 g packet         17 g Oral DAILY 06/09/22 0910      06/10/22 0000  acetaminophen (TYLENOL) 325 MG tablet         975 mg Oral EVERY 8 HOURS 06/10/22 0822      06/10/22 0000  morphine (MS CONTIN) 30 MG CR tablet         30 mg Oral EVERY 12 HOURS 06/10/22 0822      06/10/22 0000  oxyCODONE IR (ROXICODONE) 10 MG tablet         10-20 mg Oral EVERY 4 HOURS PRN 06/10/22 0822      06/10/22 0000  sennosides (SENOKOT) 8.6 MG tablet         1-2 tablet Oral 2 TIMES DAILY PRN 06/10/22 0822      06/06/22 0800  polyethylene glycol (MIRALAX) Packet 17 g         17 g Oral DAILY 06/05/22 2030 06/05/22 2144  oxyCODONE IR (ROXICODONE) tablet 10-20 mg         10-20 mg Oral EVERY 3 HOURS PRN 06/05/22 2145 06/05/22 2100  acetaminophen (TYLENOL) tablet 975 mg         975 mg Oral EVERY 8 HOURS 06/05/22 2030 06/05/22 2025  lidocaine (LMX4) cream          Topical EVERY 1 HOUR PRN 06/05/22 2030 06/05/22 2025  lidocaine 1 % 0.1-1 mL         0.1-1 mL Other EVERY 1 HOUR PRN 06/05/22 2030            Primary Service Contacted with Recommendations? Yes      Time/Communication:  I personally spent 20 minutes with greater than 50% in consultation, education, counseling and coordination of care.  See note above for details on conversation.

## 2022-06-22 NOTE — PROGRESS NOTES
VS: /76 (BP Location: Left arm)   Pulse 109   Temp 98.8  F (37.1  C)   Resp 18   Wt 75.8 kg (167 lb 1.7 oz)   SpO2 100%   BMI 21.46 kg/m   Pt denies CP or SOB,dizziness, lightheadedness and nausea and vomiting.   O2: Room air sat. > 90%.    Output: Voiding adequate amount in urinal without difficulty. AMANDA with 100 output.   Last BM: 6/17/22. Passing flatus.   Activity: Bedrest. Pt can only reposition to left and back. No right repositioning.   Skin: Intact.Ex right leg amputation. Scrotal edema.   Pain: Prn IV dilaudid, Oxycodone and tylenol available.    CMS: CMS and neuro intact. A/O X 4. Able to make needs known.    Dressing: CDI   Diet: Tolerating regular diet.   LDA: PIV infusing and [place].   Equipment: IV pole, and personal belongings.   Plan: TBD.Call light within reach.   Additional Info:

## 2022-06-22 NOTE — PROGRESS NOTES
Plastic Surgery Progress Note  06/22/22    S: Pain improving, has not needed IV pain meds today. Tolerating oral intake, appetite decreased but improving.     O: /64   Pulse 96   Temp 97.9  F (36.6  C)   Resp 18   Wt 75.8 kg (167 lb 1.7 oz)   SpO2 98%   BMI 21.46 kg/m       Alert and oriented x3  Posterior gluteal flap viable. No obvious hematoma/seroma. Incisions c/d/i with glue tape.   Drain s/s.   Scrotum with moderate edema     Hgb 8.9  UOP 2265  Drain 225   +flatus    A: 47M w/ large sarcoma of R thigh s/p chemotherapy now s/p R hindquarter amputation with Dr Whitaker on 6/17 with local gluteal flap closure with Dr Butt on 6/17.     - No pressure on the flap, okay to turn toward the left and back but NOT toward the right side  - No sitting for 2 weeks (7/1/2022)  - No need for dressings on incisions   - Pain control per primary   - Drain care, will likely be in place on discharge   - Okay to transfer to TCU from PRS perspective    COLLEEN PruittS     I agree with the above note, changes made as necessary.    Gemini Espana PA-C  Plastic and Reconstructive Surgery  [id number] or plastic surgery on call in [facility]

## 2022-06-22 NOTE — PLAN OF CARE
VS: VSS; sl tachycardic when in pain   O2: >90% on RA   Output: Voiding adequately via urinal   Last BM: 6/22; soft   Activity: Bedrest. Turns self independently, refuses assistance. Pt did finally let staff change linen and do skin assessment when pt had a BM.   Up for meals? NA   Skin: Incision R hip; scrotal edema.   Pain: Managed with 20mg oxycodone ~q3h, scheduled MS Contin. No IV dilaudid this shift   CMS: Intact   Dressing: CDI   Diet: Regular   LDA: L PIV SL, AMANDA patent   Equipment: IV pole, urinal. Refused PCD and pulsate   Plan: TBD   Additional Info:

## 2022-06-22 NOTE — PROGRESS NOTES
Orthopedic Surgery Progress Note   June 22, 2022    Subjective: No acute events overnight. Pain well controlled. Tolerating diet. Voiding spontaneously. +Flatus, no BM. Denies fever or chills, CP, SOB, numbness or tingling, motor dysfunction or weakness.     Objective: /64   Pulse 96   Temp 97.9  F (36.6  C)   Resp 18   Wt 75.8 kg (167 lb 1.7 oz)   SpO2 98%   BMI 21.46 kg/m      Drain: 85/140/0/100    Exam:    Gen: alert and oriented, responds to questions appropriately  Resp: non-labored on RA  MSK:  RLE:  - Incision c/d/i with prineo tape. 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 Patient presented with worsening right hip and thigh pain and was found to have a pathologic right proximal femur fracture. Patient now s/p right hindquarter amputation with Dr. Whitaker and primary complex wound closure with Dr. Butt on 6/17.  Procedure was notable for significant blood loss requiring transfusion of 10 units pRBC (EBL almost 3 liters). Patient remained intubated and was transferred to SICU postop, extubated on POD1. Stable for transfer to floor on POD2.      Placed psych consult 6/19 to aid in providing resources to help the patient adjust to large change s/p procedure. Recommended lexapro, seroquel, Health Psychology consult, placed.     Goals for 6/21:  - Health psychology consult pending   - Pain control, pain team following, appreciate recs.   - may consider transfer to [place] pending bed availability.      Plan:    Activity: Up with assist. No pressure on the flap (right flank region), okay to turn toward the left and back but NOT towards the right side, and no sitting x 2 weeks per plastics.   Weight bearing status: Per plastic  surgery  Antibiotics: Ancef x24 hours perioperatively.  Diet: Begin with clear fluids and progress diet as tolerated.  DVT prophylaxis: Lovenox and mechanical while in the hospital, discharge on Lovenox x4 weeks.  Wound Care: Per plastic surgery  Drains: Per plastic surgery  Pain management: transition from IV to orals as tolerated.   X-rays: None needed  Physical Therapy: Gait, ROM, ADL's.  Occupational Therapy: ADL's.  Labs: Trend Hgb on POD #1, 2, 3 .  Consults: PT, OT, plastics, psych, appreciate assistance in caring for this patient.  Follow-up: Clinic with Dr. Whitaker in 2 weeks with no XRs needed.     Disposition: Pending progress with therapies, pain control on orals, and medical stability, anticipate discharge to acute rehab or TCU.  Okay to transfer from Ortho perspective.      Zara ROSENBERG CNS  Orthopaedic Surgery  Pager: [phone]

## 2022-06-23 NOTE — PLAN OF CARE
Goal Outcome Evaluation:        VS: /70 (BP Location: Left arm)   Pulse 103   Temp 97.8  F (36.6  C) (Oral)   Resp 18   Wt 75.8 kg (167 lb 1.7 oz)   SpO2 97%   BMI 21.46 kg/m       O2: >90% on RA   Output: Void adequately    Last BM: 6/23/22   Activity: Bed rest order removed ,  no OOB activity yet, PT made plan with patient for tomorrow   Up for meals? No   Skin: Incision R. Hip   Pain: 20 mg oxycodone q3   CMS: Intact   Dressing: CDI    Diet: Regular   LDA: L. PIV saline locked , AMANDA drain    Equipment: Urinal    Plan: TBD   Additional Info:

## 2022-06-23 NOTE — PLAN OF CARE
PT: Per NP patient now okay for mobility but no sitting (okay to sit slightly to transition into standing from bedside). Ensured this was cleared prior to seeing patient. Went to see patient to give plan for mobilization. Patient very apprehensive. Ultimately patient endorsed not being able to pull back sheet to visualize his R side. Provided significant encouragement, educated and talked through strategies, educated on health psychology, educated on using family for support, educated on support groups available. Patient presents with very skewed perspective on surgery anticipating would get prosthetic right away and be able to walk. Very pleasant but tearful throughout conversation. Provided significant therapeutic listening and planning for tomorrows session. Spent a lot of time on the phone with patient and patient's sister whom is very supportive. Plan to have sister here tomorrow for 11:00 session to help patient through this emotional ulisses. Patient very engaged just very much struggling with the psychological aspect of this surgery. Very appreciative of time spent.

## 2022-06-23 NOTE — PLAN OF CARE
Pt is A&Ox4. VSS. LS clear, on RA. BS active, LBM on 6/22/2022. Voiding well. Pain managed with 20mg oxycodone. R hip surgical dressing is c/d/I. No dressing change needed. L PIV is patent and SL. Pt not oob. Continue to monitor.

## 2022-06-23 NOTE — PLAN OF CARE
[id number]  VS: VSS   O2: >90% on RA   Output: Voiding adequately via urinal   Last BM: 6/23 x2; soft. Pt refuses bowel meds   Activity: Bedrest restrictions lifted today, although pt can only put HOB to 40 degrees, no sitting, no lying on R side. PT saw pt this afternoon and had lengthy discussion of plan moving forward. Pt is quite hesitant to move in fear of significant pain, but has shown a lot of progress these past 2 days.   Up for meals? No   Skin: Incision/drain R hip/groin. Scrotal edema improving   Pain: 20mg oxycodone ~q3h. Spoke with pt about starting to space out oxycodone doses, although wanted pain medications as they became available today   CMS: Intact   Dressing: CDI   Diet: Regular   LDA: L PIV SL   Equipment: IV pole, urinal   Plan: TBD   Additional Info: Phosphorous being replaced for level of 2.2. 2/3 doses administered. Redraw scheduled for AM. If phos needs to be replaced in AM; pt requesting it be IV.

## 2022-06-23 NOTE — PLAN OF CARE
Patient A/Ox4, very pleasant. VSS. Denies CP, SOB, dizziness/LH. LSCTA. +fl/BS. Voiding well in urinal. CMS intact/baseline. Dressing to RLE incisional area CDI. Tolerating regular diet without NV. Activity level is fair, pt sleeping for most of the night. IV SL. Pain rated as comfortably managed throughout shift, managed with scheduled pain meds and PRN oxycodone. Planning for rehab of some kind. Phosphorous replaced, recheck this morning. Patient has demonstrated ability to call appropriately. Patient is resting with call light within reach. Will continue to monitor.

## 2022-06-23 NOTE — PROGRESS NOTES
Care Management Follow Up    Length of Stay (days): 18    Expected Discharge Date: 06/27/2022     Concerns to be Addressed:       Patient plan of care discussed at interdisciplinary rounds: Yes    Anticipated Discharge Disposition: Home     Anticipated Discharge Services:  Home care   Anticipated Discharge DME:  Per recommendations from therapy    Referrals Placed by CM/SW:  Home care   Private pay costs discussed: Not applicable    Additional Information:  It was discussed at discharge rounds that there is a potential that Vic may be able to transition to a home setting instead of to an acute rehabilitation facility.     A referral was sent to:   [facility] [id number] [facility] [place] [address]  Phone: [phone]  Fax: [phone]     They would be able to start services on 6/28 or 6/29.  Orders will need to be put in once confirmed.       Latosha Marie RN  [facility] RN Care Coordinator  Unit RNCC pager: [phone]     For Weekend & Holiday on call RN Care Coordinator:  (Tasks: Home care, home infusion, medical equipment/oxygen, transportation, IMM & MOON forms, etc.)     Text Paging in [employer] Smart Web is the preferred method of contact for these teams     [place] & [place] ([id number]) Saturday & Sunday; ([id number]) [facility] Recognized Holidays  Pager #1: [phone] Units: 4A, 4C, 4E, 5A & 5B   Pager #2: [phone] Units: 6A, 6B, 6C, 6D  Pager #3: [phone] Units: 7A, 7B, 7C, 7D & 5C   Pager #4: [phone] Units: 5 Ortho, 8A, 10 ICU, & [facility]      For Weekend & Holiday on call Social Work:  (Tasks: TCU, transportation, Hospice, adjustment to illness counseling, Health Care Directives, Child Protection and Domestic Violence concerns, Vulnerable Adult, IMM forms, etc.)     Text Paging in [employer] Smart Web is the preferred method of contact for these teams    [place] (0800 [phone] 1630) Saturday and Sunday  Pager: [phone] Units: 4A, 4C, 4E, 5A and 5B   Pager: [phone]  Units: 6A, 6B, 6C, 6D   Pager: [phone]: 7A, 7B, 7C, 7D, and 5C      [facility] ([id number]) Saturday and Sunday  Units: 5 Ortho, 8A, and 10 ICU   Pager: [phone]   ______________________________________________     After hours for all units everyday- (only the  is available after hours until midnight)  Pager [phone]

## 2022-06-23 NOTE — PROGRESS NOTES
Orthopedic Surgery Progress Note   June 23, 2022    Subjective: No acute events overnight. Pain well controlled. Tolerating diet. Voiding spontaneously. +Flatus, no BM. Denies fever or chills, CP, SOB, numbness or tingling, motor dysfunction or weakness.     Objective: /85 (BP Location: Left arm)   Pulse 101   Temp 97  F (36.1  C) (Oral)   Resp 16   Wt 75.8 kg (167 lb 1.7 oz)   SpO2 100%   BMI 21.46 kg/m      Drain:  Per Plastics; will remain in place 1-2 week pending output    Gen: alert and oriented, responds to questions appropriately  Resp: non-labored on RA  MSK:  RLE:  - Incision c/d/i with prineo tape. 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 Patient presented with worsening right hip and thigh pain and was found to have a pathologic right proximal femur fracture. Patient now s/p right hindquarter amputation with Dr. Whitaker and primary complex wound closure with Dr. Butt on 6/17.  Procedure was notable for significant blood loss requiring transfusion of 10 units pRBC (EBL almost 3 liters). Patient remained intubated and was transferred to SICU postop, extubated on POD1. Stable for transfer to floor on POD2.      Placed psych consult 6/19 to aid in providing resources to help the patient adjust to large change s/p procedure. Recommended lexapro, seroquel, Health Psychology consult, placed.     Goals for 6/21:  - Health psychology consult pending   - Pain control, pain team following, appreciate recs.   - may consider transfer to [place] pending bed availability.      Plan:     Activity: Up with assist. No pressure on the flap (right flank region), okay to turn toward the left and back but NOT towards the right side, and no sitting x 2  weeks per plastics. Okay to be OOB; assist with ambulation; no sitting.   Weight bearing status: Per plastic surgery  Antibiotics: Ancef x24 hours perioperatively.  Diet: Begin with clear fluids and progress diet as tolerated.  DVT prophylaxis: Lovenox and mechanical while in the hospital, discharge on Lovenox x4 weeks.  Wound Care: Per plastic surgery  Drains: Per plastic surgery  Pain management: transition from IV to orals as tolerated.   X-rays: None needed  Physical Therapy: Gait, ROM, ADL's.  Occupational Therapy: ADL's.  Labs: Trend Hgb on POD #1, 2, 3 .  Consults: PT, OT, plastics, psych, appreciate assistance in caring for this patient.  Follow-up: Clinic with Dr. Whitaker in 2 weeks with no XRs needed.     Disposition: Pending progress with therapies, pain control on orals, and medical stability, anticipate discharge to acute rehab or TCU.  Okay to transfer from Ortho perspective.    Zara ROSENBERG CNS  Orthopaedic Surgery  Pager: [phone]

## 2022-06-24 NOTE — PROGRESS NOTES
Orthopedic Surgery Progress Note   June 24, 2022    Subjective: No acute events overnight. Pain well controlled. Tolerating diet. Voiding spontaneously. +Flatus, no BM. Denies fever or chills, CP, SOB, numbness or tingling, motor dysfunction or weakness.      Objective: /85 (BP Location: Left arm)   Pulse 101   Temp 97  F (36.1  C) (Oral)   Resp 16   Wt 75.8 kg (167 lb 1.7 oz)   SpO2 100%   BMI 21.46 kg/m       Drain:  Per Plastics; will remain in place 1-2 week pending output     Gen: alert and oriented, responds to questions appropriately  Resp: non-labored on RA  MSK:  RLE:  - Incision c/d/i with prineo tape. 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 Patient presented with worsening right hip and thigh pain and was found to have a pathologic right proximal femur fracture. Patient now s/p right hindquarter amputation with Dr. Whitaker and primary complex wound closure with Dr. Butt on 6/17.  Procedure was notable for significant blood loss requiring transfusion of 10 units pRBC (EBL almost 3 liters). Patient remained intubated and was transferred to SICU postop, extubated on POD1. Stable for transfer to floor on POD2.        Plan:     Activity: Up with assist. No pressure on the flap (right flank region), okay to turn toward the left and back but NOT towards the right side, and no sitting x 2 weeks per plastics. Okay to be OOB; assist with ambulation; no sitting.   Weight bearing status: Per plastic surgery  Antibiotics: Ancef x24 hours perioperatively.  Diet: Begin with clear fluids and progress diet as tolerated.  DVT prophylaxis: Lovenox and mechanical while in the hospital, discharge on Lovenox x4 weeks.  Wound Care: Per plastic  surgery  Drains: Per plastic surgery  Pain management: transition from IV to orals as tolerated.   X-rays: None needed  Physical Therapy: Gait, ROM, ADL's.  Occupational Therapy: ADL's.  Labs: Trend Hgb on POD #1, 2, 3 .  Consults: PT, OT, plastics, psych, appreciate assistance in caring for this patient.  Follow-up: Clinic with Dr. Whitaker in 2 weeks with no XRs needed.     Disposition: Pending progress with therapies, pain control on orals, and medical stability, anticipate discharge to acute rehab-TCU or possibly home pending therapies.      Zara ROSENBERG CNS  Orthopaedic Surgery  Pager: [phone]

## 2022-06-24 NOTE — PROGRESS NOTES
Care Management Follow Up    Length of Stay (days): 19    Expected Discharge Date: 06/27/2022     Concerns to be Addressed:       Patient plan of care discussed at interdisciplinary rounds: Yes    Anticipated Discharge Disposition: Home vs Acute rehabilitation     Anticipated Discharge Services:  TBD  Anticipated Discharge DME:  TBD    Referrals Placed by LEESA/DANNY:  Home care was confirmed for PT and OT- will need to see if can add nursing.   Private pay costs discussed: Not applicable    Additional Information:  This writer met and spoke with Vic and his sister Charity to discuss discharge planning. Vic prefers to go home with support at home with his other sister but Charity feels that he should go to acute rehab before discharging home. Charity states that Vic helps his other sister a lot and tends to neglect his own self care and awareness. She advocated for him and Vic agrees to this as well and confirms that he has not thought much about the home situation but just wanted to go home. Charity also had questions regarding what he will need for equipment at home- this can be deferred to the therapist that will work with Vic today.     Care management team will continue to follow to see what the recommendations are for Vic.      If going home:   [facility]  [address] [place] [address]  Phone: [phone]  Fax: [phone]     They would be able to start services on 6/28 or 6/29.  Orders will need to be put in once confirmed. They confirmed for physical and occupational therapy, will need to see if they are able to add nursing for wound management.     Addendum: 1200  After therapy session, Sudha with Physical therapy is recommending acute rehabilitation. Vic was agreeable to this.   DANNY Springer will send a referral to [facility].     This writer called to cancel the referral to [facility].       LUIS Zuniga RN  Coordinator  Unit RNCC pager: [phone]     For Weekend & Holiday on call RN Care Coordinator:  (Tasks: Home care, home infusion, medical equipment/oxygen, transportation, IMM & MOON forms, etc.)     Text Paging in [employer] Smart Web is the preferred method of contact for these teams     [place] [facility] [place] ([id number]) Saturday & Sunday; ([id number]) [facility] Recognized Holidays  Pager #1: [phone] Units: 4A, 4C, 4E, 5A & 5B   Pager #2: [phone] Units: 6A, 6B, 6C, 6D  Pager #3: [phone] Units: 7A, 7B, 7C, 7D & 5C   Pager #4: [phone] Units: 5 Ortho, 8A, 10 ICU, & [facility] [[email or web address]]      For Weekend & Holiday on call Social Work:  (Tasks: TCU, transportation, Hospice, adjustment to illness counseling, Health Care Directives, Child Protection and Domestic Violence concerns, Vulnerable Adult, IMM forms, etc.)     Text Paging in [employer] Smart Web is the preferred method of contact for these teams    [place] (0800 [phone] 1630) Saturday and Sunday  Pager: [phone] Units: 4A, 4C, 4E, 5A and 5B   Pager: [phone] Units: 6A, 6B, 6C, 6D   Pager: [phone]: 7A, 7B, 7C, 7D, and 5C      [facility] ([phone]) Saturday and Sunday  Units: 5 Ortho, 8A, and 10 ICU   Pager: [phone]   ______________________________________________     After hours for all units everyday- (only the  is available after hours until midnight)  Pager [phone]

## 2022-06-24 NOTE — PLAN OF CARE
VS: Stable except slightly tachy, .   O2: RA.   Output: Voiding adequately using urinal.   Last BM: 6/23 using bedpan.   Activity: HOB < 40 degrees, bedrest restrictions lifted yesterday, pt's sister to come today when pt gets up for the first time.   Skin: Incision, scabs left shin, abrasion right buttocks.   Pain: Pain managed with oxycodone, scheduled morphine and tylenol, and ice.   Neuro: A/Ox4. CMS intact.   Dressing: CDI.   Diet: Regular.   LDA: PIV left lower forearm SL. AMANDA to bulb suction with serosanguinous output.   Equipment: Urinal, call light within reach.   Plan: TBD home with home care vs ARU.   Additional Info:

## 2022-06-24 NOTE — PROGRESS NOTES
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 Patient presented with worsening right hip and thigh pain and was found to have a pathologic right proximal femur fracture.  Surgery done on 6/17.    Reassessment provided for [facility].     Findings  Flowsheet re meal intake completed sporadically.  When completed, meals recorded as 100%.  Pt states he is generally consuming 100% of meals. Acknowledges some taste fatigued but says he is doing OK.  Is interested in trying nutrition supplements Ensure Enlive to help support his intake and healing.     Weight below of 75.8 is noted to be on date of surgery for R high quarter amputation. Per RN, has approx 8\" long flap that is healing.  Weekly wts ordered.  Awaiting updated weight.  Spoke with nurse who stated pt did agree to get out of bed today and she expects that to continue.  RN will try to obtain a weight the next time pt gets out of bed (to zero the bed).   Wt Readings from Last 10 Encounters:   06/17/22 75.8 kg (167 lb 1.7 oz)   06/05/22 106.6 kg (235 lb)   12/01/21 114.5 kg (252 lb 6.4 oz)   11/09/21 116.8 kg (257 lb 6.4 oz)   11/01/21 104.3 kg (230 lb)   09/24/21 117.9 kg (260 lb)     INTERVENTIONS  Implementation  Weekly wts   Ensure Enlive L an D     Follow up/Monitoring  RD to reassess at [facility]..     Vanda Olvera, MPH, RDN, LD, [facility]  Unit Pager [phone]  Weekend pager: [phone]    "

## 2022-06-24 NOTE — PROGRESS NOTES
"Plastic and Reconstructive Surgery Note  Attending: Filomena  6/24/2022    S: Pain is improving, per patient, he is using only oral medications. He is looking forward to being at home. He is comfortable going home with home cares, and feels that he will recover best this way. He is eating well. He is comfortable. He is struggling to accept his amputation, and has some \"mental stuff\" when it comes to looking at his amputation, and wasn't able to do it yesterday.     O:   /63 (BP Location: Right arm, Patient Position: Semi-Alford's, Cuff Size: Adult Regular)   Pulse 104   Temp 97.8  F (36.6  C) (Oral)   Resp 16   Wt 75.8 kg (167 lb 1.7 oz)   SpO2 99%   BMI 21.46 kg/m    Gen: comfortable, well appearing. NAD. On room air  Pelvis: incisions are clean, dry and intact, without dressings. No fluid collections or bruising. Flap is viable. Drain with serous drainage. Scrotum is edematous and swollen, but not pitting edema    I/O: 325cc serous drainage last 24hr    Assessment:  47M w/ large sarcoma of R thigh s/p chemotherapy now s/p R hindquarter amputation with Dr Whitaker on 6/17 with local gluteal flap closure with Dr Butt on 6/17. [facility] to [facility] transfer on/around 6/21.    No pressure on the flap, okay to turn toward the left and back but not toward the right side  No sitting for 2 weeks (okay to start sitting 7/1/2022)  Pain control per primary teams  Continue drain care, likely to discharge home with drain  Okay to discharge/transfer     Sharon Darling PA-C on 6/24/2022 at 8:54 AM              "

## 2022-06-24 NOTE — PLAN OF CARE
VS: VSS   O2: >90% on RA   Output: Voiding adequately via urinal   Last BM: 6/23 x2; soft. Pt refuses bowel meds   Activity: HOB up to 40 degrees, no sitting, no lying on R side. Pt stood at EOB with PT for a few minutes with 3A, GB, FWW. Pt can start sitting on 7/1   Up for meals? No   Skin: Incision/drain R hip/groin. Scrotal edema improving   Pain: 20mg oxycodone available q3h. Pt waited 5 hours and 4.5 hours between doses today. MS Contin q12h   CMS: Intact   Dressing: CDI   Diet: Regular   LDA: L PIV SL, AMANDA patent.   Equipment: IV pole, urinal, GB, FWW   Plan: TBD; home vs. ARU   Additional Info:

## 2022-06-25 NOTE — PLAN OF CARE
VS: Temp: (!) 96.6  F (35.9  C) Temp src: Oral BP: 135/78 Pulse: 97   Resp: 18 SpO2: 98 % O2 Device: None (Room air)       O2: 98% on room air, denies SOB and CP, no cough present. Lung sounds clear.   Output: Voids spontaneously and without difficulty. Pt uses the urinal to void.   Last BM: 06/24/2022, pt uses the bedpan   Activity: Assist x3 earlier today when getting up with PT for the first time. Per activity orders, pt's HOB cannot be past 40 degrees, no sitting, and no laying on R) side until 07/01/2022.   Skin: R) hip surgical incision, peeling/flaky skin to L) ankle/foot, scab on L) shin. Pt has an abrasion/scape on his buttocks.   Pain: Pain is well controlled with scheduled tylenol, PRN oxycodone and scheduled morphine.   Neuro: A & O x4, denies numbness and tingling   Dressing: CDI   Diet: Regular   LDA: L) PIV saline locked, AMANDA drain R) hip, 50ml of serosanguinous drainage this shift.   Equipment: Personal belongings, call light, urinal, bedpan   Plan: Continue with plan of care.   Additional Info:

## 2022-06-25 NOTE — PROGRESS NOTES
Orthopedic Surgery Progress Note     Subjective: No acute events overnight. Pain well controlled. Tolerating diet. Voiding spontaneously. Denies fever or chills, CP, SOB, numbness or tingling, motor dysfunction or weakness.      Objective: /85 (BP Location: Left arm)   Pulse 101   Temp 97  F (36.1  C) (Oral)   Resp 16   Wt 75.8 kg (167 lb 1.7 oz)   SpO2 100%   BMI 21.46 kg/m       Drain:  Per Plastics; will remain in place 1-2 week pending output     Gen: alert and oriented, responds to questions appropriately  Resp: non-labored on RA  MSK:  RLE:  - Incision c/d/i with prineo tape. 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 Patient presented with worsening right hip and thigh pain and was found to have a pathologic right proximal femur fracture. Patient now s/p right hindquarter amputation with Dr. Whitaker and primary complex wound closure with Dr. Butt on 6/17.  Procedure was notable for significant blood loss requiring transfusion of 10 units pRBC (EBL almost 3 liters). Patient remained intubated and was transferred to SICU postop, extubated on POD1. Stable for transfer to floor on POD2.        Plan:     Activity: Up with assist. No pressure on the flap (right flank region), okay to turn toward the left and back but NOT towards the right side, and no sitting x 2 weeks per plastics. Okay to be OOB; assist with ambulation; no sitting.   Weight bearing status: Per plastic surgery  Antibiotics: Ancef x24 hours perioperatively.  Diet: Begin with clear fluids and progress diet as tolerated.  DVT prophylaxis: Lovenox and mechanical while in the hospital, discharge on Lovenox x4 weeks.  Wound Care: Per plastic surgery  Drains: Per plastic surgery  Pain  management: transition from IV to orals as tolerated.   X-rays: None needed  Physical Therapy: Gait, ROM, ADL's.  Occupational Therapy: ADL's.  Labs: Trend Hgb on POD #1, 2, 3 .  Consults: PT, OT, plastics, psych, appreciate assistance in caring for this patient.  Follow-up: Clinic with Dr. Whitaker in 2 weeks with no XRs needed.     Disposition: Pending progress with therapies, pain control on orals, and medical stability, anticipate discharge to acute rehab-TCU or possibly home pending therapies.      Gustavo Steele MD  Orthopaedic Surgery, PGY-4  Pager: [phone]

## 2022-06-25 NOTE — PLAN OF CARE
VS:     Pt A/O X 4. Afebrile. VSS. Lungs- clear bilaterally with both       anterior and lateral. Denies nausea, shortness of breath, and chest pain.     Output:     Bowels- active in all four quadrants. Last BM 6/25. Voids spontaneously without difficulty in the bedside urinal.      Activity:     Pt up with 2x assist with walker and GB with PT, not OOB with nursing staff this shift.     Skin: Surgical incision to RLE.     Pain:     Has pain in the hip/groin area and given scheduled tylenol and 20 mg oxycodone twice, ICE applied, and is tolerating adequately.      CMS:     CMS and Neuro's are intact. Denies numbness and tingling in all extremities.      Dressing:     Surgical incisions are FIDELIA.      Diet:     Pt is on a regular diet and appetite was good this shift.       LDA:     AMANDA drain with 70 mL serosanguineous drainage.  PIV is patent in the left forearm and is SL.      Equipment:     IV pole, walker, and GB in room.     Plan:     ARU vs home with home care, PT/OT consulting. Ortho and plastics following. Pt is able to make needs known and the call light is within the pt's reach. Continue to monitor.       Additional Info:

## 2022-06-25 NOTE — PLAN OF CARE
Pt is A&Ox4. VSS. LS clear, on RA. BS active, LBM on 6/23/2022. Voiding well. Pain managed with 20mg oxycodone. R hip surgical dressing is c/d/I. L PIV is patent and SL. Continue to monitor.

## 2022-06-26 NOTE — PROGRESS NOTES
06/26/22 1230   Quick Adds   Type of Visit Initial Occupational Therapy Evaluation   Living Environment   People in Home sibling(s)   Current Living Arrangements house   Home Accessibility no concerns   Living Environment Comments Multiple family members in the home   Self-Care   Usual Activity Tolerance good   Current Activity Tolerance fair   Activity/Exercise/Self-Care Comment Pt reports he was walking, and generally indep with ADLs, SBA for shower transfer sometimes.   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 Patient presented with worsening right hip and thigh pain and was found to have a pathologic right proximal femur fracture. Patient now s/p right hindquarter amputation with Dr. Whitaker and primary complex wound closure with Dr. Butt on 6/17.   Existing Precautions/Restrictions fall  (OK for HOB 40*; NO sitting, NO pressure on R flank until 7/1)   Left Upper Extremity (Weight-bearing Status) full weight-bearing (FWB)   Right Upper Extremity (Weight-bearing Status) full weight-bearing (FWB)   Left Lower Extremity (Weight-bearing Status) full weight-bearing (FWB)   Right Lower Extremity (Weight-bearing Status) non weight-bearing (NWB)   Cognitive Status Examination   Affect/Mental Status (Cognitive) WFL   Follows Commands follows one-step commands;over 90% accuracy   Cognitive Status Comments per pt he forgets things sometimes, so sisters oversee  his meds   Sensory   Sensory Quick Adds No deficits were identified   Pain Assessment   Patient Currently in Pain Yes, see Vital Sign flowsheet  (groin)   Integumentary/Edema   Integumentary/Edema Comments drain   Posture   Posture not impaired   Range of Motion Comprehensive   Comment, General Range of Motion BUE WFL   Strength Comprehensive (MMT)   Comment, General Manual Muscle Testing (MMT) Assessment BUE generally 5/5   Coordination   Upper Extremity Coordination No deficits were identified   Bed Mobility   Bed Mobility rolling left   Rolling Left [place] (Bed Mobility) verbal cues   Assistive Device (Bed Mobility) bed rails   Transfers   Transfers sit-stand transfer;toilet transfer;shower transfer   Sit-Stand Transfer   Sit-Stand [place] (Transfers) moderate assist (50% patient effort);2 person assist   Assistive Device (Sit-Stand Transfers) walker, standard   Sit/Stand Transfer Comments Ax2 to prevent pt sitting on bed during transfer; tx completed from sidelying <> standing   Shower Transfer   [place] Level (Shower Transfer) maximum assist (25% patient effort)   Shower Transfer Comments walk-in shower with shower chair   Toilet Transfer   [place] Level (Toilet Transfer) maximum assist (25% patient effort)   Toilet Transfer Comments currently using urinal   Balance   Balance Comments able to reach across midline while supporting self on walker with 1UE, CGA   Activities of Daily Living   BADL Assessment/Intervention lower body dressing;grooming;toileting   Lower Body Dressing Assessment/Training   [place] Level (Lower Body Dressing) minimum assist (75% patient effort)   Grooming Assessment/Training   [place] Level (Grooming) contact guard assist   Toileting   [place] Level (Toileting) moderate assist (50% patient effort)   Clinical Impression   Criteria for Skilled Therapeutic Interventions Met (OT) Yes, treatment indicated   OT Diagnosis impaired ADLs   OT Problem  List-Impairments impacting ADL problems related to;activity tolerance impaired;balance;mobility;pain;post-surgical precautions   Assessment of Occupational Performance 3-5 Performance Deficits   Identified Performance Deficits dressing, shower transfer, toilet transfer   Planned Therapy Interventions (OT) ADL retraining;strengthening;transfer training   Clinical Decision Making Complexity (OT) moderate complexity   Risk & Benefits of therapy have been explained evaluation/treatment results reviewed;care plan/treatment goals reviewed;participants included;patient   OT Discharge Planning   OT Discharge Recommendation (DC Rec) Acute Rehab Center-Motivated patient will benefit from intensive, interdisciplinary therapy.  Anticipate will be able to tolerate 3 hours of therapy per day   OT Rationale for DC Rec Pt well below baseline, limited by surgical precautions and pain. He would benefit from intensive therapy to increase safety and independence with ADLs, as he was completing independently prior to admit.  Pt has strong family support.   OT Brief overview of current status ModAx2 side <> sit; [place] dressing   Total Evaluation Time (Minutes)   Total Evaluation Time (Minutes) 10

## 2022-06-26 NOTE — PROGRESS NOTES
Orthopedic Surgery Progress Note     Subjective: No acute events overnight. Pain well controlled. Tolerating diet. Voiding spontaneously. Denies fever or chills, CP, SOB, numbness or tingling, motor dysfunction or weakness. Therapies anticipate discharge to rehab.     Objective: /85 (BP Location: Left arm)   Pulse 101   Temp 97  F (36.1  C) (Oral)   Resp 16   Wt 75.8 kg (167 lb 1.7 oz)   SpO2 100%   BMI 21.46 kg/m       Drain:  Per Plastics; will remain in place 1-2 week pending output     Gen: alert and oriented, responds to questions appropriately  Resp: non-labored on RA  MSK:  RLE:  - Incision c/d/i with prineo tape. 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 Patient presented with worsening right hip and thigh pain and was found to have a pathologic right proximal femur fracture. Patient now s/p right hindquarter amputation with Dr. Whitaker and primary complex wound closure with Dr. Butt on 6/17.  Procedure was notable for significant blood loss requiring transfusion of 10 units pRBC (EBL almost 3 liters). Patient remained intubated and was transferred to SICU postop, extubated on POD1. Stable for transfer to floor on POD2.        Plan:     Activity: Up with assist. No pressure on the flap (right flank region), okay to turn toward the left and back but NOT towards the right side, and no sitting x 2 weeks per plastics. Okay to be OOB; assist with ambulation; no sitting.   Weight bearing status: Per plastic surgery  Antibiotics: Ancef x24 hours perioperatively.  Diet: Begin with clear fluids and progress diet as tolerated.  DVT prophylaxis: Lovenox and mechanical while in the hospital, discharge on Lovenox x4 weeks.  Wound Care: Per plastic  surgery  Drains: Per plastic surgery  Pain management: transition from IV to orals as tolerated.   X-rays: None needed  Physical Therapy: Gait, ROM, ADL's.  Occupational Therapy: ADL's.  Labs: Trend Hgb on POD #1, 2, 3 .  Consults: PT, OT, plastics, psych, appreciate assistance in caring for this patient.  Follow-up: Clinic with Dr. Whitaker in 2 weeks with no XRs needed.     Disposition: Pending progress with therapies, pain control on orals, and medical stability, anticipate discharge to acute rehab-TCU or possibly home pending therapies.      Gustavo Steele MD  Orthopaedic Surgery, PGY-4  Pager: [phone]

## 2022-06-26 NOTE — PLAN OF CARE
VS:     Pt A/O X 4. Afebrile. VSS. Lungs- clear bilaterally with both       anterior and lateral. Denies nausea, shortness of breath, and chest pain.     Output:     Bowels- active in all four quadrants. Last BM 6/26. Voids spontaneously without difficulty in the bedside urinal.      Activity:     Pt up with 2x assist with walker and GB with PT.     Skin: Surgical incision to RLE.     Pain:     Has pain in the hip/groin area and given scheduled tylenol and morphine and 20 mg oxycodone twice, ICE applied, and is tolerating adequately.      CMS:     CMS and Neuro's are intact. Denies numbness and tingling in all extremities.      Dressing:     Surgical incisions are open to air without drainage.     Diet:     Pt is on a regular diet and appetite was good this shift.       LDA:     AMANDA drain with 110 mL serosanguineous drainage.  PIV is patent in the left forearm and is SL.      Equipment:     IV pole, walker, and GB in room.     Plan:     ARU vs home with home care, PT/OT consulting. Ortho and plastics following. Pt is able to make needs known and the call light is within the pt's reach. Continue to monitor.       Additional Info:

## 2022-06-27 NOTE — INTERIM SUMMARY
[facility] Pre-Admission Screen    Referral Source:  [facility] MED SURG ORTHOPEDIC 0552-01  Admit date to referring facility: 6/5/2022    Physical Medicine and Rehab Consult Completed: Yes. Completed by Dr. 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 Patient presented with worsening right hip and thigh pain and was found to have a pathologic right proximal femur fracture. Patient with h/o right pelvic/thigh sarcoma so underwent hip disarticulation by Orthopedics and reconstruction of the right pelvic area by Plastics on 6/17. Procedure was notable for significant blood loss requiring transfusion of 10 units pRBC (EBL almost 3 liters). Patient remained intubated and was transferred to SICU postop, extubated on POD 1. Patient has progressed well and has been off ketamine drip since 6/19. PT evaluated 6/23 and OT evaluated 6/26. Patient is now stable to transfer to inpatient acute rehab.    Patient requires an intensive inpatient rehab program to address the following acute impairments:impaired strength, impaired activity tolerance, impaired balance and impaired weight shifting due to amputation.     Current Active Medical Management Needs/Risks for Clinical Complications  The patient requires the high level of rehabilitation physician supervision that accompanies the provision of intensive rehabilitation therapy.  The patient needs the services of the rehabilitation physician to assess the patient medically and functionally and to modify the course of treatment as needed to maximize the patient's capacity to benefit from the rehabilitation  process.    Pain: patient will need ongoing management due to chronic cancer related pain, opioid tolerance and now post op pain and at risk for phantom limb pain. Prior to admission on MS extended release 30 mg PO BID and oxycodone 5 mg tab (using 10 to 15 mg per dose). Since hospitalization remains on MS-ER 30 mg PO BID and most often using oxcodone between  mg/day.       Surgical site with flap: patient will need ongoing assessment of gluteal flap for healing. Currently, pt's restrictions include: No pressure on the flap, okay to turn toward the left and back but not toward the right side. No sitting for total of 2 weeks (okay to start sitting 7/1/2022, requested clarification of plan to be detailed in discharge paperwork).     R hip drain: Patient needs management of rosa drain to determine when this can be removed per plastics - anticipate 1-2 weeks pending drainage (200cc serous drainage on 6/27, requested clarification of plan to be detailed in discharge paperwork).     Edema: patient with mild-moderate scrotal edema post op so will need ongoing assessment and treatment of this as indicated. May benefit from wrapping.     R hip disarticulation: patient will need ongoing management of amputation site and assessment for signs of infection/healing. Patient will need ongoing education re: amputation as well as shaping/care of residual area for potential prosthesis use in the future.     Patient is at risk for falls with injury due to new amputation and impaired balance, mood disturbance with loss of limb and adjustment to this, and infection.     Past Medical/Surgical History   Surgery in the past 100 days: Yes   Additional relevant past medical history: none    Level of Functioning Prior to Admission:    LIVING ENVIRONMENT   People in Home: sibling(s)   Current Living Arrangements: apartment   Living Environment Comments: Multiple family members in the home    SELF-CARE   Usual Activity Tolerance:  good   Equipment Currently Used at Home: commode chair, crutches, grab bar, tub/shower   Activity/Exercise/Self-Care Comment: Pt reports he was walking, and generally indep with ADLs, SBA for shower transfer sometimes.    Additional Comments: Pt reports sister completes most home chores, but pt makes meals sometimes; pt reports sisters oversee his medication management; groceries delivered.     Level of Function: GG Scale (Section GG Functional Ability and Goals; CMS's BRANNON Version 3.0 Manual effective 10.1.2019):  PT Current Function Goals for Rehab   Bed Rolling 4 Supervision or touching assitance 6 Independent   Supine to Sit Not completed 6 Independent   Sit to Stand 1 Dependent (A of 2) 6 Independent   Transfer 1 Dependent (A of 2) 6 Independent   Ambulation 3 Partial/moderate assistance 6 Independent   Stairs Not completed 4 Supervision or touching assitance     OT Current Function Goals for Rehab   Feeding 5 Setup or clean-up assistance 6 Independent   Grooming 4 Supervision or touching assitance 6 Independent   Bathing Not completed 6 Independent   Upper Body Dressing Not completed 6 Independent   Lower Body Dressing 3 Partial/moderate assistance 6 Independent   Toileting 3 Partial/moderate assistance 6 Independent   Toilet Transfer Not completed 6 Independent   Tub/Shower Transfer Not completed 6 Independent   Cognition Not Impaired Not applicable     SLP Current Function Goals for Rehab   Swallow Not Impaired Not applicable   Communication Not Impaired Not applicable       Current Diet:  0-Thin and 7-Regular/easy to chew    Summary Statement:  Patient is participating in daily PT/OT and is making progress. Currently, pt rolled to L SBA using bedrail. Mod Ax2 with pt holding TH elbows for sidelying <> stand, avoiding any seated time EOB.  Pt stood for ~10 min with CGAx2 for balance activities, using 2WW. Pt completed head/torso turns, partial squats, standing balls of feet, all with BUE support.  Pt  completed BUE reaching across midline at hip and shoulder level, alternating hand on walker with good balance/control.  Pt pivoted bed <> chair (did not sit) using heel/toe slides, as well as some partial hops. Patient is able to pivot on L LE ~3' toward HOB with min A (mostly for walker managment/advancement).     Expected Therapies and Services Required During Inpatient Rehab Admission  Intensity of Therapy: Patient requires intensive therapies not available in a lesser level of care. Patient is motivated, making gains, and can tolerate 3 hours of therapy a day.  Physical Therapy: 90 minutes per day, 7 days a week for 10 days  Occupational Therapy: 90 minutes per day, 7 days a week for 10 days  Speech and Language Therapy: Not indicated at this time.   Rehabilitation Nursing Needs: Patient requires 24 hour Rehab Nursing to manage wound care, vitals, positioning, carryover of new rehab techniques, care coordination, skin integrity, pain management, provide safe environment for patient at falls risk and edema management.    Precautions/restrictions/special needs:   Precautions: fall precautions   Restrictions: No pressure on the flap, okay to turn toward the left and back but not toward the right side. No sitting for total of 2 weeks (okay  to start sitting 7/1/2022)   Special Needs: Pulsate Mattress    Expected Level of Improvement: Mod I with mobility, transfers, and ADLs; anticipate pt will need assist from family for IADLs  Expected Length of time to achieve: 10 days    Anticipated Discharge Needs:  Anticipated Discharge Destination: Home  Anticipated Discharge Support: Family member  24/7 support available : Unknown  Identified caregiver(s):  Sisters  Anticipated Discharge Needs: Home with homecare    Identified challenges/barriers:  Sitting restriction until 7/1    Liaison Signature/date/time:       Physician statement of review and agreement:  I have reviewed and am in agreement of the need for IRF stay to  address above functional and medical needs. In addition to above statements address, Patient requires intensive active and ongoing therapeutic intervention and multiple therapies; Patient requires medical supervision; Expected to actively participate in the intensive rehab program; Sufficiently stable to actively participate; Expectation for measurable improvement in functional capacity or adaption to impairments.    MD signature/date/time:

## 2022-06-27 NOTE — PROGRESS NOTES
Care Management Follow Up    Length of Stay (days): 22    Expected Discharge Date: 06/27/2022     Concerns to be Addressed: discharge planning   Patient plan of care discussed at interdisciplinary rounds: Yes    Anticipated Discharge Disposition: ARU     Anticipated Discharge Services: ARU   Anticipated Discharge DME: TBD     Patient/family educated on Medicare website which has current facility and service quality ratings: yes   Education Provided on the Discharge Plan:    Patient/Family in Agreement with the Plan: yes    Referrals Placed by CM/SW: ARU    Private pay costs discussed: Not applicable    Additional Information:   ARU is following pt for admission and will be able to accept pt pending insurance authorization.     SW/RNCC will continue to follow.    Addendum 3pm  Pt's insurance auth has gone through [facility]. Pt, provider, and RN notified.    TY Marroquin, SW  [facility]   [facility]  Ph: [phone]//Pager: [phone]  Email: [email or web address]  Pronouns: she/her/hers

## 2022-06-27 NOTE — CONSULTS
[facility]     PM&R CONSULT        Consulting Provider: Dr. Whitaker - Orthopedic surgery  Reason for Consult: Assessment of rehabilitation   Location of Patient: 5 Ortho - 552-01  Date of Encounter: 6/27/2022   Date of Admission: 6/5/2022    ASSESSMENT/PLAN:    Vic Scott III is a 47 year old male PMH significant for high-grade pleomorphic sarcoma of the right pelvis and femur s/p chemotherapy who was admitted 6/5/2022 for right thigh pain and was found to have a right pathological femur fracture. He underwent right hindquarter amputation/hemipelvectomy by orthopedics and free gluteal flap with spy by plastic surgery 6/17. PM&R was consulted for evaluation of acute inpatient rehabilitation candidacy.    - Patient is a candidate for ARU, as he is below functional baseline and has rehab needs with PT/OT. His participation may be limited due to activity restrictions by plastic surgery. Patient will also require rehab psychology to address his mood after amputation.   - Educated patient that he will not receive prosthetics right away and will need wound to heal, estimated 1-2 months from surgery. PM&R outpatient follow up for prosthetic prescription and gait training in the future.   - Prior to discharge, will need clarification and detailed instructions from plastic surgery regarding restrictions and drain. It is documented he cannot sit until July 1, but would need to clarify if there are any gradual restrictions after July 1 or completely remove restrictions. Also require instructions regarding drain and if plastic surgery will follow.    Latosha De Santiago  PGY 3  Physical Medicine and Rehabilitation  [facility]  Pager: [phone]    Patient was discussed with attending physician Dr. Ara Amaro    Physician Attestation   I, Ara Amaro MD, saw this patient with the resident and agree with the resident/fellow's findings and plan of care as documented in the  note.      I personally reviewed vital signs, medications, labs, imaging and notes in the chart.    Key findings: saw Fabian in his room and discussed the above. He had lots of good questions and was excited to start his rehab course. Noted that he can't wait to be able to sit upright in a chair and was wondering if his transfer tomorrow could be with WC. Will defer that to the primary team and it doesn't seem a safe option at this point. His incision is healing well and his pain is relatively controlled. He had some tenderness to palpation around the drain site at the medial end of his incision but no active drainage or erythema.  He will benefit from intensive inpatient rehab as above. Anticipate improvement of his function to mod I level, WC based. Will need ongoing management by rehab medicine team as well as oncology and ortho surgery.     Ara Amaro MD  Date of Service (when I saw the patient): 6/27/22      HPI:    Vic Scott III is a 47 year old male PMH significant for 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 He presented to [facility] ED 6/5/2022 with two day history of severe right thigh pain and was found to have a pathological fracture of right femur. Patient was transferred to [facility] for further management. He was found to have Hb of 7 and received 1 unit of PRBCs. He was admitted to orthopedics service and underwent right hindquarter amputation/hemipelvectomy by orthopedics and free gluteal flap with spy by plastic surgery 6/17. Patient had significant blood loss estimated 3 liters and received 10 units of PRBCs.        Patient was seen at bedside today with sister. Patient reports that since the surgery he had intermittent phantom limb sensation that was brief and lasts a few minutes every other day. Most of his pain is at the surgery site and controlled with oral pain medications. He has some scrotal swelling since the surgery but has been mild and improving.  His mood has been stable, reports days with low mood but denied hopelessness, thoughts of harming self or others. Otherwise, he denied phantom limb pain, fever, chills, contralateral limb pain or symptoms, changes in bowel or bladder. His movement has been limited due to activity restrictions with skin flap. They had several questions regarding inpatient rehabilitation, prosthesis, and recovery.       NURSING REPORT:  Pt is A&Ox4. VSS. LS clear, on RA. BS active, LBM on 6/26/2022. Voiding well. Pain managed with 20mg oxycodone. R hip surgical dressing is c/d/I. L PIV is patent and SL. Continue to monitor.          CURRENT PRECAUTIONS/RESTRICTIONS:  Per plastic surgery:  No pressure on the flap, okay to turn toward the left and back but not toward the right side  No sitting for 2 weeks (okay to start sitting 7/1/2022)  Pain control per primary teams  Continue drain care, likely to discharge home with drain    DVT PPX: enoxaparin    PREVIOUS LEVEL OF FUNCTION:  Patient was functionally independent with most ADLs, used walker for ambulating short distances and scooter for longer distances. Driving independently.    CURRENT FUNCTION:   PT:   Rolls Dc to L, vc and modAx2 sidlying to stand keeping mindful of sit precautions. Pt with smoother transtion and good balance for Ax2 arm in arm to FWW support in standing. Once at walker CGAx2 for safety. Able to pivot on L LE ~3' toward HOB with Dc (mostly for walker managment/advancement). Pt instructed through tricep dips, mini squats, calf raises in prep for hopping technique, x10 each with CGA and fair tolerance.     OT:   Pt rolled to L SBA using bedrail. ModAx2 with pt holding TH elbows for side <> stand, avoiding any seated time EOB.  Pt stood for ~10min with CGAx2 for balance activities, using 2WW. Pt completed head/torso turns, partial squats, standing balls of feet, all with BUE support.  Pt completed BUE reaching across midline at hip and shoulder level,  alternating hand on walker with good balance/control.  Pt pivoted bed <> chair (did not sit) using heel/toe slides, as well as some partial hops. ModAx2 side <> sit; [place] dressing.      LIVING SITUATION/SUPPORT:  Patient lives in [place] with his sister and niece in a townhouse, 2 stories with 1 JUANA.   His room and bathroom are on the first floor.  Sister is at home on disability and taking care of niece with disability.     Work status: on disability    PAST MEDICAL HISTORY:  Past Medical History:   Diagnosis Date     Pleomorphic cell sarcoma (H)        CURRENT MEDICATIONS:  Current Facility-Administered Medications   Medication     0.9% sodium chloride BOLUS     acetaminophen (TYLENOL) tablet 975 mg     albuterol (PROVENTIL HFA/VENTOLIN HFA) inhaler     dextrose 5% and 0.45% NaCl infusion     glucose gel 15-30 g    Or     dextrose 50 % injection 25-50 mL    Or     glucagon injection 1 mg     enoxaparin ANTICOAGULANT (LOVENOX) injection 40 mg     escitalopram (LEXAPRO) tablet 10 mg     hydrALAZINE (APRESOLINE) injection 10 mg     HYDROmorphone (PF) (DILAUDID) injection 0.3-0.5 mg     labetalol (NORMODYNE/TRANDATE) injection 10-20 mg     lidocaine (LMX4) cream     lidocaine 1 % 0.1-1 mL     magnesium hydroxide (MILK OF MAGNESIA) suspension 30 mL     melatonin tablet 1 mg     morphine (MS CONTIN) 12 hr tablet 30 mg     naloxone (NARCAN) injection 0.2 mg    Or     naloxone (NARCAN) injection 0.4 mg    Or     naloxone (NARCAN) injection 0.2 mg    Or     naloxone (NARCAN) injection 0.4 mg     ondansetron (ZOFRAN ODT) ODT tab 4 mg    Or     ondansetron (ZOFRAN) injection 4 mg     oxyCODONE IR (ROXICODONE) tablet 10-20 mg     polyethylene glycol (MIRALAX) Packet 17 g     prochlorperazine (COMPAZINE) injection 10 mg    Or     prochlorperazine (COMPAZINE) tablet 10 mg    Or     prochlorperazine (COMPAZINE) suppository 25 mg     QUEtiapine (SEROquel) tablet 25 mg     sennosides (SENOKOT) tablet 8.6 mg     sodium chloride  "(PF) 0.9% PF flush 3 mL     sodium chloride (PF) 0.9% PF flush 3 mL         EXAMINATION:  Vital signs:  Temp: 98.1  F (36.7  C) Temp src: Oral BP: 118/70 Pulse: 101   Resp: 16 SpO2: 98 % O2 Device: None (Room air) Oxygen Delivery: 2 LPM   Weight: 167 lb 1.7 oz (75.8 kg)  Estimated body mass index is 21.46 kg/m  as calculated from the following:    Height as of an earlier encounter on 6/5/22: 6' 2\" (188 cm).    Weight as of this encounter: 167 lb 1.7 oz (75.8 kg).    General: NAD, pleasant and cooperative   HEENT: ATNC, EOMI, PERRL, no discharge from nares or ears    Pulmonary: clear breath sounds b/l, no rales/wheezing    Cardiovascular: RRR, radial pulses 2+ b/l, no murmur auscultated  Abdominal: soft, non-tender to palpation, bowel sounds present   Extremities: Warm and well perfused in bilateral upper and LLEextremities. No edema in bilateral lower extremities.   MSK/neuro:   Mental Status:  alert and oriented x3. Follows 1-2 step commands.     Cranial Nerves: grossly normal   2nd CN: Pupils equal, round, reactive to light and accomodation, and visual fields intact to confrontation.  3rd,4th,and 6th CN:  H-test able to follow, no nystagmus  5th CN: Facial sensation intact in V1, V2, and V3 to light touch.  7th CN: Face symmetric to cheek puff and smile.  8th CN: Intact to light finger rub bilaterally.  9th and 10th CN: Palate elevates symmetrically.  Non-hoarse voice.  11th CN: Sternocleidomastoids and trapezius symmetric and strong.  12th CN: Tongue midline and without fasciculations.   Sensory: Normal to light touch in bilateral upper and lower extremities   Strength: (0 to 5 scale)   Scored: _/5 Right Left   Shoulder Abd 5 5   Elbow Flexion 5 5   Elbow Extension 5 5   Wrist Extension 5 5   Hand Intrinsics 5 5    Strength 5 5   Hip Flexion NA 5   Knee Extension NA 5   Ankle Dorsiflexion NA 5     Reflexes:  Scored: _/4 Right Left   Biceps +2 +2   Brachioradialis +2 +2   Triceps +2 +2   Patellar +2 +2 "   Achilles +2 +2         Coordination: No dysmetria on finger to nose bilaterally or heel to shin.    Speech: fluent   Cognition: grossly intact    Skin: surgical incision c/d/i right pelvis, AMANDA drain 200cc serosanguineous fluids      LABS AND IMAGING:  Results for orders placed or performed during the hospital encounter of 06/05/22 (from the past 24 hour(s))   Potassium   Result Value Ref Range    Potassium 3.8 3.4 - 5.3 mmol/L   Magnesium   Result Value Ref Range    Magnesium 2.0 1.6 - 2.3 mg/dL   Phosphorus   Result Value Ref Range    Phosphorus 3.7 2.5 - 4.5 mg/dL   Extra Tube    Narrative    The following orders were created for panel order Extra Tube.  Procedure                               Abnormality         Status                     ---------                               -----------         ------                     Extra Purple Top Tube[[id number]]                            Final result                 Please view results for these tests on the individual orders.   Extra Purple Top Tube   Result Value Ref Range    Hold Specimen [facility]

## 2022-06-27 NOTE — PLAN OF CARE
VS:     Pt A/O X 4. Afebrile. VSS. Lungs- clear bilaterally with both       anterior and lateral. Denies nausea, shortness of breath, and chest pain.     Output:     Bowels- active in all four quadrants. Last BM 6/26. Voids spontaneously without difficulty in the bedside urinal.      Activity:     Pt up with 2x assist with walker and GB with PT.     Skin: Surgical incision to R hip.     Pain:     Has pain in the hip/groin area and given scheduled tylenol and morphine and 20 mg oxycodone twice, ICE applied, and is tolerating adequately.      CMS:     CMS and Neuro's are intact. Denies numbness and tingling in all extremities.      Dressing:     Surgical incisions are open to air without drainage.     Diet:     Pt is on a regular diet and appetite was good this shift.       LDA:     AMANDA drain with 80 mL serosanguineous drainage.  No IV access.     Equipment:     IV pole, walker, and GB in room.     Plan:     Pt discharging to ARU tomorrow (6/28) at 1100. Ortho and plastics following. Pt is able to make needs known and the call light is within the pt's reach. Continue to monitor.       Additional Info:

## 2022-06-27 NOTE — PLAN OF CARE
Pt is A&Ox4. VSS. LS clear, on RA. BS active, LBM on 6/26/2022. Voiding well. Pain managed with 20mg oxycodone. R hip surgical dressing is c/d/I. L PIV is patent and SL. Continue to monitor.

## 2022-06-27 NOTE — PROGRESS NOTES
Plastic and Reconstructive Surgery Note  Attending: Filomena  06/27/22    S: RYAN.     O:   /59 (BP Location: Left arm)   Pulse 103   Temp 97.8  F (36.6  C) (Oral)   Resp 16   Wt 75.8 kg (167 lb 1.7 oz)   SpO2 98%   BMI 21.46 kg/m    Gen: comfortable, well appearing. NAD. On room air  Pelvis: incisions are clean, dry and intact, without dressings. Flap is viable. Drain with serous drainage. Mild scrotal edema.   I/O: 200cc serous drainage last 24hr    Assessment:  47M w/ large sarcoma of R thigh s/p chemotherapy now s/p R hindquarter amputation with Dr Whitaker on 6/17 with local gluteal flap closure with Dr Butt on 6/17. [place] [facility] transfer on/around 6/21.    No pressure on the flap, okay to turn toward the left and back but not toward the right side  No sitting for 2 weeks (okay to start sitting 7/1/2022)  Pain control per primary teams  Continue drain care, likely to discharge home with drain  Okay to discharge/transfer     Gemini Espana PA-C  Plastic and Reconstructive Surgery

## 2022-06-27 NOTE — PROGRESS NOTES
Orthopedic Surgery Progress Note     Subjective: No acute events overnight. Pain well controlled. Tolerating diet. Voiding spontaneously. Denies fever or chills, CP, SOB, numbness or tingling, motor dysfunction or weakness. Therapies anticipate discharge to rehab.     Objective: /85 (BP Location: Left arm)   Pulse 101   Temp 97  F (36.1  C) (Oral)   Resp 16   Wt 75.8 kg (167 lb 1.7 oz)   SpO2 100%   BMI 21.46 kg/m       Drain:  Per Plastics; will remain in place 1-2 week pending output     Gen: alert and oriented, responds to questions appropriately  Resp: non-labored on RA  MSK:  RLE:  - Incision c/d/i with prineo tape. 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 Patient presented with worsening right hip and thigh pain and was found to have a pathologic right proximal femur fracture. Patient now s/p right hindquarter amputation with Dr. Whitaker and primary complex wound closure with Dr. Butt on 6/17.  Procedure was notable for significant blood loss requiring transfusion of 10 units pRBC (EBL almost 3 liters). Patient remained intubated and was transferred to SICU postop, extubated on POD1. Stable for transfer to floor on POD2.        Changes for today:  Dispo pending placement   Okay to discharge from orthopaedic perspective  Drain and wound care per plastic surgery.      Activity: Up with assist. No pressure on the flap (right flank region), okay to turn toward the left and back but NOT towards the right side, and no sitting x 2 weeks per plastics. Okay to be OOB; assist with ambulation; no sitting.   Weight bearing status: Per plastic surgery  Antibiotics: Ancef x24 hours perioperatively.  Diet: Begin with clear fluids and progress diet as  tolerated.  DVT prophylaxis: Lovenox and mechanical while in the hospital, discharge on Lovenox x4 weeks.  Wound Care: Per plastic surgery  Drains: Per plastic surgery  Pain management: transition from IV to orals as tolerated.   X-rays: None needed  Physical Therapy: Gait, ROM, ADL's.  Occupational Therapy: ADL's.  Labs: Trend Hgb on POD #1, 2, 3 .  Consults: PT, OT, plastics, psych, appreciate assistance in caring for this patient.  Follow-up: Clinic with Dr. Whitaker in 2 weeks with no XRs needed.     Disposition: Pending progress with therapies, pain control on orals, and medical stability, anticipate discharge to acute rehab-TCU or possibly home pending therapies.        Sami Poon MD  Orthopedic Surgery PGY1  [phone]    Please page me directly with any questions/concerns during regular weekday hours before 5 pm. If there is no response, if it is a weekend, or if it is during evening hours then please page the orthopedic surgery resident on call.

## 2022-06-28 PROBLEM — Z89.621: Status: ACTIVE | Noted: 2022-01-01

## 2022-06-28 NOTE — LETTER
Recipient: [facility]         Sender: Naomi Marie PH: [phone]  Home Fri 07/08   RN, PT, OT, HA (if available)   Please let me know if you can accept, thanks!           Date: June 30, 2022  Patient Name:  Vic Scott III  Patient YOB: 1975  Routing Message:          The documents accompanying this notice contain confidential information belonging to the sender.  This information is intended only for the use of the individual or entity named above.  The authorized recipient of this information is prohibited from disclosing this information to any other party and is required to destroy the information after its stated need has been fulfilled, unless otherwise required by state law.    If you are not the intended recipient, you are hereby notified that any disclosure, copy, distribution or action taken in reliance on the contents of these documents is strictly prohibited.  If you have received this document in error, please return it by fax to [phone] with a note on the cover sheet explaining why you are returning it (e.g. not your patient, not your provider, etc.).  If you need further assistance, please call .  Documents may also be returned by mail to Health Information Management, , [facility] [address], [place], [place] [address].

## 2022-06-28 NOTE — PROGRESS NOTES
Rehab Admissions:  Spoke with the patient via telephone to educate him on the benefits of ARC including intensive therapies, close medical management, and rehabilitative nursing care. Educated him in the location of the ARC, visiting hours and policy, and goals of ARC to return home with family support. All questions answered. Pt reports he is in agreement with ARC.     Thank you for the referral.     Determination of admission is based upon the patient's need for an intensive, interdisciplinary approach to rehabilitation, their ability to progress, their ability to tolerate intensive therapies, their need for daily physician supervision, their need for twenty four hour nursing assistance, and their ability and willingness to participate in such a program.    Benny Colin CM  Rehab Liaison/  [facility]  6/28/2022    9:48 AM

## 2022-06-28 NOTE — PLAN OF CARE
VS: /75 (BP Location: Left arm, Patient Position: Supine, Cuff Size: Adult Regular)   Pulse 109   Temp 97.1  F (36.2  C) (Oral)   Resp 16   Wt 75.8 kg (167 lb 1.7 oz)   SpO2 100%   BMI 21.46 kg/m     O2: 100% on RA   Output: Pt voids spontaneously w/out difficulty. He has a urinal at bedside   Last BM: 06/28/22- uses bedpan   Activity: Up with an assist of 2 with a walker and GB with PT   Skin: Surgical incision to R hip   Pain: Pt complains of pain near his incision in the groin/hip area. Pt was given scheduled acetaminophen, PRN oxycodone, and an ice pack to help alleviate.    Neuro: A/O x 4   Diet: Regular   LDA: AMANDA drain with serosanguineous drainage; no IV   Equipment: IV pole, walker, GB, personal belongings   Plan: Continue to monitor. Pt is expected to discharge to ARU today around 1100.   Additional Info:

## 2022-06-28 NOTE — LETTER
Recipient: [facility] Intake           Sender: Naomi Marie [facility] PH: [phone]  Discharge today, thanks!           Date: July 8, 2022  Patient Name:  Vic Scott III  Patient YOB: 1975  Routing Message:          The documents accompanying this notice contain confidential information belonging to the sender.  This information is intended only for the use of the individual or entity named above.  The authorized recipient of this information is prohibited from disclosing this information to any other party and is required to destroy the information after its stated need has been fulfilled, unless otherwise required by state law.    If you are not the intended recipient, you are hereby notified that any disclosure, copy, distribution or action taken in reliance on the contents of these documents is strictly prohibited.  If you have received this document in error, please return it by fax to [phone] with a note on the cover sheet explaining why you are returning it (e.g. not your patient, not your provider, etc.).  If you need further assistance, please call .  Documents may also be returned by mail to Health Information Management, , [facility] [address], [place], [place] [address].

## 2022-06-28 NOTE — PLAN OF CARE
Occupational Therapy Discharge Summary    Reason for therapy discharge:    Discharged to acute rehabilitation facility.    Progress towards therapy goal(s). See goals on Care Plan in [facility] electronic health record for goal details.  Goals partially met.  Barriers to achieving goals:   discharge from facility.    Therapy recommendation(s):    Continued therapy is recommended.  Rationale/Recommendations:  to maximize safety and I with ADL.

## 2022-06-28 NOTE — DISCHARGE INSTRUCTIONS
Follow-up Appointments    -Primary Care Provider; 1-2 weeks  You are scheduled to see Dr. Rajesh Vidales on Wednesday, July 13th, 2022 at 2:20pm. Please arrive by 2:05pm for check-in and registration.    Address   [facility]                           Nurse Practictioner Clinic - 5th Floor                          [address] [place] [address]  Phone   [phone]      -Orthopedics w/ Dr. Whitaker in 2 weeks, no imaging needed  You are scheduled to see Dr. Whitaker on Wednesday, July 20h, 2022 at 2:30pm. Please arrive by 2:15pm for check-in and registration.    Address  [facility]                         4th Floor                         [address] [place] [address]  Phone   [phone]          Schedule Appointment  Date: Tuesday, 7/12/2022  Time: 10:00 am - 11:00 am  Provider: Arslan Gar  Supervised by: Vanessa Hernandez PsyD    Location: [facility], [address] [place] [address], [address], [place] [address]  Phone: [phone]  Type: Teletherapy      Home Health Care:   [facility]   PH: [phone], F: [phone]  Nurse, physical therapy, and occupational therapy   *They will contact you to coordinate the first home visit. If you don't hear from them by end of the weekend, call during regular business hours. They are aware of your discharge address.     Amputation Resources:   [employer]  https://www.wiRetslyes.org/  PH: [phone]  Shahzad Pugh (runs the organization) PH: [phone]   Info@[employer].org  Local agency with support, resources, financial assistance, home modifications and education    Balance Amputation Support Group   Tuesday nights at 6:30pm  [facility] [place]  [address] [place] [address]  Held by: Kim Pierson PH: [phone] (a bilateral amputee as well, great  resource and contact for additional support)     Amputation Coalition   https://www.amputee-coalition.org/resources/[place]-2/  PH: [phone]  National agency where you can find support groups, connect with peers and get education

## 2022-06-28 NOTE — PROGRESS NOTES
Orthopedic Surgery Progress Note     Subjective: No acute events overnight. Pain well controlled. Discharge today. No concerns.     Objective: /85 (BP Location: Left arm)   Pulse 101   Temp 97  F (36.1  C) (Oral)   Resp 16   Wt 75.8 kg (167 lb 1.7 oz)   SpO2 100%   BMI 21.46 kg/m       Drain:  Per Plastics; will remain in place 1-2 week pending output     Gen: alert and oriented, responds to questions appropriately  Resp: non-labored on RA  MSK:  RLE:  - Incision c/d/i with prineo tape. 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 Patient presented with worsening right hip and thigh pain and was found to have a pathologic right proximal femur fracture. Patient now s/p right hindquarter amputation with Dr. Whitaker and primary complex wound closure with Dr. Butt on 6/17.  Procedure was notable for significant blood loss requiring transfusion of 10 units pRBC (EBL almost 3 liters). Patient remained intubated and was transferred to SICU postop, extubated on POD1. Stable for transfer to floor on POD2. Now progressing as anticipated, stable for discharge.     Changes for today:  Drain and wound care per plastic surgery.   Okay to discharge from orthopaedic perspective      Activity: Up with assist. No pressure on the flap (right flank region), okay to turn toward the left and back but NOT towards the right side, and no sitting x 2 weeks per plastics. Okay to be OOB; assist with ambulation; no sitting.   Weight bearing status: Per plastic surgery  Antibiotics: Ancef x24 hours perioperatively - complete  Diet: Begin with clear fluids and progress diet as tolerated.  DVT prophylaxis: Lovenox and mechanical while in the hospital, discharge on  Lovenox x4 weeks; confirmed  Wound Care: Per plastic surgery  Drains: Per plastic surgery  Pain management: transition from IV to orals as tolerated.   X-rays: None needed  Physical Therapy: Gait, ROM, ADL's.  Occupational Therapy: ADL's.  Labs: PRN  Consults: PT, OT, plastics, psych, appreciate assistance in caring for this patient.  Follow-up: Clinic with Dr. Whitaker in 2 weeks with no XRs needed.  Disposition: Pending progress with therapies, pain control on orals, and medical stability, anticipate discharge to ARU 6/28 @ 11 AM     Leo Araujo MD  Orthopaedic Surgery PGY-4  [phone]

## 2022-06-28 NOTE — PLAN OF CARE
VS: /70 (BP Location: Left arm)   Pulse 101   Temp 98.1  F (36.7  C) (Oral)   Resp 16   Wt 75.8 kg (167 lb 1.7 oz)   SpO2 98%   BMI 21.46 kg/m      O2: Sating >90% on RA. Lung sounds clear. Denies chest pain and SOB.   Output: Voids spontaneously and adequately. Urinal at bedside.    Last BM: 6/27/22. Bowel sounds active x4. Passing flatus.    Activity: Up with 2 assist, FWW, and gait belt with PT. Independent in bed with repositioning.    Up for meals? Yes    Skin: R hip incision.    Pain: Pain was managed with scheduled morphine, ice, and PRN Oxy.    CMS: A&Ox4. Denies N/T.    Dressing: None in place.   Diet: Regular. Appetite was good. Denies N/V.    LDA: AMANDA drain patent.    Equipment: IV pole, FWW, gait belt, and personal belongings.    Plan: Continue to monitor. Call light within reach and utilized appropriately. Discharge to ARU on 6/28/22 at 1100.   Additional Info:

## 2022-06-28 NOTE — CONSULTS
Social Work: Initial Assessment with Discharge Plan    Patient Name: Vic Scott III  : 1975  Age: 47 year old  MRN: [id number]  Completed assessment with: Chart review and interview with patient   Admitted to ARU: 2022    Presenting Information   Date of SW assessment: 2022  Health Care Directive: Provided education Pt declined  Primary Health Care Agent: Pt  Secondary Health Care Agent: Sisters are NOK in absence of HCD.   Living Situation: Pt lives in a 2 story [facility] w/ his sister, Lauryn, and her 2 children (ages 11 and 15) in [place], [place]. Pt's 7 year old son also lives w/ him, but is currently staying w/ his mother.  Previous Functional Status: Pt states he used a walker at home and in the community PTA. Pt's sister, Lauryn, does most of the household chores. Pt's groceries were delivered. Pt is disabled. Pt was driving PTA.  DME available: Walker, shower chair, raised toilet seat.  Patient and family understanding of hospitalization: Appropriate. A&Ox4  Cultural/Language/Spiritual Considerations: English speaking,  male. Oriental orthodox. SW offered spiritual health services during ARU stay and pt is interested.       Physical Health  Reason for admission: R hip diarticulation    Provider Information   Primary Care Physician:No Ref-Primary, Physician --See face sheet for demographics.  ARU HUC will schedule PCP apt at discharge.   : : [facility] (PH: [phone])    Mental Health/Chemical Dependency:   Diagnosis: Depression (per pt report and chart).  Alcohol/Tobacco/Narcotis: None reported   Support/Services in Place: Medication management. Pt was sign by psychiatry in the hospital.   Services Needed/Recommended: Supportive services available during ARU stay. Email sent to spiritual health services for support.   Sexuality/Intimacy: Not discussed     Support System  Marital Status:   Family support: Lives w/ sister, Lauryn, who is  involved/supportive. Per pt, Lauryn is also disabled and does not work. Lauryn is able to help him as needed. Sister, Pretty, is involved and supportive. 7 year old son. Niece and nephew (11 and 15 years old) live w/ him and Lauryn.  Other support available: None reported    Community Resources  Current in home services: SNAP food assistance through the [facility]. Pt states he does not have a CADI waiver.   Previous services: See above     Financial/Employment/Education  Employment Status: Disabled  Income Source: Disability  Education: College degree  Financial Concerns:  None reported   Insurance: [facility]      Discharge Plan   Patient and family discharge goal: Home, pending progress.   Provided Education on discharge plan: Yes. Evaluations and discharge recommendations pending.   Patient agreeable to discharge plan:  Pending further discussion. Evaluations and discharge recommendations pending.   Provided education and attained signature for Medicare IM and IRF Patient Rights and Privacy Information provided to patient : NA  Provided patient with [place] [facility] Resources: NA  Barriers to discharge: Medical stability, therapy goals met.     Discharge Recommendations   Disposition: See above   Transportation Needs: family/friends  Name of Transportation Company and Phone: N/A     Additional comments   Discharge TBD, [facility] 10 days. 15/15 on [facility].    SW will remain available and continue to follow as needs arise.     Please invite to Care Conference:  N/A at this time     Ilia CRAWFORD, MAKENNA Gates   Phone: [phone]  Pager: [phone]

## 2022-06-28 NOTE — PLAN OF CARE
Physical Therapy Discharge Summary    Reason for therapy discharge:    Discharged to acute rehabilitation facility.    Progress towards therapy goal(s). See goals on Care Plan in [facility] electronic health record for goal details.  Goals partially met.  Barriers to achieving goals:   limited tolerance for therapy.    Therapy recommendation(s):    Continued therapy is recommended.  Rationale/Recommendations:  to progress safety and independence with functional mobility prior to returning home.

## 2022-06-28 NOTE — PROGRESS NOTES
Care Management Discharge Note    Discharge Date: 06/27/2022     Discharge Disposition:   [facility] ARU-5th floor    Discharge Services: Rehab    Discharge DME: None    Discharge Transportation: Staff to escort over      Private pay costs discussed: Not applicable    PAS Confirmation Code: NA   Patient/family educated on Medicare website which has current facility and service quality ratings: yes     Education Provided on the Discharge Plan: yes    Persons Notified of Discharge Plans: Pt, provider, RN  Patient/Family in Agreement with the Plan: yes    Handoff Referral Completed: No-No PCP    Additional Information:  Pt will discharge to ARU today at 12:30pm. Discharge summary and orders complete.    TY Marroquin, LGSW  [facility]   [facility]  Ph: [phone]//Pager: [phone]  Email: [email or web address]  Pronouns: she/her/hers

## 2022-06-28 NOTE — H&P
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  Patient initially presented to [facility] emergency department 6/5/2022 with two day history of severe right thigh pain.  Further workup demonstrated a pathological fracture of right femur.  Patient was transferred to [facility] for further management.  Upon arrival patient was found to have Hgb of 7 and received 1 unit of PRBC.  He underwent a right hindquarter amputation/hemipelvectomy by orthopedics and free gluteal flap w/ SPY by plastic surgery on 6/17.  During procedure patient had significant blood loss estimated at 3 liters and received 10 units of pRBCs.  Patient was extubated on POD 1, and has had a relatively uncomplicated recovery besides adequate pain management.  Appreciate pain team consult and assistance.     During acute hospitalization, patient was seen and evaluated by PT and OT, who collectively recommended that patient would benefit from ongoing therapies in the acute inpatient rehabilitation setting.      In review of the therapy notes patient is participating in daily PT/OT and is making progress. Currently, pt rolled to L SBA using bedrail. Mod Ax2 with pt holding TH elbows for sidelying <> stand, avoiding any seated time EOB.  Pt stood for ~10 min with CGAx2 for balance activities, using 2WW. Pt completed head/torso turns, partial squats, standing balls of feet, all with BUE support.  Pt completed BUE reaching across midline at hip and shoulder level, alternating hand on walker with good balance/control.  Pt pivoted bed <> chair (did not sit) using heel/toe slides, as well as some partial hops. Patient is able to pivot on L LE ~3' toward HOB with min A (mostly for  walker managment/advancement).     Upon arrival to the rehab unit, pt notes that he is doing well overall.  Pain is well controlled on current regimen.  Having regular bowel movements, not having any issues with voiding.  Discussed with patient plan for therapies here on acute rehab and what his typical daily schedule will be.  Patient expressed desire to continue working with mental health/health psych for further coping skills regarding his recent surgery and life change.  Patient denies headache, fever, chills, shortness of breath, chest pain, abdominal pain, nausea, vomiting, and diarrhea.      PAST MEDICAL HISTORY   Reviewed and updated in Epic.  Past Medical History:   Diagnosis Date     Pleomorphic cell sarcoma (H)        SURGICAL HISTORY  Reviewed and updated in Epic.  Past Surgical History:   Procedure Laterality Date     ANESTHESIA OUT OF OR BLOOD BRAIN BARRIER DISRUPTION N/A 6/16/2022    Procedure: ANESTHESIA OUT OF OR - Block Epidural;  Surgeon: GENERIC ANESTHESIA PROVIDER;  Location: [facility] OR     COMPLEX WOUND CLOSURE (UPDATE) Right 6/17/2022    Procedure: complex closure pelvis, spy;  Surgeon: KAREN Butt MD;  Location: [facility] OR     COMPLEX WOUND CLOSURE (UPDATE)  6/17/2022    Procedure: ;  Surgeon: KAREN Butt MD;  Location: [facility] OR     EXCISE TUMOR PELVIS POSTERIOR Right 6/17/2022    Procedure: Right hindquarter amputation;  Surgeon: Matty Whitaker MD;  Location: [facility] OR     INSERT PORT VASCULAR ACCESS Right 11/1/2021    Procedure: INSERTION, VASCULAR ACCESS PORT;  Surgeon: Sushil Gonzales MD;  Location: [facility] OR     IR CHEST PORT PLACEMENT > 5 YRS OF AGE  11/1/2021       SOCIAL HISTORY  Reviewed and updated in Epic.  Marital Status:   Living situation: [facility] with sister and niece, 2 stories w/ 1 stair to enter, bathroom and room are on first floor  Family support: Sister at home on disability taking care of niece with disability  Vocational History: On  disability  Tobacco use: Denies  Alcohol use: Denies  Illicit drug use: Denies  Social History     Socioeconomic History     Marital status:      Spouse name: Not on file     Number of children: Not on file     Years of education: Not on file     Highest education level: Not on file   Occupational History     Not on file   Tobacco Use     Smoking status: Never Smoker     Smokeless tobacco: Never Used   Substance and Sexual Activity     Alcohol use: Not on file     Drug use: Not on file     Sexual activity: Not on file   Other Topics Concern     Not on file   Social History Narrative     Not on file     Social Determinants of Health     Financial Resource Strain: Not on file   Food Insecurity: Not on file   Transportation Needs: Not on file   Physical Activity: Not on file   Stress: Not on file   Social Connections: Not on file   Intimate Partner Violence: Not on file   Housing Stability: Not on file       FAMILY HISTORY  Reviewed and updated in Epic.  No family history on file.      PRIOR FUNCTIONAL HISTORY   Previously patient's sister completed most home chores and oversaw his medication management.  Groceries were delivered.         MEDICATIONS  Scheduled meds  Medications Prior to Admission   Medication Sig Dispense Refill Last Dose     acetaminophen (TYLENOL) 325 MG tablet Take 3 tablets (975 mg) by mouth every 8 hours 50 tablet 0 6/28/2022 at 1256     albuterol (PROAIR HFA/PROVENTIL HFA/VENTOLIN HFA) 108 (90 Base) MCG/ACT inhaler Inhale 1-2 puffs into the lungs every 6 hours as needed for shortness of breath / dyspnea 18 g 0 Unknown at Unknown time     diclofenac (VOLTAREN) 1 % topical gel Apply 2-4 g topically 4 times daily as needed for moderate pain 100 g 0 Unknown at Unknown time     enoxaparin ANTICOAGULANT (LOVENOX) 40 MG/0.4ML syringe Inject 0.4 mLs (40 mg) Subcutaneous every 24 hours for 30 days 12 mL 0 6/27/2022 at 1714     [START ON 6/29/2022] escitalopram (LEXAPRO) 10 MG tablet Take 1 tablet  "(10 mg) by mouth daily 30 tablet 0 6/28/2022 at 0815     ibuprofen (ADVIL/MOTRIN) 200 MG tablet Take 400 mg by mouth every 8 hours as needed for mild pain   Unknown at Unknown time     morphine (MS CONTIN) 30 MG CR tablet Take 1 tablet (30 mg) by mouth every 12 hours 30 tablet 0 6/28/2022 at 1022     multivitamin, therapeutic (THERA-VIT) TABS tablet Take 1 tablet by mouth daily   Unknown at Unknown time     ondansetron (ZOFRAN) 8 MG tablet Take 1 tablet (8 mg) by mouth every 8 hours as needed for nausea 30 tablet 1 Unknown at Unknown time     oxyCODONE IR (ROXICODONE) 10 MG tablet Take 1-2 tablets (10-20 mg) by mouth every 4 hours as needed for moderate to severe pain 30 tablet 0 6/28/2022 at 1256     polyethylene glycol (MIRALAX) 17 g packet Take 17 g by mouth daily 10 packet 0 Unknown at Unknown time     prochlorperazine (COMPAZINE) 5 MG tablet Take 1 tablet (5 mg) by mouth every 6 hours as needed for nausea or vomiting 30 tablet 0 Unknown at Unknown time     QUEtiapine (SEROQUEL) 25 MG tablet Take 1 tablet (25 mg) by mouth At Bedtime 90 tablet 0 Unknown at Unknown time     sennosides (SENOKOT) 8.6 MG tablet Take 1-2 tablets by mouth 2 times daily as needed for constipation or no stool 30 tablet 1 Unknown at Unknown time       ALLERGIES     Allergies   Allergen Reactions     Blood Transfusion Related (Informational Only) Other (See Comments)     Patient has a history of a clinically significant antibody against RBC antigens.  A delay in compatible RBCs may occur.          REVIEW OF SYSTEMS  A 10 point ROS was performed and negative unless otherwise noted in HPI.       PHYSICAL EXAM  VITAL SIGNS:  /64 (BP Location: Right arm)   Pulse 103   Temp 97.5  F (36.4  C) (Oral)   Resp 16   Ht 1.88 m (6' 2\")   SpO2 98%   BMI 21.46 kg/m    BMI:  Estimated body mass index is 21.46 kg/m  as calculated from the following:    Height as of this encounter: 1.88 m (6' 2\").    Weight as of 6/17/22: 75.8 kg (167 lb 1.7 " oz).     General: No acute distress, resting comfortably in bed  HEENT: Pupils equal, round, and reactive to light   Pulmonary: Breathing comfortably on room air, clear to auscultation bilaterally  Cardiovascular: Regular rate and rhythm  Abdominal: Non-tender, non-distended, bowel sounds present in all four quadrants   Extremities: warm, well perfused  MSK/neuro:   Mental Status:  alert and oriented x3    Cranial Nerves: grossly normal    Sensory: Normal to light touch in bilateral upper extremities   Strength: 5/5 in all muscle groups of bilateral upper and left lower extremity    Abnormal movements: None    Speech: Fluent   Cognition: Superficially intact   Gait: Deferred  Skin: surgical incision intact with no surrounding drainage with AMANDA drain over medial aspect of flap site with serosanguinous fluid in bulb    LABS  Recent Results (from the past 24 hour(s))   Platelet count    Collection Time: 06/28/22  5:14 AM   Result Value Ref Range    Platelet Count 586 (H) 150 - 450 10e3/uL   Potassium    Collection Time: 06/28/22  5:15 AM   Result Value Ref Range    Potassium 4.0 3.4 - 5.3 mmol/L   Magnesium    Collection Time: 06/28/22  5:15 AM   Result Value Ref Range    Magnesium 1.9 1.6 - 2.3 mg/dL   Phosphorus    Collection Time: 06/28/22  5:15 AM   Result Value Ref Range    Phosphorus 3.3 2.5 - 4.5 mg/dL   Creatinine    Collection Time: 06/28/22  5:15 AM   Result Value Ref Range    Creatinine 0.51 (L) 0.66 - 1.25 mg/dL    GFR Estimate >90 >60 mL/min/1.73m2       IMPRESSION/PLAN:  Vic Scott III is a 47 year old male with a past medical history of high-grade pleomorphic sarcoma of R pelvis and femur s/p chemotherapy who is now s/p right hindquarter amputation/hemipelvectomy by orthopedics and free gluteal flap with spy by plastic surgery on 6/17 now admitted to acute inpatient rehabilitation 6/28/2022 to undergo multidisciplinary therapies.    Admission to acute inpatient rehab Amputation 05.3 Unilateral LE AKA  - Right pelvic/thigh sarcoma s/p hip disarticulation by Orthopedics and reconstruction of the right pelvic area by Plastics.    Impairment group code: 05.3      1. PT & OT 90 minutes of each on a daily basis, in addition to rehab nursing and close management of physiatrist.      2. Impairment of ADL's: Impairment in strength, endurance, and ROM resulting in functional limitations in patient's ability to perform ADLs    3. Impairment of mobility:  Impairment in strength, endurance, and ROM resulting in functional limitations in patient's ability to perform functional mobility     4. Medical Conditions  #High Grade pleomorphic Sarcoma of R Thigh s/p Neoadjuvant Chemotherapy  #S/p Right hindquarter amputation w/ wound flap closure  Patient required 10 units pRBC due to significant blood loss during the procedure  - Pain Management: Patient evaluated by inpatient pain service  -- Continue Morphine 30 mg q12 hour (home dose)  -- Continue Oxycodone 10-20 mg q3 hours (home dose was max of 80 mg/day)  -- Continue Tylenol 975 mg q8hrs  - Per plastics:  -- No pressure on the flap, okay to turn toward the left and back but not toward the right side  -- No sitting x 2 weeks(Through 7/1)  --Sitting x 10-15 minutes; 5-6 times a day.  Increase by 10-15 minutes each day     Monitor incision site, go back to less minutes if any concerns with incision site.  --Drain Care: strip, empty, and record daily  --Wound Care: Ice as needed    #Adjustment Disorder  - Continue Escitalopram 10 mg qday    #Scrotal Edema  Noted prior to transfer, lymphedema consult likely needed    #Constipation  - With patient on significant amount of opioids will monitor for opioid induced constipation and treat as needed    5. Adjustment to disability:  Clinical psychology to eval and treat if indicated  6. FEN: Regular Diet  7. Bowel: PRN bowel meds available  8. Bladder: Continenet  9. DVT Prophylaxis: Lovenox  10. GI Prophylaxis: Not Indicated  11. Code:  Full Code  12. Disposition: Home  13. ELOS:  10 days.  14. Rehab prognosis:  Good  15. Follow up Appointments on Discharge:   1. Orthopedics w/ Dr. Whitaker in 2 weeks, no imaging needed      Seen and discussed with Dr. Lutz, PM&R staff physician     Angel Wyatt,   PGY-3 PM&R Department

## 2022-06-28 NOTE — DISCHARGE SUMMARY
[facility] Orthopaedic Surgery Discharge Summary    Vic Scott III MRN# [id number]   Age: 47 year old YOB: 1975       Date of Admission:  6/5/2022  Date of Discharge::  6/28/2022   Admitting Physician:  Matty Whitaker MD  Discharge To:  ARU  Primary Care Physician:      No Ref-Primary, Physician          Admission Diagnoses:   Femur fracture, right (H) [S72.91XA]         Discharge Diagnosis:   There are no discharge diagnoses documented for the most recent discharge.            Procedures Performed:   Assessment: Vic Scott III is a 47-year-old male with high grade pleomorphic sarcoma of the right thigh s/p neoadjuvant chemotherapy. Patient presented with worsening right hip and thigh pain and was found to have a pathologic right proximal femur fracture. Patient now s/p right hindquarter amputation with Dr. Whitaker and primary complex wound closure with Dr. Butt on 6/17.  Procedure was notable for significant blood loss requiring transfusion of 10 units pRBC (EBL almost 3 liters).            Consultations:   INTERNAL MEDICINE ADULT IP CONSULT FOR [facility]  CARE MANAGEMENT / SOCIAL WORK IP CONSULT  PALLIATIVE CARE ADULT IP CONSULT  PSYCHIATRY IP CONSULT  PHYSICAL THERAPY ADULT IP CONSULT  PAIN MANAGEMENT ADULT IP CONSULT  PAIN MANAGEMENT ADULT IP CONSULT  PHYSICAL THERAPY ADULT IP CONSULT  OCCUPATIONAL THERAPY ADULT IP CONSULT  PSYCHIATRY IP CONSULT  PSYCHOLOGY ADULT IP CONSULT  INTERNAL MEDICINE ADULT IP CONSULT FOR [place]  INTERNAL MEDICINE ADULT IP CONSULT FOR [place]  NURSING TO CONSULT FOR VASCULAR ACCESS CARE IP CONSULT  PHYSICAL THERAPY ADULT IP CONSULT  PHYSICAL MEDICINE & REHAB ASSESSMENT FOR REHAB PLACEMENT ADULT IP CONSULT  PHYSICAL THERAPY ADULT IP CONSULT  OCCUPATIONAL THERAPY ADULT IP CONSULT          Brief History:     Vic Scott III is a 47 year old male with diagnosis of high-grade pleomorphic sarcoma based on biopsy 9/24/2021. after  presenting with rapidly progressing right leg swelling that started in 7/2021. Has been planning amputation, but R leg pain worsened at home and was admitted through ED with R femur pathologic fx.  Dr Ambrocio is his primary oncologist, and Dr Whitaker is his orthopedist.     Patient now s/p right hindquarter amputation with Dr. Whitaker and primary complex wound closure with Dr. Butt on 6/17.  Procedure was notable for significant blood loss requiring transfusion of 10 units pRBC (EBL almost 3 liters). Patient remained intubated and was transferred to SICU postop, extubated on POD1. Stable for transfer to floor on POD2.      Placed psych consult 6/19 to aid in providing resources to help the patient adjust to large change s/p procedure. Recommended lexapro, seroquel, Health Psychology consult, placed.               Hospital Course:   Patient did well post operatively.  Transitioned from iv medication to oral meds.  Tolerated diet, resumed normal bowel and bladder function.  Was seen by PT/OT prior to discharge.           Medications Prior to Admission:     Medications Prior to Admission   Medication Sig Dispense Refill Last Dose     diclofenac (VOLTAREN) 1 % topical gel Apply 2-4 g topically 4 times daily as needed for moderate pain 100 g 0 Unknown at prn     ibuprofen (ADVIL/MOTRIN) 200 MG tablet Take 400 mg by mouth every 8 hours as needed for mild pain   Unknown at prn     multivitamin, therapeutic (THERA-VIT) TABS tablet Take 1 tablet by mouth daily   6/4/2022 at Unknown time     ondansetron (ZOFRAN) 8 MG tablet Take 1 tablet (8 mg) by mouth every 8 hours as needed for nausea 30 tablet 1 Unknown at prn     prochlorperazine (COMPAZINE) 5 MG tablet Take 1 tablet (5 mg) by mouth every 6 hours as needed for nausea or vomiting 30 tablet 0 Unknown at prn     [DISCONTINUED] acetaminophen (TYLENOL) 500 MG tablet Take 1-2 tablets (500-1,000 mg) by mouth every 6 hours as needed for mild pain Per patient he needs a refill  50 tablet 1 Unknown at prn     [DISCONTINUED] morphine (MS CONTIN) 30 MG CR tablet Take 1 tablet (30 mg) by mouth every 12 hours 60 tablet 0 6/4/2022 at HS     [DISCONTINUED] oxyCODONE (ROXICODONE) 5 MG tablet Take 2-3 tablets (10-15 mg) by mouth every 4 hours as needed for moderate to severe pain 100 tablet 0 6/4/2022 at Unknown time     [DISCONTINUED] polyethylene glycol (MIRALAX) 17 GM/Dose powder Take 17 g by mouth daily as needed 510 g  Unknown at prn     [DISCONTINUED] sennosides (SENOKOT) 8.6 MG tablet Take 1-2 tablets by mouth 2 times daily as needed for constipation or no stool 30 tablet 1 Unknown at prn            Discharge Medications:        Review of your medicines      START taking      Dose / Directions   albuterol 108 (90 Base) MCG/ACT inhaler  Commonly known as: PROAIR HFA/PROVENTIL HFA/VENTOLIN HFA      Dose: 1-2 puff  Inhale 1-2 puffs into the lungs every 6 hours as needed for shortness of breath / dyspnea  Quantity: 18 g  Refills: 0     enoxaparin ANTICOAGULANT 40 MG/0.4ML syringe  Commonly known as: LOVENOX      Dose: 40 mg  Inject 0.4 mLs (40 mg) Subcutaneous every 24 hours for 30 days  Quantity: 12 mL  Refills: 0     escitalopram 10 MG tablet  Commonly known as: LEXAPRO      Dose: 10 mg  Start taking on: June 29, 2022  Take 1 tablet (10 mg) by mouth daily  Quantity: 30 tablet  Refills: 0     oxyCODONE IR 10 MG tablet  Commonly known as: ROXICODONE  Replaces: oxyCODONE 5 MG tablet      Dose: 10-20 mg  Take 1-2 tablets (10-20 mg) by mouth every 4 hours as needed for moderate to severe pain  Quantity: 30 tablet  Refills: 0     polyethylene glycol 17 g packet  Commonly known as: MIRALAX  Replaces: polyethylene glycol 17 GM/Dose powder      Dose: 17 g  Take 17 g by mouth daily  Quantity: 10 packet  Refills: 0     QUEtiapine 25 MG tablet  Commonly known as: SEROquel      Dose: 25 mg  Take 1 tablet (25 mg) by mouth At Bedtime  Quantity: 90 tablet  Refills: 0        CONTINUE these medicines which may  have CHANGED, or have new prescriptions. If we are uncertain of the size of tablets/capsules you have at home, strength may be listed as something that might have changed.      Dose / Directions   acetaminophen 325 MG tablet  Commonly known as: TYLENOL  This may have changed:     medication strength    how much to take    when to take this    reasons to take this    additional instructions      Dose: 975 mg  Take 3 tablets (975 mg) by mouth every 8 hours  Quantity: 50 tablet  Refills: 0        CONTINUE these medicines which have NOT CHANGED      Dose / Directions   diclofenac 1 % topical gel  Commonly known as: VOLTAREN  Used for: Sarcoma (H)      Dose: 2-4 g  Apply 2-4 g topically 4 times daily as needed for moderate pain  Quantity: 100 g  Refills: 0     ibuprofen 200 MG tablet  Commonly known as: ADVIL/MOTRIN      Dose: 400 mg  Take 400 mg by mouth every 8 hours as needed for mild pain  Refills: 0     morphine 30 MG CR tablet  Commonly known as: MS CONTIN      Dose: 30 mg  Take 1 tablet (30 mg) by mouth every 12 hours  Quantity: 30 tablet  Refills: 0     multivitamin, therapeutic Tabs tablet      Dose: 1 tablet  Take 1 tablet by mouth daily  Refills: 0     ondansetron 8 MG tablet  Commonly known as: ZOFRAN  Used for: Sarcoma (H)      Dose: 8 mg  Take 1 tablet (8 mg) by mouth every 8 hours as needed for nausea  Quantity: 30 tablet  Refills: 1     prochlorperazine 5 MG tablet  Commonly known as: COMPAZINE  Used for: Sarcoma (H)      Dose: 5 mg  Take 1 tablet (5 mg) by mouth every 6 hours as needed for nausea or vomiting  Quantity: 30 tablet  Refills: 0     sennosides 8.6 MG tablet  Commonly known as: SENOKOT  Used for: Sarcoma (H), Constipation, unspecified constipation type      Dose: 1-2 tablet  Take 1-2 tablets by mouth 2 times daily as needed for constipation or no stool  Quantity: 30 tablet  Refills: 1        STOP taking    oxyCODONE 5 MG tablet  Commonly known as: ROXICODONE  Replaced by: oxyCODONE IR 10 MG  tablet        polyethylene glycol 17 GM/Dose powder  Commonly known as: MIRALAX  Replaced by: polyethylene glycol 17 g packet              Where to get your medicines      These medications were sent to [facility] [place], [place] - [address]  [address], [facility] [address]    Phone: [phone]     albuterol 108 (90 Base) MCG/ACT inhaler    polyethylene glycol 17 g packet    QUEtiapine 25 MG tablet    sennosides 8.6 MG tablet     Some of these will need a paper prescription and others can be bought over the counter. Ask your nurse if you have questions.    Bring a paper prescription for each of these medications    morphine 30 MG CR tablet    oxyCODONE IR 10 MG tablet                 Pending Results at Discharge:   None         Discharge Instructions:     Discharge Procedure Orders   Reason for your hospital stay   Order Comments: Vic Scott III is a 47 year old male w/ PMHx pleomorphic sarcoma status post neoadjuvant chemotherapy with the followin.  Right hip and thigh pleomorphic sarcoma  2.  Pathologic right proximal femur fracture     -Tentative plan for OR  with Dr. Whitaker and plastic surgery colleagues  -in the interim patient can be discharged to TCU today  -PLAN FOR READMISSION on  for optimization and likely blood transfusion     Activity   Order Comments: Your activity upon discharge: activity as tolerated    - Activity: Bedrest  - Weight bearing: NWB RLE     Order Specific Question Answer Comments   Is discharge order? Yes      Adult [facility] Follow-up and recommended labs and tests   Order Comments: -Tentative plan for OR  with Dr. Whitaker and plastic surgery colleagues  Clinic phone number is   -in the interim patient can be discharged to TCU as soon as today  -PLAN FOR READMISSION on  for optimization and likely blood transfusion    Appointments on [place] and/or [facility] (with [facility] or [facility] provider or service). Call  [phone] if you haven't heard regarding these appointments within 7 days of discharge.     When to contact your care team   Order Comments: Call Dr Yuen if you have any of the following: temperature greater than 101.3  or less than 96.5,  increased shortness of breath, increased drainage, increased swelling, or increased pain.     Wound care and dressings   Order Comments: Instructions to care for your wound at home: ice to area for comfort.     General info for SNF   Order Comments: Length of Stay Estimate: Short Term Care: Estimated # of Days <30  Condition at Discharge: Improving  Level of care:skilled   Rehabilitation Potential: Excellent  Admission H&P remains valid and up-to-date: Yes  Recent Chemotherapy: N/A  Use Nursing Home Standing Orders: Yes     Mantoux instructions   Order Comments: Give two-step Mantoux (PPD) Per Facility Policy Yes     Tubes and drains   Order Comments: You are going home with the following tubes or drains: AMANDA.  Follow these instructions  to care for your tube    Strip drain daily  Empty drain daily  Record output  If less than 30 ml per day okay to remove drain.  Call Plastics with any concerns or questions.     Activity   Order Comments: Your activity upon discharge:   No pressure on the flap, okay to turn toward the left and back but not toward the right side  Sitting x 10-15 minutes; 5-6 times a day.  Increase by 10-15 minutes each day     Monitor incision site, go back to less minutes if any concerns with incision site.     Order Specific Question Answer Comments   Is discharge order? Yes      Physical Therapy Adult Consult   Order Comments: Evaluate and treat as clinically indicated.    Reason: s/p right thigh sarcoma resection     Occupational Therapy Adult Consult   Order Comments: Evaluate and treat as clinically indicated.    Reason:  s/p right thigh sarcoma resection     Diet   Order Comments: Follow this diet upon discharge: Orders Placed This Encounter       Snacks/Supplements Adult: Ensure Enlive; With Meals      Diet      Regular Diet Adult     Order Specific Question Answer Comments   Is discharge order? Yes      Future Appointments   Date Time Provider Department Center   6/29/2022  9:00 AM Cece Cano OT UROT [place]   7/6/2022 11:15 AM KAREN Butt MD [facility]       Kaya Goodwin APRN, CNP  Department of Orthopedic Surgery  [facility]  [phone]

## 2022-06-28 NOTE — PROGRESS NOTES
"SPIRITUAL HEALTH SERVICES  SPIRITUAL ASSESSMENT Progress Note  [facility] AC[facility]       REFERRAL SOURCE: On-call  referral from  for  visit, Voodoo specific.     DATA: Pt Vic Scott III identifies as Voodoo and is of  descent.     I introduced myself to Fabian as the Lead Voodoo  and oriented him to [facility].     He stated that since his leg was amputated a few days ago, \"Its' been good some days and others not so good\". Fabian uses remembrance of Allah to cope and is connect with his children throughout his hospitalization.     Fabian was  a few years ago and lived in [place] but is originally from [place]. He now resides with his sister in [place].     Fabian welcomed Islamic incantations/prayer at bedside. We prayed together at his request. Fabian has received an English copy of the Holy Quran.I also provided Fabian with elements of reflective conversation and empathic listening throughout our encounter.     PLAN: I will follow up with Fabian for the duration of his stay. [facility] is available to [facility] per request.     Ana Cristina Gastelum  Lead Voodoo   Pager [phone]    [facility] remains available 24/7 for emergent requests/referrals, either by having the switchboard page the on-call  or by entering an ASAP/STAT consult in Epic (this will also page the on-call ).    "

## 2022-06-28 NOTE — PLAN OF CARE
Goal Outcome Evaluation:       VS: /73 (BP Location: Left arm)   Pulse 95   Temp 97  F (36.1  C) (Oral)   Resp 16   Wt 75.8 kg (167 lb 1.7 oz)   SpO2 98%   BMI 21.46 kg/m       O2: > 90% Room air   Output: Void adequately. Uses urinal     Last BM: 6/27/22   Activity: Bed rest. 1 assist with PT present    Up for meals? Yes   Skin: Dry , flakey on the L. Leg and back .    Pain: Patient report pain level of 7. Pain being managed with oxycodone q3 and morphine every 12hr   CMS: Orientated x4   Dressing: R. Hip dermabond and clear adhesive on incision site  CDI    Diet: Regular   LDA: L. PIV SL   Equipment: Walker, gait belt    Plan: Discharge to ARU this afternoon    Additional Info:      DISCHARGE SUMMARY    Pt discharging to: ARU  Transportation: Staff transfer  AVS given and discussed: No  Stoplight Tool given and discussed: No  Medications given: Medication transferred to ARU    Belongings returned: Belongs transferred to ARU   Comments:

## 2022-06-28 NOTE — PLAN OF CARE
Goal Outcome Evaluation:    Plan of Care Reviewed With: patient     Overall Patient Progress: no change    Outcome Evaluation: Pt is an from 5 ortho. Admitted at 1320 via his bed from ortho. Pt denied pain as he just got prn oxycodone from ortho RN. Pt is alert and oriented x 4. Noted right lower abdomen incision with surgical glue and right lower quadrant AMANDA bulb. Emptied 35cc of serosanguinous drainage.Given orientation to room and unit routine.

## 2022-06-29 NOTE — PROGRESS NOTES
"  [facility]   Acute Rehabilitation Unit  Daily progress note    INTERVAL HISTORY  Vic Scott III was seen and examined at bedside this morning during team rounds, sister present by phone.  No acute events reported overnight.  He reports that pain has been \"ok for the most part\".  He denies any initial concerns or questions.  Sister is asking re  or additional supports at discharge for resources/ongoing equipment needs, etc.    Functionally, therapy evals underway today.  For full functional updates, see team rounds note from today.    MEDICATIONS    acetaminophen  975 mg Oral Q8H     enoxaparin ANTICOAGULANT  40 mg Subcutaneous Q24H     escitalopram  10 mg Oral Daily     morphine  30 mg Oral Q12H [facility] (08/20)        naloxone **OR** naloxone **OR** naloxone **OR** naloxone, oxyCODONE, polyethylene glycol, senna-docusate     PHYSICAL EXAM  /67 (BP Location: Right arm)   Pulse 94   Temp 97.9  F (36.6  C) (Oral)   Resp 16   Ht 1.88 m (6' 2\")   Wt 78.2 kg (172 lb 6.4 oz)   SpO2 100%   BMI 22.13 kg/m    Gen: NAD, lying in bed  HEENT: NC/AT, MMM  Cardio: appears well-perfused  Pulm: non-labored on room air  Abd: soft, non-tender, non-distended  Ext: no edema appreciated in LLE  Neuro/MSK: awake, alert, moving bilat UE and LLE in bed    LABS  CBC RESULTS: Recent Labs   Lab Test 06/29/22  0538 06/28/22  0514 06/25/22  0644 06/21/22  1142 06/20/22  0523   WBC 14.7*  --   --  14.6* 15.7*   RBC 2.96*  --   --  3.19* 2.78*   HGB 8.1*  --   --  8.9* 7.8*   HCT 25.4*  --   --  26.9* 23.9*   MCV 86  --   --  84 86   MCH 27.4  --   --  27.9 28.1   MCHC 31.9  --   --  33.1 32.6   RDW 16.9*  --   --  16.5* 17.0*   * 586* 589* 303 240     Last Basic Metabolic Panel:  Recent Labs   Lab Test 06/29/22  0538 06/28/22  0515 06/27/22  0830 06/26/22  0533 06/25/22  0644 06/22/22  0831 06/21/22  1142 06/20/22  0523     --   --   --   --   --  136 136   POTASSIUM 4.3 " 4.0 3.8   < > 4.3   < > 4.5 3.8   CHLORIDE 103  --   --   --   --   --  105 106   CO2 31  --   --   --   --   --  22 24   ANIONGAP 5  --   --   --   --   --  9 6   *  --   --   --   --   --  116* 147*   BUN 16  --   --   --   --   --  5.6* 10   CR 0.51* 0.51*  --   --  0.50*  --  0.41* 0.49*   GFRESTIMATED >90 >90  --   --  >90  --  >90 >90   VENTURA 9.0  --   --   --   --   --  7.5* 7.2*    < > = values in this interval not displayed.       Rehabilitation - continue comprehensive acute inpatient rehabilitation program with multidisciplinary approach including therapies, rehab nursing, and physiatry following. See interval history for updates.      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  Followed by Dr. Ambrocio (oncology).    - Pain Management: Patient evaluated by inpatient pain service  - Continue Morphine 30 mg q12 hour (home dose)  - Continue Oxycodone 10-20 mg q3 hours (home dose was max of 80 mg/day)  - Continue Tylenol 975 mg q8hrs  - Wean opioids back to home doses as tolerated.  - Per plastics:  - No pressure on the flap, okay to turn toward the left and back but not toward the right side  - No sitting x 2 weeks (through 7/1).  Then can initiate Sitting x 10-15 minutes; 5-6 times a day.  Increase by 10-15 minutes each day.  Monitor incision site, go back to less minutes if any concerns with incision site.  - Drain Care: strip, empty, and  record daily  - Wound Care: Ice as needed  - Edema management per lymphedema therapists  - Follow up with plastic surgery (Dr. Butt) on 7/6    #Acute blood loss anemia  #Hx of HILARIA and anemia of chronic disease  Patient required 10 units pRBC due to significant blood loss during the procedure  - Trend CBC.  Hgb stable at 8.1 on 6/29     #Leukocytosis  Downtrending from 20.1 on 6/18.  Suspect acute stress reactant from above surgery and acute blood loss.  No signs of infection at this time.   - Trend CBC.  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  - Continue Escitalopram 10 mg qday  - Monitor mood, consult mental health supports if indicated     #Scrotal Edema  Noted prior to transfer, lymphedema consult likely needed     #Constipation  - With patient on significant amount of opioids will monitor for opioid induced constipation and treat as needed    #R IT wound - pressure vs trauma  Noted on ARU admission  - WOC consulted, appreciate assistance  - Wound care as ordered    1. Adjustment to disability:  Clinical psychology to eval and treat if indicated  2. FEN: Regular Diet  3. Bowel: continent, monitor for PRN bowel meds available  4. Bladder: Continent  5. DVT Prophylaxis: Lovenox  6. GI Prophylaxis: Not Indicated  7. Code: Full Code  8. Disposition: Home  9. ELOS:  10 days.  10. Follow up Appointments on Discharge: PCP, Orthopedics w/ Dr. Whitaker in 2 weeks, no imaging needed      I, Marisela Morrison, spent a total of 35 minutes face-to-face or managing the care of Vic Scott. Over 50% of my time on the unit was spent counseling the patient and coordinating care. See note for details.       Patient seen and discussed with Dr. Luke Ocasio, PM&R Staff Physician    Marisela Morrison, PA-C  Physical Medicine & Rehabilitation

## 2022-06-29 NOTE — PROGRESS NOTES
Discharge Planner Post-Acute Rehab OT:     Discharge Plan: Home with HC OT services. ELOS 10 days    Precautions: falls, NWB of RLE d/t R gluteal flap: No pressure on the flap, okay to turn toward the left and back but not toward the right side, No sitting Through 7/1. Sitting x 10-15 minutes; 5-6 times a day. Increase by 10-15 minutes each day)    Current Status:  ADLs:    Mobility: bed bound until Friday.    Grooming: Set-up with G/H tasks supine    Dressing: TBA    Bathing: TBA    Toileting: Bedpan d/t surgical restrictions  IADLs: Pt's sister assisted with cooking, cleaning, laundry, and med mgmt.   Vision/Cognition: Pt is Ox3. Pt has corrective lenses and is far sighted.     Assessment: Pt would benefit from skilled OT services d/t recent decline in safety and IND with ADLs/IADLs and functional mobility following hospitalization for R hip diarticulation resulting in impaired balance, impaired strength, pain, surgical precautions, and NWB of RLE. ELOS is 10 days with HC OT services upon discharge    Other Barriers to Discharge (DME, Family Training, etc): Family training with sister prior to discharge.   AE/DME: pt has SEC, FWW, RTS, and shower chair. Pt will need padded commode and shower chair, and W/C.

## 2022-06-29 NOTE — PLAN OF CARE
Acute Rehab Care Conference/Team Rounds      Type: Team Rounds    Present: Dr Luke Ocasio, Marisela Morrison PA, Rupal Adler OT, Obdulia Bhat PT, Naomi VALENCIA, Thea Vincent RD, Mary Jane Landis RN, and Fabian Scott Patient.       Discharge Barriers/Treatment/Education    Rehab Diagnosis: Amputation 05.3 Unilateral LE AKA - Right pelvic/thigh sarcoma s/p hip disarticulation by Orthopedics and reconstruction of the right pelvic area by Plastics    Active Medical Co-morbidities/Prognosis:   Patient Active Problem List   Diagnosis     Sarcoma (H)     Femur fracture, right (H)     Amputee, hip, right        Safety: Is alert and oriented x4. Uses the call light appropriately. No lying on the right side of the body nor pressure on the right buttocks. Bed alarm on, call light in reach.     Pain: Has right hip pain (incision area), given scheduled tylenol with partial effect. Will want oxycodone closer to first therapy this morning. Utilized ice pack on surgical site.    Medications, Skin, Tubes/Lines: Takes medications whole. Has a Medial-lower abdomen AMANDA drain, FIDELIA with liquid bandage for wound closure, approximated. Right hindquarter amputation site is FIDELIA, approximated w/ liquid bandage. Pink spot on the sacral area, covered with mepilex. May need woc consult for further management.    Swallowing/Nutrition:    Bowel/Bladder: Continent of bowel and bladder. Uses the bedpan for bm, LBM 6/28. Uses the urinal independently with staff to empty.     Psychosocial: Lives in two-story townhouse with sister. Walker at home PTA. Disabled. Driving PTA. : Hermelindo (PH: [phone]). Depression reported. No substance abuse concerns. Good support.     ADLs/IADLs: OT eval in progress. Pt requires set-up with G/H tasks supine.     Mobility: Bed based mobility at this time due to restrictions on wt bearing on the flap; sitting program to start Friday 7/1. Will need manual w/c and walker for home  mobility     Cognition/Language:    Community Re-Entry: w/c based    Transportation: not a  due to (R) LE amputation; car transfer will not be a barrier    Decision maker: self    Plan of Care and goals reviewed and updated.    Discharge Plan/Recommendations    Fall Precautions: continue    Patient/Family input to goals: Yes    Anticipated rehab needs following discharge: Home with home care    Anticipated care giver support after discharge: Family    Estimated length of stay: 7/8/22    Overall plan for the patient: Continue IP Rehabilitation.       Utilization Review and Continued Stay Justification    Medical Necessity Criteria:    For any criteria that is not met, please document reason and plan for discharge, transfer, or modification of plan of care to address.    Requires intensive rehabilitation program to treat functional deficits?: Yes    Requires 3x per week or greater involvement of rehabilitation physician to oversee rehabilitation program?: Yes    Requires rehabilitation nursing interventions?: Yes    Patient is making functional progress?: Yes    There is a potential for additional functional progress? Yes    Patient is participating in therapy 3 hours per day a minimum of 5 days per week or 15 hours per week in 7 day period?:Yes    Has discharge needs that require coordinated discharge planning approach?:Yes        Final Physician Sign off    Statement of Approval: I approve the plan of care.     Patient Goals  Social Work Goals: Confirm discharge recommendations with therapy, coordinate safe discharge plan and remain available to support and assist as needed.    OT eval in progress    PT: eval in progress; goals for mixed mobility                                                        Patient Goal:  Pain Management: Pt will continue to have pain managed throughout stay prior to discharge from ARU  Goal: Wound Management: Incision site will indicate no concern for infection prior to discharge  from ARU                                                                       Goal: Psychosocial Needs: Pt will make needs/concerns known to staff and provide TLC when needed prior to discharge from ARU  Goal Outcome Evaluation:

## 2022-06-29 NOTE — PLAN OF CARE
Discharge Planner Post-Acute Rehab PT:     Discharge Plan: home w/ his sister, justin, 3 steps to enter, lives on main level    Precautions: falls; (R) gluteal flap:   -- No pressure on flap, okay to turn towards left and back but NOT towards the right side   -- No sitting x 2 weeks(Through 7/1) then sitting x 10-15 minutes; 5-6 times a day.  Increase by 10-15 minutes each day     Monitor incision site, go back to fewer minutes if any concerns with incision site.    Current Status:  Bed Mobility: ind w/ rail for rolling  Transfer: supine>stand w/ assist of two  Gait: ww, several steps EOB  Stairs: NT  Balance: stands w/ walker, able to release one hand very briefly    Assessment:  48 y/o male, significantly below baseline after hemipelvectomy, (L) hip ROM restriction; not yet allowed to sit on flap. Will benefit from skilled PT services so he can return to his home w/ assist from his sister.     Other Barriers to Discharge (DME, Family Training, etc):   Manual w/c  Walker  Family training stairs

## 2022-06-29 NOTE — PROGRESS NOTES
"CLINICAL NUTRITION SERVICES - ASSESSMENT NOTE     Nutrition Prescription    RECOMMENDATIONS FOR MDs/PROVIDERS TO ORDER:  None today     Malnutrition Status:    Severe malnutrition in the context of acute illness or injury     Recommendations already ordered by Registered Dietitian (RD):  Continue Ensure Enlive TID at all meals - rotate all flavors    Future/Additional Recommendations:  Monitor meal intakes, supplement acceptance, weight and lab trends      REASON FOR ASSESSMENT  Vic Scott III is a/an 47 year old male assessed by the dietitian for Positive Admission Nutrition Risk Screen (unknown weight loss, decreased appetite)     NUTRITION/MEDICAL HISTORY  Per chart review: Pt admits to ARU for rehabilitation in the setting of s/p right hindquarter amputation/hemipelvectomy by orthopedics and free gluteal flap with spy by plastic surgery on 6/17, with other noted past medical history of 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    Per pt visit: Pt reviewed during team rounds and RD visited pt at bedside, reviewed weight trends with amputation, with likely losses beyond amputation, pt agreeing notes family/friends recently noting pt looks thinner (face), pt reports some decreased appetite as well, has recently skipped some meals. RD reviewed the importance of adequate nutritional intakes for healing and continued recovery/work with therapies, reviewed protein lab below, encouraged high calorie/high protein menu items at meals. Reviewed supplements already ordered, pt agreeing to continue Ensure TID.     CURRENT NUTRITION ORDERS  Diet: Regular, Ensure Enlive at meals   Intake/Tolerance: 100% per flow sheets     LABS   Reference 06/14/22    Prealbumin 15 - 45 mg/dL 8 (L)     MEDICATIONS  Morphine PRN, Oxycodone PRN, Lexapro    ANTHROPOMETRICS  Height: 188 cm (6' 2\")  Most Recent Weight: 78.2 kg (172 lb 6.4 oz)    IBW: 72.7 kg - (adjusted by 16% for amputation)  UBW prior to " amputation: 225 lbs per pt report  BMI: Normal BMI  Weight History:   Wt Readings from Last 20 Encounters:   06/28/22 78.2 kg (172 lb 6.4 oz)   06/17/22 75.8 kg (167 lb 1.7 oz)   06/05/22 106.6 kg (235 lb)   12/01/21 114.5 kg (252 lb 6.4 oz)   11/09/21 116.8 kg (257 lb 6.4 oz)   11/01/21 104.3 kg (230 lb)   09/24/21 117.9 kg (260 lb)     Weight assessment: Prior to amputation, pt reports a UBW of about 225 lbs, with amputation then, new weight would be around 189 lbs, so current weight less then estimated loss from amputation with a further significant 8.9% loss in 1 week. Pt confirms feels like has had some weight loss beyond amputation.     Dosing Weight: 78.2 kg    ASSESSED NUTRITION NEEDS  Estimated Energy Needs: 2979-2036 kcals/day (25 - 30 kcals/kg)  Justification: Maintenance  Estimated Protein Needs:  grams protein/day (1.2 - 1.5 grams of pro/kg)  Justification: Post-op  Estimated Fluid Needs: 1 mL/kcal  Justification: Maintenance    PHYSICAL FINDINGS  WOC RN note reviewed     MALNUTRITION  % Intake: Decreased intake does not meet criteria  % Weight Loss: > 2% in 1 week (severe)  Subcutaneous Fat Loss: Facial region: mild   Muscle Loss: None observed  Fluid Accumulation/Edema: None noted  Malnutrition Diagnosis: Severe malnutrition in the context of acute illness or injury     NUTRITION DIAGNOSIS  Increased nutrient needs related to wound healing as evidenced by therapeutic recommendations, labs (low prealbumin), weight loss beyond estimated loss for amputation     INTERVENTIONS  Implementation  Nutrition Education: RD reviewed the importance of adequate nutritional intakes for healing/recovery    Medical food supplement therapy - ordered as above      Goals  Patient to consume % of nutritionally adequate meal trays TID, or the equivalent with supplements/snacks.     Monitoring/Evaluation  Progress toward goals will be monitored and evaluated per protocol.    Thea Vincent RD, CNSC, LD  ARU RD  pager: [phone]

## 2022-06-29 NOTE — PROGRESS NOTES
06/29/22 1311   Quick Adds   Type of Visit Initial PT Evaluation   Living Environment   People in Home sibling(s);child(sanjuanita), dependent   Current Living Arrangements house   Home Accessibility stairs to enter home   Number of Stairs, Main Entrance 2   Stair Railings, Main Entrance none   Transportation Anticipated family or friend will provide   Living Environment [place] lives on the main floor where bedroom and bathroom is located. Laundry is located upstairs. Pt has a queen sized bed. Pt has a walk in shower with small lip. Pt has a standard height toilet with a RTS with no handles. Sister can assist with transportation.   Self-Care   Usual Activity Tolerance good   Current Activity Tolerance fair   Regular Exercise Yes   Activity/Exercise Type strength training   Exercise Amount/Frequency 2 times/wk   Equipment Currently Used at Home wheelchair, manual   Fall history within last six months no   Activity/Exercise/Self-Care Comment Pt is on disability and was not working at the time. Pt has his son during the school year. Pt enjoys basketball and football. Pt enjoys watching wrestling. Pt would lift weights 2x a week at the gym or at home   Post-Acute Assessment Only   Post-Acute Functional Assessment See IP Rehab Daily Documentation Flowsheet for Functional Mobility/ADL Assessment   Previous Level of Function/Home Environm   Bathing/Grooming, Premorbid Functional Level independent   Dressing, Premorbid Functional Level independent   Eating/Feeding, Premorbid Functional Level independent   Toileting, Premorbid Functional Level independent   BADLs, Premorbid Functional Level independent   IADLs, Premorbid Functional Level independent   Bed Mobility, Premorbid Functional Level independent   Transfers, Premorbid Functional Level independent   Household Ambulation, Premorbid Functional Level independent   Stairs, Premorbid Functional Level independent   Community  Ambulation, Premorbid Functional Level independent   General Information   Onset of Illness/Injury or Date of Surgery 06/05/22   Referring Physician Juan Ocasio   Patient/Family Therapy Goals Statement (PT) return home, walk in house w/ walker   Pertinent History of Current Problem (include personal factors and/or comorbidities that impact the POC) 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; 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   Existing Precautions/Restrictions fall   Weight-Bearing Status - RLE nonweight-bearing   Cognition   Affect/Mental Status (Cognition) WNL   Orientation Status (Cognition) oriented x 4   Follows Commands (Cognition) WNL   Pain Assessment   Patient Currently in Pain Yes, see Vital Sign flowsheet  (left anterior groin)   Integumentary/Edema   Integumentary/Edema Comments extensive incision at surgical site which is healing well; scrotal edema 3+; no (L) LE edema   Posture    Posture Not impaired   Flexion, Left Hip (ROM)   Left Hip Flexion PROM - degrees 0-70   Extension, Left Hip (ROM)   Left Hip Extension PROM - degrees 0-5   ABduction, Left Hip (ROM)   Left Hip ABduction PROM - degrees 0-10   ADduction, Left Hip (ROM)   Left Hip ADduction PROM - degrees 0-5   External Rotation, Left Hip (ROM)   Left Hip External Rotation PROM - degrees 0-40   Internal Rotation, Left Hip (ROM)   Left Hip Internal Rotation PROM - degrees 0-5   Strength (Manual Muscle Testing)   Left Lower Extremity Strength hip;knee;ankle   Hip, Left (Strength) extension 4-/5, flexion 3/5 painful   Knee, Left (Strength) 4-/5   Ankle, Left (Strength) 4/5   Balance   Balance Comments stands w/ walker and assist, able to remove single hand for a couple of seconds   Clinical Impression   Criteria for Skilled Therapeutic Intervention Yes, treatment indicated   PT Diagnosis (PT) decreased force production   Influenced by the following impairments (L) hip ROM, (L) hip strength,  amputation of (R) LE, trunk strength assymetry   Functional limitations due to impairments bed mobility, transfers, ambulation   Clinical Presentation (PT Evaluation Complexity) Evolving/Changing   Clinical Decision Making (Complexity) moderate complexity   Planned Therapy Interventions (PT) balance training;gait training;joint mobilization;manual therapy techniques;neuromuscular re-education;ROM (range of motion);stair training;strengthening;wheelchair management/propulsion training;transfer training;progressive activity/exercise   Anticipated Equipment Needs at Discharge (PT) wheelchair cushion;wheelchair;walker, rolling   Risk & Benefits of therapy have been explained evaluation/treatment results reviewed;care plan/treatment goals reviewed;risks/benefits reviewed;current/potential barriers reviewed;participants voiced agreement with care plan;participants included;patient   Clinical Impression Comments 48 y/o male, significantly below baseline after hemipelvectomy, (L) hip ROM restriction; not yet allowed to sit on flap. Will benefit from skilled PT services so he can return to his home w/ assist from his sister.   Total Evaluation Time   Total Evaluation Time (Minutes) 25   Physical Therapy Goals   PT Frequency 2x/day   PT Predicted Duration/Target Date for Goal Attainment 07/08/22   PT: Bed Mobility Modified independent   PT: Transfers Modified independent   PT: Gait Modified independent;Rolling walker;25 feet   PT: Stairs Minimal assist;3 stairs   PT: Wheelchair Mobility 150 feet;manual wheelchair   PT: Perform aerobic activity with stable cardiovascular response continuous activity;15 minutes   PT: Goal 1 car transfer ind   PT: Goal 2 ind w/ HEP post op LE amputation

## 2022-06-29 NOTE — CONSULTS
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 Patient presented with worsening right hip and thigh pain and was found to have a pathologic right proximal femur fracture. Patient now s/p right hindquarter amputation with Dr. Whitaker and primary complex wound closure with Dr. Butt on 6/17.  Procedure was notable for significant blood loss requiring transfusion of 10 units pRBC (EBL almost 3 liters). Patient remained intubated and was transferred to SICU postop, extubated on POD1. Stable for transfer to floor on POD2. Now progressing as anticipated, stable for discharge.    Areas Assessed:      Areas visualized during today's visit: Posterior surfaces     Pressure Injury Location: R IT      Last photo: 6/29  Wound type: Pressure Injury and versus trauma     Pressure Injury Stage: , present on admission   Wound history/plan of care:   Hypopigmented area with superficial scab in center. Pt states he scratches area frequently. Howver, this is on a bony area and can not rule out pressure.     Wound base: 100 % dry drainage,      Palpation of the wound bed: normal      Drainage: scant     Description of drainage: dried     Measurements (length x width x depth, in cm) 0.4 x 0.2 x 0 cm  Periwound skin: Intact and hypopigmented      Color: pink      Temperature: normal   Odor: none  Pain: denies , none  Pain intervention prior to dressing change: no significant pain present   Treatment goal: Heal  and Protection  STATUS: initial assessment  Supplies ordered: supplies stored on unit, discussed with RN and discussed with patient       Treatment Plan:     R IT wound(s): Every 3 days   Cleanse with wound cleanser    Cover with mepilex  Geomatt cushion while in chair.     Orders:  Written    RECOMMEND PRIMARY TEAM ORDER: None, at this time  Education provided: importance of repositioning, plan of care and wound progress  Discussed plan of care with: Patient and Nurse  WOC nurse follow-up plan: weekly  Notify WOC if wound(s) deteriorate.  Nursing to notify the Provider(s) and re-consult the WOC Nurse if new skin concern.    DATA:     Current support surface: Standard  Atmos Air mattress  Containment of urine/stool: Continent of bladder and Continent of bowel  BMI: Body mass index is 22.13 kg/m .   Active diet order: Orders Placed This Encounter      Regular Diet Adult Thin Liquids (level 0)     Output: I/O last 3 completed shifts:  In: 240 [P.O.:240]  Out: 1275 [Urine:1175; Drains:100]     Labs: Recent Labs   Lab 06/29/22  0538   HGB 8.1*   WBC 14.7*     Pressure injury risk assessment:   Sensory Perception: 4-->no impairment  Moisture: 4-->rarely moist  Activity: 1-->bedfast  Mobility: 3-->slightly limited  Nutrition: 3-->adequate  Friction and Shear: 3-->no apparent problem  German Score: 18    Claire Cerna RN CWOCN  Dept. Pager: [phone]

## 2022-06-29 NOTE — PLAN OF CARE
Goal Outcome Evaluation:    Plan of Care Reviewed With: patient     Overall Patient Progress: no change    Outcome Evaluation: Pt is alert and oriented x 4. Using call light appropriately for assist. Prn oxycodone given before therapies josefina PT to maximize pain control so he can participate in therapy sessions. Right hip/lower abdomen incision with surgical glue FIDELIA and healing well. AMANDA drain emptied 30 ml towards end of shift. Buttocks/sacral area open area seen by WOCN. Mepilex dressing applied over it.

## 2022-06-29 NOTE — PLAN OF CARE
Problem: Rehabilitation (IRF) Plan of Care  Goal: Plan of Care Review  Outcome: Ongoing, Progressing  Flowsheets (Taken 6/29/2022 3116)  Plan of Care Reviewed With: patient  Outcome Evaluation: See RD progress note for full nutrition assessment and intervention details.  Overall Patient Progress: improving   Goal Outcome Evaluation:    Plan of Care Reviewed With: patient     Overall Patient Progress: improving    Outcome Evaluation: See RD progress note for full nutrition assessment and intervention details.

## 2022-06-29 NOTE — PHARMACY-ADMISSION MEDICATION HISTORY
Admission Medication History status for the 6/28/2022 admission is complete.  See EPIC admission navigator for Prior to Admission medications.    Medication history sources:       surescripts fill history    MN       Med history completed 6/5/22  by Alexandre Gimenez    Chart review    Discharge summery        Medication history source reliability: n/a    Medication adherence:  n/a    Changes made to PTA medication list (reason)  Added: n/a  Deleted: n/a  Changed: n/a    Additional medication history information (including reliability of information, actions taken by pharmacist): None    Time spent in this activity: 10 minutes     Medication history completed by: Nancy Tompkins PharmLinoD     Prior to Admission medications    Medication Sig Last Dose Taking? Auth Provider Long Term End Date   acetaminophen (TYLENOL) 325 MG tablet Take 3 tablets (975 mg) by mouth every 8 hours 6/28/2022 at 1256 Yes Nat Purvis APRN CNS     albuterol (PROAIR HFA/PROVENTIL HFA/VENTOLIN HFA) 108 (90 Base) MCG/ACT inhaler Inhale 1-2 puffs into the lungs every 6 hours as needed for shortness of breath / dyspnea Unknown at Unknown time Yes Nat Purvis APRN CNS Yes    diclofenac (VOLTAREN) 1 % topical gel Apply 2-4 g topically 4 times daily as needed for moderate pain Unknown at Unknown time Yes Bell Chicas PA-C     enoxaparin ANTICOAGULANT (LOVENOX) 40 MG/0.4ML syringe Inject 0.4 mLs (40 mg) Subcutaneous every 24 hours for 30 days 6/27/2022 at 1714 Yes Nat Purvis APRN CNS  7/27/22   escitalopram (LEXAPRO) 10 MG tablet Take 1 tablet (10 mg) by mouth daily 6/28/2022 at 0815 Yes Nat Purvis APRN CNS Yes    ibuprofen (ADVIL/MOTRIN) 200 MG tablet Take 400 mg by mouth every 8 hours as needed for mild pain Unknown at Unknown time Yes Unknown, Entered By History     morphine (MS CONTIN) 30 MG CR tablet Take 1 tablet (30 mg) by mouth every 12 hours 6/28/2022 at 1022 Yes Nat Purvis APRN CNS      multivitamin, therapeutic (THERA-VIT) TABS tablet Take 1 tablet by mouth daily Unknown at Unknown time Yes Unknown, Entered By History     ondansetron (ZOFRAN) 8 MG tablet Take 1 tablet (8 mg) by mouth every 8 hours as needed for nausea Unknown at Unknown time Yes Marga Peters CNS     oxyCODONE IR (ROXICODONE) 10 MG tablet Take 1-2 tablets (10-20 mg) by mouth every 4 hours as needed for moderate to severe pain 6/28/2022 at 1256 Yes Nat Purvis APRN CNS     polyethylene glycol (MIRALAX) 17 g packet Take 17 g by mouth daily Unknown at Unknown time Yes Nat Purvis APRN CNS     prochlorperazine (COMPAZINE) 5 MG tablet Take 1 tablet (5 mg) by mouth every 6 hours as needed for nausea or vomiting Unknown at Unknown time Yes Marga Peters CNS     QUEtiapine (SEROQUEL) 25 MG tablet Take 1 tablet (25 mg) by mouth At Bedtime Unknown at Unknown time Yes Nat Purvis APRN CNS Yes    sennosides (SENOKOT) 8.6 MG tablet Take 1-2 tablets by mouth 2 times daily as needed for constipation or no stool Unknown at Unknown time Yes Nat Purvis APRN CNS

## 2022-06-29 NOTE — PHARMACY-MEDICATION REGIMEN REVIEW
Pharmacy Medication Regimen Review  Vic Scott III is a 47 year old male who is currently in the Acute Rehab Unit.    Assessment: All medications have an appropriate indications, durations and no unnecessary use was found    Plan:   Continue current medication regimen.   Attending provider will be sent this note for review.  If there are any emergent issues noted above, pharmacist will contact provider directly by phone.      Pharmacy will periodically review the resident's medication regimen for any PRN medications not administered in > 72 hours and discontinue them. The pharmacist will discuss gradual dose reductions of psychopharmacologic medications with interdisciplinary team on a regular basis.    Please contact pharmacy if the above does not answer specific medication questions/concerns.    Background:  A pharmacist has reviewed all medications and pertinent medical history today.  Medications were reviewed for appropriate use and any irregularities found are listed with recommendations.      Current Facility-Administered Medications:      acetaminophen (TYLENOL) tablet 975 mg, 975 mg, Oral, Q8H, Angel Wyatt MD, 975 mg at 06/29/22 1352     enoxaparin ANTICOAGULANT (LOVENOX) injection 40 mg, 40 mg, Subcutaneous, Q24H, Angel Wyatt MD, 40 mg at 06/28/22 1655     escitalopram (LEXAPRO) tablet 10 mg, 10 mg, Oral, Daily, Angel Wyatt MD, 10 mg at 06/29/22 0750     morphine (MS CONTIN) 12 hr tablet 30 mg, 30 mg, Oral, Q12H [facility] (08/20), Angel Wyatt MD, 30 mg at 06/29/22 0752     naloxone (NARCAN) injection 0.2 mg, 0.2 mg, Intravenous, Q2 Min PRN **OR** naloxone (NARCAN) injection 0.4 mg, 0.4 mg, Intravenous, Q2 Min PRN **OR** naloxone (NARCAN) injection 0.2 mg, 0.2 mg, Intramuscular, Q2 Min PRN **OR** naloxone (NARCAN) injection 0.4 mg, 0.4 mg, Intramuscular, Q2 Min PRN, Luke Ocasio MD     oxyCODONE (ROXICODONE) tablet 10-20 mg, 10-20 mg, Oral, Q4H PRN, Angel Wyatt MD, 20 mg at  06/29/22 1351     polyethylene glycol (MIRALAX) Packet 17 g, 17 g, Oral, Daily PRN, Angel Wyatt MD     senna-docusate (SENOKOT-S/PERICOLACE) 8.6-50 MG per tablet 2 tablet, 2 tablet, Oral, BID PRN, Angel Wyatt MD  No current outpatient prescriptions on file.

## 2022-06-29 NOTE — PROGRESS NOTES
ORIENTATION: A/O x4. Uses call light approp. TLC provided.  TRANSFERS/AMBULATION: SPT A2, gait belt  DIET: reg/thin/whole. Denies n/v  BOWEL/BLADDER: cont B/B; LBM 6/28 on bedpan. Uses urinal indepen; staff assist with emptying. PVR 14 mL, 19 mL.   PAIN: pain to incision site; hip/groin. Prn oxycodone every 4 hours and ice applied. Pt reports current pain regimen effective. Denies SOB, dizziness, fever, chills.   LDA: AMANDA drain lower abdom quadrant; 25 mL drained serosang drainage.   SKIN: mepilex to sacrum, skin intact. Pt requested mepilex due to scratching area. aquaphor applied to skin. Sticky note left for providers to assess.   Safety: pt to not be sitting higher then 40 degrees and staying off of R site; pt aware.

## 2022-06-29 NOTE — PROGRESS NOTES
06/29/22 0926   Quick Adds   Type of Visit Initial Occupational Therapy Evaluation   Living Environment   People in Home sibling(s);child(sanjuanita), dependent;other (see comments)  (nieces (15&11 y.o) and son (7 y.o))   Current Living Arrangements house  ([facility])   Home Accessibility stairs to enter home   Number of Stairs, Main Entrance 2   Stair Railings, Main Entrance none   Transportation Anticipated car, drives self;family or friend will provide   Living Environment Comments OT: Pt lives in [place] in a 2 story [facility]. Pt lives on the main floor where bedroom and bathroom is located. Laundry is located upstairs. Pt has a queen sized bed. Pt has a walk in shower with small lip. Pt has a standard height toilet with a RTS with no handles. Sister can assist with transportation.   Self-Care   Usual Activity Tolerance good   Current Activity Tolerance fair   Regular Exercise Yes   Activity/Exercise Type strength training   Exercise Amount/Frequency 2 times/wk   Equipment Currently Used at Home raised toilet seat;shower chair;walker, rolling;cane, straight;dressing device   Fall history within last six months no   Activity/Exercise/Self-Care Comment OT: Pt is on disability and was not working at the time. Pt has his son during the school year. Pt enjoys basketball and football. Pt enjoys watching wrestling. Pt would lift weights 2x a week at the gym or at home   Instrumental Activities of Daily Living (IADL)   Previous Responsibilities housekeeping;finances;;driving   IADL Comments OT: Sister assists with cooking, cleaning, laundry, and med mgmt   Post-Acute Assessment Only   Post-Acute Functional Assessment See IP Rehab Daily Documentation Flowsheet for Functional Mobility/ADL Assessment   Previous Level of Function/Home Environm   Bathing/Grooming, Premorbid Functional Level uses device or equipment   Dressing, Premorbid Functional Level independent   Eating/Feeding, Premorbid Functional Level  "independent   Toileting, Premorbid Functional Level independent   BADLs, Premorbid Functional Level independent   IADLs, Premorbid Functional Level partial assistance   Bed Mobility, Premorbid Functional Level independent   Transfers, Premorbid Functional Level independent   Household Ambulation, Premorbid Functional Level uses device or equipment   Stairs, Premorbid Functional Level uses device or equipment   Community Ambulation, Premorbid Functional Level uses device or equipment   Previous Level of Function, Premorbid OT: Pt used SEC for mobility with in the home and in the community. Pt used his shower chair for bathing.   General Information   Onset of Illness/Injury or Date of Surgery 06/05/22   Referring Physician Dr. Alcaraz   Patient/Family Therapy Goal Statement (OT) OT: \"Try to adapt to normalcy with ADLs as best as possible\"   Additional Occupational Profile Info/Pertinent History of Current Problem OT: Per chart review, pt \" is a 47 year old male with past medical history of 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  Patient initially presented to [facility] emergency department 6/5/2022 with two day history of severe right thigh pain.  Further workup demonstrated a pathological fracture of right femur.  Patient was transferred to [facility] for further management.  Upon arrival patient was found to have Hgb of 7 and received 1 unit of PRBC.  He underwent a right hindquarter amputation/hemipelvectomy by orthopedics and free gluteal flap w/ SPY by plastic surgery on 6/17.  During procedure patient had significant blood loss estimated at 3 liters and received 10 units of pRBCs.  Patient was extubated on POD 1, and has had a relatively uncomplicated recovery besides adequate pain management.  Appreciate pain team consult and assistance.\"   Existing Precautions/Restrictions fall;weight bearing  (No pressure on the flap, okay to turn toward the left and back but not " toward the right side, No sitting Through 7/1. Sitting x 10-15 minutes; 5-6 times a day. Increase by 10-15 minutes each day)   Right Lower Extremity (Weight-bearing Status) non weight-bearing (NWB)   Heart Disease Risk Factors Stress;Race;Gender   Cognitive Status Examination   Orientation Status orientation to person, place and time   Visual Perception   Impact of Vision Impairment on Function (Vision) OT: Corrective lenses, far sighted   Sensory   Sensory Comments OT: Light touch intact with BUE's   Pain Assessment   Patient Currently in Pain Yes, see Vital Sign flowsheet  (7/10 pain in right hip)   Integumentary/Edema   Integumentary/Edema Comments OT: scrotal edema   Posture   Posture forward head position   Range of Motion Comprehensive   Comment, General Range of Motion OT: WFL all planes with BUE's   Strength Comprehensive (MMT)   Comment, General Manual Muscle Testing (MMT) Assessment OT: 5/5 with BUE's with sh. flex, sh. abd, elb. flex/ext, wrist flex/ext and    Hand Strength   Left hand  (pounds) 85   Right hand  (pounds) 84   Coordination   Left hand, nine hole peg test (seconds) 25   Right hand, nine hole peg test (seconds) 20   Clinical Impression   Criteria for Skilled Therapeutic Interventions Met (OT) Yes, treatment indicated   OT Diagnosis OT: Decreased safety and IND with ADLs/IADLs   Influenced by the following impairments OT: Impaired balance, NWB of RLE, impaired strength, pain, and limited sitting tolerance d/t surgical precautions.   OT Problem List-Impairments impacting ADL problems related to;activity tolerance impaired;balance;mobility;motor control;pain;post-surgical precautions;strength;postural control;other (see comments)   Assessment of Occupational Performance 5 or more Performance Deficits   Identified Performance Deficits OT: Performance deficits include: dressing, toileting, bathing, G/H tasks, and IADLs   Planned Therapy Interventions (OT) ADL retraining;IADL  retraining;balance training;bed mobility training;motor coordination training;strengthening;transfer training;home program guidelines;progressive activity/exercise;risk factor education;other (see comments)   Clinical Decision Making Complexity (OT) high complexity   Anticipated Equipment Needs Upon Discharge (OT) bathing equipment;dressing equipment;toileting equipment   Risk & Benefits of therapy have been explained evaluation/treatment results reviewed;care plan/treatment goals reviewed;risks/benefits reviewed;current/potential barriers reviewed;participants voiced agreement with care plan;participants included;patient   Clinical Impression Comments OT: Pt would benefit from skilled OT services d/t recent decline in safety and IND with ADLs/IADLs and functional mobility following hospitalization for R hip diarticulation resulting in impaired balance, impaired strength, pain, surgical precautions, and NWB of RLE. ELOS is 10 days with HC OT services upon discharge   Total Evaluation Time (Minutes)   Total Evaluation Time (Minutes) 30   OT Goals   Therapy Frequency (OT) Daily   OT Predicted Duration/Target Date for Goal Attainment 07/08/22   OT Goals Hygiene/Grooming;Upper Body Dressing;Lower Body Dressing;Upper Body Bathing;Lower Body Bathing;Toilet Transfer/Toileting;Meal Preparation;Home Management;OT Goal 1;OT Goal 2   OT: Hygiene/Grooming modified independent;within precautions;from wheelchair   OT: Upper Body Dressing within precautions;Independent   OT: Lower Body Dressing Modified independent;using adaptive equipment;within precautions   OT: Upper Body Bathing Supervision/stand-by assist;using adaptive equipment;within precautions   OT: Lower Body Bathing Supervision/stand-by assist;using adaptive equipment;with precautions   OT: Toilet Transfer/Toileting Modified independent;toilet transfer;cleaning and garment management;using adaptive equipment;within precautions   OT: Meal Preparation Modified  independent;with simple meal preparation;using adaptive equipment;from wheelchair   OT: Home Management Modified independent;with light demand household tasks;within precautions;from wheelchair;using adaptive equipment   OT: Goal 1 OT: Pt will safely complete shower transfers with appropriate AE/DME with SBA within sitting precautions.   OT: Goal 2 OT: Pt will demo IND with BUE HEP to increase UB strengthening needed for ADLs/IADLs and functional transfers/mobility

## 2022-06-30 NOTE — PROGRESS NOTES
Discharge Planner Post-Acute Rehab OT:      Discharge Plan: Home with HC OT services. ELOS 10 days     Precautions: falls, NWB of RLE d/t R gluteal flap: No pressure on the flap, okay to turn toward the left and back but not toward the right side, No sitting Through 7/1. Sitting x 10-15 minutes; 5-6 times a day. Increase by 10-15 minutes each day)     Current Status:  ADLs:    Mobility: bed bound until Friday (7/1/22)    Grooming: Set-up with G/H tasks supine    Dressing: min A with UBD supine, max A with LBD tasks supine    Bathing: Assistance to wash/rinse/dry 6/10 body parts supine    Toileting: Bedpan and urinal d/t surgical restrictions, will trial padded commode (7/1/22)  IADLs: Pt's sister assisted with cooking, cleaning, laundry, and med mgmt.   Vision/Cognition: Pt is Ox3. Pt has corrective lenses and is far sighted.      Assessment: Completed FB spongebath and FB dressing tasks supine. Pt would benefit from AE to increase IND with LBD tasks. Will initiate sitting schedule tomorrow (7/1/22) for pt to sit 6x for 15 min at a time.      Other Barriers to Discharge (DME, Family Training, etc): Family training with sister prior to discharge.   AE/DME: pt has SEC, FWW, RTS, and shower chair. Pt will need padded commode and shower chair, and W/C.     -30 d/t bedpan/toileting and provider visit

## 2022-06-30 NOTE — PLAN OF CARE
Pt slept well all night. Right hip/groin pain moderately managed with PRN oxycodone and tylenol scheduled. Ice pack applied with some relief. Pt voids independently using urinal at bedside. LBM 6/29. On regular diet, thin liquids, takes pills whole. Pt is alert and oriented x4. Able to make needs known. Pt refused repositioning, but able to weight shifts independently. Continue to monitor per POC.

## 2022-06-30 NOTE — PLAN OF CARE
Goal Outcome Evaluation:    Plan of Care Reviewed With: patient     Overall Patient Progress: improving    Outcome Evaluation: Pt Advocating for pain management, participating in wound management.    FOCUS/GOAL  Pain management and Wound care management    ASSESSMENT, INTERVENTIONS AND CONTINUING PLAN FOR GOAL:  Pt Aox4, using call light to make needs known.  Staying in bed at this time.  Reporting pain to R leg incision controlled with PRN oxy that is effective.  Denies cough, sob, chest pain, dizziness, N/V, N/T.  Cont of B&B, using urinal in bed, Bedpan, LBM 6/29/2022.  Nursing will continue with POC.

## 2022-06-30 NOTE — PLAN OF CARE
Discharge Planner Post-Acute Rehab PT:     Discharge Plan: home w/ his sister, justin, 3 steps to enter, lives on main level    Precautions: falls; (R) gluteal flap:   -- No pressure on flap, okay to turn towards left and back but NOT towards the right side   -- No sitting x 2 weeks(Through 7/1) then sitting x 10-15 minutes; 5-6 times a day.  Increase by 10-15 minutes each day     Monitor incision site, go back to fewer minutes if any concerns with incision site.    Current Status:  Bed Mobility: ind w/ rail for rolling  Transfer: supine>stand w/ assist of two  Gait: ww, several steps EOB  Stairs: NT  Balance: stands w/ walker, able to release one hand very briefly    Assessment:  Sidelying to stand was easier today. Hip ROM feeling better. W/c eval tomorrow  Other Barriers to Discharge (DME, Family Training, etc):   Manual w/c  Walker  Family training stairs

## 2022-06-30 NOTE — CONSULTS
Triage and Transition- Consult and Liaison     Vic Scott [facility]  June 30, 2022    Psychiatry consult completed. Dictation to follow. Recommended to with care. Mr. Scott identifies no mental health needs at this time.     DEVYN MOSLEY MSW, [facility]  Triage and Transition - Consult and Liaison   [phone]

## 2022-06-30 NOTE — PROGRESS NOTES
Targeting discharge home Fri 07/08. HC recommended. Per pt chart, when pt in the hospital and prior to admission to ARU, pt was set up with [facility]. SWer called [facility] this afternoon and was told by intake that they did not admit him due to transfer to ARU. SWer asked if they can accept. Per intake, they can not hold a spot or guarantee but recommended SWer sending the referral to be reviewed. Referral faxed and pending. Insurance is barrier to finding HC. Will f/u and continue to follow.     [facility]   PH: [phone], F: [phone]  RN, PT, OT, HA     ADDENDUM: [facility] called and still reviewing but did confirm that HA not available.     MAKENNA Hobson   [facility]   Direct Phone: [phone]  I   Pager: [phone]  I  Fax: [phone]

## 2022-06-30 NOTE — PLAN OF CARE
Goal Outcome Evaluation:    Plan of Care Reviewed With: patient     Overall Patient Progress: improving    FOCUS/GOAL  Bowel management, Bladder management, Medication management, Pain management, Mobility, Skin integrity, and Safety management    ASSESSMENT, INTERVENTIONS AND CONTINUING PLAN FOR GOAL:    Pt A/O x4. Continues on bedrest at this time. Continent of bowel and bladder. Reports ongoing right leg incisional pain, relieved with scheduled and prn meds. Regular diet/thin liquids, takes meds whole.

## 2022-06-30 NOTE — CONSULTS
"Triage and Transition - Consult and Liaison     Vic Scott III  June 30, 2022    Session start: 1345  Session end: 1357   Session duration in minutes: 12 minutes  CPT utilized: Non-billable   Patient was seen virtually ([employer] cart or other teleconferencing device).  Anticipated number of sessions or this episode of care: 1-4    Reason for consult: Fabian is 47 year old Choose not to Answer  male . Psychiatry consult was requested due to concern for increase depressive symptoms. Patient was seen by [facility] Consult & Liaison team.     Presenting problem : Patient reports symptoms of \"Today has been a good day\". Reports sometimes feeling a little down and \"It goes away and quick as it comes\".  Denies any mental health concerns.    PHQ 6/30/2022   PHQ-9 Total Score 2   Q9: Thoughts of better off dead/self-harm past 2 weeks Not at all     DENNIS-7 SCORE 6/30/2022   Total Score 0     In individual therapeutic contact with patient today, patient presents with broad affect, euthymic mood.. Safety concerns are not present today for SI, HI and NSSIB. Today Mr. Scott was focused on assisting his sister with something via phone and that he has \"a moment a few days ago but today is much better\".  He denies any symptoms of ran or psychosis as well.     Mental Status Exam   Affect: Appropriate  Appearance: Appropriate   Attention Span/Concentration: Attentive    Eye Contact: Engaged  Fund of Knowledge: Appropriate   Language /Speech Content: Fluent  Language /Speech Volume: Normal   Language /Speech Rate/Productions: Normal   Recent Memory: Intact  Remote Memory: Intact  Mood: Normal   Orientation:   Person: Yes   Place: Yes  Time of Day: Yes   Date: Yes   Situation (Do they understand why they are here?): Yes   Psychomotor Behavior: Normal   Thought Content: Clear  Thought Form: Intact    Current medications:   Current Facility-Administered Medications   Medication     acetaminophen (TYLENOL) tablet 975 mg     enoxaparin " ANTICOAGULANT (LOVENOX) injection 40 mg     escitalopram (LEXAPRO) tablet 10 mg     morphine (MS CONTIN) 12 hr tablet 30 mg     naloxone (NARCAN) injection 0.2 mg    Or     naloxone (NARCAN) injection 0.4 mg    Or     naloxone (NARCAN) injection 0.2 mg    Or     naloxone (NARCAN) injection 0.4 mg     oxyCODONE (ROXICODONE) tablet 10-20 mg     polyethylene glycol (MIRALAX) Packet 17 g     senna-docusate (SENOKOT-S/PERICOLACE) 8.6-50 MG per tablet 2 tablet     Therapeutic intervention and progress:  Therapeutic intervention consisted of building therapeutic rapport, active listening and validation.     Collateral information:   Reviewed chart and coordinated with social work.      Diagnosis:   None noted.     Plan:     Continue care coordination with care team.     Maintain current transition plan.     Writer will check in again with Mr. Scott if he remains at the ARU next week.      DEVYN MOSLEY MSW, LICSW  Triage and Transition - Consult and Liaison   [phone]

## 2022-06-30 NOTE — PROGRESS NOTES
"  [facility]   Acute Rehabilitation Unit  Daily progress note    INTERVAL HISTORY  Vic Scott III was seen and examined at bedside this morning.  No acute events reported overnight.  Today, he reports that he is feeling well overall.  States that his pain is \"up and down\", overall is managing with approximately his PTA opioid doses.  He is trying to premedicate before PT sessions, and will need to monitor pain more with starting sitting soon.  He notes stable scrotal edema.  He reports having BM yesterday, feels like he will have today.  Feels he is eating and drinking well.  Denies fever/chills, shortness of breath, abdominal pain, nausea, bladder concerns.  He feels drainage has been gradually improving; understood plan from surgeon to monitor output to determine whether ok to remove prior to discharge or to go home with it.    Functionally, he is currently independent with rolling in bed, supine>stand with assist of 2.  Able to stand with walker and release 1 hand very briefly.  He needs set-up for supine grooming/hygiene, using bedpan for toileting due to sitting restrictions.    MEDICATIONS    acetaminophen  975 mg Oral Q8H     enoxaparin ANTICOAGULANT  40 mg Subcutaneous Q24H     escitalopram  10 mg Oral Daily     morphine  30 mg Oral Q12H [facility] (08/20)        naloxone **OR** naloxone **OR** naloxone **OR** naloxone, oxyCODONE, polyethylene glycol, senna-docusate     PHYSICAL EXAM  /64 (BP Location: Right arm, Patient Position: Semi-Alford's, Cuff Size: Adult Regular)   Pulse 93   Temp 97.9  F (36.6  C) (Oral)   Resp 16   Ht 1.88 m (6' 2\")   Wt 78.2 kg (172 lb 6.4 oz)   SpO2 99%   BMI 22.13 kg/m     Gen: NAD, lying in bed  HEENT: NC/AT, MMM  Cardio: RRR, no murmurs  Pulm: non-labored on room air, lungs CTA bilaterally  Abd: soft, non-tender, non-distended, bowel sounds present  Ext: no edema appreciated in LLE, +scrotal edema, mild serosanguinous drainage in " AMANDA drain  Neuro/MSK: awake, alert, moving bilat UE and LLE in bed    LABS  CBC RESULTS:   Recent Labs   Lab Test 06/29/22  0538 06/28/22  0514 06/25/22  0644 06/21/22  1142 06/20/22  0523   WBC 14.7*  --   --  14.6* 15.7*   RBC 2.96*  --   --  3.19* 2.78*   HGB 8.1*  --   --  8.9* 7.8*   HCT 25.4*  --   --  26.9* 23.9*   MCV 86  --   --  84 86   MCH 27.4  --   --  27.9 28.1   MCHC 31.9  --   --  33.1 32.6   RDW 16.9*  --   --  16.5* 17.0*   * 586* 589* 303 240     Last Basic Metabolic Panel:  Recent Labs   Lab Test 06/29/22  0538 06/28/22  0515 06/27/22  0830 06/26/22  0533 06/25/22  0644 06/22/22  0831 06/21/22  1142 06/20/22  0523     --   --   --   --   --  136 136   POTASSIUM 4.3 4.0 3.8   < > 4.3   < > 4.5 3.8   CHLORIDE 103  --   --   --   --   --  105 106   CO2 31  --   --   --   --   --  22 24   ANIONGAP 5  --   --   --   --   --  9 6   *  --   --   --   --   --  116* 147*   BUN 16  --   --   --   --   --  5.6* 10   CR 0.51* 0.51*  --   --  0.50*  --  0.41* 0.49*   GFRESTIMATED >90 >90  --   --  >90  --  >90 >90   VENTURA 9.0  --   --   --   --   --  7.5* 7.2*    < > = values in this interval not displayed.       Rehabilitation - continue comprehensive acute inpatient rehabilitation program with multidisciplinary approach including therapies, rehab nursing, and physiatry following. See interval history for updates.      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  Followed by Dr. Ambrocio (oncology).    - Pain Management: Patient evaluated by inpatient pain service  - Continue Morphine 30 mg q12 hour (home dose)  - Continue Oxycodone 10-20 mg q3 hours (home dose was max of 80 mg/day)  - Continue Tylenol 975 mg q8hrs  - Wean opioids back to home doses as tolerated.  - 6/30: currently well-managed on approximately PTA opioid doses.  Will need to continue to monitor as he begins sitting protocol.  - Per plastics:  - No pressure on the flap, okay to turn toward the left and back but not toward the right side  - No sitting x 2 weeks (through 7/1).  Then can initiate Sitting x 10-15 minutes; 5-6 times a day.  Increase by 10-15 minutes each day.  Monitor incision site, go back to less minutes if any concerns with incision site.  - Drain Care: strip, empty, and record daily.  Per plastics, if <30 mL per day, ok to remove drain.  Appears output 6/29 110 mL, continue to monitor.  If discharging with drain, will need training.  - Wound Care: Ice as needed  - Edema management per lymphedema therapists  - Follow up with plastic surgery (Dr. Butt) on 7/6 (or may need to reschedule vs see as inpatient pending discharge from ARU)    #Acute blood loss anemia  #Hx of HILARIA and anemia of chronic disease  Patient required 10 units pRBC due to significant blood loss during the procedure  - Trend CBC.  Hgb stable at 8.1 on 6/29     #Leukocytosis  Downtrending from 20.1 on 6/18.  Suspect acute stress reactant from above surgery and acute blood loss.  No signs of infection at this time.   - Trend CBC.  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  - Continue Escitalopram 10 mg qday  - Monitor mood, consult mental health supports if indicated     #Scrotal Edema  Noted prior to transfer, lymphedema consult likely needed     #Constipation  - With patient on significant amount of opioids will  monitor for opioid induced constipation and treat as needed    #R IT wound - pressure vs trauma  Noted on ARU admission  - WOC consulted, appreciate assistance  - Wound care as ordered    #Severe malnutrition in context of acute illness or injury  - Continue regular diet, thin liquids, supplements  - RD following, appreciate assistance    1. Adjustment to disability:  Clinical psychology to eval and treat if indicated  2. FEN: Regular Diet  3. Bowel: continent, monitor for PRN bowel meds available  4. Bladder: Continent  5. DVT Prophylaxis: Lovenox  6. GI Prophylaxis: Not Indicated  7. Code: Full Code  8. Disposition: Home  9. ELOS:  10 days.  10. Follow up Appointments on Discharge: PCP, Orthopedics w/ Dr. Whitaker in 2 weeks, no imaging needed        Patient discussed with Dr. Luke Ocasio, PM&R Staff Physician    PATRICIA Hirsch-C  Physical Medicine & Rehabilitation

## 2022-07-01 NOTE — PROGRESS NOTES
ORIENTATION: A/O x4  TRANSFERS/AMBULATION: SPT A2  DIET: reg/thin/whole. Denies nausea  BOWEL/BLADDER: cont B/B; LBM 7/1. Using bedpan and urinal  PAIN: pain to incision site. Reports current pain regimen effective. Denies chest pain, SOB, numbness/tingling, fever, chills  LDA: AMANDA drain 75 mL, serosanguinous; site FIDELIA.   SKIN: incision, mepilex to sacrum  Other: Did not perform sitting schedule this shift.

## 2022-07-01 NOTE — PLAN OF CARE
FOCUS/GOAL  Medical management    ASSESSMENT, INTERVENTIONS AND CONTINUING PLAN FOR GOAL:  Pt is alert and oriented. Complained of pain to L leg per other RN. Given prn oxycodone and scheduled tylenol with good effect. Continent of bladder using urinal independently. AMANDA drain in place. Appeared to be sleeping on and off.

## 2022-07-01 NOTE — PLAN OF CARE
Individualized Overall Plan Of Care (IOPOC)      Rehab diagnosis/Impairment Group Code: Amputation 05.3 unilateral le aka - right pelvic/thigh sarcoma s/p hip disarticulation by orthopedics and reconstruction of the right pelvic area by plastics  Amputee, hip, right       Expected functional outcome: Mod I with mobility, transfers, and ADLs; anticipate pt will need assist from family for IADLs    Clinical Impression Comments: 47-year-old male with high grade pleomorphic sarcoma of the right thigh s/p neoadjuvant chemotherapy. Patient presented with worsening right hip and thigh pain and was found to have a pathologic right proximal femur fracture. Patient with h/o right pelvic/thigh sarcoma so underwent hip disarticulation by Orthopedics and reconstruction of the right pelvic area by Plastics on 6/17. Procedure was notable for significant blood loss requiring transfusion of 10 units pRBC (EBL almost 3 liters). Patient remained intubated and was transferred to SICU postop, extubated on POD 1. Patient has progressed well and has been off ketamine drip since 6/19. PT evaluated 6/23 and OT evaluated 6/26.     Mobility: 48 y/o male, significantly below baseline after hemipelvectomy, (L) hip ROM restriction; not yet allowed to sit on flap. Will benefit from skilled PT services so he can return to his home w/ assist from his sister.    ADL: OT: Pt would benefit from skilled OT services d/t recent decline in safety and IND with ADLs/IADLs and functional mobility following hospitalization for R hip diarticulation resulting in impaired balance, impaired strength, pain, surgical precautions, and NWB of RLE. ELOS is 10 days with HC OT services upon discharge    Communication/Cognition/Swallow:   Intact    Intensity of therapy:   PT 90 minutes, 2x/day, for 10 days  OT 90 minutes, Daily, for 10 days    Orthotics None currently  Education manage wound care, vitals, positioning, carryover of new rehab techniques, care coordination,  skin integrity, pain management, provide safe environment for patient at falls risk and edema management.  Neuropsychology Testing: No  Other:  None      Medical Prognosis: Fair      Physician summary statement: In addition to above statements address, Patient requires intensive active and ongoing therapeutic intervention and multiple therapies; Patient requires medical supervision; Expected to actively participate in the intensive rehab program; Sufficiently stable to actively participate; Expectation for measurable improvement in functional capacity or adaption to impairments.    Discharge destination: prior home  Discharge rehabilitation needs: outpatient versus Home care      Estimated length of stay: 10 days      Rehabilitation Physician Luke Ocasio MD

## 2022-07-01 NOTE — PLAN OF CARE
Goal Outcome Evaluation:    Plan of Care Reviewed With: patient     Overall Patient Progress: improving    FOCUS/GOAL  Bowel management, Bladder management, Pain management, Wound care management, Mobility, Skin integrity, and Safety management    ASSESSMENT, INTERVENTIONS AND CONTINUING PLAN FOR GOAL:    Pt A/O x4. Continues on bedrest at this time, is ok for 10-15 minutes of sitting 5-6 times per day, follows appropriately. Continent of bowel and bladder. Reports ongoing right leg incisional pain, relieved with scheduled meds. Regular diet/thin liquids, takes meds whole.

## 2022-07-01 NOTE — PLAN OF CARE
Goal Outcome Evaluation:    Plan of Care Reviewed With: patient     Overall Patient Progress: improving    Outcome Evaluation: Pt advocating for pain management, participating in wound management.    FOCUS/GOAL  Pain management and Wound care management     ASSESSMENT, INTERVENTIONS AND CONTINUING PLAN FOR GOAL:  Pt Aox4, using call light to make needs known.  Staying in bed at this time.  Reporting pain to R leg incision controlled with PRN oxy that is effective.  Denies cough, sob, chest pain, dizziness, N/V, N/T.  Cont of B&B, using urinal in bed, Bedpan, LBM on writers shift.  Nursing will continue with POC.

## 2022-07-01 NOTE — PROGRESS NOTES
"Discharge Planner Post-Acute Rehab OT:      Discharge Plan: Home with HC OT services. ELOS 10 days     Precautions: falls, NWB of RLE d/t R gluteal flap: No pressure on the flap, okay to turn toward the left and back but not toward the right side,     Starting 7/1 Sitting tolerance program: Sitting x 10-15 minutes; 5-6 times a day. Increase by 10-15 minutes each day. Please track on chart posted to [facility].     Current Status:  ADLs:    Mobility: Complex transfer for supine to stand Ax2, (one person assisting w/ his LE and positioning the walker, the other assisting w/ his trunk via bear hug technique, once trunk was elevated he was able to use (L) elbow to assist w/ rising, moved to standing by placing UE's on walker) CGA-Min A x2 to SPT to chair    Grooming: Set-up with G/H tasks supine    Dressing: Set up with UB, max A with LB while seated in chair and in standing with FWW    Bathing: Assistance to wash/rinse/dry 6/10 body parts supine  Toileting: Transfer Ax2 SPT FWW EOB <> padded BSC. Total A cares.  IADLs: Pt's sister assisted with cooking, cleaning, laundry, and med mgmt.   Vision/Cognition: Pt is Ox3. Pt has corrective lenses and is far sighted. Able to direct cares well, good recall of techniques.      Assessment: Performed sitting program activity tolerated sitting in chair for 12 minutes during morning OT session. Engaged in seated grooming tasks and total body dressing. Pt would benefit from AE to increase IND with LBD tasks.    Addendum: PM session, issued sitting tolerance tracking chart for use amongst therapies and nursing - posted to [facility]. Pt tolerated 14 minutes seated. Pt performed EOB <> 24\" padded BSC transfer and simulated cares Ax2 FWW, demonstrating increased tolerance to progress toileting routine.      Other Barriers to Discharge (DME, Family Training, etc): Family training with sister prior to discharge.   AE/DME: pt has SEC, FWW, RTS, and shower chair. Pt will need padded " casper and shower chair, and W/C.

## 2022-07-01 NOTE — PROGRESS NOTES
"  [facility]   Acute Rehabilitation Unit  Daily progress note    INTERVAL HISTORY  Vic Scott III was seen and examined at bedside this morning.  No acute events reported overnight.  Today, he reports that he is feeling well overall. He has stable scrotal edema. Reviewed his back after sitting. It is stable.    Feels he is eating and drinking well.  Denies fever/chills, shortness of breath, abdominal pain, nausea, bladder concerns.  He feels drainage has been gradually improving; understood plan from surgeon to monitor output to determine whether ok to remove prior to discharge or to go home with it.    Functionally,   Precautions: falls; (R) gluteal flap:   -- No pressure on flap, okay to turn towards left and back but NOT towards the right side   -- No sitting x 2 weeks(Through 7/1) then sitting x 10-15 minutes; 5-6 times a day.  Increase by 10-15 minutes each day     Monitor incision site, go back to fewer minutes if any concerns with incision site.     Current Status:  Bed Mobility: ind w/ rail for rolling  Transfer: supine>stand w/ assist of one  Gait: ww, several steps EOB  Stairs: NT  Balance: stands w/ walker, able to release one hand very briefly    MEDICATIONS    acetaminophen  975 mg Oral Q8H     enoxaparin ANTICOAGULANT  40 mg Subcutaneous Q24H     escitalopram  10 mg Oral Daily     morphine  30 mg Oral Q12H [facility] (08/20)        naloxone **OR** naloxone **OR** naloxone **OR** naloxone, oxyCODONE, polyethylene glycol, senna-docusate     PHYSICAL EXAM  /69 (BP Location: Right arm)   Pulse 94   Temp 97.3  F (36.3  C) (Oral)   Resp 16   Ht 1.88 m (6' 2\")   Wt 78.2 kg (172 lb 6.4 oz)   SpO2 100%   BMI 22.13 kg/m     Gen: NAD, lying in bed  HEENT: NC/AT, MMM  Cardio: RRR, no murmurs  Pulm: non-labored on room air, lungs CTA bilaterally  Abd: soft, non-tender, non-distended, bowel sounds present  Ext: no edema appreciated in LLE, +scrotal edema, mild " serosanguinous drainage in AMANDA drain  Neuro/MSK: awake, alert, moving bilat UE and LLE in bed    LABS  CBC RESULTS:   Recent Labs   Lab Test 06/29/22  0538 06/28/22  0514 06/25/22  0644 06/21/22  1142 06/20/22  0523   WBC 14.7*  --   --  14.6* 15.7*   RBC 2.96*  --   --  3.19* 2.78*   HGB 8.1*  --   --  8.9* 7.8*   HCT 25.4*  --   --  26.9* 23.9*   MCV 86  --   --  84 86   MCH 27.4  --   --  27.9 28.1   MCHC 31.9  --   --  33.1 32.6   RDW 16.9*  --   --  16.5* 17.0*   * 586* 589* 303 240     Last Basic Metabolic Panel:  Recent Labs   Lab Test 06/29/22  0538 06/28/22  0515 06/27/22  0830 06/26/22  0533 06/25/22  0644 06/22/22  0831 06/21/22  1142 06/20/22  0523     --   --   --   --   --  136 136   POTASSIUM 4.3 4.0 3.8   < > 4.3   < > 4.5 3.8   CHLORIDE 103  --   --   --   --   --  105 106   CO2 31  --   --   --   --   --  22 24   ANIONGAP 5  --   --   --   --   --  9 6   *  --   --   --   --   --  116* 147*   BUN 16  --   --   --   --   --  5.6* 10   CR 0.51* 0.51*  --   --  0.50*  --  0.41* 0.49*   GFRESTIMATED >90 >90  --   --  >90  --  >90 >90   VENTURA 9.0  --   --   --   --   --  7.5* 7.2*    < > = values in this interval not displayed.       Rehabilitation - continue comprehensive acute inpatient rehabilitation program with multidisciplinary approach including therapies, rehab nursing, and physiatry following. See interval history for updates.      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  Followed by Dr. Ambrocio (oncology).    - Pain Management: Patient evaluated by inpatient pain service  - Continue Morphine 30 mg q12 hour (home dose)  - Continue Oxycodone 10-20 mg q3 hours (home dose was max of 80 mg/day)  - Continue Tylenol 975 mg q8hrs  - Wean opioids back to home doses as tolerated.  - 6/30: currently well-managed on approximately PTA opioid doses.  Will need to continue to monitor as he begins sitting protocol.  - Per plastics:  - No pressure on the flap, okay to turn toward the left and back but not toward the right side  - No sitting x 2 weeks (through 7/1).  Then can initiate Sitting x 10-15 minutes; 5-6 times a day.  Increase by 10-15 minutes each day.  Monitor incision site, go back to less minutes if any concerns with incision site.  - Drain Care: strip, empty, and record daily.  Per plastics, if <30 mL per day, ok to remove drain. To continue to monitor.  If discharging with drain, will need training.  - Wound Care: Ice as needed  - Edema management per lymphedema therapists  - Follow up with plastic surgery (Dr. Butt) on 7/6 (or may need to reschedule vs see as inpatient pending discharge from ARU)    #Acute blood loss anemia  #Hx of HILARIA and anemia of chronic disease  Patient required 10 units pRBC due to significant blood loss during the procedure  - Trend CBC.  Hgb stable at 8.1 on 6/29     #Leukocytosis  Downtrending from 20.1 on 6/18.  Suspect acute stress reactant from above surgery and acute blood loss.  No signs of infection at this time.   - Trend CBC.  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  - Continue Escitalopram 10 mg qday  - Monitor mood, consult mental health supports if indicated     #Scrotal Edema  Noted prior to transfer, lymphedema consult likely needed     #Constipation  - With patient on significant amount of opioids will monitor  for opioid induced constipation and treat as needed    #R IT wound - pressure vs trauma  Noted on ARU admission  - WOC consulted, appreciate assistance  - Wound care as ordered    #Severe malnutrition in context of acute illness or injury  - Continue regular diet, thin liquids, supplements  - RD following, appreciate assistance    1. Adjustment to disability:  Clinical psychology to eval and treat if indicated  2. FEN: Regular Diet  3. Bowel: continent, monitor for PRN bowel meds available  4. Bladder: Continent  5. DVT Prophylaxis: Lovenox  6. GI Prophylaxis: Not Indicated  7. Code: Full Code  8. Disposition: Home  9. ELOS:  10 days.  10. Follow up Appointments on Discharge: PCP, Orthopedics w/ Dr. Whitaker in 2 weeks, no imaging needed    Doing well. Continue cares and plans outlined.     Luke Ocasio MD

## 2022-07-01 NOTE — PLAN OF CARE
Discharge Planner Post-Acute Rehab PT:     Discharge Plan: home w/ his sister, justin, 3 steps to enter, lives on main level    Precautions: falls; (R) gluteal flap:   -- No pressure on flap, okay to turn towards left and back but NOT towards the right side   -- No sitting x 2 weeks(Through 7/1) then sitting x 10-15 minutes; 5-6 times a day.  Increase by 10-15 minutes each day     Monitor incision site, go back to fewer minutes if any concerns with incision site.    Current Status:  Bed Mobility: ind w/ rail for rolling  Transfer: supine>stand w/ assist of one  Gait: ww, several steps EOB  Stairs: NT  Balance: stands w/ walker, able to release one hand very briefly    Assessment:  W/c eval completed, will work to get loaner next week; supine<>stand improved. Hand off to evening nursing regarding OOB sitting, therapy staff stays in the room during his sitting time, nursing not able to do this with certainty so most likely will sit only with therapy over the weekend and until longer sitting times are achieved.     Other Barriers to Discharge (DME, Family Training, etc):   Manual w/c-eval 7/1  Walker  Family training stairs

## 2022-07-02 NOTE — PROGRESS NOTES
"  [facility]   Acute Rehabilitation Unit  Daily progress note    INTERVAL HISTORY  Nursing and therapy notes reviewed. No acute events overnight.    Fabian reports doing well this morning. He denies any current pain or discomfort. Slept well, and has had good appetite. Ice pack has been helping w/ surgery site pain. He denies any CP, SOB, or abdominal pain. Continued to provide encouragement to participate in therapies. LBM 7/1.    Updates from today:  - Overnight nursing note charted 75ml output into AMANDA drain. Continue to monitor.  - No new changes from today        MEDICATIONS    acetaminophen  975 mg Oral Q8H     enoxaparin ANTICOAGULANT  40 mg Subcutaneous Q24H     escitalopram  10 mg Oral Daily     morphine  30 mg Oral Q12H [facility] (08/20)        naloxone **OR** naloxone **OR** naloxone **OR** naloxone, oxyCODONE, polyethylene glycol, senna-docusate     PHYSICAL EXAM  /71 (BP Location: Right arm)   Pulse 86   Temp 96.9  F (36.1  C) (Oral)   Resp 16   Ht 1.88 m (6' 2\")   Wt 78.2 kg (172 lb 6.4 oz)   SpO2 96%   BMI 22.13 kg/m     Gen: NAD, lying in bed  HEENT: NC/AT, MMM  Cardio: RRR, no murmurs  Pulm: non-labored on room air, lungs CTA bilaterally  Abd: soft, non-tender, non-distended, bowel sounds present  Ext: no edema appreciated in LLE, +scrotal edema, scant serosanguinous drainage in AMANDA drain  Neuro/MSK: awake, alert. 5/5 strength in b/l UE and LLE.    LABS  CBC RESULTS:   Recent Labs   Lab Test 06/29/22  0538 06/28/22  0514 06/25/22  0644 06/21/22  1142 06/20/22  0523   WBC 14.7*  --   --  14.6* 15.7*   RBC 2.96*  --   --  3.19* 2.78*   HGB 8.1*  --   --  8.9* 7.8*   HCT 25.4*  --   --  26.9* 23.9*   MCV 86  --   --  84 86   MCH 27.4  --   --  27.9 28.1   MCHC 31.9  --   --  33.1 32.6   RDW 16.9*  --   --  16.5* 17.0*   * 586* 589* 303 240     Last Basic Metabolic Panel:  Recent Labs   Lab Test 06/29/22  0538 06/28/22  0515 06/27/22  0830 06/26/22  0533 " 06/25/22  0644 06/22/22  0831 06/21/22  1142 06/20/22  0523     --   --   --   --   --  136 136   POTASSIUM 4.3 4.0 3.8   < > 4.3   < > 4.5 3.8   CHLORIDE 103  --   --   --   --   --  105 106   CO2 31  --   --   --   --   --  22 24   ANIONGAP 5  --   --   --   --   --  9 6   *  --   --   --   --   --  116* 147*   BUN 16  --   --   --   --   --  5.6* 10   CR 0.51* 0.51*  --   --  0.50*  --  0.41* 0.49*   GFRESTIMATED >90 >90  --   --  >90  --  >90 >90   VENTURA 9.0  --   --   --   --   --  7.5* 7.2*    < > = values in this interval not displayed.       Rehabilitation - continue comprehensive acute inpatient rehabilitation program with multidisciplinary approach including therapies, rehab nursing, and physiatry following. See interval history for updates.      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  Followed by Dr. Ambrocio (oncology).    - Pain Management: Patient evaluated by inpatient pain service  - Continue Morphine 30 mg q12 hour (home dose)  - Continue Oxycodone 10-20 mg q3 hours (home dose was max of 80 mg/day)  - Continue Tylenol 975 mg q8hrs  - Wean opioids back to home doses as tolerated.  - 6/30: currently well-managed on approximately PTA opioid doses.  Will need to continue to monitor as he begins sitting protocol.  - Per plastics:  - No pressure on the flap, okay to turn toward the left and back but not toward the right side  - No  sitting x 2 weeks (through 7/1).  Then can initiate Sitting x 10-15 minutes; 5-6 times a day.  Increase by 10-15 minutes each day.  Monitor incision site, go back to less minutes if any concerns with incision site.  - Drain Care: strip, empty, and record daily.  Per plastics, if <30 mL per day, ok to remove drain. To continue to monitor.  If discharging with drain, will need training.  - Wound Care: Ice as needed  - Edema management per lymphedema therapists  - Follow up with plastic surgery (Dr. Butt) on 7/6 (or may need to reschedule vs see as inpatient pending discharge from ARU)    #Acute blood loss anemia  #Hx of HILARIA and anemia of chronic disease  Patient required 10 units pRBC due to significant blood loss during the procedure  - Trend CBC.  Hgb stable at 8.1 on 6/29     #Leukocytosis  Downtrending from 20.1 on 6/18.  Suspect acute stress reactant from above surgery and acute blood loss.  No signs of infection at this time.   - Trend CBC.  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  - Continue Escitalopram 10 mg qday  - Monitor mood, consult mental health supports if indicated     #Scrotal Edema  Noted prior to transfer, lymphedema consult likely needed     #Constipation  - With patient on significant amount of opioids will monitor for opioid induced constipation and treat as needed    #R IT wound - pressure vs trauma  Noted on ARU admission  - WOC consulted, appreciate assistance  - Wound care as ordered    #Severe malnutrition in context of acute illness or injury  - Continue regular diet, thin liquids, supplements  - RD following, appreciate assistance    1. Adjustment to disability:  Clinical psychology to eval and treat if indicated  2. FEN: Regular Diet  3. Bowel: continent, monitor for PRN bowel meds available  4. Bladder: Continent  5. DVT Prophylaxis: Lovenox  6. GI Prophylaxis: Not Indicated  7. Code: Full  Code  8. Disposition: Home  9. ELOS:  10 days.  10. Follow up Appointments on Discharge: PCP, Orthopedics w/ Dr. Whitaker in 2 weeks, no imaging needed        Daria Flores MD  Resident Physician, PGY-3  Physical Medicine & Rehabilitation  [facility]

## 2022-07-02 NOTE — PROGRESS NOTES
Discharge Planner Post-Acute Rehab OT:      Discharge Plan: Home with HC OT services. ELOS 10 days     Precautions: falls, NWB of RLE d/t R gluteal flap: No pressure on the flap, okay to turn toward the left and back but not toward the right side,      Starting 7/1 Sitting tolerance program: Sitting x 10-15 minutes; 5-6 times a day. Increase by 10-15 minutes each day. Please track on chart posted to [employer].     Current Status:  ADLs:    Mobility: Complex transfer for supine to stand Ax1-2, (one person assisting w/ his LE and positioning the walker, the other assisting w/ his trunk via bear hug technique, once trunk was elevated he was able to use (L) elbow to assist w/ rising, moved to standing by placing UE's on walker) CGA-Min A x1-2 to SPT to chair    Grooming: Set-up with G/H tasks supine    Dressing: Set up with UB, max A with LB while seated in chair and in standing with FWW    Bathing: Assistance to wash/rinse/dry 6/10 body parts supine  Toileting: Transfer Ax2 SPT FWW EOB <> padded BSC. Total A cares.  IADLs: Pt's sister assisted with cooking, cleaning, laundry, and med mgmt.   Vision/Cognition: Pt is Ox3. Pt has corrective lenses and is far sighted. Able to direct cares well, good recall of techniques.      Assessment: Performed sitting program activity tolerated sitting in chair for 10 minutes during morning OT session and 20 min during PM session. Engaged in UB strengthening exercises seated in chair. Educated pt on AE training for LBD tasks.     Other Barriers to Discharge (DME, Family Training, etc): Family training with sister prior to discharge.   AE/DME: pt has SEC, FWW, RTS, and shower chair. Pt will need padded commode and shower chair, and W/C.

## 2022-07-02 NOTE — PLAN OF CARE
FOCUS/GOAL  Bowel management, Bladder management, Pain management and Cognition/Memory/Judgment/Problem solving    ASSESSMENT, INTERVENTIONS AND CONTINUING PLAN FOR GOAL:  Pt is alert and oriented x4, denies fever, chills, chest pain, SOB, N/V, abdominal pain, or new weakness/numbness/tingling. Pt reported incisional pain to right hip and received oxycodone and tylenol with relief. AMANDA drain output of 15ml. Continent of bladder, attempted to have BM on bed pan but was unable. LBM 7/1. Mepilex intact on sacrum. vs stable, no further care concerns at this time continue with POC.         Goal Outcome Evaluation: No change.

## 2022-07-02 NOTE — PLAN OF CARE
Discharge Planner Post-Acute Rehab PT:     Discharge Plan: home w/ his sister, justin, 3 steps to enter, lives on main level    Precautions: falls; (R) gluteal flap:   -- No pressure on flap, okay to turn towards left and back but NOT towards the right side   -- No sitting x 2 weeks(Through 7/1) then sitting x 10-15 minutes; 5-6 times a day.  Increase by 10-15 minutes each day     Monitor incision site, go back to fewer minutes if any concerns with incision site.    Current Status:  Bed Mobility: ind w/ rail for rolling  Transfer: supine>stand w/ CGA x1  Gait: ww, several steps EOB  Stairs: NT  Balance: stands w/ walker, able to release one hand very briefly    Assessment:  Continued up in chair for 15 and 10 mins in therapy session.  Can be up with nursing for 20 mins in chair as able.  Overall pain and muscle spasms continue to decrease and improving functional mobility with less A.    Other Barriers to Discharge (DME, Family Training, etc):   Manual w/c-eval 7/1  Walker  Family training stairs

## 2022-07-02 NOTE — PLAN OF CARE
Goal Outcome Evaluation:    Plan of Care Reviewed With: patient     Overall Patient Progress: improving    FOCUS/GOAL  Bowel management, Bladder management, Medication management, Pain management, Wound care management, Mobility, Skin integrity, and Safety management    ASSESSMENT, INTERVENTIONS AND CONTINUING PLAN FOR GOAL:    Pt A/O x4. Continues on bedrest at this time, is ok for 10-15 minutes of sitting 5-6 times per day, follows appropriately. Continent of bowel and bladder. Reports ongoing right leg incisional pain, relieved with scheduled and prn meds. Regular diet/thin liquids, takes meds whole. AMANDA site intact, mepilex to coccyx.

## 2022-07-03 NOTE — PROGRESS NOTES
ORIENTATION: A/O x4  TRANSFERS/AMBULATION: SPT A2  DIET: reg/thin/whole. Denies nausea  BOWEL/BLADDER: cont B/B; LBM 7/2. Using bedpan and urinal  PAIN: pain to incision site. Reports current pain regimen effective. Denies chest pain, SOB, numbness/tingling, fever, chills  LDA: AMANDA drain 35 mL, serosanguinous; site FIDELIA.   SKIN: incision, mepilex to sacrum changed  Other: Did not perform sitting schedule this shift; reported buttocks being sore due to being on/off bed pan

## 2022-07-03 NOTE — PLAN OF CARE
Goal Outcome Evaluation:    Plan of Care Reviewed With: patient     Overall Patient Progress: improving    FOCUS/GOAL  Bowel management, Bladder management, Pain management, Wound care management, Mobility, Skin integrity and Safety management    ASSESSMENT, INTERVENTIONS AND CONTINUING PLAN FOR GOAL:    Pt A/O x4. Continues on bedrest at this time, is ok for 10-15 minutes of sitting 5-6 times per day, follows appropriately. Continent of bowel and bladder. Reports ongoing right leg incisional pain, relieved with scheduled and prn meds. Regular diet/thin liquids, takes meds whole. AMANDA site intact, mepilex to coccyx. Spends time listening to music and talking to family/friends on the phone.

## 2022-07-03 NOTE — PLAN OF CARE
FOCUS/GOAL  Bowel management, Bladder management and Pain management    ASSESSMENT, INTERVENTIONS AND CONTINUING PLAN FOR GOAL:  Pt is alert and oriented x4, denies fever, chills, chest pain, SOB, N/V, abdominal pain, or new weakness/numbness/tingling. Received prn oxycodone and scheduled tylenol at 5 am for pain. AMANDA drain output of 15ml. Continent of bladder. Sleeping improved from previous night.  Mepilex intact on sacrum. vs stable, no further care concerns at this time continue with POC.      Goal Outcome Evaluation: No change

## 2022-07-03 NOTE — PLAN OF CARE
Discharge Planner Post-Acute Rehab PT:     Discharge Plan: home w/ his sister, justin, 3 steps to enter, lives on main level    Precautions: falls; (R) gluteal flap:   -- No pressure on flap, okay to turn towards left and back but NOT towards the right side   -- No sitting x 2 weeks(Through 7/1) then sitting x 10-15 minutes; 5-6 times a day.  Increase by 10-15 minutes each day     Monitor incision site, go back to fewer minutes if any concerns with incision site.    Current Status:  Bed Mobility: ind w/ rail for rolling  Transfer: supine>stand w/ SBA x1, Bed mobility SBA with use of leg  for sit>supine  Gait: ww, several steps EOB  Stairs: NT  Balance: stands w/ walker, able to release one hand    Assessment:  Up to 25 mins of sitting up in chair and lowered chair 1 notch.  Tolerated well with exercises.  Progressing bed mobility to set-up and SBA with increased time and adaptive equipment.      Pt to sit up for 10-25 mins in evening with nursing 1-2x as able.  Pt to be up 2-3 times for 25 mins on 7/4 as able with nursing due to no Therapies per holiday.    Other Barriers to Discharge (DME, Family Training, etc):   Manual w/c-eval 7/1  Walker  Family training stairs

## 2022-07-03 NOTE — PROGRESS NOTES
Discharge Planner Post-Acute Rehab OT:      Discharge Plan: Home with HC OT services. ELOS 10 days     Precautions: falls, R gluteal flap: No pressure on the flap, okay to turn toward the left and back but not toward the right side     Starting 7/1 Sitting tolerance program: Sitting x 10-15 minutes; 5-6 times a day. Increase by 10-15 minutes each day. Please track on chart posted to whiteboard.     Current Status:  ADLs:    Mobility: CGA-Min A x1 SPT to chair with FWW    Grooming: Set-up with G/H tasks seated    Dressing: Set up with UB seated, CGA with LBD tasks with FWW with AE    Bathing: Assistance to wash/rinse/dry 6/10 body parts supine  Toileting: Transfer Ax2 SPT FWW EOB <> padded BSC. Total A cares.  IADLs: Pt's sister assisted with cooking, cleaning, laundry, and med mgmt.   Vision/Cognition: Pt is Ox3. Pt has corrective lenses and is far sighted. Able to direct cares well, good recall of techniques.      Assessment: Performed sitting program activity tolerated sitting in chair for 20 minutes during morning OT session to complete ADL routine with AE training with pt demonstrating increased IND. Completed UB strengthening while seated in chair during PM session for 15 min while seated.      Other Barriers to Discharge (DME, Family Training, etc): Family training with sister prior to discharge.   AE/DME: pt has SEC, FWW, RTS, and shower chair. Pt will need padded commode and shower chair, and W/C.

## 2022-07-04 NOTE — PLAN OF CARE
FOCUS/GOAL  Bowel management, Bladder management, Skin integrity and Cognition/Memory/Judgment/Problem solving    ASSESSMENT, INTERVENTIONS AND CONTINUING PLAN FOR GOAL:  Pt is alert and oriented x4, denies fever, chills, chest pain, SOB, N/V, abdominal pain, or new weakness/numbness/tingling. Received prn oxycodone and scheduled tylenol at 5 am for incisional pain. AMANDA drain output of 10ml. Continent of bladder. Sleeping well.  Mepilex intact on sacrum. Incision to R hip approximated. No further care concerns at this time continue with POC.     Goal Outcome Evaluation: No change

## 2022-07-04 NOTE — PLAN OF CARE
Goal Outcome Evaluation:    Plan of Care Reviewed With: patient     Overall Patient Progress: improving    FOCUS/GOAL  Bowel management, Bladder management, Pain management, Wound care management, Mobility, Skin integrity, and Safety management    ASSESSMENT, INTERVENTIONS AND CONTINUING PLAN FOR GOAL:    Pt A/O x4. Ok for 10-15 minutes of sitting 5-6 times per day, follows appropriately. Continent of bowel and bladder. Reports ongoing right leg incisional pain, relieved with scheduled and prn meds. Regular diet/thin liquids, takes meds whole. MAANDA site intact, mepilex to coccyx. Spends time listening to music and talking to family/friends on the phone.

## 2022-07-04 NOTE — PROGRESS NOTES
ORIENTATION: A/O x4  TRANSFERS/AMBULATION: CGA A1  DIET: reg/thin/whole. Denies nausea  BOWEL/BLADDER: cont B/B; LBM 7/2. Using bedpan and urinal  PAIN: pain to incision site. Reports current pain regimen effective. Denies chest pain, SOB, numbness/tingling, fever, chills  LDA: AMANDA drain 12.5 mL, serosanguinous; site FIDELIA.   SKIN: incision WNL, mepilex to sacrum no concerns  Other: performed sitting schedule this shift, 25  Minutes; tolerated

## 2022-07-04 NOTE — PROGRESS NOTES
"  [facility]   Acute Rehabilitation Unit  Daily progress note    INTERVAL HISTORY  Nursing and therapy notes reviewed. No acute events overnight.    Fabian reports doing well this morning. He denies any current pain or discomfort, slept well overnight. Discussed w/ him that AMANDA drain is draining less, but will defer to primary team tomorrow w/ regards to removing the drain. Reports good appetite. He denies any CP, SOB, or abdominal pain. LBM 7/3.    Updates from today:  - No new changes from today  - AMANDA drain output decreasing. Will defer to primary team tomorrow w/ regards to removing AMANDA drain.        MEDICATIONS    acetaminophen  975 mg Oral Q8H     enoxaparin ANTICOAGULANT  40 mg Subcutaneous Q24H     escitalopram  10 mg Oral Daily     morphine  30 mg Oral Q12H [facility]        naloxone **OR** naloxone **OR** naloxone **OR** naloxone, oxyCODONE, polyethylene glycol, senna-docusate     PHYSICAL EXAM  /55 (BP Location: Right arm)   Pulse 85   Temp 96.9  F (36.1  C) (Oral)   Resp 18   Ht 1.88 m (6' 2\")   Wt 78.2 kg (172 lb 6.4 oz)   SpO2 97%   BMI 22.13 kg/m     Gen: NAD, lying in bed  HEENT: NC/AT, MMM  Cardio: RRR, no murmurs  Pulm: non-labored on room air, lungs CTA bilaterally  Abd: soft, non-tender, non-distended, bowel sounds present  Ext: no edema appreciated in LLE, +scrotal edema, scant serosanguinous drainage in AMANDA drain.  Neuro/MSK: awake, alert. 5/5 strength in b/l UE and LLE.    LABS  CBC RESULTS:   Recent Labs   Lab Test 06/29/22  0538 06/28/22  0514 06/25/22  0644 06/21/22  1142 06/20/22  0523   WBC 14.7*  --   --  14.6* 15.7*   RBC 2.96*  --   --  3.19* 2.78*   HGB 8.1*  --   --  8.9* 7.8*   HCT 25.4*  --   --  26.9* 23.9*   MCV 86  --   --  84 86   MCH 27.4  --   --  27.9 28.1   MCHC 31.9  --   --  33.1 32.6   RDW 16.9*  --   --  16.5* 17.0*   * 586* 589* 303 240     Last Basic Metabolic Panel:  Recent Labs   Lab Test 06/29/22  0538 " 06/28/22  0515 06/27/22  0830 06/26/22  0533 06/25/22  0644 06/22/22  0831 06/21/22  1142 06/20/22  0523     --   --   --   --   --  136 136   POTASSIUM 4.3 4.0 3.8   < > 4.3   < > 4.5 3.8   CHLORIDE 103  --   --   --   --   --  105 106   CO2 31  --   --   --   --   --  22 24   ANIONGAP 5  --   --   --   --   --  9 6   *  --   --   --   --   --  116* 147*   BUN 16  --   --   --   --   --  5.6* 10   CR 0.51* 0.51*  --   --  0.50*  --  0.41* 0.49*   GFRESTIMATED >90 >90  --   --  >90  --  >90 >90   VENTURA 9.0  --   --   --   --   --  7.5* 7.2*    < > = values in this interval not displayed.       Rehabilitation - continue comprehensive acute inpatient rehabilitation program with multidisciplinary approach including therapies, rehab nursing, and physiatry following. See interval history for updates.      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  Followed by Dr. Ambrocio (oncology).    - Pain Management: Patient evaluated by inpatient pain service  - Continue Morphine 30 mg q12 hour (home dose)  - Continue Oxycodone 10-20 mg q3 hours (home dose was max of 80 mg/day)  - Continue Tylenol 975 mg q8hrs  - Wean opioids back to home doses as tolerated.  - 6/30: currently well-managed on approximately PTA opioid doses.  Will need to continue to monitor as he begins sitting protocol.  - Per plastics:  - No pressure on the flap, okay to turn toward the left and  back but not toward the right side  - No sitting x 2 weeks (through 7/1).  Then can initiate Sitting x 10-15 minutes; 5-6 times a day.  Increase by 10-15 minutes each day.  Monitor incision site, go back to less minutes if any concerns with incision site.  - Drain Care: strip, empty, and record daily.  Per plastics, if <30 mL per day, ok to remove drain. To continue to monitor.  If discharging with drain, will need training. Will defer to primary team to decide if AMANDA drain can be removed 7/5, has been having low output.   - Wound Care: Ice as needed  - Edema management per lymphedema therapists  - Follow up with plastic surgery (Dr. Butt) on 7/6 (or may need to reschedule vs see as inpatient pending discharge from ARU)    #Acute blood loss anemia  #Hx of HILARIA and anemia of chronic disease  Patient required 10 units pRBC due to significant blood loss during the procedure  - Trend CBC.  Hgb stable at 8.1 on 6/29     #Leukocytosis  Downtrending from 20.1 on 6/18.  Suspect acute stress reactant from above surgery and acute blood loss.  No signs of infection at this time.   - Trend CBC.  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  - Continue Escitalopram 10 mg qday  - Monitor mood, consult mental health supports if indicated     #Scrotal Edema  Noted prior to transfer, lymphedema consult likely needed     #Constipation  - With patient on significant amount of opioids will monitor for opioid induced constipation and treat as needed    #R IT wound - pressure vs trauma  Noted on ARU admission  - WOC consulted, appreciate assistance  - Wound care as ordered    #Severe malnutrition in context of acute illness or injury  - Continue regular diet, thin liquids, supplements  - RD following, appreciate assistance    1. Adjustment to disability:  Clinical psychology to eval and treat if indicated  2. FEN: Regular Diet  3. Bowel: continent, monitor for PRN  bowel meds available  4. Bladder: Continent  5. DVT Prophylaxis: Lovenox  6. GI Prophylaxis: Not Indicated  7. Code: Full Code  8. Disposition: Home  9. ELOS:  10 days.  10. Follow up Appointments on Discharge: PCP, Orthopedics w/ Dr. Whitaker in 2 weeks, no imaging needed        Daria Flores MD  Resident Physician, PGY-3  Physical Medicine & Rehabilitation  [facility]

## 2022-07-05 NOTE — PLAN OF CARE
Goal Outcome Evaluation:    Plan of Care Reviewed With: patient     Overall Patient Progress: improving    FOCUS/GOAL  Bowel management, Bladder management, Pain management, Wound care management, Mobility, Skin integrity, and Safety management    ASSESSMENT, INTERVENTIONS AND CONTINUING PLAN FOR GOAL:    Pt A/O x4. Ok for 10-15 minutes of sitting 5-6 times per day, follows appropriately. Continent of bowel and bladder. Reports ongoing right leg incisional pain, relieved with scheduled and prn meds. Regular diet/thin liquids, takes meds whole. Mepilex to coccyx. AMANDA drain removed this shift. Site cleansed and covered with 2x2.

## 2022-07-05 NOTE — PLAN OF CARE
FOCUS/GOAL  Bladder management, Pain management, Cognition/Memory/Judgment/Problem solving, and Safety management    ASSESSMENT, INTERVENTIONS AND CONTINUING PLAN FOR GOAL:  Pt is alert and oriented. Continent of bladder, voiding without difficulty using urinal independently. Requested and received oxycodone x 2 for report of right hip incisional pain. Right AMANDA put out 5 ml this shift. Appeared to be sleeping between cares. Uses call light appropriately, able to make needs known. Bed alarm on for safety.

## 2022-07-05 NOTE — CONSULTS
"Triage and Transition - Consult and Liaison     Vic Scott III  July 5th, 2022    Session start: 1309  Session end: 1320  Session duration in minutes: 11 minutes  CPT utilized: Non-billable   Patient was seen virtually ([employer] cart or other teleconferencing device).  Anticipated number of sessions or this episode of care: 1-4    Reason for consult: Fabian is 47 year old Choose not to Answer  male . Psychiatry consult was requested due to concern for increase depressive symptoms. Patient was seen by [facility] Consult & Liaison team.     Presenting problem : Patient reports symptoms of \"I have been alright\". Indicates some anxiety about his pending discharge and what home is going to be like.  Reports anxiety about waiting to go home and the timeframe being so close. Notes that he has been in the hospital for a month and that there have been highs and lows of his spirits but knowing he gets to discharge helps him keep his spirits up.  He is open to OP support for mental health through psychotherapy via a female staff via telehealth.     In individual therapeutic contact with patient today, patient presents with broad affect, euthymic mood.. Safety concerns are not present today for SI, HI and NSSIB.      Mental Status Exam   Affect: Appropriate  Appearance: Appropriate   Attention Span/Concentration: Attentive    Eye Contact: Engaged  Fund of Knowledge: Appropriate   Language /Speech Content: Fluent  Language /Speech Volume: Normal   Language /Speech Rate/Productions: Normal   Recent Memory: Intact  Remote Memory: Intact  Mood: Normal   Orientation:   Person: Yes   Place: Yes  Time of Day: Yes   Date: Yes   Situation (Do they understand why they are here?): Yes   Psychomotor Behavior: Normal   Thought Content: Clear  Thought Form: Intact    Current medications:   Current Facility-Administered Medications   Medication     acetaminophen (TYLENOL) tablet 975 mg     enoxaparin ANTICOAGULANT (LOVENOX) injection 40 mg     " escitalopram (LEXAPRO) tablet 10 mg     morphine (MS CONTIN) 12 hr tablet 30 mg     naloxone (NARCAN) injection 0.2 mg    Or     naloxone (NARCAN) injection 0.4 mg    Or     naloxone (NARCAN) injection 0.2 mg    Or     naloxone (NARCAN) injection 0.4 mg     oxyCODONE (ROXICODONE) tablet 10-20 mg     polyethylene glycol (MIRALAX) Packet 17 g     senna-docusate (SENOKOT-S/PERICOLACE) 8.6-50 MG per tablet 2 tablet     Therapeutic intervention and progress:  Therapeutic intervention consisted of building therapeutic rapport, active listening and validation.     Collateral information:   Reviewed chart and coordinated with social work.      Diagnosis:   None noted.     Plan:     Continue care coordination with care team.     Maintain current transition plan.     Mr. Scott's goal is to discharge home this week. He is open to OP mental health services to provide support during this adjustment. See appointment below that has been added to the AVS.     Schedule Appointment  Date: Tuesday, 7/12/2022  Time: 10:00 am - 11:00 am  Provider: Arslan Gar  Supervised by: Vanessa Hernandez PsyD, LP  Location: [facility], [address], [address], [place] [address]  Phone: [phone]  Type: Teletherapy    DEVYN CRAWFORD, LICSW  Triage and Transition - Consult and Liaison   [phone]

## 2022-07-05 NOTE — PLAN OF CARE
Goal Outcome Evaluation:    Plan of Care Reviewed With: patient     Overall Patient Progress: no change    Outcome Evaluation: No change in patient progress this shift.    Pt is alert and oriented. Regular diet and thin liquids; can take pills whole. Continent of B&B. LBM 7/2. Ax2 SPT with walker. C/o pain; given scheduled and PRN pain medication with some relief. Call light within reach and bed alarms on. Pt is able to make needs known. Will continue with POC.

## 2022-07-05 NOTE — PLAN OF CARE
Discharge Planner Post-Acute Rehab PT:     Discharge Plan: home w/ his sister, justin, 3 steps to enter, lives on main level    Precautions: falls; (R) gluteal flap:   -- No pressure on flap, okay to turn towards left and back but NOT towards the right side    --sitting x 10-15 minutes; 5-6 times a day.  Increase by 10-15 minutes each day; tracking sheet on white board     Monitor incision site, go back to fewer minutes if any concerns with incision site.    Current Status:  Bed Mobility: ind w/ rail and raised HOB; leg   Transfer: sit<>stand w/ SBA x1, ww  Gait: ww, 25', CGA, steady, smooth pattern  Stairs: NT  Balance: stands w/ walker, able to release one hand    Assessment:  Up to 30 mins of sitting up in chair and lowered chair 1 notch.  Able to perform w/c push ups and lateral trunk shifts for pressure relief.   Bed mobility ind w/ rail and raised HOB.  Made plan w/ RN for up in chair x 30 minutes for supper.     Other Barriers to Discharge (DME, Family Training, etc):   Manual w/c-eval 7/1; will map for cushion 7/8  Walker  Family training stairs

## 2022-07-05 NOTE — PROGRESS NOTES
Discharge Planner Post-Acute Rehab OT:      Discharge Plan: Home with HC OT services. ELOS 10 days     Precautions: falls, R gluteal flap: No pressure on the flap, okay to turn toward the left and back but not toward the right side     Starting 7/1 Sitting tolerance program: Sitting x 10-15 minutes; 5-6 times a day. Increase by 10-15 minutes each day. Please track on chart posted to [employer].     Current Status:  ADLs:    Mobility: SBA/CGA SPT to chair with FWW    Grooming: Set-up with G/H tasks seated    Dressing: Set up with UB seated, SBA/CGA with LBD tasks with FWW with AE    Bathing: Assistance to wash/rinse/dry 6/10 body parts supine  Toileting: SBA/CGA SPT FWW EOB <> padded BSC. Total A cares.  IADLs: Pt's sister assisted with cooking, cleaning, laundry, and med mgmt.   Vision/Cognition: Pt is Ox3. Pt has corrective lenses and is far sighted. Able to direct cares well, good recall of techniques.      Assessment: Pt tolerating sitting in chair for up to 30 minutes today. Problem solving equipment and coverage for home. Pt will need padded commode for toileting and bathing. Pt making steady progress with transfers.     -20 d/t provider AM session  -5 d/t writer calling equipment company for AE/DME needs for discharge     Other Barriers to Discharge (DME, Family Training, etc): Family training with sister prior to discharge.   AE/DME: pt has SEC, FWW, RTS, and shower chair. Pt will need padded commode and shower chair, and W/C.

## 2022-07-06 NOTE — PROGRESS NOTES
"  [facility]   Acute Rehabilitation Unit  Daily progress note    INTERVAL HISTORY  Nursing and therapy notes reviewed. No acute events overnight.    Fabian reports doing well this morning. He denies any current pain or discomfort, slept well overnight. Discussed w/ him that AMANDA drain is draining less, ad can be removed per guidelines (<30 ml/day). Reports good appetite. He denies any CP, SOB, or abdominal pain.    Functionally      Mobility: SBA/CGA SPT to chair with FWW    Grooming: Set-up with G/H tasks seated    Dressing: Set up with UB seated, SBA/CGA with LBD tasks with FWW with AE    Bathing: Assistance to wash/rinse/dry 6/10 body parts supine  Toileting: SBA/CGA SPT FWW EOB <> padded BSC. Total A cares.  IADLs: Pt's sister assisted with cooking, cleaning, laundry, and med mgmt.   Vision/Cognition: Pt is Ox3. Pt has corrective lenses and is far sighted. Able to direct cares well, good recall of techniques        MEDICATIONS    acetaminophen  975 mg Oral Q8H     enoxaparin ANTICOAGULANT  40 mg Subcutaneous Q24H     escitalopram  10 mg Oral Daily     morphine  30 mg Oral Q12H [facility] (08/20)        naloxone **OR** naloxone **OR** naloxone **OR** naloxone, oxyCODONE, polyethylene glycol, senna-docusate     PHYSICAL EXAM  /64 (BP Location: Right arm)   Pulse 110   Temp 98.6  F (37  C) (Oral)   Resp 16   Ht 1.88 m (6' 2\")   Wt 80 kg (176 lb 4.8 oz)   SpO2 98%   BMI 22.64 kg/m     Gen: NAD, lying in bed  HEENT: NC/AT, MMM  Cardio: RRR, no murmurs  Pulm: non-labored on room air, lungs CTA bilaterally  Abd: soft, non-tender, non-distended, bowel sounds present  Ext: no edema appreciated in LLE, +scrotal edema, scant serosanguinous drainage in AMANDA drain.  Neuro/MSK: awake, alert. 5/5 strength in b/l UE and LLE.    LABS  CBC RESULTS:   Recent Labs   Lab Test 06/29/22  0538 06/28/22  0514 06/25/22  0644 06/21/22  1142 06/20/22  0523   WBC 14.7*  --   --  14.6* 15.7*   RBC 2.96*  " --   --  3.19* 2.78*   HGB 8.1*  --   --  8.9* 7.8*   HCT 25.4*  --   --  26.9* 23.9*   MCV 86  --   --  84 86   MCH 27.4  --   --  27.9 28.1   MCHC 31.9  --   --  33.1 32.6   RDW 16.9*  --   --  16.5* 17.0*   * 586* 589* 303 240     Last Basic Metabolic Panel:  Recent Labs   Lab Test 06/29/22  0538 06/28/22  0515 06/27/22  0830 06/26/22  0533 06/25/22  0644 06/22/22  0831 06/21/22  1142 06/20/22  0523     --   --   --   --   --  136 136   POTASSIUM 4.3 4.0 3.8   < > 4.3   < > 4.5 3.8   CHLORIDE 103  --   --   --   --   --  105 106   CO2 31  --   --   --   --   --  22 24   ANIONGAP 5  --   --   --   --   --  9 6   *  --   --   --   --   --  116* 147*   BUN 16  --   --   --   --   --  5.6* 10   CR 0.51* 0.51*  --   --  0.50*  --  0.41* 0.49*   GFRESTIMATED >90 >90  --   --  >90  --  >90 >90   VENTURA 9.0  --   --   --   --   --  7.5* 7.2*    < > = values in this interval not displayed.       Rehabilitation - continue comprehensive acute inpatient rehabilitation program with multidisciplinary approach including therapies, rehab nursing, and physiatry following. See interval history for updates.      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  Followed by Dr. Ambrocio (oncology).    - Pain Management: Patient evaluated by inpatient pain service  - Continue Morphine 30 mg q12 hour (home dose)  - Continue Oxycodone 10-20 mg q3 hours (home dose was  max of 80 mg/day)  - Continue Tylenol 975 mg q8hrs  - Wean opioids back to home doses as tolerated.  - 6/30: currently well-managed on approximately PTA opioid doses.  Will need to continue to monitor as he begins sitting protocol.  - Per plastics:  - No pressure on the flap, okay to turn toward the left and back but not toward the right side  - No sitting x 2 weeks (through 7/1).  Then can initiate Sitting x 10-15 minutes; 5-6 times a day.  Increase by 10-15 minutes each day.  Monitor incision site, go back to less minutes if any concerns with incision site.  - Drain Care: strip, empty, and record daily.  Per plastics, if <30 mL per day, ok to remove drain.     With usual precautions, suture holding drain was cut and drain removed without incident. Dressing applied. 7/5/2022     - Wound Care: Ice as needed  - Edema management per lymphedema therapists  - Follow up with plastic surgery (Dr. Butt) on 7/6 (or may need to reschedule vs see as inpatient pending discharge from ARU)    #Acute blood loss anemia  #Hx of HILARIA and anemia of chronic disease  Patient required 10 units pRBC due to significant blood loss during the procedure  - Trend CBC.  Hgb stable at 8.1 on 6/29     #Leukocytosis  Downtrending from 20.1 on 6/18.  Suspect acute stress reactant from above surgery and acute blood loss.  No signs of infection at this time.   - Trend CBC.  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  - Continue Escitalopram 10 mg qday  - Monitor mood, consult mental health supports if indicated     #Scrotal Edema  Noted prior to transfer, lymphedema consult likely needed     #Constipation  - With patient on significant amount of opioids will monitor for opioid induced constipation and treat as needed    #R IT wound - pressure vs trauma  Noted on ARU admission  - WOC consulted, appreciate assistance  - Wound care as ordered    #Severe malnutrition in context  of acute illness or injury  - Continue regular diet, thin liquids, supplements  - RD following, appreciate assistance    1. Adjustment to disability:  Clinical psychology to eval and treat if indicated  2. FEN: Regular Diet  3. Bowel: continent, monitor for PRN bowel meds available  4. Bladder: Continent  5. DVT Prophylaxis: Lovenox  6. GI Prophylaxis: Not Indicated  7. Code: Full Code  8. Disposition: Home  9. ELOS:  10 days.  10. Follow up Appointments on Discharge: PCP, Orthopedics w/ Dr. Whitaker in 2 weeks, no imaging needed    Doing well. Continue cares and plans outlined.     Luke Ocasio MD

## 2022-07-06 NOTE — PLAN OF CARE
FOCUS/GOAL  Bladder management, Pain management, Cognition/Memory/Judgment/Problem solving, and Safety management    ASSESSMENT, INTERVENTIONS AND CONTINUING PLAN FOR GOAL:  Pt is alert and oriented. Continent of bladder, voiding without difficulty using urinal independently. Requested oxycodone x 2 for report of pain at old AMANDA site/groin. Appeared to be sleeping during rounds. Uses call light appropriately, able to make needs known.

## 2022-07-06 NOTE — PROGRESS NOTES
Discharge Planner Post-Acute Rehab OT:      Discharge Plan: Home with  OT services. Discharge: 7/8/22     Precautions: falls, R gluteal flap: No pressure on the flap, okay to turn toward the left and back but not toward the right side     Starting 7/1 Sitting tolerance program: Sitting x 10-15 minutes; 5-6 times a day. Increase by 10-15 minutes each day. Please track on chart posted to whiteboard.     Current Status:  ADLs:    Mobility: SBA SPT to chair with FWW    Grooming: Set-up with G/H tasks seated    Dressing: Set up with UB seated, SBA with LBD tasks with FWW with AE    Bathing: Assistance to wash/rinse/dry 6/10 body parts supine  Toileting: SBA/CGA SPT FWW EOB <> padded BSC. Total A cares.  IADLs: Pt's sister assisted with cooking, cleaning, laundry, and med mgmt.   Vision/Cognition: Pt is Ox3. Pt has corrective lenses and is far sighted. Able to direct cares well, good recall of techniques.      Assessment: Pt making steady progress with ADLs with FWW. Pt ambulating in BR with CGA with FWW. Pt requires SBA with SPT with FWW to/from BSC and requires SBA with toilet hygiene and LBD tasks with FWW. Spent time working on ordering pt's padded bedside commode through [facility].      Other Barriers to Discharge (DME, Family Training, etc): Family training with sister prior to discharge.   AE/DME: pt has SEC, FWW, RTS, and shower chair. Pt will need padded commode and shower chair, and W/C.

## 2022-07-06 NOTE — DISCHARGE SUMMARY
[facility]   Acute Rehabilitation Unit  Discharge summary   Name: Vic Scott III   YOB: 1975 (47 year old y.o.)  MRN: [id number]  Date of Admission: 6/28/2022  Date of Discharge: 07/08/2022  Disposition: Home with home care  Primary Care Physician: Melanie Ref-Primary, Physician  Attending physician: Luke Ocasio MD  Other significant physician provider(s):   - Orthopedic surgery, Dr. Wilkins  - Plastic surgery, Dr. Butt  - Oncology Dr. 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 Patient initially presented to [facility] emergency department 6/5/2022 with two day history of severe right thigh pain. Further workup demonstrated a pathological fracture of right femur.  Patient was transferred to [facility] for further management.  Upon arrival patient was found to have Hgb of 7 and received 1 unit of PRBC.  He underwent a right hindquarter amputation/hemipelvectomy by orthopedics and free gluteal flap w/ SPY by plastic surgery on 6/17.  During procedure patient had significant blood loss estimated at 3 liters and received 10 units of pRBCs.  Patient was extubated on POD 1, and had a relatively uncomplicated recovery besides adequate pain management. During acute hospitalization, patient was seen and evaluated by PT and OT, who collectively recommended that patient would benefit from ongoing therapies in the acute inpatient rehabilitation setting and was  subsequently admitted to the ARU on 06/28/2022.       Medical Course:     High Grade pleomorphic Sarcoma of right thigh s/p Neoadjuvant Chemotherapy  S/p Right hindquarter amputation w/ wound flap closure  Diagnosed 9/24/21.  Followed by Dr. Ambrocio (oncology).    - Plastic surgery post op protocol.   - No pressure on the flap, okay to turn toward the left and back but not toward the right side  - No sitting x 2 weeks (through 7/1). Then can initiate Sitting x 10-15 minutes; 5-6 times a day.  Increase by 10-15 minutes each day.  Monitor incision site, go back to less minutes if any concerns with incision site.  - Drain Care: If output < 30 mL per day, ok to remove drain. Drain removed on 07/05/2022 and dressing applied  - Wound Care: Ice as needed  - F/u plastic surgery Dr. Butt  - F/u with oncology Dr. Ambrocio    Pathologic right proximal femur fracture   - F/u Orthopedic surgery, Dr. Claudette FATIMA IT wound - pressure vs trauma  Noted on ARU admission  - WOC consulted, appreciate assistance  - Wound care as ordered    Acute on Chronic Pain management  Patient evaluated by pain consult service. Patient evaluated by inpatient pain service  - Continue Morphine 30 mg q12 hour (home dose)  - Continue Oxycodone 10-20 mg q3 hours (home dose was max of 80 mg/day)  - Continue Tylenol 975 mg q8hrs  - Wean opioids back to home doses as tolerated.  - At time of discharge, pain was well managed on home PTA doses.     Acute blood loss anemia  Hx of anemia of chronic disease  Patient required 10 units pRBC due to significant blood loss during the procedure  - Hgb stable at 7.7 on 7/6/22      Leukocytosis  WBC 20.1 on 6/18. Suspecting 2/2 acute stress reactant from above surgery and acute blood loss. No signs of infection during admission. 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  - Continue Escitalopram 10 mg qday     Scrotal  Edema  Noted prior to transfer, lymphedema consult likely needed.  - F/u cancer rehab, Lymphedema management       Consultations:     Psychiatry        Procedures Performed:     No procedures performed during admission     Rehab Course:       Physical Therapy:   Discharged to home with home therapy.     Progress towards therapy goal(s). See goals on Care Plan in [facility] electronic health record for goal details.  Goals met     Therapy recommendation(s):    Continued therapy is recommended.  Rationale/Recommendations:  Has met goals for safe discharge home w/ walker and w/c for mobility. Pelvic floor pain 90% resolved. (L) LE remains weak, will benefit from home care therapy to develop, progress, and reinforce HEP.     Occupational Therapy   Discharged to home with home therapy.     Progress towards therapy goal(s). See goals on Care Plan in [facility] electronic health record for goal details.  Goals met. Pt progressed to Mod I ADLs with FWW and Mod I ambulation in room with A prn for environmental set up.        Therapy recommendation(s):    Continued therapy is recommended.  Rationale/Recommendations: Pt will benefit from continued skilled occupational therapy to further address independence in ADLs, address IADLs, leisure and community engagement, and return to driving.      AE/DME: Pt SEC, FWW, RTS, shower chair, padded drop arm BSC, shower chair, dressing AE (leg , shoe horn, reacher, sock aide) and w/c.      Pt issued UB HEP for strengthening and theraband (green and blue).     Precautions: falls, R gluteal flap: okay to turn toward the left and back but not toward the right side     Starting 7/1 Sitting tolerance program: Sitting x 10-15 minutes; 5-6 times a day. Increase by 10-15 minutes each day. At discharge pt tolerating up to 135 minutes sitting daily on padded seat cushion.         Discharge Disposition:     Discharged to home           Condition on Discharge:     Decision Maker: Self  Code Status:  Full  Condition: Good  Diet: Regular, Thin liquids     Objective:       Examination:   VITALS  Vitals:    07/05/22 0514 07/05/22 1025 07/05/22 1500 07/06/22 0700   BP:  119/63 130/64 109/69   BP Location:  Right arm Right arm Right arm   Patient Position:       Cuff Size:       Pulse:  98 110 93   Resp:  16 16 16   Temp:  97.5  F (36.4  C) 98.6  F (37  C) 97.8  F (36.6  C)   TempSrc:  Oral Oral Oral   SpO2:  99% 98% 99%   Weight: 80 kg (176 lb 4.8 oz)      Height:         physical examination    Gen: NAD, lying in bed  HEENT: NC/AT, MMM  Cardio: RRR, no murmurs  Pulm: non-labored on room air, lungs CTA bilaterally  Abd: soft, non-tender, non-distended, bowel sounds present  Ext: no edema appreciated in LLE, +scrotal edema,   Neuro/MSK: awake, alert. 5/5 strength in b/l UE and LLE.    Per PS:  Wound edges in tact, glue peeling. Glue removed.  Photo in chart.  Small area of superficial breakdown near groin and incision without tunneling.      Cover with gauze and tape  Okay to shower  Follow up in [facility] at [facility] on 7/13 at 8am  My chart message will be sent with details       Discharge Medications:      Discharge Medication List as of 7/8/2022 10:18 AM      CONTINUE these medications which have CHANGED    Details   acetaminophen (TYLENOL) 325 MG tablet Take 3 tablets (975 mg) by mouth every 8 hours, Disp-270 tablet, R-1, Local Print      escitalopram (LEXAPRO) 10 MG tablet Take 1 tablet (10 mg) by mouth daily, Disp-30 tablet, R-1, Local Print      morphine (MS CONTIN) 30 MG CR tablet Take 1 tablet (30 mg) by mouth every 12 hours, Disp-60 tablet, R-0, Local Print      multivitamin, therapeutic (THERA-VIT) TABS tablet Take 1 tablet by mouth daily for 30 days, Disp-30 tablet, R-1, E-Prescribe      oxyCODONE IR (ROXICODONE) 10 MG tablet Take 1 tablet (10 mg) by mouth every 4 hours as needed for moderate to severe pain, Disp-100 tablet, R-0, TREMAYNE, Local Print         STOP taking these medications       albuterol  (PROAIR HFA/PROVENTIL HFA/VENTOLIN HFA) 108 (90 Base) MCG/ACT inhaler Comments:   Reason for Stopping:         diclofenac (VOLTAREN) 1 % topical gel Comments:   Reason for Stopping:         enoxaparin ANTICOAGULANT (LOVENOX) 40 MG/0.4ML syringe Comments:   Reason for Stopping:         ibuprofen (ADVIL/MOTRIN) 200 MG tablet Comments:   Reason for Stopping:         ondansetron (ZOFRAN) 8 MG tablet Comments:   Reason for Stopping:         polyethylene glycol (MIRALAX) 17 g packet Comments:   Reason for Stopping:         prochlorperazine (COMPAZINE) 5 MG tablet Comments:   Reason for Stopping:         QUEtiapine (SEROQUEL) 25 MG tablet Comments:   Reason for Stopping:         sennosides (SENOKOT) 8.6 MG tablet Comments:   Reason for Stoppin.  Adjustment to disability:  Clinical psychology to eval and treat if indicated  2. FEN: Regular Diet  3. Bowel: continent, monitor for PRN bowel meds available  4. Bladder: Continent  5. DVT Prophylaxis: Lovenox  6. GI Prophylaxis: Not Indicated  7. Code: Full Code  8. Disposition: Home with home care  9. ELOS:  22     Follow Up Appointments   1. Primary care  2. PM&R, Dr. Ortiz   3. Orthopedic surgery, Dr. Wilkins  4. plastic surgery Dr. Butt       Discharge summary was forwarded to No Ref-Primary, Physician (PCP) at the time of discharge, so as to bridge from hospital to outpatient care.   It was our pleasure to care for Vic Scott III during this hospitalization. Please do not hesitate to contact me should there be questions regarding the hospital course or discharge plan.      I was assisted by Leo Orellana MD for this discharge.      TT 45 min    Luke Ocasio MD

## 2022-07-06 NOTE — PLAN OF CARE
Discharge Planner Post-Acute Rehab PT:     Discharge Plan: home w/ his sister, justin, 3 steps to enter, lives on main level    Precautions: falls; (R) gluteal flap:   -- No pressure on flap, okay to turn towards left and back but NOT towards the right side    --sitting x 10-15 minutes; 5-6 times a day.  Increase by 10-15 minutes each day; tracking sheet on white board     Monitor incision site, go back to fewer minutes if any concerns with incision site.    Current Status:  Bed Mobility: ind w/ rail and raised HOB; leg   Transfer: sit<>stand w/ SBA x1, ww  Gait: ww, 25', CGA, steady, smooth pattern  Stairs: NT  Balance: stands w/ walker, able to release one hand    Assessment:  A.m. session: focus on functional mobility, limb strengthening: amb ww, CGA, 30' x 6 w/ sitting rest between each; LAQ's while sitting; standing UE press ups, sitting push-ups for pressure reliefs    P.m. session: Pt reported not having to complete stairs at sister's home and declined stair training. Agreeable to gait activity to simulate community activity: amb ~25ft x 2, FWW, CGA with w/c follow outside to simulate community amb, crossing uneven sidewalk and cobblestones, performance of incline/decline. Pt displayed good control of velocity w/no LOB and good safety awareness.    Pt reported being interested in becoming mod I in room with transfers.     Other Barriers to Discharge (DME, Family Training, etc):   Manual w/c-eval 7/1; will map for cushion 7/8  Walker  Family training stairs

## 2022-07-06 NOTE — PROGRESS NOTES
"  [facility]   Acute Rehabilitation Unit  Daily progress note    INTERVAL HISTORY  Nursing and therapy notes reviewed. No acute events overnight.  TR held and reviewed. On track for Friday discharge with home cares.    Fabian reports doing well this morning. He denies any current pain or discomfort, slept well overnight. Reviewed groin site.  Reports good appetite. He denies any CP, SOB, or abdominal pain.    Functionally      Mobility: SBA SPT to chair with FWW    Grooming: Set-up with G/H tasks seated    Dressing: Set up with UB seated, SBA with LBD tasks with FWW with AE    Bathing: Assistance to wash/rinse/dry 6/10 body parts supine  Toileting: SBA/CGA SPT FWW EOB <> padded BSC. Total A cares.  IADLs: Pt's sister assisted with cooking, cleaning, laundry, and med mgmt.   Vision/Cognition: Pt is Ox3. Pt has corrective lenses and is far sighted. Able to direct cares well, good recall of techniques.      Assessment: Pt making steady progress with ADLs with FWW. Pt ambulating in BR with CGA with FWW. Pt requires SBA with SPT with FWW to/from BSC and requires SBA with toilet hygiene and LBD tasks with FWW. Spent time working on ordering pt's padded bedside commode through [facility].     MEDICATIONS    acetaminophen  975 mg Oral Q8H     enoxaparin ANTICOAGULANT  40 mg Subcutaneous Q24H     escitalopram  10 mg Oral Daily     morphine  30 mg Oral Q12H [facility] (08/20)        naloxone **OR** naloxone **OR** naloxone **OR** naloxone, oxyCODONE, polyethylene glycol, senna-docusate     PHYSICAL EXAM  /71 (BP Location: Right arm)   Pulse 120   Temp 98.7  F (37.1  C) (Oral)   Resp 18   Ht 1.88 m (6' 2\")   Wt 80 kg (176 lb 4.8 oz)   SpO2 97%   BMI 22.64 kg/m     Gen: NAD, lying in bed  HEENT: NC/AT, MMM  Cardio: RRR, no murmurs  Pulm: non-labored on room air, lungs CTA bilaterally  Abd: soft, non-tender, non-distended, bowel sounds present  Ext: no edema appreciated in LLE, " +scrotal edema,   Neuro/MSK: awake, alert. 5/5 strength in b/l UE and LLE.    LABS  CBC RESULTS:   Recent Labs   Lab Test 07/06/22  0540 06/29/22  0538 06/28/22  0514 06/25/22  0644 06/21/22  1142   WBC 12.8* 14.7*  --   --  14.6*   RBC 2.91* 2.96*  --   --  3.19*   HGB 7.7* 8.1*  --   --  8.9*   HCT 25.5* 25.4*  --   --  26.9*   MCV 88 86  --   --  84   MCH 26.5 27.4  --   --  27.9   MCHC 30.2* 31.9  --   --  33.1   RDW 17.8* 16.9*  --   --  16.5*   *  465* 526* 586*   < > 303    < > = values in this interval not displayed.     Last Basic Metabolic Panel:  Recent Labs   Lab Test 07/06/22  0540 06/29/22  0538 06/28/22  0515 06/27/22  0830 06/22/22  0831 06/21/22  1142 06/20/22  0523   NA  --  139  --   --   --  136 136   POTASSIUM  --  4.3 4.0 3.8   < > 4.5 3.8   CHLORIDE  --  103  --   --   --  105 106   CO2  --  31  --   --   --  22 24   ANIONGAP  --  5  --   --   --  9 6   GLC  --  107*  --   --   --  116* 147*   BUN  --  16  --   --   --  5.6* 10   CR 0.51* 0.51* 0.51*  --    < > 0.41* 0.49*   GFRESTIMATED >90 >90 >90  --    < > >90 >90   VENTURA  --  9.0  --   --   --  7.5* 7.2*    < > = values in this interval not displayed.       Rehabilitation - continue comprehensive acute inpatient rehabilitation program with multidisciplinary approach including therapies, rehab nursing, and physiatry following. See interval history for updates.      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  Followed by Dr. Ambrocio (oncology).    - Pain Management: Patient evaluated by inpatient pain service  - Continue Morphine 30 mg q12 hour (home dose)  - Continue Oxycodone 10-20 mg q3 hours (home dose was max of 80 mg/day)  - Continue Tylenol 975 mg q8hrs  - Wean opioids back to home doses as tolerated.  - 6/30: currently well-managed on approximately PTA opioid doses.  Will need to continue to monitor as he begins sitting protocol.  - Per plastics:  - No pressure on the flap, okay to turn toward the left and back but not toward the right side  - No sitting x 2 weeks (through 7/1).  Then can initiate Sitting x 10-15 minutes; 5-6 times a day.  Increase by 10-15 minutes each day.  Monitor incision site, go back to less minutes if any concerns with incision site.  - Drain Care: strip, empty, and record daily.  Per plastics, if <30 mL per day, ok to remove drain.     With usual precautions, suture holding drain was cut and drain removed without incident. Dressing applied. 7/5/2022     - Wound Care: Ice as needed  - Edema management per lymphedema therapists  - Follow up with plastic surgery (Dr. Butt) on 7/6 (or may need to reschedule vs see as inpatient pending discharge from ARU)    #Acute blood loss anemia  #Hx of HILARIA and anemia of chronic disease  Patient required 10 units pRBC due to significant blood loss during the procedure  - Trend CBC.  Hgb stable at 7.7 7/6/22     #Leukocytosis  Downtrending from 20.1 on 6/18.  Suspect acute stress reactant from above surgery and acute blood loss.  No signs of infection at this time.   - Trend CBC.  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  - Continue Escitalopram 10 mg qday  - Monitor mood, consult mental health supports if indicated     #Scrotal Edema  Noted prior to transfer, lymphedema consult likely needed     #Constipation  - With patient on significant  amount of opioids will monitor for opioid induced constipation and treat as needed    #R IT wound - pressure vs trauma  Noted on ARU admission  - WOC consulted, appreciate assistance  - Wound care as ordered    #Severe malnutrition in context of acute illness or injury  - Continue regular diet, thin liquids, supplements  - RD following, appreciate assistance    1. Adjustment to disability:  Clinical psychology to eval and treat if indicated  2. FEN: Regular Diet  3. Bowel: continent, monitor for PRN bowel meds available  4. Bladder: Continent  5. DVT Prophylaxis: Lovenox  6. GI Prophylaxis: Not Indicated  7. Code: Full Code  8. Disposition: Home with home care  9. ELOS:  7/7/22  10. Follow up Appointments on Discharge: PCP, Orthopedics w/ Dr. Whitaker in 2 weeks, no imaging needed    Doing well. Continue cares and plans outlined.     Luke Ocasio MD

## 2022-07-06 NOTE — PLAN OF CARE
Goal Outcome Evaluation: Progressing    Cognition: Pt is AOx4  Pain: Reported left incisional pain. PRN oxy given with relief.   Skin: No new concerns. Incision WDL.   Bowel/bladder: Continent of bowel and bladder. Uses urinal independently.   Transfer: A2 pivot transfer with walker and gait belt to W/C/  Vital signs: VSS. Denied SOB, chest pain, numbness/tingling.  Diet: Tolerating reg/thin

## 2022-07-06 NOTE — PROGRESS NOTES
Discharge address: [address] [place] [address] [address] [place] [address]    MAKENNA Hobson   [facility]   Direct Phone: [phone]  I   Pager: [phone]  I  Fax: [phone]

## 2022-07-06 NOTE — PROGRESS NOTES
[facility]  WOC Nurse Inpatient Assessment     Consulted for: R IT    Patient History (according to provider note(s):      Vic Scott III is a 47-year-old male with high grade pleomorphic sarcoma of the right thigh s/p neoadjuvant chemotherapy. Patient presented with worsening right hip and thigh pain and was found to have a pathologic right proximal femur fracture. Patient now s/p right hindquarter amputation with Dr. Whitaker and primary complex wound closure with Dr. Butt on 6/17.  Procedure was notable for significant blood loss requiring transfusion of 10 units pRBC (EBL almost 3 liters). Patient remained intubated and was transferred to SICU postop, extubated on POD1. Stable for transfer to floor on POD2. Now progressing as anticipated, stable for discharge.    Areas Assessed:      Areas visualized during today's visit: Posterior surfaces     Pressure Injury Location: R IT      Last photo: 6/29  Wound type: Pressure Injury and versus trauma     Pressure Injury Stage: , present on admission   Wound history/plan of care:   Hypopigmented area with superficial scab in center. Pt states he scratches area frequently. Howver, this is on a bony area and can not rule out pressure.     Wound base: resurfaced      Treatment Plan:     R IT wound(s): Every 3 days   Cleanse with wound cleanser    Cover with mepilex  Geomatt cushion while in chair.     Orders: Written    RECOMMEND PRIMARY TEAM ORDER: None, at this time  Education provided: importance of repositioning, plan of care and wound progress  Discussed plan of care with: Patient and Nurse  WOC nurse follow-up plan: signing off  Notify WOC if wound(s) deteriorate.  Nursing to notify the Provider(s) and re-consult the WOC Nurse if new skin concern.    DATA:     Current support surface: Standard  Atmos Air mattress  Containment of urine/stool: Continent of bladder and Continent of bowel  BMI: Body mass index is 22.64  kg/m .   Active diet order: Orders Placed This Encounter      Regular Diet Adult Thin Liquids (level 0)     Output: I/O last 3 completed shifts:  In: 480 [P.O.:480]  Out: 1450 [Urine:1450]     Labs: No lab results found in last 7 days.    Invalid input(s): GLUCOMBO  Pressure injury risk assessment:   Sensory Perception: 4-->no impairment  Moisture: 4-->rarely moist  Activity: 1-->bedfast  Mobility: 3-->slightly limited  Nutrition: 3-->adequate  Friction and Shear: 2-->potential problem  German Score: 17    Claire Cerna RN CWOCN  Dept. Pager: [phone]

## 2022-07-06 NOTE — PROGRESS NOTES
CLINICAL NUTRITION SERVICES - REASSESSMENT NOTE     Nutrition Prescription    RECOMMENDATIONS FOR MDs/PROVIDERS TO ORDER:  None at this time    Malnutrition Status:    Patient does not meet two of the established criteria necessary for diagnosing malnutrition but is at risk for malnutrition    Recommendations already ordered by Registered Dietitian (RD):  Continue with Ensure Enlive TID with meals    Future/Additional Recommendations:  Monitor PO/supplement intake, weight trends     EVALUATION OF THE PROGRESS TOWARD GOALS   Diet: Regular  Supplements: Ensure Enlive TID w/ meals  Intake: % per flowsheets  Per [employer], last 3 day average: 3065 kcals and 128 gm protein. This meets >100% of estimated energy and protein needs (assumes pt consumed 100% of all meals and supplements).     NEW FINDINGS   Pt reports eating well and consuming 100% of the 3 Ensure each day. He estimates eating about 50% of the meals he orders. His appetite has been improving and he has no nutrition concerns at this time. Discussed importance of adequate protein for healing and recovery.    Weight:   07/05/22 0514 80 kg (176 lb 4.8 oz)   06/28/22 1325 78.2 kg (172 lb 6.4 oz) -- admit wt     Wt Readings from Last 5 Encounters:   07/05/22 80 kg (176 lb 4.8 oz)   06/17/22 75.8 kg (167 lb 1.7 oz)   06/05/22 106.6 kg (235 lb)   12/01/21 114.5 kg (252 lb 6.4 oz)   11/09/21 116.8 kg (257 lb 6.4 oz)     6/17/22 weight is on date of surgery for R high quarter amputation  Weight trending up since amputation    Labs:   Cr: 0.51(L)    Meds:  Senna-docusate PRN (given 7/6)    GI:  Last BM 7/5 x1    Skin:  WOCN 6/29: pressure injury location R IT    MALNUTRITION  % Intake: No decreased intake noted  % Weight Loss: None noted  Subcutaneous Fat Loss: None observed  Muscle Loss: None observed  Fluid Accumulation/Edema: Mild  Malnutrition Diagnosis: Patient does not meet two of the established criteria necessary for diagnosing malnutrition but is at  risk for malnutrition    Previous Goals   Patient to consume % of nutritionally adequate meal trays TID, or the equivalent with supplements/snacks.  Evaluation: Met    Previous Nutrition Diagnosis  Increased nutrient needs related to wound healing as evidenced by therapeutic recommendations, labs (low prealbumin), weight loss beyond estimated loss for amputation   Evaluation: Improving    CURRENT NUTRITION DIAGNOSIS  Increased nutrient needs related to wound healing as evidenced by therapeutic recommendations.    INTERVENTIONS  Implementation  Medical food supplement therapy - continue with current supplements    Goals  Patient to consume % of nutritionally adequate meal trays TID, or the equivalent with supplements/snacks.    Monitoring/Evaluation  Progress toward goals will be monitored and evaluated per protocol.    Ally Crooks RD  Unit pager: [phone]

## 2022-07-06 NOTE — CARE CONFERENCE
Acute Rehab Care Conference/Team Rounds      Type: Team Rounds    Present: Dr. Luke Ocasio, Obdulia Bhat PT, Rupal Adler OT, Naomi VALENCIA, Thea Vincent RD, Mi Hall RN, and Fabian Scott Patient.       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 Uses call light appropriately, able to make needs known. Bed alarm in use.     Pain: Reports medial groin pain from previous AMANDA drain. Taking oxycodone PRN with adequate relief.    Medications, Skin, Tubes/Lines: Medications taken whole with water. Skin with right hip incision and old AMANDA site. No tubes or lines.     Swallowing/Nutrition:    Bowel/Bladder: Continent of bladder, voiding without difficulty using urinal. Continent of bowel, last BM 7/5. PRN senna given due to hard stool.    Psychosocial: Lives in two-[facility] with sister. Walker at home PTA. Disabled. Driving PTA. : Herm[place] (PH: [phone]). Depression reported. No substance abuse concerns. Good support.     ADLs/IADLs: Pt making steady progress with ADLs. Pt tolerating sitting up in chair for 30 minutes at a time. Pt requires set-up with UBD tasks and CGA with LBD tasks with FWW with use of AE. Pt requires CGA with SPT to/from padded commode. Pt requires set-up with G/H tasks seated. Pt has ordered total hip kit through [employer] (reacher, sock aid, LHSH, dressing stick, long handled sponge), leg , and padded commode. HC OT services    Mobility: Up in room w/ walker and assist; sitting time is progressing w/o injury to flap; stairs goal discontinued as he is going to a home w/ only a threshold to enter; pelvic  floor spasms decreasing and groin pain improving daily, ongoing concern for spasm in puborectalis and need for OP pelvic ; w/c prescription complete, cushion TBD after mapping appt on Friday 7/8; on track for discharge home Friday after therapy w/ home care f/u.     Cognition/Language: Intact    Community Re-Entry: w/c based    Transportation: Not a , car transfer not a barrier    Decision maker: self    Plan of Care and goals reviewed and updated.    Discharge Plan/Recommendations    Fall Precautions: continue    Patient/Family input to goals: Yes    Anticipated rehab needs following discharge: Home with Family    Anticipated care giver support after discharge: Family    Estimated length of stay: 7/7/22    Overall plan for the patient: Continue IP Rehabilitation.       Utilization Review and Continued Stay Justification    Medical Necessity Criteria:    For any criteria that is not met, please document reason and plan for discharge, transfer, or modification of plan of care to address.    Requires intensive rehabilitation program to treat functional deficits?: Yes    Requires 3x per week or greater involvement of rehabilitation physician to oversee rehabilitation program?: Yes    Requires rehabilitation nursing interventions?: Yes    Patient is making functional progress?: Yes    There is a potential for additional functional progress? Yes    Patient is participating in therapy 3 hours per day a minimum of 5 days per week or 15 hours per week in 7 day period?:Yes    Has discharge needs that require coordinated discharge planning approach?:Yes          Final Physician Sign off    Statement of Approval: I approve the plan of care.     Patient Goals  Social Work Goals: Confirm discharge recommendations with therapy, coordinate safe discharge plan and remain available to support and assist as needed.    OT Predicted Duration/Target Date for Goal Attainment: 07/08/22  Therapy Frequency (OT):  Daily  OT: Hygiene/Grooming: modified independent, within precautions, from wheelchair  OT: Upper Body Dressing: within precautions, Independent  OT: Lower Body Dressing: Modified independent, using adaptive equipment, within precautions  OT: Upper Body Bathing: Supervision/stand-by assist, using adaptive equipment, within precautions  OT: Lower Body Bathing: Supervision/stand-by assist, using adaptive equipment, with precautions  OT: Toilet Transfer/Toileting: Modified independent, toilet transfer, cleaning and garment management, using adaptive equipment, within precautions  OT: Meal Preparation: Modified independent, with simple meal preparation, using adaptive equipment, from wheelchair  OT: Home Management: Modified independent, with light demand household tasks, within precautions, from wheelchair, using adaptive equipment  OT: Goal 1: OT: Pt will safely complete shower transfers with appropriate AE/DME with SBA within sitting precautions.  OT: Goal 2: OT: Pt will demo IND with BUE HEP to increase UB strengthening needed for ADLs/IADLs and functional transfers/mobility    PT Predicted Duration/Target Date for Goal Attainment: 07/08/22  PT Frequency: 2x/day  PT: Bed Mobility: Goal Met  PT: Transfers: Modified independent  PT: Gait: Modified independent, Rolling walker, 25 feet  PT: Stairs: Completed (no stairs as previously reported)  PT: Wheelchair Mobility: 150 feet, manual wheelchair  PT: Perform aerobic activity with stable cardiovascular response: continuous activity, 15 minutes  PT: Goal 1: car transfer ind  PT: Goal 2: ind w/ HEP post op LE amputation                                                                                Nursing Goal:  Pain Management: Pt will continue to have pain managed throughout stay prior to discharge from ARU  Nursing Goal: Wound Management: Pt and caregiver will demonstrate incision cares by 7/8/2022.  Nursing Goal: Medication Management: Pt will verbalize understanding  of medication regimen by 7/8/2022.

## 2022-07-06 NOTE — PROGRESS NOTES
"Rehabilitation Medicine Commode Face to Face Note     Diagnosis: Amputee, right hip  Current height: 6'2\"  Current weight:176lbs  : 1975     Current transfer status: SBA with FWW  Current Toileting status: SBA with SPT to/from padded commode d/t Right gluteal flap (I.e. unable to place pressure on flap at this time d/t surgical precautions).   Current Mobility Status: CGA with functional mobility with FWW  Therapy team has ruled out the following less costly options:              Bedpan - Right gluteal flap    Raised toilet seat - Right gluteal flap               Toilet with grab bar - Right gluteal flap     Patient will use a commode for scheduled bowel/bladder program daily. Patient has been using this type of commode during acute rehab stay with success.     This equipment will allow them to be as IND as possible with toileting and reduce caregiver burden, it will be part of a fall prevention plan for safe mobility.      Patient has a family member willing and able to assist as necessary with toileting or patient is IND with toileting routine.      Arm and leg strength: UE strength and trunk control adequate to sit in a bedside commode.     Length of need: lifetime    Luke Ocasio MD   NPI: [id number]     Signature:  Date: 2022       "

## 2022-07-06 NOTE — PLAN OF CARE
Problem: Rehabilitation (IRF) Plan of Care  Goal: Plan of Care Review  Outcome: Ongoing, Progressing  Flowsheets (Taken 7/6/2022 1216)  Plan of Care Reviewed With: patient  Outcome Evaluation: Pt eating well and drinking Ensure Enlive TID. Appetite is improving  Overall Patient Progress: improving     Problem: Oral Intake Inadequate  Goal: Improved Oral Intake  Outcome: Ongoing, Progressing   Goal Outcome Evaluation:

## 2022-07-06 NOTE — PROGRESS NOTES
Planning discharge Fri 07/08. Notified that pt is going to another family member's house. HC recommended. SWer called [facility] and spoke with Cuca PH: [phone], F: [phone]. Per Cuca, she will look at see about staffing but worried that PT would be about 2 weeks out. Made a plan for this writer to get discharge address and fax additional updates, while Cuca reached out to her staff to see about availability. Final confirmation pending. If unable to provide therapy, [facility] may be able to at least accommodate HC RN and pt could do OP therapy.     Resident notified of update and pending plan. SWer met with pt at bedside. Confirmed discharge address, in previous SW note. Notified pt of transportation resources, he was not aware of this. Notified pt of [facility] CC referral and service, pt in agreement with SW sending referral on his behalf as he establishes care with new PCP and has f/u needs. SWer plan to bring pt a booklet with amputation resources. Pt sister plans to pick him up on day of discharge. HUC getting PCP apt established.     Will continue to follow.     ADDENDUM: [facility] accepted for RN, PT, and OT. Contact information added to pt AVS. Will fax orders and ppwk at time of discharge. Will update pt tomorrow and follow up on other items listed above. Sticky note for provider entered with confirmed discharge plan for HC.     Naomi Marie, [facility]   Direct Phone: [phone]  I   Pager: [phone]  I  Fax: [phone]

## 2022-07-06 NOTE — PLAN OF CARE
FOCUS/GOAL  Bowel management, Bladder management, Pain management, and Medical management    ASSESSMENT, INTERVENTIONS AND CONTINUING PLAN FOR GOAL:  Pt has been continent of bladder this shift. LBM 7/5. Per report pt's BM was hard yesterday. Offered PRN senna which pt did take x1. Pt c/o L groin pain and was given PRN oxy x2 with some relief. Will continue with POC.     Goal Outcome Evaluation:    Plan of Care Reviewed With: patient     Overall Patient Progress: no change

## 2022-07-07 NOTE — PROGRESS NOTES
Plastic and reconstructive surgery     Paged for wound check.     Patient in room, working with OT.    Wound edges in tact, glue peeling. Glue removed.  Photo in chart.  Small area of superficial breakdown near groin and incision without tunneling.     Cover with gauze and tape  Okay to shower  Follow up in [facility] at [facility] on 7/13 at 8am  My chart message will be sent with details     Sharon Darling PA-C

## 2022-07-07 NOTE — PLAN OF CARE
Goal Outcome Evaluation:    Plan of Care Reviewed With: patient     Overall Patient Progress: no change    Outcome Evaluation: No change in pt progress on shift.    Pt A&Ox4. Cont of bladder on shift, using urinal at bedside. Pt denies SOB, headache, nausea, or new numbness/tingling. Pt c/o R hip pain, PRN oxy given and scheduled tylenol provided mild relief. Call light in reach, alarms on, continue POC.

## 2022-07-07 NOTE — PROGRESS NOTES
received referral for Dr Ortiz in the PM&R clinic. Scheduling instructions updated and sent to New Patient Scheduling for completion.    Heidy Lomax, LEXIN, RN, PHN, OCN  Hematology/Oncology Nurse Navigator  [facility]  [phone]

## 2022-07-07 NOTE — PROGRESS NOTES
Met with pt at beside. Provided [facility] and services that they can provide. Information added to pt AVS, SWer will send orders at discharge, pt aware and agreeable.     SWer provided pt with [facility] Transportation information and included his are ID number. SWer provided pt with booklet from [employer] and list of other resources (including [employer]). Pt expressed feeling optomistic but realistic that at discharge, he may have good days and bad days. Pt stated that he would be interested in a support group and/or other resources. Reminded pt of psychology apt at OP, which was set up by Gabrielle (Behavioral Health SWer) last week. Highly encouraged pt to contact Shahzad Rodriges from [employer] for additional support. Pt stated that he would reach out to Shahzad and appreciated the information. Additional support, validation, and encouragement provided. SWer reminded pt of [facility] referral and pt continues to be in agreement. Referral form completed, clinicals printed, and entire referral for [facility] faxed.     SW will fax HC orders tomorrow at discharge. No additional SW needs or concerns. SW available if needs arise.     MAKENNA Hobson   [facility]   Direct Phone: [phone]  I   Pager: [phone]  I  Fax: [phone]

## 2022-07-07 NOTE — PLAN OF CARE
Goal Outcome Evaluation:    Plan of Care Reviewed With: patient     Overall Patient Progress: no change    Pt A/O, able to make needs known. Oxy given Q4, MScontin given as scheduled, rating pain 6-7 with relief after ice and medications. Up with 1 assist to BSC, BM today. Refused Covid swab this afternoon. No other care concerns at this time, continue with POC.

## 2022-07-07 NOTE — PLAN OF CARE
FOCUS/GOAL  Bowel management, Bladder management, Medication management, and Medical management    ASSESSMENT, INTERVENTIONS AND CONTINUING PLAN FOR GOAL:  Pt has been continent of bladder this shift. LBM 7/6. Reviewed medication list with pt. He asked appropriate questions and verbalized understanding. Notified by OT that pt had some steristrips that had come off his incision during session. Assessed site and applied new steristrips. No new drainage or dehiscence noted in that area, but groin side of incision appeared to be pulling away from skin and opening. Notified PMR Resident MD PAULA Orellana who assessed. No new orders at this time. Pt and his sister will need incision cares education prior to discharge planned for tomorrow 7/8. Pt c/o groin pain and was given scheduled and PRN medications with some relief. Will continue with POC.     Goal Outcome Evaluation:    Plan of Care Reviewed With: patient     Overall Patient Progress: no change

## 2022-07-08 NOTE — PROGRESS NOTES
"  [facility]   Acute Rehabilitation Unit  Daily progress note    INTERVAL HISTORY  Nursing and therapy notes reviewed. No acute events overnight.  Orders for new commode with padding given. On track for Friday discharge with home cares.    Fabian reports doing well this morning. He denies any current pain or discomfort, slept well overnight. Reviewed groin site. Willing to wean opioids.  Reports good appetite. He denies any CP, SOB, or abdominal pain.    Functionally      Mobility: SBA SPT to chair with FWW    Grooming: Set-up with G/H tasks seated    Dressing: Set up with UB seated, SBA with LBD tasks with FWW with AE    Bathing: Assistance to wash/rinse/dry 6/10 body parts supine  Toileting: SBA/CGA SPT FWW EOB <> padded BSC. Total A cares.  IADLs: Pt's sister assisted with cooking, cleaning, laundry, and med mgmt.   Vision/Cognition: Pt is Ox3. Pt has corrective lenses and is far sighted. Able to direct cares well, good recall of techniques.      Assessment: Pt making steady progress with ADLs with FWW. Pt ambulating in BR with CGA with FWW. Pt requires SBA with SPT with FWW to/from BSC and requires SBA with toilet hygiene and LBD tasks with FWW. Spent time working on ordering pt's padded bedside commode through [facility].     MEDICATIONS    acetaminophen  975 mg Oral Q8H     enoxaparin ANTICOAGULANT  40 mg Subcutaneous Q24H     escitalopram  10 mg Oral Daily     morphine  30 mg Oral Q12H [facility] (08/20)        naloxone **OR** naloxone **OR** naloxone **OR** naloxone, oxyCODONE, polyethylene glycol, senna-docusate     PHYSICAL EXAM  /63 (BP Location: Right arm, Patient Position: Semi-Alford's)   Pulse 110   Temp 98.1  F (36.7  C) (Oral)   Resp 18   Ht 1.88 m (6' 2\")   Wt 80 kg (176 lb 4.8 oz)   SpO2 97%   BMI 22.64 kg/m     Gen: NAD, lying in bed  HEENT: NC/AT, MMM  Cardio: RRR, no murmurs  Pulm: non-labored on room air, lungs CTA bilaterally  Abd: soft, " non-tender, non-distended, bowel sounds present  Ext: no edema appreciated in LLE, +scrotal edema,   Neuro/MSK: awake, alert. 5/5 strength in b/l UE and LLE.    LABS  CBC RESULTS:   Recent Labs   Lab Test 07/06/22  0540 06/29/22  0538 06/28/22  0514 06/25/22  0644 06/21/22  1142   WBC 12.8* 14.7*  --   --  14.6*   RBC 2.91* 2.96*  --   --  3.19*   HGB 7.7* 8.1*  --   --  8.9*   HCT 25.5* 25.4*  --   --  26.9*   MCV 88 86  --   --  84   MCH 26.5 27.4  --   --  27.9   MCHC 30.2* 31.9  --   --  33.1   RDW 17.8* 16.9*  --   --  16.5*   *  465* 526* 586*   < > 303    < > = values in this interval not displayed.     Last Basic Metabolic Panel:  Recent Labs   Lab Test 07/06/22  0540 06/29/22  0538 06/28/22  0515 06/27/22  0830 06/22/22  0831 06/21/22  1142 06/20/22  0523   NA  --  139  --   --   --  136 136   POTASSIUM  --  4.3 4.0 3.8   < > 4.5 3.8   CHLORIDE  --  103  --   --   --  105 106   CO2  --  31  --   --   --  22 24   ANIONGAP  --  5  --   --   --  9 6   GLC  --  107*  --   --   --  116* 147*   BUN  --  16  --   --   --  5.6* 10   CR 0.51* 0.51* 0.51*  --    < > 0.41* 0.49*   GFRESTIMATED >90 >90 >90  --    < > >90 >90   VENTURA  --  9.0  --   --   --  7.5* 7.2*    < > = values in this interval not displayed.       Rehabilitation - continue comprehensive acute inpatient rehabilitation program with multidisciplinary approach including therapies, rehab nursing, and physiatry following. See interval history for updates.      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  Followed by Dr. Ambrocio (oncology).    - Pain Management: Patient evaluated by inpatient pain service  - Continue Morphine 30 mg q12 hour (home dose)  - Continue Oxycodone 10-20 mg q3 hours (home dose was max of 80 mg/day)  - Continue Tylenol 975 mg q8hrs  - Wean opioids back to home doses as tolerated.  - 6/30: currently well-managed on approximately PTA opioid doses.  Will need to continue to monitor as he begins sitting protocol.  - Per plastics:  - No pressure on the flap, okay to turn toward the left and back but not toward the right side  - No sitting x 2 weeks (through 7/1).  Then can initiate Sitting x 10-15 minutes; 5-6 times a day.  Increase by 10-15 minutes each day.  Monitor incision site, go back to less minutes if any concerns with incision site.  - Drain Care: strip, empty, and record daily.  Per plastics, if <30 mL per day, ok to remove drain.     With usual precautions, suture holding drain was cut and drain removed without incident. Dressing applied. 7/5/2022     - Wound Care: Ice as needed  - Edema management per lymphedema therapists  - Follow up with plastic surgery (Dr. Butt) on 7/6 (or may need to reschedule vs see as inpatient pending discharge from ARU)    #Acute blood loss anemia  #Hx of HILARIA and anemia of chronic disease  Patient required 10 units pRBC due to significant blood loss during the procedure  - Trend CBC.  Hgb stable at 7.7 7/6/22     #Leukocytosis  Downtrending from 20.1 on 6/18.  Suspect acute stress reactant from above surgery and acute blood loss.  No signs of infection at this time.   - Trend CBC.  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  - Continue Escitalopram 10 mg qday  - Monitor mood, consult mental health supports if indicated     #Scrotal Edema  Noted prior to transfer,  lymphedema consult likely needed     #Constipation  - With patient on significant amount of opioids will monitor for opioid induced constipation and treat as needed    #R IT wound - pressure vs trauma  Noted on ARU admission  - WOC consulted, appreciate assistance  - Wound care as ordered    #Severe malnutrition in context of acute illness or injury  - Continue regular diet, thin liquids, supplements  - RD following, appreciate assistance    1. Adjustment to disability:  Clinical psychology to eval and treat if indicated  2. FEN: Regular Diet  3. Bowel: continent, monitor for PRN bowel meds available  4. Bladder: Continent  5. DVT Prophylaxis: Lovenox  6. GI Prophylaxis: Not Indicated  7. Code: Full Code  8. Disposition: Home with home care  9. ELOS:  7/7/22  10. Follow up Appointments on Discharge: PCP, Orthopedics w/ Dr. Whitaker in 2 weeks, no imaging needed    Doing well. Continue cares and plans outlined.     Luke Ocasio MD

## 2022-07-08 NOTE — PROGRESS NOTES
Home today. Orders faxed to HC agency. No additional SW needs.     Home Health Care:   [facility]   PH: [phone], F: [phone]  Nurse, physical therapy, and occupational therapy     Naomi Marie [facility]   Direct Phone: [phone]  I   Pager: [phone]  I  Fax: [phone]

## 2022-07-08 NOTE — PLAN OF CARE
"Goal Outcome Evaluation:    Plan of Care Reviewed With: patient     Overall Patient Progress: no change    Outcome Evaluation: No change in pt progress on shift.    Pt A&Ox4. Mod I with walker. Cont of bladder on shift, using urinal at bedside. Pt denies SOB, headache, nausea or new numbness/tingling. Pt c/o R groin pain, PRN oxy given x2 with mild relief. No new skin concerns. Pt couldn't sleep as \"anxious to get home\". Call light in reach, walker in reach, continue POC.  "

## 2022-07-08 NOTE — PROGRESS NOTES
Occupational Therapy Discharge Summary    Reason for therapy discharge:    Discharged to home with home therapy.    Progress towards therapy goal(s). See goals on Care Plan in [facility] electronic health record for goal details.  Goals met. Pt progressed to Mod I ADLs with FWW and Mod I ambulation in room with A prn for environmental set up.      Therapy recommendation(s):    Continued therapy is recommended.  Rationale/Recommendations: Pt will benefit from continued skilled occupational therapy to further address independence in ADLs, address IADLs, leisure and community engagement, and return to driving.     AE/DME: Pt SEC, FWW, RTS, shower chair, padded drop arm BSC, shower chair, dressing AE (leg , shoe horn, reacher, sock aide) and w/c.     Pt issued UB HEP for strengthening and theraband (green and blue).    Precautions: falls, R gluteal flap: okay to turn toward the left and back but not toward the right side     Starting 7/1 Sitting tolerance program: Sitting x 10-15 minutes; 5-6 times a day. Increase by 10-15 minutes each day. At discharge pt tolerating up to 135 minutes sitting daily on padded seat cushion.

## 2022-07-08 NOTE — PLAN OF CARE
Goal Outcome Evaluation: Progressing  Patient had a small amount of drainage on dressing after plastic surgery came and removed it.  New dressing applied.    Triggered sepsis protocol, lactic acid 0.8.  ,109.    Oxycodone given along with scheduled pain meds with partial relief per patient.  Plans to discharge tomorrow.  Sister will be coming to learn dressing change prior to discharge.

## 2022-07-08 NOTE — PLAN OF CARE
Discharge education provided to pt and all prescriptions signed by MD given to pt and questions answered. Wound dressing education provided to pt and family, extra supplies provided and questions answered. Pt and family verbalized understanding and no further concerns. Pt has been safely discharged from [facility].

## 2022-07-08 NOTE — PLAN OF CARE
Physical Therapy Discharge Summary    Reason for therapy discharge:    Discharged to home with home therapy.    Progress towards therapy goal(s). See goals on Care Plan in [facility] electronic health record for goal details.  Goals met    Therapy recommendation(s):    Continued therapy is recommended.  Rationale/Recommendations:  Has met goals for safe discharge home w/ walker and w/c for mobility. Pelvic floor pain 90% resolved. (L) LE remains weak, will benefit from home care therapy to develop, progress, and reinforce HEP.

## 2022-07-09 NOTE — PROGRESS NOTES
Clinic Care Coordination Contact  [facility]/Voicemail     Clinical Data: Care Coordinator Outreach - TCM      Outreach attempted x 1.  Left message on patient's voicemail, providing [facility]'s 24/7 scheduling and nurse triage phone number [phone] ([phone]) for questions/concerns and/or to schedule an appt with an [facility] provider, if they do not have a PCP.      Plan:  Care Coordinator will try to reach patient again in 1-2 business days.       Aniyah Jones RN  [facility], [facility]

## 2022-07-09 NOTE — TELEPHONE ENCOUNTER
Pt returning clinic call.  FNA advised that call came from clinic care coordination line.   Follow up on scheduling upcoming appointments.    Pt states he is already scheduled to see PCP and plastic surgery on 7/13.    Advised t care coordinator will attempt to call again in 1-2 business days.    Pt verbalized understanding, no further assistance requested.

## 2022-07-10 NOTE — PROGRESS NOTES
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 Patient initially presented to [facility] emergency department 6/5/2022 with two day history of severe right thigh pain. Further workup demonstrated a pathological fracture of right femur.  Patient was transferred to [facility] for further management.  Upon arrival patient was found to have Hgb of 7 and received 1 unit of PRBC.  He underwent a right hindquarter amputation/hemipelvectomy by orthopedics and free gluteal flap w/ SPY by plastic surgery on 6/17.  During procedure patient had significant blood loss estimated at 3 liters and received 10 units of pRBCs.  Patient was extubated on POD 1, and had a relatively uncomplicated recovery besides adequate pain management. During acute hospitalization, patient was seen and evaluated by PT and OT, who collectively recommended that patient would benefit from ongoing therapies in the acute inpatient rehabilitation setting and was subsequently admitted to the ARU on 06/28/2022.          ASSESSMENT      Enrollment  Primary Care Care Coordination Status: Not a Candidate    Discharge Assessment  How are you doing now  "that you are home?: \"I'm trying to adjust to being home. I was in the hospital for a month\"  How are your symptoms? (Red Flag symptoms escalate to triage hotline per guidelines): Improved  Do you feel your condition is stable enough to be safe at home until your provider visit?: Yes  Does the patient have their discharge instructions? : Yes  Does the patient have questions regarding their discharge instructions? : No  Were you started on any new medications or were there changes to any of your previous medications? : Yes  Does the patient have all of their medications?: No (see comment) (pharmacy was out of morphine and it will be ready Tuesday. Patient managing well with oxycodone)  Do you have questions regarding any of your medications? : No  Do you have all of your needed medical supplies or equipment (DME)?  (i.e. oxygen tank, CPAP, cane, etc.): No - What equipment or supplies are needed? (needs new shower chair. OT or PT home care (patient isn't sure which) is coming to home today. Asked patient to request from therapist and suggested sponge bath until received and cleared to shower by therapists)  Discharge follow-up appointment scheduled within 14 calendar days? : Yes  Discharge Follow Up Appointment Date: 07/13/22  Discharge Follow Up Appointment Scheduled with?: Specialty Care Provider (post-op with surgeon and establish care appointment with new PCP same day)         Post-op (Clinicians Only)  Did the patient have surgery or a procedure: Yes  Incision: closed;healing  Drainage: No  Bleeding: none  Fever: No  Chills: No  Redness: No  Warmth: No  Swelling: No  Incision site pain: Yes (managing with pain medications)  Closure: dissolving;suture  Pressure Reduction Devices: chair cushion utilized  Eating & Drinking: eating and drinking without complaints/concerns  PO Intake: regular diet  Bowel Function: normal  Date of last BM: 07/09/22  Urinary Status: voiding without complaint/concerns      PLAN            "                                           Outpatient Plan:  Keep appointments as scheduled. Continue home occupational and physical therapy visits.     Future Appointments   Date Time Provider Department Center   7/13/2022  8:00 AM KAREN Butt MD [facility]   7/13/2022  2:20 PM Rajesh Vidales APRN [facility]   7/20/2022  2:30 PM Matty Whitaker MD [facility]   7/27/2022  3:15 PM Latosha Taylor [facility]   8/3/2022  3:00 PM Leslie Ortiz MD [facility]         For any urgent concerns, please contact our 24 hour nurse triage line: [phone] ([phone])         Liliana Jones RN  [facility]

## 2022-07-20 NOTE — PROGRESS NOTES
Today's Date: Jul 20, 2022     Patient Vic Scott III 1975 presents to the clinic today to address   Chief Complaint   Patient presents with     [facility] F/U     Just got out of the hospital on 7/8/2022, everything has been going well     paperwork     Will need to contact [employer] for paperwork             SUBJECTIVE     History of Present Illness:    47-year-old male presents to clinic for hospital follow-up. PMH includes pleomorphic sarcoma of right pelvis/femus s/p chemotherapy. Patient went to [facility] ER on 6/5/22 with two-day history of severe right thigh pain. Workup demonstrated pathrological fracture of right femur. He was transfered to [facility] where he underwent a right hundquarter amputation/hemiplevectomy by orthopedics and free gluteal flap with SPY by plastic surgery on 6/17. Patient required 10units of PRBC during his procedure. He was admitted to acute rehab unit on 6/28/22. He was discharged home with home PT on 7/8/22. He reports that he currently has Home PT every other day and home health RN visiting every other day. He reports that [facility] (his [employer]) has papers that need to be signed. He saw Plastic surgery this morning and is scheduled to see Orthopedic surgery this afternoon. He has follow-up scheduled with Oncology on 7/27/22- Oncology manages his chronic pain. Today, the patient feels well. He currently denies fevers, fatigue, cough, SOB, CP, nausea, vomiting, diarrhea.       Review of Systems   Constitutional, HEENT, cardiovascular, pulmonary, gi and gu systems are negative, except as otherwise noted.                  Allergies   Allergen Reactions     Blood Transfusion Related (Informational Only) Other (See Comments)     Patient has a history of a clinically significant antibody against RBC antigens.  A delay in compatible RBCs may occur.         Current Outpatient Medications   Medication Instructions     acetaminophen (TYLENOL) 975 mg, Oral,  EVERY 8 HOURS     escitalopram (LEXAPRO) 10 mg, Oral, DAILY     morphine (MS CONTIN) 30 mg, Oral, EVERY 12 HOURS     multivitamin, therapeutic (THERA-VIT) TABS tablet 1 tablet, Oral, DAILY     oxyCODONE IR (ROXICODONE) 10 mg, Oral, EVERY 4 HOURS PRN       Past Medical History:   Diagnosis Date     Pleomorphic cell sarcoma (H)         Family History   Problem Relation Age of Onset     Heart Disease Mother      Coronary Artery Disease Father      Other - See Comments Father         pleomorphic cell sarcoma     Cancer Maternal Grandmother      No Known Problems Sister      No Known Problems Sister      No Known Problems Son      No Known Problems Daughter      No Known Problems Daughter      No Known Problems Daughter         Social History     Tobacco Use     Smoking status: Never Smoker     Smokeless tobacco: Never Used   Vaping Use     Vaping Use: Never used   Substance Use Topics     Alcohol use: Never     Drug use: Never        History   Sexual Activity     Sexual activity: Not Currently     Partners: Female     Birth control/ protection: Condom        PHQ 6/30/2022 7/20/2022   PHQ-9 Total Score 2 3   Q9: Thoughts of better off dead/self-harm past 2 weeks Not at all Not at all        Immunization History   Administered Date(s) Administered     DT (PEDS <7y) 03/01/2005     Td (Adult), Adsorbed 03/01/2005        Health Maintenance components reviewed -   COVID-2021 Infection.  Has not had vaccines.  Pneumonia Vaccine- Not done.  TDAP- Patient believes he's had one in [place] within past 10 years         OBJECTIVE     /78 (BP Location: Right arm, Patient Position: Sitting, Cuff Size: Adult Regular)   Pulse 105   Temp 98.1  F (36.7  C) (Oral)   Resp 15   Wt 79.4 kg (175 lb)   BMI 22.47 kg/m       Labs:  Lab Results   Component Value Date    WBC 12.8 (H) 07/06/2022    HGB 7.7 (L) 07/06/2022    HCT 25.5 (L) 07/06/2022     (H) 07/06/2022     (H) 07/06/2022    ALT 12 06/05/2022    AST 29  06/05/2022     06/29/2022    BUN 16 06/29/2022    CO2 31 06/29/2022    INR 1.34 (H) 06/17/2022        Physical Exam  Constitutional:       General: He is not in acute distress.     Appearance: He is not ill-appearing.      Comments: In Wheelchair   Cardiovascular:      Rate and Rhythm: Regular rhythm. Tachycardia present.      Heart sounds: Normal heart sounds. No murmur heard.    No friction rub. No gallop.   Pulmonary:      Effort: Pulmonary effort is normal. No respiratory distress.      Breath sounds: Normal breath sounds. No wheezing, rhonchi or rales.   Musculoskeletal:      Cervical back: Neck supple.      Comments: R leg hindquarter amputation   Neurological:      General: No focal deficit present.      Mental Status: He is alert.   Psychiatric:         Thought Content: Thought content normal.         Judgment: Judgment normal.               ASSESSMENT/PLAN     1. Anemia, unspecified type  Patient denies fatigue, CP. Will recheck CBC today,  - CBC with platelets; Future    2. Sarcoma (H)  Refill for multivitamin placed. >5 minute discussion on importance of COVID/Pneumonia vaccinations given history of Sarcoma and possible future chemo treatment.   Advised patient to have [employer] call/fax our clinic with requested information, will complete forms when received.  - multivitamin, therapeutic (THERA-VIT) TABS tablet; Take 1 tablet by mouth daily  Dispense: 30 tablet; Refill: 1    3. Hypoalbuminemia  Patient was on Ensure while in Rehab, rx sent given hypoalbuminemia last month. Will recheck albumin today.  - Nutritional Supplements (ENSURE ACTIVE HIGH PROTEIN) LIQD; Take 1 Can by mouth 3 times daily (with meals)  Dispense: 7110 mL; Refill: 1  - Albumin level; Future        Follow-Up:  - Follow up in 3 month(s), or if symptoms worsen or fail to improve.     Options for treatment and follow-up care were reviewed with the patient. Patient engaged in the decision making process and verbalized understanding  of the options discussed and agreed with the final plan.  AVS printed and given to patient.    CHET Greenwood [facility]  [facility]  [address] [address]  [phone]

## 2022-07-20 NOTE — NURSING NOTE
Reason For Visit:   Chief Complaint   Patient presents with     RECHECK     Post-op followup right hindquarter amputation DOS 6/17/22       There were no vitals taken for this visit.    Pain Assessment  Patient Currently in Pain: Yes  0-10 Pain Scale: 4  Primary Pain Location:  (right pelvis)    Yohan Dorantes ATC

## 2022-07-20 NOTE — NURSING NOTE
"Chief Complaint   Patient presents with     Surgical Followup     Post op, DOS: 6/17/22       Vitals:    07/20/22 1058   BP: (!) 141/87   BP Location: Right arm   Patient Position: Sitting   Cuff Size: Adult Regular   Pulse: 110   SpO2: 100%   Weight: 79.4 kg (175 lb)   Height: 1.88 m (6' 2\")       Body mass index is 22.47 kg/m .                          Caryn Hinojosa, EMT    "

## 2022-07-20 NOTE — LETTER
7/20/2022       RE: Vic Scott III  [address]  [place] [address]     Dear Colleague,    Thank you for referring your patient, Vic Scott III, to the [facility] PLAST[facility] SURGERY CLINIC [place] [facility]. Please see a copy of my visit note below.    PRESENTING COMPLAINT:  Postoperative visit status post right pelvic/thigh sarcoma requiring distal hip disarticulation and flap closure done 06/17/2022.    HISTORY OF PRESENTING COMPLAINT:  Mr. Scott is 47 years old.  He is a month out from his surgery, at home in a wheelchair, has been doing well in terms of maneuvering himself around, has not been putting too much pressure on the stump site.    PHYSICAL EXAMINATION:  Vital signs stable.  He is afebrile, in no obvious distress; 95% of the incision is well healed, a couple of superficial areas at the T-junction site anteriorly and at the most posterior aspect in the groin are still superficially open.  No evidence of infection.  He has no sensation in the flap.    ASSESSMENT AND PLAN:  Based on the above findings, a diagnosis of a right hip disarticulation and flap closure was made.  I think he is doing extremely well.  I think it is okay if he puts pressure on the flap now when he is sitting in a wheelchair, but every 10-15 minutes must give a respite to prevent pressure sores.  This was explained in detail to him.      I will see him back in 6 weeks to monitor his progress.  All of his questions were answered.  He was happy with the visit.  All exam and discussion done in presence of my nurse, Rachna Jurado.          Again, thank you for allowing me to participate in the care of your patient.      Sincerely,    KAREN Butt MD

## 2022-07-20 NOTE — PROGRESS NOTES
This patient is 1 month status post right hindquarter amputation.  He is happy with his progress thus far and considering visiting with the orthotist regarding any prosthetic possibilities.    The patient saw plastics today who reports that his wound is healing well.  They will see him back in 6 weeks.    Today I will get an x-ray of the pelvis to document his residual bony anatomy.  We will get an MRI in 3 months to check for local recurrence and a CT scan of the chest to look for metastatic disease.  I expressed the willingness to help with any of the patient's prosthetic or other needs.  I have answered all of his questions today.

## 2022-07-20 NOTE — LETTER
7/20/2022         RE: Vic Scott III  [address]  [place] [address]        Dear Colleague,    Thank you for referring your patient, Vic Scott III, to the [facility] [place]. Please see a copy of my visit note below.    This patient is 1 month status post right hindquarter amputation.  He is happy with his progress thus far and considering visiting with the orthotist regarding any prosthetic possibilities.    The patient saw plastics today who reports that his wound is healing well.  They will see him back in 6 weeks.    Today I will get an x-ray of the pelvis to document his residual bony anatomy.  We will get an MRI in 3 months to check for local recurrence and a CT scan of the chest to look for metastatic disease.  I expressed the willingness to help with any of the patient's prosthetic or other needs.  I have answered all of his questions today.        Matty Whitaker MD

## 2022-07-20 NOTE — LETTER
Date:July 21, 2022      Provider requested that no letter be sent. Do not send.       [facility]

## 2022-07-20 NOTE — NURSING NOTE
Chief Complaint   Patient presents with     [facility] F/U     Just got out of the hospital on 7/8/2022, everything has been going well     paperwork     Will need to contact [employer] for paperwork

## 2022-07-22 NOTE — TELEPHONE ENCOUNTER
Health Call Center    Phone Message:      SULMA MENARD from pt's home care called.  Pt's home care facility needs UPDATED WOUND CARE ORDERS from pt's appt on 7.20.22 (appt with MD Sherman)    Please FAX updated wound care orders to:    FAX:  [phone]    ALSO...    A prescription/update for wound care supplies is needed (bandages, etc).  Pt NEEDS Mepilex - Absorbent foam dressing. This information/script should go to [employer]:    Please FAX to [employer]  FAX:  [phone]    If you have any questions for [employer], please contact Sherley NOBLE, Customer Service, [employer], PHONE: [phone]    Please contact SULMA MENARD, at [phone] with ANY questions in regards to this TE.    Reason for Call: Other: Wound Care Order and Wound Care Supplies     Action Taken: Message routed to:  [facility] ([facility]): Team and MD    Travel Screening: Not Applicable

## 2022-07-22 NOTE — TELEPHONE ENCOUNTER
Writer called and talked with [facility] on phone. [facility] states that they have enough dressing to get pt through the weekend. But will need new orders Monday to be able to pick supplies up. Writer had informed [facility] that Dr. Willis's team is out of office today already and will reach back Monday. The home nurse that did dressing today stated that there is still quit a bit of drainage. They are uncertain if it is Dr. Whitaker that is following wound care or if should be someone else.     Ella Degroot, LIZZIE

## 2022-07-22 NOTE — CONFIDENTIAL NOTE
Called to discussed recent CBC/Fe panel. H/H low but stable at this time. Patient has follow-up with oncology next week. Patient can call our clinic for any questions or concerns.    CHET Greenwood, CNP  [facility] [employer]

## 2022-07-28 NOTE — PROGRESS NOTES
Fabian is a 47 year old who is being evaluated via a billable video visit.      How would you like to obtain your AVS? Mail a copy  If the video visit is dropped, the invitation should be resent by: Text to cell phone: [phone]  Will anyone else be joining your video visit? No      Ana Maria Huerta LORENZO    Video-Visit Details    Video Start Time: 9:58 AM    Type of service:  Video Visit    Video End Time:10:28 AM    Originating Location (pt. 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 Whitaker, Orthopedics with rapidly progressing right leg swelling since ~ July 2021. MRI was incomplete due to pain, but did show ~[id number] right thigh soft tissue mass with probable bony involvement of the proximal right femur. 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 Was seen by Dr. Whitaker to review the results on 10/1/21 - discussed consideration of neoadjuvant chemotherapy vs resection and reconstruction. Given high risk for amputation with massive reconstructive surgery, he was referred to Dr. Ambrocio and a PET was ordered which identifed concerning inguinal lymphadenopathy. He was seen by Dr. Ambrocio 10/26/21, ordered NGS (in process) and recommended starting Doxil/Ifos initially as an inpatient. Admitted for Cycle 1 Doxil/Ifos (H3U5=80/2/21).  Notably he had an episode of confusion while hospitalized on 11/8/21 concerning for ifosfamide neurotoxicity.  This resolved without administration of methylene blue. With cycle 2 treatment he also had severe neurotoxicity 12 hours into day 3 ifosfamide infusion. He again improved without methylene blue. Remainder of cycle 2 treatment was aborted due reaction/patient wishes.     PET performed  1/11/22 with decrease size of the hypermetabolic soft tissue component of pleomorphic sarcoma in the right thigh, previously measuring 18.3 x 4.4 x 13 and now measuring approximately 12.2 x 4.0 x 4.7 cm. He was hesitant to move forward with cycle 3 chemotherapy and instead met with Dr. Whitaker as well as Dr. Bhatia to discuss multiple surgical options. He presented to the ED 6/5/22 for right leg pain and inability to ambulate. He was found to a have a pathologic right proximal femur fracture. He underwent right hindquarter amputation with Dr. Whitaker and primary complex wound closure with Dr. Butt on 6/17/22. Procedure was notable for significant blood loss requiring transfusion of 10 units pRBC.       Interval History:    Fabian is seen via virtual visit today. He reports he is recovering well from surgery.  He is working with home PT 2 times per week.  He is having some back and groin pain but has been able to decrease his use of MS Contin.  He is using oxycodone about 3 times per day as well as Tylenol.  He denies any dizziness, lightheadedness or shortness of breath.  He notes that his mood has been good.  He has been in good spirits and is able to remain positive despite his rather urgent amputation.  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  He has now reached out to the owner of a company who employs people who have had amputations and is interested and returning to work when he is able.  For his hypoalbuminemia he has been using Ensure 3 times per day.  He denies any other issues with his appetite.    Labs:     Current Outpatient Medications   Medication     acetaminophen (TYLENOL) 325 MG tablet     escitalopram (LEXAPRO) 10 MG tablet     morphine (MS CONTIN) 30 MG CR tablet     multivitamin, therapeutic (THERA-VIT) TABS tablet     Nutritional Supplements (ENSURE ACTIVE HIGH PROTEIN) LIQD     oxyCODONE IR (ROXICODONE) 10 MG tablet      No current facility-administered medications for this visit.     PHYSICAL EXAM:  There were no vitals taken for this visit.    Video physical exam  General: Patient appears well in no acute distress.   Skin: No visualized rash or lesions on visualized skin  Eyes: EOMI, no erythema, sclera icterus or discharge noted  Resp: Appears to be breathing comfortably without accessory muscle usage, speaking in full sentences, no cough  MSK: Appears to have normal range of motion based on visualized movements  Neurologic: No apparent tremors, facial movements symmetric  Psych: affect appropriate, pleasant, alert and oriented    The rest of a comprehensive physical examination is deferred due to PHE (public health emergency) video restrictions    Labs:   Most Recent 3 CBC's:  Recent Labs   Lab Test 07/20/22  1514 07/06/22  0540 06/29/22  0538   WBC 9.9 12.8* 14.7*   HGB 7.4* 7.7* 8.1*   MCV 78 88 86   * 465*  465* 526*     Most Recent 3 BMP's:  Recent Labs   Lab Test 07/06/22  0540 06/29/22  0538 06/28/22  0515 06/27/22  0830 06/22/22  0831 06/21/22  1142 06/20/22  0523   NA  --  139  --   --   --  136 136   POTASSIUM  --  4.3 4.0 3.8   < > 4.5 3.8   CHLORIDE  --  103  --   --   --  105 106   CO2  --  31  --   --   --  22 24   BUN  --  16  --   --   --  5.6* 10   CR 0.51* 0.51* 0.51*  --    < > 0.41* 0.49*   ANIONGAP  --  5  --   --   --  9 6   VENTURA  --  9.0  --   --   --  7.5* 7.2*   GLC  --  107*  --   --   --  116* 147*    < > = values in this interval not displayed.    Most Recent 2 LFT's:  Recent Labs   Lab Test 06/05/22 2123 06/05/22  0940   AST 29 17   ALT 12 <9   ALKPHOS 137 151*   BILITOTAL 0.8 0.8   I reviewed the most recent labs above today.     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 Biopsy showing high grade pleomorphic sarcoma of large right thigh soft tissue mas. High risk for amputation with  massive reconstruction. Began initial treatment with Doxil/Ifos as an inpatient 11/2/21. Baseline ECHO with EF 60-65%. Tolerated first cycle well with concern for ifos neurotoxicity, but did not require methylene blue. Also had significant ifos neurotoxicity with cycle 2 which limited finishing the cycle.    Cycle 3 delayed by 2 weeks due to two missed PET scans.  PET performed 1/11/22 showing decrease size of the hypermetabolic soft tissue component of pleomorphic sarcoma in the right thigh, previously measuring 18.3 x 4.4 x 13 and now measuring approximately 12.2 x 4.0 x 4.7 cm. At that time he did not wish to proceed with additional chemotherapy. Met with ortho and had been deciding between proceeding between right hemipelvectomy versus reconstruction.    He presented to the ED 6/5/2022 with increased right leg and thigh pain and inability to walk.  Underwent right hindquarter amputation 6/17/22.  Surgery complicated by significant blood loss requiring transfusion of 10 units PRBCs.     Situation was discussed with Dr. Ambrocio. As he only received 2 cycles of neoadjuvant chemotherapy without a clear evidence of response, there is no current role for adjuvant chemotherapy.  We discussed the need for frequent surveillance including chest CT every 3 months and MRI to assess for local recurrence.     Pain  Postoperative pain improving.  He is currently only using MS Contin in the morning and does not take this on all days.  Advised that he may discontinue this.  I will refill his oxycodone and Tylenol today.      Ifosfamide neurotoxicity  Intermittent urinary incontinence, somnolence, disorientation and episodes of confusion with cycle 1 and 2. Did not require methylene blue. Should not preclude future treatment. Plan for ifos dose reduction by and additional 20% if doxil/ifos needed in the future.    Anemia  Iron studies during chemotherapy indicate mixed iron deficiency and anemia of chronic disease.  Experience  significant blood loss during surgery.  Iron studies currently stable.  We will plan to repeat CBC in 2 weeks to monitor hemoglobin trend although suspect he is still recovering from surgical blood loss.    Plan:  Recheck CBC in 2 weeks.  CT chest within 1 month. Virtual visit with MEHRAN following to review.    50 minutes spent on the date of the encounter doing chart review, review of test results, interpretation of tests, patient visit and documentation     Latosha Taylor CNP  [facility] [address] [address]  [phone]

## 2022-07-29 NOTE — LETTER
7/29/2022         RE: Vic Scott III  [address]  [place] [address]        Dear Colleague,    Thank you for referring your patient, Vic Scott III, to the [facility]. Please see a copy of my visit note below.    Fabian is a 47 year old who is being evaluated via a billable video visit.      How would you like to obtain your AVS? Mail a copy  If the video visit is dropped, the invitation should be resent by: Text to cell phone: [phone]  Will anyone else be joining your video visit? No      Ana Maria VENTURA    Video-Visit Details    Video Start Time: 9:58 AM    Type of service:  Video Visit    Video End Time:10:28 AM    Originating Location (pt. 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 Whitaker, Orthopedics with rapidly progressing right leg swelling since ~ July 2021. MRI was incomplete due to pain, but did show ~[id number] right thigh soft tissue mass with probable bony involvement of the proximal right femur. 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 Was seen by Dr. Whitaker to review the results on 10/1/21 - discussed consideration of neoadjuvant chemotherapy vs resection and reconstruction. Given high risk for amputation with massive reconstructive surgery, he was referred to Dr. Ambrocio and a PET was ordered which identifed concerning inguinal lymphadenopathy. He was seen by Dr. Ambrocio 10/26/21, ordered NGS (in process) and recommended starting Doxil/Ifos initially as an inpatient. Admitted for Cycle 1 Doxil/Ifos (T8D8=54/2/21).  Notably he had an episode of confusion while hospitalized on 11/8/21 concerning for ifosfamide neurotoxicity.  This resolved  without administration of methylene blue. With cycle 2 treatment he also had severe neurotoxicity 12 hours into day 3 ifosfamide infusion. He again improved without methylene blue. Remainder of cycle 2 treatment was aborted due reaction/patient wishes.     PET performed 1/11/22 with decrease size of the hypermetabolic soft tissue component of pleomorphic sarcoma in the right thigh, previously measuring 18.3 x 4.4 x 13 and now measuring approximately 12.2 x 4.0 x 4.7 cm. He was hesitant to move forward with cycle 3 chemotherapy and instead met with Dr. Whitaker as well as Dr. Bhatia to discuss multiple surgical options. He presented to the ED 6/5/22 for right leg pain and inability to ambulate. He was found to a have a pathologic right proximal femur fracture. He underwent right hindquarter amputation with Dr. Whitaker and primary complex wound closure with Dr. Butt on 6/17/22. Procedure was notable for significant blood loss requiring transfusion of 10 units pRBC.       Interval History:    Fabian is seen via virtual visit today. He reports he is recovering well from surgery.  He is working with home PT 2 times per week.  He is having some back and groin pain but has been able to decrease his use of MS Contin.  He is using oxycodone about 3 times per day as well as Tylenol.  He denies any dizziness, lightheadedness or shortness of breath.  He notes that his mood has been good.  He has been in good spirits and is able to remain positive despite his rather urgent amputation.  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  He has now reached out to the owner of a company who employs people who have had amputations and is interested and returning to work when he is able.  For his hypoalbuminemia he has been using Ensure 3 times per day.  He denies any other issues with his appetite.    Labs:     Current Outpatient Medications   Medication      acetaminophen (TYLENOL) 325 MG tablet     escitalopram (LEXAPRO) 10 MG tablet     morphine (MS CONTIN) 30 MG CR tablet     multivitamin, therapeutic (THERA-VIT) TABS tablet     Nutritional Supplements (ENSURE ACTIVE HIGH PROTEIN) LIQD     oxyCODONE IR (ROXICODONE) 10 MG tablet     No current facility-administered medications for this visit.     PHYSICAL EXAM:  There were no vitals taken for this visit.    Video physical exam  General: Patient appears well in no acute distress.   Skin: No visualized rash or lesions on visualized skin  Eyes: EOMI, no erythema, sclera icterus or discharge noted  Resp: Appears to be breathing comfortably without accessory muscle usage, speaking in full sentences, no cough  MSK: Appears to have normal range of motion based on visualized movements  Neurologic: No apparent tremors, facial movements symmetric  Psych: affect appropriate, pleasant, alert and oriented    The rest of a comprehensive physical examination is deferred due to PHE (public health emergency) video restrictions    Labs:   Most Recent 3 CBC's:  Recent Labs   Lab Test 07/20/22  1514 07/06/22  0540 06/29/22  0538   WBC 9.9 12.8* 14.7*   HGB 7.4* 7.7* 8.1*   MCV 78 88 86   * 465*  465* 526*     Most Recent 3 BMP's:  Recent Labs   Lab Test 07/06/22  0540 06/29/22  0538 06/28/22  0515 06/27/22  0830 06/22/22  0831 06/21/22  1142 06/20/22  0523   NA  --  139  --   --   --  136 136   POTASSIUM  --  4.3 4.0 3.8   < > 4.5 3.8   CHLORIDE  --  103  --   --   --  105 106   CO2  --  31  --   --   --  22 24   BUN  --  16  --   --   --  5.6* 10   CR 0.51* 0.51* 0.51*  --    < > 0.41* 0.49*   ANIONGAP  --  5  --   --   --  9 6   VENTURA  --  9.0  --   --   --  7.5* 7.2*   GLC  --  107*  --   --   --  116* 147*    < > = values in this interval not displayed.    Most Recent 2 LFT's:  Recent Labs   Lab Test 06/05/22 2123 06/05/22  0940   AST 29 17   ALT 12 <9   ALKPHOS 137 151*   BILITOTAL 0.8 0.8   I reviewed the most recent labs  above today.     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 Biopsy showing high grade pleomorphic sarcoma of large right thigh soft tissue mas. High risk for amputation with massive reconstruction. Began initial treatment with Doxil/Ifos as an inpatient 11/2/21. Baseline ECHO with EF 60-65%. Tolerated first cycle well with concern for ifos neurotoxicity, but did not require methylene blue. Also had significant ifos neurotoxicity with cycle 2 which limited finishing the cycle.    Cycle 3 delayed by 2 weeks due to two missed PET scans.  PET performed 1/11/22 showing decrease size of the hypermetabolic soft tissue component of pleomorphic sarcoma in the right thigh, previously measuring 18.3 x 4.4 x 13 and now measuring approximately 12.2 x 4.0 x 4.7 cm. At that time he did not wish to proceed with additional chemotherapy. Met with ortho and had been deciding between proceeding between right hemipelvectomy versus reconstruction.    He presented to the ED 6/5/2022 with increased right leg and thigh pain and inability to walk.  Underwent right hindquarter amputation 6/17/22.  Surgery complicated by significant blood loss requiring transfusion of 10 units PRBCs.     Situation was discussed with Dr. Ambrocio. As he only received 2 cycles of neoadjuvant chemotherapy without a clear evidence of response, there is no current role for adjuvant chemotherapy.  We discussed the need for frequent surveillance including chest CT every 3 months and MRI to assess for local recurrence.     Pain  Postoperative pain improving.  He is currently only using MS Contin in the morning and does not take this on all days.  Advised that he may discontinue this.  I will refill his oxycodone and Tylenol today.      Ifosfamide neurotoxicity  Intermittent urinary incontinence, somnolence, disorientation and episodes of confusion with cycle 1 and 2.  Did not require methylene blue. Should not preclude future treatment. Plan for ifos dose reduction by and additional 20% if doxil/ifos needed in the future.    Anemia  Iron studies during chemotherapy indicate mixed iron deficiency and anemia of chronic disease.  Experience significant blood loss during surgery.  Iron studies currently stable.  We will plan to repeat CBC in 2 weeks to monitor hemoglobin trend although suspect he is still recovering from surgical blood loss.    Plan:  Recheck CBC in 2 weeks.  CT chest within 1 month. Virtual visit with MEHRAN following to review.    50 minutes spent on the date of the encounter doing chart review, review of test results, interpretation of tests, patient visit and documentation         Again, thank you for allowing me to participate in the care of your patient.      Sincerely,    Latosha Taylor, CNP

## 2022-08-01 NOTE — TELEPHONE ENCOUNTER
[facility] Call Center    Phone Message    May a detailed message be left on voicemail: yes     Reason for Call: Other: needs new referral due to different insurance. Patient has [employer] now according to Tameka at [facility].     Action Taken: Message routed to:  [facility]    Travel Screening: Not Applicable

## 2022-08-03 NOTE — PROGRESS NOTES
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    Oncology History:  -Patient initially presented to [facility] emergency department 2022 with two day history of severe right thigh pain.   -Further workup demonstrated a pathological fracture of right femur.  Patient was transferred to [facility] for further management.    -Upon arrival patient was found to have Hgb of 7 and received 1 unit of PRBC.   -He underwent a right hindquarter amputation/hemipelvectomy by orthopedics and free gluteal flap w/ SPY by plastic surgery on .    -During procedure patient had significant blood loss estimated at 3 liters and received 10 units of pRBCs.  Patient was extubated on POD 1, and had a relatively uncomplicated recovery besides adequate pain management.   -During acute hospitalization, patient was seen and evaluated by PT and OT, who collectively recommended that patient would benefit from ongoing therapies in the acute inpatient rehabilitation setting and was subsequently admitted to the ARU on 2022.   - Discharged home from ARU on 22. Followed by Dr. Ocasio while on ARU.        Symptoms,  Patient presents for an initial visit virtually today.  He has been doing okay since his return home.  He has home PT and OT, though states that he has transitioned out of home PT and just his physical therapist is coming currently.  He has PT sessions twice weekly, and will be  "transitioning to once weekly soon.  He has been working on upper extremity and lower extremity exercises, and rolling.  His mobility is still primarily by wheelchair.  He states that his wound is healing well, and has had a little soreness above his hip bone per history.  He has practiced walking with his walker with his physical therapist, and has a wheelchair that is custom from [employer].  He is requesting refills on his pain medications that were prescribed while he was in the ARU.  He was previously following with the chronic pain team, and does not have a future visit set up yet per chart review.  He also saw his PCP for an initial visit after his return home.  He states that he does have some phantom sensations and mild tingling \"phantom pain\" occasionally, but definitely not every day or frequency.  He states that OT had indicated that a shower chair was ordered for him, however he has not heard anything about this.        Therapies/HEP,  Participating in home PT twice weekly, will be transitioning down to once weekly soon.      Functionally,   Modified independent using wheelchair for mobility and most ADLs.             Past Medical and Surgical History:     Past Medical History:   Diagnosis Date     Pleomorphic cell sarcoma (H)      Past Surgical History:   Procedure Laterality Date     ANESTHESIA OUT OF OR BLOOD BRAIN BARRIER DISRUPTION N/A 6/16/2022    Procedure: ANESTHESIA OUT OF OR - Block Epidural;  Surgeon: GENERIC ANESTHESIA PROVIDER;  [facility]     COMPLEX WOUND CLOSURE (UPDATE) Right 6/17/2022    Procedure: complex closure pelvis, spy;  Surgeon: KAREN Butt MD;  [facility]     COMPLEX WOUND CLOSURE (UPDATE)  6/17/2022    Procedure: ;  Surgeon: KAREN Butt MD;  [facility]     EXCISE TUMOR PELVIS POSTERIOR Right 6/17/2022    Procedure: Right hindquarter amputation;  Surgeon: Matty Whitaker MD;  [facility]     INSERT PORT VASCULAR ACCESS Right " 11/1/2021    Procedure: INSERTION, VASCULAR ACCESS PORT;  Surgeon: Sushil Gonzales MD;  Location: [facility] OR     IR CHEST PORT PLACEMENT > 5 YRS OF AGE  11/1/2021            Social History:     Social History     Tobacco Use     Smoking status: Never Smoker     Smokeless tobacco: Never Used   Substance Use Topics     Alcohol use: Never              Functional history:       ADLs: Modified independent using wheelchair with family assist as needed.  Assistive devices: Wheelchair for mobility  iADLs (medication management and finances): Independent           Family History:     Family History   Problem Relation Age of Onset     Heart Disease Mother      Coronary Artery Disease Father      Other - See Comments Father         pleomorphic cell sarcoma     Cancer Maternal Grandmother      No Known Problems Sister      No Known Problems Sister      No Known Problems Son      No Known Problems Daughter      No Known Problems Daughter      No Known Problems Daughter             Medications:     Current Outpatient Medications   Medication Sig Dispense Refill     acetaminophen (TYLENOL) 325 MG tablet Take 3 tablets (975 mg) by mouth every 8 hours 270 tablet 1     escitalopram (LEXAPRO) 10 MG tablet Take 1 tablet (10 mg) by mouth daily 30 tablet 1     morphine (MS CONTIN) 30 MG CR tablet Take 1 tablet (30 mg) by mouth every 12 hours 60 tablet 0     multivitamin, therapeutic (THERA-VIT) TABS tablet Take 1 tablet by mouth daily 30 tablet 1     Nutritional Supplements (ENSURE ACTIVE HIGH PROTEIN) LIQD Take 1 Can by mouth 3 times daily (with meals) 7110 mL 1     oxyCODONE IR (ROXICODONE) 10 MG tablet Take 1 tablet (10 mg) by mouth every 4 hours as needed for moderate to severe pain 100 tablet 0            Allergies:     Allergies   Allergen Reactions     Blood Transfusion Related (Informational Only) Other (See Comments)     Patient has a history of a clinically significant antibody against RBC antigens.  A delay in compatible RBCs  "may occur.               ROS:     A focused ROS is negative other than the symptoms noted above in the HPI.           Physical Examiniation:     VITAL SIGNS: There were no vitals taken for this visit.  BMI: Estimated body mass index is 22.47 kg/m  as calculated from the following:    Height as of 7/20/22: 1.88 m (6' 2\").    Weight as of 7/20/22: 79.4 kg (175 lb).    Gen: NAD, pleasant and cooperative   HEENT: Atraumatic, normocephalic, extraocular movements appear intact  Pulm: non-labored breathing in room air  Neuro/MSK:   Orientation: Oriented to person, time, place and situation, Exhibits good insight into her condition and ongoing treatment  Motor: Observed moving upper extremities actively against gravity           Laboratory/Imaging:     XR Pelvis 1/2 Views (7/20/22):  Impression:  1. New postsurgical changes consistent with right hind quarter  amputation.  2. Mild degenerative change of the left hip.           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  As discussed with Fabian, we will follow-up with his home care agency regarding ongoing home PT which is crucial to his rehabilitation and consideration of a possible prostheses.  He was advised to continue relief from weightbearing every 15 minutes or so as per plastic surgery instructions.  We will also follow-up on the order for the shower chair.  Patient was instructed on normal course and rehabilitation expectations following an amputation such as his.  His oxycodone was refilled for short period, and his Tylenol prescription was also refilled at today's visit.  However, he was instructed that he " will need to follow-up with his surgical teams for any postsurgical pain management.  We could also consider setting him up with our pain team for ongoing management as needed.  He does not exhibit any neuropathic/phantom pain symptoms, so there is no neuropathic medication needs at this time.  We will plan a 3-month follow-up virtual visit.  Patient is in agreement with this plan.    1. Patient education: In depth discussion and education was provided about the assessment and implications of each of the below recommendations for management. Patient indicated readiness to learn, all questions were answered and understanding of material presented was confirmed.  2. Therapy/equipment/braces:  1. Continue home physical therapy.  2. Continue to use wheelchair for all mobility.  3. Will check on status of shower chair.  4. We will contact his home agency to confirm coverage to continue therapy.  3. Medications:  1. Continue pain medication regimen as tolerated.  Follow-up with surgical teams for ongoing management.  4. Follow up: 3-month virtual visit.    Leslie Ortiz MD  Physical Medicine & Rehabilitation    I appreciate the opportunity to participate in the care of your patient.     60 minutes spent on the date of the encounter doing chart review, history and exam, documentation and further activities as noted above.

## 2022-08-03 NOTE — LETTER
8/3/2022         RE: Vic Scott III  [address]  [place] [address]        Dear Colleague,    Thank you for referring your patient, Vic Scott III, to the [facility]. Please see a copy of my visit note below.           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    Oncology History:  -Patient initially presented to [facility] emergency department 2022 with two day history of severe right thigh pain.   -Further workup demonstrated a pathological fracture of right femur.  Patient was transferred to [facility] for further management.    -Upon arrival patient was found to have Hgb of 7 and received 1 unit of PRBC.   -He underwent a right hindquarter amputation/hemipelvectomy by orthopedics and free gluteal flap w/ SPY by plastic surgery on .    -During procedure patient had significant blood loss estimated at 3 liters and received 10 units of pRBCs.  Patient was extubated on POD 1, and had a relatively uncomplicated recovery besides adequate pain management.   -During acute hospitalization, patient was seen and evaluated by PT and OT, who collectively recommended that patient would benefit from ongoing therapies in the acute inpatient rehabilitation setting and was subsequently admitted to the ARU on 2022.   - Discharged home from ARU on 22. Followed by Dr. Ocasio while on ARU.        Symptoms,  Patient  "presents for an initial visit virtually today.  He has been doing okay since his return home.  He has home PT and OT, though states that he has transitioned out of home PT and just his physical therapist is coming currently.  He has PT sessions twice weekly, and will be transitioning to once weekly soon.  He has been working on upper extremity and lower extremity exercises, and rolling.  His mobility is still primarily by wheelchair.  He states that his wound is healing well, and has had a little soreness above his hip bone per history.  He has practiced walking with his walker with his physical therapist, and has a wheelchair that is custom from [employer].  He is requesting refills on his pain medications that were prescribed while he was in the ARU.  He was previously following with the chronic pain team, and does not have a future visit set up yet per chart review.  He also saw his PCP for an initial visit after his return home.  He states that he does have some phantom sensations and mild tingling \"phantom pain\" occasionally, but definitely not every day or frequency.  He states that OT had indicated that a shower chair was ordered for him, however he has not heard anything about this.        Therapies/HEP,  Participating in home PT twice weekly, will be transitioning down to once weekly soon.      Functionally,   Modified independent using wheelchair for mobility and most ADLs.             Past Medical and Surgical History:     Past Medical History:   Diagnosis Date     Pleomorphic cell sarcoma (H)      Past Surgical History:   Procedure Laterality Date     ANESTHESIA OUT OF OR BLOOD BRAIN BARRIER DISRUPTION N/A 6/16/2022    Procedure: ANESTHESIA OUT OF OR - Block Epidural;  Surgeon: GENERIC ANESTHESIA PROVIDER;  [facility]     COMPLEX WOUND CLOSURE (UPDATE) Right 6/17/2022    Procedure: complex closure pelvis, spy;  Surgeon: KAREN Butt MD;  [facility]     COMPLEX WOUND CLOSURE (UPDATE) "  6/17/2022    Procedure: ;  Surgeon: KAREN Butt MD;  [facility]     EXCISE TUMOR PELVIS POSTERIOR Right 6/17/2022    Procedure: Right hindquarter amputation;  Surgeon: Matty Whitaker MD;  [facility]     INSERT PORT VASCULAR ACCESS Right 11/1/2021    Procedure: INSERTION, VASCULAR ACCESS PORT;  Surgeon: Sushil Gonzales MD;  Location: [facility] OR     IR CHEST PORT PLACEMENT > 5 YRS OF AGE  11/1/2021            Social History:     Social History     Tobacco Use     Smoking status: Never Smoker     Smokeless tobacco: Never Used   Substance Use Topics     Alcohol use: Never              Functional history:       ADLs: Modified independent using wheelchair with family assist as needed.  Assistive devices: Wheelchair for mobility  iADLs (medication management and finances): Independent           Family History:     Family History   Problem Relation Age of Onset     Heart Disease Mother      Coronary Artery Disease Father      Other - See Comments Father         pleomorphic cell sarcoma     Cancer Maternal Grandmother      No Known Problems Sister      No Known Problems Sister      No Known Problems Son      No Known Problems Daughter      No Known Problems Daughter      No Known Problems Daughter             Medications:     Current Outpatient Medications   Medication Sig Dispense Refill     acetaminophen (TYLENOL) 325 MG tablet Take 3 tablets (975 mg) by mouth every 8 hours 270 tablet 1     escitalopram (LEXAPRO) 10 MG tablet Take 1 tablet (10 mg) by mouth daily 30 tablet 1     morphine (MS CONTIN) 30 MG CR tablet Take 1 tablet (30 mg) by mouth every 12 hours 60 tablet 0     multivitamin, therapeutic (THERA-VIT) TABS tablet Take 1 tablet by mouth daily 30 tablet 1     Nutritional Supplements (ENSURE ACTIVE HIGH PROTEIN) LIQD Take 1 Can by mouth 3 times daily (with meals) 7110 mL 1     oxyCODONE IR (ROXICODONE) 10 MG tablet Take 1 tablet (10 mg) by mouth every 4 hours as needed for moderate  "to severe pain 100 tablet 0            Allergies:     Allergies   Allergen Reactions     Blood Transfusion Related (Informational Only) Other (See Comments)     Patient has a history of a clinically significant antibody against RBC antigens.  A delay in compatible RBCs may occur.               ROS:     A focused ROS is negative other than the symptoms noted above in the HPI.           Physical Examiniation:     VITAL SIGNS: There were no vitals taken for this visit.  BMI: Estimated body mass index is 22.47 kg/m  as calculated from the following:    Height as of 7/20/22: 1.88 m (6' 2\").    Weight as of 7/20/22: 79.4 kg (175 lb).    Gen: NAD, pleasant and cooperative   HEENT: Atraumatic, normocephalic, extraocular movements appear intact  Pulm: non-labored breathing in room air  Neuro/MSK:   Orientation: Oriented to person, time, place and situation, Exhibits good insight into her condition and ongoing treatment  Motor: Observed moving upper extremities actively against gravity           Laboratory/Imaging:     XR Pelvis 1/2 Views (7/20/22):  Impression:  1. New postsurgical changes consistent with right hind quarter  amputation.  2. Mild degenerative change of the left hip.           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  As discussed with Fabian, we will follow-up with his home care agency regarding ongoing home PT which is crucial to his rehabilitation and consideration of a possible prostheses.  He was advised to continue relief from weightbearing every 15 minutes or so as per plastic surgery instructions.  We will also follow-up on the order for the " shower chair.  Patient was instructed on normal course and rehabilitation expectations following an amputation such as his.  His oxycodone was refilled for short period, and his Tylenol prescription was also refilled at today's visit.  However, he was instructed that he will need to follow-up with his surgical teams for any postsurgical pain management.  We could also consider setting him up with our pain team for ongoing management as needed.  He does not exhibit any neuropathic/phantom pain symptoms, so there is no neuropathic medication needs at this time.  We will plan a 3-month follow-up virtual visit.  Patient is in agreement with this plan.    1. Patient education: In depth discussion and education was provided about the assessment and implications of each of the below recommendations for management. Patient indicated readiness to learn, all questions were answered and understanding of material presented was confirmed.  2. Therapy/equipment/braces:  1. Continue home physical therapy.  2. Continue to use wheelchair for all mobility.  3. Will check on status of shower chair.  4. We will contact his home agency to confirm coverage to continue therapy.  3. Medications:  1. Continue pain medication regimen as tolerated.  Follow-up with surgical teams for ongoing management.  4. Follow up: 3-month virtual visit.    Leslie Ortiz MD  Physical Medicine & Rehabilitation    I appreciate the opportunity to participate in the care of your patient.     60 minutes spent on the date of the encounter doing chart review, history and exam, documentation and further activities as noted above.

## 2022-08-03 NOTE — PROGRESS NOTES
Fabian is a 47 year old who is being evaluated via a billable video visit.      How would you like to obtain your AVS? [employer]  If the video visit is dropped, the invitation should be resent by: Text to cell phone: [phone]   Will anyone else be joining your video visit? No        Video-Visit Details    Video Start Time: 3:10 PM    Type of service:  Video Visit    Video End Time:3:50 PM    Originating Location (pt. Location): Home    Distant Location (provider location):  [facility]     Platform used for Video Visit: Kendra Lund

## 2022-08-12 NOTE — TELEPHONE ENCOUNTER
Called [facility] Nursing services and had them send the paper work for the physical therapy plan of care with full evaluation to the nurse practitioners clinic e-mail. Will watch for it.   SHAWN Lombardo 10:35 AM 8/12/2022

## 2022-08-13 NOTE — ED PROVIDER NOTES
EMERGENCY DEPARTMENT ENCOUNTER      NAME: Vic Scott III  AGE: 47 year old male  YOB: 1975  MRN: [id number]  EVALUATION DATE & TIME: 8/13/2022  1:24 AM    PCP: Rajesh Vidales    ED PROVIDER: Bj Padilla M.D.      Chief Complaint   Patient presents with     Post-op Problem         FINAL IMPRESSION:  1. Abdominal wall mass          ED COURSE & MEDICAL DECISION MAKING:    Pertinent Labs & Imaging studies reviewed. (See chart for details)  47 year old male presents to the Emergency Department for evaluation of abdominal pain. Patient appears non toxic with stable vitals signs, patient is afebrile, slight tachycardia but no hypoxia or increased work of breathing.  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  On exam he is status post amputation of the entire right lower extremity, he has induration and fullness with tenderness to palpation over the right surgical site.  Considered hematoma, abscess, considered but less likely intestinal obstruction, nothing suggest perforation, GI bleed, no other focal tenderness to suggest diverticulitis, cholecystitis or pancreatitis, overall very low suspicion for appendicitis.  No chest or pulmonary complaints and lungs are clear.  No skin findings to suggest cellulitis or necrotizing fasciitis.  We will obtain screening labs and CT imaging.  Patient was given pain medications here.    Reassessment: Labs showed no acute concerning findings, did note hemoglobin of 9.0 but this is actually improved when compared to prior, again the patient denies any blood in his urine or stools, nothing suggest GI bleed.  CT imaging reported findings of multiple hypodense masses within the lateral abdominal wall, small hypodense mass abutting the cecum, this is concerning for metastatic disease.  Multiple pulmonary nodules consistent with pulmonary metastatic disease and a  small left pleural effusion.  Again lungs clear and the patient is saturating well on room air.  Repeat exam benign the patient states he feels improved following our interventions.  With an otherwise negative work-up, reassuring vitals, feel he is safe for discharge and close follow-up.  Discussed the CT findings here with patient and family at the bedside, will recommend urgent follow-up with the surgical team and oncology team at the [facility] in the next 2 to 3 days for continued outpatient management evaluation.  Will discharge patient on a short course of naproxen and advised no other NSAIDs while taking naproxen.  Discussed all these findings recommendations with the patient felt reassured and comfortable discharge.  Return precautions provided.    1:30 AM I met with patient for initial interview and encounter. PPE worn includes N95 mask, nitrile gloves, and eye protection.   3:28 AM repeat exam is benign.  Discussed findings and discharge with close follow-up.  Return precautions provided.        At the conclusion of the encounter I discussed the results of all of the tests and the disposition. The questions were answered and return precautions provided. The patient or family acknowledged understanding and was agreeable with the care plan.         MEDICATIONS GIVEN IN THE EMERGENCY:  Medications   ketorolac (TORADOL) injection 15 mg (15 mg Intravenous Given 8/13/22 0156)   iopamidol (ISOVUE-370) solution 80 mL (75 mLs Intravenous Given 8/13/22 0243)       NEW PRESCRIPTIONS STARTED AT TODAY'S ER VISIT  Discharge Medication List as of 8/13/2022  3:41 AM      START taking these medications    Details   naproxen (NAPROSYN) 500 MG tablet Take 1 tablet (500 mg) by mouth 2 times daily (with meals) for 8 days, Disp-16 tablet, R-0, Local Print                  =================================================================    HPI    Patient information was obtained from: Patient, Mother    Use of  "Intrepreter: N/A         Vic Scott III is a 47 year old male who presents to the ED for evaluation of RLQ abdominal pain.    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 He presented with worsening right hip and thigh pain and was found to have a pathologic right proximal femur fracture. Patient now s/p right hindquarter amputation with Dr. Whitaker and primary wound closure with Dr. Butt on 6/17/22.    Patient reports increased \"extruciating\" pain to his RLQ with associated swelling the last week and a half. He states that the nurse in charge of changing the bandages indicated that the pain and swelling was \"part of the healing process.\" However, he presents today with worsening symptoms. Patient reports that he took Tylenol around 12:30 AM without much relief, but has been pretty conservative with his oxycodone due to family dependency concerns. He otherwise denies fever, vomiting, change in bowel or bladder habits, chest pain, or shortness of breath. Patient is a non-smoker, and he denies any alcohol or drug use.     REVIEW OF SYSTEMS   Constitutional:  Denies fever, chills  Respiratory:  Denies productive cough or increased work of breathing  Cardiovascular:  Denies chest pain, palpitations  GI: Denies nausea, vomiting, or change in bowel or bladder habits   Musculoskeletal:  Positive for RLQ abdominal pain and swelling   Skin:  Denies rash   Neurologic:  Denies focal weakness  All systems negative except as marked.     PAST MEDICAL HISTORY:  Past Medical History:   Diagnosis Date     Pleomorphic cell sarcoma (H)        PAST SURGICAL HISTORY:  Past Surgical History:   Procedure Laterality Date     ANESTHESIA OUT OF OR BLOOD BRAIN BARRIER DISRUPTION N/A 6/16/2022    Procedure: ANESTHESIA OUT OF OR - Block Epidural;  Surgeon: GENERIC ANESTHESIA PROVIDER;  Location: [facility]     COMPLEX WOUND CLOSURE (UPDATE) Right 6/17/2022    Procedure: " complex closure pelvis, spy;  Surgeon: KAREN Butt MD;  Location: [facility] OR     COMPLEX WOUND CLOSURE (UPDATE)  6/17/2022    Procedure: ;  Surgeon: KAREN Butt MD;  Location: [facility] OR     EXCISE TUMOR PELVIS POSTERIOR Right 6/17/2022    Procedure: Right hindquarter amputation;  Surgeon: Matty Whitaker MD;  Location: [facility] OR     INSERT PORT VASCULAR ACCESS Right 11/1/2021    Procedure: INSERTION, VASCULAR ACCESS PORT;  Surgeon: Sushil Gonzales MD;  Location: [facility] OR     IR CHEST PORT PLACEMENT > 5 YRS OF AGE  11/1/2021         CURRENT MEDICATIONS:    Prior to Admission medications    Medication Sig Start Date End Date Taking? Authorizing Provider   acetaminophen (TYLENOL) 325 MG tablet Take 3 tablets (975 mg) by mouth every 8 hours 7/8/22   Luke Ocasio MD   acetaminophen (TYLENOL) 500 MG tablet Take 2 tablets (1,000 mg) by mouth 3 times daily as needed for mild pain 8/3/22   Leslie Ortiz MD   escitalopram (LEXAPRO) 10 MG tablet Take 1 tablet (10 mg) by mouth daily 7/8/22   Luke Ocasio MD   morphine (MS CONTIN) 30 MG CR tablet Take 1 tablet (30 mg) by mouth every 12 hours 7/8/22   Luke Ocasio MD   multivitamin, therapeutic (THERA-VIT) TABS tablet Take 1 tablet by mouth daily 7/20/22   Rajesh Vidales APRN CNP   Nutritional Supplements (ENSURE ACTIVE HIGH PROTEIN) LIQD Take 1 Can by mouth 3 times daily (with meals) 7/20/22   Rajesh Vidales APRN CNP   oxyCODONE IR (ROXICODONE) 10 MG tablet Take 1 tablet (10 mg) by mouth every 4 hours as needed for moderate to severe pain 8/3/22   Leslie Ortiz MD   albuterol (PROAIR HFA/PROVENTIL HFA/VENTOLIN HFA) 108 (90 Base) MCG/ACT inhaler Inhale 1-2 puffs into the lungs every 6 hours as needed for shortness of breath / dyspnea 6/28/22 7/7/22  Bebler, Nat A, APRN CNS   QUEtiapine (SEROQUEL) 25 MG tablet Take 1 tablet (25 mg) by mouth At Bedtime 6/28/22 7/7/22  Nat Purvis, CHET CNS     "    ALLERGIES:  Allergies   Allergen Reactions     Blood Transfusion Related (Informational Only) Other (See Comments)     Patient has a history of a clinically significant antibody against RBC antigens.  A delay in compatible RBCs may occur.        FAMILY HISTORY:  Family History   Problem Relation Age of Onset     Heart Disease Mother      Coronary Artery Disease Father      Other - See Comments Father         pleomorphic cell sarcoma     Cancer Maternal Grandmother      No Known Problems Sister      No Known Problems Sister      No Known Problems Son      No Known Problems Daughter      No Known Problems Daughter      No Known Problems Daughter        SOCIAL HISTORY:   Social History     Socioeconomic History     Marital status:    Tobacco Use     Smoking status: Never Smoker     Smokeless tobacco: Never Used   Vaping Use     Vaping Use: Never used   Substance and Sexual Activity     Alcohol use: Never     Drug use: Never     Sexual activity: Not Currently     Partners: Female     Birth control/protection: Condom       VITALS:  Patient Vitals for the past 24 hrs:   BP Temp Temp src Pulse Resp SpO2 Height Weight   08/13/22 0336 135/76 -- -- 89 -- 99 % -- --   08/13/22 0116 (!) 140/83 98.4  F (36.9  C) Oral 103 18 98 % 1.88 m (6' 2\") 79.4 kg (175 lb)        PHYSICAL EXAM   Constitutional:  Awake, alert, in no apparent distress  HENT:  Normocephalic, Atraumatic. Bilateral external ears normal. Oropharynx moist. Nose normal. Neck- Normal range of motion with no guarding, No midline cervical tenderness, Supple, No stridor.   Eyes:  PERRL, EOMI with no signs of entrapment, Conjunctiva normal, No discharge.   Respiratory:  Normal breath sounds, No respiratory distress, No wheezing.    Cardiovascular:  Slight tachycardia, Normal rhythm, No appreciable rubs or gallops.   GI:  Soft, No tenderness, No distension, No palpable masses  : Normal external genitalia  Musculoskeletal:  Intact distal pulses, No edema.  " Complete amputation of the right lower extremity.  Integument:  Warm, Dry, large surgical scar right lower quadrant of the abdomen with surrounding induration and firmness, no erythema or warmth  Neurologic:  Alert & oriented, Normal motor function, Normal sensory function, No focal deficits noted.   Psychiatric:  Affect normal, Judgment normal, Mood normal.     LAB:  All pertinent labs reviewed and interpreted.  Results for orders placed or performed during the hospital encounter of 08/13/22   CT Abdomen Pelvis w Contrast    Impression    IMPRESSION:   1.  [Postsurgical changes from right lower extremity amputation and right hemipelvectomy. There are multiple hypodense masses within the lateral abdominal wall musculature on the right largest of which measures 5.6 x 7.5 cm and is within the transverse   and oblique abdominous muscles, Consistent with metastatic disease.  2.  Small hypodense mass abutting the cecum, concerning for intraperitoneal extension and possible bowel wall involvement of metastatic disease, at this time there is no evidence of bowel perforation or bowel obstruction.  3.  Multiple pulmonary nodules consistent with pulmonary metastatic disease with a small left-sided pleural effusion   Comprehensive metabolic panel   Result Value Ref Range    Sodium 139 136 - 145 mmol/L    Potassium 3.7 3.5 - 5.0 mmol/L    Chloride 104 98 - 107 mmol/L    Carbon Dioxide (CO2) 26 22 - 31 mmol/L    Anion Gap 9 5 - 18 mmol/L    Urea Nitrogen 12 8 - 22 mg/dL    Creatinine 0.73 0.70 - 1.30 mg/dL    Calcium 9.3 8.5 - 10.5 mg/dL    Glucose 113 70 - 125 mg/dL    Alkaline Phosphatase 112 45 - 120 U/L    AST 12 0 - 40 U/L    ALT <9 0 - 45 U/L    Protein Total 7.5 6.0 - 8.0 g/dL    Albumin 3.2 (L) 3.5 - 5.0 g/dL    Bilirubin Total 0.4 0.0 - 1.0 mg/dL    GFR Estimate >90 >60 mL/min/1.73m2   Lactic acid whole blood   Result Value Ref Range    Lactic Acid 1.0 0.7 - 2.0 mmol/L   CBC with platelets and differential   Result  Value Ref Range    WBC Count 8.0 4.0 - 11.0 10e3/uL    RBC Count 3.71 (L) 4.40 - 5.90 10e6/uL    Hemoglobin 9.0 (L) 13.3 - 17.7 g/dL    Hematocrit 28.1 (L) 40.0 - 53.0 %    MCV 76 (L) 78 - 100 fL    MCH 24.3 (L) 26.5 - 33.0 pg    MCHC 32.0 31.5 - 36.5 g/dL    RDW 19.1 (H) 10.0 - 15.0 %    Platelet Count 333 150 - 450 10e3/uL    % Neutrophils 76 %    % Lymphocytes 14 %    % Monocytes 8 %    % Eosinophils 1 %    % Basophils 0 %    % Immature Granulocytes 1 %    NRBCs per 100 WBC 0 <1 /100    Absolute Neutrophils 6.1 1.6 - 8.3 10e3/uL    Absolute Lymphocytes 1.2 0.8 - 5.3 10e3/uL    Absolute Monocytes 0.7 0.0 - 1.3 10e3/uL    Absolute Eosinophils 0.1 0.0 - 0.7 10e3/uL    Absolute Basophils 0.0 0.0 - 0.2 10e3/uL    Absolute Immature Granulocytes 0.0 <=0.4 10e3/uL    Absolute NRBCs 0.0 10e3/uL       RADIOLOGY:  CT Abdomen Pelvis w Contrast   Final Result   IMPRESSION:    1.  [Postsurgical changes from right lower extremity amputation and right hemipelvectomy. There are multiple hypodense masses within the lateral abdominal wall musculature on the right largest of which measures 5.6 x 7.5 cm and is within the transverse    and oblique abdominous muscles, Consistent with metastatic disease.   2.  Small hypodense mass abutting the cecum, concerning for intraperitoneal extension and possible bowel wall involvement of metastatic disease, at this time there is no evidence of bowel perforation or bowel obstruction.   3.  Multiple pulmonary nodules consistent with pulmonary metastatic disease with a small left-sided pleural effusion             EKG:      I have independently reviewed and interpreted the EKG(s) documented above.    PROCEDURES:            I, Jean-Claude Rodriguez, am serving as a scribe to document services personally performed by Bj Padilla MD, based on my observation and the provider's statements to me. I, Bj Padilla MD attest that Jean-Claude Rodriguez is acting in a scribe capacity, has observed my performance of  the services and has documented them in accordance with my direction.    Bj Padilla M.D.  Emergency Medicine  [facility] EMERGENCY DEPARTMENT  [address] [place] [address] [id number] [phone]  Dept: [phone]     Bj Padilla MD  08/13/22 9948

## 2022-08-13 NOTE — ED TRIAGE NOTES
"Pt had amputation of right leg from hip down 6/2022. Pt states \"having pain\". Pt took tylenol approx 1 hour prior to coming to ER. Pt has used ice without relief.      Triage Assessment     Row Name 08/13/22 0118       Triage Assessment (Adult)    Airway WDL WDL       Respiratory WDL    Respiratory WDL WDL       Skin Circulation/Temperature WDL    Skin Circulation/Temperature WDL WDL       Cardiac WDL    Cardiac WDL WDL       Peripheral/Neurovascular WDL    Peripheral Neurovascular WDL WDL    Capillary Refill, General less than/equal to 3 secs       Cognitive/Neuro/Behavioral WDL    Cognitive/Neuro/Behavioral WDL WDL       William Coma Scale    Best Eye Response 4-->(E4) spontaneous    Best Motor Response 6-->(M6) obeys commands    Best Verbal Response 5-->(V5) oriented    [place] Coma Scale Score 15              "

## 2022-08-13 NOTE — TELEPHONE ENCOUNTER
"Triage Call:    Patient is 2 months post op of right leg amputation.  He reports the incision wound is healing without signs of infection per wound nurse.  He reports superior to the incision is a \"raised bruise\" that is >2 inches in size, but decreasing in size.  He reports that the \"raised bruise\" has been increasing in severity of pain and getting to severe at times.  He denies fever, soaked dressing, red streaks, gaping incision, or pain in incision.    According to the protocol, patient should see physician within 24 hours.  Care advice given. Patient verbalizes understanding and agrees with plan of care.Patient plans to go to [facility] tomorrow.    Cuca Tavera RN  08/13/22 12:01 AM  [facility] Nurse Advisor      Additional Information    Negative: [1] Major abdominal surgical incision AND [2] wound gaping open AND [3] visible internal organs    Negative: Sounds like a life-threatening emergency to the triager    Negative: [1] Bleeding from incision AND [2] won't stop after 10 minutes of direct pressure    Negative: [1] Widespread rash AND [2] bright red, sunburn-like    Negative: Severe pain in the incision    Negative: [1] Incision gaping open AND [2] length of opening > 2 inches (5 cm)    Negative: Patient sounds very sick or weak to the triager    Negative: Sounds like a serious complication to the triager    Negative: [1] Incision gaping open AND [2] < 48 hours since wound re-opened    Negative: Fever > 100.4 F (38.0 C)    Negative: [1] Incision looks infected (spreading redness, pain) AND [2] fever > 99.5 F (37.5 C)    Negative: [1] Incision looks infected (spreading redness, pain) AND [2] large red area (> 2 in. or 5 cm)    Negative: [1] Incision looks infected (spreading redness, pain) AND [2] face wound    Negative: [1] Red streak runs from the incision AND [2] longer than 1 inch (2.5 cm)    Negative: [1] Pus or bad-smelling fluid draining from incision AND [2] no fever    " Negative: [1] Post-op pain AND [2] not controlled with pain medications    Negative: Dressing soaked with blood or body fluid (e.g., drainage)    Negative: [1] Raised bruise and [2] size > 2 inches (5 cm) and expanding    Negative: [1] Caller has URGENT question AND [2] triager unable to answer question    [1] INCREASING pain in incision AND [2] > 2 days (48 hours) since surgery    Protocols used: POST-OP INCISION SYMPTOMS-A-AH

## 2022-08-15 NOTE — TELEPHONE ENCOUNTER
CLINICAL NUTRITION SERVICES     Reason for Contact: Patient was contacted by phone due to requested to speak with a Dietitian on the Oncology Distress Screening tool, 7/29/22    Action: RD called patient indicating reason for phone call. Left a VM with a return call back number.     Follow up: Wait for a return phone call.    Radha Bagley RDN, CASSIUSN  [facility]  [phone]

## 2022-08-25 PROBLEM — C41.9 OSTEOSARCOMA OF BONE (H): Status: ACTIVE | Noted: 2022-01-01

## 2022-08-25 NOTE — LETTER
8/25/2022         RE: Vic Scott III  [address]  [address]  [facility] [address]        Dear Colleague,    Thank you for referring your patient, Vic Scott III, to the [facility]. Please see a copy of my visit note below.    8-25-22    I saw Mr. 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 MRI was incomplete due to pain, but did show ~[id number] right thigh soft tissue mass with probable bony involvement of the proximal right femur. 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  Given high risk for amputation with massive reconstructive surgery, he got a PET was ordered which identifed concerning inguinal lymphadenopathy and started Doxil/Ifos initially as an inpatient. Admitted for Cycle 1 Doxil/Ifos (Y3Y6=87/2/21).  Notably he had an episode of confusion while hospitalized on 11/8/21 concerning for ifosfamide neurotoxicity.  This resolved without administration of methylene blue. With cycle 2 treatment he also had severe neurotoxicity 12 hours into day 3 ifosfamide infusion. He again improved without methylene blue. Remainder of cycle 2 treatment was aborted due reaction/patient wishes.      PET performed 1/11/22 with decrease size of the hypermetabolic soft tissue component of pleomorphic sarcoma in the right thigh, previously measuring 18.3 x 4.4 x 13 and now measuring approximately 12.2 x 4.0 x 4.7 cm. He was hesitant to move forward with cycle 3 chemotherapy and instead met with Dr. Whitaker as well as Dr. Bhatia to discuss multiple surgical options. He presented to the ED 6/5/22 for right leg pain and inability to ambulate. He was found to a have a pathologic right proximal femur fracture. He underwent right hindquarter amputation with Dr. Whitaker and primary complex wound closure with Dr. Butt on  6/17/22. Procedure was notable for significant blood loss requiring transfusion of 10 units pRBC.       -    Interval history    He began developing some right-sided abdominal pain after playing basketball about 2 and half weeks ago.  He presented to an ER because of this and had a CT scan on August 13 and was told he should follow-up with us.  This pain has actually resolved now but does bother him when he is reaching or picking up something heavy.  He is in a wheelchair but does not notice any LOPEZ or limitations to his activity.  He has seen palliative in the past.  His ROS today is actually otherwise fairly unremarkable.    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  He has now reached out to the owner of a company who employs people who have had amputations and is interested and returning to work when he is able.  For his hypoalbuminemia he has been using Ensure 3 times per day.      On exam he appeared comfortable with normal affect.  HEENT full EOMs  CHEST no respiratory distress  NEURO normal mentation and speech  PSYCH fairly good mood    -  I reviewed the pathology.  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  - Tumor involves the proximal femur, acetabulum and surrounding soft tissue  - Margins are free of tumor  - See synoptic report        Conventional osteosarcoma    Histologic Type Comment  with osteoclastic giant cells    Histologic Grade  G3, poorly differentiated, high grade    Mitotic Rate  40 mitoses per mm2   Necrosis (macroscopic or microscopic)  Present    Extent of Necrosis  90 %   Treatment Effect  Present    Extent of Treatment Effect  90 %   Lymphovascular Invasion  Not identified    MARGINS   Margin Status  All margins negative for tumor      -  I reviewed the new images of 8-13-22 showing likely local recurrence but also multiple lung nodules on the lower lung  fields.    CT-AP 8-13-22  IMPRESSION:   1.  [Postsurgical changes from right lower extremity amputation and right hemipelvectomy. There are multiple hypodense masses within the lateral abdominal wall musculature on the right largest of which measures 5.6 x 7.5 cm and is within the transverse   and oblique abdominous muscles, Consistent with metastatic disease.  2.  Small hypodense mass abutting the cecum, concerning for intraperitoneal extension and possible bowel wall involvement of metastatic disease, at this time there is no evidence of bowel perforation or bowel obstruction.  3.  Multiple pulmonary nodules consistent with pulmonary metastatic disease with a small left-sided pleural effusion    -  We discussed the situation.  He did not get a very dramatic response to Doxil with ifosfamide and was unable to tolerate the ifosfamide.  Given the more specific diagnosis I suggested doxorubicin infusionally along with cisplatin and we went over the typical toxicities of that.  We will of course use Neulasta and we discussed the approach neutropenic fever which is the same as last time.  He has seen palliative and despite his apparent good mood I suggested following with with palliative and he is amenable to that.  We will want a baseline CT at the time of starting.  We will probably get a scan after 6 to 9 weeks depending on how things go.  Initially we will not use the high-dose methotrexate but just do the doxorubicin cis-platinum hopefully every 21 days.    We did discuss the very serious nature of metastatic disease in the lung and the very unlikely possibility of cure.    All of his questions were addressed and he will call if others arise.  We will haveArgy talk with him about this treatment and try to get things started as soon as possible we will have him see Latosha at about day 14 of treatment to assess things and plan the next cycle.  Time today greater than 50 minutes including chart and image review and  orders.      Jonn Ambrocio M.D.  Professor  Hematology, Oncology and Transplantation

## 2022-08-25 NOTE — PROGRESS NOTES
Fabian is a 47 year old who is being evaluated via a billable video visit.      How would you like to obtain your AVS? Mail a copy  If the video visit is dropped, the invitation should be resent by: Text to cell phone: [phone]  Will anyone else be joining your video visit? Melanie    Ana Maria Huerta LORENZO    Video-Visit Details    Video Start [id number]    Type of service:  Video Visit    Video End Timeabout 448    Originating Location (pt. Location)car    Distant Location (provider location):  [facility]     Platform used for Video Visit:Filecubed        8-25-22    I saw Mr. 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 MRI was incomplete due to pain, but did show ~[id number] right thigh soft tissue mass with probable bony involvement of the proximal right femur. 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  Given high risk for amputation with massive reconstructive surgery, he got a PET was ordered which identifed concerning inguinal lymphadenopathy and started Doxil/Ifos initially as an inpatient. Admitted for Cycle 1 Doxil/Ifos (X4X5=17/2/21).  Notably he had an episode of confusion while hospitalized on 11/8/21 concerning for ifosfamide neurotoxicity.  This resolved without administration of methylene blue. With cycle 2 treatment he also had severe neurotoxicity 12 hours into day 3 ifosfamide infusion. He again improved without methylene blue. Remainder of cycle 2 treatment was aborted due reaction/patient wishes.      PET performed 1/11/22 with decrease size of the hypermetabolic soft tissue component of pleomorphic sarcoma in the right thigh, previously measuring 18.3 x 4.4 x 13 and now measuring approximately 12.2 x 4.0 x 4.7 cm. He was hesitant to move forward with cycle 3 chemotherapy and instead met with Dr. Whitaker as well as   Sriram to discuss multiple surgical options. He presented to the ED 6/5/22 for right leg pain and inability to ambulate. He was found to a have a pathologic right proximal femur fracture. He underwent right hindquarter amputation with Dr. Whitaker and primary complex wound closure with Dr. Butt on 6/17/22. Procedure was notable for significant blood loss requiring transfusion of 10 units pRBC.       -    Interval history    He began developing some right-sided abdominal pain after playing basketball about 2 and half weeks ago.  He presented to an ER because of this and had a CT scan on August 13 and was told he should follow-up with us.  This pain has actually resolved now but does bother him when he is reaching or picking up something heavy.  He is in a wheelchair but does not notice any LOPEZ or limitations to his activity.  He has seen palliative in the past.  His ROS today is actually otherwise fairly unremarkable.    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  He has now reached out to the owner of a company who employs people who have had amputations and is interested and returning to work when he is able.  For his hypoalbuminemia he has been using Ensure 3 times per day.      On exam he appeared comfortable with normal affect.  HEENT full EOMs  CHEST no respiratory distress  NEURO normal mentation and speech  PSYCH fairly good mood    -  I reviewed the pathology.  Case: [id number]  6-17-22  Right leg, hindquarter amputation:  - High-grade osteosarcoma, 48.7 cm  - Tumor involves the proximal femur, acetabulum and surrounding soft tissue  - Margins are free of tumor  - See synoptic report        Conventional osteosarcoma    Histologic Type Comment  with osteoclastic giant cells    Histologic Grade  G3, poorly differentiated, high grade    Mitotic Rate  40 mitoses per mm2   Necrosis (macroscopic or microscopic)  Present    Extent of  Necrosis  90 %   Treatment Effect  Present    Extent of Treatment Effect  90 %   Lymphovascular Invasion  Not identified    MARGINS   Margin Status  All margins negative for tumor      -  I reviewed the new images of 8-13-22 showing likely local recurrence but also multiple lung nodules on the lower lung fields.    CT-AP 8-13-22  IMPRESSION:   1.  [Postsurgical changes from right lower extremity amputation and right hemipelvectomy. There are multiple hypodense masses within the lateral abdominal wall musculature on the right largest of which measures 5.6 x 7.5 cm and is within the transverse   and oblique abdominous muscles, Consistent with metastatic disease.  2.  Small hypodense mass abutting the cecum, concerning for intraperitoneal extension and possible bowel wall involvement of metastatic disease, at this time there is no evidence of bowel perforation or bowel obstruction.  3.  Multiple pulmonary nodules consistent with pulmonary metastatic disease with a small left-sided pleural effusion    -  We discussed the situation.  He did not get a very dramatic response to Doxil with ifosfamide and was unable to tolerate the ifosfamide.  Given the more specific diagnosis I suggested doxorubicin infusionally along with cisplatin and we went over the typical toxicities of that.  We will of course use Neulasta and we discussed the approach neutropenic fever which is the same as last time.  He has seen palliative and despite his apparent good mood I suggested following with with palliative and he is amenable to that.  We will want a baseline CT at the time of starting.  We will probably get a scan after 6 to 9 weeks depending on how things go.  Initially we will not use the high-dose methotrexate but just do the doxorubicin cis-platinum hopefully every 21 days.    We did discuss the very serious nature of metastatic disease in the lung and the very unlikely possibility of cure.    All of his questions were addressed and  he will call if others arise.  We will haveArgy talk with him about this treatment and try to get things started as soon as possible we will have him see Latosha at about day 14 of treatment to assess things and plan the next cycle.  Time today greater than 50 minutes including chart and image review and orders.    Jonn Ambrocio M.D.  Professor  Hematology, Oncology and Transplantation

## 2022-08-29 PROBLEM — T45.1X5A ADVERSE EFFECT OF ANTINEOPLASTIC AND IMMUNOSUPPRESSIVE DRUGS, INITIAL ENCOUNTER: Status: ACTIVE | Noted: 2022-01-01

## 2022-08-30 NOTE — PROCEDURES
[facility]    Double Lumen PICC Placement    Date/Time: 8/30/2022 4:50 PM  Performed by: Dimitri Mccord RN  Authorized by: Jonn Ambrocio MD   Indications: vascular access      UNIVERSAL PROTOCOL   Site Marked: Yes  Prior Images Obtained and Reviewed:  Yes  Required items: Required blood products, implants, devices and special equipment available    Patient identity confirmed:  Verbally with patient and arm band  NA - No sedation, light sedation, or local anesthesia  Confirmation Checklist:  Patient's identity using two indicators, relevant allergies, procedure was appropriate and matched the consent or emergent situation and correct equipment/implants were available  Time out: Immediately prior to the procedure a time out was called    Universal Protocol: the Joint Commission Universal Protocol was followed    Preparation: Patient was prepped and draped in usual sterile fashion       ANESTHESIA    Anesthesia: Local infiltration  Local Anesthetic:  Lidocaine 1% without epinephrine  Anesthetic Total (mL):  5      SEDATION    Patient Sedated: No        Preparation: skin prepped with ChloraPrep  Skin prep agent: skin prep agent completely dried prior to procedure  Sterile barriers: maximum sterile barriers were used: cap, mask, sterile gown, sterile gloves, and large sterile sheet  Hand hygiene: hand hygiene performed prior to central venous catheter insertion  Type of line used: PICC and Power PICC  Catheter type: double lumen  Lumen type: non-valved  Catheter size: 5 Fr  Brand: Bard  Lot number: [id number]  Placement method: venipuncture, MST, ultrasound and tip navigation system  Number of attempts: 1  Difficulty threading catheter: no  Successful placement: yes  Orientation: left    Location: brachial vein (medial) (vd 0.52 cm)  Tip Location: superior vena cava  Arm circumference: adults 10 cm  Extremity circumference: 33  Visible catheter  length: 1  Total catheter length: 50  Dressing and securement: adhesive securement device, alcohol impregnated caps, blood cleaned with CHG, chlorhexidine disc applied, dressing applied, glue, line secured, securement device, site cleansed, statlock, sterile dressing applied, transparent dressing, transparent securement dressing and tissue adhesive  Post procedure assessment: placement verified by 3CG technology, free fluid flow and blood return through all ports  PROCEDURE   Patient Tolerance:  Patient tolerated the procedure well with no immediate complicationsDescribe Procedure: PICC ok to use

## 2022-08-30 NOTE — PROGRESS NOTES
[facility]: Cancer Care                                                                                        Spoke with Fabian today.  Confirmed all of his upcoming appts including his PICC placement for today.  Reviewed his upcoming chemo plan.   He will arrange for transportation today to have PICC placed.  He already has a port.    Encouraged him to call with questions or concerns.    Esperanza Ayala RNCC

## 2022-08-31 PROBLEM — C78.00: Status: ACTIVE | Noted: 2022-01-01

## 2022-08-31 PROBLEM — C49.9: Status: ACTIVE | Noted: 2022-01-01

## 2022-08-31 PROBLEM — J90 PLEURAL EFFUSION ON LEFT: Status: ACTIVE | Noted: 2022-01-01

## 2022-08-31 PROBLEM — I26.99: Status: ACTIVE | Noted: 2022-01-01

## 2022-09-01 NOTE — H&P
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 Most recent CT showed multiple abscesses to lateral abdominal wall multiple pulmonary nodules 08/13.  Patient states that today he started having sudden onset shortness of breath, chest discomfort and palpitations prompting him to present to the ED.  Patient is currently hypertensive, tachycardic, tachypneic satting 97% on 4 L of oxygen.  Denies home oxygen use.  Denies recent fevers, chills, nausea, vomiting, abdominal pain, chest pain, orthopnea, lower extremity swelling, erythema,, warmth, tenderness of abdominal incision site.  Differential includes malignant effusion, infectious, secondary to heart failure, and hepatic chylothorax.  CT on this admission shows new right upper lobe pulmonary vein thrombus, worsening left-sided pleural effusion when compared to previous CT on 08/13, and his known pulmonary metastases have progressed.  I suspect this is likely secondary to his worsening pulmonary metastases.  Infectious etiology is unlikely given patient does not have  any fevers, productive cough, chills, or fatigue.  I suspect his elevated white blood cell count is secondary to reactive process.  Patient denies history of heart failure or liver cirrhosis.  Denies alcohol use.  Given that patient's right pulmonary vein thrombus is secondary to malignancy, his recent CT shows no evidence of PE, physical exam not concerning for DVT, no previous history of DVT or PE, and denies tobacco use, anticoagulation would not be indicated.  I suspect this new oxygen requirement secondary to his pleural effusion.  - Diagnostic and therapeuticThoracentesis in AM   - body fluid analysis ordered   -Coag panel ordered   - Continuous pulse ox   - Continuous oxygen. Wean as toelrated   - CBC in AM    # High-grade pleomorphic sarcoma of the right pelvis and femur   # s/p chemotherapy & right hindquarter amputation/hemipelvectomy (6/17/2022)  # Metastasis to abdominal wall and lung  Patient was diagnosed with high-grade pleomorphic sarcoma of right femur and pelvis on 09/20/2021.  Patient Doxil/Ifos initially on 11/2/2021, which was complicated by isofamide neurotoxicity. It resolved without methene blue. His 2nd cycle of chemo was aborted because of this. He experienced a right proximal femur fracture on 06/05/2022 and underwent right hindquarter amputation by Dr. Grove and primary complex wound closure by Dr. Petersen on 06/17/2022. Pt's CT on 08/13/2022 showed new metastasis to abdominal wall and lungs. His oncologist Dr. Ambrocio suggested to start doxorubicin, cisplatin, and Neulasta. He was agreeable to follow with palliative. Pt received PICC placement on 08/30. He was scheduled to start his chemo in 1 day.   - Oncology consulted for recommendations on chemotherapy given his recent pulmonary vein tumor thrombus    - continue PTA morphine and oxycodone   - continue PTA zofran   - Consider palliative care consult   - wound care ordered     #Leukocytosis   Differential includes infectious vs  "reactive etiology.Pt remains afebrile, denies fevers, chills, nausea, vomiting, diarrhea, abdominal pain, dysuria, increased urinary frequency or urgency, or productive cough. Physical exam shows no signs of infection. I suspect this is likely reactive and will defer starting patient on antibiotics.   - f/u body fluid analysis   - CIS   - low threshold to start antibiotics   - CBC in AM     #Anemia   Currently there is no concern for hemothorax. I suspect this is anemia of chronic disease. He is currently above his baseline Hgb and there are no active signs of bleeding. Denies hematochezia, melena, hemotysis, and hematemasis. Not currently on blood thinners.   - CTM   - CBC in AM     #MDD: continue PTA lexapro     #Hx of asthma: resolved      Diet:  Regular diet   DVT Prophylaxis: Enoxaparin (Lovenox) SQ  Sung Catheter: Not present  Fluids: 500 mL NS in ED   Central Lines: PRESENT     Cardiac Monitoring: None  Code Status: Full Code      The patient's care was discussed with the Attending Physician, Dr. Rajesh Mascorro.    Marsha Kwok MD  Medicine Service, [facility]  Securely message with the Vocera Web Console (learn more here)  Text page via [facility] Paging/Directory   Please see signed in provider for up to date coverage information    ______________________________________________________________________    Chief Complaint   \"SOB and chest pain\"    History is obtained from the patient    History of Present Illness   Vic Scott III is a 47 year old male who presents with shortness of breath and chest pain starting today.  Patient states that he was heading to his car to visit a friend when he became short of breath.  He initially thought it was because of the humidity and drove to his friend's house after which he became short of breath, started having chest pain, and palpitations.  At the time he also had a dry cough.  He states that this is never " happened before.  Denies history of DVTs or PEs.  Patient has known malignancy with metastases to abdominal wall and lungs.  Upon arrival in the ED his blood pressure was 137/90.  He was tachycardic, afebrile, and had a new oxygen requirement of 4 L.  Patient denies using oxygen at home.  Patient had a PICC line inserted yesterday and hopes to start chemotherapy tomorrow.  Denies erythema, warmth, swelling, tenderness to his PICC line and his abdominal incisional site.  Patient states that his abdominal incision site has a yellowish discharge but is not concerning for infection.  He has a wound care nurse at home who monitors this.  Denies fevers, chills, nausea, vomiting, lower extremity edema, orthopnea, abdominal pain, dysuria, productive cough, urinary frequency, or urinary urgency.    Review of Systems    The 10 point Review of Systems is negative other than noted in the HPI or here.     Past Medical History    I have reviewed this patient's medical history and updated it with pertinent information if needed.   Past Medical History:   Diagnosis Date     Pleomorphic cell sarcoma (H)         Past Surgical History   I have reviewed this patient's surgical history and updated it with pertinent information if needed.  Past Surgical History:   Procedure Laterality Date     ANESTHESIA OUT OF OR BLOOD BRAIN BARRIER DISRUPTION N/A 06/16/2022    Procedure: ANESTHESIA OUT OF OR - Block Epidural;  Surgeon: GENERIC ANESTHESIA PROVIDER;  Location: [facility] OR     COMPLEX WOUND CLOSURE (UPDATE) Right 06/17/2022    Procedure: complex closure pelvis, spy;  Surgeon: KAREN Butt MD;  Location: [facility] OR     COMPLEX WOUND CLOSURE (UPDATE)  06/17/2022    Procedure: ;  Surgeon: KAREN Butt MD;  Location: [facility] OR     EXCISE TUMOR PELVIS POSTERIOR Right 06/17/2022    Procedure: Right hindquarter amputation;  Surgeon: Matty Whitaker MD;  Location: [facility] OR     INSERT PORT VASCULAR ACCESS Right 11/01/2021    Procedure:  INSERTION, VASCULAR ACCESS PORT;  Surgeon: Sushil Gonzales MD;  Location: [facility] OR     IR CHEST PORT PLACEMENT > 5 YRS OF AGE  11/01/2021     PICC DOUBLE LUMEN PLACEMENT Left 08/30/2022    left medial brachial 5 fr dl power picc 50 cm        Social History   I have reviewed this patient's social history and updated it with pertinent information if needed. Vic Scott III  reports that he has never smoked. He has never used smokeless tobacco. He reports that he does not drink alcohol and does not use drugs.    Family History   I have reviewed this patient's family history and updated it with pertinent information if needed.  Family History   Problem Relation Age of Onset     Heart Disease Mother      Coronary Artery Disease Father      Other - See Comments Father         pleomorphic cell sarcoma     Cancer Maternal Grandmother      No Known Problems Sister      No Known Problems Sister      No Known Problems Son      No Known Problems Daughter      No Known Problems Daughter      No Known Problems Daughter        Prior to Admission Medications   Prior to Admission Medications   Prescriptions Last Dose Informant Patient Reported? Taking?   Nutritional Supplements (ENSURE ACTIVE HIGH PROTEIN) LIQD Unknown at Unknown time Self No Yes   Sig: Take 1 Can by mouth 3 times daily (with meals)   acetaminophen (TYLENOL) 325 MG tablet  Self No No   Sig: Take 3 tablets (975 mg) by mouth every 8 hours   Patient not taking: No sig reported   acetaminophen (TYLENOL) 500 MG tablet Unknown at Unknown time Self No Yes   Sig: Take 2 tablets (1,000 mg) by mouth 3 times daily as needed for mild pain   escitalopram (LEXAPRO) 10 MG tablet 8/31/2022 at am Self No Yes   Sig: Take 1 tablet (10 mg) by mouth daily   morphine (MS CONTIN) 30 MG CR tablet 8/31/2022 at am Self No Yes   Sig: Take 1 tablet (30 mg) by mouth every 12 hours   multivitamin, therapeutic (THERA-VIT) TABS tablet 8/31/2022 at am Self No Yes   Sig: Take 1 tablet by mouth  daily   naproxen (NAPROSYN) 500 MG tablet 8/31/2022 at am Self Yes Yes   Sig: Take 500 mg by mouth 2 times daily (with meals)   oxyCODONE IR (ROXICODONE) 10 MG tablet  Self No No   Sig: Take 1 tablet (10 mg) by mouth every 4 hours as needed for moderate to severe pain   prochlorperazine (COMPAZINE) 10 MG tablet  Self No No   Sig: Take 1 tablet (10 mg) by mouth every 6 hours as needed (Nausea/Vomiting)      Facility-Administered Medications: None     Allergies   Allergies   Allergen Reactions     Blood Transfusion Related (Informational Only) Other (See Comments)     Patient has a history of a clinically significant antibody against RBC antigens.  A delay in compatible RBCs may occur.        Physical Exam   Vital Signs: Temp: 98.8  F (37.1  C) Temp src: Oral BP: (!) 143/93 Pulse: (!) 128   Resp: (!) 39 SpO2: 93 % O2 Device: Nasal cannula Oxygen Delivery: 4 LPM  Weight: 0 lbs 0 oz    Constitutional: Well appearing, NAD, appears stated age  HEENT: NC/AT, anicteric sclera, pupils round and equal, EOMI  CV: regular rhythm, tachycardic, heart sounds difficult to hear due to body habitus , normal S1/S2, no murmurs/gallops/rubs, intact distal pulses  Pulm: non-labored respirations on room air, tachypnea, diminished lung sounds on lung lung, right lung sounds are decreased but clear, no wheezes or crackles  Abdomen: normoactive bowel sounds, soft, non-distended, non-tender  Extremities: warm and well perfused, no peripheral edema, cyanosis, or clubbing. Right leg amputee.   Skin: no jaundice, no rashes on exposed skin, PICC site and abdominal incision site are not erythematous, swollen, warm, or tender. No discharge from PICC site. Dried yellowish discharge from healing abdominal incision site. No pus.   Neuro: alert and oriented x3, CN II-XII grossly intact  Psych: normal affect

## 2022-09-01 NOTE — ED PROVIDER NOTES
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  Patient reports that intermittently over the past few days he has become short of breath, more so when laying down.  Today he got into a car and became more short of breath than previously and also experienced some palpitations.  He denies any chest pain or pressure.  He does report a cough briefly this morning while transferring from his bed to his wheelchair, but no consistent cough.  He denies normally being on oxygen at baseline.  He denies fever, chills, runny nose, congestion, sore throat.  Patient denies previous history of PE/DVT.  He denies any left leg swelling.      He does endorse right-sided abdominal pain near the site of his prior surgery and CT findings consistent with metastatic disease.  He does endorse some drainage and pain from the wound site, but indicates this is normal since his amputation/hemipelvectomy.  Patient indicates he is scheduled to start chemotherapy in 2 days.  Patient reports he does have home health care to assist him.    CT ABDOMEN PELVIS W CONTRAST 8/13/2022 2:41 AM  IMPRESSION:   1.  Postsurgical changes from right lower extremity amputation and right hemipelvectomy. There are multiple hypodense masses within the lateral abdominal wall musculature on the right largest of which measures 5.6 x 7.5 cm and is within the transverse and oblique abdominous muscles, Consistent with metastatic disease.  2.   Small hypodense mass abutting the cecum, concerning for intraperitoneal extension and possible bowel wall involvement of metastatic disease, at this time there is no evidence of bowel perforation or bowel obstruction.  3.  Multiple pulmonary nodules consistent with pulmonary metastatic disease with a small left-sided pleural effusion    Past Medical History  Past Medical History:   Diagnosis Date     Pleomorphic cell sarcoma (H)      Past Surgical History:   Procedure Laterality Date     ANESTHESIA OUT OF OR BLOOD BRAIN BARRIER DISRUPTION N/A 06/16/2022    Procedure: ANESTHESIA OUT OF OR - Block Epidural;  Surgeon: GENERIC ANESTHESIA PROVIDER;  Location: [facility]     COMPLEX WOUND CLOSURE (UPDATE) Right 06/17/2022    Procedure: complex closure pelvis, spy;  Surgeon: KAREN Butt MD;  Location: [facility] OR     COMPLEX WOUND CLOSURE (UPDATE)  06/17/2022    Procedure: ;  Surgeon: KAREN Butt MD;  Location: [facility] OR     EXCISE TUMOR PELVIS POSTERIOR Right 06/17/2022    Procedure: Right hindquarter amputation;  Surgeon: Matty Whitaker MD;  Location: [facility] OR     INSERT PORT VASCULAR ACCESS Right 11/01/2021    Procedure: INSERTION, VASCULAR ACCESS PORT;  Surgeon: Sushil Gonzales MD;  Location: [facility] OR     IR CHEST PORT PLACEMENT > 5 YRS OF AGE  11/01/2021     PICC DOUBLE LUMEN PLACEMENT Left 08/30/2022    left medial brachial 5 fr dl power picc 50 cm     acetaminophen (TYLENOL) 500 MG tablet  escitalopram (LEXAPRO) 10 MG tablet  morphine (MS CONTIN) 30 MG CR tablet  multivitamin, therapeutic (THERA-VIT) TABS tablet  naproxen (NAPROSYN) 500 MG tablet  Nutritional Supplements (ENSURE ACTIVE HIGH PROTEIN) LIQD  acetaminophen (TYLENOL) 325 MG tablet  oxyCODONE IR (ROXICODONE) 10 MG tablet  prochlorperazine (COMPAZINE) 10 MG tablet      Allergies   Allergen Reactions     Blood Transfusion Related (Informational Only) Other (See Comments)     Patient has a history of a clinically significant antibody  against RBC antigens.  A delay in compatible RBCs may occur.      Family History  Family History   Problem Relation Age of Onset     Heart Disease Mother      Coronary Artery Disease Father      Other - See Comments Father         pleomorphic cell sarcoma     Cancer Maternal Grandmother      No Known Problems Sister      No Known Problems Sister      No Known Problems Son      No Known Problems Daughter      No Known Problems Daughter      No Known Problems Daughter      Social History   Social History     Tobacco Use     Smoking status: Never Smoker     Smokeless tobacco: Never Used   Vaping Use     Vaping Use: Never used   Substance Use Topics     Alcohol use: Never     Drug use: Never      Past medical history, past surgical history, medications, allergies, family history, and social history were reviewed with the patient. No additional pertinent items.       Review of Systems   Constitutional: Negative for chills and fever.   HENT: Negative for congestion, rhinorrhea and sore throat.    Respiratory: Positive for shortness of breath. Negative for cough.    Cardiovascular: Positive for palpitations. Negative for chest pain and leg swelling.   Gastrointestinal: Positive for abdominal pain (R). Negative for diarrhea, nausea and vomiting.   Genitourinary: Negative for dysuria.   All other systems reviewed and are negative.    A complete review of systems was performed with pertinent positives and negatives noted in the HPI, and all other systems negative.    Physical Exam   BP: (!) 137/90  Pulse: (!) 129  Temp: 98.8  F (37.1  C)  Resp: 18  SpO2: 93 %  Physical Exam  Vitals and nursing note reviewed.   Constitutional:       General: He is in acute distress.      Appearance: He is well-developed.      Comments: Adult male, tachypneic, appears to have mildly increased WOB   HENT:      Head: Normocephalic.      Mouth/Throat:      Mouth: Mucous membranes are moist.      Pharynx: Oropharynx is clear.   Eyes:      Pupils:  Pupils are equal, round, and reactive to light.   Cardiovascular:      Rate and Rhythm: Regular rhythm. Tachycardia present.      Pulses: Normal pulses.      Heart sounds: Normal heart sounds. No murmur heard.    No gallop.   Pulmonary:      Breath sounds: No wheezing or rales.      Comments: Tachypneic. Some increased WOB. Diminished breath sounds on left. No wheezing or rhonchi appreciated.  Abdominal:      General: Bowel sounds are normal. There is no distension.      Palpations: Abdomen is soft.      Tenderness: There is no abdominal tenderness. There is no guarding or rebound.   Musculoskeletal:      Comments: S/p R hindquarter amputation   Skin:     General: Skin is warm and dry.   Neurological:      Mental Status: He is alert and oriented to person, place, and time.   Psychiatric:         Behavior: Behavior normal.           ED Course      Procedures   8:00 PM  The patient was seen and examined by Flores Joel MD in Room ED09.              EKG Interpretation:      Interpreted by Flores Joel MD  Time reviewed: 2030   Symptoms at time of EKG: SOB   Rhythm: Sinus tachycardia  Rate: 130-140  Axis: Normal  Ectopy: None  Conduction: Normal  ST Segments/ T Waves: No ST-T wave changes and No acute ischemic changes  Q Waves: None  Comparison to prior: Unchanged    Clinical Impression: sinus tachycardia    The medical record was reviewed and interpreted.  Current labs reviewed and interpreted.  Previous labs reviewed and interpreted.         Critical care: Based upon patient's evaluation he is critically ill and does require critical care.  Approximately 45 minutes is spent in assessment, reassessment, record review, entering and interpretation of orders, discussion with consultants and patient as well as radiology resident and nursing staff, and documentation.     Results for orders placed or performed during the hospital encounter of 08/31/22   CT Chest Pulmonary Embolism w Contrast     Status: None  (Preliminary result)    Impression    RESIDENT PRELIMINARY INTERPRETATION  IMPRESSION:   1. Exam is positive for pulmonary vein thrombus arising from a  pulmonary vein in the right upper lobe. The thrombus extends from a  pulmonary mass and most likely represents tumor thrombus.  2. Exam is negative for pulmonary embolism though there is suboptimal  contrast timing resulting in diminished evaluation of the subsegmental  pulmonary arteries.  3. Massive left-sided effusion results in complete left lower lobe  atelectasis and a moderate amount of left upper lobe atelectasis.  4. Extensive multifocal metastatic pulmonary masses are new since  1/11/2022 and have progressed since 8/13/2022.       [Urgent Result: Pulmonary vein thrombus]    Finding was identified on 8/31/2022 9:40 PM.     Dr. Joel was contacted by Dr. Orellana at 8/31/2022 10:09 PM and  verbalized understanding of the urgent finding.    [facility] Draw     Status: None    Narrative    The following orders were created for panel order [facility] Draw.  Procedure                               Abnormality         Status                     ---------                               -----------         ------                     Extra Blue Top Tube[[id number]]                              Final result               Extra Red Top Tube[[id number]]                               Final result               Extra Green Top (Lithium...[[id number]]                      Final result               Extra Purple Top Tube[[id number]]                            Final result               Extra Purple Top Tube[[id number]]                            Final result               Extra Green Top (Lithium...[[id number]]                      Final result                 Please view results for these tests on the individual orders.   Comprehensive metabolic panel     Status: Abnormal   Result Value Ref Range    Sodium 140 136 - 145 mmol/L    Potassium 3.7 3.4 - 5.3 mmol/L    Creatinine 0.63 (L)  0.67 - 1.17 mg/dL    Urea Nitrogen 10.7 6.0 - 20.0 mg/dL    Chloride 105 98 - 107 mmol/L    Carbon Dioxide (CO2) 24 22 - 29 mmol/L    Anion Gap 11 7 - 15 mmol/L    Glucose 126 (H) 70 - 99 mg/dL    Calcium 9.0 8.6 - 10.0 mg/dL    Protein Total 7.0 6.4 - 8.3 g/dL    Albumin 3.5 3.5 - 5.2 g/dL    Bilirubin Total 0.8 <=1.2 mg/dL    Alkaline Phosphatase 145 (H) 40 - 129 U/L    AST 24 10 - 50 U/L    ALT 10 10 - 50 U/L    GFR Estimate >90 >60 mL/min/1.73m2   Symptomatic; Auto-generated order Influenza A/B & SARS-CoV2 (COVID-19) Virus PCR Multiplex Nasopharyngeal     Status: Normal    Specimen: Nasopharyngeal; Swab   Result Value Ref Range    Influenza A PCR Negative Negative    Influenza B PCR Negative Negative    RSV PCR Negative Negative    SARS CoV2 PCR Negative Negative    Narrative    Testing was performed using the Xpert Xpress CoV2/Flu/RSV Assay on the Cepheid GeneXpert Instrument. This test should be ordered for the detection of SARS-CoV-2 and influenza viruses in individuals who meet clinical and/or epidemiological criteria. Test performance is unknown in asymptomatic patients. This test is for in vitro diagnostic use under the FDA EUA for laboratories certified under CLIA to perform high or moderate complexity testing. This test has not been FDA cleared or approved. A negative result does not rule out the presence of PCR inhibitors in the specimen or target RNA in concentration below the limit of detection for the assay. If only one viral target is positive but coinfection with multiple targets is suspected, the sample should be re-tested with another FDA cleared, approved, or authorized test, if coinfection would change clinical management. This test was validated by the [facility]. These laboratories are certified under the Clinical  Laboratory Improvement Amendments of 1988 (CLIA-88) as qualified to perform high complexity laboratory testing.   Extra Blue Top Tube     Status: None   Result  Value Ref Range    Hold Specimen JIC    Extra Red Top Tube     Status: None   Result Value Ref Range    Hold Specimen JIC    Extra Green Top (Lithium Heparin) Tube     Status: None   Result Value Ref Range    Hold Specimen JIC    Extra Purple Top Tube     Status: None   Result Value Ref Range    Hold Specimen JIC    Extra Purple Top Tube     Status: None   Result Value Ref Range    Hold Specimen JIC    Extra Green Top (Lithium Heparin) ON ICE     Status: None   Result Value Ref Range    Hold Specimen JIC    CBC with platelets and differential     Status: Abnormal   Result Value Ref Range    WBC Count 14.6 (H) 4.0 - 11.0 10e3/uL    RBC Count 4.01 (L) 4.40 - 5.90 10e6/uL    Hemoglobin 9.5 (L) 13.3 - 17.7 g/dL    Hematocrit 30.4 (L) 40.0 - 53.0 %    MCV 76 (L) 78 - 100 fL    MCH 23.7 (L) 26.5 - 33.0 pg    MCHC 31.3 (L) 31.5 - 36.5 g/dL    RDW 19.1 (H) 10.0 - 15.0 %    Platelet Count 338 150 - 450 10e3/uL    % Neutrophils 85 %    % Lymphocytes 6 %    % Monocytes 7 %    % Eosinophils 1 %    % Basophils 0 %    % Immature Granulocytes 1 %    NRBCs per 100 WBC 0 <1 /100    Absolute Neutrophils 12.4 (H) 1.6 - 8.3 10e3/uL    Absolute Lymphocytes 0.9 0.8 - 5.3 10e3/uL    Absolute Monocytes 1.0 0.0 - 1.3 10e3/uL    Absolute Eosinophils 0.1 0.0 - 0.7 10e3/uL    Absolute Basophils 0.0 0.0 - 0.2 10e3/uL    Absolute Immature Granulocytes 0.1 <=0.4 10e3/uL    Absolute NRBCs 0.0 10e3/uL   iStat Gases Electrolytes ICA Glucose Venous, POCT     Status: Abnormal   Result Value Ref Range    CPB Applied No     Hematocrit POCT 40 40 - 53 %    Calcium, Ionized Whole Blood POCT 5.0 4.4 - 5.2 mg/dL    Glucose Whole Blood POCT 126 (H) 70 - 99 mg/dL    Bicarbonate Venous POCT 26 21 - 28 mmol/L    Hemoglobin POCT 13.6 13.3 - 17.7 g/dL    Potassium POCT 3.7 3.4 - 5.3 mmol/L    Sodium POCT 141 133 - 144 mmol/L    pCO2 Venous POCT 43 40 - 50 mm Hg    pO2 Venous POCT 34 25 - 47 mm Hg    pH Venous POCT 7.38 7.32 - 7.43    O2 Sat, Venous POCT 64 (L) 94  - 100 %   Creatinine POCT     Status: Abnormal   Result Value Ref Range    Creatinine POCT 0.5 (L) 0.7 - 1.3 mg/dL    GFR, ESTIMATED POCT >60 >60 mL/min/1.73m2   INR     Status: Normal   Result Value Ref Range    INR 1.05 0.85 - 1.15   EKG 12 lead     Status: None   Result Value Ref Range    Systolic Blood Pressure  mmHg    Diastolic Blood Pressure  mmHg    Ventricular Rate 130 BPM    Atrial Rate 131 BPM    WY Interval 130 ms    QRS Duration 70 ms     ms    QTc 456 ms    P Axis 58 degrees    R AXIS -12 degrees    T Axis 0 degrees    Interpretation ECG       Sinus tachycardia  Inferior infarct , age undetermined  Abnormal ECG  Unconfirmed report - interpretation of this ECG is computer generated - see medical record for final interpretation  Confirmed by - EMERGENCY ROOM, PHYSICIAN ([id number]),  MACIEJ HERNANDEZ ([id number]) on 8/31/2022 10:29:26 PM     CBC with Platelets & Differential     Status: Abnormal    Narrative    The following orders were created for panel order CBC with Platelets & Differential.  Procedure                               Abnormality         Status                     ---------                               -----------         ------                     CBC with platelets and d...[[id number]]  Abnormal            Final result                 Please view results for these tests on the individual orders.     Medications   0.9% sodium chloride BOLUS (0 mLs Intravenous Stopped 8/31/22 2228)   sodium chloride (PF) 0.9% PF flush 91 mL (91 mLs Intravenous Given 8/31/22 2118)   iopamidol (ISOVUE-370) solution 61 mL (61 mLs Intravenous Given 8/31/22 2118)   iopamidol (ISOVUE-370) solution 61 mL (61 mLs Intravenous Given 8/31/22 2137)   sodium chloride (PF) 0.9% PF flush 91 mL (91 mLs Intravenous Given 8/31/22 2137)   oxyCODONE IR (ROXICODONE) tablet 10 mg (10 mg Oral Given 8/31/22 2151)        Assessments & Plan (with Medical Decision Making)   Patient presents to the emergency department today  complaining of worsening shortness of breath, starting slightly yesterday and getting much worse today.  Differential diagnosis of shortness of breath and a cancer patient is quite extensive.  Certainly this needs to include increased metastatic burden, also need to consider the possibility of infection, pneumonia, COVID, pulmonary embolism, pleural effusion, intermittent arrhythmia, pericardial effusion, severe electrolyte abnormality, amongst others.  We did establish IV access and we did draw blood for laboratory analysis.  EKG was done upon arrival and this demonstrates sinus tachycardia with a rate of 130.  There is no sign of ectopy or ischemia.  CBC shows a white count of 14.6 with a left shift.  Hemoglobin is 9.5 which is fairly close to baseline.  CMP is essentially within normal limits with slightly increased alkaline phosphatase at 145.  INR is within normal limits.  VBG shows a pH of 7.38 with a PCO2 of 43, PO2 of 34, bicarb of 26.  Influenza swabs and COVID swabs were negative.  Due to concern about pulmonary embolism in a metastatic cancer patient, we did elect to do a contrast chest CT which demonstrates positive pulmonary vein thrombus which is arising from a pulmonary mass and most likely represents tumor thrombus per radiology.  I did speak to the radiology resident about this.  There is no sign of PE, though there is a fairly massive left-sided pleural effusion.  The extensive multifocal metastatic pulmonary masses are progressed since most recent comparison imaging just a few weeks ago.    At this point I believe that it would be in the patient's best interest to be admitted to the hospital.  I have spoken to the oncology moonlighter and we will plan to admit at this time.  Due to the size of the pleural effusion and persistent tachycardia, we will admit to a stepdown unit.  He is currently on 2 to 3 L of oxygen via nasal cannula.    I have reviewed the nursing notes. I have reviewed the  findings, diagnosis, plan and need for follow up with the patient.    New Prescriptions    No medications on file       Final diagnoses:   Secondary sarcoma of lung, unspecified laterality (H)   Pleural effusion on left   Thrombus of pulmonary vein (H) - suspected tumor thrombus per radiology   Acute respiratory failure with hypoxia (H)     ILeo, am serving as a trained medical scribe to document services personally performed by Flores Joel MD, based on the provider's statements to me.     IFlores MD, was physically present and have reviewed and verified the accuracy of this note documented by Leo Wooten.    --  Flores Joel MD  [facility] EMERGENCY DEPARTMENT  8/31/2022     Flores Joel MD  08/31/22 [id number]

## 2022-09-01 NOTE — CONSULTS
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    In August was recently found to have progression of the disease which she was seen in the CT abdomen and chest with new lesions in the abdominal wall, cecum multiple lung nodules and left pleural effusion.  He met with Dr. Ambrocio and the plan was to start chemotherapy with doxorubicin/cisplatin/high-dose methotrexate on 9/1/2022.    But he presented to the emergency room yesterday with worsening shortness of breath and chest pain.  His repeat CT scan of the chest on 9/1/2022 showed moderate to large left pleural effusion and tumor thrombosis in the right upper lobe pulmonary vein.    Oncologic History:  -July 2021 presented with rapidly progressive right leg swelling, MRI showed 29 x 20 cm right thigh soft tissue mass of the proximal right femur.   -  Biopsy of the mass on 9/24/2021 showed high-grade pleomorphic sarcoma.  -PET CT scan at that time showed concerning inguinal lymphadenopathy.  -She was started on Doxil/ifosfamide in November 2021.  C1D1 of Doxil/ifosfamide 11/2/2021.  -It was complicated by ifosfamide neurotoxicity-did not receive methylene blue  - C2D1 - doxil/ifos , it was also complicated by ifosfamide neurotoxicity (Symptoms include intermittent urinary incontinence, somnolence,  disorientation and confusion).    -Patient did not want further cycle 3 due to the side effects.    -In June 2022 he presented with right leg pain and inability to ambulate with proximal femur fracture hence he underwent amputation and hemipelvectomy on 6/17/2022    As he only received 2 cycles of neoadjuvant chemotherapy without a clear evidence of response, there is no current role for adjuvant chemotherapy.    Since that he was on surveillance CT scan and MRI every 3 months.    -August 2022 he was noted to have abdominal pain for which a CT abdomen pelvis was done which showed multiple hypodense masses in the lateral abdominal wall consistent with metastatic disease, hypodense lesion in the cecum with intraperitoneal extension and multiple pulmonary nodules with left-sided pleural effusion.    -Due to progression of the disease plan was to start chemotherapy with doxorubicin/cisplatin and high-dose methotrexate on 9/1/2022.      Review of Systems:  A comprehensive ROS was performed and found to be negative or non-contributory with the exception of that noted in the HPI above.    Past Medical History:  Past Medical History:   Diagnosis Date     Pleomorphic cell sarcoma (H)        Past Surgical History:  Past Surgical History:   Procedure Laterality Date     ANESTHESIA OUT OF OR BLOOD BRAIN BARRIER DISRUPTION N/A 06/16/2022    Procedure: ANESTHESIA OUT OF OR - Block Epidural;  Surgeon: GENERIC ANESTHESIA PROVIDER;  Location: [facility]     COMPLEX WOUND CLOSURE (UPDATE) Right 06/17/2022    Procedure: complex closure pelvis, spy;  Surgeon: KAREN Butt MD;  Location: [facility] OR     COMPLEX WOUND CLOSURE (UPDATE)  06/17/2022    Procedure: ;  Surgeon: KAREN Butt MD;  Location: [facility] OR     EXCISE TUMOR PELVIS POSTERIOR Right 06/17/2022    Procedure: Right hindquarter amputation;  Surgeon: Matty Whitaker MD;  Location: [facility] OR     INSERT PORT VASCULAR ACCESS Right 11/01/2021    Procedure: INSERTION,  VASCULAR ACCESS PORT;  Surgeon: Sushil Gonzales MD;  Location: [facility] OR     IR CHEST PORT PLACEMENT > 5 YRS OF AGE  11/01/2021     PICC DOUBLE LUMEN PLACEMENT Left 08/30/2022    left medial brachial 5 fr dl power picc 50 cm       Social History:  Social History     Socioeconomic History     Marital status:    Tobacco Use     Smoking status: Never Smoker     Smokeless tobacco: Never Used   Vaping Use     Vaping Use: Never used   Substance and Sexual Activity     Alcohol use: Never     Drug use: Never     Sexual activity: Not Currently     Partners: Female     Birth control/protection: Condom        Family History  Family History   Problem Relation Age of Onset     Heart Disease Mother      Coronary Artery Disease Father      Other - See Comments Father         pleomorphic cell sarcoma     Cancer Maternal Grandmother      No Known Problems Sister      No Known Problems Sister      No Known Problems Son      No Known Problems Daughter      No Known Problems Daughter      No Known Problems Daughter        Outpatient Medications:  No current facility-administered medications on file prior to encounter.  acetaminophen (TYLENOL) 500 MG tablet, Take 2 tablets (1,000 mg) by mouth 3 times daily as needed for mild pain  escitalopram (LEXAPRO) 10 MG tablet, Take 1 tablet (10 mg) by mouth daily  morphine (MS CONTIN) 30 MG CR tablet, Take 1 tablet (30 mg) by mouth every 12 hours  multivitamin, therapeutic (THERA-VIT) TABS tablet, Take 1 tablet by mouth daily  naproxen (NAPROSYN) 500 MG tablet, Take 500 mg by mouth 2 times daily (with meals)  Nutritional Supplements (ENSURE ACTIVE HIGH PROTEIN) LIQD, Take 1 Can by mouth 3 times daily (with meals)  acetaminophen (TYLENOL) 325 MG tablet, Take 3 tablets (975 mg) by mouth every 8 hours (Patient not taking: No sig reported)  oxyCODONE IR (ROXICODONE) 10 MG tablet, Take 1 tablet (10 mg) by mouth every 4 hours as needed for moderate to severe pain  [DISCONTINUED] albuterol  (PROAIR HFA/PROVENTIL HFA/VENTOLIN HFA) 108 (90 Base) MCG/ACT inhaler, Inhale 1-2 puffs into the lungs every 6 hours as needed for shortness of breath / dyspnea  [DISCONTINUED] QUEtiapine (SEROQUEL) 25 MG tablet, Take 1 tablet (25 mg) by mouth At Bedtime         Physical Exam:    Blood pressure 139/84, pulse 110, temperature 98.8  F (37.1  C), temperature source Oral, resp. rate 25, SpO2 98 %.  General: alert and cooperative, lying in bed, no acute distress  HEENT: sclera anicteric, EOMI, MMM  Neck: supple, normal ROM  CV: RRR, no murmurs  Resp: CTAB, normal respiratory effort on ambient air  GI: soft, non-tender, non-distended, bowel sounds present and normoactive  MSK: warm and well-perfused, normal tone  Skin: no rashes on limited exam, no jaundice  Neuro: Alert and interactive, moves all extremities equally, no focal deficits    Labs & Studies: I personally reviewed the following studies:  ROUTINE LABS (Last four results):  CMP  Recent Labs   Lab 09/01/22 0540 08/31/22 2040 08/31/22 2020 08/31/22 2013     --  141 140   POTASSIUM 4.0  --  3.7 3.7   CHLORIDE 104  --   --  105   CO2 26  --   --  24   ANIONGAP 8  --   --  11   *  --  126* 126*   BUN 9.7  --   --  10.7   CR 0.61* 0.5*  --  0.63*   GFRESTIMATED >90 >60  --  >90   VENTURA 8.9  --   --  9.0   PROTTOTAL 6.6  --   --  7.0   ALBUMIN 3.5  --   --  3.5   BILITOTAL 0.6  --   --  0.8   ALKPHOS 134*  --   --  145*   AST 21  --   --  24   ALT 10  --   --  10     CBC  Recent Labs   Lab 09/01/22 0540 08/31/22 2020 08/31/22 2013   WBC 9.2  --  14.6*   RBC 3.66*  --  4.01*   HGB 8.6* 13.6 9.5*   HCT 27.7* 40 30.4*   MCV 76*  --  76*   MCH 23.5*  --  23.7*   MCHC 31.0*  --  31.3*   RDW 19.1*  --  19.1*     --  338     INR  Recent Labs   Lab 08/31/22 2013   INR 1.05       CT PE  8/31/2022                                                            IMPRESSION:   1. Exam is positive for pulmonary vein thrombus arising from a  pulmonary vein in the  right upper lobe. The thrombus extends from a  pulmonary mass and most likely represents tumor thrombus.  2. Exam is negative for pulmonary artery embolism though there is  suboptimal contrast timing resulting in diminished evaluation of the  subsegmental pulmonary arteries.  3. Massive left-sided effusion results in complete left lower lobe  atelectasis and a moderate amount of left upper lobe atelectasis.  4. Extensive multifocal metastatic pulmonary masses are new since  1/11/2022 and have progressed since 8/13/2022.     CT abd 8/13/2022   IMPRESSION:   1.  [Postsurgical changes from right lower extremity amputation and right hemipelvectomy. There are multiple hypodense masses within the lateral abdominal wall musculature on the right largest of which measures 5.6 x 7.5 cm and is within the transverse   and oblique abdominous muscles, Consistent with metastatic disease.  2.  Small hypodense mass abutting the cecum, concerning for intraperitoneal extension and possible bowel wall involvement of metastatic disease, at this time there is no evidence of bowel perforation or bowel obstruction.  3.  Multiple pulmonary nodules consistent with pulmonary metastatic disease with a small left-sided pleural effusion      Assessment & Plan:   Vic Scott III is a 47 year old male with history of high-grade pleomorphic sarcoma of the right femur and pelvis, status post amputation/hemipelvectomy on 6/17/2022 is admitted with concerns for tumor progression and pulmonary vein tumor thrombosis.    # Metastatic high-grade pleomorphic sarcoma  # Status post right hemipelvectomy on 6/17/2022  # Tumor thrombosis of pulmonary vein  Patient with high-grade pleomorphic sarcoma of right femur who underwent neoadjuvant chemotherapy with Doxil and ifosfamide for 2 cycles and later had an amputation/hemipelvectomy on 6/17/2022 is now admitted with concern for progression of the disease.  CT scan on 8/13/2022-multiple hypodense masses in  the lateral abdominal wall, multiple pulmonary nodules, left pleural effusion.  A CT PE showed pulmonary vein tumor thrombosis of the right upper lobe.      Due to progression of the disease plan was to start him on chemotherapy outpatient today the chemo regimen with doxorubicin/cisplatin/high-dose methotrexate.  We will start doxorubicin/cisplatin first and the methotrexate will be added on later cycles.   We discussed the side effects of chemotherapy including peripheral neuropathy, ototoxicity, nephrotoxicity immunosuppression, infection, fatigue, nausea, vomiting.    Prior to starting the chemo with doxorubicin we will get an echocardiogram.     # Left sided malignant pleural effusion  Patient has worsening dyspnea and chest pain and a large left-sided pleural effusion, thoracentesis was done today about 900 mL bloody fluid was drained.    Recommendations:   - Echocardiogram   - Agree with thoracentesis   -We will start chemotherapy with doxorubicin and cisplatin tomorrow after the echocardiogram and once he is transferred to     We will continue to follow this patient. Please do not hesitate to page with any questions or concerns.    Patient was seen and plan of care was discussed with attending physician Dr. Walker.    Tamiko Flood MD  Hematology/Oncology/Transplant Fellow   Pgr :: [phone]

## 2022-09-01 NOTE — PROCEDURES
Procedure Note    Attending:  Yash Plunkett  Resident: none  Procedure: Left Thoracentesis  Indication: massive effusion      The risks and benefits of the procedure were explained to the patient  who expressed understanding and opted to proceed.  Consent was obtained and placed in the chart and the patient was placed on pulse oximetry.  A time out was performed.    An ultrasound probe was used to identify an area of pleural fluid in the left hemithorax.  This area was prepped and draped in the usual sterile fashion.  6 ml of 1% lidocaine was instilled and fluid located using ultrasound guidance.   The thoracentesis catheter and needle were inserted above the rib until fluid obtained then the needle removed.    Using a vacuum bottle , 900 ml of bloody colored fluid was removed. A specimen was sent for analysis.  The procedure was stopped due to marke coughing.    The catheter was removed with the patient exhaling and an occlusive dressing placed.       Ultrasound revealed moderate remaining effusion after the procedure and a chest radiograph is pending.    The tolerated the procedure well.  Please contact the Consult and Procedure Service if any concerns or complications arise.       Yash Plunkett MD    DOS:  9/1/22

## 2022-09-01 NOTE — PROGRESS NOTES
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 Presenting with sudden shortness of breath and hypoxia found to have massive left pleural effusion and right pulmonary vein tumor thrombus.     Acute hypoxic respiratory failure 2/2 massive left pleural effusion  Sudden onset SOB and significant increase in left sided pleural effusion in past 2 weeks. Unclear etiology, infectious less likely at this time given no leukocytosis, no fever and sudden onset. No PE on admit CT. RUL tumor thrombus cannot explain left sided pleural effusion. Thoracentesis with 900 ml bloody output, labs pending, and prematurely stopped due to coughing. Patient overall doing well, low threshold to start antibiotics given past chemotherapy/immunocompromised.  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  Patient Doxil/Ifos initially on 11/2/2021, which was complicated by isofamide neurotoxicity. It resolved without methene blue. His 2nd cycle of  chemo was aborted because of this. He experienced a right proximal femur fracture on 06/05/2022 and underwent right hindquarter amputation by Dr. Grove and primary complex wound closure by Dr. Petersen on 06/17/2022. Pt's CT on 08/13/2022 showed new metastasis to abdominal wall and lungs. His oncologist Dr. Ambrocio suggested to start doxorubicin, cisplatin, and Neulasta. He was agreeable to follow with palliative. Pt received PICC placement on 08/30, and was planned to start chemo 9/1. High risk for VTE given malignancy, however waiting on anticoagulation until repeat para and r/o hemothorax.   - Oncology consulted for recommendations on chemotherapy given his recent pulmonary vein tumor thrombus    - continue PTA morphine and oxycodone   - continue PTA zofran   - Consider palliative care consult   - wound care ordered     Chronic anemia   Stable, low Hgb on admit. No acute drop c/f hemothorax.  - trend CBC       Diet: Combination Diet Regular Diet Adult    DVT Prophylaxis: defer  Sung Catheter: Not present  Fluids: PO  Central Lines: PRESENT     Cardiac Monitoring: None  Code Status: Full Code      Disposition Plan      Recent Labs   Lab 09/01/22  0540 08/31/22 2040 08/31/22 2020 08/31/22 2013   WBC 9.2  --   --  14.6*   HGB 8.6*  --  13.6 9.5*   MCV 76*  --   --  76*     --   --  338   INR  --   --   --  1.05     --  141 140   POTASSIUM 4.0  --  3.7 3.7   CHLORIDE 104  --   --  105   CO2 26  --   --  24   BUN 9.7  --   --  10.7   CR 0.61* 0.5*  --  0.63*   ANIONGAP 8  --   --  11   VENTURA 8.9  --   --  9.0   *  --  126* 126*   ALBUMIN 3.5  --   --  3.5   PROTTOTAL 6.6  --   --  7.0   BILITOTAL 0.6  --   --  0.8   ALKPHOS 134*  --   --  145*   ALT 10  --   --  10   AST 21  --   --  24     Recent Results (from the past 24 hour(s))   CT Chest Pulmonary Embolism w Contrast   Result Value    Radiologist flags Pulmonary vein thrombus (Urgent)    Narrative    EXAMINATION: CTA pulmonary angiogram,  8/31/2022 9:36 PM     COMPARISON: None.    HISTORY: CT dated 8/13/2022. PET dated 1/11/2022    TECHNIQUE: Volumetric helical acquisition of CT images of the chest  from the lung apices to the kidneys were acquired after the  administration of 61 mL of Isovue-370 IV contrast x 2 due to  suboptimal technique and bolus timing on the initial CT.   Post-processed multiplanar and/or MIP reformations were obtained,  archived to PACS and used in interpretation of this study.     FINDINGS:    Tubes/Lines: Right chest wall Port-A-Cath and left arm PICC tips at  the superior cavoatrial junction.    Contrast bolus is: suboptimal.  Exam is negative for acute pulmonary  embolism. Suboptimal evaluation of the subsegmental pulmonary arteries  related to contrast timing. The exam is positive for pulmonary vein  embolism with a filling defect in the right pulmonary vein (series 14,  image 135-145) which arises from a hypodense rounded mass in the right  upper lobe.    Mediastinum:  Heart is not enlarged. No pericardial effusion. Normal caliber  thoracic aorta and main pulmonary artery. Conventional great arch  anatomy.     Lungs:  There is a large left-sided effusion with complete left lower lobe  atelectasis. There is a small right pleural effusion. There are  innumerable hypoattenuating masses throughout bilateral lung fields  which are new since 1/11/2022 and progressed since CT abdomen dated  8/13/2022.    Visualized upper abdomen: Unremarkable.    Bones and soft tissues: No acute or suspicious osseous lesions.      Impression    IMPRESSION:   1. Exam is positive for pulmonary vein thrombus arising from a  pulmonary vein in the right upper lobe. The thrombus extends from a  pulmonary mass and most likely represents tumor thrombus.  2. Exam is negative for pulmonary artery embolism though there is  suboptimal contrast timing resulting in diminished evaluation of the  subsegmental pulmonary arteries.  3. Massive left-sided effusion  results in complete left lower lobe  atelectasis and a moderate amount of left upper lobe atelectasis.  4. Extensive multifocal metastatic pulmonary masses are new since  1/11/2022 and have progressed since 8/13/2022.     [Urgent Result: Pulmonary vein thrombus]    Finding was identified on 8/31/2022 9:40 PM.     Dr. Joel was contacted by Dr. Orellana at 8/31/2022 10:09 PM and  verbalized understanding of the urgent finding.     I have personally reviewed the examination and initial interpretation  and I agree with the findings.    DEWAYNE KEMP MD         SYSTEM ID:  [id number]   XR Chest Port 1 View    Narrative    EXAM:  XR CHEST PORT 1 VIEW    INDICATION: s/p left thoracentesis    COMPARISON:  Chest CT 8/31/2022    FINDINGS:  Single AP view of the chest. Right Port-A-Cath terminating over the  cavoatrial junction. Left approach PICC terminating over the right  atrium.    Indistinct cardiac borders. Low lung volumes. Slightly reduced but  still persistent large left pleural effusion with adjacent  atelectasis. Multiple nodular opacities within the right lung. No  pneumothorax. Unremarkable upper abdomen. No acute bony lesions.      Impression    IMPRESSION:  1.  Slightly reduced but persistent large left pleural effusion status  post thoracentesis.  2.  Redemonstration of multiple metastatic pulmonary masses.    I have personally reviewed the examination and initial interpretation  and I agree with the findings.    KATYA HALE MD         SYSTEM ID:  [id number]        The patient's care was discussed with the Attending Physician, Dr. Bryant.    Gemini Lacey MD  Hospitalist Service, [facility]  Securely message with the Vocera Web Console (learn more here)  Text page via [facility] Paging/Directory   Please see signed in provider for up to date coverage information      Clinically Significant Risk Factors Present on Admission         ______________________________________________________________________    Interval History   Admitted overnight. Has thora today and O2 need is down 6 > 4L. He reports feeling well, no chest pain- just SOB which is better now on O2. 4pt ROS negative.    Data reviewed today: I reviewed all medications, new labs and imaging results over the last 24 hours.    Physical Exam   Vital Signs: Temp: 98.8  F (37.1  C) Temp src: Oral BP: 139/84 Pulse: 110   Resp: 25 SpO2: 98 % O2 Device: Nasal cannula Oxygen Delivery: 6 LPM  Weight: 0 lbs 0 oz     Constitutional: Well appearing, NAD, appears stated age  HEENT: NC/AT, anicteric sclera, pupils round and equal, EOMI  CV: RRR, normal S1/S2, no murmurs/gallops/rubs, increased split with inspiration, intact distal pulses  Pulm: non-labored respirations on 6L, tachypnea, diminished lung sounds on left lung, left lung dull to percussion throughout- right lung resonant, right lung sounds are decreased but clear, no wheezes or crackles  Abdomen: normoactive bowel sounds, soft, non-distended, non-tender  Extremities: warm and well perfused, no peripheral edema, cyanosis, or clubbing. Right leg amputee.   Skin: no jaundice, no rashes on exposed skin, PICC site and abdominal incision site are not erythematous, swollen, warm, or tender. No discharge from PICC site.  Neuro: alert and oriented x3, CN II-XII grossly intact  Psych: normal affect    Data   Recent Labs   Lab 09/01/22  0540 08/31/22 2040 08/31/22 2020 08/31/22 2013   WBC 9.2  --   --  14.6*   HGB 8.6*  --  13.6 9.5*   MCV 76*  --   --  76*     --   --  338   INR  --   --   --  1.05     --  141 140   POTASSIUM 4.0  --  3.7 3.7   CHLORIDE 104  --   --  105   CO2 26  --   --  24   BUN 9.7  --   --  10.7   CR 0.61* 0.5*  --  0.63*   ANIONGAP 8  --   --  11   VENTURA 8.9  --   --  9.0   *  --  126* 126*   ALBUMIN 3.5  --   --  3.5   PROTTOTAL 6.6  --   --  7.0   BILITOTAL 0.6  --   --  0.8   ALKPHOS 134*  --   --   145*   ALT 10  --   --  10   AST 21  --   --  24     Recent Results (from the past 24 hour(s))   CT Chest Pulmonary Embolism w Contrast   Result Value    Radiologist flags Pulmonary vein thrombus (Urgent)    Narrative    EXAMINATION: CTA pulmonary angiogram, 8/31/2022 9:36 PM     COMPARISON: None.    HISTORY: CT dated 8/13/2022. PET dated 1/11/2022    TECHNIQUE: Volumetric helical acquisition of CT images of the chest  from the lung apices to the kidneys were acquired after the  administration of 61 mL of Isovue-370 IV contrast x 2 due to  suboptimal technique and bolus timing on the initial CT.   Post-processed multiplanar and/or MIP reformations were obtained,  archived to PACS and used in interpretation of this study.     FINDINGS:    Tubes/Lines: Right chest wall Port-A-Cath and left arm PICC tips at  the superior cavoatrial junction.    Contrast bolus is: suboptimal.  Exam is negative for acute pulmonary  embolism. Suboptimal evaluation of the subsegmental pulmonary arteries  related to contrast timing. The exam is positive for pulmonary vein  embolism with a filling defect in the right pulmonary vein (series 14,  image 135-145) which arises from a hypodense rounded mass in the right  upper lobe.    Mediastinum:  Heart is not enlarged. No pericardial effusion. Normal caliber  thoracic aorta and main pulmonary artery. Conventional great arch  anatomy.     Lungs:  There is a large left-sided effusion with complete left lower lobe  atelectasis. There is a small right pleural effusion. There are  innumerable hypoattenuating masses throughout bilateral lung fields  which are new since 1/11/2022 and progressed since CT abdomen dated  8/13/2022.    Visualized upper abdomen: Unremarkable.    Bones and soft tissues: No acute or suspicious osseous lesions.      Impression    IMPRESSION:   1. Exam is positive for pulmonary vein thrombus arising from a  pulmonary vein in the right upper lobe. The thrombus extends from  a  pulmonary mass and most likely represents tumor thrombus.  2. Exam is negative for pulmonary artery embolism though there is  suboptimal contrast timing resulting in diminished evaluation of the  subsegmental pulmonary arteries.  3. Massive left-sided effusion results in complete left lower lobe  atelectasis and a moderate amount of left upper lobe atelectasis.  4. Extensive multifocal metastatic pulmonary masses are new since  1/11/2022 and have progressed since 8/13/2022.     [Urgent Result: Pulmonary vein thrombus]    Finding was identified on 8/31/2022 9:40 PM.     Dr. Joel was contacted by Dr. Orellana at 8/31/2022 10:09 PM and  verbalized understanding of the urgent finding.     I have personally reviewed the examination and initial interpretation  and I agree with the findings.    DWEAYNE KEMP MD         SYSTEM ID:  [id number]   XR Chest Port 1 View    Narrative    EXAM:  XR CHEST PORT 1 VIEW    INDICATION: s/p left thoracentesis    COMPARISON:  Chest CT 8/31/2022    FINDINGS:  Single AP view of the chest. Right Port-A-Cath terminating over the  cavoatrial junction. Left approach PICC terminating over the right  atrium.    Indistinct cardiac borders. Low lung volumes. Slightly reduced but  still persistent large left pleural effusion with adjacent  atelectasis. Multiple nodular opacities within the right lung. No  pneumothorax. Unremarkable upper abdomen. No acute bony lesions.      Impression    IMPRESSION:  1.  Slightly reduced but persistent large left pleural effusion status  post thoracentesis.  2.  Redemonstration of multiple metastatic pulmonary masses.    I have personally reviewed the examination and initial interpretation  and I agree with the findings.    KATYA HALE MD         SYSTEM ID:  [id number]

## 2022-09-01 NOTE — ED TRIAGE NOTES
"Pt BIBA with c/o SOB. HX asthma, osteosarcoma. Pt states for the past few days he's noted to be more SOB than normal, states he does has asthma but \"it hasn't been a problem in forever\". Pt states he went to clinic this AM, and when returning home he was unable to get up the stairs independently without being severely SOB. Pt A&Ox4, tachycardic otherwise all other VSS on 4 LPM NC, pain 6/10.      Triage Assessment     Row Name 08/31/22 1957       Triage Assessment (Adult)    Airway WDL WDL       Respiratory WDL    Respiratory WDL WDL       Skin Circulation/Temperature WDL    Skin Circulation/Temperature WDL WDL       Cardiac WDL    Cardiac WDL X;rhythm    Cardiac Rhythm tachycardic       Peripheral/Neurovascular WDL    Peripheral Neurovascular WDL WDL       Cognitive/Neuro/Behavioral WDL    Cognitive/Neuro/Behavioral WDL WDL              "

## 2022-09-02 NOTE — PLAN OF CARE
[id number]    A&Ox4. Tachycardic. Hypertensive within parameters. Utilizing 4L O2 via nasal cannula. Denies pain and nausea. Reports some SOB, improved with supplemental O2. Cisplatin finished infusing this shift, post infusion fluids given per orders. CIVI doxorubicin infusing, tolerating well. Pt slept between cares. Continue with plan of care.

## 2022-09-02 NOTE — PROGRESS NOTES
Hematology / Oncology  Daily Progress Note   Date of Service: 09/02/2022  Patient: Vic Scott III  MRN: [id number]  Admission Date: 8/31/2022  Hospital Day # 2  Cancer Diagnosis: Metastatic high grade pleomorphic sarcoma   Primary Outpatient Oncologist: Dr. Ambrocio   Current Treatment Plan: Cisplatin/Doxorubicin     Recommendations:   - Continue chemotherapy per chemo protocol and continue to monitor closely for side effects   - Agree with repeat thoracentesis, patient may need to discharge home with oxygen   - Will continue to follow along     Assessment & Plan:   Vic Scott III is a 47 year old male with past medical history of high-grade pleomorphic sarcoma s/p right hindquarter amputation/ hemipelvectomy (June 2022) recently found to have metastasis to abdominal wall and lung. Currently admitted for worsening pleural effusion and SOB.     # Metastatic Pleomorphic Sarcoma   In brief, he initially had rapidly progressing right leg swelling since July 2021. MRI showed ~29-20 cm right thigh soft tissue mass with bony involvement of proximal right femur. Biopsy shows significant high grade pleomorphic sarcoma.PET was ordered which identifed concerning inguinal lymphadenopathy . Proceeded with Doxil/Ifos x 2 cycles but was complicated by neurotoxicity. He then was admitted June 2022 with right leg pain and inability to ambulated. Found to have pathologic right proximal femur fracture. Underwent right hindquarter amputation with Dr. Whitaker and primary complex wound closure with Dr. Butt. Recent CT showed new metastatis to abdominal wall and lung.    - Echo 9/1 EF 55-60%      Therapy Plan: Cisplatin/Doxorubicin (C1D1=9/1/22)    - Cisplatin 60 mg/m2 (122 mg) IV D1-2    - Doxorubicin 17.5 mg/m2 (35 mg) CIVI D1-4    - Premeds: Kytil, Emend, Dex, Aloxi    Patient was seen and plan of care was discussed with attending physician Dr. Walker.    Thank you for the opportunity to partake in this patients  "plan of care. Please do not hesitate to page with questions. We will continue to follow.     I spent >35 minutes face-to-face or coordinating care of Vic Soctt III. Over 50% of our time on the unit was spent counseling the patient and/or coordinating care.    Dione Amaro PA-C ([facility])   Hematology/Oncology   Pager: [phone]   ___________________________________________________________________    Subjective & Interval History:    No acute events noted overnight. Fabian is overall feeling well. He continues to need oxygen but is not feeling SOB. He may have rolled on his amputation site and had copious amount of drainage. He has not had any side effects from the chemotherapy. All questions answered at this time.     A comprehensive review of systems was obtained and is negative other than noted here or in the HPI.       Physical Exam:    Blood pressure 116/77, pulse 80, temperature 97.4  F (36.3  C), temperature source Oral, resp. rate 18, height 1.88 m (6' 2\"), weight 85.7 kg (188 lb 15 oz), SpO2 94 %.    General: lying in bed, no acute distress  HEENT: sclera anicteric, EOMI, MMM  Neck: supple, normal ROM  MSK: Right surgical amputation site w/o oozing or drainage, chux pad is saturate. No overt dehiscence. Surgical suture noted.  warm and well-perfused, normal tone  Skin: no rashes on limited exam, no jaundice  Neuro: Alert and interactive, moves all extremities equally, no focal deficits    Labs & Studies: I personally reviewed the following studies:  ROUTINE LABS (Last four results):  CMP  Recent Labs   Lab 09/02/22  1102 09/02/22  0821 09/01/22  1639 09/01/22  0540 08/31/22  2040 08/31/22 2020 08/31/22 2013    140  --  138  --  141 140   POTASSIUM 4.0 4.1  --  4.0  --  3.7 3.7   CHLORIDE 107 108*  --  104  --   --  105   CO2 23 24  --  26  --   --  24   ANIONGAP 10 8  --  8  --   --  11   * 147*  --  111*  --  126* 126*   BUN 11.2 10.5  --  9.7  --   --  10.7   CR 0.49* 0.51*  --  0.61* 0.5*  " --  0.63*   GFRESTIMATED >90 >90  --  >90 >60  --  >90   VENTURA 8.8 8.7  --  8.9  --   --  9.0   PHOS  --   --  3.3  --   --   --   --    PROTTOTAL 6.3*  --   --  6.6  --   --  7.0   ALBUMIN 3.1*  --   --  3.5  --   --  3.5   BILITOTAL 0.5  --   --  0.6  --   --  0.8   ALKPHOS 147*  --   --  134*  --   --  145*   AST 22  --   --  21  --   --  24   ALT 12  --   --  10  --   --  10     CBC  Recent Labs   Lab 09/02/22  0821 09/01/22  0540 08/31/22 2020 08/31/22 2013   WBC 8.0 9.2  --  14.6*   RBC 3.59* 3.66*  --  4.01*   HGB 8.3* 8.6* 13.6 9.5*   HCT 27.0* 27.7* 40 30.4*   MCV 75* 76*  --  76*   MCH 23.1* 23.5*  --  23.7*   MCHC 30.7* 31.0*  --  31.3*   RDW 18.7* 19.1*  --  19.1*    334  --  338     INR  Recent Labs   Lab 08/31/22 2013   INR 1.05       Medications list for reference:  Current Facility-Administered Medications   Medication     0.9% sodium chloride BOLUS     0.9% sodium chloride BOLUS     acetaminophen (TYLENOL) tablet 650 mg    Or     acetaminophen (TYLENOL) Suppository 650 mg     acetaminophen (TYLENOL) tablet 975 mg     albuterol (PROVENTIL HFA/VENTOLIN HFA) inhaler     albuterol (PROVENTIL) neb solution 2.5 mg     azithromycin (ZITHROMAX) 500 mg in sodium chloride 0.9 % 250 mL intermittent infusion     bisacodyl (DULCOLAX) suppository 10 mg     cefTRIAXone (ROCEPHIN) 2 g vial to attach to  ml bag for ADULTS or NS 50 ml bag for PEDS     Chemotherapy Infusing-Continuous Infusion     CISplatin (PLATINOL) 122 mg in sodium chloride 0.9 % 672 mL infusion     dexamethasone (DECADRON) injection 12 mg     diphenhydrAMINE (BENADRYL) injection 50 mg     DOXOrubicin (ADRIAMYCIN) 35 mg in sodium chloride 0.9 % 568 mL infusion     enoxaparin ANTICOAGULANT (LOVENOX) injection 40 mg     EPINEPHrine PF (ADRENALIN) injection 0.3 mg     escitalopram (LEXAPRO) tablet 10 mg     famotidine (PEPCID) injection 20 mg     famotidine (PEPCID) injection 20 mg     heparin 100 UNIT/ML injection 5 mL     heparin lock  flush 10 UNIT/ML injection 5 mL     lidocaine (LMX4) cream     lidocaine 1 % 0.1-1 mL     melatonin tablet 1 mg     meperidine (DEMEROL) injection 25 mg     methylPREDNISolone sodium succinate (solu-MEDROL) injection 125 mg     morphine (MS CONTIN) 12 hr tablet 30 mg     naloxone (NARCAN) injection 0.2 mg    Or     naloxone (NARCAN) injection 0.4 mg    Or     naloxone (NARCAN) injection 0.2 mg    Or     naloxone (NARCAN) injection 0.4 mg     oxyCODONE IR (ROXICODONE) tablet 10 mg     palonosetron (ALOXI) injection 0.25 mg     polyethylene glycol (MIRALAX) Packet 17 g     prochlorperazine (COMPAZINE) tablet 10 mg     senna-docusate (SENOKOT-S/PERICOLACE) 8.6-50 MG per tablet 1 tablet    Or     senna-docusate (SENOKOT-S/PERICOLACE) 8.6-50 MG per tablet 2 tablet     sodium chloride (PF) 0.9% PF flush 3 mL     sodium chloride (PF) 0.9% PF flush 3 mL     sodium chloride (PF) 0.9% PF flush 3-20 mL     sodium chloride 0.9 % 1,000 mL with potassium chloride 20 mEq, magnesium sulfate 2 g infusion

## 2022-09-02 NOTE — PLAN OF CARE
"/75   Pulse 119   Temp 98.8  F (37.1  C) (Oral)   Resp 26   Ht 1.88 m (6' 2\")   Wt 85.7 kg (188 lb 15 oz)   SpO2 94%   BMI 24.26 kg/m    Pt admitted from ED to 7D at 1630. Pt admitted with dyspnea. Dyspnea has improved since thoracentesis, but still reporting shortness of breath with activity. Pt on 3-4L NC, sating 92-94%, desated to 86% on 3L with activity, recovered on 5L. Pt on continuous pulse ox. HR tachy in 110-120s. BP stable. Pt afebrile. Sepsis triggered, LA 1.6. BCx collected. Started on Azithromycin and CTX, unclear order and paged MD with no reply. Thoracentesis dressing intact with no drainage noted. Up with 1-2 Ast and sera steady. Pt RLE amputated. Fall precautions in place, patient calling appropriately. Pt reports some generalized pain on right pelvis, scheduled tylenol given. Per patient wound care completed at home every 3 days. Would benefit from WOCN consult. Continue POC.       Nursing Focus: Chemotherapy    D: Positive blood return via PL PICC. Insertion site is clean/dry/intact, dressing intact with no complaints of pain.  Urine output is recorded in intake in Doc Flowsheet.      I: Premedications of NS 1L Bolus, Emend, Dexamethasone and Kytril given per order (see electronic medical administration record). Dose #1 of Cisplatin started to infuse over 2 hours. Reviewed pt teaching on chemotherapy side effects.  Pt denies need for further teaching. Chemotherapy double checked per protocol by two chemotherapy competent RN's.     A: Tolerating infusion well. Denies nausea and or pain.     P: Continue to monitor urine output and symptoms of nausea. Screen for symptoms of toxicity.    "

## 2022-09-02 NOTE — PROGRESS NOTES
Nursing Focus: Chemotherapy    D: Positive blood return via purple lumen PICC. Insertion site is clean/dry/intact, dressing intact with no complaints of pain. Urine output is recorded in intake in Doc Flowsheet.      I: Premedications given per order (see electronic medical administration record). Dose #1 of Doxorubicin started to infuse over 22 hours. Reviewed pt teaching on chemotherapy side effects. Pt denies need for further teaching. Chemotherapy double checked per protocol by two chemotherapy competent RN's.     A: Tolerating procedure well. Denies nausea and or pain.     P: Continue to monitor urine output and symptoms of nausea. Screen for symptoms of toxicity.

## 2022-09-02 NOTE — PROVIDER NOTIFICATION
"Dr. Adair was paged at 1630 via pager on [facility]:     \"Can you up date the springboard to say why doxorubicin and cisplatin doses are different than what is stated in the protocol? Also the dox order says home infusion and needs to be changed to an order for inpatient.\"     Dr. Meyer called back and said she would talk to Dr. Walker, unsure whether chemo would start today, patient needed ECHO done and it was completed 9/1 around 1530.     Dr. Meyer called again and said unsure if chemo would start today because of antibiotics that were started when patient was admitted to . MD said she would call back once attending was able to sign chemo orders and asked for pharmacy's #.     Writer talked to Tiffany pharmacist who did change chemo dose in springboard but provider still needs to sign orders as of now. Home infusion was changed to inpatient as well.     Melanie SMITH just paged on call fellow to see if there are any updates.   "

## 2022-09-02 NOTE — CONSULTS
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 Presenting with sudden shortness of breath and hypoxia found to have massive left pleural effusion and right pulmonary vein tumor thrombus.     Areas Assessed:      Areas visualized during today's visit: Focused: and Right LE amputation site    Wound location: Right LE amputation site        Last photo: 9/2/22  Wound due to: Surgical Wound  Wound history/plan of care: see hx for surgery dates. Wound healed to a scab in July with 1 episode of scab coming off and copious drainage until scab re-formed. Has been a scab or healed until this week and today 9/2 scab again fell off and patient has copious watery brown drainage- MD's aware and no odor present. Drainage is from most lateral aspect (seen on left in photo above). Abdomen above wound is very firm.  Wound base: 100 % dermis and non-granular tissue     Palpation of the wound bed: normal      Drainage: copious     Description of drainage: watery brown (dried blood colored) and clear     Measurements (length x width x depth, in cm): 0.5   x 0.5  x  ? cm - did not probe into wound     Tunneling: N/A     Undermining: N/A  Periwound skin: Intact and Scar tissue      Color: normal and consistent with surrounding tissue and pink      Temperature: normal   Odor: none  Pain: denies , none  Pain interventions prior to dressing change: no significant pain present   Treatment goal:  Drainage control  STATUS: initial assessment  Supplies ordered: gathered, ordered Exudry, discussed with RN and discussed with patient     Treatment Plan:     Right LE amputation site wound(s): Daily  and PRN when soiled  Cleanse with NS or wound cleanser and gently pat dry   Apply no sting wipe to periwound (if starting to become macerated change to barrier paste to those areas)  Apply gauze folded in half over heavily draining area (most lateral aspect)  Cover with Exudry ([id number]) secured with waterproof tape or sacral mepilex depending on amount of drainage (use Exudry with more drainage)    Orders: Written    RECOMMEND PRIMARY TEAM ORDER: None, at this time  Education provided: importance of repositioning, plan of care, wound progress and Moisture management  Discussed plan of care with: Patient and Nurse  WOC nurse follow-up plan: weekly  Notify WOC if wound(s) deteriorate.  Nursing to notify the Provider(s) and re-consult the WOC Nurse if new skin concern.    DATA:     Current support surface: Standard  Atmos Air mattress  Containment of urine/stool: Continent of bladder, Continent of bowel and Incontinent pad in bed  BMI: Body mass index is 24.26 kg/m .   Active diet order: Orders Placed This Encounter      Combination Diet Regular Diet Adult     Output: I/O last 3 completed shifts:  In: 2031.4 [P.O.:120; I.V.:1033; IV Piggyback:878.4]  Out: 2875 [Urine:2875]     Labs: Recent Labs   Lab 09/02/22  1102 09/02/22  0821 08/31/22 2020 08/31/22 2013   ALBUMIN 3.1*  --    < > 3.5   HGB  --  8.3*   < > 9.5*   INR  --   --   --  1.05   WBC  --  8.0   < > 14.6*   .00*  --    < >  --     < > = values in this interval not displayed.     Pressure injury risk assessment:   Sensory Perception: 4-->no impairment  Moisture: 4-->rarely moist  Activity: 2-->chairfast  Mobility: 3-->slightly limited  Nutrition: 3-->adequate  Friction and Shear: 2-->potential problem  German Score: 18    Lilia Sanabria RN CWOCN  Dept.  Pager: [phone]  Dept. Office Number: *[phone]

## 2022-09-02 NOTE — PROGRESS NOTES
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 Presenting with sudden shortness of breath and hypoxia found to have massive left pleural effusion and right pulmonary vein tumor thrombus.     1. Acute hypoxic respiratory failure 2/2 massive left pleural effusion with possible underlying bacterial pneumonia, all are present on admission  This is the main problem that led to this admission.  Sudden onset SOB and significant increase in left sided pleural effusion in past 2 weeks. Unclear etiology, infectious less likely at this time given no leukocytosis, no fever and sudden onset. No PE on admit CT. RUL tumor thrombus cannot explain left sided pleural effusion. Thoracentesis with 900 ml bloody output, labs pending, and prematurely stopped due to coughing.  The pleural fluid is exudative in nature.  I am concerned for an infection especially in the setting of 1475 total nucleated cells of which 52% neutrophils.  -Ordered 1 dose of furosemide 20 mg IV in order to help with efforts to wean oxygen  - Re-attempt thoracentesis 9/3, orders for repeat diagnostics with cell count and cultures was placed.  He had appreciate input of our procedure colleagues  -Weaning oxygen as tolerated  -We will repeat imaging after thoracentesis to evaluate for any underlying consolidation  -Continue empirical ceftriaxone and azithromycin    2. 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  Patient was diagnosed with high-grade pleomorphic sarcoma of right femur and pelvis on 09/20/2021.  Patient Doxil/Ifos initially on 11/2/2021, which was complicated by isofamide neurotoxicity. It resolved without methene blue. His 2nd cycle of chemo was aborted because of this. He experienced a right proximal femur fracture on 06/05/2022 and underwent right hindquarter amputation by Dr. Grove and primary complex wound closure by Dr. Petersen on 06/17/2022. Pt's CT on 08/13/2022 showed new metastasis to abdominal wall and lungs. His oncologist Dr. Ambrocio suggested to start doxorubicin, cisplatin, and Neulasta. He was agreeable to follow with palliative. Pt received PICC placement on 08/30, and was planned to start chemo 9/1. High risk for platelet rich thrombosis on top of the cancer related thrombus of the pulmonary vein. The patient was initiated on doxorubicin and cisplatin him during this admission.  -Continue aspirin 1 g every 8 hours  -Continue cisplatin on, doxorubicin and dexamethasone  -Continue morphine 30 mg every 12 hours as a scheduled and oxycodone 10 mg every 4 hours as needed  -We highly appreciate input of oncology service.    3. Chronic anemia of malignancy, all are present on admission  Stable, low Hgb on admit. No acute drop c/f hemothorax.  - trend CBC       Diet: Combination Diet Regular Diet Adult  Snacks/Supplements Adult: Ensure Enlive; Between Meals    DVT Prophylaxis: defer  Sung Catheter: Not present  Fluids: PO  Central Lines: PRESENT  PICC Double Lumen 08/30/22 Left Brachial vein medial-Site Assessment: WDL  Cardiac Monitoring: None  Code Status: Full Code      Disposition Plan      Expected Discharge Date: 09/05/2022         Discharge Comments: continuous chemo   Recent Labs   Lab 09/02/22  1102 09/02/22  0821 09/01/22  0540 08/31/22 2040 08/31/22 2020 08/31/22 2013   WBC  --  8.0 9.2  --   --  14.6*   HGB  --  8.3* 8.6*  --  13.6 9.5*   MCV  --  75* 76*  --   --  76*   PLT  --  292 334  --   --  338   INR  --   --   --   --   --  1.05    140 138  --  141 140   POTASSIUM 4.0 4.1 4.0  --  3.7 3.7   CHLORIDE 107 108* 104  --   --  105   CO2 23 24 26  --   --  24   BUN 11.2 10.5 9.7  --   --  10.7   CR 0.49* 0.51* 0.61*   < >  --  0.63*   ANIONGAP 10 8 8  --   --  11   VENTURA 8.8 8.7 8.9  --   --  9.0   * 147* 111*  --  126* 126*   ALBUMIN 3.1*  --  3.5  --   --  3.5   PROTTOTAL 6.3*  --  6.6  --   --  7.0   BILITOTAL 0.5  --  0.6  --   --  0.8   ALKPHOS 147*  --  134*  --   --  145*   ALT 12  --  10  --   --  10   AST 22  --  21  --   --  24    < > = values in this interval not displayed.     No results found for this or any previous visit (from the past 24 hour(s)).     The patient's care was discussed with the Attending Physician, Dr. Bryant.    Carlos Bryant MD  Medicine Service, [facility] TEAM 3  [facility]  Securely message with the Vocera Web Console (learn more here)  Text page via [facility] Paging/Directory   Please see signed in provider for up to date coverage information      Clinically Significant Risk Factors Present on Admission        ______________________________________________________________________    Interval History   The patient reported improvement in his shortness of breath, however lack of resolution of his shortness of breath.  The patient denied any subjective fever or chills.  Four-point review of systems is otherwise negative.    Data reviewed today: I reviewed all medications, new labs and imaging results over the last 24 hours.    Physical Exam   Vital Signs: Temp: 97.4  F (36.3  C) Temp src: Oral BP: 116/77 Pulse: 80   Resp: 18 SpO2: 94 % O2  Device: None (Room air) Oxygen Delivery: 4 LPM  Weight: 188 lbs 14.95 oz     Constitutional: Well appearing, NAD, appears stated age  HEENT: NC/AT, anicteric sclera, pupils round and equal, EOMI  CV: RRR, normal S1/S2, no murmurs/gallops/rubs, increased split with inspiration, intact distal pulses  Pulm: non-labored respirations on 6L, tachypnea, diminished lung sounds on left lung, left lung dull to percussion throughout- right lung resonant, right lung sounds are decreased but clear, no wheezes or crackles  Abdomen: normoactive bowel sounds, soft, non-distended, non-tender  Extremities: warm and well perfused, no peripheral edema, cyanosis, or clubbing. Right leg amputee.   Skin: no jaundice, no rashes on exposed skin, PICC site and abdominal incision site are not erythematous, swollen, warm, or tender. No discharge from PICC site.  Neuro: alert and oriented x3, CN II-XII grossly intact  Psych: normal affect    Data   Recent Labs   Lab 09/02/22  1102 09/02/22  0821 09/01/22  0540 08/31/22  2040 08/31/22 2020 08/31/22 2013   WBC  --  8.0 9.2  --   --  14.6*   HGB  --  8.3* 8.6*  --  13.6 9.5*   MCV  --  75* 76*  --   --  76*   PLT  --  292 334  --   --  338   INR  --   --   --   --   --  1.05    140 138  --  141 140   POTASSIUM 4.0 4.1 4.0  --  3.7 3.7   CHLORIDE 107 108* 104  --   --  105   CO2 23 24 26  --   --  24   BUN 11.2 10.5 9.7  --   --  10.7   CR 0.49* 0.51* 0.61*   < >  --  0.63*   ANIONGAP 10 8 8  --   --  11   VENTURA 8.8 8.7 8.9  --   --  9.0   * 147* 111*  --  126* 126*   ALBUMIN 3.1*  --  3.5  --   --  3.5   PROTTOTAL 6.3*  --  6.6  --   --  7.0   BILITOTAL 0.5  --  0.6  --   --  0.8   ALKPHOS 147*  --  134*  --   --  145*   ALT 12  --  10  --   --  10   AST 22  --  21  --   --  24    < > = values in this interval not displayed.     No results found for this or any previous visit (from the past 24 hour(s)).

## 2022-09-02 NOTE — PLAN OF CARE
Problem: Oral Intake Altered (Oncology Care)  Goal: Optimal Oral Intake  Outcome: Ongoing, Progressing   Goal Outcome Evaluation:    Plan of Care Reviewed With: other (see comments)     Overall Patient Progress: improving

## 2022-09-02 NOTE — PLAN OF CARE
Problem: Plan of Care - These are the overarching goals to be used throughout the patient stay.    Goal: Plan of Care Review/Shift Note  Flowsheets (Taken 9/2/2022 4884)  Plan of Care Reviewed With: other (see comments)  Overall Patient Progress: improving   Goal Outcome Evaluation:    Plan of Care Reviewed With: other (see comments)     Overall Patient Progress: improving

## 2022-09-02 NOTE — PROGRESS NOTES
SPIRITUAL HEALTH SERVICES  SPIRITUAL ASSESSMENT Progress Note  [facility] UU U7D    REFERRAL SOURCE: Self initiated  visit.    I visited the pt smiling. Pt appreciated the visit from [facility] ,and welcomed it. Pt mentioned school have started, and his home is getting busy. Pt mentioned he is well and grateful. We prayed together for his health may  Allah remove the heaviness and adrian him healing of the soul and the body. I also provided him with prayer mat.    PLAN: [facility] will remain the same.    Lita Schulz  Chaplain Resident  Phone: [phone]

## 2022-09-02 NOTE — PROGRESS NOTES
"CLINICAL NUTRITION SERVICES - ASSESSMENT NOTE     Nutrition Prescription    RECOMMENDATIONS FOR MDs/PROVIDERS TO ORDER:  - None at this time    Malnutrition Status:    - Unable to determine at this time    Recommendations already ordered by Registered Dietitian (RD):  - None at this time     Future/Additional Recommendations:  - Continue to encourage good po intakes of meals and ONS to ensure wt maintenance and assist with wound healing  - Monitor wt trends for maintenance     REASON FOR ASSESSMENT  Vic Scott III is a/an 47 year old male assessed by the dietitian for positive Admission Nutrition Risk Screen for wt loss, but unsure of amount lost and eating poorly because of a decreased appetite.    Per chart review, pt with past medical history of high-grade pleomorphic sarcoma s/p right hindquarter amputation/ hemipelvectomy (June 2022) recently found to have metastasis to abdominal wall and lung. Currently admitted for worsening pleural effusion and SOB.     NUTRITION HISTORY  Unable to obtain secondary to pt sleeping at time of visit.    CURRENT NUTRITION ORDERS  Diet: Regular with Ensure Enlive supplements betw meals   Intake/Tolerance: Per RN/flowheets (pt sleeping at time of visit), reported pt consumed 75% x 1 meal today  -  LABS  Labs reviewed    MEDICATIONS  0.9% NACL 1,000 ml (every 20 hrs)  Decadron (every 20 hrs)    Skin:  WOC consult for evaluate and treat of wound (right LE amputation site)  - reported that scab fell off today and pt has had copious watery brown drainage.     ANTHROPOMETRICS  Height: 188 cm (6' 2\")  Most Recent Weight: 85.7 kg (188 lb 15 oz) - 9/1  IBW: 72.7 kg - (adjusted by 16% for amputation)  BMI: Normal BMI  Weight History: Noted wt trending upward (gain of 21 lbs) since right hindquarter amputation/hemipelvectomy on 6/17/2022    Wt Readings from Last 10 Encounters:   09/01/22 85.7 kg (188 lb 15 oz)   08/13/22 79.4 kg (175 lb)   07/20/22 79.4 kg (175 lb)   07/20/22 79.4 kg " (175 lb)   07/05/22 80 kg (176 lb 4.8 oz)   06/17/22 75.8 kg (167 lb 1.7 oz)   06/05/22 106.6 kg (235 lb)   12/01/21 114.5 kg (252 lb 6.4 oz)   11/09/21 116.8 kg (257 lb 6.4 oz)   11/01/21 104.3 kg (230 lb)       Dosing Weight:  86 kg    ASSESSED NUTRITION NEEDS  Estimated Energy Needs: 2366-4465 kcals/day (25 - 30 kcals/kg)  Justification: Maintenance  Estimated Protein Needs: 103-129 grams protein/day (1.2 - 1.5 grams of pro/kg)  Justification: Increased needs with chemo and Wound healing  Estimated Fluid Needs: (1 mL/kcal)   Justification: Maintenance    PHYSICAL FINDINGS  See malnutrition section below.    MALNUTRITION  % Intake: Unable to assess  % Weight Loss: None noted  Subcutaneous Fat Loss: Unable to assess  Muscle Loss: Unable to assess  Fluid Accumulation/Edema: None noted (per chart review)  Malnutrition Diagnosis: Unable to determine due to unable to complete all parameters of nutritional assessment    NUTRITION DIAGNOSIS  Predicted inadequate nutrient intake (calorie-protein) related to tolerating po with good intakes at present, but at risk for po to falter with chemo d/t potential for decreased appetite and N/V with chemo.    INTERVENTIONS  Implementation  Nutrition Education: handouts on coping with poor appetite, and tips for increasing protein intakes left at bedside.      Goals  Patient to consume >75% of nutritionally adequate meal trays TID, or the equivalent with supplements/snacks.     Monitoring/Evaluation  Progress toward goals will be monitored and evaluated per protocol.    Carmen Anand RD,LD  7D pager [phone]

## 2022-09-03 NOTE — PROGRESS NOTES
Chemotherapy  D: Blood return is brisk per PICC. Urine output is adequate as recorded in intake and output flowsheet.   I:  Day 2 Doxorubicin started to infuse over 22 hours.   A: Tolerating infusion well so far.   P: Continue to monitor urine output and nausea. Screen for symptoms of toxicity.

## 2022-09-03 NOTE — PROCEDURES
Procedure Note    Attending:  Balbir  Resident: none  Procedure: Left Thoracentesis      The risks and benefits of the procedure were explained to the patient  who expressed understanding and opted to proceed.  Consent was obtained and placed in the chart and the patient was placed on pulse oximetry.  A time out was performed.    An ultrasound probe was used to identify an area of pleural fluid in the left hemithorax.  This area was prepped and draped in the usual sterile fashion.  7 ml of 1% lidocaine was instilled and fluid located using ultrasound guidance.  The thoracentesis catheter and needle were inserted above the rib until fluid obtained then the needle removed.    Using suction bottles, 1100 ml of bloody colored fluid was removed. A specimen was sent for analysis.    The catheter was removed with the patient exhaling and an occlusive dressing placed.       Ultrasound revealed normal lung sliding after the procedure and a chest radiograph is pending.    The tolerated the procedure well.  Please contact the Consult and Procedure Service if any concerns or complications arise.       Yash Plunkett MD     DOS:  9/3/22

## 2022-09-03 NOTE — PROVIDER NOTIFICATION
MD Bryant paged...    7D 7517-1 CF  ID called to notify of Isolated in broth only Gram positive cocci in clusters from pleural fluid collected 9/1 at 1141.

## 2022-09-03 NOTE — PROGRESS NOTES
"Hematology-Oncology Brief progress note      Subjective:    Fabian is doing well today.Day 3 of Doxorubicin today, finished cisplatin. , o2 requirment improving. Minimal nausea from chemo, no other SE.       Objective:    Vital signs:  Temp: 98.2  F (36.8  C) Temp src: Oral BP: 124/72 Pulse: 83   Resp: 16 SpO2: 97 % O2 Device: Nasal cannula Oxygen Delivery: 2 LPM Height: 188 cm (6' 2\") Weight: 85.7 kg (188 lb 15 oz)  Estimated body mass index is 24.26 kg/m  as calculated from the following:    Height as of this encounter: 1.88 m (6' 2\").    Weight as of this encounter: 85.7 kg (188 lb 15 oz).    General: lying in bed, no acute distress  HEENT: sclera anicteric, EOMI, MMM  Neck: supple, normal ROM  MSK: Right surgical amputation site w/o oozing or drainage, chux pad is saturate. No overt dehiscence. Surgical suture noted.  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     -Continue Doxorubicin as per protocol  --other cares per primary team  -Recommend PT/OT referral encouragement for physical activity  -May need repeat thoracentesis if respiratory status does not improve enough or may require home O2      Liu Walker MD    Hematology, Oncology and Transplantation      "

## 2022-09-03 NOTE — PLAN OF CARE
Alert and oriented X 4. AVSS. Denies SOB at rest. Denies pain or abdominal discomfort. Tolerating doxorubicin infusion well. Used urinal at bedside. Good urine output. Slept in between cares.

## 2022-09-03 NOTE — PROGRESS NOTES
Nursing Focus: Chemotherapy    D:  Patient received Day 2 Cisplatin.     I:   Chemotherapy double checked per protocol by two chemotherapy competent RN's prior to administration.  Premedications administered per orders (see electronic medical administration record).  Brisk blood return observed from PICC prior to administration.  Insertion site is clean/dry/intact, dressing intact with no complaints of pain. Cisplatin programmed to infuse over 2 hours/minutes per protocol. Reviewed pt teaching on chemotherapy side effects.      A:  Patient tolerating chemo well thus far. Denies nausea.  Patient denies any further need for teaching at this time.     P: Continue to monitor patient for chemo side effects and intervene as needed. Continue to screen for symptoms of toxicity.  Notify MD with any concerns or changes in patient status.

## 2022-09-03 NOTE — PLAN OF CARE
"Goal Outcome Evaluation:    4922-4327    /73 (BP Location: Right arm)   Pulse 90   Temp 97.7  F (36.5  C) (Oral)   Resp 16   Ht 1.88 m (6' 2\")   Wt 85.7 kg (188 lb 15 oz)   SpO2 96%   BMI 24.26 kg/m      Reason for admission: Presented with sudden shortness of breath and hypoxia found to have massive left pleural effusion and right pulmonary vein tumor thrombus.  Activity: A2. RLE amputee.   Pain: Denies.   Neuro: AxOx4. Neuros intact.   Cardiac: WDL  Respiratory: LOPEZ. NLB at rest. Denies SOB at rest. O2 weaned to 1L. O2 sats WDL. Bedside thoracentesis today, 1100 mL off.   GI/: Voiding spontaneously with adequate UOP. LBM 8/31. Pt declining bowel meds.   Diet: Regular diet, low appetite.   Lines: DL PICC intact infusing TKO/abx x1, CIVI Doxorubicin x1, brisk blood return noted. Site WDL.   Wounds: RLE/hip dressing changed per POC x2. Dressing CDI.   Labs/imaging: Reviewed. See chart.      Plan: D3 Doxorubicin to be hung this evening.       Continue to monitor and follow POC    "

## 2022-09-03 NOTE — PLAN OF CARE
7D Physical Therapy - Orders received, chart reviewed, discussed with pt. Pt reports to be nearing functional mobility baseline, denies any concerns with mobility. Has wheelchair accessible home with all DME needs met. Has been seeing HH PT and would like to continue following discharge. Pt with no IP PT needs at this time. Will complete consult and defer evaluation, please reconsult as appropriate if patient has decline in functional mobility requiring further skilled inpatient PT needs. Defer Discharge recommendations to medical team.

## 2022-09-03 NOTE — PLAN OF CARE
[id number]    AxOx4. VSS on 3LPM. Continues to report SOB. Denies pain in hip this shift. Dressing CDI. Intermittent nausea, PRN compazine effective. CIVI chemo transfusing.

## 2022-09-04 NOTE — CARE PLAN
Occupational Therapy: Orders received. Chart reviewed and discussed with care team.? Occupational Therapy not indicated due to pt at ADL baseline, reports will discharge home today or tomorrow.  Pt lives with his sister, nieces, and son in a house w/ all needs met on main level. Pt ambulates with FWW, SEC, or WC. Pt is actively working w/ HH PT and looks forward to continuing this. OT issued green theraband for IND performance of BUE HEP and encouraged pt to increase activity while hospitalized; sit EOB or transfer to chair, if recliner able to be located. .? Defer discharge recommendations to medical team.? Will complete orders. Please re-order OT if pt's hospitalization is extended and new ADL deficits are identified.

## 2022-09-04 NOTE — PROGRESS NOTES
"Hematology-Oncology Brief progress note      Subjective:    Fabian is doing well today. Last day of doxorubicin today. Minimal nausea from chemo, no other SE.       Objective:    Vital signs:  Temp: 97.9  F (36.6  C) Temp src: Oral BP: 125/75 Pulse: 92   Resp: 18 SpO2: 91 % O2 Device: Nasal cannula Oxygen Delivery: 1 LPM Height: 188 cm (6' 2\") Weight: 89 kg (196 lb 3.4 oz)  Estimated body mass index is 25.19 kg/m  as calculated from the following:    Height as of this encounter: 1.88 m (6' 2\").    Weight as of this encounter: 89 kg (196 lb 3.4 oz).    General: lying in bed, no acute distress  HEENT: sclera anicteric, EOMI, MMM  Neck: supple, normal ROM  MSK: Right surgical amputation site w/o oozing or drainage, chux pad is saturate. No overt dehiscence. Surgical suture noted.  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     -Continue Doxorubicin as per protocol, last day today  --other cares per primary team  -Recommend PT/OT referral encouragement for physical activity  -May need repeat thoracentesis if respiratory status does not improve enough or may require home O2      Liu Wakler MD    Hematology, Oncology and Transplantation      "

## 2022-09-04 NOTE — PROGRESS NOTES
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     Hypoxia improved after thoracentesis x 2, down to 0.5L NC from 4L NC. Patient remains admitted for ongoing chemotherapy, IV antibiotics, and management of pleural effusion.     Today:  - Repeat CXR with mildly increase in left sided effusion  - Repeat CXR in AM  - 20mg IV lasix  - Continue to wean O2 as able    # AHRF 2/2 massive left pleural effusion, with possible bacterial PNA, improving, all POA  # Pleural fluid culture (+) Staph epidermidis  This is the main problem that led to this admission.  Sudden onset SOB and significant increase in left sided pleural effusion in past 2 weeks. Pleural fluid (+) for Staph epi, which may reflect contaminant given absence of fever, leukocytosis. No PE on admit CT.  - s/p thoracentesis x 2, fluid is exudative in nature.    - Repeat CXR with mildly increase in left sided effusion  - Repeat CXR in AM  - Curbside ID (9/4) regarding significance of Staph epi    > Likely reflects skin anmol, no further workup warranted if patient improving    > If pathogen grows on repeat cultures or clinical decompensation occurs despite CTX, consider broadening coverage and formal ID consult  - 20mg IV lasix  - Continue to wean O2 as able  - s/p azithromycin 500mg x 3 days  - Continue ceftriaxone x 5 days pending final pleural fluid  culture results    # High-grade pleomorphic sarcoma of the right pelvis and femur   s/p chemotherapy & right hindquarter amputation/hemipelvectomy (6/17/2022) with metastasis to abdominal wall and lungs complicated by 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  Patient was diagnosed with high-grade pleomorphic sarcoma of right femur and pelvis on 09/20/2021.  Patient Doxil/Ifos initially on 11/2/2021, which was complicated by isofamide neurotoxicity. It resolved without methene blue. His 2nd cycle of chemo was aborted because of this. He experienced a right proximal femur fracture on 06/05/2022 and underwent right hindquarter amputation by Dr. Grove and primary complex wound closure by Dr. Petersen on 06/17/2022. Pt's CT on 08/13/2022 showed new metastasis to abdominal wall and lungs. His oncologist Dr. Ambrocio suggested to start doxorubicin, cisplatin, and Neulasta. He was agreeable to follow with palliative.   High risk for platelet rich thrombosis on top of the cancer related thrombus of the pulmonary vein. The patient was initiated on doxorubicin and cisplatin him during this admission.    - Continue aspirin 1 g every 8 hours  - Continue cisplatin on, doxorubicin and dexamethasone  - Continue morphine 30 mg every 12 hours as a scheduled and oxycodone 10 mg every 4 hours as needed  - We highly appreciate input of oncology service.   - Defer therapeutic AC at this time per hem/onc input     > Will need to discuss role of VTE ppx at home prior to discharge    # Chronic anemia of malignancy, all are present on admission  Stable, low Hgb on admit. No acute drop c/f hemothorax.  - Trend CBC    # MDD  - Continue PTA escitalopram       Diet: Combination Diet Regular Diet Adult  Snacks/Supplements Adult: Ensure Enlive; Between Meals    DVT Prophylaxis: Lovenox  Sung Catheter: Not present  Fluids:  PO  Central Lines: PRESENT  PICC Double Lumen 08/30/22 Left Brachial vein medial-Site Assessment: WDL  Cardiac Monitoring: None  Code Status: Full Code      Disposition Plan     Expected Discharge Date: 09/06/2022        Discharge Comments: Likely d/c home in 1-2 days pending respiratory status stable on room air, oncology input    Patient discussed with the attending physician, Dr. Bryant, who agrees with the above assessment and plan.     Benny Roblero MD  Internal Medicine - Pediatrics PGY-4  [facility]      ______________________________________________________________________    Interval History     No acute events overnight.  Nursing notes reviewed.  Per RN, patient desaturates to 88-89% while sleeping, and therefore remains on 0.5-1.0L nasal cannula.    Patient seen at bedside this morning.  Patient denies any acute or worsening dyspnea, cough, chest pain, palpitations, vomiting, abdominal pain.      He is interested in going home in the next 1 to 2 days to see his children before they start school.  Discussed plan for possible chest tube if effusion does not improve or if effusion appears to be infected based on culture data.      Patient is not excited about staying in the hospital for a chest tube, though he will think further about this.    Four-point review of systems is otherwise negative.    Data reviewed today: I reviewed all medications, new labs and imaging results over the last 24 hours.    Physical Exam   Vital Signs: Temp: 96.9  F (36.1  C) Temp src: Temporal BP: 119/75 Pulse: 87   Resp: 18 SpO2: 96 % O2 Device: Nasal cannula Oxygen Delivery: 2 LPM  Weight: 188 lbs 14.95 oz     Constitutional: Well appearing, NAD, appears stated age  HEENT: NC/AT, anicteric sclera, pupils round and equal, EOMI  CV: RRR, normal S1/S2, no murmurs/gallops/rubs, increased split with inspiration, intact distal pulses  Pulm: non-labored respirations on 1L NC, diminished lung sounds on left lung, left lung  dull to percussion throughout. No wheezes or crackles  Abdomen: normoactive bowel sounds, soft, non-distended, non-tender  Extremities: warm and well perfused, no peripheral edema, cyanosis, or clubbing. Right leg amputee.   Skin: no jaundice, no rashes on exposed skin No discharge from PICC site.  Neuro: alert and oriented x3, CN II-XII grossly intact    Data   Recent Labs   Lab 09/04/22  0603 09/03/22  1042 09/02/22  1102 09/02/22  0821 08/31/22 2020 08/31/22 2013   WBC 7.2 9.7  --  8.0   < > 14.6*   HGB 8.0* 8.2*  --  8.3*   < > 9.5*   MCV 75* 74*  --  75*   < > 76*    319  --  292   < > 338   INR  --   --   --   --   --  1.05    140 140 140   < > 140   POTASSIUM 3.6 3.8 4.0 4.1   < > 3.7   CHLORIDE 104 107 107 108*   < > 105   CO2 25 25 23 24   < > 24   BUN 20.1* 16.4 11.2 10.5   < > 10.7   CR 0.66* 0.52* 0.49* 0.51*   < > 0.63*   ANIONGAP 9 8 10 8   < > 11   VENTURA 8.9 9.0 8.8 8.7   < > 9.0   * 123* 139* 147*   < > 126*   ALBUMIN 3.2* 3.2* 3.1*  --    < > 3.5   PROTTOTAL 6.0* 6.3* 6.3*  --    < > 7.0   BILITOTAL 0.4 0.4 0.5  --    < > 0.8   ALKPHOS 96 117 147*  --    < > 145*   ALT 12 10 12  --    < > 10   AST 23 16 22  --    < > 24    < > = values in this interval not displayed.     Recent Results (from the past 24 hour(s))   XR Chest Port 1 View    Narrative    Exam:  Chest X-ray 9/3/2022 11:04 AM    History: follow up left sided pleural effusion    Comparison: 9/1/2022    Findings:   Single portable AP view of the chest. Increased large left sided  pleural effusion. Unchanged bilateral pulmonary opacities. Cardiac  silhouette is obscured. No pneumothorax. Right chest wall Port-A-Cath  terminates near the cavoatrial junction and the left upper extremity  PICC terminates in the right atrium.      Impression    Impression:   1.  Increased large left sided pleural effusion. Unchanged bilateral  pulmonary metastases.     KATYA HALE MD         SYSTEM ID:  [id number]   XR Chest Port 1 View     Narrative    EXAM:  XR CHEST PORT 1 VIEW    INDICATION: s/p left thoracentesis    COMPARISON:  Chest x-ray 9/3/2022, thoracentesis 9/3/2022    FINDINGS:  Single AP view of the chest. Right chest Port-A-Cath terminating over  the cavoatrial junction. Left PICC terminates over the right atrium.    Partially obscured cardiac silhouette. Decreased left pleural effusion  with residual moderate left pleural effusion and adjacent opacities.  Bilateral nodular opacities. No pneumothorax. No acute bony lesions.      Impression    IMPRESSION:  1.  Decreased but still present residual moderate left pleural  effusion with adjacent atelectasis.  2.  Unchanged bilateral pulmonary metastases.    I have personally reviewed the examination and initial interpretation  and I agree with the findings.    ANSHUL MYERS MD         SYSTEM ID:  [id number]

## 2022-09-04 NOTE — PROGRESS NOTES
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 Presenting with sudden shortness of breath and hypoxia found to have massive left pleural effusion and right pulmonary vein tumor thrombus.       Today:  - repeat thoracentesis per CAPS team      1. Acute hypoxemic respiratory failure 2/2 massive left pleural effusion with possible underlying bacterial pneumonia, all are present on admission  This is the main problem that led to this admission.  Sudden onset SOB and significant increase in left sided pleural effusion in past 2 weeks. Unclear etiology, infectious less likely at this time given no leukocytosis, no fever and sudden onset. No PE on admit CT. RUL tumor thrombus cannot explain left sided pleural effusion. Thoracentesis with 900 ml bloody output, labs pending, and prematurely stopped due to coughing.  The pleural fluid is exudative in nature.   - repeat thoracentesis with CAPS team today 09/03  -Weaning oxygen as tolerated  -We will repeat imaging after thoracentesis to evaluate for any underlying consolidation  -Continue empirical ceftriaxone and azithromycin    2. 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  Patient was diagnosed with high-grade pleomorphic sarcoma of right femur and pelvis on 09/20/2021.  Patient Doxil/Ifos initially on 11/2/2021, which was complicated by isofamide neurotoxicity. It resolved without methene blue. His 2nd cycle of chemo was aborted because of this. He experienced a right proximal femur fracture on 06/05/2022 and underwent right hindquarter amputation by Dr. Grove and primary complex wound closure by Dr. Petersen on 06/17/2022. Pt's CT on 08/13/2022 showed new metastasis to abdominal wall and lungs. His oncologist Dr. Ambrocio suggested to start doxorubicin, cisplatin, and Neulasta. He was agreeable to follow with palliative. Pt received PICC placement on 08/30, and was planned to start chemo 9/1. High risk for platelet rich thrombosis on top of the cancer related thrombus of the pulmonary vein. The patient was initiated on doxorubicin and cisplatin him during this admission.  -Continue aspirin 1 g every 8 hours  -Continue cisplatin on, doxorubicin and dexamethasone  -Continue morphine 30 mg every 12 hours as a scheduled and oxycodone 10 mg every 4 hours as needed  -We highly appreciate input of oncology service.    3. Chronic anemia of malignancy, all are present on admission  Stable, low Hgb on admit. No acute drop c/f hemothorax.  - trend CBC       Diet: Combination Diet Regular Diet Adult  Snacks/Supplements Adult: Ensure Enlive; Between Meals    DVT Prophylaxis: defer  Sung Catheter: Not present  Fluids: PO  Central Lines: PRESENT  PICC Double Lumen 08/30/22 Left Brachial vein medial-Site Assessment: WDL  Cardiac Monitoring: None  Code Status: Full Code      Disposition Plan     Expected Discharge Date: 09/06/2022        Discharge Comments: continuous chemo over the weekend. repeat thoracentisis 9/3. Pending improvement, likely d/c home. possible new O2 requirement.   Recent Labs   Lab 09/03/22  1042 09/02/22  1102 09/02/22  0821 09/01/22  0540 08/31/22 2020  08/31/22 2013   WBC 9.7  --  8.0 9.2  --  14.6*   HGB 8.2*  --  8.3* 8.6*   < > 9.5*   MCV 74*  --  75* 76*  --  76*     --  292 334  --  338   INR  --   --   --   --   --  1.05    140 140 138   < > 140   POTASSIUM 3.8 4.0 4.1 4.0   < > 3.7   CHLORIDE 107 107 108* 104  --  105   CO2 25 23 24 26  --  24   BUN 16.4 11.2 10.5 9.7  --  10.7   CR 0.52* 0.49* 0.51* 0.61*   < > 0.63*   ANIONGAP 8 10 8 8  --  11   VENTURA 9.0 8.8 8.7 8.9  --  9.0   * 139* 147* 111*   < > 126*   ALBUMIN 3.2* 3.1*  --  3.5  --  3.5   PROTTOTAL 6.3* 6.3*  --  6.6  --  7.0   BILITOTAL 0.4 0.5  --  0.6  --  0.8   ALKPHOS 117 147*  --  134*  --  145*   ALT 10 12  --  10  --  10   AST 16 22  --  21  --  24    < > = values in this interval not displayed.     Recent Results (from the past 24 hour(s))   XR Chest Port 1 View    Narrative    Exam:  Chest X-ray 9/3/2022 11:04 AM    History: follow up left sided pleural effusion    Comparison: 9/1/2022    Findings:   Single portable AP view of the chest. Increased large left sided  pleural effusion. Unchanged bilateral pulmonary opacities. Cardiac  silhouette is obscured. No pneumothorax. Right chest wall Port-A-Cath  terminates near the cavoatrial junction and the left upper extremity  PICC terminates in the right atrium.      Impression    Impression:   1.  Increased large left sided pleural effusion. Unchanged bilateral  pulmonary metastases.     KATYA HALE MD         SYSTEM ID:  [id number]   XR Chest Port 1 View    Narrative    EXAM:  XR CHEST PORT 1 VIEW    INDICATION: s/p left thoracentesis    COMPARISON:  Chest x-ray 9/3/2022, thoracentesis 9/3/2022    FINDINGS:  Single AP view of the chest. Right chest Port-A-Cath terminating over  the cavoatrial junction. Left PICC terminates over the right atrium.    Partially obscured cardiac silhouette. Decreased left pleural effusion  with residual moderate left pleural effusion and adjacent opacities.  Bilateral nodular opacities. No  pneumothorax. No acute bony lesions.      Impression    IMPRESSION:  1.  Decreased but still present residual moderate left pleural  effusion with adjacent atelectasis.  2.  Unchanged bilateral pulmonary metastases.    I have personally reviewed the examination and initial interpretation  and I agree with the findings.    ANSHUL MYERS MD         SYSTEM ID:  [id number]        The patient's care was discussed with the Attending Physician, Dr. Bryant.    Aleksey [facility]  Internal Medicine, PGY-3  [facility] Service  p     ______________________________________________________________________    Interval History   Slept well and feels generally well today.    No acute worsening in respiratory symptoms.    Four-point review of systems is otherwise negative.    Data reviewed today: I reviewed all medications, new labs and imaging results over the last 24 hours.    Physical Exam   Vital Signs: Temp: 98.2  F (36.8  C) Temp src: Oral BP: 124/72 Pulse: 83   Resp: 16 SpO2: 97 % O2 Device: Nasal cannula Oxygen Delivery: 2 LPM  Weight: 188 lbs 14.95 oz     Constitutional: Well appearing, NAD, appears stated age  HEENT: NC/AT, anicteric sclera, pupils round and equal, EOMI  CV: RRR, normal S1/S2, no murmurs/gallops/rubs, increased split with inspiration, intact distal pulses  Pulm: non-labored respirations on 1-2L, tachypnea, diminished lung sounds on left lung, left lung dull to percussion throughout- right lung resonant, right lung sounds are decreased but clear, no wheezes or crackles  Abdomen: normoactive bowel sounds, soft, non-distended, non-tender  Extremities: warm and well perfused, no peripheral edema, cyanosis, or clubbing. Right leg amputee.   Skin: no jaundice, no rashes on exposed skin, PICC site and abdominal incision site are not erythematous, swollen, warm, or tender. No discharge from PICC site.  Neuro: alert and oriented x3, CN II-XII grossly intact  Psych: normal affect    Data   Recent Labs   Lab  09/03/22  1042 09/02/22  1102 09/02/22  0821 09/01/22  0540 08/31/22 2020 08/31/22 2013   WBC 9.7  --  8.0 9.2  --  14.6*   HGB 8.2*  --  8.3* 8.6*   < > 9.5*   MCV 74*  --  75* 76*  --  76*     --  292 334  --  338   INR  --   --   --   --   --  1.05    140 140 138   < > 140   POTASSIUM 3.8 4.0 4.1 4.0   < > 3.7   CHLORIDE 107 107 108* 104  --  105   CO2 25 23 24 26  --  24   BUN 16.4 11.2 10.5 9.7  --  10.7   CR 0.52* 0.49* 0.51* 0.61*   < > 0.63*   ANIONGAP 8 10 8 8  --  11   VENTURA 9.0 8.8 8.7 8.9  --  9.0   * 139* 147* 111*   < > 126*   ALBUMIN 3.2* 3.1*  --  3.5  --  3.5   PROTTOTAL 6.3* 6.3*  --  6.6  --  7.0   BILITOTAL 0.4 0.5  --  0.6  --  0.8   ALKPHOS 117 147*  --  134*  --  145*   ALT 10 12  --  10  --  10   AST 16 22  --  21  --  24    < > = values in this interval not displayed.     Recent Results (from the past 24 hour(s))   XR Chest Port 1 View    Narrative    Exam:  Chest X-ray 9/3/2022 11:04 AM    History: follow up left sided pleural effusion    Comparison: 9/1/2022    Findings:   Single portable AP view of the chest. Increased large left sided  pleural effusion. Unchanged bilateral pulmonary opacities. Cardiac  silhouette is obscured. No pneumothorax. Right chest wall Port-A-Cath  terminates near the cavoatrial junction and the left upper extremity  PICC terminates in the right atrium.      Impression    Impression:   1.  Increased large left sided pleural effusion. Unchanged bilateral  pulmonary metastases.     KATYA HALE MD         SYSTEM ID:  [id number]   XR Chest Port 1 View    Narrative    EXAM:  XR CHEST PORT 1 VIEW    INDICATION: s/p left thoracentesis    COMPARISON:  Chest x-ray 9/3/2022, thoracentesis 9/3/2022    FINDINGS:  Single AP view of the chest. Right chest Port-A-Cath terminating over  the cavoatrial junction. Left PICC terminates over the right atrium.    Partially obscured cardiac silhouette. Decreased left pleural effusion  with residual moderate left  pleural effusion and adjacent opacities.  Bilateral nodular opacities. No pneumothorax. No acute bony lesions.      Impression    IMPRESSION:  1.  Decreased but still present residual moderate left pleural  effusion with adjacent atelectasis.  2.  Unchanged bilateral pulmonary metastases.    I have personally reviewed the examination and initial interpretation  and I agree with the findings.    ANSHUL MYERS MD         SYSTEM ID:  [id number]

## 2022-09-04 NOTE — PLAN OF CARE
[id number]    Temp: 96.9  F (36.1  C) Temp src: Temporal BP: 119/75 Pulse: 87   Resp: 18 SpO2: 96 % O2 Device: Nasal cannula Oxygen Delivery: 2 LPM     Reason for admission: SOB w/ hypoxia, L PE and R pulmonary view tumor thrombus  Activity: Ast x2   Pain: Denied pain  Neuro: A&Ox4, makes needs known, uses call light appropriately  Cardiac: Denies chest pain/tightness  Respiratory: Denies SOB, sating 99% on 2L NC, no cough present, wean as able  GI/: Compazine given x1 see MAR, voids spontaneously, no abd discomfort  Diet: Regular   Lines: PICC , infusing per orders  Wounds: Amputation dressing CDI  Labs/imaging: Reviewed.       New changes this shift: No acute events         Continue with POC

## 2022-09-04 NOTE — PLAN OF CARE
[id number]    AxOx4. VSS. On 2L O2 per patient request as he desats at night. Denies pain, nausea, SOB. CIVI doxorubicin infusing. No acute events.

## 2022-09-04 NOTE — PROGRESS NOTES
Nursing Focus: Chemotherapy    D:  Patient received Day 3 Doxorubicin.     I:   Chemotherapy double checked per protocol by two chemotherapy competent RN's prior to administration.  No premeds.  Brisk blood return observed from PICC prior to administration.  Insertion site is clean/dry/intact, dressing intact with no complaints of pain. Doxorubicin programmed to infuse over 22 hours/minutes per protocol. Reviewed pt teaching on chemotherapy side effects.      A:  Patient tolerating chemo well thus far. Denies nausea.  Patient denies any further need for teaching at this time.     P: Continue to monitor patient for chemo side effects and intervene as needed. Continue to screen for symptoms of toxicity.  Notify MD with any concerns or changes in patient status.

## 2022-09-04 NOTE — PLAN OF CARE
"Goal Outcome Evaluation:    [id number]    /81 (BP Location: Right arm)   Pulse 99   Temp 98.2  F (36.8  C) (Oral)   Resp 18   Ht 1.88 m (6' 2\")   Wt 85.3 kg (188 lb)   SpO2 95%   BMI 24.14 kg/m      Reason for admission: Presented with sudden shortness of breath and hypoxia found to have massive left pleural effusion and right pulmonary vein tumor thrombus.  Activity: A2. RLE amputee.   Pain: Denies.   Neuro: AxOx4. Neuros intact.   Cardiac: WDL  Respiratory: LOPEZ. NLB at rest. Denies SOB at rest. O2 weaned to 1L. O2 sats WDL. Several attempts to wean fully down to RA, resulting in desats to high 80s.  GI/: Voiding spontaneously with adequate UOP. One time IV Lasix given with good response. LBM 8/31. Pt declining bowel meds.   Diet: Regular diet, poor appetite.    Lines: DL PICC intact infusing TKO/abx x1, CIVI Doxorubicin x1, brisk blood return noted. Site WDL.   Wounds: RLE/hip dressing CDI. Daily dressing change completed per POC orders.   Labs/imaging: Reviewed. See chart.      Plan: D4 (last bag) Doxorubicin to be hung this evening.       Continue to monitor and follow POC  "

## 2022-09-05 NOTE — PLAN OF CARE
CIVI day 4 doxorubicin infusing, will complete at 1900 tonight. On 1L O2 all day. Pt denies SOB while laying in bed, but LOPEZ. Pt states that he feels his breathing is better then when he came in. Only ate a popsicle today. Slept between cares.

## 2022-09-05 NOTE — PLAN OF CARE
Nursing Focus: Chemotherapy    D: Positive blood return via PICC red lumen. Insertion site is clean/dry/intact, dressing intact with no complaints of pain.  Urine output is recorded in intake in Doc Flowsheet.      I: No premedications given per order (see electronic medical administration record). Dose #4 of doxorubicin started to infuse over 22hours. Reviewed pt teaching on chemotherapy side effects.  Pt denies need for further teaching. Chemotherapy double checked per protocol by two chemotherapy competent RN's.     A: Tolerating infusion well. Denies nausea and or pain.     P: Continue to monitor urine output and symptoms of nausea. Screen for symptoms of toxicity.

## 2022-09-05 NOTE — PROGRESS NOTES
Care Management Initial Consult    General Information  Assessment completed with: Patient,    Type of CM/SW Visit: Initial Assessment    Primary Care Provider verified and updated as needed:     Readmission within the last 30 days:        Reason for Consult: discharge planning  Advance Care Planning: Advance Care Planning Reviewed: no concerns identified  . Sisters are NOK in absence of HCD.        Communication Assessment  Patient's communication style: spoken language (English or Bilingual)    Hearing Difficulty or Deaf: no   Wear Glasses or Blind: yes    Cognitive  Cognitive/Neuro/Behavioral: WDL                      Living Environment:   People in home: sibling(s)     Current living Arrangements: house . Pt lives in a 2 story [facility] w/ his sister, Lauryn, and her 2 children (ages 11 and 15) in [place], [place]. Pt's 7 year old son also lives w/ him, but is currently staying w/ his mother.    Able to return to prior arrangements:  Pt states he used a walker at home and in the community PTA. Pt's sister, Lauryn, does most of the household chores. Pt's groceries were delivered. Pt is disabled. Pt was driving PTA.       Family/Social Support:  Care provided by: self, other (see comments)  Provides care for:    Marital Status:   Sibling(s)          Description of Support System: Supportive, Involved    Lives w/ sister, Lauryn, who is involved/supportive. Per pt, Lauryn is also disabled and does not work. Lauryn is able to help him as needed. Sister, Pretty, is involved and supportive. 7 year old son. Niece and nephew (11 and 15 years old) live w/ twan and Lauryn.       Current Resources:   Patient receiving home care services: No  Community Resources:  SNAP food assistance through the Count includes the [facility]. Pt states he does not have a CADI waiver.   Equipment currently used at home: wheelchair, manual, walker, rolling, shower chair      Employment/Financial:  Employment Status: disabled        Financial Concerns:     Referral  "to Financial Worker: No       Lifestyle & Psychosocial Needs:  Social Determinants of Health     Tobacco Use: Low Risk      Smoking Tobacco Use: Never Smoker     Smokeless Tobacco Use: Never Used   Alcohol Use: Not on file   Financial Resource Strain: Not on file   Food Insecurity: Not on file   Transportation Needs: Not on file   Physical Activity: Not on file   Stress: Not on file   Social Connections: Not on file   Intimate Partner Violence: Not on file   Depression: Not at risk     PHQ-2 Score: 0   Housing Stability: Not on file       Functional Status:  Prior to admission patient needed assistance:              Mental Health Status:  Mental Health Status: Past Concern  .  Depression (per pt report and chart). Pt was seen by psychiatry on last admission for medication management.     Chemical Dependency Status:  Chemical Dependency Status: No Current Concerns             Values/Beliefs:  Spiritual, Cultural Beliefs, Cheondoism Practices, Values that affect care: no  Description of Beliefs that Will Affect Care: Presybeterian            Additional Information:  OT recommendations for discharge: \" Occupational Therapy: Orders received. Chart reviewed and discussed with care team.? Occupational Therapy not indicated due to pt at ADL baseline, reports will discharge home today or tomorrow.  Pt lives with his sister, nieces, and son in a house w/ all needs met on main level. Pt ambulates with FWW, SEC, or WC. Pt is actively working w/ HH PT and looks forward to continuing this. OT issued green theraband for IND performance of BUE HEP and encouraged pt to increase activity while hospitalized; sit EOB or transfer to chair, if recliner able to be located. .? Defer discharge recommendations to medical team.? Will complete orders. Please re-order OT if pt's hospitalization is extended and new ADL deficits are identified. \"    RNCC to follow for homecare resumption.           " -------------------------------------------------------------------------------------------------------  MAKENNA Preciado  Weekend Adult Acute Care     For weekend social work needs, contact information below and available on [facility]:  4A, 4C, 4E, 5A, 5B                  pager [phone]  6A, 6B, 6C                               pager [phone]  7A, 7B, 7C, 7D, 5C                 pager [phone]  ED/OBS                                  pager [phone]     For weekend RN care coordinator needs (home discharge with needs including home care, rides home, assisted living facility returns, durable medical equip, IV antibiotics) - page [phone].     For hours 1600 - midnight Pager: [phone]

## 2022-09-05 NOTE — CONSULTS
[facility]   Pulmonary Consult Note  Vic Scott III MRN: [id number]  1975  Date of Admission:8/31/2022  Date of Service: 09/05/2022  ___________________________________    We were consulted by Dr. Bryant for evaluation of need for chest tube placement.        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 Patient had thoracentesis x2 on 9/1 and 9/3 with decrease to his oxygen requirements, now down to 1-2 L. We were consulted regarding need for possible chest tube placement. Cultures x2 negative aside from broth only staph epi which is likely a contaminant. Likely this is a malignancy associated pleural effusion. Recommend placement of chest tube to fully drain, afterwards will do clamp trial to assess rate of re-accumulation. If rapid re-accumulation might need to consider Pleurx, if slow re-accumulationcan can do outpatient thoracentesis PRN.    - IR guided chest tube placement    - Repeat pleural studies including cytology     Pulmonology will continue to follow.     Patient seen and discussed w/ Dr. Cox.     Capo Chavez MD  Pulmonary & Critical Care  [phone] (Text Page)           History of Present Illness:   Vic Scott III is a 47 year old male who presented [facility] on 8/31/2022 with complaints of shortness of breath and chest pain.     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 He was hypoxic requiring 4L, at baseline no oxygen requirement. Patient underwent thoracentesis x2 with incomplete emptying. He had some improvement in his oxygen status now down to 1-2L. However, CXR demonstrating large unchagned moderate to large PE.    Pulmonology consulted to assess need for chest tube placement.     Patient is a non-smoker and doesn't drink alcohol or use any recreational substances.  Has hx of chronic cough and chest pain where he underwent PFTs in past and was told \"he didn't have asthma\".     He has 4 kids. Sister and her kids also live with him.           Review of Symptoms:   10-point ROS reviewed, & found negative w/ exceptions noted in the HPI.          Past Medical History:     Past Medical History:   Diagnosis Date     Pleomorphic cell sarcoma (H)        Past Surgical History:   Procedure Laterality Date     ANESTHESIA OUT OF OR BLOOD BRAIN BARRIER DISRUPTION N/A 06/16/2022    Procedure: ANESTHESIA OUT OF OR - Block Epidural;  Surgeon: GENERIC ANESTHESIA PROVIDER;  Location: [facility]     COMPLEX WOUND CLOSURE (UPDATE) Right 06/17/2022    Procedure: complex closure pelvis, spy;  Surgeon: KAREN Butt MD;  Location: [facility] OR     COMPLEX WOUND CLOSURE (UPDATE)  06/17/2022    Procedure: ;  Surgeon: KAREN Butt MD;  Location: [facility] OR     EXCISE TUMOR PELVIS POSTERIOR Right 06/17/2022    Procedure: Right hindquarter amputation;  Surgeon: Matty Whitaker MD;  Location: [facility] OR     INSERT PORT VASCULAR ACCESS Right 11/01/2021    Procedure: INSERTION, VASCULAR ACCESS PORT;  Surgeon: Sushil Gonzales MD;  Location: [facility] OR     IR CHEST PORT PLACEMENT > 5 YRS OF AGE  11/01/2021     PICC DOUBLE LUMEN PLACEMENT Left 08/30/2022    left medial brachial 5 fr dl power picc 50 cm            Allergies:     Allergies   Allergen Reactions     Blood Transfusion Related (Informational Only) Other (See Comments)     Patient has a history of a clinically significant antibody " against RBC antigens.  A delay in compatible RBCs may occur.              Outpatient Medications:     No current facility-administered medications on file prior to encounter.  acetaminophen (TYLENOL) 500 MG tablet, Take 2 tablets (1,000 mg) by mouth 3 times daily as needed for mild pain  escitalopram (LEXAPRO) 10 MG tablet, Take 1 tablet (10 mg) by mouth daily  morphine (MS CONTIN) 30 MG CR tablet, Take 1 tablet (30 mg) by mouth every 12 hours  multivitamin, therapeutic (THERA-VIT) TABS tablet, Take 1 tablet by mouth daily  naproxen (NAPROSYN) 500 MG tablet, Take 500 mg by mouth 2 times daily (with meals)  Nutritional Supplements (ENSURE ACTIVE HIGH PROTEIN) LIQD, Take 1 Can by mouth 3 times daily (with meals)  acetaminophen (TYLENOL) 325 MG tablet, Take 3 tablets (975 mg) by mouth every 8 hours (Patient not taking: No sig reported)  oxyCODONE IR (ROXICODONE) 10 MG tablet, Take 1 tablet (10 mg) by mouth every 4 hours as needed for moderate to severe pain  [DISCONTINUED] albuterol (PROAIR HFA/PROVENTIL HFA/VENTOLIN HFA) 108 (90 Base) MCG/ACT inhaler, Inhale 1-2 puffs into the lungs every 6 hours as needed for shortness of breath / dyspnea  [DISCONTINUED] QUEtiapine (SEROQUEL) 25 MG tablet, Take 1 tablet (25 mg) by mouth At Bedtime              Family History:     Family History   Problem Relation Age of Onset     Heart Disease Mother      Coronary Artery Disease Father      Other - See Comments Father         pleomorphic cell sarcoma     Cancer Maternal Grandmother      No Known Problems Sister      No Known Problems Sister      No Known Problems Son      No Known Problems Daughter      No Known Problems Daughter      No Known Problems Daughter                Social History:     Social History     Tobacco Use     Smoking status: Never Smoker     Smokeless tobacco: Never Used   Vaping Use     Vaping Use: Never used   Substance Use Topics     Alcohol use: Never     Drug use: Never             Physical Exam:   /70  "(BP Location: Right arm)   Pulse 82   Temp 97.5  F (36.4  C) (Oral)   Resp 16   Ht 1.88 m (6' 2\")   Wt 86.1 kg (189 lb 13.1 oz)   SpO2 96%   BMI 24.37 kg/m      General: NAD  HEENT: Anicteric sclera  CV: RRR, no m/r/g  Lungs: Decreased breath sounds on left side up to lower upper lobe, no crackles, on NC  Abd: Soft, NT, ND  Ext: right hemipelvectomy,  No LE edema  Skin: No rashes, cyanosis, or jaundice  Neuro: AAOx3, no focal deficits          Data:   Labs (all laboratory studies reviewed by me):   Arterial Blood Gases   No lab results found in last 7 days.  Complete Blood Count   Recent Labs   Lab 09/05/22 0728 09/04/22 0603 09/03/22  1042 09/02/22  0821   WBC 5.8 7.2 9.7 8.0   HGB 7.9* 8.0* 8.2* 8.3*    313 319 292     Basic Metabolic Panel  Recent Labs   Lab 09/05/22 0728 09/04/22  0603 09/03/22  1042 09/02/22  1102    138 140 140   POTASSIUM 3.4 3.6 3.8 4.0   CHLORIDE 101 104 107 107   CO2 25 25 25 23   BUN 21.8* 20.1* 16.4 11.2   CR 0.77 0.66* 0.52* 0.49*   GLC 95 103* 123* 139*     Liver Function Tests  Recent Labs   Lab 09/05/22 0728 09/04/22  0603 09/03/22  1042 09/02/22  1102 09/01/22  0540 08/31/22 2013   AST 24 23 16 22   < > 24   ALT 13 12 10 12   < > 10   ALKPHOS 93 96 117 147*   < > 145*   BILITOTAL 0.3 0.4 0.4 0.5   < > 0.8   ALBUMIN 3.1* 3.2* 3.2* 3.1*   < > 3.5   INR  --   --   --   --   --  1.05    < > = values in this interval not displayed.     Coagulation Profile  Recent Labs   Lab 09/01/22  0540 08/31/22 2013   INR  --  1.05   PTT 31  --        Imaging (all imaging studies reviewed by me):  Recent Results (from the past 24 hour(s))   XR Chest Port 1 View    Narrative    Exam: XR CHEST PORT 1 VIEW, 9/4/2022 2:23 PM    Indication: evaluate pleural effusion    Comparison: 9/3/2022 chest x-ray    Findings:   Right chest port catheter tip terminates in the lower SVC/cavoatrial  junction. Left PICC line tip terminates in a similar position. The  cardiac silhouette is " obscured. Well-defined metastatic lesions in the  aerated lung. Increasing moderate to large left pleural effusion, and  associated atelectasis. No pneumothorax. No right-sided effusion.      Impression    Impression:   1. Increasing moderate to large left pleural effusion.  2. Again noted metastatic pulmonary disease.    I have personally reviewed the examination and initial interpretation  and I agree with the findings.    KATYA HALE MD         SYSTEM ID:  [id number]   XR Chest Port 1 View    Narrative    Chest radiograph  9/5/2022 6:19 AM      HISTORY: Left pleural effusion    COMPARISON: 9/4/2022    FINDINGS:   Single AP view of the chest. Stable right chest port and left arm  PICC. The mediastinum is obscured by the largely unchanged moderate to  large left pleural effusion, perihilar and bibasilar interstitial and  airspace opacities, as well as innumerable pulmonary metastases. No  pneumothorax or significant right pleural effusion.      Impression    IMPRESSION:   1. Largely unchanged moderate to large left pleural effusion.   2. Unchanged perihilar and bibasilar opacities.    I have personally reviewed the examination and initial interpretation  and I agree with the findings.    ANSHUL MYERS MD         SYSTEM ID:  [id number]

## 2022-09-05 NOTE — PROGRESS NOTES
[facility]    Internal Medicine Progress Note - [facility] 3 Service    Physician Attestation   I, Carlos Bryant MD, was present with the medical/MEHRAN student who participated in the service and in the documentation of the note.  I have verified the history and personally performed the physical exam and medical decision making.  I agree with the assessment and plan of care as documented in the note.      I personally reviewed vital signs, medications, labs and imaging.      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      Hypoxia improved after thoracentesis x 2.  Patient remains admitted for IV antibiotics and management of continued pleural effusion following thoracocentesis. Chest tube placement planned for tomorrow (9/6) for continued management.       # AHRF 2/2 massive left pleural effusion, with possible bacterial PNA, improving, all POA  # Pleural fluid culture (+) Staph epidermidis  This is the main problem that led to this admission.  Sudden  onset SOB and significant increase in left sided pleural effusion in past 2 weeks. Large pleural effusion continues following thoracocentesis. Pulmonology consulted and chest tube chosen for continued treatment of recurring pleural effusion. Pleural fluid (+) for Staph epi, which may reflect contaminant given absence of fever, leukocytosis. No PE on admit CT.  - Pulmonology consulted for thoracocentesis vs chest tube with chest tube determined to be best option  - s/p thoracentesis x 2, fluid is exudative in nature.    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  Patient was diagnosed with high-grade pleomorphic sarcoma of right femur and pelvis on 09/20/2021.  Patient Doxil/Ifos initially on 11/2/2021, which was complicated by isofamide neurotoxicity. It resolved without methene blue. His 2nd cycle of chemo was aborted because of this. He experienced a right proximal femur fracture on 06/05/2022 and underwent right hindquarter amputation by Dr. Grove and primary complex wound closure by Dr. Petersen on 06/17/2022. Pt's CT on 08/13/2022 showed new metastasis to abdominal wall and lungs. His oncologist Dr. Ambrocio suggested to start doxorubicin, cisplatin, and Neulasta. He was agreeable to  follow with palliative.   High risk for platelet rich thrombosis on top of the cancer related thrombus of the pulmonary vein. The patient was initiated on doxorubicin and cisplatin during this admission.  - Continue aspirin 1 g every 8 hours  - Continue cisplatin on, doxorubicin and dexamethasone  - Continue morphine 30 mg every 12 hours as a scheduled and oxycodone 10 mg every 4 hours as needed  - We highly appreciate input of oncology service.   - Defer therapeutic AC at this time per hem/onc input     > Will need to discuss role of VTE ppx at home prior to discharge     # Chronic anemia of malignancy, all are present on admission  Stable, low Hgb on admit. No acute drop c/f hemothorax.  - Trend CBC     # MDD  - Continue PTA escitalopram      Diet: Combination Diet Regular Diet Adult  Snacks/Supplements Adult: Ensure Enlive; Between Meals  Fluids: PO  Lines: PRESENT  Sung Catheter: Not present    DVT Prophylaxis: Enoxaparin (Lovenox) SQ  Code Status: Full Code    The patient's care was discussed with the Attending Physician, Dr. Bryant .    [facility] 3    Interval History   Patient attempted sleeping without oxygen overnight. One episode of desaturating to 88% and was given 1L nasal cannula.    Patient denies any new or worsening dyspnea, cough or chest pain. No new or worsening symptoms.      Physical Exam   Vital Signs: Temp: 97.5  F (36.4  C) Temp src: Oral BP: 126/70 Pulse: 82   Resp: 16 SpO2: 96 % O2 Device: Nasal cannula Oxygen Delivery: 1 LPM  Weight: 189 lbs 13.06 oz  General Appearance: Well appearing, no acute distress, appears state age  Respiratory: No acute respiratory distress, on 1L nasal canula during examination, no wheezes or crackles, diminished lung sounds in L lower lung  Cardiovascular: Normal S1/S2, no murmurs/gallops/rubs  Skin: No jaundice, no rashes on exposed skin        Data   Recent Labs   Lab 09/05/22  0728 09/04/22  0603 09/03/22  1042 08/31/22 2020  08/31/22 2013   WBC 5.8 7.2 9.7   < > 14.6*   HGB 7.9* 8.0* 8.2*   < > 9.5*   MCV 75* 75* 74*   < > 76*    313 319   < > 338   INR  --   --   --   --  1.05    138 140   < > 140   POTASSIUM 3.4 3.6 3.8   < > 3.7   CHLORIDE 101 104 107   < > 105   CO2 25 25 25   < > 24   BUN 21.8* 20.1* 16.4   < > 10.7   CR 0.77 0.66* 0.52*   < > 0.63*   ANIONGAP 11 9 8   < > 11   VENTURA 8.9 8.9 9.0   < > 9.0   GLC 95 103* 123*   < > 126*   ALBUMIN 3.1* 3.2* 3.2*   < > 3.5   PROTTOTAL 6.0* 6.0* 6.3*   < > 7.0   BILITOTAL 0.3 0.4 0.4   < > 0.8   ALKPHOS 93 96 117   < > 145*   ALT 13 12 10   < > 10   AST 24 23 16   < > 24    < > = values in this interval not displayed.

## 2022-09-05 NOTE — PROGRESS NOTES
"Hematology-Oncology Brief progress note      Subjective:    Fabian is doing well today. Will finish Doxorubicin later tonight. Minimal nausea from chemo, no other SE. Breathing stable, on 1 L/min      Objective:    Vital signs:  Temp: 97.5  F (36.4  C) Temp src: Oral BP: 112/69 Pulse: 82   Resp: 16 SpO2: 95 % O2 Device: Nasal cannula Oxygen Delivery: 1 LPM Height: 188 cm (6' 2\") Weight: 86.1 kg (189 lb 13.1 oz)  Estimated body mass index is 24.37 kg/m  as calculated from the following:    Height as of this encounter: 1.88 m (6' 2\").    Weight as of this encounter: 86.1 kg (189 lb 13.1 oz).    General: lying in bed, no acute distress  HEENT: sclera anicteric, EOMI, MMM  Neck: supple, normal ROM  MSK: Right surgical amputation site w/o oozing or drainage, chux pad is saturate. No overt dehiscence. Surgical suture noted.  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     -Continue Doxorubicin as per protocol, last day today  --other cares per primary team  -Recommend PT/OT referral encouragement for physical activity  -May need repeat thoracentesis if respiratory status does not improve enough or may require home O2  -OP Oncology f/u appts has been arranged (9/15 with Latosha Taylor)      Liu Walker MD    Hematology, Oncology and Transplantation      "

## 2022-09-05 NOTE — PLAN OF CARE
[id number]  VSS. Alert and oriented. Denying pain overnight. CIVI chemo continues, PICC site WDL. Sleeping between RN cares, continue with POC.

## 2022-09-06 NOTE — PLAN OF CARE
"Goal Outcome Evaluation:    5714-1639    /70 (BP Location: Right arm)   Pulse 87   Temp 98.4  F (36.9  C) (Oral)   Resp 16   Ht 1.88 m (6' 2\")   Wt 86.1 kg (189 lb 13.1 oz)   SpO2 96%   BMI 24.37 kg/m      Reason for admission: Presented with sudden shortness of breath and hypoxia found to have massive left pleural effusion and right pulmonary vein tumor thrombus.  Activity: A2. RLE amputee.   Pain: Denies.   Neuro: AxOx4. Neuros intact.   Cardiac: WDL  Respiratory: LOPEZ. NLB at rest. Denies SOB at rest. 1LNC. O2 sats WDL.   GI/: Voiding spontaneously with adequate UOP. LBM 8/31. Pt declining bowel meds. 300cc emesis tonight following Ensure intake. Given PRN Compazine x1 with effect.   Diet: Regular diet, poor appetite.    Lines: DL PICC intact. Caps changed, HL x2. Site WDL.   Wounds: RLE/hip dressing CDI. Daily dressing change completed per POC orders.   Labs/imaging: Reviewed. See chart.      Plan: Chest tube placement in IR tomorrow for further management of pleural effusion.       Continue to monitor and follow POC    "

## 2022-09-06 NOTE — PROCEDURES
[facility]    Procedure: Chest Tube, Left     Date/Time: 9/6/2022 2:45 PM  Performed by: Edmar Galicia MD  Authorized by: Mel Mistry MD   IR Fellow Physician:  Radiology Resident Physician: Edmar Galicia        UNIVERSAL PROTOCOL   Site Marked: Yes  Prior Images Obtained and Reviewed:  Yes  Required items: Required blood products, implants, devices and special equipment available    Patient identity confirmed:  Verbally with patient, arm band, provided demographic data and hospital-assigned identification number  NA - No sedation, light sedation, or local anesthesia  Confirmation Checklist:  Patient's identity using two indicators, relevant allergies, procedure was appropriate and matched the consent or emergent situation and correct equipment/implants were available  Time out: Immediately prior to the procedure a time out was called    Universal Protocol: the Joint Commission Universal Protocol was followed    Preparation: Patient was prepped and draped in usual sterile fashion    ESBL (mL):  25     ANESTHESIA    Anesthesia: Local infiltration  Local Anesthetic:  Lidocaine 1% without epinephrine  Anesthetic Total (mL):  10      SEDATION    Patient Sedated: No    See dictated procedure note for full details.  Findings: Blood reddish colored flood seeped readily from the catheter tubing.     Specimens: fluid and/or tissue for laboratory analysis and culture    Complications: None    Condition: Stable      PROCEDURE    Patient Tolerance:  Patient tolerated the procedure well with no immediate complications  Length of time physician/provider present for 1:1 monitoring during sedation: 0

## 2022-09-06 NOTE — PLAN OF CARE
"Goal Outcome Evaluation:    /68 (BP Location: Left arm)   Pulse 100   Temp 97.2  F (36.2  C) (Temporal)   Resp 18   Ht 1.88 m (6' 2\")   Wt 86.1 kg (189 lb 13.1 oz)   SpO2 94%   BMI 24.37 kg/m      4977-3317:    Vitals: VS on RA, afebrile  Neuro: A&Ox4. Denies numbness or tingling.  Cardiac: Denies chest pain.  Respiratory: Denies shortness of breath. 1L O2, Nasal cannula. On continuous pulse ox.  GI/: No concerns with urination. No BM this shift.  Pain: Denies pain.  Diet: Regular.  Skin: RLE/hip dressing changes daily.    Chest tube placement in IR on 9/6.    Continue to monitor and with plan of care.      "

## 2022-09-06 NOTE — CONSULTS
"    Interventional Radiology Consult Service Note    46 yo man with a PMH of high grade pleomorphic sarcoma s/p chemotherapy, and right hemipelvectomy, with right pulm v tumor burden, and acute hypoxic RF with large left pleural effusion, likely malignant. Has had two large volume thoras, but has not fully drained effusion. Request for chest tube placement for frequent drainage with potential for pleurx placement with IP in the future. Pulm to be involved in management of chest tube after placement.    Patient is on IR schedule Tuesday 9/6 for a left chest tube placement, request for 14 Fr. Diagnostics on pleural fluid in chart.  Labs WNL for procedure.    No NPO required.  Medications to be held include: None  Consent will be done prior to procedure.     Please contact the IR charge RN at [phone] for estimated time of procedure.     Case discussed with Dr Mistry and Dr. Chavez    Vitals:   /77 (BP Location: Right arm)   Pulse 91   Temp 97.6  F (36.4  C) (Oral)   Resp 16   Ht 1.88 m (6' 2\")   Wt 84 kg (185 lb 3.2 oz)   SpO2 96%   BMI 23.78 kg/m      Pertinent Labs:     Lab Results   Component Value Date    WBC 5.8 09/05/2022    WBC 7.2 09/04/2022    WBC 9.7 09/03/2022       Lab Results   Component Value Date    HGB 7.9 09/05/2022    HGB 8.0 09/04/2022    HGB 8.2 09/03/2022       Lab Results   Component Value Date     09/05/2022     09/04/2022     09/03/2022       Lab Results   Component Value Date    INR 1.05 08/31/2022    PTT 31 09/01/2022         Karley Hutchinson PA-C  Interventional Radiology  Pager: [phone]    "

## 2022-09-06 NOTE — PROGRESS NOTES
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 Ambrocio   Current Treatment Plan: Cisplatin/Doxorubicin     Recommendations:   - Please monitor daily BMP and CBC w/ diff  - Continue supportive cares of chemotherapy associated nausea/vomiting; range for Zofran and Compazine (ordered for you)   - Agree with IR consult and possible PleurX placement for recurrent pleural effusion   - Will request appointment for Udenyca ([facility] support) for Thursday 9/8    Assessment & Plan:   Vic Scott III is a 47 year old male with past medical history of high-grade pleomorphic sarcoma s/p right hindquarter amputation/ hemipelvectomy (June 2022) recently found to have metastasis to abdominal wall and lung. Currently admitted for worsening pleural effusion and SOB.     # Metastatic Pleomorphic Sarcoma   # Recurrent Pleural Effusion   In brief, he initially had rapidly progressing right leg swelling since July 2021. MRI showed ~29-20 cm right thigh soft tissue mass with bony involvement of proximal right femur. Biopsy shows significant high grade pleomorphic sarcoma.PET was ordered which identifed concerning inguinal lymphadenopathy . Proceeded with Doxil/Ifos x 2 cycles but was complicated by neurotoxicity. He then was admitted June 2022 with right leg pain and inability to ambulated. Found to have pathologic right proximal femur fracture. Underwent right hindquarter amputation with Dr. Whitaker and primary complex wound closure with Dr. Butt. Recent CT showed new metastatis to abdominal wall and lung.    - Echo 9/1 EF 55-60%      Therapy Plan: Cisplatin/Doxorubicin (C1D1=9/1/22)    - Cisplatin 60 mg/m2 (122 mg) IV D1-2    - Doxorubicin 17.5 mg/m2 (35 mg) CIVI D1-4    - Premeds: Kytil, Emend, Dex, Aloxi    Patient  "was seen and plan of care was discussed with attending physician Dr. Walker.    Thank you for the opportunity to partake in this patients plan of care. Please do not hesitate to page with questions. We will continue to follow.     I spent >35 minutes face-to-face or coordinating care of Vic Scott III. Over 50% of our time on the unit was spent counseling the patient and/or coordinating care.    Dione Amaro PA-C ([facility])   Hematology/Oncology   Pager: [phone]   ___________________________________________________________________    Subjective & Interval History:    No acute events noted overnight. Fabian is feeling nauseous. He had one bout of emesis this morning after taking his morning pills. He is using the nausea medicine and it has been somewhat helpful. Discussed reviewing and increasing to have better control. He was agreeable. He is hopeful to discharge from the hospital soon. Reviewed the the PleurX may be able to help keeping out of the hospital and is worth considering. He understands. All questions answered at this time.     A comprehensive review of systems was obtained and is negative other than noted here or in the HPI.       Physical Exam:    Blood pressure 123/77, pulse 91, temperature 97.6  F (36.4  C), temperature source Oral, resp. rate 16, height 1.88 m (6' 2\"), weight 84 kg (185 lb 3.2 oz), SpO2 96 %.    General: lying in bed, no acute distress  HEENT: sclera anicteric, EOMI, MMM  Neck: supple, normal ROM  Pulm: breathing comfortably on 2L NC   Skin: no rashes on limited exam, no jaundice  Neuro: Alert and interactive, moves all extremities equally, no focal deficits    Labs & Studies: I personally reviewed the following studies:  ROUTINE LABS (Last four results):  CMP  Recent Labs   Lab 09/05/22  0728 09/04/22  0603 09/03/22  1042 09/02/22  1102 09/02/22  0821 09/01/22  1639    138 140 140   < >  --    POTASSIUM 3.4 3.6 3.8 4.0   < >  --    CHLORIDE 101 104 107 107   < >  --    CO2 " 25 25 25 23   < >  --    ANIONGAP 11 9 8 10   < >  --    GLC 95 103* 123* 139*   < >  --    BUN 21.8* 20.1* 16.4 11.2   < >  --    CR 0.77 0.66* 0.52* 0.49*   < >  --    GFRESTIMATED >90 >90 >90 >90   < >  --    VENTURA 8.9 8.9 9.0 8.8   < >  --    MAG  --  2.2 2.4*  --   --   --    PHOS  --  3.6 3.2  --   --  3.3   PROTTOTAL 6.0* 6.0* 6.3* 6.3*  --   --    ALBUMIN 3.1* 3.2* 3.2* 3.1*  --   --    BILITOTAL 0.3 0.4 0.4 0.5  --   --    ALKPHOS 93 96 117 147*  --   --    AST 24 23 16 22  --   --    ALT 13 12 10 12  --   --     < > = values in this interval not displayed.     CBC  Recent Labs   Lab 09/05/22  0728 09/04/22  0603 09/03/22  1042 09/02/22  0821   WBC 5.8 7.2 9.7 8.0   RBC 3.40* 3.48* 3.54* 3.59*   HGB 7.9* 8.0* 8.2* 8.3*   HCT 25.4* 26.0* 26.2* 27.0*   MCV 75* 75* 74* 75*   MCH 23.2* 23.0* 23.2* 23.1*   MCHC 31.1* 30.8* 31.3* 30.7*   RDW 19.0* 19.0* 18.7* 18.7*    313 319 292     INR  Recent Labs   Lab 08/31/22  2013   INR 1.05       Medications list for reference:  Current Facility-Administered Medications   Medication     0.9% sodium chloride BOLUS     acetaminophen (TYLENOL) tablet 650 mg    Or     acetaminophen (TYLENOL) Suppository 650 mg     acetaminophen (TYLENOL) tablet 975 mg     albuterol (PROVENTIL HFA/VENTOLIN HFA) inhaler     albuterol (PROVENTIL) neb solution 2.5 mg     bisacodyl (DULCOLAX) suppository 10 mg     diphenhydrAMINE (BENADRYL) injection 50 mg     [Held by provider] enoxaparin ANTICOAGULANT (LOVENOX) injection 40 mg     EPINEPHrine PF (ADRENALIN) injection 0.3 mg     escitalopram (LEXAPRO) tablet 10 mg     famotidine (PEPCID) injection 20 mg     famotidine (PEPCID) injection 20 mg     heparin 100 UNIT/ML injection 5 mL     heparin lock flush 10 UNIT/ML injection 5 mL     Lidocaine (LIDOCARE) 4 % Patch 2 patch     lidocaine (LMX4) cream     lidocaine 1 % 0.1-1 mL     lidocaine patch in PLACE     melatonin tablet 1 mg     meperidine (DEMEROL) injection 25 mg     methylPREDNISolone  sodium succinate (solu-MEDROL) injection 125 mg     morphine (MS CONTIN) 12 hr tablet 30 mg     naloxone (NARCAN) injection 0.2 mg    Or     naloxone (NARCAN) injection 0.4 mg    Or     naloxone (NARCAN) injection 0.2 mg    Or     naloxone (NARCAN) injection 0.4 mg     ondansetron (ZOFRAN) injection 4 mg     oxyCODONE IR (ROXICODONE) tablet 10 mg     polyethylene glycol (MIRALAX) Packet 17 g     prochlorperazine (COMPAZINE) tablet 10 mg     senna-docusate (SENOKOT-S/PERICOLACE) 8.6-50 MG per tablet 1 tablet    Or     senna-docusate (SENOKOT-S/PERICOLACE) 8.6-50 MG per tablet 2 tablet     sodium chloride (PF) 0.9% PF flush 3 mL     sodium chloride (PF) 0.9% PF flush 3 mL     sodium chloride (PF) 0.9% PF flush 3-20 mL

## 2022-09-06 NOTE — PLAN OF CARE
6345-9089 hours: Afebrile, vital sign stable. On 3 LPM oxygen this afternoon, weaned to 2 LPM this evening. Continue to wean as able. Denies pain today. Nausea intermittent with eating. Zofran given with some improvement. Unable to order meals due to nausea, only eating popcicles. Patient went down for a chest tube around 1300 hours and returned to unit around 1500 hours. 750 mL sanguinous output from chest tube since returned from IR. Patient has order for respiratory bacterial culture that still needs to be collected if patient produces sputum. Voiding adequately. Right amputation surgical incision draining copious amounts of serosanguinous fluid. Dressing changed x 3 today.

## 2022-09-06 NOTE — PROGRESS NOTES
Resident/Fellow Attestation   I, Olga Lidia Yi MD, was present with the medical/MEHRAN student who participated in the service and in the documentation of the note.  I have verified the history and personally performed the physical exam and medical decision making.  I agree with the assessment and plan of care as documented in the note.      Chest tube placed today for recurrent malignant pleural effusion. Pulmonology following. Will drain effusion completely and based on how fast fluid re-accumulates will consider pleur-x catheter placement vs intermittent outpatient thoracentesis.    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      Hypoxia improved after thoracentesis x 2.  Patient remains admitted for IV antibiotics and management of continued pleural effusion following thoracocentesis. Chest tube placement planned for today.     # AHRF 2/2 massive left pleural effusion, with possible bacterial PNA, improving, all POA  # Pleural fluid culture (+) Staph epidermidis  This is the main problem that led to this admission.  Sudden onset SOB and significant increase in left sided pleural effusion in past 2 weeks. Large pleural effusion continues following thoracocentesis. With recurrent  pleural effusions, pulmonology consulted and chest tube chosen for continued treatment of recurring pleural effusion. Pleural fluid (+) for Staph epi, which may reflect contaminant given absence of fever, leukocytosis. No PE on admit CT.  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  Patient was diagnosed with high-grade pleomorphic sarcoma of right femur and pelvis on 09/20/2021.  Patient Doxil/Ifos initially on 11/2/2021, which was complicated by isofamide neurotoxicity. It resolved without methene blue. His 2nd cycle of chemo was aborted because of this. He experienced a right proximal femur fracture on 06/05/2022 and underwent right hindquarter amputation by Dr. Grove and primary complex wound closure by Dr. Petersen on 06/17/2022. Pt's CT on 08/13/2022 showed new metastasis to abdominal wall and lungs. His oncologist Dr. Ambrocio suggested to start doxorubicin, cisplatin, and Neulasta. He was agreeable to follow with palliative.   High risk for platelet rich thrombosis on top of the cancer related thrombus of the pulmonary vein. The patient was initiated on doxorubicin and cisplatin during this admission.  -  Continue aspirin 1 g every 8 hours  - Continue morphine 30 mg every 12 hours as a scheduled and oxycodone 10 mg every 4 hours as needed  - S/p cisplatin, doxorubicin and dexamethasone  - We highly appreciate input of oncology service.   - Defer therapeutic AC at this time per hem/onc input     > Will need to discuss role of VTE ppx at home prior to discharge    # MDD  - Continue PTA escitalopram  - Make contact with palliative care on discharge so they are aware of situation    # Chronic anemia of malignancy, all are present on admission  Stable, low Hgb on admit. No acute drop c/f hemothorax.  - Trend CBC    Diet: Combination Diet Regular Diet Adult  Snacks/Supplements Adult: Ensure Enlive; Between Meals  Fluids: PO  Lines: PRESENT  Sung Catheter: Not present    DVT Prophylaxis: Enoxaparin (Lovenox) SQ  Code Status: Full Code     Expected Discharge Date: 09/09/2022        Discharge Comments: continuous chemo over the weekend. repeat thoracentisis 9/3. Pending improvement, likely d/c home. possible new O2 requirement.       The patient's care was discussed with the Attending Physician, Dr. Kim.    [facility]    Interval History   Patient had one episode of emesis overnight after drinking ensure. No other episodes of emesis. Also endorses some nausea but no other emesis. Some shortness of breath while transferring and standing up, but none reported while laying down in the bed. Patient denies any coughing, chest or abdominal discomfort and no difficulties with urination/BM.     Patient is looking forward to going home to see his kids but would rather continue to get treatment now and not have to come back for awhile vs leaving now and coming back in a few days.    4 pt ROS otherwise negative    Physical Exam   Vital Signs: Temp: 98.6  F (37  C) Temp src: Oral BP: 97/51 Pulse: 82   Resp: 18 SpO2: 99 % O2 Device: Nasal cannula Oxygen Delivery: 3 LPM  Weight: 185 lbs 3.2 oz  General  Appearance: Well-appearing, no acute distress, appears stated age, watching TV while visiting  Respiratory: No acute respiratory distress, on 1L nasal canula during examination, no wheezes or crackles, diminished lung sounds on L lower lung  Cardiovascular: Normal S1/S2, no murmurs/gallops/rubs  Skin: No jaundice, no rashes on exposed skin          Data   Recent Labs   Lab 09/05/22  0728 09/04/22  0603 09/03/22  1042 08/31/22 2020 08/31/22 2013   WBC 5.8 7.2 9.7   < > 14.6*   HGB 7.9* 8.0* 8.2*   < > 9.5*   MCV 75* 75* 74*   < > 76*    313 319   < > 338   INR  --   --   --   --  1.05    138 140   < > 140   POTASSIUM 3.4 3.6 3.8   < > 3.7   CHLORIDE 101 104 107   < > 105   CO2 25 25 25   < > 24   BUN 21.8* 20.1* 16.4   < > 10.7   CR 0.77 0.66* 0.52*   < > 0.63*   ANIONGAP 11 9 8   < > 11   VENTURA 8.9 8.9 9.0   < > 9.0   GLC 95 103* 123*   < > 126*   ALBUMIN 3.1* 3.2* 3.2*   < > 3.5   PROTTOTAL 6.0* 6.0* 6.3*   < > 7.0   BILITOTAL 0.3 0.4 0.4   < > 0.8   ALKPHOS 93 96 117   < > 145*   ALT 13 12 10   < > 10   AST 24 23 16   < > 24    < > = values in this interval not displayed.

## 2022-09-06 NOTE — PROGRESS NOTES
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 Presenting with sudden shortness of breath and hypoxia found to have massive left pleural effusion and right pulmonary vein tumor thrombus.     Areas Assessed:      Areas visualized during today's visit: Focused: and Right LE amputation site    Wound location: Right LE amputation site     9/2 9/6    Last photo: 9/6/22  Wound due to: Surgical Wound  Wound history/plan of care: see hx for surgery dates. Wound healed to a scab in July with 1 episode of scab coming off and copious drainage until scab re-formed. Has been a scab or healed until this week and today 9/2 scab again fell off and patient has copious watery brown drainage- MD's aware and no odor present. Drainage is from most lateral aspect (seen on left in photo above). Abdomen above wound is very firm.  Wound base: 100 % dermis and non-granular tissue     Palpation of the wound bed: normal      Drainage: copious     Description of drainage: watery brown (dried blood colored) and clear     Measurements (length x width x depth, in cm): 0.5   x 0.5  x  ? cm - did not probe into wound     Tunneling: N/A     Undermining: N/A  Periwound skin: Intact and Scar tissue      Color: normal and consistent with surrounding tissue and pink      Temperature: normal   Odor: none  Pain: denies , none  Pain interventions prior to dressing change: no significant pain present    Treatment goal: Drainage control  STATUS: stable  Supplies ordered: gathered, ordered Exudry, discussed with RN and discussed with patient . Discussed with RN concern about copious amount of drainage and would support re-consulting plastic surgery to evaluate continued abdominal fluid retention and firmness.    Treatment Plan:     Right LE amputation site wound(s): Daily  and PRN when soiled  Cleanse with NS or wound cleanser and gently pat dry   Apply no sting wipe to periwound (if starting to become macerated change to barrier paste to those areas)  Apply gauze folded in half over heavily draining area (most lateral aspect)  Cover with Exudry ([id number]) secured with waterproof tape or sacral mepilex depending on amount of drainage (use Exudry with more drainage)    Orders: Written    RECOMMEND PRIMARY TEAM ORDER: Plastic Surgery consult  Education provided: importance of repositioning, plan of care, wound progress and Moisture management  Discussed plan of care with: Patient and Nurse  WOC nurse follow-up plan: weekly  Notify WOC if wound(s) deteriorate.  Nursing to notify the Provider(s) and re-consult the WOC Nurse if new skin concern.    DATA:     Current support surface: Standard  Atmos Air mattress  Containment of urine/stool: Continent of bladder, Continent of bowel and Incontinent pad in bed  BMI: Body mass index is 23.78 kg/m .   Active diet order: Orders Placed This Encounter      Combination Diet Regular Diet Adult     Output: I/O last 3 completed shifts:  In: 448 [P.O.:448]  Out: 1550 [Urine:1250; Emesis/NG output:300]     Labs:   Recent Labs   Lab 09/05/22  0728 08/31/22 2020 08/31/22 2013   ALBUMIN 3.1*   < > 3.5   HGB 7.9*   < > 9.5*   INR  --   --  1.05   WBC 5.8   < > 14.6*   CRP 65.00*   < > 104.00*    < > = values in this interval not displayed.     Pressure injury risk assessment:   Sensory Perception: 4-->no impairment  Moisture: 4-->rarely moist  Activity: 2-->chairfast  Mobility:  3-->slightly limited  Nutrition: 3-->adequate  Friction and Shear: 2-->potential problem  German Score: 18    Lilia Sanabria RN CWOCN  Dept. Pager: [phone]  Dept. Office Number: [phone]

## 2022-09-06 NOTE — IR NOTE
Patient Name: Vic Scott III  Medical Record Number: [id number]  Today's Date: 9/6/2022    Procedure: Left Chest Tube Placement.  Proceduralist: Dr. Mistry  Pathology present: NA    Procedure Start: 1414  Procedure end: 1436  Sedation medications administered: Local     Report given to: Alondra MCINTYRE RN  : ARSALAN    Other Notes: Pt arrived to IR room 06 from 7D. Consent reviewed. Pt denies any questions or concerns regarding procedure. Pt positioned supine and monitored per protocol. Pt tolerated procedure without any noted complications. Pt transferred back to 7D.    Chest tube to water seal and ready for use.

## 2022-09-07 PROBLEM — N17.9 ACUTE KIDNEY FAILURE, UNSPECIFIED (H): Status: ACTIVE | Noted: 2022-01-01

## 2022-09-07 NOTE — PLAN OF CARE
[id number]  AVSS, alert and oriented x 4, Denies nausea/sob. Pain at back, given schedule Morphine. Wean his oxygen to 1 L nasal canula, oxygen saturation is 94-95% On continuous pulsox in his room.  Pt has chest tube at left side, chest tube site is intact and no bleeding at the site, sanguinous output of 50 ml from chest tube. Needs dressing change at chest tube site tomorrow, and then needs after seven days.  Right amputation surgical incision dressing clean, dry and intact, 3 times dressing changed by day shift nurse.  Pt do not have appetite, ate 1 popsicle for the shift. Voiding adequately, did not BM for couple days.  Plastic surgeon consult for the small opening on surgical site with drainage. Still sputum sample needed.  Continue to monitor care.

## 2022-09-07 NOTE — PROGRESS NOTES
Resident/Fellow Attestation   I, Megan Hurtado MD, was present with the medical/MEHRAN student who participated in the service and in the documentation of the note.  I have verified the history and personally performed the physical exam and medical decision making.  I agree with the assessment and plan of care as documented in the note.      47y M with metastatic osteosarcoma admitted for acute hypoxemic respiratory failure 2/2 large left pleural effusion now s/p chest tube placement by IR. On 1-2L NC O2. Chest tube being managed by Pulm team; daily CXR. Per Onc recs: Neupogen tonight, Olanzapine at bedtime for nausea. Creatinine elevated today, likely pre-renal in setting of decreased PO, will give 1L LR over 10hrs. Restarted lovenox today.     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      Hypoxia improved after thoracentesis x 2.  Patient remains admitted for IV antibiotics and management of continued pleural effusion following thoracocentesis. Chest tube placed 9/6.     # AHRF 2/2 massive left pleural effusion, with possible  bacterial PNA, improving, all POA  # Pleural fluid culture (+) Staph epidermidis  This is the main problem that led to this admission.  Sudden onset SOB and significant increase in left sided pleural effusion in past 2 weeks. Large pleural effusion continues following thoracocentesis. With recurrent pleural effusions, pulmonology consulted and chest tube chosen for continued treatment of recurring pleural effusion. Pleural fluid (+) for Staph epi, which may reflect contaminant given absence of fever, leukocytosis. No PE on admit CT. Chest tube placed for 9/6 for management of recurrent pleural effusions.  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  Patient was diagnosed with high-grade pleomorphic sarcoma of right femur and pelvis on 09/20/2021.  Patient Doxil/Ifos initially on 11/2/2021, which was complicated by isofamide neurotoxicity. It resolved without methene blue. His 2nd cycle of chemo was aborted because of this. He experienced a right proximal femur fracture on 06/05/2022 and underwent right hindquarter amputation by Dr. Grove and primary complex wound closure by Dr. Petersen on 06/17/2022. Pt's CT on  08/13/2022 showed new metastasis to abdominal wall and lungs. His oncologist Dr. Ambrocio suggested to start doxorubicin, cisplatin, and Neulasta. He was agreeable to follow with palliative.   High risk for platelet rich thrombosis on top of the cancer related thrombus of the pulmonary vein. The patient was initiated on doxorubicin and cisplatin during this admission.  - Continue aspirin 1 g every 8 hours  - Continue morphine 30 mg every 12 hours as a scheduled and oxycodone 10 mg every 4 hours as needed  - Start Neupogen tonight (2000)  - Start Zyprexa  - S/p cisplatin, doxorubicin and dexamethasone  - We highly appreciate input of oncology service.   - Defer therapeutic AC at this time per hem/onc input     > Will need to discuss role of VTE ppx at home prior to discharge    # MDD  - Continue PTA escitalopram  - Make contact with palliative care on discharge so they are aware of situation     # Chronic anemia of malignancy, all are present on admission  Stable, low Hgb on admit. No acute drop c/f hemothorax.  - Trend CBC    Diet: Combination Diet Regular Diet Adult  Snacks/Supplements Adult: Ensure Clear; Between Meals  Fluids: PO, 100cc/hr  Lines: PRESENT  Sung Catheter: Not present    DVT Prophylaxis: Enoxaparin (Lovenox) SQ  Code Status: Full Code     Expected Discharge Date: 09/12/2022        Discharge Comments: continuous chemo over the weekend. repeat thoracentisis 9/3. Pending improvement, likely d/c home. possible new O2 requirement.       The patient's care was discussed with the Dr. Kim.    [facility] 3    Interval History   Patient endorsed pain over night at the site of the chest tube. The pain goes away on rest, but comes back while moving around in the bed. He says that the pain has gotten better since the initial placement of the tube. He denies any other concerns today, just looking forward to being able to go home.    Physical Exam   Vital Signs: Temp: 98.7  F  (37.1  C) Temp src: Oral BP: 127/84 Pulse: 96   Resp: 18 SpO2: 98 % O2 Device: Nasal cannula Oxygen Delivery: 1 LPM  Weight: 183 lbs 9.6 oz  General Appearance: Well-appearing, no acute distress, appears stated age, waking up while I visited  Respiratory: No acute respiratory distress, no wheezes or crackles, diminished lung sounds on L lower lung  Cardiovascular: Normal S1/S2, no murmur/gallop/rubs  GI: Soft, non-tender, non-distended  Psych: Answers questions appropriately    Data   Recent Labs   Lab 09/07/22  0809 09/05/22  0728 09/04/22  0603 08/31/22 2020 08/31/22 2013   WBC 6.9 5.8 7.2   < > 14.6*   HGB 7.3* 7.9* 8.0*   < > 9.5*   MCV 73* 75* 75*   < > 76*    271 313   < > 338   INR  --   --   --   --  1.05   * 137 138   < > 140   POTASSIUM 3.1* 3.4 3.6   < > 3.7   CHLORIDE 96* 101 104   < > 105   CO2 28 25 25   < > 24   BUN 27.9* 21.8* 20.1*   < > 10.7   CR 1.28* 0.77 0.66*   < > 0.63*   ANIONGAP 9 11 9   < > 11   VENTURA 8.7 8.9 8.9   < > 9.0   * 95 103*   < > 126*   ALBUMIN  --  3.1* 3.2*   < > 3.5   PROTTOTAL  --  6.0* 6.0*   < > 7.0   BILITOTAL  --  0.3 0.4   < > 0.8   ALKPHOS  --  93 96   < > 145*   ALT  --  13 12   < > 10   AST  --  24 23   < > 24    < > = values in this interval not displayed.

## 2022-09-07 NOTE — PROGRESS NOTES
Hematology / Oncology  Daily Progress Note   Date of Service: 09/07/2022  Patient: Vic Scott III  MRN: [id number]  Admission Date: 8/31/2022  Hospital Day # 7  Cancer Diagnosis: Metastatic high grade pleomorphic sarcoma   Primary Outpatient Oncologist: Dr. Ambrocio   Current Treatment Plan: Cisplatin/Doxorubicin     Recommendations:   - Start Neupogen tonight as patient will not discharge in time for outpatient Neulasta (ordered for you)  - Recommend low dose Zyprexa 2.5 mg at bedtime for additional nausea control   - Will continue to follow along     Assessment & Plan:   Vic Scott III is a 47 year old male with past medical history of high-grade pleomorphic sarcoma s/p right hindquarter amputation/ hemipelvectomy (June 2022) recently found to have metastasis to abdominal wall and lung. Currently admitted for worsening pleural effusion and SOB.     # Metastatic Pleomorphic Sarcoma   In brief, he initially had rapidly progressing right leg swelling since July 2021. MRI showed ~29-20 cm right thigh soft tissue mass with bony involvement of proximal right femur. Biopsy shows significant high grade pleomorphic sarcoma.PET was ordered which identifed concerning inguinal lymphadenopathy . Proceeded with Doxil/Ifos x 2 cycles but was complicated by neurotoxicity. He then was admitted June 2022 with right leg pain and inability to ambulated. Found to have pathologic right proximal femur fracture. Underwent right hindquarter amputation with Dr. Whitaker and primary complex wound closure with Dr. Butt. Recent CT showed new metastatis to abdominal wall and lung.    - Echo 9/1 EF 55-60%      Therapy Plan: Cisplatin/Doxorubicin (C1D1=9/1/22)    - Cisplatin 60 mg/m2 (122 mg) IV D1-2    - Doxorubicin 17.5 mg/m2 (35 mg) CIVI D1-4    - Premeds: Kytil, Emend, Dex, Aloxi    # Recurrent Pleural Effusions   Patient has had several large volume thoracentesis 900 mL (9/1) and 1100 mL (9/3). Chest tube placed by IR 9/6  "based on re accumulation rate consider PleruX     # CINV   - Aggressive nausea control with PRN Zofran and Compazine   - Could consider at bedtime Zyprexa     Patient was seen and plan of care was discussed with attending physician Dr. Walker.    Thank you for the opportunity to partake in this patients plan of care. Please do not hesitate to page with questions. We will continue to follow.     I spent >35 minutes face-to-face or coordinating care of Vic Scott III. Over 50% of our time on the unit was spent counseling the patient and/or coordinating care.    Dione Amaro PA-C ([facility])   Hematology/Oncology   Pager: [phone]   ___________________________________________________________________    Subjective & Interval History:    No acute events noted overnight. Fabian is feeling a little better. He continues to have nausea and diet is limited to popsicles and crackers. Dicussed that we could consider adding Zyprexa at night to help with baseline nausea throughout the day. He was agreeable. Reviewed that since he is not discharging in time for his neulasta injection we would start Neupogen tonight. He understood. All questions answered at this time.     A comprehensive review of systems was obtained and is negative other than noted here or in the HPI.       Physical Exam:    Blood pressure 127/77, pulse 96, temperature 98.4  F (36.9  C), temperature source Oral, resp. rate 18, height 1.88 m (6' 2\"), weight 84 kg (185 lb 1.6 oz), SpO2 94 %.    General: lying in bed, no acute distress  HEENT: sclera anicteric, EOMI, MMM  Neck: supple, normal ROM  Pulm: breathing comfortably on RA, Chest tube in place with serosangunous output  Skin: no rashes on limited exam, no jaundice  Neuro: Alert and interactive, moves all extremities equally, no focal deficits    Labs & Studies: I personally reviewed the following studies:  ROUTINE LABS (Last four results):  CMP  Recent Labs   Lab 09/07/22  0809 09/05/22  0728 09/04/22  0603 " 09/03/22  1042 09/02/22  1102 09/02/22  0821 09/01/22  1639   * 137 138 140 140   < >  --    POTASSIUM 3.1* 3.4 3.6 3.8 4.0   < >  --    CHLORIDE 96* 101 104 107 107   < >  --    CO2 28 25 25 25 23   < >  --    ANIONGAP 9 11 9 8 10   < >  --    * 95 103* 123* 139*   < >  --    BUN 27.9* 21.8* 20.1* 16.4 11.2   < >  --    CR 1.28* 0.77 0.66* 0.52* 0.49*   < >  --    GFRESTIMATED 70 >90 >90 >90 >90   < >  --    VENTURA 8.7 8.9 8.9 9.0 8.8   < >  --    MAG  --   --  2.2 2.4*  --   --   --    PHOS  --   --  3.6 3.2  --   --  3.3   PROTTOTAL  --  6.0* 6.0* 6.3* 6.3*  --   --    ALBUMIN  --  3.1* 3.2* 3.2* 3.1*  --   --    BILITOTAL  --  0.3 0.4 0.4 0.5  --   --    ALKPHOS  --  93 96 117 147*  --   --    AST  --  24 23 16 22  --   --    ALT  --  13 12 10 12  --   --     < > = values in this interval not displayed.     CBC  Recent Labs   Lab 09/07/22  0809 09/05/22  0728 09/04/22  0603 09/03/22  1042   WBC 6.9 5.8 7.2 9.7   RBC 3.14* 3.40* 3.48* 3.54*   HGB 7.3* 7.9* 8.0* 8.2*   HCT 23.0* 25.4* 26.0* 26.2*   MCV 73* 75* 75* 74*   MCH 23.2* 23.2* 23.0* 23.2*   MCHC 31.7 31.1* 30.8* 31.3*   RDW 18.5* 19.0* 19.0* 18.7*    271 313 319     INR  Recent Labs   Lab 08/31/22 2013   INR 1.05       Medications list for reference:  Current Facility-Administered Medications   Medication     acetaminophen (TYLENOL) tablet 650 mg    Or     acetaminophen (TYLENOL) Suppository 650 mg     acetaminophen (TYLENOL) tablet 975 mg     bisacodyl (DULCOLAX) suppository 10 mg     [Held by provider] enoxaparin ANTICOAGULANT (LOVENOX) injection 40 mg     escitalopram (LEXAPRO) tablet 10 mg     heparin 100 UNIT/ML injection 5 mL     heparin lock flush 10 UNIT/ML injection 5 mL     Lidocaine (LIDOCARE) 4 % Patch 2 patch     lidocaine (LMX4) cream     lidocaine (XYLOCAINE) 5 % ointment     lidocaine 1 % 0.1-1 mL     lidocaine patch in PLACE     melatonin tablet 1 mg     morphine (MS CONTIN) 12 hr tablet 30 mg     naloxone (NARCAN)  injection 0.2 mg    Or     naloxone (NARCAN) injection 0.4 mg    Or     naloxone (NARCAN) injection 0.2 mg    Or     naloxone (NARCAN) injection 0.4 mg     ondansetron (ZOFRAN ODT) ODT tab 4-8 mg    Or     ondansetron (ZOFRAN) tablet 4-8 mg    Or     ondansetron (ZOFRAN) injection 4-8 mg     oxyCODONE IR (ROXICODONE) tablet 10 mg     polyethylene glycol (MIRALAX) Packet 17 g     prochlorperazine (COMPAZINE) injection 5-10 mg    Or     prochlorperazine (COMPAZINE) tablet 5-10 mg     senna-docusate (SENOKOT-S/PERICOLACE) 8.6-50 MG per tablet 1 tablet    Or     senna-docusate (SENOKOT-S/PERICOLACE) 8.6-50 MG per tablet 2 tablet     sodium chloride (PF) 0.9% PF flush 3 mL     sodium chloride (PF) 0.9% PF flush 3 mL     sodium chloride (PF) 0.9% PF flush 3-20 mL

## 2022-09-07 NOTE — PLAN OF CARE
[id number]  Goal Outcome Evaluation:  Pt A&Ox4, VSS on RA. Pain managed with oxycodone x2, lidocaine cream x1. L hip incision dressing changed x1. N managed with zofran x1, PICC heparin locked. Continue to monitor and with POC.

## 2022-09-07 NOTE — PROGRESS NOTES
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 Patient had thoracentesis x2 on 9/1 and 9/3 with decrease to his oxygen requirements, now down to 1-2 L. We were consulted regarding need for possible chest tube placement. Cultures x2 negative aside from broth only staph epi which is likely a contaminant. CT placed 9/6 with lymphocyte predominance suggestive of  malignancy associated pleural effusion. Patient with improvement in respiratory status.                 - Follow cytology and cultures   - Continue chest tube to suction   - CXR AM (not needed everyday)     Pulmonology will continue to follow.    Plan d/w Dr. Colt M.D., who is in agreement.    Capo Chavez MD  Pulmonary & Critical Care Fellow  [phone]      Interval History    - Nursing notes reviewed.   - Patient reports improvement in SOB   - Mild chest pain at chest tube site     Review of Systems   ROS:  6 point ROS including Respiratory, CV, GI and , other than that noted in the HPI, is negative    Physical Exam Temp:  [96.4  F (35.8  C)-98.7  F (37.1  C)] 98.7  F (37.1  C)  Pulse:  [91-96] 96  Resp:  [18] 18  BP: (106-127)/(48-84) 127/84  SpO2:  [90 %-99 %] 98 %  I/O last 3 completed shifts:  In: 161.67 [I.V.:161.67]  Out: 2370 [Urine:1500; Chest Tube:870]  Wt Readings from Last 1 Encounters:   09/07/22 83.3 kg (183 lb 9.6 oz)    Body mass index is 23.57 kg/m . Resp: 18      Exam:  General: NAD  HEENT: MMM  CV: RRR, no murmur, click, rub  Resp: decreased breath  sounds on left side  Abd: Soft, ND, BS+  Extremities: right hemipelvectomy   Neuro: AAOx3, no focal deficits    Data   Labs: reviewed in EMR and notable labs listed below.  Pleural diff 30% neutrophils, 41% lymphocytes     Imaging: reviewed in EMR and notable imaging listed below.    Medications     acetaminophen  975 mg Oral Q6H    Or     acetaminophen  650 mg Rectal Q6H     enoxaparin ANTICOAGULANT  40 mg Subcutaneous Q24H     escitalopram  10 mg Oral Daily     filgrastim  480 mcg Subcutaneous Daily at 8 pm     HYDROmorphone  0.5 mg Intravenous Once     lidocaine  2 patch Transdermal Q24H     lidocaine   Transdermal Q8H SAMANTHA     morphine  30 mg Oral Q12H     OLANZapine  2.5 mg Oral At Bedtime     sodium chloride (PF)  3 mL Intracatheter Q8H

## 2022-09-07 NOTE — PROGRESS NOTES
"   09/07/22 1425   Quick Adds   Type of Visit Initial PT Evaluation   Living Environment   People in Home sibling(s)   Current Living Arrangements house   Home Accessibility   (Pt reports no stairs to enter)   Transportation Anticipated family or friend will provide   Living Environment Comments Has been living with his sister who is home during the day. Has PCA services a couple times a week and they assist with showers   Self-Care   Usual Activity Tolerance moderate   Current Activity Tolerance fair   Regular Exercise No   Equipment Currently Used at Home wheelchair, manual;walker, rolling;shower chair   Fall history within last six months no   Activity/Exercise/Self-Care Comment Pt IND with pivot transfers to and from , pt reports he walks short distances with FWW.   General Information   Onset of Illness/Injury or Date of Surgery 08/31/22   Referring Physician Olga Lidia Yi MD   Patient/Family Therapy Goals Statement (PT) Return home   Pertinent History of Current Problem (include personal factors and/or comorbidities that impact the POC) Per EMR: \"47 year old male with past medical history of high-grade pleomorphic sarcoma s/p right hindquarter amputation/ hemipelvectomy (June 2022) recently found to have metastasis to abdominal wall and lung. Currently admitted for worsening pleural effusion and SOB\"   Existing Precautions/Restrictions fall   General Observations Activity: Ambulate with assist   Cognition   Cognitive Status Comments Follows all commands appropriately. Lacks insight into deficits   Pain Assessment   Patient Currently in Pain Yes, see Vital Sign flowsheet   Integumentary/Edema   Integumentary/Edema   (R Amputation site covered in dressing)   Posture    Posture Protracted shoulders   Range of Motion (ROM)   Range of Motion ROM is WFL   Strength (Manual Muscle Testing)   Strength Comments UE strength to clear hip from surface of bed   Bed Mobility   Bed Mobility supine-sit;sit-supine "   Supine-Sit Grover (Bed Mobility) supervision   Sit-Supine Grover (Bed Mobility) minimum assist (75% patient effort)   Comment, (Bed Mobility) Significantly slow speed, cautious, able to manage L LE.   Transfers   Transfers sit-stand transfer   Sit-Stand Transfer   Sit-Stand Grover (Transfers) contact guard   Assistive Device (Sit-Stand Transfers)   (scale)   Comment, (Sit-Stand Transfer) Heavy use of UEs   Gait/Stairs (Locomotion)   Comment, (Gait/Stairs) did not assess due to pt tolerance and preference   Balance   Balance Comments Sitting balance: Pt maintains without UE support   Sensory Examination   Sensory Perception patient reports no sensory changes   Clinical Impression   Criteria for Skilled Therapeutic Intervention Yes, treatment indicated   PT Diagnosis (PT) Impaired mobility   Influenced by the following impairments pain, deconditioning   Functional limitations due to impairments bed mobility, transfers   Clinical Presentation (PT Evaluation Complexity) Stable/Uncomplicated   Clinical Presentation Rationale clinical judgement   Clinical Decision Making (Complexity) low complexity   Planned Therapy Interventions (PT) balance training;bed mobility training;gait training;home exercise program;patient/family education;transfer training;wheelchair management/propulsion training;progressive activity/exercise   Risk & Benefits of therapy have been explained evaluation/treatment results reviewed;care plan/treatment goals reviewed;risks/benefits reviewed;current/potential barriers reviewed;participants voiced agreement with care plan;participants included;patient   PT Discharge Planning   PT Discharge Recommendation (DC Rec) home with assist;home with home care physical therapy   PT Rationale for DC Rec Pt current max tolerance is static standing. Pt reports limiting factor of pain from chest tube. Anticipate pt will be able to discharge home with continued support from family. Pt would  benefit from ongoing skilled PT upon discharge   PT Brief overview of current status Nursing Staff: STS A x 1   Plan of Care Review   Plan of Care Reviewed With patient   Total Evaluation Time   Total Evaluation Time (Minutes) 10   Physical Therapy Goals   PT Frequency 5x/week   PT Predicted Duration/Target Date for Goal Attainment 09/23/22   PT Goals Bed Mobility;Transfers;Gait;Wheelchair Mobility   PT: Bed Mobility Independent   PT: Transfers Modified independent   PT: Gait Modified independent   PT: Wheelchair Mobility 150 feet;Caregiver SBA

## 2022-09-07 NOTE — CONSULTS
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 Butt. Since the June surgery, he has been following in clinic with local wound cares for incision site. He previously had a small drainage from the wound when a scab was removed that self-resolved with time. He was readmitted on 8/31 with dyspnea, found to have metastases to the abdominal wall and malignant pleural effusion. WO team has continued to care for the patient while hospitalized, plastic surgery was asked to assess the wound given a small area of serous drainage, possible fluid collection.     Patient noticed serous fluid drainage from primary incision site of the right hip 1 week ago and was noted by WOC team on 9/6 (pictures in chart) to be draining serous fluid from an induration at the superior border.  He states that he has previously noticed drainage when the wound scab had fallen off, but endorses more drainage recently due to increased dependence on the right hip. The patient has not noticed an odor, cloudiness, erythema, pruritis, or pain. He has not had any fevers, chills, chest pain, or diaphoresis.     Active diagnoses this visit:  Secondary sarcoma of lung, unspecified laterality (H)  Pleural effusion on left  Thrombus of pulmonary vein (H)  Acute respiratory failure with hypoxia (H)  Adverse effect of antineoplastic and immunosuppressive drugs, initial encounter  Osteosarcoma of bone (H)  Sarcoma (H)  Encounter for screening laboratory testing for severe acute respiratory syndrome coronavirus 2 (SARS-CoV-2)    Medical, surgical, social, and family histories, medications and allergies reviewed and updated.    Objective:  /77 (BP  "Location: Right arm)   Pulse 96   Temp 98.4  F (36.9  C) (Oral)   Resp 18   Ht 1.88 m (6' 2\")   Wt 84 kg (185 lb 1.6 oz)   SpO2 94%   BMI 23.77 kg/m      Physical exam:  General: Resting comfortably in bed, NAD   Cardio: RRR   Resp: chest tube in place on left side, non-labored breathing on room air  Abd: distended, nontender  MSK: right hip incision with exudry dressing, foam tape in place. Surgical incision is well-healed, edges are approximated with no surrounding erythema or warmth. There is a small (0.5x0.5 cm) area along the most lateral aspect of the wound with serous drainage. There is no appreciable fluid collection; attempted to probe with a cotton swab, the wound appears well healed, no tunneling noted with probe. Of note, there is firm skin along the entire lower abdomen consistent with scarring from hydradenitis. The incision is non-tender.   Neuro: A/Ox4    Psych: Positive affect, pleasant             Labs:   WBC: 6.9    H.3    Hematocrit: 23   RBC: 3.14     Imaging:   Last CT abdomen/pelvis , reviewed. Post-surgical changes present.     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 Butt. He was readmitted on  for dyspnea, found to have malignant pleural effusion. Plastic surgery was consulted for serous drainage from his prior incision. This has been monitored outpatient and has been treated with local wound care. On physical examination, the incision site has healed well, there is a small area of drainage on the lateral aspect of the wound. No appreciable fluctuance noted and the area of drainage does not appear to tunnel upon probing. The wound does not appear infected. At this time, recommend continued wound care with the [facility] team.     Plan:   - No acute surgical intervention " indicated at this time  - Continue excellent wound care management by the [facility] team   - Plastic surgery team will place referral to be seen outpatient in clinic in 2-3 weeks  - Continue management per primary team    Susan Sumner, MS4 Plastic Surgery     I agree with the documentation provided by the medical student above and was present for the history and physical exam. I made updates or corrections as appropriate.     Discussed with chief resident, Dr. Montanez, and attending surgeon, Dr. Filomena Underwood MD  Plastic & Reconstructive Surgery Resident  Please see [facility] for on-call provider

## 2022-09-07 NOTE — PROVIDER NOTIFICATION
"Notified Devon Noel MD at [phone].   \"7D 7517-2 C.F.: 7/10 pain poorly managed with PO oxycodone. Can you order something for break through pain. States topicals helped, can you order PRN voltaren gel? Thank you, Ashley SMITH #[id number]\"  "

## 2022-09-07 NOTE — PROGRESS NOTES
Care Management Follow Up    Writer met with patient to follow up with discharge planning.  Patient stated he currently has [facility] for nursing, PT, and OT.  Patient stated he notified them he was here.     Resumption orders placed.     Patient stated he had no questions or concerns about discharge planning at this time.      Care management team will continue to follow patient for discharge planning.    [facility]  884       Joy Ash RN, BSN  Care Coordinator 7D  Office: [phone]  Pager: [phone]    To contact the weekend Critical access hospital (0800 - 1630) Saturday and Sunday    Units: 4A, 4C, 4E, 5A and 5B- Pager 1: [phone]    Units: 6A, 6B, 6C, 6D- Pager 2: [phone]    Units: 7A, 7B, 7C, 7D, and 5C-Pager 3: [phone]

## 2022-09-08 NOTE — TELEPHONE ENCOUNTER
[facility] Call Center    Phone Message    May a detailed message be left on voicemail: yes     Reason for Call: Appointment Intake    Referring Provider Name: Miryam Underwood MD  Diagnosis and/or Symptoms: Amputee, hip, right / Post op monitoring of incision     Sending for review for scheduling - dx Post op monitoring of incision - dx not listed on guidelines     Action Taken: Message routed to:  [facility] ([facility] Plastic Surgery    Travel Screening: Not Applicable

## 2022-09-08 NOTE — PLAN OF CARE
Goal Outcome Evaluation:    VSS, A&Ox4. On 1L nasal canula, attempted to ween pt off but had SOB. O2 sat over 92%. Poor appetite in the evening. Lidocaine patch around chest tube insertion site, chest tube and surgery incision site dressing changed. Chest tube patent and draining sanguinous drainage Oxy and MS cotin for chest tube site pain. Started on Lovenox and Neupogen. Adequate U.O.

## 2022-09-08 NOTE — PROGRESS NOTES
Resident/Fellow Attestation   I, Megan Hurtado MD, was present with the medical/MEHRAN student who participated in the service and in the documentation of the note.  I have verified the history and personally performed the physical exam and medical decision making.  I agree with the assessment and plan of care as documented in the note.      47y M with metastatic osteosarcoma admitted for acute hypoxemic respiratory failure 2/2 large left pleural effusion now s/p chest tube placement by IR. Remains on 1-2L NC O2. Having increased pain while admitted associated with chest tube. Will increase oxycodone from 10mg to 15mg q4hr PRN and monitor for changes in respiratory status. Creatine increasing suspect 2/2 either poor PO intake vs nephrotoxicity from chemotherapy. Will treat with IV fluids and continue to monitor.     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      Hypoxia improved after thoracentesis x 2 and chest tube placement. Patient remains admitted for IV antibiotics and management of continued pleural effusion following thoracocentesis. Chest tube placed 9/6.    # AHRF 2/2 massive  left pleural effusion, with possible bacterial PNA, improving, all POA  # Pleural fluid culture (+) Staph epidermidis  This is the main problem that led to this admission.  Sudden onset SOB and significant increase in left sided pleural effusion in past 2 weeks. Large pleural effusion continues following thoracocentesis. With recurrent pleural effusions, pulmonology consulted and chest tube chosen for continued treatment of recurring pleural effusion. Pleural fluid (+) for Staph epi, which may reflect contaminant given absence of fever, leukocytosis. No PE on admit CT. Chest tube placed for 9/6 for management of recurrent pleural effusions.  - Pulmonology following and managing chest tube, appreciate recs  - - Chest tube placed 9/6  - F/u pleural fluid culture 9/6 and cytology (in progress)  - Continue to wean O2 as able   - s/p thoracentesis x 2, fluid is exudative in nature  - s/p azithromycin 500mg x 3 days  - s/p ceftriaxone 2g x 5 days     #Acute Kidney Injury  Creatinine 1.28 on 9/7 and given 1L of lactated ringers with no improvement on 9/8 with Cr 1.73. Suspect prerenal due to poor oral intake vs nephrotoxicity from recent chemotherapy. Denies any difficulties urinating or changes in the quantity of urination. Patient has only been having water and popsicles for the last couple days likely contributing to low renal flow.   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  Patient Doxil/Ifos initially on 11/2/2021, which was complicated by isofamide neurotoxicity. It resolved without methene blue. His 2nd  cycle of chemo was aborted because of this. He experienced a right proximal femur fracture on 06/05/2022 and underwent right hindquarter amputation by Dr. Grove and primary complex wound closure by Dr. Petersen on 06/17/2022. Pt's CT on 08/13/2022 showed new metastasis to abdominal wall and lungs. His oncologist Dr. Ambrocio suggested to start doxorubicin, cisplatin, and Neulasta. He was agreeable to follow with palliative.   High risk for platelet rich thrombosis on top of the cancer related thrombus of the pulmonary vein. The patient was initiated on doxorubicin and cisplatin during this admission. Elevated WBC today (19.7 from 6.9) likely due to Neupogen initiation last night.  - Continue aspirin 1 g every 8 hours  - Continue morphine 30 mg every 12 hours as a scheduled and oxycodone 15 mg every 4 hours as needed  - S/p cisplatin, doxorubicin and dexamethasone  - We highly appreciate input of oncology service     # Hypokalemia  - replete PRN    # MDD  - Continue PTA escitalopram  - Make contact with palliative care on discharge so they are aware of situation     # Chronic anemia of malignancy, all are present on admission  Stable, low Hgb on admit. No acute drop c/f hemothorax.  - Trend CBC    Diet: Combination Diet Regular Diet Adult  Snacks/Supplements Adult: Ensure Clear; Between Meals  Fluids: PO, 100cc/hr  Lines: PRESENT  Sung Catheter: Not present    DVT Prophylaxis: Enoxaparin (Lovenox) SQ  Code Status: Full Code     Expected Discharge Date: 09/12/2022        Discharge Comments: continuous chemo over the weekend. repeat thoracentisis 9/3. Pending improvement, likely d/c home. possible new O2 requirement.       The patient's care was discussed with the Dr. Kim.    Rand[facility] 3    Interval History   Patient reports his breathing is better today. He was able to go down to 1L oxygen  And got up to move around a couple times. He reported he was able to get 35 minutes yesterday  without oxygen, but started to get some anxiety. He is on board with continuing to try and wean completely off the oxygen as he is able.    Pain was also discussed with patient today as he reported that while moving and during PT he was experiencing more than usual. It is located mainly around his chest tube insertion site and is adequately controlled with rest. He is on board with increasing the dose of oxycodone from 10 to 15mg for more adequate pain control while he is here in the hospital.    Physical Exam   Vital Signs: Temp: 98.9  F (37.2  C) Temp src: Oral BP: 114/57 Pulse: 102   Resp: 18 SpO2: 96 % O2 Device: Nasal cannula Oxygen Delivery: 1 LPM  Weight: 179 lbs 6.4 oz  General Appearance: Well-appearing, no acute distress, appears stated age, watching TV when I arrived  Respiratory: No acute respiratory distress, no wheezes or crackles  Cardiovascular: Normal S1/S2, no murmur/gallop/rub  GI: Soft, non-tender, non-distended  Psych: Answers questions appropriately         Data   Recent Labs   Lab 09/08/22  0843 09/07/22  0809 09/05/22  0728 09/04/22  0603   WBC 19.7* 6.9 5.8 7.2   HGB 7.4* 7.3* 7.9* 8.0*   MCV 72* 73* 75* 75*    200 271 313    133* 137 138   POTASSIUM 3.4 3.1* 3.4 3.6   CHLORIDE 98 96* 101 104   CO2 27 28 25 25   BUN 29.9* 27.9* 21.8* 20.1*   CR 1.73* 1.28* 0.77 0.66*   ANIONGAP 11 9 11 9   VENTURA 8.9 8.7 8.9 8.9   GLC 96 107* 95 103*   ALBUMIN  --   --  3.1* 3.2*   PROTTOTAL  --   --  6.0* 6.0*   BILITOTAL  --   --  0.3 0.4   ALKPHOS  --   --  93 96   ALT  --   --  13 12   AST  --   --  24 23

## 2022-09-08 NOTE — PROGRESS NOTES
Hematology / Oncology  Daily Progress Note   Date of Service: 09/08/2022  Patient: Vic Scott III  MRN: [id number]  Admission Date: 8/31/2022  Hospital Day # 8  Cancer Diagnosis: Metastatic high grade pleomorphic sarcoma   Primary Outpatient Oncologist: Dr. Ambrocio   Current Treatment Plan: Cisplatin/Doxorubicin     Recommendations:   - Continue supportive medications for nausea; PRN Zofran & Compazine and Zyprexa at bedtime   - Encouraged patient to increase bowel regimen as no BM since admission, if no results with Senna or Miralax may need to consider Mag Citrate or Milk of Mag   - Will discontinue Neupogen based on AM WBC     Assessment & Plan:   Vic Scott III is a 47 year old male with past medical history of high-grade pleomorphic sarcoma s/p right hindquarter amputation/ hemipelvectomy (June 2022) recently found to have metastasis to abdominal wall and lung. Currently admitted for worsening pleural effusion and SOB.     # Metastatic Pleomorphic Sarcoma   In brief, he initially had rapidly progressing right leg swelling since July 2021. MRI showed ~29-20 cm right thigh soft tissue mass with bony involvement of proximal right femur. Biopsy shows significant high grade pleomorphic sarcoma.PET was ordered which identifed concerning inguinal lymphadenopathy . Proceeded with Doxil/Ifos x 2 cycles but was complicated by neurotoxicity. He then was admitted June 2022 with right leg pain and inability to ambulated. Found to have pathologic right proximal femur fracture. Underwent right hindquarter amputation with Dr. Whitaker and primary complex wound closure with Dr. Butt. Recent CT showed new metastatis to abdominal wall and lung.    - Echo 9/1 EF 55-60%      Therapy Plan: Cisplatin/Doxorubicin (C1D1=9/1/22)    - Cisplatin 60 mg/m2 (122 mg) IV D1-2    - Doxorubicin 17.5 mg/m2 (35 mg) CIVI D1-4    - Premeds: Kytil, Emend, Dex, Aloxi    # Leukocytosis   2/2 GCSF support, received Neupogen x1  "(9/7)    # Recurrent Pleural Effusions   Patient has had several large volume thoracentesis 900 mL (9/1) and 1100 mL (9/3). Chest tube placed by IR 9/6 based on re accumulation rate consider PleruX     # CINV   - Aggressive nausea control with PRN Zofran and Compazine   - Could consider at bedtime Zyprexa     # Constipation   No bowel movement since admission, while using Zofran more frequently may need to consider a daily bowel regimen     Patient's plan of care was discussed with attending physician Dr. Ibarra     Thank you for the opportunity to partake in this patients plan of care. Please do not hesitate to page with questions. We will continue to follow.     I spent > 25 minutes face-to-face or coordinating care of Vic Scott III. Over 50% of our time on the unit was spent counseling the patient and/or coordinating care.    Dione Amaro PA-C ([facility])   Hematology/Oncology   Pager: [phone]   ___________________________________________________________________    Subjective & Interval History:    No acute events noted overnight. Fabian feels like his breathing is better. He has not had a bowel movement since coming into the hospital. I encouraged him to use some of the PRN bowel medications available. He reports his nausea is doing better. He was able to eat bread with meat sauce yesterday. He has already ordered breakfast and plans to take some antiemetics prior to eating to try and make sure things stay down. Encouraged patient to work with PT. All questions answered at this time.    A comprehensive review of systems was obtained and is negative other than noted here or in the HPI.       Physical Exam:    Blood pressure 105/51, pulse 95, temperature 98.1  F (36.7  C), temperature source Oral, resp. rate 16, height 1.88 m (6' 2\"), weight 81.4 kg (179 lb 6.4 oz), SpO2 93 %.    General: lying in bed, no acute distress  HEENT: sclera anicteric, EOMI, MMM  Neck: supple, normal ROM  Pulm: breathing comfortably on " RA, Chest tube in place with serosangunous output  Skin: no rashes on limited exam, no jaundice  Neuro: Alert and interactive, moves all extremities equally, no focal deficits    Labs & Studies: I personally reviewed the following studies:  ROUTINE LABS (Last four results):  CMP  Recent Labs   Lab 09/07/22 0809 09/05/22 0728 09/04/22 0603 09/03/22  1042 09/02/22  1102 09/02/22  0821 09/01/22  1639   * 137 138 140 140   < >  --    POTASSIUM 3.1* 3.4 3.6 3.8 4.0   < >  --    CHLORIDE 96* 101 104 107 107   < >  --    CO2 28 25 25 25 23   < >  --    ANIONGAP 9 11 9 8 10   < >  --    * 95 103* 123* 139*   < >  --    BUN 27.9* 21.8* 20.1* 16.4 11.2   < >  --    CR 1.28* 0.77 0.66* 0.52* 0.49*   < >  --    GFRESTIMATED 70 >90 >90 >90 >90   < >  --    VENTURA 8.7 8.9 8.9 9.0 8.8   < >  --    MAG  --   --  2.2 2.4*  --   --   --    PHOS  --   --  3.6 3.2  --   --  3.3   PROTTOTAL  --  6.0* 6.0* 6.3* 6.3*  --   --    ALBUMIN  --  3.1* 3.2* 3.2* 3.1*  --   --    BILITOTAL  --  0.3 0.4 0.4 0.5  --   --    ALKPHOS  --  93 96 117 147*  --   --    AST  --  24 23 16 22  --   --    ALT  --  13 12 10 12  --   --     < > = values in this interval not displayed.     CBC  Recent Labs   Lab 09/07/22 0809 09/05/22 0728 09/04/22 0603 09/03/22  1042   WBC 6.9 5.8 7.2 9.7   RBC 3.14* 3.40* 3.48* 3.54*   HGB 7.3* 7.9* 8.0* 8.2*   HCT 23.0* 25.4* 26.0* 26.2*   MCV 73* 75* 75* 74*   MCH 23.2* 23.2* 23.0* 23.2*   MCHC 31.7 31.1* 30.8* 31.3*   RDW 18.5* 19.0* 19.0* 18.7*    271 313 319     INR  No lab results found in last 7 days.    Medications list for reference:  Current Facility-Administered Medications   Medication     acetaminophen (TYLENOL) tablet 975 mg    Or     acetaminophen (TYLENOL) Suppository 650 mg     bisacodyl (DULCOLAX) suppository 10 mg     enoxaparin ANTICOAGULANT (LOVENOX) injection 40 mg     escitalopram (LEXAPRO) tablet 10 mg     filgrastim (NEUPOGEN) injection 480 mcg     heparin 100 UNIT/ML injection  5 mL     heparin lock flush 10 UNIT/ML injection 5 mL     HYDROmorphone (PF) (DILAUDID) injection 0.5 mg     Lidocaine (LIDOCARE) 4 % Patch 2 patch     lidocaine (XYLOCAINE) 5 % ointment     lidocaine 1 % 0.1-1 mL     lidocaine patch in PLACE     melatonin tablet 1 mg     morphine (MS CONTIN) 12 hr tablet 30 mg     naloxone (NARCAN) injection 0.2 mg    Or     naloxone (NARCAN) injection 0.4 mg    Or     naloxone (NARCAN) injection 0.2 mg    Or     naloxone (NARCAN) injection 0.4 mg     OLANZapine (zyPREXA) tablet 2.5 mg     ondansetron (ZOFRAN ODT) ODT tab 4-8 mg    Or     ondansetron (ZOFRAN) tablet 4-8 mg    Or     ondansetron (ZOFRAN) injection 4-8 mg     oxyCODONE IR (ROXICODONE) tablet 10 mg     polyethylene glycol (MIRALAX) Packet 17 g     prochlorperazine (COMPAZINE) injection 5-10 mg    Or     prochlorperazine (COMPAZINE) tablet 5-10 mg     senna-docusate (SENOKOT-S/PERICOLACE) 8.6-50 MG per tablet 1 tablet    Or     senna-docusate (SENOKOT-S/PERICOLACE) 8.6-50 MG per tablet 2 tablet     sodium chloride (PF) 0.9% PF flush 3 mL     sodium chloride (PF) 0.9% PF flush 3 mL     sodium chloride (PF) 0.9% PF flush 3-20 mL

## 2022-09-08 NOTE — PROGRESS NOTES
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 Patient had thoracentesis x2 on 9/1 and 9/3 with decrease to his oxygen requirements, now down to 1-2 L. We were consulted regarding need for possible chest tube placement. Cultures x2 negative aside from broth only staph epi which is likely a contaminant. CT placed 9/6 with lymphocyte predominance suggestive of malignancy associated pleural effusion. Patient with improvement in respiratory status, however, tube has stopped draining. CXR with also worsening right sided effusions.                 - Continue following cytology and cultures   - Recommend discussion with IR regard need to up-size tube vs flushing vs re-adjusting   - Repeat CT chest     Pulmonology will continue to follow.    Plan d/w Dr. Colt M.D., who is in agreement.    Capo Chavez MD  Pulmonary & Critical Care Fellow  [phone]      Interval History    - Nursing notes reviewed.   - Essentially no output from chest tube despite getting up from bed   - CXR demonstrates either coiled tube vs clogged tube vs need for up-size     Review of Systems   ROS:  6 point ROS including Respiratory, CV, GI and , other than that noted in the HPI, is negative    Physical Exam Temp:  [95.3  F (35.2  C)-98.9  F (37.2  C)] 98.2  F (36.8  C)  Pulse:  [] 101  Resp:  [16-18] 18  BP: (104-125)/(50-72) 107/57  SpO2:  [90 %-97 %] 97 %  I/O last 3 completed shifts:  In: 10 [I.V.:10]  Out: 1915 [Urine:1875; Chest Tube:40]  Wt Readings  from Last 1 Encounters:   09/08/22 81.4 kg (179 lb 6.4 oz)    Body mass index is 23.03 kg/m . Resp: 18      Exam:  General: NAD  HEENT: MMM  CV: RRR, no murmur, click, rub  Resp: decreased breath sounds bilaterally   Abd: Soft, ND, BS+  Extremities: right hemipelvectomy   Neuro: AAOx3, no focal deficits    Data   Labs: reviewed in EMR and notable labs listed below.  Pleural diff 30% neutrophils, 41% lymphocytes     Imaging: reviewed in EMR and notable imaging listed below.    Medications     acetaminophen  975 mg Oral Q6H    Or     acetaminophen  650 mg Rectal Q6H     enoxaparin ANTICOAGULANT  40 mg Subcutaneous Q24H     escitalopram  10 mg Oral Daily     HYDROmorphone  0.5 mg Intravenous Once     lidocaine  2 patch Transdermal Q24H     lidocaine   Transdermal Q8H SAMANTHA     morphine  30 mg Oral Q12H     OLANZapine  2.5 mg Oral At Bedtime     polyethylene glycol  17 g Oral Daily     sodium chloride (PF)  3 mL Intracatheter Q8H

## 2022-09-08 NOTE — PLAN OF CARE
Afebrile, blood pressures stable. On 1 LPM oxygen with adequate oxygenation. Complaints of left sided chest pain at chest tube insertion site and right lower quadrant abdominal pain near surgical amputation site. Oxycodone increased today for better pain management. Abdomen is noticeably distended today. Last bowel movement unknown, but patient is passing gas and has active bowel sounds. Senna given, refused scheduled miralax. Zofran given for intermittent nausea today. Ordered breakfast of eggs and toast, unable to eat anything. Chest tube with 0 mL output today. Chest x-ray obtained this morning with concerns for coiled chest tube. Pulmonology consulted by primary team. Voiding adequate amounts. Up in his personal chair this afternoon.

## 2022-09-08 NOTE — PLAN OF CARE
Goal Outcome Evaluation:  [id number]  A&OX4, afebrile, OVSS on 1 L of oxygen  via NC.  Denied pain, nausea or vomiting this shift. Chest tube to suction, patent draining sanguinous fluid. MIVF discontinued. No significant events overnight, continue with POC.

## 2022-09-09 NOTE — CONSULTS
"    Interventional Radiology Consult Service Note  09/09/22     Case discussed with IR attending Dr. Benny Stenier.     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  Output is with low to 0 mL output overnight.      CT chest done today and reviewed with Dr. Steiner/Ashley and left chest tube is kinked with very little residual fluid around catheter. Multiple small loculated not amenable to drainage.      D/w [facility] team Megan and recommend chest tube removal. With regard to right pleural effusion, likely malignant, and if patient is symptomatic, recommend CAPS team consult for thoracentesis.      Expected date of discharge: TBD    Vitals:   /62 (BP Location: Right arm)   Pulse 96   Temp 98.4  F (36.9  C) (Oral)   Resp 18   Ht 1.88 m (6' 2\")   Wt 81.4 kg (179 lb 6.4 oz)   SpO2 94%   BMI 23.03 kg/m      Pertinent Labs:     Lab Results   Component Value Date    WBC 19.7 (H) 09/08/2022    WBC 6.9 09/07/2022    WBC 5.8 09/05/2022     Lab Results   Component Value Date    HGB 7.4 09/08/2022    HGB 7.3 09/07/2022    HGB 7.9 09/05/2022     Lab Results   Component Value Date     09/08/2022     09/07/2022     09/05/2022     Lab Results   Component Value Date    INR 1.05 08/31/2022    PTT 31 09/01/2022        COVID-19 PCR Results    COVID-19 PCR Results 11/2/21 11/30/21 12/4/21 6/7/22 8/31/22 9/8/22   SARS CoV2 PCR Negative Negative Negative Negative Negative Negative      Comments are available for some flowsheets but are not being displayed.             Padmini Peck PA-C  Interventional Radiology  Pager: [phone]    "

## 2022-09-09 NOTE — PLAN OF CARE
Problem: Oral Intake Altered (Oncology Care)  Goal: Optimal Oral Intake  Outcome: Ongoing, Not Progressing   Goal Outcome Evaluation:    Plan of Care Reviewed With: patient     Overall Patient Progress: declining  Encouraged pt to increase intakes of Ensure Clear to 2 per day

## 2022-09-09 NOTE — PROGRESS NOTES
"SPIRITUAL HEALTH SERVICES  SPIRITUAL ASSESSMENT Progress Note  [facility] ([place]) UU U7D    REFERRAL SOURCE: Self Initiated follow up.    I visited the pt in his room. He welcomed the follow up. He mentioned that he is doing very well and grateful. Pt mentioned he is hopeful he will be discharged soon. He mentioned he is grateful to Allah \" I put all my  trust  in Allah/God\". I provided him with prayer booklet. May Allah adrian him complete healing of the soul and the body.    PLAN: SHS will remain the same.    Lita Nelson Resident  Phone: [phone]    "

## 2022-09-09 NOTE — PROGRESS NOTES
Brief Pulmonary Note    Chest tube pulled out, noted to have clot in tube lining. If after removal there is obvious rapid re-accumulation then would recommend consideration of PleurX catheter.     Patient discussed w/ Dr. Gregory.    [place]  Pulm/CC PGY4

## 2022-09-09 NOTE — PROGRESS NOTES
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 Ambrocio   Current Treatment Plan: Cisplatin/Doxorubicin     Recommendations:   - Reports good nausea control with PRN Zofran & Compazine and Zyprexa at bedtime   - Encouraged aggressive bowel regimen  - Encouraged patient to increase PO fluid intake now that nausea under better control for JAZZMINE     Assessment & Plan:   Vic Scott III is a 47 year old male with past medical history of high-grade pleomorphic sarcoma s/p right hindquarter amputation/ hemipelvectomy (June 2022) recently found to have metastasis to abdominal wall and lung. Currently admitted for worsening pleural effusion and SOB.     # Metastatic Pleomorphic Sarcoma   In brief, he initially had rapidly progressing right leg swelling since July 2021. MRI showed ~29-20 cm right thigh soft tissue mass with bony involvement of proximal right femur. Biopsy shows significant high grade pleomorphic sarcoma.PET was ordered which identifed concerning inguinal lymphadenopathy . Proceeded with Doxil/Ifos x 2 cycles but was complicated by neurotoxicity. He then was admitted June 2022 with right leg pain and inability to ambulated. Found to have pathologic right proximal femur fracture. Underwent right hindquarter amputation with Dr. Whitaker and primary complex wound closure with Dr. Butt. Recent CT showed new metastatis to abdominal wall and lung.    - Echo 9/1 EF 55-60%      Therapy Plan: Cisplatin/Doxorubicin (C1D1=9/1/22)    - Cisplatin 60 mg/m2 (122 mg) IV D1-2    - Doxorubicin 17.5 mg/m2 (35 mg) CIVI D1-4    - Premeds: Kytil, Emend, Dex, Aloxi    # Leukocytosis   2/2 GCSF support, received Neupogen x1 (9/7)    # JAZZMINE   Likely related to recent Cisplatin.    # Recurrent Pleural Effusions  "  Patient has had several large volume thoracentesis 900 mL (9/1) and 1100 mL (9/3). Chest tube placed by IR 9/6 based on re accumulation rate consider PleruX.   - Chest tube management per primary team     # CINV   - Aggressive nausea control with PRN Zofran and Compazine   - Could consider increasing bedtime Zyprexa 5 mg if worsening nausea     # Constipation   No bowel movement since admission, while using Zofran more frequently may need to consider a daily bowel regimen     Patient's plan of care was discussed with attending physician Dr. Ibarra     Thank you for the opportunity to partake in this patients plan of care. Please do not hesitate to page with questions. We will continue to follow.     I spent > 35 minutes face-to-face or coordinating care of Vic Scott III. Over 50% of our time on the unit was spent counseling the patient and/or coordinating care.    Dione Amaro PA-C ([facility])   Hematology/Oncology   Pager: [phone]   ___________________________________________________________________    Subjective & Interval History:    No acute events noted overnight. Fabian feels like his breathing is better after they manipulated his chest tube. He was able to go outside for a bit and that helped a lot. He is hopeful to discharge soon. Reviewed that his kidney function is a bit elevated likely from the Cisplatin he received and encouraged him to increase his fluid intake now that nausea is better control. He was in agreement. He was able to eat more yesterday. Discussed getting some cafeteria slips so he can keep up with oral intake. All questions answered at this time.       A comprehensive review of systems was obtained and is negative other than noted here or in the HPI.       Physical Exam:    Blood pressure 114/62, pulse 96, temperature 98.4  F (36.9  C), temperature source Oral, resp. rate 18, height 1.88 m (6' 2\"), weight 81.4 kg (179 lb 6.4 oz), SpO2 94 %.    General: lying in bed, no acute " distress  HEENT: sclera anicteric, EOMI, MMM  Neck: supple, normal ROM  Pulm: breathing comfortably on 2L, Chest tube in place with serosangunous output  Skin: no rashes on limited exam, no jaundice  Neuro: Alert and interactive, moves all extremities equally, no focal deficits    Labs & Studies: I personally reviewed the following studies:  ROUTINE LABS (Last four results):  CMP  Recent Labs   Lab 09/09/22  0703 09/08/22  0843 09/07/22  0809 09/05/22  0728 09/04/22  0603 09/03/22  1042 09/02/22  1102   * 136 133* 137 138 140 140   POTASSIUM 3.3* 3.4 3.1* 3.4 3.6 3.8 4.0   CHLORIDE 96* 98 96* 101 104 107 107   CO2 28 27 28 25 25 25 23   ANIONGAP 10 11 9 11 9 8 10   * 96 107* 95 103* 123* 139*   BUN 31.7* 29.9* 27.9* 21.8* 20.1* 16.4 11.2   CR 2.02* 1.73* 1.28* 0.77 0.66* 0.52* 0.49*   GFRESTIMATED 40* 49* 70 >90 >90 >90 >90   VENTURA 8.4* 8.9 8.7 8.9 8.9 9.0 8.8   MAG  --   --   --   --  2.2 2.4*  --    PHOS  --   --   --   --  3.6 3.2  --    PROTTOTAL  --   --   --  6.0* 6.0* 6.3* 6.3*   ALBUMIN  --   --   --  3.1* 3.2* 3.2* 3.1*   BILITOTAL  --   --   --  0.3 0.4 0.4 0.5   ALKPHOS  --   --   --  93 96 117 147*   AST  --   --   --  24 23 16 22   ALT  --   --   --  13 12 10 12     CBC  Recent Labs   Lab 09/08/22  0843 09/07/22  0809 09/05/22  0728 09/04/22  0603   WBC 19.7* 6.9 5.8 7.2   RBC 3.20* 3.14* 3.40* 3.48*   HGB 7.4* 7.3* 7.9* 8.0*   HCT 23.0* 23.0* 25.4* 26.0*   MCV 72* 73* 75* 75*   MCH 23.1* 23.2* 23.2* 23.0*   MCHC 32.2 31.7 31.1* 30.8*   RDW 18.4* 18.5* 19.0* 19.0*    200 271 313     INR  No lab results found in last 7 days.    Medications list for reference:  Current Facility-Administered Medications   Medication     acetaminophen (TYLENOL) tablet 975 mg    Or     acetaminophen (TYLENOL) Suppository 650 mg     bisacodyl (DULCOLAX) suppository 10 mg     escitalopram (LEXAPRO) tablet 10 mg     heparin 100 UNIT/ML injection 5 mL     heparin ANTICOAGULANT injection 5,000 Units     heparin  lock flush 10 UNIT/ML injection 5 mL     HYDROmorphone (PF) (DILAUDID) injection 0.5 mg     Lidocaine (LIDOCARE) 4 % Patch 2 patch     lidocaine (XYLOCAINE) 5 % ointment     lidocaine 1 % 0.1-1 mL     lidocaine patch in PLACE     melatonin tablet 1 mg     morphine (MS CONTIN) 12 hr tablet 30 mg     naloxone (NARCAN) injection 0.2 mg    Or     naloxone (NARCAN) injection 0.4 mg    Or     naloxone (NARCAN) injection 0.2 mg    Or     naloxone (NARCAN) injection 0.4 mg     OLANZapine (zyPREXA) tablet 2.5 mg     ondansetron (ZOFRAN ODT) ODT tab 4-8 mg    Or     ondansetron (ZOFRAN) tablet 4-8 mg    Or     ondansetron (ZOFRAN) injection 4-8 mg     oxyCODONE (ROXICODONE) tablet 15 mg     polyethylene glycol (MIRALAX) Packet 17 g     prochlorperazine (COMPAZINE) injection 5-10 mg    Or     prochlorperazine (COMPAZINE) tablet 5-10 mg     senna-docusate (SENOKOT-S/PERICOLACE) 8.6-50 MG per tablet 1 tablet    Or     senna-docusate (SENOKOT-S/PERICOLACE) 8.6-50 MG per tablet 2 tablet     sodium chloride (PF) 0.9% PF flush 3 mL     sodium chloride (PF) 0.9% PF flush 3 mL     sodium chloride (PF) 0.9% PF flush 3-20 mL

## 2022-09-09 NOTE — PROVIDER NOTIFICATION
#[id number]    FYI pt's K level is 3.3  pt may need potassium replacement order.    Thank you   RN [id number]

## 2022-09-09 NOTE — PLAN OF CARE
"/62 (BP Location: Right arm)   Pulse 88   Temp 96.8  F (36  C) (Temporal)   Resp 18   Ht 1.88 m (6' 2\")   Wt 81.4 kg (179 lb 6.4 oz)   SpO2 92%   BMI 23.03 kg/m       Reason for Admission: SOB, hypoxia, pleural effusion    Status: Progressing    RN assumed cares at 2300    Pain: Moderate pain managed w/Tylenol  Neuro: A/Ox4, denies n/t, calls appropriately  Cardiac: WDL - denies chest pain  Respiratory: 1L NC, sating low 90s, dyspnea on exertion; CTWS overnight - found to be kinked, may be corrected 09/09  GI/: No BM on shift; voiding spontaneously w/bedside urinal  IV/Drains: DL PICC - HL x1, infusing LR @100mL/hr x1  Activity: Assist x2, RLE amputee  Skin: RLE hip dressing changed per POC orders  Labs: Reviewed    Plan of Care: Proceed w/POC  "

## 2022-09-09 NOTE — PLAN OF CARE
[id number]  No acute event, Afebrile, VSS.  Pain controlled with scheduled Ms contin (Q12 hrs) and tylenol (Q6 hours).   Pt not have a BM for last few day (pt not remember the exact date, but said Monday or Tuesday).   Spent up on personal wheelchair most of the day.   Dressing change done x1.   Kept 2 LPM via NC  for comfort and pt's SpO2 sometimes drops to upper 80s.     NEURO: Alert and oriented x4.      RESPIRATORY: 2 LPM via NC, denies dizziness, and SOB.     CARDIO: VSS, denies dizziness, and extremity pain.      GI/: Denies N/V, BS active, No BM for few days, AUOP.       SKIN: Intact.      ACTIVITY: Assist x1.      PAIN: Yes,     DLA: PICC, double lumen, both heparin locked.     BG: No      PLAN: Continue monitoring.           Goal Outcome Evaluation:

## 2022-09-09 NOTE — PROGRESS NOTES
Resident/Fellow Attestation   I, Olga Lidia Yi MD, was present with the medical/MEHRAN student who participated in the service and in the documentation of the note.  I have verified the history and personally performed the physical exam and medical decision making.  I agree with the assessment and plan of care as documented in the note.        Olga Lidia Yi MD  PGY-3  Date of Service (when I saw the patient): 09/09/22      [facility]    Internal Medicine Progress Note - [facility] 3 Service    Main Plans for Today   - CT chest, small L pleural effusion so IR recommends chest tube removal  - Chest tube removed by pulmonology at bedside  - CXR in AM  - Continue miralax  - Switch DVT ppx from enoxaparin to heparin given JAZZMINE  - Monitoring kidney function    Assessment & Plan   47 year old male with PMH significant for high-grade pleomorphic sarcoma of the right pelvis and femur s/p chemotherapy and right hindquarter amputation/hemipelvectomy (6/17/2022) with recent CT findings (8/13) concerning for multiple hypodense masses within the lateral abdominal wall, multiple pulmonary nodules concerning for metastatic disease, and small left-sided pleural effusion, admitted with SOB and hypoxia 2/2 massive left pleural effusion and right pulmonary vein tumor thrombus.      Hypoxia improved after thoracentesis x 2 and chest tube placement. Patient s/p antiotics and management of continued pleural effusion by chest tube following thoracocentesis. Chest tube placed 9/6, removal planned for 9/9.    # AHRF 2/2 large left pleural effusion, with possible bacterial PNA, improving, all POA  # Pleural fluid culture (+) Staph epidermidis  This is the main problem that led to this admission.  Sudden onset SOB and significant increase in left sided pleural effusion in past 2 weeks. Large pleural effusion continues following thoracocentesis. Chest tube placed for 9/6 for management of recurrent  pleural effusions. With continued improvement and decreased oxygen requirements, removal planned for 9/9. Has been on room air sporadically over past 24 hours without difficulties.   - Pulmonology following and managing chest tube, appreciate recs  - Chest tube placed 9/6, removed 9/9 (kinked and only small pleural effusion remains  - F/u pleural fluid culture 9/6 and cytology (in progress)  - Continue to wean O2 as able   - s/p thoracentesis x 2, fluid is exudative in nature  - s/p azithromycin 500mg x 3 days  - s/p ceftriaxone 2g x 5 days     #Acute Kidney Injury  Creatinine 1.73 on 9/8 and given 2L IVF with no improvement on 9/9 with Cr 2.02. Suspect nephrotoxicity from recent chemotherapy (cisplatin, doxorubicin) with component of pre-renal d/t poor PO intake. Denies any difficulties urinating or changes in the quantity of urination. Patient began eating yesterday after only having water and popsicles.  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  Patient Doxil/Ifos initially on 11/2/2021, which was complicated by isofamide neurotoxicity. It resolved without methene blue. His 2nd cycle of chemo was aborted because of this. He experienced a right proximal femur fracture on 06/05/2022 and underwent right hindquarter amputation by Dr. Grove and primary complex wound closure by Dr. Petersen on 06/17/2022. Pt's CT on 08/13/2022 showed new metastasis to abdominal wall and lungs. His oncologist Dr. Ambrocio suggested to start doxorubicin, cisplatin, and Neulasta. He was agreeable to follow with palliative.   High risk for platelet rich thrombosis  on top of the cancer related thrombus of the pulmonary vein. The patient was initiated on doxorubicin and cisplatin during this admission. Elevated WBC today (19.7 from 6.9) likely due to Neupogen initiation last night.  - Continue aspirin 1 g every 8 hours  - Continue morphine 30 mg every 12 hours as a scheduled and oxycodone 15 mg every 4 hours as needed  - S/p cisplatin, doxorubicin and dexamethasone  - We highly appreciate input of oncology service     # Hypokalemia  - replete PRN    # MDD  - Continue PTA escitalopram  - Make contact with palliative care on discharge so they are aware of situation     # Chronic anemia of malignancy, all are present on admission  Stable, low Hgb on admit. No acute drop c/f hemothorax.  - Trend CBC    Diet: Combination Diet Regular Diet Adult  Snacks/Supplements Adult: Ensure Clear; Between Meals   DVT ppx: heparin  Fluids: PO  Lines: PRESENT  Sung Catheter: Not present    DVT Prophylaxis: Enoxaparin (Lovenox) SQ  Code Status: Full Code    The patient's care was discussed with the Dr. Kim.    [facility] 3    Interval History   Patient continues to report improvements in breathing. He was able to move around yesterday and even go outside without getting short of breath. He reports feeling a lot better and denies any cough or dyspnea.     Patient also reports he is having less pain overall today. He is continuing to get pain over the chest tube incision location, but denies pain at his amputation site(R lower extremity) or elsewhere on the body. He is able to move around without discomfort.    He continues to report he has not had a BM since admission. He doesn't like taking miralax, but agreed to take it 9/9 morning and was anticipating he would have one today. Denies any abdominal discomfort     4 pt ROS otherwise negative      Physical Exam   Vital Signs: Temp: 98.4  F (36.9  C) Temp src: Oral BP: 114/62 Pulse: 96   Resp: 18 SpO2: 94 % O2  Device: Nasal cannula Oxygen Delivery: 2 LPM  Weight: 179 lbs 6.4 oz  General Appearance: Well-appearing, no acute distress, appears stated age, watching TV  Respiratory: No acute respiratory distress, no wheezes or crackles  Cardiovascular: Normal S1/S2, no murmur/gallop/rub  GI: Soft, non-tender, non-distended  Psych: Answers questions appropriately         Data   Recent Labs   Lab 09/09/22  0703 09/08/22  0843 09/07/22  0809 09/05/22  0728 09/04/22  0603   WBC  --  19.7* 6.9 5.8 7.2   HGB  --  7.4* 7.3* 7.9* 8.0*   MCV  --  72* 73* 75* 75*   PLT  --  188 200 271 313   * 136 133* 137 138   POTASSIUM 3.3* 3.4 3.1* 3.4 3.6   CHLORIDE 96* 98 96* 101 104   CO2 28 27 28 25 25   BUN 31.7* 29.9* 27.9* 21.8* 20.1*   CR 2.02* 1.73* 1.28* 0.77 0.66*   ANIONGAP 10 11 9 11 9   VENTURA 8.4* 8.9 8.7 8.9 8.9   * 96 107* 95 103*   ALBUMIN  --   --   --  3.1* 3.2*   PROTTOTAL  --   --   --  6.0* 6.0*   BILITOTAL  --   --   --  0.3 0.4   ALKPHOS  --   --   --  93 96   ALT  --   --   --  13 12   AST  --   --   --  24 23

## 2022-09-09 NOTE — PLAN OF CARE
"Goal Outcome Evaluation:    [id number]    /60 (BP Location: Right arm)   Pulse 98   Temp 96.9  F (36.1  C) (Oral)   Resp 18   Ht 1.88 m (6' 2\")   Wt 81.4 kg (179 lb 6.4 oz)   SpO2 98%   BMI 23.03 kg/m      Reason for admission: Presented with sudden shortness of breath and hypoxia found to have massive left pleural effusion and right pulmonary vein tumor thrombus.  Activity: A2. RLE amputee.   Pain: Denies.   Neuro: AxOx4. Neuros intact.   Cardiac: WDL  Respiratory: LOPEZ. NLB at rest. Denies SOB at rest. 1LNC. O2 sats WDL. Chest tube found to be kinked on days per imaging following low output. Currently set to water seal overnight per pt care orders. Possible intervention or replacement tomorrow to be determined. Site WDL, dressing CDI, emergency supplies at bedside.   GI/: Voiding spontaneously with adequate UOP. LBM 8/31. Pt given Senna this shift, awaiting effect. Given PRN Compazine x1 for ppx prior to dinner.   Diet: Regular diet, ate majority of dinner this evening. .    Lines: DL PICC intact. Site WDL. HL x1, infusing MIVF x1.   Wounds: RLE/hip dressing CDI. PRN dressing change completed per POC orders.   Labs/imaging: Reviewed. See chart.       Continue to monitor and follow POC  "

## 2022-09-09 NOTE — PLAN OF CARE
Goal Outcome Evaluation:    Plan of Care Reviewed With: patient     Overall Patient Progress: declining  Encourage po intakes with small frequent meals every 2-3 hrs, including use of ONS.

## 2022-09-09 NOTE — PROGRESS NOTES
CLINICAL NUTRITION SERVICES - REASSESSMENT NOTE     Nutrition Prescription    RECOMMENDATIONS FOR MDs/PROVIDERS TO ORDER:  - Total fluids/adjustments per Provider until po intakes improve  - If pt to remain hospitalized > 72 hrs, recommend obtaining calorie counts to help quantify po intakes  -Would consider trial of appetite stimulant to help improve po intakes  - Recommend daily multivitamin with mineral supplement until po intakes improve    Malnutrition Status:    - Moderate malnutrition in the context of acute illness      Recommendations already ordered by Registered Dietitian (RD):  - Modify supplement order to send Ensure Clear at 2 pm and HS    Future/Additional Recommendations:  - Encourage po intakes with small frequent meals (including ONS) every 2-3 hrs  - Monitor wt trends     EVALUATION OF THE PROGRESS TOWARD GOALS   Diet: Regular with Ensure Clear betw meals    Intake: Per review of Room Service ordering, pt only ordering one meal per day, in addition to receiving one ONS per day. Pt attributes limit po intakes to poor appetite and nausea on chemo.      NEW FINDINGS   Weight: 81.4 kg (9/8), 85.7 kg (9/1); wt loss of 4.3 kg (>5% loss) x the past week. Question d/t volume depletion on chemo, in addition to poor po intakes.   - Noted pt received MIVF's on 9/7 and 9/8.    MALNUTRITION  % Intake: < 75% for > 7 days (moderate)  % Weight Loss: > 2% in 1 week (severe)  Subcutaneous Fat Loss: None observed  Muscle Loss: None observed  Fluid Accumulation/Edema: Unable to assess  Malnutrition Diagnosis: Moderate malnutrition in the context of acute illness    Previous Goals   Patient to consume >75% of nutritionally adequate meal trays TID, or the equivalent with supplements/snacks.    Evaluation: Not met    Previous Nutrition Diagnosis  Predicted inadequate nutrient intake (calorie-protein) related to tolerating po with good intakes at present, but at risk for po to falter with chemo d/t potential for  decreased appetite and N/V with chemo.    Evaluation: No longer applicable, nutrition diagnosis changed below    CURRENT NUTRITION DIAGNOSIS  Inadequate oral intake related to poor appetite with chemo as evidenced by pt consuming < 75% of meals over the past wee, in addition to wt loss of >5% over the past week.    INTERVENTIONS  Implementation  Collaboration with other providers - bedside RN and Provider  Medical food supplement therapy - discussed increasing intakes of Ensure Clear to 2 per day to help increase his calorie and protein intakes. Pt receptive to recommendation.   Provided cafe passes to increase meal options    Goals  1. Patient to consume % of nutritionally adequate meal trays TID, or the equivalent with supplements/snacks.  2. Wt not to decline < 81 kg    Monitoring/Evaluation  Progress toward goals will be monitored and evaluated per protocol.    Carmen Anand RD,LD  7D pager [phone]

## 2022-09-10 NOTE — PROGRESS NOTES
Resident/Fellow Attestation   I, Megan Hurtado MD, was present with the medical/MEHRAN student who participated in the service and in the documentation of the note.  I have verified the history and personally performed the physical exam and medical decision making.  I agree with the assessment and plan of care as documented in the note and have made edits as needed.    Given continued supplemental oxygen requirement and c/f worsening now R-sided pleural effusion, will consult procedure team for possible thoracentesis. Continue with miralax and senna for bowel regimen. Did discuss possibility of suppository, which Fabian wants to avoid if possible.      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      Hypoxia improved after thoracentesis x 2 and chest tube placement. Patient s/p antiotics and management of continued pleural effusion by chest tube following thoracocentesis. Chest tube placed 9/6 and subsequently removed 9/9. Continues to requires 2-3L NC oxygen to maintain saturations.     #Acute on chronic microcytic  anemia  Hemoglobin level of 6.2 on 9/10, down from 7.4 9/9. Likely due to recent chemotherapy. No hemoptysis, melena, hematochezia, hematuria. Reports he has previously needed transfusions after chemo.  - Consented 9/10/22  - transfuse 1U pRBCs 9/10  - CTM    # AHRF 2/2 large left pleural effusion, with possible bacterial PNA, improving, all POA  # Pleural fluid culture (+) Staph epidermidis  Large left pleural effusion evident of chest CT on admission. Now s/p thoracentesis x2, chest tube placed for 9/6 and subsequent removal 9/9. Has been on 2-3L NC, occasionally switches to room air. Chest imaging now with large R sided pleural effusion.  - s/p azithromycin 500mg x 3 days, ceftriaxone 2g x 5 days   - Pulmonology following, appreciate recs  - Continue to wean O2 as able   - Given continued O2 requirements and R-sided pleural effusion, will consult procedure team for possible thoracentesis of R effusion    #Acute Kidney Injury  Creatinine 2.06 9/10, 2.02 9/9; Suspect nephrotoxicity from recent chemotherapy (cisplatin, doxorubicin) with component of pre-renal d/t poor PO intake. Denies any difficulties urinating or changes in the quantity of urination.   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  Patient Doxil/Ifos initially on 11/2/2021, which was complicated by isofamide neurotoxicity. It resolved without methene blue. His 2nd cycle of chemo was aborted because of this. He experienced a right proximal femur fracture on 06/05/2022 and underwent right hindquarter amputation by Dr. Grove and primary complex wound closure by Dr. Petersen on 06/17/2022. Pt's  CT on 08/13/2022 showed new metastasis to abdominal wall and lungs. His oncologist Dr. Ambrocio suggested to start doxorubicin, cisplatin, and Neulasta. He was agreeable to follow with palliative.   High risk for platelet rich thrombosis on top of the cancer related thrombus of the pulmonary vein. The patient was initiated on doxorubicin and cisplatin during this admission.  - Continue aspirin 1 g every 8 hours  - Continue morphine 30 mg every 12 hours as a scheduled and oxycodone 15 mg every 4 hours as needed  - S/p cisplatin, doxorubicin and dexamethasone  - We highly appreciate input of oncology service     # Hypokalemia  - replete PRN    # MDD  - Continue PTA escitalopram  - Make contact with palliative care on discharge so they are aware of situation    Diet: Combination Diet Regular Diet Adult  Snacks/Supplements Adult: Ensure Clear; Between Meals   DVT ppx: heparin  Fluids: PO  Lines: PRESENT  Sung Catheter: Not present    DVT Prophylaxis: Enoxaparin (Lovenox) SQ  Code Status: Full Code    The patient's care was discussed with the Dr. Kim.    [facility] 3    Interval History    Patient reports breathing has felt about the same today. He has continued to need oxygen 1-2L moving around and laying in bed. He denies any shortness of breath, cough, or chest pain.    Patient reports pain today has been controlled. Denies any pain at the chest tube insertion site. He does report some pain on R side of belly that is sharp without radiation. The pain has not progressed and has been the same since it began night of 9/9. Pain is instigated with movement and alleviated with rest.    4 pt ROS otherwise negative      Physical Exam   Vital Signs: Temp: (!) 96.4  F (35.8  C) Temp src: Oral BP: 120/60 Pulse: 95   Resp: 16 SpO2: 94 % O2 Device: Nasal cannula Oxygen Delivery: 2 LPM  Weight: 179 lbs 6.4 oz  General Appearance: Well-appearing, no acute distress, appears stated age, watching  football  Respiratory: No acute respiratory distress, no wheezes or crackles  Cardiovascular: Normal S1/S2, no murmur/gallop/rub  GI: Soft, non-tender, non-distended, pain with deep palpation of R side  Psych: Answers questions appropriately         Data   Recent Labs   Lab 09/10/22  0747 09/10/22  0609 09/09/22  0703 09/08/22  0843 09/07/22  0809 09/05/22  0728 09/04/22  0603   WBC 4.1 4.3  --  19.7*   < > 5.8 7.2   HGB 6.2* 6.1*  --  7.4*   < > 7.9* 8.0*   MCV 73* 72*  --  72*   < > 75* 75*   * 110*  --  188   < > 271 313   NA  --  138 134* 136   < > 137 138   POTASSIUM  --  3.5 3.3* 3.4   < > 3.4 3.6   CHLORIDE  --  100 96* 98   < > 101 104   CO2  --  29 28 27   < > 25 25   BUN  --  33.5* 31.7* 29.9*   < > 21.8* 20.1*   CR  --  2.06* 2.02* 1.73*   < > 0.77 0.66*   ANIONGAP  --  9 10 11   < > 11 9   VENTURA  --  8.6 8.4* 8.9   < > 8.9 8.9   GLC  --  94 119* 96   < > 95 103*   ALBUMIN  --   --   --   --   --  3.1* 3.2*   PROTTOTAL  --   --   --   --   --  6.0* 6.0*   BILITOTAL  --   --   --   --   --  0.3 0.4   ALKPHOS  --   --   --   --   --  93 96   ALT  --   --   --   --   --  13 12   AST  --   --   --   --   --  24 23    < > = values in this interval not displayed.

## 2022-09-10 NOTE — PLAN OF CARE
"2300- 0700    /60 (BP Location: Right arm)   Pulse 95   Temp (!) 96.4  F (35.8  C) (Oral)   Resp 16   Ht 1.88 m (6' 2\")   Wt 81.4 kg (179 lb 6.4 oz)   SpO2 94%   BMI 23.03 kg/m      VSS on 2L NC, Afebrile. Pt reports RLQ abdominal pain; scheduled tylenol given and lido patches in place. UTV surgical dressings. Last BM on 9/5 or 9/6 per pt report; would like Senna w/ morning meds. Slept between cares. Continue POC.   "

## 2022-09-10 NOTE — PROGRESS NOTES
Hematology-Oncology Brief progress note    Cancer Diagnosis: Metastatic high grade pleomorphic sarcoma   Primary Outpatient Oncologist: Dr. Ambrocio   Current Treatment Plan: Cisplatin/Doxorubicin C1D1 9/1/22      Subjective:    Chest tube was removed yesterday  , he remains on 1 liter of oxygen , continues to have some pain , managed with pain medications.     Objective:    Temp: 99  F (37.2  C) Temp src: Oral BP: 125/76 Pulse: 105   Resp: 18 SpO2: 94 % O2 Device: Nasal cannula Oxygen Delivery: 1 LPM      Constitutional : Alert and oriented lying in the bed comfortably  HEENT: Sclera anicteric  Respiratory: Chest decreased breath sounds bilaterally  CVS: S1-S2 normally  Abdomen: Soft nontender  Extremities: Dressing present in the right lower extremity.      A/P:    # Metastatic Pleomorphic Sarcoma   Due to recent progression of the disease metastasis to abdominal wall and lung he was started on cisplatin and doxorubicin on 9/1/2022.  He received Neupogen on 9/7.  Outpatient follow-up appointment on 9/15 with oncology MEHRAN and 9/21 for next infusion.    # Recurrent Pleural Effusions   Patient has had several large volume thoracentesis 900 mL (9/1) and 1100 mL (9/3). Chest tube placed by IR 9/6 and it was removed on 9/9 based on re accumulation rate consider PleruX.  Can be done outpatient if needed.    # Nausea vomiting secondary to chemotherapy  -Continue conservative measures including Zofran ,Compazine and Zyprexa     #Anemia secondary to chemotherapy  -Transfuse 1 unit of packed RBCs    #JAZZMINE secondary to cisplatin resolved    Recommendation:  -Symptomatic management with antinausea medications and transfusions  -Outpatient follow-up already set up on 9/15 with oncology MEHRAN  -Next infusion appointment on 9/21    Patient was seen and plan was dicussed with Dr.Gupta Tamiko Adair MD  Hematology/Oncology/Transplant Fellow   [facility]   Pgr :

## 2022-09-10 NOTE — PLAN OF CARE
[id number]  No acute event, afebrile, VSS.  RBC transfused without incident for Hgb level 7.1  Thoracentesis done at bedside.     NEURO: Alert and oriented x4.      RESPIRATORY: 1 LPM via NC all day, denies dizziness, and SOB.     CARDIO: VSS, denies dizziness, and extremity pain.      GI/: Denies N/V, BS active, BM x 1, AUOP       SKIN: Intact.      ACTIVITY: Assist x2     PAIN: Denies pain.    DLA: PICC double lumen, heparin locked.     BG: No      PLAN: Continue monitoring.       Goal Outcome Evaluation:    Plan of Care Reviewed With: patient

## 2022-09-11 NOTE — PROGRESS NOTES
Discharge  D: Orders for discharge and outpatient medications written.  I: Home medications and return to clinic schedule reviewed with patient. Discharge instructions and parameters for calling Health Care Provider reviewed. Patient left at 1630 accompanied by transport/NST.   A: Patient/family verbalized understanding and was ready for discharge.   P: Patient instructed to  medications in Pharmacy. Follow up as scheduled and printed on discharge summary.

## 2022-09-11 NOTE — PLAN OF CARE
"1900- 0700    /79 (BP Location: Right arm)   Pulse 103   Temp 97.7  F (36.5  C) (Oral)   Resp 18   Ht 1.88 m (6' 2\")   Wt 81.4 kg (179 lb 6.4 oz)   SpO2 96%   BMI 23.03 kg/m      HTN and tachy on 2 L NC, Afebrile. Denies nausea. Reports dyspnea on exertion. Pt reports RLQ abdominal; PRN Oxycodone x3 and 1 time dose of IV Dilaudid 0.5 mg for breakthrough. Pt reports mild relief from oxy. Would potentially benefit with increase in dose or frequency. LS clear and diminished. Sats intermittently dropped on RA. Stool incontinence x2; Large BMs. Slept between cares. Continue POC.   "

## 2022-09-11 NOTE — PROGRESS NOTES
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      Hypoxia improved after thoracentesis x 2 and chest tube placement. Patient s/p antiotics and management of continued pleural effusion by chest tube following thoracocentesis. Chest tube placed 9/6 and subsequently removed 9/9, had R thora 9/10. Continues to requires 2L NC oxygen to maintain saturations.     #Acute on chronic microcytic anemia  Hemoglobin level of 6.2 on 9/10, down from 7.4 9/9. Likely due to recent chemotherapy. No hemoptysis, melena, hematochezia, hematuria. Reports he has previously needed transfusions after chemo.  - Consented 9/10/22  - transfuse 1U pRBCs 9/10  - CTM    # AHRF 2/2 large left pleural effusion, with possible bacterial PNA, improving, all POA  # Pleural fluid culture (+) Staph epidermidis  Large left pleural effusion evident of chest CT on admission. Now s/p thoracentesis x2, chest tube placed for 9/6 and subsequent removal 9/9. Has been on 2-3L NC, occasionally switches to room air. Chest imaging now with large R sided pleural effusion.  - s/p azithromycin 500mg x 3 days, ceftriaxone 2g x 5 days   - Pulmonology following, appreciate recs  - Continue to wean O2 as able   - S/p chest tube to L pleural effusion,  removed 9/9    - S/p R thoracentesis 9/11  - O2 requirements ongoing despite stable pleural effusions, will discharge with home O2 and close follow-up with oncology    Patient has been assessed for Home Oxygen needs.     Pulse oximetry (SpO2) and Oxygen flow readings:    SpO2 =  88-89% on room air at rest while awake.    SpO2 improved to  97% on  2 liters/minute at rest.    SpO2 = 88 % on room air during activity/with exercise.    *SpO2 improved to 97 % on 2  liters/minute during activity/with exercise.    Vic Scott III is now in a chronic stable state and continues to require supplemental oxygen. Patient has continued oxygen desaturation due to pleural effusions.    Alternative treatment(s) tried or considered and deemed clinically infective for treatment of malignant pleural effusions include thoracentesis.  If portability is ordered, is the patient mobile within the home? yes    **Patients who qualify for home O2 coverage under the CMS guidelines require ABG tests or O2 sat readings obtained closest to, but no earlier than 2 days prior to the discharge, as evidence of the need for home oxygen therapy. Testing must be performed while patient is in the chronic stable state. See notes for O2 sats.**    #Acute Kidney Injury  Creatinine 2.06 9/10, 2.02 9/9; Suspect nephrotoxicity from recent chemotherapy (cisplatin, doxorubicin) with component of pre-renal d/t poor PO intake. Denies any difficulties urinating or changes in the quantity of urination.   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  Patient Doxil/Ifos initially on 11/2/2021, which was  complicated by isofamide neurotoxicity. It resolved without methene blue. His 2nd cycle of chemo was aborted because of this. He experienced a right proximal femur fracture on 06/05/2022 and underwent right hindquarter amputation by Dr. Grove and primary complex wound closure by Dr. Petersen on 06/17/2022. Pt's CT on 08/13/2022 showed new metastasis to abdominal wall and lungs. His oncologist Dr. Ambrocio suggested to start doxorubicin, cisplatin, and Neulasta. He was agreeable to follow with palliative.   High risk for platelet rich thrombosis on top of the cancer related thrombus of the pulmonary vein. The patient was initiated on doxorubicin and cisplatin during this admission.  - Continue aspirin 1 g every 8 hours  - Continue morphine 30 mg every 12 hours as a scheduled and oxycodone 15 mg every 4 hours as needed  - S/p cisplatin, doxorubicin and dexamethasone  - We highly appreciate input of oncology service     # Hypokalemia  - replete PRN    # MDD  - Continue PTA escitalopram  - Make contact with palliative care on discharge so they are aware of situation    Diet: Combination Diet Regular Diet Adult  Snacks/Supplements Adult: Ensure Clear; Between Meals   DVT ppx: heparin  Fluids: PO  Lines: PRESENT  Sung Catheter: Not present    DVT Prophylaxis: Enoxaparin (Lovenox) SQ  Code Status: Full Code    The patient's care was discussed with the Dr. Kim.    Olga Lidia Yi MD  [facility] 3    Interval History    NAEO. Desatted at night requiring 1-2 L NC. Documented hypoxia at rest. Pleural effusions stable on imaging. Pt feeling well today, really wants to get out of hospital. Had several BMs last night after aggressive bowel regimen. Had R sided abdominal pain worse in setting of moving bowels again but improved this AM. No n/v.     4 pt ROS otherwise negative      Physical Exam   Vital Signs: Temp: 97.4  F (36.3  C) Temp src: Oral BP: 109/65 Pulse: 87   Resp: 18 SpO2: 99 % O2 Device: Nasal  cannula Oxygen Delivery: 1 LPM  Weight: 179 lbs 6.4 oz  General Appearance: Well-appearing, no acute distress, appears stated age, watching football  Respiratory: Non-labored breathing on 2L NC. Diminished bibasilar breath sounds, no wheezes or crackles  Cardiovascular: RRR, Normal S1/S2, no murmur/gallop/rub  GI: Soft, non-tender, non-distended, non-tender  Psych: appropriate affect        Data   Recent Labs   Lab 09/11/22  0910 09/10/22  1824 09/10/22  0747 09/10/22  0609 09/09/22  0703 09/07/22  0809 09/05/22  0728   WBC 1.2*  --  4.1 4.3  --    < > 5.8   HGB 7.2* 8.5* 6.2* 6.1*  --    < > 7.9*   MCV 75*  --  73* 72*  --    < > 75*   PLT 75*  --  113* 110*  --    < > 271     --   --  138 134*   < > 137   POTASSIUM 3.8  --   --  3.5 3.3*   < > 3.4   CHLORIDE 100  --   --  100 96*   < > 101   CO2 29  --   --  29 28   < > 25   BUN 34.3*  --   --  33.5* 31.7*   < > 21.8*   CR 2.11*  --   --  2.06* 2.02*   < > 0.77   ANIONGAP 8  --   --  9 10   < > 11   VENTURA 8.7  --   --  8.6 8.4*   < > 8.9   *  --   --  94 119*   < > 95   ALBUMIN  --   --   --   --   --   --  3.1*   PROTTOTAL  --   --   --   --   --   --  6.0*   BILITOTAL  --   --   --   --   --   --  0.3   ALKPHOS  --   --   --   --   --   --  93   ALT  --   --   --   --   --   --  13   AST  --   --   --   --   --   --  24    < > = values in this interval not displayed.

## 2022-09-11 NOTE — PROGRESS NOTES
Home Oxygen Assessment Tools  Patient's 02 sat on RA at rest 92% for 5 minutes.   Patient is on RA  (02 device) Sp02 88-89%, after 3 minutes of standing on the side of the bed.   Patient 02 97% sat on 2 LPM with activity after 5 minutes.   Patient's Sp02 97% on 2 LPM/02% after 5 minutes of (standing, moving up and down activity).

## 2022-09-11 NOTE — PLAN OF CARE
[id number]  No acuate event, Afebrile, VSS.     NEURO: Alert and oriented x4.      RESPIRATORY: 1-2 LPM via NC to keep Sp02 above 89%, denies dizziness, and SOB.     CARDIO: VSS, denies dizziness, and extremity pain.      GI/: Denies N/V, BS active, AUOP.      SKIN: Intact.      ACTIVITY: Assist x1     PAIN: Yes.     DLA: PICC, double lumen.     BG: No      PLAN: Continue monitoring.             Goal Outcome Evaluation:    Plan of Care Reviewed With: patient

## 2022-09-11 NOTE — PROGRESS NOTES
Patient has been assessed for Home Oxygen needs. Oxygen readings:    *Pulse oximetry (SpO2) = 88-89% on room air at rest while awake.    *SpO2 improved to 97% on 2liters/minute at rest.    *SpO2 = 88% on room air during activity/with exercise.    *SpO2 improved to 97% on 2liters/minute during activity/with exercise.

## 2022-09-11 NOTE — PROGRESS NOTES
Care Management Discharge Note    Discharge Date: 09/12/2022       Discharge Disposition: Home    Discharge Services: NA    Discharge DME:  Oxygen    Discharge Transportation: family or friend will provide    Private pay costs discussed: Not applicable    PAS Confirmation Code: NA   Patient/family educated on Medicare website which has current facility and service quality ratings: NA     Education Provided on the Discharge Plan: yes   Persons Notified of Discharge Plans: patient   Patient/Family in Agreement with the Plan: yes      Handoff Referral Completed: External    Additional Information:  Received message from 7D charge nurseMelanie, that the patient is medically ready for discharge, but will need home oxygen.  Oxygen walk test, MD order, and face to face completed.  Assisted by calling [facility] with the oxygen referral.  [facility] will plan on delivering oxygen to the patients bedside in the next 1-2 hours.  Updated Melanie on 7D.  RNCC will remain available if further needs arise.      [facility](home oxygen)  Phone: [phone]     ADDENDUM 1600: Received update from 7D charge RNMelanie, that patient will discharge with a PICC line and needs PICC line cares arranged.  Contacted [facility] and spoke with Magdiel.  They had received a referral from clinic for chemo infusions.  Pt will have coverage for line care.  They will need an orders for line care and will contact pt's nursing agency, [facility], to see if they can teach pt tomorrow.  If home care is unable [facility] will see pt to provide education on line care.  Home care and home infusion orders placed.  Magdiel reported that [facility] will reach out to pt tomorrow to discuss delivery of supplies(heparin, saline, drsg change kits) and nursing visit for education.  This was communicated with Melanie.       [facility](RN/PT/OT)  Phone: [phone]    [facility](line cares)  Phone: [phone]  Fax: [phone]        Nely  HAVEN Hayden  Phone: [phone]  Pager: [phone]  To contact the weekend [facility] (0800 - 1630) Saturday and Sunday    Units: 4A, 4C, 4E, 5A and 5B- Pager 1: [phone]    Units: 6A, 6B, 6C, 6D- Pager 2: [phone]    Units: 7A, 7B, 7C, 7D, and 5C-Pager 3: [phone]

## 2022-09-11 NOTE — PROCEDURES
[facility]    Thoracentesis at Bedside    Date/Time: 9/10/2022 3:40 PM  Performed by: Zay Turner MD  Authorized by: Zay Turner MD       UNIVERSAL PROTOCOL   Site Marked: Yes  Prior Images Obtained and Reviewed:  Yes  Required items: Required blood products, implants, devices and special equipment available    Patient identity confirmed:  Verbally with patient, arm band, provided demographic data and hospital-assigned identification number  NA - No sedation, light sedation, or local anesthesia  Confirmation Checklist:  Patient's identity using two indicators, relevant allergies, procedure was appropriate and matched the consent or emergent situation and correct equipment/implants were available  Time out: Immediately prior to the procedure a time out was called    Universal Protocol: the Joint Commission Universal Protocol was followed    Preparation: Patient was prepped and draped in usual sterile fashion    ESBL (mL):  0    Procedure purpose: therapeutic and diagnostic  Indications: pleural effusion       ANESTHESIA    Anesthesia: Local infiltration  Local Anesthetic:  Lidocaine 1% without epinephrine  Anesthetic Total (mL):  8      SEDATION    Patient Sedated: No      PROCEDURE DETAILS   Preparation: skin prepped with ChloraPrep  Patient position: sitting  Ultrasound guidance: yes  Location: right posterior  Intercostal space: 7th  Puncture method: over-the-needle catheter  Needle size: 22  Catheter size: 8 Danish  Number of attempts: 1  Drainage amount: 800 ml  Drainage characteristics: bloody  Chest x-ray performed: yes  Chest x-ray interpreted by radiologist.  Chest x-ray findings: normal findings      PROCEDURE    Patient Tolerance:  Patient tolerated the procedure well with no immediate complications  Length of time physician/provider present for 1:1 monitoring during sedation: 0

## 2022-09-11 NOTE — DISCHARGE SUMMARY
[facility]  Discharge Summary - Medicine & Pediatrics       Date of Admission:  8/31/2022  Date of Discharge:  9/11/2022  Discharging Provider: Jey Kim MD  Discharge Service: Medicine Service, NIESHA TEAM 3    Discharge Diagnoses   Acute hypoxic respiratory failure 2/2 bilateral likely malignant pleural effusion  R pulmonary vein tumor thrombus  CAP  Metastatic pleomorphic sarcoma of R pelvis/femur  JAZZMINE  Acute on chronic microcytic anemia  Hypokalemia  Nausea 2/2 chemotherapy  Moderate malnutrition in the context of acute illness  MDD    Follow-ups Needed After Discharge   Follow-up Appointments     Follow Up ([facility])      Follow up with oncology on 9/15 as scheduled with CBC and BMP. Follow-up   with your PCP in 1-2 weeks    Appointments on [place] and/or [facility] (with [facility] or [facility]   provider or service). Call [phone] if you haven't heard regarding   these appointments within 7 days of discharge.             Unresulted Labs Ordered in the Past 30 Days of this Admission     Date and Time Order Name Status Description    9/10/2022 11:25 AM Cytology, non-gynecologic In process     9/10/2022 11:25 AM Pleural fluid Aerobic Bacterial Culture Routine Preliminary     9/1/2022  9:14 AM Fungal or Yeast Culture Routine Preliminary       These results will be followed up by oncology and PCP    Discharge Disposition   Discharged to home  Condition at discharge: Fair      Hospital Course   Vic Scott III was admitted on 8/31/2022 for admitted for hypoxemic respiratory failure 2/2 massive left pleural effusion and right pulmonary vein tumor thrombus.  The following problems were addressed during his hospitalization:     # Acute hypoxic respiratory failure 2/2 bilateral likely malignant pleural effusions, POA  # CAP  # R pulmonary vein tumor thrombus  - Pulmonology and oncology followed while inpatient. Pt is s/p L chest tube placement with improvement in  pleural effusion (removed 9/9) and R thoracentesis (800 ml) 9/10. Effusions stable on CXR at discharge. Oxygen saturations stable on 2L NC, home oxygen ordered for discharge. Follow-up with PCP and oncology to determine if pleurex catheter(s) would be beneficial in future  - s/p ceftriaxone and azithromycin for CAP treatment  - Home oxygen ordered (2L NC at all times)  - Follow-up with oncology re: R pulmonary vein tumor thrombus. No anticoagulation recommended while inpatient, defer to onc recs  - Follow-up with PCP in 1-2 weeks     #Acute on chronic microcytic anemia  Hemoglobin trended down following chemotherapy while inpatient. Received 1 unit PRBC 9/10 with improvement. No evidence of bleeding. Will be monitored closely as outpatient with oncology (CBC on 9/15 with follow-up)     #Acute Kidney Injury, plateaued  Significant worsening of kidney function following chemotherapy, likely 2/2 chemotherapy. No improvement with IVF. 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 Oncology followed while inpatient. Pt received chemotherapy (doxarubicin and cisplatin) while inpatient and will follow-up with oncology closely as outpatient (9/15 clinic).   - Given additional anti-nausea medications on discharge per onc recs  - Given additional 5mg oxycodone tabs x12 on discharge to use in combination with home 10 mg oxycodone tabs, up to 15 mg q4h PRN for cancer related pain  - Follow-up with plastic surgery for R pelvis surgical site management   - Maintain PICC at discharge per onc recs     # Hypokalemia  -  BMP on 9/15 with oncology     # MDD  - Continue PTA escitalopram  - Make contact with palliative care on discharge so they are aware of situation    Consultations This Hospital Stay   INTERNAL MEDICINE PROCEDURE TEAM ADULT IP CONSULT [facility] - THORACENTESIS  ONCOLOGY ADULT IP CONSULT  WOUND OSTOMY CONTINENCE NURSE  IP CONSULT  INTERNAL MEDICINE PROCEDURE TEAM ADULT IP CONSULT [facility] - THORACENTESIS  PHYSICAL THERAPY ADULT IP CONSULT  OCCUPATIONAL THERAPY ADULT IP CONSULT  PULMONARY GENERAL ADULT IP CONSULT  INTERVENTIONAL RADIOLOGY ADULT/PEDS IP CONSULT  PHYSICAL THERAPY ADULT IP CONSULT  OCCUPATIONAL THERAPY ADULT IP CONSULT  PLASTIC SURGERY IP CONSULT  NURSING TO CONSULT FOR VASCULAR ACCESS CARE IP CONSULT  INTERVENTIONAL RADIOLOGY ADULT/PEDS IP CONSULT  INTERNAL MEDICINE PROCEDURE TEAM ADULT IP CONSULT [facility] - THORACENTESIS  INTERNAL MEDICINE PROCEDURE TEAM ADULT IP CONSULT [facility] - THORACENTESIS  INTERNAL MEDICINE PROCEDURE TEAM ADULT IP CONSULT [facility] - THORACENTESIS    Code Status   Full Code       The patient was discussed with Dr. Julio Yi MD  [facility] [address] [place] [id number]  Phone: [phone]  ______________________________________________________________________    Physical Exam   Vital Signs: Temp: 97.8  F (36.6  C) Temp src: Oral BP: 120/69 Pulse: 100   Resp: 18 SpO2: 98 % O2 Device: Nasal cannula Oxygen Delivery: 1 LPM  Weight: 179 lbs 6.4 oz  General Appearance:  Well-appearing, no acute distress, appears stated age, watching football  Respiratory: Non-labored breathing on 2L NC. Diminished bibasilar breath sounds, no wheezes or crackles  Cardiovascular: RRR, Normal S1/S2, no murmur/gallop/rub  GI: Soft, non-tender, non-distended, non-tender  Neuro: AAOx4  Psych: appropriate affect      Primary Care Physician   Rajesh Vidales    Discharge Orders      Plastic Surgery Referral      Resume Home Care Services      Reason for your hospital stay    You were hospitalized with low oxygen levels due to fluid collections surrounding your lungs (pleural effusions). You had a chest tube to drain the fluid on the left side and had a procedure on 9/10 to remove fluid from the R side. Your shortness of breath improved but you still require some oxygen which you will be discharged home (use 2L/min at all times) with to keep your oxygen saturation above 90%. You should follow-up with oncology as scheduled on 9/15. Return to the emergency department if you have significant worsening of your shortness of breath or your oxygen levels are below 90% persistently despite 2L of oxygen.    It was a pleasure taking care of you,    Olga Lidia Yi,   Internal Medicine Resident PGY-3     Activity    Your activity upon discharge: activity as tolerated     Resume Home Care Services     Follow Up ([facility])    Follow up with oncology on 9/15 as scheduled with CBC and BMP. Follow-up with your PCP in 1-2 weeks    Appointments on [place] and/or [facility] (with [facility] or [facility] provider or service). Call [phone] if you haven't heard regarding these appointments within 7 days of discharge.     Full Code     Oxygen Adult/Peds    Oxygen Documentation:   I certify that this patient, Vic Scott III has been under my care (or a nurse practitioner or physican's assistant working with me). This is the face-to-face encounter for oxygen medical necessity.      Vic Scott III is now in a chronic stable state and continues to require supplemental oxygen. Patient has continued oxygen desaturation due to pleural effusions.    Alternative treatment(s) tried or considered and deemed clinically infective for treatment of malignant pleural effusions include thoracentesis.  If portability is ordered, is the patient mobile within the home? yes    **Patients who qualify for home O2 coverage under the CMS guidelines require ABG tests or O2 sat readings  obtained closest to, but no earlier than 2 days prior to the discharge, as evidence of the need for home oxygen therapy. Testing must be performed while patient is in the chronic stable state. See notes for O2 sats.**     Diet    Follow this diet upon discharge: Orders Placed This Encounter      Snacks/Supplements Adult: Ensure Clear; Between Meals      Combination Diet Regular Diet Adult     Check Out Appointment Request    Schedule Nicole inj on Thursday 9/8.       Significant Results and Procedures   Results for orders placed or performed during the hospital encounter of 08/31/22   CT Chest Pulmonary Embolism w Contrast     Value    Radiologist flags Pulmonary vein thrombus (Urgent)    Narrative    EXAMINATION: CTA pulmonary angiogram, 8/31/2022 9:36 PM     COMPARISON: None.    HISTORY: CT dated 8/13/2022. PET dated 1/11/2022    TECHNIQUE: Volumetric helical acquisition of CT images of the chest  from the lung apices to the kidneys were acquired after the  administration of 61 mL of Isovue-370 IV contrast x 2 due to  suboptimal technique and bolus timing on the initial CT.   Post-processed multiplanar and/or MIP reformations were obtained,  archived to PACS and used in interpretation of this study.     FINDINGS:    Tubes/Lines: Right chest wall Port-A-Cath and left arm PICC tips at  the superior cavoatrial junction.    Contrast bolus is: suboptimal.  Exam is negative for acute pulmonary  embolism. Suboptimal evaluation of the subsegmental pulmonary arteries  related to contrast timing. The exam is positive for pulmonary vein  embolism with a filling defect in the right pulmonary vein (series 14,  image 135-145) which arises from a hypodense rounded mass in the right  upper lobe.    Mediastinum:  Heart is not enlarged. No pericardial effusion. Normal caliber  thoracic aorta and main pulmonary artery. Conventional great arch  anatomy.     Lungs:  There is a large left-sided effusion with complete left lower  lobe  atelectasis. There is a small right pleural effusion. There are  innumerable hypoattenuating masses throughout bilateral lung fields  which are new since 1/11/2022 and progressed since CT abdomen dated  8/13/2022.    Visualized upper abdomen: Unremarkable.    Bones and soft tissues: No acute or suspicious osseous lesions.      Impression    IMPRESSION:   1. Exam is positive for pulmonary vein thrombus arising from a  pulmonary vein in the right upper lobe. The thrombus extends from a  pulmonary mass and most likely represents tumor thrombus.  2. Exam is negative for pulmonary artery embolism though there is  suboptimal contrast timing resulting in diminished evaluation of the  subsegmental pulmonary arteries.  3. Massive left-sided effusion results in complete left lower lobe  atelectasis and a moderate amount of left upper lobe atelectasis.  4. Extensive multifocal metastatic pulmonary masses are new since  1/11/2022 and have progressed since 8/13/2022.     [Urgent Result: Pulmonary vein thrombus]    Finding was identified on 8/31/2022 9:40 PM.     Dr. Joel was contacted by Dr. Orellana at 8/31/2022 10:09 PM and  verbalized understanding of the urgent finding.     I have personally reviewed the examination and initial interpretation  and I agree with the findings.    DEWAYNE KEMP MD         SYSTEM ID:  [id number]   XR Chest Port 1 View    Narrative    EXAM:  XR CHEST PORT 1 VIEW    INDICATION: s/p left thoracentesis    COMPARISON:  Chest CT 8/31/2022    FINDINGS:  Single AP view of the chest. Right Port-A-Cath terminating over the  cavoatrial junction. Left approach PICC terminating over the right  atrium.    Indistinct cardiac borders. Low lung volumes. Slightly reduced but  still persistent large left pleural effusion with adjacent  atelectasis. Multiple nodular opacities within the right lung. No  pneumothorax. Unremarkable upper abdomen. No acute bony lesions.      Impression    IMPRESSION:  1.   Slightly reduced but persistent large left pleural effusion status  post thoracentesis.  2.  Redemonstration of multiple metastatic pulmonary masses.    I have personally reviewed the examination and initial interpretation  and I agree with the findings.    KATYA HALE MD         SYSTEM ID:  [id number]   XR Chest Port 1 View    Narrative    Exam:  Chest X-ray 9/3/2022 11:04 AM    History: follow up left sided pleural effusion    Comparison: 9/1/2022    Findings:   Single portable AP view of the chest. Increased large left sided  pleural effusion. Unchanged bilateral pulmonary opacities. Cardiac  silhouette is obscured. No pneumothorax. Right chest wall Port-A-Cath  terminates near the cavoatrial junction and the left upper extremity  PICC terminates in the right atrium.      Impression    Impression:   1.  Increased large left sided pleural effusion. Unchanged bilateral  pulmonary metastases.     KATYA HALE MD         SYSTEM ID:  [id number]   XR Chest Port 1 View    Narrative    EXAM:  XR CHEST PORT 1 VIEW    INDICATION: s/p left thoracentesis    COMPARISON:  Chest x-ray 9/3/2022, thoracentesis 9/3/2022    FINDINGS:  Single AP view of the chest. Right chest Port-A-Cath terminating over  the cavoatrial junction. Left PICC terminates over the right atrium.    Partially obscured cardiac silhouette. Decreased left pleural effusion  with residual moderate left pleural effusion and adjacent opacities.  Bilateral nodular opacities. No pneumothorax. No acute bony lesions.      Impression    IMPRESSION:  1.  Decreased but still present residual moderate left pleural  effusion with adjacent atelectasis.  2.  Unchanged bilateral pulmonary metastases.    I have personally reviewed the examination and initial interpretation  and I agree with the findings.    ANSHUL MYERS MD         SYSTEM ID:  [id number]   XR Chest Port 1 View    Narrative    Exam: XR CHEST PORT 1 VIEW, 9/4/2022 2:23 PM    Indication: evaluate pleural  effusion    Comparison: 9/3/2022 chest x-ray    Findings:   Right chest port catheter tip terminates in the lower SVC/cavoatrial  junction. Left PICC line tip terminates in a similar position. The  cardiac silhouette is obscured. Well-defined metastatic lesions in the  aerated lung. Increasing moderate to large left pleural effusion, and  associated atelectasis. No pneumothorax. No right-sided effusion.      Impression    Impression:   1. Increasing moderate to large left pleural effusion.  2. Again noted metastatic pulmonary disease.    I have personally reviewed the examination and initial interpretation  and I agree with the findings.    KATYA HALE MD         SYSTEM ID:  [id number]   XR Chest Port 1 View    Narrative    Chest radiograph  9/5/2022 6:19 AM      HISTORY: Left pleural effusion    COMPARISON: 9/4/2022    FINDINGS:   Single AP view of the chest. Stable right chest port and left arm  PICC. The mediastinum is obscured by the largely unchanged moderate to  large left pleural effusion, perihilar and bibasilar interstitial and  airspace opacities, as well as innumerable pulmonary metastases. No  pneumothorax or significant right pleural effusion.      Impression    IMPRESSION:   1. Largely unchanged moderate to large left pleural effusion.   2. Unchanged perihilar and bibasilar opacities.    I have personally reviewed the examination and initial interpretation  and I agree with the findings.    ANSHUL MYERS MD         SYSTEM ID:  [id number]   IR Chest Tube Place Non Tunneled Left    Narrative    PROCEDURES 9/6/2022 3:34 PM:  Ultrasound guided chest tube placement.     Clinical History: 14 Fr Chest tube placement requested. Background  history of recurrent malignant right pleural effusion requiring  frequent thoracentesis.    Comparisons: Chest x-ray 9/5/2022    Staff Radiologist: JOSEPH White MD    Fellow(s)/Resident(s): Edmar Galicia MD    Medications: The patient was placed on continuous monitoring.  No  intravenous sedation administered. Vital signs monitored by nursing  staff under Interventional Radiologists supervision. The patient  remained stable throughout the procedure.    Contrast: No intravenous contrast administered.    PROCEDURE: The patient understood the limitations, alternatives, and  risks of the procedure and requested the procedure be performed. Both  written and oral consent were obtained.    The patient was placed in the upright sitting position. Limited  pre-procedural ultrasound demonstrated adequate sized fluid collection  for drainage. The left back was prepped and draped in the usual  sterile fashion. Ten to one volume mixture of 1% lidocaine without  epinephrine buffered with 8.4% bicarbonate solution was used for local  anesthesia.     Under ultrasound guidance, a 5 Syrian Globel Direct catheter  needle was advanced into the left pleural space. Needle removed.  Permanent copy of the image documenting catheter placement was entered  into the patients record.    Catheter removed over guidewire. Track serially dilated over guidewire  to 14 Syrian size. 14 Syrian non-locking catheter advanced over  guidewire and placed as a left chest tube. Guidewire and introducer  removed. Approximately 100 cc fluid was aspirated and submitted for  laboratory evaluation as ordered by the ordering team. Catheter placed  to water seal chest tube drainage. 2-0 nylon catheter-retaining suture  and sterile dressing applied. No immediate complication.      Impression    IMPRESSION:   1. Left ultrasound guided 14 Syrian non-locking chest tube placement.  Sample of fluid sent to the lab. Catheter placed to water seal chest  drainage, 20 cm water suction.  2. Tube outputs should be monitored and recorded each shift. When  pleural collection resolves, tube may be removed.      I, BRANDY TALAVERA MD, attest that I was present in the procedure room  for the entire procedure.    I have personally reviewed the  examination and initial interpretation  and I agree with the findings.    BRANDY TALAVERA MD         SYSTEM ID:  [id number]   XR Chest Port 1 View    Narrative    EXAM: XR Chest 1 view 9/7/2022 1:38 PM      HISTORY: Chest tube placed 9/6. Monitoring pleural effusion..    COMPARISON: 9/5/2022.     TECHNIQUE: Frontal view of the chest.    FINDINGS: Right chest wall port catheter with tip in the right atrium.  Left-sided PICC with tip in the high right atrium.  Interval placement  of left basilar chest tube.  Trachea is midline. Cardiac mediastinal silhouette is partially  obscured. Stable perihilar and bibasilar patchy airspace and  interstitial opacities. Decreased large left pleural effusion. New  small-moderate right pleural effusion with costophrenic angle blunting  and silhouetting of the hemidiaphragm. No pneumothoraces. The osseous  portion of the upper abdomen is within normal limits.      Impression    IMPRESSION:   1. Decreased left basilar pleural effusion. Left basilar chest tube in  place.  2. New small-moderate right pleural effusion.  3. Stable perihilar and bibasilar patchy airspace and interstitial  opacities.  4. Other support devices are stable.    I have personally reviewed the examination and initial interpretation  and I agree with the findings.    GDOWIN ENRIQUEZ MD         SYSTEM ID:  [id number]   XR Chest Port 1 View     Value    Radiologist flags left chest tube (Urgent)    Narrative    EXAM: XR Chest 1 view 9/8/2022 9:18 AM      HISTORY: Monitoring pleural effusion after chest tube placement..    COMPARISON: Previous day.     TECHNIQUE: Frontal view of the chest.    FINDINGS: Right chest wall miquel catheter with tip in the low SVC.  Left-sided PICC with tip in the right atrium. Left-sided chest tube in  appears kinked.  Trachea is midline. Cardiac and mediastinal silhouette is partially  obscured. Stable interstitial and airspace opacities bilaterally.  Stable bilateral moderate pleural  effusions. No pneumothoraces.      Impression    IMPRESSION:   1. Stable interstitial and airspace opacities bilaterally.  2. Stable bilateral moderate pleural effusions.  3. Left chest appears kinked.    [Access Center: left chest tube]    This report will be copied to the [facility] to ensure a  provider acknowledges the finding. Access Center is available Monday  through Friday 8am-3:30 pm.     I have personally reviewed the examination and initial interpretation  and I agree with the findings.    BETTY FARR MD         SYSTEM ID:  [id number]   CT Chest w/o Contrast    Narrative    Examination: CT CHEST W/O CONTRAST, 9/9/2022 10:21 AM     HISTORY: 47y M with metastatic osteosarcoma and pleural effusions s/p  chest tube placement. Now possible kink in tube. CT to re-eval  effusions.    Comparison: Radiograph 9/8/2022, CT 8/31/2022    TECHNIQUE: Helical acquisition of CT images from the lung apices to  the kidneys without IV contrast. Coronal and axial MIP images were  reconstructed from the source data.    FINDINGS:     Thorax:    Right chest wall miquel catheter with tip in the right atrium.  Left-sided PICC with tip in the right atrium.    There is no definite axillary, hilar, or mediastinal lymphadenopathy.  Stable metastatic implant adjacent to the aortic arch. Conventional  aortic arch branching pattern. The heart is normal in size without  significant pericardial effusion. There are no significant coronary  calcifications.     Lungs:  The trachea and central airways are clear. Redemonstration of the  innumerable metastatic lesions, the largest measuring 4.9 x 4.9 cm in  the left upper lobe. Decreased left loculated effusion. Moderate right  loculated effusion. There is acute angle of the left basilar chest  tube as it enters the pleural space. No pneumothorax.    Upper abdomen: Limited evaluation of the upper abdomen.     Bones: No acute or aggressive osseus abnormality.      Impression     IMPRESSION:   1. Left basilar chest tube is kinked as it enters the pleural space.  2. Decreased left loculated effusion  3. Moderate loculated effusion.  4. Redemonstration of multifocal metastatic lesions of the lungs.    I have personally reviewed the examination and initial interpretation  and I agree with the findings.    BETTY FARR MD         SYSTEM ID:  [id number]   XR Chest Port 1 View    Narrative    Exam: XR CHEST PORT 1 VIEW, 9/10/2022 9:08 AM    Indication: Pleural fluid eval s/p chest tube removal 9/8    Comparison: 9/9/2022    Findings:     Upright portable AP chest demonstrating unchanged at least moderate  right malignant effusion status post right chest tube removal. Stable  right IJ Port-A-Cath and left arm PICC line tips projecting over the  cavoatrial junction. Moderate left-sided pleural effusion with poorly  aerated left lower lobe appears unchanged. Revisualization of multiple  large pulmonary metastatic masses and nodules. No appreciable  pneumothorax. No acute upper abdominal pathology. Focal metallic  opacities projecting over the left upper abdomen and central abdomen,  possibly external to the patient      Impression    Impression:  No significant change in right greater than left moderate  malignant effusions status post right chest tube removal. No  appreciable pneumothorax. Stable additional support devices.  Similar-appearing bilateral nodular opacities/masses.    I have personally reviewed the examination and initial interpretation  and I agree with the findings.    SHELIA TONG MD         SYSTEM ID:  [id number]   XR Chest Port 1 View    Narrative    EXAM: XR Chest 1 view 9/10/2022 9:34 PM      HISTORY: post thoracentesis.    COMPARISON: Same day  at 0852.     TECHNIQUE: Frontal view of the chest.    FINDINGS: Right chest wall catheter with tip in the right atrium.  Left-sided PICC with tip in the right atrium. Trachea is midline.  Cardiomediastinal silhouette is stable. Stable  pulmonary  nodules/masses and interstitial opacities. Continued bibasilar  atelectasis. Decreased large right pleural effusion. Stable left  moderate pleural effusion. No pneumothoraces.      Impression    IMPRESSION:   1. Decreased large right pleural effusion. No pneumothorax.  2. Stable left moderate pleural effusion.  3. Stable pulmonary nodules/masses and interstitial opacities..    I have personally reviewed the examination and initial interpretation  and I agree with the findings.    KRISTEN GRANGER DO         SYSTEM ID:  [id number]   XR Chest Port 1 View    Narrative    EXAM: XR CHEST PORT 1 VIEW  2022 8:15 AM     HISTORY:  pleural effusion re-eval       COMPARISON:  Chest x-ray 9/10/2022, chest CT 2022.    FINDINGS: AP radiograph of the chest. Right chest wall Port-A-Cath  with tip overlying the low SVC. Left arm PICC with tip overlying the  superior cavoatrial junction.    Unchanged partial silhouetting of the cardiac silhouette due to the  moderate bilateral pleural effusions with streaky bibasilar and dense  retrocardiac opacities. No pneumothorax. Unchanged nodular opacities  scattered throughout both lungs consistent with the known solid  nodular metastases. Visualized upper abdomen is unremarkable.      Impression    IMPRESSION:  1. Unchanged moderate bilateral pleural effusions with streaky  bibasilar and dense retrocardiac atelectasis versus infection.  2. Unchanged multifocal nodular metastases in both lungs.    I have personally reviewed the examination and initial interpretation  and I agree with the findings.    HANNA CHANG MD         SYSTEM ID:  [id number]   Echocardiogram Complete     Value    LVEF  55-60%    Narrative    [id number]  [id number]  [id number]  [id number]^ROSETTE^SULMA     [facility],[place]  Echocardiography Laboratory  [address] [place] [id number]     Name: MARIELA MICHAEL III  MRN: [id number]  : 1975  Study Date: 2022  03:17 PM  Age: 47 yrs  Gender: Male  Patient Location: [facility]  Reason For Study: Dyspnea  Ordering Physician: SULMA DINERO  Performed By: JIE Patel     BSA: 2.1 m2  Height: 74 in  Weight: 175 lb  BP: 135/83 mmHg  ______________________________________________________________________________  Procedure  Complete Portable Echo Adult. Contrast Optison. Echocardiogram with two-  dimensional, color and spectral Doppler performed. Optison (NDC #3814-0809-02)  given intravenously. Patient was given 6 ml mixture of 3 ml Optison and 6 ml  saline. 3 ml wasted.  ______________________________________________________________________________  Interpretation Summary  Global and regional left ventricular function is normal with an EF of 55-60%.  The right ventricle is normal size.  Global right ventricular function is normal.  IVC diameter <2.1 cm collapsing >50% with sniff suggests a normal RA pressure  of 3 mmHg.  No pericardial effusion is present.  ______________________________________________________________________________  Left Ventricle  Global and regional left ventricular function is normal with an EF of 55-60%.  Left ventricular size is normal. Diastolic function not assessed due to  tachycardia. Abnormal non-specific septal motion is present.     Right Ventricle  The right ventricle is normal size. Global right ventricular function is  normal. A right heart catheter is noted in the right ventricle.     Atria  Both atria appear normal.     Mitral Valve  The mitral valve is normal. Trace mitral insufficiency is present.     Aortic Valve  Aortic valve is normal in structure and function. The aortic valve is  tricuspid.     Tricuspid Valve  The tricuspid valve is normal. Trace tricuspid insufficiency is present.  Pulmonary artery systolic pressure cannot be assessed.     Pulmonic Valve  The pulmonic valve is normal. Trace pulmonic insufficiency is present.     Vessels  The aorta root is normal. The thoracic  aorta is normal. The pulmonary artery  cannot be assessed. The inferior vena cava was normal in size with preserved  respiratory variability. IVC diameter <2.1 cm collapsing >50% with sniff  suggests a normal RA pressure of 3 mmHg.     Pericardium  No pericardial effusion is present.     Miscellaneous  A left pleural effusion is present.     Compared to Previous Study  Previous study not available for comparison.  ______________________________________________________________________________  MMode/2D Measurements & Calculations  IVSd: 1.2 cm  LVIDd: 4.1 cm  LVIDs: 2.7 cm  LVPWd: 1.1 cm  FS: 34.9 %  LV mass(C)d: 159.3 grams  LV mass(C)dI: 77.6 grams/m2  Ao root diam: 3.1 cm  asc Aorta Diam: 3.0 cm  LVOT diam: 2.2 cm  LVOT area: 3.8 cm2  LA Volume (BP): 35.8 ml     LA Volume Index (BP): 17.5 ml/m2  RWT: 0.55     Doppler Measurements & Calculations  MV E max dimitry: 53.3 cm/sec  MV A max dimitry: 74.3 cm/sec  MV E/A: 0.72  MV dec time: 0.13 sec  PA V2 max: 112.9 cm/sec  PA max P.1 mmHg  PA acc time: 0.09 sec  E/E' av.3  Lateral E/e': 4.0  Medial E/e': 6.7     ______________________________________________________________________________  Report approved by: MD Jacoby Mclaughlin 2022 03:53 PM               Discharge Medications   Current Discharge Medication List      START taking these medications    Details   OLANZapine (ZYPREXA) 2.5 MG tablet Take 1 tablet (2.5 mg) by mouth At Bedtime  Qty: 30 tablet, Refills: 0    Associated Diagnoses: Secondary sarcoma of lung, unspecified laterality (H)      ondansetron (ZOFRAN ODT) 4 MG ODT tab Take 1-2 tablets (4-8 mg) by mouth every 8 hours as needed for nausea or vomiting  Qty: 30 tablet, Refills: 0    Associated Diagnoses: Secondary sarcoma of lung, unspecified laterality (H)      !! oxyCODONE (ROXICODONE) 5 MG tablet Take 1 tablet (5 mg) by mouth every 4 hours as needed for pain Can take one 5 mg tab in addition to your home 10 mg oxycodone for  total of 15 mg every 4 hours as needed for pain  Qty: 12 tablet, Refills: 0    Associated Diagnoses: Secondary sarcoma of lung, unspecified laterality (H)      polyethylene glycol (MIRALAX) 17 GM/Dose powder Take 17 g by mouth daily Hold for loose stools  Qty: 510 g, Refills: 0    Associated Diagnoses: Secondary sarcoma of lung, unspecified laterality (H)       !! - Potential duplicate medications found. Please discuss with provider.      CONTINUE these medications which have NOT CHANGED    Details   escitalopram (LEXAPRO) 10 MG tablet Take 1 tablet (10 mg) by mouth daily  Qty: 30 tablet, Refills: 1    Associated Diagnoses: Closed fracture of neck of right femur with routine healing, subsequent encounter      morphine (MS CONTIN) 30 MG CR tablet Take 1 tablet (30 mg) by mouth every 12 hours  Qty: 60 tablet, Refills: 0    Associated Diagnoses: Amputee, hip, right; Sarcoma (H); Closed fracture of neck of right femur, initial encounter (H)      multivitamin, therapeutic (THERA-VIT) TABS tablet Take 1 tablet by mouth daily  Qty: 30 tablet, Refills: 1    Associated Diagnoses: Sarcoma (H)      naproxen (NAPROSYN) 500 MG tablet Take 500 mg by mouth 2 times daily (with meals)      Nutritional Supplements (ENSURE ACTIVE HIGH PROTEIN) LIQD Take 1 Can by mouth 3 times daily (with meals)  Qty: 7110 mL, Refills: 1    Associated Diagnoses: Hypoalbuminemia      acetaminophen (TYLENOL) 325 MG tablet Take 3 tablets (975 mg) by mouth every 8 hours  Qty: 270 tablet, Refills: 1    Associated Diagnoses: Closed fracture of neck of right femur, initial encounter (H)      !! oxyCODONE IR (ROXICODONE) 10 MG tablet Take 1 tablet (10 mg) by mouth every 4 hours as needed for moderate to severe pain  Qty: 40 tablet, Refills: 0    Associated Diagnoses: Closed fracture of neck of right femur, initial encounter (H)      prochlorperazine (COMPAZINE) 10 MG tablet Take 1 tablet (10 mg) by mouth every 6 hours as needed (Nausea/Vomiting)  Qty: 30  tablet, Refills: 2    Associated Diagnoses: Osteosarcoma of bone (H); Adverse effect of antineoplastic and immunosuppressive drugs, initial encounter; Sarcoma (H)       !! - Potential duplicate medications found. Please discuss with provider.        Allergies   Allergies   Allergen Reactions     Blood Transfusion Related (Informational Only) Other (See Comments)     Patient has a history of a clinically significant antibody against RBC antigens.  A delay in compatible RBCs may occur.

## 2022-09-12 NOTE — PLAN OF CARE
Physical Therapy Discharge Summary    Reason for therapy discharge:    Discharged to home.    Progress towards therapy goal(s). See goals on Care Plan in [facility] electronic health record for goal details.  Goals partially met.  Barriers to achieving goals:   discharge from facility.    Therapy recommendation(s):    Continued therapy is recommended.  Rationale/Recommendations:  ongoing deficits in activity tolerance and endurance.

## 2022-09-13 NOTE — PROGRESS NOTES
Clinic Care Coordination Contact  [facility]: Post-Discharge Note  SITUATION                                                      Admission:    Admission Date: 08/31/22   Reason for Admission: Shortness of breath  Discharge:   Discharge Date: 09/11/22  Discharge Diagnosis: Acute hypoxic respiratory failure 2/2 bilateral likely malignant pleural effusion, R pulmonary vein tumor thrombus, CAP, Metastatic pleomorphic sarcoma of R pelvis/femur, JAZZMINE, Acute on chronic microcytic anemia, Hypokalemia, Nausea 2/2 chemotherapy    BACKGROUND                                                      Per hospital discharge summary and inpatient provider notes:    Vic Scott III is a 47 year old male with PMH significant for 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  Patient reports that intermittently over the past few days he has become short of breath, more so when laying down.  Today he got into a car and became more short of breath than previously and also experienced some palpitations.  He denies any chest pain or pressure.  He does report a cough briefly this morning while transferring from his bed to his wheelchair, but no consistent cough.  He denies normally being on oxygen at baseline.  He denies fever, chills, runny nose, congestion, sore throat.  Patient denies previous history of PE/DVT.  He denies any left leg swelling.       He does endorse right-sided abdominal pain near the site of his prior surgery and CT findings consistent with metastatic disease.  He does endorse some drainage and pain from the wound site, but indicates this is normal since his amputation/hemipelvectomy.  Patient  "indicates he is scheduled to start chemotherapy in 2 days.  Patient reports he does have home health care to assist him.    ASSESSMENT      Discharge Assessment  How are you doing now that you are home?: \"Hi yeah I think I got your voicemail. Things are improving yes.\"  How are your symptoms? (Red Flag symptoms escalate to triage hotline per guidelines): Improved  Do you feel your condition is stable enough to be safe at home until your provider visit?: Yes  Does the patient have their discharge instructions? : Yes  Does the patient have questions regarding their discharge instructions? : No  Were you started on any new medications or were there changes to any of your previous medications? : Yes  Does the patient have all of their medications?: Yes  Do you have questions regarding any of your medications? : No  Do you have all of your needed medical supplies or equipment (DME)?  (i.e. oxygen tank, CPAP, cane, etc.): Yes  Discharge follow-up appointment scheduled within 14 calendar days? : Yes  Discharge Follow Up Appointment Date: 09/15/22  Discharge Follow Up Appointment Scheduled with?: Specialty Care Provider (Oncology)    Post-op ([facility] Only)  If the patient had a surgery or procedure, do they have any questions for a nurse?: No      PLAN                                                      Outpatient Plan:      Follow Up ([facility])  Follow up with oncology on 9/15 as scheduled with CBC and BMP. Follow-up with your PCP in 1-2 weeks    Appointments on [place] and/or [facility] (with [facility] or [facility] provider or service). Call [phone] if you haven't heard regarding these appointments within 7 days of discharge.    Future Appointments   Date Time Provider Department Center   9/15/2022 11:30 AM Latosha Taylor CNP [facility]   9/21/2022  8:15 AM  MASONIC LAB DRAW [facility]CONL [facility]   9/21/2022  8:45 AM Latosha Taylor CNP [facility]   9/21/2022  9:30 AM  ONC INFUSION NURSE [facility]   9/22/2022  9:30 " AM  ONC INFUSION NURSE [facility]   9/27/2022  8:00 AM David Valentino MD [facility]   9/28/2022  9:00 AM KAREN Butt MD [facility]   11/4/2022  2:00 PM Leslie Ortiz MD [facility]         For any urgent concerns, please contact our 24 hour nurse triage line: [phone] ([phone])         MOSES Zhao  [phone]  [facility]

## 2022-09-13 NOTE — PROGRESS NOTES
BRIEF MEDICINE NOTE    Updated by microbiology lab that pt had single broth culture from R thoracentesis on 9/10 growing gram positive cocci in clusters. Pt clinically improving per discussion with MA documented today 9/13. Pt had staph epi on L thoracentesis as well, attributed to skin anmol/contamination. Consider this likely to be contamination as well. Given no clinical change so will not . Pt has close follow-up with oncology (9/15) and with PCP to further follow-up this result. Discussed with Dr. Kim, agrees with above.    Olga Lidia Yi MD  Internal Medicine PGY-3

## 2022-09-14 NOTE — PROGRESS NOTES
This is a recent snapshot of the patient's [facility] medical record.  For current drug dose and complete information and questions, call [phone] or In Basket pool, [facility] home infusion ([id number])  CSN Number:  [id number]

## 2022-09-17 PROBLEM — I63.9 STROKE (H): Status: ACTIVE | Noted: 2022-01-01

## 2022-09-17 PROBLEM — I63.9 CEREBROVASCULAR ACCIDENT (CVA), UNSPECIFIED MECHANISM (H): Status: ACTIVE | Noted: 2022-01-01

## 2022-09-17 NOTE — ED PROVIDER NOTES
ED Triage Provider Note  [facility]  Encounter Date: Sep 17, 2022    History:  No chief complaint on file.    Vic Scott III is a 47 year old male who presents to the ED with left leg and foot swelling since last night, mostly the foot.  No pain in the foot. Has right leg amputation because of sarcoma. Is on chemotherapy currently. Last infusion was a couple weeks ago. No fever. Right hand feels limp.  Can only move the thumb and index finger. Can't squeez the hand well. The hand symptoms started about 2:30 yesterday afternoon. He feels some numbness in the hand as well. The right wrist feels week too. No injury to the arm or hand. Had normal function of then hand yesterday. No fever, cough. Is on oxygen at home 2-3 L continuous for fluid in the lungs.  (history of left pleural effusion)    Review of Systems:  No chest pain    Exam:  /75 (BP Location: Right arm)   Pulse 119   Temp 99.4  F (37.4  C) (Oral)   Resp 16   SpO2 100%   General: No acute distress. Appears stated age.   Cardio: Regular rate, extremities well perfused  Resp: Normal work of breathing, grossly normal respiratory rate  Neuro: Alert. CN II-XII grossly intact.  Right hand weakness with  strength and with flexion of all 4 fingers.  Weakness with extension and flexion at the wrist.       Medical Decision Making:  Patient arriving to the ED with problem as above. A medical screening exam was performed. Orders initiated from Triage. The patient is appropriate to be immediately roomed.      Marisela Donovan MD on 9/17/2022 at 3:41 PM     aMrisela Donovan MD  09/17/22 [id number]

## 2022-09-17 NOTE — TELEPHONE ENCOUNTER
"Triage Call:     Pt calling to report that he had sudden loss off ability to move his right hand yesterday and called 911. Pt reports that the paramedics told the patient that his vital signs were \"normal\" and patient was not transported to the hospital.     Pt thought that the numbness was going away, but then suddenly today he is feeling this again and he is unable to make a fist with his right hand; which he normally can do. He can not move his fingers.     Pt is able to feel his hand    Disposition: Call 911. Pt was advised to call 911 and ask to be transported via ambulance to the hospital for evaluation per the protocol.     Pt agrees with this plan and is calling 911.     Qi Danielle RN  [facility] Nurse Advisor 12:13 PM 9/17/2022    Reason for Disposition    [1] Weakness (i.e., paralysis, loss of muscle strength) of the face, arm / hand, or leg / foot on one side of the body AND [2] sudden onset AND [3] present now (Exception: Bell's palsy suspected [i.e., weakness only on one side of the face, developing over hours to days, no other symptoms])    Additional Information    Negative: [1] SEVERE weakness (i.e., unable to walk or barely able to walk, requires support) AND [2] new-onset or worsening    Protocols used: NEUROLOGIC DEFICIT-A-AH      "

## 2022-09-17 NOTE — LETTER
[facility] [address] [address]  [phone]    FACSIMILE TRANSMITTAL SHEET    TO: Kayleen [facility]        FROM: Qi OROURKE  PHONE: [phone]  DATE: 09/19/22      NOTES/COMMENTS: NOÉ referral home PT/OT. Anticipate discharge today 9/19/22. Will fax discharge orders/home care orders when discharging.                                       IF YOU DID NOT RECEIVE THE CORRECT NUMBER OF PAGES OR THE FAX DID NOT COME THROUGH CLEARLY, PLEASE CALL THE SENDER     CONFIDENTIALITY STATEMENT: Confidential information that may accompany this transmission contains protected health information under state and federal law and is legally privileged. This information is intended only for the use of the individual or entity named above and may be used only for carrying out treatment, payment or other healthcare operations. The recipient or person responsible for delivering this information is prohibited by law from disclosing this information without proper authorization to any other party, unless required to do so by law or regulation. If you are not the intended recipient, you are hereby notified that any review, dissemination, distribution, or copying of this message is strictly prohibited. If you have received this communication in error, please destroy the materials and contact us immediately by calling the number listed above. No response indicates that the information was received by the appropriate authorized party

## 2022-09-17 NOTE — LETTER
[facility] [address] [address]  [phone]    FACSIMILE TRANSMITTAL SHEET    TO: [facility]   FAX NUMBER: [phone]       FROM: Qi OROURKE   PHONE: [phone]  DATE: 09/19/22      NOTES/COMMENTS: RAFY new referral home health PT/OT. Discharge today, 9/19/22.                                      IF YOU DID NOT RECEIVE THE CORRECT NUMBER OF PAGES OR THE FAX DID NOT COME THROUGH CLEARLY, PLEASE CALL THE SENDER     CONFIDENTIALITY STATEMENT: Confidential information that may accompany this transmission contains protected health information under state and federal law and is legally privileged. This information is intended only for the use of the individual or entity named above and may be used only for carrying out treatment, payment or other healthcare operations. The recipient or person responsible for delivering this information is prohibited by law from disclosing this information without proper authorization to any other party, unless required to do so by law or regulation. If you are not the intended recipient, you are hereby notified that any review, dissemination, distribution, or copying of this message is strictly prohibited. If you have received this communication in error, please destroy the materials and contact us immediately by calling the number listed above. No response indicates that the information was received by the appropriate authorized party

## 2022-09-17 NOTE — LETTER
[facility] [address] [address]  [phone]    FACSIMILE TRANSMITTAL SHEET    TO: Kayleen [facility]      FROM: Qi OROURKE  PHONE: [phone]  DATE: 09/22/22      NOTES/COMMENTS: [facility] discharge orders. Discharge today, 9/22/22.                                      IF YOU DID NOT RECEIVE THE CORRECT NUMBER OF PAGES OR THE FAX DID NOT COME THROUGH CLEARLY, PLEASE CALL THE SENDER     CONFIDENTIALITY STATEMENT: Confidential information that may accompany this transmission contains protected health information under state and federal law and is legally privileged. This information is intended only for the use of the individual or entity named above and may be used only for carrying out treatment, payment or other healthcare operations. The recipient or person responsible for delivering this information is prohibited by law from disclosing this information without proper authorization to any other party, unless required to do so by law or regulation. If you are not the intended recipient, you are hereby notified that any review, dissemination, distribution, or copying of this message is strictly prohibited. If you have received this communication in error, please destroy the materials and contact us immediately by calling the number listed above. No response indicates that the information was received by the appropriate authorized party

## 2022-09-18 NOTE — DISCHARGE SUMMARY
[facility]    Neurology Stroke Discharge Summary    Date of Admission: 9/17/2022  Date of Discharge: 09/22/2022    Disposition: Discharged to home  Primary Care Physician: Rajesh Vidales      Admission Diagnosis:   #Acute ischemic strokes of Left hemisphere due to other etiology (hypercoagulability of malignancy)    Discharge Diagnosis:   #Acute ischemic strokes of Left hemisphere due to other etiology (hypercoagulability of malignancy)    Problem Leading to Hospitalization (from [facility]):   Vic Scott III is a 47 year old male with past medical history significant for 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 The patient was recently discharged from [facility] after left-sided pleural effusion and right upper lobe pulmonary vein thrombosis (felt to be tumor clot). The patient presented to the ED for right hand and finger weakness which started around 2 PM on 9/16/2022.  The patient was watching TV on his wheelchair when he noticed that he was unable to move the fingers on his right hand except for his thumb. Patient reported that EMS was called who told him it was likely due to dehydration and asked him to go into the hospital, but he did not. The next day, when the patient was talking to his infusion nurse over the phone, the nurse suggested that he should come to the hospital to get it checked out as the symptoms did not subside (but did not get worse). Denies other symptoms. Initial CBC showed that the patient had pancytopenia with hemoglobin of 6.3 and platelet count of 85, white blood cell count of 1.2 and RBC count of 2.69.     Please see H&P dated 9/17/2022 for further details about presentation.    Brief Hospital Course:   47-year-old male with PMHx of high-grade pleomorphic sarcoma of right pelvis and femur status post postchemotherapy and  amputation, CT concerning for multiple hypodense mass within the lateral abdominal wall, multiple pulmonary nodules concerning for metastatic disease, with recent history of right upper lobe pulmonary vein thrombosis presented with weakness of the fingers of his R hand, and very subtle weakness of his arm too. NIHSS of 3. Initial CT/CTA/ echo was unremarkable. His blood counts also indicated pancytopenia with a Hgb of 6.3 and he was transfused with 2 units of pRBC. LDL - 112, HbA1c 4.7. MRI showed Scattered acute left hemispheric hyperintensities on DWI with corresponding hypointensity on ADC- over posterior frontal centrum semiovale , temporoparietal cortex. IV thrombolytics were not given because of being outside 4.5-hour window and minor symptoms.  Endovascular not done because of absence of LVO. USG Doppler of leg also done due to 1+ pitting edema, revealed no DVT.      Given patient's history of malignancy and recent pulmonary venous thrombosis, it was felt that the etiology is likely hypercoagulability of malignancy, and hence he is being started on eliquis 5 mg BID. Oncology were consulted for advice regarding pancytopenia and anticoagulation parameters, they were okay with starting anticoagulation such as eliquis as long as platelet count is above 50. He should follow up with a neurologist (referral placed) as well as outpatient occupational therapy.    On 9-20 the patient started to develop profuse diarrhea.  He was stooling upwards of 20 times per day on day 1.  He developed a tender abdomen and a CT abdomen was performed demonstrating a partial small bowel obstruction and worsened intra-abdominal tumor burden.  General surgery saw and evaluated the patient and felt that surgery was not acutely necessary.  He was made n.p.o. and put on maintenance IV fluids.  Over the following days he was able to better tolerate a diet and was tolerating a regular diet on the day of discharge.  Loose stools dissipated  over the course of 2 days as well, C. difficile and enteric panel both negative during this time.    Problem list and follow up plan  1. L hemipsheric stroke 2/2 malignancy  -Continue elliquis  -continue statin  -Follow up with stroke team  -outpatient OT/PT    2. pSBO 2/2 malignancy  3. Diarrhea w/ electrolyte abnormalities  -follow up with PCP in 1 week   -BMP to be drawn to 9/23  -Take 10 day course of Mag/K supplements    3. Malignancy   4. Pancytopenia  -Follow up with oncology     Rehab evaluation: OT and PT. - home with assist; home with home care occupational therapy.    Smoking Cessation: patient is not a smoker    BP Long-term Goal: 140/90 or less    Antithrombotic/Anticoagulant Agent: apixaban (Eliquis) 5 mg BID.    Hgb A1C Goal: < 7.0    Complications: None.         PERTINENT INVESTIGATIONS    Labs  Lipid Panel: Recent Labs   Lab Test 09/17/22  1607   CHOL 183   HDL 48   *   TRIG 116     A1C:   Lab Results   Component Value Date    A1C 4.7 09/17/2022     INR:   Recent Labs   Lab 09/17/22  1607   INR 1.04      Coag Panel / Hypercoag Workup: Not indicated  Pending test results:     Echo: Normal biventricular function.Left ventricular size is normal. Global and regional left ventricular function  is normal with an EF of 55-60%.The atrial septum is intact as assessed by color Doppler and agitated saline  bubble study .    Imaging:   MRI/Head CT There are scattered areas of diffusion restriction in the left  cerebral hemisphere including, but not limited to hand motor region,  posterior frontal centrum semiovale, inferior frontal gyrus,  temporoparietal cortex and periventricular white matter. Associated  T2/FLAIR hyperintensity. No associated contrast enhancement.   Intracranial Vasculature Head CTA demonstrates no aneurysm or stenosis of the major  intracranial arteries. Fetal origin of left PCA.   Cervical Vasculature Neck CTA demonstrates no stenosis of the major cervical arteries. The  origins of  the great vessels from the aortic arch are patent. The  normal distal right internal carotid artery measures 5 mm. The normal  distal left internal carotid artery measures 5 mm.      Endovascular procedure: None     Cardiac Monitoring: Patient had > 24 hrs of cardiac monitor while in hospital.    Findings: No atrial fibrillation was found.    Sleep Apnea Screen:   Questions/Answers      1. Prior to your stroke, have you been told that you snore? No.    2. Prior to your stroke, have you been told that you struggle to breath while you are sleeping? No.    3. Prior to your stroke, do you feel tired and sleepy even after getting a normal night of sleep? No.    Sleep Apnea Screen Findings: Patient has 0-1 symptoms of sleep apnea.  Further sleep study is not recommended at this time.    PHQ-9 Depression Screen Score: 2    Education discussed with: patient and family on follow-up recommendations/plan, 911 call if warning signs of stroke and results (MRI).    During daily rounds, the plan of care was discussed and developed with patient and family.  Plan of care includes: anticoagulation.    PHYSICAL EXAMINATION  Vital Signs:  B/P: 130/76, T: 98.4, P: 95, R: 18    General Exam  General:  patient lying in bed without any acute distress    HEENT:  normocephalic/atraumatic  Cardio:  Appears well perfused  Pulmonary:  no respiratory distress  Abdomen:  soft  Extremities:  1+ edema on LLE extending upto midcalf.  Skin:  intact      Neuro Exam  Mental Status:  alert, oriented x 3, follows 3 step commands, speech clear and fluent, naming and repetition normal , able to spell world forwards and backwards.   Cranial Nerves:  visual fields intact, PERRL, EOMI with normal smooth pursuit, facial sensation intact and symmetric, facial movements symmetric, hearing not formally tested but intact to conversation, palate elevation symmetric and uvula midline, no dysarthria, shoulder shrug strong bilaterally, tongue protrusion  midline  Motor:  normal muscle tone and bulk, no abnormal movements, able to move all limbs spontaneously        Delt Bi Tri Wrist Flexion/  Extension Iliopsoas Quadriceps Hamstrings Tibialis Anterior Gastroc     C5 C6 C7 C8/T1 L2 L3 L4-S1 L4 S1   R 5/5 5-/5 5-/5 5-/5 Unable to test Unable to test Unable to test Unable to test Unable to test   L 5/5 5/5 5/5 5/5 5/5 5/5 5/5 5/5 5/5      L hand motor strength- 5/5 throughout  R hand motor strength:  Thumb abduction, adduction, flexion, extension 5 out of 5.  Opponens pollicis 5/5  Index finger flexion, extension at the MCP 4+/5  Middle finger flexion, extension at MCP 4/5  Ring finger flexion at MCP 3+ out of 5, and extension 3/5  Little finger flexion 3+/5, extension 3/ 5.  Dorsal and lumbar interossei- first- 4/5, second 4-/5, third and fourth 2/5.   strength 4/5     Reflexes:  toe down-going, no clonus.  Sensory:  light touch sensation intact and symmetric throughout upper and lower extremities, pinprick sensation intact and symmetric, no extinction on double simultaneous stimulation   Coordination:  normal finger-to-nose and heel-to-shin bilaterally without dysmetria  Station/Gait:  unable to test due to R leg amputated    National Institutes of Health Stroke Scale (on day of discharge)  NIHSS Total Score: 2    Modified Donis Score (Discharge)  4-Moderately severe disability; unable to walk without assistance and unable to attend to own bodily    Medications    Current Discharge Medication List      START taking these medications    Details   apixaban ANTICOAGULANT (ELIQUIS) 5 MG tablet Take 1 tablet (5 mg) by mouth 2 times daily  Qty: 60 tablet, Refills: 3    Associated Diagnoses: Cerebrovascular accident (CVA) due to embolism of middle cerebral artery, unspecified blood vessel laterality (H)      atorvastatin (LIPITOR) 80 MG tablet Take 1 tablet (80 mg) by mouth every evening  Qty: 90 tablet, Refills: 3    Associated Diagnoses: Cerebrovascular accident  (CVA) due to embolism of middle cerebral artery, unspecified blood vessel laterality (H)         CONTINUE these medications which have NOT CHANGED    Details   acetaminophen (TYLENOL) 325 MG tablet Take 3 tablets (975 mg) by mouth every 8 hours  Qty: 270 tablet, Refills: 1    Associated Diagnoses: Closed fracture of neck of right femur, initial encounter (H)      escitalopram (LEXAPRO) 10 MG tablet Take 1 tablet (10 mg) by mouth daily  Qty: 30 tablet, Refills: 1    Associated Diagnoses: Closed fracture of neck of right femur with routine healing, subsequent encounter      morphine (MS CONTIN) 30 MG CR tablet Take 1 tablet (30 mg) by mouth every 12 hours  Qty: 60 tablet, Refills: 0    Associated Diagnoses: Amputee, hip, right; Sarcoma (H); Closed fracture of neck of right femur, initial encounter (H)      multivitamin, therapeutic (THERA-VIT) TABS tablet Take 1 tablet by mouth daily  Qty: 30 tablet, Refills: 1    Associated Diagnoses: Sarcoma (H)      naproxen (NAPROSYN) 500 MG tablet Take 500 mg by mouth 2 times daily (with meals)      Nutritional Supplements (ENSURE ACTIVE HIGH PROTEIN) LIQD Take 1 Can by mouth 3 times daily (with meals)  Qty: 7110 mL, Refills: 1    Associated Diagnoses: Hypoalbuminemia      OLANZapine (ZYPREXA) 2.5 MG tablet Take 1 tablet (2.5 mg) by mouth At Bedtime  Qty: 30 tablet, Refills: 0    Associated Diagnoses: Secondary sarcoma of lung, unspecified laterality (H)      ondansetron (ZOFRAN ODT) 4 MG ODT tab Take 1-2 tablets (4-8 mg) by mouth every 8 hours as needed for nausea or vomiting  Qty: 30 tablet, Refills: 0    Associated Diagnoses: Secondary sarcoma of lung, unspecified laterality (H)      oxyCODONE IR (ROXICODONE) 10 MG tablet Take 1 tablet (10 mg) by mouth every 4 hours as needed for moderate to severe pain  Qty: 40 tablet, Refills: 0    Associated Diagnoses: Closed fracture of neck of right femur, initial encounter (H)      polyethylene glycol (MIRALAX) 17 GM/Dose powder Take 17  g by mouth daily Hold for loose stools  Qty: 510 g, Refills: 0    Associated Diagnoses: Secondary sarcoma of lung, unspecified laterality (H)      prochlorperazine (COMPAZINE) 10 MG tablet Take 1 tablet (10 mg) by mouth every 6 hours as needed (Nausea/Vomiting)  Qty: 30 tablet, Refills: 2    Associated Diagnoses: Osteosarcoma of bone (H); Adverse effect of antineoplastic and immunosuppressive drugs, initial encounter; Sarcoma (H)             Additional recommendations and follow up:       Basic metabolic panel     Magnesium     Home Infusion Referral      Home Care Referral      Primary Care - Care Coordination Referral      Wound Care Referral      Follow Up ([facility])    Follow up with primary care provider, Rajesh Vidales, within 7 days to evaluate medication change and regarding new diagnosis.  No follow up labs or test are needed.      Appointments on [place] and/or [facility] (with [facility] or [facility] provider or service). Call [phone] if you haven't heard regarding these appointments within 7 days of discharge.     Reason for your hospital stay    You were here for evaluation of your right hand weakness.  We found on the MRI that you had a stroke on the left side of your brain which could explain your symptoms.  We then try to work you up for causes of that stroke.  It seems that your stroke was caused because of both because hypercoagulability of malignancy.  In people with cancer, there is an increased risk of clot formation, and some of these clots may then go to the brain and blocked the vessels which can cause a stroke.  To prevent similar strokes in the future, we decided to start you on Eliquis, or apixaban, which is a blood thinning medication.  We also recommend that you follow-up with a general neurologist in the future.     Activity    Your activity upon discharge: activity as tolerated     Follow Up ([facility])    You are scheduled for the following:   - Labs/MEHRAN visit prior to possible  admission for cycle 2 chemotherapy - 9/26    In the meantime, call [facility] triage line at [phone] for temp > or = 100.4, uncontrolled nausea/vomiting/diarrhea/constipation, unrelieved pain, bleeding not relieved with pressure, dizziness, chest pain, shortness of breath, loss of consciousness, and any new or concerning symptoms.     Diet    Follow this diet upon discharge: Orders Placed This Encounter      Regular Diet Adult     Stroke Hospital Follow Up    Resident clinic okay.  [facility] will call you to coordinate care as prescribed by your provider. If you don t hear from a representative within 2 business days, please call [phone].       Check Out Appointment Request    Patient discharging 9/21. Please arrange for MEHRAN visit with chemo admission to follow for 9/26.       Patient was seen and discussed with the Attending, Dr. Bain.    Elena Vargas MD  PGY3 Neurology Resident

## 2022-09-18 NOTE — CONSULTS
Care Management Initial Consult    General Information  Assessment completed with: Patient, VM-chart review,    Type of CM/SW Visit: Initial Assessment    Primary Care Provider verified and updated as needed: Yes (Rajesh Vidales [phone] [address] [place] [id number])   Readmission within the last 30 days: current reason for admission unrelated to previous admission   Return Category: New Diagnosis  Reason for Consult: discharge planning  Advance Care Planning: Advance Care Planning Reviewed: no concerns identified       Fabian's sisters ar his NOK in the absence of an HCD     Communication Assessment  Patient's communication style: spoken language (English or Bilingual)       Cognitive  Cognitive/Neuro/Behavioral: WDL  Level of Consciousness: alert  Arousal Level: opens eyes spontaneously  Orientation: oriented x 4     Best Language: 0 - No aphasia  Speech: clear    Living Environment:   People in home: child(sanjuanita), dependent, sibling(s)  sister Lauryn  Current living Arrangements: house (Fabian lives in a 2 story townhome w/ his sister, Lauryn, and her 2 children (ages 11 and 15) in [place], [place]. His 7 year old son also lives w/ him.)      Able to return to prior arrangements: yes       Family/Social Support:  Care provided by: self, other (see comments) (Fabian's sister assists when needed)  Provides care for: child(sanjuanita)  Marital Status:   Sibling(s), Children, Other (specify) (Fabian recieves much support from his extended family and friends as well as his sister's children)          Description of Support System: Supportive, Involved    Support Assessment: Adequate family and caregiver support, Adequate social supports (Lives w/ sister, Lauryn, who is involved/supportive. Per Fabian, Lauryn is also disabled and does not work. Lauryn is able to help him as needed. SisterPretty, is involved and supportive.)    Current Resources:   Patient receiving home care services: Yes  Skilled Home Care Services:  "Physicial Therapy ([facility] (Ph: [phone]; Fax: [phone]))  Community Resources: OP Infusion, Palliative Care, Transportation Services, Other (see comment), County Worker, County Programs, Financial/Insurance (OP Lymphedema)  Equipment currently used at home: walker, rolling, wheelchair, manual, shower chair, commode chair  Supplies currently used at home: None    Employment/Financial:  Employment Status: disabled        Financial Concerns: No concerns identified   Referral to Financial Worker: No       Lifestyle & Psychosocial Needs:  Social Determinants of Health     Tobacco Use: Low Risk      Smoking Tobacco Use: Never Smoker     Smokeless Tobacco Use: Never Used   Alcohol Use: Not on file   Financial Resource Strain: Not on file   Food Insecurity: Not on file   Transportation Needs: Not on file   Physical Activity: Not on file   Stress: Not on file   Social Connections: Not on file   Intimate Partner Violence: Not on file   Depression: Not at risk     PHQ-2 Score: 0   Housing Stability: Not on file     Functional Status:  Prior to admission patient needed assistance:   Dependent ADLs:: Ambulation-walker, Wheelchair-independent (Fabian's sister assists as needed)  Dependent IADLs:: Transportation, Shopping     Mental Health Status:  Mental Health Status: Current Concern  Mental Health Management: Medication, Psychiatrist    Chemical Dependency Status:  Chemical Dependency Status: No Current Concerns           Values/Beliefs:  Spiritual, Cultural Beliefs, Baptism Practices, Values that affect care:  (Fabian is Gnosticism)               Additional Information:  Vic Scott III \"Fabian\" is a 47 year old male patient with history of high grade pleomorphic sarcoma s/p R hemipelvectomy with active mets to abdominal wall and pulmonary vein, currently on chemotherapy, who presented for R hand weakness and is found to have L frontal and temporal  scattered embolic infarcts including the L hand knob region. " Stroke mechanism is hypercoagulability of malignancy. Notified his Oncologist and consulted inpt Onc. Will anticoagulate with apixaban 5 mg BID so long as platelets remain>50. PT/OT to consult for post-hospital rehab plan.       Care Management/Social Work Consult placed for discharge planning    1346 DANNY met with Fabian at bedside to complete assessment. SW introduced self and explained the reason for the visit. Fabian agreed to speak with SW    SW explained that Fabian had recent assessment and SW wanted to verify and/or update information contained in the assessment from 9/5/2022.    Fabian lives in a [address] [employer] w/ his sister, Lauryn, and her 2 children (ages 11 and 15) in [place], [place]. His 7 year old son also lives with them    He reports a wide [place] of friends and extended family who provide emotional support.. Per Fabian, Lauryn is also disabled and does not work but  is able to help him as needed. His sister, Pretty, is also involved and supportive. In the absence of an HCD his sisters are his NOK    As Fabian's symptoms have resolved he told SW that he would prefer to discharge home.    Fabian reported no additional questions or concerns. DANNY provided availability and contact information and excused self from his bedside.    DANNY will continue to follow as needed.    TY Gibbs, [facility]  ED/OBS   [facility]  Phone: [phone]  Pager: [phone]  Fax: [phone]     On-call pager, [phone], 4:00 pm to midnight           TY KERN

## 2022-09-18 NOTE — PROGRESS NOTES
[facility]    Stroke Progress Note    Interval EventsNo acute overnight events. Patient notes that his weakness has improved, particularly of his right index and middle fingers.  Slept well, denies any headache, blurry/double vision, incoordination, weakness, numbness of 1 side of the body (apart from his right hand). On exam, has subtle weakness of R biceps, triceps, and wrist movement compared to L (some pronator drift noted too). Finger strength described in detail in exam below. He notes that the strength of his fingers has improved somewhat and can try to form a fist with his fingers now. Patient's sister reports that patient's family has a history of DVTs, but USG Doppler did not show any DVT to explain 1+ pitting edema over L leg.     BP overnight in the 120s.     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 The patient was recently discharged from [facility] after left-sided pleural effusion and right upper lobe pulmonary vein thrombosis (felt to be tumor clot). The patient presented to the ED for right hand and finger weakness which started around 2 PM on 9/16/2022.  The patient was watching TV on his wheelchair when he noticed that he was unable to move the fingers on his right hand except for his thumb. Patient reported that EMS was called who told him it was likely due to dehydration and asked him to go into the hospital, but he did not. The next day, when the patient was talking to his infusion nurse over the phone, the nurse suggested that he should come to the hospital to get it checked out as the symptoms did not subside (but did not get worse). Denies other symptoms.    On chart review of oncology notes patient last infusion of cisplatin and doxorubicin  on 9/1/2022. Not on anticoagulation.  He denies any history of bleeding such as nosebleeds, easy bruisability.  NIHSS initially 3, losing points for ataxia and LLE weakness.     In the ED the patient was worked up with CBC, BMP, MRI with and without contrast of the brain and MRI of the cervical spine.  CBC showed that the patient had pancytopenia with hemoglobin of 6.3 and platelet count of 85, white blood cell count of 1.2 and RBC count of 2.69.  The patient's BMP showed BUN of 23.6 and creatinine of 1.53.  The patient was started on normal saline infusion as well as transfused 1 unit of PRBC by the ED.      Stroke Evaluation Summarized    MRI/Head CT Scattered acute left hemispheric infarcts. No abnormal intracranial  contrast enhancement to suggest metastatic disease.   Intracranial Vasculature Head CTA demonstrates no aneurysm or stenosis of the major  intracranial arteries. Fetal origin of left PCA.   Cervical Vasculature Neck CTA demonstrates no stenosis of the major cervical arteries. The  origins of the great vessels from the aortic arch are patent. The  normal distal right internal carotid artery measures 5 mm. The normal  distal left internal carotid artery measures 5 mm.      Echocardiogram Normal biventricular function.Left ventricular size is normal. Global and regional left ventricular function  is normal with an EF of 55-60%.The atrial septum is intact as assessed by color Doppler and agitated saline  bubble study .   EKG/Telemetry Sinus tachycardia   Inferior infarct , age undetermined   Abnormal ECG   Other Testing Not Applicable      LDL  9/17/2022: 112 mg/dL   A1C  9/17/2022: 4.7 %   Troponin 9/17/2022: 23 ng/L; 23 ng/L       Impression   #Acute ischemic stroke of Left hemisphere due to other etiology (likely hypercoagulability of underlying cancer)  #High-grade pleomorphic sarcoma  47-year-old male with PMHx of high-grade pleomorphic sarcoma of right pelvis and femur status post postchemotherapy  and amputation, with recent history of right upper lobe pulmonary vein thrombosis presented with weakness of the fingers of his R hand, and very subtle weakness of his arm too. NIHSS of 3. MRI showed Scattered acute left hemispheric hyperintensities on DWI with corresponding hypointensity on ADC- over posterior frontal centrum semiovale , temporoparietal cortex. Echo, CTA unremarkable, no significant atherosclerosis noted.  IV thrombolytics were not given because of being outside 4.5-hour window and minor symptoms.  Endovascular not done because of absence of LVO.  He is being admitted for further stroke work-up.    Given patient's history of malignancy and recent pulmonary venous thrombosis, it is likely that the etiology may be related to hypercoagulability of malignancy, and hence will need anticoagulation. His blood counts also indicate pancytopenia and he had to be transfused as a result. Oncology were consulted for advice regarding pancytopenia and anticoagulation parameters-  okay with starting anticoagulation such as eliquis as long as platelet count is above 50.    Plan  #Acute ischemic stroke of Left hemisphere due to other etiology (likely hypercoagulability of underlying cancer)  Neurochecks Q 4 hours  - Permissive HTN; labetalol PRN for SBP > 220  - Avoid hypotonic IV fluids  - Aspirin 325 mg load  - Daily aspirin 81 mg for secondary stroke prevention  - Statin: pending LDL level  - CTA head and neck pending  - Telemetry, EKG  - Echocardiogram wo bubble study: Pending  - Bedside Glucose Monitoring  - A1c, Lipid Panel, Troponin x 3  - PT/OT/SLP  - Stroke Education  - Depression Screen  - Apnea Screen  - Euthermia, Euglycemia  - Likely due to hypercoagulability of malignancy, so will probably anticoagulate. Pharmacy liaison consult placed to determine if insurance covers eliquis/xarelto/lovenox. The [place] trial showed apixaban being non-inferior to Dalteparin in treatment of cancer-associated VTE.  While not specific to stroke, it seems reasonable to anticoagulate with a DOAC instead of lovenox.    #High-grade pleomorphic sarcoma   #Pancytopenia  - Oncology consult as the patient was scheduled for a doxorubicin and cisplatin infusion on 9/21/2022 for his metastatic pleomorphic sarcoma.  In addition the patient also has pancytopenia.   - Transfused with 1 unit pRBC in the ED.  - Oncology okay with starting anticoagulation such as eliquis as long as platelet count is above 50, which it is.     #Left Lower extremity swelling  - Per Oncology, swelling unlikely to be malignancy or chemotherapy related. Echo was unremarkable as well. Potential DVT was also considered- USG Doppler done which showed no DVT. Likely dependent edema.    Prophylaxis            For VTE Prevention:  - pneumatic compression device     For Acid Suppression:  - GI prophylaxis is not indicated     Code Status  Full Code     During initial physical assessment, the plan of care was discussed and developed with patient.  Plan of care includes: admission to hospital, aspirin initiation, stroke workup. .     Patient was admitted via [facility] ED ([place])     The patient will be admitted to the Neuro Critical Care/Stroke team..      The patient was discussed with the stroke attending, Dr. Cari Vogel.     Eliana Riley MD  PGY1 Neurology Resident  [facility] [id number]  ______________________________________________________    Clinically Significant Risk Factors Present on Admission           # Hypomagnesemia: Mg = 1.4 mg/dL (Ref range: 1.7 - 2.3 mg/dL) on admission, will replace as needed   # Hypoalbuminemia: Albumin = 3.3 g/dL (Ref range: 3.5 - 5.2 g/dL) on admission, will monitor as appropriate   # Thrombocytopenia: Plts = 85 10e3/uL (Ref range: 150 - 450 10e3/uL) on admission, will monitor for bleeding   # Acute Kidney Injury, unspecified: based on a >150% or 0.3 mg/dL increase in creatinine on admission compared to past 90 day average,  will monitor renal function        Medications   Scheduled Meds    aspirin  81 mg Oral or Feeding Tube Daily     escitalopram  10 mg Oral or Feeding Tube Daily     magnesium sulfate  4 g Intravenous Once     sodium chloride (PF)  3 mL Intracatheter Q8H       Infusion Meds    - MEDICATION INSTRUCTIONS -       - MEDICATION INSTRUCTIONS -         PRN Meds  sodium chloride 0.9%, acetaminophen, lidocaine 4%, lidocaine (buffered or not buffered), - MEDICATION INSTRUCTIONS -, - MEDICATION INSTRUCTIONS -, sodium chloride (PF)       PHYSICAL EXAMINATION  Temp:  [98.4  F (36.9  C)-99.6  F (37.6  C)] 98.4  F (36.9  C)  Pulse:  [] 95  Resp:  [16-28] 18  BP: (115-143)/(67-88) 130/76  SpO2:  [99 %-100 %] 100 %      General Exam  General:  patient lying in bed without any acute distress    HEENT:  normocephalic/atraumatic  Cardio:  Appears well perfused  Pulmonary:  no respiratory distress  Abdomen:  soft  Extremities:  1+ edema on LLE extending upto midcalf.  Skin:  intact     Neuro Exam  Mental Status:  alert, oriented x 3, follows 3 step commands, speech clear and fluent, naming and repetition normal , able to spell world forwards and backwards.   Cranial Nerves:  visual fields intact, PERRL, EOMI with normal smooth pursuit, facial sensation intact and symmetric, facial movements symmetric, hearing not formally tested but intact to conversation, palate elevation symmetric and uvula midline, no dysarthria, shoulder shrug strong bilaterally, tongue protrusion midline  Motor:  normal muscle tone and bulk, no abnormal movements, able to move all limbs spontaneously       Delt Bi Tri Wrist Flexion/  Extension Iliopsoas Quadriceps Hamstrings Tibialis Anterior Gastroc     C5 C6 C7 C8/T1 L2 L3 L4-S1 L4 S1   R 5/5 5-/5 5-/5 5-/5 Unable to test Unable to test Unable to test Unable to test Unable to test   L 5/5 5/5 5/5 5/5 5/5 5/5 5/5 5/5 5/5     L hand motor strength- 5/5 throughout  R hand motor strength:  Thumb abduction,  adduction, flexion, extension 5 out of 5.  Opponens pollicis 5/5  Index finger flexion, extension at the MCP 4+/5  Middle finger flexion, extension at MCP 4/5  Ring finger flexion at MCP 3+ out of 5, and extension 3/5  Little finger flexion 3+/5, extension 3/ 5.  Dorsal and lumbar interossei- first- 4/5, second 4-/5, third and fourth 2/5.   strength 4/5    Reflexes:  toe down-going, no clonus.  Sensory:  light touch sensation intact and symmetric throughout upper and lower extremities, pinprick sensation intact and symmetric, no extinction on double simultaneous stimulation   Coordination:  normal finger-to-nose and heel-to-shin bilaterally without dysmetria  Station/Gait:  unable to test due to R leg amputated    Stroke Scales    NIHSS  1a. Level of Consciousness 0-->Alert, keenly responsive   1b. LOC Questions 0-->Answers both questions correctly   1c. LOC Commands 0-->Performs both tasks correctly   2.   Best Gaze 0-->Normal   3.   Visual 0-->No visual loss   4.   Facial Palsy 0-->Normal symmetrical movements   5a. Motor Arm, Left 0-->No drift, limb holds 90 (or 45) degrees for full 10 secs   5b. Motor Arm, Right 1-->Drift, limb holds 90 (or 45) degrees, but drifts down before full 10 secs, does not hit bed or other support   6a. Motor Leg, Left 0-->No drift, leg holds 30 degree position for full 5 secs   6b. Motor Leg, right (UN) Amputation or joint fusion   7.   Limb Ataxia 1-->Present in one limb   8.   Sensory 0-->Normal, no sensory loss   9.   Best Language 0-->No aphasia, normal   10. Dysarthria 0-->Normal   11. Extinction and Inattention  0-->No abnormality   Total 2 (09/18/22 0938)       Modified Paterson Score (Pre-morbid)  4-Moderately severe disability; unable to walk without assistance and unable to attend to own bodily    Imaging  I personally reviewed all imaging; relevant findings per HPI.     Lab Results Data   CBC  Recent Labs   Lab 09/17/22  1606   WBC 1.2*   RBC 2.69*   HGB 6.3*   HCT 19.5*    PLT 85*     Basic Metabolic Panel    Recent Labs   Lab 09/18/22  0803 09/17/22  1606   NA  --  140   POTASSIUM  --  3.5   CHLORIDE  --  102   CO2  --  25   BUN  --  23.6*   CR  --  1.53*   * 122*   VENTURA  --  8.8     Liver Panel  Recent Labs   Lab 09/17/22  1606   PROTTOTAL 6.5   ALBUMIN 3.3*   BILITOTAL 0.6   ALKPHOS 275*   AST 20   ALT 13     INR    Recent Labs   Lab Test 09/17/22  1607 08/31/22  2013 06/17/22  2312   INR 1.04 1.05 1.34*      Lipid Profile    Recent Labs   Lab Test 09/17/22  1607   CHOL 183   HDL 48   *   TRIG 116     A1C    Recent Labs   Lab Test 09/17/22  2141   A1C 4.7     Troponin    Recent Labs   Lab 09/17/22  2110   CTROPT 23*  23*

## 2022-09-18 NOTE — ED PROVIDER NOTES
ED Provider Note  [facility]      History     Chief Complaint   Patient presents with     Extremity Weakness     HPI  Vic Scott III is a 47 year old male who has a history of metastatic pleomorphic sarcoma, right-sided AKA, acute hypoxic respiratory failure, right pulmonary vein thrombus, JAZZMINE, chronic microcytic anemia among other medical problems who presents to the emergency department with weakness of right hand for 2 days.  Symptoms started slightly over 24 hours ago.  He was watching TV and he noticed that his right hand was heavy and that his middle, ring, and small digit or weaker than usual.  Symptoms persisted and because they did not resolve he decided to come in today for evaluation.  No headache.  No neck pain.  No fever/chills.  No nausea/vomiting.  No difficulty speaking.  No other concerns or complaints.    Past Medical History  Past Medical History:   Diagnosis Date     Pleomorphic cell sarcoma (H)      Past Surgical History:   Procedure Laterality Date     ANESTHESIA OUT OF OR BLOOD BRAIN BARRIER DISRUPTION N/A 06/16/2022    Procedure: ANESTHESIA OUT OF OR - Block Epidural;  Surgeon: GENERIC ANESTHESIA PROVIDER;  Location: [facility] OR     COMPLEX WOUND CLOSURE (UPDATE) Right 06/17/2022    Procedure: complex closure pelvis, spy;  Surgeon: KAREN Butt MD;  Location: [facility] OR     COMPLEX WOUND CLOSURE (UPDATE)  06/17/2022    Procedure: ;  Surgeon: KAREN Butt MD;  Location: [facility] OR     EXCISE TUMOR PELVIS POSTERIOR Right 06/17/2022    Procedure: Right hindquarter amputation;  Surgeon: Matty Whitaker MD;  Location: [facility] OR     INSERT PORT VASCULAR ACCESS Right 11/01/2021    Procedure: INSERTION, VASCULAR ACCESS PORT;  Surgeon: Sushil Gonzales MD;  Location: [facility] OR     IR CHEST PORT PLACEMENT > 5 YRS OF AGE  11/01/2021     IR CHEST TUBE PLACEMENT NON-TUNNELLED LEFT  9/6/2022     PICC DOUBLE LUMEN PLACEMENT Left 08/30/2022    left medial brachial 5  fr dl power picc 50 cm     acetaminophen (TYLENOL) 325 MG tablet  escitalopram (LEXAPRO) 10 MG tablet  morphine (MS CONTIN) 30 MG CR tablet  multivitamin, therapeutic (THERA-VIT) TABS tablet  naproxen (NAPROSYN) 500 MG tablet  Nutritional Supplements (ENSURE ACTIVE HIGH PROTEIN) LIQD  OLANZapine (ZYPREXA) 2.5 MG tablet  ondansetron (ZOFRAN ODT) 4 MG ODT tab  oxyCODONE IR (ROXICODONE) 10 MG tablet  polyethylene glycol (MIRALAX) 17 GM/Dose powder  prochlorperazine (COMPAZINE) 10 MG tablet      Allergies   Allergen Reactions     Blood Transfusion Related (Informational Only) Other (See Comments)     Patient has a history of a clinically significant antibody against RBC antigens.  A delay in compatible RBCs may occur.      Family History  Family History   Problem Relation Age of Onset     Heart Disease Mother      Coronary Artery Disease Father      Other - See Comments Father         pleomorphic cell sarcoma     Cancer Maternal Grandmother      No Known Problems Sister      No Known Problems Sister      No Known Problems Son      No Known Problems Daughter      No Known Problems Daughter      No Known Problems Daughter      Social History   Social History     Tobacco Use     Smoking status: Never Smoker     Smokeless tobacco: Never Used   Vaping Use     Vaping Use: Never used   Substance Use Topics     Alcohol use: Never     Drug use: Never      Past medical history, past surgical history, medications, allergies, family history, and social history were reviewed with the patient. No additional pertinent items.       Review of Systems   Constitutional: Negative for chills and fever.   HENT: Negative for congestion.    Eyes: Negative for redness.   Respiratory: Negative for cough and shortness of breath.    Cardiovascular: Positive for leg swelling ( left). Negative for chest pain.   Gastrointestinal: Negative for abdominal pain, nausea and vomiting.   Endocrine: Negative for polydipsia and polyuria.   Genitourinary:  "Negative for difficulty urinating.   Musculoskeletal: Negative for arthralgias, neck pain and neck stiffness.   Skin: Negative for color change.   Neurological: Positive for weakness ( right hand). Negative for numbness and headaches.   Psychiatric/Behavioral: Negative for confusion.   All other systems reviewed and are negative.    A complete review of systems was performed with pertinent positives and negatives noted in the HPI, and all other systems negative.    Physical Exam   BP: 124/75  Pulse: 119  Temp: 99.4  F (37.4  C)  Resp: 16  Height: 188 cm (6' 2\")  Weight: 83.9 kg (185 lb)  SpO2: 100 %  Physical Exam  Vitals and nursing note reviewed.   Constitutional:       General: He is not in acute distress.     Appearance: He is normal weight. He is not ill-appearing, toxic-appearing or diaphoretic.   HENT:      Head: Normocephalic and atraumatic.      Nose: Nose normal.      Mouth/Throat:      Mouth: Mucous membranes are moist.      Pharynx: Oropharynx is clear.   Eyes:      General: No scleral icterus.     Pupils: Pupils are equal, round, and reactive to light.   Cardiovascular:      Rate and Rhythm: Tachycardia present.      Heart sounds: Normal heart sounds.   Pulmonary:      Effort: Pulmonary effort is normal. No respiratory distress.      Breath sounds: Normal breath sounds. No wheezing or rhonchi.   Abdominal:      Palpations: Abdomen is soft.      Tenderness: There is no abdominal tenderness. There is no guarding or rebound.   Musculoskeletal:         General: No tenderness. Normal range of motion.      Cervical back: Normal range of motion and neck supple. No tenderness.      Left lower leg: Edema ( 1+, diffuse) present.      Comments: Right-sided AKA.   Skin:     General: Skin is warm.      Findings: No rash.   Neurological:      Mental Status: He is alert and oriented to person, place, and time.      GCS: GCS eye subscore is 4. GCS verbal subscore is 5. GCS motor subscore is 6.      Cranial Nerves: " Cranial nerves 2-12 are intact. No cranial nerve deficit.      Sensory: Sensation is intact. No sensory deficit.      Motor: Weakness present.      Comments: Weakness in third, fourth, and fifth digits of right hand.  Sensation to light touch and two-point discrimination is intact in right hand.   Psychiatric:         Mood and Affect: Mood normal.         Behavior: Behavior normal.         Thought Content: Thought content normal.         Judgment: Judgment normal.         ED Course      Procedures        Score    Level of consciousness: (0)   Alert, keenly responsive    LOC questions: (0)   Answers both questions correctly    LOC commands: (0)   Performs both tasks correctly    Best gaze: (0)   Normal    Visual: (0)   No visual loss    Facial palsy: (0)   Normal symmetrical movements    Motor arm (left): (0)   No drift    Motor arm (right): (0)   No drift    Motor leg (left): (0)   No drift    Motor leg (right): (0)   No drift    Limb ataxia: (0)   Absent    Sensory: (0)   Normal- no sensory loss    Best language: (0)   Normal- no aphasia    Dysarthria: (0)   Normal    Extinction and inattention: (0)   No abnormality        Total Score:  0               EKG Interpretation:      Interpreted by Klever Wolfe MD  Time reviewed: 8:47 PM  Symptoms at time of EKG: Stroke  Rhythm: Sinus tachycardia  Rate: 111  Axis: normal  Ectopy: none  Conduction: normal  ST Segments/ T Waves: No ST-T wave changes  Q Waves: none  Comparison to prior: No old EKG available    Clinical Impression: Sinus tachycardia        Critical Care Addendum    My initial assessment, based on my review of nursing observations, review of vital signs, focused history, physical exam, review of cardiac rhythm monitor, 12 lead ECG analysis and discussion with ED triage provider and stroke team, established that Vic Scott III has focal neurologic abnormalities, which requires immediate intervention, and therefore he is critically ill.     After the  initial assessment, the care team initiated multiple lab tests and consulted with Stroke service to provide stabilization care. Due to the critical nature of this patient, I reassessed nursing observations, vital signs, physical exam, review of cardiac rhythm monitor, 12 lead ECG analysis, interpretation of Laboratory and imaging studies and mental status multiple times prior to his disposition.     Time also spent performing documentation, reviewing test results, discussion with consultants and coordination of care.     Critical care time (excluding teaching time and procedures): 45 minutes.                 Medications   0.9% sodium chloride BOLUS (has no administration in time range)   gadobutrol (GADAVIST) injection 8.39 mL (8.3 mLs Intravenous Given 9/17/22 1723)   iopamidol (ISOVUE-370) solution 75 mL (75 mLs Intravenous Given 9/17/22 2007)   sodium chloride (PF) 0.9% PF flush 90 mL (90 mLs Intravenous Given 9/17/22 2008)        Assessments & Plan (with Medical Decision Making)   47-year-old man with history of metastatic pleomorphic sarcoma or right pelvis/femur among other medical problems, presenting with weakness in right upper extremity since yesterday.  Differential diagnosis: Metastatic cancer, ischemic stroke, hemorrhagic stroke, herniated disc, nerve impingement.    After thorough history and physical exam patient appears to be no acute distress.  Orders were initiated from triage after he was seen by the triage provider.  Laboratory studies returned with pancytopenia, significant for hemoglobin of 6.3.  PRBC transfusion was initiated after patient's consenting.  Creatinine slightly elevated 1.53.  I reviewed his MRIs and I read the radiology reports; it appears that he does have an ischemic stroke.  Stroke team will admit him to their service for further evaluation and management.    I have reviewed the nursing notes. I have reviewed the findings, diagnosis, plan and need for follow up with the  patient.    New Prescriptions    No medications on file       Final diagnoses:   Cerebrovascular accident (CVA), unspecified mechanism (H)       --  Klever Wolfe MD  [facility] EMERGENCY DEPARTMENT  9/17/2022     Klever Wolfe MD  09/17/22 9313

## 2022-09-18 NOTE — PLAN OF CARE
Physical Therapy: Orders received. Chart reviewed and discussed with care team.? Occupational Therapy not indicated due to no acute PT needs.? Defer discharge recommendations to OT.? Will complete orders.

## 2022-09-18 NOTE — PROGRESS NOTES
09/18/22 1300   Quick Adds   Type of Visit Initial Occupational Therapy Evaluation   Living Environment   People in Home sibling(s)   Current Living Arrangements house   Home Accessibility no concerns;wheelchair accessible   Transportation Anticipated family or friend will provide   Living Environment Comments [place] [facility] [place] [facility] [place] [facility] [facility], just has very small ledge to get into the home and can go over in in a wheelchair, all needs are met on the bottom level of the home.   Self-Care   Usual Activity Tolerance moderate   Current Activity Tolerance moderate   Regular Exercise No   Equipment Currently Used at Home walker, rolling;wheelchair, manual;shower chair;commode chair   Fall history within last six months no   Activity/Exercise/Self-Care Comment Pt reports he is IND with transfers and all ADLs using his manual wheelchair at home. Pt has a commode next to his bed for toileting and a shower chair for bathing, no falls reported. Pt states he can walk short distances at home with a walker but has been using the wheelchair primarily for mobility.   Instrumental Activities of Daily Living (IADL)   IADL Comments Pts sister is able to help with IADLs at baseline, pt does not drive.   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  Just started chemotherapy with cisplatin/doxorubicin and received cycle 1 on 9/1/22.  Course has been complicated with pleural effusion and pulmonary vein tumor thrombosis.  Now with acute ischemic stroke.   Existing  Precautions/Restrictions fall   Limitations/Impairments sensory   Left Upper Extremity (Weight-bearing Status) full weight-bearing (FWB)   Right Upper Extremity (Weight-bearing Status) weight-bearing as tolerated (WBAT)   Left Lower Extremity (Weight-bearing Status) full weight-bearing (FWB)   Right Lower Extremity (Weight-bearing Status) non weight-bearing (NWB)   General Observations and Info Activity: up ad lucretia   Cognitive Status Examination   Orientation Status orientation to person, place and time   Affect/Mental Status (Cognitive) WNL   Follows Commands WNL   Cognitive Status Comments No cognition deficits noted.   Visual Perception   Visual Impairment/Limitations corrective lenses full-time   Impact of Vision Impairment on Function (Vision) No vision changes.   Sensory   Sensory Comments Some numbness reported in 4th and 5th digits on right hand.   Range of Motion Comprehensive   Comment, General Range of Motion BUE ROM WNL   Strength Comprehensive (MMT)   Comment, General Manual Muscle Testing (MMT) Assessment RUE strength 4/5, LUE 5/5   Coordination   Upper Extremity Coordination Right UE impaired   Gross Motor Coordination No deficits identified   Fine Motor Coordination Impaired on right hand   Coordination Comments R  strength 4/5, unable to  fully with 4th and 5th digits.   Bed Mobility   Comment (Bed Mobility) SBA supine<>sit   Transfers   Transfer Comments SBA pivot transfer to wheelchair from bed   Sit-Stand Transfer   Sit/Stand Transfer Comments SBA partial sit<>stand from bed   Balance   Balance Comments Steady on LLE with transfers   Clinical Impression   Criteria for Skilled Therapeutic Interventions Met (OT) Yes, treatment indicated   OT Diagnosis Decreased ADL-I   OT Problem List-Impairments impacting ADL problems related to;strength;sensation;coordination   Assessment of Occupational Performance 1-3 Performance Deficits   Identified Performance Deficits home management, community  mobility, writing 2/2 impaired R hand fine coordination   Planned Therapy Interventions (OT) strengthening;progressive activity/exercise;fine motor coordination training;transfer training   Clinical Decision Making Complexity (OT) low complexity   Risk & Benefits of therapy have been explained evaluation/treatment results reviewed;care plan/treatment goals reviewed;risks/benefits reviewed;current/potential barriers reviewed;participants voiced agreement with care plan;participants included;patient   OT Discharge Planning   OT Discharge Recommendation (DC Rec) home with assist;home with home care occupational therapy  (Home with home physical and occupaitonal therapy)   OT Rationale for DC Rec Pt presents slightly below functional baseline for ADLs and IADLs 2/2 decreased function and fine motor coordination in R hand. Pt transferring to and from wheelchair and able to propel and manipulate wheelchair in halls and room with SBA. Pt is able to complete all wheelchair based mobility and ADLs safely at this time. Would benefit from HH therapies to continue working on ambulation with fww and improve function in right hand. Safe to discharge home once medically stable.   OT Brief overview of current status SBA   Total Evaluation Time (Minutes)   Total Evaluation Time (Minutes) 8   OT Goals   Therapy Frequency (OT) One time eval and treatment   OT Predicted Duration/Target Date for Goal Attainment 09/18/22   OT: Bed Mobility Modified independent;supine to/from sitting   OT: Transfer Modified independent;with assistive device   OT: Goal 1 Pt will perform wheelchair propulsion and manipulation in hallway and room with SBA.   OT: Goal 2 Pt with demo independence in HEP for  strengthening and fine motor coordination in right hand by discharge.

## 2022-09-18 NOTE — CONSULTS
Oncology  Consult Note   Date of Service: 09/18/2022    Patient: Vic Scott III  MRN: [id number]  Admission Date: 9/17/2022  Hospital Day # 1  Cancer Diagnosis: Sarcoma  Primary Outpatient Oncologist: Cristóbal  Current Treatment Plan: cisplatin/doxorubicin    Reason for Consult: New acute ischemic stroke.     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  Just started chemotherapy with cisplatin/doxorubicin and received cycle 1 on 9/1/22.  Course has been complicated with pleural effusion and pulmonary vein tumor thrombosis.  Now with acute ischemic stroke.       Recommendations:   - No specific oncologic recommendations. 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  Management of stroke as per neurology.  ASA  -Not certain that this requires systemic anticoagulation but defer to neurology on this.   -His pulmonary vein thrombosis was felt to be related to tumor thrombus  - Pt is due for chemotherapy next week on 9/21.  He remains neutropenic and thrombcytopenic, so not clear if he will be recovered by then.  In the meantime, neutropenic precautions.   -Recommend 1 U PRBC transfusion for Hgb of 6.3  - Has appt on 9/21 with MEHRAN Malik in [facility] for 9/21. Will plan to keep that appt. Presuming he should be able to discharge by thn.   - We will continue to follow while he is inpt and can help make discharge plans if needed.  Please do not hesitate to call with questions or concerns     Leander Sumner  Associate Professor of Medicine  Division of Hematology, Oncology, and Transplantation  [id number]      Oncologic History:  In brief, he initially had rapidly progressing right leg swelling since July 2021. MRI showed ~29-20 cm right thigh soft tissue mass with bony involvement of proximal right femur. Biopsy shows significant high grade pleomorphic  "sarcoma.PET was ordered which identifed concerning inguinal lymphadenopathy . Proceeded with Doxil/Ifos x 2 cycles but was complicated by neurotoxicity. He then was admitted June 2022 with right leg pain and inability to ambulated. Found to have pathologic right proximal femur fracture. Underwent right hindquarter amputation with Dr. Whitaker and primary complex wound closure with Dr. Butt. Recent CT showed new metastatis to abdominal wall and lung.    - Echo 9/1 EF 55-60%                   Therapy Plan: Cisplatin/Doxorubicin (C1D1=9/1/22)                - Cisplatin 60 mg/m2 (122 mg) IV D1-2                - Doxorubicin 17.5 mg/m2 (35 mg) CIVI D1-4                - Premeds: Kytil, Emend, Dex, Aloxi             Thank you for the opportunity to partake in this patients plan of care. Please do not hesitate to page with questions. We will continue to follow.          ___________________________________________________________________     Subjective & Interval History:      Pt seen in ED.  He is feeling ok.  He thinks the strength in his R hand is a little bit better. Was unable to make a fist yesterday, no can close his first 3 digits.  Digits 4/5 still do not close.  He still feels numb in his R hand.  He otherwise has no weakness. No slurred speech, no difficulty swallowing, no headaches.  His breathing is ok. No fevers or chills.     Physical Exam:    Blood pressure 130/76, pulse 95, temperature 98.4  F (36.9  C), resp. rate 18, height 1.88 m (6' 2\"), weight 83.9 kg (185 lb), SpO2 100 %.  General: alert and cooperative, lying in bed, no acute distress  HEENT: sclera anicteric, EOMI, MMM  Neck: supple, normal ROM  CV: RRR, no murmurs  Resp: CTAB, normal respiratory effort on ambient air  GI: soft, non-tender, non-distended, bowel sounds present and normoactive  MSK: warm and well-perfused, normal tone, R amputee  Skin: no rashes on limited exam, no jaundice  Neuro: Alert and interactive,  strength 4/5 on the R, " L extremities with normal strength.  Upper extremity on the R is otherwise normal.  He has sensation to light touch in his R hand, but feels numb.     Past Medical History:  Past Medical History:   Diagnosis Date     Pleomorphic cell sarcoma (H)        Past Surgical History:  Past Surgical History:   Procedure Laterality Date     ANESTHESIA OUT OF OR BLOOD BRAIN BARRIER DISRUPTION N/A 06/16/2022    Procedure: ANESTHESIA OUT OF OR - Block Epidural;  Surgeon: GENERIC ANESTHESIA PROVIDER;  Location: [facility]     COMPLEX WOUND CLOSURE (UPDATE) Right 06/17/2022    Procedure: complex closure pelvis, spy;  Surgeon: KAREN Butt MD;  Location: [facility] OR     COMPLEX WOUND CLOSURE (UPDATE)  06/17/2022    Procedure: ;  Surgeon: KAREN Butt MD;  Location: [facility] OR     EXCISE TUMOR PELVIS POSTERIOR Right 06/17/2022    Procedure: Right hindquarter amputation;  Surgeon: Matty Whitaker MD;  Location: [facility] OR     INSERT PORT VASCULAR ACCESS Right 11/01/2021    Procedure: INSERTION, VASCULAR ACCESS PORT;  Surgeon: Sushil Gonzales MD;  Location: [facility] OR     IR CHEST PORT PLACEMENT > 5 YRS OF AGE  11/01/2021     IR CHEST TUBE PLACEMENT NON-TUNNELLED LEFT  9/6/2022     PICC DOUBLE LUMEN PLACEMENT Left 08/30/2022    left medial brachial 5 fr dl power picc 50 cm       Social History:  Social History     Socioeconomic History     Marital status:    Tobacco Use     Smoking status: Never Smoker     Smokeless tobacco: Never Used   Vaping Use     Vaping Use: Never used   Substance and Sexual Activity     Alcohol use: Never     Drug use: Never     Sexual activity: Not Currently     Partners: Female     Birth control/protection: Condom        Family History  Family History   Problem Relation Age of Onset     Heart Disease Mother      Coronary Artery Disease Father      Other - See Comments Father         pleomorphic cell sarcoma     Cancer Maternal Grandmother      No Known Problems Sister      No Known Problems  Sister      No Known Problems Son      No Known Problems Daughter      No Known Problems Daughter      No Known Problems Daughter        Outpatient Medications:  No current facility-administered medications on file prior to encounter.  acetaminophen (TYLENOL) 325 MG tablet, Take 3 tablets (975 mg) by mouth every 8 hours  escitalopram (LEXAPRO) 10 MG tablet, Take 1 tablet (10 mg) by mouth daily  morphine (MS CONTIN) 30 MG CR tablet, Take 1 tablet (30 mg) by mouth every 12 hours  multivitamin, therapeutic (THERA-VIT) TABS tablet, Take 1 tablet by mouth daily  naproxen (NAPROSYN) 500 MG tablet, Take 500 mg by mouth 2 times daily (with meals)  Nutritional Supplements (ENSURE ACTIVE HIGH PROTEIN) LIQD, Take 1 Can by mouth 3 times daily (with meals)  OLANZapine (ZYPREXA) 2.5 MG tablet, Take 1 tablet (2.5 mg) by mouth At Bedtime  ondansetron (ZOFRAN ODT) 4 MG ODT tab, Take 1-2 tablets (4-8 mg) by mouth every 8 hours as needed for nausea or vomiting  oxyCODONE IR (ROXICODONE) 10 MG tablet, Take 1 tablet (10 mg) by mouth every 4 hours as needed for moderate to severe pain  polyethylene glycol (MIRALAX) 17 GM/Dose powder, Take 17 g by mouth daily Hold for loose stools  prochlorperazine (COMPAZINE) 10 MG tablet, Take 1 tablet (10 mg) by mouth every 6 hours as needed (Nausea/Vomiting)  [DISCONTINUED] albuterol (PROAIR HFA/PROVENTIL HFA/VENTOLIN HFA) 108 (90 Base) MCG/ACT inhaler, Inhale 1-2 puffs into the lungs every 6 hours as needed for shortness of breath / dyspnea  [DISCONTINUED] QUEtiapine (SEROQUEL) 25 MG tablet, Take 1 tablet (25 mg) by mouth At Bedtime           Labs & Studies: I personally reviewed the following studies:  ROUTINE LABS (Last four results):  CMP  Recent Labs   Lab 09/18/22  0803 09/17/22  2110 09/17/22  1606 09/11/22  0910   NA  --   --  140 137   POTASSIUM  --   --  3.5 3.8   CHLORIDE  --   --  102 100   CO2  --   --  25 29   ANIONGAP  --   --  13 8   *  --  122* 117*   BUN  --   --  23.6* 34.3*    CR  --   --  1.53* 2.11*   GFRESTIMATED  --   --  56* 38*   VENTURA  --   --  8.8 8.7   MAG  --   --  1.4*  --    PHOS  --  3.3  --   --    PROTTOTAL  --   --  6.5  --    ALBUMIN  --   --  3.3*  --    BILITOTAL  --   --  0.6  --    ALKPHOS  --   --  275*  --    AST  --   --  20  --    ALT  --   --  13  --      CBC  Recent Labs   Lab 09/17/22  1606 09/11/22  0910   WBC 1.2* 1.2*   RBC 2.69* 3.04*   HGB 6.3* 7.2*   HCT 19.5* 22.9*   MCV 73* 75*   MCH 23.4* 23.7*   MCHC 32.3 31.4*   RDW 18.1* 18.7*   PLT 85* 75*     INR  Recent Labs   Lab 09/17/22  1607   INR 1.04       IMAGING:

## 2022-09-18 NOTE — H&P
[facility]    Stroke Admission Note    Chief Complaint  Right hand weakness.     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  The patient was recently discharged from [facility] after left-sided pleural effusion status post chest tube now removed and right upper lobe pulmonary vein thrombosis.  The patient presented to the ED for right hand and finger weakness which started around 2 PM yesterday 9/16/2022.  The patient endorsed that he suddenly noticed that he was unable to move the fingers on his right hand except for his thumb.  He was watching television on his wheelchair when this happened.  Patient reported that EMS was called and they told him it was likely due to dehydration and asked him if he wanted to go into the hospital.  But the patient did not come into the ED yesterday as he had to take care of his sister's children, as he lives with his sister.  The patient denied any numbness, tingling, blurring of vision, headaches, hearing changes, confusion when this happened.  Patient also started noticing swelling in his left lower extremity which started last night, but was not associated with any weakness.  On chart review of oncology notes it is evident that they had started the patient on cisplatin and doxorubicin on 9/1/2022.  The patient was scheduled on 9/21/2022 for next infusion of his chemotherapeutic drugs.    In the ED the patient was worked up with CBC, BMP, MRI with and without contrast of the brain and MRI of the cervical spine.  CBC showed that the patient had pancytopenia with hemoglobin of 6.3 and platelet count of 85,  white blood cell count of 1.2 and RBC count of 2.69.  The patient's BMP showed BUN of 23.6 and creatinine of 1.53.  The patient was started on normal saline infusion as well as transfused 1 unit of PRBC by the ED.      Intravenous Thrombolysis  Not given due to: symptom onset > 4.5 hrs       Endovascular Treatment  Not initiated due to absence of proximal vessel occlusion    Impression     #Acute ischemic stroke of Left hemisphere due to other etiology (likely hypercoagulability of underlying cancer)  #High-grade pleomorphic sarcoma  Plan  Acute Ischemic Stroke (without tPA) Plan  - Admit to Neurology  - Neurochecks Q 4 hours  - Permissive HTN; labetalol PRN for SBP > 220  - Avoid hypotonic IV fluids  - Aspirin 325 mg load  - Daily aspirin 81 mg for secondary stroke prevention  - Statin: pending LDL level  - CTA head and neck pending  - Telemetry, EKG  - Echocardiogram wo bubble study: Pending  - Bedside Glucose Monitoring  - A1c, Lipid Panel, Troponin x 3  - PT/OT/SLP  - Stroke Education  - Depression Screen  - Apnea Screen  - Euthermia, Euglycemia  - Oncology consult as the patient was scheduled for a doxorubicin and cisplatin infusion on 9/21/2022 for his metastatic pleomorphic sarcoma.  In addition the patient also has pancytopenia.         Prophylaxis            For VTE Prevention:  - pneumatic compression device    For Acid Suppression:  - GI prophylaxis is not indicated    Code Status  Full Code    During initial physical assessment, the plan of care was discussed and developed with patient.  Plan of care includes: admission to hospital, aspirin initiation, stroke workup. .    Patient was admitted via [facility] ED ([place])    The patient will be admitted to the Neuro Critical Care/Stroke team..     The patient was discussed with Staff is Dr. Cari Vogel.     Arnaldo Bernardo MD  Neurology Resident PGY 2  Pager  [phone]    ___________________________________________________    Nutrition:   None    Clinically Significant Risk Factors Present on Admission           # Hypomagnesemia: Mg = 1.4 mg/dL (Ref range: 1.7 - 2.3 mg/dL) on admission, will replace as needed   # Hypoalbuminemia: Albumin = 3.3 g/dL (Ref range: 3.5 - 5.2 g/dL) on admission, will monitor as appropriate   # Thrombocytopenia: Plts = 85 10e3/uL (Ref range: 150 - 450 10e3/uL) on admission, will monitor for bleeding   # Acute Kidney Injury, unspecified: based on a >150% or 0.3 mg/dL increase in creatinine on admission compared to past 90 day average, will monitor renal function       Past Medical History   Past Medical History:   Diagnosis Date     Pleomorphic cell sarcoma (H)      Past Surgical History   Past Surgical History:   Procedure Laterality Date     ANESTHESIA OUT OF OR BLOOD BRAIN BARRIER DISRUPTION N/A 06/16/2022    Procedure: ANESTHESIA OUT OF OR - Block Epidural;  Surgeon: GENERIC ANESTHESIA PROVIDER;  Location: [facility] OR     COMPLEX WOUND CLOSURE (UPDATE) Right 06/17/2022    Procedure: complex closure pelvis, spy;  Surgeon: KAREN Butt MD;  Location: [facility] OR     COMPLEX WOUND CLOSURE (UPDATE)  06/17/2022    Procedure: ;  Surgeon: KAREN Butt MD;  Location: [facility] OR     EXCISE TUMOR PELVIS POSTERIOR Right 06/17/2022    Procedure: Right hindquarter amputation;  Surgeon: Matty Whitaker MD;  Location: [facility] OR     INSERT PORT VASCULAR ACCESS Right 11/01/2021    Procedure: INSERTION, VASCULAR ACCESS PORT;  Surgeon: Sushil Gonzales MD;  Location: [facility] OR     IR CHEST PORT PLACEMENT > 5 YRS OF AGE  11/01/2021     IR CHEST TUBE PLACEMENT NON-TUNNELLED LEFT  9/6/2022     PICC DOUBLE LUMEN PLACEMENT Left 08/30/2022    left medial brachial 5 fr dl power picc 50 cm     Medications   Home Meds  Prior to Admission medications    Medication Sig Start Date End Date Taking? Authorizing Provider   acetaminophen (TYLENOL) 325 MG tablet  Take 3 tablets (975 mg) by mouth every 8 hours  Patient not taking: No sig reported 7/8/22   Luke Ocasio MD   escitalopram (LEXAPRO) 10 MG tablet Take 1 tablet (10 mg) by mouth daily 7/8/22   Luke Ocasio MD   morphine (MS CONTIN) 30 MG CR tablet Take 1 tablet (30 mg) by mouth every 12 hours 7/8/22   Luke Ocasio MD   multivitamin, therapeutic (THERA-VIT) TABS tablet Take 1 tablet by mouth daily 7/20/22   Rajesh Vidales APRN CNP   naproxen (NAPROSYN) 500 MG tablet Take 500 mg by mouth 2 times daily (with meals)    Reported, Patient   Nutritional Supplements (ENSURE ACTIVE HIGH PROTEIN) LIQD Take 1 Can by mouth 3 times daily (with meals) 7/20/22   Rajesh Vidales APRN CNP   OLANZapine (ZYPREXA) 2.5 MG tablet Take 1 tablet (2.5 mg) by mouth At Bedtime 9/11/22   Olga Lidia Yi MD   ondansetron (ZOFRAN ODT) 4 MG ODT tab Take 1-2 tablets (4-8 mg) by mouth every 8 hours as needed for nausea or vomiting 9/11/22   Olga Lidia Yi MD   oxyCODONE IR (ROXICODONE) 10 MG tablet Take 1 tablet (10 mg) by mouth every 4 hours as needed for moderate to severe pain 8/3/22   Leslie Ortiz MD   polyethylene glycol (MIRALAX) 17 GM/Dose powder Take 17 g by mouth daily Hold for loose stools 9/11/22   Olga Lidia Yi MD   prochlorperazine (COMPAZINE) 10 MG tablet Take 1 tablet (10 mg) by mouth every 6 hours as needed (Nausea/Vomiting) 8/30/22   Jonn Ambrocio MD   albuterol (PROAIR HFA/PROVENTIL HFA/VENTOLIN HFA) 108 (90 Base) MCG/ACT inhaler Inhale 1-2 puffs into the lungs every 6 hours as needed for shortness of breath / dyspnea 6/28/22 7/7/22  Nat Purvis APRN CNS   QUEtiapine (SEROQUEL) 25 MG tablet Take 1 tablet (25 mg) by mouth At Bedtime 6/28/22 7/7/22  Nat Purvis APRN CNS       PRN Meds  sodium chloride 0.9%    Allergies   Allergies   Allergen Reactions     Blood Transfusion Related (Informational Only) Other (See Comments)     Patient has a history of a  clinically significant antibody against RBC antigens.  A delay in compatible RBCs may occur.      Family History   Family History   Problem Relation Age of Onset     Heart Disease Mother      Coronary Artery Disease Father      Other - See Comments Father         pleomorphic cell sarcoma     Cancer Maternal Grandmother      No Known Problems Sister      No Known Problems Sister      No Known Problems Son      No Known Problems Daughter      No Known Problems Daughter      No Known Problems Daughter      Social History   Social History     Tobacco Use     Smoking status: Never Smoker     Smokeless tobacco: Never Used   Vaping Use     Vaping Use: Never used   Substance Use Topics     Alcohol use: Never     Drug use: Never       Review of Systems   The 10 point Review of Systems is negative other than noted in the HPI       PHYSICAL EXAMINATION  Temp:  [99.4  F (37.4  C)] 99.4  F (37.4  C)  Pulse:  [112-119] 114  Resp:  [16-28] 24  BP: (124-133)/(75-83) 133/83  SpO2:  [100 %] 100 %    General:  patient lying in bed without any acute distress    HEENT:  normocephalic/atraumatic  Cardio:  RRR  Pulmonary:  no respiratory distress  Abdomen:  soft  Extremities:  no edema  Skin:  intact      Neuro Exam  Mental Status:  alert, oriented x 3, follows commands, speech clear and fluent, naming and repetition normal  Cranial Nerves:  visual fields intact, EOMI with normal smooth pursuit, slight esotropia in the right eye which according to the patient Is normal, facial sensation intact and symmetric, facial movements symmetric, hearing not formally tested but intact to conversation, no dysarthria, shoulder shrug equal bilaterally, tongue protrusion midline  Motor:  Patient has an amputated right lower extremity. Strength is 5/5 in left upper extremity. Left lower extremity hip flexion is 3/5 and 5/5 in knee flexion and extension and ankle flexion and extension. Right upper extremity flexion and extension 5/5. Wrist flexion 5/5,  Wrist extension 4/5, opponens pollicis 5/5, finger extension 3/5 in the medial R digits and 4/4 on the index finger. Finger extension 2/5.  3/5.   Reflexes: normo reflexive througout  Sensory:  light touch sensation intact and symmetric throughout upper and lower extremity , no extinction on double simultaneous stimulation  Coordination:    Station/Gait:  Did not assess    Dysphagia Screen  Per nursing      Stroke Scales    NIHSS  1a. Level of Consciousness 0-->Alert, keenly responsive   1b. LOC Questions 0-->Answers both questions correctly   1c. LOC Commands 0-->Performs both tasks correctly   2.   Best Gaze 0-->Normal   3.   Visual 0-->No visual loss   4.   Facial Palsy 0-->Normal symmetrical movements   5a. Motor Arm, Left 0-->No drift, limb holds 90 (or 45) degrees for full 10 secs   5b. Motor Arm, Right 0-->No drift, limb holds 90 (or 45) degrees for full 10 secs   6a. Motor Leg, Left 2-->Some effort against gravity, leg falls to bed by 5 secs, but has some effort against gravity   6b. Motor Leg, right 0-->No drift, leg holds 30 degree position for full 5 secs   7.   Limb Ataxia 1-->Present in one limb   8.   Sensory 0-->Normal, no sensory loss   9.   Best Language 0-->No aphasia, normal   10. Dysarthria 0-->Normal   11. Extinction and Inattention  0-->No abnormality   Total 3 (09/17/22 1645)       Modified Fernley Score (Pre-morbid)  2-Slight disability; unable to carry out all previous activities, but able to look after own affairs    Imaging  I personally reviewed all imaging; relevant findings per the HPI.    Lab Results Data   CBC  Recent Labs   Lab 09/17/22  1606 09/11/22  0910   WBC 1.2* 1.2*   RBC 2.69* 3.04*   HGB 6.3* 7.2*   HCT 19.5* 22.9*   PLT 85* 75*     Basic Metabolic Panel   Recent Labs   Lab 09/17/22  1606 09/11/22  0910    137   POTASSIUM 3.5 3.8   CHLORIDE 102 100   CO2 25 29   BUN 23.6* 34.3*   CR 1.53* 2.11*   * 117*   VENTURA 8.8 8.7     Liver Panel  Recent Labs   Lab  09/17/22  1606   PROTTOTAL 6.5   ALBUMIN 3.3*   BILITOTAL 0.6   ALKPHOS 275*   AST 20   ALT 13     INR    Recent Labs   Lab Test 08/31/22 2013 06/17/22  2312 06/17/22  2135   INR 1.05 1.34* 1.91*      Lipid Profile  No lab results found.  A1C  No lab results found.  Troponin  No results for input(s): CTROPT, TROPONINIS, TROPONINI, GHTROP in the last 168 hours.       Stroke Code / Stroke Consult Data Data    Not a stroke code

## 2022-09-19 NOTE — PROGRESS NOTES
"SPIRITUAL HEALTH SERVICES  SPIRITUAL ASSESSMENT Progress Note  [facility] ([place]) 6A       REFERRAL SOURCE: Self initiated  visit, [facility] specific.     DATA: Pt Vic Scott III identifies as Samaritan and is of  descent.     I introduced myself to Fabian as the Lead Samaritan  and oriented him to [facility].     Fabian shared with me that he, \"I have good days and bad days\" when it comes to coping with his leg amputation and sarcoma. He stated that on the good days, he is active and participating socially with family. He stated that Jainism is foundational to his good days attributing to prayers and remembrances of Allah.    He draws inspiration from his father's teachings when he was a child. He shared that his father passed away last year.     To cope with the bad times, Fabian stated that he has been keeping a journal, documenting his life events with photos and his struggles with sarcoma. He stated that he aspires to make a documentary or even publish a book about it in the future.     Fabian welcomed Islamic incantations/prayer at bedside. We prayed together at his request.I also provided Fabian with an Islamic prayer booklet for [facility]aritan patients.       PLAN: I will follow up with Fabian for the duration of his stay. [facility] is available to Fabian per request.     Ana Cristina Gastelum  Lead Samaritan   Pager [phone]    [facility] remains available 24/7 for emergent requests/referrals, either by having the switchboard page the on-call  or by entering an ASAP/STAT consult in Epic (this will also page the on-call ).    "

## 2022-09-19 NOTE — CONSULTS
09/19/22 1130 Klisch, Christine M, RN     Stroke Education Note     The following information has been reviewed with the patient:     1. Warning signs of stroke     2. Calling 911 if having warning signs of stroke     3. All modifiable risk factors: hypertension, CAD, atrial fib, diabetes, hypercholesterolemia, smoking, substance abuse, diet, physical inactivity, obesity, sleep apnea.     4. Patient's risk factors for stroke which include: Cancer     5. Follow-up plan for after discharge     6. Discharge medications which include: eliquis     In addition, the above information was given to the patient in writing as a part of the [facility] Stroke Class Handout.     Learner's response to risk factors / lifestyle modification education: Committment to change Taking steps      Christine M Klisch, RN

## 2022-09-19 NOTE — PROGRESS NOTES
Care Management Follow Up    Length of Stay (days): 2    Expected Discharge Date: 09/18/2022     Concerns to be Addressed: discharge planning  Patient plan of care discussed at interdisciplinary rounds: Yes    Anticipated Discharge Disposition: Home  Anticipated Discharge Services:  Home Care  Anticipated Discharge DME:  None    Patient/family educated on Medicare website which has current facility and service quality ratings: yes  Education Provided on the Discharge Plan:  yes  Patient/Family in Agreement with the Plan: yes    Referrals Placed by CM/SW: Home Care  Private pay costs discussed: Not applicable    Additional Information:  Patient status reviewed, discussed in discharge rounds. Patient may be medically ready for discharge today or tomorrow. Therapy is recommending home PT/OT.     Per chart review, patient was previously open to [facility] for PT/OT/RN and [facility] for PICC line cares. Call placed to [facility] who states patient was discharged. They are unable to accept him back as a patient as they no longer accept his insurance.     Call placed to [facility] who states patient was accepted to them for PT/OT last week, they plan to see patient on Thursday. [facility] staff state [facility] nurse is following for line cares. Spoke with [facility] liaison who confirms patient is open to them for line cares, nursing provided. Home health order and home infusion resumption orders placed.     [facility]  Ph:[phone]   Fax:[phone]    [facility] - resume line care   Ph: [phone]   Fax: [phone]     Qi Woodward RN, BSN  6A RN Care Coordinator  Ph: [phone]   Pager: [phone]

## 2022-09-19 NOTE — PROVIDER NOTIFICATION
ED 25  pt c/o diarrhea x7 today and wanting something to help? pt also low grade fever and tachy FYI.   thanks, Daija

## 2022-09-19 NOTE — CONSULTS
Discharge Pharmacy Test Claim    Patient's [employer] prepaid medical assistance plans covers eliquis and xarelto with $3 copays and enoxaparin with a copay of $1.    Test Claim Copay   eliquis 3.00   enoxaparin 1.00   xarelto 3.00     Latosha Bergeron  [facility] Pharmacy Liaison  Ph: [phone] Pager: [phone]

## 2022-09-19 NOTE — PROVIDER NOTIFICATION
ED   pt did not tolerate K+ solution. maybe just another bag? or just recheck after 2nd bag?   thanks

## 2022-09-19 NOTE — PLAN OF CARE
Status: patient admitted with R hand weakness, found to have multiple left hemispheric ischemic strokes. Hx of sarcoma of the right pelvis and femur s/p chemotherapy and right hindquarter amputation on 6/17/22  Vitals: VSS ex tachy. On CCM.  Neuros: A&Ox4. RLE amputated. 4/5 w/ mod hand grasp in RUE.   IV: DL PICC SL, R port unaccessed  Labs/Electrolytes:   Resp/trach: Per pt, baseline 2-3LNC.Currently on 2L NC w/ home NC.  Diet: Regular  Bowel status: Multiple loose stools this shift.   : Voiding spont in urinal  Skin: R amputation scar not fully healed, L inner leg moisture injury, PI to sacrum, all covered with Mepilex.   Pain: RLQ pain. MDs aware. declines intervention.   Activity: Up with 2/pivot to w/c at baseline. RLE amputation  Plan: Continue to monitor labs.   Updates this shift: Immodium started. Red lumen clogged-paged for alteplase. CT order put in. Stool sample needed. BMP ordered.       Stroke Patients:   PLC scheduled or completed: Completed this shift   Pneumoboots in place: Refused

## 2022-09-19 NOTE — PLAN OF CARE
Arrived from: [facility]   Belongings/meds: 2 phones with chargers, shoes, clothes, beads, oxygen tubing, glasses, wallet  2 RN Skin Assessment Completed by: Kyle    Non-intact findings documented (yes/no/NA): poor wound healing to pelvic/sacral amputation site, open wound to sacrum, dry skin to foot    Status: patient admitted with R hand weakness, found to have multiple left hemispheric ischemic strokes  Vitals: VSS on RA with sl tachycardia 100s, per provider likely baseline. On CCM.  Neuros: R grasp mod, poor fine motor R hand  IV: DL PICC SL, R port unaccessed  Labs/Electrolytes: Two K+ replacements and RBC transfusion in ED, redraws Hgb 7.9 and K+ 3.5  Resp/trach: Per pt, baseline 3LNC. RA since transfer to , satting %  Diet: Regular  Bowel status: Multiple loose stools in ED, 1 since transfer  : Voiding spont in urinal  Skin: See non-intact findings above. Requested WOC consult from primary team.  Pain: C/o RLQ pain that started yesterday, he thinks it might be from frequent stools  Activity: Up with 2/pivot to w/c at baseline. RLE amputation  Plan: Continue to monitor labs.   Updates this shift: Transferred to floor.    Stroke Patients:   PLC scheduled or completed: no  Pneumoboots in place:  ordered

## 2022-09-19 NOTE — PROVIDER NOTIFICATION
ED 25  pt c/o diarrhea x7, slightly tachy, low grade fever. also, do u plan on d/ch pt after blood? if so can hemoglobin be checked 1hr after transfusion done? pls call   thanks, Daija

## 2022-09-19 NOTE — CONSULTS
[facility] General Surgery Consultation    Vic Scott III MRN# [id number]   Age: 47 year old YOB: 1975     Date of Admission:  9/17/2022    Date of Consult:   9/17/22    Reason for consult: Pelvic mass, developing SBO on imaging       Requesting service: Neurology; requesting provider: Dr. 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 He is admitted to the hospital on 9/17 for stroke symptoms 2/2 scattered left MCA territory infarcts likely due to hypercoagulability of his metastatic cancer. CT obtained today to workup RLQ abdominal pain and diarrhea concerning for developing small bowel obstruction given small bowel distention with air-fluid levels with possible transition point in the R hemipelvis next to a necrotic pelvic mass.     Patient reports currently he does not have abdominal pain, nausea, or vomiting (other than one event associated with pills yesterday). Was tolerating food today without issues. Has been afebrile and had multiple episodes of diarrhea. Lactic acid 0.8. On exam, his abomen is obese, non-tender and nondistended.    Plan:  - No acute surgical interventions  - Given benign exam, okay to trial clear liquid diet and advance as tolerates  - No current role for nasogastric decompression, however low threshold to place nasogastric tube and make NPO if becomes nauseated or has worsening abdominal pain and distension  - EGS to follow    Patient discussed with chief resident, Dr. Edmond, and staff, Dr. Vogel.    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 He is admitted to the hospital on 9/17 for stroke symptoms 2/2 scattered left MCA territory infarcts likely due to hypercoagulability of his metastatic cancer.     He reports having 1-2 day hx of abdominal pain in his RLQ which came and went. Has had several episodes of watery diarrhea today. However on exam currently he had no abdominal pain. Reports having one episode previously ~3weeks ago where he couldn't have a bowel movement but that resolved without hospitalization.     CT obtained today to workup RLQ abdominal pain and diarrhea and demonstrated findings concerning for developing small bowel obstruction given small bowel distention with air-fluid levels with possible transition point in the R hemipelvis next to a necrotic pelvic mass.             Past Medical History:     Past Medical History:   Diagnosis Date     Pleomorphic cell sarcoma (H)           Past Surgical History:     Past Surgical History:   Procedure Laterality Date     ANESTHESIA OUT OF OR BLOOD BRAIN BARRIER DISRUPTION N/A 06/16/2022    Procedure: ANESTHESIA OUT OF OR - Block Epidural;  Surgeon: GENERIC ANESTHESIA PROVIDER;  Location: [facility] OR     COMPLEX WOUND CLOSURE (UPDATE) Right 06/17/2022    Procedure: complex closure pelvis, spy;  Surgeon: KAREN Butt MD;  Location: [facility] OR     COMPLEX WOUND CLOSURE (UPDATE)  06/17/2022    Procedure: ;  Surgeon: KAREN Butt MD;  Location: [facility] OR     EXCISE TUMOR PELVIS POSTERIOR Right 06/17/2022    Procedure: Right hindquarter amputation;  Surgeon:  Matty Whitaker MD;  Location: [facility] OR     INSERT PORT VASCULAR ACCESS Right 11/01/2021    Procedure: INSERTION, VASCULAR ACCESS PORT;  Surgeon: Sushil Gonzales MD;  Location: [facility] OR     IR CHEST PORT PLACEMENT > 5 YRS OF AGE  11/01/2021     IR CHEST TUBE PLACEMENT NON-TUNNELLED LEFT  9/6/2022     PICC DOUBLE LUMEN PLACEMENT Left 08/30/2022    left medial brachial 5 fr dl power picc 50 cm             Social History:     Social History     Tobacco Use     Smoking status: Never Smoker     Smokeless tobacco: Never Used   Substance Use Topics     Alcohol use: Never          Family History:     Family History   Problem Relation Age of Onset     Heart Disease Mother      Coronary Artery Disease Father      Other - See Comments Father         pleomorphic cell sarcoma     Cancer Maternal Grandmother      No Known Problems Sister      No Known Problems Sister      No Known Problems Son      No Known Problems Daughter      No Known Problems Daughter      No Known Problems Daughter           Allergies:     Allergies   Allergen Reactions     Blood Transfusion Related (Informational Only) Other (See Comments)     Patient has a history of a clinically significant antibody against RBC antigens.  A delay in compatible RBCs may occur.              Medications:     Current Facility-Administered Medications   Medication     0.9% sodium chloride BOLUS     acetaminophen (TYLENOL) tablet 650 mg     alteplase (CATHFLO ACTIVASE) injection 2 mg     apixaban ANTICOAGULANT (ELIQUIS) tablet 5 mg     atorvastatin (LIPITOR) tablet 80 mg     escitalopram (LEXAPRO) tablet 10 mg     lidocaine (LMX4) cream     lidocaine 1 % 0.1-1 mL     loperamide (IMODIUM) capsule 2 mg     medication instruction - No oral meds if patient didn't pass dysphagia screen     Medication Instructions - Avoid dextrose in IV solutions.     potassium chloride (KLOR-CON) Packet 20 mEq     potassium chloride (KLOR-CON) Packet 40 mEq     sodium chloride (PF) 0.9%  PF flush 3 mL     sodium chloride (PF) 0.9% PF flush 3 mL     sodium chloride 0.9% infusion               Review of Systems:     Complete review of systems negative except as documented in HPI          Physical Exam:   All vitals have been reviewed  Temp:  [98.1  F (36.7  C)-99.9  F (37.7  C)] 98.2  F (36.8  C)  Pulse:  [] 101  Resp:  [16-25] 16  BP: (127-138)/(79-94) 127/83  SpO2:  [99 %-100 %] 99 %    Intake/Output Summary (Last 24 hours) at 9/19/2022 1844  Last data filed at 9/19/2022 1701  Gross per 24 hour   Intake 756.25 ml   Output 225 ml   Net 531.25 ml       Physical Exam:   General: Alert, in no acute distress.  Neuro: A&Ox3  HEENT: Normocephalic, atraumatic.  Neck: trachea midline.  Respiratory: Non-labored breathing  Cardiovascular: Regular rate and rhythm. No tachycardia. Strong radial pulse  Gastrointestinal: Abdomen soft, obese, non-distended, non-tender to palpation.  Genitourinary: No york  MSK/Extremities: Moving all three extremities spontaneously (s/p R hip disarticulation). Warm, well perfused.  Incisions/Skin: As noted above. No jaundice, rashes.            Data:   All laboratory data reviewed    Results:  BMP  Recent Labs   Lab 09/19/22  1707 09/19/22  0434 09/18/22  2154 09/18/22  1438 09/18/22  0803 09/17/22  1606   * 142  --  140  --  140   POTASSIUM 3.1* 3.7  3.5 2.1* 3.2*  --  3.5   CHLORIDE 100 106  --  104  --  102   CO2 23 23  --  26  --  25   BUN 13.7 15.4  --  18.2  --  23.6*   CR 1.19* 1.25*  --  1.33*  --  1.53*   * 139*  --  135* 113* 122*     CBC  Recent Labs   Lab 09/19/22  0434 09/18/22  1438 09/17/22  1606   WBC  --  1.5* 1.2*   HGB 7.9* 6.8* 6.3*   PLT  --  106* 85*     LFT  Recent Labs   Lab 09/17/22  1607 09/17/22  1606   AST  --  20   ALT  --  13   ALKPHOS  --  275*   BILITOTAL  --  0.6   ALBUMIN  --  3.3*   INR 1.04  --      Recent Labs   Lab 09/19/22  1707 09/19/22  0434 09/18/22  1438 09/18/22  0803 09/17/22  1606   * 139* 135* 113* 122*            Imaging:  CT Abdomen Pelvis w Contrast    Result Date: 9/19/2022  EXAMINATION: CT ABDOMEN PELVIS W CONTRAST, 9/19/2022 4:17 PM INDICATION: Sudden onset diarrhea w/ RLQ abdominal pain COMPARISON STUDY: Chest CT 9/9/2022, abdominal CT 8/13/2022 TECHNIQUE: CT scan of the abdomen and pelvis was performed on multidetector CT scanner using volumetric acquisition technique and images were reconstructed in multiple planes with variable thickness and reviewed on dedicated workstations. CONTRAST: 112 cc of Isovue-370 injected IV without oral contrast CT scan radiation dose is optimized to minimum requisite dose using automated dose modulation techniques. FINDINGS: Lower thorax: Incompletely imaged hypoenhancing metastatic lesions. Increasing air right greater than left loculated pleural effusions. There is a small pneumothorax component of the left basilar effusion on series 4 image 149, likely related to a recently removed pleural chest tube. Partially imaged mediastinal lymphadenopathy. Possible filling defect in the segment of the superior right pulmonary vein on series 4 image 31, versus artifact. Liver: No mass. No intrahepatic biliary ductal dilation. Biliary System: Cholelithiasis without cholecystitis. Pancreas: No mass or pancreatic ductal dilation. Adrenal glands: No mass or nodules Spleen: Normal. Kidneys: No suspicious mass, obstructing calculus or hydronephrosis. Gastrointestinal tract : Appendix is incompletely visualized, though does not appear distended. Increasing small bowel caliber distention with multiple air-fluid levels, in greatest dimension measuring up to 3.5 cm in the lower central abdomen on series 4 image 2017. Minimal vascular engorgement and perienteric fat stranding present on the left upper quadrant on series 4 image 322. There is relatively decompressed small bowel in the lower central pelvis adjacent to a large hypoenhancing mass (series 4 image 418). No pneumatosis or portal venous  gas. Prominent fluid-filled rectum. Mesentery/peritoneum/retroperitoneum: Enlarging right iliopsoas centrally hypoenhancing mass on series 4 image 367. No intra-abdominal fluid collection. Lymph nodes: No significant lymphadenopathy. Vasculature: Occluded right external iliac artery. Pelvis: Urinary bladder is normal. Osseous structures: No aggressive or acute osseous lesion.   Bones/soft tissues: Increase in size of numerous centrally necrotic right lower abdominal wall intramuscular as well as right retroperitoneal masses. Greater extension of one of these into the right pelvis on series 4 image 444 measures up to 10.4 x 12.7 cm in cross-sectional dimension which has substantially increased from the previous exam. Again noted postsurgical changes of right lower extremity amputation and right middle lobectomy.     IMPRESSION: 1. Developing small bowel obstruction with small bowel caliber measuring up to 2.5 cm and suspected transition point in the right hemipelvis adjacent to a growing necrotic pelvic mass. 2. Enlarging necrotic abdominal and retroperitoneal masses. Substantial increase in intrapelvic extension of one of these 3masses (series 4 image 444). 3. Increasing right greater than left loculated pleural effusions. 4. Incompletely imaged pulmonary metastatic disease and lymphadenopathy.  Possible tiny filling defect in a superior pulmonary vein segmental branch, vs artifact. [Urgent Result: SBO, growing mets.] Finding was identified on 9/19/2022 4:45 PM. Dr Story (neurology) was contacted by Dr. Ogden at 9/19/2022 5:10 PM and verbalized understanding of the urgent finding. I have personally reviewed the examination and initial interpretation and I agree with the findings. HANNA CHANG MD   SYSTEM ID:  [id number]

## 2022-09-19 NOTE — PROGRESS NOTES
"[facility]    Stroke Progress Note    Interval Events  Had planned to discharge yesterday.  Was receiving planned pRBC transfusion for chronic anemia prior to discharge.  Pt complaining of diarrhea since yesterday afternoon.  Labs were drawn showing hypokalemia to 2.1, now 3.7 s/p 40 mEq IV, 60 mEq PO (patient endorses additional 40 mEq given yesterday was promptly vomited up); 2 g magnesium sulfate given empirically.  Patient states he feels \"off\" and \"a little queasy\" but denies significant nausea, vomiting beyond single episode yesterday.  Complains of slight RLQ abdominal pain, which he states is chronic but \"maybe a bit worse.\"     Continues to endorse improvement in R hand/arm weakness, although remains unable to move digits 4 and 5.       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 The patient was recently discharged from [facility] after left-sided pleural effusion and right upper lobe pulmonary vein thrombosis (felt to be tumor clot). The patient presented to the ED for right hand and finger weakness which started around 2 PM on 9/16/2022.  The patient was watching TV on his wheelchair when he noticed that he was unable to move the fingers on his right hand except for his thumb. Patient reported that EMS was called who " told him it was likely due to dehydration and asked him to go into the hospital, but he did not. The next day, when the patient was talking to his infusion nurse over the phone, the nurse suggested that he should come to the hospital to get it checked out as the symptoms did not subside (but did not get worse). Denies other symptoms.    On chart review of oncology notes patient last infusion of cisplatin and doxorubicin on 9/1/2022. Not on anticoagulation.  He denies any history of bleeding such as nosebleeds, easy bruisability.  NIHSS initially 3, losing points for ataxia and LLE weakness.     In the ED the patient was worked up with CBC, BMP, MRI with and without contrast of the brain and MRI of the cervical spine.  CBC showed that the patient had pancytopenia with hemoglobin of 6.3 and platelet count of 85, white blood cell count of 1.2 and RBC count of 2.69.  The patient's BMP showed BUN of 23.6 and creatinine of 1.53.  The patient was started on normal saline infusion as well as transfused 1 unit of PRBC by the ED.      Stroke Evaluation Summarized    MRI/Head CT Scattered acute left hemispheric infarcts. No abnormal intracranial  contrast enhancement to suggest metastatic disease.   Intracranial Vasculature Head CTA demonstrates no aneurysm or stenosis of the major  intracranial arteries. Fetal origin of left PCA.   Cervical Vasculature Neck CTA demonstrates no stenosis of the major cervical arteries. The  origins of the great vessels from the aortic arch are patent. The  normal distal right internal carotid artery measures 5 mm. The normal  distal left internal carotid artery measures 5 mm.      Echocardiogram Normal biventricular function.Left ventricular size is normal. Global and regional left ventricular function  is normal with an EF of 55-60%.The atrial septum is intact as assessed by color Doppler and agitated saline  bubble study .   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 NIHSS of 3. MRI showed Scattered acute left hemispheric hyperintensities on DWI with corresponding hypointensity on ADC- over posterior frontal centrum semiovale , temporoparietal cortex. Echo, CTA unremarkable, no significant atherosclerosis noted.  IV thrombolytics were not given because of being outside 4.5-hour window and minor symptoms.  Endovascular not done because of absence of LVO.  He is being admitted for further stroke work-up.    Given patient's history of malignancy and recent pulmonary venous thrombosis, it is likely that the etiology may be related to hypercoagulability of malignancy, and hence will need anticoagulation. His blood counts also indicate pancytopenia and he had to be transfused as a result. Oncology were consulted for advice regarding pancytopenia and anticoagulation parameters-  okay with starting anticoagulation such as eliquis as long as platelet count is above 50.    Plan  #Acute ischemic stroke of Left hemisphere due to other etiology (likely hypercoagulability of underlying cancer)  Neurochecks Q 4 hours  - Permissive HTN; labetalol PRN for SBP > 220  - Avoid hypotonic IV fluids  - Start Eliquis 5 mg BID  - Start atorvastatin 80 mg daily  - CTA head and neck pending  - Telemetry, EKG  - Echocardiogram wo bubble study: Pending  - Bedside Glucose Monitoring  - A1c, Lipid Panel,  Troponin x 3  - PT/OT/SLP  - Stroke Education  - Depression Screen  - Apnea Screen  - Euthermia, Euglycemia  - Likely due to hypercoagulability of malignancy, so will probably anticoagulate. Pharmacy liaison consult placed to determine if insurance covers eliquis/xarelto/lovenox. The [place] trial showed apixaban being non-inferior to Dalteparin in treatment of cancer-associated VTE. While not specific to stroke, it seems reasonable to anticoagulate with a DOAC instead of lovenox.    #High-grade pleomorphic sarcoma   #Pancytopenia  - Oncology consult as the patient was scheduled for a doxorubicin and cisplatin infusion on 9/21/2022 for his metastatic pleomorphic sarcoma.  In addition the patient also has pancytopenia.   - Transfused with 1 unit pRBC in the ED.  - Oncology okay with starting anticoagulation such as eliquis as long as platelet count is above 50, which it is.     #Left Lower extremity swelling  - Per Oncology, swelling unlikely to be malignancy or chemotherapy related. Echo was unremarkable as well. Potential DVT was also considered- USG Doppler done which showed no DVT. Likely dependent edema.    Prophylaxis            For VTE Prevention:  - pneumatic compression device     For Acid Suppression:  - GI prophylaxis is not indicated     Code Status  Full Code     During initial physical assessment, the plan of care was discussed and developed with patient.  Plan of care includes: admission to hospital, aspirin initiation, stroke workup. .     Patient was admitted via [facility] ED ([place])     The patient will be admitted to the Neuro Critical Care/Stroke team..      The patient was discussed with the stroke attending, Dr. Cari Vogel.     Patient discussed with my supervising physician, Dr. Bain, who agrees with the critical findings, assessment, and plan as documented in the note above, or as otherwise noted in their attestation or staff note.      Ming Story MD, MS  PGY-1,  Neurology      ______________________________________________________    Clinically Significant Risk Factors Present on Admission                    Medications   Scheduled Meds    apixaban ANTICOAGULANT  5 mg Oral BID     escitalopram  10 mg Oral or Feeding Tube Daily     potassium chloride  20 mEq Oral or Feeding Tube Once     potassium chloride  40 mEq Oral or Feeding Tube Once     sodium chloride (PF)  3 mL Intracatheter Q8H       Infusion Meds    - MEDICATION INSTRUCTIONS -       - MEDICATION INSTRUCTIONS -         PRN Meds  sodium chloride 0.9%, acetaminophen, lidocaine 4%, lidocaine (buffered or not buffered), loperamide, - MEDICATION INSTRUCTIONS -, - MEDICATION INSTRUCTIONS -, sodium chloride (PF)       PHYSICAL EXAMINATION  Temp:  [98.5  F (36.9  C)-99.9  F (37.7  C)] 98.6  F (37  C)  Pulse:  [] 100  Resp:  [16-25] 18  BP: (130-138)/(79-94) 135/83  SpO2:  [99 %-100 %] 100 %      General Exam  General:  patient lying in bed without any acute distress    HEENT:  normocephalic/atraumatic  Cardio:  Appears well perfused  Pulmonary:  no respiratory distress  Abdomen:  soft  Extremities:  1+ edema on LLE extending upto midcalf.  Skin:  intact     Neuro Exam  Mental Status:  alert, oriented x 3, follows 3 step commands, speech clear and fluent, naming and repetition normal , able to spell world forwards and backwards.   Cranial Nerves:  visual fields intact, PERRL, EOMI with normal smooth pursuit, facial sensation intact and symmetric, facial movements symmetric, hearing not formally tested but intact to conversation, palate elevation symmetric and uvula midline, no dysarthria, shoulder shrug strong bilaterally, tongue protrusion midline  Motor:  normal muscle tone and bulk, no abnormal movements, able to move all limbs spontaneously.  Unable to extend digits 4 and 5 on R.  Mild ataxia on FNF on R, normal on L.    Reflexes:  toes down-going, no clonus.    Sensory:  light touch sensation intact and symmetric  throughout upper and lower extremities, pinprick sensation intact and symmetric, no extinction on double simultaneous stimulation   Coordination:  normal finger-to-nose and heel-to-shin bilaterally without dysmetria  Station/Gait:  unable to test due to R leg amputated    Stroke Scales    NIHSS  1a. Level of Consciousness 0-->Alert, keenly responsive   1b. LOC Questions 0-->Answers both questions correctly   1c. LOC Commands 0-->Performs both tasks correctly   2.   Best Gaze 0-->Normal   3.   Visual 0-->No visual loss   4.   Facial Palsy 0-->Normal symmetrical movements   5a. Motor Arm, Left 0-->No drift, limb holds 90 (or 45) degrees for full 10 secs   5b. Motor Arm, Right 0-->No drift, limb holds 90 (or 45) degrees for full 10 secs   6a. Motor Leg, Left 0-->No drift, leg holds 30 degree position for full 5 secs   6b. Motor Leg, right (UN) Amputation or joint fusion   7.   Limb Ataxia 1-->Present in one limb   8.   Sensory 0-->Normal, no sensory loss   9.   Best Language 0-->No aphasia, normal   10. Dysarthria 0-->Normal   11. Extinction and Inattention  0-->No abnormality   Total 1 (09/19/22 0800)       Modified [place] Score (Pre-morbid)  4-Moderately severe disability; unable to walk without assistance and unable to attend to own bodily    Imaging  I personally reviewed all imaging; relevant findings per HPI.     Lab Results Data   CBC  Recent Labs   Lab 09/19/22  0434 09/18/22  1438 09/17/22  1606   WBC  --  1.5* 1.2*   RBC  --  2.85* 2.69*   HGB 7.9* 6.8* 6.3*   HCT  --  21.2* 19.5*   PLT  --  106* 85*     Basic Metabolic Panel    Recent Labs   Lab 09/19/22  0434 09/18/22  2154 09/18/22  1438 09/18/22  0803 09/17/22  1606     --  140  --  140   POTASSIUM 3.7  3.5 2.1* 3.2*  --  3.5   CHLORIDE 106  --  104  --  102   CO2 23  --  26  --  25   BUN 15.4  --  18.2  --  23.6*   CR 1.25*  --  1.33*  --  1.53*   *  --  135* 113* 122*   VENTURA 8.7  --  8.7  --  8.8     Liver Panel  Recent Labs   Lab  09/17/22  1606   PROTTOTAL 6.5   ALBUMIN 3.3*   BILITOTAL 0.6   ALKPHOS 275*   AST 20   ALT 13     INR    Recent Labs   Lab Test 09/17/22  1607 08/31/22  2013 06/17/22  2312   INR 1.04 1.05 1.34*      Lipid Profile    Recent Labs   Lab Test 09/17/22  1607   CHOL 183   HDL 48   *   TRIG 116     A1C    Recent Labs   Lab Test 09/17/22  2141   A1C 4.7     Troponin    Recent Labs   Lab 09/17/22  2110   CTROPT 23*  23*

## 2022-09-19 NOTE — PROGRESS NOTES
Shift:  [id number]    VS:  VSS  Pain:  Denies  Neuro:  A/Ox4, slight drift, weakness noted in right arm. No facial droop/deficit noted.  Cardiac:  WDL  Respiratory:  WDL  Diet/Appetite:  Regular diet, good appetite.  GI/:  Soft/loose BMx4, voiding spontaneously.  LDAs:  Left double lumen PICC. Red port sluggish, but blood return noted. Purple port patent.  Skin:  Slight irritation on left ischial tuberosity noted. Mepilex applied.  Activity:  Up with OT/PT pivot to wheelchair, otherwise in bed this shift.  Test/Procedures:  Echocardiogram unremarkable.  Labs:  Hemoglobin 6.8, awaiting 1 unit blood transfusion. Potassium 3.2 replaced per protocol.     Shift Summary:  Discharge orders were placed at 1622. RN verified that neuro wanted to discharge patient despite hemoglobin of 6.8. Stroke packet obtained from , given to patient. Neuro called as RN was reviewing discharge orders with patient and informed RN that they had consulted with hematology and decided to transfuse the patient before discharge. Awaiting unit from blood bank.

## 2022-09-19 NOTE — PHARMACY-CONSULT NOTE
Pharmacy Consult to evaluate for medication related stroke core measures    Vic Scott III, 47 year old male admitted for stroke workup on 9/17/2022.    Thrombolytic was not given because of Time from onset contraindications    VTE Prophylaxis SCDs /PCDs placed on 9/19/22, as appropriate prior to end of hospital day 2.    Antithrombotic: apixaban started on 9/19/22, as appropriate by end of hospital day 2. Continue antithrombotic therapy on discharge to meet quality measures, unless contraindicated.    Anticoagulation if history of A-fib/flutter: Patient does not have history of A-fib/flutter - anticoagulation not required for medication related stroke core measures.     LDL Cholesterol Calculated   Date Value Ref Range Status   09/17/2022 112 (H) <=100 mg/dL Final       Patient currently receiving Lipitor (atorvastatin) continue statin on discharge to meet quality measures, unless contraindicated.    Recommendations: None at this time    Thank you for the consult.    Jesus Landaverde [facility] 9/19/2022 4:18 PM

## 2022-09-20 NOTE — PROGRESS NOTES
[facility]    Stroke Progress Note    Interval Events  Patient developed abdominal pain yesterday afternoon, was not tolerating diet. CT abdomen c/w pSBO. Surgery team following. Plan to ADAT, with a low threshold for NGT decompression as needed. No surgical intervention was done. Etiology related to worsening sarcoma tumor burden. Afebrile, normotensive, lactate wnl.    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 The patient was recently discharged from [facility] after left-sided pleural effusion and right upper lobe pulmonary vein thrombosis (felt to be tumor clot). The patient presented to the ED for right hand and finger weakness which started around 2 PM on 9/16/2022.  The patient was watching TV on his wheelchair when he noticed that he was unable to move the fingers on his right hand except for his thumb. Patient reported that EMS was called who told him it was likely due to dehydration and asked him to go into the hospital, but he did not. The next day, when the patient was talking to his infusion nurse over the phone, the nurse suggested that he should come to the hospital to get it checked out as the symptoms did not subside (but did not get worse). Denies other symptoms.    On chart review of oncology notes patient last infusion of cisplatin and doxorubicin on 9/1/2022. Not on anticoagulation.  He denies any history of bleeding such as nosebleeds, easy bruisability.  NIHSS initially 3, losing points for ataxia and LLE weakness.     In the ED the patient was worked up with CBC, BMP, MRI with and without contrast of the brain and MRI of the cervical spine.  CBC showed that the patient had pancytopenia with hemoglobin of 6.3 and platelet count of 85, white  blood cell count of 1.2 and RBC count of 2.69.  The patient's BMP showed BUN of 23.6 and creatinine of 1.53.  The patient was started on normal saline infusion as well as transfused 1 unit of PRBC by the ED.      Stroke Evaluation Summarized    MRI/Head CT Scattered acute left hemispheric infarcts. No abnormal intracranial  contrast enhancement to suggest metastatic disease.   Intracranial Vasculature Head CTA demonstrates no aneurysm or stenosis of the major  intracranial arteries. Fetal origin of left PCA.   Cervical Vasculature Neck CTA demonstrates no stenosis of the major cervical arteries. The  origins of the great vessels from the aortic arch are patent. The  normal distal right internal carotid artery measures 5 mm. The normal  distal left internal carotid artery measures 5 mm.      Echocardiogram Normal biventricular function.Left ventricular size is normal. Global and regional left ventricular function  is normal with an EF of 55-60%.The atrial septum is intact as assessed by color Doppler and agitated saline  bubble study .   EKG/Telemetry Sinus tachycardia   Inferior infarct , age undetermined   Abnormal ECG   Other Testing Not Applicable      LDL  9/17/2022: 112 mg/dL   A1C  9/17/2022: 4.7 %   Troponin No lab value available in past 48 hrs       Impression   47-year-old male with PMHx of high-grade pleomorphic sarcoma of right pelvis and femur status post postchemotherapy and amputation, with recent history of right upper lobe pulmonary vein thrombosis admitted following R hand weakness, found to have scattered acute left hemispheric strokes. Echo, CTA unremarkable, no significant atherosclerosis noted. Etiology likely hypercoagulability of malignancy. Started apixaban 9/19. Found to have pSBO on 9/19 due to progressive sarcoma burden. General surgery following.     Plan  #pSBO 2/2 increased intraabdominal tumor burden  -Advance diet as tolerated (clear liquids currently)  -mIVf @ 125/hr  -Will consider  NGT decompression as needed  -General surgery consulted, appreciate recs  -No plan for intraabdominal surgery currently    #L hemispheric infarcts 2/2 hypercoagulability of malignancy  - Neurochecks Q 4 hours  - Goal normotension  - Continue Eliquis 5 mg BID  - Start atorvastatin 80 mg daily  - Continue Cardiac monitoring  - PT/OT/SLP  - Stroke Education  - Depression Screen  - Apnea Screen  - Euthermia, Euglycemia    #High-grade pleomorphic sarcoma   #Pancytopenia  - Oncology consult as the patient was scheduled for a doxorubicin and cisplatin infusion on 9/21/2022 for his metastatic pleomorphic sarcoma.  In addition the patient also has pancytopenia.   - Oncology okay with starting anticoagulation such as eliquis as long as platelet count is above 50, which it is.   - Team will get back to us regarding plan for outpatient follow up and discussion of prognosis given most recent CT finding.     #Left Lower extremity swelling  - Per Oncology, swelling unlikely to be malignancy or chemotherapy related. Echo was unremarkable as well. Potential DVT was also considered- USG Doppler done which showed no DVT. Likely dependent edema.    Checklist           For VTE Prevention: pneumatic compression device  For Acid Suppression: GI prophylaxis is not indicated  Full Code     Discussed with staff Dr. Odell Vargas MD  Neurology PGY-3      ______________________________________________________    Clinically Significant Risk Factors Present on Admission                    Medications   Scheduled Meds    apixaban ANTICOAGULANT  5 mg Oral BID     atorvastatin  80 mg Oral QPM     escitalopram  10 mg Oral or Feeding Tube Daily     potassium chloride  20 mEq Oral or Feeding Tube Once     potassium chloride  40 mEq Oral or Feeding Tube Once     potassium chloride  40 mEq Oral Once     sodium chloride (PF)  3 mL Intracatheter Q8H       Infusion Meds    - MEDICATION INSTRUCTIONS -       - MEDICATION INSTRUCTIONS -        sodium chloride 125 mL/hr at 09/19/22 9655       PRN Meds  sodium chloride 0.9%, acetaminophen, lidocaine 4%, lidocaine (buffered or not buffered), - MEDICATION INSTRUCTIONS -, - MEDICATION INSTRUCTIONS -, sodium chloride (PF)       PHYSICAL EXAMINATION  Temp:  [98.1  F (36.7  C)-98.8  F (37.1  C)] 98.5  F (36.9  C)  Pulse:  [100-111] 106  Resp:  [16-18] 18  BP: (126-142)/(81-93) 126/85  SpO2:  [94 %-100 %] 96 %      General Exam  General:  patient lying in bed without any acute distress    HEENT:  normocephalic/atraumatic  Cardio:  Appears well perfused  Pulmonary:  no respiratory distress  Abdomen:  Distended, mild tenderness with palpation, non-peritonitic   Extremities:  1+ edema on LLE extending upto midcalf.  Skin:  intact     Neuro Exam  Mental Status:  alert, oriented x 3, follows 3 step commands, speech clear and fluent, naming and repetition normal , able to spell world forwards and backwards.   Cranial Nerves:  visual fields intact, PERRL, EOMI with normal smooth pursuit, facial sensation intact and symmetric, facial movements symmetric, hearing not formally tested but intact to conversation, palate elevation symmetric and uvula midline, no dysarthria, shoulder shrug strong bilaterally, tongue protrusion midline  Motor:  normal muscle tone and bulk, no abnormal movements, able to move all limbs spontaneously.  Unable to extend digits 4 and 5 on R, 1/5 in 4rth and 5th digits.  Mild ataxia on FNF on R, normal on L.    Reflexes:  toes down-going, no clonus.    Sensory:  light touch sensation intact and symmetric throughout upper and lower extremities, pinprick sensation intact and symmetric, no extinction on double simultaneous stimulation   Coordination:  normal finger-to-nose and heel-to-shin bilaterally without dysmetria  Station/Gait:  unable to test due to R leg amputated    Stroke Scales    NIHSS  1a. Level of Consciousness 0-->Alert, keenly responsive   1b. LOC Questions 0-->Answers both questions  correctly   1c. LOC Commands 0-->Performs both tasks correctly   2.   Best Gaze 0-->Normal   3.   Visual 0-->No visual loss   4.   Facial Palsy 0-->Normal symmetrical movements   5a. Motor Arm, Left 0-->No drift, limb holds 90 (or 45) degrees for full 10 secs   5b. Motor Arm, Right 1-->Drift, limb holds 90 (or 45) degrees, but drifts down before full 10 secs, does not hit bed or other support   6a. Motor Leg, Left 0-->No drift, leg holds 30 degree position for full 5 secs   6b. Motor Leg, right (UN) Amputation or joint fusion   7.   Limb Ataxia 1-->Present in one limb   8.   Sensory 0-->Normal, no sensory loss   9.   Best Language 0-->No aphasia, normal   10. Dysarthria 0-->Normal   11. Extinction and Inattention  0-->No abnormality   Total 2 (09/20/22 0000)       Modified Donis Score (Pre-morbid)  4-Moderately severe disability; unable to walk without assistance and unable to attend to own bodily    Imaging  I personally reviewed all imaging; relevant findings per HPI.     Lab Results Data   CBC  Recent Labs   Lab 09/20/22  0325 09/19/22  0434 09/18/22  1438 09/17/22  1606   WBC 3.6*  --  1.5* 1.2*   RBC 3.11*  --  2.85* 2.69*   HGB 7.7* 7.9* 6.8* 6.3*   HCT 23.8*  --  21.2* 19.5*     --  106* 85*     Basic Metabolic Panel    Recent Labs   Lab 09/20/22  0325 09/19/22  1707 09/19/22  0434    134* 142   POTASSIUM 3.5  3.5 3.1* 3.7  3.5   CHLORIDE 105 100 106   CO2 22 23 23   BUN 11.7 13.7 15.4   CR 1.20* 1.19* 1.25*   * 114* 139*   VENTURA 8.7 8.6 8.7     Liver Panel  Recent Labs   Lab 09/17/22  1606   PROTTOTAL 6.5   ALBUMIN 3.3*   BILITOTAL 0.6   ALKPHOS 275*   AST 20   ALT 13     INR    Recent Labs   Lab Test 09/17/22  1607 08/31/22 2013 06/17/22  2312   INR 1.04 1.05 1.34*      Lipid Profile    Recent Labs   Lab Test 09/17/22  1607   CHOL 183   HDL 48   *   TRIG 116     A1C    Recent Labs   Lab Test 09/17/22  2141   A1C 4.7     Troponin    Recent Labs   Lab 09/17/22  2110   CTROPT 23*   23*

## 2022-09-20 NOTE — PLAN OF CARE
Status: Pt admitted with RUE weakness, found to have acute L hemispheric ischemic stroke. Hx of sarcoma of R pelvis and femur now s/p chemo and R hindquarter amputation 6/17/22.  Vitals: VSS on RA ex tachycardic up to 115. On CCM.   Neuros: A&Ox4. Denies N/T. RUE 4/5 with moderate hand grasp and ataxic. Intermittent R drift, not seen @ 0800, seen @ 1200. NIHSS 1,2.  IV: DL PICC - purple infusing NS @ 125, red SL. Both lumen with good blood return.  Labs/Electrolytes: K+, mag, and phos redraw tomorrow (9/21 AM), no replacements needed this shift.  Resp/trach: LS clear/diminished. Denies SOB.   Diet: Clear liquid, ADAT. Denies Nausea.  Bowel status: BS hyperactive. Multiple loose BMs this shift, intermittent incontinence. LBM 9/20.   : Voids spontaneously via bedside urinal.   Skin: R amputation scar not fully healed, L inner leg moisture injury, pressure injury to sacrum, all covered with Mepilex. barrier cream applied and dressing reapplied every 2 hours.  Pain: incisional pain controlled with PRN tylenol.   Activity: A2/pivot to wheelchair at baseline. RLE amputation.   Plan: Continue to monitor and follow POC. ADAT and monitor for N/V. Patient chemo cancelled for tomorrow. Neutropenic precautions.

## 2022-09-20 NOTE — PLAN OF CARE
Status: Pt admitted with RUE weakness, found to have acute L hemispheric ischemic stroke. Hx of sarcoma of R pelvis and femur now s/p chemo and R hindquarter amputation 6/17/22.  Vitals: VSS on RA ex tachy. CCM.   Neuros: A&O x4. Denies N/T. RUE 4/5 with moderate hand grasp and ataxic. R drift at 1600, absent at 2000.  IV: DL PICC - purple infusing NS @ 125, red SL. Both lumen with good blood return.  Labs/Electrolytes: K 3.1. IV replacement currently in progress. Redraw 1-2 hours after last bag. Stool sample sent to lab.  Resp/trach: LS clear/dim. Denies SOB.   Diet: Changed to clear liquid, ADAT.   Bowel status: BS hyperactive. Multiple loose BMs this shift. Order placed for rectal tube.  : Voids spontaneously via bedside urinal.   Skin: R amputation scar not fully healed, L inner leg moisture injury, PI to sacrum, all covered with Mepilex.   Pain: RLQ pain, declined interventions.   Activity: Assist of two, pivot to wheelchair at baseline. RLE amputation.   Social: No visitors this shift.   Plan: Place rectal tube. ADAT and monitor for N/V.   Updates this shift: Alteplase used on red PICC lumen, good blood return now noted. SBO and growing mets found on CT this shift.    Stroke Patients:   PLC scheduled or completed: completed this AM  Pneumoboots in place: no, pt refused

## 2022-09-20 NOTE — PLAN OF CARE
Status: Pt admitted with RUE weakness, found to have acute L hemispheric ischemic stroke. Hx of sarcoma of R pelvis and femur now s/p chemo and R hindquarter amputation 6/17/22.  Vitals: VSS on RA ex tachycardia. On CCM.   Neuros: A&O x4. Denies N/T. RUE 4/5 with moderate hand grasp and ataxic. Intermittent R drift  IV: DL PICC - purple infusing NS @ 125, red SL. Both lumen with good blood return.  Labs/Electrolytes: Replaced K. Redraw was 3.5. Redraw tomorrow (9/21 AM).  Resp/trach: LS clear/dim. Denies SOB.   Diet: Clear liquid, ADAT. Pt tolerated popsicles, chicken broth, and ginger ale   Bowel status: BS hyperactive. Multiple loose BMs this shift.   : Voids spontaneously via bedside urinal.   Skin: R amputation scar not fully healed, L inner leg moisture injury, pressure injury to sacrum, all covered with Mepilex. Sacral and L inner leg mepilex changed.  Pain: Reports incisional pain. PRN tylenol given. Denies abd pain.  Activity: Assist of two, pivot to wheelchair at baseline. RLE amputation.   Plan: ADAT and monitor for N/V.   Updates this shift: Pt refusing rectal tube at this time. Paged provider about DC'd imodium - stated can cause worsening SBO. Also DC'd rectal tube orders for now

## 2022-09-20 NOTE — PROGRESS NOTES
"         Surgery Progress Note            9/20/2022    Subjective  Patient required surgical consultation due to concerns of SBO. He reports having a fine night. He reports he passed gas a few times yesterday and has had multiple BM's of liquid consistency.    Objective  /80 (BP Location: Right arm)   Pulse 110   Temp 98.6  F (37  C) (Oral)   Resp 20   Ht 1.88 m (6' 2\")   Wt 83.7 kg (184 lb 8.4 oz)   SpO2 97%   BMI 23.69 kg/m       Labs  Lab Results   Component Value Date    WBC 3.6 09/20/2022     Lab Results   Component Value Date    RBC 3.11 09/20/2022     Lab Results   Component Value Date    HGB 7.7 09/20/2022     Lab Results   Component Value Date    HCT 23.8 09/20/2022     No components found for: MCT  Lab Results   Component Value Date    MCV 77 09/20/2022     Lab Results   Component Value Date    MCH 24.8 09/20/2022     Lab Results   Component Value Date    MCHC 32.4 09/20/2022     Lab Results   Component Value Date    RDW 19.7 09/20/2022     Lab Results   Component Value Date     09/20/2022     Last Comprehensive Metabolic Panel:  Sodium   Date Value Ref Range Status   09/20/2022 137 136 - 145 mmol/L Final     Potassium   Date Value Ref Range Status   09/20/2022 3.5 3.4 - 5.3 mmol/L Final   09/20/2022 3.5 3.4 - 5.3 mmol/L Final   08/13/2022 3.7 3.5 - 5.0 mmol/L Final     Potassium POCT   Date Value Ref Range Status   08/31/2022 3.7 3.4 - 5.3 mmol/L Final     Chloride   Date Value Ref Range Status   09/20/2022 105 98 - 107 mmol/L Final   08/13/2022 104 98 - 107 mmol/L Final     Carbon Dioxide (CO2)   Date Value Ref Range Status   09/20/2022 22 22 - 29 mmol/L Final   08/13/2022 26 22 - 31 mmol/L Final     Anion Gap   Date Value Ref Range Status   09/20/2022 10 7 - 15 mmol/L Final   08/13/2022 9 5 - 18 mmol/L Final     Glucose   Date Value Ref Range Status   09/20/2022 117 (H) 70 - 99 mg/dL Final   08/13/2022 113 70 - 125 mg/dL Final   10/20/2021 92 70 - 99 mg/dL Final     GLUCOSE BY METER " POCT   Date Value Ref Range Status   09/18/2022 113 (H) 70 - 99 mg/dL Final     Urea Nitrogen   Date Value Ref Range Status   09/20/2022 11.7 6.0 - 20.0 mg/dL Final   08/13/2022 12 8 - 22 mg/dL Final     Creatinine   Date Value Ref Range Status   09/20/2022 1.20 (H) 0.67 - 1.17 mg/dL Final     GFR Estimate   Date Value Ref Range Status   09/20/2022 76 >60 mL/min/1.73m2 Final     Comment:     Effective December 21, 2021 eGFRcr in adults is calculated using the 2021 CKD-EPI creatinine equation which includes age and gender (Padmini et al., [place], DOI: 10.1056/FXKOxb9872223)     GFR, ESTIMATED POCT   Date Value Ref Range Status   08/31/2022 >60 >60 mL/min/1.73m2 Final     Calcium   Date Value Ref Range Status   09/20/2022 8.7 8.6 - 10.0 mg/dL Final     Bilirubin Total   Date Value Ref Range Status   09/17/2022 0.6 <=1.2 mg/dL Final     Alkaline Phosphatase   Date Value Ref Range Status   09/17/2022 275 (H) 40 - 129 U/L Final     ALT   Date Value Ref Range Status   09/17/2022 13 10 - 50 U/L Final     AST   Date Value Ref Range Status   09/17/2022 20 10 - 50 U/L Final     Potassium   Date Value Ref Range Status   09/20/2022 3.5 3.4 - 5.3 mmol/L Final   09/20/2022 3.5 3.4 - 5.3 mmol/L Final   08/13/2022 3.7 3.5 - 5.0 mmol/L Final     Potassium POCT   Date Value Ref Range Status   08/31/2022 3.7 3.4 - 5.3 mmol/L Final     Imaging  CT Abdomen Pelvis with Contrast  IMPRESSION:   1. Developing small bowel obstruction with small bowel caliber  measuring up to 2.5 cm and suspected transition point in the right  hemipelvis adjacent to a growing necrotic pelvic mass.  2. Enlarging necrotic abdominal and retroperitoneal masses.  Substantial increase in intrapelvic extension of one of these 3masses  (series 4 image 444).  3. Increasing right greater than left loculated pleural effusions.  4. Incompletely imaged pulmonary metastatic disease and  lymphadenopathy.  Possible tiny filling defect in a superior pulmonary  vein segmental  branch, vs artifact.     Assessment/Plan  Patient is a 47yoM requiring surgical consultation for presumed SBO. Because patient is passing gas and having BM's, despite CT findings, there is no concern for SBO at this time.    - Surgery team will sign off at this time. Please page with questions or concerns.    Radha Blanton  MS3    Edmar Alvarez MD  PGY-1 Surgery Resident

## 2022-09-21 NOTE — PROGRESS NOTES
[facility]    Stroke Progress Note    Interval Events  Stooled 3 times today. Electrolyte abnormalities present this morning were corrected. Discussed discharging with the patient today but he noted that he was unable to arrange a pickup with his family late this evening and transport could not be arranged.     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 The patient was recently discharged from [facility] after left-sided pleural effusion and right upper lobe pulmonary vein thrombosis (felt to be tumor clot). The patient presented to the ED for right hand and finger weakness which started around 2 PM on 9/16/2022.  The patient was watching TV on his wheelchair when he noticed that he was unable to move the fingers on his right hand except for his thumb. Patient reported that EMS was called who told him it was likely due to dehydration and asked him to go into the hospital, but he did not. The next day, when the patient was talking to his infusion nurse over the phone, the nurse suggested that he should come to the hospital to get it checked out as the symptoms did not subside (but did not get worse). Denies other symptoms.    On chart review of oncology notes patient last infusion of cisplatin and doxorubicin on 9/1/2022. Not on anticoagulation.  He denies any history of bleeding such as nosebleeds, easy bruisability.  NIHSS initially 3, losing points for ataxia and LLE weakness.     In the ED the patient was worked up with CBC, BMP, MRI with and without contrast of the brain and MRI of the cervical spine.  CBC showed that the patient had pancytopenia with hemoglobin of 6.3 and platelet count of 85, white blood cell count of 1.2 and RBC count of 2.69.  The patient's BMP  showed BUN of 23.6 and creatinine of 1.53.  The patient was started on normal saline infusion as well as transfused 1 unit of PRBC by the ED.      Stroke Evaluation Summarized    MRI/Head CT Scattered acute left hemispheric infarcts. No abnormal intracranial  contrast enhancement to suggest metastatic disease.   Intracranial Vasculature Head CTA demonstrates no aneurysm or stenosis of the major  intracranial arteries. Fetal origin of left PCA.   Cervical Vasculature Neck CTA demonstrates no stenosis of the major cervical arteries. The  origins of the great vessels from the aortic arch are patent. The  normal distal right internal carotid artery measures 5 mm. The normal  distal left internal carotid artery measures 5 mm.      Echocardiogram Normal biventricular function.Left ventricular size is normal. Global and regional left ventricular function  is normal with an EF of 55-60%.The atrial septum is intact as assessed by color Doppler and agitated saline  bubble study .   EKG/Telemetry Sinus tachycardia   Inferior infarct , age undetermined   Abnormal ECG   Other Testing Not Applicable      LDL  9/17/2022: 112 mg/dL   A1C  9/17/2022: 4.7 %   Troponin No lab value available in past 48 hrs       Impression   47-year-old male with PMHx of high-grade pleomorphic sarcoma of right pelvis and femur status post postchemotherapy and amputation, with recent history of right upper lobe pulmonary vein thrombosis admitted following R hand weakness, found to have scattered acute left hemispheric strokes. Echo, CTA unremarkable, no significant atherosclerosis noted. Etiology likely hypercoagulability of malignancy. Started apixaban 9/19. Found to have pSBO on 9/19 due to progressive sarcoma burden. General surgery following.     Plan  #pSBO 2/2 increased intraabdominal tumor burden  Abdominal pain and early satiety reduced today. Improving.   -Advance diet as tolerated (regular diet)  -Will consider NGT decompression as  needed  -General surgery consulted, appreciate recs  -No plan for intraabdominal surgery currently    #Diarrhea  #Hypokalemia  #hypomagnesemia  Patient has had several days of loose stools. C-Diff negative. Possibly related to to pSBO. Causing hypokalemia and low mag, which were both replaced. Enteric panel negative as well.   -electrolyte replacement protocols  -PM BMP pending  -will discharge on PO mag and K  -Recheck BMP 2 days after discharge    #L hemispheric infarcts 2/2 hypercoagulability of malignancy  - Neurochecks Q 4 hours  - Goal normotension  - Continue Eliquis 5 mg BID  - Start atorvastatin 80 mg daily  - Continue Cardiac monitoring  - PT/OT/SLP  - Stroke Education  - Depression Screen  - Apnea Screen  - Euthermia, Euglycemia    #High-grade pleomorphic sarcoma   #Pancytopenia  - Oncology consult as the patient was scheduled for a doxorubicin and cisplatin infusion on 9/21/2022 for his metastatic pleomorphic sarcoma.  In addition the patient also has pancytopenia.   - Oncology okay with starting anticoagulation such as eliquis as long as platelet count is above 50, which it is.   - Team will get back to us regarding plan for outpatient follow up and discussion of prognosis given most recent CT finding.     #Left Lower extremity swelling  - Per Oncology, swelling unlikely to be malignancy or chemotherapy related. Echo was unremarkable as well. Potential DVT was also considered- USG Doppler done which showed no DVT. Likely dependent edema.    Checklist           For VTE Prevention: pneumatic compression device  For Acid Suppression: GI prophylaxis is not indicated  Full Code     Discussed with staff Dr. Odell Vargas MD  Neurology PGY-3      ______________________________________________________    Clinically Significant Risk Factors Present on Admission                    Medications   Scheduled Meds    apixaban ANTICOAGULANT  5 mg Oral BID     atorvastatin  80 mg Oral QPM      escitalopram  10 mg Oral or Feeding Tube Daily     magnesium sulfate  4 g Intravenous Once     potassium chloride  10 mEq Intravenous Q1H     sodium chloride (PF)  3 mL Intracatheter Q8H       Infusion Meds    - MEDICATION INSTRUCTIONS -         PRN Meds  sodium chloride 0.9%, acetaminophen, lidocaine 4%, lidocaine (buffered or not buffered), - MEDICATION INSTRUCTIONS -, sodium chloride (PF)       PHYSICAL EXAMINATION  Temp:  [98.4  F (36.9  C)-99  F (37.2  C)] 98.9  F (37.2  C)  Pulse:  [102-124] 110  Resp:  [16-20] 16  BP: (117-141)/(80-86) 136/80  SpO2:  [96 %-100 %] 98 %      General Exam  General:  patient lying in bed without any acute distress    HEENT:  normocephalic/atraumatic  Cardio:  Appears well perfused  Pulmonary:  no respiratory distress  Abdomen:  Distended, mild tenderness with palpation, non-peritonitic   Extremities:  1+ edema on LLE extending upto midcalf.  Skin:  intact     Neuro Exam  Mental Status:  alert, oriented x 3, follows 3 step commands, speech clear and fluent, naming and repetition normal , able to spell world forwards and backwards.   Cranial Nerves:  visual fields intact, PERRL, EOMI with normal smooth pursuit, facial sensation intact and symmetric, facial movements symmetric, hearing not formally tested but intact to conversation, palate elevation symmetric and uvula midline, no dysarthria, shoulder shrug strong bilaterally, tongue protrusion midline  Motor:  normal muscle tone and bulk, no abnormal movements, able to move all limbs spontaneously.  Unable to extend digits 4 and 5 on R, 1/5 in 4rth and 5th digits.  Mild ataxia on FNF on R, normal on L.    Reflexes:  toes down-going, no clonus.    Sensory:  light touch sensation intact and symmetric throughout upper and lower extremities, pinprick sensation intact and symmetric, no extinction on double simultaneous stimulation   Coordination:  normal finger-to-nose and heel-to-shin bilaterally without dysmetria  Station/Gait:   unable to test due to R leg amputated    Stroke Scales    NIHSS  1a. Level of Consciousness 0-->Alert, keenly responsive   1b. LOC Questions 0-->Answers both questions correctly   1c. LOC Commands 0-->Performs both tasks correctly   2.   Best Gaze 0-->Normal   3.   Visual 0-->No visual loss   4.   Facial Palsy 0-->Normal symmetrical movements   5a. Motor Arm, Left 0-->No drift, limb holds 90 (or 45) degrees for full 10 secs   5b. Motor Arm, Right 0-->No drift, limb holds 90 (or 45) degrees for full 10 secs   6a. Motor Leg, Left 0-->No drift, leg holds 30 degree position for full 5 secs   6b. Motor Leg, right (UN) Amputation or joint fusion   7.   Limb Ataxia 1-->Present in one limb   8.   Sensory 0-->Normal, no sensory loss   9.   Best Language 0-->No aphasia, normal   10. Dysarthria 0-->Normal   11. Extinction and Inattention  0-->No abnormality   Total 1 (09/21/22 0000)       Modified Donis Score (Pre-morbid)  4-Moderately severe disability; unable to walk without assistance and unable to attend to own bodily    Imaging  I personally reviewed all imaging; relevant findings per HPI.     Lab Results Data   CBC  Recent Labs   Lab 09/21/22 0722 09/20/22 0325 09/19/22  0434 09/18/22  1438   WBC 5.6 3.6*  3.6*  --  1.5*   RBC 3.06* 3.11*  3.11*  --  2.85*   HGB 7.7* 7.7*  7.7* 7.9* 6.8*   HCT 23.7* 23.8*  23.8*  --  21.2*    216  216  --  106*     Basic Metabolic Panel    Recent Labs   Lab 09/21/22  0722 09/20/22  0325 09/19/22  1707    137 134*   POTASSIUM 3.1* 3.5  3.5 3.1*   CHLORIDE 105 105 100   CO2 21* 22 23   BUN 11.0 11.7 13.7   CR 1.12 1.20* 1.19*   * 117* 114*   VENTURA 8.8 8.7 8.6     Liver Panel  Recent Labs   Lab 09/17/22  1606   PROTTOTAL 6.5   ALBUMIN 3.3*   BILITOTAL 0.6   ALKPHOS 275*   AST 20   ALT 13     INR    Recent Labs   Lab Test 09/17/22  1607 08/31/22  2013 06/17/22  2312   INR 1.04 1.05 1.34*      Lipid Profile    Recent Labs   Lab Test 09/17/22  1607   CHOL 183   HDL  48   *   TRIG 116     A1C    Recent Labs   Lab Test 09/17/22  2141   A1C 4.7     Troponin    Recent Labs   Lab 09/17/22  2110   CTROPT 23*  23*

## 2022-09-21 NOTE — PROGRESS NOTES
Status: Admitted 9/17 with RUE weakness. Found to have scattered acute L hemispheric strokes. Hx of R pelvis & femur sarcoma. S/p R amputation/hemipelvicectomy 6/2022  Vitals: VSS on RA. Tachy at times  Neuros: A&O x4. RUE 4 & ataxic. L side 5. No pronator drift noted this shift.  IV: DL PICC SL w/ blood return. Awaiting heparin lock orders.  Labs/Electrolytes: WNL  Resp/trach: Dyspnea on exertion. Supplemental O2 NC used when getting to the recliner per pt request which he does at home.   Diet: Regular diet. Fair intake  Bowel status: having incontinent loose, watery stools today,  : Voiding spontaneously  Skin: R pelvic amputation scar not fully healed w/ mepilex. Moisture damage on scrotum and inner L gluteal fold. Open sores on buttocks with mepilex. Barrier cream applied with every brief change.  Pain: Denies  Activity: A2, pivot at baseline. Up in recliner x1  Plan: Mag and K+ low and replacing via IV, recheck ordered at 1530 per MD.  Plan to discharge to home tonight.

## 2022-09-21 NOTE — PLAN OF CARE
Status: Admit 9/17 with RUE weakness. Found l hemispheric strokes. History of R pelvis and femur sarcoma with R hemipelvis and R leg amputation   Vitals: VSS, intermittently tachycardic   Neuros: A/Ox4, RUE ataxia. RUE 4/5, L 5/5. NIHSS 1   IV: DL PICC SL   Labs/Electrolytes: trending Hgb, K  Resp/trach: RA, LOPEZ   Diet: Regular, intermittently vomiting with pills   Bowel status: LBM 9/21, loose watery stools   : Voiding spontaneously   Skin: R pelvis amputation site not fully healed, Moisture damage related sores covered with mepilex. Would benefit from WOC consult for optimization   Pain: denied overnight   Activity: Ax1-2 w/ walker to pivot to chair   Plan: Continue to monitor, would benefit from oncology re-consult        Stroke Patients:   PLC completed: Stroke PLC on 9/19

## 2022-09-21 NOTE — PROGRESS NOTES
1830    Patient called Writer into patient's room and informed Writer that he does not have a ride home. Writer talked to patient's sister on Patient's cell phone and the sister said that they physically cannot lift his wheelchair and bring it to the hospital for him to go home.   Writer asked if patient made the team aware of this earlier in the day. Patient stated he told the doctor this morning but did not inform any nursing staff or care coordinator. Writer informed patient that he will most likely not be leaving tonight and will have to wait until tomorrow for transport home.   Writer called Neuro Stroke team to let them know.     Patient will likely need stretcher transport as they will not go to his house to  his wheelchair and oxygen and bring it back to the hospital, and then drive patient back to his home.

## 2022-09-21 NOTE — PLAN OF CARE
Status: Admitted 9/17 with RUE weakness. Found to have scattered acute L hemispheric strokes. Hx of R pelvis & femur sarcoma. S/p R amputation/hemipelvicectomy 6/2022  Vitals: VSS on RA. Tachy at times  Neuros: A&O x4. RUE 4 & ataxic. L side 5. No pronator drift noted this shift. NIHSS 1  IV: DL PICC SL w/ blood return. Awaiting heparin lock orders.  Labs/Electrolytes: WNL  Resp/trach: Dyspnea on exertion. Supplemental O2 NC used when getting to the recliner  Diet: Regular diet. Fair intake  Bowel status: Loose, watery, mucous BM's. LBM 9/20  : Voiding spontaneously  Skin: R pelvic amputation scar not fully healed w/ mepilex. Moisture damage on scrotum and inner L gluteal fold. Open sores on buttocks with mepilex. Barrier cream applied with every brief change.  Pain: Denies  Activity: A2, pivot at baseline. Up in recliner x1  Plan: Continue to monitor and follow POC    Triggered sepsis @ 1520. Lactic acid 0.8    Pulsate mattress now in place.

## 2022-09-22 NOTE — PROGRESS NOTES
Care Management Discharge Note    Discharge Date: 09/22/2022     Discharge Disposition: Home  Discharge Services:  Home Care  Discharge DME:  None    Discharge Transportation: family or friend will provide    Private pay costs discussed: transportation costs    Education Provided on the Discharge Plan:  yes  Persons Notified of Discharge Plans: patient, nurse, provder  Patient/Family in Agreement with the Plan: yes    Handoff Referral Completed: Yes    Additional Information:  Patient discharging home today with home health therapies. Patient is needing a ride home, uses wheelchair at baseline and requires oxygen with exertion. Wheelchair ride arranged with [facility] for 10 am. [facility] and [facility] updated on discharge. Discharge orders faxed. Hand off complete.     [facility] - wheelchair ride 10 am 9/22  Ph: [phone]    [facility]  Ph:[phone]   Fax:[phone]     [facility] - resume line care   Ph: [phone]   Fax: [phone]     [facility] - oxygen (resume)  Ph: [phone]  Fax: [phone]    Qi Woodward, RN, BSN  6A RN Care Coordinator  Ph: [phone]   Pager: [phone]

## 2022-09-22 NOTE — PLAN OF CARE
Vitals: VSS on RA  Neuros: Alert and oriented x4, RUE mild ataxia. RUE 4/5, L side 5/5. NIHSS 1  IV: DL PICC SL'd  Resp/trach: WNL on RA  Diet: Regular diet, denied nausea.  Bowel status: BS+x4, last BM 9/22  : Voiding  Skin:  R pelvic amputation scar not fully healed w/ mepilex for protection. Moisture damage on scrotum and inner L gluteal fold. PI to sacrum covered with mepilex. Barrier cream applied with every brief change for protection.  Pain: Denied  Activity: Ax1, pivot, up to recliner.    Discharge time/date: 9/22/22, 1020  Walked or Wheelchair: [facility]- [facility]   PIV removed: No-no piv present, sent home with PICC  Reviewed AVS with patient: Yes, all questions answered.  Medication due times added to AVS in EPIC:yes  Verbalized understanding of discharge with teachback: yes  Medications retrieved from pharmacy: Sent to outside pharmacy-pt will .   Supplies sent home: Briefs, wipes and barrier cream sent with patient.  Belongings from security with patient: N/A

## 2022-09-22 NOTE — PLAN OF CARE
Status: Admitted 9/17 with RUE weakness. Found to have scattered acute L hemispheric strokes. Hx of R pelvis & femur sarcoma. S/p R amputation/hemipelvicectomy 6/2022  Vitals: VSS on RA. Tachy at times   Neuros: A&O x4. RUE 4 & ataxic. L side 5. No pronator drift noted this shift. NIHSS 1  IV: DL PICC HL. Blood return in both lumens.  Labs/Electrolytes: WNL  Resp/trach: Dyspnea on exertion. Supplemental O2 NC used when getting to the recliner per baseline.  Diet: Regular diet. Fair intake  Bowel status: Watery, mucous BM x2 this shift. LBM 9/21  : Voiding spontaneously  Skin: R pelvic amputation scar not fully healed w/ mepilex. Moisture damage on scrotum and inner L gluteal fold. Open sores on buttocks with mepilex. Barrier cream applied with every brief change.  Pain: Denies  Activity: A2, pivot at baseline. Up in recliner x1  Plan: Discharge tomorrow 9/22 pending transport set up.

## 2022-09-22 NOTE — CARE PLAN
Occupational Therapy Discharge Summary    Reason for therapy discharge:    Discharged to home with home care    Progress towards therapy goal(s). See goals on Care Plan in [facility] electronic health record for goal details.  Goals partially met.  Barriers to achieving goals:   discharge from facility.    Therapy recommendation(s):    Continued therapy is recommended.  Rationale/Recommendations:  decrease caregiver burden .

## 2022-09-22 NOTE — PLAN OF CARE
Status: Admit 9/17 with RUE weakness. Found l hemispheric strokes. History of R pelvis and femur sarcoma with R hemipelvis and R leg amputation   Vitals: VSS, intermittently tachycardic   Neuros: A/Ox4, RUE mild ataxia. RUE 4/5, L 5/5. NIHSS 1   IV: DL PICC SL   Labs/Electrolytes: trending Hgb, K and Mag   Resp/trach: RA, LOPEZ   Diet: Regular, denied nausea overnight   Bowel status: LBM 9/22, loose watery stools   : Voiding spontaneously   Skin: R pelvis amputation site not fully healed, Moisture damage related sores covered with mepilex. Would benefit from WOC consult for optimization for going home   Pain: denied overnight   Activity: Ax1-2 w/ walker to pivot to chair   Plan: Continue to monitor, pt hoping to set up ride to discharge today.          Stroke Patients:   PLC completed: Stroke PLC on 9/19

## 2022-09-23 NOTE — PROGRESS NOTES
This is a recent snapshot of the patient's [facility] medical record.  For current drug dose and complete information and questions, call [phone] or In Basket pool, fv home infusion ([id number])  CSN Number:  [id number]

## 2022-09-25 NOTE — PROGRESS NOTES
"Clinic Care Coordination Contact  [facility]: Post-Discharge Note  SITUATION                                                      Admission:    Admission Date: 09/17/22   Reason for Admission: R hand weakness, L hemipheric stroke 2/2 malingnancy and diarrhea  Discharge:   Discharge Date: 09/22/22  Discharge Diagnosis: R hand weakness, L hemipheric stroke 2/2 malingnancy and diarrhea    BACKGROUND                                                      Per hospital discharge summary and inpatient provider notes:      Vic Scott III is a 47 year old male who presents to the ED with left leg and foot swelling since last night, mostly the foot.  No pain in the foot. Has right leg amputation because of sarcoma. Is on chemotherapy currently. Last infusion was a couple weeks ago. No fever. Right hand feels limp.  Can only move the thumb and index finger. Can't squeez the hand well. The hand symptoms started about 2:30 yesterday afternoon. He feels some numbness in the hand as well. The right wrist feels week too. No injury to the arm or hand. Had normal function of then hand yesterday. No fever, cough. Is on oxygen at home 2-3 L continuous for fluid in the lungs.  (history of left pleural effusion)    ASSESSMENT           Discharge Assessment  How are you doing now that you are home?: \"I'm doing much better.\" Patient states that therapies came last week. No fever, no discharge from wound. He has family helping him at home. His stools are more formed and less frequent.  How are your symptoms? (Red Flag symptoms escalate to triage hotline per guidelines): Improved  Do you feel your condition is stable enough to be safe at home until your provider visit?: Yes  Does the patient have their discharge instructions? : Yes  Does the patient have questions regarding their discharge instructions? : Yes (see comment) (He was advised to call the wound nurse per his discharge instrutions. He was under the understanding that they " would call him.)  Were you started on any new medications or were there changes to any of your previous medications? : Yes  Does the patient have all of their medications?: Yes  Do you have questions regarding any of your medications? : No  Do you have all of your needed medical supplies or equipment (DME)?  (i.e. oxygen tank, CPAP, cane, etc.): No - What equipment or supplies are needed? (He is working with primary MD to get a shower chair and a hospital bed.)  Discharge follow-up appointment scheduled within 14 calendar days? : Yes  Discharge Follow Up Appointment Date: 09/26/22  Discharge Follow Up Appointment Scheduled with?: Specialty Care Provider         Post-op (Clinicians Only)  Did the patient have surgery or a procedure: Yes  Incision: healing  Drainage: No  Bleeding: none  Fever: No  Chills: No  Redness: No  Swelling: No  Incision site pain: No  Eating & Drinking: eating and drinking without complaints/concerns  PO Intake: regular diet  Bowel Function: normal  Date of last BM: 09/25/22  Urinary Status: voiding without complaint/concerns        PLAN                                                      Outpatient Plan:      Problem list and follow up plan  1. L hemipsheric stroke 2/2 malignancy  -Continue elliquis  -continue statin  -Follow up with stroke team  -outpatient OT/PT     2. pSBO 2/2 malignancy  3. Diarrhea w/ electrolyte abnormalities  -follow up with PCP in 1 week   -BMP to be drawn to 9/23  -Take 10 day course of Mag/K supplements     3. Malignancy   4. Pancytopenia  -Follow up with oncology      Rehab evaluation: OT and PT. - home with assist; home with home care occupational therapy.       Future Appointments   Date Time Provider Department Center   9/26/2022  7:15 AM  [facility] LAB DRAW ONL [facility]   9/26/2022  8:15 AM Latosha Taylor CNP ONA [facility]   9/27/2022  8:00 AM David Valentino MD [facility]   9/28/2022  9:00 AM KAREN Butt MD [facility]   9/29/2022  8:20 AM  Mandi Thomas FMOB [facility]   10/24/2022  3:00 PM Rajesh Vidales APRN [facility]   11/4/2022  2:00 PM Leslie Ortiz MD [facility]   2/27/2023  8:00 AM Jey Solano MD [facility] [place]         For any urgent concerns, please contact our 24 hour nurse triage line: [phone] ([phone])         May Freedman RN

## 2022-09-27 NOTE — TELEPHONE ENCOUNTER
[facility] Call Center    Phone Message    May a detailed message be left on voicemail: yes     Reason for Call: Order(s): Home Care Orders: Other: Wound care three times a week. Patients bottom is more of pressure sore, wound  is being use currently.  per Christine there is a wound on abdomen right lower, needing recommendations for wound care.     Action Taken: Message routed to:  [facility] ([facility]): NP     Travel Screening: Not Applicable

## 2022-09-27 NOTE — LETTER
"2022       RE: Vic Scott III  [address]  [address]  [facility] [address]     Dear Colleague,    Thank you for referring your patient, Vic Scott III, [facility]. Please see a copy of my visit note below.      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   2022 pathologic R femur fx-->R hindquarter amputation. We met during hospitalization 2022 CT shows pulm and abd wall mets-->cis/doxorubicin  2022 hospitalized with resp failure, bilateral malignant effusions, R pulm vein tumor thrombus.   2022 L embolic strokes. Hospitalization complicated by a partial SBO, resolved with supportive measures.     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 3 dtrs 1 son: 5-16 yo. 2 sisters. .  Both parents .    Care Planning - No HCD. Verbally states he would want his sisters to be his decision makers.       History:  History gathered today from: patient, medical chart    Chart reviewed and I confirmed key details with him, summarized above.  Hospitalized 2x in Sept--pneumonia/pleural effusions; stroke. Scans show progression; unclear to me what this will mean for his cancer plans; he is unsure too.     Pain is a top concern.  Feels 'nerves' at amputation site.  Eg transfers--gets shooting pain through R side of body  Has phantom sensations that are painful  Has a small open stump wound, 'mostly healed', a little painful sometimes    Oxycodone 10 mg--does not work well, takes it less than daily  MSContin 30 mg--not taking  Takes 4 x 200 mg ibuprofen: moderately good relief with this without side effects  Tylenol on occasion   He has not taken any pain adjuvants    Olanzapine 2.5 mg--takes it prn, helps a little    Mood:  \"Rollercoaster\"--up and " "down.  Able to enjoy the good parts of his day. No anhedonia  \"I'm only 47, a year and a half ago I was a fully functioning man, active, working, now I'm in a wheelchair, went from 230# to 180#. I played college football--how life just flipped. But then I talk with my sister and she tells me I need to accept it.\" Trying to accept it and knows it could be worse. Tries to treat every day as a blessing.  He talks about going through a period of not caring what happened to him, but he feels he is through that. Never had an issue with depression prior.   Lexapro 10 mg  He is in an amputee group on FB: this has given him perspective    Disease understanding:  \"They are trying to attack it as hard as they can.\" Knows it is incurable.  Survival \"I heard it could be 10 years it could be 2 years.\" [place] of someone who lived 20 years with his type of sarcoma.  He has not asked about prognosis further and is not sure he wants to know more at the moment.     He sees Dr Ortiz. There is talk of a prosthesis.     PE: There were no vitals taken for this visit.   Wt Readings from Last 3 Encounters:   09/19/22 83.7 kg (184 lb 8.4 oz)   09/11/22 (P) 84.6 kg (186 lb 9.6 oz)   08/13/22 79.4 kg (175 lb)     Alert NAD  Clear sensorium full affect      Data reviewed:  I reviewed recent labs and imaging, my comments: Cr 1. Plt 307. Hgb 7.7.   CT Aug  1.  Postsurgical changes from right lower extremity amputation and right hemipelvectomy. There are multiple hypodense masses within the lateral abdominal wall musculature on the right largest of which measures 5.6 x 7.5 cm and is within the transverse   and oblique abdominous muscles, Consistent with metastatic disease.  2.  Small hypodense mass abutting the cecum, concerning for intraperitoneal extension and possible bowel wall involvement of metastatic disease, at this time there is no evidence of bowel perforation or bowel obstruction.  3.  Multiple pulmonary nodules consistent with pulmonary " metastatic disease with a small left-sided pleural effusion    CT                                                                 IMPRESSION:   1. Developing small bowel obstruction with small bowel caliber  measuring up to 2.5 cm and suspected transition point in the right  hemipelvis adjacent to a growing necrotic pelvic mass.  2. Enlarging necrotic abdominal and retroperitoneal masses.  Substantial increase in intrapelvic extension of one of these 3masses  (series 4 image 444).  3. Increasing right greater than left loculated pleural effusions.  4. Incompletely imaged pulmonary metastatic disease and  lymphadenopathy.  Possible tiny filling defect in a superior pulmonary  vein segmental branch, vs artifact.      database reviewed: y      Impression & Recommendations:  48 yo with metastatic sarcoma, progressing; s/p R hindquarter amputation, recent complications of pleural effusions and embolic stroke    Pain from wound/phantom limb:  Moderately well controlled with NSAIDs, dose prn oxycodone used <daily  Will add gabapentin given clear neuralgic components to his pain; d/w him why and side effects    Mood/grief  Long discussion with him about this. Overall he's doing well right now especially given his circumstances. Not depressed    Care planning  He knows some basic parameters about his possible limited survival. He'd want his sisters to be his decision makers if he couldn't participate in health care decisions; I believe they are his NOK given his kids are all minors and his parents are .    Follow-up 2-3 weeks      75 minutes spent on the date of the encounter doing chart review, history and exam, patient education & counseling, documentation and other activities as noted above.    Thank you for involving us in the patient's care.     David Valentino MD / Palliative Medicine / Text me via [facility].

## 2022-09-27 NOTE — PROGRESS NOTES
This is a recent snapshot of the patient's [facility] medical record.  For current drug dose and complete information and questions, call [phone] or In Basket pool, [facility]v home infusion ([id number])  CSN Number:  [id number]

## 2022-09-27 NOTE — PATIENT INSTRUCTIONS
"Thank you for meeting with us in the [facility]iative [facility].    How to get a hold of us:  For non-urgent matters, MyChart works best.    For more urgent matters, or if you prefer not to use MyChart, call our clinic nurse coordinator Carin Juarez RN at [phone].    We have an on-call number for evenings and weekends. Please call this only if you are having uncontrolled symptoms or serious side effects from your medicines: [phone].     For refills, please give us a week (5 working days) notice. We don't always have providers available everyday to do refills. If you call the day you run out of your medicine, we may not be able to refill it in time, so call 5 days in advance!        _______________________________________________________________________________  Gabapentin 300 mg capsules:  Day 1: take one capsule at night.   --Do this for 3 days total  Day 4: take one capsule in morning and one at night.  --Do this for 3 days total  Day 7: take one capsule, three times a day (morning, afternoon, and night - you don't need to be exact)  --Continue taking this until you talk with us.     Gabapentin is a safe pain medicine which is not an \"opiate\" (narcotic). It is not addictive, it does not effect any organs, nor does it interact with other medications. Occasionally, patients feel unsteady on their feet when they take it - if that happens, call us immediately. Other occasional side effects are swelling in your ankles, and feeling tired (drowsy, sleepy from the medicine). You should not drive for 6 hours the first time you take it; if you feel tired (\"drowsy,\" \"sleepy\") from it, you should not drive at all when you feel that way.     "

## 2022-09-27 NOTE — PROGRESS NOTES
This is a recent snapshot of the patient's [facility] medical record.  For current drug dose and complete information and questions, call [phone] or In Basket pool, fv home infusion ([id number])  CSN Number:  [id number]

## 2022-09-27 NOTE — PROGRESS NOTES
Fabian is a 47 year old who is being evaluated via a billable video visit.      Patient stated he is in the state of [place] for the visit today.    How would you like to obtain your AVS? MyChart  If the video visit is dropped, the invitation should be resent by: Text to cell phone: [phone]  Will anyone else be joining your video visit? 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   2022 pathologic R femur fx-->R hindquarter amputation. We met during hospitalization 2022 CT shows pulm and abd wall mets-->cis/doxorubicin  2022 hospitalized with resp failure, bilateral malignant effusions, R pulm vein tumor thrombus.   2022 L embolic strokes. Hospitalization complicated by a partial SBO, resolved with supportive measures.     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 3 dtrs 1 son: 5-16 yo. 2 sisters. .  Both parents .    Care Planning - No HCD. Verbally states he would want his sisters to be his decision makers.       History:  History gathered today from: patient, medical chart    Chart reviewed and I confirmed key details with him, summarized above.  Hospitalized 2x in Sept--pneumonia/pleural effusions; stroke. Scans show progression; unclear to me what this will mean for his cancer plans; he is unsure too.     Pain is a top concern.  Feels 'nerves' at amputation site.  Eg transfers--gets shooting pain through R side of body  Has phantom sensations that are painful  Has a small open stump wound, 'mostly healed', a little painful sometimes    Oxycodone 10 mg--does not work well, takes it less than daily  MSContin 30 mg--not taking  Takes 4 x 200 mg ibuprofen: moderately good relief with this without side effects  Tylenol on occasion   He has not taken any pain adjuvants    Olanzapine 2.5 mg--takes it prn, helps a  "little    Mood:  \"Rollercoaster\"--up and down.  Able to enjoy the good parts of his day. No anhedonia  \"I'm only 47, a year and a half ago I was a fully functioning man, active, working, now I'm in a wheelchair, went from 230# to 180#. I played college football--how life just flipped. But then I talk with my sister and she tells me I need to accept it.\" Trying to accept it and knows it could be worse. Tries to treat every day as a blessing.  He talks about going through a period of not caring what happened to him, but he feels he is through that. Never had an issue with depression prior.   Lexapro 10 mg  He is in an amputee group on FB: this has given him perspective    Disease understanding:  \"They are trying to attack it as hard as they can.\" Knows it is incurable.  Survival \"I heard it could be 10 years it could be 2 years.\" [place] of someone who lived 20 years with his type of sarcoma.  He has not asked about prognosis further and is not sure he wants to know more at the moment.     He sees Dr Ortiz. There is talk of a prosthesis.     PE: There were no vitals taken for this visit.   Wt Readings from Last 3 Encounters:   09/19/22 83.7 kg (184 lb 8.4 oz)   09/11/22 (P) 84.6 kg (186 lb 9.6 oz)   08/13/22 79.4 kg (175 lb)     Alert NAD  Clear sensorium full affect      Data reviewed:  I reviewed recent labs and imaging, my comments: Cr 1. Plt 307. Hgb 7.7.   CT Aug  1.  Postsurgical changes from right lower extremity amputation and right hemipelvectomy. There are multiple hypodense masses within the lateral abdominal wall musculature on the right largest of which measures 5.6 x 7.5 cm and is within the transverse   and oblique abdominous muscles, Consistent with metastatic disease.  2.  Small hypodense mass abutting the cecum, concerning for intraperitoneal extension and possible bowel wall involvement of metastatic disease, at this time there is no evidence of bowel perforation or bowel obstruction.  3.  Multiple " pulmonary nodules consistent with pulmonary metastatic disease with a small left-sided pleural effusion    CT                                                                 IMPRESSION:   1. Developing small bowel obstruction with small bowel caliber  measuring up to 2.5 cm and suspected transition point in the right  hemipelvis adjacent to a growing necrotic pelvic mass.  2. Enlarging necrotic abdominal and retroperitoneal masses.  Substantial increase in intrapelvic extension of one of these 3masses  (series 4 image 444).  3. Increasing right greater than left loculated pleural effusions.  4. Incompletely imaged pulmonary metastatic disease and  lymphadenopathy.  Possible tiny filling defect in a superior pulmonary  vein segmental branch, vs artifact.      database reviewed: y      Impression & Recommendations:  48 yo with metastatic sarcoma, progressing; s/p R hindquarter amputation, recent complications of pleural effusions and embolic stroke    Pain from wound/phantom limb:  Moderately well controlled with NSAIDs, dose prn oxycodone used <daily  Will add gabapentin given clear neuralgic components to his pain; d/w him why and side effects    Mood/grief  Long discussion with him about this. Overall he's doing well right now especially given his circumstances. Not depressed    Care planning  He knows some basic parameters about his possible limited survival. He'd want his sisters to be his decision makers if he couldn't participate in health care decisions; I believe they are his NOK given his kids are all minors and his parents are .    Follow-up 2-3 weeks      75 minutes spent on the date of the encounter doing chart review, history and exam, patient education & counseling, documentation and other activities as noted above.    Thank you for involving us in the patient's care.   David Valentino MD / Palliative Medicine / Text me via [facility].      Video-Visit Details    Video Start Time: 805    Type of  service:  Video Visit    Video End Time:8:32 AM    Originating Location (pt. Location): Home    Distant Location (provider location): Home  Platform used for Video Visit: Kendra Onofre Virtual Visit Facilitator

## 2022-09-28 NOTE — TELEPHONE ENCOUNTER
Attempted to call patient, left message for return call to clinic. Please see notation below when patient returns call.    Ally Hernandez RN

## 2022-09-28 NOTE — TELEPHONE ENCOUNTER
Can you please check with this patient to has a hospital follow-up with me tomorrow.  Have never seen him before and has had a lot of no-shows with hospital follow-ups.  Will you just check to make sure he plans on coming in or if he would rather have us try to find an appointment with his primary care physician.  Thank you

## 2022-09-28 NOTE — TELEPHONE ENCOUNTER
Patient called back and let us know that he will be coming to his appointment tomorrow with Dr. Thomas.    He is in a wheelchair and is needing a ride. I did give him a number for [place] [employer] services for handicapped individuals.

## 2022-09-29 NOTE — TELEPHONE ENCOUNTER
DATE: 9/29/22     TIME OF RECEIPT FROM LAB: 1:45 pm      LAB TEST:  Hgb 7.4 Platelets 571 WBC 11.6   Therapy plan in place to transfuse if Hgb is 7 or less.     9/21 Hgb 7.7     RESULTS PAGED TO (PROVIDER):  1:49 Message sent to Latosha Taylor     TIME LAB VALUE REPORTED TO PROVIDER: 1:56 Latosha Taylor acknowledges lab result.     RECOMMENDATIONS: No Further follow up needed.

## 2022-09-29 NOTE — TELEPHONE ENCOUNTER
[facility] Call Center    Phone Message    May a detailed message be left on voicemail: yes     Reason for Call: Order(s): Other:   Reason for requested: Mandi was calling to ask if the Primary provider to fax an order for Fully mechanical hospital bed with a pressure releasing mattress and tub transfer bench faxed to [facility] [place] Fax [phone] please call Mandi once order has been faxed or to address any questions or concerns thank you.  Date needed: asap   Provider name: Rajesh Vidales APRN CNP        Action Taken: Message routed to:  [facility] pcc    Travel Screening: Not Applicable

## 2022-09-29 NOTE — TELEPHONE ENCOUNTER
LVM basic voicemail asking them to call the clinic to let us know where they faxed the orders to. If they call back, please have them fax to [facility] fax [phone].

## 2022-09-29 NOTE — PROGRESS NOTES
Assessment/ Plan     1. Hospital discharge follow-up  Fabian presents as a new patient for hospital follow-up.  He was hospitalized from 9/17 through 9/22 for an ischemic stroke related to hypercoagulable state due to his high-grade sarcoma.  He is now on Eliquis and tolerating it well.  He has a very good team including oncology and palliative care.  He has good follow-up already scheduled.  He is having home health for some wound cares on his hip and leg.  He was having significant diarrhea and subsequently some electrolyte abnormalities and low magnesium.  He feels like the diarrhea has resolved.  He feels like his pain is adequately controlled.  He is not having any new symptoms.  He already has future lab orders in place and those are drawn today.    2. Cerebrovascular accident (CVA) due to embolism of middle cerebral artery, unspecified blood vessel laterality (H)  - Primary Care Referral    3. Diarrhea, unspecified type  This is now resolved.  - Primary Care Referral    4. Osteosarcoma of bone (H)  He follows with oncology and palliative care.  - CBC with platelets differential  - Comprehensive metabolic panel  - Magnesium    5. Amputee, hip, right  He has some chronic wounds that are being taken care of by a home wound care nurse.      Subjective:      Vic Scott III is a 47 year old male who presents for hospital follow-up.  I have never seen this patient before, he is new to me today.  He has a complex past medical history including a high-grade pleomorphic sarcoma of the right pelvis and femur, status post chemotherapy and amputation.  He does have what looks like some metastases to his lungs and abdominal wall.  He has an pulmonary vein thrombosis.  He presented with some neurological symptoms the day of admission.  He was found to have a left hemispheric stroke likely related to hypercoagulable state with his cancer.  He has done well with Eliquis without any adverse side effects.  He is pancytopenic  but they felt that this was okay as long as his platelets are above 50.  He has a neurologist, oncologist, and palliative care team.  He feels that his pain is adequately controlled at the moment.  He was having quite a bit of diarrhea in the hospital with some electrolyte abnormalities.  He was discharged on 2 weeks of magnesium.  He states that diarrhea has not resolved.  Is not having any new symptoms such as cough.  He is on chronic oxygen at home but did not bring it today as it is inconvenient and he does not have a portable unit yet.        Post Medication Reconciliation Status:    Med rec was done.  Relevant past medical, family, surgical, and social history reviewed with patient, unless noted in HPI, not pertinent for this visit.  Medications were discussed and reconciled.   Review of Systems   A 12 point comprehensive review of systems was negative except as noted.      Current Outpatient Medications   Medication Sig Dispense Refill     acetaminophen (TYLENOL) 325 MG tablet Take 3 tablets (975 mg) by mouth every 8 hours 270 tablet 1     apixaban ANTICOAGULANT (ELIQUIS) 5 MG tablet Take 1 tablet (5 mg) by mouth 2 times daily 60 tablet 3     atorvastatin (LIPITOR) 80 MG tablet Take 1 tablet (80 mg) by mouth every evening 90 tablet 3     escitalopram (LEXAPRO) 10 MG tablet Take 1 tablet (10 mg) by mouth daily 30 tablet 1     gabapentin (NEURONTIN) 300 MG capsule Take 1 capsule (300 mg) by mouth every evening for 3 days, THEN 1 capsule (300 mg) 2 times daily for 3 days, THEN 1 capsule (300 mg) 3 times daily for 30 days. 90 capsule 0     magnesium oxide (MAGNESIUM OXIDE) 400 MG tablet Take 0.5 tablets (200 mg) by mouth daily 10 tablet 0     multivitamin, therapeutic (THERA-VIT) TABS tablet Take 1 tablet by mouth daily 30 tablet 1     naproxen (NAPROSYN) 500 MG tablet Take 500 mg by mouth 2 times daily (with meals)       Nutritional Supplements (ENSURE ACTIVE HIGH PROTEIN) LIQD Take 1 Can by mouth 3 times daily  "(with meals) 7110 mL 1     OLANZapine (ZYPREXA) 2.5 MG tablet Take 1 tablet (2.5 mg) by mouth At Bedtime 30 tablet 0     ondansetron (ZOFRAN ODT) 4 MG ODT tab Take 1-2 tablets (4-8 mg) by mouth every 8 hours as needed for nausea or vomiting 30 tablet 0     oxyCODONE IR (ROXICODONE) 10 MG tablet Take 1 tablet (10 mg) by mouth every 4 hours as needed for moderate to severe pain 40 tablet 0     polyethylene glycol (MIRALAX) 17 GM/Dose powder Take 17 g by mouth daily Hold for loose stools 510 g 0     potassium chloride ER (KLOR-CON M) 20 MEQ CR tablet Take 1 tablet (20 mEq) by mouth daily for 10 days 10 tablet 0     prochlorperazine (COMPAZINE) 10 MG tablet Take 1 tablet (10 mg) by mouth every 6 hours as needed (Nausea/Vomiting) 30 tablet 2         Objective:     /80   Pulse 115   Resp 16   Ht 1.88 m (6' 2\")   BMI 23.69 kg/m      Body mass index is 23.69 kg/m .       General appearance: alert, appears stated age and cooperative  Good eye contact, appropriate answers.  Wheelchair-bound    Lungs: clear to auscultation bilaterally  Heart: regular rate and rhythm, S1, S2 normal, no murmur, click, rub or gallop  Abdomen: soft, non-tender; bowel sounds normal; no masses,  no organomegaly      No results found for this or any previous visit (from the past 168 hour(s)).       This note has been dictated using voice recognition software. Any grammatical or context distortions are unintentional and inherent to the software  "

## 2022-09-30 NOTE — TELEPHONE ENCOUNTER
Called Christine from home health and [facility]   Meenu BUNN EMT 9:29 AM 9/30/2022

## 2022-09-30 NOTE — PROGRESS NOTES
This is a recent snapshot of the patient's [facility] medical record.  For current drug dose and complete information and questions, call [phone] or In Basket pool, fv home infusion ([id number])  CSN Number:  [id number]

## 2022-10-04 NOTE — TELEPHONE ENCOUNTER
[facility] Call Center    Phone Message    May a detailed message be left on voicemail: yes     Reason for Call: Other: Mandi OT follow up on call that she called in Orders for equipment on 9/29/22.     Action Taken: Message routed to:  [facility]cc    Travel Screening: Not Applicable

## 2022-10-04 NOTE — PROGRESS NOTES
Oncology Follow-up Visit  Oct 5, 2022    Reason for Visit: metastatic high grade osteosarcoma    Oncology History:  He initially presented to Dr. Whitaker, Orthopedics with rapidly progressing right leg swelling since ~ July 2021. MRI was incomplete due to pain, but did show ~10e73mh right thigh soft tissue mass with probable bony involvement of the proximal right femur. 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 Was seen by Dr. Whitaker to review the results on 10/1/21 - discussed consideration of neoadjuvant chemotherapy vs resection and reconstruction. Given high risk for amputation with massive reconstructive surgery, he was referred to Dr. Ambrocio and a PET was ordered which identifed concerning inguinal lymphadenopathy. He was seen by Dr. Ambrocio 10/26/21, ordered NGS (in process) and recommended starting Doxil/Ifos initially as an inpatient. Admitted for Cycle 1 Doxil/Ifos (W3I5=17/2/21).  Notably he had an episode of confusion while hospitalized on 11/8/21 concerning for ifosfamide neurotoxicity.  This resolved without administration of methylene blue. With cycle 2 treatment he also had severe neurotoxicity 12 hours into day 3 ifosfamide infusion. He again improved without methylene blue. Remainder of cycle 2 treatment was aborted due reaction/patient wishes.     PET performed 1/11/22 with decrease size of the hypermetabolic soft tissue component of pleomorphic sarcoma in the right thigh, previously measuring 18.3 x 4.4 x 13 and now measuring approximately 12.2 x 4.0 x 4.7 cm. He was hesitant to move forward with cycle 3 chemotherapy and instead met with Dr. Whitaker as well as Dr. Bhatia to discuss multiple surgical options. He presented to the ED 6/5/22 for right leg pain and inability to ambulate. He was found to a have a pathologic right proximal femur fracture. He underwent right hindquarter amputation with Dr. Whitaker and primary complex wound closure with Dr. Butt on  6/17/22. Surgical pathology was positive for high-grade osteosarcoma. Procedure was notable for significant blood loss requiring transfusion of 10 units pRBC.     CT A/P 8/13/22 performed for surveillance with multiple hypodense masses within the lateral abdominal wall musculature and multiple pulmonary nodules consistent with metastatic disease. He was admitted 9/1/22 with SOB from large left sided malignant pleural effusion, tumor thrombus of right pulmonary vein and progressive disease. He started on doxorubicin/cisplatin 9/1/22.     He was readmitted 9/17/22 for right sided weakness. MRI demonstrated left hemispheric stroke secondary to malignancy and recent pulmonary venous thrombosis. Hospitalization complicated by diffuse diarrhea, c-diff and enteric panels negative.     Interval History: ***    ROS:  Review of Systems - Oncology     Current Outpatient Medications   Medication     acetaminophen (TYLENOL) 325 MG tablet     apixaban ANTICOAGULANT (ELIQUIS) 5 MG tablet     atorvastatin (LIPITOR) 80 MG tablet     escitalopram (LEXAPRO) 10 MG tablet     gabapentin (NEURONTIN) 300 MG capsule     magnesium oxide (MAG-OX) 400 MG tablet     multivitamin, therapeutic (THERA-VIT) TABS tablet     naproxen (NAPROSYN) 500 MG tablet     Nutritional Supplements (ENSURE ACTIVE HIGH PROTEIN) LIQD     OLANZapine (ZYPREXA) 2.5 MG tablet     ondansetron (ZOFRAN ODT) 4 MG ODT tab     oxyCODONE IR (ROXICODONE) 10 MG tablet     polyethylene glycol (MIRALAX) 17 GM/Dose powder     prochlorperazine (COMPAZINE) 10 MG tablet     No current facility-administered medications for this visit.         PHYSICAL EXAM:  There were no vitals taken for this visit.  General: Well-appearing male in no acute distress.  Eyes: EOMI, PERRL. No scleral icterus.  ENT: Oral mucosa is moist without lesions or thrush.   Lymphatic: Neck is supple without cervical or supraclavicular lymphadenopathy. No axillary lymphadenopathy.  Cardiovascular: Regular rate  and rhythm. No murmurs, gallops, or rubs. No peripheral edema.  Respiratory: Clear to auscultation bilaterally. No wheezes or crackles.  Gastrointestinal: Bowel sounds present. Abdomen soft, non-tender. No palpable hepatosplenomegaly or masses.   Neurologic: Cranial nerves II through XII are grossly intact.  Skin: No rashes, petechiae, or bruising noted on exposed skin.    ECOG: ***    Labs:   Most Recent 3 CBC's:  Recent Labs   Lab Test 10/03/22  1230 09/29/22  0815 09/21/22  0722 09/07/22  0809 09/05/22  0728   WBC 15.2* 11.6* 5.6   < > 5.8   HGB 7.0* 7.4* 7.7*   < > 7.9*   MCV 79 77* 78   < > 75*   * 571* 307   < > 271   ANEUTAUTO 12.3* 7.9  --   --  4.5    < > = values in this interval not displayed.    Most Recent 3 BMP's:  Recent Labs   Lab Test 09/29/22  0815 09/21/22  1707 09/21/22  0722 09/18/22  0803 09/17/22  1606 09/07/22  0809 09/05/22  0728    138 137   < > 140   < > 137   POTASSIUM 3.5 3.5 3.1*   < > 3.5   < > 3.4   CHLORIDE 104 106 105   < > 102   < > 101   CO2 23 20* 21*   < > 25   < > 25   BUN 10.8 9.8 11.0   < > 23.6*   < > 21.8*   CR 0.82 1.02 1.12   < > 1.53*   < > 0.77   ANIONGAP 13 12 11   < > 13   < > 11   VENTURA 8.6 8.6 8.8   < > 8.8   < > 8.9   * 103* 100*   < > 122*   < > 95   PROTTOTAL 6.6  --   --   --  6.5  --  6.0*   ALBUMIN 3.1*  --   --   --  3.3*  --  3.1*    < > = values in this interval not displayed.    Most Recent 2 LFT's:  Recent Labs   Lab Test 09/29/22  0815 09/17/22  1606   AST 30 20   ALT 9* 13   ALKPHOS 214* 275*   BILITOTAL 0.5 0.6    Most Recent TSH and T4:  Recent Labs   Lab Test 09/17/22  1606   TSH 0.51     Phos/Mag:  Lab Results   Component Value Date    PHOS 2.8 09/21/2022    PHOS 3.1 09/21/2022    PHOS 3.1 09/20/2022    MAG 1.1 (L) 09/29/2022    MAG 2.3 09/21/2022    MAG 1.4 (L) 09/21/2022      Imaging:    I reviewed the above labs and imaging today.    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 Biopsy showing high grade pleomorphic sarcoma of large right thigh soft tissue mass.Began initial treatment with Doxil/Ifos as an inpatient 11/2/21 completing 2 cycles, both of which were c/b neurotoxicity     PET performed 1/11/22 showing decrease size of the hypermetabolic soft tissue component of pleomorphic sarcoma in the right thigh, previously measuring 18.3 x 4.4 x 13 and now measuring approximately 12.2 x 4.0 x 4.7 cm. At that time he did not wish to proceed with additional chemotherapy. Met with ortho and had been deciding between proceeding between right hemipelvectomy versus reconstruction.     He presented to the ED 6/5/2022 with increased right leg and thigh pain and inability to walk.  Underwent right hindquarter amputation 6/17/22.  Pathology was positive for high-grade osteosarcoma.     CT A/P 8/13/22 with evidence of metastases to abdominal wall and lungs. Began chemotherapy with doxorubicin/cisplatin 9/1/22 while inpatient.     Right pulmonary vein tumor thrombus   No anticoagulation recommended while inpatient     Malignant pleural effusion   S/p L chest tube placement with improvement in pleural effusion (removed 9/9) and R thoracentesis (800 ml) 9/10. Effusions stable on CXR at discharge. Cytology negative for malignancy Considering pleurX catheter. Discharged on home oxygen.     Acute on chronic microcytic anemia   Hgb downtrending following chemotherapy. Received 1 unit pRBC 9/10/22.     Cancer related pain   Oxycodone 15 mg q4h as needed     JAZZMINE   Significant worsening of kidney function following chemotherapy. Minimal improvement with IV fluids. Creatinine 2.11 on discharge.     MDD   Continue escitalopram   Palliative care referral?     Vascular access   PICC line removed at time of discharge, will require Oro for further chemotherapy     *** minutes spent on the date of the encounter doing {2021 E&M time in:053656}     Latosha Taylor, LISA Ferrer  Cancer [address] [address]  [phone]

## 2022-10-04 NOTE — PROGRESS NOTES
This is a recent snapshot of the patient's [facility] medical record.  For current drug dose and complete information and questions, call [phone] or In Basket pool, [facility] home infusion ([id number])  CSN Number:  [id number]

## 2022-10-04 NOTE — PROGRESS NOTES
[facility]: Cancer Care                                                                                        Called Fabian and left a message to remind him of his appointment for a scan today and labs and Latosha K tomorrow.  Let him know that his hgb was low at 7.0 and that he will need a blood transfusion.  He has an appointment in [facility]MT tomorrow at 12:00 for a transfusion.  Asked for a callback to confirm.    Update 4:12pm    Called and left another message with the above information for Fabian.  Again asking for a callback with confirmation.      Signature:  Miri Borden RN

## 2022-10-05 NOTE — LETTER
Date:October 7, 2022      Provider requested that no letter be sent. Do not send.       [facility]

## 2022-10-05 NOTE — TELEPHONE ENCOUNTER
[facility] phone call message- patient requesting to speak directly with PCP or provider.    PCP: Rajesh Vidales    Reason for Call: Changing health parameters     Additional Details: Mandi from [facility] is calling is regards to the patient's health perimeters. She states for his pulse, it's set at . Patient's pulse is on average around 112-115. She states that the patient also stated that this was normal for him. Would PCP like to keep the perimeters as they are or would he like to adjust? She does state when it's out side of the perimeters, they will need to call every time.     Also, as an FYI to PCP, they are declining the order for the manual wheelchair for the pt. She states the patient qualifies for the power wheelchair so they would like to get him one. Patient will need a face-to-face appointment to discuss the need for a power wheelchair in the future. She will schedule soon. She states the appointment can be virtual or in person.     Mandi is aware that PCP is out of the office and stated she understands and that this can wait until provider is back.     Is a call back needed? Yes    Patient informed that it may take up to 2 business days to hear back from PCP:Yes, PCP not in clinic until 10/10.    OK to leave a message on voice mail? Yes    Primary language: English      needed? No    Call taken on October 5, 2022 at 8:22 AM by Bell Mike CMA

## 2022-10-05 NOTE — TELEPHONE ENCOUNTER
"Called Mandi back to discuss orders. We dicussed what orders were placed. She stated that the patient would need a \"Fully mechanical hospital bed\" and NOT a \"semi-mechanical\" as originally ordered.    She also stated that the 2nd order needs to say \"Pressure relieving mattress for a hospital bed\".    The 3rd order is correct and does not need any changes.    I did explain that PCP was out of the office until Monday but that I would see if another provider in clinic could update those orders. I told her I would call her back if there were any issues.    She asked that we email those orders once complete to [email or web address]    I also gave her the number to the [facility] for future reference.    Bell Mike, [facility]    "

## 2022-10-05 NOTE — PROGRESS NOTES
Chief Complaint   Patient presents with     Infusion     Possible infusion r/t Osteosarcoma     Patient did not have current ABO and hx of +antibodies. Was unable to secure transportation for AM lab draw and MEHRAN visit. Hgb resulted at 6.6, patient states he is not symptomatic with the exception of mild fatigue. Agreeable to return tomorrow for blood transfusion and rescheduled visit with MEHRAN.  Patient discharged in stable condition.    Miryam Zepeda RN

## 2022-10-05 NOTE — LETTER
10/5/2022         RE: Vic Scott III  [address]  [address]  [place] [facility] [address]        Dear Colleague,    Thank you for referring your patient, Vic Scott III, to the [facility] [place]. Please see a copy of my visit note below.    Chief Complaint   Patient presents with     Infusion     Possible infusion r/t Osteosarcoma     Patient did not have current ABO and hx of +antibodies. Was unable to secure transportation for AM lab draw and MEHRAN visit. Hgb resulted at 6.6, patient states he is not symptomatic with the exception of mild fatigue. Agreeable to return tomorrow for blood transfusion and rescheduled visit with MEHRAN.  Patient discharged in stable condition.    Miryam Zepeda, RN        Again, thank you for allowing me to participate in the care of your patient.        Sincerely,        [facility]

## 2022-10-05 NOTE — NURSING NOTE
Chief Complaint   Patient presents with     Blood Draw     Labs drawn via picc by RN in lab. VS taken.      Labs collected from PICC.  Line flushed with saline and heparin locked.  Vitals taken and pt checked in for appt.    Mandi Chandra RN

## 2022-10-05 NOTE — LETTER
10/5/2022         RE: Vic Scott III  [address]  [address]  [place] [address]        Dear Colleague,    Thank you for referring your patient, Vic Scott III, to the [facility]. Please see a copy of my visit note below.    Patient did not arrive for provider visit but was seen in infusion for tentative blood products and labs. Provider visit rescheduled to 10/7/22.      Again, thank you for allowing me to participate in the care of your patient.        Sincerely,        Latosha Taylor, CNP

## 2022-10-06 NOTE — Clinical Note
10/6/2022         RE: Vic Scott III  [address]  [address]  [place] [address]        Dear Colleague,    Thank you for referring your patient, Vic Scott III, to the [facility]. Please see a copy of my visit note below.    Hematology-Oncology Visit  Oct 6, 2022    Reason for Visit: high-grade osteosarcoma    Oncology History:   He initially presented to Dr. Whitaker, Orthopedics with rapidly progressing right leg swelling since ~ July 2021. MRI was incomplete due to pain, but did show ~84s22re right thigh soft tissue mass with probable bony involvement of the proximal right femur. 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 Was seen by Dr. Whitaker to review the results on 10/1/21 - discussed consideration of neoadjuvant chemotherapy vs resection and reconstruction. Given high risk for amputation with massive reconstructive surgery, he was referred to Dr. Ambrocio and a PET was ordered which identifed concerning inguinal lymphadenopathy. He was seen by Dr. Ambrocio 10/26/21, ordered NGS (in process) and recommended starting Doxil/Ifos initially as an inpatient. Admitted for Cycle 1 Doxil/Ifos (I7W9=01/2/21).  Notably he had an episode of confusion while hospitalized on 11/8/21 concerning for ifosfamide neurotoxicity.  This resolved without administration of methylene blue. With cycle 2 treatment he also had severe neurotoxicity 12 hours into day 3 ifosfamide infusion. He again improved without methylene blue. Remainder of cycle 2 treatment was aborted due reaction/patient wishes.     PET performed 1/11/22 with decrease size of the hypermetabolic soft tissue component of pleomorphic sarcoma in the right thigh, previously measuring 18.3 x 4.4 x 13 and now measuring approximately 12.2 x 4.0 x 4.7 cm. He was hesitant to move forward with cycle 3 chemotherapy and instead met with Dr. Whitaker as well as Dr. Bhatia to discuss multiple surgical  options. He presented to the ED 6/5/22 for right leg pain and inability to ambulate. He was found to a have a pathologic right proximal femur fracture. He underwent right hindquarter amputation with Dr. Whitaker and primary complex wound closure with Dr. Butt on 6/17/22. Surgical pathology was positive for high-grade osteosarcoma. Procedure was notable for significant blood loss requiring transfusion of 10 units pRBC.     CT A/P 8/13/22 performed for surveillance with multiple hypodense masses within the lateral abdominal wall musculature and multiple pulmonary nodules consistent with metastatic disease. He was admitted 9/1/22 with SOB from large left sided malignant pleural effusion, tumor thrombus of right pulmonary vein and progressive disease. He started on doxorubicin/cisplatin 9/1/22.    He was readmitted 9/17/22 for right sided weakness. MRI demonstrated left hemispheric stroke secondary to malignancy and recent pulmonary venous thrombosis. Hospitalization complicated by diffuse diarrhea, c-diff and enteric panels negative.     Interval History:    Fabian is seen today in the infusion center while he is receiving a blood transfusion.  He has missed multiple appointments in the past few weeks due to various reasons including transportation issues.  It is now been 6 weeks since he received chemotherapy with cisplatin and doxorubicin.  Overall he reports he is feeling well.  He is not experiencing significant fatigue.  He did experience some lightheadedness and dizziness today which he attributed to his low hemoglobin otherwise this has not been an issue.  He continues to experience right lower quadrant abdominal pain for which she is taking oxycodone and gabapentin although he has recently run out of oxycodone and was taking old doses of MS Contin to treat this.    His appetite is fairly good although can fluctuate from day-to-day.  He is able to eat on a daily basis and has no issues with drinking fluids.  He  no longer is experiencing diarrhea but is having multiple soft stools per day.  He denies any issues with urination.  Home infusion has been coming to his home to draw labs and has educated him on caring for his PICC line which has remained in since his hospital admission.    He notes edema in his left lower extremity which is improved with elevation.  States this has been present since his last hospitalization for which she was admitted for stroke.  He denies residual deficits in his right upper extremity which he was experiencing prior to his stroke diagnosis.  He has remained on apixaban since discharge and is taking this regularly.  He denies visual disturbances, headache, nausea or changes in speech.    Fabian does verbalize today that he does not wish to discuss specific information on his prognosis.  He is aware that he will likely receive chemotherapy in some form for the rest of his life and is hoping to avoid future delays in treatment.    Current Outpatient Medications   Medication     magnesium oxide (MAG-OX) 400 MG tablet     oxyCODONE IR (ROXICODONE) 10 MG tablet     potassium chloride ER (KLOR-CON M) 20 MEQ CR tablet     acetaminophen (TYLENOL) 325 MG tablet     apixaban ANTICOAGULANT (ELIQUIS) 5 MG tablet     atorvastatin (LIPITOR) 80 MG tablet     escitalopram (LEXAPRO) 10 MG tablet     gabapentin (NEURONTIN) 300 MG capsule     multivitamin, therapeutic (THERA-VIT) TABS tablet     naproxen (NAPROSYN) 500 MG tablet     Nutritional Supplements (ENSURE ACTIVE HIGH PROTEIN) LIQD     OLANZapine (ZYPREXA) 2.5 MG tablet     ondansetron (ZOFRAN ODT) 4 MG ODT tab     polyethylene glycol (MIRALAX) 17 GM/Dose powder     No current facility-administered medications for this visit.     Facility-Administered Medications Ordered in Other Visits   Medication     heparin lock flush 10 UNIT/ML injection 5 mL     sodium chloride (PF) 0.9% PF flush 3-20 mL     PHYSICAL EXAM:  Pulse (!) 124   Temp 98.9  F (37.2  C) (Oral)    Resp 18   SpO2 98%     General: Well-appearing male in no acute distress.  Eyes: EOMI, PERRL. No scleral icterus.  ENT: Oral mucosa is moist without lesions or thrush.   Cardiovascular: Tachycardic, regular rhythm. No murmurs, gallops, or rubs. +1 LLE edema.  Respiratory: CTA bilaterally. No wheezes or crackles.  Gastrointestinal:  Abdomen soft, non-tender.   Neurologic: Grossly nonfocal. Hand  strength equal bilaterally. Full ROM of upper extremities.  Skin: No rashes, petechiae, or bruising noted on exposed skin.    Labs:   Most Recent 3 CBC's:  Recent Labs   Lab Test 10/06/22  1052 10/05/22  1257 10/03/22  1230   WBC 14.6* 14.2* 15.2*   HGB 6.3* 6.6* 7.0*   MCV 76* 76* 79    431 469*     Most Recent 3 BMP's:  Recent Labs   Lab Test 10/06/22  1052 10/05/22  1257 09/29/22  0815    142 140   POTASSIUM 3.2* 3.1* 3.5   CHLORIDE 106 106 104   CO2 24 24 23   BUN 7.3 9.2 10.8   CR 0.76 0.76 0.82   ANIONGAP 14 12 13   VENTURA 8.7 8.8 8.6   * 117* 105*    Most Recent 2 LFT's:  Recent Labs   Lab Test 10/06/22  1052 10/05/22  1257   AST 19 19   ALT 5* 6*   ALKPHOS 136* 144*   BILITOTAL 0.5 0.4   I reviewed the most recent labs above today.     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 Biopsy showing high grade pleomorphic sarcoma of large right thigh soft tissue mass.Began initial treatment with Doxil/Ifos as an inpatient 11/2/21 completing 2 cycles, both of which were c/b neurotoxicity.    PET performed 1/11/22 showing decrease size of the hypermetabolic soft tissue component of pleomorphic sarcoma in the right thigh, previously measuring 18.3 x 4.4 x 13 and now measuring approximately 12.2 x 4.0 x 4.7 cm. At that time he did not wish to proceed with additional chemotherapy. Met with ortho and had been deciding between proceeding between right hemipelvectomy versus reconstruction.    He  presented to the ED 6/5/2022 with increased right leg and thigh pain and inability to walk.  Underwent right hindquarter amputation 6/17/22.  Pathology was positive for high-grade osteosarcoma.     CT A/P 8/13/22 with evidence of metastases to abdominal wall and lungs. Began chemotherapy with doxorubicin/cisplatin 9/1/22 while inpatient.    Right pulmonary vein tumor thrombus  No anticoagulation recommended while inpatient    Malignant pleural effusion  S/p L chest tube placement with improvement in pleural effusion (removed 9/9) and R thoracentesis (800 ml) 9/10. Effusions stable on CXR at discharge. Cytology negative for malignancy Considering pleurX catheter. Discharged on home oxygen.    Acute on chronic microcytic anemia  Hgb downtrending following chemotherapy. Received 1 unit pRBC 9/10/22.    Cancer related pain  Oxycodone 15 mg q4h as needed    JAZZMINE  Significant worsening of kidney function following chemotherapy. Minimal improvement with IV fluids. Creatinine 2.11 on discharge.    MDD  Continue escitalopram  Palliative care referral?    Vascular access  PICC line removed at time of discharge?, will require Oro for further chemotherapy    Plan:  ***    *** minutes spent on the date of the encounter doing {2021 E&M time in:914335}     Latosha Taylor CNP  [facility] [address] [address]  [phone]      Again, thank you for allowing me to participate in the care of your patient.        Sincerely,        Latosha Taylor CNP

## 2022-10-06 NOTE — PROGRESS NOTES
Infusion Nursing Note:  Vic Scott III presents today for 1 unit PRBCs.    Patient seen by provider today: Yes: Latosha Taylor CNP   present during visit today: Not Applicable.    Note: Pt presents today for 1 unit PRBCs. Assessed in infusion by Latosha Taylor CNP. Pt wishes to proceed with planned treatment.    JODI Taylor CNP/Emily Meese, RN 10/06/22 1330  -Please remove PICC line after infusion has finished, malpositioned on CT scan from 10/5/22.    PICC removed. Gauze and Tegaderm dressing applied.     Intravenous Access:  PICC.    Treatment Conditions:  Lab Results   Component Value Date    HGB 6.3 (LL) 10/06/2022    WBC 14.6 (H) 10/06/2022    ANEU 1.8 09/20/2022    ANEUTAUTO 11.2 (H) 10/06/2022     10/06/2022      Results reviewed, labs MET treatment parameters, ok to proceed with treatment. Transfuse PRBC for Hgb <7.    Post Infusion Assessment:  Patient tolerated infusion without incident.  Blood return noted pre and post infusion.  Site patent and intact, free from redness, edema or discomfort.  No evidence of extravasations.  Access discontinued per protocol.     Discharge Plan:   Patient declined prescription refills.  Discharge instructions reviewed with: Patient.  Patient and/or family verbalized understanding of discharge instructions and all questions answered.  AVS to patient via [email or web address].  Patient will return 10/18/22 for next appointment.   Patient discharged in stable condition accompanied by: self.  Departure Mode: Wheelchair.      Emily Meese, RN

## 2022-10-06 NOTE — PATIENT INSTRUCTIONS
[facility] Triage and after hours / weekends / holidays:  [phone]    Please call the triage or after hours line if you experience a temperature greater than or equal to 100.4, shaking chills, have uncontrolled nausea, vomiting and/or diarrhea, dizziness, shortness of breath, chest pain, bleeding, unexplained bruising, or if you have any other new/concerning symptoms, questions or concerns.      If you are having any concerning symptoms or wish to speak to a provider before your next infusion visit, please call your care coordinator or triage to notify them so we can adequately serve you.     If you need a refill on a narcotic prescription or other medication, please call before your infusion appointment.                October 2022 Sunday Monday Tuesday Wednesday Thursday Friday Saturday                                 1       2     3     4    CT CHEST WO  12:25 PM   (20 min.)   MICCT1   [facility] 5    LAB CENTRAL   7:15 AM   (15 min.)   [facility] LAB DRAW   [facility]    RETURN   7:30 AM   (45 min.)   Latosha Taylor CNP   [facility]    BMT INFUSION 4 HR (240 MIN)  12:00 PM   (240 min.)    BMT INFUSION NURSE   [facility] [place]    CT CHEST/ABDOMEN/PELVIS W   5:30 PM   (20 min.)   UCSCCT1   [facility] [place] 6    LAB CENTRAL   9:45 AM   (15 min.)   [facility] LAB DRAW   [facility]    ONC INFUSION 3 HR (180 MIN)  10:30 AM   (180 min.)    ONC INFUSION NURSE   [facility]    RETURN  12:15 PM   (45 min.)   Latosha Taylor CNP   [facility] 7     8       9     10    VIDEO VISIT RETURN   1:30 PM   (45 min.)   Latosha Taylor CNP   [facility] 11     12     13     14     15       16     17     18    VIDEO VISIT RETURN  10:25  AM   (40 min.)   David Valentino MD   [facility] 19     20     21     22       23     24    [facility] RETURN   2:45 PM   (20 min.)   Rajesh Vidales APRN CNP   [facility] [place] 25     26     27     28     29       30     31 November 2022 Sunday Monday Tuesday Wednesday Thursday Friday Saturday             1     2     3     4    VIDEO VISIT RETURN   1:45 PM   (30 min.)   Leslie Ortiz MD   [facility] 5       6     7     8     9    RETURN PLASTIC SURGERY  10:30 AM   (15 min.)   KAREN Butt MD   [facility] [place] 10     11     12       13     14     15     16     17     18     19       20     21     22     23     24     25     26       27     28     29    W/C SEATING EVAL  10:15 AM   (60 min.)   Shirley Roth OT   [facility] 30                                      Lab Results:  Recent Results (from the past 12 hour(s))   Comprehensive metabolic panel    Collection Time: 10/06/22 10:52 AM   Result Value Ref Range    Sodium 144 136 - 145 mmol/L    Potassium 3.2 (L) 3.4 - 5.3 mmol/L    Chloride 106 98 - 107 mmol/L    Carbon Dioxide (CO2) 24 22 - 29 mmol/L    Anion Gap 14 7 - 15 mmol/L    Urea Nitrogen 7.3 6.0 - 20.0 mg/dL    Creatinine 0.76 0.67 - 1.17 mg/dL    Calcium 8.7 8.6 - 10.0 mg/dL    Glucose 125 (H) 70 - 99 mg/dL    Alkaline Phosphatase 136 (H) 40 - 129 U/L    AST 19 10 - 50 U/L    ALT 5 (L) 10 - 50 U/L    Protein Total 6.8 6.4 - 8.3 g/dL    Albumin 3.3 (L) 3.5 - 5.2 g/dL    Bilirubin Total 0.5 <=1.2 mg/dL    GFR Estimate >90 >60 mL/min/1.73m2   Phosphorus    Collection Time: 10/06/22 10:52 AM   Result Value Ref Range    Phosphorus 3.1 2.5 - 4.5 mg/dL   Magnesium    Collection Time: 10/06/22 10:52 AM   Result Value Ref Range    Magnesium 1.3 (L) 1.7  - 2.3 mg/dL   Uric acid    Collection Time: 10/06/22 10:52 AM   Result Value Ref Range    Uric Acid 4.6 3.4 - 7.0 mg/dL   CBC with platelets and differential    Collection Time: 10/06/22 10:52 AM   Result Value Ref Range    WBC Count 14.6 (H) 4.0 - 11.0 10e3/uL    RBC Count 2.57 (L) 4.40 - 5.90 10e6/uL    Hemoglobin 6.3 (LL) 13.3 - 17.7 g/dL    Hematocrit 19.4 (L) 40.0 - 53.0 %    MCV 76 (L) 78 - 100 fL    MCH 24.5 (L) 26.5 - 33.0 pg    MCHC 32.5 31.5 - 36.5 g/dL    RDW 21.7 (H) 10.0 - 15.0 %    Platelet Count 421 150 - 450 10e3/uL    % Neutrophils 76 %    % Lymphocytes 7 %    % Monocytes 11 %    % Eosinophils 1 %    % Basophils 1 %    % Immature Granulocytes 4 %    NRBCs per 100 WBC 0 <1 /100    Absolute Neutrophils 11.2 (H) 1.6 - 8.3 10e3/uL    Absolute Lymphocytes 1.1 0.8 - 5.3 10e3/uL    Absolute Monocytes 1.6 (H) 0.0 - 1.3 10e3/uL    Absolute Eosinophils 0.2 0.0 - 0.7 10e3/uL    Absolute Basophils 0.1 0.0 - 0.2 10e3/uL    Absolute Immature Granulocytes 0.6 (H) <=0.4 10e3/uL    Absolute NRBCs 0.0 10e3/uL

## 2022-10-06 NOTE — PROGRESS NOTES
This is a recent snapshot of the patient's [facility] medical record.  For current drug dose and complete information and questions, call [phone] or In Basket pool, [facility] home infusion ([id number])  CSN Number:  [id number]

## 2022-10-06 NOTE — NURSING NOTE
Chief Complaint   Patient presents with     Blood Draw     Picc line blood draw with heparin flush by lab RN. Vitals taken and appointment arrived   BP not documented because machine erased data before filing  Latosha Pinto RN

## 2022-10-06 NOTE — PROGRESS NOTES
Hematology-Oncology Visit  Oct 6, 2022    Reason for Visit: high-grade osteosarcoma    Oncology History:   He initially presented to Dr. Whitaker, Orthopedics with rapidly progressing right leg swelling since ~ July 2021. MRI was incomplete due to pain, but did show ~72n17mg right thigh soft tissue mass with probable bony involvement of the proximal right femur. 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 Was seen by Dr. Whitaker to review the results on 10/1/21 - discussed consideration of neoadjuvant chemotherapy vs resection and reconstruction. Given high risk for amputation with massive reconstructive surgery, he was referred to Dr. Ambrocio and a PET was ordered which identifed concerning inguinal lymphadenopathy. He was seen by Dr. Ambrocio 10/26/21, ordered NGS (in process) and recommended starting Doxil/Ifos initially as an inpatient. Admitted for Cycle 1 Doxil/Ifos (P5Q8=94/2/21).  Notably he had an episode of confusion while hospitalized on 11/8/21 concerning for ifosfamide neurotoxicity.  This resolved without administration of methylene blue. With cycle 2 treatment he also had severe neurotoxicity 12 hours into day 3 ifosfamide infusion. He again improved without methylene blue. Remainder of cycle 2 treatment was aborted due reaction/patient wishes.     PET performed 1/11/22 with decrease size of the hypermetabolic soft tissue component of pleomorphic sarcoma in the right thigh, previously measuring 18.3 x 4.4 x 13 and now measuring approximately 12.2 x 4.0 x 4.7 cm. He was hesitant to move forward with cycle 3 chemotherapy and instead met with Dr. Whitaker as well as Dr. Bhatia to discuss multiple surgical options. He presented to the ED 6/5/22 for right leg pain and inability to ambulate. He was found to a have a pathologic right proximal femur fracture. He underwent right hindquarter amputation with Dr. Whitaker and primary complex wound closure with Dr. Butt on 6/17/22.  Surgical pathology was positive for high-grade osteosarcoma. Procedure was notable for significant blood loss requiring transfusion of 10 units pRBC.     CT A/P 8/13/22 performed for surveillance with multiple hypodense masses within the lateral abdominal wall musculature and multiple pulmonary nodules consistent with metastatic disease. He was admitted 9/1/22 with SOB from large left sided malignant pleural effusion, tumor thrombus of right pulmonary vein and progressive disease. He started on doxorubicin/cisplatin 9/1/22.    He was readmitted 9/17/22 for right sided weakness. MRI demonstrated left hemispheric stroke secondary to malignancy and recent pulmonary venous thrombosis. Hospitalization complicated by diffuse diarrhea, c-diff and enteric panels negative.     Interval History:    Fabian is seen today in the infusion center while he is receiving a blood transfusion.  He has missed multiple appointments in the past few weeks due to various reasons including transportation issues.  It is now been 6 weeks since he received chemotherapy with cisplatin and doxorubicin.  Overall he reports he is feeling well.  He is not experiencing significant fatigue.  He did experience some lightheadedness and dizziness today which he attributed to his low hemoglobin otherwise this has not been an issue.  He continues to experience right lower quadrant abdominal pain for which she is taking oxycodone and gabapentin although he has recently run out of oxycodone and was taking old doses of MS Contin to treat this.    His appetite is fairly good although can fluctuate from day-to-day.  He is able to eat on a daily basis and has no issues with drinking fluids.  He no longer is experiencing diarrhea but is having multiple soft stools per day.  He denies any issues with urination.  Home infusion has been coming to his home to draw labs and has educated him on caring for his PICC line which has remained in since his hospital  admission.    He notes edema in his left lower extremity which is improved with elevation.  States this has been present since his last hospitalization for which she was admitted for stroke.  He denies residual deficits in his right upper extremity which he was experiencing prior to his stroke diagnosis.  He has remained on apixaban since discharge and is taking this regularly.  He denies visual disturbances, headache, nausea or changes in speech.    Fabian does verbalize today that he does not wish to discuss specific information on his prognosis.  He is aware that he will likely receive chemotherapy in some form for the rest of his life and is hoping to avoid future delays in treatment.    Current Outpatient Medications   Medication     magnesium oxide (MAG-OX) 400 MG tablet     oxyCODONE IR (ROXICODONE) 10 MG tablet     potassium chloride ER (KLOR-CON M) 20 MEQ CR tablet     acetaminophen (TYLENOL) 325 MG tablet     apixaban ANTICOAGULANT (ELIQUIS) 5 MG tablet     atorvastatin (LIPITOR) 80 MG tablet     escitalopram (LEXAPRO) 10 MG tablet     gabapentin (NEURONTIN) 300 MG capsule     multivitamin, therapeutic (THERA-VIT) TABS tablet     naproxen (NAPROSYN) 500 MG tablet     Nutritional Supplements (ENSURE ACTIVE HIGH PROTEIN) LIQD     OLANZapine (ZYPREXA) 2.5 MG tablet     ondansetron (ZOFRAN ODT) 4 MG ODT tab     polyethylene glycol (MIRALAX) 17 GM/Dose powder     No current facility-administered medications for this visit.     Facility-Administered Medications Ordered in Other Visits   Medication     heparin lock flush 10 UNIT/ML injection 5 mL     sodium chloride (PF) 0.9% PF flush 3-20 mL     PHYSICAL EXAM:  Pulse (!) 124   Temp 98.9  F (37.2  C) (Oral)   Resp 18   SpO2 98%     General: Well-appearing male in no acute distress.  Eyes: EOMI, PERRL. No scleral icterus.  ENT: Oral mucosa is moist without lesions or thrush.   Cardiovascular: Tachycardic, regular rhythm. No murmurs, gallops, or rubs. +1 LLE  edema.  Respiratory: CTA bilaterally. No wheezes or crackles.  Gastrointestinal:  Abdomen soft, non-tender.   Neurologic: Grossly nonfocal. Hand  strength equal bilaterally. Full ROM of upper extremities.  Skin: No rashes, petechiae, or bruising noted on exposed skin.    Labs:   Most Recent 3 CBC's:  Recent Labs   Lab Test 10/06/22  1052 10/05/22  1257 10/03/22  1230   WBC 14.6* 14.2* 15.2*   HGB 6.3* 6.6* 7.0*   MCV 76* 76* 79    431 469*     Most Recent 3 BMP's:  Recent Labs   Lab Test 10/06/22  1052 10/05/22  1257 09/29/22  0815    142 140   POTASSIUM 3.2* 3.1* 3.5   CHLORIDE 106 106 104   CO2 24 24 23   BUN 7.3 9.2 10.8   CR 0.76 0.76 0.82   ANIONGAP 14 12 13   VENTURA 8.7 8.8 8.6   * 117* 105*    Most Recent 2 LFT's:  Recent Labs   Lab Test 10/06/22  1052 10/05/22  1257   AST 19 19   ALT 5* 6*   ALKPHOS 136* 144*   BILITOTAL 0.5 0.4   I reviewed the most recent labs above today.     Imaging:  Narrative & Impression   EXAMINATION: CT CHEST/ABDOMEN/PELVIS W CONTRAST, 10/5/2022 3:26 PM     TECHNIQUE:  Helical CT images from the thoracic inlet through the  symphysis pubis were obtained with IV contrast. Contrast dose: Isovue  370 102cc     COMPARISON: CT 9/19/2022, 9/9/2022.     HISTORY: Osteosarcoma of bone (H); Sarcoma (H); Cancer associated  pain; Malignant neoplasm metastatic to lung, unspecified laterality  (H); Malignant neoplasm metastatic to intra-abdominal lymph node (H)     FINDINGS:  CHEST:     MEDIASTINUM: Left PICC with tip in the right brachiocephalic vein.  Right port with tip in the cavoatrial junction. No central pulmonary  artery filling defect. Normal heart size, no pericardial effusion.  Normal caliber thoracic aorta. Enlargement main pulmonary artery  measuring up to 3.5 cm. Normal thyroid. No suspicious mediastinal  lymphadenopathy. Normal caliber esophagus.     Right middle lobectomy. Right greater than left pleural effusions,  decreased from prior. Innumerable pulmonary  metastases the largest in  the left upper lobe measuring 5.3 x 4.6 cm (series 3, image 52),  increased in size previously measuring 4.4 x 4.9 cm. Additional  growing metastases include the lesion in the peripheral right upper  lobe measuring 0.9 x 1.0 cm previously 0.6 x 0.7 cm. Several  metastases on prior exams were obscured due to overlying pleural  effusion.      ABDOMEN/PELVIS:  Images partially obscured by motion artifact.  LIVER: Within normal limits.     BILIARY: Within normal limits.     PANCREAS: Within normal limits.     SPLEEN: Within normal limits.     ADRENAL GLANDS: Within normal limits.     URINARY TRACT: No hydronephrosis, hydroureter, or nephrolithiasis.  Partial mass effect on the bladder secondary to adjacent pelvic  metastasis.     REPRODUCTIVE ORGANS: Within normal limits.     STOMACH: Within normal limits.     BOWEL: Normal caliber of the small and large bowel. The appendix is  not well-visualized, however, no secondary signs of appendicitis.      PERITONEUM/FLUID: Innumerable centrally necrotic retroperitoneal,  pelvic, and right lower abdominal wall intramuscular metastases these  lesions are stable in size when compared to CT dated 9/19/2022. The  largest of these masses measures approximately 13.4 x 15.0 cm, similar  to prior with extension into the right pelvis.     No free fluid. No free air.     VESSELS: No aneurysmal dilatation of the abdominal aorta.  The portal,  splenic, and superior mesenteric veins are patent. The right external  iliac artery and vein is occluded, similar to prior.     LYMPH NODES: No lymphadenopathy.     BONES/SOFT TISSUES:  Postoperative changes of right lower extremity amputation.  Subcutaneous edema of the right pelvis.                                                                       IMPRESSION:   In this patient with a history of osteosarcoma:  1. Postoperative changes of right lower extremity amputation.  Innumerable pulmonary metastases, some of which  have increased in size  from prior.  2. Retroperitoneal, abdominal, and pelvic metastases are overall  similar in size and number.  3. Malpositioned left PICC with tip in the brachiocephalic vein.  4. Mild enlargement of the pulmonary artery measuring up to 3.5 cm,  can be seen in pulmonary artery hypertension.     I reviewed the above labs and imaging today.    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 Biopsy showing high grade pleomorphic sarcoma of large right thigh soft tissue mass.Began initial treatment with Doxil/Ifos as an inpatient 11/2/21 completing 2 cycles, both of which were c/b neurotoxicity.    PET performed 1/11/22 showing decrease size of the hypermetabolic soft tissue component of pleomorphic sarcoma in the right thigh, previously measuring 18.3 x 4.4 x 13 and now measuring approximately 12.2 x 4.0 x 4.7 cm. At that time he did not wish to proceed with additional chemotherapy. Met with ortho and had been deciding between proceeding between right hemipelvectomy versus reconstruction.    He presented to the ED 6/5/2022 with increased right leg and thigh pain and inability to walk.  Underwent right hindquarter amputation 6/17/22.  Pathology was positive for high-grade osteosarcoma. CT A/P 8/13/22 with evidence of metastases to abdominal wall and lungs. Began chemotherapy with doxorubicin/cisplatin 9/1/22 while inpatient.     Discussed need to resume treatment, possibly with continued doxorubicin/cisplatin which will need to be administered due to kidney injury with cycle 1 versus alternative treatment. Fabian feels comfortable resuming treatment at this point. There are limitations with transportation, either will require his sisters to provide transport or rides through insurance which need to be made 24 hours in advance. [facility] has requested SW assistance. Reviewed CT-CAP with patient and Dr. Ambrocio which shows  relatively stable disease in the abdomen and growth of at least a few pulmonary masses. Due to concern for intolerance with further platinum/anthracycline therapy, Dr. Ambrocio recommends switching to oral pazopanib.     Plan:  -Virtual visit 10/10 to further discuss pazopanib treatment  -Continue CBC with diff/CMP M/W/F through home infusion or home care    Acute L hemispheric ischemic stroke  Presented to ED 9/16/22 d/t inability to move right arm and hand. MRI showed scattered acute left hemispheric hyperintensities. Etiology felt to hypercoagulability of malignancy given recent pulmonary venous thrombosis. Started on Apixaban inpatient.  -Continue apixaban 5 mg BID. Hold anticoagulation for platelets < 50.    Pleural effusion  S/p L chest tube placement with improvement in pleural effusion (removed 9/9) and R thoracentesis (800 ml) 9/10. Effusions stable on CXR at discharge. Cytology negative for malignancy. Discharged on home oxygen which he has been able to wean down. O2 95-98% with home pulse ox.     Acute on chronic microcytic anemia  Hgb continues to downtrend despite receiving chemo now 6 weeks ago. Continue transfusion support with pRBC for hgb < 7.     Cancer related pain  Primarily located in RLQ. Well controlled with oxycodone, needs refill. Did supplement with MS Contin when he ran out of oxycodone.   -Continue oxycodone 10 mg every 4 hours as needed, refilled today  -Palliative care follow-up with Dr. Valentino 10/18/22 as lpanned    JAZZMINE  Significant worsening of kidney function following chemotherapy. Minimal improvement with IV fluids. Creatinine 2.11 on discharge, normal range today. Plan to discontinue platinum therapy as above.     Partial SBO  Developed profuse diarrhea while inpatient. C-diff and enteric panel negative. CT abdomen demonstrated pSBO and increased intra-abdominal tumor burden. Treated conservatively with NPO, fluids.   -Stools regular, monitor for recurrent  symptoms.    FEN  Hypokalemia: Begin potassium chloride 20 mEq daily  Hypomagnesemia: Resume magnesium oxide 400 mg twice a day    MDD  Continue escitalopram. Mood quite good today. Is able to remain positive despite the circumstances. Verbalizes understanding that prognosis isn't great but does not wish to discuss details. Palliative care follow-up as above.     Vascular access  PICC line has remained in place since hospitalizations. CT shows line is malpositioned with tip in the brachiocephalic vein.  -PICC line pulled in infusion today    60 minutes spent on the date of the encounter doing chart review, review of test results, interpretation of tests, patient visit and documentation     Latosha Taylor, CNP  [facility]  [address] [place] [id number] [phone]

## 2022-10-07 NOTE — TELEPHONE ENCOUNTER
Prior Authorization Approval    Authorization Effective Date: 10/7/2022  Authorization Expiration Date: 4/7/2023  Medication: Votrient - APPROVED  Approved Dose/Quantity:   Reference #:     Insurance Company: [employer] - Phone [phone] Fax [phone]  Expected CoPay:       CoPay Card Available:      Foundation Assistance Needed:    Which Pharmacy is filling the prescription (Not needed for infusion/clinic administered):    Pharmacy Notified:    Patient Notified:          Merry Corea CPOncology Pharmacy Liaison     [facility]  Clinics [place] [address]  Office: [phone]  Fax: [phone]  [email or web address]     none

## 2022-10-07 NOTE — PROGRESS NOTES
Patient did not arrive for provider visit but was seen in infusion for tentative blood products and labs. Provider visit rescheduled to 10/7/22.

## 2022-10-07 NOTE — PROGRESS NOTES
Fabian is a 47 year old who is being evaluated via a billable video visit.      How would you like to obtain your AVS? MyChart  If the video visit is dropped, the invitation should be resent by: Text to cell phone: [phone]  Will anyone else be joining your video visit? No        Video-Visit Details    Video Start Time: 1:59 PM    Type of service:  Video Visit    Video End Time:2:17 PM    Originating Location (pt. Location): Home    Distant Location (provider location):  [facility]     Platform used for Video Visit: Kendra Lund    Hematology-Oncology Visit  Oct 10, 2022    Reason for Visit: high-grade osteosarcoma    Oncology History:   He initially presented to Dr. Whitaker, Orthopedics with rapidly progressing right leg swelling since ~ July 2021. MRI was incomplete due to pain, but did show ~35n18al right thigh soft tissue mass with probable bony involvement of the proximal right femur. 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 Was seen by Dr. Whitaker to review the results on 10/1/21 - discussed consideration of neoadjuvant chemotherapy vs resection and reconstruction. Given high risk for amputation with massive reconstructive surgery, he was referred to Dr. Ambrocio and a PET was ordered which identifed concerning inguinal lymphadenopathy. He was seen by Dr. Ambrocio 10/26/21, ordered NGS (in process) and recommended starting Doxil/Ifos initially as an inpatient. Admitted for Cycle 1 Doxil/Ifos (Y6T0=50/2/21).  Notably he had an episode of confusion while hospitalized on 11/8/21 concerning for ifosfamide neurotoxicity.  This resolved without administration of methylene blue. With cycle 2 treatment he also had severe neurotoxicity 12 hours into day 3 ifosfamide infusion. He again improved without methylene blue. Remainder of cycle 2 treatment was aborted due reaction/patient wishes.     PET performed 1/11/22 with decrease size of the  hypermetabolic soft tissue component of pleomorphic sarcoma in the right thigh, previously measuring 18.3 x 4.4 x 13 and now measuring approximately 12.2 x 4.0 x 4.7 cm. He was hesitant to move forward with cycle 3 chemotherapy and instead met with Dr. Whitaker as well as Dr. Bhatia to discuss multiple surgical options. He presented to the ED 6/5/22 for right leg pain and inability to ambulate. He was found to a have a pathologic right proximal femur fracture. He underwent right hindquarter amputation with Dr. Whitaker and primary complex wound closure with Dr. Butt on 6/17/22. Surgical pathology was positive for high-grade osteosarcoma. Procedure was notable for significant blood loss requiring transfusion of 10 units pRBC.     CT A/P 8/13/22 performed for surveillance with multiple hypodense masses within the lateral abdominal wall musculature and multiple pulmonary nodules consistent with metastatic disease. He was admitted 9/1/22 with SOB from large left sided malignant pleural effusion, tumor thrombus of right pulmonary vein and progressive disease. He started on doxorubicin/cisplatin 9/1/22.    He was readmitted 9/17/22 for right sided weakness. MRI demonstrated left hemispheric stroke secondary to malignancy and recent pulmonary venous thrombosis. Hospitalization complicated by diffuse diarrhea, c-diff and enteric panels negative.     Interval History:    Fabian is seen today via virtual visit to discuss plan for change in chemotherapy treatment. He feels much better since his blood transfusion last week. States home care was out today but did not draw blood as clarification was needed on the orders. He confirmed he has started potassium and magnesium replacement. Continues to have soft stools but no frequent diarrhea.    Current Outpatient Medications   Medication     acetaminophen (TYLENOL) 325 MG tablet     apixaban ANTICOAGULANT (ELIQUIS) 5 MG tablet     atorvastatin (LIPITOR) 80 MG tablet     escitalopram  (LEXAPRO) 10 MG tablet     gabapentin (NEURONTIN) 300 MG capsule     magnesium oxide (MAG-OX) 400 MG tablet     multivitamin, therapeutic (THERA-VIT) TABS tablet     naproxen (NAPROSYN) 500 MG tablet     Nutritional Supplements (ENSURE ACTIVE HIGH PROTEIN) LIQD     OLANZapine (ZYPREXA) 2.5 MG tablet     ondansetron (ZOFRAN ODT) 4 MG ODT tab     oxyCODONE IR (ROXICODONE) 10 MG tablet     polyethylene glycol (MIRALAX) 17 GM/Dose powder     potassium chloride ER (KLOR-CON M) 20 MEQ CR tablet     No current facility-administered medications for this visit.     PHYSICAL EXAM:  There were no vitals taken for this visit.    Video physical exam  General: Patient appears well in no acute distress.   Skin: No visualized rash or lesions on visualized skin  Eyes: EOMI, no erythema, sclera icterus or discharge noted  Resp: Appears to be breathing comfortably without accessory muscle usage, speaking in full sentences, no cough  MSK: Appears to have normal range of motion based on visualized movements  Neurologic: No apparent tremors, facial movements symmetric  Psych: affect pleasant, alert and oriented    Labs:   Most Recent 3 CBC's:  Recent Labs   Lab Test 10/06/22  1052 10/05/22  1257 10/03/22  1230   WBC 14.6* 14.2* 15.2*   HGB 6.3* 6.6* 7.0*   MCV 76* 76* 79    431 469*     Most Recent 3 BMP's:  Recent Labs   Lab Test 10/06/22  1052 10/05/22  1257 09/29/22  0815    142 140   POTASSIUM 3.2* 3.1* 3.5   CHLORIDE 106 106 104   CO2 24 24 23   BUN 7.3 9.2 10.8   CR 0.76 0.76 0.82   ANIONGAP 14 12 13   VENTURA 8.7 8.8 8.6   * 117* 105*    Most Recent 2 LFT's:  Recent Labs   Lab Test 10/06/22  1052 10/05/22  1257   AST 19 19   ALT 5* 6*   ALKPHOS 136* 144*   BILITOTAL 0.5 0.4   I reviewed the most recent labs above today.     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 Biopsy showing high grade pleomorphic  sarcoma of large right thigh soft tissue mass.Began initial treatment with Doxil/Ifos as an inpatient 11/2/21 completing 2 cycles, both of which were c/b neurotoxicity.    PET performed 1/11/22 showing decrease size of the hypermetabolic soft tissue component of pleomorphic sarcoma in the right thigh, previously measuring 18.3 x 4.4 x 13 and now measuring approximately 12.2 x 4.0 x 4.7 cm. At that time he did not wish to proceed with additional chemotherapy. Met with ortho and had been deciding between proceeding between right hemipelvectomy versus reconstruction.    He presented to the ED 6/5/2022 with increased right leg and thigh pain and inability to walk.  Underwent right hindquarter amputation 6/17/22.  Pathology was positive for high-grade osteosarcoma. CT A/P 8/13/22 with evidence of metastases to abdominal wall and lungs. Began chemotherapy with doxorubicin/cisplatin 9/1/22 while inpatient.     Discussed need to resume treatment, possibly with continued doxorubicin/cisplatin which will need to be administered due to kidney injury with cycle 1 versus alternative treatment. Fabian feels comfortable resuming treatment at this point. There are limitations with transportation, either will require his sisters to provide transport or rides through insurance which need to be made 24 hours in advance. [facility] has requested SW assistance. Reviewed CT-CAP with patient and Dr. Ambrocio which shows relatively stable disease in the abdomen and growth of at least a few pulmonary masses. Due to concern for intolerance with further platinum/anthracycline therapy, Dr. Ambrocio recommends switching to oral pazopanib.     We further discussed the plan for treatment to pazopanib today. I anticipate he should be able to start treatment within the next week. Will need EKG prior to starting chemotherapy. Potential side effects were reviewed including hypertension, nausea, diarrhea, peripheral edema, electrolyte imbalances, hepatic  dysfunction, thyroid dysfunction and QTc prolongation. Fabian did confirm he has a blood pressure cuff at home. Advised he check BP daily once starting therapy and notify our clinic for readings > 140/90.    Plan:  -Begin pazopanib once pills are received  -EKG prior to starting therapy and 1 week following initiation of therapy  -Labs (CBC, CMP, Phos, protein random urine and TSH with reflex T4) weekly x 4 then every 2 weeks x 8 then monthly  -Continue CBC with diff/CMP M/W/F through home infusion or home care  -Anticipate repeat imaging in about 2 months    Acute L hemispheric ischemic stroke  Presented to ED 9/16/22 d/t inability to move right arm and hand. MRI showed scattered acute left hemispheric hyperintensities. Etiology felt to hypercoagulability of malignancy given recent pulmonary venous thrombosis. Started on Apixaban inpatient.  -Continue apixaban 5 mg BID. Hold anticoagulation for platelets < 50.    Pleural effusion  S/p L chest tube placement with improvement in pleural effusion (removed 9/9) and R thoracentesis (800 ml) 9/10. Effusions stable on CXR at discharge. Cytology negative for malignancy. Discharged on home oxygen which he has been able to wean down. O2 95-98% with home pulse ox.     Acute on chronic microcytic anemia  Hgb continues to downtrend despite receiving chemo now 6 weeks ago. Continue transfusion support with pRBC for hgb < 7.     Cancer related pain  Primarily located in RLQ. Well controlled with oxycodone, needs refill. Did supplement with MS Contin when he ran out of oxycodone.   -Continue oxycodone 10 mg every 4 hours as needed, refilled today  -Palliative care follow-up with Dr. Valentino 10/18/22 as planned    JAZZMINE  Significant worsening of kidney function following chemotherapy. Minimal improvement with IV fluids. Creatinine 2.11 on discharge, now within normal range. Plan to discontinue platinum therapy as above.     Partial SBO  Developed profuse diarrhea while inpatient.  C-diff and enteric panel negative. CT abdomen demonstrated pSBO and increased intra-abdominal tumor burden. Treated conservatively with NPO, fluids.   -Stools regular, monitor for recurrent symptoms.    FEN  Hypokalemia: Continue potassium chloride 20 mEq daily  Hypomagnesemia:Continue  magnesium oxide 400 mg twice a day    MDD  Continue escitalopram. Mood quite good today. Is able to remain positive despite the circumstances. Verbalizes understanding that prognosis isn't great but does not wish to discuss details. Palliative care follow-up as above.       33 minutes spent on the date of the encounter doing chart review, review of test results, interpretation of tests, patient visit and documentation     Latosha Taylor, CNP  [facility]  [address] [place] [id number] [phone]

## 2022-10-07 NOTE — TELEPHONE ENCOUNTER
PA Initiation    Medication: Votrient - Submitted  Insurance Company: [employer] - Phone [phone] Fax [phone]  Pharmacy Filling the Rx:    Filling Pharmacy Phone:    Filling Pharmacy Fax:    Start Date: 10/7/2022        Merry Corea CPhTOncology Pharmacy Liaison     [facility] [address] [address]  Office: [phone]  Fax: [phone]  [email or web address]

## 2022-10-10 NOTE — LETTER
10/10/2022         RE: Vic Scott III  [address]  [address]  [place] [address]        Dear Colleague,    Thank you for referring your patient, Vic Scott III, to the [facility]. Please see a copy of my visit note below.    Fabian is a 47 year old who is being evaluated via a billable video visit.      How would you like to obtain your AVS? MyChart  If the video visit is dropped, the invitation should be resent by: Text to cell phone: [phone]  Will anyone else be joining your video visit? No        Video-Visit Details    Video Start Time: 1:59 PM    Type of service:  Video Visit    Video End Time:2:17 PM    Originating Location (pt. Location): Home    Distant Location (provider location):  [facility]     Platform used for Video Visit: Kendra Lund    Hematology-Oncology Visit  Oct 10, 2022    Reason for Visit: high-grade osteosarcoma    Oncology History:   He initially presented to Dr. Whitaker, Orthopedics with rapidly progressing right leg swelling since ~ July 2021. MRI was incomplete due to pain, but did show ~[id number] right thigh soft tissue mass with probable bony involvement of the proximal right femur. 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 Was seen by Dr. Whitaker to review the results on 10/1/21 - discussed consideration of neoadjuvant chemotherapy vs resection and reconstruction. Given high risk for amputation with massive reconstructive surgery, he was referred to Dr. Ambrocio and a PET was ordered which identifed concerning inguinal lymphadenopathy. He was seen by Dr. Ambrocio 10/26/21, ordered NGS (in process) and recommended starting Doxil/Ifos initially as an inpatient. Admitted for Cycle 1 Doxil/Ifos (M6V6=90/2/21).  Notably he had an episode of confusion while hospitalized on 11/8/21 concerning for ifosfamide neurotoxicity.  This resolved without administration of methylene blue.  With cycle 2 treatment he also had severe neurotoxicity 12 hours into day 3 ifosfamide infusion. He again improved without methylene blue. Remainder of cycle 2 treatment was aborted due reaction/patient wishes.     PET performed 1/11/22 with decrease size of the hypermetabolic soft tissue component of pleomorphic sarcoma in the right thigh, previously measuring 18.3 x 4.4 x 13 and now measuring approximately 12.2 x 4.0 x 4.7 cm. He was hesitant to move forward with cycle 3 chemotherapy and instead met with Dr. Whitaker as well as Dr. Bhatia to discuss multiple surgical options. He presented to the ED 6/5/22 for right leg pain and inability to ambulate. He was found to a have a pathologic right proximal femur fracture. He underwent right hindquarter amputation with Dr. Whitaker and primary complex wound closure with Dr. Butt on 6/17/22. Surgical pathology was positive for high-grade osteosarcoma. Procedure was notable for significant blood loss requiring transfusion of 10 units pRBC.     CT A/P 8/13/22 performed for surveillance with multiple hypodense masses within the lateral abdominal wall musculature and multiple pulmonary nodules consistent with metastatic disease. He was admitted 9/1/22 with SOB from large left sided malignant pleural effusion, tumor thrombus of right pulmonary vein and progressive disease. He started on doxorubicin/cisplatin 9/1/22.    He was readmitted 9/17/22 for right sided weakness. MRI demonstrated left hemispheric stroke secondary to malignancy and recent pulmonary venous thrombosis. Hospitalization complicated by diffuse diarrhea, c-diff and enteric panels negative.     Interval History:    Fabian is seen today via virtual visit to discuss plan for change in chemotherapy treatment. He feels much better since his blood transfusion last week. States home care was out today but did not draw blood as clarification was needed on the orders. He confirmed he has started potassium and magnesium  replacement. Continues to have soft stools but no frequent diarrhea.    Current Outpatient Medications   Medication     acetaminophen (TYLENOL) 325 MG tablet     apixaban ANTICOAGULANT (ELIQUIS) 5 MG tablet     atorvastatin (LIPITOR) 80 MG tablet     escitalopram (LEXAPRO) 10 MG tablet     gabapentin (NEURONTIN) 300 MG capsule     magnesium oxide (MAG-OX) 400 MG tablet     multivitamin, therapeutic (THERA-VIT) TABS tablet     naproxen (NAPROSYN) 500 MG tablet     Nutritional Supplements (ENSURE ACTIVE HIGH PROTEIN) LIQD     OLANZapine (ZYPREXA) 2.5 MG tablet     ondansetron (ZOFRAN ODT) 4 MG ODT tab     oxyCODONE IR (ROXICODONE) 10 MG tablet     polyethylene glycol (MIRALAX) 17 GM/Dose powder     potassium chloride ER (KLOR-CON M) 20 MEQ CR tablet     No current facility-administered medications for this visit.     PHYSICAL EXAM:  There were no vitals taken for this visit.    Video physical exam  General: Patient appears well in no acute distress.   Skin: No visualized rash or lesions on visualized skin  Eyes: EOMI, no erythema, sclera icterus or discharge noted  Resp: Appears to be breathing comfortably without accessory muscle usage, speaking in full sentences, no cough  MSK: Appears to have normal range of motion based on visualized movements  Neurologic: No apparent tremors, facial movements symmetric  Psych: affect pleasant, alert and oriented    Labs:   Most Recent 3 CBC's:  Recent Labs   Lab Test 10/06/22  1052 10/05/22  1257 10/03/22  1230   WBC 14.6* 14.2* 15.2*   HGB 6.3* 6.6* 7.0*   MCV 76* 76* 79    431 469*     Most Recent 3 BMP's:  Recent Labs   Lab Test 10/06/22  1052 10/05/22  1257 09/29/22  0815    142 140   POTASSIUM 3.2* 3.1* 3.5   CHLORIDE 106 106 104   CO2 24 24 23   BUN 7.3 9.2 10.8   CR 0.76 0.76 0.82   ANIONGAP 14 12 13   VENTURA 8.7 8.8 8.6   * 117* 105*    Most Recent 2 LFT's:  Recent Labs   Lab Test 10/06/22  1052 10/05/22  1257   AST 19 19   ALT 5* 6*   ALKPHOS 136* 144*    BILITOTAL 0.5 0.4   I reviewed the most recent labs above today.     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 Biopsy showing high grade pleomorphic sarcoma of large right thigh soft tissue mass.Began initial treatment with Doxil/Ifos as an inpatient 11/2/21 completing 2 cycles, both of which were c/b neurotoxicity.    PET performed 1/11/22 showing decrease size of the hypermetabolic soft tissue component of pleomorphic sarcoma in the right thigh, previously measuring 18.3 x 4.4 x 13 and now measuring approximately 12.2 x 4.0 x 4.7 cm. At that time he did not wish to proceed with additional chemotherapy. Met with ortho and had been deciding between proceeding between right hemipelvectomy versus reconstruction.    He presented to the ED 6/5/2022 with increased right leg and thigh pain and inability to walk.  Underwent right hindquarter amputation 6/17/22.  Pathology was positive for high-grade osteosarcoma. CT A/P 8/13/22 with evidence of metastases to abdominal wall and lungs. Began chemotherapy with doxorubicin/cisplatin 9/1/22 while inpatient.     Discussed need to resume treatment, possibly with continued doxorubicin/cisplatin which will need to be administered due to kidney injury with cycle 1 versus alternative treatment. Fabian feels comfortable resuming treatment at this point. There are limitations with transportation, either will require his sisters to provide transport or rides through insurance which need to be made 24 hours in advance. [facility] has requested SW assistance. Reviewed CT-CAP with patient and Dr. Ambrocio which shows relatively stable disease in the abdomen and growth of at least a few pulmonary masses. Due to concern for intolerance with further platinum/anthracycline therapy, Dr. Ambrocio recommends switching to oral pazopanib.     We further discussed the plan for treatment to pazopanib  today. I anticipate he should be able to start treatment within the next week. Will need EKG prior to starting chemotherapy. Potential side effects were reviewed including hypertension, nausea, diarrhea, peripheral edema, electrolyte imbalances, hepatic dysfunction, thyroid dysfunction and QTc prolongation. Fabian did confirm he has a blood pressure cuff at home. Advised he check BP daily once starting therapy and notify our clinic for readings > 140/90.    Plan:  -Begin pazopanib once pills are received  -EKG prior to starting therapy and 1 week following initiation of therapy  -Labs (CBC, CMP, Phos, protein random urine and TSH with reflex T4) weekly x 4 then every 2 weeks x 8 then monthly  -Continue CBC with diff/CMP M/W/F through home infusion or home care  -Anticipate repeat imaging in about 2 months    Acute L hemispheric ischemic stroke  Presented to ED 9/16/22 d/t inability to move right arm and hand. MRI showed scattered acute left hemispheric hyperintensities. Etiology felt to hypercoagulability of malignancy given recent pulmonary venous thrombosis. Started on Apixaban inpatient.  -Continue apixaban 5 mg BID. Hold anticoagulation for platelets < 50.    Pleural effusion  S/p L chest tube placement with improvement in pleural effusion (removed 9/9) and R thoracentesis (800 ml) 9/10. Effusions stable on CXR at discharge. Cytology negative for malignancy. Discharged on home oxygen which he has been able to wean down. O2 95-98% with home pulse ox.     Acute on chronic microcytic anemia  Hgb continues to downtrend despite receiving chemo now 6 weeks ago. Continue transfusion support with pRBC for hgb < 7.     Cancer related pain  Primarily located in RLQ. Well controlled with oxycodone, needs refill. Did supplement with MS Contin when he ran out of oxycodone.   -Continue oxycodone 10 mg every 4 hours as needed, refilled today  -Palliative care follow-up with Dr. Valentino 10/18/22 as planned    JAZZMINE  Significant  worsening of kidney function following chemotherapy. Minimal improvement with IV fluids. Creatinine 2.11 on discharge, now within normal range. Plan to discontinue platinum therapy as above.     Partial SBO  Developed profuse diarrhea while inpatient. C-diff and enteric panel negative. CT abdomen demonstrated pSBO and increased intra-abdominal tumor burden. Treated conservatively with NPO, fluids.   -Stools regular, monitor for recurrent symptoms.    FEN  Hypokalemia: Continue potassium chloride 20 mEq daily  Hypomagnesemia:Continue  magnesium oxide 400 mg twice a day    MDD  Continue escitalopram. Mood quite good today. Is able to remain positive despite the circumstances. Verbalizes understanding that prognosis isn't great but does not wish to discuss details. Palliative care follow-up as above.       33 minutes spent on the date of the encounter doing chart review, review of test results, interpretation of tests, patient visit and documentation         Again, thank you for allowing me to participate in the care of your patient.      Sincerely,    Latosha Taylor, CNP

## 2022-10-10 NOTE — TELEPHONE ENCOUNTER
Hi all,    I called Mandi and gave her a verbal order to increase HR parameter to 120. I resent some DME orders, can someone please email them to Mandi? Her email is [email or web address]    ThanksKole

## 2022-10-10 NOTE — TELEPHONE ENCOUNTER
Called [facility] and asked them to send over the fax via email. They said they would send it over. I will check the email and give it to Kole once we receive it.   Meenu BUNN EMT 1:07 PM 10/10/2022              ----- Message from Bell Mike CMA sent at 10/10/2022 11:28 AM CDT -----  Hi,  Can someone reach out to [facility] and find out what they faxed over for this patient? Mandi, from [facility] said they need justification for the recently placed orders. Can you see if they can email to the clinic email and then give them to Kole to sign?    [facility]: [phone]

## 2022-10-10 NOTE — TELEPHONE ENCOUNTER
Mandi with [facility] is calling to see if Dr. Thomas would be ok with having Fabian on as his primary Doctor. Stated he kind of hopped around for a while but really liked Dr. Thomas and would like to keep care with her. I told her we have to ask because from what I can see he has not had an establish care appointment with her.   And Mandi stated that the referral was sent in for a manual wheelchair but she really wants a new order placed because his insurance covers a power wheelchair. But insurance wants a face to face for this, wondering if this could be re-written for the power chair from the September 29th visit?   Please let Mandi know what can be done with a phone call. MOSES is ok to leave at the number.

## 2022-10-10 NOTE — TELEPHONE ENCOUNTER
Christine from [facility] is calling about the patient. She is going out to do dressing changes for the patient. She was told by the patient that he has had his picc line removed. Nurse would like to know if she is still doing labs for the patient or just wound care?    She can be reached at:  [phone]

## 2022-10-11 NOTE — TELEPHONE ENCOUNTER
Reg Reina,    Do you know if Fabian will be having his PICC line replaced for further transfusions/labs?    Thanks!  Kole

## 2022-10-12 NOTE — PROGRESS NOTES
This is a recent snapshot of the patient's [facility] medical record.  For current drug dose and complete information and questions, call [phone] or In Basket pool, [facility] home infusion ([id number])  CSN Number:  [id number]

## 2022-10-12 NOTE — ORAL ONC MGMT
Oral Chemotherapy Monitoring Program     Placed call to patient in follow up of planned Votrient therapy and required EKG prior to start of treatment. Three attempts were made to reach Fabian's number. There is a busy signal tone with each attempt. An attempt to reach his sister Lauryn was unsuccessful as well, the phone range but there was no answer and the VM box has not been set up according to the recording.     Rajesh Del Rio PharmD  [facility]  [phone]  October 12, 2022

## 2022-10-12 NOTE — TELEPHONE ENCOUNTER
Per call from Marlene, requesting call back to confirm if clinic has received orders. She re-faxed orders today again due wrong fax # and email.

## 2022-10-12 NOTE — TELEPHONE ENCOUNTER
I did place a order for electric power wheelchair.  I would only take him on as a patient as a last resort.  My panel is really full and I have trouble getting my own patients in.  It would be preferable if he kept with the person he had an establish care with.  However, if this is too much for this patient, then I would take him on.

## 2022-10-13 NOTE — ORAL ONC MGMT
"Oral Chemotherapy Monitoring Program    Lab Monitoring Plan  Cbc, cmp weekly x4 then q 2 weeks x2 then monthly, TSH occasionally per Dr. Ambrocio.  Baseline EKG and then repeat during the second week of treatment.  Subjective/Objective:  Vic Scott III is a 47 year old male contacted by phone for an initial visit for oral chemotherapy education.      ORAL CHEMOTHERAPY 10/7/2022 10/12/2022 10/13/2022   Assessment Type Initial Work up Other New Teach;Refill   Diagnosis Code Sarcoma Sarcoma Sarcoma   Providers Dr Cristóbal Ambrocio   [facility]   Drug Name Votrient (pazopanib) Votrient (pazopanib) Votrient (pazopanib)   Dose 800 mg - 800 mg   Current Schedule Daily - Daily   Cycle Details Continuous - Continuous   Any new drug interactions? Yes - -   Pharmacist Intervention? (No Data) - -   Is the dose as ordered appropriate for the patient? Yes - -       Last PHQ-2 Score on record:   PHQ-2 ( 1999 [employer]) 7/20/2022 2/4/2022   Q1: Little interest or pleasure in doing things 0 0   Q2: Feeling down, depressed or hopeless 0 0   PHQ-2 Score 0 0   PHQ-2 Total Score (12-17 Years)- Positive if 3 or more points; Administer PHQ-A if positive - -       Vitals:  BP:   BP Readings from Last 1 Encounters:   10/06/22 128/82     Wt Readings from Last 1 Encounters:   09/19/22 83.7 kg (184 lb 8.4 oz)     Estimated body surface area is 2.09 meters squared as calculated from the following:    Height as of 9/29/22: 1.88 m (6' 2\").    Weight as of 9/19/22: 83.7 kg (184 lb 8.4 oz).    Labs:  _  Result Component Current Result Ref Range   Sodium 144 (10/6/2022) 136 - 145 mmol/L     _  Result Component Current Result Ref Range   Potassium 3.2 (L) (10/6/2022) 3.4 - 5.3 mmol/L     _  Result Component Current Result Ref Range   Calcium 8.7 (10/6/2022) 8.6 - 10.0 mg/dL     _  Result Component Current Result Ref Range   Magnesium 1.3 (L) (10/6/2022) 1.7 - 2.3 mg/dL     _  Result Component Current " Result Ref Range   Phosphorus 3.1 (10/6/2022) 2.5 - 4.5 mg/dL     _  Result Component Current Result Ref Range   Albumin 3.3 (L) (10/6/2022) 3.5 - 5.2 g/dL     _  Result Component Current Result Ref Range   Urea Nitrogen 7.3 (10/6/2022) 6.0 - 20.0 mg/dL     _  Result Component Current Result Ref Range   Creatinine 0.76 (10/6/2022) 0.67 - 1.17 mg/dL     _  Result Component Current Result Ref Range   AST 19 (10/6/2022) 10 - 50 U/L     _  Result Component Current Result Ref Range   ALT 5 (L) (10/6/2022) 10 - 50 U/L     _  Result Component Current Result Ref Range   Bilirubin Total 0.5 (10/6/2022) <=1.2 mg/dL     _  Result Component Current Result Ref Range   WBC Count 14.6 (H) (10/6/2022) 4.0 - 11.0 10e3/uL     _  Result Component Current Result Ref Range   Hemoglobin 6.3 (LL) (10/6/2022) 13.3 - 17.7 g/dL     _  Result Component Current Result Ref Range   Platelet Count 421 (10/6/2022) 150 - 450 10e3/uL     _  Result Component Current Result Ref Range   Absolute Neutrophils 1.8 (9/20/2022) 1.6 - 8.3 10e3/uL     _  Result Component Current Result Ref Range   Absolute Neutrophils 11.2 (H) (10/6/2022) 1.6 - 8.3 10e3/uL        Assessment:  Patient is appropriate to start therapy after baseline EKG.    Plan:  Basic chemotherapy teaching was reviewed with the patient including indication, start date of therapy, dose, administration, adverse effects, missed doses, food and drug interactions, monitoring, side effect management, office contact information, and safe handling. Written materials were provided and all questions answered. Fabian said he is planning to schedule his EKG and hopes to have it done this week or next week. He knows not to start Votrient until the EKG is done.    Follow-Up:  After EKG and then about one week after start of Votrient.     Rajesh Del Rio PharmD  [facility]  [phone]  October 13, 2022

## 2022-10-14 NOTE — PROGRESS NOTES
Called patient to reschedule them off Dr. Butt's schedule. They now have a visit with Sharon on 10/21/22 at 12:30 pm.     Caryn Hinojosa, EMT

## 2022-10-16 PROBLEM — R00.0 SINUS TACHYCARDIA: Status: ACTIVE | Noted: 2022-01-01

## 2022-10-16 PROBLEM — J90 PLEURAL EFFUSION: Status: ACTIVE | Noted: 2022-01-01

## 2022-10-16 PROBLEM — D64.9 ANEMIA, UNSPECIFIED TYPE: Status: ACTIVE | Noted: 2022-01-01

## 2022-10-16 PROBLEM — R05.1 ACUTE COUGH: Status: ACTIVE | Noted: 2022-01-01

## 2022-10-16 PROBLEM — R06.02 SOB (SHORTNESS OF BREATH): Status: ACTIVE | Noted: 2022-01-01

## 2022-10-16 NOTE — ED PROVIDER NOTES
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  Some productive sputum no hemoptysis.  Patient history of transfusion few weeks ago/history of ongoing microcytic anemia with chemo also.  Patient hospitalized a month ago with right-sided upper extremity stroke.  No significant chest pain reported with this.  Patient appetite generally stable.  Patient typically is on a couple liters nasal cannulae.  Patient is on Eliquis as had history of blood clot in left lung in the past before has been compliant with this also.  Denies any recent COVID exposures otherwise.  No other increasing leg swelling of the left etc.  No fevers reported now presents here for evaluation.  Currently patient feels fairly well on 2 L O2 vitally stable otherwise mild tachycardia.  Some subjective fever noted.  Not documented.  Patient notes some slight increasing weakness also.    As noted patient admitted to hospital go for acute right hand weakness with diagnosis of acute ischemic stroke of left hemisphere due to hypercoagulability of malignancy.    Past Medical History  Past Medical History:   Diagnosis Date     Pleomorphic cell sarcoma (H)      Past Surgical History:   Procedure Laterality Date     ANESTHESIA OUT OF OR BLOOD BRAIN BARRIER DISRUPTION N/A 06/16/2022    Procedure: ANESTHESIA OUT OF OR - Block Epidural;  Surgeon: GENERIC ANESTHESIA PROVIDER;  Location: [facility]     COMPLEX WOUND CLOSURE (UPDATE) Right 06/17/2022    Procedure: complex closure pelvis, spy;  Surgeon: KAREN Butt MD;  Location: [facility] OR     COMPLEX WOUND CLOSURE (UPDATE)  06/17/2022    Procedure: ;  Surgeon: KAREN Butt  MD Taj;  Location: [facility] OR     EXCISE TUMOR PELVIS POSTERIOR Right 06/17/2022    Procedure: Right hindquarter amputation;  Surgeon: Matty Whitaker MD;  Location: [facility] OR     INSERT PORT VASCULAR ACCESS Right 11/01/2021    Procedure: INSERTION, VASCULAR ACCESS PORT;  Surgeon: Sushil Gonzales MD;  Location: [facility] OR     IR CHEST PORT PLACEMENT > 5 YRS OF AGE  11/01/2021     IR CHEST TUBE PLACEMENT NON-TUNNELLED LEFT  9/6/2022     PICC DOUBLE LUMEN PLACEMENT Left 08/30/2022    left medial brachial 5 fr dl power picc 50 cm     acetaminophen (TYLENOL) 325 MG tablet  apixaban ANTICOAGULANT (ELIQUIS) 5 MG tablet  atorvastatin (LIPITOR) 80 MG tablet  escitalopram (LEXAPRO) 10 MG tablet  gabapentin (NEURONTIN) 300 MG capsule  magnesium oxide (MAG-OX) 400 MG tablet  multivitamin, therapeutic (THERA-VIT) TABS tablet  naproxen (NAPROSYN) 500 MG tablet  Nutritional Supplements (ENSURE ACTIVE HIGH PROTEIN) LIQD  OLANZapine (ZYPREXA) 2.5 MG tablet  ondansetron (ZOFRAN ODT) 4 MG ODT tab  oxyCODONE IR (ROXICODONE) 10 MG tablet  [START ON 11/3/2022] pazopanib (VOTRIENT) 200 MG tablet  polyethylene glycol (MIRALAX) 17 GM/Dose powder  potassium chloride ER (KLOR-CON M) 20 MEQ CR tablet  [START ON 11/2/2022] prochlorperazine (COMPAZINE) 10 MG tablet      Allergies   Allergen Reactions     Blood Transfusion Related (Informational Only) Other (See Comments)     Patient has a history of a clinically significant antibody against RBC antigens.  A delay in compatible RBCs may occur.      Family History  Family History   Problem Relation Age of Onset     Heart Disease Mother      Coronary Artery Disease Father      Other - See Comments Father         pleomorphic cell sarcoma     Cancer Maternal Grandmother      No Known Problems Sister      No Known Problems Sister      No Known Problems Son      No Known Problems Daughter      No Known Problems Daughter      No Known Problems Daughter      Social History   Social History      Tobacco Use     Smoking status: Never     Smokeless tobacco: Never   Vaping Use     Vaping Use: Never used   Substance Use Topics     Alcohol use: Never     Drug use: Never      Past medical history, past surgical history, medications, allergies, family history, and social history were reviewed with the patient. No additional pertinent items.       Review of Systems   Constitutional: Positive for activity change and fatigue. Negative for appetite change, chills and fever.   HENT: Negative for congestion, sinus pressure, sinus pain and trouble swallowing.    Respiratory: Positive for cough and shortness of breath. Negative for choking, chest tightness and wheezing.    Cardiovascular: Negative for chest pain and leg swelling.   Gastrointestinal: Negative for abdominal pain, nausea and vomiting.   Genitourinary: Negative for dysuria and flank pain.   Musculoskeletal: Negative for back pain. Gait problem: right leg amputation chronic.   Skin: Negative for rash and wound.   Allergic/Immunologic: Positive for immunocompromised state.   Neurological: Positive for weakness (hx recent stroke with mild weakness right arm without change). Negative for syncope, light-headedness and headaches.   Hematological: Bruises/bleeds easily.   Psychiatric/Behavioral: Negative for agitation, confusion, decreased concentration and dysphoric mood.   All other systems reviewed and are negative.    A complete review of systems was performed with pertinent positives and negatives noted in the HPI, and all other systems negative.    Physical Exam   BP: (!) 140/86  Pulse: 108  Temp: 98  F (36.7  C)  Resp: 18  SpO2: 98 %  Physical Exam  Vitals and nursing note reviewed.   Constitutional:       General: He is in acute distress.      Appearance: He is well-developed and well-nourished. He is not toxic-appearing or diaphoretic.      Comments: Patient is nontoxic-appearing alert oriented 3.  Patient mild tachycardic blood pressure stable  otherwise ox saturation 2 L at 90%.  Afebrile here.  Mildly flattened affect     HENT:      Head: Normocephalic and atraumatic.      Nose: Nose normal.      Mouth/Throat:      Pharynx: Oropharynx is clear.   Eyes:      General: No scleral icterus.     Extraocular Movements: Extraocular movements intact.      Conjunctiva/sclera: Conjunctivae normal.      Pupils: Pupils are equal, round, and reactive to light.   Cardiovascular:      Rate and Rhythm: Regular rhythm. Tachycardia present.   Pulmonary:      Breath sounds: No wheezing or rales.      Comments: No marked retractions or tachypnea noted  Abdominal:      General: There is distension.      Palpations: Abdomen is soft. There is no mass.   Musculoskeletal:         General: Deformity present.      Cervical back: Normal range of motion and neck supple.      Comments: Right leg amputation   Skin:     General: Skin is warm and dry.      Capillary Refill: Capillary refill takes less than 2 seconds.      Findings: No rash.   Neurological:      Mental Status: He is alert and oriented to person, place, and time. Mental status is at baseline.      Comments: Noted recent history of stroke with some right upper extremity weakness noted without change but patient able to put arm behind head and move arm and make a fist etc.  No other focal findings   Psychiatric:      Comments: Flattened affect otherwise appropriate.         ED Course         Patient valuated the ER does have a port did order labs we can access this also.  With the blood cultures lactic acid  EKG chest x-ray ordered also.  Further assessment at this point cardiology.      Patient had port accessed here in the ER.  IV fluids given.  Continue monitoring still has mild tachycardia.  Oxygen saturation stable on 2 L which patient basically is on.  Patient is slightly weaker etc. but otherwise not encephalopathic or confused.    Patient EKG done revealing sinus tachycardia with nonspecific ST changes  seen.  Patient's troponin was 39 BNP was 79.  INR 1.62.  Lactic acid 2.3.  140 potassium 3.6.  Bicarb 22 gap is 14 creatinine 0.77.  Glucose 106.  White count 16.7 slightly increased hemoglobin is down from 6.3-5.9.  Patient denies any bleeding     Influenza RSV and COVID were negative also.  Patient had type and screen done.    CT scan of the chest done with contrast no PE seen patient with multiple nodules noted with metastatic disease.  Effusions noted also.    With patient's increasing anemia has been transfused in the past before requires special blood products I believe has some sensitive antibodies which may take a while to get blood.  Patient given ceftriaxone and Zithromax also covering for underlying infection has been treated with this before in the past she is had thoracentesis before also unclear if the pleural effusions are significant to cause worsening symptoms.  This point my recommendations be to admit the patient we will cover for infection with him still receiving chemo with metastatic disease along with this would look at transfusing him.  We discussed with medicine regarding the recommendations.      With some increasing weakness etc. more short of breath coughing patient does agree at this point.    Patient consented for transfusion did order 1 unit of packed red cells also to be transfused.          Procedures            EKG Interpretation:      Interpreted by Pro Khan MD  Time reviewed: 1321  Symptoms at time of EKG: cough and sob   Rhythm: sinus tach  Rate: normal  Axis: normal  Ectopy: none  Conduction: normal  ST Segments/ T Waves: Nonspecific ST-T wave changes  Q Waves: none  Comparison to prior: No old EKG available    Clinical Impression: normal EKG                    Results for orders placed or performed during the hospital encounter of 10/16/22   XR Chest Port 1 View     Status: None    Narrative    XR CHEST PORT 1 VIEW  10/16/2022 1:13 PM      HISTORY: cough and  sob    COMPARISON: CT CAP 10/5/2022    FINDINGS: Upright chest radiograph. Chest port tip projects over the  right atrium. Cardiac silhouette is obscured. Numerable pulmonary  nodules better seen on prior CT CAP. No pneumothorax. Bilateral  pleural effusions with overlying atelectasis. Bones and upper abdomen  are unremarkable.      Impression    IMPRESSION:  1. Innumerable pulmonary nodules, better seen on prior CT CAP.  2. Bilateral pleural effusions with overlying atelectasis, stable.    I have personally reviewed the examination and initial interpretation  and I agree with the findings.    BETTY FARR MD         SYSTEM ID:  [id number]   CT Chest Pulmonary Embolism w Contrast     Status: None    Narrative    EXAMINATION: CTA pulmonary angiogram, 10/16/2022 5:28 PM     COMPARISON: Chest radiograph same day, CT CAP 10/5/2020    HISTORY: Concern for PE    TECHNIQUE: Volumetric helical acquisition of CT images of the chest  from the lung apices to the kidneys were acquired after the  administration of 63 mL of Isovue-370 IV contrast. Non-Flash technique  with shallow inspiratory breath hold technique.  Post-processed  multiplanar and/or MIP reformations were obtained, archived to PACS  and used in interpretation of this study.     FINDINGS:  Contrast bolus is: excellent.  Exam is negative for acute  pulmonary embolism.       Normal heart size, no pericardial effusion. Normal caliber thoracic  aorta) trunk. Normal esophagus. No concerning for cervical or thoracic  lymphadenopathy.    Central tracheobronchial tree is patent. Innumerable pulmonary nodules  throughout the lungs, the largest in the left upper lobe measures 5.2  x 5.2 cm. No pneumothorax or pleural effusion. Platelike atelectasis  of the lung bases.    Evaluation of the upper abdomen is limited due to contrast timing.  Small fluid about the spleen.    BONES: No acute or suspicious osseous abnormality.      Impression    IMPRESSION:   1. Exam is negative  for acute pulmonary embolism.   2. 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      In the event of a positive result for acute pulmonary embolism  resulting in right heart strain, please activate the PERT  Multidisciplinary group for consultation by paging [phone]).     PERT -- Pulmonary Embolism Response Team (Multidisciplinary team  including cardiology, interventional radiology, critical care,  hematology)       I have personally reviewed the examination and initial interpretation  and I agree with the findings.    GODWIN ENRIQUEZ MD         SYSTEM ID:  [id number]   INR     Status: Abnormal   Result Value Ref Range    INR 1.62 (H) 0.85 - 1.15   Partial thromboplastin time     Status: Normal   Result Value Ref Range    aPTT 36 22 - 38 Seconds   CRP inflammation     Status: Abnormal   Result Value Ref Range    CRP Inflammation 114.00 (H) <5.00 mg/L   Comprehensive metabolic panel     Status: Abnormal   Result Value Ref Range    Sodium 140 136 - 145 mmol/L    Potassium 3.6 3.4 - 5.3 mmol/L    Chloride 104 98 - 107 mmol/L    Carbon Dioxide (CO2) 22 22 - 29 mmol/L    Anion Gap 14 7 - 15 mmol/L    Urea Nitrogen 11.1 6.0 - 20.0 mg/dL    Creatinine 0.77 0.67 - 1.17 mg/dL    Calcium 8.3 (L) 8.6 - 10.0 mg/dL    Glucose 106 (H) 70 - 99 mg/dL    Alkaline Phosphatase 103 40 - 129 U/L    AST 24 10 - 50 U/L    ALT <5 (L) 10 - 50 U/L    Protein Total 5.9 (L) 6.4 - 8.3 g/dL    Albumin 3.1 (L) 3.5 - 5.2 g/dL    Bilirubin Total 0.9 <=1.2 mg/dL    GFR Estimate >90 >60 mL/min/1.73m2   Troponin T, High Sensitivity     Status: Abnormal   Result Value Ref Range    Troponin T, High Sensitivity 39 (H) <=22 ng/L   Nt probnp inpatient (BNP)     Status: Abnormal   Result Value Ref Range    N terminal Pro BNP Inpatient 789 (H) 0 - 450 pg/mL   Lactic acid whole blood     Status: Abnormal   Result Value Ref Range    Lactic Acid 2.3 (H) 0.7 - 2.0 mmol/L   Magnesium     Status: Abnormal   Result Value Ref Range     Magnesium 1.3 (L) 1.7 - 2.3 mg/dL   Symptomatic; Yes; 10/13/2022 Influenza A/B & SARS-CoV2 (COVID-19) Virus PCR Multiplex Nasopharyngeal     Status: Normal    Specimen: Nasopharyngeal; Swab   Result Value Ref Range    Influenza A PCR Negative Negative    Influenza B PCR Negative Negative    RSV PCR Negative Negative    SARS CoV2 PCR Negative Negative    Narrative    Testing was performed using the Xpert Xpress CoV2/Flu/RSV Assay on the Cepheid GeneXpert Instrument. This test should be ordered for the detection of SARS-CoV-2 and influenza viruses in individuals who meet clinical and/or epidemiological criteria. Test performance is unknown in asymptomatic patients. This test is for in vitro diagnostic use under the FDA EUA for laboratories certified under CLIA to perform high or moderate complexity testing. This test has not been FDA cleared or approved. A negative result does not rule out the presence of PCR inhibitors in the specimen or target RNA in concentration below the limit of detection for the assay. If only one viral target is positive but coinfection with multiple targets is suspected, the sample should be re-tested with another FDA cleared, approved, or authorized test, if coinfection would change clinical management. This test was validated by the [facility]. These laboratories are certified under the Clinical Laboratory Improvement Amendments of 1988 (CLIA-88) as qualified to perform high complexity laboratory testing.   CBC with platelets and differential     Status: Abnormal   Result Value Ref Range    WBC Count 16.7 (H) 4.0 - 11.0 10e3/uL    RBC Count 2.38 (L) 4.40 - 5.90 10e6/uL    Hemoglobin 5.9 (LL) 13.3 - 17.7 g/dL    Hematocrit 19.1 (L) 40.0 - 53.0 %    MCV 80 78 - 100 fL    MCH 24.8 (L) 26.5 - 33.0 pg    MCHC 30.9 (L) 31.5 - 36.5 g/dL    RDW 22.1 (H) 10.0 - 15.0 %    Platelet Count 273 150 - 450 10e3/uL    % Neutrophils 86 %    % Lymphocytes 5 %    % Monocytes 7 %    %  Eosinophils 1 %    % Basophils 0 %    % Immature Granulocytes 1 %    NRBCs per 100 WBC 0 <1 /100    Absolute Neutrophils 14.4 (H) 1.6 - 8.3 10e3/uL    Absolute Lymphocytes 0.8 0.8 - 5.3 10e3/uL    Absolute Monocytes 1.1 0.0 - 1.3 10e3/uL    Absolute Eosinophils 0.2 0.0 - 0.7 10e3/uL    Absolute Basophils 0.1 0.0 - 0.2 10e3/uL    Absolute Immature Granulocytes 0.2 <=0.4 10e3/uL    Absolute NRBCs 0.0 10e3/uL   UA with Microscopic     Status: Abnormal   Result Value Ref Range    Color Urine Straw Colorless, Straw, Light Yellow, Yellow    Appearance Urine Clear Clear    Glucose Urine Negative Negative mg/dL    Bilirubin Urine Negative Negative    Ketones Urine Negative Negative mg/dL    Specific Gravity Urine 1.021 1.003 - 1.035    Blood Urine Negative Negative    pH Urine 6.0 5.0 - 7.0    Protein Albumin Urine 10 (A) Negative mg/dL    Urobilinogen Urine Normal Normal, 2.0 mg/dL    Nitrite Urine Negative Negative    Leukocyte Esterase Urine Negative Negative    Mucus Urine Present (A) None Seen /LPF    RBC Urine 2 <=2 /HPF    WBC Urine 1 <=5 /HPF   EKG 12-lead, tracing only     Status: None (Preliminary result)   Result Value Ref Range    Systolic Blood Pressure  mmHg    Diastolic Blood Pressure  mmHg    Ventricular Rate 117 BPM    Atrial Rate 117 BPM    MA Interval 126 ms    QRS Duration 76 ms     ms    QTc 460 ms    P Axis 56 degrees    R AXIS 21 degrees    T Axis 30 degrees    Interpretation ECG       Sinus tachycardia  Cannot rule out Anterior infarct , age undetermined  Abnormal ECG     CBC with platelets differential     Status: Abnormal    Narrative    The following orders were created for panel order CBC with platelets differential.  Procedure                               Abnormality         Status                     ---------                               -----------         ------                     CBC with platelets and d...[[id number]]  Abnormal            Final result                 Please view  results for these tests on the individual orders.   ABO/Rh type and screen     Status: None (In process)    Narrative    The following orders were created for panel order ABO/Rh type and screen.  Procedure                               Abnormality         Status                     ---------                               -----------         ------                     Adult Type and Screen[[id number]]                            In process                   Please view results for these tests on the individual orders.     Medications   lidocaine 1 % 0.1-1 mL (has no administration in time range)   lidocaine (LMX4) cream (has no administration in time range)   sodium chloride (PF) 0.9% PF flush 3 mL (3 mLs Intracatheter Not Given 10/16/22 1520)   sodium chloride (PF) 0.9% PF flush 3 mL (has no administration in time range)   magnesium sulfate 2 g in water intermittent infusion (has no administration in time range)   apixaban ANTICOAGULANT (ELIQUIS) tablet 5 mg (has no administration in time range)   atorvastatin (LIPITOR) tablet 80 mg (has no administration in time range)   escitalopram (LEXAPRO) tablet 10 mg (has no administration in time range)   multivitamin, therapeutic (THERA-VIT) tablet 1 tablet (has no administration in time range)   OLANZapine (zyPREXA) tablet 2.5 mg (has no administration in time range)   lidocaine 1 % 0.1-1 mL (has no administration in time range)   lidocaine (LMX4) cream (has no administration in time range)   sodium chloride (PF) 0.9% PF flush 3 mL (has no administration in time range)   sodium chloride (PF) 0.9% PF flush 3 mL (has no administration in time range)   melatonin tablet 1 mg (has no administration in time range)   Patient is already receiving anticoagulation with heparin, enoxaparin (LOVENOX), warfarin (COUMADIN)  or other anticoagulant medication (has no administration in time range)   acetaminophen (TYLENOL) tablet 650 mg (has no administration in time range)     Or    acetaminophen (TYLENOL) Suppository 650 mg (has no administration in time range)   naproxen (NAPROSYN) tablet 500 mg (has no administration in time range)   senna-docusate (SENOKOT-S/PERICOLACE) 8.6-50 MG per tablet 1 tablet (has no administration in time range)     Or   senna-docusate (SENOKOT-S/PERICOLACE) 8.6-50 MG per tablet 2 tablet (has no administration in time range)   polyethylene glycol (MIRALAX) Packet 17 g (has no administration in time range)   ondansetron (ZOFRAN ODT) ODT tab 4 mg (has no administration in time range)     Or   ondansetron (ZOFRAN) injection 4 mg (has no administration in time range)   prochlorperazine (COMPAZINE) injection 10 mg (has no administration in time range)     Or   prochlorperazine (COMPAZINE) tablet 10 mg (has no administration in time range)     Or   prochlorperazine (COMPAZINE) suppository 25 mg (has no administration in time range)   cefTRIAXone (ROCEPHIN) 1 g vial to attach to  mL bag for ADULTS or NS 50 mL bag for PEDS (has no administration in time range)   azithromycin (ZITHROMAX) 500 mg in sodium chloride 0.9 % 250 mL intermittent infusion (has no administration in time range)   gabapentin (NEURONTIN) capsule 300 mg (has no administration in time range)   oxyCODONE (ROXICODONE) tablet 5-10 mg (has no administration in time range)   pazopanib (VOTRIENT) tablet 800 mg (has no administration in time range)   0.9% sodium chloride BOLUS (0 mLs Intravenous Stopped 10/16/22 1623)   ipratropium - albuterol 0.5 mg/2.5 mg/3 mL (DUONEB) neb solution 3 mL (3 mLs Nebulization Given 10/16/22 1733)   iopamidol (ISOVUE-370) solution 63 mL (63 mLs Intravenous Given 10/16/22 1718)   sodium chloride (PF) 0.9% PF flush 93 mL (93 mLs Intravenous Given 10/16/22 1718)   cefTRIAXone (ROCEPHIN) 1 g vial to attach to  mL bag for ADULTS or NS 50 mL bag for PEDS (1 g Intravenous New Bag 10/16/22 8839)   azithromycin (ZITHROMAX) 500 mg in sodium chloride 0.9 % 250 mL intermittent  infusion (500 mg Intravenous New Bag 10/16/22 1937)        Assessments & Plan (with Medical Decision Making)  47-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  Is been ongoing with chemotherapy.  Patient has had some pleural effusions that required thoracentesis in the past for he has multiple nodules in the lung been treated for pulm infections also.  History of a PE recent stroke with right upper extremity subtle weakness is improved is on Eliquis this was a month ago.  Patient still taking presented with a few days of increasing shortness of breath subjective fevers and cough with sputum but no hemoptysis he was evaluated here chest x-ray just showed the effusions on the right especially with multiple nodularity I can see pneumothorax otherwise CT scan was done without findings of a PE but chronic other changes noted with effusion also.  Patient's white count slightly elevated also.  Cardiac work-up revealed slightly elevated troponin EKG showed a sinus tachycardia only.  Patient inflammatory markers are elevated also.  Patient with history of anemia also noted with the chemotherapy hemoglobin was 6.3 I believe now 5.9.  White count 16.7.  I give the patient IV fluids here in the ER patient is maintaining ox saturation 2 L etc. I ordered ceftriaxone and Zithromax IV covering for [facility] pneumonia concerning for infection at this point I think he needs to be admitted for management of his pulmonary symptoms which appear to be relatively stable but some decompensation noted concern with his metastatic disease with effusions there may be reasons to either perform thoracentesis in the near future if this may aid in some of his symptoms also looking for infection cause etc. also he most likely would improve with a unit of blood which I will talk to the admitting team.  Patient was consented for unit of packed cells it was ordered also.  Signed out to evening  physician.  Patient otherwise agrees stable here in the ER.       I have reviewed the nursing notes. I have reviewed the findings, diagnosis, plan and need for follow up with the patient.    New Prescriptions    No medications on file       Final diagnoses:   SOB (shortness of breath)   Acute cough   Anemia, unspecified type   Osteosarcoma of bone (H)   Amputee, hip, right   Pleural effusion   Sinus tachycardia       --  Pro Khan  [facility] EMERGENCY DEPARTMENT  10/16/2022    This note was created at least in part by the use of dragon voice dictation system. Inadvertent typographical errors may still exist.  Pro Khan MD.    Patient evaluated in the emergency department during the COVID-19 pandemic period. Careful attention to patients safety was addressed throughout the evaluation. Evaluation and treatment management was initiated with disposition made efficiently and appropriate as possible to minimize any risk of potential exposure to patient during this evaluation.       Pro Khan MD  10/16/22 2052

## 2022-10-16 NOTE — H&P
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      # Acute on chronic hypoxic respiratory failure  # CAP vs pulmonary mets  Pt reports 2-day history of increased dyspnea, especially with activity and new cough productive of yellow sputum. Baseline on 2L NC, currently requiring 3-4L. Reports chills and sweats but no known fever. No chest pain but heart feels like it is beating faster than normal. WBC 16.7, up from 14.6 10 days prior. Lactic acid 2.3. . Troponin T 39. Blood culture drawn and pending. COVID/Influenza/RSV all negative. ECG w/ sinus tachycardia, no ST-T changes or ectopy. CXR and CT PE w/ innumerable pulmonary metastases otherwise without acute findings. Rocephin/Zithromax started in ED.  - Admit to inpatient  - Continue Rocephin/Zithromax   - Duonebs QID PRN  - IS  - Sputum culture  - Follow blood culture  - CBC in AM    # Acute on chronic anemia  Hgb 5.9 on admission; baseline ~ 7.5-8. Last transfused 10/6.  - Transfuse 1 unit PRBCs as ordered in ED  - Hgb q12h, transfuse <7    # Lactic acidosis  # Concern for severe sepsis  Lactic acid level 2.3 on admission is likely multifactorial in the setting of active malignancy, acute anemia, though certainly could represent severe sepsis in the setting of CAP. 1L NS given in ED. Awaiting transfusion. Blood  cultures drawn and pending. UA neg. CXR w/o obvious infiltrates. On Rocephin and Zithromax as above.  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  Patient Doxil/Ifos initially on 11/2/2021, which was complicated by isofamide neurotoxicity. It resolved without methene blue. His 2nd cycle of chemo was aborted because of this. He experienced a right proximal femur fracture on 06/05/2022 and underwent right hindquarter amputation by Dr. Grove and primary complex wound closure by Dr. Petersen on 06/17/2022. Pt's CT on 08/13/2022 showed new metastasis to abdominal wall and lungs. PICC placed 08/30, started on doxorubicin/cisplatin 9/1/22. Readmitted 9/17 for left hemispheric stroke 2/2 malignancy and JAZZMINE. His oncologist, Dr. Ambrocio recommended switching to oral pazopanib. He started this treatment with first dose today. He has been seeing palliative care and wound care outpatient.  - Continue PTA pazopanib  - Continue PTA Tylenol, naproxen PRN pain  - Continue PTA oxycodone 5-10mg q4hr PRN severe pain  - Bowel regimen  - Zofran/Compazine PRN nausea/vomiting  - Consult oncology, appreciate assistance  - Consult WOCN for ongoing care of surgical wound  - Consult palliative care for continuity    # Leukocytosis  WBC up-trending over the past month likely reactive etiology, afebrile but now w/ new sx suspicious for infection as above.  - Continue abx as above  - CBC in AM    # Hypomagnesemia  - Replete per RN replacement protocol    # Depression  - Continue PTA Lexapro  - Spiritual Care consult placed per patient request  - Palliative Care consult as above       Diet: Combination Diet Regular Diet Adult    DVT Prophylaxis: DOAC  Sung  Catheter: Not present  Central Lines: PRESENT       Cardiac Monitoring: None  Code Status: Full Code    Clinically Significant Risk Factors Present on Admission           # Hypomagnesemia: Mg = 1.3 mg/dL (Ref range: 1.7 - 2.3 mg/dL) on admission, will replace as needed   # Hypoalbuminemia: Albumin = 3.1 g/dL (Ref range: 3.5 - 5.2 g/dL) on admission, will monitor as appropriate   # Coagulation Defect: home medication list includes an anticoagulant medication            Disposition Plan      Expected Discharge Date: 10/18/2022                The patient's care was discussed with the Attending Physician, Dr. Turner and Patient.    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 He was came to the ED today via ambulance for increasing shortness of breath. Pt reports 2-day history of increased dyspnea, especially with activity and new cough productive of yellow sputum. Baseline on 2L NC, currently requiring 3-4L. Reports chills and sweats but no known fever. No chest pain but heart feels like it is beating faster than  normal.    Review of Systems    The 10 point Review of Systems is negative other than noted in the HPI or here.     Past Medical History    I have reviewed this patient's medical history and updated it with pertinent information if needed.   Past Medical History:   Diagnosis Date     Pleomorphic cell sarcoma (H)        Past Surgical History   I have reviewed this patient's surgical history and updated it with pertinent information if needed.  Past Surgical History:   Procedure Laterality Date     ANESTHESIA OUT OF OR BLOOD BRAIN BARRIER DISRUPTION N/A 06/16/2022    Procedure: ANESTHESIA OUT OF OR - Block Epidural;  Surgeon: GENERIC ANESTHESIA PROVIDER;  Location: [facility]     COMPLEX WOUND CLOSURE (UPDATE) Right 06/17/2022    Procedure: complex closure pelvis, spy;  Surgeon: KAREN Butt MD;  Location: [facility] OR     COMPLEX WOUND CLOSURE (UPDATE)  06/17/2022    Procedure: ;  Surgeon: KAREN Butt MD;  Location: [facility] OR     EXCISE TUMOR PELVIS POSTERIOR Right 06/17/2022    Procedure: Right hindquarter amputation;  Surgeon: Matty Whitaker MD;  Location: [facility] OR     INSERT PORT VASCULAR ACCESS Right 11/01/2021    Procedure: INSERTION, VASCULAR ACCESS PORT;  Surgeon: Sushil Gonzales MD;  Location: [facility] OR     IR CHEST PORT PLACEMENT > 5 YRS OF AGE  11/01/2021     IR CHEST TUBE PLACEMENT NON-TUNNELLED LEFT  9/6/2022     PICC DOUBLE LUMEN PLACEMENT Left 08/30/2022    left medial brachial 5 fr dl power picc 50 cm       Social History   I have reviewed this patient's social history and updated it with pertinent information if needed.  Social History     Tobacco Use     Smoking status: Never     Smokeless tobacco: Never   Vaping Use     Vaping Use: Never used   Substance Use Topics     Alcohol use: Never     Drug use: Never       Family History   I have reviewed this patient's family history and updated it with pertinent information if needed.  Family History   Problem Relation Age of Onset     Heart  Disease Mother      Coronary Artery Disease Father      Other - See Comments Father         pleomorphic cell sarcoma     Cancer Maternal Grandmother      No Known Problems Sister      No Known Problems Sister      No Known Problems Son      No Known Problems Daughter      No Known Problems Daughter      No Known Problems Daughter        Prior to Admission Medications   Prior to Admission Medications   Prescriptions Last Dose Informant Patient Reported? Taking?   Nutritional Supplements (ENSURE ACTIVE HIGH PROTEIN) LIQD  Self No No   Sig: Take 1 Can by mouth 3 times daily (with meals)   OLANZapine (ZYPREXA) 2.5 MG tablet  Self No No   Sig: Take 1 tablet (2.5 mg) by mouth At Bedtime   acetaminophen (TYLENOL) 325 MG tablet  Self No No   Sig: Take 3 tablets (975 mg) by mouth every 8 hours   apixaban ANTICOAGULANT (ELIQUIS) 5 MG tablet   No No   Sig: Take 1 tablet (5 mg) by mouth 2 times daily   atorvastatin (LIPITOR) 80 MG tablet   No No   Sig: Take 1 tablet (80 mg) by mouth every evening   escitalopram (LEXAPRO) 10 MG tablet  Self No No   Sig: Take 1 tablet (10 mg) by mouth daily   gabapentin (NEURONTIN) 300 MG capsule   No No   Sig: Take 1 capsule (300 mg) by mouth every evening for 3 days, THEN 1 capsule (300 mg) 2 times daily for 3 days, THEN 1 capsule (300 mg) 3 times daily for 30 days.   magnesium oxide (MAG-OX) 400 MG tablet   No No   Sig: Take 1 tablet (400 mg) by mouth 2 times daily   multivitamin, therapeutic (THERA-VIT) TABS tablet  Self No No   Sig: Take 1 tablet by mouth daily   naproxen (NAPROSYN) 500 MG tablet  Self Yes No   Sig: Take 500 mg by mouth 2 times daily (with meals)   ondansetron (ZOFRAN ODT) 4 MG ODT tab  Self No No   Sig: Take 1-2 tablets (4-8 mg) by mouth every 8 hours as needed for nausea or vomiting   oxyCODONE IR (ROXICODONE) 10 MG tablet   No No   Sig: Take 1 tablet (10 mg) by mouth every 4 hours as needed for moderate to severe pain   pazopanib (VOTRIENT) 200 MG tablet   No No   Sig:  Take 4 tablets (800 mg) by mouth daily Take on an empty stomach 1 hour before or 2 hours after a meal.   polyethylene glycol (MIRALAX) 17 GM/Dose powder  Self No No   Sig: Take 17 g by mouth daily Hold for loose stools   potassium chloride ER (KLOR-CON M) 20 MEQ CR tablet   No No   Sig: Take 1 tablet (20 mEq) by mouth daily   prochlorperazine (COMPAZINE) 10 MG tablet   No No   Sig: Take 1 tablet (10 mg) by mouth every 6 hours as needed for nausea or vomiting      Facility-Administered Medications: None     Allergies   Allergies   Allergen Reactions     Blood Transfusion Related (Informational Only) Other (See Comments)     Patient has a history of a clinically significant antibody against RBC antigens.  A delay in compatible RBCs may occur.        Physical Exam   Vital Signs: Temp: 98.3  F (36.8  C) Temp src: Oral BP: (!) 156/92 Pulse: 115   Resp: 18 SpO2: 97 % O2 Device: Nasal cannula Oxygen Delivery: 2 LPM  Weight: 0 lbs 0 oz    General: Pleasant, nontoxic appearance, no acute distress   Eyes: PERRL, EOMI, sclerae nonicteric   ENT: Oral mucosa pink and moist   Respiratory: Clear throughout, tachypneic and increased WOB noted after sitting forward in bed which improved with rest  Cardiovascular: Tachycardic, regular, no murmurs, extremities without edema   Abdomen: Soft, NT, ND, +BS   Skin: Warm, dry, no pallor, no rash   Musculoskeletal: Moves all extremities equally, no tenderness, no swelling  Neurologic: Alert and oriented x 4, CN II-XII grossly intact, no focal weakness, speech normal   Psychiatric: Depressed mood and flat affect. Thoughts logical and goal-oriented       Data   Data reviewed today: I reviewed all medications, new labs and imaging results over the last 24 hours. I personally reviewed the EKG tracing showing sinus tachycardia, no ectopy, no ST-T changes and the chest x-ray image(s) showing numerous mets.    Recent Results (from the past 24 hour(s))   EKG 12-lead, tracing only    Collection Time:  10/16/22  1:21 PM   Result Value Ref Range    Systolic Blood Pressure  mmHg    Diastolic Blood Pressure  mmHg    Ventricular Rate 117 BPM    Atrial Rate 117 BPM    OR Interval 126 ms    QRS Duration 76 ms     ms    QTc 460 ms    P Axis 56 degrees    R AXIS 21 degrees    T Axis 30 degrees    Interpretation ECG       Sinus tachycardia  Cannot rule out Anterior infarct , age undetermined  Abnormal ECG     Symptomatic; Yes; 10/13/2022 Influenza A/B & SARS-CoV2 (COVID-19) Virus PCR Multiplex Nasopharyngeal    Collection Time: 10/16/22  1:27 PM    Specimen: Nasopharyngeal; Swab   Result Value Ref Range    Influenza A PCR Negative Negative    Influenza B PCR Negative Negative    RSV PCR Negative Negative    SARS CoV2 PCR Negative Negative   INR    Collection Time: 10/16/22  2:27 PM   Result Value Ref Range    INR 1.62 (H) 0.85 - 1.15   Partial thromboplastin time    Collection Time: 10/16/22  2:27 PM   Result Value Ref Range    aPTT 36 22 - 38 Seconds   CRP inflammation    Collection Time: 10/16/22  2:27 PM   Result Value Ref Range    CRP Inflammation 114.00 (H) <5.00 mg/L   Comprehensive metabolic panel    Collection Time: 10/16/22  2:27 PM   Result Value Ref Range    Sodium 140 136 - 145 mmol/L    Potassium 3.6 3.4 - 5.3 mmol/L    Chloride 104 98 - 107 mmol/L    Carbon Dioxide (CO2) 22 22 - 29 mmol/L    Anion Gap 14 7 - 15 mmol/L    Urea Nitrogen 11.1 6.0 - 20.0 mg/dL    Creatinine 0.77 0.67 - 1.17 mg/dL    Calcium 8.3 (L) 8.6 - 10.0 mg/dL    Glucose 106 (H) 70 - 99 mg/dL    Alkaline Phosphatase 103 40 - 129 U/L    AST 24 10 - 50 U/L    ALT <5 (L) 10 - 50 U/L    Protein Total 5.9 (L) 6.4 - 8.3 g/dL    Albumin 3.1 (L) 3.5 - 5.2 g/dL    Bilirubin Total 0.9 <=1.2 mg/dL    GFR Estimate >90 >60 mL/min/1.73m2   Troponin T, High Sensitivity    Collection Time: 10/16/22  2:27 PM   Result Value Ref Range    Troponin T, High Sensitivity 39 (H) <=22 ng/L   Nt probnp inpatient (BNP)    Collection Time: 10/16/22  2:27 PM    Result Value Ref Range    N terminal Pro BNP Inpatient 789 (H) 0 - 450 pg/mL   Lactic acid whole blood    Collection Time: 10/16/22  2:27 PM   Result Value Ref Range    Lactic Acid 2.3 (H) 0.7 - 2.0 mmol/L   Magnesium    Collection Time: 10/16/22  2:27 PM   Result Value Ref Range    Magnesium 1.3 (L) 1.7 - 2.3 mg/dL   CBC with platelets and differential    Collection Time: 10/16/22  2:27 PM   Result Value Ref Range    WBC Count 16.7 (H) 4.0 - 11.0 10e3/uL    RBC Count 2.38 (L) 4.40 - 5.90 10e6/uL    Hemoglobin 5.9 (LL) 13.3 - 17.7 g/dL    Hematocrit 19.1 (L) 40.0 - 53.0 %    MCV 80 78 - 100 fL    MCH 24.8 (L) 26.5 - 33.0 pg    MCHC 30.9 (L) 31.5 - 36.5 g/dL    RDW 22.1 (H) 10.0 - 15.0 %    Platelet Count 273 150 - 450 10e3/uL    % Neutrophils 86 %    % Lymphocytes 5 %    % Monocytes 7 %    % Eosinophils 1 %    % Basophils 0 %    % Immature Granulocytes 1 %    NRBCs per 100 WBC 0 <1 /100    Absolute Neutrophils 14.4 (H) 1.6 - 8.3 10e3/uL    Absolute Lymphocytes 0.8 0.8 - 5.3 10e3/uL    Absolute Monocytes 1.1 0.0 - 1.3 10e3/uL    Absolute Eosinophils 0.2 0.0 - 0.7 10e3/uL    Absolute Basophils 0.1 0.0 - 0.2 10e3/uL    Absolute Immature Granulocytes 0.2 <=0.4 10e3/uL    Absolute NRBCs 0.0 10e3/uL   Adult Type and Screen    Collection Time: 10/16/22  2:27 PM   Result Value Ref Range    ABO/RH(D) A POS     Antibody Screen Positive (A) Negative    SPECIMEN EXPIRATION DATE [id number]    Prepare red blood cells (unit)    Collection Time: 10/16/22  6:52 PM   Result Value Ref Range    Blood Component Type Red Blood Cells     Product Code B9280S84     Unit Status Ready for issue     Unit Number [id number]     CROSSMATCH COMPATIBLE     CODING SYSTEM [id number]    UA with Microscopic    Collection Time: 10/16/22  7:34 PM   Result Value Ref Range    Color Urine Straw Colorless, Straw, Light Yellow, Yellow    Appearance Urine Clear Clear    Glucose Urine Negative Negative mg/dL    Bilirubin Urine Negative Negative    Ketones  Urine Negative Negative mg/dL    Specific Gravity Urine 1.021 1.003 - 1.035    Blood Urine Negative Negative    pH Urine 6.0 5.0 - 7.0    Protein Albumin Urine 10 (A) Negative mg/dL    Urobilinogen Urine Normal Normal, 2.0 mg/dL    Nitrite Urine Negative Negative    Leukocyte Esterase Urine Negative Negative    Mucus Urine Present (A) None Seen /LPF    RBC Urine 2 <=2 /HPF    WBC Urine 1 <=5 /HPF   Lactic acid whole blood    Collection Time: 10/16/22  9:12 PM   Result Value Ref Range    Lactic Acid 1.6 0.7 - 2.0 mmol/L   Prepare red blood cells (unit)    Collection Time: 10/16/22  9:12 PM   Result Value Ref Range    Blood Component Type Red Blood Cells     Product Code Y1304A05     Unit Status Transfused     Unit Number [id number]     CROSSMATCH COMPATIBLE     CODING SYSTEM [id number]     ISSUE DATE AND TIME [id number]     UNIT ABO/RH A-     UNIT TYPE ISBT 0600        Recent Results (from the past 24 hour(s))   XR Chest Port 1 View    Narrative    XR CHEST PORT 1 VIEW  10/16/2022 1:13 PM      HISTORY: cough and sob    COMPARISON: CT CAP 10/5/2022    FINDINGS: Upright chest radiograph. Chest port tip projects over the  right atrium. Cardiac silhouette is obscured. Numerable pulmonary  nodules better seen on prior CT CAP. No pneumothorax. Bilateral  pleural effusions with overlying atelectasis. Bones and upper abdomen  are unremarkable.      Impression    IMPRESSION:  1. Innumerable pulmonary nodules, better seen on prior CT CAP.  2. Bilateral pleural effusions with overlying atelectasis, stable.    I have personally reviewed the examination and initial interpretation  and I agree with the findings.    BETTY FARR MD         SYSTEM ID:  [id number]   CT Chest Pulmonary Embolism w Contrast    Narrative    EXAMINATION: CTA pulmonary angiogram, 10/16/2022 5:28 PM     COMPARISON: Chest radiograph same day, CT CAP 10/5/2020    HISTORY: Concern for PE    TECHNIQUE: Volumetric helical acquisition of CT images of the  chest  from the lung apices to the kidneys were acquired after the  administration of 63 mL of Isovue-370 IV contrast. Non-Flash technique  with shallow inspiratory breath hold technique.  Post-processed  multiplanar and/or MIP reformations were obtained, archived to PACS  and used in interpretation of this study.     FINDINGS:  Contrast bolus is: excellent.  Exam is negative for acute  pulmonary embolism.       Normal heart size, no pericardial effusion. Normal caliber thoracic  aorta) trunk. Normal esophagus. No concerning for cervical or thoracic  lymphadenopathy.    Central tracheobronchial tree is patent. Innumerable pulmonary nodules  throughout the lungs, the largest in the left upper lobe measures 5.2  x 5.2 cm. No pneumothorax or pleural effusion. Platelike atelectasis  of the lung bases.    Evaluation of the upper abdomen is limited due to contrast timing.  Small fluid about the spleen.    BONES: No acute or suspicious osseous abnormality.      Impression    IMPRESSION:   1. Exam is negative for acute pulmonary embolism.   2. 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      In the event of a positive result for acute pulmonary embolism  resulting in right heart strain, please activate the PERT  Multidisciplinary group for consultation by paging [phone]  [phone]).     PERT -- Pulmonary Embolism Response Team (Multidisciplinary team  including cardiology, interventional radiology, critical care,  hematology)       I have personally reviewed the examination and initial interpretation  and I agree with the findings.    GODWIN ENRIQUEZ MD         SYSTEM ID:  [id number]

## 2022-10-16 NOTE — ED TRIAGE NOTES
Pt BIBA from home - SOB x2 days. Reports productive cough, denies pain. 2L NC baseline. EMS reports high 80s on room air upon arrival.  Arrives on 3L

## 2022-10-17 NOTE — CONSULTS
[facility]  Palliative Care Consultation Note    Patient: Vic Scott III  Date of Admission:  10/16/2022    Requesting Clinician / Team: Hospital Medicine  Reason for consult: Symptom management  Goals of care  Patient and family support    Recommendations/discussion  Pt seen and examined. Goals are fully restorative and sx seem controlled with present interventions. Adjustment to illness ongoing.  Multiple hospitalizations with past several weeks related to multiple comorbid etiologies.   PC will continue to follow for support.  For Sx management: PTA notes -Oxycodone 10 mg--does not work well, takes it less than daily. Was taking 4 x 200 mg ibuprofen: moderately good relief with this without side effects  Tylenol on occasion      These recommendations have been discussed with patient and primary team      Thank you for the opportunity to participate in the care of this patient and family. Our team: will continue to follow.     During regular M-F work hours -- if you are not sure who specifically to contact -- please contact us by sending a text page to our team consult pager at [phone].    After regular work hours and on weekends/holidays, you can call our answering service at [phone]. Also, who's on call for us is available in [facility] [employer].   Akshat ROSENBERG NP  Nurse Practitioner- Advanced Practice Provider  [facility] Palliative Medicine Consult Service   [phone]  TT spent: 36 minutes of which 20 minutes were spent in direct face to face contact with patient/family. Greater than 50% of time spent counseling and/or coordinating care.   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 Ongoing evidence of disease  progression with CT shows pulm and abd wall mets- started on modification to chemotherapy. Hospitalized X2 in September with resp failure, bilateral malignant effusions, R pulm vein tumor thrombus, and again on 2022 with L embolic stroke and complicated by partial SBO, resolved with supportive measures.   Admitted via ED on 10/16/2022 for increased oxygen needs and suspected CAP.       Prognosis, Goals, & Planning:      Functional Status just prior to hospitalization: Unable to determine      Prognosis, Goals, and/or Advance Care Planning were addressed today: No        Summary/Comments:       Patient's decision making preferences: independently          Patient has decision-making capacity today for complex decisions: Yes            I have concerns about the patient/family's health literacy today: No           Patient has a completed Health Care Directive: No.       Code status: Full Code    Coping, Meaning, & Spirituality:   Mood, coping, and/or meaning in the context of serious illness were addressed today: Yes  Summary/Comments: feels  Irritated and frustrated with being in hospital again.    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 3 dtrs 1 son: 5-18 yo. 2 sisters. .  Both parents .       History of Present Illness:  History gathered today from: patient, medical chart, medical team members  Pain is not an issue except focal discomfort at limb remnant wound site. No fever or GI distress. Lives with sister(s). Some shortness of breath and cough.  Recent cancer history per oncology consult 10/17:    -CT A/P 22 performed for surveillance with multiple hypodense masses within the lateral abdominal wall musculature and multiple pulmonary nodules consistent with metastatic disease.   -He was admitted 22 with SOB from large left sided malignant pleural effusion, tumor thrombus of right pulmonary vein and progressive disease. He started doxorubicin/cisplatin  9/1/22.   -Readmitted 9/17/22 for right sided weakness. MRI demonstrated left hemispheric stroke secondary to malignancy and recent pulmonary venous thrombosis.   -Due to concerns for further platinum/anthracycline therapy, he was switched to pazopanib 10/10/22.  Now admitted with concerns for CAP- IV Abx  and sx management.    Key Palliative Symptom Data:  # Pain severity the last 12 hours: low  We are not managing pain in this patient  # Dyspnea severity the last 12 hours: moderate    Patient is on opioids: assessed and bowels ok/no needed changes to plan of care today.    ROS:  Comprehensive ROS is reviewed and is negative except per HPI     Past Medical History:  Past Medical History:   Diagnosis Date     Pleomorphic cell sarcoma (H)         Past Surgical History:  Past Surgical History:   Procedure Laterality Date     ANESTHESIA OUT OF OR BLOOD BRAIN BARRIER DISRUPTION N/A 06/16/2022    Procedure: ANESTHESIA OUT OF OR - Block Epidural;  Surgeon: GENERIC ANESTHESIA PROVIDER;  Location: [facility]     COMPLEX WOUND CLOSURE (UPDATE) Right 06/17/2022    Procedure: complex closure pelvis, spy;  Surgeon: KAREN Butt MD;  Location: [facility] OR     COMPLEX WOUND CLOSURE (UPDATE)  06/17/2022    Procedure: ;  Surgeon: KAREN Butt MD;  Location: [facility] OR     EXCISE TUMOR PELVIS POSTERIOR Right 06/17/2022    Procedure: Right hindquarter amputation;  Surgeon: Matty Whitaker MD;  Location: [facility] OR     INSERT PORT VASCULAR ACCESS Right 11/01/2021    Procedure: INSERTION, VASCULAR ACCESS PORT;  Surgeon: Sushil Gonzales MD;  Location: [facility] OR     IR CHEST PORT PLACEMENT > 5 YRS OF AGE  11/01/2021     IR CHEST TUBE PLACEMENT NON-TUNNELLED LEFT  9/6/2022     PICC DOUBLE LUMEN PLACEMENT Left 08/30/2022    left medial brachial 5 fr dl power picc 50 cm         Family History:  Family History   Problem Relation Age of Onset     Heart Disease Mother      Coronary Artery Disease Father      Other - See Comments  Father         pleomorphic cell sarcoma     Cancer Maternal Grandmother      No Known Problems Sister      No Known Problems Sister      No Known Problems Son      No Known Problems Daughter      No Known Problems Daughter      No Known Problems Daughter         Allergies:  Allergies   Allergen Reactions     Blood Transfusion Related (Informational Only) Other (See Comments)     Patient has a history of a clinically significant antibody against RBC antigens.  A delay in compatible RBCs may occur.         Medications:  I have reviewed this patient's medication profile and medications from this hospitalization.     Physical Exam:  Vital Signs: Temp: 98.3  F (36.8  C) Temp src: Oral BP: (!) 155/98 Pulse: 112   Resp: (!) 32 SpO2: 98 % O2 Device: Nasal cannula Oxygen Delivery: 2 LPM  Weight: 0 lbs 0 oz  General: alert and cooperative, lying on ED cot. No acute distress  HEENT: EOMI, MMM 02 via NC.  CV: RRR, mild tachycardia no murmurs  Resp: CTAB anteriorly, normal respiratory effort   GI: soft, non-tender, non-distended, bowel sounds present and normoactive  Skin: no rashes on limited exam, no jaundice  Neuro: Alert and interactive, moves all extremities equally, no focal deficits    Data reviewed:  ROUTINE IP LABS (Last four results)  BMPRecent Labs   Lab 10/17/22  0553 10/16/22  1427    140   POTASSIUM 3.8 3.6   CHLORIDE 107 104   VENTURA 8.7 8.3*   CO2 26 22   BUN 8.8 11.1   CR 0.75 0.77   GLC 85 106*     CBC  Recent Labs   Lab 10/17/22  0553 10/16/22  1427   WBC 13.1* 16.7*   RBC 2.53* 2.38*   HGB 6.4* 5.9*   HCT 20.5* 19.1*   MCV 81 80   MCH 25.3* 24.8*   MCHC 31.2* 30.9*   RDW 21.1* 22.1*    273     INR  Recent Labs   Lab 10/16/22  1427   INR 1.62*

## 2022-10-17 NOTE — CONSULTS
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    Areas Assessed:      Areas visualized during today's visit: right hip    Pt has almost healed incision on right hip/ lower abdomen.       Treatment Plan:     Right hip/abdomen protection: Every 3 days and PRN  Cleanse with wound cleanser and pat dry. Apply mepilex over area.      Orders: Written    RECOMMEND PRIMARY TEAM ORDER: None, at this time  Education provided: plan of care and wound progress  Discussed plan of care with: Patient and Nurse  WOC nurse follow-up plan: signing off  Notify WOC if wound(s) deteriorate.  Nursing to notify the Provider(s) and re-consult the WOC Nurse if new skin concern.    DATA:     Current support surface: Standard  cart- ashley pulsate ordered 10/17  Containment of urine/stool: Incontinent pad in bed  BMI: There is no height or weight on file to calculate BMI.   Active diet order: Orders Placed This Encounter      Combination Diet Regular Diet Adult     Output: I/O last 3 completed shifts:  In: 700 [P.O.:240; IV Piggyback:310]  Out: 1400 [Urine:1400]     Labs: Recent Labs   Lab 10/17/22  0553 10/16/22  1427   ALBUMIN  --  3.1*   HGB 6.4* 5.9*   INR  --  1.62*   WBC 13.1* 16.7*   CRP  --  114.00*     Pressure injury risk assessment:                          Jennifer Hernandez RN  CWOCN   Dept. Pager: [phone]  Dept. Office Number: [phone]

## 2022-10-17 NOTE — CONSULTS
SPIRITUAL HEALTH SERVICES Progress Note  [facility] ED    I met with Fabian per consult request for emotional support. He shared some frustration with being back in the hospital and not knowing what was causing his symptoms. Fabian expressed that his Gnosticist danny is important to him as well as prayer. He has support of community and family. I provided him with a Quran and Book of Prayer for Gnosticist Patients.    Royce Foley  Chaplain Resident  Pager [phone]      * [facility] remains available 24/7 for emergent requests/referrals, either by having the switchboard page the on-call  or by entering an ASAP/STAT consult in Epic (this will also page the on-call ). Routine Epic consults receive an initial response within 24 hours.*

## 2022-10-17 NOTE — UTILIZATION REVIEW
"    Admission Status; Secondary Review Determination         Under the authority of the Utilization Management Committee, the utilization review process indicated a secondary review on the above patient.  The review outcome is based on review of the medical records, discussions with staff, and applying clinical experience noted on the date of the review.        (X)      Inpatient Status Appropriate - This patient's medical care is consistent with medical management for inpatient care and reasonable inpatient medical practice.      () Observation Status Appropriate - This patient does not meet hospital inpatient criteria and is placed in observation status. If this patient's primary payer is Medicare and was admitted as an inpatient, Condition Code 44 should be used and patient status changed to \"observation\".   () Admission Status NOT Appropriate - This patient's medical care is not consistent with medical management for Inpatient or Observation Status.          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  Patient is requiring increased oxygen, has been initiated on IV Rocephin and azithromycin.  The patient is also anemic and is getting packed red blood cells.    Given acute on chronic hypoxic respiratory failure, need for an IV antibiotics, and transfusion, the " patient is at high risk for deterioration and is likely to stay greater than 2 midnights and inpatient status is appropriate.    The severity of illness, intensity of service provided, expected LOS and risk for adverse outcome make the care complex, high risk and appropriate for hospital admission.        The information on this document is developed by the utilization review team in order for the business office to ensure compliance.  This only denotes the appropriateness of proper admission status and does not reflect the quality of care rendered.         The definitions of Inpatient Status and Observation Status used in making the determination above are those provided in the CMS Coverage Manual, Chapter 1 and Chapter 6, section 70.4.      Sincerely,     LAW DOE MD    Physician Advisor  Utilization Review/ Case Management  [facility].

## 2022-10-17 NOTE — PROGRESS NOTES
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 Improved today, back on baseline O2.     Acute on chronic hypoxic respiratory failure  CAP vs pulmonary mets  Leukocytosis  Pt reports 2-day history of increased dyspnea, especially with activity and new cough productive of yellow sputum. Baseline 2-3L, more recently 3-4 with associated tachycardia. Leukocytosis 16.7, COVID/Influenza/RSV all negative. ECG sinus tachycardia. CXR and CT PE w/ innumerable pulmonary metastases otherwise without acute findings. Rocephin/Zithromax started in ED.  - Continue Rocephin/Zithromax.   - Treat anemia, transfuse for Hb <7  - Duonebs QID PRN  - Follow blood culture, NGTD    Acute on chronic anemia  Hgb 5.9 on admission; baseline ~ 7.5-8. Last transfused 10/6.  - Received 2 units PRBC with appropriate response  - Daily CBC, transfuse <7  - Check reticulocytes, PTT, PT, INR, fibringoen, LDH, haptoglobin     Lactic acidosis, resolved  Concern for severe sepsis  Lactic acid level 2.3 on admission is likely multifactorial in the setting of active malignancy, acute anemia, though certainly could represent severe sepsis in the setting of CAP. 1L NS given in ED. Awaiting transfusion. Blood cultures drawn  and pending. UA neg. CXR w/o obvious infiltrates. On Rocephin and Zithromax as above.  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  Patient Doxil/Ifos initially on 11/2/2021, which was complicated by isofamide neurotoxicity. It resolved without methene blue. His 2nd cycle of chemo was aborted because of this. He experienced a right proximal femur fracture on 06/05/2022 and underwent right hindquarter amputation by Dr. Grove and primary complex wound closure by Dr. Petersen on 06/17/2022. Pt's CT on 08/13/2022 showed new metastasis to abdominal wall and lungs. PICC placed 08/30, started on doxorubicin/cisplatin 9/1/22. Readmitted 9/17 for left hemispheric stroke 2/2 malignancy and JAZZMINE. His oncologist, Dr. Ambrocio recommended switching to oral pazopanib. He started this treatment with first dose today. He has been seeing palliative care and wound care outpatient.  - Hold PTA pazopanib while inpatient   - Continue PTA Tylenol, naproxen PRN pain  - Continue PTA oxycodone 5-10mg q4hr PRN severe pain  - Bowel regimen  - Zofran/Compazine PRN nausea/vomiting  - Consult oncology, appreciate assistance  - Consult WOCN for ongoing care of surgical wound  - Consult palliative care for continuity    Acute L hemispheric ischemic stroke  Presented to ED 9/16/22 d/t inability to move right arm and hand. MRI showed scattered acute left hemispheric hyperintensities. Etiology felt to hypercoagulability of malignancy given recent pulmonary venous thrombosis. Started on Apixaban inpatient.  -Continue apixaban 5 mg BID. Hold anticoagulation for platelets < 50.    Hypomagnesemia  - Replete per RN replacement protocol    Depression  - Continue PTA Lexapro  - Spiritual Care consult placed per patient  request  - Palliative Care consult as above         Diet: Combination Diet Regular Diet Adult    DVT Prophylaxis: DOAC  Sung Catheter: Not present  Central Lines: PRESENT     Cardiac Monitoring: None  Code Status: Full Code      Disposition Plan      Expected Discharge Date: 10/18/2022                The patient's care was discussed with the Patient.    Carmel Bob MD  Hospitalist Service, GOLD TEAM [address] [place] [address]  Securely message with the Vocera Web Console (learn more here)  Text page via [facility] Paging/Directory   Please see signed in provider for up to date coverage information      Clinically Significant Risk Factors Present on Admission             # Hypoalbuminemia: Albumin = 3.1 g/dL (Ref range: 3.5 - 5.2 g/dL) on admission, will monitor as appropriate   # Coagulation Defect: home medication list includes an anticoagulant medication         ______________________________________________________________________    Interval History   - No events overnight. Weaned to baseline 2L O2. Tachypnea noted while receiving PRBC but no shortness of breath. No fevers or chills.    A 4 point review of systems evaluated including questioning patient about cardiovascular symptoms, gastrointestinal symptoms, respiratory symptoms, constitutional symptoms, and hematology / bleeding symptoms and all were negative except as noted in HPI above.    Data reviewed today: I reviewed all medications, new labs and imaging results over the last 24 hours. I personally reviewed the EKG tracing showing .    Physical Exam   Vital Signs: Temp: 98.5  F (36.9  C) Temp src: Oral BP: (!) 136/93 Pulse: 106   Resp: 18 SpO2: 100 % O2 Device: Nasal cannula Oxygen Delivery: 2 LPM  Weight: 0 lbs 0 oz  Constitutional: awake, alert, cooperative, no apparent distress, and appears stated age  Eyes: lids and lashes normal  Respiratory: Tachypnea, scattered crackles bilaterally. No  wheezing.  Cardiovascular: Normal apical impulse, regular rate and rhythm, normal S1 and S2, no S3 or S4, and no murmur noted  GI: non-distended and non-tender  Skin: no rashes  Musculoskeletal: LLE warm, without edema. S/p R hemipelvectomy.  Neurologic: Cranial Nerves:  cranial nerves II-XII are grossly intact    Data   Recent Labs   Lab 10/17/22  1714 10/17/22  0553 10/16/22  1427   WBC  --  13.1* 16.7*   HGB 8.5* 6.4* 5.9*   MCV  --  81 80   PLT  --  238 273   INR  --   --  1.62*   NA  --  143 140   POTASSIUM  --  3.8 3.6   CHLORIDE  --  107 104   CO2  --  26 22   BUN  --  8.8 11.1   CR  --  0.75 0.77   ANIONGAP  --  10 14   VENTURA  --  8.7 8.3*   GLC  --  85 106*   ALBUMIN  --   --  3.1*   PROTTOTAL  --   --  5.9*   BILITOTAL  --   --  0.9   ALKPHOS  --   --  103   ALT  --   --  <5*   AST  --   --  24

## 2022-10-17 NOTE — PLAN OF CARE
Assumed cares at [id number]     Status: SOB, low hgb  Neuro:  AOX4  GI/: WNL  Resp: SOB, Not in distress: On oxygen 2L NC sating at 96-98%  Mobility: Independent  Cardiac: Denies chest pain, Tachy  Lines/Drains:  Port in placed  Pain: Denies pain during assessment  Labs: Hgb 5.9 1u of PRBC transfusion done to recheck the hgb at am  Mg Replaced was done earlier:   -Now Hgb was 6.4 to transfuse another unit of PRBC    VS: BP (!) 136/93 (BP Location: Left arm)   Pulse 106   Temp 98.5  F (36.9  C) (Oral)   Resp 18   SpO2 100%       Plan of Care:   -For sputum culture pending  -On tele  -Fall Prec  -Social, OT, Onco, WOC, Palliative consult  -Reg diet

## 2022-10-17 NOTE — PROVIDER NOTIFICATION
Med gold 7 text paged:    FYI- pt respiratory rate is 32-36 during blood transfusion. Pt not feeling SOB, denies nebs etc. No other symptoms of reaction.

## 2022-10-17 NOTE — CONSULTS
"  Oncology  Consult Note   Date of Service: 10/17/2022    Patient: Vic Scott III  MRN: [id number]  Admission Date: 10/16/2022  Hospital Day # 1  Cancer Diagnosis: High grade osteosarcoma   Primary Outpatient Oncologist: Dr. 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 He endorsed 2 days of LOPEZ with exertion and a productive cough with yellow sputum. Endorses chills, but no recorded fever. Otherwise denies chest or abdominal pain, N/V/D, skin changes. In terms of his osteosarcoma, he had a right hindquarter amputation/hemipelvectomy (6/17/22). CT 8/13/22 showed multiple hypodense masses within the lateral abdominal wall musculature and multiple pulmonary nodules c/w metastatic disease. He was recently admitted 9/1/22 with SOB with left sided malignant pleural effusion and tumor thrombus of right pulmonary vein. He was also readmitted 9/17/22 for right sided weakness with left hemispheric stroke, with right sided finger weakness/numbness, which has improved. He was recently switched to pazopanib, which was mailed to his house, and he had started taking 10/16.     During this admission, his work-up was notable for WBC 16.7, Hgb 5.9, respiratory viral screenings negative, lactic acid 2.3, . CT Chest showed no acute PE and innumerable pulmonary metastases in the setting of osteosarcoma. He was started on empiric treatment for PNA. Oncology was consulted to help evaluate SOB in setting of high grade osteosarcoma.     Oncologic History:  Summarized from Dr. Ambrocio's outpatient note   -Initially presented to ortho with rapidly progressing right leg swelling since ~ July 2021  -MRI incomplete due to pain, but did show ~[id number] " right thigh soft tissue mass with probable bony involvement of the proximal right femur.   -Biopsy of the mass 9/24/21 significant for high grade pleomorphic sarcoma. Given high risk for amputation with massive reconstructive surgery, he was referred to Dr. Ambrocio and a PET was ordered which identifed concerning inguinal lymphadenopathy.   -Seen by Dr. Ambrocio 10/26/21,recommended starting Doxil/Ifos initially as an inpatient. He was started on doxil/ifos (11/2/21) and had 2 cycles, both were complicated by ifosfamide neurotoxicity that resolved with administration of methylene blue. He stopped only completed part of cycle 2 due to reaction/patient wishes.    -PET 1/11/22 with decrease size of the hypermetabolic soft tissue component of pleomorphic sarcoma in the right thigh, previously measuring 18.3 x 4.4 x 13 and now measuring approximately 12.2 x 4.0 x 4.7 cm. He was hesitant to move forward with cycle 3 chemotherapy and instead met with Ortho: Dr. Whitaker as well as Dr. Bhatia to discuss multiple surgical options.  - He presented to the ED 6/5/22 for right leg pain and inability to ambulate, found a pathologic right proximal femur fracture. He underwent right hindquarter amputation with Dr. Whitaker and primary complex wound closure with Dr. Butt on 6/17/22. Surgical pathology was positive for high-grade osteosarcoma. Procedure was notable for significant blood loss requiring transfusion of 10 units pRBC.    -CT A/P 8/13/22 performed for surveillance with multiple hypodense masses within the lateral abdominal wall musculature and multiple pulmonary nodules consistent with metastatic disease.   -He was admitted 9/1/22 with SOB from large left sided malignant pleural effusion, tumor thrombus of right pulmonary vein and progressive disease. He started on doxorubicin/cisplatin 9/1/22.   -Readmitted 9/17/22 for right sided weakness. MRI demonstrated left hemispheric stroke secondary to malignancy and recent  pulmonary venous thrombosis.   -Due to concerns for further platinum/anthracycline therapy, he was switched to pazopanib, which was started 10/16/22.    Review of Systems:  A complete ROS was performed and found to be negative or non-contributory with the exception of that noted in the HPI above.    Past Medical History:  Past Medical History:   Diagnosis Date     Pleomorphic cell sarcoma (H)        Past Surgical History:  Past Surgical History:   Procedure Laterality Date     ANESTHESIA OUT OF OR BLOOD BRAIN BARRIER DISRUPTION N/A 06/16/2022    Procedure: ANESTHESIA OUT OF OR - Block Epidural;  Surgeon: GENERIC ANESTHESIA PROVIDER;  Location: [facility]     COMPLEX WOUND CLOSURE (UPDATE) Right 06/17/2022    Procedure: complex closure pelvis, spy;  Surgeon: KAREN Butt MD;  Location: [facility] OR     COMPLEX WOUND CLOSURE (UPDATE)  06/17/2022    Procedure: ;  Surgeon: KAREN Butt MD;  Location: [facility] OR     EXCISE TUMOR PELVIS POSTERIOR Right 06/17/2022    Procedure: Right hindquarter amputation;  Surgeon: Matty Whitaker MD;  Location: [facility] OR     INSERT PORT VASCULAR ACCESS Right 11/01/2021    Procedure: INSERTION, VASCULAR ACCESS PORT;  Surgeon: Sushil Gonzales MD;  Location: [facility] OR     IR CHEST PORT PLACEMENT > 5 YRS OF AGE  11/01/2021     IR CHEST TUBE PLACEMENT NON-TUNNELLED LEFT  9/6/2022     PICC DOUBLE LUMEN PLACEMENT Left 08/30/2022    left medial brachial 5 fr dl power picc 50 cm       Social History:  Social History     Socioeconomic History     Marital status:      Spouse name: None     Number of children: None     Years of education: None     Highest education level: None   Tobacco Use     Smoking status: Never     Smokeless tobacco: Never   Vaping Use     Vaping Use: Never used   Substance and Sexual Activity     Alcohol use: Never     Drug use: Never     Sexual activity: Not Currently     Partners: Female     Birth control/protection: Condom        Family History  Family  History   Problem Relation Age of Onset     Heart Disease Mother      Coronary Artery Disease Father      Other - See Comments Father         pleomorphic cell sarcoma     Cancer Maternal Grandmother      No Known Problems Sister      No Known Problems Sister      No Known Problems Son      No Known Problems Daughter      No Known Problems Daughter      No Known Problems Daughter        Outpatient Medications:  No current facility-administered medications on file prior to encounter.  acetaminophen (TYLENOL) 325 MG tablet, Take 3 tablets (975 mg) by mouth every 8 hours  apixaban ANTICOAGULANT (ELIQUIS) 5 MG tablet, Take 1 tablet (5 mg) by mouth 2 times daily  atorvastatin (LIPITOR) 80 MG tablet, Take 1 tablet (80 mg) by mouth every evening  escitalopram (LEXAPRO) 10 MG tablet, Take 1 tablet (10 mg) by mouth daily  gabapentin (NEURONTIN) 300 MG capsule, Take 1 capsule (300 mg) by mouth every evening for 3 days, THEN 1 capsule (300 mg) 2 times daily for 3 days, THEN 1 capsule (300 mg) 3 times daily for 30 days.  magnesium oxide (MAG-OX) 400 MG tablet, Take 1 tablet (400 mg) by mouth 2 times daily  multivitamin, therapeutic (THERA-VIT) TABS tablet, Take 1 tablet by mouth daily  naproxen (NAPROSYN) 500 MG tablet, Take 500 mg by mouth 2 times daily (with meals)  Nutritional Supplements (ENSURE ACTIVE HIGH PROTEIN) LIQD, Take 1 Can by mouth 3 times daily (with meals)  OLANZapine (ZYPREXA) 2.5 MG tablet, Take 1 tablet (2.5 mg) by mouth At Bedtime  ondansetron (ZOFRAN ODT) 4 MG ODT tab, Take 1-2 tablets (4-8 mg) by mouth every 8 hours as needed for nausea or vomiting  oxyCODONE IR (ROXICODONE) 10 MG tablet, Take 1 tablet (10 mg) by mouth every 4 hours as needed for moderate to severe pain  [START ON 11/3/2022] pazopanib (VOTRIENT) 200 MG tablet, Take 4 tablets (800 mg) by mouth daily Take on an empty stomach 1 hour before or 2 hours after a meal.  polyethylene glycol (MIRALAX) 17 GM/Dose powder, Take 17 g by mouth daily Hold  for loose stools  potassium chloride ER (KLOR-CON M) 20 MEQ CR tablet, Take 1 tablet (20 mEq) by mouth daily  [START ON 11/2/2022] prochlorperazine (COMPAZINE) 10 MG tablet, Take 1 tablet (10 mg) by mouth every 6 hours as needed for nausea or vomiting (Patient not taking: Reported on 10/17/2022)  [DISCONTINUED] albuterol (PROAIR HFA/PROVENTIL HFA/VENTOLIN HFA) 108 (90 Base) MCG/ACT inhaler, Inhale 1-2 puffs into the lungs every 6 hours as needed for shortness of breath / dyspnea  [DISCONTINUED] QUEtiapine (SEROQUEL) 25 MG tablet, Take 1 tablet (25 mg) by mouth At Bedtime         Physical Exam:    Blood pressure (!) 136/93, pulse 106, temperature 98.5  F (36.9  C), temperature source Oral, resp. rate 18, SpO2 100 %.  General: alert and cooperative, lying in bed, NAD   HEENT: sclera anicteric, MMM  Neck: supple  CV: RRR  Resp: CTAB  GI: soft, non-tender, non-distended  MSK: left leg intact, right hindquarter amputation. No edema with left lower extremities   Skin: no rashes on limited exam  Neuro: Alert and interactive, moves all extremities equally  Psych: Good eye contact, mood and affect normal     Labs & Studies: I personally reviewed the following studies:  ROUTINE LABS (Last four results):  CMP  Recent Labs   Lab 10/17/22  0553 10/16/22  1427    140   POTASSIUM 3.8 3.6   CHLORIDE 107 104   CO2 26 22   ANIONGAP 10 14   GLC 85 106*   BUN 8.8 11.1   CR 0.75 0.77   GFRESTIMATED >90 >90   VENTURA 8.7 8.3*   MAG 1.7 1.3*   PROTTOTAL  --  5.9*   ALBUMIN  --  3.1*   BILITOTAL  --  0.9   ALKPHOS  --  103   AST  --  24   ALT  --  <5*     CBC  Recent Labs   Lab 10/17/22  0553 10/16/22  1427   WBC 13.1* 16.7*   RBC 2.53* 2.38*   HGB 6.4* 5.9*   HCT 20.5* 19.1*   MCV 81 80   MCH 25.3* 24.8*   MCHC 31.2* 30.9*   RDW 21.1* 22.1*    273     INR  Recent Labs   Lab 10/16/22  1427   INR 1.62*       Assessment & Plan:   Vic Scott III is a 47 year old male with a history of high grade osteosarcoma of the right pelvis  and femur, pulmonary vein tumor thrombus, and left-sided pleural effusion who presents with SOB. He endorsed 2 days of LOPEZ, a productive cough with yellow sputum and was started on empiric PNA treatment. For this osteosarcoma, he had a right hindquarter amputation/hemipelvectomy (6/17/22). CT 8/13/22 showed multiple hypodense masses within the lateral abdominal wall musculature and multiple pulmonary nodules c/w metastatic disease. Recently admission 9/1/22 with left sided malignant pleural effusion and tumor thrombus of right pulmonary vein. He was recently switched to pazopanib 10/16.     #Osteosarcoma with multiple pulmonary nodules   #SOB  #Concern for PNA  #Anemia  His SOB could be related to progression of disease in context of enlarging pulmonary lesions on CT C/A/P 10/5/22. Pozatinib can also cause pulmonary symptoms including LOPEZ and increase risk for upper respiratory tract infection. This could also be related to PNA, and reasonable to treat empirically for infection and reassess his respiratory and functional status. His SOB could also be due to acute on chronic symptomatic anemia, recommend getting further work-up as below, though this will be done after getting transfused pRBC   -Started on empiric treatment with azithromycin and CTX for PNA   -Would hold pozopanib while inpatient       Recommendations:   - Agree with empiric treatment for PNA  - Hold pazopanib while inpatient  - Please check reticulocytes, PTT, PT, INR, fibrinogen, LDH, haptoglobin (ordered)   - Will update Dr. Ambrocio     Thank you for this interesting consult. We will continue to follow this patient. Please do not hesitate to page with any questions or concerns.    Patient was seen and plan of care was discussed with attending physician Dr. Martinez. Attestation to follow.     Edward Levin MD PGY4  Hematology/Oncology   Pager: [phone]

## 2022-10-18 NOTE — PROGRESS NOTES
Admitted/transferred from:   2 RN full   skin assessment completed by Moses Briceno RN and KAREN Calix RN.  Skin assessment finding: issues found L thigh skin tear, Posterior Leg w/ skin sore, R sided old hip incision/wound draining yellow purulent drainage. Blanchable redness on coccyx. Dried cracked skin on LLE. R chest port    Interventions/actions: WOC consult ordered     Will continue to monitor.    Pt arrived to unit from ED ~ 0450 w/ SOB/dyspnea w/ concern for HAP. LS clear/diminished. Satting >94% on 2 L (Baseline). Baseline RUE n/t (Baseline) Please refer to skin note for skin assessment. Pt continent of bladder. Intermittently incontinent to bowel. Oxy 10 mg x1 for R sided abdominal pain. Continue POC

## 2022-10-18 NOTE — PROGRESS NOTES
[facility]    Medicine Progress Note - Hospitalist Service, GOLD TEAM 7    Date of Admission:  10/16/2022    Assessment & Plan         Vic Scott III is a 47 year old male with PMH significant for high-grade pleomorphic sarcoma of the right pelvis and femur s/p chemotherapy and right hindquarter amputation/hemipelvectomy (6/17/2022) with recent CT findings (8/13/22) concerning for multiple hypodense masses within the lateral abdominal wall, multiple pulmonary nodules concerning for metastatic disease, 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     Changes today:    - Transition to oral antibiotics    - Likely discharge tomorrow pending continued respiratory improvement.    Acute on chronic hypoxic respiratory failure  CAP vs pulmonary mets  Leukocytosis  Pt reports 2-day history of increased dyspnea, especially with activity and new cough productive of yellow sputum. Baseline 2-3L, more recently 3-4 with associated tachycardia. Leukocytosis 16.7, COVID/Influenza/RSV all negative. ECG sinus tachycardia. CXR and CT PE w/ innumerable pulmonary metastases otherwise without acute findings. Rocephin/Zithromax started in ED.  - Transition to cefpodoxime 200mg BID and azithro to complete 5 day course  - Treat anemia, transfuse for Hb <7  - Duonebs QID PRN  - Follow blood culture, NGTD    Acute on chronic anemia  Hgb 5.9 on admission; baseline ~ 7.5-8. Last transfused 10/6. LDH elevated. Haptoglobin elevated, less likely for hemolysis. More likely poor bone marrow response. Reticulocytes slightly elevated but not proportional to level of anemia.  Received 2 units PRBC with appropriate response  - Daily CBC, transfuse <7    Lactic acidosis, resolved  Concern for severe sepsis  Lactic  acid level 2.3 on admission is likely multifactorial in the setting of active malignancy, acute anemia, though certainly could represent severe sepsis in the setting of CAP. 1L NS given in ED. Awaiting transfusion. Blood cultures drawn and pending. UA neg. CXR w/o obvious infiltrates. On Rocephin and Zithromax as above.  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  Patient Doxil/Ifos initially on 11/2/2021, which was complicated by isofamide neurotoxicity. It resolved without methene blue. His 2nd cycle of chemo was aborted because of this. He experienced a right proximal femur fracture on 06/05/2022 and underwent right hindquarter amputation by Dr. Grove and primary complex wound closure by Dr. Petersen on 06/17/2022. Pt's CT on 08/13/2022 showed new metastasis to abdominal wall and lungs. PICC placed 08/30, started on doxorubicin/cisplatin 9/1/22. Readmitted 9/17 for left hemispheric stroke 2/2 malignancy and JAZZMINE. His oncologist, Dr. Ambrocio recommended switching to oral pazopanib. He started this treatment with first dose today. He has been seeing palliative care and wound care outpatient.  - Hold PTA pazopanib while inpatient   - Continue PTA Tylenol, naproxen PRN pain  - Continue PTA oxycodone 5-10mg q4hr PRN severe pain  - Bowel regimen  - Zofran/Compazine PRN nausea/vomiting  - Consult oncology, appreciate assistance  - Consult WOCN for ongoing care of surgical wound  - Consult palliative care for continuity    Acute L hemispheric ischemic stroke  Presented to ED 9/16/22 d/t inability to move right arm and hand. MRI showed scattered acute left hemispheric hyperintensities. Etiology felt to hypercoagulability of malignancy given recent pulmonary venous thrombosis. Started  on Apixaban inpatient.  -Continue apixaban 5 mg BID. Hold anticoagulation for platelets < 50.    Hypomagnesemia  - Replete per RN replacement protocol    Depression  - Continue PTA Lexapro  - Spiritual Care consult placed per patient request  - Palliative Care consult as above         Diet: Combination Diet Regular Diet Adult    DVT Prophylaxis: DOAC  Sung Catheter: Not present  Central Lines: PRESENT       Cardiac Monitoring: ACTIVE order. Indication: Electrolyte Imbalance (24 hours)- Magnesium <1.3 mg/ml; Potassium < =2.8 or > 5.5 mg/ml  Code Status: Full Code      Disposition Plan      Expected Discharge Date: 10/19/2022      Destination: home with help/services          The patient's care was discussed with the Bedside Nurse, Care Coordinator/ and Patient.    Dora Elizondo MD  Hospitalist Service, [address]  Securely message with the Vocera Web Console (learn more here)  Text page via [facility] Paging/Directory   Please see signed in provider for up to date coverage information      Clinically Significant Risk Factors Present on Admission                   ______________________________________________________________________    Interval History   NAEON, nursing notes reviewed. Back on home O2, still has some SOB. Denies chest pain. Occasional sharp right sided abdominal pain - states it feels like muscle pain.     A 4 point review of systems evaluated including questioning patient about cardiovascular symptoms, gastrointestinal symptoms, respiratory symptoms, constitutional symptoms, and hematology / bleeding symptoms and all were negative except as noted in HPI above.    Data reviewed today: I reviewed all medications, new labs and imaging results over the last 24 hours. Labs and Imaging reviewed in Epic, pertinent discussed in A&P.       Physical Exam   Vital Signs: Temp: 97.5  F (36.4  C) Temp src: Oral BP: (!) 152/98 Pulse: 92   Resp:  24 SpO2: 96 % O2 Device: Nasal cannula Oxygen Delivery: 2 LPM  Weight: 178 lbs 14.4 oz     Constitutional: awake, alert, cooperative, no apparent distress, and appears stated age  Eyes: lids and lashes normal  Respiratory: Tachypnea, scattered crackles in left lower lobe. No wheezing.  Cardiovascular: Normal apical impulse, regular rate and rhythm, normal S1 and S2, no S3 or S4, and no murmur noted  GI: non-distended and non-tender  Skin: no rashes  Musculoskeletal: LLE warm, without edema. S/p R hemipelvectomy.  Neurologic: Cranial Nerves:  cranial nerves II-XII are grossly intact    Data   Recent Labs   Lab 10/18/22  0553 10/17/22  1714 10/17/22  0553 10/16/22  1427   WBC 13.9*  --  13.1* 16.7*   HGB 8.4* 8.5* 6.4* 5.9*   MCV 80  --  81 80     --  238 273   INR 1.42*  --   --  1.62*     --  143 140   POTASSIUM 3.8  --  3.8 3.6   CHLORIDE 104  --  107 104   CO2 25  --  26 22   BUN 10.4  --  8.8 11.1   CR 0.81  --  0.75 0.77   ANIONGAP 12  --  10 14   VENTURA 8.8  --  8.7 8.3*   GLC 91  --  85 106*   ALBUMIN  --   --   --  3.1*   PROTTOTAL  --   --   --  5.9*   BILITOTAL  --   --   --  0.9   ALKPHOS  --   --   --  103   ALT  --   --   --  <5*   AST  --   --   --  24

## 2022-10-18 NOTE — PROGRESS NOTES
Hematology / Oncology  Daily Progress Note   Date of Service: 10/18/2022  Patient: Vic Scott III  MRN: [id number]  Admission Date: 10/16/2022  Hospital Day # 2  Cancer Diagnosis: High grade osteosarcoma   Primary Outpatient Oncologist: Dr. 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 He endorsed 2 days of LOPEZ, a productive cough with yellow sputum and was started on empiric PNA treatment. For this osteosarcoma, he had a right hindquarter amputation/hemipelvectomy (6/17/22). CT 8/13/22 showed multiple hypodense masses within the lateral abdominal wall musculature and multiple pulmonary nodules c/w metastatic disease. Recently admission 9/1/22 with left sided malignant pleural effusion and tumor thrombus of right pulmonary vein. He was recently switched to pazopanib 10/16.      #Osteosarcoma with multiple pulmonary nodules   #SOB  #Concern for PNA  #Anemia  Likely multifactorial SOB related to progression of disease in context of enlarging pulmonary lesions on CT C/A/P 10/5/22, Pozatinib can also cause pulmonary symptoms including LOPEZ and increase risk for upper respiratory tract infection. This could also be related to PNA, and reasonable to treat empirically for infection and reassess his respiratory and functional status. His SOB could also be due to acute on chronic symptomatic anemia, especially as his symptoms improved significantly after getting transfused (10/17).   -Started on empiric treatment with azithromycin and CTX for PNA   -Would hold pozopanib while inpatient, could potentially restart if in coming days if he feels significantly better      Recommendations:   - Agree with empiric treatment for PNA  - Hold pazopanib while inpatient, will review with Dr. Ambrocio    - Continue to monitor Hgb, transfuse if <7 or signs of active bleed     Patient was seen and plan of care was discussed with attending physician Dr. Martinez.    Thank you for the opportunity to partake in this patients plan of care. Please do not hesitate to page with questions.     Edward Levin MD PGY4  Hematology/Oncology   Pager: [phone]   ___________________________________________________________________    Subjective & Interval History:    NEAO, transfused 2u pRBC 10/17, hgb stable this morning. Labs not suggestive of hemolysis or DIC. Generally feels better with decreased SOB. He has mild pain with his right pelvis.     Physical Exam:    Blood pressure (!) 154/99, pulse 91, temperature 97.5  F (36.4  C), temperature source Oral, resp. rate 22, weight 81.1 kg (178 lb 14.4 oz), SpO2 97 %.    General: alert and cooperative, lying in bed, NAD   HEENT: sclera anicteric, MMM  Neck: supple  CV: RRR  Resp: CTAB  GI: soft, non-tender, non-distended  MSK: left leg intact, right hindquarter amputation. No edema with left lower extremities   Skin: no rashes on limited exam  Neuro: Alert and interactive, moves all extremities equally  Psych: Mood and affect normal     Labs & Studies: I personally reviewed the following studies:  ROUTINE LABS (Last four results):  CMP  Recent Labs   Lab 10/18/22  0553 10/17/22  0553 10/16/22  1427    143 140   POTASSIUM 3.8 3.8 3.6   CHLORIDE 104 107 104   CO2 25 26 22   ANIONGAP 12 10 14   GLC 91 85 106*   BUN 10.4 8.8 11.1   CR 0.81 0.75 0.77   GFRESTIMATED >90 >90 >90   VENTURA 8.8 8.7 8.3*   MAG  --  1.7 1.3*   PROTTOTAL  --   --  5.9*   ALBUMIN  --   --  3.1*   BILITOTAL  --   --  0.9   ALKPHOS  --   --  103   AST  --   --  24   ALT  --   --  <5*     CBC  Recent Labs   Lab 10/18/22  0553 10/17/22  1714 10/17/22  0553 10/16/22  1427   WBC 13.9*  --  13.1* 16.7*   RBC 3.24*  --  2.53* 2.38*   HGB 8.4* 8.5* 6.4* 5.9*   HCT 25.8*  --  20.5* 19.1*   MCV 80  --  81 80   MCH 25.9*  --   25.3* 24.8*   MCHC 32.6  --  31.2* 30.9*   RDW 20.2*  --  21.1* 22.1*     --  238 273     INR  Recent Labs   Lab 10/18/22  0553 10/16/22  1427   INR 1.42* 1.62*       Medications list for reference:  Current Facility-Administered Medications   Medication     acetaminophen (TYLENOL) tablet 650 mg    Or     acetaminophen (TYLENOL) Suppository 650 mg     apixaban ANTICOAGULANT (ELIQUIS) tablet 5 mg     atorvastatin (LIPITOR) tablet 80 mg     azithromycin (ZITHROMAX) tablet 500 mg     cefdinir (OMNICEF) capsule 300 mg     escitalopram (LEXAPRO) tablet 10 mg     gabapentin (NEURONTIN) capsule 300 mg     heparin 100 UNIT/ML injection 5-10 mL     heparin lock flush 10 UNIT/ML injection 5-10 mL     ipratropium - albuterol 0.5 mg/2.5 mg/3 mL (DUONEB) neb solution 3 mL     lidocaine (LMX4) cream     lidocaine 1 % 0.1-1 mL     melatonin tablet 1 mg     multivitamin, therapeutic (THERA-VIT) tablet 1 tablet     naloxone (NARCAN) injection 0.2 mg    Or     naloxone (NARCAN) injection 0.4 mg    Or     naloxone (NARCAN) injection 0.2 mg    Or     naloxone (NARCAN) injection 0.4 mg     naproxen (NAPROSYN) tablet 500 mg     OLANZapine (zyPREXA) tablet 2.5 mg     ondansetron (ZOFRAN ODT) ODT tab 4 mg    Or     ondansetron (ZOFRAN) injection 4 mg     oxyCODONE (ROXICODONE) tablet 5-10 mg     Patient is already receiving anticoagulation with heparin, enoxaparin (LOVENOX), warfarin (COUMADIN)  or other anticoagulant medication     [Held by provider] pazopanib (VOTRIENT) tablet 800 mg     polyethylene glycol (MIRALAX) Packet 17 g     prochlorperazine (COMPAZINE) injection 10 mg    Or     prochlorperazine (COMPAZINE) tablet 10 mg    Or     prochlorperazine (COMPAZINE) suppository 25 mg     senna-docusate (SENOKOT-S/PERICOLACE) 8.6-50 MG per tablet 1 tablet    Or     senna-docusate (SENOKOT-S/PERICOLACE) 8.6-50 MG per tablet 2 tablet     sodium chloride (PF) 0.9% PF flush 10-20 mL     sodium chloride (PF) 0.9% PF flush 10-20 mL      sodium chloride (PF) 0.9% PF flush 10-20 mL     sodium chloride (PF) 0.9% PF flush 3 mL     sodium chloride (PF) 0.9% PF flush 3 mL

## 2022-10-18 NOTE — PLAN OF CARE
Goal Outcome Evaluation:  Oxygen is at his baseline 2 liters. Given Oxycodone Tylenol for right hip/abdominal pain with some relief. Denied nausea. Afebrile. Antibiotics switched to oral. Plan is to discharge tomorrow. He had a large loose/soft incontinent stool. PLlase encourage turing in bed.

## 2022-10-18 NOTE — CONSULTS
Care Management Initial Consult    General Information  Assessment completed with: Patient,    Type of CM/SW Visit: Initial Assessment    Primary Care Provider verified and updated as needed: Yes   Readmission within the last 30 days: current reason for admission unrelated to previous admission      Reason for Consult: discharge planning  Advance Care Planning: Advance Care Planning Reviewed: no concerns identified  Fabian's sisters ar his NOK in the absence of an HCD       Communication Assessment  Patient's communication style: spoken language (English or Bilingual)    Hearing Difficulty or Deaf: no   Wear Glasses or Blind: yes    Cognitive  Cognitive/Neuro/Behavioral: WDL                      Living Environment:   People in home: sibling(s)  Lauryn Vora  Current living Arrangements: apartment      Able to return to prior arrangements: yes; though pt would like to look at more supportive living options eventually. Was provided with resources and advised to reach out to [facility] to discuss CADI waiver and MA resoureces       Family/Social Support:  Care provided by: self, other (see comments) (PCA)  Provides care for: no one  Marital Status:   Sibling(s), Children, Other (specify) (Fabian recieves much support from his extended family and friends as well as his sister's children)          Description of Support System: Supportive, Involved    Support Assessment: Adequate family and caregiver support, Adequate social supports    Current Resources:   Patient receiving home care services: Yes  Skilled Home Care Services: Skilled Nursing, Physicial Therapy, Home Health Aid, Occupational Therapy  Community Resources: PCA, Home Care  Equipment currently used at home: walker, rolling, wheelchair, manual  Supplies currently used at home: Wound Care Supplies    Employment/Financial:  Employment Status: disabled        Financial Concerns: No concerns identified           Lifestyle & Psychosocial Needs:  Social  Determinants of Health     Tobacco Use: Low Risk      Smoking Tobacco Use: Never     Smokeless Tobacco Use: Never     Passive Exposure: Not on file   Alcohol Use: Not on file   Financial Resource Strain: Not on file   Food Insecurity: Not on file   Transportation Needs: Not on file   Physical Activity: Not on file   Stress: Not on file   Social Connections: Not on file   Intimate Partner Violence: Not on file   Depression: Not at risk     PHQ-2 Score: 0   Housing Stability: Not on file       Functional Status:  Prior to admission patient needed assistance:   Dependent ADLs:: Ambulation-walker, Wheelchair-independent (Lauryn assists as needed)  Dependent IADLs:: Transportation, Shopping       Mental Health Status:  Mental Health Status: Current Concern  Mental Health Management:  (Pt would like a therapist and psychiatrist)    Chemical Dependency Status:  Chemical Dependency Status: No Current Concerns             Values/Beliefs:  Spiritual, Cultural Beliefs, Gnosticist Practices, Values that affect care: yes          Values/Beliefs Comment:  (Oriental orthodox)    Additional Information:  Vic Scott III is a 47 year old male with PMH significant for high-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    SW met with pt at bedside to complete CMA. Pt was A&Ox4. Pt was pleasant and cooperative. SW introduced self and role in SW. Pt reports he lives at home with his sister and receives homecare services for RN, PT, T and wound care through [facility]. Pt states he also has 65 hrs of PCA per week. His plan is to return home with  resumption of homecare services.    Pt requested resources for other housing options as he feels eventually he may need more care. He was encouraged to utilize his [facility] to discuss CADI waiver and MA options for housing. He was also provided with 2-1-1 and [facility] Line for placement resources. Pt was also encouraged to reach out to his PCP regarding his increase in anxiety; he currently takes an selective serotonin reuptake inhibitor for depression and finds it helpful however he has an increase in anxiety. He was provided with instructions on how to reach out to therapists if he'd like to establish one.    No other SW needs identified at this time.     TY Lopez

## 2022-10-18 NOTE — PROGRESS NOTES
"SPIRITUAL HEALTH SERVICES  SPIRITUAL ASSESSMENT Progress Note  [facility] 7D       REFERRAL SOURCE: Unit  referral for pt request, [facility] specific.     DATA: Pt Vic Scott III identifies as [facility] and is of  descent.     I introduced myself to Fabian as the Lead [facility]  and oriented him to [facility].     Fabian shared that he hopes to transfer to a assisted living facility so that, \"my sister can have a break los she and her daughter are disabled too\".     Fabian aspires that this place be in [place] near his family. He hopes to be able take up drivign lessons to be able to drive with his left leg.     I informed Fabian of the [facility] [place] ([facility]) that is established there. I  also informed him that it is wheelchair accessible and that the Imam there conducts the sermons in English and is of  descent.      Fabian Islamic incantations/prayer at bedside. We prayed together at his request.     PLAN: I will follow up with Fabian for the duration of his stay. [facility] is available to [facility] per request.     Ana Cristina Gastelum  Lead [facility]   Pager [phone]    [facility] remains available 24/7 for emergent requests/referrals, either by having the switchboard page the on-call  or by entering an ASAP/STAT consult in Epic (this will also page the on-call ).    "

## 2022-10-18 NOTE — CARE PLAN
Occupational Therapy: Orders received. Chart reviewed and discussed with care team.? Occupational Therapy not indicated due to pt is near/at recent ADL baseline, uses WC for mobility and home is set up w/ needed AE. No IP OT needs identified.? Defer discharge recommendations to medical team.? Will complete orders.

## 2022-10-18 NOTE — PLAN OF CARE
Physical Therapy: Orders received. Chart reviewed and discussed with care team.? Physical Therapy not indicated due to lack of mobility needs. In conversation with pt, he reports confidence with transfers and equipment.? Defer discharge recommendations to medical team.? Will complete orders.

## 2022-10-19 NOTE — PLAN OF CARE
Goal Outcome Evaluation:  Fabian said his breathing is back to baseline.  Home O2 is at 2L.  C/O pain and obtains relief with Oxycodone 10mg.  Right hip wound CDI and Mepilex on site.  Discharge to home today.  Discharge medications and instructions reviewed with pt - no questions. MD spoke with pt and pt is aware to restart his Pazopanib the day after the antibiotics are completed.  Home Pazopanib sent home with pt.  IVAD heplocked and needle removed.

## 2022-10-19 NOTE — PLAN OF CARE
[id number]  Afrebile, BP max 166/107, recheck /100 Pt is complaining of sob and pain too. MD notified for BP. Did Neb treatment, after the neb, BP drop to 149/96 On 2L nasal canula, oxygen saturation is 98% Pain is 7/10 at abdomen/right hip, given PRN oxycodone x 2.  Pt is Right hindquarter amputation, gets up with 1 assist with steady scale. Using bedside urinal, voiding spontaneously.  Plan to discharge home tomorrow.  Continue to monitor care.

## 2022-10-19 NOTE — PROGRESS NOTES
Hematology / Oncology  Daily Progress Note   Date of Service: 10/19/2022  Patient: Vic Scott III  MRN: [id number]  Admission Date: 10/16/2022  Hospital Day # 3  Cancer Diagnosis: High grade osteosarcoma   Primary Outpatient Oncologist: Dr. 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 He endorsed 2 days of LOPEZ, a productive cough with yellow sputum and was started on empiric PNA treatment. For this osteosarcoma, he had a right hindquarter amputation/hemipelvectomy (6/17/22). CT 8/13/22 showed multiple hypodense masses within the lateral abdominal wall musculature and multiple pulmonary nodules c/w metastatic disease. Recently admission 9/1/22 with left sided malignant pleural effusion and tumor thrombus of right pulmonary vein. He was recently switched to pazopanib 10/16.      #Osteosarcoma with multiple pulmonary nodules   #SOB  #Concern for PNA  #Anemia  Likely multifactorial SOB related to progression of disease in context of enlarging pulmonary lesions on CT C/A/P 10/5/22, Pozatinib can also cause pulmonary symptoms including LOPEZ and increase risk for upper respiratory tract infection. This could also be related to PNA, and reasonable to treat empirically for infection and reassess his respiratory and functional status. His SOB could also be due to acute on chronic symptomatic anemia, especially as his symptoms improved significantly after getting transfused (10/17). Baseline O2 2-4L.   -Started on empiric treatment with azithromycin and CTX for PNA   -Would hold pozopanib while inpatient, plan to start after completion of azithromycin to avoid drug interactions.     Recommendations:   - Agree with empiric treatment for PNA  - resume pazopanib after completion of  azithromycin (in 3 days) to avoid drug interactions, reviewed with Dr. Ambrocio   - Hgb stable s/p 2u on admission. Arranged weekly q Mondays CBC/BMP through his home care agency, and requested poss pRBC if his hgb </=7 at [facility]. Messaged outpatient RNCC to monitor his hgb and notify patient and clinic to cancel appt if hgb is >7. If his hgb is dropping more rapidly, will defer to clinic to request more frequent labs/transfusions.     Plan of care was discussed with attending physician Dr. Witt. Seen by MEHRAN only.   Discussed recommendations with Dr. Elizondo    Thank you for the opportunity to partake in this patients plan of care. Please do not hesitate to page with questions. Will sign off.     Sofi Floyd PA-C  Hematology/Oncology  Pager #[phone] or available on [employer]  (I am available Mon-Fri 7am-3pm, if questions arise between 3pm and 7am please reach out to the on-call Heme/Onc Fellow)   ___________________________________________________________________    Subjective & Interval History:    NEAO. Hgb stable this morning. Reports right pelvic pain is manageable at a 7/10, has oxycodone at home which usually helps.Generally feels better with decreased SOB. We discussed that he should resume pazopanib after he is done taking azithromycin. We also reviewed the plan for outpatient labs and request for poss pRBC    Physical Exam:    Blood pressure (!) 150/94, pulse 109, temperature 97.5  F (36.4  C), temperature source Oral, resp. rate 18, weight 78.9 kg (173 lb 14.4 oz), SpO2 98 %.    General: alert and cooperative, lying in bed, NAD   HEENT: sclera anicteric, MMM  Neck: supple  Resp: normal respiratory effort on 2L nasal cannula  GI: soft, non-tender, non-distended  MSK: left leg intact, right hindquarter amputation. No edema with left lower extremities   Skin: no rashes on limited exam  Neuro: Alert and interactive, moves all extremities equally  Psych: Mood and affect normal     Labs & Studies: I personally  reviewed the following studies:  ROUTINE LABS (Last four results):  CMP  Recent Labs   Lab 10/19/22  0540 10/18/22  0553 10/17/22  0553 10/16/22  1427    141 143 140   POTASSIUM 4.2 3.8 3.8 3.6   CHLORIDE 101 104 107 104   CO2 25 25 26 22   ANIONGAP 11 12 10 14   GLC 86 91 85 106*   BUN 10.2 10.4 8.8 11.1   CR 0.84 0.81 0.75 0.77   GFRESTIMATED >90 >90 >90 >90   VENTURA 8.7 8.8 8.7 8.3*   MAG  --   --  1.7 1.3*   PROTTOTAL  --   --   --  5.9*   ALBUMIN  --   --   --  3.1*   BILITOTAL  --   --   --  0.9   ALKPHOS  --   --   --  103   AST  --   --   --  24   ALT  --   --   --  <5*     CBC  Recent Labs   Lab 10/19/22  0540 10/18/22  1759 10/18/22  0553 10/17/22  1714 10/17/22  0553 10/16/22  1427   WBC 12.8*  --  13.9*  --  13.1* 16.7*   RBC 3.15*  --  3.24*  --  2.53* 2.38*   HGB 8.2* 8.4* 8.4* 8.5* 6.4* 5.9*   HCT 25.7*  --  25.8*  --  20.5* 19.1*   MCV 82  --  80  --  81 80   MCH 26.0*  --  25.9*  --  25.3* 24.8*   MCHC 31.9  --  32.6  --  31.2* 30.9*   RDW 19.9*  --  20.2*  --  21.1* 22.1*     --  251  --  238 273     INR  Recent Labs   Lab 10/18/22  0553 10/16/22  1427   INR 1.42* 1.62*       Medications list for reference:  Current Facility-Administered Medications   Medication     acetaminophen (TYLENOL) tablet 650 mg    Or     acetaminophen (TYLENOL) Suppository 650 mg     apixaban ANTICOAGULANT (ELIQUIS) tablet 5 mg     atorvastatin (LIPITOR) tablet 80 mg     azithromycin (ZITHROMAX) tablet 500 mg     cefdinir (OMNICEF) capsule 300 mg     escitalopram (LEXAPRO) tablet 10 mg     gabapentin (NEURONTIN) capsule 300 mg     heparin 100 UNIT/ML injection 5-10 mL     heparin lock flush 10 UNIT/ML injection 5-10 mL     ipratropium - albuterol 0.5 mg/2.5 mg/3 mL (DUONEB) neb solution 3 mL     lidocaine (LMX4) cream     lidocaine 1 % 0.1-1 mL     melatonin tablet 1 mg     multivitamin, therapeutic (THERA-VIT) tablet 1 tablet     naloxone (NARCAN) injection 0.2 mg    Or     naloxone (NARCAN) injection 0.4 mg     Or     naloxone (NARCAN) injection 0.2 mg    Or     naloxone (NARCAN) injection 0.4 mg     naproxen (NAPROSYN) tablet 500 mg     OLANZapine (zyPREXA) tablet 2.5 mg     ondansetron (ZOFRAN ODT) ODT tab 4 mg    Or     ondansetron (ZOFRAN) injection 4 mg     oxyCODONE (ROXICODONE) tablet 5-10 mg     Patient is already receiving anticoagulation with heparin, enoxaparin (LOVENOX), warfarin (COUMADIN)  or other anticoagulant medication     [Held by provider] pazopanib (VOTRIENT) tablet 800 mg     polyethylene glycol (MIRALAX) Packet 17 g     prochlorperazine (COMPAZINE) injection 10 mg    Or     prochlorperazine (COMPAZINE) tablet 10 mg    Or     prochlorperazine (COMPAZINE) suppository 25 mg     senna-docusate (SENOKOT-S/PERICOLACE) 8.6-50 MG per tablet 1 tablet    Or     senna-docusate (SENOKOT-S/PERICOLACE) 8.6-50 MG per tablet 2 tablet     sodium chloride (PF) 0.9% PF flush 10-20 mL     sodium chloride (PF) 0.9% PF flush 10-20 mL     sodium chloride (PF) 0.9% PF flush 10-20 mL     sodium chloride (PF) 0.9% PF flush 3 mL     sodium chloride (PF) 0.9% PF flush 3 mL

## 2022-10-19 NOTE — PROGRESS NOTES
Care Management Discharge Note    Discharge Date: 10/19/2022  Discharge Disposition: Home  Discharge Services: Mental Health Resources, PCA, Home Care  Discharge DME: Oxygen  Discharge Transportation: health plan transportation  Education Provided on the Discharge Plan:  yes  Persons Notified of Discharge Plans: yes  Patient/Family in Agreement with the Plan: unable to assess    Handoff Referral Completed: Yes    Additional Information:  RNCC was notified by provider that pt is medically ready for discharge today and will need discharge transport set up. RNCC called [facility] and  stated they do not do rides in [place]. RNCC reached out to [facility] and was able to set up a w/c ride with oxygen through [facility] for 1:30pm today. RNCC notfied pt's RN, Bárbara, regarding ride time and pt needing to be at front entrance by 1:30pm.     RNCC was notified by provider that they would like to add weekly blood draws to home care services. RNCC reached out to [facility] to verify services and ability to add on blood draws. RNCC spoke to [facility] with admissions and confirmed pt was open for SN, PT, and OT and they are able to add on weekly blood draws on Mondays so pt can go into [facility] for PRN transfusions on Wednesdays. Home care orders added. No other discharge needs identified.    Bob Beyer   Ph:[phone]   Fax:[phone]    [facility]  Ph: [phone]    Alberto Greenfield RN BSN  7C RN Care Coordinator   Ph: [phone]  Pager: [phone]

## 2022-10-19 NOTE — PLAN OF CARE
Goal Outcome Evaluation:    VSS, on 2L O2. Denies nausea & vomiting. Given PRN Oxy 10 mg x2 and tylenol for abdomen/right hip pain pain. Has been having adequate U.O. using urinal. Continue with plan of care.

## 2022-10-19 NOTE — DISCHARGE SUMMARY
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    Follow-ups Needed After Discharge    - F/u with PCP for post hospital follow up and monitoring of improvement of SOB    Unresulted Labs Ordered in the Past 30 Days of this Admission     Date and Time Order Name Status Description    10/16/2022  1:06 PM Blood Culture Line, venous Preliminary     10/5/2022  3:00 PM PREPARE RED BLOOD CELLS (UNIT) Preliminary     10/5/2022  7:45 AM PREPARE RED BLOOD CELLS (UNIT) Preliminary       These results will be followed up by hospitalist pool    Discharge Disposition   Discharged to home  Condition at discharge: Stable      Hospital Course   Vic Scott III is a 47 year old male with PMH significant for high-grade pleomorphic sarcoma of the right pelvis and femur s/p chemotherapy and right hindquarter amputation/hemipelvectomy (6/17/2022) with recent CT findings (8/13/22) concerning for multiple hypodense masses within the lateral abdominal wall, multiple pulmonary nodules concerning for metastatic disease, 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      Acute on chronic hypoxic respiratory failure  CAP vs pulmonary mets  Leukocytosis  Pt reports 2-day history of increased dyspnea, especially with activity and new cough productive  of yellow sputum. Baseline 2-3L, more recently 3-4 with associated tachycardia. Leukocytosis 16.7, COVID/Influenza/RSV all negative. ECG sinus tachycardia. CXR and CT PE w/ innumerable pulmonary metastases otherwise without acute findings. Improved with CAP treatment and transfusion for anemia.  - Transitioned to cefpodoxime 200mg BID and azithro to complete 5 day course  - Duonebs QID PRN  - Follow blood culture, NGTD     Acute on chronic anemia  Hgb 5.9 on admission; baseline ~ 7.5-8. Last transfused 10/6. LDH elevated. Haptoglobin elevated, less likely for hemolysis. More likely poor bone marrow response. Reticulocytes slightly elevated but not proportional to level of anemia.  Received 2 units PRBC with appropriate response.     Lactic acidosis, resolved  Sepsis secondary to CAP  Lactic acid level 2.3 on admission is likely multifactorial in the setting of active malignancy, acute anemia, though certainly could represent severe sepsis in the setting of CAP. 1L NS given in ED. Awaiting transfusion. Blood cultures drawn and pending. UA neg. CXR w/o obvious infiltrates. 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  Patient Doxil/Ifos initially on 11/2/2021, which was complicated by isofamide neurotoxicity. It resolved without methene blue. His 2nd cycle of chemo was aborted because of this. He experienced a right proximal femur fracture on 06/05/2022 and underwent right hindquarter amputation by Dr. Grove and primary complex wound closure by Dr. Petersen on 06/17/2022. Pt's CT on 08/13/2022 showed new metastasis to abdominal wall and lungs. PICC placed 08/30, started on doxorubicin/cisplatin 9/1/22. Readmitted 9/17 for left hemispheric  stroke 2/2 malignancy and JAZZMINE. His oncologist, Dr. Ambrocio recommended switching to oral pazopanib. He started this treatment with first dose today. He has been seeing palliative care and wound care outpatient.  - Hold PTA pazopanib while on azithromycin - discussed with patient to restart day after antibiotics are completed     Acute L hemispheric ischemic stroke  Presented to ED 9/16/22 d/t inability to move right arm and hand. MRI showed scattered acute left hemispheric hyperintensities. Etiology felt to hypercoagulability of malignancy given recent pulmonary venous thrombosis. Started on Apixaban inpatient.  -Continue apixaban 5 mg BID.      Depression  - Continue PTA Lexapro       Consultations This Hospital Stay   CARE MANAGEMENT / SOCIAL WORK IP CONSULT  OCCUPATIONAL THERAPY ADULT IP CONSULT  PHYSICAL THERAPY ADULT IP CONSULT  WOUND OSTOMY CONTINENCE NURSE  IP CONSULT  PALLIATIVE CARE ADULT IP CONSULT  SPIRITUAL HEALTH SERVICES IP CONSULT  ONCOLOGY ADULT IP CONSULT  NURSING TO CONSULT FOR VASCULAR ACCESS CARE IP CONSULT    Code Status   Prior    Time Spent on this Encounter   I, Dora Elizondo MD, personally saw the patient today and spent greater than 30 minutes discharging this patient.       Dora Elizondo MD  [facility] [address] [place] [id number]  Phone: [phone]  ______________________________________________________________________    Physical Exam   Weight: 173 lbs 14.4 oz   BP (!) 150/94 (BP Location: Right arm)   Pulse 109   Temp 97.5  F (36.4  C) (Oral)   Resp 18   Wt 78.9 kg (173 lb 14.4 oz)   SpO2 98%   BMI 22.33 kg/m        Gen: NAD, alert, pleasant, cooperative, non-toxic  HEENT: EOMI, no conjunctival icterus, tracking appropriately  Resp: mild tachypnea although much improved, no crackles or wheezing appreciated, on O2  Cardiac: borderline tachycardic on exam, no S3/S4, no M/R/G appreciated  GI: soft, non-tender, non-distended  Ext: WWP, no  edema, spontaneous movement in all 4  Neuro: AOx3, CN 2-12 grossly intact, appropriate mentation         Primary Care Physician   Rajesh Vidales    Discharge Orders      Basic metabolic panel    Home RN to draw CBC and CMP once weekly ideally Mondays or Tuesdays starting 10/24     Home Care Referral      Primary Care - Care Coordination Referral      Reason for your hospital stay    Dear Vic Scott III    You were hospitalized at [facility] with shortness of breath. Your treatment consisted of treating you for pneumonia with antibiotics.  Over your hospitalization your breathing and oxygen needs has improved and today you are ready to be discharged home.  If you continue antibiotics and working with your oncologist regarding cancer treatment you should continue to improve but if you develop fever, shortness of breath, light headedness, chest pain please seek medical attention.    It was a pleasure meeting with you today. Thank you for allowing me and my team the privilege of caring for you today. You are the reason we are here, and I truly hope we provided you with the excellent service you deserve. Please let us know if there is anything else we can do for you so that we can be sure you are leaving completely satisfied with your care experience.    Your hospital unit at the time of discharge is 7D so if you have any questions please call the hospital at [phone] and ask to talk to a nurse on 7D.    Take care!    Dora Elizondo MD  Internal Medicine Hospitalist  [facility]     Activity    Your activity upon discharge: activity as tolerated     Discharge Instructions    We are suggesting the following medication changes:   - START azithromycin and cefdinir (antibiotics) for your pneumonia, take these through 10/22   - HOLD your pazopanib until you talk to your oncologist about restarting    Please get the following tests done:   - None    Please set up an appointment  with:   - Your oncology team for hospital follow up and discussion of restarting your chemotherapy     Resume Home Care Services    Patient to resume PT and OT services with [facility].     Diet    Follow this diet upon discharge: Orders Placed This Encounter      Combination Diet Regular Diet Adult     Check Out Appointment Request    [facility]- patient is going to have labs drawn by home RN on Mondays or Tuesdays starting 10/24. Please arrange possible pRBC's on Tuesdays or Wed Thursdays (starting 10/25) for 4 weeks, which the RN will cancel if his hgb is >7.     CBC with Platelets & Differential    Home RN to draw CBC and CMP once weekly ideally Mondays or Tuesdays starting 10/24       Significant Results and Procedures   Results for orders placed or performed during the hospital encounter of 10/16/22   XR Chest Port 1 View    Narrative    XR CHEST PORT 1 VIEW  10/16/2022 1:13 PM      HISTORY: cough and sob    COMPARISON: CT CAP 10/5/2022    FINDINGS: Upright chest radiograph. Chest port tip projects over the  right atrium. Cardiac silhouette is obscured. Numerable pulmonary  nodules better seen on prior CT CAP. No pneumothorax. Bilateral  pleural effusions with overlying atelectasis. Bones and upper abdomen  are unremarkable.      Impression    IMPRESSION:  1. Innumerable pulmonary nodules, better seen on prior CT CAP.  2. Bilateral pleural effusions with overlying atelectasis, stable.    I have personally reviewed the examination and initial interpretation  and I agree with the findings.    BETTY FARR MD         SYSTEM ID:  [id number]   CT Chest Pulmonary Embolism w Contrast    Narrative    EXAMINATION: CTA pulmonary angiogram, 10/16/2022 5:28 PM     COMPARISON: Chest radiograph same day, CT CAP 10/5/2020    HISTORY: Concern for PE    TECHNIQUE: Volumetric helical acquisition of CT images of the chest  from the lung apices to the kidneys were acquired after the  administration of 63 mL of  Isovue-370 IV contrast. Non-Flash technique  with shallow inspiratory breath hold technique.  Post-processed  multiplanar and/or MIP reformations were obtained, archived to PACS  and used in interpretation of this study.     FINDINGS:  Contrast bolus is: excellent.  Exam is negative for acute  pulmonary embolism.       Normal heart size, no pericardial effusion. Normal caliber thoracic  aorta) trunk. Normal esophagus. No concerning for cervical or thoracic  lymphadenopathy.    Central tracheobronchial tree is patent. Innumerable pulmonary nodules  throughout the lungs, the largest in the left upper lobe measures 5.2  x 5.2 cm. No pneumothorax or pleural effusion. Platelike atelectasis  of the lung bases.    Evaluation of the upper abdomen is limited due to contrast timing.  Small fluid about the spleen.    BONES: No acute or suspicious osseous abnormality.      Impression    IMPRESSION:   1. Exam is negative for acute pulmonary embolism.   2. 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      In the event of a positive result for acute pulmonary embolism  resulting in right heart strain, please activate the PERT  Multidisciplinary group for consultation by paging [phone] ([phone]).     PERT -- Pulmonary Embolism Response Team (Multidisciplinary team  including cardiology, interventional radiology, critical care,  hematology)       I have personally reviewed the examination and initial interpretation  and I agree with the findings.    GODWIN ENRIQUEZ MD         SYSTEM ID:  [id number]   Echo Limited     Value    LVEF  60-65%    Narrative    [id number]  [id number]  [id number]  [id number]^O'MICKI^     [facility],[place]  Echocardiography Laboratory  [address] [place] [id number]     Name: MARIELA MICHAEL III  MRN: [id number]  : 1975  Study Date: 10/17/2022 08:21 AM  Age: 47 yrs  Gender: Male  Patient [facility]: [facility]  Reason For Study: CHF  Ordering  Physician: LUKE BLEVINS  Performed By: Ming Spring RDCS     BSA: 2.1 m2  Height: 74 in  Weight: 184 lb  HR: 109  BP: 41/96 mmHg  ______________________________________________________________________________  Procedure  Limited Portable Echo Adult.  ______________________________________________________________________________  Interpretation Summary  Global and regional left ventricular function is normal with an EF of 60-65%.  Right ventricular function, chamber size, wall motion, and thickness are  normal.  Pulmonary artery systolic pressure is normal.  The inferior vena cava is normal.  No pericardial effusion is present.  ______________________________________________________________________________  Left Ventricle  Global and regional left ventricular function is normal with an EF of 60-65%.  Abnormal non-specific septal motion is present.     Right Ventricle  Right ventricular function, chamber size, wall motion, and thickness are  normal.     Tricuspid Valve  Trace tricuspid insufficiency is present. The right ventricular systolic  pressure is approximated at 25.2 mmHg plus the right atrial pressure.  Pulmonary artery systolic pressure is normal.     Vessels  The inferior vena cava is normal.     Pericardium  No pericardial effusion is present.  ______________________________________________________________________________  MMode/2D Measurements & Calculations  IVSd: 0.79 cm  LVIDd: 3.6 cm  LVIDs: 3.0 cm  LVPWd: 0.97 cm  FS: 16.5 %  LV mass(C)d: 91.4 grams  LV mass(C)dI: 43.6 grams/m2  Ao root diam: 3.4 cm  LVOT diam: 2.0 cm  LVOT area: 3.1 cm2  RWT: 0.54     Doppler Measurements & Calculations  TR max dimitry: 251.0 cm/sec  TR max P.2 mmHg     ______________________________________________________________________________  Report approved by: Mary Kate Marshall 10/17/2022 08:49 AM               Discharge Medications   Discharge Medication List as of 10/19/2022 12:22 PM      START taking these medications     Details   azithromycin (ZITHROMAX) 500 MG tablet Take 1 tablet (500 mg) by mouth daily for 3 days, Disp-3 tablet, R-0, E-Prescribe      cefdinir (OMNICEF) 300 MG capsule Take 1 capsule (300 mg) by mouth 2 times daily for 4 days, Disp-8 capsule, R-0, E-Prescribe         CONTINUE these medications which have CHANGED    Details   gabapentin (NEURONTIN) 300 MG capsule Take 1 capsule (300 mg) by mouth 3 times daily, Disp-90 capsule, R-0, E-Prescribe      ipratropium-albuterol (COMBIVENT RESPIMAT)  MCG/ACT inhaler Inhale 1 puff into the lungs 3 times daily as needed for shortness of breath / dyspnea or wheezing, Disp-4 g, R-0, E-Prescribe         CONTINUE these medications which have NOT CHANGED    Details   acetaminophen (TYLENOL) 325 MG tablet Take 3 tablets (975 mg) by mouth every 8 hours, Disp-270 tablet, R-1, Local Print      apixaban ANTICOAGULANT (ELIQUIS) 5 MG tablet Take 1 tablet (5 mg) by mouth 2 times daily, Disp-60 tablet, R-3, E-Prescribe      atorvastatin (LIPITOR) 80 MG tablet Take 1 tablet (80 mg) by mouth every evening, Disp-90 tablet, R-3, E-Prescribe      escitalopram (LEXAPRO) 10 MG tablet Take 1 tablet (10 mg) by mouth daily, Disp-30 tablet, R-1, Local Print      magnesium oxide (MAG-OX) 400 MG tablet Take 1 tablet (400 mg) by mouth 2 times daily, Disp-30 tablet, R-1, E-Prescribe      multivitamin, therapeutic (THERA-VIT) TABS tablet Take 1 tablet by mouth daily, Disp-30 tablet, R-1, E-Prescribe      naproxen (NAPROSYN) 500 MG tablet Take 500 mg by mouth 2 times daily (with meals), Historical      OLANZapine (ZYPREXA) 2.5 MG tablet Take 1 tablet (2.5 mg) by mouth At Bedtime, Disp-30 tablet, R-0, E-Prescribe      ondansetron (ZOFRAN ODT) 4 MG ODT tab Take 1-2 tablets (4-8 mg) by mouth every 8 hours as needed for nausea or vomiting, Disp-30 tablet, R-0, E-Prescribe      oxyCODONE IR (ROXICODONE) 10 MG tablet Take 1 tablet (10 mg) by mouth every 4 hours as needed for moderate to severe pain,  Disp-40 tablet, R-0, TREMAYNE, E-Prescribe      pazopanib (VOTRIENT) 200 MG tablet Take 4 tablets (800 mg) by mouth daily Take on an empty stomach 1 hour before or 2 hours after a meal., Disp-120 tablet, R-0, E-Prescribe      polyethylene glycol (MIRALAX) 17 GM/Dose powder Take 17 g by mouth daily Hold for loose stools, Disp-510 g, R-0, E-Prescribe      Nutritional Supplements (ENSURE ACTIVE HIGH PROTEIN) LIQD Take 1 Can by mouth 3 times daily (with meals), Disp-7110 mL, R-1, E-Prescribe         STOP taking these medications       potassium chloride ER (KLOR-CON M) 20 MEQ CR tablet Comments:   Reason for Stopping:         prochlorperazine (COMPAZINE) 10 MG tablet Comments:   Reason for Stopping:             Allergies   Allergies   Allergen Reactions     Blood Transfusion Related (Informational Only) Other (See Comments)     Patient has a history of a clinically significant antibody against RBC antigens.  A delay in compatible RBCs may occur.

## 2022-10-20 NOTE — PROGRESS NOTES
Clinic Care Coordination Contact  [facility]: Post-Discharge Note  SITUATION                                                      Admission:    Admission Date: 10/16/22   Reason for Admission: SOB (shortness of breath)  Acute cough  Anemia, unspecified type  Osteosarcoma of bone (H)  Amputee, hip, right  Pleural effusion  Sinus tachycardia  Discharge:   Discharge Date: 10/19/22  Discharge Diagnosis: Amputee, hip, right    Osteosarcoma of bone (H)    Sinus tachycardia    SOB (shortness of breath)    Pleural effusion    Anemia, unspecified type    Acute cough    BACKGROUND                                                      Per hospital discharge summary and inpatient provider notes:    Vic Scott III is a 47 year old male who has history of sarcoma with right leg amputation in June 2022 currently receiving ongoing chemo for this presented the ER with 3 days of increasing shortness of breath and cough.  Some productive sputum no hemoptysis.  Patient history of transfusion few weeks ago/history of ongoing microcytic anemia with chemo also.  Patient hospitalized a month ago with right-sided upper extremity stroke.  No significant chest pain reported with this.  Patient appetite generally stable.  Patient typically is on a couple liters nasal cannulae.  Patient is on Eliquis as had history of blood clot in left lung in the past before has been compliant with this also.  Denies any recent COVID exposures otherwise.  No other increasing leg swelling of the left etc.  No fevers reported now presents here for evaluation.  Currently patient feels fairly well on 2 L O2 vitally stable otherwise mild tachycardia.  Some subjective fever noted.  Not documented.  Patient notes some slight increasing weakness also.     As noted patient admitted to hospital go for acute right hand weakness with diagnosis of acute ischemic stroke of left hemisphere due to hypercoagulability of malignancy.        ASSESSMENT        "    Discharge Assessment  How are you doing now that you are home?: Patient states he is \"alright\". A little stuffy nose, new.  Denies any other new or worsening symptoms.  How are your symptoms? (Red Flag symptoms escalate to triage hotline per guidelines): Improved  Do you feel your condition is stable enough to be safe at home until your provider visit?: Yes  Does the patient have their discharge instructions? : Yes  Does the patient have questions regarding their discharge instructions? : No  Were you started on any new medications or were there changes to any of your previous medications? : Yes  Does the patient have all of their medications?: No (see comment) (States he is picking them up today.)  Do you have questions regarding any of your medications? : No  Do you have all of your needed medical supplies or equipment (DME)?  (i.e. oxygen tank, CPAP, cane, etc.):  (N/A)  Discharge follow-up appointment scheduled within 14 calendar days? : Yes  Discharge Follow Up Appointment Date: 10/24/22  Discharge Follow Up Appointment Scheduled with?: Primary Care Provider         Post-op (Clinicians Only)  Did the patient have surgery or a procedure: No        PLAN                                                      Outpatient Plan:  Please set up an appointment with:  - Your oncology team for hospital follow up and discussion of restarting your chemotherapy    Future Appointments   Date Time Provider Department Center   10/21/2022 12:30 PM Sharon Darling PA-C [facility]   10/24/2022  3:00 PM Rajesh Vidales APRN [facility]   10/25/2022  8:15 AM [facility] INFUSION INFUSION LAB [facility]   10/25/2022  8:45 AM WW INFUSION CHAIR 1 [facility]   10/26/2022  2:15 PM Latosha Taylor CNP [facility]   11/1/2022  8:30 AM [facility] INFUSION INFUSION LAB [facility]   11/1/2022  9:30 AM WW INFUSION CHAIR 6 [facility]   11/4/2022  2:00 PM Leslie Ortiz MD [facility]   11/8/2022  4:20 PM David Valentino " MD [facility]   11/9/2022  8:30 AM [facility] INFUSION INFUSION LAB [facility]   11/9/2022  9:30 AM [facility] INFUSION CHAIR 5 [facility]   11/16/2022  8:00 AM [facility] INFUSION INFUSION LAB [facility]   11/16/2022  8:30 AM [facility] INFUSION CHAIR 7 [facility]   11/29/2022 10:30 AM Shirley Roth, OT [facility] [place]   2/27/2023  8:00 AM Jey Solano MD [facility] [place]         For any urgent concerns, please contact our 24 hour nurse triage line: [phone] ([phone])         Christina Hollins RN

## 2022-10-20 NOTE — CONFIDENTIAL NOTE
Telephone encounter- Latosha, PT called clinic to report high HR (140) and /96. Per Latosha, patient's eyes/oral mucosa not dry. She reports that the patient is afebrile (98.2F) with no CP, SOB or any other symptoms.    Advised to increase hydration and to call back clinic in 1 hour after rechecking vitals  (Pt had recent limited ECHO with normal EF.) Patient will need urgent/emergent evaluation if his tachycardia persists or he develops any associated symptoms.    All questions/concerns addressed. Latosha stated understanding/agreement to plan of care.    CHET Greenwood, CNP  [facility] [employer]

## 2022-10-21 NOTE — TELEPHONE ENCOUNTER
Patient stated that he is feeling fine. He feels better than yesterday. He has been pushing fluids and is going to continue doing so. He has a nurse coming in at 3pm to check on him.      He denies headache, shortness of breath, and chest pain.     Pulse is at 125, O2 is 95, bp 158/91    I told him that if anything changes or he gets shortness of breath or chest pain before the nurse comes that he should call someone to help.     Meenu BUNN, EMT 10:52 AM 10/21/2022

## 2022-10-23 NOTE — TELEPHONE ENCOUNTER
Patient had antibiotics for pneumonia.    He took his last antibiotic today and he was suppose to wait until he was done with his antibiotic.    He took his last antibiotic this morning.    Per after visit summary patient is to follow up with Oncology before he restarts his Chemo.    Patient notified.  Alondra Coe RN on 10/22/2022 at 7:32 PM    Additional Information    Negative: [1] Caller is not with the adult (patient) AND [2] reporting urgent symptoms    Negative: Lab result questions    Negative: Medication questions    Negative: Caller can't be reached by phone    Negative: Caller has already spoken to PCP or another triager    Negative: RN needs further essential information from caller in order to complete triage    Negative: Requesting regular office appointment    Negative: [1] Caller requesting NON-URGENT health information AND [2] PCP's office is the best resource    Negative: Health Information question, no triage required and triager able to answer question    General information question, no triage required and triager able to answer question    Protocols used: INFORMATION ONLY CALL-A-

## 2022-10-24 PROBLEM — D72.829 LEUKOCYTOSIS, UNSPECIFIED TYPE: Status: ACTIVE | Noted: 2022-01-01

## 2022-10-24 PROBLEM — J18.9 PNEUMONIA OF BOTH LUNGS DUE TO INFECTIOUS ORGANISM, UNSPECIFIED PART OF LUNG: Status: ACTIVE | Noted: 2022-01-01

## 2022-10-24 NOTE — TELEPHONE ENCOUNTER
[facility] called to let us know they are switching DME suppliers for the patient as [facility] will charge out of pocket for the patient. She said they should be sending over a form. I let her know that we would watch for it.     Bell Miek, [facility]

## 2022-10-24 NOTE — TELEPHONE ENCOUNTER
I called Fabian to see where he was for his appointment. He stated that he forgot about the appointment with Kole today. I advised him to go to the ER or see someone today. Rajesh Vidales CNP joined the call to further explain concerns. He advised going to the ER due to abnormal labs that were going to be discussed in the appointment today. Fabian denied chest pain or fatigue. He took his pulse and it was 125. He was informed of low hemoglobin levels and high white blood cell count. Kole advised him to go to the ER or Urgent care to be evaluated today due to those circumstanced and his previous health history. Fabian stated that he understood.     Meenu BUNN, EMT 3:28 PM 10/24/2022

## 2022-10-24 NOTE — TELEPHONE ENCOUNTER
Joined call with pt and Meenu Duckworth, EMT. Explained concern given his tachycardia, Hgb 7.2 and leukocytosis (WBC 16.3.) Patient instructed to go to Urgent Care or ER today.     All questions/concerns addressed. Patient stated understanding/agreement to plan of care.    CHET Greenwood, CNP  [facility] [employer]

## 2022-10-24 NOTE — TELEPHONE ENCOUNTER
RN at Home care called to let provider know about mutual patient vital signs this morning, which is heart rate of 132 and blood pressure of 160/100.

## 2022-10-25 NOTE — PLAN OF CARE
Goal Outcome Evaluation:  Afebrile 's,/106,RR 24,sats on 2L 99% ,A&Ox4 ,c/o right side abdominal and hip pain 7/10 ,oxycodone 10 mg given two times, denied nausea,had adequate urinary output and about 3-4 x watery stools. Zosyn and Vanco administered per order.Pt transferred to 7 D report given to receiving nurse.Pt's personal w/c and belongings sent with pt.

## 2022-10-25 NOTE — CONSULTS
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 Admitted for acute on chronic anemia and leukocytosis concerning for infection.     Recommendations:   - Agree with broad spectrum antibiotics and infectious work-up  - Discussed with oncology pharmacist and patient will need to provide his own supply of Pazopanib. Ok to hold until home supply is brought to the hospital.  - Canceled outpatient follow up tomorrow at patient is admitted      # Metastatic Osteosarcoma   In brief, he initially presented to Dr. Whitaker with rapidly progressing right leg swelling since July 2021. MRI showed a 29 x 20 cm right thigh soft tissue mass with probable bony involvement of the proximal right femur. Biopsy 9/24/21 c/w high grade pleomorphic sarcoma. Neoadjuvant chemotherapy was recommended as PET was concerning for inguinal lymphadenopathy. He initiated Doxil/Ifos(J1D8=96/2/21) c/b confusion concerning for ifosfamide neurotoxicity for which he received Methylene Blue. C2 c/b severe neurotoxicity and again improved with Methylene blue. Remainder of cycle 2 was not completed. PET 1/11/22 decrease size of right thigh hypermetabolic soft tissue mass. Due to neurotoxicity patient was hesitant to receive for chemotherapy. He was admitted in June 2022 with right leg pain and inability to ambulate. He was found to have a right proximal femur fracture and underwent right hindquarter  amputation with Dr. Whitaker and primary complex wound closure with Dr. Butt (6/17/22). Surgical pathology positive for high-grade osteosarcoma. CT AP 8/13/22 for surveillance with multiple hypodense masses within the lateral abdominal wall and multiple pulmonary nodules c/w metastatic disease. He started Doxorubicin/Cisplatin 9/1/22. He was then readmitted 9/17/22 for right side weakness and MRI demonstrated left hemispheric stroke. Due to concern for intolerance of further platinum/anthracycline therapy he was switched to Pazopanib started on 10/16/22.   - Patient will need to provide his own supply of Pazopanib to take while in the hospital. Ok to hold until this is brought in and then assess continuation.        # Acute on Chronic Anemia   On admission noted to have Hgb 6.9.   - Transfuse to keep Hgb >7     # Concern for infection   # Leukocytosis   # Tachycardia   Patient was directed to the ED for lab abnormalities (Hgb 7.2, and WBC 16.3).  Concerning for infection. Infectious work up notable for CXR with mixed opacities suspicious for pneumonia. 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 Thrombus extends from a pulmonary mass most likely representing tumor thrombus. AC was deferred as it was thought to be tumor thrombus. He then presented with right sided weakness and MRI 9/16/22 showed left hemisphere hyperintensities consistent with acute strokes at that time w/ etiology felt to be hypercoagulability in setting of malignancy given recent PE.  - PTA Eliquis held in the setting of acute Hgb drop      Patient was seen and plan of care was discussed with attending physician Dr. Witt      Thank you for the opportunity to partake in this patients plan of care. Please do not hesitate to page with questions. We will continue to follow.  "    I spent >40  minutes face-to-face or coordinating care of Vic Scott III. Over 50% of our time on the unit was spent counseling the patient and/or coordinating care.     Dione Amaro PA-C ([facility])   Hematology/Oncology  Pager #[phone]    ___________________________________________________________________     Subjective & Interval History:    Nursing notes reviewed. Fabian is overall feeling well. Reports no new focal infectious symptoms. He has been overall tolerating Pazopanib well with some mild diarrhea as the side effect. Reviewed concern for possible infection prompting his admission. He voiced understanding and is hoping to \"get this figured out\". He seems tired, he reports not sleeping well in the ED. He did not bring his home supply of Pazopanib reviewed that it is ok to hold until it is brought in. All questions answered at this time.     Physical Exam:    Blood pressure (!) 133/101, pulse (!) 123, temperature 98  F (36.7  C), temperature source Oral, resp. rate 27, weight 78.5 kg (173 lb), SpO2 99 %.  General: alert and cooperative, lying in bed, no acute distress  HEENT: sclera anicteric, EOMI, MMM  Neck: supple, normal ROM  CV: Tachycardic, no murmurs  Resp: Crackles on anterior chest fields, normal respiratory effort on 2L NC   GI: soft, non-tender, non-distended, bowel sounds present and normoactive  MSK: RLE amputation noted. warm and well-perfused, normal tone  Skin: no rashes on limited exam, no jaundice  Neuro: Alert and interactive, moves all extremities equally, no focal deficits    Past Medical History:  Past Medical History:   Diagnosis Date     Pleomorphic cell sarcoma (H)        Past Surgical History:  Past Surgical History:   Procedure Laterality Date     ANESTHESIA OUT OF OR BLOOD BRAIN BARRIER DISRUPTION N/A 06/16/2022    Procedure: ANESTHESIA OUT OF OR - Block Epidural;  Surgeon: GENERIC ANESTHESIA PROVIDER;  Location: [facility]     COMPLEX WOUND CLOSURE (UPDATE) Right 06/17/2022    " Procedure: complex closure pelvis, spy;  Surgeon: KAREN Butt MD;  Location: [facility] OR     COMPLEX WOUND CLOSURE (UPDATE)  06/17/2022    Procedure: ;  Surgeon: KAREN Butt MD;  Location: [facility] OR     EXCISE TUMOR PELVIS POSTERIOR Right 06/17/2022    Procedure: Right hindquarter amputation;  Surgeon: Matty Whitaker MD;  Location: [facility] OR     INSERT PORT VASCULAR ACCESS Right 11/01/2021    Procedure: INSERTION, VASCULAR ACCESS PORT;  Surgeon: Sushil Gonzales MD;  Location: [facility] OR     IR CHEST PORT PLACEMENT > 5 YRS OF AGE  11/01/2021     IR CHEST TUBE PLACEMENT NON-TUNNELLED LEFT  9/6/2022     PICC DOUBLE LUMEN PLACEMENT Left 08/30/2022    left medial brachial 5 fr dl power picc 50 cm       Social History:  Social History     Socioeconomic History     Marital status:    Tobacco Use     Smoking status: Never     Smokeless tobacco: Never   Vaping Use     Vaping Use: Never used   Substance and Sexual Activity     Alcohol use: Never     Drug use: Never     Sexual activity: Not Currently     Partners: Female     Birth control/protection: Condom        Family History  Family History   Problem Relation Age of Onset     Heart Disease Mother      Coronary Artery Disease Father      Other - See Comments Father         pleomorphic cell sarcoma     Cancer Maternal Grandmother      No Known Problems Sister      No Known Problems Sister      No Known Problems Son      No Known Problems Daughter      No Known Problems Daughter      No Known Problems Daughter        Outpatient Medications:  No current facility-administered medications on file prior to encounter.  acetaminophen (TYLENOL) 325 MG tablet, Take 3 tablets (975 mg) by mouth every 8 hours  apixaban ANTICOAGULANT (ELIQUIS) 5 MG tablet, Take 1 tablet (5 mg) by mouth 2 times daily  atorvastatin (LIPITOR) 80 MG tablet, Take 1 tablet (80 mg) by mouth every evening  escitalopram (LEXAPRO) 10 MG tablet, Take 1 tablet (10 mg) by mouth  daily  gabapentin (NEURONTIN) 300 MG capsule, Take 1 capsule (300 mg) by mouth 3 times daily  ipratropium-albuterol (COMBIVENT RESPIMAT)  MCG/ACT inhaler, Inhale 1 puff into the lungs 3 times daily as needed for shortness of breath / dyspnea or wheezing  multivitamin, therapeutic (THERA-VIT) TABS tablet, Take 1 tablet by mouth daily  naproxen (NAPROSYN) 500 MG tablet, Take 500 mg by mouth 2 times daily (with meals)  Nutritional Supplements (ENSURE ACTIVE HIGH PROTEIN) LIQD, Take 1 Can by mouth 3 times daily (with meals)  OLANZapine (ZYPREXA) 2.5 MG tablet, Take 1 tablet (2.5 mg) by mouth At Bedtime  ondansetron (ZOFRAN ODT) 4 MG ODT tab, Take 1-2 tablets (4-8 mg) by mouth every 8 hours as needed for nausea or vomiting  oxyCODONE IR (ROXICODONE) 10 MG tablet, Take 1 tablet (10 mg) by mouth every 4 hours as needed for moderate to severe pain  [START ON 11/3/2022] pazopanib (VOTRIENT) 200 MG tablet, Take 4 tablets (800 mg) by mouth daily Take on an empty stomach 1 hour before or 2 hours after a meal.  magnesium oxide (MAG-OX) 400 MG tablet, Take 1 tablet (400 mg) by mouth 2 times daily (Patient not taking: Reported on 10/25/2022)  polyethylene glycol (MIRALAX) 17 GM/Dose powder, Take 17 g by mouth daily Hold for loose stools (Patient not taking: Reported on 10/25/2022)  [DISCONTINUED] albuterol (PROAIR HFA/PROVENTIL HFA/VENTOLIN HFA) 108 (90 Base) MCG/ACT inhaler, Inhale 1-2 puffs into the lungs every 6 hours as needed for shortness of breath / dyspnea  [DISCONTINUED] QUEtiapine (SEROQUEL) 25 MG tablet, Take 1 tablet (25 mg) by mouth At Bedtime           Labs & Studies: I personally reviewed the following studies:  ROUTINE LABS (Last four results):  CMP  Recent Labs   Lab 10/24/22  2037 10/19/22  0540    137   POTASSIUM 4.1 4.2   CHLORIDE 104 101   CO2 24 25   ANIONGAP 13 11   * 86   BUN 12.3 10.2   CR 0.67 0.84   GFRESTIMATED >90 >90   VENTURA 9.0 8.7   PROTTOTAL 7.0  --    ALBUMIN 3.3*  --    BILITOTAL  1.2  --    ALKPHOS 149*  --    AST 29  --    ALT 9*  --      CBC  Recent Labs   Lab 10/24/22  2037 10/24/22  1030 10/19/22  0540 10/18/22  1759   WBC 17.3* 16.3* 12.8*  --    RBC 2.66* 2.70* 3.15*  --    HGB 6.9* 7.2* 8.2* 8.4*   HCT 22.2* 21.8* 25.7*  --    MCV 84 81 82  --    MCH 25.9* 26.7 26.0*  --    MCHC 31.1* 33.0 31.9  --    RDW 20.0* 19.9* 19.9*  --     252 213  --      INRNo lab results found in last 7 days.    IMAGING:

## 2022-10-25 NOTE — H&P
[facility]    History and Physical - Hospitalist Service, GOLD TEAM        Date of Admission:  10/24/2022    Assessment & Plan      Vic Scott III is a 47 year old male admitted on 10/24/2022. 47 y.o. 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       # Sepsis 2/2 to possible enteric source, vs chronic wounds vs pulmonary process in setting of lung metastasis   # Tachycardia, Leukocytosis w/ neutrophilic predominance   # Mild lactic acidosis  # Diarrhea   # Elevated inflammatory markers    Meets sepsis criteria with the above. WBC of 17.3 from recen mid-teens baseline, ESR 88, ,Procalcitonin 0.26, Lactic acid of 2.1  History and findings in the ED reveals diarrhea, watery, U/A w/ without signs of infection, non-bloody. CXR    - enteric panel, c diff toxin   - f/u blood cultures   - trend cbc w/ diff   - continue vanc and piperacillin tazobactam as before.   - f/u MRSA nares.       # Acute on chronic anemia      Hgb 7.2 on presentation. Required transfusions at resent o   - Trend CBC and transfuse for Hgb <7     # Chronic hypoxic respiratory failure on 2L of home O2 at baseline   # Lung metastasis   # Increased mixed bilateral opacities on CXR, c/f for infection vs worsening lung mets.      Recently discharged on 10/19 to complete a total of 5 day course w/ Cefpodoxime and azithromycin for CAP. Subjective improvement, though CXR with evidence of worsening lung opacities w/ concern for infection. Low threshold to cover for  atypical pneumonia. Will hold off now given baseline O2 needs and absence of respiratory symptoms.      - duonebs QID PRN     - follow up blood cultures     - abx as above. (Vanc+ Pip+Tazo)    - consider further imaging as indicated.       # Metastatic High grade pleomorphic sarcoma of the Right pelvis and femur   # S/p chemotherapy and right hindquarter amputation/hemipelvectomy (6/17/2022)   # Metastasis to lungs and abdominal wall.   Doxil/Ifos initially on 11/2/2021, which was complicated by isofamide neurotoxicity. It resolved without methene blue. His 2nd cycle of chemo was aborted because of this.Right proximal femur fracture on 06/05/2022 and underwent right hindquarter amputation by Dr. Grove and primary complex wound closure by Dr. Petersen on 06/17/2022. Pt's CT on 08/13/2022 showed new metastasis to abdominal wall and lungs. PICC placed 08/30, started on doxorubicin/cisplatin 9/1/22. Readmitted 9/17 for left hemispheric stroke 2/2 malignancy and JAZZMINE. Switched  to oral pazopanib which was also held while on azithromycin at the above referenced recent hospitalization for CAP treatment.       - consider oncology consult in the AM    - continue pazopanib daily as currently, appreciate oncology weigh in on continuation of this.      #Hypoalbuminemia   # Elevated Alkaline phosphatase, chronic     - monitor       # Hx of Pulmonary Embolism   #Hx of Left hemispheric ischemic stroke    MRI of 9/16/22 showed left hemisphere hyperintensities consistent with acute strokes at that time w/ etiology felt to be hypercoagulability in setting of malignancy given recent PE, was started on apixaban inpatient during that hospitalization     - hold pta apixaban in lieu of acute anemia, may restart if low concern for active bleeding in the AM tomorrow.         Diet:  Regular diet + pta Ensure.   DVT Prophylaxis: DOAC  Sung Catheter: Not present  Fluids: none .   Central Lines: PRESENT     Cardiac Monitoring: None  Code  Status: Full Code      Clinically Significant Risk Factors Present on Admission              # Hypoalbuminemia: Lowest albumin = 3.3 g/dL (Ref range: 3.5-5.2) at 10/24/2022  8:37 PM, will monitor as appropriate  # Drug Induced Coagulation Defect: home medication list includes an anticoagulant medication                Disposition Plan      Expected Discharge Date: 10/26/2022                To be staffed in the AM.    Jolie Espinal MD  Hospitalist Service, [facility]  Securely message with the Vocera Web Console (learn more here)  Text page via [facility] Paging/Directory   Please see signed in provider for up to date coverage information    ______________________________________________________________________    Chief Complaint   My labs weren't normal    History is obtained from the patient    History of Present Illness   Vic Scott is a 47 y.o. 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    According to patient he has no symptoms except the fact that he is having some loose stools, which started earlier on the day of admission. At the time of my interview patient had had 4 loose BMs two of which occurred in the ED. Patient reports feeling better since his recent hospitalization, has completed the 5 day course of treatment for CAP. He denies any fevers, chills, nausea, vomiting, abdominal pain, dizziness, hematemesis, hemoptysis melena, hematochezia, worsening shortness of breath or orthopnea.       In the ED:   Vitals: afebrile, tachycardic (125-135), hypertensive 151/100,  saturating adequately on 2L of NC>   Labs: CMP notable for albumin 3.3, alk phos of 149 (chronically elevated), Cr at baseline of 0.67, Calcium of 9.0, AG of 13, CO2 of 24, Na 141, K+ 4.1,   ESR 88, , procalcitonin 0.26 . WBC of 17.3 with absolute neutrophililia of 14 , Hgb of 6.9 (baseline 7-8, though early 10/2022 was approx 6-7.   U/A was negative but for trace blood and albuminuria. A set of blood cultures pending, SARS/RSV/FluA/B negative,   Imaging CXR showed right pleural effusion, and prior known lung masses better characterized by previous      ED interventions: Patient received a bolus of NS 1L, got a dose of IV vancomycin 2g, and IV piperacillin-tazobactam 4.5g, 1 unit of blood and admitted to medicine for further management.     Review of Systems    Unless as indicated above, all systems reviewed were negative     Past Medical History    I have reviewed this patient's medical history and updated it with pertinent information if needed.   Past Medical History:   Diagnosis Date     Pleomorphic cell sarcoma (H)        Past Surgical History   I have reviewed this patient's surgical history and updated it with pertinent information if needed.  Past Surgical History:   Procedure Laterality Date     ANESTHESIA OUT OF OR BLOOD BRAIN BARRIER DISRUPTION N/A 06/16/2022    Procedure: ANESTHESIA OUT OF OR - Block Epidural;  Surgeon: GENERIC ANESTHESIA PROVIDER;  Location: [facility]     COMPLEX WOUND CLOSURE (UPDATE) Right 06/17/2022    Procedure: complex closure pelvis, spy;  Surgeon: KAREN Butt MD;  Location: [facility] OR     COMPLEX WOUND CLOSURE (UPDATE)  06/17/2022    Procedure: ;  Surgeon: KAREN Butt MD;  Location: [facility] OR     EXCISE TUMOR PELVIS POSTERIOR Right 06/17/2022    Procedure: Right hindquarter amputation;  Surgeon: Matty Whitaker MD;  Location: [facility] OR     INSERT PORT VASCULAR ACCESS Right 11/01/2021    Procedure: INSERTION, VASCULAR ACCESS PORT;  Surgeon: Christian  MD Sushil;  Location: [facility] OR     IR CHEST PORT PLACEMENT > 5 YRS OF AGE  11/01/2021     IR CHEST TUBE PLACEMENT NON-TUNNELLED LEFT  9/6/2022     PICC DOUBLE LUMEN PLACEMENT Left 08/30/2022    left medial brachial 5 fr dl power picc 50 cm        Social History   I have reviewed this patient's social history and updated it with pertinent information if needed. Vic Scott III  reports that he has never smoked. He has never used smokeless tobacco. He reports that he does not drink alcohol and does not use drugs.    Family History   I have reviewed this patient's family history and updated it with pertinent information if needed.  Family History   Problem Relation Age of Onset     Heart Disease Mother      Coronary Artery Disease Father      Other - See Comments Father         pleomorphic cell sarcoma     Cancer Maternal Grandmother      No Known Problems Sister      No Known Problems Sister      No Known Problems Son      No Known Problems Daughter      No Known Problems Daughter      No Known Problems Daughter        Prior to Admission Medications   Prior to Admission Medications   Prescriptions Last Dose Informant Patient Reported? Taking?   Nutritional Supplements (ENSURE ACTIVE HIGH PROTEIN) LIQD 10/24/2022 at pm Self No Yes   Sig: Take 1 Can by mouth 3 times daily (with meals)   OLANZapine (ZYPREXA) 2.5 MG tablet 10/23/2022 at pm Self No Yes   Sig: Take 1 tablet (2.5 mg) by mouth At Bedtime   acetaminophen (TYLENOL) 325 MG tablet Past Week at prn Self No Yes   Sig: Take 3 tablets (975 mg) by mouth every 8 hours   apixaban ANTICOAGULANT (ELIQUIS) 5 MG tablet 10/24/2022 at am Self No Yes   Sig: Take 1 tablet (5 mg) by mouth 2 times daily   atorvastatin (LIPITOR) 80 MG tablet 10/24/2022 at am Self No Yes   Sig: Take 1 tablet (80 mg) by mouth every evening   escitalopram (LEXAPRO) 10 MG tablet 10/24/2022 at am Self No Yes   Sig: Take 1 tablet (10 mg) by mouth daily   gabapentin (NEURONTIN) 300 MG capsule  10/24/2022 at pm Self No Yes   Sig: Take 1 capsule (300 mg) by mouth 3 times daily   ipratropium-albuterol (COMBIVENT RESPIMAT)  MCG/ACT inhaler 10/24/2022 at pm Self No Yes   Sig: Inhale 1 puff into the lungs 3 times daily as needed for shortness of breath / dyspnea or wheezing   magnesium oxide (MAG-OX) 400 MG tablet Not Taking Self No No   Sig: Take 1 tablet (400 mg) by mouth 2 times daily   Patient not taking: Reported on 10/25/2022   multivitamin, therapeutic (THERA-VIT) TABS tablet 10/24/2022 at am Self No Yes   Sig: Take 1 tablet by mouth daily   naproxen (NAPROSYN) 500 MG tablet 10/24/2022 at am Self Yes Yes   Sig: Take 500 mg by mouth 2 times daily (with meals)   ondansetron (ZOFRAN ODT) 4 MG ODT tab 10/24/2022 at am Self No Yes   Sig: Take 1-2 tablets (4-8 mg) by mouth every 8 hours as needed for nausea or vomiting   oxyCODONE IR (ROXICODONE) 10 MG tablet 10/24/2022 at am Self No Yes   Sig: Take 1 tablet (10 mg) by mouth every 4 hours as needed for moderate to severe pain   pazopanib (VOTRIENT) 200 MG tablet 10/24/2022 at am Self No Yes   Sig: Take 4 tablets (800 mg) by mouth daily Take on an empty stomach 1 hour before or 2 hours after a meal.   polyethylene glycol (MIRALAX) 17 GM/Dose powder Not Taking Self No No   Sig: Take 17 g by mouth daily Hold for loose stools   Patient not taking: Reported on 10/25/2022      Facility-Administered Medications: None     Allergies   Allergies   Allergen Reactions     Blood Transfusion Related (Informational Only) Other (See Comments)     Patient has a history of a clinically significant antibody against RBC antigens.  A delay in compatible RBCs may occur.        Physical Exam   Vital Signs: Temp: 98.8  F (37.1  C) Temp src: Oral BP: (!) 144/96 Pulse: (!) 127   Resp: 22 SpO2: 97 % O2 Device: Nasal cannula Oxygen Delivery: 2 LPM  Weight: 173 lbs 0 oz      General: alert and oriented x3, in no apparent cardiopulmonary distress   HEENT: normocephalic, atraumatic,  MMM, PERRL, EOMI, no scleral icterus or chemosis, no conjunctival pallor   Neurologic: CN II-XII grossly intact, 5/5 strength in BUE, and 5/5 strength in LLEextremities, sensation grossly intact   Cardiac: tachycardic and normal rhythm, normal S1, S2, no murmurs gallops or rubs    Pulmonary: chest clear to auscultation bilaterally, no dullness to percussion, no wheezes, rales, or rhonchi   Abdomen: soft,diffusely tender (per patient this is baseline),  no rebound or guarding.   Genitourinary: No urinary catheter present   Skin: No rashes, ecchymosis   Extremities: LLE without ower extremity edema, palpable dorsalis pedis and posterior tibialis pulses. RLE surgically absent.    Musculoskeletal: No joint swelling, full ROM in all large joints   Psychiatric: Linear thought processes, normal speech, mood appropriate affect.           Data   Data reviewed today: I reviewed all medications, new labs and imaging results over the last 24 hours.    Recent Labs   Lab 10/24/22  2037 10/24/22  1030 10/19/22  0540   WBC 17.3* 16.3* 12.8*   HGB 6.9* 7.2* 8.2*   MCV 84 81 82    252 213     --  137   POTASSIUM 4.1  --  4.2   CHLORIDE 104  --  101   CO2 24  --  25   BUN 12.3  --  10.2   CR 0.67  --  0.84   ANIONGAP 13  --  11   VENTURA 9.0  --  8.7   *  --  86   ALBUMIN 3.3*  --   --    PROTTOTAL 7.0  --   --    BILITOTAL 1.2  --   --    ALKPHOS 149*  --   --    ALT 9*  --   --    AST 29  --   --      Recent Results (from the past 24 hour(s))   XR Chest 2 Views    Narrative    Exam: XR CHEST 2 VIEWS, 10/24/2022 9:06 PM    Comparison: CT PE10/16/2022    History: infectious w/u, recent pna    Findings:  Semiupright PA and lateral views of the chest are obtained. Right  chest Port-A-Cath in place, tip terminates over the superior  cavoatrial junction.    Trachea is midline. Mediastinum is within normal limits. Heart  silhouette is obscured. Redemonstration of numerous large pleural  masses better characterized on CT  chest 10/16/2022. There is relative  increase in mixed airspace and interstitial opacities intervening  between the masses. Trace right effusion. No pneumothorax. The upper  abdomen is unremarkable.      Impression    Impression:   1. Increased mixed opacities suspicious for infection.  2. Redemonstration of numerous pulmonary masses better characterized  on CT chest 10/16/2022.  3. Trace right effusion.    I have personally reviewed the examination and initial interpretation  and I agree with the findings.    DEWAYNE KEMP MD         SYSTEM ID:  [id number]

## 2022-10-25 NOTE — PROGRESS NOTES
"CLINICAL NUTRITION SERVICES - ASSESSMENT NOTE     Nutrition Prescription    RECOMMENDATIONS FOR MDs/PROVIDERS TO ORDER:  - With on going diarrhea, would hold Mag Oxide (has a laxtive effect).  If Mg lab low may tolerate better as IV or use \"Magneiums + protein.\"    Malnutrition Status:    Moderate malnutrition in the context of acute on chronic illness.     Recommendations already ordered by Registered Dietitian (RD):  - ordered  Mg++ and PO4 labs for tomorrow AM (last lab order to add on was 19 hr ago).  - Start Ensure Enlive TID w/ meals--any flavor per discussion with pt.     Future/Additional Recommendations:  - follow GI Sx, po intake, electrolytes.      REASON FOR ASSESSMENT  Vic Scott III is a/an 47 year old male assessed by the dietitian for Provider Order -patient with home nutritional supplements, please evaluate and see if any changes, for now will order pta Ensure once no longer NPO.    Chart reviewed.  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 Mets to lung and abdominal wall noted per CT on 8/13/22. PICC placed and started chemo 9/1/22. Readmitted 9/17 for L hemispheric stroke due to malignancy and JAZZMINE.      NUTRITION HISTORY  Pt takes regular Ensure TID with meals at home.  He notes diarrhea started 10/24 and just resumed oral chemo on weekend.     CURRENT NUTRITION ORDERS  Diet: Regular as of 3pm   Intake/Tolerance: Pt is nibbling at order of chicken tenders, mac and cheese and corn.      Received 1L NS bolus 10/24.    LABS  Labs reviewed  10/24--Na, K+ wnl.  No " "Mg or Phos noted. Lactic acid 2.2, . Sed rate 88.  Hgb 6.9 before transfusion.  10/24 UA--negative for ketones.    GI Per MD charting, diarrhea started day of admission--4 thus far.     MEDICATIONS  Medications reviewed  Mag Oxide 400 mg 2x/d (pt notes he hasn't taken any Mag Oxide today), IV zosyn, iv vanco.  PRN Rx include zofran.     Per home Rx list--Ensure active High Protein TID, Mag Oxide 400 mg BID (pt notes he rarely took this at home).     ANTHROPOMETRICS  Height: 188 cm (6'2\")  Most Recent Weight: 78.3 kg (172 lb 11.2 oz)    IBW: 70.8 kg (IBW -18%) (110% IBW)   kg (IBW - 18% for R hindquarter amp and pelvectomy) 113% IBW  BMI: n/a with R hip disartulation (R hindquarter amp with pelvectomy)  Weight History:   6.5% wt loss within the past month. Pt noted leg swollen during September admission so will count as ~5%.   Wt Readings from Last 20 Encounters:   10/25/22 78.3 kg (172 lb 11.2 oz)   10/19/22 78.9 kg (173 lb 14.4 oz)   09/19/22 83.7 kg (184 lb 8.4 oz)   09/11/22 (P) 84.6 kg (186 lb 9.6 oz)   08/13/22 79.4 kg (175 lb)   07/20/22 79.4 kg (175 lb)   07/20/22 79.4 kg (175 lb)   07/05/22 80 kg (176 lb 4.8 oz)   06/17/22 75.8 kg (167 lb 1.7 oz)- date of amp   06/05/22 106.6 kg (235 lb)   12/01/21 114.5 kg (252 lb 6.4 oz)   11/09/21 116.8 kg (257 lb 6.4 oz)   11/01/21 104.3 kg (230 lb)   09/24/21 117.9 kg (260 lb)     Dosing Weight: 78 kg Using 10/25 wt (110% of IBW)    ASSESSED NUTRITION NEEDS  Estimated Energy Needs: 1950 - 2340 kcals/day (25 - 30 kcals/kg)  Justification: Maintenance  Estimated Protein Needs: 94 - 117 grams protein/day (1.2 - 1.5 grams of pro/kg)  Justification: Hypercatabolism with acute illness  Estimated Fluid Needs: 2340 - 2730 mL/day (30 - 35 mL/kg)   Justification: Increased needs with diarrhea and Per provider pending fluid status    PHYSICAL FINDINGS  See malnutrition section below.    MALNUTRITION  % Intake: No decreased intake noted  % Weight Loss: wt loss of 5% within " the past month (moderate)   Subcutaneous Fat Loss: Upper arm:  moderate  Muscle Loss: Upper arm (bicep, tricep):  mild and Posterior calf:  Mild (however likely due to wheelchair bound with R leg amp.  Fluid Accumulation/Edema: Does not meet criteria  Malnutrition Diagnosis: Moderate malnutrition in the context of acute on chronic illness.     NUTRITION DIAGNOSIS  Unintended weight loss related to recent illness as evidenced by 5% wt loss within the past month, mild and moderate fat and muscle loss.    INTERVENTIONS  Implementation  Nutrition Education: Discussed supplement options and potential for Mag Oxide to promote loose stools.   Medical food supplement therapy     Goals  Patient to consume % of nutritionally adequate meal trays TID, or the equivalent with supplements/snacks.     Monitoring/Evaluation  Progress toward goals will be monitored and evaluated per protocol.    Marlene Davis RD, LD   (following while in ED 10/25)  7B (M-F) Pager: [phone]    After 10/25 contact floor RD:  6A/7D RD pager: [phone]  Weekend/Holiday RD pager: [phone]

## 2022-10-25 NOTE — PHARMACY-VANCOMYCIN DOSING SERVICE
Pharmacy Vancomycin Initial Note  Date of Service 2022  Patient's  1975  47 year old, male    Indication: Sepsis    Current estimated CrCl = Estimated Creatinine Clearance: 122 mL/min (based on SCr of 0.84 mg/dL).    Creatinine for last 3 days  No results found for requested labs within last 72 hours.    Recent Vancomycin Level(s) for last 3 days  No results found for requested labs within last 72 hours.      Vancomycin IV Administrations (past 72 hours)      No vancomycin orders with administrations in past 72 hours.                Nephrotoxins and other renal medications (From now, onward)    Start     Dose/Rate Route Frequency Ordered Stop    10/25/22 0900  vancomycin (VANCOCIN) 1,250 mg in 0.9% NaCl 250 mL intermittent infusion         1,250 mg  over 90 Minutes Intravenous EVERY 12 HOURS 10/24/22 2049      10/24/22 2050  vancomycin (VANCOCIN) 2,000 mg in sodium chloride 0.9 % 500 mL intermittent infusion         2,000 mg  over 120 Minutes Intravenous ONCE 10/24/22 2048      10/24/22 2035  piperacillin-tazobactam (ZOSYN) 4.5 g vial to attach to  mL bag         4.5 g  over 30 Minutes Intravenous ONCE 10/24/22 2031            Contrast Orders - past 72 hours (72h ago, onward)    None          [employer] Prediction of Planned Initial Vancomycin Regimen  Loading dose: 2000 mg at 21:00 10/24/2022.  Regimen: 1250 mg IV every 12 hours.  Start time: 20:47 on 10/24/2022  Exposure target: AUC24 (range)400-600 mg/L.hr   AUC24,ss: 547 mg/L.hr  Probability of AUC24 > 400: 80 %  Ctrough,ss: 16.6 mg/L  Probability of Ctrough,ss > 20: 36 %  Probability of nephrotoxicity (Lodise AGUSTIN ): 12 %          Plan:  1. Start vancomycin  2000 mg IV once followed by 1250mg q12h.   2. Vancomycin monitoring method: AUC  3. Vancomycin therapeutic monitoring goal: 400-600 mg*h/L  4. Pharmacy will check vancomycin levels as appropriate in 1-3 Days.    5. Serum creatinine levels will be ordered daily for the first week  of therapy and at least twice weekly for subsequent weeks.      Veronica Cash, RPH

## 2022-10-25 NOTE — ED PROVIDER NOTES
[place] EMERGENCY DEPARTMENT ([facility])  10/24/22    History     Chief Complaint   Patient presents with     Abnormal Labs     The history is provided by the patient and medical records.     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  Was recently admitted to the hospital for dyspnea/hypoxia likely related to community-acquired pneumonia versus pulmonary mets as well as acute on chronic anemia.  At that time was found to have a hemoglobin of 5.9 and received 2 units packed red blood cells.  There was no evidence of active bleeding and the anemia was thought to be related to poor bone marrow response.     Today, patient was obtaining routine labs and was told to come to the ED by his heme/oncology team.  Most notably had a hemoglobin of 7.2 (down from 8.2 on 10/19/2022), and with blood cell count of 16.3.  Patient denies any obvious source of infection.  In fact, he states that he feels improved since discharge from the hospital.  Specifically, denies fever/chills, cough, congestion, rash, has no other complaints.  Wears 2 L O2 nasal cannula at home.    Past Medical History  Past Medical History:   Diagnosis Date     Pleomorphic cell sarcoma (H)      Past Surgical History:   Procedure Laterality Date     ANESTHESIA OUT OF OR BLOOD BRAIN BARRIER DISRUPTION N/A 06/16/2022    Procedure: ANESTHESIA OUT OF OR - Block Epidural;  Surgeon: GENERIC ANESTHESIA PROVIDER;  [facility]     COMPLEX WOUND CLOSURE (UPDATE) Right 06/17/2022    Procedure: complex closure pelvis, spy;  Surgeon: KAREN Butt MD;  [facility]     COMPLEX WOUND  CLOSURE (UPDATE)  06/17/2022    Procedure: ;  Surgeon: KAREN Butt MD;  [facility]     EXCISE TUMOR PELVIS POSTERIOR Right 06/17/2022    Procedure: Right hindquarter amputation;  Surgeon: Matty Whitaker MD;  [facility]     INSERT PORT VASCULAR ACCESS Right 11/01/2021    Procedure: INSERTION, VASCULAR ACCESS PORT;  Surgeon: Sushil Gonzales MD;  Location: [facility] OR     IR CHEST PORT PLACEMENT > 5 YRS OF AGE  11/01/2021     IR CHEST TUBE PLACEMENT NON-TUNNELLED LEFT  9/6/2022     PICC DOUBLE LUMEN PLACEMENT Left 08/30/2022    left medial brachial 5 fr dl power picc 50 cm     acetaminophen (TYLENOL) 325 MG tablet  apixaban ANTICOAGULANT (ELIQUIS) 5 MG tablet  atorvastatin (LIPITOR) 80 MG tablet  escitalopram (LEXAPRO) 10 MG tablet  gabapentin (NEURONTIN) 300 MG capsule  ipratropium-albuterol (COMBIVENT RESPIMAT)  MCG/ACT inhaler  multivitamin, therapeutic (THERA-VIT) TABS tablet  naproxen (NAPROSYN) 500 MG tablet  Nutritional Supplements (ENSURE ACTIVE HIGH PROTEIN) LIQD  OLANZapine (ZYPREXA) 2.5 MG tablet  ondansetron (ZOFRAN ODT) 4 MG ODT tab  oxyCODONE IR (ROXICODONE) 10 MG tablet  [START ON 11/3/2022] pazopanib (VOTRIENT) 200 MG tablet  magnesium oxide (MAG-OX) 400 MG tablet  polyethylene glycol (MIRALAX) 17 GM/Dose powder      Allergies   Allergen Reactions     Blood Transfusion Related (Informational Only) Other (See Comments)     Patient has a history of a clinically significant antibody against RBC antigens.  A delay in compatible RBCs may occur.      Family History  Family History   Problem Relation Age of Onset     Heart Disease Mother      Coronary Artery Disease Father      Other - See Comments Father         pleomorphic cell sarcoma     Cancer Maternal Grandmother      No Known Problems Sister      No Known Problems Sister      No Known Problems Son      No Known Problems Daughter      No Known Problems Daughter      No Known Problems Daughter      Social History    Social History     Tobacco Use     Smoking status: Never     Smokeless tobacco: Never   Vaping Use     Vaping Use: Never used   Substance Use Topics     Alcohol use: Never     Drug use: Never      Past medical history, past surgical history, medications, allergies, family history, and social history were reviewed with the patient. No additional pertinent items.       Review of Systems   Constitutional: Negative for chills, fatigue and fever.   HENT: Negative for congestion and sore throat.    Eyes: Negative for pain and visual disturbance.   Respiratory: Negative for cough, chest tightness and shortness of breath.    Cardiovascular: Negative for chest pain and palpitations.   Gastrointestinal: Negative for abdominal distention, abdominal pain, constipation, diarrhea, nausea and vomiting.   Genitourinary: Negative for difficulty urinating, dysuria, frequency and urgency.   Musculoskeletal: Negative for arthralgias, back pain, myalgias and neck pain.   Skin: Negative for color change and rash.   Neurological: Negative for dizziness, weakness and headaches.   Psychiatric/Behavioral: Negative for confusion.     A complete review of systems was performed with pertinent positives and negatives noted in the HPI, and all other systems negative.    Physical Exam   BP: (!) 151/100  Pulse: (!) 135  Temp: 98.9  F (37.2  C)  Resp: 16  Weight: 78.5 kg (173 lb)  SpO2: 98 %  Physical Exam  Vitals and nursing note reviewed.   Constitutional:       General: He is not in acute distress.     Appearance: Normal appearance. He is ill-appearing. He is not toxic-appearing.   HENT:      Head: Normocephalic and atraumatic.      Nose: Nose normal.      Mouth/Throat:      Mouth: Mucous membranes are moist.   Eyes:      Pupils: Pupils are equal, round, and reactive to light.   Cardiovascular:      Rate and Rhythm: Tachycardia present.      Pulses: Normal pulses.      Heart sounds: Normal heart sounds.   Pulmonary:      Effort: Pulmonary  effort is normal. No respiratory distress.      Breath sounds: Normal breath sounds.      Comments: 2 L nasal cannula.  No respiratory distress.  Abdominal:      General: Abdomen is flat. There is no distension.      Palpations: Abdomen is soft.   Musculoskeletal:         General: No swelling or deformity. Normal range of motion.      Cervical back: Normal range of motion. No rigidity.      Comments: Status post right hemipelvectomy.  Surgical wounds clean dry and intact.   Skin:     General: Skin is warm.      Capillary Refill: Capillary refill takes less than 2 seconds.   Neurological:      Mental Status: He is alert and oriented to person, place, and time.   Psychiatric:         Mood and Affect: Mood normal.         ED Course      Procedures       The medical record was reviewed and interpreted.  Current labs reviewed and interpreted.  Previous labs reviewed and interpreted.       Critical Care Addendum    My initial assessment, based on my review of nursing observations, review of vital signs, focused history and physical exam, established that Vic Scott III has suspicion for sepsis and need for evaluation and early goal-directed therapy, which requires immediate intervention, and therefore he is critically ill.     After the initial assessment, the care team initiated multiple lab tests, initiated IV fluid administration and initiated medication therapy with vanc/zosyn to provide stabilization care. Due to the critical nature of this patient, I reassessed nursing observations, vital signs, physical exam, mental status, neurologic status and respiratory status multiple times prior to his disposition.     Time also spent performing documentation, reviewing test results and coordination of care.     Critical care time (excluding teaching time and procedures): 30 minutes.      Results for orders placed or performed during the hospital encounter of 10/24/22   XR Chest 2 Views     Status: None (Preliminary  result)    Impression    RESIDENT PRELIMINARY INTERPRETATION  Impression:   1. Increased mixed opacities suspicious for infection.  2. Redemonstration of numerous pulmonary masses better characterized  on CT chest 10/16/2022.  3. Trace right effusion.   Lactic acid whole blood     Status: Abnormal   Result Value Ref Range    Lactic Acid 2.2 (H) 0.7 - 2.0 mmol/L   Comprehensive metabolic panel     Status: Abnormal   Result Value Ref Range    Sodium 141 136 - 145 mmol/L    Potassium 4.1 3.4 - 5.3 mmol/L    Chloride 104 98 - 107 mmol/L    Carbon Dioxide (CO2) 24 22 - 29 mmol/L    Anion Gap 13 7 - 15 mmol/L    Urea Nitrogen 12.3 6.0 - 20.0 mg/dL    Creatinine 0.67 0.67 - 1.17 mg/dL    Calcium 9.0 8.6 - 10.0 mg/dL    Glucose 108 (H) 70 - 99 mg/dL    Alkaline Phosphatase 149 (H) 40 - 129 U/L    AST 29 10 - 50 U/L    ALT 9 (L) 10 - 50 U/L    Protein Total 7.0 6.4 - 8.3 g/dL    Albumin 3.3 (L) 3.5 - 5.2 g/dL    Bilirubin Total 1.2 <=1.2 mg/dL    GFR Estimate >90 >60 mL/min/1.73m2   Erythrocyte sedimentation rate auto     Status: Abnormal   Result Value Ref Range    Erythrocyte Sedimentation Rate 88 (H) 0 - 15 mm/hr   CRP inflammation     Status: Abnormal   Result Value Ref Range    CRP Inflammation 114.00 (H) <5.00 mg/L   Procalcitonin     Status: Abnormal   Result Value Ref Range    Procalcitonin 0.26 (H) <0.05 ng/mL   UA with Microscopic reflex to Culture     Status: Abnormal    Specimen: Urine, Midstream   Result Value Ref Range    Color Urine Light Yellow Colorless, Straw, Light Yellow, Yellow    Appearance Urine Clear Clear    Glucose Urine Negative Negative mg/dL    Bilirubin Urine Negative Negative    Ketones Urine Negative Negative mg/dL    Specific Gravity Urine 1.017 1.003 - 1.035    Blood Urine Trace (A) Negative    pH Urine 6.0 5.0 - 7.0    Protein Albumin Urine 70 (A) Negative mg/dL    Urobilinogen Urine Normal Normal, 2.0 mg/dL    Nitrite Urine Negative Negative    Leukocyte Esterase Urine Negative Negative     RBC Urine 2 <=2 /HPF    WBC Urine 1 <=5 /HPF    Squamous Epithelials Urine <1 <=1 /HPF    Narrative    Urine Culture not indicated   Symptomatic; Unknown Influenza A/B & SARS-CoV2 (COVID-19) Virus PCR Multiplex Nose     Status: Normal    Specimen: Nose; Swab   Result Value Ref Range    Influenza A PCR Negative Negative    Influenza B PCR Negative Negative    RSV PCR Negative Negative    SARS CoV2 PCR Negative Negative    Narrative    Testing was performed using the Xpert Xpress CoV2/Flu/RSV Assay on the Cepheid GeneXpert Instrument. This test should be ordered for the detection of SARS-CoV-2 and influenza viruses in individuals who meet clinical and/or epidemiological criteria. Test performance is unknown in asymptomatic patients. This test is for in vitro diagnostic use under the FDA EUA for laboratories certified under CLIA to perform high or moderate complexity testing. This test has not been FDA cleared or approved. A negative result does not rule out the presence of PCR inhibitors in the specimen or target RNA in concentration below the limit of detection for the assay. If only one viral target is positive but coinfection with multiple targets is suspected, the sample should be re-tested with another FDA cleared, approved, or authorized test, if coinfection would change clinical management. This test was validated by the [facility]. These laboratories are certified under the Clinical Laboratory Improvement Amendments of 1988 (CLIA-88) as qualified to perform high complexity laboratory testing.   CBC with platelets and differential     Status: Abnormal   Result Value Ref Range    WBC Count 17.3 (H) 4.0 - 11.0 10e3/uL    RBC Count 2.66 (L) 4.40 - 5.90 10e6/uL    Hemoglobin 6.9 (LL) 13.3 - 17.7 g/dL    Hematocrit 22.2 (L) 40.0 - 53.0 %    MCV 84 78 - 100 fL    MCH 25.9 (L) 26.5 - 33.0 pg    MCHC 31.1 (L) 31.5 - 36.5 g/dL    RDW 20.0 (H) 10.0 - 15.0 %    Platelet Count 265 150 - 450 10e3/uL    %  Neutrophils 81 %    % Lymphocytes 9 %    % Monocytes 7 %    % Eosinophils 2 %    % Basophils 0 %    % Immature Granulocytes 1 %    NRBCs per 100 WBC 0 <1 /100    Absolute Neutrophils 14.0 (H) 1.6 - 8.3 10e3/uL    Absolute Lymphocytes 1.6 0.8 - 5.3 10e3/uL    Absolute Monocytes 1.2 0.0 - 1.3 10e3/uL    Absolute Eosinophils 0.3 0.0 - 0.7 10e3/uL    Absolute Basophils 0.1 0.0 - 0.2 10e3/uL    Absolute Immature Granulocytes 0.1 <=0.4 10e3/uL    Absolute NRBCs 0.0 10e3/uL   Extra Tube     Status: None    Narrative    The following orders were created for panel order Extra Tube.  Procedure                               Abnormality         Status                     ---------                               -----------         ------                     Extra Blue Top Tube[[id number]]                              Final result               Extra Red Top Tube[[id number]]                               Final result               Extra Purple Top Tube[[id number]]                            Final result                 Please view results for these tests on the individual orders.   Extra Blue Top Tube     Status: None   Result Value Ref Range    Hold Specimen JIC    Extra Red Top Tube     Status: None   Result Value Ref Range    Hold Specimen JIC    Extra Purple Top Tube     Status: None   Result Value Ref Range    Hold Specimen JIC    Adult Type and Screen     Status: Abnormal   Result Value Ref Range    ABO/RH(D) A POS     Antibody Screen Positive (A) Negative    SPECIMEN EXPIRATION DATE [id number]    Prepare red blood cells (unit)     Status: None (Preliminary result)   Result Value Ref Range    Blood Component Type Red Blood Cells     Product Code [id number]     Unit Status Ready for issue     Unit Number [id number]     CROSSMATCH COMPATIBLE     CODING SYSTEM [id number]    CBC with platelets differential     Status: Abnormal    Narrative    The following orders were created for panel order CBC with platelets  differential.  Procedure                               Abnormality         Status                     ---------                               -----------         ------                     CBC with platelets and d...[[id number]]  Abnormal            Final result                 Please view results for these tests on the individual orders.   ABO/Rh type and screen *Canceled*     Status: None ()    Narrative    The following orders were created for panel order ABO/Rh type and screen.  Procedure                               Abnormality         Status                     ---------                               -----------         ------                       Please view results for these tests on the individual orders.   ABO/Rh type and screen *Canceled*     Status: None ()    Narrative    The following orders were created for panel order ABO/Rh type and screen.  Procedure                               Abnormality         Status                     ---------                               -----------         ------                     Adult Type and Screen[[id number]]                                                         Please view results for these tests on the individual orders.   ABO/Rh type and screen     Status: Abnormal    Narrative    The following orders were created for panel order ABO/Rh type and screen.  Procedure                               Abnormality         Status                     ---------                               -----------         ------                     Adult Type and Screen[[id number]]        Abnormal            Edited Result - FINAL        Please view results for these tests on the individual orders.     Medications   pharmacy alert - intermittent dosing (has no administration in time range)   vancomycin (VANCOCIN) 2,000 mg in sodium chloride 0.9 % 500 mL intermittent infusion (2,000 mg Intravenous New Bag 10/24/22 8199)   vancomycin (VANCOCIN) 1,250 mg in 0.9% NaCl 250 mL intermittent  infusion (has no administration in time range)   lidocaine 1 % 0.1-1 mL (has no administration in time range)   lidocaine (LMX4) cream (has no administration in time range)   sodium chloride (PF) 0.9% PF flush 3 mL (has no administration in time range)   sodium chloride (PF) 0.9% PF flush 3 mL (has no administration in time range)   melatonin tablet 1 mg (has no administration in time range)   ondansetron (ZOFRAN ODT) ODT tab 4 mg (has no administration in time range)     Or   ondansetron (ZOFRAN) injection 4 mg (has no administration in time range)   0.9% sodium chloride BOLUS (0 mLs Intravenous Stopped 10/24/22 2340)   piperacillin-tazobactam (ZOSYN) 4.5 g vial to attach to  mL bag (0 g Intravenous Stopped 10/24/22 2340)        Assessments & Plan (with Medical Decision Making)   Patient with history of sarcoma status post right hemicolectomy, known mets to lungs, presents the ED with outpatient labs showing leukocytosis and anemia.  Recently admitted for the same.    Differential includes pneumonia, sepsis, septic shock, COVID, other viral illness.  On arrival, patient is tachycardic to 130.  Has normal blood pressure.  Patient states his normal heart rate is in the 120s.  He is afebrile.  He looks ill but not acutely toxic.  Is on 2 L nasal cannula which is his baseline    Chest x-ray shows increased mixed opacities, suspicious for pneumonia.  Patient started on vancomycin/Zosyn.    Labs redemonstrate leukocytosis of 17.3.  Hemoglobin is 6.9.  Consented/transfuse 1 unit packed red blood cells.    Vital signs continue be stable aside from tachycardia.  Low suspicion for septic shock.  Patient was given 1 L IV fluids, tachycardia improved slightly.    Plan to admit to the medicine/oncology team.  Discussed with hospitalist triage.    I have reviewed the nursing notes. I have reviewed the findings, diagnosis, plan and need for follow up with the patient.    New Prescriptions    No medications on file        Final diagnoses:   Leukocytosis, unspecified type   Pneumonia of both lungs due to infectious organism, unspecified part of lung       --  Wes Reyna DO  [facility] EMERGENCY DEPARTMENT  10/24/2022     Wes Reyna DO  10/25/22 0059

## 2022-10-25 NOTE — PROGRESS NOTES
Admitted/transferred from: ED  2 RN full   skin assessment completed by Maribel Sr, RN and Raleigh AUSTIN RN.  Skin assessment finding: issues found pinkish dry sacral discoloration and post. L hip wound, whitish in color, covered with mepilex   Interventions/actions: skin interventions change foam PRN     Will continue to monitor. skin

## 2022-10-25 NOTE — PROGRESS NOTES
This is a recent snapshot of the patient's [facility] medical record.  For current drug dose and complete information and questions, call [phone] or In Basket pool, [facility] home infusion ([id number])  CSN Number:  [id number]

## 2022-10-25 NOTE — ED NOTES
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  Was recently admitted to the hospital for dyspnea/hypoxia likely related to community-acquired pneumonia versus pulmonary mets as well as acute on chronic anemia.  At that time was found to have a hemoglobin of 5.9 and received 2 units packed red blood cells.  There was no evidence of active bleeding and the anemia was thought to be related to poor bone marrow response.    Today, patient was obtaining routine labs and was told to come to the ED by his heme/oncology team.  Most notably had a hemoglobin of 7.2 (down from 8.2 on 10/19/2022), and with blood cell count of 16.3.  Patient denies any obvious source of infection.  In fact, he states that he feels improved since discharge from the hospital.  Specifically, denies fever/chills, cough, congestion, rash, has no other complaints.  Wears 2 L O2 nasal cannula at home.    Past Medical History:   Diagnosis Date     Pleomorphic cell sarcoma (H)        Past Surgical History:   Procedure Laterality Date     ANESTHESIA OUT OF OR BLOOD BRAIN BARRIER DISRUPTION N/A 06/16/2022    Procedure: ANESTHESIA OUT OF OR - Block Epidural;  Surgeon: GENERIC ANESTHESIA PROVIDER;  [facility]     COMPLEX WOUND CLOSURE (UPDATE) Right 06/17/2022    Procedure: complex closure pelvis, spy;  Surgeon: KAREN Butt MD;  [facility]     COMPLEX WOUND CLOSURE (UPDATE)  06/17/2022    Procedure: ;  Surgeon:  KAREN Butt MD;  [facility]     EXCISE TUMOR PELVIS POSTERIOR Right 06/17/2022    Procedure: Right hindquarter amputation;  Surgeon: Matty Whitaker MD;  [facility]     INSERT PORT VASCULAR ACCESS Right 11/01/2021    Procedure: INSERTION, VASCULAR ACCESS PORT;  Surgeon: Sushil Gonzales MD;  Location: [facility] OR     IR CHEST PORT PLACEMENT > 5 YRS OF AGE  11/01/2021     IR CHEST TUBE PLACEMENT NON-TUNNELLED LEFT  9/6/2022     PICC DOUBLE LUMEN PLACEMENT Left 08/30/2022    left medial brachial 5 fr dl power picc 50 cm       Family History   Problem Relation Age of Onset     Heart Disease Mother      Coronary Artery Disease Father      Other - See Comments Father         pleomorphic cell sarcoma     Cancer Maternal Grandmother      No Known Problems Sister      No Known Problems Sister      No Known Problems Son      No Known Problems Daughter      No Known Problems Daughter      No Known Problems Daughter        Social History     Tobacco Use     Smoking status: Never     Smokeless tobacco: Never   Substance Use Topics     Alcohol use: Never       Review of Systems:  10 point review systems negative aside from what is mentioned in HPI.    Exam:  BP (!) 151/100   Pulse (!) 135   Temp 98.9  F (37.2  C) (Oral)   Resp 16   Wt 78.5 kg (173 lb)   SpO2 98%   BMI 22.21 kg/m    General: No acute distress. Appears chronically ill  Cardio: Regular rate, extremities well perfused  Resp: Normal work of breathing, grossly normal respiratory rate.  2L NC  Neuro: Alert. CN II-XII grossly intact. Grossly intact strength.   Abdomen: soft, nondistended      Medical Decision Making:  Patient arriving to the ED with problem as above. A medical screening exam was performed. Orders initiated from Triage. The patient is appropriate to wait in triage    Wes Reyna DO on 10/24/2022 at 8:31 PM     Wes Reyna DO  10/24/22 2034

## 2022-10-25 NOTE — PROGRESS NOTES
Attempted to call ED around 12:16, was told that the nurse in charge will be informed and she will call me when she is ready.

## 2022-10-25 NOTE — PLAN OF CARE
Goal Outcome Evaluation:    Time: 13:15-19:30     Reason for admit: new onset diarrhea, SOB  Vitals: Slightly elevated BP, tachycardic  Activity: Activity as tolerated, AO2  Neuro: Alert and oriented x4  Mood/Behavior: Calm and cooperative  Lines/Drains: Port-a-cath at TKO  Cardiac: Tachycardic  Resp: Lung sounds clear, no cough noted, on O2 at 2 Lpm via NC  Diet: Regular diet  GI/: Voids without difficulty  Skin: Skin is dry, whitish wound on post. L thigh, covered with foam dressing. Foam dressing on sacral area for protection, (pink)  Pain: c/o pain on the R thigh rated 4-6/10 on pain scale, did not ask for any PRN meds.  New this shift: Transferred from ED at around 13:15, via stretcher, with R hip stump. 4-eye skin check done. Refused MRSA test today, will try again tomorrow.     Plan: Continue with POC

## 2022-10-26 NOTE — PLAN OF CARE
Status: pneumonia Bilateral lungs  Vitals:/82 (BP Location: Right arm, Patient Position: Supine, Cuff Size: Adult Regular)   Pulse 106   Temp 98.5  F (36.9  C) (Oral)   Resp 18   Wt 78.3 kg (172 lb 11.2 oz)   SpO2 100%   BMI 22.17 kg/m     Neuros: Axox4. Follows commands  IV:Kihz-Q-Dfry-Infusing TKO  Resp/trach:2L NC, tolerating  Diet: Regular, tolerating  Bowel status:LBM:10/25 loose  : voids spontaneously via urinal  Skin: L thigh wound, coccyx redness/blanchable-mepilex applied, R  Pain: Right Hip- scheduled tylenol and gabapentin given  Activity: A2 pivot/lift dependent to commode/bedpan  Plan: Continue POC  Updates this shift: pt refused PO mag due to loose stools. Zosyn and vanco given this shift

## 2022-10-26 NOTE — PHARMACY-VANCOMYCIN DOSING SERVICE
Pharmacy Vancomycin Note  Date of Service 2022  Patient's  1975   47 year old, male    Indication: Sepsis  Day of Therapy: 3  Current vancomycin regimen:  1250 mg IV q12h  Current vancomycin monitoring method: AUC  Current vancomycin therapeutic monitoring goal: 400-600 mg*h/L    Loading dose: N/A  Regimen: 1250 mg IV every 12 hours.  Start time: 15:33 on 10/26/2022  Exposure target: AUC24 (range)400-600 mg/L.hr   AUC24,ss: 993 mg/L.hr  Probability of AUC24 > 400: 100 %  Ctrough,ss: 34.2 mg/L  Probability of Ctrough,ss > 20: 100 %  Probability of nephrotoxicity (Lodise AGUSTIN ): 54 %    Current estimated CrCl = Estimated Creatinine Clearance: 88 mL/min (based on SCr of 1.12 mg/dL).    Creatinine for last 3 days  10/24/2022:  8:37 PM Creatinine 0.67 mg/dL  10/26/2022:  5:38 AM Creatinine 1.12 mg/dL    Recent Vancomycin Levels (past 3 days)  10/26/2022: 11:13 AM Vancomycin 25.1 ug/mL    Vancomycin IV Administrations (past 72 hours)                   vancomycin (VANCOCIN) 1,250 mg in 0.9% NaCl 250 mL intermittent infusion (mg) 1,250 mg New Bag 10/25/22 2249     1,250 mg New Bag  1121    vancomycin (VANCOCIN) 2,000 mg in sodium chloride 0.9 % 500 mL intermittent infusion (mg) 2,000 mg New Bag 10/24/22 2338                Nephrotoxins and other renal medications (From now, onward)    Start     Dose/Rate Route Frequency Ordered Stop    10/27/22 2300  vancomycin (VANCOCIN) 1,250 mg in 0.9% NaCl 250 mL intermittent infusion         1,250 mg  over 90 Minutes Intravenous EVERY 24 HOURS 10/26/22 1447               Contrast Orders - past 72 hours (72h ago, onward)    None          Interpretation of levels and current regimen:  Vancomycin level is reflective of AUC greater than 600    Has serum creatinine changed greater than 50% in last 72 hours: No    Urine output:  good urine output    Renal Function: Stable    InsightRX Prediction of Planned New Vancomycin Regimen  Loading dose: N/A  Regimen: 1250 mg IV  every 24 hours.  Start time: 15:33 on 10/26/2022  Exposure target: AUC24 (range)400-600 mg/L.hr   AUC24,ss: 525 mg/L.hr  Probability of AUC24 > 400: 96 %  Ctrough,ss: 15.3 mg/L  Probability of Ctrough,ss > 20: 12 %  Probability of nephrotoxicity (Lodise AGUSTIN 2009): 11 %    Plan:  1. Decrease Dose to 1250 mg every 24 hours  2. Vancomycin monitoring method: AUC  3. Vancomycin therapeutic monitoring goal: 400-600 mg*h/L  4. Pharmacy will check vancomycin levels as appropriate in 1-3 Days.  5. Serum creatinine levels will be ordered daily for the first week of therapy and at least twice weekly for subsequent weeks.    Rosalino ReynaD

## 2022-10-26 NOTE — PLAN OF CARE
[id number]: /87 (BP Location: Left arm)   Pulse (!) 123   Temp 98  F (36.7  C) (Oral)   Resp (!) 32   Wt 75.5 kg (166 lb 6.4 oz)   SpO2 99%   BMI 21.36 kg/m    Pt denies nausea. Pt experiencing constant pain in right hip, rating 7/10. Managed with PRN Oxy X1, scheduled Tylenol & Gabapentin. Pt experiencing diarrhea X2, Imodium given X2. Experiencing dyspnea, RR tachypenic with activity RR 32. Maintaining O2 sats >98% on 2L NC. Pt ast 1-2, GB/Walker, Pivots well to wheelchair. Has skin wound on left thigh and blanchable redness on coccyx, mepliex applied. Pulsate mattress ordered. Having fair appeitite, ate 75% of breakfast. Continue POC.

## 2022-10-26 NOTE — PLAN OF CARE
Goal Outcome Evaluation:      Plan of Care Reviewed With: patient    Overall Patient Progress: improvingOverall Patient Progress: improving    Outcome Evaluation: Pt agreed to Ensure TID w/ meals.

## 2022-10-26 NOTE — PROGRESS NOTES
"SPIRITUAL HEALTH SERVICES  SPIRITUAL ASSESSMENT Progress Note  [facility] 7D   TELE-VISIT      REFERRAL SOURCE: Hospital  request upon admission, Jew specific.     DATA: Pt Vic Scott III identifies as Jew and is of  descent.     I introduced myself to Fabian as the Lead Jew  and oriented him to [facility].     He stated that, \"My white blood cell count is high\" and that he may have a bacterial infection. Fabian noted that his sister will bring some books for him to read during his stay and he hopes that he will not be at the hospital too long. Fabian denies emotional/spiritual needs at this time.     PLAN: I will follow up with Fabian for the duration of his stay. [facility] is available to [facility] per request.     Ana Cristina Gastelum  Lead Jew   Pager [phone]    [facility] remains available 24/7 for emergent requests/referrals, either by having the switchboard page the on-call  or by entering an ASAP/STAT consult in Epic (this will also page the on-call ).    "

## 2022-10-26 NOTE — PROGRESS NOTES
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 He presented to the ED in the setting of abnormal labs and is admitted with concern for sepsis as well as acute on chronic anemia.     Severe sepsis 2/2 unknown source  Possible CAP  Patient meets criteria with tachycardia, leukocytosis, elevated lactic acid.  Source unclear, broad work-up and cautious approach given metastatic cancer currently on treatment.  Pro-Fabian, ESR, CRP all elevated. Enteric panel and C. difficile negative.  Respiratory panel negative including COVID.  Notably does have increased opacities on CXR that may be consistent with infection.  UA unremarkable. Discussed with oncology team today, and ultimately favor empiric course of treatment given immunosuppression and objective evidence of acute process with elevated inflammatory markers. Pna seems the most likely source, and it is possible that he did not adequately respond to his prior course due to immunosuppression vs having viral infection on top of that episode.  - Monitoring cultures, remain negative  - Discontinue vanc/zosyn  - Start PO abx with doxycycline/cefpodoxime --> goal to have slightly different atypical coverage and will plan for 10-day course, end date 11/2    JAZZMINE  Creatinine increased to 1.2 from baseline ~0.6-0.7 on 10/26. Suspect prerenal in the setting of ?sepsis and diarrhea.  - 1 L LR  - Recheck BMP in AM     Acute on chronic anemia  Patient noted to have hemoglobin of 6.9 after drop on routine  outpatient labs.  He did receive a transfusion in the ED. No overt signs of blood loss.  - Daily CBC  - Transfuse to keep Hgb >7     H/o PE  H/o L hemispheric ischemic stroke  Stroke identified during 9/2022 admission, felt to be due to hypercoagulable state related to malignancy so initiated on anticoagulation.  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 Ambrocio. Currently on therapy with pazopanib.   - Per oncology it is okay to continue this inpatient though the patient will have to use his home supply    Moderate malnutrition in the context of acute on chronic illness  - Nutrition consulted, appreciate recs  - Ensure supplements ordered       Diet: Regular Diet Adult  Snacks/Supplements Adult: Ensure Enlive; With Meals    DVT Prophylaxis: DOAC  Sung Catheter: Not present  Central Lines: PRESENT     Cardiac Monitoring: None  Code Status: Full Code      Disposition Plan      Expected Discharge Date: 10/28/2022      Destination: home with family  Discharge Comments: Pending stability on PO abx and back on blood thinner        The patient's care was discussed with the Bedside Nurse, Patient and Onc Consultant.    Mel Yen MD  Hospitalist Service, GOLD TEAM [address] [place] [address]  Securely message with the Vocera Web Console (learn more here)  Text page via [facility] Paging/Directory   Please see signed in provider for up to date coverage information      Clinically Significant Risk Factors              # Hypoalbuminemia: Lowest albumin = 2.8 g/dL (Ref range: 3.5-5.2) at 10/26/2022  5:38 AM, will monitor as appropriate           # Moderate Malnutrition: based on nutrition assessment, PRESENT ON ADMISSION       ______________________________________________________________________    Interval History    No acute events overnight.  Patient reports that he feels a little better than yesterday.  He had one episode of coughing earlier this morning that has now resolved.  While he says that it feels as though there is some phlegm in his throat his cough remains nonproductive.  Otherwise denies fevers, chills, nausea, other new concerns.    Four-point ROS negative unless noted above.    Data reviewed today: I reviewed all medications, new labs and imaging results over the last 24 hours. I personally reviewed no images or EKG's today.    Physical Exam   Vital Signs: Temp: 98.5  F (36.9  C) Temp src: Oral BP: 112/82 Pulse: 106   Resp: 18 SpO2: 100 % O2 Device: Nasal cannula Oxygen Delivery: 2 LPM  Weight: 166 lbs 6.4 oz  Constitutional: awake, alert, cooperative, no apparent distress  Respiratory: good air exchange, clear to auscultation bilaterally, no crackles or wheezing, breathing non-labored on baseline supplemental O2  Cardiovascular: tachycardic but regular, no murmur noted  GI: normal bowel sounds, soft, non-distended, non-tender  Skin: normal skin color, texture, turgor  Musculoskeletal: R leg amuputation  Neurologic: alert and oriented x3    Data   Recent Labs   Lab 10/26/22  0538 10/24/22  2037 10/24/22  1030   WBC 12.0* 17.3* 16.3*   HGB 8.3* 6.9* 7.2*   MCV 84 84 81    265 252   INR 1.20*  --   --     141  --    POTASSIUM 4.3 4.1  --    CHLORIDE 106 104  --    CO2 21* 24  --    BUN 15.4 12.3  --    CR 1.12 0.67  --    ANIONGAP 11 13  --    VENTURA 8.8 9.0  --    GLC 84 108*  --    ALBUMIN 2.8* 3.3*  --    PROTTOTAL 6.1* 7.0  --    BILITOTAL 0.9 1.2  --    ALKPHOS 121 149*  --    ALT 7* 9*  --    AST 21 29  --      No results found for this or any previous visit (from the past 24 hour(s)).

## 2022-10-26 NOTE — PROGRESS NOTES
Antimicrobial Stewardship Team Note    Antimicrobial Stewardship Program - A joint venture between [facility] and  Physicians to optimize antibiotic management.  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    History of Present Illness: Prior to presenting to the [facility] ED, the patient had labs drawn for a follow up visit that showed an elevated WBC (16.3) and low Hgb (7.2) and was tachycardic (125 bpm) and was advised to go to the ED for further workup.  Of note, the patient was admitted 10/16-10/19 for acute on chronic hypoxic respiratory failure secondary to CAP and was discharged on azithromycin and cefdinir for a total of 7 days of therapy that ended 10/22.  On presentation, the patient had a WBC of 17.3, a CRP of 114, procalcitonin of 0.26, lactic acid of 2.2, and an ESR of 88.  The patient was afebrile and denied fever/chills, cough, congestion, rash, and increased oxygen needs beyond 2L via nasal cannula at home.  A UA had elevated protein and trace blood but nothing else pertinent, enteric panel, C.diff PCR and COVID swab were negative, and both sets of blood cultures drawn are no growth to date after 1 day.  CXR on 10/24 shows increased mixed opacities that were suspicious for infection and a redemonstration of numerous pulmonary masses better characterized on a chest CT from 10/16/22.  On 10/26, the patient's WBC has trended down to 12 and remains afebrile and hemodynamically stable.  Today is day 3 of vancomycin and Zosyn therapy.         Active  Anti-infective Medications   (From admission, onward)                 Start     Stop    10/25/22 1130  vancomycin  1,250 mg,   Intravenous,   EVERY 12 HOURS        Sepsis        --    10/25/22 0700  piperacillin-tazobactam  4.5 g,   Intravenous,   EVERY 6 HOURS        Sepsis        --                  Assessment: possible sepsis secondary to pneumonia  The patient has never had a fever, subjectively feels better and many of the inflammatory markers can be explained by active malignancy.  The CXR does seem to be a bit worse than CXRs from the recent admission, but the bilateral nature of the increased opacities would point more towards a viral cause of infection over a bacterial pneumonia.  All in all, 48 hours is a good length of time to reassess the risk of sepsis caused by an infection.  Given the current findings and negative cultures thus far, it would be reasonable to stop antibiotics at this time.    Recommendations:  Discontinue vancomycin and Zosyn    Pharmacy took the following actions: Called/paged provider, Electronic note created.    Discussed with ID Staff: GUS Naranjo, MD, MS; Tameka Trujillo, PharmD, BCIDP    Rosalino PritchettD  Pharmacy Resident    Vital Signs/Clinical Features:  Vitals         10/24 0700  10/25 0659 10/25 0700  10/26 0659 10/26 0700  10/26 1311   Most Recent      Temp ( F) 98.6 -  98.9    97.4 -  98.5    98 -  98.3     98 (36.7) 10/26 1300    Pulse 120 -  135    104 -  123    120 -  123     123 10/26 1300    Resp 9 -  32    16 -  29      32     32 10/26 0918    /66 -  161/100    112/82 -  148/106    130/87 -  132/91     130/87 10/26 1300    SpO2 (%) 97 -  99    98 -  100    99 -  100     99 10/26 1300            Labs  Estimated Creatinine Clearance: 88 mL/min (based on SCr of 1.12 mg/dL).  Recent Labs   Lab Test 10/16/22  1427 10/17/22  0553 10/18/22  0553 10/19/22  0540 10/24/22  2037 10/26/22  0538   CR 0.77 0.75 0.81 0.84 0.67 1.12       Recent Labs   Lab  Test 11/09/21  0707 11/12/21  0943 11/30/21  1049 12/06/21  1426 12/10/21  1323 12/13/21  1034 09/17/22  1606 09/18/22  1438 09/20/22  0325 09/21/22  0722 10/17/22  0553 10/17/22  1714 10/18/22  0553 10/18/22  1759 10/19/22  0540 10/24/22  1030 10/24/22  2037 10/26/22  0538   WBC 3.8* 0.3*   < > 8.0 0.8*   < > 1.2*   < > 3.6*  3.6*   < > 13.1*  --  13.9*  --  12.8* 16.3* 17.3* 12.0*   ANEU 3.2 0.0*  --  7.7 0.3*  --  0.3*  --  1.8  --   --   --   --   --   --   --   --   --    ALYM 0.4* 0.0*  --  0.2* 0.4*  --  0.6*  --  0.7*  --   --   --   --   --   --   --   --   --    GREGORIA 0.0 0.0  --  0.0 0.1  --  0.3  --  1.0  --   --   --   --   --   --   --   --   --    AEOS 0.0 0.0  --  0.0 0.0  --  0.0  --  0.0  --   --   --   --   --   --   --   --   --    HGB 7.6* 7.4*   < > 6.8* 5.8*   < > 6.3*   < > 7.7*  7.7*   < > 6.4*   < > 8.4* 8.4* 8.2* 7.2* 6.9* 8.3*   HCT 23.1* 22.3*   < > 20.8* 17.7*   < > 19.5*   < > 23.8*  23.8*   < > 20.5*  --  25.8*  --  25.7* 21.8* 22.2* 26.0*   MCV 78 77*   < > 77* 75*   < > 73*   < > 77*  77*   < > 81  --  80  --  82 81 84 84    128*   < > 521* 220   < > 85*   < > 216  216   < > 238  --  251  --  213 252 265 267    < > = values in this interval not displayed.       Recent Labs   Lab Test 09/29/22  0815 10/05/22  1257 10/06/22  1052 10/16/22  1427 10/24/22  2037 10/26/22  0538   BILITOTAL 0.5 0.4 0.5 0.9 1.2 0.9   ALKPHOS 214* 144* 136* 103 149* 121   ALBUMIN 3.1* 3.3* 3.3* 3.1* 3.3* 2.8*   AST 30 19 19 24 29 21   ALT 9* 6* 5* <5* 9* 7*       Recent Labs   Lab Test 12/03/21  1711 06/08/22  0927 08/31/22  2013 09/01/22  0540 09/01/22  1639 09/02/22  1102 09/05/22  0728 09/08/22  1109 09/19/22  1707 09/19/22  1855 09/20/22  1552 10/16/22  1427 10/16/22  2112 10/24/22  2037   PCAL 0.28*  --  0.21* 0.32*  --  0.24*  --   --   --   --   --   --   --  0.26*   LACT  --    < >  --   --    < >  --   --    < > 0.6* 0.7 0.8 2.3* 1.6 2.2*   .0*  --  104.00* 115.00*  --  113.00*  65.00*  --   --   --   --  114.00*  --  114.00*   SED  --   --   --   --   --   --   --   --   --   --   --   --   --  88*    < > = values in this interval not displayed.             Culture Results:  7-Day Micro Results       Procedure Component Value Units Date/Time    MRSA MSSA PCR, Nasal Swab [[id number]] Collected: 10/26/22 1154    Order Status: Sent Lab Status: In process Updated: 10/26/22 1205    Specimen: Swab from Nares, Bilateral     Enteric Bacteria and Virus Panel by DAVE Stool [[id number]]  (Normal) Collected: 10/25/22 0255    Order Status: Completed Lab Status: Final result Updated: 10/25/22 1059    Specimen: Stool from Per Rectum      Campylobacter group Not Detected     Salmonella species Not Detected     Shigella species Not Detected     Vibrio group Not Detected     Rotavirus Not Detected     Shiga toxin 1 gene Not Detected     Shiga toxin 2 gene Not Detected     Norovirus I and II Not Detected     Yersinia enterocolitica Not Detected    Narrative:      Testing performed by multiplexed, qualitative PCR using the [employer] Enteric Pathogens Nucleic Acid Test. Results should not be used as the sole basis for diagnosis, treatment or other patient management decisions. Positive results do not rule out co-infection with other organisms that are not detected by this test and may not be the sole or definitive cause of patient illness. Negative results in the setting of clinical illness compatible with gastroenteritis may be due to infection by pathogens that are not detected by this test or non-infectious causes such as ulcerative colitis, irritable bowel syndrome or Crohn's disease. Note: Shiga toxin producing E. coli (STEC) typically harbor one or both genes that encode for Shiga toxins 1 and 2.    Blood Culture Peripheral Blood [[id number]]  (Normal) Collected: 10/24/22 2037    Order Status: Completed Lab Status: Preliminary result Updated: 10/25/22 2146    Specimen: Peripheral Blood       Culture No growth after 1 day    Blood Culture Line, venous [[id number]]  (Normal) Collected: 10/24/22 2037    Order Status: Completed Lab Status: Preliminary result Updated: 10/25/22 2146    Specimen: Blood from Line, venous      Culture No growth after 1 day            Recent Labs   Lab Test 11/05/21  1756 11/08/21  0439 12/04/21  0004 09/08/22  1301 10/16/22  1934 10/24/22  2147   URINEPH 5.5 6.0 7.0 5.5 6.0 6.0   NITRITE Negative Negative Negative Negative Negative Negative   LEUKEST Negative Negative Negative Negative Negative Negative   WBCU 2 2 1  --  1 1                   Recent Labs   Lab Test 10/25/22  0255   CDBPCT Negative       Imaging: XR Chest 2 Views    Result Date: 10/25/2022  Exam: XR CHEST 2 VIEWS, 10/24/2022 9:06 PM Comparison: CT PE10/16/2022 History: infectious w/u, recent pna Findings: Semiupright PA and lateral views of the chest are obtained. Right chest Port-A-Cath in place, tip terminates over the superior cavoatrial junction. Trachea is midline. Mediastinum is within normal limits. Heart silhouette is obscured. Redemonstration of numerous large pleural masses better characterized on CT chest 10/16/2022. There is relative increase in mixed airspace and interstitial opacities intervening between the masses. Trace right effusion. No pneumothorax. The upper abdomen is unremarkable.     Impression: 1. Increased mixed opacities suspicious for infection. 2. Redemonstration of numerous pulmonary masses better characterized on CT chest 10/16/2022. 3. Trace right effusion. I have personally reviewed the examination and initial interpretation and I agree with the findings. DEWAYNE KEMP MD   SYSTEM ID:  [id number]

## 2022-10-27 NOTE — PROGRESS NOTES
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 He presented to the ED in the setting of abnormal labs and is admitted with concern for sepsis as well as acute on chronic anemia.     Severe sepsis (resolved) 2/2 unknown source  Possible CAP  Pleural effusions  Patient meets criteria with tachycardia, leukocytosis, elevated lactic acid.  Source unclear, broad work-up and cautious approach given metastatic cancer currently on treatment.  Pro-Fabian, ESR, CRP all elevated. Enteric panel and C. difficile negative.  Respiratory panel negative including COVID.  Notably does have increased opacities on CXR that may be consistent with infection.  UA unremarkable. Discussed with oncology team today, and ultimately favor empiric course of treatment given immunosuppression and objective evidence of acute process with elevated inflammatory markers. Pna seems the most likely source, and it is possible that he did not adequately respond to his prior course due to immunosuppression vs having viral infection on top of that episode.  Worsened resp distress 10/27 after improving. CXR looks unchanged or slightly improved in regards to infiltrate though re-demonstrates effusions. Attempted thora, but US imaging revealed no significant fluid and presence of large mass. Also concerned for new/worse PE vs cancer progression.  - Monitoring cultures, remain negative  - Continue doxycycline/cefpodoxime for now -- no fever and HDS, other causes for change being  evaluated, can re-broaden pending workup  - CXR, repeat procal this AM  - CAPS consult for thora --> attempted but no fluid pocket  - CTA chest ordered  - Discussed with oncology team and will follow up after CT this afternoon    JAZZMINE, improved  Creatinine increased to 1.2 from baseline ~0.6-0.7 on 10/26. Suspect prerenal in the setting of ?sepsis and diarrhea. Improving today.  - Monitor  - Avoid additional IVF for now pending above workup     Acute on chronic anemia  Patient noted to have hemoglobin of 6.9 after drop on routine outpatient labs.  He did receive a transfusion in the ED. No overt signs of blood loss.  - Daily CBC  - Transfuse to keep Hgb >7     H/o PE  H/o L hemispheric ischemic stroke  Stroke identified during 9/2022 admission, felt to be due to hypercoagulable state related to malignancy so initiated on anticoagulation.  - Resumed apixaban 10/26 after discussing with onc      Metastatic high-grade pleomorphic sarcoma of R pelvis/femur s/p chemo and amputation/hemipelvectomy (6/2022)  Mets to lungs, abdominal wall  Primary oncologist Dr. Ambrocio. Currently on therapy with pazopanib.   - Per oncology it is okay to continue this inpatient though the patient will have to use his home supply    Moderate malnutrition in the context of acute on chronic illness  - Nutrition consulted, appreciate recs  - Ensure supplements ordered       Diet: Regular Diet Adult  Snacks/Supplements Adult: Ensure Enlive; With Meals    DVT Prophylaxis: DOAC  Sung Catheter: Not present  Central Lines: PRESENT       Cardiac Monitoring: None  Code Status: Full Code      Disposition Plan      Expected Discharge Date: 10/30/2022      Destination: home with help/services  Discharge Comments: Worsening respiratory symptoms 10/27, new workup        The patient's care was discussed with the Bedside Nurse, Patient and Onc, CAPS Consultant.    Mel Yen MD  Hospitalist Service, GOLD TEAM [address] [place] [address]  [facility]  Securely message with the Vocera Web Console (learn more here)  Text page via [facility] Paging/Directory   Please see signed in provider for up to date coverage information      Clinically Significant Risk Factors              # Hypoalbuminemia: Lowest albumin = 2.8 g/dL (Ref range: 3.5-5.2) at 10/27/2022  6:56 AM, will monitor as appropriate           # Moderate Malnutrition: based on nutrition assessment, PRESENT ON ADMISSION       ______________________________________________________________________    Interval History   Patient had increased sob and cough overnight. Felt like he was getting better during the day yesterday, but started having a harder time breathing in the evening. Not made better/worse by lying down/sitting up. Worse with exertion, though now sob at rest as well. Denies fevers, chills, n/v, cp.     Four-point ROS negative unless noted above.    Data reviewed today: I reviewed all medications, new labs and imaging results over the last 24 hours. I personally reviewed no images or EKG's today.    Physical Exam   Vital Signs: Temp: 98.4  F (36.9  C) Temp src: Oral BP: (!) 145/103 Pulse: (!) 137   Resp: (!) 32 SpO2: 97 % O2 Device: Nasal cannula Oxygen Delivery: 3 LPM  Weight: 166 lbs 6.4 oz  Constitutional: awake, alert  Respiratory: tachypneic, speaking without difficulty, coarse breath sounds bilaterally  Cardiovascular: tachycardic but regular, no murmur noted  GI: normal bowel sounds, soft, non-distended, non-tender  Skin: normal skin color, texture, turgor  Musculoskeletal: R leg amuputation  Neurologic: alert and oriented x3    Data   Recent Labs   Lab 10/27/22  0656 10/26/22  0538 10/24/22  2037   WBC 18.9* 12.0* 17.3*   HGB 8.9* 8.3* 6.9*   MCV 84 84 84    267 265   INR 1.23* 1.20*  --     138 141   POTASSIUM 4.3 4.3 4.1   CHLORIDE 106 106 104   CO2 19* 21* 24   BUN 16.0 15.4 12.3   CR 0.97 1.12 0.67   ANIONGAP 13 11 13   VENTURA 8.9 8.8 9.0   * 84  108*   ALBUMIN 2.8* 2.8* 3.3*   PROTTOTAL 6.1* 6.1* 7.0   BILITOTAL 0.8 0.9 1.2   ALKPHOS 122 121 149*   ALT 7* 7* 9*   AST 21 21 29     No results found for this or any previous visit (from the past 24 hour(s)).

## 2022-10-27 NOTE — PLAN OF CARE
Goal Outcome Evaluation:  Time: 9873-9901    BP (!) 144/94   Pulse (!) 125   Temp 97.7  F (36.5  C) (Oral)   Resp 28   Wt 75.5 kg (166 lb 6.4 oz)   SpO2 98%   BMI 21.36 kg/m      Reason for admission: pneumonia Bilateral lungs  Activity: Assist of 2 with gaitbelt/pivot tranfers,Activity as tolerated  Pain: Right hip pain managed with scheduled Tylenol. No prn pain meds given this shift.  Neuro: A&OX4  Cardiac: Tachycardiac, hypertensive  Respiratory: Occasional SOB, sating above 90% at 3L of oxygen, crackles   GI/:Voiding via bedside commode  Diet: Regular  Lines: Port-a-cath at TKO  Wounds: R  Thigh wound, UTV sacrum/coccyx  Labs/imaging: Labs scheduled this AM      New changes this shift: Patient received 1 L bolus of NS. Patient has a dry cough, provider notified and Guaifenesin-Codeine ordered and administered.         Continue to monitor and follow POC

## 2022-10-27 NOTE — PROGRESS NOTES
Hematology / Oncology  Daily Progress Note   Date of Service: 10/27/2022  Patient: Vic Scott III  MRN: [id number]  Admission Date: 10/24/2022  Hospital Day # 3  Cancer Diagnosis: Metastatic high grade pleomorphic sarcoma   Primary Outpatient Oncologist: Dr. Ambrocio   Current Treatment Plan: Pazopanib     Recommendations:   - Agree with further work up of SOB and tachypnea   - Continue Vantin and Doxycyline for CAP   - Will continue to follow along and help arrange follow up closer to discharge     Assessment & Plan:   Vic Scott III is a 47 year old male with past medical history of 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 Admitted for acute on chronic anemia and leukocytosis concerning for infection.      # Metastatic Osteosarcoma   In brief, he initially presented to Dr. Whitaker with rapidly progressing right leg swelling since July 2021. MRI showed a 29 x 20 cm right thigh soft tissue mass with probable bony involvement of the proximal right femur. Biopsy 9/24/21 c/w high grade pleomorphic sarcoma. Neoadjuvant chemotherapy was recommended as PET was concerning for inguinal lymphadenopathy. He initiated Doxil/Ifos(I7Y2=30/2/21) c/b confusion concerning for ifosfamide neurotoxicity for which he received Methylene Blue. C2 c/b severe neurotoxicity and again improved with Methylene blue. Remainder of cycle 2 was not completed. PET 1/11/22 decrease size of right thigh hypermetabolic soft tissue mass. Due to neurotoxicity patient was hesitant to receive for chemotherapy. He was admitted in June 2022 with right leg pain and inability to ambulate. He was found to have a right proximal femur fracture and underwent right hindquarter amputation with Dr. Whitaker and primary complex wound closure with Dr. Butt (6/17/22). Surgical pathology positive for high-grade osteosarcoma. CT AP 8/13/22 for surveillance with multiple  hypodense masses within the lateral abdominal wall and multiple pulmonary nodules c/w metastatic disease. He started Doxorubicin/Cisplatin 9/1/22. He was then readmitted 9/17/22 for right side weakness and MRI demonstrated left hemispheric stroke. Due to concern for intolerance of further platinum/anthracycline therapy he was switched to Pazopanib started on 10/16/22.   - Patient will need to provide his own supply of Pazopanib to take while in the hospital. Ok to hold until this is brought in and then assess continuation.         # Acute on Chronic Anemia   On admission noted to have Hgb 6.9.   - Transfuse to keep Hgb >7      # Concern for infection   # Leukocytosis   # Tachycardia   Patient was directed to the ED for lab abnormalities (Hgb 7.2, and WBC 16.3).  Concerning for infection. Infectious work up notable for CXR with mixed opacities suspicious for pneumonia. He received 1L IVF in the ED   - IV Zosyn and Vanco (10/24-10/26)  - Transitioned to PO Doxycycline and Vantin (x10/26) with placen to complete a 10-day course ending 11/2      # H/o Pulmonary Embolism   # H/o L Hemispheric ischemic stroke   CT PE 8/31 notable for pulmonary vein thrombus arising from a pulmonary vein in the RUL. Thrombus extends from a pulmonary mass most likely representing tumor thrombus. AC was deferred as it was thought to be tumor thrombus. He then presented with right sided weakness and MRI 9/16/22 showed left hemisphere hyperintensities consistent with acute strokes at that time w/ etiology felt to be hypercoagulability in setting of malignancy given recent PE.  - PTA Eliquis resumed (x10/26)       Patient's plan of care was discussed with attending physician Dr. Sumner.    Thank you for the opportunity to partake in this patients plan of care. Please do not hesitate to page with questions. We will continue to follow.     I spent >45  minutes face-to-face or coordinating care of Vic Scott III. Over 50% of our time on the  "unit was spent counseling the patient and/or coordinating care.    Dione Amaro PA-C ([facility])   Hematology/Oncology   Pager: [phone]   ___________________________________________________________________    Subjective & Interval History:    Nursing notes reviewed. Vent[place] is tired today. He was unable to get much sleep overnight due to new dry cough. He reports feeling SOB and \"working at breathing\". He forgot to ask his sister to bring in his Pazopanib so he has not been on it while in the hospital. All questions answered at this time.     A comprehensive review of systems was obtained and is negative other than noted here or in the HPI.       Physical Exam:    Blood pressure (!) 142/95, pulse (!) 135, temperature 97.7  F (36.5  C), temperature source Oral, resp. rate 28, weight 77.2 kg (170 lb 4.8 oz), SpO2 99 %.    General: lying in bed, no acute distress  HEENT: sclera anicteric, EOMI, MMM  Neck: supple, normal ROM  CV: RRR, normal S1/S2, no m/r/g  Resp: tachypnic, with some increased work of breathing, crackles L>R, currently on 4L NC   MSK: RLE amputation noted, warm and well-perfused, normal tone  Skin: no rashes on limited exam, no jaundice  Neuro: Alert and interactive, moves all extremities equally, no focal deficits    Labs & Studies: I personally reviewed the following studies:  ROUTINE LABS (Last four results):  CMP  Recent Labs   Lab 10/27/22  0656 10/26/22  0538 10/24/22  2037    138 141   POTASSIUM 4.3 4.3 4.1   CHLORIDE 106 106 104   CO2 19* 21* 24   ANIONGAP 13 11 13   * 84 108*   BUN 16.0 15.4 12.3   CR 0.97 1.12 0.67   GFRESTIMATED >90 82 >90   VENTURA 8.9 8.8 9.0   MAG  --  1.8  --    PHOS  --  5.2*  --    PROTTOTAL 6.1*  6.1* 6.1* 7.0   ALBUMIN 2.8* 2.8* 3.3*   BILITOTAL 0.8 0.9 1.2   ALKPHOS 122 121 149*   AST 21 21 29   ALT 7* 7* 9*     CBC  Recent Labs   Lab 10/27/22  0656 10/26/22  0538 10/24/22  2037 10/24/22  1030   WBC 18.9* 12.0* 17.3* 16.3*   RBC 3.36* 3.11* 2.66* 2.70*   HGB " 8.9* 8.3* 6.9* 7.2*   HCT 28.2* 26.0* 22.2* 21.8*   MCV 84 84 84 81   MCH 26.5 26.7 25.9* 26.7   MCHC 31.6 31.9 31.1* 33.0   RDW 19.1* 19.0* 20.0* 19.9*    267 265 252     INR  Recent Labs   Lab 10/27/22  0656 10/26/22  0538   INR 1.23* 1.20*       Medications list for reference:  Current Facility-Administered Medications   Medication     acetaminophen (TYLENOL) tablet 975 mg     apixaban ANTICOAGULANT (ELIQUIS) tablet 5 mg     benzonatate (TESSALON) capsule 100 mg     cefpodoxime (VANTIN) tablet 200 mg     doxycycline hyclate (VIBRAMYCIN) capsule 100 mg     gabapentin (NEURONTIN) capsule 300 mg     guaiFENesin-codeine (ROBITUSSIN AC) 100-10 MG/5ML solution 5 mL     HYDROmorphone (PF) (DILAUDID) injection 0.5-1 mg     lidocaine (LMX4) cream     lidocaine 1 % 0.1-1 mL     loperamide (IMODIUM) capsule 2 mg     magnesium oxide (MAG-OX) tablet 400 mg     melatonin tablet 1 mg     naloxone (NARCAN) injection 0.2 mg    Or     naloxone (NARCAN) injection 0.4 mg    Or     naloxone (NARCAN) injection 0.2 mg    Or     naloxone (NARCAN) injection 0.4 mg     OLANZapine (zyPREXA) tablet 2.5 mg     ondansetron (ZOFRAN ODT) ODT tab 4 mg    Or     ondansetron (ZOFRAN) injection 4 mg     oxyCODONE IR (ROXICODONE) tablet 10 mg     pazopanib (VOTRIENT) tablet 800 mg     pharmacy alert - intermittent dosing     sodium chloride (PF) 0.9% PF flush 3 mL     sodium chloride (PF) 0.9% PF flush 3 mL     umeclidinium-vilanterol (ANORO ELLIPTA) 62.5-25 MCG/INH oral inhaler 1 puff

## 2022-10-27 NOTE — PROVIDER NOTIFICATION
Paged [phone]  Patient latest vitals , /92 and resp 28. Patient has no order set for vitals with parameters. Can this be added to his orders? Thanks.     Response:  Vital signs order entered and 1000 ml NS bolus entered

## 2022-10-27 NOTE — PROVIDER NOTIFICATION
Paged En at [phone]  Pt has a frequent dry cough. Reports being up all night due to the cough. Can he get something for the cough? Maybe Robitussin or Tessalon saud?   Thanks, Genevieve #[phone]

## 2022-10-27 NOTE — PLAN OF CARE
2816-1865: BP (!) 160/109 (BP Location: Left arm, Cuff Size: Adult Regular)   Pulse (!) 134   Temp 98.1  F (36.7  C) (Oral)   Resp 24   Wt 77.2 kg (170 lb 4.8 oz)   SpO2 99%   BMI 21.87 kg/m    Pt denies nausea. Experiencing increased dyspnea today. HR Tachy 130-140s, MD updated and aware. RR 28-32 at baseline, maintaining O2>94%  on 3L humidified O2. CXR & CT PE completed, see results. Procal 0.49. PO antibiotics continued. Having harsh productive cough, Robitussin and Tessalon given. Pain not well managed with PRN Oxy, gabapentin and tylenol, rating 8/10. PRN dilaudid given with improvement in pain. Pt unable to tolerate standing and pivoting to wheelchair today due to dyspnea. Pt had one episode of stool incontinence. Voiding adequately. Continue POC.

## 2022-10-27 NOTE — PROVIDER NOTIFICATION
G11 MD Rowe paged at [phone]:    [id number] Vic Scott Pt having increased SOB at rest, RR 32-36, HR sustaining 135-140s, continuous dry/harsh cough and LS more coarse today. Please advise,    Plan: Spoke with provided, will order Portable Chest X-ray, Procal. MD assessed at bedside. Had two fluid boluses yesterday, evaluating for fluid overload vs worsening pna.

## 2022-10-27 NOTE — PROVIDER NOTIFICATION
Paged:     Nicki Clinton MD    Pt having new dry cough. Could we get order for tesslon pearls and robitussin? Thanks.     Cuca [phone]

## 2022-10-27 NOTE — PLAN OF CARE
Goal Outcome Evaluation:       Pt A&Ox4 with VSS on 2L NC throughout shift. Pt had one episode of HTN (172/91) accompanied with compliants of SOB. O2 stats WDL. Given inhaler and paged for tessalon and robitussin for new dry cough. Complaints of pain in R hip at, given prn oxy x1 with some relief noted. No episodes of diarrhea this shift, good urine output noted. Continue with POC.

## 2022-10-27 NOTE — CONSULTS
Care Management Initial Consult    General Information  Assessment completed with: Patient,    Type of CM/SW Visit: Initial Assessment    Primary Care Provider verified and updated as needed: Yes   Readmission within the last 30 days: current reason for admission unrelated to previous admission   Return Category: Exacerbation of disease  Reason for Consult: discharge planning  Advance Care Planning: Advance Care Planning Reviewed: no concerns identified          Communication Assessment  Patient's communication style: spoken language (English or Bilingual)    Hearing Difficulty or Deaf: no   Wear Glasses or Blind: yes    Cognitive  Cognitive/Neuro/Behavioral: WDL                      Living Environment:   People in home: sibling(s), other relative(s)     Current living Arrangements: apartment      Able to return to prior arrangements: yes       Family/Social Support:  Care provided by: homecare agency (PCA)  Provides care for: no one, unable/limited ability to care for self  Marital Status: Single  Sibling(s), PCA          Description of Support System: Supportive    Support Assessment: Adequate family and caregiver support    Current Resources:   Patient receiving home care services: Yes  Skilled Home Care Services: Skilled Nursing, Physicial Therapy, Home Health Aid, Occupational Therapy  Community Resources: PCA, Home Care  Equipment currently used at home: walker, rolling  Supplies currently used at home: Wound Care Supplies    Employment/Financial:  Employment Status: disabled        Financial Concerns:             Lifestyle & Psychosocial Needs:  Social Determinants of Health     Tobacco Use: Low Risk      Smoking Tobacco Use: Never     Smokeless Tobacco Use: Never     Passive Exposure: Not on file   Alcohol Use: Not on file   Financial Resource Strain: Not on file   Food Insecurity: Not on file   Transportation Needs: Not on file   Physical Activity: Not on file   Stress: Not on file   Social Connections: Not  on file   Intimate Partner Violence: Not on file   Depression: Not at risk     PHQ-2 Score: 0   Housing Stability: Not on file       Functional Status:  Prior to admission patient needed assistance:   Dependent ADLs:: Ambulation-walker, Wheelchair-independent  Dependent IADLs:: Transportation, Shopping       Mental Health Status:  Mental Health Status: No Current Concerns       Chemical Dependency Status:                Values/Beliefs:  Spiritual, Cultural Beliefs, [employer] Practices, Values that affect care:    Description of Beliefs that Will Affect Care: Latter day            Additional Information:  47 year old male with history of 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 Recently discharged on 10/19 with home care for SN, PT and OT. ANASTASIA 1-2 days pending response to PO Abx.    CMA completed at the bedside with Pt d/t elevated risk score. Pt confirmed PCP and insurance as well as home care and PCA services in the home. He stated [facility] supplies his O2. Pt will need medical transport at [place] with O2 tank. Pt stated he does not have a portable tank here but does have is WC.     Bob Beyer  SN  PT  OT  Weekly Lab Draws    Ph:[phone]   Fax:[phone]  * resumption orders placed, please update HC with ANASTASIA      [facility]  Ph: [phone]  * Ensure they can supply O2 tank with transport     [facility] [place]  * Pt stated he gets 65 hours a week for PCA services that included overnight support    Pt stated he is in the process of applying for a CADI Waiver so he can move to a group home or [facility]. Pt stated his sister and his nice live with him but his niece has cerebral palsy and his sister had her toes amputated and he sometimes struggles to get the help  he needs. Pt was encouraged to share contact information with the [facility] so RN[facility] or SW could help him follow up while he is in the hospital. He stated he would have his sister bring that information to the hospital.     Care management will continue to follow and support safe discharge planning as needed.     Tonay Cordon RN  RN[facility] Float   [phone]

## 2022-10-28 NOTE — PROGRESS NOTES
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 Admitted for acute on chronic anemia and leukocytosis concerning for infection.      # Metastatic Osteosarcoma   In brief, he initially presented to Dr. Whitaker with rapidly progressing right leg swelling since July 2021. MRI showed a 29 x 20 cm right thigh soft tissue mass with probable bony involvement of the proximal right femur. Biopsy 9/24/21 c/w high grade pleomorphic sarcoma. Neoadjuvant chemotherapy was recommended as PET was concerning for inguinal lymphadenopathy. He initiated Doxil/Ifos(C6F1=93/2/21) c/b confusion concerning for ifosfamide neurotoxicity for which he received Methylene Blue. C2 c/b severe neurotoxicity and again improved with Methylene blue. Remainder of cycle 2 was not completed. PET 1/11/22 decrease size of right thigh hypermetabolic soft tissue mass. Due to neurotoxicity patient was hesitant to receive for chemotherapy. He was admitted in June 2022 with right leg pain and inability to ambulate. He was found to have a right proximal femur fracture and underwent right hindquarter amputation with Dr. Whitaker and primary complex wound closure with Dr. Butt (6/17/22). Surgical pathology positive for high-grade osteosarcoma. CT AP 8/13/22 for surveillance with multiple  hypodense masses within the lateral abdominal wall and multiple pulmonary nodules c/w metastatic disease. He started Doxorubicin/Cisplatin 9/1/22. He was then readmitted 9/17/22 for right side weakness and MRI demonstrated left hemispheric stroke. Due to concern for intolerance of further platinum/anthracycline therapy he was switched to Pazopanib started on 10/16/22.   - Patient will need to provide his own supply of Pazopanib to take while in the hospital. Ok to hold until this is brought in and then assess continuation.         # Acute on Chronic Anemia   On admission noted to have Hgb 6.9.   - Transfuse to keep Hgb >7      # Concern for infection   # Leukocytosis   # Tachycardia   Patient was directed to the ED for lab abnormalities (Hgb 7.2, and WBC 16.3).  Concerning for infection. Infectious work up notable for CXR with mixed opacities suspicious for pneumonia. He received 1L IVF in the ED   - IV Zosyn and Vanco (10/24-10/26)  - Transitioned to PO Doxycycline and Vantin (x10/26) with placen to complete a 10-day course ending 11/2      # H/o Pulmonary Embolism   # H/o L Hemispheric ischemic stroke   CT PE 8/31 notable for pulmonary vein thrombus arising from a pulmonary vein in the RUL. Thrombus extends from a pulmonary mass most likely representing tumor thrombus. AC was deferred as it was thought to be tumor thrombus. He then presented with right sided weakness and MRI 9/16/22 showed left hemisphere hyperintensities consistent with acute strokes at that time w/ etiology felt to be hypercoagulability in setting of malignancy given recent PE.  - PTA Eliquis resumed (x10/26)       Patient's plan of care was discussed with attending physician Dr. Sumner.    Thank you for the opportunity to partake in this patients plan of care. Please do not hesitate to page with questions. We will continue to follow.     I spent >35  minutes face-to-face or coordinating care of Vic Scott III. Over 50% of our time on the  unit was spent counseling the patient and/or coordinating care.    Dione Amaro PA-C ([facility])   Hematology/Oncology   Pager: [phone]   ___________________________________________________________________    Subjective & Interval History:    Nursing notes reviewed. Ventura is doing a bit better today. He was able to sleep a bit better overnight. He reports his breathing is about the same. He continues to have a dry cough.Reviewed CT scan and no new changes noted. He voiced understanding. Discussed trying nebulizer to see if this may help his sob and wheezing. Patient was agreeable. All questions answered at this time.     A comprehensive review of systems was obtained and is negative other than noted here or in the HPI.       Physical Exam:    Blood pressure (!) 148/102, pulse (!) 131, temperature 97.1  F (36.2  C), temperature source Temporal, resp. rate 20, weight 80.2 kg (176 lb 14.4 oz), SpO2 98 %.    General: lying in bed, no acute distress  HEENT: sclera anicteric, EOMI, MMM  Neck: supple, normal ROM  CV: Tachycardic, normal S1/S2, no m/r/g  Resp: tachypnic, crackles L>R, currently on 3L NC   MSK: RLE amputation noted, warm and well-perfused, normal tone  Skin: no rashes on limited exam, no jaundice  Neuro: Alert and interactive, moves all extremities equally, no focal deficits    Labs & Studies: I personally reviewed the following studies:  ROUTINE LABS (Last four results):  CMP  Recent Labs   Lab 10/28/22  0609 10/27/22  0656 10/26/22  0538 10/24/22  2037    138 138 141   POTASSIUM 4.6 4.3 4.3 4.1   CHLORIDE 106 106 106 104   CO2 20* 19* 21* 24   ANIONGAP 11 13 11 13   * 109* 84 108*   BUN 19.2 16.0 15.4 12.3   CR 1.05 0.97 1.12 0.67   GFRESTIMATED 89 >90 82 >90   VENTURA 9.1 8.9 8.8 9.0   MAG  --   --  1.8  --    PHOS  --   --  5.2*  --    PROTTOTAL 6.1* 6.1*  6.1* 6.1* 7.0   ALBUMIN 2.7* 2.8* 2.8* 3.3*   BILITOTAL 0.7 0.8 0.9 1.2   ALKPHOS 108 122 121 149*   AST 22 21 21 29   ALT <5* 7* 7* 9*      CBC  Recent Labs   Lab 10/28/22  0609 10/27/22  0656 10/26/22  0538 10/24/22  2037   WBC 17.2* 18.9* 12.0* 17.3*   RBC 3.01* 3.36* 3.11* 2.66*   HGB 8.0* 8.9* 8.3* 6.9*   HCT 25.8* 28.2* 26.0* 22.2*   MCV 86 84 84 84   MCH 26.6 26.5 26.7 25.9*   MCHC 31.0* 31.6 31.9 31.1*   RDW 19.4* 19.1* 19.0* 20.0*    303 267 265     INR  Recent Labs   Lab 10/28/22  0609 10/27/22  0656 10/26/22  0538   INR 1.46* 1.23* 1.20*       Medications list for reference:  Current Facility-Administered Medications   Medication     acetaminophen (TYLENOL) tablet 975 mg     apixaban ANTICOAGULANT (ELIQUIS) tablet 5 mg     benzocaine-menthol (CHLORASEPTIC) 6-10 MG lozenge 1 lozenge     benzonatate (TESSALON) capsule 100 mg     cefpodoxime (VANTIN) tablet 200 mg     doxycycline hyclate (VIBRAMYCIN) capsule 100 mg     gabapentin (NEURONTIN) capsule 300 mg     guaiFENesin-codeine (ROBITUSSIN AC) 100-10 MG/5ML solution 5 mL     HYDROmorphone (PF) (DILAUDID) injection 0.5-1 mg     ipratropium - albuterol 0.5 mg/2.5 mg/3 mL (DUONEB) neb solution 3 mL     lidocaine (LMX4) cream     lidocaine 1 % 0.1-1 mL     loperamide (IMODIUM) capsule 2 mg     magnesium oxide (MAG-OX) tablet 400 mg     melatonin tablet 1 mg     naloxone (NARCAN) injection 0.2 mg    Or     naloxone (NARCAN) injection 0.4 mg    Or     naloxone (NARCAN) injection 0.2 mg    Or     naloxone (NARCAN) injection 0.4 mg     OLANZapine (zyPREXA) tablet 2.5 mg     ondansetron (ZOFRAN ODT) ODT tab 4 mg    Or     ondansetron (ZOFRAN) injection 4 mg     oxyCODONE IR (ROXICODONE) tablet 10 mg     pazopanib (VOTRIENT) tablet 800 mg     sodium chloride (PF) 0.9% PF flush 3 mL     sodium chloride (PF) 0.9% PF flush 3 mL     umeclidinium-vilanterol (ANORO ELLIPTA) 62.5-25 MCG/INH oral inhaler 1 puff

## 2022-10-28 NOTE — PLAN OF CARE
[id number]:     Pt A&Ox4. Continued baseline tachycardia and HTN with OVSS on 3L NC. No complaints of N/V. Some dyspnea noted with cares and continued SOB at baseline. Pain continued in R hip. Given prn oxy x1 with relief. Pt continued to have productive/dry cough. Tessolon given x1 and robitussin x1 with little relief. Incontinent of stool x1. Good urine output

## 2022-10-28 NOTE — PROVIDER NOTIFICATION
Provider Notification    Re: Patient's sister is upset about recent care, would like an update from the team when able. Gracy Vora, Fabian is okay for info to be shared. Also she is not happy about home medication being brought in, wants our Pharamacy to supply. Please advise.    Action: Notified Dr. Rowe at #[phone]    Response: Primary and Oncology team aware. Primary attempted to call but sister number is not accurate in chart and provided number was an unidentified voicemail. Will try to obtain number and update chart. Oncology Fellow came by, but patient was not at bedside and number is not accurate.

## 2022-10-28 NOTE — PLAN OF CARE
Goal Outcome Evaluation:  [id number]  Alert and oriented x4, intermittently tachy and hypertensive with 3L of oxygen via NC sating in the upper 90s. Pain rated  9/10 managed with prn iv Dilaudid x1. Continues with dry cough, robitussin given. Denies nausea or vomiting. Had 1 episode of  Stool incontinence.  Patient reports he slept better tonight. Continue to monitor and w/POC.

## 2022-10-28 NOTE — PROGRESS NOTES
[facility]    Medicine Progress Note - Hospitalist Service, GOLD TEAM 11    Date of Admission:  10/24/2022    Assessment & Plan   Vic Scott III is a 47 year old male with history of metastatic high grade pleomorphic sarcoma of the right pelvis and femur s/p chemotherapy and right hindquarter amputation, hemipelvectomy (June 17/2022) with a recent admission from 10/16-10/19 for acute on chronic hypoxic respiratory failure 2/2 CAP and acute on chronic anemia requiring transfusion. He presented to the ED in the setting of abnormal labs and is admitted with concern for sepsis as well as acute on chronic anemia.     Severe sepsis (resolved) 2/2 unknown source  Possible CAP  Pleural effusions  Patient meets criteria with tachycardia, leukocytosis, elevated lactic acid.  Source unclear, broad work-up and cautious approach given metastatic cancer currently on treatment.  Pro-Fabian, ESR, CRP all elevated. Enteric panel and C. difficile negative.  Respiratory panel negative including COVID.  Notably does have increased opacities on CXR that may be consistent with infection.  UA unremarkable. Discussed with oncology team today, and ultimately favor empiric course of treatment given immunosuppression and objective evidence of acute process with elevated inflammatory markers. Pna seems the most likely source, and it is possible that he did not adequately respond to his prior course due to immunosuppression vs having viral infection on top of that episode.  Worsened resp distress 10/27. CXR looked unchanged or slightly improved in regards to infiltrate though re-demonstrates effusions. Attempted thora, but US imaging revealed no significant fluid and presence of large mass. CTA negative for PE and appears stable compared to earlier this month.   - Monitoring cultures, remain negative  - Continue doxycycline/cefpodoxime  - Adding duonebs today for wheezing on exam -- pt has had  improvement with this in the past  - Adding cough drops in addition to robitussin/tessalon  - Improving again today, pt able to sleep last night    JAZZMINE, improved  Creatinine increased to 1.2 from baseline ~0.6-0.7 on 10/26. Suspect prerenal in the setting of ?sepsis and diarrhea.  - Monitor     Acute on chronic anemia  Patient noted to have hemoglobin of 6.9 after drop on routine outpatient labs.  He did receive a transfusion in the ED. No overt signs of blood loss.  - Daily CBC  - Transfuse to keep Hgb >7     H/o PE  H/o L hemispheric ischemic stroke  Stroke identified during 9/2022 admission, felt to be due to hypercoagulable state related to malignancy so initiated on anticoagulation.  - Resumed apixaban 10/26 after discussing with onc      Metastatic high-grade pleomorphic sarcoma of R pelvis/femur s/p chemo and amputation/hemipelvectomy (6/2022)  Mets to lungs, abdominal wall  Primary oncologist Dr. Ambrocio. Currently on therapy with pazopanib.   - Pt's sister bringing his home supply of pazopanib today  - Resumption per oncology team    Moderate malnutrition in the context of acute on chronic illness  - Nutrition consulted, appreciate recs  - Ensure supplements ordered       Diet: Regular Diet Adult  Snacks/Supplements Adult: Ensure Enlive; With Meals    DVT Prophylaxis: DOAC  Sung Catheter: Not present  Central Lines: PRESENT       Cardiac Monitoring: None  Code Status: Full Code      Disposition Plan      Expected Discharge Date: 10/30/2022      Destination: home with help/services  Discharge Comments: Pending continued improvement in breathing and not needing IV pain meds.        The patient's care was discussed with the Bedside Nurse, Patient and Onc Consultant.    Mel Yen MD  Hospitalist Service, [address]  Securely message with the Vocera Web Console (learn more here)  Text page via Sup[employer] Paging/Directory   Please see signed in  provider for up to date coverage information      Clinically Significant Risk Factors              # Hypoalbuminemia: Lowest albumin = 2.8 g/dL (Ref range: 3.5-5.2) at 10/27/2022  6:56 AM, will monitor as appropriate           # Moderate Malnutrition: based on nutrition assessment, PRESENT ON ADMISSION       ______________________________________________________________________    Interval History   No acute events overnight. Pain better controlled with addition of IV dilaudid for breakthrough. Slept well last night and feeling much improved today. Still having bothersome cough and side pain. Definite improvement in shortness of breath. No fevers, chills, nausea.    Four-point ROS negative unless noted above.    Data reviewed today: I reviewed all medications, new labs and imaging results over the last 24 hours. I personally reviewed no images or EKG's today.    Physical Exam   Vital Signs: Temp: 97.1  F (36.2  C) Temp src: Temporal BP: (!) 151/86 Pulse: 114   Resp: 20 SpO2: 100 % O2 Device: None (Room air) Oxygen Delivery: 3 LPM  Weight: 170 lbs 4.8 oz  Constitutional: awake, alert  Respiratory: breathing comfortably, wheezy on the left>right  Cardiovascular: tachycardic but regular, no murmur noted  GI: normal bowel sounds, soft, non-distended, non-tender  Skin: normal skin color, texture, turgor  Musculoskeletal: R leg amuputation  Neurologic: alert and oriented x3    Data   Recent Labs   Lab 10/28/22  0609 10/27/22  0656 10/26/22  0538 10/24/22  2037   WBC 17.2* 18.9* 12.0* 17.3*   HGB 8.0* 8.9* 8.3* 6.9*   MCV 86 84 84 84    303 267 265   INR 1.46* 1.23* 1.20*  --    NA  --  138 138 141   POTASSIUM  --  4.3 4.3 4.1   CHLORIDE  --  106 106 104   CO2  --  19* 21* 24   BUN  --  16.0 15.4 12.3   CR  --  0.97 1.12 0.67   ANIONGAP  --  13 11 13   VENTURA  --  8.9 8.8 9.0   GLC  --  109* 84 108*   ALBUMIN  --  2.8* 2.8* 3.3*   PROTTOTAL  --  6.1*  6.1* 6.1* 7.0   BILITOTAL  --  0.8 0.9 1.2   ALKPHOS  --  122 121  149*   ALT  --  7* 7* 9*   AST  --  21 21 29     Recent Results (from the past 24 hour(s))   XR Chest Port 1 View    Narrative    Portable chest    INDICATION: Increased shortness of breath/cough    COMPARISON: 10/24/2022    FINDINGS: Heart is enlarged. Nodular and patchy opacities in the lungs  are unchanged. Right IJ portacatheter tip in the distal SVC. Right  costophrenic angle blunting loss of the left costophrenic angle  increased retrocardiac opacification all appear similar.      Impression    IMPRESSION: Unchanged multiple pulmonary nodules or masses. Continued  right pleural effusion an retrocardiac atelectasis possible left  pleural effusion.    GODWIN ENRIQUEZ MD         SYSTEM ID:  [id number]   POC US GUIDE FOR THORACENTESIS    Impression    Heterogenous lung parenchyma without evidence of effusion noted in bibasilar lung fields. Findings communicated to primary service     CT Chest Pulmonary Embolism w Contrast    Narrative    EXAM: CT CHEST PULMONARY EMBOLISM W CONTRAST  10/27/2022 1:21 PM     HISTORY:  worsening sob; r/o additional PEs; eval mass/effusion on  left       COMPARISON:  Chest x-ray 10/24/2022 and chest CT 10/16/2022.    TECHNIQUE: Helical technique CT chest ;axial slices with sagittal and  coronal reconstructions. Total DLP: mGy*cm.    FINDINGS:  LUNGS:  No evidence of pulmonary embolus. Postoperative changes of right  middle lobectomy. There is redemonstration of innumerable amount of  varying sized round/ovoid consolidative nodular metastatic lesions  without significant change since 10/16/2022. Stable mild/moderate  right pleural effusion with unchanged minimal/trace left pleural  effusion and associated mild atelectasis. No pneumothorax.    CHEST, MEDIASTINUM, AND AXILLA:  The thyroid is unremarkable. Right chest wall Port-A-Cath tip  terminates at the SVC-atrial junction. Heart size is within normal  limits, somewhat externally compressed from metastatic lesions. The  main  pulmonary artery diameter is stable mildly dilated measuring up  to 3.2 cm. No abnormal intracardiac contrast filling defects or  evidence of right heart strain. No discretely visualized mediastinal,  axillary, or hilar lymphadenopathy. Patent distal esophagus.    UPPER ABDOMEN:  Non-portovenous contrast phase technique of the limited upper abdomen  is unremarkable. No appreciable abdominal lymphadenopathy.    BONES AND SOFT TISSUES:  Degenerative changes of the visualized spine. No aggressive or  suspicious osseous/soft tissue lesion identified.      Impression    IMPRESSION:  1. Redemonstration of innumerable varying sized round/ovoid  consolidative nodular metastatic lesions without significant change  since 10/16/2022, in the setting of known osteosarcoma.   2. Stable mild/moderate right pleural effusion with unchanged  minimal/trace left pleural effusion and associated mild atelectasis.    I have personally reviewed the examination and initial interpretation  and I agree with the findings.    GODWIN ENRIQUEZ MD         SYSTEM ID:  [id number]

## 2022-10-28 NOTE — PROGRESS NOTES
RT NOTE:    RT called to assess need for PRN neb. Arrived pt on 3 LPM NC with bubbler. No SOB/increased WOB noted upon arrival. VSS, SpO2 % entirety of treatment    Neb given, pt fell asleep 2x while doing treatment. Needed some encouragement to take deep breaths. States he liked tx and might ask for one later. Unsure how beneficial tx is at this time.     Audrey Ferguson, LRT, BSRT-RRT

## 2022-10-28 NOTE — PROGRESS NOTES
Patient was coughing in the morning time with no output and Robitussin and Tessalon was given with relief, MD ordered Neb Treatment too, abdominal pain is managed with tylenol and oxycodone.  Good appetite, on 3 liters and sating in the upper 90's. HR around 130's  Denies nausea/vomitting. Abdomen is distended. Alert and oriented.Continue to monitor.

## 2022-10-29 NOTE — PLAN OF CARE
[id number]:     Tachycardic, OVSS on 3L. Denies nausea. Pt reports SOB with activity. Dry cough. PRN tessalon saud given x1. Pain 10/10 R hip, PRN dilaudid given x1. Heat aqua pad in place for comfort. Pt voiding adequately via bedside urinal overnight. No BM this shift. Sleeping between cares.

## 2022-10-29 NOTE — PROGRESS NOTES
Patient got neb treatment this morning with some relief, and pain is being managed oxycodone. Not much coughing noted today.  Patient had bed bath and skin barrier applied on the sacral area, left leg lotioned.  Tolerating regular diet, denies nausea, voided in urinal but no BM. Continue to monitor,

## 2022-10-29 NOTE — PLAN OF CARE
Goal Outcome Evaluation:[id number]  BP (!) 142/77 (BP Location: Left arm)   Pulse (!) 134   Temp 98.4  F (36.9  C) (Oral)   Resp 18   Wt 80.2 kg (176 lb 14.4 oz)   SpO2 96%   BMI 22.71 kg/m      Tachycardic. Shortness of breath present, oxygen sats are % on 3 L NC. Nebulizer and cough medications given. Up to wheelchair most of shift. Two incontinent stools. Pain reported to be on the right hip. Heat/aqua pad used. Left lower lobe coarse. Cough is dry, encouraged IS use. Mepelex to lower back. Redness on buttock, ointment applied with clean up. Will continue with plan of care.       Plan of Care Reviewed With: patient    Overall Patient Progress: no changeOverall Patient Progress: no change

## 2022-10-29 NOTE — PROGRESS NOTES
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 He presented to the ED in the setting of abnormal labs and is admitted with concern for sepsis as well as acute on chronic anemia.     Severe sepsis (resolved) 2/2 unknown source  Possible CAP  Pleural effusions  Patient meets criteria with tachycardia, leukocytosis, elevated lactic acid.  Source unclear, broad work-up and cautious approach given metastatic cancer currently on treatment.  Pro-Fabian, ESR, CRP all elevated. Enteric panel and C. difficile negative.  Respiratory panel negative including COVID.  Notably does have increased opacities on CXR that may be consistent with infection.  UA unremarkable. Discussed with oncology team today, and ultimately favor empiric course of treatment given immunosuppression and objective evidence of acute process with elevated inflammatory markers. Pna seems the most likely source, and it is possible that he did not adequately respond to his prior course due to immunosuppression vs having viral infection on top of that episode.  Worsened resp distress 10/27. CXR looked unchanged or slightly improved in regards to infiltrate though re-demonstrates effusions. Attempted thora, but US imaging revealed no significant fluid and presence of large mass. CTA negative for PE and appears stable compared to earlier this month.   - Monitoring cultures, remain negative  - Continue doxycycline/cefpodoxime  - Continue duonebs--> pt reports that these help with  symptoms  - Prn robitussin/tessalon/cough drops    Pain 2/2 cancer  Patient having pain at the site of the amputation that has been worse since being in the hospital. He thinks it is related to coughing and being moved in bed. Managed with addition of IV dilaudid. Exam does not appear different, no skin changes, no palpable masses/fluctuance. Skin is warm due to pt using hot pad.  - Recommended CT scan to eval area and look for changes to mass vs evidence of new infection, pt prefers not to currently as he feels like it is unchanged  - Continue scheduled tylenol, gabapentin, and oxy prn for pain  - IV dilaudid for breakthrough    JAZZMINE, improved  Creatinine increased to 1.2 from baseline ~0.6-0.7 on 10/26. Suspect prerenal in the setting of ?sepsis and diarrhea.  - Monitor     Acute on chronic anemia  Patient noted to have hemoglobin of 6.9 after drop on routine outpatient labs.  He did receive a transfusion in the ED. No overt signs of blood loss.  - Daily CBC  - Transfuse to keep Hgb >7     H/o PE  H/o L hemispheric ischemic stroke  Stroke identified during 9/2022 admission, felt to be due to hypercoagulable state related to malignancy so initiated on anticoagulation.  - Resumed apixaban 10/26 after discussing with onc      Metastatic high-grade pleomorphic sarcoma of R pelvis/femur s/p chemo and amputation/hemipelvectomy (6/2022)  Mets to lungs, abdominal wall  Primary oncologist Dr. Ambrocio. Currently on therapy with pazopanib.  - Resumption of pazopanib per oncology team pending availability of patient's home supply  - Pt's sister would like to speak with onc team   - May benefit from re-involving pall care/having a conference with his onc team this week once they are back to address some of sister's concerns about level of care as well as these persistent/worsening symptoms    Moderate malnutrition in the context of acute on chronic illness  - Nutrition consulted, appreciate recs  - Ensure supplements  ordered       Diet: Regular Diet Adult  Snacks/Supplements Adult: Ensure Enlive; With Meals    DVT Prophylaxis: DOAC  Sung Catheter: Not present  Central Lines: PRESENT       Cardiac Monitoring: None  Code Status: Full Code      Disposition Plan      Expected Discharge Date: 10/31/2022      Destination: home with help/services  Discharge Comments: Pending continued improvement in breathing and not needing IV pain meds.  Goals of care conference? Group Home Conversation to start maybe Monday 10/31        The patient's care was discussed with the Bedside Nurse, Patient and Patient's Family.    Mel Yen MD  Hospitalist Service, GOLD TEAM [address] [place] [address]  Securely message with the Vocera Web Console (learn more here)  Text page via [facility] Paging/Directory   Please see signed in provider for up to date coverage information      Clinically Significant Risk Factors           # Hypercalcemia: corrected calcium is >10.1, will monitor as appropriate    # Hypoalbuminemia: Lowest albumin = 2.7 g/dL (Ref range: 3.5-5.2) at 10/28/2022  6:09 AM, will monitor as appropriate           # Moderate Malnutrition: based on nutrition assessment        ______________________________________________________________________    Interval History   No acute events overnight. Patient continues to have ongoing pain in his right hip. He feels like it is being made worse by coughing and adjustments in the bed. The cough is a little better, though still ongoing. Patient had just received a neb treatment with RT and says that it did help his breathing to feel better. Slept okay, but not all through the night. No fevers, chills, n/v.    Four-point ROS negative unless noted above.    I also spoke with the patient's sister via phone in his room today. She has had concerns about his care and questions about the plan. I reviewed his hospital course and the workup that we have done so far that has  demonstrated stable masses and no evidence for new/worsening infection or fluid that we can drain. She shares that she is worried about the cancer being so advanced and also notes worries about the level of care that he needs.  She would also like to talk with the oncology team regarding this.    Data reviewed today: I reviewed all medications, new labs and imaging results over the last 24 hours. I personally reviewed no images or EKG's today.    Physical Exam   Vital Signs: Temp: 97.8  F (36.6  C) Temp src: Oral BP: (!) 143/79 Pulse: (!) 128   Resp: 18 SpO2: 96 % O2 Device: Nasal cannula Oxygen Delivery: 3 LPM  Weight: 176 lbs 14.4 oz  Constitutional: awake, alert, nad  Respiratory: tachypneic, CTAB immediately following neb  Cardiovascular: tachycardic but regular  GI: soft, non-distended, non-tender  Skin: no rashes  Musculoskeletal: R leg amuputation; stump non-tender to palpation with no masses/fluctuance noted on exam  Neurologic: alert and oriented x3    Data   Recent Labs   Lab 10/28/22  0609 10/27/22  0656 10/26/22  0538   WBC 17.2* 18.9* 12.0*   HGB 8.0* 8.9* 8.3*   MCV 86 84 84    303 267   INR 1.46* 1.23* 1.20*    138 138   POTASSIUM 4.6 4.3 4.3   CHLORIDE 106 106 106   CO2 20* 19* 21*   BUN 19.2 16.0 15.4   CR 1.05 0.97 1.12   ANIONGAP 11 13 11   VENTURA 9.1 8.9 8.8   * 109* 84   ALBUMIN 2.7* 2.8* 2.8*   PROTTOTAL 6.1* 6.1*  6.1* 6.1*   BILITOTAL 0.7 0.8 0.9   ALKPHOS 108 122 121   ALT <5* 7* 7*   AST 22 21 21     No results found for this or any previous visit (from the past 24 hour(s)).

## 2022-10-29 NOTE — PROGRESS NOTES
Care Management Follow Up    Length of Stay (days): 5    Expected Discharge Date: 10/30/2022     Concerns to be Addressed: all concerns addressed in this encounter     Patient plan of care discussed at interdisciplinary rounds: Yes    Anticipated Discharge Disposition: Home Care     Anticipated Discharge Services: PCA  Anticipated Discharge DME:      Patient/family educated on Medicare website which has current facility and service quality ratings:    Education Provided on the Discharge Plan:    Patient/Family in Agreement with the Plan: yes    Referrals Placed by CM/SW:    Private pay costs discussed: Not applicable    Additional Information:  DANNY received request to speak with the pt's sister Lauryn regarding concerns she had. DANNY confirmed with the pt that he is ok with SW reaching out and he agreed. DANNY called Lauryn [phone], she noted that she is feeling a bit lost with the pt's care and what the best steps are for him as well as her. Lauryn shared she is feeling overwhelmed with all the care the pt is needing, she noted he is only getting 6.25 hrs a day of PCA services, but they have been asking for more. She notes that pt has been in and out of the hospital and she feels that his need are not being meet at home and the level of care is something she can not provide due to her own needs as well as the care her child needs. Lauryn discussed that she has talked to the pt about moving to [place] or a group home, DANNY explained that the pt is to young for an [place] in [place], but a group home could be an options to consider. Discussed that we would need to have the [facility] assess for waivered services to see if he would qualify. Jerri has been talking to someone at the [facility], but she was told they could not help with the assessment. DANNY discussed we could follow-up on Monday when the office are open to request an assessment. Lauryn will follow-up with the contact information for the person she has been talking with.     DANNY explained  that we can not guarantee that this can be accomplished while the pt is IP, but we can help get the process started, but much will be dependent on group homes having openings. Lauryn is aware and does express wanting the pt to be in a place where his needs are met. Lauryn plans to come speak to the pt more about this today. Lauryn was very emotional and tearful during our call. SW provided validation and emotional support.     TY Norris, [facility]  Social Work- Weekend Coverage 7C, 7D, and 5C  Pager [phone]

## 2022-10-30 NOTE — CARE PLAN
"Speech therapy: Orders received and acknowledged from clinical swallow evaluation. Pt is currently on regular/thin liquid diet. RN reports no concerns with pt swallowing today. Per MD note, \"Pt describes a central chest/epigastric \"pain\" that feels like something was stuck in his throat. Made him reluctant to swallow pills last night. Is now resolved, but reports he has never had something like that before.\"    SLP followed up with pt and asked if he had concerns with swallow pt reports, \"not with swallowing, more just a chest pain\" and pointing to mid-chest. Pt reports no concerns with pharyngeal swallow. Defer full clinical swallow evaluation at this time and recommend continuing regular/thin liquid diet. If concern for epigastric pain continues pt may benefit from GI consult and/or esophogram at that time. SLP will complete orders.   "

## 2022-10-30 NOTE — PLAN OF CARE
Goal Outcome Evaluation:    VSS, on 2L O2, A&Ox4, IV dilaudid given for pain. Adequate urine output during shift. No BM during shift. Ok appetite during shift. Continue with plan of care.

## 2022-10-30 NOTE — PLAN OF CARE
[id number]    Tachycardic in 130's. OVSS on 2LO2. PRN oxy given x2, pt encouraged to stay on top of PO pain meds so he doesent end up needing dilaudid and was agreeable to this. Overnight and this AM chest discomfort now resolved. PO chemo from home still has not arrived, unsure on status of this medication. Continue with POC.

## 2022-10-30 NOTE — PROGRESS NOTES
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 He presented to the ED in the setting of abnormal labs and is admitted with concern for sepsis as well as acute on chronic anemia.    Severe sepsis (resolved) 2/2 unknown source  Possible CAP  Pleural effusions  Patient meets criteria with tachycardia, leukocytosis, elevated lactic acid.  Source unclear, broad work-up and cautious approach given metastatic cancer currently on treatment.  Pro-Fabian, ESR, CRP all elevated. Enteric panel and C. difficile negative.  Respiratory panel negative including COVID.  Notably does have increased opacities on CXR that may be consistent with infection.  UA unremarkable. Discussed with oncology team today, and ultimately favor empiric course of treatment given immunosuppression and objective evidence of acute process with elevated inflammatory markers. Pna seems the most likely source, and it is possible that he did not adequately respond to his prior course due to immunosuppression vs having viral infection on top of that episode.  Worsened resp distress 10/27. CXR looked unchanged or slightly improved in regards to infiltrate though re-demonstrates effusions. Attempted thora, but US imaging revealed no significant fluid and presence of large mass. CTA negative for PE and appears stable compared to earlier this month.   - Monitoring cultures, remain negative  - Continue doxycycline/cefpodoxime  - Continue duonebs--> pt continues to find these helpful,  "asking about being able to do this at home  - Prn robitussin/tessalon/cough drops    Pain 2/2 cancer  Patient having pain at the site of the amputation that has been worse since being in the hospital. He thinks it is related to coughing and being moved in bed. Managed with addition of IV dilaudid. Exam does not appear different, no skin changes, no palpable masses/fluctuance.  - Recommended CT scan to eval area and look for changes to mass vs evidence of new infection, pt prefers not to currently as he feels like it is unchanged  - Continue scheduled tylenol, gabapentin, and oxy prn for pain  - IV dilaudid for breakthrough  - Patient thinks that it may be helpful to discuss pain control with palliative care again given the increased difficulty controlling it this admission, consult placed for tomorrow    Sensation of something stuck in throat  Pt describes a central chest/epigastric \"pain\" that feels like something was stuck in his throat. Made him reluctant to swallow pills last night. Is now resolved, but reports he has never had something like that before.  - SLP consult, appreciate recs  - Maybe swallow imaging?    JAZZMINE, improved  Creatinine increased to 1.2 from baseline ~0.6-0.7 on 10/26. Suspect prerenal in the setting of ?sepsis and diarrhea.  - Monitor     Acute on chronic anemia  Patient noted to have hemoglobin of 6.9 after drop on routine outpatient labs.  He did receive a transfusion in the ED. No overt signs of blood loss.  - Daily CBC  - Transfuse to keep Hgb >7     H/o PE  H/o L hemispheric ischemic stroke  Stroke identified during 9/2022 admission, felt to be due to hypercoagulable state related to malignancy so initiated on anticoagulation.  - Resumed apixaban 10/26 after discussing with onc      Metastatic high-grade pleomorphic sarcoma of R pelvis/femur s/p chemo and amputation/hemipelvectomy (6/2022)  Mets to lungs, abdominal wall  Primary oncologist Dr. Ambrocio. Currently on therapy with " pazopanib.  - Resumption of pazopanib per oncology team pending availability of patient's home supply  - May benefit from re-involving pall care/having a conference with his onc team this week once they are back to address some of sister's concerns about level of care as well as these persistent/worsening symptoms    Moderate malnutrition in the context of acute on chronic illness  - Nutrition consulted, appreciate recs  - Ensure supplements ordered       Diet: Regular Diet Adult  Snacks/Supplements Adult: Ensure Enlive; With Meals    DVT Prophylaxis: DOAC  Sung Catheter: Not present  Central Lines: PRESENT       Cardiac Monitoring: None  Code Status: Full Code      Disposition Plan      Expected Discharge Date: 11/01/2022      Destination: home with help/services  Discharge Comments: Pending continued improvement in breathing and not needing IV pain meds.  Goals of care conference? Group Home Conversation to start maybe Monday 10/31        The patient's care was discussed with the Bedside Nurse and Patient. VM left for patient's sister.    Mel Yen MD  Hospitalist Service, GOLD TEAM [address] [place] [address]  Securely message with the Vocera Web Console (learn more here)  Text page via [facility] Paging/Directory   Please see signed in provider for up to date coverage information      Clinically Significant Risk Factors              # Hypoalbuminemia: Lowest albumin = 2.7 g/dL (Ref range: 3.5-5.2) at 10/28/2022  6:09 AM, will monitor as appropriate           # Moderate Malnutrition: based on nutrition assessment        ______________________________________________________________________    Interval History   Patient had episode of increased chest discomfort overnight. On overnight assessment, it appeared to be related to increased wheezing and EKG was not ischemic-appearing, so was managed with nebs. Pt tells me this morning that it felt like there was something stuck in his  throat and that swallowing made it worse. It has now resolved, but he hasn't had a similar sensation in the past. He did also endorse some wheezing overnight and says the nebs were very helpful. Feeling like his breathing is more comfortable this morning. Received IV dilaudid x1 early this morning, thinks that he was behind on oral pain meds (in part due to not wanting to swallow). If he stays on top of it then it is more manageable, though he does note concern that the chest pain is overall seeming worse and less well-controlled.    Four-point ROS negative unless noted above.    Data reviewed today: I reviewed all medications, new labs and imaging results over the last 24 hours. I personally reviewed no images or EKG's today.    Physical Exam   Vital Signs: Temp: 96.9  F (36.1  C) Temp src: Temporal BP: (!) 147/93 Pulse: (!) 138   Resp: 28 SpO2: 99 % O2 Device: Nasal cannula Oxygen Delivery: 4 LPM  Weight: 170 lbs 9.6 oz  Constitutional: awake, alert, nad  Respiratory: slightly tachypneic, slight wheezing noted L>R  Cardiovascular: tachycardic but regular  GI: soft, non-distended, non-tender  Skin: no rashes  Musculoskeletal: R leg amuputation; stump non-tender to palpation with no masses/fluctuance noted  Neurologic: alert and oriented x3    Data   Recent Labs   Lab 10/29/22  0724 10/28/22  0609 10/27/22  0656   WBC 15.6* 17.2* 18.9*   HGB 7.6* 8.0* 8.9*   MCV 84 86 84    270 303   INR 1.46* 1.46* 1.23*    137 138   POTASSIUM 4.1 4.6 4.3   CHLORIDE 102 106 106   CO2 21* 20* 19*   BUN 23.7* 19.2 16.0   CR 1.06 1.05 0.97   ANIONGAP 13 11 13   VENTURA 9.2 9.1 8.9   * 111* 109*   ALBUMIN 2.9* 2.7* 2.8*   PROTTOTAL 6.2* 6.1* 6.1*  6.1*   BILITOTAL 0.7 0.7 0.8   ALKPHOS 115 108 122   ALT <5* <5* 7*   AST 23 22 21     No results found for this or any previous visit (from the past 24 hour(s)).

## 2022-10-30 NOTE — PLAN OF CARE
Goal Outcome Evaluation:    00:00-07:00 am  AF with mildly hypertensive 140's/90's  HR remain tachy but stable in mid 130's-140  Good O2 saturation on 2 L/NC.  Pt with episode of dyspnea and chest pain this shift.  Provider notified and was in room to see pt.  EKG was done with sinus tachy as previous.  Neb treatment done  O2 increased to 4L/NC for comfort with O2 sat %.  OVSS remain stable.  A&Ox4   Pain in right hip 7/10 but declined need for pain med even though it appears painful with movement.  Dilaudid 1 mg IV given x1 with encouragement with good relief for pain in right hip and chest.  HR improving, less anxious, and resting comfortably.  Voiding spontaneously, adequate UOP.  LBM 10/29   Continue to closely respiratory status and encourage pain management.  Continue w/POC

## 2022-10-31 NOTE — PROGRESS NOTES
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 Ambrocio   Current Treatment Plan: Pazopanib     Recommendations:   - Agree with palliative care consult   - Continue nebulizers for SOB as patient finds helpful   - Educated patient why home supply of Pazopanib needs to be brought in as the hospital does not have specific medication on hand and it is rather expensive.     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 Admitted for acute on chronic anemia and leukocytosis concerning for infection.      # Metastatic Osteosarcoma   In brief, he initially presented to Dr. Whitaker with rapidly progressing right leg swelling since July 2021. MRI showed a 29 x 20 cm right thigh soft tissue mass with probable bony involvement of the proximal right femur. Biopsy 9/24/21 c/w high grade pleomorphic sarcoma. Neoadjuvant chemotherapy was recommended as PET was concerning for inguinal lymphadenopathy. He initiated Doxil/Ifos(S2W0=02/2/21) c/b confusion concerning for ifosfamide neurotoxicity for which he received Methylene Blue. C2 c/b severe neurotoxicity and again improved with Methylene blue. Remainder of cycle 2 was not completed. PET 1/11/22 decrease size of right thigh hypermetabolic soft tissue mass. Due to neurotoxicity patient was hesitant to receive for chemotherapy. He was admitted in June 2022 with right leg pain and inability to ambulate. He was found to have a right proximal femur fracture and underwent right hindquarter amputation with Dr. Whitaker and primary complex wound closure with Dr. Butt (6/17/22). Surgical pathology  positive for high-grade osteosarcoma. CT AP 8/13/22 for surveillance with multiple hypodense masses within the lateral abdominal wall and multiple pulmonary nodules c/w metastatic disease. He started Doxorubicin/Cisplatin 9/1/22. He was then readmitted 9/17/22 for right side weakness and MRI demonstrated left hemispheric stroke. Due to concern for intolerance of further platinum/anthracycline therapy he was switched to Pazopanib started on 10/16/22.   - Patient will need to provide his own supply of Pazopanib to take while in the hospital. Ok to hold until this is brought in and then assess continuation.         # Acute on Chronic Anemia   On admission noted to have Hgb 6.9.   - Transfuse to keep Hgb >7     # Cancer related pain   - Agree with palliative care consult       # Concern for infection   # Leukocytosis   # Tachycardia   Patient was directed to the ED for lab abnormalities (Hgb 7.2, and WBC 16.3).  Concerning for infection. Infectious work up notable for CXR with mixed opacities suspicious for pneumonia. He received 1L IVF in the ED   - IV Zosyn and Vanco (10/24-10/26)  - Transitioned to PO Doxycycline and Vantin (x10/26) with placen to complete a 10-day course ending 11/2      # H/o Pulmonary Embolism   # H/o L Hemispheric ischemic stroke   CT PE 8/31 notable for pulmonary vein thrombus arising from a pulmonary vein in the RUL. Thrombus extends from a pulmonary mass most likely representing tumor thrombus. AC was deferred as it was thought to be tumor thrombus. He then presented with right sided weakness and MRI 9/16/22 showed left hemisphere hyperintensities consistent with acute strokes at that time w/ etiology felt to be hypercoagulability in setting of malignancy given recent PE.  - PTA Eliquis resumed (x10/26)       Patient's plan of care was discussed with attending physician Dr. Sumner.    Thank you for the opportunity to partake in this patients plan of care. Please do not hesitate to page with  "questions. We will continue to follow.     I spent >75  minutes face-to-face or coordinating care of iVc Scott III. Over 50% of our time on the unit was spent counseling the patient and/or coordinating care.    Dione Amaro PA-C ([facility])   Hematology/Oncology   Pager: [phone]   ___________________________________________________________________    Subjective & Interval History:    Nursing notes reviewed. Fabian is doing ok. He is having ongoing hip pain and discomfort. He finds the nebulizers help with some of the wheezing and SOB. Reviewed reasoning for why his home supply of Pazopanib needs to be brought in as hospital does not have this medication on hand. He voiced understanding. All questions answered at this time.     Called and updated sisters Lauryn and Pretty; updated about current cancer status and how the point of chemotherapy at this time is for symptom control and it is unlikely we will be able to cure his cancer. They were able to provide insight that Fabian believes these are cysts and \"we don't know what we are doing\". This was the first time they were hearing that Fabian has metastatic disease and cancer in the lungs. They are unsure if he is able to fully process his current state or if he is saying this to help protect them about the seriousness of the situation. Offered to have a family meeting to provide and update and promote further discussions about Fabian's status. The sisters wanted to digest this information and consider later. All questions answered at this time.     A comprehensive review of systems was obtained and is negative other than noted here or in the HPI.       Physical Exam:    Blood pressure (!) 143/92, pulse (!) 130, temperature 97.5  F (36.4  C), temperature source Oral, resp. rate 18, weight 77.6 kg (171 lb 1.6 oz), SpO2 100 %.    General: lying in bed, no acute distress  HEENT: sclera anicteric, EOMI, MMM  Neck: supple, normal ROM  CV: Tachycardic, normal S1/S2, no m/r/g  Resp: " tachypnic, crackles L>R, currently on 3L NC   MSK: RLE amputation noted, warm and well-perfused, normal tone  Skin: no rashes on limited exam, no jaundice  Neuro: Alert and interactive, moves all extremities equally, no focal deficits    Labs & Studies: I personally reviewed the following studies:  ROUTINE LABS (Last four results):  CMP  Recent Labs   Lab 10/31/22  0725 10/30/22  0758 10/29/22  0724 10/28/22  0609 10/27/22  0656 10/26/22  0538    136 136 137   < > 138   POTASSIUM 4.3 4.3 4.1 4.6   < > 4.3   CHLORIDE 104 103 102 106   < > 106   CO2 23 22 21* 20*   < > 21*   ANIONGAP 12 11 13 11   < > 11   * 132* 123* 111*   < > 84   BUN 22.0* 22.5* 23.7* 19.2   < > 15.4   CR 1.14 0.98 1.06 1.05   < > 1.12   GFRESTIMATED 80 >90 88 89   < > 82   VENTURA 9.4 9.2 9.2 9.1   < > 8.8   MAG  --   --   --   --   --  1.8   PHOS  --   --   --   --   --  5.2*   PROTTOTAL 6.2* 6.4 6.2* 6.1*   < > 6.1*   ALBUMIN 2.9* 2.9* 2.9* 2.7*   < > 2.8*   BILITOTAL 0.7 0.8 0.7 0.7   < > 0.9   ALKPHOS 110 113 115 108   < > 121   AST 23 22 23 22   < > 21   ALT <5* <5* <5* <5*   < > 7*    < > = values in this interval not displayed.     CBC  Recent Labs   Lab 10/31/22  0725 10/30/22  0758 10/29/22  0724 10/28/22  0609   WBC 20.3* 19.2* 15.6* 17.2*   RBC 2.90* 2.96* 2.91* 3.01*   HGB 7.8* 7.8* 7.6* 8.0*   HCT 24.9* 24.9* 24.5* 25.8*   MCV 86 84 84 86   MCH 26.9 26.4* 26.1* 26.6   MCHC 31.3* 31.3* 31.0* 31.0*   RDW 20.0* 19.6* 19.4* 19.4*    317 300 270     INR  Recent Labs   Lab 10/31/22  0725 10/30/22  0758 10/29/22  0724 10/28/22  0609   INR 1.36* 1.48* 1.46* 1.46*       Medications list for reference:  Current Facility-Administered Medications   Medication     acetaminophen (TYLENOL) tablet 975 mg     apixaban ANTICOAGULANT (ELIQUIS) tablet 5 mg     benzocaine-menthol (CHLORASEPTIC) 6-10 MG lozenge 1 lozenge     benzonatate (TESSALON) capsule 100 mg     cefpodoxime (VANTIN) tablet 200 mg     doxycycline hyclate (VIBRAMYCIN)  capsule 100 mg     gabapentin (NEURONTIN) capsule 400 mg     guaiFENesin-codeine (ROBITUSSIN AC) 100-10 MG/5ML solution 5 mL     heparin 100 UNIT/ML injection 5-10 mL     heparin lock flush 10 UNIT/ML injection 5-10 mL     heparin lock flush 10 UNIT/ML injection 5-10 mL     HYDROmorphone (PF) (DILAUDID) injection 0.5-1 mg     ipratropium - albuterol 0.5 mg/2.5 mg/3 mL (DUONEB) neb solution 3 mL     lidocaine (LMX4) cream     lidocaine 1 % 0.1-1 mL     loperamide (IMODIUM) capsule 2 mg     magnesium oxide (MAG-OX) tablet 400 mg     melatonin tablet 1 mg     naloxone (NARCAN) injection 0.2 mg    Or     naloxone (NARCAN) injection 0.4 mg    Or     naloxone (NARCAN) injection 0.2 mg    Or     naloxone (NARCAN) injection 0.4 mg     OLANZapine (zyPREXA) tablet 2.5 mg     ondansetron (ZOFRAN ODT) ODT tab 4 mg    Or     ondansetron (ZOFRAN) injection 4 mg     oxyCODONE IR (ROXICODONE) tablet 10 mg     [Held by provider] pazopanib (VOTRIENT) tablet 800 mg     sodium chloride (PF) 0.9% PF flush 3 mL     sodium chloride (PF) 0.9% PF flush 3 mL     umeclidinium-vilanterol (ANORO ELLIPTA) 62.5-25 MCG/INH oral inhaler 1 puff

## 2022-10-31 NOTE — PLAN OF CARE
[id number]:    Tachycardic, hypertensive within parameters. On 2L NC.  Afebrile. Denies nausea. Pt reports SOB with activity. Dry cough. PRN tessalon saud given x1, and robitussin x1. Pain 9/10 R hip, PRN PO oxycodone given x1 and IV dilaudid given x1. Heat aqua pad in place for comfort. Pt voiding adequately via bedside urinal overnight. No BM this shift. Refusing repositioning and skin assessment. Educated on pressure injuries. Sleeping between cares.

## 2022-10-31 NOTE — PROVIDER NOTIFICATION
Web base paging Dr. Rincon [phone]      Pt does not have PRN heparin flush. Please include PRN heparin flush. Thanks.       Antonio SMITH, [phone]

## 2022-10-31 NOTE — PLAN OF CARE
Goal Outcome Evaluation:    VSS, on 2LPM O2, A&Ox4. PRN oxy 10mg given twice and one PRN IV dilaudid 1 mg. Robitussin given for cough and neb twice during shift. Pt declined mag oxide. Adequate U.O. and no BM during shift.  Port is hep locked. Continue with plan of care.

## 2022-10-31 NOTE — CONSULTS
[facility]  Palliative Care Consultation Note    Patient: Vic Scott III  Date of Admission:  10/24/2022    Requesting Clinician / Team: Mel Rowe MD  Reason for consult: Pain management    Recommendations:    gabapentin increased to 400 mg TID (ordred for you)    Attempt to trial TENS unit for phantom limb pain, ordered PT consult and appreciate evaluation for this (ordered for you)    Continue oxycodone 10 mg q4h PRN    Continue acetaminophen scheduled    Encouraged him with the addition of the extra gabapentin the pain medications should be more helpful    Continue to encourage good bowel regimen given on opioids    I do note that discussion regarding goals of care may be needed and we are happy to assist with these along with the oncology team and primary team.    These recommendations have been communicated via this note to the primary team.      Thank you for the opportunity to participate in the care of this patient and family. Our team: will continue to follow.     During regular M-F work hours -- if you are not sure who specifically to contact -- please contact us by sending a text page to our team consult pager at [phone].    After regular work hours and on weekends/holidays, you can call our answering service at [phone]. Also, who's on call for us is available in [employer].       Assessments:  Vic Scott III is a 47 year old male with a past medical history of 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 He presented to the ED with abnormal labs and is admitted with concern for sepsis due to chronic anemia. His hospital course has been complicated by respiratory distress, worsening of his pain, JAZZMINE.    Today, the patient was seen  for:  Metastatic high grade pleomorphic sarcoma  Phantom limb pain      Prognosis, Goals, & Planning:      Functional Status just prior to hospitalization: Not assessed      Prognosis, Goals, and/or Advance Care Planning were addressed today: No        Summary/Comments:       Patient's decision making preferences: independently          Patient has decision-making capacity today for complex decisions: Yes            I have concerns about the patient/family's health literacy today: Yes           Patient has a completed Health Care Directive: No.       Code status: No CPR / No Intubation    Coping, Meaning, & Spirituality:   Mood, coping, and/or meaning in the context of serious illness were addressed today: Yes  Summary/Comments: seems to be coping appropriately, did not assess in depth today, will continue to evaluate ongoing.    Social:     Living situation: lived with family and family struggling to support his needs    Key family / caregivers: sister    History of Present Illness:  History gathered today from: medical chart    Vic Scott III is a 47 year old male with a past medical history of 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 He presented to the ED with abnormal labs and is admitted with concern for sepsis due to chronic anemia. His hospital course has been complicated by respiratory distress, worsening of his pain, JAZZMINE.    Palliative care consulted 10/31 for symptom management    Key Palliative Symptom Data:  # Pain severity the last 12 hours: moderate    Patient is on opioids: assessed and bowels ok/no needed changes to plan of care today.    ROS:  Comprehensive ROS is reviewed and is negative except as here & per HPI:      Past Medical History:  Past Medical History:   Diagnosis Date      Pleomorphic cell sarcoma (H)         Past Surgical History:  Past Surgical History:   Procedure Laterality Date     ANESTHESIA OUT OF OR BLOOD BRAIN BARRIER DISRUPTION N/A 06/16/2022    Procedure: ANESTHESIA OUT OF OR - Block Epidural;  Surgeon: GENERIC ANESTHESIA PROVIDER;  Location: [facility]     COMPLEX WOUND CLOSURE (UPDATE) Right 06/17/2022    Procedure: complex closure pelvis, spy;  Surgeon: KAREN Butt MD;  Location: [facility] OR     COMPLEX WOUND CLOSURE (UPDATE)  06/17/2022    Procedure: ;  Surgeon: KAREN Butt MD;  Location: [facility] OR     EXCISE TUMOR PELVIS POSTERIOR Right 06/17/2022    Procedure: Right hindquarter amputation;  Surgeon: Matty Whitaker MD;  Location: [facility] OR     INSERT PORT VASCULAR ACCESS Right 11/01/2021    Procedure: INSERTION, VASCULAR ACCESS PORT;  Surgeon: Sushil Gonzales MD;  Location: [facility] OR     IR CHEST PORT PLACEMENT > 5 YRS OF AGE  11/01/2021     IR CHEST TUBE PLACEMENT NON-TUNNELLED LEFT  9/6/2022     PICC DOUBLE LUMEN PLACEMENT Left 08/30/2022    left medial brachial 5 fr dl power picc 50 cm         Family History:  Family History   Problem Relation Age of Onset     Heart Disease Mother      Coronary Artery Disease Father      Other - See Comments Father         pleomorphic cell sarcoma     Cancer Maternal Grandmother      No Known Problems Sister      No Known Problems Sister      No Known Problems Son      No Known Problems Daughter      No Known Problems Daughter      No Known Problems Daughter         Allergies:  Allergies   Allergen Reactions     Blood Transfusion Related (Informational Only) Other (See Comments)     Patient has a history of a clinically significant antibody against RBC antigens.  A delay in compatible RBCs may occur.         Medications:  I have reviewed this patient's medication profile and medications from this hospitalization.   Noted:  Acetaminophen 975 mg TID  Gabapentin 300 mg TID  olanzapine 2.5 mg at bedtime  Tessalon pearls  PRN -x2  Robitussin PRN -x3  Hydromorphone IV PRN -x3 3 mg over last 24 hours  Oxycodone PRN- x5 50 mg over last 24 hours      Physical Exam:  Vital Signs: Temp: 97.5  F (36.4  C) Temp src: Oral BP: (!) 143/92 Pulse: (!) 130   Resp: 18 SpO2: 100 % O2 Device: Nasal cannula Oxygen Delivery: 3 LPM  Weight: 171 lbs 1.6 oz    Constitutional: Awake, alert, cooperative, no apparent distress  ENT: Normocephalic, without obvious abnormality, atramatic  Lungs: No increased work of breathing, good air exchange on NC  Musculoskeletal: No redness, warmth, or swelling of the joints. R leg amputation.  Neurologic: Awake, alert, oriented to name, place and time.    Neuropsychiatric: Normal affect, mood, orientation, memory and insight.  Skin: No rashes, erythema    Data reviewed:  Recent imaging reviewed, my comments on pertinents:   CT chest 10/27  IMPRESSION:   1. Redemonstration of innumerable varying sized round/ovoid   consolidative nodular metastatic lesions without significant change   since 10/16/2022, in the setting of known osteosarcoma.   2. Stable mild/moderate right pleural effusion with unchanged   minimal/trace left pleural effusion and associated mild atelectasis.     I have personally reviewed the examination and initial interpretation   and I agree with the findings.     10/5 CT abdomen  IMPRESSION:   In this patient with a history of osteosarcoma:  1. Postoperative changes of right lower extremity amputation.  Innumerable pulmonary metastases, some of which have increased in size  from prior.  2. Retroperitoneal, abdominal, and pelvic metastases are overall  similar in size and number.  3. Malpositioned left PICC with tip in the brachiocephalic vein.  4. Mild enlargement of the pulmonary artery measuring up to 3.5 cm,  can be seen in pulmonary artery hypertension.  Recent lab data reviewed, my comments on pertinents:   Sodium 139  Potassium 4.3  Creatinine1.14  GFR 80  WBC 20.3  Hemoglobin 7.8  Platelets  331    CHET Amaro CNS  Palliative Care Consult Team  Pager: [phone]    55 minutes spent. This includes 35 minutes face to face with patient discussing current complex health conditions and symptom management. Coordination of care with the primary team, palliative  and SW, and oncology regarding symptom management, psychosocial needs, and goals of care.

## 2022-10-31 NOTE — PROGRESS NOTES
Care Management Follow Up     Length of Stay (days): 6     Expected Discharge Date: TBD     Concerns to be Addressed: all concerns addressed in this encounter     Patient plan of care discussed at interdisciplinary rounds: Yes     Anticipated Discharge Disposition: Home Care     Anticipated Discharge Services: PCA  Anticipated Discharge DME:       Patient/family educated on Medicare website which has current facility and service quality ratings:    Education Provided on the Discharge Plan:    Patient/Family in Agreement with the Plan: yes     Referrals Placed by CM/SW: [facility] referral      Additional Information:  Per chart review, pt's sister was requesting assistance in applying for [facility] for a GH. She had a long conversation with  yesterday.    Today, I placed a referral for [facility] intake form. A staff member will be in contact with the sister within two business day to complete the assessment.    Pt will need to discharge home, when medically stable, as GH process will take a great deal of time and cannot be completed while in inpatient. Sister was informed of this on 10/30.      MAKENNA Preciado

## 2022-10-31 NOTE — PROGRESS NOTES
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 He presented to the ED in the setting of abnormal labs and is admitted with concern for sepsis as well as acute on chronic anemia.    Severe sepsis (resolved) 2/2 unknown source  Possible CAP  Pleural effusions  Patient meets criteria with tachycardia, leukocytosis, elevated lactic acid.  Source unclear, broad work-up and cautious approach given metastatic cancer currently on treatment.  Pro-Fabian, ESR, CRP all elevated. Enteric panel and C. difficile negative.  Respiratory panel negative including COVID.  Notably does have increased opacities on CXR that may be consistent with infection.  UA unremarkable. Discussed with oncology team today, and ultimately favor empiric course of treatment given immunosuppression and objective evidence of acute process with elevated inflammatory markers. Pna seems the most likely source, and it is possible that he did not adequately respond to his prior course due to immunosuppression vs having viral infection on top of that episode.  Worsened resp distress 10/27. CXR looked unchanged or slightly improved in regards to infiltrate though re-demonstrates effusions. Attempted thora, but US imaging revealed no significant fluid and presence of large mass. CTA negative for PE and appears stable compared to earlier this month.   - Monitoring cultures, remain negative  - Continue doxycycline/cefpodoxime  - Continue duonebs--> pt continues to find these helpful,  "asking about being able to do this at home  - Prn robitussin/tessalon/cough drops    Pain 2/2 cancer  Patient having pain at the site of the amputation that has been worse since being in the hospital. He thinks it is related to coughing and being moved in bed. Managed with addition of IV dilaudid. Exam does not appear different, no skin changes, no palpable masses/fluctuance.  - Recommended CT scan to eval area and look for changes to mass vs evidence of new infection, pt prefers not to currently as he feels like it is unchanged  - Continue scheduled tylenol, gabapentin, and oxy prn for pain  - IV dilaudid for breakthrough  - Palliative care consult -- patient thinks that it would helpful to discuss pain control given the increased difficulty controlling it this admission    Atypical CP / sensation of something stuck in throat  Pt describes a central chest/epigastric \"pain\" that feels like something was stuck in his throat, new on 10/29. Resolved during the day on 10/30, but had recurrence while trying to eat breakfast today.  - XR esophagram    JAZZMINE, improved  Creatinine increased to 1.2 from baseline ~0.6-0.7 on 10/26. Suspect prerenal in the setting of ?sepsis and diarrhea.  - Monitor     Acute on chronic anemia  Patient noted to have hemoglobin of 6.9 after drop on routine outpatient labs.  He did receive a transfusion in the ED. No overt signs of blood loss.  - Daily CBC  - Transfuse to keep Hgb >7     H/o PE  H/o L hemispheric ischemic stroke  Stroke identified during 9/2022 admission, felt to be due to hypercoagulable state related to malignancy so initiated on anticoagulation.  - Resumed apixaban 10/26 after discussing with onc      Metastatic high-grade pleomorphic sarcoma of R pelvis/femur s/p chemo and amputation/hemipelvectomy (6/2022)  Mets to lungs, abdominal wall  Primary oncologist Dr. Ambrocio. Currently on therapy with pazopanib.  - Resumption of pazopanib per oncology team pending availability of " patient's home supply  - May benefit from having a conference with his onc team/pall care this week to address some of sister's concerns about level of care as well as these persistent/worsening symptoms    Moderate malnutrition in the context of acute on chronic illness  - Nutrition consulted, appreciate recs  - Ensure supplements ordered       Diet: Regular Diet Adult  Snacks/Supplements Adult: Ensure Enlive; With Meals    DVT Prophylaxis: DOAC  Sung Catheter: Not present  Central Lines: PRESENT       Cardiac Monitoring: None  Code Status: Full Code      Disposition Plan      Expected Discharge Date: 11/01/2022      Destination: home with help/services  Discharge Comments: Pending continued improvement in breathing and not needing IV pain meds.  Goals of care conference? Group Home Conversation to start maybe Monday 10/31        The patient's care was discussed with the Bedside Nurse, Patient and Onc/Pall Care Consultant.    Mel Yen MD  Hospitalist Service, [address]  Securely message with the Vocera Web Console (learn more here)  Text page via [employer] Paging/Directory   Please see signed in provider for up to date coverage information      Clinically Significant Risk Factors              # Hypoalbuminemia: Lowest albumin = 2.7 g/dL (Ref range: 3.5-5.2) at 10/28/2022  6:09 AM, will monitor as appropriate           # Moderate Malnutrition: based on nutrition assessment        ______________________________________________________________________    Interval History   No acute events overnight. Despite using PO pain medications more regularly yesterday, patient also required IV dilaudid x3 to manage pain. He tells me that the pain is okay this morning, but looks quite uncomfortable with any movement in bed. Also notes that when he was eating breakfast today he again felt as though something got stuck in his throat and now this sensation is  continuing. Doesn't feel like heartburn or CP. Breathing stable from prior days. Pain at stump site also still severe but not worse.    Four-point ROS negative unless noted above.    Data reviewed today: I reviewed all medications, new labs and imaging results over the last 24 hours. I personally reviewed no images or EKG's today.    Physical Exam   Vital Signs: Temp: 97.5  F (36.4  C) Temp src: Oral BP: (!) 155/88 Pulse: (!) 127   Resp: 22 SpO2: 99 % O2 Device: Nasal cannula Oxygen Delivery: 3 LPM  Weight: 170 lbs 9.6 oz  Constitutional: awake, alert  Respiratory: breathing appears more comfortable than on prior exams, mild wheezing  Cardiovascular: tachycardic, regular  GI: soft, non-distended, non-tender  Skin: no rashes  Musculoskeletal: R leg amuputation; stump somewhat tender to palpation and with movement in bed, no masses/fluctuance noted  Neurologic: alert and oriented x3    Data   Recent Labs   Lab 10/30/22  0758 10/29/22  0724 10/28/22  0609   WBC 19.2* 15.6* 17.2*   HGB 7.8* 7.6* 8.0*   MCV 84 84 86    300 270   INR 1.48* 1.46* 1.46*    136 137   POTASSIUM 4.3 4.1 4.6   CHLORIDE 103 102 106   CO2 22 21* 20*   BUN 22.5* 23.7* 19.2   CR 0.98 1.06 1.05   ANIONGAP 11 13 11   VENTURA 9.2 9.2 9.1   * 123* 111*   ALBUMIN 2.9* 2.9* 2.7*   PROTTOTAL 6.4 6.2* 6.1*   BILITOTAL 0.8 0.7 0.7   ALKPHOS 113 115 108   ALT <5* <5* <5*   AST 22 23 22     No results found for this or any previous visit (from the past 24 hour(s)).

## 2022-10-31 NOTE — PLAN OF CARE
Afebrile, tachycardic on 2L NC.  SOB with activity.  Alert and oriented x4.  Oxycodone 10 mg given x2.  Denies nausea.  PRN tessalon saud given x1, and robitussin x1, for dry cough.  Voiding adequate amount via urinal.  No BM this shift.  Resting between cares.  Continue plan of care.

## 2022-11-01 NOTE — PLAN OF CARE
[id number]: Tachy to 120. OVSS on 3L NC. LS coarse- denies SOB. 1mg PRN IV Dilaudid given X1 for right lower abdominal pain. Repositioned with pillows X1 and pulsate mattress ordered.

## 2022-11-01 NOTE — PROGRESS NOTES
"[facility]  Palliative Care Daily Progress Note       Recommendations & Counseling       Patient requests more information about his breathing and what is going on with his esophagus, appreciate primary team further discussing today    Would be helpful to have a care conference this week with his family to discuss his current situation further, more of a medical update, patient agrees to this    Open to meeting with PCSW and appreciates ongoing  support from Ana Cristina    Stopped PRN oxycodone and started hydromorphone 2-4 mg q4h PRN for pain    Added home dose lexapro 10 mg daily          Assessments          Vic Scott III is a 47 year old male with a past medical history of 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 He presented to the ED with abnormal labs and is admitted with concern for sepsis due to chronic anemia. His hospital course has been complicated by respiratory distress, worsening of his pain, JAZZMINE.     Today, the patient was seen for:  Metastatic high grade pleomorphic sarcoma  Phantom limb pain    Prognosis, Goals, or Advance Care Planning was addressed today with: Yes.    \"working on getting his health better then it is right now\"    Mood, coping, and/or meaning in the context of serious illness were addressed today: Yes.  Summary/Comments: irritated and frustrated, also very anxious, open to nonpharm management for anxiety. Requested patient relations.               Interval History:     Chart review/discussion with unit or clinical team members:   Notes reviewed, hgb drop to 6.7, also new abdominal pain, 4 L NC and SOB with activity    Per patient or family/caregivers today:  Not feeling great during visit, having chest pain and had this " before, worries most about his breathing and having a hard time not having any answers to what is causing this. Having ongoing phantom limb pain and not having any new pain in his abdomen.     Key Palliative Symptoms:  # Pain severity the last 12 hours: moderate  # Anxiety severity the last 12 hours: moderate               Review of Systems:     Besides above, ROS was reviewed and is unremarkable          Medications:     I have reviewed this patient's medication profile and medications during this hospitalization.    Noted meds:    Acetaminophen 975 mg TID  Gabapentin 400 mg TID  Olanzapine 2.5 mg at bedtime  Tessalon pearls PRN- x1  Robitussin AC PRN- x1  Hydromorphone IV PRN- 2 mg over last 24 hours  Oxycodone PRN- x2 20 mg over last 24 hours             Physical Exam:   Vitals were reviewed  Temp: 98.5  F (36.9  C) Temp src: Oral BP: 129/71 Pulse: (!) 121   Resp: 22 SpO2: 97 % O2 Device: Nasal cannula Oxygen Delivery: 3 LPM    Intake/Output Summary (Last 24 hours) at 11/1/2022 1026  Last data filed at 11/1/2022 0800  Gross per 24 hour   Intake 20 ml   Output 850 ml   Net -830 ml     Constitutional: Awake, alert, cooperative, no apparent distress  ENT: Normocephalic, without obvious abnormality, atramatic  Lungs: No increased work of breathing, good air exchange on NC  Musculoskeletal: No redness, warmth, or swelling of the joints. R leg amputation.  Neurologic: Awake, alert, oriented to name, place and time.    Neuropsychiatric: Normal affect, mood, orientation, memory and insight.  Skin: No rashes, erythema             Data Reviewed:     Reviewed recent pertinent imaging, comments:   CT chest 10/27  IMPRESSION:   1. Redemonstration of innumerable varying sized round/ovoid   consolidative nodular metastatic lesions without significant change   since 10/16/2022, in the setting of known osteosarcoma.   2. Stable mild/moderate right pleural effusion with unchanged   minimal/trace left pleural effusion and  associated mild atelectasis.     I have personally reviewed the examination and initial interpretation   and I agree with the findings.      10/5 CT abdomen  IMPRESSION:   In this patient with a history of osteosarcoma:  1. Postoperative changes of right lower extremity amputation.  Innumerable pulmonary metastases, some of which have increased in size  from prior.  2. Retroperitoneal, abdominal, and pelvic metastases are overall  similar in size and number.  3. Malpositioned left PICC with tip in the brachiocephalic vein.  4. Mild enlargement of the pulmonary artery measuring up to 3.5 cm,  can be seen in pulmonary artery hypertension.    Reviewed recent labs, comments:   Sodium 139  Potassium 4.3  Creatinine 0.97  GFR > 90  WBC 13.4  Hemoglobin 6.7  Platelets 283        CHET Amaro CNS  Palliative Care Consult Team  Pager: [phone]    35 minutes spent. This includes 20 minutes face to face with patient discussing current complex health conditions, goals of care, symptom management, and psychosocial support. Coordination of care with the primary team, palliative  and SW, and RN regarding goals of care, symptom management, and psychosocial needs.

## 2022-11-01 NOTE — PROVIDER NOTIFICATION
"Gold 11 Hospitalist paged at *[phone]    \"Pt hgb critical at 6.7. No conditional orders to replace. Consent is sighed. Thanks!\"  "

## 2022-11-01 NOTE — PROGRESS NOTES
CLINICAL NUTRITION SERVICES - REASSESSMENT NOTE     Nutrition Prescription    RECOMMENDATIONS FOR MDs/PROVIDERS TO ORDER:  - Recommend monitoring need for MIVF's d/t decreased appetite/intakes and loose/watery stools over the past week.  - Recommend checking PO4 level with next BMP draw for evaluation    Malnutrition Status:    - Moderate malnutrition in the context of acute on chronic illness      Recommendations already ordered by Registered Dietitian (RD):  - Continue ONS as ordered    Future/Additional Recommendations:  - Monitor for ongoing changes in appetite and need for appetite stimulant  - Moniotrpo tolerance and adequacy of intakes and need for diet modification if chest/epigastric pain persists      EVALUATION OF THE PROGRESS TOWARD GOALS   Diet: Regular with Ensure Enlive supplement with meals TID  - noted pt seen by SLP for swallow evaluation, but deferred d/t pt denying swallowing issues and relating discomfort to pain in his chest.    Intake: Pt reports consuming on ave 50% of is meals (x 2  per day) d/t decreased appetite and pain issues, plus drinking 1-2 ONS per day    NEW FINDINGS   Weight: 78.6 kg (today), 78.3 kg (10/25); wt fairly stable over the past week, but per previous RD assessment, pt with h/o ~5% wt loss within the past month    Labs:  PO4 5.2 (high) on 10/26; no new data since last lab draw.      Meds:  MAG-OX (BID) - pt refusing today    GI:  Loose. Watery stools over the past week    MALNUTRITION  % Intake: < 75% for > 7 days (moderate)  % Weight Loss: h/o 5% in 1 month (moderate)  Subcutaneous Fat Loss: Upper arm: moderate  Muscle Loss: Upper arm (bicep, tricep): mild and Posterior calf: mild, but suspect d/t atrophy secondary to R leg amputation and wheeled chair bound.  Fluid Accumulation/Edema: Does not meet criteria  Malnutrition Diagnosis: Moderate malnutrition in the context of acute on chronic illness    Previous Goals   Patient to consume % of nutritionally adequate  meal trays TID, or the equivalent with supplements/snacks.    Evaluation: Not met    Previous Nutrition Diagnosis  Unintended weight loss related to recent illness as evidenced by 5% wt loss within the past month, mild and moderate fat and muscle loss.    Evaluation: No longer applicable, nutrition diagnosis changed below    CURRENT NUTRITION DIAGNOSIS  Inadequate oral intake related to decreased appetite and pain issues hindering po adequacy as evidenced by h/o 5% wt loss within the past month, consuming <75% of his meals intakes and has signs of fat and muscle wasting.    INTERVENTIONS  Implementation  Collaboration with other providers - bedside RN  Medical food supplement therapy - as outlined above    Goals  1. Patient to consume % of nutritionally adequate meal trays TID, or the equivalent with supplements/snacks.  2. Wt to remain > 78 kg    Monitoring/Evaluation  Progress toward goals will be monitored and evaluated per protocol.      Carmen Anand RD,LD  7D pager [phone]

## 2022-11-01 NOTE — PHARMACY-VANCOMYCIN DOSING SERVICE
Pharmacy Vancomycin Initial Note  Date of Service 2022  Patient's  1975  47 year old, male  Actual Body Weight: 78.6 kg     Indication: Healthcare-Associated Pneumonia    Current estimated CrCl = Estimated Creatinine Clearance: 105.8 mL/min (based on SCr of 0.97 mg/dL).    Creatinine for last 3 days  10/30/2022:  7:58 AM Creatinine 0.98 mg/dL  10/31/2022:  7:25 AM Creatinine 1.14 mg/dL  2022:  6:12 AM Creatinine 0.97 mg/dL    Recent Vancomycin Level(s) for last 3 days  No results found for requested labs within last 72 hours.      Vancomycin IV Administrations (past 72 hours)      No vancomycin orders with administrations in past 72 hours.                Nephrotoxins and other renal medications (From now, onward)    Start     Dose/Rate Route Frequency Ordered Stop    22 1430  piperacillin-tazobactam (ZOSYN) 4.5 g vial to attach to  mL bag            4.5 g  over 30 Minutes Intravenous EVERY 6 HOURS 22 1404            Contrast Orders - past 72 hours (72h ago, onward)    Start     Dose/Rate Route Frequency Stop    22 0830  barium sulfate (EZ-HD) oral suspension 98%          Oral ONCE 22 0904    22 0830  barium sulfate (EZ PAQUE) oral suspension 96%          Oral ONCE 22 0904          [employer] Prediction of Planned Initial Vancomycin Regimen    Regimen: 1500 mg IV every 24 hours.  Start time: 15:30 on 2022  Exposure target: AUC24 (range) 400-600 mg/L.hr   AUC24,ss: 505 mg/L.hr  Probability of AUC24 > 400: 95 %  Ctrough,ss: 14.1 mg/L  Probability of Ctrough,ss > 20: 2 %  Probability of nephrotoxicity (Lodise AGUSTIN ): 9 %        Plan:  1. Start vancomycin 1500 mg IV q24h, give first dose now, then AM dosing.   2. Vancomycin monitoring method: AUC  3. Vancomycin therapeutic monitoring goal: 400-600 mg*h/L  4. Pharmacy will check vancomycin levels as appropriate in 1-3 Days.    5. Serum creatinine levels will be ordered daily for the first week of  therapy and at least twice weekly for subsequent weeks.      Tiffany Nelson, RPH

## 2022-11-01 NOTE — PROGRESS NOTES
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 He presented to the ED in the setting of abnormal labs and is admitted with concern for sepsis as well as acute on chronic anemia.    Sepsis and severe sepsis secondary to likely bacterial pneumonia, all are present on admission  This is the main problem that led to this hospitalization.  Patient meets criteria with tachycardia, leukocytosis, elevated lactic acid.  The patient qualifies for diagnosis of pneumonia given shortness of breath and infiltrates on chest imaging. Pro-Fabian, ESR, CRP all elevated. Respiratory panel negative including COVID. Respiratory distress was noted during this hospitalization with repeat imaging demonstrating pleural effusion.  The patient had an attempt for thoracentesis, but US imaging revealed no significant fluid and presence of large mass. CTA negative for PE and appears stable compared to earlier this month.     The patient had an unremarkable enteric panel and C. difficile battery upon admission.  Urinalysis was also unremarkable.    Differential diagnosis for the etiology of sepsis includes infected metastasis at the site of her prior amputation of the right lower extremity given right lower abdominal pain and fluctuation on clinical examination.  Continued tachycardia and leukocytosis are noted.The patient declined cross-sectional imaging.    -Ordered inflammatory markers with CRP [elevated at 130 which is the highest level during this hospitalization] and elevated  procalcitonin  -Ordered lactic acid that was elevated at 3.1, so administered albumin 25% at 50 g for volume resuscitation and repeat lactic acid.  Repeat lactic acid was 2.4.  -Ordered limited ultrasound to evaluate for right lower quadrant possible fluid collection  -Ordered blood cultures  -Ordered sputum cultures  -Ordered Fungitell and galactomannan  -Discontinued doxycycline/cefpodoxime  -Resumed broad spectrum antibiotics with vancomycin and Zosyn.    2.  Malignancy related pain, all are POA  Patient having pain reticulocyte count is elevated the site of the amputation that has been worse since being in the hospital.     -Continue scheduled acetaminophen 1 g every 8 hours  -Continue gabapentin 400 mg 3 times daily for adjuvant treatment of pain  -Oral narcotics were switched to Dilaudid 2 to 4 mg every 4 hours as needed  -Continue intravenous Dilaudid 0.5 mg through 1 mg every 2 hours as needed for severe pain  -I had appreciate input of palliative care service    3.  odynophagia secondary to severe external compression of the distal esophagus leading to luminal narrowing secondary to adjacent osteosarcoma metastatic lesion, [place] POA  X-ray showed the above-stated findings    4.  Anemia of critical illness on top of chronic anemia secondary to malignancy, all other POA  The patient received blood transfusion prior to during this admission.  He ordered 1  Of the blood.    5. 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 Ambrocio. Currently on therapy with pazopanib.  -We will resume pazopanib per oncology team pending availability of patient's home supply  - May benefit from having a conference with his onc team/[facility] this week to address some of sister's concerns about level of care as well as these  persistent/worsening symptoms.  I plan to arrange for such needing.    6. Moderate malnutrition in the context of acute on chronic illness, all other VITO  - Nutrition consulted, appreciate recs  - Ensure supplements ordered    7.  Resolved medical problems  JAZZMINE, improved  Creatinine increased to 1.2 from baseline ~0.6-0.7 on 10/26. Suspect prerenal in the setting of sepsis and diarrhea.  - Monitor     8.  Chronic medical problems  H/o PE  H/o L hemispheric ischemic stroke  Stroke identified during 9/2022 admission, felt to be due to hypercoagulable state related to malignancy so initiated on anticoagulation.  -Continue apixaban for anticoagulation         Diet: Regular Diet Adult  Snacks/Supplements Adult: Ensure Enlive; With Meals    DVT Prophylaxis: DOAC  Sung Catheter: Not present  Central Lines: PRESENT       Cardiac Monitoring: None  Code Status: Full Code      Disposition Plan     Expected Discharge Date: 11/01/2022      Destination: home with help/services  Discharge Comments: Pending continued improvement in breathing and not needing IV pain meds.  Goals of care conference? Group Home Conversation to start maybe Monday 10/31        The patient's care was discussed with the Bedside Nurse and Patient.    Carlos Bryant MD  Hospitalist Service, GOLD TEAM [address] [place] [address]  Securely message with the Vocera Web Console (learn more here)  Text page via [facility] Paging/Directory   Please see signed in provider for up to date coverage information      Clinically Significant Risk Factors           # Hypercalcemia: corrected calcium is >10.1, will monitor as appropriate    # Hypoalbuminemia: Lowest albumin = 2.7 g/dL (Ref range: 3.5-5.2) at 10/28/2022  6:09 AM, will monitor as appropriate           # Moderate Malnutrition: based on nutrition assessment        ______________________________________________________________________    Interval History   The patient continues to  complain of right lower quadrant abdominal pain.  No reported nausea or vomiting.    Four-point ROS negative unless noted above.    Data reviewed today: I reviewed all medications, new labs and imaging results over the last 24 hours. I personally reviewed no images or EKG's today.    Physical Exam   Vital Signs: Temp: 98.3  F (36.8  C) Temp src: Oral BP: 126/81 Pulse: (!) 129   Resp: 18 SpO2: 100 % O2 Device: Nasal cannula Oxygen Delivery: 3 LPM  Weight: 173 lbs 4.8 oz  Constitutional: Awake, alert, cooperative, no apparent distress.  Eyes: Conjunctiva and pupils examined and normal.  HEENT: Moist mucous membranes, normal dentition.  Respiratory: Clear to auscultation bilaterally, no crackles or wheezing.  Cardiovascular: Regular rate and rhythm, normal S1 and S2, and no murmur noted.  GI: Soft, non-distended, non-tender, normal bowel sounds.  Lymph/Hematologic: No anterior cervical or supraclavicular adenopathy.  Skin: No rashes, no cyanosis, no edema.  Musculoskeletal: No joint swelling, erythema or tenderness.  Neurologic: Cranial nerves 2-12 intact, normal strength and sensation.  Psychiatric: Alert, oriented to person, place and time, no obvious anxiety or depression.    Data   Recent Labs   Lab 11/01/22  0612 10/31/22  0725 10/30/22  0758   WBC 13.4* 20.3* 19.2*   HGB 6.7* 7.8* 7.8*   MCV 86 86 84    331 317   INR 1.53* 1.36* 1.48*    139 136   POTASSIUM 4.3 4.3 4.3   CHLORIDE 104 104 103   CO2 23 23 22   BUN 23.1* 22.0* 22.5*   CR 0.97 1.14 0.98   ANIONGAP 12 12 11   VENTURA 9.0 9.4 9.2   * 116* 132*   ALBUMIN 2.9* 2.9* 2.9*   PROTTOTAL 6.1* 6.2* 6.4   BILITOTAL 0.7 0.7 0.8   ALKPHOS 111 110 113   ALT <5* <5* <5*   AST 19 23 22     No results found for this or any previous visit (from the past 24 hour(s)).

## 2022-11-01 NOTE — PROGRESS NOTES
"/84 (BP Location: Left arm)   Pulse (!) 132   Temp 97.8  F (36.6  C) (Oral)   Resp 16   Wt 77.6 kg (171 lb 1.6 oz)   SpO2 100%   BMI 21.97 kg/m      Reason For Admission: \"Fabian\" is a 48 yo pt admitted on 10/24/22 with pneumonia of both lungs and leukocytosis with hx of cough, anemia, pleural effusion, SOB, CVA, and sinus tach.     Pt is tachy max this shift was 132, parameter is 140 or less, phantom pain at 10/10 1 mg IV dilaudid for pain, on 4 L NC, LS Coarse Crackles, neb given, A&O X4, A2 with GB, right amputation at hip, voiding spontaneously,Loose BM X1 this shift, Right chest port needle changed this shift-HL, coccyx abrasion open to air,  Regular diet, Continue with POC  "

## 2022-11-01 NOTE — PLAN OF CARE
[id number]    /78 (BP Location: Left arm)   Pulse (!) 127   Temp 98  F (36.7  C) (Oral)   Resp 18   Wt 77.6 kg (171 lb 1.6 oz)   SpO2 99%   BMI 21.97 kg/m      Reason for admission: sepsis/abnormal labs  Activity: Ax2  Pain: rating 8/10, declining interventions other than tylenol  Neuro: WNL  Cardiac: tachycardic  Respiratory: very coarse lung sounds, int cough, LOPEZ  GI/: WNL  Diet: Regular  Lines: Port  Wounds: skin breakdown coccyx  Labs/imaging: see chart      New changes this shift: No major changes this shift.  Pt continues to cough intermittently and c/o pain, although only taking tylenol to manage.       Plan: Continue to monitor and find placement.      Continue to monitor and follow POC

## 2022-11-01 NOTE — PLAN OF CARE
Problem: Plan of Care - These are the overarching goals to be used throughout the patient stay.    Goal: Plan of Care Review  Recent Flowsheet Documentation  Taken 11/1/2022 1619 by Carmen Anand RD  Plan of Care Reviewed With: patient  Overall Patient Progress: no change   Goal Outcome Evaluation:      Plan of Care Reviewed With: patient    Overall Patient Progress: no changeOverall Patient Progress: no change  Please encourage small, frequent meals (including ONS) to help stimulant appetite/intakes.

## 2022-11-02 NOTE — PROGRESS NOTES
Cross Cover:    RN paged, pt mild shortness of breath, vitally stable on 3L oxygen    Plan: CXR stat, and RN to send AM labs (to check Hb), Alb neb x 1      Romeo York MD  Text Page

## 2022-11-02 NOTE — PROGRESS NOTES
DATE:  11/2/2022   LAB TEST:  D Dimer  LAB VALUE:  9.43  ACTION:  TATY Paged to Dr. Bryant  RESPONSE: This is a baseline D Dimer for this admission, MD aware

## 2022-11-02 NOTE — PLAN OF CARE
Goal Outcome Evaluation:           Overall Patient Progress: no changeOverall Patient Progress: no change    Outcome Evaluation: Patient AxOx4, Tachy 110's otherwise VSS on 3L nc. Patient c/o increased SOB unprovoked around 0430 this morning. LS wheezy bilaterally. MDs paged, CXR ordered, showing Large L effusion, pt. VSS, mds to pass on to day team.  and a one time Albuterol neb given which helped a little. no bm overnight. voids ind in the urinal. Increased to 4L nc for comfort, satting 94% on 3L with continued sob. Continue POC

## 2022-11-02 NOTE — PROVIDER NOTIFICATION
Provider Notification    Re: Mg - 1.4, no protocol replacement, please place. Also, patient continues to feel SOB at rest, O2 maintaining, IV Dilaudid given.    Action: Notified Dr. Bryant via [facility]    Response: Pending

## 2022-11-02 NOTE — PLAN OF CARE
Goal Outcome Evaluation:  Care from 7 am to 3 pm  Late entry  A&Ox4. Flat affect. Dilaudid 0.5 mg IV x1 for R side abdomen pain with relief. Tachycardic in the 120's. Hgb 6.8; one unit of blood transfusion is infusing. Esophagogram done. Us of abdomen done( see result)/Up to a wheelchair with assist x2.  Pulsate mattress is placed on bed.   Continue with POC

## 2022-11-02 NOTE — SIGNIFICANT EVENT
Significant Event Note    Time of event: 2:41 PM November 2, 2022    Description of event:  Pt on apixaban that was held for thoracentesis     Plan:  Held apixaban for thoracentesis tomorrow,     Discussed with: patient's family/emergency contact and bedside nurse    Link Young MD

## 2022-11-02 NOTE — PROGRESS NOTES
[facility]  Palliative Care Daily Progress Note       Recommendations & Counseling       Schedule care conference with patient and family to discuss prognosis and GOC    Schedule Dilaudid 2mg 1 tab PO every 6 hours for management of SOB and pain    Continue the 2 mg q4h PRN for pain and dyspnea    It does not appear that PRN nebulizer and increase in oxygen supplementation helps alleviate SOB. Patients SOB is likely related to pulmonary metastasis and/or abdominal ascites and solid mass    Continue to monitor bowel status and treat PRN; to utilize PRN Loperamide with diarrhea episodes     Discussed GOC today, very much focused on restorative goals at this time.          Assessments          Patient sitting up in bed upon arrival and is able to answer questions appropriately. Patients biggest concern today is feelings of SOB and generalized fatigue along with chronic pain issues to R hip. Patient given PRN Dilaudid for management of SOB and effective per patient. Patient and nurse report that patient was unable to take PO Dilaudid this AM due to swallowing issues related to SOB. Patient was given one time dose IV Lasix with goal of sx management of SOB. Patient reported 5/10 pain to R hip during visit. Patient declines concerns with bowels at this time and reports loose stools multiple times per day.     Patient was educated on worsening cancer with metastases to lungs with severe compression to distal esophagus with luminal narrowing 2/2 osteosarcoma. Patient declined interest in hearing exact numbers of prognosis and verbalized wishes to keep trying to live everyday. Patient verbalized importance of living due to his four children (4, 5, 15, and 17 years old). Reflects on the fact that the amputation was a big change and he has overcome this and focused on what he is able to do. Discussed potential avenues for management of symptoms versus curative measures, however,  patient will need continued education and encouragement to discuss EOL disease process and trajectory. As previously discussed, patient and family will benefit from scheduled care conference to discuss prognosis openly and realistically and to further determine GOC.     Today, the patient was seen for:  Metastatic high grade pleomorphic sarcoma  Phantom limb pain  Shortness of breath    Prognosis, Goals, or Advance Care Planning was addressed today with: Yes.  Mood, coping, and/or meaning in the context of serious illness were addressed today: Yes.  Summary/Comments:     Patient expresses primary goal of managing SOB at this time. It was previously recorded that patient is working on getting his health better than it is right now and I am not sure if this is a realistic goal. Care conference has not yet been scheduled but is currently being discussed.               Interval History:     Chart review/discussion with unit or clinical team members:     Patient AxOx4, Tachy 110's otherwise VSS on 3L nc. Patient c/o increased SOB unprovoked around 0430 this morning. LS wheezy bilaterally. MDs paged, CXR ordered, showing Large L effusion, pt. VSS, mds to pass on to day team.  and a one time Albuterol neb given which helped a little. no bm overnight. voids ind in the urinal. Increased to 4L nc for comfort, satting 94% on 3L with continued sob. Continue POC    Nursing notes reviewed. Fabian is doing ok. He is having a bit more SOB today. He has not found the nebulizer to be all that helpful. Continues to have pain mostly in right amputation/lower abdomen site. Fabian is open to having a family meeting.  All questions answered at this time.        Per patient or family/caregivers today:    C/o SOB and fatigue during visit. Continues to complain of R hip pain related to sarcoma site. Abdmonial ascites with abdominal US revealing large solid mass.      IV pain medications are helpful for his pain, work right away and he often fall  asleep after getting the medication, feels that its helpful overall for his pain but not really sure how long it lasts. He is able to take pills and does not have concerns during our visit about this. Reports his chest pain to be present after eating meals and such and less so with medications.     Key Palliative Symptoms:  # Pain severity the last 12 hours: moderate    Patient is on opioids: assessed and bowels ok/no needed changes to plan of care today.           Review of Systems:     Besides above, an additional system ROS was reviewed and is unremarkable          Medications:     I have reviewed this patient's medication profile and medications during this hospitalization.    Noted meds:      Acetaminophen 975mg every 8 hours  Apixaban 5mg BID   Lexapro 10mg daily  Ipratropium-albuterol 0.5mg/2.5mg/3mL TID  Olanzapine 2.5mg at bedtime   Tessalon 100mg TID PRN cough  Robitussin -10mg/5mL solution every 4 hours PRN cough   Dilaudid 2-4mg every 4 hour PRN mild to severe pain  Hydromorphone 0.5-1mg IV every 2 hours PRN moderate to sever pain   Loperamide 2mg QID PRN diarrhea   Zofran 4mg IV every 6 hours PRN N/V  Anoro Ellipta 62.5-25mcg/INH inhaler 1 puff daily PRN SOB/wheeze/dyspnea             Physical Exam:   Vitals were reviewed  Constitutional:   awake, fatigued, somnolent, cooperative and no apparent distress     Eyes:   Lids and lashes normal, pupils equal, round and reactive to light, extra ocular muscles intact, sclera clear, conjunctiva normal     Lungs:   Mild respiratory distress     Abdomen:   distended and ascites absent              Data Reviewed:     Reviewed recent pertinent imaging, comments:   Exam: XR CHEST PORT 1 VIEW, 11/2/2022 4:52 AM      Comparison: 10/27/2022     History: shortness of breath     Findings:  Single portable upright view of the chest obtained. Right chest  Port-A-Cath in place, tip terminates near the superior cavoatrial  junction.     Trachea is midline. Mediastinum  is within normal limits. Heart  silhouette is somewhat obscured.. Similar appearance of large  bilateral pulmonary masses better characterized on CT chest  10/27/2022. Similar appearance of large left and small right effusions  and associated atelectasis. There is no pneumothorax or pleural  effusion. The upper abdomen is unremarkable.                                                                      Impression:   1. Similar appearance of large left and small right pleural effusions  and associated atelectasis.  2. Redemonstration of numerous pulmonary masses better characterized  on CT chest 10/27/2022.     Exam: US RIGHT LOWER QUADRANT ABDOMEN LIMITED, 11/1/2022 3:13 PM     Indication: 47-year-old male with previous right hemipelvectomy due to  osteosarcoma and diffuse metastatic disease, refuses additional CT  scans. Persistent leukocytosis, sepsis, team requesting an evaluation  due to concerns for intra-abdominal infection at the site in question  versus the right lower extremity sarcoma.     Comparison: CT chest abdomen and pelvis 10/5/2022.     Findings:   At the location in question in the right lower quadrant, there is a  22.5 x 22.4 x 19.0 cm complex heterogeneous solid mass with mixed  solid and cystic components and internal vascularity. Within this  structure there are multiple anechoic complex fluid collections which  could represent cystic necrosis versus fluid collection secondary to  the malignancy or a secondary cause such as an infection/abscess. Of  note, there are large areas of central necrosis within the mass on  prior CT. This mass correlates to the solid peripherally enhancing  mass seen on previous CT abdomen from 10/5/2022 (series 2 image 546)  and is consistent with the patient's history of metastatic  osteosarcoma.                                                                      Impression: At the site in question there is a complex heterogeneous  solid structure with multiple  internal anechoic fluid collections  which are likely secondary to the primary malignancy with central  necrosis, though cannot exclude a secondary superimposed infection on  imaging. Allowing for differences in modalities, lesion appears  similar in size to mass on prior CT in this region. No overt  surrounding hyperemia demonstrated sonographically.    Esophagram dated 11/1/2022     COMPARISON:  CT chest 10/27/2022.     HISTORY:    47-year-old male with previous right hemipelvectomy due to  osteosarcoma, presenting with acute respiratory failure, work up  revealing sensation of food being stuck in the chest. Previous chest  CT demonstrating multiple round/ovoid consolidated nodular metastatic  lesions within the chest cavity.     Examination is partially limited due to patient inability to stand  upright and limited mobility while supine.     FINDINGS: Examination of the esophagus reveals dysmotility with bolus  fractionation and part of the barium bolus remaining within the  esophagus. There were no definite mucosal abnormalities. Severe  external compression of the distal esophagus leading to luminal  narrowing. The gastroesophageal junction is patent. No hiatal hernia  was seen on examination. There was absence of gastroesophageal reflux.                                                                      IMPRESSION: Severe external compression of the distal esophagus  leading to luminal narrowing, likely secondary to adjacent  osteosarcoma metastatic lesions as seen on CT chest from 10/27/2022  (series 5, image 30).  Reviewed recent labs, comments:      Latest Reference Range & Units 11/01/22 17:35 11/02/22 01:21 11/02/22 06:46 11/02/22 06:52   Sodium 136 - 145 mmol/L    142   Potassium 3.4 - 5.3 mmol/L    3.6   Chloride 98 - 107 mmol/L    108 (H)   Carbon Dioxide (CO2) 22 - 29 mmol/L    22   Urea Nitrogen 6.0 - 20.0 mg/dL    21.2 (H)   Creatinine 0.67 - 1.17 mg/dL    0.90   GFR Estimate >60 mL/min/1.73m2     >90   Calcium 8.6 - 10.0 mg/dL    9.1   Anion Gap 7 - 15 mmol/L    12   Magnesium 1.7 - 2.3 mg/dL    1.4 (L)   Phosphorus 2.5 - 4.5 mg/dL    3.6   Albumin 3.5 - 5.2 g/dL    3.0 (L)   Protein Total 6.4 - 8.3 g/dL    6.0 (L)   Alkaline Phosphatase 40 - 129 U/L    104   ALT 10 - 50 U/L    <5 (L)   AST 10 - 50 U/L    18   Bilirubin Total <=1.2 mg/dL    0.9   CRP Inflammation <5.00 mg/L    91.90 (H)   Glucose 70 - 99 mg/dL    104 (H)   Lactic Acid 0.7 - 2.0 mmol/L 2.4 (H) 2.4 (H) 1.6    WBC 4.0 - 11.0 10e3/uL    12.3 (H)   Hemoglobin 13.3 - 17.7 g/dL    7.4 (L)   Hematocrit 40.0 - 53.0 %    24.1 (L)   Platelet Count 150 - 450 10e3/uL    241   RBC Count 4.40 - 5.90 10e6/uL    2.73 (L)   MCV 78 - 100 fL    88   MCH 26.5 - 33.0 pg    27.1   MCHC 31.5 - 36.5 g/dL    30.7 (L)   RDW 10.0 - 15.0 %    18.8 (H)   % Neutrophils %    84   % Lymphocytes %    7   % Monocytes %    7   % Eosinophils %    1   % Basophils %    0   Absolute Basophils 0.0 - 0.2 10e3/uL    0.0   Absolute Eosinophils 0.0 - 0.7 10e3/uL    0.2   Absolute Immature Granulocytes <=0.4 10e3/uL    0.1   Absolute Lymphocytes 0.8 - 5.3 10e3/uL    0.8   Absolute Monocytes 0.0 - 1.3 10e3/uL    0.9   % Immature Granulocytes %    1   Absolute Neutrophils 1.6 - 8.3 10e3/uL    10.3 (H)   Absolute NRBCs 10e3/uL    0.0   NRBCs per 100 WBC <1 /100    0           Alee LYON    I was present and participated in the interview and exam of this patient. I have reviewed labs and imaging indpendently. I agree with the above document and have edited it to reflect my findings.     CHET Amaro CNS  Palliative Care Consult Team  Pager: [phone]      45 minutes spent. This includes 30 minutes face to face with patient discussing current complex health conditions and prognosis, goals of care, symptom management. Coordination of care with the primary team, palliative  and SW, and RN regarding goals of care and symptom management, psychosocial needs.

## 2022-11-02 NOTE — PROGRESS NOTES
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 He presented to the ED in the setting of abnormal labs and is admitted with concern for sepsis as well as acute on chronic anemia.    Sepsis and severe sepsis secondary to likely bacterial pneumonia, all are present on admission  This is the main problem that led to this hospitalization.  Patient meets criteria with tachycardia, leukocytosis, elevated lactic acid.  The patient qualifies for diagnosis of pneumonia given shortness of breath and infiltrates on chest imaging. Pro-Fabian, ESR, CRP all elevated. Respiratory panel negative including COVID. Respiratory distress was noted during this hospitalization with repeat imaging demonstrating pleural effusion.  The patient had an attempt for thoracentesis, but US imaging revealed no significant fluid and presence of large mass. CTA negative for PE and appears stable compared to earlier this month.     The patient had an unremarkable enteric panel and C. difficile battery upon admission.  Urinalysis was also unremarkable.    Differential diagnosis for the etiology of sepsis includes infected metastasis at the site of her prior amputation of the right lower extremity given right lower abdominal pain and fluctuation on clinical examination.  Continued tachycardia and leukocytosis are noted.The patient declined cross-sectional imaging.    Abdominal ultrasound at the right lower quadrant shows evidence of metastasis and cannot rule out infection.  Inflammatory markers was  elevated to its highest level since admission on 11/2, this currently downtrending.  Tachycardia and lactic acidosis have also down trended.  The patient received albumin 50 g at 25% on 11/1/2022.      -Awaiting blood cultures  -Awaiting sputum cultures  -Awaiting Fungitell and galactomannan  -Discontinued doxycycline/cefpodoxime  -I discontinued vancomycin given negative MRSA probe  -Continue Zosyn    2. Acute hypoxic respiratory failure secondary to metastasis to the lungs and volume overload related to resuscitation, all are present on admission  This is the likely etiology for the patient's dyspnea.  I ordered intravenous furosemide 20 mg that led the patient to produce 1.4 L of urine within 2 hours.  His dyspnea improved.  I have also requested an evaluation from my interventional colleagues for thoracentesis however there is not deep enough pocket and the risk for bleeding with the procedure would be higher than benefits that can be driven from it.  We will not hold anticoagulation since there will be no thoracentesis for this patient.  Nevertheless, I believe that the main component contributing to the patient's shortness of breath/dyspnea is malignancy in his lungs.  I am awaiting a callback from the patient sister in order to schedule goals of care discussion based on the request of the patient.    3.  Malignancy related pain, all are POA  Patient having pain reticulocyte count is elevated the site of the amputation that has been worse since being in the hospital.     -Continue scheduled acetaminophen 1 g every 8 hours  -Continue gabapentin 400 mg 3 times daily for adjuvant treatment of pain  -Dilaudid 2 mg p.o. every 6 hours was scheduled by palliative care service  -Continue intravenous Dilaudid 0.5 mg through 1 mg every 2 hours as needed for severe pain  -I have discussed with the patient today about how advanced his cancer is and about our goals from having goals of care discussion with the patient.  -I  had appreciate input of palliative care service    4.  odynophagia secondary to severe external compression of the distal esophagus leading to luminal narrowing secondary to adjacent osteosarcoma metastatic lesion, [place] POA  X-ray showed the above-stated findings    5.  Anemia of critical illness on top of chronic anemia secondary to malignancy, all other POA  The patient received blood transfusion prior to during this admission.  The patient received total of 2 units of blood transfusion during this admission.    6. 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 Ambrocoi. Currently on therapy with pazopanib.  -We will resume pazopanib per oncology team pending availability of patient's home supply  - May benefit from having a conference with his onc team/pall care this week to address some of sister's concerns about level of care as well as these persistent/worsening symptoms.  I plan to arrange for such needing.    7. Moderate malnutrition in the context of acute on chronic illness, all other VITO  - Nutrition consulted, appreciate recs  - Ensure supplements ordered    8.  Resolved medical problems  JAZZMINE, improved  Creatinine increased to 1.2 from baseline ~0.6-0.7 on 10/26. Suspect prerenal in the setting of sepsis and diarrhea.  - Monitor     9.  Chronic medical problems  H/o PE  H/o L hemispheric ischemic stroke  Stroke identified during 9/2022 admission, felt to be due to hypercoagulable state related to malignancy so initiated on anticoagulation.  -Continue apixaban for anticoagulation         Diet: Regular Diet Adult  Snacks/Supplements Adult: Ensure Enlive; With Meals    DVT Prophylaxis: DOAC  Sung Catheter: Not present  Central Lines: PRESENT       Cardiac Monitoring: None  Code Status: Full Code       Disposition Plan      Expected Discharge Date: 11/05/2022      Destination: home with help/services  Discharge Comments: Pending improved respuratory function and goals of care discussion with patient and sister, currently waiting a call back from sister        The patient's care was discussed with the Bedside Nurse and Patient.    Carlos Bryant MD  Hospitalist Service, GOLD TEAM [address] [place] [address]  Securely message with the Vocera Web Console (learn more here)  Text page via [facility] Paging/Directory   Please see signed in provider for up to date coverage information      Clinically Significant Risk Factors            # Hypomagnesemia: Lowest Mg = 1.4 mg/dL (Ref range: 1.7-2.3) in last 2 days, will replace as needed   # Hypoalbuminemia: Lowest albumin = 2.7 g/dL (Ref range: 3.5-5.2) at 10/28/2022  6:09 AM, will monitor as appropriate           # Moderate Malnutrition: based on nutrition assessment        ______________________________________________________________________    Interval History   The patient reported improvement of his shortness of breath after receiving furosemide.  There is no subjective fever or chills.    Four-point ROS negative unless noted above.    Data reviewed today: I reviewed all medications, new labs and imaging results over the last 24 hours. I personally reviewed no images or EKG's today.    Physical Exam   Vital Signs: Temp: 97.5  F (36.4  C) Temp src: Oral BP: 114/77 Pulse: (!) 121   Resp: 18 SpO2: 100 % O2 Device: Nasal cannula Oxygen Delivery: 4 LPM  Weight: 173 lbs 4.8 oz  Constitutional: Awake, alert, cooperative, no apparent distress.  Eyes: Conjunctiva and pupils examined and normal.  HEENT: Moist mucous membranes, normal dentition.  Respiratory: Clear to auscultation bilaterally, no crackles or wheezing.  Cardiovascular: Regular rate and rhythm, normal S1 and S2, and no murmur noted.  GI: Soft, non-distended, non-tender, normal  bowel sounds.  Lymph/Hematologic: No anterior cervical or supraclavicular adenopathy.  Skin: No rashes, no cyanosis, no edema.  Musculoskeletal: No joint swelling, erythema or tenderness.  Neurologic: Cranial nerves 2-12 intact, normal strength and sensation.  Psychiatric: Alert, oriented to person, place and time, no obvious anxiety or depression.    Data   Recent Labs   Lab 11/02/22  0652 11/01/22  0612 10/31/22  0725   WBC 12.3* 13.4* 20.3*   HGB 7.4* 6.7* 7.8*   MCV 88 86 86    283 331   INR 1.56* 1.53* 1.36*    139 139   POTASSIUM 3.6 4.3 4.3   CHLORIDE 108* 104 104   CO2 22 23 23   BUN 21.2* 23.1* 22.0*   CR 0.90 0.97 1.14   ANIONGAP 12 12 12   VENTURA 9.1 9.0 9.4   * 104* 116*   ALBUMIN 3.0* 2.9* 2.9*   PROTTOTAL 6.0* 6.1* 6.2*   BILITOTAL 0.9 0.7 0.7   ALKPHOS 104 111 110   ALT <5* <5* <5*   AST 18 19 23     Recent Results (from the past 24 hour(s))   XR Chest Port 1 View    Narrative    Exam: XR CHEST PORT 1 VIEW, 11/2/2022 4:52 AM    Comparison: 10/27/2022    History: shortness of breath    Findings:  Single portable upright view of the chest obtained. Right chest  Port-A-Cath in place, tip terminates near the superior cavoatrial  junction.    Trachea is midline. Mediastinum is within normal limits. Heart  silhouette is somewhat obscured.. Similar appearance of large  bilateral pulmonary masses better characterized on CT chest  10/27/2022. Similar appearance of large left and small right effusions  and associated atelectasis. There is no pneumothorax or pleural  effusion. The upper abdomen is unremarkable.      Impression    Impression:   1. Similar appearance of large left and small right pleural effusions  and associated atelectasis.  2. Redemonstration of numerous pulmonary masses better characterized  on CT chest 10/27/2022.    I have personally reviewed the examination and initial interpretation  and I agree with the findings.    FRANK THOMASON MD         SYSTEM ID:  [id number]

## 2022-11-02 NOTE — PROGRESS NOTES
"   11/02/22 8970   Appointment Info   Signing Clinician's Name / Credentials (PT) Karolina Bustos DPT   Rehab Comments (PT) Pts personal w/c in room   Living Environment   People in Home sibling(s)  (sister)   Current Living Arrangements house   Home Accessibility no concerns   Transportation Anticipated health plan transportation   Living Environment Comments Pt lives with his sister, has no stairs to enter home or within home.   Self-Care   Usual Activity Tolerance moderate   Current Activity Tolerance fair   Regular Exercise No   Equipment Currently Used at Home walker, rolling;wheelchair, manual;commode chair;shower chair;other (see comments)  (home oxygen)   Fall history within last six months no   Activity/Exercise/Self-Care Comment Pt reports PCA services, unable to state hours, but states \"she will do whatever I need help with that day\".   General Information   Onset of Illness/Injury or Date of Surgery 10/24/22   Referring Physician Gisell Mendoza, CHET CNS   Pertinent History of Current Problem (include personal factors and/or comorbidities that impact the POC) Per chart review \" Pt is a 47 year old male admitted on 10/24/2022. 47 y.o. 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 \"   Existing Precautions/Restrictions fall;oxygen therapy device and L/min  (2 lpm via NC)   Cognition   Affect/Mental Status (Cognition) sad/depressed affect   Orientation Status (Cognition) oriented x 4   Follows Commands (Cognition) WFL   Pain Assessment   Patient Currently in Pain No "   Integumentary/Edema   Integumentary/Edema no deficits were identifed   Posture    Posture Forward head position;Protracted shoulders   Range of Motion (ROM)   Range of Motion ROM is WFL   Bed Mobility   Comment, (Bed Mobility) Not observed, pt seated in personal w/c   Transfers   Comment, (Transfers) Pt tx sit->stand->pivot from w/c to recliner with SBA.   Gait/Stairs (Locomotion)   Comment, (Gait/Stairs) Unsafe to attempt, pt reports SOB with minimal activity.   Balance   Balance Comments Pt needs support of BUE to facilitate stand pivot transfer.   Sensory Examination   Sensory Perception patient reports no sensory changes   Clinical Impression   Criteria for Skilled Therapeutic Intervention Yes, treatment indicated   PT Diagnosis (PT) Impaired Functional Mobility   Influenced by the following impairments impaired balance, decreased strength, activity intolerance   Functional limitations due to impairments bed mob, trasnfers, gait   Clinical Presentation (PT Evaluation Complexity) Stable/Uncomplicated   Clinical Presentation Rationale PMHx, clinical judgement, current presentation   Clinical Decision Making (Complexity) low complexity   Planned Therapy Interventions (PT) bed mobility training;gait training;neuromuscular re-education;strengthening;transfer training;wheelchair management/propulsion training;progressive activity/exercise;home program guidelines   Risk & Benefits of therapy have been explained care plan/treatment goals reviewed   PT Total Evaluation Time   PT Eval, Low Complexity Minutes (24883) 8   Physical Therapy Goals   PT Frequency 4x/week   PT Predicted Duration/Target Date for Goal Attainment 11/09/22   PT Goals Bed Mobility;Transfers;Gait;Aerobic Activity   PT: Bed Mobility Independent;Supine to/from sit;Rolling   PT: Transfers Sit to/from stand;Bed to/from chair;Modified independent;Assistive device   PT: Gait Modified independent;Rolling walker;50 feet   PT: Wheelchair Mobility 150  feet;manual wheelchair   PT: Perform aerobic activity with stable cardiovascular response continuous activity;10 minutes   PT Discharge Planning   PT Plan bed mob, transfers, UE Therex, UE ergometer   PT Discharge Recommendation (DC Rec) home with assist;home with home care physical therapy   PT Rationale for DC Rec Pt is mobilizing well, has necessary DME for home   PT Brief overview of current status Pt pivots from w/c to recliner chair with SBA   Total Session Time   Total Session Time (sum of timed and untimed services) 8      None

## 2022-11-02 NOTE — PROVIDER NOTIFICATION
Redness at pt sacrum/coccyx has streaks of blood with sensitivity on touch, provider Brando Givens DO paged to place order for WOC

## 2022-11-02 NOTE — PROGRESS NOTES
Hematology / Oncology  Daily Progress Note   Date of Service: 11/02/2022  Patient: Vic Scott III  MRN: [id number]  Admission Date: 10/24/2022  Hospital Day # 9  Cancer Diagnosis: Metastatic high grade pleomorphic sarcoma   Primary Outpatient Oncologist: Dr. Ambrocio   Current Treatment Plan: Pazopanib     Recommendations:   - Continue to optimize respiratory status; suspect he is becoming more symptomatic from pulmonary metastasis   - Available for family meeting when one is arranged      Assessment & Plan:   Vic Scott III is a 47 year old male with past medical history of 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 Admitted for acute on chronic anemia and leukocytosis concerning for infection.       # Metastatic Osteosarcoma   In brief, he initially presented to Dr. Whitaker with rapidly progressing right leg swelling since July 2021. MRI showed a 29 x 20 cm right thigh soft tissue mass with probable bony involvement of the proximal right femur. Biopsy 9/24/21 c/w high grade pleomorphic sarcoma. Neoadjuvant chemotherapy was recommended as PET was concerning for inguinal lymphadenopathy. He initiated Doxil/Ifos(P2Q9=14/2/21) c/b confusion concerning for ifosfamide neurotoxicity for which he received Methylene Blue. C2 c/b severe neurotoxicity and again improved with Methylene blue. Remainder of cycle 2 was not completed. PET 1/11/22 decrease size of right thigh hypermetabolic soft tissue mass. Due to neurotoxicity patient was hesitant to receive for chemotherapy. He was admitted in June 2022 with right leg pain and inability to ambulate. He was found to have a right proximal femur fracture and underwent right hindquarter amputation with Dr. Whitaker and primary complex wound closure with Dr. Butt (6/17/22). Surgical pathology positive for high-grade osteosarcoma. CT AP 8/13/22 for surveillance with multiple hypodense  masses within the lateral abdominal wall and multiple pulmonary nodules c/w metastatic disease. He started Doxorubicin/Cisplatin 9/1/22. He was then readmitted 9/17/22 for right side weakness and MRI demonstrated left hemispheric stroke. Due to concern for intolerance of further platinum/anthracycline therapy he was switched to Pazopanib started on 10/16/22.   - Patient will need to provide his own supply of Pazopanib to take while in the hospital. Ok to hold until this is brought in and then assess continuation.         # Acute on Chronic Anemia   On admission noted to have Hgb 6.9.   - Transfuse to keep Hgb >7     # Cancer related pain   - Agree with palliative care consult       # Concern for infection   # Leukocytosis   # Tachycardia   Patient was directed to the ED for lab abnormalities (Hgb 7.2, and WBC 16.3).  Concerning for infection. Infectious work up notable for CXR with mixed opacities suspicious for pneumonia. 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 Thrombus extends from a pulmonary mass most likely representing tumor thrombus. AC was deferred as it was thought to be tumor thrombus. He then presented with right sided weakness and MRI 9/16/22 showed left hemisphere hyperintensities consistent with acute strokes at that time w/ etiology felt to be hypercoagulability in setting of malignancy given recent PE.  - PTA Eliquis resumed (x10/26)       Patient's plan of care was discussed with attending physician Dr. Sumner.    Thank you for the opportunity to partake in this patients plan of care. Please do not hesitate to page with questions. We will continue to follow.     I spent >35  minutes face-to-face or  coordinating care of Hothanh Scott III. Over 50% of our time on the unit was spent counseling the patient and/or coordinating care.    Dione Amaro PA-C ([facility])   Hematology/Oncology   Pager: [phone]   ___________________________________________________________________    Subjective & Interval History:    Nursing notes reviewed. Ventura is doing ok. He is having a bit more SOB today. He has not found the nebulizer to be all that helpful. Continues to have pain mostly in right amputation/lower abdomen site. Ventura is open to having a family meeting.  All questions answered at this time.     A comprehensive review of systems was obtained and is negative other than noted here or in the HPI.       Physical Exam:    Blood pressure 132/84, pulse 112, temperature 97.4  F (36.3  C), temperature source Oral, resp. rate 26, weight 78.6 kg (173 lb 4.8 oz), SpO2 100 %.    General: lying in bed, no acute distress  HEENT: sclera anicteric, EOMI, MMM  Neck: supple, normal ROM  CV: Tachycardic, normal S1/S2, no m/r/g  Resp: tachypnic, crackles L>R, currently on 4L NC   MSK: RLE amputation noted, warm and well-perfused, normal tone  Skin: no rashes on limited exam, no jaundice  Neuro: Alert and interactive, moves all extremities equally, no focal deficits    Labs & Studies: I personally reviewed the following studies:  ROUTINE LABS (Last four results):  CMP  Recent Labs   Lab 11/02/22  0652 11/01/22  0612 10/31/22  0725 10/30/22  0758    139 139 136   POTASSIUM 3.6 4.3 4.3 4.3   CHLORIDE 108* 104 104 103   CO2 22 23 23 22   ANIONGAP 12 12 12 11   * 104* 116* 132*   BUN 21.2* 23.1* 22.0* 22.5*   CR 0.90 0.97 1.14 0.98   GFRESTIMATED >90 >90 80 >90   VENTURA 9.1 9.0 9.4 9.2   MAG 1.4*  --   --   --    PHOS 3.6  --   --   --    PROTTOTAL 6.0* 6.1* 6.2* 6.4   ALBUMIN 3.0* 2.9* 2.9* 2.9*   BILITOTAL 0.9 0.7 0.7 0.8   ALKPHOS 104 111 110 113   AST 18 19 23 22   ALT <5* <5* <5* <5*     CBC  Recent Labs   Lab 11/02/22  0652  11/01/22  0612 10/31/22  0725 10/30/22  0758   WBC 12.3* 13.4* 20.3* 19.2*   RBC 2.73* 2.49* 2.90* 2.96*   HGB 7.4* 6.7* 7.8* 7.8*   HCT 24.1* 21.4* 24.9* 24.9*   MCV 88 86 86 84   MCH 27.1 26.9 26.9 26.4*   MCHC 30.7* 31.3* 31.3* 31.3*   RDW 18.8* 19.9* 20.0* 19.6*    283 331 317     INR  Recent Labs   Lab 11/02/22  0652 11/01/22  0612 10/31/22  0725 10/30/22  0758   INR 1.56* 1.53* 1.36* 1.48*       Medications list for reference:  Current Facility-Administered Medications   Medication     acetaminophen (TYLENOL) tablet 975 mg     apixaban ANTICOAGULANT (ELIQUIS) tablet 5 mg     benzocaine-menthol (CHLORASEPTIC) 6-10 MG lozenge 1 lozenge     benzonatate (TESSALON) capsule 100 mg     escitalopram (LEXAPRO) tablet 10 mg     gabapentin (NEURONTIN) capsule 400 mg     guaiFENesin-codeine (ROBITUSSIN AC) 100-10 MG/5ML solution 5 mL     heparin 100 UNIT/ML injection 5-10 mL     heparin lock flush 10 UNIT/ML injection 5-10 mL     heparin lock flush 10 UNIT/ML injection 5-10 mL     HYDROmorphone (DILAUDID) tablet 2-4 mg     HYDROmorphone (PF) (DILAUDID) injection 0.5-1 mg     ipratropium - albuterol 0.5 mg/2.5 mg/3 mL (DUONEB) neb solution 3 mL     lidocaine (LMX4) cream     lidocaine 1 % 0.1-1 mL     loperamide (IMODIUM) capsule 2 mg     magnesium oxide (MAG-OX) tablet 400 mg     magnesium sulfate 4 g in 100 mL sterile water (premade)     melatonin tablet 1 mg     naloxone (NARCAN) injection 0.2 mg    Or     naloxone (NARCAN) injection 0.4 mg    Or     naloxone (NARCAN) injection 0.2 mg    Or     naloxone (NARCAN) injection 0.4 mg     OLANZapine (zyPREXA) tablet 2.5 mg     ondansetron (ZOFRAN ODT) ODT tab 4 mg    Or     ondansetron (ZOFRAN) injection 4 mg     [Held by provider] pazopanib (VOTRIENT) tablet 800 mg     piperacillin-tazobactam (ZOSYN) 4.5 g vial to attach to  mL bag     sodium chloride (PF) 0.9% PF flush 3 mL     sodium chloride (PF) 0.9% PF flush 3 mL     umeclidinium-vilanterol (ANORO ELLIPTA)  62.5-25 MCG/INH oral inhaler 1 puff     vancomycin (VANCOCIN) 1,500 mg in 0.9% NaCl 250 mL intermittent infusion     STAFF NOTE  Fabian Scott III is a 47 year old man with past medical history of 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 Admitted for acute on chronic anemia and leukocytosis concerning for infection.      PHYSICAL EXAM  Blood pressure 132/84, pulse 112, temperature 97.4  F (36.3  C), temperature source Oral, resp. rate 26, weight 78.6 kg (173 lb 4.8 oz), SpO2 100 %.  General: lying in bed, no acute distress  HEENT: sclera anicteric, EOMI, MMM  Neck: supple, normal ROM  CV: Tachycardic, normal S1/S2, no m/r/g  Resp: tachypnic, crackles L>R, currently on 4L NC   MSK: RLE amputation noted, warm and well-perfused, normal tone      ASSESSMENT AND PLAN  Mr. Scott is a 47-year-old man with metastatic high grade pleomorphic sarcoma Hospital Day # 9, patient of Dr. Ambrocio.  Plan was to continue pazopanib and we are waiting for family to bring in the outpatient pills.   Recommendations are:  - Continue to optimize respiratory status; suspect he is becoming more symptomatic from pulmonary metastasis   - Available for family meeting when one is arranged      Thank you for allowing us to participate in this patient's care.  The patient was seen and evaluated by me.  I discussed the patient with the MEHRAN and agree with the findings and plan in the note, which was edited by me.    Sincerely,     Aleksey Wilson MD  Professor  [facility]  [phone].        I spent 15 minutes with the patient more than 50% of which was in counseling and coordination of care.

## 2022-11-02 NOTE — PROGRESS NOTES
/81 (BP Location: Left arm)   Pulse 113   Temp 97.8  F (36.6  C) (Oral)   Resp 16   Wt 78.6 kg (173 lb 4.8 oz)   SpO2 99%   BMI 22.25 kg/m      Right lower abd pain constant-dilaudid 1 mg x2 this shift, tachy(baseline), A&O X 4, A2, voiding spontaneously, multiple loose BM this shift, denies n/v, coccyx/sacrum redness with streaks of blood and sensitive to the touch-provider paged to place WOC, Mepilex applied to coccyx/sacrum, barrier cream applied, brief changed multiple times, Hgb 6.7-received 1 unit of RBC, Zosyn x2-scheduled Q6 hrs, Eugeniao X1, CT of abd today, BC results pending, Lactic 2.4 @ 1735, lactic scheduled Q4

## 2022-11-02 NOTE — PLAN OF CARE
"[id number]    /82 (BP Location: Left arm)   Pulse (!) 123   Temp 98  F (36.7  C) (Oral)   Resp 18   Wt 78.6 kg (173 lb 4.8 oz)   SpO2 98%   BMI 22.25 kg/m        At the start of shift, felt SOB at rest and could not take deep breathes, anterior exp wheezing noted Remained on 4L via NC,  O2 sats in the high 90s, provided 0.5mg of IV Dilaudid and PRN Inhaler. On scheduled Nebs TID. Patient was tired from being up, so fell asleep after. Towards the afternoon breathing improved, LS now diminished. States he started to breathe easier after 20mg of IV Lasix, has 1200 ml of urine out, clear yellow. CAPS team came by to scan for poss thora, reports not enough fluid, Eliquis remains on hold.     HR tachy to the low 120s, asymptomatic. Afebrile. Mg - 1.4, replaced, re-check pending. Port TKO in-between ABX. Overall denies pain, but right side can cause some discomfort with turning. Palliative scheduled PO Dilaudid Q6H, Fabian seemed reluctant to take it as he has no pain, but was agreeable. Informed Fabian he has the right to decline if he truly does not want it, he is aware. Denies nausea. Only ate a later dinner, not feeling hungry. Had 3-4 loose/liquid stools, Imodium x1, intermittently will report needing to be changed. Mepilex applied to Coccyx and on Left gluteal cheek, WOC came by and added orders. Encouraging Q2-3h repos, but patient int refuses, was agreeable to elevate heel while in bed, pulsate mattress in-use. Did reinforce pressure injuries education. Up to the recliner this afternoon. Ax1 to pivot.Plan for a care conference at some point, Fabian reports he does not want to be told he is going to \"die tomorrow\" and believes this would be very disturbing for his family to hear. He likes to think positive. Continue with POC.   "

## 2022-11-02 NOTE — CONSULTS
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 He presented to the ED in the setting of abnormal labs and is admitted with concern for sepsis as well as acute on chronic anemia.    Areas Assessed:      Areas visualized during today's visit: Focused: and Sacrum/coccyx    Pressure Injury Location: coccyx      Last photo: 11/2/22  Wound type: Pressure Injury     Pressure Injury Stage: Deep Tissue Pressure Injury (DTPI), present on admission      Wound history/plan of care:   Pt does not know how long wound has been present. Has a Roho cushion in W.C. and states that he spends 30 min.-3 hours maximum time sitting in chair before going back to bed.     Wound base: 50% nonblanchable dark brown erythema on left, 40% new pink unpigmented epithelium under flaking skin, 10 % dermis,      Palpation of the wound bed: normal      Drainage: none     Description of drainage: none     Measurements (length x width x depth, in cm)   6 x 4 x 0.2 cm        Periwound skin: Intact and Dry/scaly         Temperature: normal   Odor: none  Pain: mild,   Pain intervention prior to dressing change: N/A  Treatment goal: Heal   STATUS: initial assessment  Supplies ordered: at bedside and supplies stored on unit      Treatment Plan:     Wound location:  coccyx  Cleanse with MicroKlenz moistened gauze   Pat dry.   Apply no sting barrier film to the marty wound skin and allow to dry   Cover with Sacral  Mepilex dressing, carefully molding into place  Paint the edges of the mepilex dressing  with no-sting barrier film  Change every third day and as needed      Pressure Injury Prevention (PIP) Plan:  If patient is declining pressure injury prevention interventions: Explore reason why and address patient's concerns, Educate on pressure injury risk and prevention intervention(s) and Ensure Care team is aware ( provider, charge nurse, etc)  Mattress: Follow bed algorithm, reassess daily and order specialty mattress, if indicated.  HOB: Maintain at or below 30 degrees, unless contraindicated  Repositioning in bed: Every 1-2 hours  and Left/right positioning; avoid supine  Heels: Keep elevated off mattress and Pillows under calves  Chair positioning: use pt's roho cushion    If patient has a buttock pressure injury, or high risk for PI use chair cushion or SPS.  Moisture Management: Perineal cleansing /protection: Follow Incontinence Protocol and Avoid brief in bed     Orders: Reviewed and Written    RECOMMEND PRIMARY TEAM ORDER: None, at this time  Education provided: importance of repositioning and plan of care  Discussed plan of care with: Patient and Nurse  WOC nurse follow-up plan: weekly  Notify WOC if wound(s) deteriorate.  Nursing to notify the Provider(s) and re-consult the WOC Nurse if new skin concern.    DATA:     Current support surface: Standard  Low air loss mattress  Containment of urine/stool: Incontinence Protocol and Incontinent pad in bed  BMI: Body mass index is 22.25 kg/m .   Active diet order: Orders Placed This Encounter      Regular Diet Adult     Output: I/O last 3 completed shifts:  In: 380 [P.O.:80]  Out: 1700 [Urine:1700]     Labs: Recent Labs   Lab 11/02/22  0652   ALBUMIN 3.0*   HGB 7.4*   INR 1.56*   WBC 12.3*   CRP 91.90*     Pressure injury risk assessment:   Sensory Perception: 3-->slightly limited  Moisture: 3-->occasionally moist  Activity: 2-->chairfast  Mobility: 2-->very limited  Nutrition: 2-->probably inadequate  Friction and Shear: 2-->potential problem  German  Score: 14    [facility]  Phone: [phone]  Pager: [phone]

## 2022-11-02 NOTE — PROGRESS NOTES
PALLIATIVE CARE SOCIAL WORK Progress Note   [facility] ([place]) Unit 7D    REFERRAL SOURCE: Palliative Care Team    PCSW was asked to see Fabian for emotional coping and anxiety support.   Met with Fabian this afternoon to introduce self and role. He was open to talking about his coping. He reports that feeling anxious is new for him. He would prefer morning visits as this afternoon we were interrupted multiple times.     Plan: PCSW will continue to follow for support and coping while Palliative Care Team is following.     TY Luna, [facility]  Palliative Care Clinical   Pager [phone]    [facility] Inpatient Team Consult Pager [phone] Mon-Fri 8-4:30  After hours Answering Service [phone]

## 2022-11-03 NOTE — PLAN OF CARE
Goal Outcome Evaluation:     A&Ox4. Tachycardic, HR in 120s. Sating 100% on 3L. Continuous pulse ox. OVSS. Lactic 4.5. Code sepsis called. 50mg Albumin ordered with a recheck lactic at 1530. Mg 2.0, on high replacement protocol. IV infusion replacement started but paused to run albumin. Needs to be reconnected after 2nd bag of albumin. Incontinent of stool. Doesn't call to be cleaned up and not motivated to stand to be cleaned. Loose stools. C. Diff sample ordered. UA collected and sent to lab. Partially cooperative with luana and lanny. Not complaining of pain, scheduled dilaudid changed to prn orders. Scheduled long acting morphine ordered. SOB with activity. Denies N/V. Care conference scheduled for tomorrow at 2:00 PM. Continue to monitor.

## 2022-11-03 NOTE — PLAN OF CARE
[id number]  Goal Outcome Evaluation:    BP (!) 139/94 (BP Location: Left arm)   Pulse (!) 127   Temp 97.9  F (36.6  C) (Oral)   Resp 22   Wt 78.6 kg (173 lb 4.8 oz)   SpO2 99%   BMI 22.25 kg/m      Pt tachy high of 127. Denies nausea. Denies sob at rest/ trouble breathing. Dyspnea w/ exertion. Pt weaned to 3L O2 at 95%. Continued w/ pulse ox. Pain managed w/ scheduled dilaudid. Urinal used at bedside, no BM overnight. 1x imodium given per pt request. PRN robitussin, tessalon given for congestion/cough. Pt stayed in chair overnight. q2 turn provided but occasionally refused. Refused Mg oxide.

## 2022-11-03 NOTE — CODE/RAPID RESPONSE
Rapid Response Team Note    Assessment   In assessment a rapid response was called on Vic Scott III due to lactic acidosis. This presentation is likely due to known sepsis and metastatic osteosarcoma.     Plan   -  Give pt 50 g, 25% albumin with repeat lactic acid level later today.   -  The Internal Medicine primary team was bedside.  -  Disposition: The patient will remain on the current unit. We will continue to monitor this patient closely.  -  Reassessment and plan follow-up will be performed by the primary team    Romeo Nelson PA-C  [facility] RRT [facility] Job Code Contact #[phone]  [facility] Paging/Directory    Hospital Course   Brief Summary of events leading to rapid response:   Primary team ordered routine LA that resulted >4.0.    Admission Diagnosis:   Pneumonia of both lungs due to infectious organism, unspecified part of lung [J18.9]  Leukocytosis, unspecified type [D72.829]    Physical Exam   Temp: 98.5  F (36.9  C) Temp  Min: 97.9  F (36.6  C)  Max: 98.7  F (37.1  C)  Resp: 16 Resp  Min: 16  Max: 22  SpO2: 100 % SpO2  Min: 97 %  Max: 100 %  Pulse: (!) 129 Pulse  Min: 118  Max: 129    No data recorded  BP: (!) 141/89 Systolic (24hrs), Av , Min:127 , Max:148   Diastolic (24hrs), Av, Min:82, Max:94     I/Os: I/O last 3 completed shifts:  In: 970 [P.O.:970]  Out: 2300 [Urine:2300]     Exam:   General: in no acute distress  Mental Status: baseline mental status.      Significant Results and Procedures   Lactic Acid:   Recent Labs   Lab Test 22  1320 22  0646 22  0121   LACT 4.5* 1.6 2.4*     CBC:   Recent Labs   Lab Test 22  0721 22  0652 22  0612   WBC 17.7* 12.3* 13.4*   HGB 8.1* 7.4* 6.7*   HCT 26.3* 24.1* 21.4*    241 283        Sepsis Evaluation   The patient is known to have an infection.  Vic Scott III meets SIRS criteria AND has a lactate >2 or other evidence of acute organ damage.  These vital signs, lab and physical exam findings  "constitute a diagnosis of SEVERE SEPSIS.    Sepsis Time-Zero (time severe sepsis diagnosis confirmed): 11/03/22 as this was the time when Lactate resulted, and the level was > 2.0     Anti-infectives (From now, onward)    Start     Dose/Rate Route Frequency Ordered Stop    11/02/22 1200  micafungin (MYCAMINE) 150 mg in sodium chloride 0.9 % 100 mL intermittent infusion         150 mg  100 mL/hr over 60 Minutes Intravenous EVERY 24 HOURS 11/02/22 1157      11/01/22 1600  piperacillin-tazobactam (ZOSYN) 4.5 g vial to attach to  mL bag        Note to Pharmacy: For [facility], [facility] and [facility]: For Zosyn-naive patients, use the \"Zosyn initial dose + extended infusion\" order panel.    4.5 g  over 30 Minutes Intravenous EVERY 6 HOURS 11/01/22 1404          Current antibiotic coverage is appropriate for source of infection.    3 Hour Severe Sepsis Bundle Completion:  1. Initial Lactic Acid result shown above. Repeat lactic acid   2. Blood Cultures before Antibiotics: Yes  3. Broad Spectrum Antibiotics Administered: yes  4. Fluids: Fluid bolus not indicated due to: L>R pleural effusions on track to received L sided thoracentesis later today    "

## 2022-11-03 NOTE — PROGRESS NOTES
[facility]  Palliative Care Daily Progress Note       Recommendations & Counseling     Goals of care:    Full code, affirmed that today, discussed having a open conversation as things unfold for him    Still wants to come back to the hospital and understands the rehospitalization's is a reality he doesn't love but willing to keep doing for now    Would still be helpful to have GOC discussion with him and his sister, he reports that he is going back to his sisters place and I am not sure she is aware of this plan.    I do worry there is a mix of medical illiteracy and coping affecting how he is communicating his understanding of the details of his health conditions, however seems to appreciate the bigger picture and understands that he is very sick and time is short per his doctors.    Symptoms:    Ordered MS Contin 15 mg BID    Changed hydromorphone to 2-4 mg q4h PRN, encourage use of this please    Changed hydromorphone IV to 0.5-1 mg q4h PRN    Fan at bedside for dyspnea          Assessments          Patient sitting up in bed upon arrival and is able to answer questions appropriately. Patients biggest concern today is feelings of SOB and generalized fatigue along with chronic pain issues to R hip. Patient given PRN Dilaudid for management of SOB and effective per patient. Patient and nurse report that patient was unable to take PO Dilaudid this AM due to swallowing issues related to SOB. Patient was given one time dose IV Lasix with goal of sx management of SOB. Patient reported 5/10 pain to R hip during visit. Patient declines concerns with bowels at this time and reports loose stools multiple times per day.      Patient was educated on worsening cancer with metastases to lungs with severe compression to distal esophagus with luminal narrowing 2/2 osteosarcoma. Patient declined interest in hearing exact numbers of prognosis and verbalized wishes to keep trying to  live everyday. Patient verbalized importance of living due to his four children (4, 5, 15, and 17 years old). Reflects on the fact that the amputation was a big change and he has overcome this and focused on what he is able to do. Discussed potential avenues for management of symptoms versus curative measures, however, patient will need continued education and encouragement to discuss EOL disease process and trajectory. As previously discussed, patient and family will benefit from scheduled care conference to discuss prognosis openly and realistically and to further determine GOC.      Today, the patient was seen for:  Metastatic high grade pleomorphic sarcoma  Phantom limb pain  Shortness of breath    Prognosis, Goals, or Advance Care Planning was addressed today with: Yes.    Discussed with him today his goals, he is very much at times questionable if he understands his medical condition details, reports today that he didn't know he had cancer in both lungs. He does report that doctors have given him limited time measured in months-1 year. He acknowledges that time is not something in his control and things are going to happen/progress with his cancer anyway. We reviewed his wishes to keep coming back to the hospital, and acknoweldges that he just keeps coming back frequently, prepared this was going to keep happening, and he says while he does not like it he will put up with further hospitalizations. Reviewed if he gets to the point of not feeling like its worth it anymore that he could be care for outside the hospital with a team to keep his symptoms managed better. Reviewed also that palliative car would be a resource should he have goals to continue to see his doctors and try to get more time. He very much is still willing to put up with coming back to the hospital and understands that options really are not going to be meaningful for changing the course much. We also discussed code status, discussed the  recommendation for DNR/DNI, he feels right now that he would want it tried even if it didn't work. Also encouraged him to think about this and there may be a time for DNR.DNI. he also says vaguely today he feels he wants to be full code but maybe tomorrow will be different. He very much sees each day a true gift he wakes up.    Mood, coping, and/or meaning in the context of serious illness were addressed today: Yes.  Summary/Comments: discussions about his disease process and timeline scare him, he uses humor frequently to cope.            Interval History:     Chart review/discussion with unit or clinical team members:   Notes reviewed, no acute events, this morning didn't report severe symptoms of pain.    Per patient or family/caregivers today:  Patient reports pain and breathing seem to be better. Hopeful to discharge in the coming days. Taking one day at a time.3    Key Palliative Symptoms:  # Pain severity the last 12 hours: low  # Dyspnea severity the last 12 hours: low    Patient is on opioids: assessed and bowels ok/no needed changes to plan of care today.           Review of Systems:     Besides above, ROS was reviewed and is unremarkable          Medications:     I have reviewed this patient's medication profile and medications during this hospitalization.    Noted meds:    Acetaminophen 975 mg TID  Gabapentin 400 mg TID  Olanzapine 2.5 mg at bedtime  Tessalon pearls PRN  Robitussin AC PRN  Hydromorphone IV PRN- 0.5 mg over last 24 hours  Hydromorphone PO PRN- 6 mg over last 24 hours    5+24= 29 OME             Physical Exam:   Vitals were reviewed  Temp: 98.2  F (36.8  C) Temp src: Oral BP: (!) 131/90 Pulse: (!) 128   Resp: 18 SpO2: 100 % O2 Device: Nasal cannula Oxygen Delivery: 3 LPM    Intake/Output Summary (Last 24 hours) at 11/3/2022 1104  Last data filed at 11/3/2022 0715  Gross per 24 hour   Intake 893 ml   Output 1125 ml   Net -232 ml     Constitutional: Awake, alert, cooperative, no apparent  distress  ENT: Normocephalic, without obvious abnormality, atramatic  Lungs: No increased work of breathing, good air exchange on NC  Musculoskeletal: No redness, warmth, or swelling of the joints. R leg amputation.  Neurologic: Awake, alert, oriented to name, place and time.    Neuropsychiatric: Normal affect, mood, orientation, memory and insight.  Skin: No rashes, erythema              Data Reviewed:     Reviewed recent pertinent imaging, comments:   Chest xray 11/2  Impression:   1. Similar appearance of large left and small right pleural effusions   and associated atelectasis.   2. Redemonstration of numerous pulmonary masses better characterized   on CT chest 10/27/2022.     I have personally reviewed the examination and initial interpretation   and I agree with the findings.     Reviewed recent labs, comments:   Sodium 142  Potassium 3.6  Creatinine 1.11  GFR 83  WBC 17.7  hemoglobin 8.1  Platelets 282        CHET Amaro CNS  Palliative Care Consult Team  Pager: [phone]    35 minutes spent. This includes 20 minutes face to face with patient discussing current complex health conditions and prognosis, goals of care, symptom management. Coordination of care with the primary team, oncology, palliative SW, and RN regarding goals of care and symptom management.

## 2022-11-03 NOTE — PROVIDER NOTIFICATION
11/03/22 1400   Call Information   Date of Call 11/03/22   Time of Call 1409   Name of person requesting the team Marga RASCON   Title of person requesting team RN   RRT Arrival time 1416   Reason for call   Type of RRT Adult   Primary reason for call Sepsis suspected   Sepsis Suspected Heart Rate > 100;Elevated Lactate level;WBC <4 or >12   Was patient transferred from the ED, ICU, or PACU within last 24 hours prior to RRT call? No   SBAR   Situation LA 4.5, WBC 17.7, . No complaints of pain. VSS normal otherwise.   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 He presented to the ED in the setting of abnormal labs and is admitted with concern for sepsis as well as acute on chronic anemia.   Notable History/Conditions Cancer   Assessment Pt appears comfortable. Up in chair eating dinner. Tachycardic, but pt appears comfortable.   Interventions Fluid bolus;Meds  (albumin given, abx started zosyn and vanco)   Patient Outcome   Patient Outcome Stabilized on unit   RRT Team   Attending/Primary/Covering Physician GEOVANNA Bryant MD.   Date Attending Physician notified 11/03/22   Time Attending Physician notified [id number]   Physician(s) Jw Nelson PA-C   Lead RN Rafi RASCON   RT NA   Post RRT Intervention Assessment   Post RRT Assessment Stable/Improved   Date Follow Up Done 11/03/22   Time Follow Up Done 1831   Comments LA down to 3.1 from 4.5

## 2022-11-04 NOTE — PROGRESS NOTES
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 He presented to the ED in the setting of abnormal labs and is admitted with concern for sepsis as well as acute on chronic anemia.    1. Sepsis and severe sepsis secondary to likely bacterial pneumonia on top of lung metastasis., all are present on admission  This is the main problem that led to this hospitalization.  Patient meets criteria with tachycardia, leukocytosis, elevated lactic acid.  The patient qualifies for diagnosis of pneumonia given shortness of breath and infiltrates on chest imaging. Pro-Fabian, ESR, CRP all elevated. Respiratory panel negative including COVID. Respiratory distress was noted during this hospitalization with repeat imaging demonstrating pleural effusion.  The patient had an attempt for thoracentesis, but US imaging revealed no significant fluid and presence of large mass. CTA negative for PE and appears stable compared to earlier this month.     The patient had an unremarkable enteric panel and C. difficile battery upon admission.  Urinalysis was also unremarkable.    Differential diagnosis for the etiology of sepsis includes infected metastasis at the site of her prior amputation of the right lower extremity given right lower abdominal pain and fluctuation on clinical examination.  Continued tachycardia and leukocytosis are noted.The patient declined cross-sectional imaging.  However abdominal ultrasound at the right lower quadrant shows evidence of metastasis and cannot rule out  infection.  Inflammatory markers was elevated to its highest level since admission on 11/2, this currently downtrending.  However the patient continues to have rebound tachycardia with worsening leukocytosis with heart rate of 1 , white blood cell count of 17.7, and lactic acid of 4.5.    Differential diagnosis:  1.  Cardiogenic etiology for the tachycardia and lactic acidosis as in low cardiac output has been effectively ruled out by the undersigned with a calculated cardiac index of 3.5 based on mixed venous oxygen saturation through venous blood gas obtained from Port-A-Cath  3.  Hemorrhagic etiology as an hemorrhagic shock physiology, is less likely with a stable hemoglobin  3.  Infection and sepsis related etiology, is the most likely etiology, again with obstructive pneumonia on top of his metastatic lung disease.  Bacterial infections are the most likely etiology.  Fungal infection is among differential diagnosis.  Given continued abdominal pain, diarrhea, multiple loose bowel movements, persistent leukocytosis in spite of antibiotics, I am concerned for C. difficile in the spite of negative battery performed 9 days prior.  The last etiology I can consider would be an infection at the Port-A-Cath    -Obtained and reviewed oxyhemoglobin from Port-A-Cath and calculated cardiac index  -Repeat blood cultures including 1 set of blood cultures from the Port-A-Cath  -The patient fulfilled criteria for C. difficile testing including more than 3 loose bowel movements in 24 hours, abdominal discomfort, leukocytosis, lack of use of laxatives for 72 hours, ordered C. difficile panel   -Awaiting Fungitell and galactomannan  -Awaiting final blood cultures obtained priorly  -Continue Zosyn with sepsis dosing  -Continue micafungin while awaiting fungal work-up  -Started oral vancomycin while awaiting C. difficile panel  -25% albumin at 50 g  -Trend lactic acid    2.  Malignancy related pain, all are POA  Patient having  pain reticulocyte count is elevated the site of the amputation that has been worse since being in the hospital.     -Continue scheduled acetaminophen 1 g every 8 hours  -Continue gabapentin 400 mg 3 times daily for adjuvant treatment of pain  -The patient was started on MS Contin at 15 mg twice daily by palliative care service  -Dilaudid dosing was adjusted to 2 to 4 mg every 4 hours as needed oral and 0.5 to 1 mg every 4 hours intravenous as needed by palliative care service  -I was able to arrange for goals of care discussion to be performed on 11/4 at 1 PM.  Goals of care discussion would include the patient, his sister [the patient requested specifically assisted], oncology service, palliative care service, and the undersigned.  Although the patient's sister reported that she would be able to be present on 11/4 at 12 PM, unfortunately 12 PM would not be a time when the oncology service be able to meet with the patient and his sister.  As such, I have left a voicemail for the patient's sister to inform her of the changes of the time of the meeting to be 1 PM.  -I appreciate input of palliative care service  -I appreciate the input of oncology service    3. Acute hypoxic respiratory failure secondary to metastasis to the lungs and volume overload related to resuscitation, all are present on admission  This is the likely etiology for the patient's dyspnea.  I ordered intravenous furosemide 20 mg that led the patient to produce 1.4 L of urine within 2 hours.  His dyspnea improved.  I have also requested an evaluation from my interventional colleagues for thoracentesis however there is not deep enough pocket and the risk for bleeding with the procedure would be higher than benefits that can be driven from it.  We will not hold anticoagulation since there will be no thoracentesis for this patient.  Nevertheless, I believe that the main component contributing to the patient's shortness of breath/dyspnea is malignancy  in his lungs.  I am awaiting a callback from the patient sister in order to schedule goals of care discussion based on the request of the patient.    4. 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 Ambrocio. Currently on therapy with pazopanib.  -We will resume pazopanib per oncology team pending availability of patient's home supply  - May benefit from having a conference with his onc team/pall care this week to address some of sister's concerns about level of care as well as these persistent/worsening symptoms.  I plan to arrange for such needing.    5. Moderate malnutrition in the context of acute on chronic illness, all other VITO  - Nutrition consulted, appreciate recs  - Ensure supplements ordered    6.  Resolved medical problems  # Odynophagia secondary to severe external compression of the distal esophagus leading to luminal narrowing secondary to adjacent osteosarcoma metastatic lesion, [place] POA    # Anemia of critical illness on top of chronic anemia secondary to malignancy, all other POA  The patient received blood transfusion prior to during this admission.  The patient received total of 2 units of blood transfusion during this admission.    # JAZZMINE, improved  Creatinine increased to 1.2 from baseline ~0.6-0.7 on 10/26. Suspect prerenal in the setting of sepsis and diarrhea.  - Monitor     7.  Chronic medical problems  H/o PE  H/o L hemispheric ischemic stroke  Stroke identified during 9/2022 admission, felt to be due to hypercoagulable state related to malignancy so initiated on anticoagulation.  -Continue apixaban for anticoagulation         Diet: Regular Diet Adult  Snacks/Supplements Adult: Ensure Enlive; With Meals    DVT Prophylaxis: DOAC  Sung Catheter: Not present  Central Lines: PRESENT        Cardiac Monitoring: None  Code Status: Full Code      Disposition Plan      Expected Discharge Date: 11/06/2022      Destination: home with help/services  Discharge Comments: Pending improved respuratory function and goals of care discussion with patient and sister, currently waiting a call back from sister        The patient's care was discussed with the Bedside Nurse and Patient.    Carlos Bryant MD  Hospitalist Service, GOLD TEAM 11  [facility]  Securely message with the Vocera Web Console (learn more here)  Text page via [facility] Paging/Directory   Please see signed in provider for up to date coverage information      Clinically Significant Risk Factors            # Hypomagnesemia: Lowest Mg = 1.4 mg/dL (Ref range: 1.7-2.3) in last 2 days, will replace as needed   # Hypoalbuminemia: Lowest albumin = 2.7 g/dL (Ref range: 3.5-5.2) at 10/28/2022  6:09 AM, will monitor as appropriate           # Moderate Malnutrition: based on nutrition assessment        ______________________________________________________________________    Interval History   The patient reported improved shortness of breath and abdominal pain.  There is no subjective fever or chills.    Four-point ROS negative unless noted above.    Data reviewed today: I reviewed all medications, new labs and imaging results over the last 24 hours. I personally reviewed no images or EKG's today.    Physical Exam   Vital Signs: Temp: 97.9  F (36.6  C) Temp src: Oral BP: (!) 142/84 (rn notified) Pulse: (!) 124 (rn notified)   Resp: 18 SpO2: 100 % O2 Device: Nasal cannula Oxygen Delivery: 2 LPM  Weight: 173 lbs 4.8 oz  Constitutional: Awake, alert, cooperative, no apparent distress.  Eyes: Conjunctiva and pupils examined and normal.  HEENT: Moist mucous membranes, normal dentition.  Respiratory: Clear to auscultation bilaterally, no crackles or wheezing.  Cardiovascular: Regular rate and rhythm, normal S1 and S2,  and no murmur noted.  GI: Soft, non-distended, non-tender, normal bowel sounds.  Lymph/Hematologic: No anterior cervical or supraclavicular adenopathy.  Skin: No rashes, no cyanosis, no edema.  Musculoskeletal: No joint swelling, erythema or tenderness.  Neurologic: Cranial nerves 2-12 intact, normal strength and sensation.  Psychiatric: Alert, oriented to person, place and time, no obvious anxiety or depression.    Data   Recent Labs   Lab 11/03/22  0721 11/03/22  0209 11/02/22  0652 11/01/22  0612   WBC 17.7*  --  12.3* 13.4*   HGB 8.1*  --  7.4* 6.7*   MCV 89  --  88 86     --  241 283   INR 1.43*  --  1.56* 1.53*     --  142 139   POTASSIUM 3.6  --  3.6 4.3   CHLORIDE 105  --  108* 104   CO2 21*  --  22 23   BUN 22.2*  --  21.2* 23.1*   CR 1.11  --  0.90 0.97   ANIONGAP 16*  --  12 12   VENTURA 9.1  --  9.1 9.0   * 106* 104* 104*   ALBUMIN 3.1*  --  3.0* 2.9*   PROTTOTAL 6.3*  --  6.0* 6.1*   BILITOTAL 0.9  --  0.9 0.7   ALKPHOS 111  --  104 111   ALT <5*  --  <5* <5*   AST 18  --  18 19     No results found for this or any previous visit (from the past 24 hour(s)).

## 2022-11-04 NOTE — PROGRESS NOTES
7/17/2017       RE: Minh Altamirano  [address] [facility] [address] [facility]  [address]  [place] [address]     Dear Colleague,    Thank you for referring your patient, Minh Altamirano, to the [facility] UROLOGY AND INST FOR PROSTATE AND UROLOGIC CANCERS at [facility]. Please see a copy of my visit note below.    Follow-up Visit for Neurogenic Bladder    Name: Minh Altamirano    MRN: [id number]   YOB: 1986  Accompanied at today's visit by:self                 Chief Complaint:   Neurogenic Bladder          History of Present Illness:   HISTORY: Minh Altamirano is a 31 year old female with a history of neurogenic bladder secondary to sacral agenesis. Patient is not wheelchair bound. Last visit with us was January 2017. She is here for routine NGB f/u w/o any specific changes or complaints.     Previous Bladder Surgeries:  Previous Bladder Augmentation: colon in 2012. Revisions include: none   Catheterizable stoma:appendiceal Mitrofanoff in 2012. Revisions include: takedown due to dehiscence.   Anti-incontinence procedures: bladder neck sling (autologous fascia) in 2012  Botox injections: 8/2016.     Pertinent Medications:  Current Anticholinergics: None  Current Prophylactic antibiotics: None  Intravesical gentamycin:  None  Intravesical oxybutinin: None    Catheterization History:  The patient catheterizes per native urethra with a 12F straight catheter q3 hours. Catheterization is performed by  self. The patient uses a new catheter each time. She does not irrigate the bladder. NOTE - She used to receive 180 cath/month and irrigation supplies. Now she only receives 30/month and no irrigation supplies. Dr Moore on 06/16/17 wrote her a new order for 180/month.     Incontinence History:  She does leak between voids/caths. She does experience urgency of urination. She does not experience stress urinary incontinence with the following activities: coughing/sneezing. She wears pads and uses  [facility]  Palliative Care Daily Progress Note       Recommendations & Counseling       Continue current regimen for symptom management    Goals are currently full and restorative cares    Appreciate PCSW and  support of patient and family    Family wishes for a phone call chain of command if there are any changes his his condition and the order includes:  1. Lauryn- sister mobile [phone], home [phone]  2. Pretty- sister mobile [phone]  3. Kayley- aunt [phone]  4. Ed Bagley- uncle [phone]  5. Charity Crystal- cousin [phone]      Family also wishes to have a gathering on Sunday if possible as a group, appreciate nursing staff and leadership discussing if this is possible in a room in the hospital.          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 He presented to the ED with abnormal labs and is admitted with concern for sepsis due to chronic anemia. His hospital course has been complicated by respiratory distress, worsening of his pain, JAZZMINE.     Today, the patient was seen for:  Metastatic high grade pleomorphic sarcoma  Phantom limb pain    Prognosis, Goals, or Advance Care Planning was addressed today with: Yes.    Met with patient and family along with the oncology doctor and primary team. Discussed his current oncology picture of the fact that he has metastatic cancer which is quite extensive in the lungs causing problems. Discussed extensively rationale around his medication needing to be brought in. Discussed his needs and sister feels that she cannot take him home. Fabian is appropriately emotional about this and tearful, feels that  1-2/day.    If patient has an AUS or Sling then:  Leak management: NA  Sphincter Complications: NA      Urinary Tract Infection History:  Treated with antibiotics for positive culture and non-specific symptoms: 1 times in the last year  Treated with antibiotics for positive culture plus symptoms of UTI: 1 times in the last year    Bowel Movement History:  The patient has a bowel movement q2 days. Bowel regimen includes Miralax and senna          Past Medical History:     Past Medical History:   Diagnosis Date     Anemia      Chronic infection     MRSA     Constipation, chronic      Incontinence of urine      Lipoma of spinal cord      Migraine      neurogenic bladder      Spinal dysraphism (H)           Past Surgical History:     Past Surgical History:   Procedure Laterality Date     BLADDER AUGMENTATION  1/5/2012    Procedure:BLADDER AUGMENTATION; Surgeon:TRINA MOORE; Location:[facility] OR     CYSTOSCOPY, INTRAVESICAL INJECTION N/A 8/19/2016    Procedure: CYSTOSCOPY, INTRAVESICAL INJECTION;  Surgeon: Trina Moore MD;  Location: [facility] OR     LAMINECTOMY LUMBAR TWO LEVELS       LAPAROTOMY EXPLORATORY  1/11/2012    Procedure:LAPAROTOMY EXPLORATORY; Exploratory Laparotomy with Takedown of Mitrofanoff, Ventral Hernia Repair with Strattice Mesh implantation ; Surgeon:TRINA MOORE; Location:[facility] OR     MITROFANOFF PROCEDURE (APPENDIX CONDUIT)  1/5/2012    Procedure:MITROFANOFF PROCEDURE (APPENDIX CONDUIT); Bladder Augmentation with Right Colon, Appendix Conduit- Mitrofanoff, Insertion of Pubovaginal Sling using Autologous Rectus Fascia; Surgeon:TRINA MOORE; Location:[facility] OR     tumor resection and cord detethering              Allergies:     Allergies   Allergen Reactions     Naproxen Hives and Nausea and Vomiting     Bactrim [Sulfamethoxazole W/Trimethoprim] Itching     Gabapentin Itching and Nausea     Vicodin [Hydrocodone-Acetaminophen] Itching            Medications:     Current Outpatient  he has come to terms with things with his life expectancy, and this is something he was never prepared for the possibility of. Sister also I think per family is fearful of finding Fabian passed away in the home and the concern about the children in the home who have already experience a lot of medical events that fabian has had with EMS in place. We discussed the infections that are currently being covered with antibiotics and discussed the next steps of a thoracentesis today to help also evaluate for additional infection. Discussed hes got some time still here in the hospital to sort things out.    Discussed  Fabians goals of care which are to continue to prolong his life as much as possible, he does have some important goals that he wants to get family pictures and also do some cooking.     We also discussed prognosis likely in the measure of months. Family is aware.    Mood, coping, and/or meaning in the context of serious illness were addressed today: Yes.  Summary/Comments: family voiced frustration, lots of difficult emotions and provided validation and listening today.             Interval History:     Chart review/discussion with unit or clinical team members:   Notes reviewed, no acute events. Sepsis protocol triggered and rapid response last night.    Per patient or family/caregivers today:  Patient is up in the chair, symptoms managed, family present and supportive.    Key Palliative Symptoms:  # Pain severity the last 12 hours: moderate  # Anxiety severity the last 12 hours: moderate    Patient is on opioids: assessed and bowels ok/no needed changes to plan of care today.           Review of Systems:     Besides above, ROS was reviewed and is unremarkable          Medications:     I have reviewed this patient's medication profile and medications during this hospitalization.    Noted meds:    Acetaminophen 975 mg TID  lexapro 10 mg daily  Gabapentin 400 mg TID  Ms contin 15 mg BID  Olanzapine 2.5 mg at bedtime                Physical Exam:   Vitals were reviewed  Temp: 97.2  F (36.2  C) Temp src: Oral BP: 133/83 Pulse: (!) 124   Resp: 22 SpO2: 100 % O2 Device: Nasal cannula Oxygen Delivery: 4 LPM    Intake/Output Summary (Last 24 hours) at 11/4/2022 0714  Last data filed at 11/4/2022 0612  Gross per 24 hour   Intake 676 ml   Output 1425 ml   Net -749 ml     Constitutional: Awake, alert, cooperative, no apparent distress  ENT: Normocephalic, without obvious abnormality, atramatic  Lungs: No increased work of breathing, good air exchange on NC  Musculoskeletal: No redness, warmth, or swelling of the joints. R leg amputation.  Neurologic: Awake, alert, oriented to name, place and time.    Neuropsychiatric: Normal affect, mood, orientation, memory and insight.  Skin: No rashes, erythema             Data Reviewed:     Reviewed recent pertinent imaging, comments:   Chest xray 11/2  Impression:   1. Similar appearance of large left and small right pleural effusions   and associated atelectasis.   2. Redemonstration of numerous pulmonary masses better characterized   on CT chest 10/27/2022.     I have personally reviewed the examination and initial interpretation   and I agree with the findings.     Reviewed recent labs, comments:   WBC 16.7  Hemoglobin 7.5  Platelets 271  Sodium 143  Potassium 3.5  Creatinine 1.38  GFR 64    Lactic acid 2.5      CHET Amaro CNS  Palliative Care Consult Team  Pager: [phone]    125 minutes spent. This includes 75 ([phone]) minutes face to face with patient and family discussing current complex health conditions and prognosis, goals of care, symptom management. Coordination of care with the primary team, palliative  and SW, and RN regarding goals of care, symptom management, psychosocial support needs.      "Prescriptions   Medication Sig     mirabegron (MYRBETRIQ) 25 MG 24 hr tablet Take 1 tablet (25 mg) by mouth daily     polyethylene glycol (MIRALAX) powder Take 17 g by mouth daily     solifenacin (VESICARE) 10 MG tablet Take 1 tablet (10 mg) by mouth daily     ranitidine (ZANTAC) 150 MG tablet Take 1 tablet by mouth 2 times daily for 10 days.     sennosides (SENOKOT) 8.6 MG tablet Take 1 tablet by mouth 2 times daily as needed for constipation.     sodium chloride (OCEAN) 0.65 % nasal spray Spray 1 spray in nostril every hour as needed for congestion.     No current facility-administered medications for this visit.              Review of Systems:    ROS: 10 point ROS neg other than the symptoms noted above in the HPI and PMH.          Physical Exam:   B/P: Data Unavailable, T: Data Unavailable, P: Data Unavailable, R: Data Unavailable  Estimated body mass index is 26.04 kg/(m^2) as calculated from the following:    Height as of 1/9/17: 1.6 m (5' 3\").    Weight as of 1/9/17: 66.7 kg (147 lb).  General: age-appropriate appearing female in NAD sitting in an exam chair.    Abdomen: Degree of obesity is none.   Motor: Normal in both upper and lower limbs          Data:    Imaging:  Renal/Bladder Ultrasound   - f/u US ordered today    Labs:  Creatinine ordered today   Vitamin B12: NA           Assessment and Plan:   31 year old female with neurogenic bladder secondary to sacral agensis. Who is here for routine f/u. She has no acute changes in her  function and she is doing well. She does report stable urinary incontinence associated w/ spasms and urgency using 1-2 pads/day. She previously has been on anticholinergics in 2015. She is open for an new trial today. She also is having trouble receiving necessary catheretization and irrigation supplies from her insurance company despite recent renewal orders. She did not get survalance renal us (NB - she has a solitary lft kideny) or BMP prior to today's visit       Plan:   - " start Detrol 4mg QD today  - Renal and bladder US ordered  - BMP ordered  - gave pt a few 12f straight caths today (she is down to one currently)    Renewed [facility] supply order today     Follow-Up: 1 month with above tests with Jerilyn to follow new anticholinergic and US/labs       LYDIA WELCH, MS4  UROLOGY [facility]  July 17, 2017     Scribe Disclosure:   I, Lydia Welch, am serving as a scribe; to document services personally performed by Dr Moore -based on data collection and the provider's statements to me.     The medical student acted as a scribe for this note. All portions of the documented history and physical were personally performed by me as the attending physician.       Again, thank you for allowing me to participate in the care of your patient.      Sincerely,    Fady Moore MD

## 2022-11-04 NOTE — PROGRESS NOTES
7518-02 [facility]   TATY Pt has IR for thoracentesis scheduled at 1430. He had received apixaban 5mg this morning.   Shante SMITH [id number]

## 2022-11-04 NOTE — PROGRESS NOTES
CLINICAL NUTRITION SERVICES - BRIEF NOTE     Nutrition Prescription    RECOMMENDATIONS FOR MDs/PROVIDERS TO ORDER:  - Recommend monitoring need for MIVF's d/t decreased appetite/intakes and loose/watery stools over the past week.    Recommendations already ordered by Registered Dietitian (RD):  -Providing Special K bar at 8pm  -Discontinuing lunch time Ensure, and replacing with palma Magic Cup.   -Continuing to encourage PO intake and keeping up with fluids.     Future/Additional Recommendations:  -Continue to monitor PO intake, fluid status.      Findings  -Pt states he is keeping up with PO intake fairly well, though he continues to order meals just 1-2 times over past ~5 days. Eating 25%-75% at meals. Denies any nausea / vomiting, states that diarrhea is persisting though he has not passed stool yet today. Encouraging continuing use of PRN imodium for this.   -Pt states he does like Ensure supplements, but they are getting boring. Discussed trying other options, will try Magic Cup and Special K bar as well.   -Pt states he is doing well keeping up with hydration as well, writer noted large water vessel in front of patient.     -Labs:   BUN 26.4  Phosphorous 4.6  Lactic acid: 4.5 (11/3) -> 2.5 today.     INTERVENTIONS  Implementation  Nutrition education for recommended modifications   Medical food supplement therapy     Follow up/Monitoring  RD will continue to follow per protocol.     Chencho Clifford RD, LD  6A/7D RD pager: [phone]  Weekend/Holiday RD pager: [phone]

## 2022-11-04 NOTE — CONSULTS
Impression: Bilateral pleural effusions, R>L, dyspnea, sepsis, sarcoma, left lung mass  -CAPS attempted and deferring due to small window    Plan:  IR diagnostic and therapeutic right thoracentesis  Labs are entered per Annette  May not have a safe window  Unlikely to provide therapeutic benefit or improve respiratory status

## 2022-11-04 NOTE — PROGRESS NOTES
Antimicrobial Stewardship Team Note    Antimicrobial Stewardship Program - A joint venture between [facility] and  Physicians to optimize antibiotic management.  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    History of Present Illness: Prior to presenting to the [place] ED, the patient had labs drawn for a follow up visit that showed an elevated WBC (16.3) and low Hgb (7.2) and was tachycardic (125 bpm) and was advised to go to the ED for further workup.  Of note, the patient was admitted 10/16-10/19 for acute on chronic hypoxic respiratory failure secondary to CAP and was discharged on azithromycin and cefdinir for a total of 7 days of therapy that ended 10/22.  On presentation, the patient had a WBC of 17.3, a CRP of 114, procalcitonin of 0.26, lactic acid of 2.2, and an ESR of 88.  The patient was afebrile and denied fever/chills, cough, congestion, rash, and increased oxygen needs beyond 2L via nasal cannula at home.  A UA had elevated protein and trace blood but nothing else pertinent, enteric panel, C.diff PCR and COVID swab were negative, and both sets of blood cultures drawn were no growth.  CXR on 10/24 shows increased mixed opacities that were suspicious for infection and a redemonstration of numerous pulmonary masses better characterized on a chest CT from 10/16/22.  AST rounded on 10/26 and recommended discontinuing vancomycin and Zosyn.  At that time the patient was deescalated to cefpodoxime and doxycycline.     On 11/1, the patient triggered the sepsis  protocol with a lactate of 3.1 and 2.4 after fluid resuscitation and was restarted on vancomycin and Zosyn.  Blood cultures were drawn which are no growth to date and a COVID PCR was negative.  On 11/2, micafungin was started and vancomycin was stopped.  A fungitell from 11/2 was negative and a aspergillus galactomannan antigen is in process.  A CXR demonstrated no change from previous imaging.  The patient triggered the sepsis protocol again on 11/3 with a lactic acid of 4.5 and repeat blood cultures were obtained which have been no growth to date.  A repeat cdiff test was ordered but has not yet been collected due to lack of bowel movement but PO vancomycin was started while awaiting test results.  The patient has remained afebrile and hemodynamically stable throughout this admission.  Current antimicrobial regimen is Zosyn (day 4), micafungin (day 3), and PO vanc (day 2) and the plan is to start doxycyline on 11/4.           Active Anti-infective Medications   (From admission, onward)                 Start     Stop    11/04/22 1030  doxycycline  100 mg,   Intravenous,   EVERY 12 HOURS        Community Acquired Pneumonia        --    11/03/22 1500  vancomycin  125 mg,   Oral,   4 TIMES DAILY        c diff possibility, c diff panel is pending, will start treatment while awaiting work up given severe sepsis        --    11/02/22 1200  micafungin (MYCAMINE) injection  150 mg,   Intravenous,   100 mL/hr,   EVERY 24 HOURS        Treatment of pneumonia refractory to antibiotics while awaiting microbiological data        --    11/01/22 1600  piperacillin-tazobactam  4.5 g,   Intravenous,   EVERY 6 HOURS        Hospital-Acquired Pneumonia        --                  Assessment: possible sepsis secondary to pneumonia  The patient has remained afebrile and hemodynamically stable during this admission.  The patient has had some fluctuating leukocytosis and other elevated inflammatory markers but this seems more likely to be  due to a stress reaction to the patient's large tumor burden.  It is known that lactic acid is often elevated in patients who have cancer due to the rapid growth of tumor cells utilizing anaerobic metabolism to supply the required energy for growth and producing lactic acid as a byproduct.  The patient will likely continue to have an elevated lactate as long as they have actively growing cancer.  While it is difficult to rule out a post-obstructive pneumonia, without adequate source control, it is likely the patient would continue to have post-obstructive pneumonia and without an isolated organism to target, treating continually with broad spectrum antibiotics is likely to do more harm than good by selecting out resistant organisms.  At this point, we have low suspicion for c.diff infection given difficulty obtaining a sample the past 24 hours but agree with testing to rule out.  The patient has had an extremely thorough workup for infection with no results and has been adequately treated for any HAP or CAP that may have been present.  We recommend stopping antimicrobials at this point.      Recommendations:  Stop Zosyn and doxycycline   Stop micafungin pending negative aspergillus galactomannan antigen test  Stop PO vancomycin if cdiff test is PCR negative OR PCR positive, toxin negative  Please stop drawing lactic acid levels or understand that it will not be sensitive for sepsis secondary to an infection as the patient will remain elevated as long as there is active cancer.  It is not going to be helpful for assessing for possible infections.      Pharmacy took the following actions: Called/paged provider, Electronic note created.    Discussed with ID Staff: Dr. Jana Lewis MD; Ly Diehl, PharmD, BCIDP    Rosalino PritchettD  Pharmacy Resident    Vital Signs/Clinical Features:  Vitals         11/02 0700  11/03 0659 11/03 0700  11/04 0659 11/04 0700  11/04 1121   Most Recent      Temp ( F) 97.4 -  98.7     97.2 -  98.5      97     97 (36.1) 11/04 0930    Pulse 112 -  127    116 -  129      116     116 11/04 0930    Resp 18 -  26    16 -  22      20     20 11/04 0930    /77 -  148/82    131/90 -  142/84      124/79     124/79 11/04 0930    SpO2 (%) 97 -  100    96 -  100    99 -  100     99 11/04 0930            Labs  Estimated Creatinine Clearance: 74.4 mL/min (A) (based on SCr of 1.38 mg/dL (H)).  Recent Labs   Lab Test 10/30/22  0758 10/31/22  0725 11/01/22  0612 11/02/22  0652 11/03/22  0721 11/04/22  0616   CR 0.98 1.14 0.97 0.90 1.11 1.38*       Recent Labs   Lab Test 11/09/21  0707 11/12/21  0943 11/30/21  1049 12/06/21  1426 12/10/21  1323 12/13/21  1034 09/17/22  1606 09/18/22  1438 09/20/22  0325 09/21/22  0722 10/30/22  0758 10/31/22  0725 11/01/22  0612 11/02/22  0652 11/03/22  0721 11/04/22  0616   WBC 3.8* 0.3*   < > 8.0 0.8*   < > 1.2*   < > 3.6*  3.6*   < > 19.2* 20.3* 13.4* 12.3* 17.7* 16.7*   ANEU 3.2 0.0*  --  7.7 0.3*  --  0.3*  --  1.8  --   --   --   --   --   --   --    ALYM 0.4* 0.0*  --  0.2* 0.4*  --  0.6*  --  0.7*  --   --   --   --   --   --   --    GREGORIA 0.0 0.0  --  0.0 0.1  --  0.3  --  1.0  --   --   --   --   --   --   --    AEOS 0.0 0.0  --  0.0 0.0  --  0.0  --  0.0  --   --   --   --   --   --   --    HGB 7.6* 7.4*   < > 6.8* 5.8*   < > 6.3*   < > 7.7*  7.7*   < > 7.8* 7.8* 6.7* 7.4* 8.1* 7.5*   HCT 23.1* 22.3*   < > 20.8* 17.7*   < > 19.5*   < > 23.8*  23.8*   < > 24.9* 24.9* 21.4* 24.1* 26.3* 24.7*   MCV 78 77*   < > 77* 75*   < > 73*   < > 77*  77*   < > 84 86 86 88 89 90    128*   < > 521* 220   < > 85*   < > 216  216   < > 317 331 283 241 282 271    < > = values in this interval not displayed.       Recent Labs   Lab Test 10/30/22  0758 10/31/22  0725 11/01/22  0612 11/02/22  0652 11/03/22  0721 11/04/22  0616   BILITOTAL 0.8 0.7 0.7 0.9 0.9 1.0   ALKPHOS 113 110 111 104 111 99   ALBUMIN 2.9* 2.9* 2.9* 3.0* 3.1* 3.3*   AST 22 23 19 18 18 17   ALT <5* <5* <5*  <5* <5* <5*       Recent Labs   Lab Test 09/01/22  0540 09/01/22  1639 09/02/22  1102 09/05/22  0728 10/24/22  2037 10/27/22  0656 11/01/22  0612 11/01/22  1433 11/02/22  0652 11/03/22  0721 11/03/22  1320 11/03/22  1442 11/03/22  1803 11/04/22  0157 11/04/22  0616 11/04/22  0953   PCAL 0.32*  --  0.24*  --  0.26* 0.49* 0.80*  --   --  0.78*  --   --   --   --   --   --    LACT  --    < >  --    < > 2.2*  --   --    < >  --   --  4.5* 4.3* 3.1* 3.4* 2.2* 2.5*   .00*  --  113.00*   < > 114.00* 92.70* 130.00*  --  91.90* 76.40*  --   --   --   --  112.00*  --    SED  --   --   --   --  88*  --   --   --   --   --   --   --   --   --   --   --     < > = values in this interval not displayed.       Recent Labs   Lab Test 10/26/22  1113   VANCOMYCIN 25.1*       Culture Results:  7-Day Micro Results       Procedure Component Value Units Date/Time    Pleural fluid Aerobic Bacterial Culture Routine     Order Status: Sent Lab Status: No result     Specimen: Pleural fluid from Pleural Cavity     Gram stain     Order Status: Sent Lab Status: No result     Specimen: Pleural fluid     Acid-Fast Bacilli Culture and Stain     Order Status: Sent Lab Status: No result     Specimen: Pleural fluid     Acid-Fast Bacilli Culture and Stain     Order Status: Sent Lab Status: No result     Specimen: Pleural fluid     Anaerobic bacterial culture     Order Status: Sent Lab Status: No result     Specimen: Pleural fluid from Pleural Cavity     Fungus Culture, non-blood     Order Status: Sent Lab Status: No result     Specimen: Pleural fluid     Blood Culture Peripheral Blood     Order Status: Canceled Lab Status: No result     Specimen: Peripheral Blood     Blood Culture Portacath     Order Status: Canceled Lab Status: No result     Specimen: Blood from Portacath     Blood Culture Portacath [[id number]]  (Normal) Collected: 11/03/22 1320    Order Status: Completed Lab Status: Preliminary result Updated: 11/04/22 1939    Specimen:  Blood from Portacath      Culture No growth after 12 hours    Blood Culture Arm, Right [[id number]]  (Normal) Collected: 11/03/22 1320    Order Status: Completed Lab Status: Preliminary result Updated: 11/04/22 0503    Specimen: Blood from Arm, Right      Culture No growth after 12 hours    C. difficile Toxin B PCR with reflex to C. difficile Antigen and Toxins A/B EIA     Order Status: Sent Lab Status: No result     Specimen: Stool from Per Rectum     Respiratory Aerobic Bacterial Culture     Order Status: Sent Lab Status: No result     Specimen: Sputum from Expectorate     Fungal or Yeast Culture Routine     Order Status: Sent Lab Status: No result     Specimen: Sputum     Pleural fluid Aerobic Bacterial Culture Routine     Order Status: Sent Lab Status: No result     Specimen: Pleural fluid from Pleural Cavity     Gram stain     Order Status: Sent Lab Status: No result     Specimen: Pleural fluid     Fungus Culture, non-blood     Order Status: Sent Lab Status: No result     Specimen: Pleural fluid     Aspergillus Galactomannan Antigen [[id number]] Collected: 11/02/22 0652    Order Status: Sent Lab Status: In process Updated: 11/02/22 0658    Specimen: Blood from Hand, Right     Respiratory Aerobic Bacterial Culture     Order Status: Sent Lab Status: No result     Specimen: Sputum from Expectorate     Respiratory Aerobic Bacterial Culture [[id number]] Collected: 11/01/22 2328    Order Status: Canceled Lab Status: No result     Specimen: Sputum from Expectorate     Blood Culture Portacath [[id number]]  (Normal) Collected: 11/01/22 1434    Order Status: Completed Lab Status: Preliminary result Updated: 11/03/22 1603    Specimen: Blood from Portacath      Culture No growth after 2 days    Blood Culture Arm, Right [[id number]]  (Normal) Collected: 11/01/22 1434    Order Status: Completed Lab Status: Preliminary result Updated: 11/03/22 1603    Specimen: Blood from Arm, Right      Culture No growth after 2  days            Recent Labs   Lab Test 11/08/21  0439 12/04/21  0004 09/08/22  1301 10/16/22  1934 10/24/22  2147 11/03/22  1256   URINEPH 6.0 7.0 5.5 6.0 6.0 5.0   NITRITE Negative Negative Negative Negative Negative Negative   LEUKEST Negative Negative Negative Negative Negative Negative   WBCU 2 1  --  1 1 2                   Recent Labs   Lab Test 10/25/22  0255   CDBPCT Negative       Imaging: US Abdomen Limited    Result Date: 11/1/2022  Exam: US RIGHT LOWER QUADRANT ABDOMEN LIMITED, 11/1/2022 3:13 PM Indication: 47-year-old male with previous right hemipelvectomy due to osteosarcoma and diffuse metastatic disease, refuses additional CT scans. Persistent leukocytosis, sepsis, team requesting an evaluation due to concerns for intra-abdominal infection at the site in question versus the right lower extremity sarcoma. Comparison: CT chest abdomen and pelvis 10/5/2022. Findings: At the location in question in the right lower quadrant, there is a 22.5 x 22.4 x 19.0 cm complex heterogeneous solid mass with mixed solid and cystic components and internal vascularity. Within this structure there are multiple anechoic complex fluid collections which could represent cystic necrosis versus fluid collection secondary to the malignancy or a secondary cause such as an infection/abscess. Of note, there are large areas of central necrosis within the mass on prior CT. This mass correlates to the solid peripherally enhancing mass seen on previous CT abdomen from 10/5/2022 (series 2 image 546) and is consistent with the patient's history of metastatic osteosarcoma.     Impression: At the site in question there is a complex heterogeneous solid structure with multiple internal anechoic fluid collections which are likely secondary to the primary malignancy with central necrosis, though cannot exclude a secondary superimposed infection on imaging. Allowing for differences in modalities, lesion appears similar in size to mass on prior  CT in this region. No overt surrounding hyperemia demonstrated sonographically. I have personally reviewed the examination and initial interpretation and I agree with the findings. ANSHUL MYERS MD   SYSTEM ID:  [id number]    XR Chest Port 1 View    Result Date: 11/2/2022  Exam: XR CHEST PORT 1 VIEW, 11/2/2022 4:52 AM Comparison: 10/27/2022 History: shortness of breath Findings: Single portable upright view of the chest obtained. Right chest Port-A-Cath in place, tip terminates near the superior cavoatrial junction. Trachea is midline. Mediastinum is within normal limits. Heart silhouette is somewhat obscured.. Similar appearance of large bilateral pulmonary masses better characterized on CT chest 10/27/2022. Similar appearance of large left and small right effusions and associated atelectasis. There is no pneumothorax or pleural effusion. The upper abdomen is unremarkable.     Impression: 1. Similar appearance of large left and small right pleural effusions and associated atelectasis. 2. Redemonstration of numerous pulmonary masses better characterized on CT chest 10/27/2022. I have personally reviewed the examination and initial interpretation and I agree with the findings. FRANK THOMASON MD   SYSTEM ID:  [id number]    POC US Guide for Thorcentesis    Limited Bedside Lung Ultrasound, performed and interpreted by me. Indication: Chest pain and shortness of breath With the patient positioned upright, bilateral anterior, posterior and lateral lung fields were examined for evidence of thoracic free fluid, pulmonary consolidation, and pulmonary edema. Findings: right side note trace effusion, No discernable effusion noted on left. IMPRESSION: Noted findings as above.     XR Esophagram    Result Date: 11/1/2022  Esophagram dated 11/1/2022 COMPARISON:  CT chest 10/27/2022. HISTORY:    47-year-old male with previous right hemipelvectomy due to osteosarcoma, presenting with acute respiratory failure, work up revealing  sensation of food being stuck in the chest. Previous chest CT demonstrating multiple round/ovoid consolidated nodular metastatic lesions within the chest cavity. Examination is partially limited due to patient inability to stand upright and limited mobility while supine. FINDINGS: Examination of the esophagus reveals dysmotility with bolus fractionation and part of the barium bolus remaining within the esophagus. There were no definite mucosal abnormalities. Severe external compression of the distal esophagus leading to luminal narrowing. The gastroesophageal junction is patent. No hiatal hernia was seen on examination. There was absence of gastroesophageal reflux.     IMPRESSION: Severe external compression of the distal esophagus leading to luminal narrowing, likely secondary to adjacent osteosarcoma metastatic lesions as seen on CT chest from 10/27/2022 (series 5, image 30). Fluoroscopy time was 2.5 minutes. I have personally reviewed the examination and initial interpretation and I agree with the findings. ANSHUL MYERS MD   SYSTEM ID:  [id number]

## 2022-11-04 NOTE — PROCEDURES
[facility]    Procedure: IR Procedure Note    Date/Time: 11/4/2022 3:42 PM  Performed by: Romeo Rodriguez PA-C  Authorized by: Romeo Rodriguez PA-C       UNIVERSAL PROTOCOL   Site Marked: NA  Prior Images Obtained and Reviewed:  Yes  Required items: Required blood products, implants, devices and special equipment available    Patient identity confirmed:  Verbally with patient, arm band, provided demographic data and hospital-assigned identification number  Patient was reevaluated immediately before administering moderate or deep sedation or anesthesia  Confirmation Checklist:  Patient's identity using two indicators, relevant allergies, procedure was appropriate and matched the consent or emergent situation and correct equipment/implants were available  Time out: Immediately prior to the procedure a time out was called    Universal Protocol: the Joint Commission Universal Protocol was followed    Preparation: Patient was prepped and draped in usual sterile fashion    ESBL (mL):  0.2     ANESTHESIA    Anesthesia: Local infiltration  Local Anesthetic:  Lidocaine 1% without epinephrine  Anesthetic Total (mL):  5      SEDATION    Patient Sedated: No    See dictated procedure note for full details.  Findings: Highly loculated small right pleural effusion. Right diagnostic thoracentesis performed, with removal of 4 ml serosanguinous fluid. Despite repeated efforts, no additional fluid could be aspirated. Patient declined a second puncture into the right pleural space.    Specimens: fluid and/or tissue for laboratory analysis    Complications: None    Condition: Stable    Plan: Follow up per primary team.       PROCEDURE    Patient Tolerance:  Patient tolerated the procedure well with no immediate complications  Length of time physician/provider present for 1:1 monitoring during sedation: 0

## 2022-11-04 NOTE — PROGRESS NOTES
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 He presented to the ED in the setting of abnormal labs and is admitted with concern for sepsis as well as acute on chronic anemia.    1. Sepsis and severe sepsis secondary to likely bacterial pneumonia on top of lung metastasis, all are present on admission  This is the main problem that led to this hospitalization.  Pneumonia is the most likely etiology.      Differential diagnosis:  A.  Postobstructive pneumonia secondary to metastatic disease of the lungs: This is the main etiology for the patient dyspnea, changes on CT chest, leukocytosis with white blood cell count peaking at 20, elevated inflammatory markers with CRP peaking at 130, lactic acidosis with lactic acid of 4.5.     I highly appreciate the input of interventional radiology who were able to perform diagnostic thoracentesis without any immediate complications on 11/4.  I am currently awaiting cell count, gram stain, and fungal and bacterial cultures from right-sided pleural fluid.    Given prior growth of Staph epidermidis from pleural fluid, I am concerned for continued infection with a Staphylococcus.  As such I have resumed coverage for staph with doxycycline intravenous.  I would also continue Zosyn for coverage of Pseudomonas and anaerobes given prolonged hospitalization of this patient and the need to cover for anaerobes with obstructive nature of the pneumonia.Fungitell is negative.  Galactomannan is currently pending.  I will continue  micafungin while awaiting galactomannan.     Although the antibiotic stewardship service has provided insights about the care of this patient, including that the leukocytosis etiology is stress related, that the lactic acidosis is because of the cancer, as type B lactic acidosis.  Antibiotics Uristrip service service was not able to explain the patient continued dyspnea at the bedside, the need for oxygen with acute hypoxic respiratory failure up to 4 L in spite of ruling out pulmonary embolism and heart failure, tachycardia with heart rate in 130s, or the resolution of lactic acidosis [4.5] and tachycardia [130s] with volume resuscitation and antibiotics.  As such I will continue antibiotics since the patient is achieving a clinical benefit.  The oncology service are also in agreement with this approach given lack of any explanation from an oncological standpoint for the clinical manifestations or the leukocytosis or the elevated CRP.     B.  C. difficile, the patient continues to have diarrhea.  Inability to obtain the sample is not for the lack of sample with a stool as it was documented in the antibiotic stewardship documentation, it is because of the inability to obtain a sample when the patient is incontinent consistently.  I will continue oral vancomycin while awaiting for the C. difficile panel    2.  Malignancy related pain, all are POA  Patient having pain reticulocyte count is elevated the site of the amputation that has been worse since being in the hospital.     -Continue scheduled acetaminophen 1 g every 8 hours  -Continue gabapentin 400 mg 3 times daily for adjuvant treatment of pain  -The patient was started on MS Contin at 15 mg twice daily by palliative care service  -Dilaudid dosing was adjusted to 2 to 4 mg every 4 hours as needed oral and 0.5 to 1 mg every 4 hours intravenous as needed by palliative care service  -I was able to arrange for goals of care discussion to be performed on 11/4 at 1  PM.  Goals of care discussion would include the patient, his sister [the patient requested specifically assisted], oncology service, palliative care service, and the undersigned.  Although the patient's sister reported that she would be able to be present on 11/4 at 12 PM, unfortunately 12 PM would not be a time when the oncology service be able to meet with the patient and his sister.  As such, I have left a voicemail for the patient's sister to inform her of the changes of the time of the meeting to be 1 PM.  -I appreciate input of palliative care service  -I appreciate the input of oncology service    3. Acute hypoxic respiratory failure secondary to metastasis to the lungs and volume overload related to resuscitation, all are present on admission,, stable treatment as above    4. 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 Ambrocio. Currently on therapy with pazopanib.  -We will resume pazopanib per oncology team pending availability of patient's home supply  - May benefit from having a conference with his onc team/pall care this week to address some of sister's concerns about level of care as well as these persistent/worsening symptoms.  I plan to arrange for such needing.    5. Moderate malnutrition in the context of acute on chronic illness, all other VITO  - Nutrition consulted, appreciate recs  - Ensure supplements ordered    6.  Resolved medical problems  # Odynophagia secondary to severe external compression of the distal esophagus leading to luminal narrowing secondary to adjacent osteosarcoma metastatic lesion, [place] POA    # Anemia of critical illness on top of chronic anemia secondary to malignancy, all other POA  The patient received blood transfusion prior to during this admission.  The patient  received total of 2 units of blood transfusion during this admission.    # JAZZMINE, improved  Creatinine increased to 1.2 from baseline ~0.6-0.7 on 10/26. Suspect prerenal in the setting of sepsis and diarrhea.  - Monitor     7.  Chronic medical problems  H/o PE  H/o L hemispheric ischemic stroke  Stroke identified during 9/2022 admission, felt to be due to hypercoagulable state related to malignancy so initiated on anticoagulation.  -Continue apixaban for anticoagulation    Goals of care discussion  Oncology service, palliative care service, and the undersigned met with the patient, sister, and his  at the bedside.  Expressed how sick the patient is stage IV malignancy with metastasis to the lungs and the limited effectiveness of current therapies based on literature regarding.  We also discussed the active infections and its current treatment.  The patient verbalized understanding to the gravity of his disease and his poor prognosis.  He continues to be hopeful and optimistic for feeling better, to spend as much time as possible in life with his family and continues to be highly supportive of appropriately aggressive care provided option.    I spent 60 minutes providing goals of care discussion with patient and family       Diet: Regular Diet Adult  Snacks/Supplements Adult: Ensure Enlive; With Meals  Snacks/Supplements Adult: Other; Special K bar @ 8pm please.; Between Meals  Snacks/Supplements Adult: Magic Cup; Between Meals    DVT Prophylaxis: DOAC  Sung Catheter: Not present  Central Lines: PRESENT       Cardiac Monitoring: None  Code Status: Full Code      Disposition Plan     Expected Discharge Date: 11/06/2022      Destination: home with help/services  Discharge Comments: Pending improved respuratory function and goals of care discussion with patient and sister, currently waiting a call back from sister        The patient's care was discussed with the Bedside Nurse and Patient.    Carlos Bryant,  MD  Hospitalist Service, GOLD TEAM 11  [facility]  Securely message with the [employer] Web Console (learn more here)  Text page via [facility] Paging/Directory   Please see signed in provider for up to date coverage information      Clinically Significant Risk Factors              # Hypoalbuminemia: Lowest albumin = 2.7 g/dL (Ref range: 3.5-5.2) at 10/28/2022  6:09 AM, will monitor as appropriate           # Moderate Malnutrition: based on nutrition assessment        ______________________________________________________________________    Interval History   The patient continues to report shortness of breath, however he admitted to mild improvement.  No subjective fever or chills reported.    Four-point ROS negative unless noted above.    Data reviewed today: I reviewed all medications, new labs and imaging results over the last 24 hours. I personally reviewed no images or EKG's today.    Physical Exam   Vital Signs: Temp: 97.3  F (36.3  C) Temp src: Temporal BP: (!) 154/93 Pulse: 107   Resp: 17 SpO2: 98 % O2 Device: Nasal cannula Oxygen Delivery: 4 LPM  Weight: 173 lbs 4.8 oz  Constitutional: Awake, alert, cooperative, no apparent distress.  Eyes: Conjunctiva and pupils examined and normal.  HEENT: Moist mucous membranes, normal dentition.  Respiratory: Clear to auscultation bilaterally, no crackles or wheezing.  Cardiovascular: Regular rate and rhythm, normal S1 and S2, and no murmur noted.  GI: Soft, non-distended, non-tender, normal bowel sounds.  Lymph/Hematologic: No anterior cervical or supraclavicular adenopathy.  Skin: No rashes, no cyanosis, no edema.  Musculoskeletal: No joint swelling, erythema or tenderness.  Neurologic: Cranial nerves 2-12 intact, normal strength and sensation.  Psychiatric: Alert, oriented to person, place and time, no obvious anxiety or depression.    Data   Recent Labs   Lab 11/04/22  0616 11/03/22  0721 11/03/22  0209 11/02/22  0652   WBC  16.7* 17.7*  --  12.3*   HGB 7.5* 8.1*  --  7.4*   MCV 90 89  --  88    282  --  241   INR 1.64* 1.43*  --  1.56*    142  --  142   POTASSIUM 3.5 3.6  --  3.6   CHLORIDE 105 105  --  108*   CO2 20* 21*  --  22   BUN 26.4* 22.2*  --  21.2*   CR 1.38* 1.11  --  0.90   ANIONGAP 18* 16*  --  12   VENTURA 9.3 9.1  --  9.1   * 111* 106* 104*   ALBUMIN 3.3* 3.1*  --  3.0*   PROTTOTAL 6.3* 6.3*  --  6.0*   BILITOTAL 1.0 0.9  --  0.9   ALKPHOS 99 111  --  104   ALT <5* <5*  --  <5*   AST 17 18  --  18     Recent Results (from the past 24 hour(s))   IR Thoracentesis    Narrative    Procedure date: 11/4/2022.    History: Patient with complex highly loculated right pleural effusion,  presenting for diagnostic thoracentesis.    Procedure: Right diagnostic thoracentesis.    Preop diagnosis: Complex highly loculated right pleural effusion.    Postop diagnosis: Same.    : Jw Rodriguez PA-C.    Assist: None.    Medications: 5 ml 1% lidocaine local anesthesia.    Face to face sedation time: None.    Nursing: Throughout the procedure the patient's vital signs and oxygen  saturations were continuously monitored by IR nursing staff under my  supervision, and remained stable.    Fluoroscopy time: None.    IV contrast: None.    Consent: The procedure, as well as risks and benefits, was discussed  in detail with the patient. Informed written and verbal consent was  obtained.    Procedure/Findings:  The patient was placed in the upright position on  the gurney, and limited ultrasound evaluation of the right posterior  hemithorax revealed a small complex highly loculated pleural effusion.  The area overlying  the right posterior hemithorax was prepped and  draped in usual sterile fashion. Under ultrasound guidance, the area  overlying the effusion was anesthetized to the pleura with 10 ml 1%  lidocaine. Under ultrasound guidance a 5 Czech, 10 cm straight [employer]  needle/catheter was advanced into the pleural space, with  initial  return of hazy serosanguineous fluid. The catheter was advanced off  the needle into the pleural space and the needle was removed. Syringe  aspiration failed to return significant fluid. Under ultrasound  guidance a 0.035 Bentson wire was advanced through the catheter into  the right pleural space. Under ultrasound guidance wire was  manipulated within the right pleural space in an effort to disrupt  septations. Wire was removed and the catheter was connected to syringe  aspiration. Under ultrasound guidance, continuous aspiration was  performed while maneuvering the catheter within the right pleural  space. Ultimately, a total of 4 cc hazy serosanguineous fluid was  aspirated. Fluid contained some debris. Patient declined a repeat  puncture into right pleural space for additional aspiration attempt.  Repeat ultrasound revealed significant fluid remaining.  The catheter  was removed with the patient performing an expiratory maneuver, and  the entrance site was occluded while pressure was applied to the  intercostal space for approximately 1 minute. The site was cleansed  and dressed with a sterile occlusive bandage.  Images were saved  throughout the procedure. The procedure was well tolerated, with no  immediate complications.  Lab results pending.    Estimate blood loss: 0.2 cc.    Specimens:  4 cc hazy serosanguinous fluid from the right pleural  space.      Impression    Impression: Ultrasound guided right diagnostic thoracentesis. Total of  4 cc of hazy serosanguinous fluid drained.    Plan: Patient transported to post care unit in stable condition.   Interventional radiology post procedure standing orders for  thoracentesis.  Daily dressing changes as needed.      Attestation: The physician assistant (PA) who performed this procedure  and signed the above report is licensed to practice in the state of  [place] pursuant to [place] Statute 147[place].09.  This includes meeting the  Statute and [place] Board of  Medical Practice requirement of an  active Delegation Agreement, which documents delegation of services by  primary and alternate supervising physicians. All services rendered  are performed under a collaborative agreement with Dr. Ran Sharpe, Director of Interventional Radiology, [facility].    SAUMYA NELSON PA-C         SYSTEM ID:  [id number]

## 2022-11-04 NOTE — PLAN OF CARE
[id number]  Goal Outcome Evaluation:    /83   Pulse (!) 124   Temp 97.2  F (36.2  C) (Oral)   Resp 22   Wt 78.6 kg (173 lb 4.8 oz)   SpO2 100%   BMI 22.25 kg/m      Pt vitals stable. Tachy high of 124. Pain managed w/ scheduled morphine, denied need for PRN. Denies nausea. Complaint of SOB at rest, O2 increased to 4L per pt request. PRN inhaler given w/ relief. Pt refused q2 turn through night. Waiting on cdiff sample, no BM this shift. Uses urinal at bedside independently. Anticipated Mg recheck in am. Care conference scheduled 1 pm today.

## 2022-11-04 NOTE — PLAN OF CARE
Goal Outcome Evaluation:  Alert; sleeping in between nursing cares. Pt has been sitting on a chair since this afternoon. Tachycardic the 110's to 120's. B/P 140's/80's. Sats high 90's to 100% on 3 LPM NC. No complaints of pain; pt is now  on Ms contin 15 mg tab Q 12 hrs. Pt is also on scheduled tylenol 975 mg tab po Q eight hrs and gabapentin 400 mg three times daily.   Lactic acid was 4.3 this afternoon; pt received 50 gm IV albumin; recheck lactic acid  came down to 3.1. Mag 2.0; replaced; recheck in the morning. No Bm this evening; unable to collect stool sample to r/o cdiff. Unable to do nasal swab for respiratory aerobic bacterial culture as pt has been irritable.  Uses urinal at bed side; adequate urine OP.  Plan for care conference with patient and sister at 1 pm  Continue with POC

## 2022-11-04 NOTE — PROGRESS NOTES
Patient Name: Vic Scott III  Medical Record Number: [id number]  Today's Date: 11/4/2022    Procedure: Thoracentesis   Proceduralist: Jw Rodriguez  Pathology present: No    Procedure Start: 1522  Procedure end: 1539  Sedation medications administered: None     Report given to: LUIS Frey  : ARSALAN    Other Notes: Pt arrived to IR room 6 from [facility]. Consent reviewed. Pt denies any questions or concerns regarding procedure. Pt positioned sitting and monitored per protocol. Pt tolerated procedure without any noted complications. Pt transferred back to [facility].

## 2022-11-04 NOTE — PLAN OF CARE
[id number]  Vitals: /79 (BP Location: Right arm)   Pulse 116   Temp 97  F (36.1  C) (Oral)   Resp 20   Wt 78.6 kg (173 lb 4.8 oz)   SpO2 99%   BMI 22.25 kg/m      Neuros: A/O x4   IV: Right Port TKO   Labs/Electrolytes: reviewed   Resp/trach: on 3L via NC, maintaining sats >90  Diet: Reg fair appetite   Bowel status: No BM this shift. C-diff r/o waiting to collect stool.   : voiding adequetly   Skin: Pt refused skin assessment and repositioning   Pain: Denied pain   Activity: A-2 with transfers.   Updates this shift: Thoracentesis done     Goal Outcome Evaluation:      Plan of Care Reviewed With: patient    Overall Patient Progress: no changeOverall Patient Progress: no change

## 2022-11-04 NOTE — PROGRESS NOTES
"PALLIATIVE CARE SOCIAL WORK Progress Note   [facility] Unit 7D    Follow Up    Met with Fabian this morning for supportive visit. He reports that he's overall doing ok. He's trying to \"take things as they come.\" He talked about his kids and how important they are to him. He reports that they know he's in the hospital, but isn't sure how much more they know. He was able to reflect that he doesn't want to think too far in the future and sometimes doesn't want to hear information the medical team is telling him. Attempted to normalize that it's difficult to hear hard information.     Plan: PCSW will continue to follow for emotional support while Palliative Care Team is following.     TY Luna, [facility]  Palliative Care Clinical   Pager [phone]    [facility] Inpatient Team Consult Pager [phone] Mon-Fri 8-4:30  After hours Answering Service [phone]        "

## 2022-11-05 NOTE — PROGRESS NOTES
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 He presented to the ED in the setting of abnormal labs and is admitted with concern for sepsis as well as acute on chronic anemia.    1. Sepsis and severe sepsis secondary to likely bacterial pneumonia on top of lung metastasis, all are present on admission, improving  This is the main problem that led to this hospitalization.  Pneumonia is the most likely etiology.  The patient lactic acidosis has resolved.  The patient tachycardia is improving.    Differential diagnosis:  A.  Postobstructive pneumonia secondary to metastatic disease of the lungs: This is the main etiology for the patient dyspnea, changes on CT chest, leukocytosis with white blood cell count peaking at 20, elevated inflammatory markers with CRP peaking at 130, lactic acidosis with lactic acid of 4.5.     I highly appreciate the input of interventional radiology who were able to perform diagnostic thoracentesis without any immediate complications on 11/4.  I am currently awaiting cell count, gram stain, and fungal and bacterial cultures from right-sided pleural fluid.    Given prior growth of Staph epidermidis from pleural fluid, I am concerned for continued infection with a Staphylococcus.  As such I have resumed coverage for staph with doxycycline intravenous.  I would also continue Zosyn for coverage of Pseudomonas and anaerobes given prolonged hospitalization of this patient and the need to cover for anaerobes with obstructive nature of the  pneumonia.Fungitell is negative.  Galactomannan is currently pending.  I will continue micafungin while awaiting galactomannan.     Although the antibiotic stewardship service has provided insights about the care of this patient, including that the leukocytosis etiology is stress related, that the lactic acidosis is because of the cancer, as type B lactic acidosis.  Antibiotics Uristrip service service was not able to explain the patient continued dyspnea at the bedside, the need for oxygen with acute hypoxic respiratory failure up to 4 L in spite of ruling out pulmonary embolism and heart failure, tachycardia with heart rate in 130s, or the resolution of lactic acidosis [4.5] and tachycardia [130s] with volume resuscitation and antibiotics.  As such I will continue antibiotics since the patient is achieving a clinical benefit.  The oncology service are also in agreement with this approach given lack of any explanation from an oncological standpoint for the clinical manifestations or the leukocytosis or the elevated CRP.     -Continue but improved tachycardia is noted since it is associated with hypotension with systolics above 150, this is likely secondary to volume resuscitation, I ordered 1 dose of intravenous furosemide.  I would not use any antihypertensive since the furosemide would be associated with reduction of both intravascular volume and blood pressure.  - The patient has been ruled out for C. difficile with a negative PCR, discontinue enteric precautions.  I discontinued oral vancomycin.  -Following blood cultures  -Following pleural fluid cultures obtained 11/4 including both bacterial and fungal cultures  -Following galactomannan  -Continue Zosyn [started 11/1] for total course of 7 days.  I updated the duration of antibiotic to that effect.  -Continue doxycycline [started 11/4] for total course of 7 days.  I have dated the duration of the antibiotic to that effect.  -Continue micafungin while  awaiting for galactomannan    2.  Malignancy related pain, all are POA  Patient having pain reticulocyte count is elevated the site of the amputation that has been worse since being in the hospital.     -Continue scheduled acetaminophen 1 g every 8 hours  -Continue gabapentin 400 mg 3 times daily for adjuvant treatment of pain  -Continue MS Contin at 15 mg twice daily  -Continue Dilaudid 2 to 4 mg every 4 hours as needed for moderate to severe pain orally.    - Continue Dilaudid 0.5-1 mg intravenously for breakthrough  -Continue olanzapine 2.5 mg at bedtime  -I appreciate input of palliative care service  -I appreciate the input of oncology service    3. Acute hypoxic respiratory failure secondary to metastasis to the lungs and volume overload related to resuscitation, all are present on admission,, stable treatment as above    4. 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 Ambrocio. Currently on therapy with pazopanib.  - Although pazopanib 800 mg daily has been obtained by the patient's sister and given to our pharmacy, we will not resume this medication while the patient is having active infection based on recommendations of oncology.    -I highly appreciate input of oncology service    5. Moderate malnutrition in the context of acute on chronic illness, all other VITO  - Nutrition consulted, appreciate recs  - Ensure supplements ordered    6.  Resolved medical problems  # Odynophagia secondary to severe external compression of the distal esophagus leading to luminal narrowing secondary to adjacent osteosarcoma metastatic lesion, [place] POA    # Anemia of critical illness on top of chronic anemia secondary to malignancy, all other POA  The patient received blood transfusion prior to during this admission.  The  patient received total of 2 units of blood transfusion during this admission.    # JAZZMINE, improved  Creatinine increased to 1.2 from baseline ~0.6-0.7 on 10/26. Suspect prerenal in the setting of sepsis and diarrhea.  - Monitor     7.  Chronic medical problems  H/o PE  H/o L hemispheric ischemic stroke  Stroke identified during 9/2022 admission, felt to be due to hypercoagulable state related to malignancy so initiated on anticoagulation.  -Continue apixaban for anticoagulation    Disposition: The patient is interested in options for transitional care unit.  I consulted with case management and social work services to help provide a list of TCU that are approved by the patient's insurance.     Diet: Regular Diet Adult  Snacks/Supplements Adult: Ensure Enlive; With Meals  Snacks/Supplements Adult: Other; Special K bar @ 8pm please.; Between Meals  Snacks/Supplements Adult: Magic Cup; Between Meals    DVT Prophylaxis: DOAC  Sung Catheter: Not present  Central Lines: PRESENT       Cardiac Monitoring: None  Code Status: Full Code      Disposition Plan     Expected Discharge Date: 11/06/2022      Destination: home with help/services  Discharge Comments: Pending improved respuratory function and goals of care discussion with patient and sister, currently waiting a call back from sister        The patient's care was discussed with the Bedside Nurse and Patient.    Carlos Bryant MD  Hospitalist Service, [address]  Securely message with the Vocera Web Console (learn more here)  Text page via [facility] Paging/Directory   Please see signed in provider for up to date coverage information      Clinically Significant Risk Factors              # Hypoalbuminemia: Lowest albumin = 2.7 g/dL (Ref range: 3.5-5.2) at 10/28/2022  6:09 AM, will monitor as appropriate           # Moderate Malnutrition: based on nutrition assessment         ______________________________________________________________________    Interval History   The patient continues to report shortness of breath, however he admitted to mild improvement.  No subjective fever or chills reported.    Patient reported abdominal discomfort and abdominal distention.  No nausea or vomiting.    Four-point ROS negative unless noted above.    Data reviewed today: I reviewed all medications, new labs and imaging results over the last 24 hours. I personally reviewed no images or EKG's today.    Physical Exam   Vital Signs: Temp: 98  F (36.7  C) Temp src: Oral BP: (!) 158/107 Pulse: 110   Resp: 16 SpO2: 100 % O2 Device: Nasal cannula Oxygen Delivery: 3 LPM  Weight: 173 lbs 4.8 oz  Constitutional: Awake, alert, cooperative, no apparent distress.  Eyes: Conjunctiva and pupils examined and normal.  HEENT: Moist mucous membranes, normal dentition.  Respiratory: Clear to auscultation bilaterally, no crackles or wheezing.  Cardiovascular: Regular rate and rhythm, normal S1 and S2, and no murmur noted.  GI: Soft, non-distended, non-tender, normal bowel sounds.  Lymph/Hematologic: No anterior cervical or supraclavicular adenopathy.  Skin: No rashes, no cyanosis, no edema.  Musculoskeletal: No joint swelling, erythema or tenderness.  Neurologic: Cranial nerves 2-12 intact, normal strength and sensation.  Psychiatric: Alert, oriented to person, place and time, no obvious anxiety or depression.    Data   Recent Labs   Lab 11/05/22  0524 11/04/22  0616 11/03/22  0721 11/03/22  0209 11/02/22  0652   WBC 15.0* 16.7* 17.7*  --  12.3*   HGB 7.8* 7.5* 8.1*  --  7.4*   MCV 89 90 89  --  88    271 282  --  241   INR  --  1.64* 1.43*  --  1.56*   NA  --  143 142  --  142   POTASSIUM  --  3.5 3.6  --  3.6   CHLORIDE  --  105 105  --  108*   CO2  --  20* 21*  --  22   BUN  --  26.4* 22.2*  --  21.2*   CR  --  1.38* 1.11  --  0.90   ANIONGAP  --  18* 16*  --  12   VENTURA  --  9.3 9.1  --  9.1   GLC  --  141*  111* 106* 104*   ALBUMIN  --  3.3* 3.1*  --  3.0*   PROTTOTAL  --  6.3* 6.3*  --  6.0*   BILITOTAL  --  1.0 0.9  --  0.9   ALKPHOS  --  99 111  --  104   ALT  --  <5* <5*  --  <5*   AST  --  17 18  --  18     Recent Results (from the past 24 hour(s))   IR Thoracentesis    Narrative    Procedure date: 11/4/2022.    History: Patient with complex highly loculated right pleural effusion,  presenting for diagnostic thoracentesis.    Procedure: Right diagnostic thoracentesis.    Preop diagnosis: Complex highly loculated right pleural effusion.    Postop diagnosis: Same.    : Jw Rodriguez PA-C.    Assist: None.    Medications: 5 ml 1% lidocaine local anesthesia.    Face to face sedation time: None.    Nursing: Throughout the procedure the patient's vital signs and oxygen  saturations were continuously monitored by IR nursing staff under my  supervision, and remained stable.    Fluoroscopy time: None.    IV contrast: None.    Consent: The procedure, as well as risks and benefits, was discussed  in detail with the patient. Informed written and verbal consent was  obtained.    Procedure/Findings:  The patient was placed in the upright position on  the gurney, and limited ultrasound evaluation of the right posterior  hemithorax revealed a small complex highly loculated pleural effusion.  The area overlying  the right posterior hemithorax was prepped and  draped in usual sterile fashion. Under ultrasound guidance, the area  overlying the effusion was anesthetized to the pleura with 10 ml 1%  lidocaine. Under ultrasound guidance a 5 Citizen of [place], 10 cm straight Yueh  needle/catheter was advanced into the pleural space, with initial  return of hazy serosanguineous fluid. The catheter was advanced off  the needle into the pleural space and the needle was removed. Syringe  aspiration failed to return significant fluid. Under ultrasound  guidance a 0.035 Bentson wire was advanced through the catheter into  the right pleural space.  Under ultrasound guidance wire was  manipulated within the right pleural space in an effort to disrupt  septations. Wire was removed and the catheter was connected to syringe  aspiration. Under ultrasound guidance, continuous aspiration was  performed while maneuvering the catheter within the right pleural  space. Ultimately, a total of 4 cc hazy serosanguineous fluid was  aspirated. Fluid contained some debris. Patient declined a repeat  puncture into right pleural space for additional aspiration attempt.  Repeat ultrasound revealed significant fluid remaining.  The catheter  was removed with the patient performing an expiratory maneuver, and  the entrance site was occluded while pressure was applied to the  intercostal space for approximately 1 minute. The site was cleansed  and dressed with a sterile occlusive bandage.  Images were saved  throughout the procedure. The procedure was well tolerated, with no  immediate complications.  Lab results pending.    Estimate blood loss: 0.2 cc.    Specimens:  4 cc hazy serosanguinous fluid from the right pleural  space.      Impression    Impression: Ultrasound guided right diagnostic thoracentesis. Total of  4 cc of hazy serosanguinous fluid drained.    Plan: Patient transported to post care unit in stable condition.   Interventional radiology post procedure standing orders for  thoracentesis.  Daily dressing changes as needed.      Attestation: The physician assistant (PA) who performed this procedure  and signed the above report is licensed to practice in the state of  [place] pursuant to [place] Statute 147A.09.  This includes meeting the  Statute and [place] Board of Medical Practice requirement of an  active Delegation Agreement, which documents delegation of services by  primary and alternate supervising physicians. All services rendered  are performed under a collaborative agreement with Dr. Ran Sharpe, Director of Interventional Radiology, [place]  [facility].    SAUMYA NELSON PA-C         SYSTEM ID:  [id number]

## 2022-11-05 NOTE — PLAN OF CARE
[id number]  Goal Outcome Evaluation:    BP (!) 159/96   Pulse 119   Temp 97.5  F (36.4  C) (Oral)   Resp 18   Wt 78.6 kg (173 lb 4.8 oz)   SpO2 100%   BMI 22.25 kg/m      Pt tachy overnight, high of 119. Hypertensive, high of 159/96. Pain controlled w/ scheduled morphine. Denies SOB, only w/ movement. Pt weaned back down to 3L tolerating well. 1 small BM this shift, unable to collect cdiff sample. Education on cdiff and precautions provided. Respiratory sample still pending collection, cup sitting at bedside. Refused q2 turn overnight. Refused scheduled tylenol.

## 2022-11-05 NOTE — CONSULTS
I saw Mr. Scott along with his sister, aunt, and other family members in a 90-minute family meeting.  We discussed Fabian's medical condition and reviewed the CT scan of the chest abdomen and pelvis showing multiple pulmonary metastases as well as groundglass opacities consistent with possible infection.  Joining in the conversation was Dr. Bryant his hospitalist.  We discussed that Fabian's metastatic pleomorphic sarcoma is not curable and that the pazopanib medication that he has been on is given with palliative intent.  His sister was upset because she felt that was open and needed to be provided by the hospital even though he had a prescription for this medication at home.  I discussed that it is hospital policy that select medications are not duplicated by the hospital and that rather the patient should bring these medicines in.  I discussed the reason for this policy is that it is very unlikely that insurance would cover a duplicate prescription of the medication that costs thousands of dollars per month.  I explained that pazopanib does not have a generic form.  His sister expressed her understanding.    We then went on to discuss the strategy of draining fluid for diagnostic purposes, to determine whether a new organism could be found and treatment modified to cover possible postobstructive pneumonia.    We also discussed end-of-life issues.  We discussed that our plan right now is active treatment of the osteosarcoma but we were unable to give the treatment at the present time because of underlying infection.  We did discuss that once the infection is under control it may be possible to resume treatment with the pazopanib.    Fabian voiced an understanding that our treatment is palliative and that survival is limited.  I discussed that we do not have a way to tell expected survival.  I discussed that we will make every effort to provide best supportive care for his pneumonia and then go want to resume direct  treatment of the sarcoma.  All of his questions were answered.  All of his family's questions were answered.  They all expressed an understanding of the goals of care which is to provide the best possible treatment for the pneumonia which may be postobstructive and then follow-up with treatment for the sarcoma.    Aleksey Wilson MD

## 2022-11-06 NOTE — PROGRESS NOTES
"Care Management Follow Up    Length of Stay (days): 13    Expected Discharge Date: 11/08/2022     Concerns to be Addressed: discharge planning    Patient plan of care discussed at interdisciplinary rounds: Yes    Anticipated Discharge Disposition: Home Care     Anticipated Discharge Services: PCA  Anticipated Discharge DME:  NA    Patient/family educated on Medicare website which has current facility and service quality ratings:  yes  Education Provided on the Discharge Plan:  yes  Patient/Family in Agreement with the Plan: yes    Referrals Placed by CM/SW:    Pt does not currently have a skilled nursing need.     Private pay costs discussed: private room/amenity fees    Additional Information:  SW was informed in rounds that Pt needs TCU. Per chart review, the last PT/OT assessment indicates that Pt does not met the criteria for skilled nursing need. Home with assist is the current recommendation-   11/03 0931  home with assist;home with home care physical therapy       SW paged the team to place a PMR consult as this recommendation may change. Per review, \"family does not want to take Pt home so would like a list of TCUs.\" This is not an option unless the Pt meets skilled nursing criteria or the family chooses to private pay for TCU services which is often not a feasible option. SW will wait on the PMR results and provide a warm handoff to weekday SW for discharge planning steps moving forward. SW will continue to follow for psychosocial support, resources and advocate on behalf of the patient.    Carmel Perez, [facility], 7D & ICU  11/6/2022       Social Work and Care Management Department       SEARCHABLE in Set[employer] - search SOCIAL WORK       [place] (5920 [phone] [phone]) Saturday and Sunday     Units: 4A, 4C, & 4E Pager: [phone]     Units: 5A & 5B Pager: [phone]     Units: 6A & 6B   Pager: [phone]     Units: 6C & 6D Pager: [phone]     Units: 7A &7B  Pager: [phone]     Units: 7C, " 7D, & 5C Pager: [phone]     Unit: [place] [facility] Pager: [phone]      [facility] [place] ([id number]) Saturday and Sunday      Units: 5 Ortho, 5 Med/Surg & WB ED  Pager:[phone]     Units: 6 Med/Surg, 8A, & 10A ICU  Pager: [phone]        After hours (1630 - 0000) Saturday & Sunday; ([phone]) Mon-Fri; ([phone]) [facility] Recognized Holidays     Units: ALL  Pager: [phone]     MAKENNA Crockett

## 2022-11-06 NOTE — PROGRESS NOTES
Patient complaining of anxiety (described as dyspnea though no increased respiratory rate no worsening hypoxia). Will trial 0.5 mg PO ativan x 1 now and follow up symptoms.    Romeo Moya D.O.  Internal Medicine, Hospitalist  [facility]  Pager: [phone]

## 2022-11-06 NOTE — PLAN OF CARE
Goal Outcome Evaluation:  8814-6824  A&OX4, tachycardiac per pt baseline, intermittently hypertensive within parameters, OVSS on 3L of oxygen via NC. Pt denies nausea or vomiting. Patient c/o of right hip pain, IV Dilaudid given x2 and scheduled pain meds with some effectiveness. Patient slept in chair, refused to be repositioned or helped with marty-cares, and getting into bed. Did eventually allow staff to change the blue chak which was soiled, though frustrated for waking him up. Pt states he feels better in the chair despite RN explaining risks associated with that. Pt had complained of SOB, reported he felt very anxious, provider notified and one time dose of Ativan was given with good effect. Continues on iv antibiotics. Patient noted to be more fatigued, needs to take pauses before he can continue with a conversation,  poor po intake. Will continue to monitor and w/POC.

## 2022-11-06 NOTE — PLAN OF CARE
Goal Outcome Evaluation:    3077-5500:    BPs 140s-150s/90s-100s, HR 110s-120s, oxygen saturation maintained above 90% using 3-4LPM via NC. IV lasix given x1, good UOP. Multiple BMs. Stool sample collected, C.diff negative, enteric precautions removed. Refusing repositioning, continue to educate. Continue to monitor & w/ POC.

## 2022-11-06 NOTE — PROGRESS NOTES
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 He presented to the ED in the setting of abnormal labs and is admitted with concern for sepsis as well as acute on chronic anemia.    1. Sepsis and severe sepsis secondary to likely bacterial pneumonia on top of lung metastasis, all are present on admission, improving  This is the main problem that led to this hospitalization.  Pneumonia is the most likely etiology.  The patient was noted to be in increased abdominal pain requiring more narcotics.  He also has noticed to be more fatigued.  Worsening tachycardia, worsening leukocytosis, worsening CRP, and worsening procalcitonin are noted.  Given worsening clinical picture and laboratory work-up, I will obtain CT chest abdomen pelvis.    Differential diagnosis:    A.  Possibly infected 21 x 16 cm pelvic mass: There is a mass-effect leading to compression of the pelvic organ.  Urinary bladder has not been definitively identified based on the current study.  I requested urine analysis with reflex urine culture    B  Spontaneous bacterial peritonitis, this is a possibility given abdominal pain.  There is also increased ascites likely secondary to volume resuscitation.    C.  Postobstructive pneumonia secondary to metastatic disease of the lungs: This is the main etiology for the patient dyspnea, changes on CT chest, leukocytosis with white blood cell count peaking at 20, elevated inflammatory markers with CRP peaking at 130, lactic acidosis with lactic acid of 4.5.      I highly appreciate the input of interventional radiology who were able to perform diagnostic thoracentesis without any immediate complications on 11/4.  I am currently awaiting cell count, gram stain, and fungal and bacterial cultures from right-sided pleural fluid.    Given prior growth of Staph epidermidis from pleural fluid, I am concerned for continued infection with a Staphylococcus.  As such I have resumed coverage for staph with doxycycline intravenous.  I would also continue Zosyn for coverage of Pseudomonas and anaerobes given prolonged hospitalization of this patient and the need to cover for anaerobes with obstructive nature of the pneumonia.Fungitell is negative.  Galactomannan is currently pending.  I will continue micafungin while awaiting galactomannan.     Although the antibiotic stewardship service has provided insights about the care of this patient, including that the leukocytosis etiology is stress related, that the lactic acidosis is because of the cancer, as type B lactic acidosis.  Antibiotics Uristrip service service was not able to explain the patient continued dyspnea at the bedside, the need for oxygen with acute hypoxic respiratory failure up to 4 L in spite of ruling out pulmonary embolism and heart failure, tachycardia with heart rate in 130s, or the resolution of lactic acidosis [4.5] and tachycardia [130s] with volume resuscitation and antibiotics.  As such I will continue antibiotics since the patient is achieving a clinical benefit.  The oncology service are also in agreement with this approach given lack of any explanation from an oncological standpoint for the clinical manifestations or the leukocytosis or the elevated CRP.     The patient has been ruled out for C. difficile with a negative PCR, discontinue enteric precautions.  I discontinued oral vancomycin.    -Acute imaging of the chest, abdomen, and pelvis  -Ordered urinalysis with reflex to urine culture  -Ordered repeat  blood cultures  -Following blood cultures  -Following pleural fluid cultures obtained 11/4 including both bacterial and fungal cultures  -Following galactomannan  -Continue Zosyn [started 11/1] for total course of 7 days.  I updated the duration of antibiotic to that effect.  -Continue doxycycline [started 11/4] for total course of 7 days.  I have dated the duration of the antibiotic to that effect.  -Continue micafungin while awaiting for galactomannan  -Requested evaluation by my interventional colleague  for consideration of diagnostic and therapeutic paracentesis.  -Please hold apixaban on 11/7 AM in order for the thoracentesis to help  -Ordered INR and fibrinogen based on recommendations of   -Ordered laboratory work-up needed    2.  Acute kidney injury on top of CKD stage I-II, all are present on admission  This is likely secondary to mass-effect of the pelvic mass on the patient's bladder possibly.  I will repeat lab work on 11/7 and considering urology input    3.  Malignancy related pain, all are POA  Patient having pain reticulocyte count is elevated the site of the amputation that has been worse since being in the hospital.     -Continue scheduled acetaminophen 1 g every 8 hours  -Continue gabapentin 400 mg 3 times daily for adjuvant treatment of pain  -Continue MS Contin at 15 mg twice daily  -Continue Dilaudid 2 to 4 mg every 4 hours as needed for moderate to severe pain orally.    - Continue Dilaudid 0.5-1 mg intravenously for breakthrough  -Continue olanzapine 2.5 mg at bedtime  -I appreciate input of palliative care service  -I appreciate the input of oncology service    4. Acute hypoxic respiratory failure secondary to metastasis to the lungs and volume overload related to resuscitation, all are present on admission, treatment as above    5. Metastatic high-grade pleomorphic sarcoma of right pelvis and femur with metastasis to the lungs, abdominal wall, neck adjacent to the esophagus  s/p chemo and amputation/hemipelvectomy (6/2022), all are present on admission  Primary oncologist Dr. Ambrocio. Currently on therapy with pazopanib.  - Although pazopanib 800 mg daily has been obtained by the patient's sister and given to our pharmacy, we will not resume this medication while the patient is having active infection based on recommendations of oncology.    -I highly appreciate input of oncology service    6. Moderate malnutrition in the context of acute on chronic illness, all other VITO  - Nutrition consulted, appreciate recs  - Ensure supplements ordered    7.  Resolved medical problems  # Odynophagia secondary to severe external compression of the distal esophagus leading to luminal narrowing secondary to adjacent osteosarcoma metastatic lesion, [place] POA    # Anemia of critical illness on top of chronic anemia secondary to malignancy, all other POA  The patient received blood transfusion prior to during this admission.  The patient received total of 2 units of blood transfusion during this admission.    # JAZZMINE, improved  Creatinine increased to 1.2 from baseline ~0.6-0.7 on 10/26. Suspect prerenal in the setting of sepsis and diarrhea.  - Monitor     8.  Chronic medical problems  H/o PE  H/o L hemispheric ischemic stroke  Stroke identified during 9/2022 admission, felt to be due to hypercoagulable state related to malignancy so initiated on anticoagulation.  -Continue apixaban for anticoagulation    Disposition: The patient is interested in options for transitional care unit.  I consulted with case management and social work services to help provide a list of TCU that are approved by the patient's insurance.     Diet: Regular Diet Adult  Snacks/Supplements Adult: Ensure Enlive; With Meals  Snacks/Supplements Adult: Other; Special K bar @ 8pm please.; Between Meals  Snacks/Supplements Adult: Magic Cup; Between Meals    DVT Prophylaxis: DOAC  Sung Catheter: Not present  Central Lines: PRESENT        Cardiac Monitoring: None  Code Status: Full Code      Disposition Plan      Expected Discharge Date: 11/08/2022      Destination: home with help/services  Discharge Comments: Plan for TCU placement ??  Barriers to discharge: Improvement of respiratory status and tachycardia        The patient's care was discussed with the Bedside Nurse and Patient.    Carlos Bryant MD  Hospitalist Service, GOLD TEAM 11  [facility]  Securely message with the Vocera Web Console (learn more here)  Text page via [facility] Paging/Directory   Please see signed in provider for up to date coverage information      Clinically Significant Risk Factors         # Hypernatremia: Highest Na = 151 mmol/L (Ref range: 136-145) in last 2 days, will monitor as appropriate   # Hypercalcemia: corrected calcium is >10.1, will monitor as appropriate   # Anion Gap Metabolic Acidosis: Highest Anion Gap = 28 mmol/L (Ref range: 7-15) in last 2 days, will monitor and treat as appropriate  # Hypoalbuminemia: Lowest albumin = 2.7 g/dL (Ref range: 3.5-5.2) at 10/28/2022  6:09 AM, will monitor as appropriate           # Moderate Malnutrition: based on nutrition assessment        ______________________________________________________________________    Interval History   The patient reported generalized abdominal pain and generalized abdominal distention.  No nausea or vomiting.    Four-point ROS negative unless noted above.    Data reviewed today: I reviewed all medications, new labs and imaging results over the last 24 hours. I personally reviewed the chest CT image(s) showing no new consolidation.    Physical Exam   Vital Signs: Temp: 97.6  F (36.4  C) Temp src: Oral BP: (!) 147/92 Pulse: 97   Resp: 24 SpO2: 98 % O2 Device: Nasal cannula Oxygen Delivery: 3 LPM  Weight: 173 lbs 4.8 oz  Constitutional: Awake, alert, cooperative, no apparent distress.  Eyes: Conjunctiva and pupils examined and normal.  HEENT: Moist  mucous membranes, normal dentition.  Respiratory: Clear to auscultation bilaterally, no crackles or wheezing.  Cardiovascular: Regular rate and rhythm, normal S1 and S2, and no murmur noted.  GI: Soft, non-distended, non-tender, normal bowel sounds.  Lymph/Hematologic: No anterior cervical or supraclavicular adenopathy.  Skin: No rashes, no cyanosis, no edema.  Musculoskeletal: No joint swelling, erythema or tenderness.  Neurologic: Cranial nerves 2-12 intact, normal strength and sensation.  Psychiatric: Alert, oriented to person, place and time, no obvious anxiety or depression.    Data   Recent Labs   Lab 11/06/22  0609 11/05/22  0524 11/04/22  0616 11/03/22  0721 11/03/22  0209 11/02/22  0652   WBC 21.4* 15.0* 16.7* 17.7*  --  12.3*   HGB 7.8* 7.8* 7.5* 8.1*  --  7.4*   MCV 89 89 90 89  --  88    262 271 282  --  241   INR  --   --  1.64* 1.43*  --  1.56*    151* 143 142  --  142   POTASSIUM 3.8 3.8 3.5 3.6  --  3.6   CHLORIDE 102 110* 105 105  --  108*   CO2 19* 13* 20* 21*  --  22   BUN 43.0* 28.4* 26.4* 22.2*  --  21.2*   CR 1.91* 1.49* 1.38* 1.11  --  0.90   ANIONGAP 19* 28* 18* 16*  --  12   VENTURA 9.5 9.5 9.3 9.1  --  9.1   GLC 92 83 141* 111*   < > 104*   ALBUMIN 3.2* 3.5 3.3* 3.1*  --  3.0*   PROTTOTAL 6.3* 6.7 6.3* 6.3*  --  6.0*   BILITOTAL 0.8 0.9 1.0 0.9  --  0.9   ALKPHOS 118 119 99 111  --  104   ALT <5* 6* <5* <5*  --  <5*   AST 21 24 17 18  --  18    < > = values in this interval not displayed.     Recent Results (from the past 24 hour(s))   CT Chest/Abdomen/Pelvis w Contrast    Narrative    EXAMINATION: CT CHEST/ABDOMEN/PELVIS W CONTRAST, 11/6/2022 1:40 PM    TECHNIQUE:  Helical CT images from the lung apices through the  symphysis pubis were obtained with contrast.  Coronal reformatted  images were generated at a workstation for further assessment.    CONTRAST:  105 cc Isovue 370 IV.    COMPARISON: Chest CT 10/27/2022, CT CAP 10/5/2022    HISTORY: worsening fatigue,continued cough,   increased abdominal pain,  worsening inflammatory markers inspite of broad spectrum antibiotics,  known sarcoma    FINDINGS:    Chest:    Lungs: Tracheal secretions/debris in the upper thoracic trachea.  Recent demonstration of multiple bilateral centrally hypodense,  necrotic metastatic masses, for example a 5.3 cm mass in the left  upper lobe (series 3, image 48) and a 15 x 10 cm mass in the left  lower lobe. Overall, the metastatic masses are not significantly  changed compared to CT from 10/27/2022. No pneumothorax. Small  bilateral pleural effusions. Mild bibasilar atelectasis. There is  possible compression on the heart by large left lower lobe mass.    Mediastinum: Right chest wall Port-A-Cath tip in the right atrium. The  thyroid gland is unremarkable. The heart is not enlarged. No  significant pericardial effusion. The ascending aorta and main  pulmonary artery are normal in caliber. No mediastinal  lymphadenopathy.    Abdomen and pelvis:   Liver: No suspicious liver lesions. Portal veins appear patent.  Gallbladder: No gallstones. No evidence of acute cholecystitis.  Spleen: Normal size.  Pancreas: No suspicious pancreatic lesions. The pancreatic duct is not  dilated.  Adrenal glands: No adrenal nodules.  Kidneys: No kidney masses. No hydronephrosis or obstructing renal  stones.  Bladder / Pelvic organs: Large mass in the right pelvis, extending out  of the pelvis and measuring 21 x 16 cm. There is compression of pelvic  organs by the large mass. The urinary bladder is not definitively  identified, likely decompressed.  Bowel: No bowel wall thickening or evidence for obstruction. The  appendix is not visualized.  Lymph nodes: No retroperitoneal, mesenteric, or pelvic  lymphadenopathy.  Peritoneum/Retroperitoneum: Increased abdominal ascites compared to  prior. No loculated fluid collection or pneumoperitoneum.  Vessels: No infrarenal aortic aneurysm. Occlusion of the right common  iliac and external iliac  arteries from large mass. Occlusion of the  right  Iliac vein.    Bones and soft tissues: Postsurgical changes of resection of the right  hemipelvis and visualized right lower extremity. Body wall anasarca.  Degenerative changes in the spine.      Impression    IMPRESSION: In this patient with a history of osteosarcoma, status  post right hemipelvis and right lower extremity resection:  1.  Redemonstration of extensive centrally necrotic pulmonary  metastases, with the largest measuring 15 cm in the left lower lobe.  2.  Large mass in the pelvis measuring 21 cm. There is mass effect in  the pelvis, resulting in occlusion of the right iliac arteries and  veins. Unchanged from prior.  3.  Moderate abdominal ascites, increased compared to prior.  4.  Small bilateral pleural effusions.

## 2022-11-06 NOTE — PROVIDER NOTIFICATION
Paged [place] [facility] via [facility]    7D [id number] F.C    Pt complaining of SOB, on 4L of oxygen sating at 100%. Reports he feels anxious. Can he get anything for anxiety? Thanks.  Genevieve #[id number]    Provider called back and ordered 0.5 mg PO ativan x 1 now and follow up symptoms.    Follow up: Pt reports feeling more calm and breathing more improved after the PO Ativan and scheduled pain meds.

## 2022-11-07 NOTE — PROGRESS NOTES
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 Ambrocio   Current Treatment Plan: Pazopanib     Recommendations:   - Continue to optimize respiratory status; concerned he is becoming more symptomatic from pulmonary metastasis   - Overall patient is more somnolent and encephalopathic. Cr increasing over the past few days, Cr up to 3.04 and BUN at 61.2. Consider nephrology and urology consult though will defer to primary team.   - Was updated by primary team and palliative care that patient is now changed to comfort focused care given that patient's mental status is likely due to encephalopathy caused by worsening kidney function and increasing uremia. This is likely due to the enlarging malignant pelvic mass noted on CT CAP.   - It is unlikely that Pazopanib would help given patient's acute decline.     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 Admitted for acute on chronic anemia and leukocytosis concerning for infection.       # Metastatic Osteosarcoma   In brief, he initially presented to Dr. Whitaker with rapidly progressing right leg swelling since July 2021. MRI showed a 29 x 20 cm right thigh soft tissue mass with probable bony involvement of the proximal right femur. Biopsy 9/24/21 c/w high grade pleomorphic sarcoma. Neoadjuvant chemotherapy was recommended as PET was concerning for inguinal lymphadenopathy. He initiated Doxil/Ifos(R8S9=19/2/21) c/b confusion concerning for ifosfamide neurotoxicity for which he received Methylene Blue. C2 c/b severe neurotoxicity and again improved with Methylene blue. Remainder of  cycle 2 was not completed. PET 1/11/22 decrease size of right thigh hypermetabolic soft tissue mass. Due to neurotoxicity patient was hesitant to receive for chemotherapy. He was admitted in June 2022 with right leg pain and inability to ambulate. He was found to have a right proximal femur fracture and underwent right hindquarter amputation with Dr. Whitaker and primary complex wound closure with Dr. Butt (6/17/22). Surgical pathology positive for high-grade osteosarcoma. CT AP 8/13/22 for surveillance with multiple hypodense masses within the lateral abdominal wall and multiple pulmonary nodules c/w metastatic disease. He started Doxorubicin/Cisplatin 9/1/22. He was then readmitted 9/17/22 for right side weakness and MRI demonstrated left hemispheric stroke. Due to concern for intolerance of further platinum/anthracycline therapy he was switched to Pazopanib started on 10/16/22.   - CT CAP (11/6/22) shows extensive centrally necrotic pulmonary metastases, with the largest measuring 15 cm in the left lower lobe; Large mass in the pelvis measuring 21 cm. There is mass effect in the pelvis, resulting in occlusion of the right iliac arteries and veins; Moderate abdominal ascites, increased compared to prior. Small bilateral pleural effusions.  - Was updated by primary team and palliative care that patient is now changed to comfort focused care given that patient's mental status is likely due to encephalopathy caused by worsening kidney function and increasing uremia. This is likely due to the enlarging malignant pelvic mass noted on CT CAP.   - Of note, patient's home supply of Pazopanib is available however It is unlikely that Pazopanib would help given patient's acute decline.     # Acute on Chronic Anemia   On admission noted to have Hgb 6.9.   - Transfuse to keep Hgb >7     # Cancer related pain   - Agree with palliative care consult       # Concern for infection   # Leukocytosis   # Tachycardia   Patient was  directed to the ED for lab abnormalities (Hgb 7.2, and WBC 16.3).  Concerning for infection. Infectious work up notable for CXR with mixed opacities suspicious for pneumonia. 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 Thrombus extends from a pulmonary mass most likely representing tumor thrombus. AC was deferred as it was thought to be tumor thrombus. He then presented with right sided weakness and MRI 9/16/22 showed left hemisphere hyperintensities consistent with acute strokes at that time w/ etiology felt to be hypercoagulability in setting of malignancy given recent PE.  - PTA Eliquis resumed (x10/26)       Patient's plan of care was discussed with attending physician Dr. Wilson.    Thank you for the opportunity to partake in this patients plan of care. Please do not hesitate to page with questions. We will continue to follow.     I spent >35  minutes face-to-face or coordinating care of Vic Scott III. Over 50% of our time on the unit was spent counseling the patient and/or coordinating care.    Bell Chicas PA-C   Hematology/Oncology   Pager: [phone]   ___________________________________________________________________    Subjective & Interval History:    Nursing notes reviewed.  Vic is encephalopathic this morning. Denies pain. Difficult to arouse.     Updated by palliative team that patient is now on comfort focused cares after discussion with patient's family      Physical Exam:    Blood pressure 131/79, pulse 107, temperature 97.4  F (36.3  C), temperature source Axillary, resp. rate 18, weight 78.6 kg (173 lb 4.8 oz), SpO2 100 %.    General: lying in bed, difficult to arouse  HEENT: sclera  anicteric  Neck: supple, normal ROM  CV: Tachycardic, normal S1/S2, no m/r/g  Resp: tachypnic, crackles L>R, currently on 3L NC   MSK: RLE amputation noted, warm and well-perfused, normal tone  Skin: no rashes on limited exam, no jaundice  Neuro: Alert and interactive, moves all extremities equally, no focal deficits    Labs & Studies: I personally reviewed the following studies:  ROUTINE LABS (Last four results):  CMP  Recent Labs   Lab 11/07/22  0705 11/06/22  0609 11/05/22  0524 11/04/22  0616    140 151* 143   POTASSIUM 4.3 3.8 3.8 3.5   CHLORIDE 100 102 110* 105   CO2 17* 19* 13* 20*   ANIONGAP 22* 19* 28* 18*   * 92 83 141*   BUN 61.2* 43.0* 28.4* 26.4*   CR 3.04* 1.91* 1.49* 1.38*   GFRESTIMATED 25* 43* 58* 64   VENTURA 8.9 9.5 9.5 9.3   MAG 2.5* 1.8 2.0 2.1   PHOS 8.4* 6.0* 4.6* 4.6*   PROTTOTAL 6.3* 6.3* 6.7 6.3*   ALBUMIN 3.2* 3.2* 3.5 3.3*   BILITOTAL 0.6 0.8 0.9 1.0   ALKPHOS 111 118 119 99   AST 22 21 24 17   ALT <5* <5* 6* <5*     CBC  Recent Labs   Lab 11/07/22  0705 11/06/22  0609 11/05/22  0524 11/04/22  0616   WBC 24.9* 21.4* 15.0* 16.7*   RBC 2.86* 2.88* 2.84* 2.75*   HGB 7.8* 7.8* 7.8* 7.5*   HCT 25.8* 25.7* 25.3* 24.7*   MCV 90 89 89 90   MCH 27.3 27.1 27.5 27.3   MCHC 30.2* 30.4* 30.8* 30.4*   RDW 18.6* 18.6* 18.8* 19.3*    299 262 271     INR  Recent Labs   Lab 11/07/22  0705 11/04/22  0616 11/03/22  0721 11/02/22  0652   INR 1.78* 1.64* 1.43* 1.56*       Medications list for reference:  Current Facility-Administered Medications   Medication     acetaminophen (TYLENOL) tablet 975 mg     apixaban ANTICOAGULANT (ELIQUIS) tablet 5 mg     benzocaine-menthol (CHLORASEPTIC) 6-10 MG lozenge 1 lozenge     benzonatate (TESSALON) capsule 100 mg     escitalopram (LEXAPRO) tablet 10 mg     gabapentin (NEURONTIN) capsule 400 mg     guaiFENesin-codeine (ROBITUSSIN AC) 100-10 MG/5ML solution 5 mL     heparin 100 UNIT/ML injection 5-10 mL     heparin lock flush 10 UNIT/ML injection 5-10 mL      heparin lock flush 10 UNIT/ML injection 5-10 mL     HYDROmorphone (DILAUDID) tablet 2-4 mg     HYDROmorphone (PF) (DILAUDID) injection 0.5-1 mg     ipratropium - albuterol 0.5 mg/2.5 mg/3 mL (DUONEB) neb solution 3 mL     levofloxacin (LEVAQUIN) infusion 250 mg     lidocaine (LMX4) cream     lidocaine 1 % 0.1-1 mL     loperamide (IMODIUM) capsule 2 mg     magnesium oxide (MAG-OX) tablet 400 mg     melatonin tablet 1 mg     metroNIDAZOLE (FLAGYL) infusion 500 mg     morphine (MS CONTIN) 12 hr tablet 15 mg     naloxone (NARCAN) injection 0.2 mg    Or     naloxone (NARCAN) injection 0.4 mg    Or     naloxone (NARCAN) injection 0.2 mg    Or     naloxone (NARCAN) injection 0.4 mg     OLANZapine (zyPREXA) tablet 2.5 mg     ondansetron (ZOFRAN ODT) ODT tab 4 mg    Or     ondansetron (ZOFRAN) injection 4 mg     [Held by provider] pazopanib (VOTRIENT) tablet 800 mg     sodium chloride (PF) 0.9% PF flush 3 mL     sodium chloride (PF) 0.9% PF flush 3 mL     umeclidinium-vilanterol (ANORO ELLIPTA) 62.5-25 MCG/INH oral inhaler 1 puff     STAFF NOTE  Fabian Scott III is a 47 year old man with past medical history of 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 Admitted for acute on chronic anemia and leukocytosis concerning for infection.      PHYSICAL EXAM  Blood pressure 132/84, pulse 112, temperature 97.4  F (36.3  C), temperature source Oral, resp. rate 26, weight 78.6 kg (173 lb 4.8 oz), SpO2 100 %.  General: lying in bed, no acute distress  HEENT: sclera anicteric, EOMI, MMM  Neck: supple, normal ROM  CV: Tachycardic, normal S1/S2, no m/r/g  Resp: tachypnic, crackles L>R, currently on 4L NC   MSK: RLE amputation noted, warm and well-perfused, normal tone      ASSESSMENT AND PLAN  Mr. Scott is a 47-year-old man with metastatic high grade pleomorphic sarcoma Hospital Day # 9, patient of Dr. Ambrocio.  Plan was to continue pazopanib and we are  waiting for family to bring in the outpatient pills.   Recommendations are:  - Continue to optimize respiratory status; concerned he is becoming more symptomatic from pulmonary metastasis   - Overall patient is more somnolent and encephalopathic. Cr increasing over the past few days, Cr up to 3.04 and BUN at 61.2. Consider nephrology and urology consult though will defer to primary team.   - Was updated by primary team and palliative care that patient is now changed to comfort focused care given that patient's mental status is likely due to encephalopathy caused by worsening kidney function and increasing uremia. This is likely due to the enlarging malignant pelvic mass noted on CT CAP.   - It is unlikely that Pazopanib would help given patient's acute decline.     Thank you for allowing us to participate in this patient's care.  The patient was seen and evaluated by me.  I discussed the patient with the MEHRAN and agree with the findings and plan in the note, which was edited by me.    Sincerely,     Aleksey Wilson MD  Professor  [facility]  [phone].        I spent 10 minutes with the patient more than 50% of which was in counseling and coordination of care.

## 2022-11-07 NOTE — PROCEDURES
[facility]  CAPS PROCEDURE NOTE  Date of Admission:  10/24/2022  Consult Requested by: Medicine  Reason for Consult: diagnostic evaluation of ascites    Indication/HPI: abdominal swelling     Pre-Procedure Diagnosis: Ascites    Post-Procedure Diagnosis: Ascites    Risk Assessment: Average risk, Technically straightforward; patient's anticoagulation has been held according to guidelines based on the agent and platelets and coags are within guidelines    [facility]    Paracentesis    Date/Time: 11/7/2022 4:33 PM  Performed by: Link Young MD  Authorized by: Link Young MD     PRE-PROCEDURE DETAILS  Procedure purpose: diagnostic and therapeutic  Indications: abdominal discomfort secondary to ascites        UNIVERSAL PROTOCOL   Site Marked: Yes  Prior Images Obtained and Reviewed:  Yes  Required items: Required blood products, implants, devices and special equipment available    Patient identity confirmed:  Verbally with patient and arm band  NA - No sedation, light sedation, or local anesthesia  Confirmation Checklist:  Patient's identity using two indicators  Time out: Immediately prior to the procedure a time out was called    Universal Protocol: the Joint Commission Universal Protocol was followed    Preparation: Patient was prepped and draped in usual sterile fashion       ANESTHESIA    Local Anesthetic: Lidocaine 1% without epinephrine  Anesthetic Total (mL):  5      SEDATION    Patient Sedated: No    POST PROCEDURE DETAILS  Needle gauge: 22  Ultrasound guidance: yes  Fluid removed: 10(ml)  Fluid appearance: serous  Dressing: 4x4 sterile gauze        PROCEDURE    Patient Tolerance:  Patient tolerated the procedure well with no immediate complications  Length of time physician/provider present for 1:1 monitoring during sedation: 0        No results found for this or any previous visit from the past 1 day.            Link Young MD  [facility]  Securely message with the [employer] Web Console (learn more here)  Text page via [facility] Paging/Directory   Please see signed in provider for up to date coverage information

## 2022-11-07 NOTE — PLAN OF CARE
Goal Outcome Evaluation:    HR 100s, oxygen saturation maintained above 90% using 3-4LPM via NC. Care conference today. Decided on comfort cares & code status changed. Increased lethargy/sedation. Responding intermittently to touch/voice; can be difficult to arouse & pt quickly falling back asleep. Lactic acid checked, 1.6. EKG completed, sinus tach. IV abx's changed to flagyl/levaquin. Nephrology consulted. Diagnostic paracentesis completed. Port a cath changed. Continues w/ incontinence. Continue to monitor & w/ POC.

## 2022-11-07 NOTE — PROGRESS NOTES
Care Management Follow Up    Length of Stay (days): 14    Expected Discharge Date: 11/09/2022     Concerns to be Addressed: Discharge Planning    Patient plan of care discussed at interdisciplinary rounds: Yes    Anticipated Discharge Disposition: TCU     Anticipated Discharge Services: None    Anticipated Discharge DME: None    Patient/family educated on Medicare website which has current facility and service quality ratings: Yes    Education Provided on the Discharge Plan: Yes    Patient/Family in Agreement with the Plan: Yes    Referrals Placed by CM/SW: Not at this time     Private pay costs discussed: Not applicable    Additional Information:  Writer met with patient to discuss TCU options. Left Medicare Care Compare form for facilities near his home in [place] at bedside. Will follow up with patient to determine preferences.    MAKENNA Díaz, MSW  Adult Acute Care Float   Pager [phone]

## 2022-11-07 NOTE — PROGRESS NOTES
Marcos Note  I was called by Dr Carlos Bryant MD  on 2022 at 2:50 PM to provide input for Vic Scott III MRN [id number]. The patient is located at [facility].. The nature of this request for a marcos consultation does not permit me to perform a comprehensive review of health care records, patient/family interview, nor an examination of the patient. I obtained limited patient information from the provider via conversation and a limited chart review.    Based on only the information I was provided today, I note the followin year old yo with non-treatable metastatic cancer. Clinically declining in multiple organ systems. Per Dr. Bryant, discussed DNR/DNI and comfort care status today and plan was to transition to comfort measure only. There was concern of potential reversible cause of encephalopathy and labs showed worsening creatinine. I reviewed creatinine and BUN trend with Dr. Bryant. It is unlikely that this is uremic encephalopathy given that creatinine has been worsening for just a few days and Fabian had evidence of encephalopathy yesterday when creatinine was 1.9 mg/dL. I suspect some other process is contributing to encephalopathy.    This are not intended to take the place of the care team's clinical judgement, which should always be utilized to provide the most appropriate care to meet the unique needs of each patient.     Latosha Luo MD  [facility]  Department of Medicine  Division of Nephrology and Hypertension  [facility]  [employer] [place] Web Console

## 2022-11-07 NOTE — CONSULTS
[facility]     PM&R CONSULT        Consulting Provider: Dr. Carlos Bryant MD  Reason for Consult: Assessment of rehabilitation   Location of Patient: 7518-02  Date of Encounter: 11/7/2022   Date of Admission: 10/24/2022      ASSESSMENT/PLAN:    Mr. 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  Physical Medicine and Rehabilitation have been consulted to evaluate patient for rehabilitation needs/discharge planning.    Recommendations:  -  Patient currently requiring 4L via NC compared to home 2L.  Patient with variable ability within therapy sessions due to pain and fluctuating mental status.  Due to these issues do not feel it would be safe for patient to discharge home, and that TCU would be most appropriate discharge destination for patient to work on regaining strength and endurance.      Thank you for this interesting consult.     Please call for any questions. Pager number [phone].    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  Patient had a L hemispheric ischemic stroke in September of 2022 which was thought to be due to hypercoagulable state related to malignancy.  Pt does have some residual weakness of RUE after stroke.  Patient admitted to [facility] on 10/24/2022 in setting of abnormal labs w/ concern for sepsis and acute on chronic anemia.      On initial evaluation in AM pt states he feels his strength is stable from when he initially  "presented to the hospital.  He and his family prefer discharging to a TCU.  Believe that he is not at his prior baseline of strength.  Pt's pain is appropriately controlled.  He believes his shortness of breath is stable as well.  Patient states he still has some residual issues from prior stroke, noting he has good strength but finds it very difficult to perform fine motor movements with his RUE.    When team attempted to evaluate patient in PM he was very somnolent, intermittently opening his eyes and answered questions appropriately with significant pause.  Patient also requiring 3L via NC now s/p thoracentesis.  Per recent Heme/Onc note patient's change in mental status is due to encephalopathy from worsening kidney function and increasing uremia.      NURSING REPORT:  Goal Outcome Evaluation:  Time: 8437-9623  Reason for admission: He presented to the ED in the setting of abnormal labs and is admitted with concern for sepsis as well as acute on chronic anemia.  Activity: Assist of 1-2  Pain: Right hip pain managed with scheduled pain meds  Neuro: A&OX4,   Cardiac: tachy within pt baseline  Respiratory: Occasional labored breathing, sating in the upper 90s on 4L of oxygen, diminished/corse LS  GI/:fecal incontinence, denies  N&V,   Diet: Regular  Lines: R chest port running TKO  Wounds: Wound to right hip area  Labs/imaging: Reviewed      New changes this shift: None     Plan: Continues on iv antibiotics. Still needs a UA/UC. Has thoracentesis today. Hold Eliquis.       Continue to monitor and follow POC \"    CURRENT PRECAUTIONS/RESTRICTIONS:  None    DVT PPX:  Apixaban    PREVIOUS LEVEL OF FUNCTION:  Prior to admission patient utilized rolling walker and manual wheelchair for ambulation.  Commode chair and shower chair used to assist with IADLs.    CURRENT FUNCTION:   PT:   Pt requiring Mod A support with sitting balance due to right lean.  Limited activation of LLE while in [facility].  Patient required max " A at trunk to attempt upright posture.    OT: No formal evaluation in chart likely due to patients fluctuation in mental status      LIVING SITUATION/SUPPORT:  Patient lives with his sister in a house with no stairs to enter and no stairs in home.    Support system includes sister, cousin, aunt, & uncle    Work status: Not currently working      PAST MEDICAL HISTORY:  Past Medical History:   Diagnosis Date     Pleomorphic cell sarcoma (H)            CURRENT MEDICATIONS:  Current Facility-Administered Medications   Medication     acetaminophen (TYLENOL) tablet 975 mg     [Held by provider] apixaban ANTICOAGULANT (ELIQUIS) tablet 5 mg     benzocaine-menthol (CHLORASEPTIC) 6-10 MG lozenge 1 lozenge     benzonatate (TESSALON) capsule 100 mg     [Held by provider] escitalopram (LEXAPRO) tablet 10 mg     guaiFENesin-codeine (ROBITUSSIN AC) 100-10 MG/5ML solution 5 mL     heparin 100 UNIT/ML injection 5-10 mL     heparin lock flush 10 UNIT/ML injection 5-10 mL     heparin lock flush 10 UNIT/ML injection 5-10 mL     HYDROmorphone (PF) (DILAUDID) injection 0.3-0.5 mg     ipratropium - albuterol 0.5 mg/2.5 mg/3 mL (DUONEB) neb solution 3 mL     levofloxacin (LEVAQUIN) infusion 250 mg     lidocaine (LMX4) cream     lidocaine 1 % 0.1-1 mL     loperamide (IMODIUM) capsule 2 mg     [Held by provider] magnesium oxide (MAG-OX) tablet 400 mg     melatonin tablet 1 mg     metroNIDAZOLE (FLAGYL) infusion 500 mg     naloxone (NARCAN) injection 0.2 mg    Or     naloxone (NARCAN) injection 0.4 mg    Or     naloxone (NARCAN) injection 0.2 mg    Or     naloxone (NARCAN) injection 0.4 mg     [Held by provider] OLANZapine (zyPREXA) tablet 2.5 mg     ondansetron (ZOFRAN ODT) ODT tab 4 mg    Or     ondansetron (ZOFRAN) injection 4 mg     [Held by provider] pazopanib (VOTRIENT) tablet 800 mg     sodium chloride (PF) 0.9% PF flush 3 mL     sodium chloride (PF) 0.9% PF flush 3 mL     umeclidinium-vilanterol (ANORO ELLIPTA) 62.5-25 MCG/INH oral  "inhaler 1 puff         EXAMINATION:  Vital signs:  Temp: 97.4  F (36.3  C) Temp src: Axillary BP: 131/79 Pulse: 107   Resp: 18 SpO2: 100 % O2 Device: Nasal cannula Oxygen Delivery: 3 LPM   Weight: 78.6 kg (173 lb 4.8 oz)  Estimated body mass index is 22.25 kg/m  as calculated from the following:    Height as of 9/29/22: 1.88 m (6' 2\").    Weight as of this encounter: 78.6 kg (173 lb 4.8 oz).    General: NAD, pleasant and cooperative  HEENT: ATNC, no discharge from nares or ears   Pulmonary: Breathing comfortably on 4L via NC, no accessory muscle use  Cardiovascular: RRR  Abdominal: non-tender to palpation, mildly distended, bowel sounds present   Extremities: Warm and well perfused in bilateral upper and left lower extremity. Edema LLE with compression stocking in place  MSK/neuro:   Mental Status:  alert and oriented x3. Follows 1-2 step commands.     Cranial Nerves: grossly normal besides hoarse voice   Sensory: Normal to light touch in bilateral upper extremities   Strength: (0 to 5 scale)   Scored: _/5 Right Left   Shoulder Abd 5 5   Elbow Flexion 5 5   Elbow Extension 5 5   Wrist Extension 4 5   Hand Intrinsics 4 5    Strength 4 5   Hip Flexion NT 4   Knee Extension NT 5   Ankle Dorsiflexion NT 5      Coordination: No dysmetria on finger to nose on left, dysmetria on finger to nose on right   Speech: Fluent, non-slurred   Cognition: Superficially intact   Gait: Deferred    Skin: normal skin color, texture, turgor where visualized      LABS AND IMAGING:  Results for orders placed or performed during the hospital encounter of 10/24/22 (from the past 24 hour(s))   CBC with Platelets & Differential    Narrative    The following orders were created for panel order CBC with Platelets & Differential.  Procedure                               Abnormality         Status                     ---------                               -----------         ------                     CBC with platelets and d...[[id number]]  " Abnormal            Final result                 Please view results for these tests on the individual orders.   Comprehensive metabolic panel   Result Value Ref Range    Sodium 139 136 - 145 mmol/L    Potassium 4.3 3.4 - 5.3 mmol/L    Chloride 100 98 - 107 mmol/L    Carbon Dioxide (CO2) 17 (L) 22 - 29 mmol/L    Anion Gap 22 (H) 7 - 15 mmol/L    Urea Nitrogen 61.2 (H) 6.0 - 20.0 mg/dL    Creatinine 3.04 (H) 0.67 - 1.17 mg/dL    Calcium 8.9 8.6 - 10.0 mg/dL    Glucose 102 (H) 70 - 99 mg/dL    Alkaline Phosphatase 111 40 - 129 U/L    AST 22 10 - 50 U/L    ALT <5 (L) 10 - 50 U/L    Protein Total 6.3 (L) 6.4 - 8.3 g/dL    Albumin 3.2 (L) 3.5 - 5.2 g/dL    Bilirubin Total 0.6 <=1.2 mg/dL    GFR Estimate 25 (L) >60 mL/min/1.73m2   Magnesium   Result Value Ref Range    Magnesium 2.5 (H) 1.7 - 2.3 mg/dL   Phosphorus   Result Value Ref Range    Phosphorus 8.4 (H) 2.5 - 4.5 mg/dL   CRP inflammation   Result Value Ref Range    CRP Inflammation 149.00 (H) <5.00 mg/L   Fibrinogen activity   Result Value Ref Range    Fibrinogen Activity 338 170 - 490 mg/dL   INR   Result Value Ref Range    INR 1.78 (H) 0.85 - 1.15   CBC with platelets and differential   Result Value Ref Range    WBC Count 24.9 (H) 4.0 - 11.0 10e3/uL    RBC Count 2.86 (L) 4.40 - 5.90 10e6/uL    Hemoglobin 7.8 (L) 13.3 - 17.7 g/dL    Hematocrit 25.8 (L) 40.0 - 53.0 %    MCV 90 78 - 100 fL    MCH 27.3 26.5 - 33.0 pg    MCHC 30.2 (L) 31.5 - 36.5 g/dL    RDW 18.6 (H) 10.0 - 15.0 %    Platelet Count 309 150 - 450 10e3/uL    % Neutrophils 83 %    % Lymphocytes 6 %    % Monocytes 9 %    % Eosinophils 0 %    % Basophils 0 %    % Immature Granulocytes 2 %    NRBCs per 100 WBC 0 <1 /100    Absolute Neutrophils 20.9 (H) 1.6 - 8.3 10e3/uL    Absolute Lymphocytes 1.4 0.8 - 5.3 10e3/uL    Absolute Monocytes 2.2 (H) 0.0 - 1.3 10e3/uL    Absolute Eosinophils 0.0 0.0 - 0.7 10e3/uL    Absolute Basophils 0.0 0.0 - 0.2 10e3/uL    Absolute Immature Granulocytes 0.4 <=0.4 10e3/uL     Absolute NRBCs 0.0 10e3/uL   Cell count with differential fluid    Narrative    The following orders were created for panel order Cell count with differential fluid.  Procedure                               Abnormality         Status                     ---------                               -----------         ------                     Cell Count Body Fluid[[id number]]        Abnormal            Final result               Differential Body Fluid[[id number]]                          Final result                 Please view results for these tests on the individual orders.   Gram stain    Specimen: Peritoneum; Ascites Fluid   Result Value Ref Range    Gram Stain Result No organisms seen     Gram Stain Result 3+ WBC seen     Narrative    Quantification of host cells and microbiological organisms was done on a cytocentrifuged preparation.   Cell Count Body Fluid   Result Value Ref Range    Color Brown (A) Colorless, Yellow    Clarity Cloudy (A) Clear    Total Nucleated Cells 1,130 /uL    Cell Count Fluid Source Abdomen     Narrative    No reference ranges have been established.  This result  should be interpreted in the context of the patient's clinical condition and   compared to simultaneous measurement in the patient's blood.         Differential Body Fluid   Result Value Ref Range    % Neutrophils 52 %    % Lymphocytes 6 %    % Monocyte/Macrophages      % Other Cells 42 %    Narrative    No reference ranges have been established. This result should be interpreted in the context of the patient's clinical condition and compared to simultaneous measurement in the patient's blood.   Lactic acid whole blood   Result Value Ref Range    Lactic Acid 1.6 0.7 - 2.0 mmol/L   EKG 12-lead, tracing only   Result Value Ref Range    Systolic Blood Pressure  mmHg    Diastolic Blood Pressure  mmHg    Ventricular Rate 104 BPM    Atrial Rate 104 BPM    HI Interval 134 ms    QRS Duration 80 ms     ms    QTc 473 ms    P Axis 52  degrees    R AXIS 12 degrees    T Axis 15 degrees    Interpretation ECG       Sinus tachycardia  Nonspecific T wave abnormality  Abnormal ECG  When compared with ECG of 30-OCT-2022 05:42,  No significant change was found         Angel Wyatt DO  PGY4 PM&R Resident    Patient was seen and discussed with staff attending, Dr. Vargas.

## 2022-11-07 NOTE — PLAN OF CARE
Goal Outcome Evaluation:    4078-9709:     BP max 162/107, HR 100s, oxygen saturation maintained above 90% using 3-4LPM via NC; did not want to decrease oxygen below 3LPM - reports SOB. Incontinent BM x1. Sacral cares/dressing completed. Chest/abd/pelvis CT completed. Will hold apixaban (11/7 0800) for planned paracentesis. Magnesium replaced via IV, recheck w/ AM labs. Lozenge x2 given for dry mouth/sore throat. Refusing repositioning, continue to educate. Was up in chair throughout day, currently in bed. Continue to monitor & w/ POC.     -Need to collect UA.

## 2022-11-07 NOTE — PROGRESS NOTES
SPIRITUAL HEALTH SERVICES  SPIRITUAL ASSESSMENT Progress Note  [facility] ([place]) 7C       REFERRAL SOURCE: Self initiated  visit, Bahai specific.     DATA: Pt Vic Scott III identifies as Bahai and is of  descent.     I met with Fabian on his unit along with the specialists (MD, Dr. Bryant and LISA Jameson) involved in his cares.     Fabian was fatigued, physically weak, and was having difficulty speaking due to his illness. The team mentioned to him that they are fearful of his life ending in the coming days/weeks as his health has taking a decline.     Fabian would shake is head and say no when asked about DNR/DNI decisions. When he was given clarifications, he asked to have some more time to respond. I offered to Fabian a consolation that if he were to opt for DNR/DNI, that this would not be considered unlawful in the danny of [employer].     Fabian agreed to have his family contacted to come visit today. Dr. Bryant will call to notify them.     PLAN: I will follow up with Fabian for the duration of his stay. [facility] is available to [facility] per request.     Ana Cristina Gastelum  Lead Bahai   Pager [phone]    [facility] remains available 24/7 for emergent requests/referrals, either by having the switchboard page the on-call  or by entering an ASAP/STAT consult in Epic (this will also page the on-call ).

## 2022-11-07 NOTE — PROGRESS NOTES
"[facility]  Palliative Care Daily Progress Note       Recommendations & Counseling       Currently DNR/DNI, comfort focused with continuing antibiotics    Appreciate  support for patient and family    Would encourage use of PRN IV/SL options for symptom management    Would enter the comfort measures for end of life order set and discontinue non comfort focused treatments and therapies as appropriate.           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 He presented to the ED with abnormal labs and is admitted with concern for sepsis due to chronic anemia. His hospital course has been complicated by respiratory distress, worsening of his pain, JAZZMINE.     Today, the patient was seen for:  Metastatic high grade pleomorphic sarcoma  Phantom limb pain    Prognosis, Goals, or Advance Care Planning was addressed today with: Yes.'    Fabian today was very hard to engage given his AMS, was able to maintain wakefulness for very brief moments of time. I told him today the worry that time is quite short. He responded that the \"cancer is getting stronger\". I discussed having family come today to see him and discussed code status, recommended DNR/DNI. He was not sure at this time, and wanted to touch base later about this.     Attempted to call sister with hospitalist today, and discussed his current medical condition and concern that he has gotten worse since we last spoke and described how he seemed to us the last day and today. While we called Lauryn, the person first identified them as Lauryn however later said they were the youngest sister. She reported that Lauryn was in the ICU at this " time. Discussed that we would call the next sister and give her a update as well. Discussed the goal of hopefully having a family meeting today.       Mood, coping, and/or meaning in the context of serious illness were addressed today: Yes.  Summary/Comments: patient is fairly tired however seems to be coping ok at the moment, certainly dealing with some difficult things today, appreciates spiritual health support from [employer].             Interval History:     Chart review/discussion with unit or clinical team members:   Notes reviewed, the last day he has been getting worse, very sleepy, likely has the mass in his abdomen which is infected and causing his ongoing sepsis issues.     Per patient or family/caregivers today:  Will arouse with touch, responds appropriately but is very tired.     Key Palliative Symptoms:  # Pain severity the last 12 hours: moderate  # Dyspnea severity the last 12 hours: moderate               Review of Systems:     Besides above,  ROS was reviewed and is unremarkable          Medications:     I have reviewed this patient's medication profile and medications during this hospitalization.    Noted meds:    Acetaminophen 975 mg TID  lexapro 10 mg daily  Gabapentin 400 mg TID  MS contin 15 mg BID  Olanzapine 2.5 mg at bedtime  Hydromorphone PO PRN- x1 2mg over last 24 hours  Hydromorphone IV PRN  Loperamide PRN             Physical Exam:   Vitals were reviewed  Temp: 98.5  F (36.9  C) Temp src: Oral BP: 129/72 Pulse: 106   Resp: 16 SpO2: 100 % O2 Device: Nasal cannula Oxygen Delivery: 3 LPM    Intake/Output Summary (Last 24 hours) at 11/7/2022 0932  Last data filed at 11/7/2022 0700  Gross per 24 hour   Intake 220 ml   Output 150 ml   Net 70 ml     Constitutional: lethargic, able to wake to voice and touch, no apparent distress  ENT: Normocephalic, without obvious abnormality, atramatic  Lungs: increased work of breathing  on NC  Musculoskeletal: No redness, warmth, or swelling of the  joints. R leg amputation.  Neurologic: lethargic, oriented to self and responds to questions appropriately  Skin: No rashes, erythema             Data Reviewed:     Reviewed recent pertinent imaging, comments:   Chest CT/abd/pelvis 11/6    IMPRESSION: In this patient with a history of osteosarcoma, status   post right hemipelvis and right lower extremity resection:   1.  Redemonstration of extensive centrally necrotic pulmonary   metastases, with the largest measuring 15 cm in the left lower lobe.   2.  Large mass in the pelvis measuring 21 cm. There is mass effect in   the pelvis, resulting in occlusion of the right iliac arteries and   veins. Unchanged from prior.   3.  Moderate abdominal ascites, increased compared to prior.   4.  Small bilateral pleural effusions.     I have personally reviewed the examination and initial interpretation   and I agree with the findings.     Reviewed recent labs, comments:   WBC 24.9  Hemoglobin 7.8  Platelets 309      CHET Amaro CNS  Palliative Care Consult Team  Pager: [phone]    85 minutes spent. This includes 65 (1156-4879 AND 9910-9377) minutes face to face with patient and family discussing current complex health conditions and prognosis, goals of care, symptom management. Coordination of care with the primary team,  and palliative SW, oncology, and RN regarding goals of care, symptom management, and psychosocial support.

## 2022-11-07 NOTE — PLAN OF CARE
Goal Outcome Evaluation:  Time: 5108-9477  Reason for admission: He presented to the ED in the setting of abnormal labs and is admitted with concern for sepsis as well as acute on chronic anemia.  Activity: Assist of 1-2  Pain: Right hip pain managed with scheduled pain meds  Neuro: A&OX4,   Cardiac: tachy within pt baseline  Respiratory: Occasional labored breathing, sating in the upper 90s on 4L of oxygen, diminished/corse LS  GI/:fecal incontinence, denies  N&V,   Diet: Regular  Lines: R chest port running TKO  Wounds: Wound to right hip area  Labs/imaging: Reviewed      New changes this shift: None     Plan: Continues on iv antibiotics. Still needs a UA/UC. Has thoracentesis today. Hold Eliquis.       Continue to monitor and follow POC

## 2022-11-08 NOTE — PLAN OF CARE
[id number]    Fabian continues on comfort cares. Family remains at bedside, emotional support provided. Obtunded, agonal breathing, labored, periods of apnea increasing. Only arouses to pain, unable to communicate. At beginning of shift, family preferred PRN Dilaudid only be given q3h. This changed as Fabian declined overnight and appeared more agitated.PRN Dilaudid and PRN ativan given. RR ranged from 15-9 overnight, tachycardic in low 100s. Incontinent of urine & stool, no BM overnight, primofit in place with no urine output. Lips and mouth moisturized, eye drops given as needed. Awaiting arrival of sister, Pretty, who is flying in and should be here around 0830. Family is hoping he will hold on until then.     Continue to keep Fabian comfortable.

## 2022-11-08 NOTE — PROGRESS NOTES
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 He presented to the ED in the setting of abnormal labs and is admitted with concern for sepsis as well as acute on chronic anemia.    A.  Acute metabolic encephalopathy likely secondary to infection on top of metastatic high-grade pleomorphic sarcoma not responsive to antibiotics, not present on admission  This is the main problem being managed for 11/7.  The patient had worsening mental status starting on 11/6.  Patient reported worsening craig pain with continued shortness of breath.  No focal neurological deficits.  I obtained extensive work-up including CT chest abdomen pelvis with contrast showing 21 cm sized mass compressing on pelvic organs and arterial vasculature.  I obtain further diagnostics within the last 72 hours including thoracentesis on the right side and paracentesis in order to have targets for antimicrobials and to relieve any symptoms related to buildup of fluids.  Procedures were performed without any immediate complications however there was no targets obtained.  I have obtained multiple aerobic and anaerobic bacterial and fungal cultures. I have administered broad-spectrum antibiotics with intravenous Zosyn, intravenous doxycycline, and intravenous micafungin without any remarkable improvement in symptomatology, signs at the bedside, and worsening inflammatory markers.  I suspect that there continued obstruction caused by the metastasis of the malignancy is the reason why antibiotics have  not been effective.      I have considered the diagnosis of acute uremic encephalopathy given worsening kidney function.  However the patient's mental status has deteriorated when the patient creatinine was 1.9 and BUN was 43 on 11/6.  Follow-up kidney function testing shows creatinine of 3 and BUN of 61 on 11/7 with a change in laboratory markers that is mild with significant worsening of clinical picture making uremic encephalopathy less likely.  I have discussed the care of this patient with Dr. Latosha Luo, staff nephrologist that uremic encephalopathy can be blamed for the patient worsening mental status.  I discussed care of this patient including imaging findings with urology service who also is indicated or needed.    The only etiology that I can further consider would be neurotoxicity of Zosyn as such levofloxacin and metronidazole based on prior microbiological data from prior    The patient mental status continued to deteriorate within the last 48 hours.  Given lack of improvement clinically and laboratory, I recommended DNR and DNI and transition to comfort measures only.  Tayla this personally with palliative care service Gisell Mendoza and oncology service [place]icas.      I carried goals of care discussion with the patient Sister Pretty was available over the phone, the patient uncle who was available in person, the patient brother-in-law, and the patient's cousins and other family members for available in person.  To pursue DNR/DNI and comfort measures only   -Switch the patient to comfort oriented management  -Started Dilaudid for pain based on comfort measures ordered stat  -Based on comfort measures order set  -Continued antibiotics with levofloxacin and metronidazole  -Continued anticoagulation with apixaban  -Given worsening mental status I do not believe that this patient to discharge from the hospital to hospice facility or hospice at home.   - I have counseled the patient family the  patient this is likely eminent.  The family has been in understanding and are appreciative of the care provided to the patient.    B  Medical problems that led to transition to comfort measures only  1. Sepsis and severe sepsis secondary to likely bacterial pneumonia on top of lung metastasis, all are present on admission, improving  This is the main problem that led to this hospitalization.  Pneumonia is the most likely etiology.  The patient was noted to be in increased abdominal pain requiring more narcotics.  He also has noticed to be more fatigued.  Worsening tachycardia, worsening leukocytosis, worsening CRP, and worsening procalcitonin are noted.  Given worsening clinical picture and laboratory work-up, I will obtain CT chest abdomen pelvis.    Differential diagnosis:    A.  Possibly infected 21 x 16 cm pelvic mass: There is a mass-effect leading to compression of the pelvic organ.  Urinary bladder has not been definitively identified based on the current study.  I requested urine analysis with reflex urine culture    B  Spontaneous bacterial peritonitis, this is a possibility given abdominal pain.  There is also increased ascites likely secondary to volume resuscitation.    C.  Postobstructive pneumonia secondary to metastatic disease of the lungs: This is the main etiology for the patient dyspnea, changes on CT chest, leukocytosis with white blood cell count peaking at 20, elevated inflammatory markers with CRP peaking at 130, lactic acidosis with lactic acid of 4.5.     I highly appreciate the input of interventional radiology who were able to perform diagnostic thoracentesis without any immediate complications on 11/4.  I am currently awaiting cell count, gram stain, and fungal and bacterial cultures from right-sided pleural fluid.    Given prior growth of Staph epidermidis from pleural fluid, I am concerned for continued infection with a Staphylococcus.  As such I have resumed coverage for staph with  doxycycline intravenous.  I would also continue Zosyn for coverage of Pseudomonas and anaerobes given prolonged hospitalization of this patient and the need to cover for anaerobes with obstructive nature of the pneumonia.Fungitell is negative.  Galactomannan is currently pending.  I will continue micafungin while awaiting galactomannan.     Although the antibiotic stewardship service has provided insights about the care of this patient, including that the leukocytosis etiology is stress related, that the lactic acidosis is because of the cancer, as type B lactic acidosis.  Antibiotics Uristrip service service was not able to explain the patient continued dyspnea at the bedside, the need for oxygen with acute hypoxic respiratory failure up to 4 L in spite of ruling out pulmonary embolism and heart failure, tachycardia with heart rate in 130s, or the resolution of lactic acidosis [4.5] and tachycardia [130s] with volume resuscitation and antibiotics.  As such I will continue antibiotics since the patient is achieving a clinical benefit.  The oncology service are also in agreement with this approach given lack of any explanation from an oncological standpoint for the clinical manifestations or the leukocytosis or the elevated CRP.     The patient has been ruled out for C. difficile with a negative PCR, discontinue enteric precautions.  I discontinued oral vancomycin.    -Acute imaging of the chest, abdomen, and pelvis  -Ordered urinalysis with reflex to urine culture  -Ordered repeat blood cultures  -Following blood cultures  -Following pleural fluid cultures obtained 11/4 including both bacterial and fungal cultures  -Following galactomannan  -Continue Zosyn [started 11/1] for total course of 7 days.  I updated the duration of antibiotic to that effect.  -Continue doxycycline [started 11/4] for total course of 7 days.  I have dated the duration of the antibiotic to that effect.  -Continue micafungin while awaiting for  galactomannan  -Requested evaluation by my interventional colleague  for consideration of diagnostic and therapeutic paracentesis.  -Please hold apixaban on 11/7 AM in order for the thoracentesis to help  -Ordered INR and fibrinogen based on recommendations of   -Ordered laboratory work-up needed    2.  Acute kidney injury on top of CKD stage I-II, all are present on admission  This is likely secondary to mass-effect of the pelvic mass on the patient's bladder possibly.  I will repeat lab work on 11/7 and considering urology input    3.  Malignancy related pain, all are POA  Patient having pain reticulocyte count is elevated the site of the amputation that has been worse since being in the hospital.     -Continue scheduled acetaminophen 1 g every 8 hours  -Continue gabapentin 400 mg 3 times daily for adjuvant treatment of pain  -Continue MS Contin at 15 mg twice daily  -Continue Dilaudid 2 to 4 mg every 4 hours as needed for moderate to severe pain orally.    - Continue Dilaudid 0.5-1 mg intravenously for breakthrough  -Continue olanzapine 2.5 mg at bedtime  -I appreciate input of palliative care service  -I appreciate the input of oncology service    4. Acute hypoxic respiratory failure secondary to metastasis to the lungs and volume overload related to resuscitation, all are present on admission, treatment as above    5. 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 Ambrocio. Currently on therapy with pazopanib.  - Although pazopanib 800 mg daily has been obtained by the patient's sister and given to our pharmacy, we will not resume this medication while the patient is having active infection based on recommendations of oncology.    -I highly appreciate input of oncology service    6. Moderate  malnutrition in the context of acute on chronic illness, all other VITO  - Nutrition consulted, appreciate recs  - Ensure supplements ordered    7.  Resolved medical problems  # Odynophagia secondary to severe external compression of the distal esophagus leading to luminal narrowing secondary to adjacent osteosarcoma metastatic lesion, Dema POA    # Anemia of critical illness on top of chronic anemia secondary to malignancy, all other POA  The patient received blood transfusion prior to during this admission.  The patient received total of 2 units of blood transfusion during this admission.    # JAZZMINE, improved  Creatinine increased to 1.2 from baseline ~0.6-0.7 on 10/26. Suspect prerenal in the setting of sepsis and diarrhea.  - Monitor     8.  Chronic medical problems  H/o PE  H/o L hemispheric ischemic stroke  Stroke identified during 9/2022 admission, felt to be due to hypercoagulable state related to malignancy so initiated on anticoagulation.  -Continue apixaban for anticoagulation    Billing:  I met with the patient family to discuss goals of care for 45 minutes.       Diet: Regular Diet Adult  Snacks/Supplements Adult: Ensure Enlive; With Meals  Snacks/Supplements Adult: Other; Special K bar @ 8pm please.; Between Meals  Snacks/Supplements Adult: Magic Cup; Between Meals    DVT Prophylaxis: DOAC  Sung Catheter: Not present  Central Lines: PRESENT       Cardiac Monitoring: None  Code Status: No CPR- Do NOT Intubate      Disposition Plan      Expected Discharge Date: 11/09/2022      Destination: home with help/services  Discharge Comments: Plan for TCU placement ??  Barriers to discharge: Improvement of respiratory status and tachycardia        The patient's care was discussed with the Bedside Nurse and Patient.    Carlos Bryant MD  Hospitalist Service, GOLD TEAM [address] [place] [address]  Securely message with the Vocera Web Console (learn more here)  Text page via [employer]  Paging/Directory   Please see signed in provider for up to date coverage information      Clinically Significant Risk Factors           # Hypercalcemia: corrected calcium is >10.1, will monitor as appropriate   # Anion Gap Metabolic Acidosis: Highest Anion Gap = 22 mmol/L (Ref range: 7-15) in last 2 days, will monitor and treat as appropriate  # Hypoalbuminemia: Lowest albumin = 2.7 g/dL (Ref range: 3.5-5.2) at 10/28/2022  6:09 AM, will monitor as appropriate           # Moderate Malnutrition: based on nutrition assessment        ______________________________________________________________________    Interval History   The patient had worsening mental status.  The patient was not able to come to get with any symptoms, was not able to obtain four-point review of systems when the patient altered mental status.    Data reviewed today: I reviewed all medications, new labs and imaging results over the last 24 hours. I personally reviewed no images or EKG's today.    Physical Exam   Vital Signs: Temp: 97.4  F (36.3  C) Temp src: Axillary BP: 131/79 Pulse: 107   Resp: 26 SpO2: 100 % O2 Device: Nasal cannula Oxygen Delivery: 4 LPM  Weight: 173 lbs 4.8 oz  Constitutional: Awake, alert, cooperative, no apparent distress.  Eyes: Conjunctiva and pupils examined and normal.  HEENT: Moist mucous membranes, normal dentition.  Respiratory: Clear to auscultation bilaterally, no crackles or wheezing.  Cardiovascular: Regular rate and rhythm, normal S1 and S2, and no murmur noted.  GI: Soft, non-distended, non-tender, normal bowel sounds.  Lymph/Hematologic: No anterior cervical or supraclavicular adenopathy.  Skin: No rashes, no cyanosis, no edema.  Musculoskeletal: No joint swelling, erythema or tenderness.  Neurologic: Cranial nerves 2-12 intact, normal strength and sensation.  Psychiatric: Alert, oriented to person, place and time, no obvious anxiety or depression.    Data   Recent Labs   Lab 11/07/22  0705 11/06/22  0609  11/05/22  0524 11/04/22  0616 11/03/22  0721   WBC 24.9* 21.4* 15.0* 16.7* 17.7*   HGB 7.8* 7.8* 7.8* 7.5* 8.1*   MCV 90 89 89 90 89    299 262 271 282   INR 1.78*  --   --  1.64* 1.43*    140 151* 143 142   POTASSIUM 4.3 3.8 3.8 3.5 3.6   CHLORIDE 100 102 110* 105 105   CO2 17* 19* 13* 20* 21*   BUN 61.2* 43.0* 28.4* 26.4* 22.2*   CR 3.04* 1.91* 1.49* 1.38* 1.11   ANIONGAP 22* 19* 28* 18* 16*   VENTURA 8.9 9.5 9.5 9.3 9.1   * 92 83 141* 111*   ALBUMIN 3.2* 3.2* 3.5 3.3* 3.1*   PROTTOTAL 6.3* 6.3* 6.7 6.3* 6.3*   BILITOTAL 0.6 0.8 0.9 1.0 0.9   ALKPHOS 111 118 119 99 111   ALT <5* <5* 6* <5* <5*   AST 22 21 24 17 18     No results found for this or any previous visit (from the past 24 hour(s)).

## 2022-11-08 NOTE — PROVIDER NOTIFICATION
Provider Notification    Re: Please place comfort care orders in. Patient breathing has changed. Can he have Morphine or Dilaudid avail more freq + Ativan. He is not anxious right now but for future admin. Thanks!    Action: Notified Dr. Zhang at #[phone]    Response: No orders placed, re-paged at [phone]    ---------------    Dr. Bryant placed order set

## 2022-11-08 NOTE — PLAN OF CARE
"[id number]    Comfort cares continued this shift.    At the start of shift, Fabian was somnolent, but able to arouse to voice/gentle touch for 15-30 seconds. He did whisper one worded statements when staff/family were joking about his blanket and state \"no\" when asked if he was in pain or having difficulty in breathing. He did tell family he was in pain around 1830, PRN IV Dilaudid provided. At about 1945, Fabian was unable to stay awake long enough to answer questions. Breathing is labored and shallow, on 4L via NC.Eyes very dry, refresh gtts given and oral cares performed. External catheter in place, but no urine output. Brief has remained clean.     Family was worried that IV narcs would make Fabian go faster. Educated sister, Pretty and family visiting on the changes with breathing towards the end-of-life and how IV pain meds can ease the breathing. Sisters (Pretty and Lauryn) want to keep him comfortable and are okay with providing Fabian IV Dilaudid Q3H or as indicated.     Cousin will be staying the night. Family from out of state are flying in to see Fabain. His sister Pretty, will be here around 0800 tomorrow from [place] and she is hoping he can hang on until then. He has 5 kids, and most live out of state, made heartbeat vials for his children.      Will continue to keep Fabian comfortable.     "

## 2022-11-08 NOTE — PROGRESS NOTES
Fabian  at 0843 this am. Family was at his side a arrangements were made. Family took all his belongings except for his wheelchair which they said it was rented and they don't know were from. I will call [facility] tomorrow to see if they know, otherwise they will have to come and get it.

## 2022-11-08 NOTE — PLAN OF CARE
Physical Therapy Discharge Summary    Reason for therapy discharge:    Change in goals of care to comfort cares    Progress towards therapy goal(s). See goals on Care Plan in [facility] electronic health record for goal details.  Goals not met.  Barriers to achieving goals:   change in goals of care.    Therapy recommendation(s):    No further therapy is recommended.

## 2022-11-08 NOTE — PROVIDER NOTIFICATION
Provider Notification    Re: Family is now comfortable giving Fabian morphine. Can you place order?    Action: Notified Romeo York MD Pager #[phone] at [phone]    Response: Continue with 0.5mg Dilaudid q15 min

## 2022-11-08 NOTE — CARE PLAN
MD DEATH PRONOUNCEMENT    Called to pronounce Vic Scott III dead.    Physical Exam: Unresponsive to noxious stimuli    Patient was pronounced dead at 8:43 AM, November 8, 2022.        Principal Problem:    Pneumonia of both lungs due to infectious organism, unspecified part of lung  Active Problems:    Leukocytosis, unspecified type       Infectious disease present?: YES    Communicable disease present? (examples: HIV, chicken pox, TB, Ebola, CJD) :  NO    Autopsy discussed: Yes  Autopsy Declined by family as per patient wishes      Body disposition: Autopsy was discussed with family member:  Family members in person.  Permission for autopsy was declined.    CHAVEZ Hernandez MD  [place] [phone]

## 2022-11-09 LAB — BACTERIA PLR CULT: NO GROWTH

## 2022-11-11 LAB
BACTERIA BLD CULT: NO GROWTH
BACTERIA BLD CULT: NO GROWTH
BACTERIA PLR CULT: NORMAL

## 2022-11-12 LAB — BACTERIA FLD CULT: NO GROWTH

## 2022-11-24 NOTE — DISCHARGE SUMMARY
[facility]  Hospitalist Discharge Summary      Date of Admission:  10/24/2022  Date of Discharge:  2022  8:43 AM  Discharging Provider: Carlos Bryant MD  Discharge Service: Hospitalist Service, GOLD TEAM 11    Discharge Diagnoses   1. 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  Acute kidney injury AKIN stage I on top of CKD stage I-II, all are present on admission  3.  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 Moderate malnutrition in the context of acute on chronic illness, all other VITO  5. Odynophagia secondary to severe external compression of the distal esophagus leading to luminal narrowing secondary to adjacent osteosarcoma metastatic lesion, all are POA  6. Anemia of critical illness on top of chronic anemia secondary to malignancy, all other POA    Follow-ups Needed After Discharge   none    Unresulted Labs Ordered in the Past 30 Days of this Admission     Date and Time Order Name Status Description    2022  3:18 PM Fungus Culture, non-blood Preliminary     2022 10:23 AM Fungus Culture, non-blood Preliminary     10/5/2022  3:00 PM PREPARE RED BLOOD CELLS (UNIT) Preliminary     10/5/2022  7:45 AM PREPARE RED BLOOD CELLS (UNIT) Preliminary           Discharge Disposition   Patient  during this admission  Condition at discharge:     Hospital Course    1. Acute metabolic encephalopathy, acute hypoxic respiratory failure, sepsis and severe sepsis likely secondary to infection on top of metastatic high-grade pleomorphic sarcoma not  responsive to antibiotics, not present on admission  This is the main problem that led to this hospitalization.    The patient qualified for diagnosis of acute metabolic encephalopathy given worsening mental status in the spite of lack of any other etiology other than infectious etiologies.  The patient qualified for diagnosis of acute hypoxic respiratory failure secondary to requiring oxygen to maintain saturation above 92% when at baseline the patient does not require any oxygen.  The patient meets criteria with tachycardia, leukocytosis, elevated lactic acid.  The patient qualifies for diagnosis of pneumonia given shortness of breath and infiltrates on chest imaging. Pro-Fabian, ESR, CRP were all elevated. Respiratory panel negative including COVID. Respiratory distress was noted during this hospitalization with repeat imaging demonstrating pleural effusion.   CTA negative for PE and appears stable compared to earlier this month. The patient had an attempt for thoracentesis, but US imaging revealed no significant fluid and presence of large mass. A repeat attempt was remarkable for removal of a small amount of fluid (three ml) that was used for diagnostic purpose without any remarkable microbiological data as outcome.    Differential diagnosis for lactic acidosis:  A.  Cardiogenic etiology for the tachycardia and lactic acidosis as in low cardiac output has been effectively ruled out by the undersigned with a calculated cardiac index of 3.5 based on mixed venous oxygen saturation through venous blood gas obtained from Port-A-Cath  B.  Hemorrhagic etiology as an hemorrhagic shock physiology, is less likely with a stable hemoglobin    Differential diagnosis for infections:  A. C difficile: The patient had an unremarkable enteric panel and C. difficile battery upon admission. Repeat c difficile panel was also negative.    B. Urinary tract infection given abdominal pain: Urinalysis was also unremarkable.     C. Infected  metastasis at the site of his prior amputation of the right lower extremity given right lower abdominal pain and fluctuation on clinical examination.  Continued tachycardia and leukocytosis are noted.Ct abdomen and pelvis showed evidence of large intra-abdominal mass of metasstases with compression on arterial circulation and could not rule out an infection. However abdominal ultrasound at the right lower quadrant shows evidence of metastasis and cannot rule out infection.      D. Fungal infection on top of metastases: negative fungitel and galactomannan and fungal cultures. The patient received micafungin while awaiting work up. This was switched once fungal markers were negative.     D. Infection at the Port-A-Cath: unremarkable blood cultures from access site.    E.  Spontaneous bacterial peritonitis: Ruled out with paracentesis    The patient received broad spectrum with multiple oral and intravenous agents along with intravenous fluids and high-dose intravenous albumin between 10/24 through 11/08 with times of reduction of tachycardia and lactic acidosis. But in spite of this, the patient overall picture did not improve and the patient eventually passed away active appropriately aggressive interventions.  This was likely secondary to an infection at multiple sites of metastasis that was not controlled secondary to the abnormal structure of metastasis with higher incidence of resistance to antibiotics given the vascular structures of malignant tissues.  Of note, the patient was not amendable to any surgical intervention given his widespread metastasis and failure to respond to multiple lines of chemotherapy and directed therapy that was administered based on oncology.  During this hospitalization, the patient was not able to receive his chemotherapy and directed therapies given his active infection and hemodynamic instability.  The patient mental status was worsened 24 hours prior to discharge as such goals of  "care discussion was carried\" recommendation was made by the undersigned and palliative care to focus on comfort measures only.  The patient sister and family decided to pursue comfort measures only given the inability of more aggressive interventions to reverse the patient's ultimate outcome.  The patient was managed in collaboration with oncology service, palliative care service, interventional radiology, and procedural service of internal medicine.    2  Acute kidney injury AKIN stage I on top of CKD stage I-II, all are present on admission:   This is the main problem that complicated this hospitalization.  This is likely secondary to acute tubular necrosis secondary to underlying sepsis.  There was no evidence of obstructive physiology given lack of any hydroureter or hydronephrosis on CT abdomen pelvis.  The patient received albumin and intravenous fluids effectively ruling out prerenal etiology.  Although this resolved with appropriately aggressive interventions as detailed above, it recurred 24 hours prior to death.  There was concerns for the patient mental status to be related to the acute injury injury through uremic encephalopathy.  Based on discussion between the undersigned, urology, and nephrology service, and given the limited elevation in creatinine and BUN, and the presence of low-grade acute kidney injury while the patient was encephalopathic, acute kidney injury was deemed not to be the etiology for the patient's encephalopathy.    3.  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  The patient failed multiple lines of chemotherapy the most recent line of targeted therapy included pazopznib 800 mg daily but the patient was not able to receive for adequate period of time [needed to be given for at least 3 months] before evaluation of responsiveness can be obtained.  This medication was not administered given active infection and hemodynamic instability for this patient.  The patient was managed in collaboration with palliative care service and oncology service.    4. Moderate malnutrition in the context of acute on chronic illness, all are POA  We followed recommendations of adult dietitian    5. Odynophagia secondary to severe external compression of the distal esophagus leading to luminal narrowing secondary to adjacent osteosarcoma metastatic lesion, all are POA  This was diagnosed based on cross-sectional imaging.    6. Anemia of critical illness on top of chronic anemia secondary to malignancy, all other POA  The patient received blood transfusion prior to during this admission.  The patient received total of 2 units of blood transfusion during this admission.    Consultations This Hospital Stay   PHARMACY TO DOSE VANCO  NUTRITION SERVICES ADULT IP CONSULT  ONCOLOGY ADULT IP CONSULT  CARE MANAGEMENT / SOCIAL WORK IP CONSULT  INTERNAL MEDICINE PROCEDURE TEAM ADULT IP CONSULT [facility] - THORACENTESIS  PALLIATIVE CARE ADULT IP CONSULT  SOCIAL WORK IP CONSULT  CARE MANAGEMENT / SOCIAL WORK IP CONSULT  SPEECH LANGUAGE PATH ADULT IP CONSULT  PALLIATIVE CARE ADULT IP CONSULT  PHYSICAL THERAPY ADULT IP CONSULT  PHARMACY TO DOSE VANCO  WOUND OSTOMY CONTINENCE NURSE  IP CONSULT  NURSING TO CONSULT FOR VASCULAR ACCESS CARE IP CONSULT  INTERNAL MEDICINE PROCEDURE TEAM ADULT IP CONSULT [facility] - THORACENTESIS  IP RESPIRATORY CARE CHRONIC PULMONARY DISEASE SPECIALIST  INTERVENTIONAL RADIOLOGY ADULT/PEDS IP CONSULT  CARE  MANAGEMENT / SOCIAL WORK IP CONSULT  PHYSICAL MEDICINE & REHAB ASSESSMENT FOR REHAB PLACEMENT ADULT IP CONSULT  SPEECH LANGUAGE PATH ADULT IP CONSULT  INTERNAL MEDICINE PROCEDURE TEAM ADULT IP CONSULT [facility] - PARACENTESIS  NEPHROLOGY GENERAL ADULT IP CONSULT  UROLOGY IP CONSULT    Code Status   DNR/DNI    Time Spent on this Encounter   I, Carlos Bryant MD, discharged this patient today but I did not personally see the patient today and will not be billing for the patient's discharge.       Carlos Bryant MD  [facility] [address] [facility]  [address] [facility] [address]  Phone: [phone]  ______________________________________________________________________    Physical Exam   Vital Signs:                   Weight: 173 lbs 4.8 oz  Physical Exam: Unresponsive to noxious stimuli     Patient was pronounced dead at 8:43 AM, November 8, 2022.       Primary Care Physician   Rajesh Vidales    Discharge Orders      Resume Home Care Services    Resume Services as prior to admission:   SN, PT, and OT  Weekly blood draws on Mondays  PRN transfusions on Wednesdays at [facility].     Bob Beyer   Ph:[phone]   Fax:[phone]       Significant Results and Procedures   Most Recent 3 CBC's:Recent Labs   Lab Test 11/07/22  0705 11/06/22  0609 11/05/22  0524   WBC 24.9* 21.4* 15.0*   HGB 7.8* 7.8* 7.8*   MCV 90 89 89    299 262     Most Recent 3 BMP's:Recent Labs   Lab Test 11/07/22  0705 11/06/22  0609 11/05/22  0524    140 151*   POTASSIUM 4.3 3.8 3.8   CHLORIDE 100 102 110*   CO2 17* 19* 13*   BUN 61.2* 43.0* 28.4*   CR 3.04* 1.91* 1.49*   ANIONGAP 22* 19* 28*   VENTURA 8.9 9.5 9.5   * 92 83     Most Recent 2 LFT's:Recent Labs   Lab Test 11/07/22  0705 11/06/22  0609   AST 22 21   ALT <5* <5*   ALKPHOS 111 118   BILITOTAL 0.6 0.8     Most Recent 3 INR's:Recent Labs   Lab Test 11/07/22  0705 11/04/22  0616 11/03/22  0721   INR 1.78* 1.64* 1.43*   ,   Results for orders  placed or performed during the hospital encounter of 10/24/22   XR Chest 2 Views    Narrative    Exam: XR CHEST 2 VIEWS, 10/24/2022 9:06 PM    Comparison: CT PE10/16/2022    History: infectious w/u, recent pna    Findings:  Semiupright PA and lateral views of the chest are obtained. Right  chest Port-A-Cath in place, tip terminates over the superior  cavoatrial junction.    Trachea is midline. Mediastinum is within normal limits. Heart  silhouette is obscured. Redemonstration of numerous large pleural  masses better characterized on CT chest 10/16/2022. There is relative  increase in mixed airspace and interstitial opacities intervening  between the masses. Trace right effusion. No pneumothorax. The upper  abdomen is unremarkable.      Impression    Impression:   1. Increased mixed opacities suspicious for infection.  2. Redemonstration of numerous pulmonary masses better characterized  on CT chest 10/16/2022.  3. Trace right effusion.    I have personally reviewed the examination and initial interpretation  and I agree with the findings.    DEWAYNE KEMP MD         SYSTEM ID:  [id number]   XR Chest Port 1 View    Narrative    Portable chest    INDICATION: Increased shortness of breath/cough    COMPARISON: 10/24/2022    FINDINGS: Heart is enlarged. Nodular and patchy opacities in the lungs  are unchanged. Right IJ portacatheter tip in the distal SVC. Right  costophrenic angle blunting loss of the left costophrenic angle  increased retrocardiac opacification all appear similar.      Impression    IMPRESSION: Unchanged multiple pulmonary nodules or masses. Continued  right pleural effusion an retrocardiac atelectasis possible left  pleural effusion.    GODWIN ENRIQUEZ MD         SYSTEM ID:  [id number]   POC US GUIDE FOR THORACENTESIS    Impression    Heterogenous lung parenchyma without evidence of effusion noted in bibasilar lung fields. Findings communicated to primary service     CT Chest Pulmonary Embolism w  Contrast    Narrative    EXAM: CT CHEST PULMONARY EMBOLISM W CONTRAST  10/27/2022 1:21 PM     HISTORY:  worsening sob; r/o additional PEs; eval mass/effusion on  left       COMPARISON:  Chest x-ray 10/24/2022 and chest CT 10/16/2022.    TECHNIQUE: Helical technique CT chest ;axial slices with sagittal and  coronal reconstructions. Total DLP: mGy*cm.    FINDINGS:  LUNGS:  No evidence of pulmonary embolus. Postoperative changes of right  middle lobectomy. There is redemonstration of innumerable amount of  varying sized round/ovoid consolidative nodular metastatic lesions  without significant change since 10/16/2022. Stable mild/moderate  right pleural effusion with unchanged minimal/trace left pleural  effusion and associated mild atelectasis. No pneumothorax.    CHEST, MEDIASTINUM, AND AXILLA:  The thyroid is unremarkable. Right chest wall Port-A-Cath tip  terminates at the SVC-atrial junction. Heart size is within normal  limits, somewhat externally compressed from metastatic lesions. The  main pulmonary artery diameter is stable mildly dilated measuring up  to 3.2 cm. No abnormal intracardiac contrast filling defects or  evidence of right heart strain. No discretely visualized mediastinal,  axillary, or hilar lymphadenopathy. Patent distal esophagus.    UPPER ABDOMEN:  Non-portovenous contrast phase technique of the limited upper abdomen  is unremarkable. No appreciable abdominal lymphadenopathy.    BONES AND SOFT TISSUES:  Degenerative changes of the visualized spine. No aggressive or  suspicious osseous/soft tissue lesion identified.      Impression    IMPRESSION:  1. Redemonstration of innumerable varying sized round/ovoid  consolidative nodular metastatic lesions without significant change  since 10/16/2022, in the setting of known osteosarcoma.   2. Stable mild/moderate right pleural effusion with unchanged  minimal/trace left pleural effusion and associated mild atelectasis.    I have personally reviewed the  examination and initial interpretation  and I agree with the findings.    GODWIN ENRIQUEZ MD         SYSTEM ID:  [id number]   XR Esophagram    Narrative    Esophagram dated 11/1/2022    COMPARISON:  CT chest 10/27/2022.    HISTORY:    47-year-old male with previous right hemipelvectomy due to  osteosarcoma, presenting with acute respiratory failure, work up  revealing sensation of food being stuck in the chest. Previous chest  CT demonstrating multiple round/ovoid consolidated nodular metastatic  lesions within the chest cavity.    Examination is partially limited due to patient inability to stand  upright and limited mobility while supine.    FINDINGS: Examination of the esophagus reveals dysmotility with bolus  fractionation and part of the barium bolus remaining within the  esophagus. There were no definite mucosal abnormalities. Severe  external compression of the distal esophagus leading to luminal  narrowing. The gastroesophageal junction is patent. No hiatal hernia  was seen on examination. There was absence of gastroesophageal reflux.      Impression    IMPRESSION: Severe external compression of the distal esophagus  leading to luminal narrowing, likely secondary to adjacent  osteosarcoma metastatic lesions as seen on CT chest from 10/27/2022  (series 5, image 30).    Fluoroscopy time was 2.5 minutes.    I have personally reviewed the examination and initial interpretation  and I agree with the findings.    ANSHUL MYERS MD         SYSTEM ID:  [id number]   US Abdomen Limited    Narrative    Exam: US RIGHT LOWER QUADRANT ABDOMEN LIMITED, 11/1/2022 3:13 PM    Indication: 47-year-old male with previous right hemipelvectomy due to  osteosarcoma and diffuse metastatic disease, refuses additional CT  scans. Persistent leukocytosis, sepsis, team requesting an evaluation  due to concerns for intra-abdominal infection at the site in question  versus the right lower extremity sarcoma.    Comparison: CT chest  abdomen and pelvis 10/5/2022.    Findings:   At the location in question in the right lower quadrant, there is a  22.5 x 22.4 x 19.0 cm complex heterogeneous solid mass with mixed  solid and cystic components and internal vascularity. Within this  structure there are multiple anechoic complex fluid collections which  could represent cystic necrosis versus fluid collection secondary to  the malignancy or a secondary cause such as an infection/abscess. Of  note, there are large areas of central necrosis within the mass on  prior CT. This mass correlates to the solid peripherally enhancing  mass seen on previous CT abdomen from 10/5/2022 (series 2 image 546)  and is consistent with the patient's history of metastatic  osteosarcoma.      Impression    Impression: At the site in question there is a complex heterogeneous  solid structure with multiple internal anechoic fluid collections  which are likely secondary to the primary malignancy with central  necrosis, though cannot exclude a secondary superimposed infection on  imaging. Allowing for differences in modalities, lesion appears  similar in size to mass on prior CT in this region. No overt  surrounding hyperemia demonstrated sonographically.    I have personally reviewed the examination and initial interpretation  and I agree with the findings.    ANSHUL MYERS MD         SYSTEM ID:  [id number]   XR Chest Port 1 View    Narrative    Exam: XR CHEST PORT 1 VIEW, 11/2/2022 4:52 AM    Comparison: 10/27/2022    History: shortness of breath    Findings:  Single portable upright view of the chest obtained. Right chest  Port-A-Cath in place, tip terminates near the superior cavoatrial  junction.    Trachea is midline. Mediastinum is within normal limits. Heart  silhouette is somewhat obscured.. Similar appearance of large  bilateral pulmonary masses better characterized on CT chest  10/27/2022. Similar appearance of large left and small right effusions  and associated  atelectasis. There is no pneumothorax or pleural  effusion. The upper abdomen is unremarkable.      Impression    Impression:   1. Similar appearance of large left and small right pleural effusions  and associated atelectasis.  2. Redemonstration of numerous pulmonary masses better characterized  on CT chest 10/27/2022.    I have personally reviewed the examination and initial interpretation  and I agree with the findings.    FRANK THOMASON MD         SYSTEM ID:  [id number]   POC US Guide for Thorcentesis    Impression    Limited Bedside Lung Ultrasound, performed and interpreted by me.   Indication: Chest pain and shortness of breath    With the patient positioned upright, bilateral anterior, posterior and lateral lung fields were examined for evidence of thoracic free fluid, pulmonary consolidation, and pulmonary edema.       Findings: right side note trace effusion, No discernable effusion noted on left.     IMPRESSION: Noted findings as above.    IR Thoracentesis    Narrative    Procedure date: 11/4/2022.    History: Patient with complex highly loculated right pleural effusion,  presenting for diagnostic thoracentesis.    Procedure: Right diagnostic thoracentesis.    Preop diagnosis: Complex highly loculated right pleural effusion.    Postop diagnosis: Same.    : Jw Rodriguez PA-C.    Assist: None.    Medications: 5 ml 1% lidocaine local anesthesia.    Face to face sedation time: None.    Nursing: Throughout the procedure the patient's vital signs and oxygen  saturations were continuously monitored by IR nursing staff under my  supervision, and remained stable.    Fluoroscopy time: None.    IV contrast: None.    Consent: The procedure, as well as risks and benefits, was discussed  in detail with the patient. Informed written and verbal consent was  obtained.    Procedure/Findings:  The patient was placed in the upright position on  the gurney, and limited ultrasound evaluation of the right  posterior  hemithorax revealed a small complex highly loculated pleural effusion.  The area overlying  the right posterior hemithorax was prepped and  draped in usual sterile fashion. Under ultrasound guidance, the area  overlying the effusion was anesthetized to the pleura with 10 ml 1%  lidocaine. Under ultrasound guidance a 5 Georgian, 10 cm straight Yueh  needle/catheter was advanced into the pleural space, with initial  return of hazy serosanguineous fluid. The catheter was advanced off  the needle into the pleural space and the needle was removed. Syringe  aspiration failed to return significant fluid. Under ultrasound  guidance a 0.035 Bentson wire was advanced through the catheter into  the right pleural space. Under ultrasound guidance wire was  manipulated within the right pleural space in an effort to disrupt  septations. Wire was removed and the catheter was connected to syringe  aspiration. Under ultrasound guidance, continuous aspiration was  performed while maneuvering the catheter within the right pleural  space. Ultimately, a total of 4 cc hazy serosanguineous fluid was  aspirated. Fluid contained some debris. Patient declined a repeat  puncture into right pleural space for additional aspiration attempt.  Repeat ultrasound revealed significant fluid remaining.  The catheter  was removed with the patient performing an expiratory maneuver, and  the entrance site was occluded while pressure was applied to the  intercostal space for approximately 1 minute. The site was cleansed  and dressed with a sterile occlusive bandage.  Images were saved  throughout the procedure. The procedure was well tolerated, with no  immediate complications.  Lab results pending.    Estimate blood loss: 0.2 cc.    Specimens:  4 cc hazy serosanguinous fluid from the right pleural  space.      Impression    Impression: Ultrasound guided right diagnostic thoracentesis. Total of  4 cc of hazy serosanguinous fluid drained.    Plan:  Patient transported to post care unit in stable condition.   Interventional radiology post procedure standing orders for  thoracentesis.  Daily dressing changes as needed.      Attestation: The physician assistant (PA) who performed this procedure  and signed the above report is licensed to practice in the [facility] pursuant to [place] Statute 147A.09.  This includes meeting the  Statute and [place] Board of Medical [employer] requirement of an  active Delegation Agreement, which documents delegation of services by  primary and alternate supervising physicians. All services rendered  are performed under a collaborative agreement with Dr. Ran Sharpe, Director of Interventional Radiology, [facility].    SAUMYA NELSON PA-C         SYSTEM ID:  [id number]   CT Chest/Abdomen/Pelvis w Contrast    Narrative    EXAMINATION: CT CHEST/ABDOMEN/PELVIS W CONTRAST, 11/6/2022 1:40 PM    TECHNIQUE:  Helical CT images from the lung apices through the  symphysis pubis were obtained with contrast.  Coronal reformatted  images were generated at a workstation for further assessment.    CONTRAST:  105 cc Isovue 370 IV.    COMPARISON: Chest CT 10/27/2022, CT CAP 10/5/2022    HISTORY: worsening fatigue,continued cough,  increased abdominal pain,  worsening inflammatory markers inspite of broad spectrum antibiotics,  known sarcoma    FINDINGS:    Chest:    Lungs: Tracheal secretions/debris in the upper thoracic trachea.  Recent demonstration of multiple bilateral centrally hypodense,  necrotic metastatic masses, for example a 5.3 cm mass in the left  upper lobe (series 3, image 48) and a 15 x 10 cm mass in the left  lower lobe. Overall, the metastatic masses are not significantly  changed compared to CT from 10/27/2022. No pneumothorax. Small  bilateral pleural effusions. Mild bibasilar atelectasis. There is  possible compression on the heart by large left lower lobe mass.    Mediastinum: Right chest wall  Port-A-Cath tip in the right atrium. The  thyroid gland is unremarkable. The heart is not enlarged. No  significant pericardial effusion. The ascending aorta and main  pulmonary artery are normal in caliber. No mediastinal  lymphadenopathy.    Abdomen and pelvis:   Liver: No suspicious liver lesions. Portal veins appear patent.  Gallbladder: No gallstones. No evidence of acute cholecystitis.  Spleen: Normal size.  Pancreas: No suspicious pancreatic lesions. The pancreatic duct is not  dilated.  Adrenal glands: No adrenal nodules.  Kidneys: No kidney masses. No hydronephrosis or obstructing renal  stones.  Bladder / Pelvic organs: Large mass in the right pelvis, extending out  of the pelvis and measuring 21 x 16 cm. There is compression of pelvic  organs by the large mass. The urinary bladder is not definitively  identified, likely decompressed.  Bowel: No bowel wall thickening or evidence for obstruction. The  appendix is not visualized.  Lymph nodes: No retroperitoneal, mesenteric, or pelvic  lymphadenopathy.  Peritoneum/Retroperitoneum: Increased abdominal ascites compared to  prior. No loculated fluid collection or pneumoperitoneum.  Vessels: No infrarenal aortic aneurysm. 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 Occlusion of the  right  Iliac vein.    Bones and soft tissues: Postsurgical changes of resection of the right  hemipelvis and visualized right lower extremity. Body wall anasarca.  Degenerative changes in the spine.      Impression    IMPRESSION: In this patient with a history of osteosarcoma, status  post right hemipelvis and right lower extremity resection:  1.  Redemonstration of extensive centrally necrotic pulmonary  metastases, with the largest measuring 15 cm in the left lower lobe.  2.  Large mass in the pelvis measuring 21 cm. There is mass effect in  the pelvis, resulting in occlusion of the right iliac arteries and  veins. Unchanged from prior.  3.   Moderate abdominal ascites, increased compared to prior.  4.  Small bilateral pleural effusions.    I have personally reviewed the examination and initial interpretation  and I agree with the findings.    FRANK THOMASON MD         SYSTEM ID:  [id number]       Discharge Medications   Discharge Medication List as of 11/8/2022  6:09 PM      CONTINUE these medications which have NOT CHANGED    Details   acetaminophen (TYLENOL) 325 MG tablet Take 3 tablets (975 mg) by mouth every 8 hours, Disp-270 tablet, R-1, Local Print      apixaban ANTICOAGULANT (ELIQUIS) 5 MG tablet Take 1 tablet (5 mg) by mouth 2 times daily, Disp-60 tablet, R-3, E-Prescribe      atorvastatin (LIPITOR) 80 MG tablet Take 1 tablet (80 mg) by mouth every evening, Disp-90 tablet, R-3, E-Prescribe      escitalopram (LEXAPRO) 10 MG tablet Take 1 tablet (10 mg) by mouth daily, Disp-30 tablet, R-1, Local Print      gabapentin (NEURONTIN) 300 MG capsule Take 1 capsule (300 mg) by mouth 3 times daily, Disp-90 capsule, R-0, E-Prescribe      ipratropium-albuterol (COMBIVENT RESPIMAT)  MCG/ACT inhaler Inhale 1 puff into the lungs 3 times daily as needed for shortness of breath / dyspnea or wheezing, Disp-4 g, R-0, E-Prescribe      multivitamin, therapeutic (THERA-VIT) TABS tablet Take 1 tablet by mouth daily, Disp-30 tablet, R-1, E-Prescribe      naproxen (NAPROSYN) 500 MG tablet Take 500 mg by mouth 2 times daily (with meals), Historical      Nutritional Supplements (ENSURE ACTIVE HIGH PROTEIN) LIQD Take 1 Can by mouth 3 times daily (with meals), Disp-7110 mL, R-1, E-Prescribe      OLANZapine (ZYPREXA) 2.5 MG tablet Take 1 tablet (2.5 mg) by mouth At Bedtime, Disp-30 tablet, R-0, E-Prescribe      ondansetron (ZOFRAN ODT) 4 MG ODT tab Take 1-2 tablets (4-8 mg) by mouth every 8 hours as needed for nausea or vomiting, Disp-30 tablet, R-0, E-Prescribe      oxyCODONE IR (ROXICODONE) 10 MG tablet Take 1 tablet (10 mg) by mouth every 4 hours as needed for  moderate to severe pain, Disp-40 tablet, R-0, TREMAYNE, E-Prescribe      pazopanib (VOTRIENT) 200 MG tablet Take 4 tablets (800 mg) by mouth daily Take on an empty stomach 1 hour before or 2 hours after a meal., Disp-120 tablet, R-0, E-Prescribe      magnesium oxide (MAG-OX) 400 MG tablet Take 1 tablet (400 mg) by mouth 2 times daily, Disp-30 tablet, R-1, E-Prescribe      polyethylene glycol (MIRALAX) 17 GM/Dose powder Take 17 g by mouth daily Hold for loose stools, Disp-510 g, R-0, E-Prescribe           Allergies   Allergies   Allergen Reactions     Blood Transfusion Related (Informational Only) Other (See Comments)     Patient has a history of a clinically significant antibody against RBC antigens.  A delay in compatible RBCs may occur.

## 2022-12-02 LAB — BACTERIA PLR CULT: NO GROWTH

## 2022-12-05 LAB — BACTERIA FLD CULT: NO GROWTH

## 2023-01-01 NOTE — TELEPHONE ENCOUNTER
[facility] Call Center    Phone Message    May a detailed message be left on voicemail: yes     Reason for Call: Other: Patient's sister (Lauryn) returned Sofia's message.  Please return her call at [phone].     Action Taken: Message routed to:  [facility] ortho    Travel Screening: Not Applicable                                                                       Normal and symmetric appearance without webbing, redundant skin, masses, pits or sternocleidomastoid muscle lesions; clavicles of normal shape, contour and nontender on palpation.

## 2023-02-02 ENCOUNTER — TRANSFERRED RECORDS (OUTPATIENT)
Dept: HEALTH INFORMATION MANAGEMENT | Facility: CLINIC | Age: 48
End: 2023-02-02
Payer: COMMERCIAL

## 2023-02-17 ENCOUNTER — MEDICAL CORRESPONDENCE (OUTPATIENT)
Dept: HEALTH INFORMATION MANAGEMENT | Facility: CLINIC | Age: 48
End: 2023-02-17
Payer: COMMERCIAL

## 2023-12-08 NOTE — PLAN OF CARE
"Afebrile, vital signs stable. Weaned from 2 LPM to 1 LPM today. Reports improvement in breathing. Complaints of nausea and pain at chest tube insertion site. Oxycodone given x 2, scheduled tylenol. Zofran and compazine given for nausea. Plan to start zyprexa at bedtime. LR started this afternoon for rising creatinine and decreased oral intake. Potassium replaced with a one time order and recheck planned for tomorrow with morning labs. Lovenox to resume this evening. Neupogen to start this evening. Chest tube to -20 H20 and 20 mL output this shift of serous fluid. Right leg amputation dressing changed x 1 today. Voiding adequate amounts. Patient is constipated reporting \"last bowel movement a few days ago.\"                " yes

## 2023-12-15 NOTE — DISCHARGE INSTRUCTIONS
called and stated that patient takes her Alprazolam TID and keeps getting only 60 pills. He said it should be for 90 days.  Please resend the prescription to [facility] [place]   Do not take other NSAIDs such as ibuprofen or Motrin while taking naproxen.  Please follow-up with your surgical and cancer team on Monday and please return for any worsening or more concerning symptoms.

## 2024-06-04 NOTE — RESULT ENCOUNTER NOTE
Report routed to hospitalist pool.  Patient admitted.  Actively followed by medicine provider.  No action required.  hide

## (undated) DEVICE — ESU FCP BIPOLAR NONSTICK STR 4"X0.4MM W/CORD 19-3002AU

## (undated) DEVICE — DRAPE ISOLATION BAG 1003

## (undated) DEVICE — Device

## (undated) DEVICE — CONNECTOR MALE TO MALE LL

## (undated) DEVICE — DRAPE FLUID WARMING 52"X66" ORS-301

## (undated) DEVICE — CLIP APPLIER 13" PREMIUM II 134048

## (undated) DEVICE — SU PROLENE 5-0 RB-1DA 36"  8556H

## (undated) DEVICE — GLOVE PROTEXIS W/NEU-THERA 7.0  2D73TE70

## (undated) DEVICE — LINEN GOWN XLG 5407

## (undated) DEVICE — EYE SPONGE SPEAR WECK CEL 0008685

## (undated) DEVICE — SU PDS II 0 CTX 36" Z370T

## (undated) DEVICE — GLOVE PROTEXIS BLUE W/NEU-THERA 7.5  2D73EB75

## (undated) DEVICE — KNIFE HANDLE W/15 BLADE 371615

## (undated) DEVICE — SU SILK 2-0 TIE 12X30" A305H

## (undated) DEVICE — CLIP GEM MICRO GEM2431

## (undated) DEVICE — SOL WATER IRRIG 1000ML BOTTLE 2F7114

## (undated) DEVICE — SU PDS II 0 CT-1 27" Z340H

## (undated) DEVICE — DRSG TELFA 3X8" 1238

## (undated) DEVICE — DRAPE SHEET REV FOLD 3/4 9349

## (undated) DEVICE — STRAP UNIVERSAL POSITIONING 2-PIECE 4X47X76" 91-287

## (undated) DEVICE — ESU ELEC BLADE 2.75" COATED/INSULATED E1455

## (undated) DEVICE — SPONGE LAP 18X18" X8435

## (undated) DEVICE — SU VICRYL 3-0 CT-1 36" J344H

## (undated) DEVICE — GOWN XLG DISP 9545

## (undated) DEVICE — CLIP APPLIER 11" MED LIGACLIP MCM30

## (undated) DEVICE — DRAPE LAP W/ARMBOARD 29410

## (undated) DEVICE — ADH SKIN CLOSURE PREMIERPRO EXOFIN 1.0ML 3470

## (undated) DEVICE — CLOSURE SYS SKIN PREMIERPRO EXOFINFUSION 4X60CM 3473

## (undated) DEVICE — SUCTION TIP YANKAUER STR K87

## (undated) DEVICE — RETR ELASTIC STAYS LONE STAR BLUNT SGL PK 3350-1G

## (undated) DEVICE — KIT PROCEDURE SPY-PHI SGL HH9001

## (undated) DEVICE — STPL SKIN PROXIMATE 35 WIDE PMW35

## (undated) DEVICE — CLIP GEM MICRO GEM1521

## (undated) DEVICE — SU MONOCRYL 4-0 PS-2 18" UND Y496G

## (undated) DEVICE — BNDG ELASTIC 6" DBL LENGTH UNSTERILE 6611-16

## (undated) DEVICE — LINEN TOWEL PACK X6 WHITE 5487

## (undated) DEVICE — TUBING SUCTION 10'X3/16" N510

## (undated) DEVICE — SU VICRYL 0 CT-1 27" UND J260H

## (undated) DEVICE — SU VICRYL 2-0 CTX 36" J369H

## (undated) DEVICE — PACK CENTRAL LINE INSERTION SAN32CLFCG

## (undated) DEVICE — NDL ANGIOCATH 22GA 1" 4050

## (undated) DEVICE — GEL ULTRASOUND AQUASONIC 20GM 01-01

## (undated) DEVICE — SU MONOCRYL 4-0 P-3 18" UND Y494G

## (undated) DEVICE — LINEN TOWEL PACK X5 5464

## (undated) DEVICE — CLIP HORIZON SM RED WIDE SLOT 001201

## (undated) DEVICE — SYR 03ML LL W/O NDL 309657

## (undated) DEVICE — CLIP HORIZON LG ORANGE 004200

## (undated) DEVICE — RETR BLADE LONE STAR 20MM SPIRA 3 FINGER 3335-4G

## (undated) DEVICE — PACK SET-UP STD 9102

## (undated) DEVICE — COVER ULTRASOUND PROBE W/GEL FLEXI-FEEL 6"X58" LF  25-FF658

## (undated) DEVICE — SU SILK 3-0 TIE 12X30" A304H

## (undated) DEVICE — DRSG XEROFORM 5X9" CUR253590W

## (undated) DEVICE — ESU ELEC BLADE 6" COATED E1450-6

## (undated) DEVICE — KIT INTRODUCER FLUENT MICRO 5FRX10CM ECHO TIP KIT-038-04

## (undated) DEVICE — LIGHT HANDLE X1 31140133

## (undated) DEVICE — SPONGE COTTONOID 1/2X3" 80-1407

## (undated) DEVICE — ESU PENCIL SMOKE EVAC W/ROCKER SWITCH 0703-047-000

## (undated) DEVICE — DRAPE IOBAN INCISE 23X17" 6650EZ

## (undated) DEVICE — PREP CHLORAPREP 26ML TINTED HI-LITE ORANGE 930815

## (undated) DEVICE — SOL NACL 0.9% IRRIG 1000ML BOTTLE 2F7124

## (undated) DEVICE — SYR 05ML LL W/O NDL

## (undated) DEVICE — LABEL MEDICATION SYSTEM 3303-P

## (undated) DEVICE — DRAIN JACKSON PRATT ROUND SIL 19FR W/TROCAR LF JP-2232

## (undated) DEVICE — CLIP HORIZON MED BLUE 002200

## (undated) DEVICE — SPONGE COTTONOID 1/2X3" 20-07S

## (undated) DEVICE — VESSEL LOOPS BLUE SUPERMAXI 011022PBX

## (undated) DEVICE — HEMOSTASIS BONE OSTENE 2.5G SYNTHETIC 1503832

## (undated) DEVICE — STPL SKIN 35W ROTATING HEAD PRW35

## (undated) DEVICE — DECANTER BAG 2002S

## (undated) DEVICE — SUCTION TIP YANKAUER W/O VENT K86

## (undated) DEVICE — DRAPE MICRO ZEISS 120X54" LF 09-GL900

## (undated) DEVICE — SU VICRYL 2-0 CT-1 27" UND J259H

## (undated) DEVICE — LINEN TOWEL PACK X30 5481

## (undated) DEVICE — SPECIMEN CONTAINER 5OZ STERILE 2600SA

## (undated) DEVICE — DRAPE STOCKINETTE IMPERVIOUS 10" 21048

## (undated) DEVICE — CATH TRAY FOLEY SURESTEP 16FR W/TMP PRB STLK LATEX A319416AM

## (undated) DEVICE — SU MONOCRYL 2-0 SH 27" UND Y417H

## (undated) DEVICE — NDL ANGIOCATH 24GA 0.75" 4053

## (undated) DEVICE — WIPE INSTRUMENT MEROCEL 400200

## (undated) DEVICE — DRAPE U SPLIT 74X120" 29440

## (undated) DEVICE — SU VICRYL 3-0 SH 27" J316H

## (undated) DEVICE — BNDG ESMARK 4" STERILE LF 820-3412

## (undated) DEVICE — VESSEL LOOPS YELLOW MAXI 31145694

## (undated) DEVICE — SU SILK 0 TIE 6X30" A306H

## (undated) DEVICE — DRAPE CONVERTORS U-DRAPE 60X72" 8476

## (undated) DEVICE — TUBING SMOKE EVAC 3/8"X10' 0702-045-023

## (undated) DEVICE — SYR BULB IRRIG DOVER 60 ML LATEX FREE 67000

## (undated) DEVICE — CLIP APPLIER 09 3/8" SM LIGACLIP MCS20

## (undated) DEVICE — DRSG AQUACEL EXTRA AG 4X5" 422299

## (undated) DEVICE — SUCTION CARDIAC FRAZIER TIP 6FR STERILE 3" 10053

## (undated) DEVICE — NDL EPIDURAL TUOHY 20GA 3.5" 405028

## (undated) DEVICE — DRSG TEGADERM 8X12" 1629

## (undated) DEVICE — DRAPE POUCH INSTRUMENT 1018

## (undated) DEVICE — DRAIN JACKSON PRATT RESERVOIR 400ML SU130-1000

## (undated) DEVICE — SU SILK 0 SH 30" K834H

## (undated) DEVICE — ESU GROUND PAD ADULT W/CORD E7507

## (undated) DEVICE — PITCHER STERILE 1000ML  SSK9004A

## (undated) DEVICE — GLOVE PROTEXIS POWDER FREE SMT 7.5  2D72PT75X

## (undated) DEVICE — SUCTION MANIFOLD NEPTUNE 2 SYS 4 PORT 0702-020-000

## (undated) DEVICE — RETR ELASTIC STAYS LONE STAR BLUNT DUAL LEAD 3550-1G

## (undated) RX ORDER — CEFAZOLIN SODIUM 1 G/3ML
INJECTION, POWDER, FOR SOLUTION INTRAMUSCULAR; INTRAVENOUS
Status: DISPENSED
Start: 2021-11-01

## (undated) RX ORDER — CEFAZOLIN SODIUM 1 G/3ML
INJECTION, POWDER, FOR SOLUTION INTRAMUSCULAR; INTRAVENOUS
Status: DISPENSED
Start: 2022-01-01

## (undated) RX ORDER — TRANEXAMIC ACID 650 MG/1
TABLET ORAL
Status: DISPENSED
Start: 2022-01-01

## (undated) RX ORDER — CALCIUM CHLORIDE 100 MG/ML
INJECTION INTRAVENOUS; INTRAVENTRICULAR
Status: DISPENSED
Start: 2022-01-01

## (undated) RX ORDER — LIDOCAINE HYDROCHLORIDE 10 MG/ML
INJECTION, SOLUTION EPIDURAL; INFILTRATION; INTRACAUDAL; PERINEURAL
Status: DISPENSED
Start: 2022-01-01

## (undated) RX ORDER — VERAPAMIL HYDROCHLORIDE 2.5 MG/ML
INJECTION, SOLUTION INTRAVENOUS
Status: DISPENSED
Start: 2022-01-01

## (undated) RX ORDER — PAPAVERINE HYDROCHLORIDE 30 MG/ML
INJECTION INTRAMUSCULAR; INTRAVENOUS
Status: DISPENSED
Start: 2022-01-01

## (undated) RX ORDER — HEPARIN SODIUM 1000 [USP'U]/ML
INJECTION, SOLUTION INTRAVENOUS; SUBCUTANEOUS
Status: DISPENSED
Start: 2022-01-01

## (undated) RX ORDER — LIDOCAINE HYDROCHLORIDE 10 MG/ML
INJECTION, SOLUTION EPIDURAL; INFILTRATION; INTRACAUDAL; PERINEURAL
Status: DISPENSED
Start: 2021-09-24

## (undated) RX ORDER — FENTANYL CITRATE 50 UG/ML
INJECTION, SOLUTION INTRAMUSCULAR; INTRAVENOUS
Status: DISPENSED
Start: 2022-01-01

## (undated) RX ORDER — OXYCODONE HYDROCHLORIDE 5 MG/1
TABLET ORAL
Status: DISPENSED
Start: 2022-01-01

## (undated) RX ORDER — MINERAL OIL
OIL (ML) MISCELLANEOUS
Status: DISPENSED
Start: 2022-01-01

## (undated) RX ORDER — CEFAZOLIN SODIUM/WATER 2 G/20 ML
SYRINGE (ML) INTRAVENOUS
Status: DISPENSED
Start: 2022-01-01

## (undated) RX ORDER — EPINEPHRINE 1 MG/ML
INJECTION, SOLUTION INTRAMUSCULAR; SUBCUTANEOUS
Status: DISPENSED
Start: 2022-01-01